# Patient Record
Sex: MALE | Race: BLACK OR AFRICAN AMERICAN | Employment: OTHER | URBAN - METROPOLITAN AREA
[De-identification: names, ages, dates, MRNs, and addresses within clinical notes are randomized per-mention and may not be internally consistent; named-entity substitution may affect disease eponyms.]

---

## 2017-07-20 ENCOUNTER — TRANSFERRED RECORDS (OUTPATIENT)
Dept: HEALTH INFORMATION MANAGEMENT | Facility: CLINIC | Age: 72
End: 2017-07-20

## 2017-07-24 ENCOUNTER — TRANSFERRED RECORDS (OUTPATIENT)
Dept: HEALTH INFORMATION MANAGEMENT | Facility: CLINIC | Age: 72
End: 2017-07-24

## 2017-08-01 ENCOUNTER — MEDICAL CORRESPONDENCE (OUTPATIENT)
Dept: HEALTH INFORMATION MANAGEMENT | Facility: CLINIC | Age: 72
End: 2017-08-01

## 2017-08-01 ENCOUNTER — TRANSFERRED RECORDS (OUTPATIENT)
Dept: HEALTH INFORMATION MANAGEMENT | Facility: CLINIC | Age: 72
End: 2017-08-01

## 2017-08-11 LAB — COPATH REPORT: NORMAL

## 2017-08-11 PROCEDURE — 00000346 ZZHCL STATISTIC REVIEW OUTSIDE SLIDES TC 88321: Performed by: UROLOGY

## 2017-08-18 ENCOUNTER — PRE VISIT (OUTPATIENT)
Dept: UROLOGY | Facility: CLINIC | Age: 72
End: 2017-08-18

## 2017-08-18 NOTE — TELEPHONE ENCOUNTER
Patient with urothelial carcinoma coming in for a consult. Chart reviewed and all records available. No need for a call.

## 2017-08-22 ENCOUNTER — OFFICE VISIT (OUTPATIENT)
Dept: UROLOGY | Facility: CLINIC | Age: 72
End: 2017-08-22

## 2017-08-22 VITALS
SYSTOLIC BLOOD PRESSURE: 153 MMHG | WEIGHT: 159.7 LBS | HEART RATE: 68 BPM | BODY MASS INDEX: 19.45 KG/M2 | DIASTOLIC BLOOD PRESSURE: 91 MMHG | HEIGHT: 76 IN

## 2017-08-22 DIAGNOSIS — C68.0 URETHRAL CANCER (H): Primary | ICD-10-CM

## 2017-08-22 RX ORDER — FLUTICASONE PROPIONATE 50 MCG
1 SPRAY, SUSPENSION (ML) NASAL EVERY MORNING
COMMUNITY
Start: 2013-07-28 | End: 2017-12-13

## 2017-08-22 RX ORDER — CEFAZOLIN SODIUM 1 G/3ML
1 INJECTION, POWDER, FOR SOLUTION INTRAMUSCULAR; INTRAVENOUS SEE ADMIN INSTRUCTIONS
Status: CANCELLED | OUTPATIENT
Start: 2017-08-22

## 2017-08-22 ASSESSMENT — ENCOUNTER SYMPTOMS
BOWEL INCONTINENCE: 0
MYALGIAS: 1
MUSCLE CRAMPS: 1
JAUNDICE: 0
DYSURIA: 1
ARTHRALGIAS: 1
DIFFICULTY URINATING: 1
HEARTBURN: 1
MUSCLE WEAKNESS: 0
TASTE DISTURBANCE: 0
BLOATING: 0
RECTAL BLEEDING: 0
TROUBLE SWALLOWING: 1
DIARRHEA: 0
DEPRESSION: 0
ABDOMINAL PAIN: 0
BLOOD IN STOOL: 0
STIFFNESS: 0
HOARSE VOICE: 0
NERVOUS/ANXIOUS: 1
BACK PAIN: 0
HEMATURIA: 0
RECTAL PAIN: 0
NECK PAIN: 1
PANIC: 0
DECREASED CONCENTRATION: 0
SINUS PAIN: 1
VOMITING: 0
FLANK PAIN: 0
NECK MASS: 0
SMELL DISTURBANCE: 0
NAUSEA: 0
SORE THROAT: 0
JOINT SWELLING: 0
INSOMNIA: 1
SINUS CONGESTION: 1
CONSTIPATION: 1

## 2017-08-22 ASSESSMENT — PAIN SCALES - GENERAL: PAINLEVEL: NO PAIN (0)

## 2017-08-22 NOTE — MR AVS SNAPSHOT
After Visit Summary   8/22/2017    King Gonsalo Bruno    MRN: [id number]           Patient Information     Date Of Birth          1945        Visit Information        Provider Department      8/22/2017 10:00 AM Muriel Haddad MD [facility] Urology and [facility] Prostate and Urologic Cancers        Today's Diagnoses     Urethral cancer (H)    -  1       Follow-ups after your visit        Additional Services     PAC Visit Referral (For [facility] Only)       Does this visit require an Anesthesia consult?  Yes - Evaluate for medical necessity related to one of the following conditions:      H&P done by:  N/A and Other (Specify): PAC      Please be aware that coverage of these services is subject to the terms and limitations of your health insurance plan.  Call member services at your health plan with any benefit or coverage questions.      Please bring the following to your appointment:  >>   Any x-rays, CTs or MRIs which have been performed.  Contact the facility where they were done to arrange for  prior to your scheduled appointment.  Any new CT, MRI or other procedures ordered by your specialist must be performed at a [facility] facility or coordinated by your clinic's referral office.    >>   List of current medications  >>   This referral request   >>   Any documents/labs given to you for this referral                  Your next 10 appointments already scheduled     Aug 23, 2017  9:45 AM CDT   PET ONCOLOGY WHOLE BODY with UUPET1   [facility] PET CT ([facility] [place])    [address] [facility] [address] [phone]           Tell your doctor:   If there is any chance you may be pregnant or if you are breastfeeding.   If you have problems lying in small spaces (claustrophobia). If you do, your doctor may give you medicine to help you relax. If you have diabetes:   Have your exam early in the morning. Your blood glucose will go up as  the day goes by.   Your glucose level must be 180 or less at the start of the exam. Please take any medicines you need to ensure this blood glucose level. 24 hours before your scan: Don t do any heavy exercise. (No jogging, aerobics or other workouts.) Exercise will make your pictures less accurate. 6 hours before your scan:   Stop all food and liquids (except water).   Do not chew gum or suck on mints.   If you need to take medicine with food, you may take it with a few crackers.  Please call your Imaging Department at your exam site with any questions.            Aug 23, 2017 11:45 AM CDT   (Arrive by 11:30 AM)   New Patient Visit with Steve Arceo MD   [facility] ([facility] [place])    [address] [place] [id number] [phone]              Future tests that were ordered for you today     Open Future Orders        Priority Expected Expires Ordered    PET Oncology Whole Body Routine  8/22/2018 8/22/2017            Who to contact     Please call your clinic at [phone] to:    Ask questions about your health    Make or cancel appointments    Discuss your medicines    Learn about your test results    Speak to your doctor   If you have compliments or concerns about an experience at your clinic, or if you wish to file a complaint, please contact [facility] Patient Relations at [phone] or email us at [email or web address]         Additional Information About Your Visit        Pryloshart Information     [employer] is an electronic gateway that provides easy, online access to your medical records. With [employer], you can request a clinic appointment, read your test results, renew a prescription or communicate with your care team.     To sign up for [employer] visit the website at [email or web address]   You will be asked to enter the access code listed below, as well as some personal information. Please  "follow the directions to create your username and password.     Your access code is: [email or web address]  Expires: 10/31/2017  1:07 PM     Your access code will  in 90 days. If you need help or a new code, please contact your [facility] or call [phone] for assistance.        Care EveryWhere ID     This is your Care EveryWhere ID. This could be used by other organizations to access your [facility] medical records  [id number]        Your Vitals Were     Pulse Height BMI (Body Mass Index)             68 1.93 m (6' 4\") 19.44 kg/m2          Blood Pressure from Last 3 Encounters:   17 (!) 153/91    Weight from Last 3 Encounters:   17 72.4 kg (159 lb 11.2 oz)              We Performed the Following     PAC Visit Referral (For [facility] Only)     Deysi-Operative Worksheet        Primary Care Provider Office Phone # Fax #    Joan Janet Ventura [phone]       [place] [address]        Equal Access to Services     SHILPI RED : Hadii aad ku hadasho Soomaali, waaxda luqadaha, qaybta kaalmada adeegyada, ronald srivastava . So [facility] [phone].    ATENCIÓN: Si habla español, tiene a washburn disposición servicios gratuitos de asistencia lingüística. Juwan al [phone].    We comply with applicable federal civil rights laws and [place] laws. We do not discriminate on the basis of race, color, national origin, age, disability sex, sexual orientation or gender identity.            Thank you!     Thank you for choosing [facility]OLOGY AND [facility] FOR PROSTATE AND UROLOGIC CANCERS  for your care. Our goal is always to provide you with excellent care. Hearing back from our patients is one way we can continue to improve our services. Please take a few minutes to complete the written survey that you may receive in the mail after your visit with us. Thank you!             Your Updated Medication List - Protect others around " you: Learn how to safely use, store and throw away your medicines at www.disposemymeds.org.          This list is accurate as of: 8/22/17 12:08 PM.  Always use your most recent med list.                   Brand Name Dispense Instructions for use Diagnosis    fluticasone 50 MCG/ACT spray    FLONASE     1 spray        ranitidine 150 MG tablet    ZANTAC     Take 150 mg by mouth

## 2017-08-22 NOTE — LETTER
8/22/2017       RE: King Gonsalo Bruno  [address]  [place] [facility] [address]     Dear Colleague,    Thank you for referring your patient, King Gonsalo Burno, to the [facility] UROLOGY AND [facility] FOR PROSTATE AND UROLOGIC CANCERS at [facility]. Please see a copy of my visit note below.    REASON FOR CONSULTATION:  Urethral cancer.      HISTORY OF PRESENT ILLNESS:  Mr. Bruno is a 71-year-old gentleman who originally presented for evaluation with his Primary Care physician with complaints of urinary difficulties.  He also had penile discomfort and mild hematuria for the last 4-6 months.  He at that time apparently had a strong stream and denied any history of urethral stricture or injury.  Also, he had no history of any STDs.  He subsequently was evaluated and found to have gross hematuria and then seen by Dr. Dunn.  Dr. Dunn was able to palpate the urethra, which was markedly abnormal, and he was very concerned about it.  He subsequently underwent a cystoscopy, at which time a very rough and irregular penile urethra was noted.  The urethra was dilated, as it could not be cystoscopically examined, and biopsy of this penile urethral area and mass was obtained.  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  He has then been counseled that he will need some sort of surgery to excise his urethra and needs some sort of urinary diversion, perhaps with a perineal urethrostomy, and he is here for further evaluation and possible discussion of treatment options.  He continues to have some urinary difficulties with a poor stream.  He denies any pain at this time.  He denies any significant bleeding.  He denies any back pain, bone pain or weight loss recently.  He does, however, have weight loss over the last couple of years of about 20 pounds, which he says may have been really linked to an esophageal diverticulum he has had, for which  he has had an endoscopy and treatments.  He also has a history of anemia.      PAST MEDICAL HISTORY:  Anemia, dysphagia and hypertension.  He also has GERD.      PAST SURGICAL HISTORY:  Laryngoscopy, takedown of hypopharyngeal diverticula.  He has a left upper quadrant abdominal incision as well.      SOCIAL HISTORY:  He is a retired  who was a former smoker, has a 10 pack year history of smoking, quit in 1992.  He quit alcohol use.  He currently has a significant other.         FAMILY HISTORY:  Significant for cancer in the brother, diabetes in the brother, hypertension in mother and prostate cancer in the brother.      MEDICATIONS:  Flonase, tolterodine, MiraLax, ibuprofen, oxycodone, hydrochlorothiazide, hydroxyzine, omeprazole.      ALLERGIES:  Lisinopril.      REVIEW OF SYSTEMS:  Review of 13 different systems is documented in the chart.  The only pertinent positives include  with difficulty urinating, burning, urgency and bladder spasms as well as GI with some dyspepsia difficulties and slight difficulty swallowing.      PHYSICAL EXAMINATION:   VITAL SIGNS:  He is afebrile.  Blood pressure is 152/91, pulse 68, weight 70 kg.   HEAD AND NECK:  Atraumatic, normocephalic.  PERRL, EOMI.  Oral mucosa moist and pink.  Neck is supple.  No jugular venous distention.  No palpable adenopathy or masses.   ABDOMEN:  Soft, nontender, nondistended.  No palpable organomegaly.  No CVA tenderness.   EXTREMITIES:  No clubbing, cyanosis or edema.   EXTERNAL GENITALIA:  Fully descended testicles bilaterally, palpably normal.  Phallus abnormal  He has a very firm, nontender mass lesion that extends along the urethra from about the mid penile shaft all the way to the perineum.     RECTAL:  Normal tone.  No masses palpable.  Normal-sized prostate, smooth and nontender.      IMAGING:  It should be noted that he had a CT of the abdomen and pelvis, which did not reveal any evidence of metastatic disease.  He also obtained  an MRI of the penis at the outside institution, which confirmed the presence of a large, almost occlusive mass along the bulbar urethra and the urethra of the penile shaft.  It did not reveal any betty metastases on the MRI or CT scan.      ASSESSMENT AND PLAN:  This is a gentleman with newly diagnosed urothelial carcinoma, primarily arising from the urethra.  It appears that his bladder itself has not been evaluated, and he may quite well be in some amount of retention at this time.  He denies any local pain at this time except for trying to urinate.  I could not palpate any inguinal lymph nodes in this gentleman.  At this point, I think we would like to obtain better staging information with a PET-CT scan.  I also spoke to his urologist, Dr. Dunn, and confirmed that he had not really thoroughly examined the bladder and also performed a bladder biopsy.  He was planning to do that, and the plan was also to perhaps resect the urethra and perform a perineal urethrostomy.  I presented to Mr. Bruno that we should probably pursue this line of evaluation after the PET-CT, perform a rigid cystoscopy of the bladder and possibly biopsy the bladder to ensure there is no urothelial component of cancer in the bladder.  I also suggested that we place an SP tube at that time to relieve the outlet obstruction.  I would also like him to see Medical Oncology to see if he would be a good candidate for neoadjuvant chemotherapy, since if he responds to that, we may be able to spare a larger extent of the urethra and make it more feasible for him to have a perineal urethrostomy.  He certainly would need surgery to remove the distal urethra.  Potentially, the corpora cavernosa could be preserved.  If there is not enough urethra left over after the distal portion is resected and a perineal urethrostomy could not be performed, he may need a complete radical cystoprostatectomy or have a bladder diversion with closure of the bladder  neck.  I discussed all of these options with him, and I will also discuss him with my colleague, Dr. Ferdinand Magaña, who will be able to helo with surgical reconstruction ultimately.  At this point, I would like him to also see Medical Oncology.  We will also schedule a cystoscopy with SP tube placement and bladder evaluation and possible biopsy in the near future.           Again, thank you for allowing me to participate in the care of your patient.      Sincerely,    Muriel Haddad MD

## 2017-08-23 ENCOUNTER — ALLIED HEALTH/NURSE VISIT (OUTPATIENT)
Dept: UROLOGY | Facility: CLINIC | Age: 72
End: 2017-08-23

## 2017-08-23 ENCOUNTER — ALLIED HEALTH/NURSE VISIT (OUTPATIENT)
Dept: SURGERY | Facility: CLINIC | Age: 72
End: 2017-08-23

## 2017-08-23 ENCOUNTER — ANESTHESIA EVENT (OUTPATIENT)
Dept: SURGERY | Facility: AMBULATORY SURGERY CENTER | Age: 72
End: 2017-08-23

## 2017-08-23 ENCOUNTER — OFFICE VISIT (OUTPATIENT)
Dept: SURGERY | Facility: CLINIC | Age: 72
End: 2017-08-23

## 2017-08-23 ENCOUNTER — CARE COORDINATION (OUTPATIENT)
Dept: ONCOLOGY | Facility: CLINIC | Age: 72
End: 2017-08-23

## 2017-08-23 ENCOUNTER — ONCOLOGY VISIT (OUTPATIENT)
Dept: ONCOLOGY | Facility: CLINIC | Age: 72
End: 2017-08-23
Attending: INTERNAL MEDICINE
Payer: COMMERCIAL

## 2017-08-23 ENCOUNTER — HOSPITAL ENCOUNTER (OUTPATIENT)
Dept: PET IMAGING | Facility: CLINIC | Age: 72
Discharge: HOME | End: 2017-08-23
Attending: UROLOGY | Admitting: UROLOGY
Payer: MEDICARE

## 2017-08-23 VITALS
HEART RATE: 76 BPM | DIASTOLIC BLOOD PRESSURE: 92 MMHG | TEMPERATURE: 98.2 F | OXYGEN SATURATION: 100 % | WEIGHT: 162.26 LBS | HEIGHT: 76 IN | BODY MASS INDEX: 19.76 KG/M2 | SYSTOLIC BLOOD PRESSURE: 165 MMHG | RESPIRATION RATE: 18 BRPM

## 2017-08-23 VITALS
SYSTOLIC BLOOD PRESSURE: 165 MMHG | BODY MASS INDEX: 19.75 KG/M2 | WEIGHT: 162.2 LBS | OXYGEN SATURATION: 100 % | TEMPERATURE: 98.2 F | HEIGHT: 76 IN | HEART RATE: 76 BPM | RESPIRATION RATE: 17 BRPM | DIASTOLIC BLOOD PRESSURE: 92 MMHG

## 2017-08-23 DIAGNOSIS — C68.0 URETHRAL CANCER (H): ICD-10-CM

## 2017-08-23 DIAGNOSIS — C68.0 URETHRAL CANCER (H): Primary | ICD-10-CM

## 2017-08-23 DIAGNOSIS — Z01.811 PRE-OP CHEST EXAM: Primary | ICD-10-CM

## 2017-08-23 DIAGNOSIS — Z01.811 PRE-OP CHEST EXAM: ICD-10-CM

## 2017-08-23 DIAGNOSIS — C67.9 BLADDER CANCER (H): Primary | ICD-10-CM

## 2017-08-23 LAB
ALBUMIN UR-MCNC: NEGATIVE MG/DL
APPEARANCE UR: CLEAR
BACTERIA #/AREA URNS HPF: ABNORMAL /HPF
BILIRUB UR QL STRIP: NEGATIVE
COLOR UR AUTO: ABNORMAL
CREAT BLD-MCNC: 1.3 MG/DL (ref 0.66–1.25)
GFR SERPL CREATININE-BSD FRML MDRD: 55 ML/MIN/1.7M2
GLUCOSE BLDC GLUCOMTR-MCNC: 109 MG/DL (ref 70–99)
GLUCOSE UR STRIP-MCNC: NEGATIVE MG/DL
HGB UR QL STRIP: ABNORMAL
KETONES UR STRIP-MCNC: NEGATIVE MG/DL
LEUKOCYTE ESTERASE UR QL STRIP: ABNORMAL
MUCOUS THREADS #/AREA URNS LPF: PRESENT /LPF
NITRATE UR QL: NEGATIVE
PH UR STRIP: 6 PH (ref 5–7)
RBC #/AREA URNS AUTO: 22 /HPF (ref 0–2)
SOURCE: ABNORMAL
SP GR UR STRIP: 1 (ref 1–1.03)
SQUAMOUS #/AREA URNS AUTO: 1 /HPF (ref 0–1)
UROBILINOGEN UR STRIP-MCNC: 0 MG/DL (ref 0–2)
WBC #/AREA URNS AUTO: 2 /HPF (ref 0–2)

## 2017-08-23 PROCEDURE — A9552 F18 FDG: HCPCS | Performed by: UROLOGY

## 2017-08-23 PROCEDURE — 82565 ASSAY OF CREATININE: CPT

## 2017-08-23 PROCEDURE — 78816 PET IMAGE W/CT FULL BODY: CPT | Mod: PI

## 2017-08-23 PROCEDURE — 82962 GLUCOSE BLOOD TEST: CPT

## 2017-08-23 PROCEDURE — 71260 CT THORAX DX C+: CPT

## 2017-08-23 PROCEDURE — 34300033 ZZH RX 343: Performed by: UROLOGY

## 2017-08-23 PROCEDURE — 99205 OFFICE O/P NEW HI 60 MIN: CPT | Mod: ZP | Performed by: INTERNAL MEDICINE

## 2017-08-23 PROCEDURE — 25000128 H RX IP 250 OP 636: Performed by: UROLOGY

## 2017-08-23 PROCEDURE — 99212 OFFICE O/P EST SF 10 MIN: CPT | Mod: ZF

## 2017-08-23 RX ORDER — ALBUTEROL SULFATE 90 UG/1
1-2 AEROSOL, METERED RESPIRATORY (INHALATION)
Status: CANCELLED
Start: 2017-08-23

## 2017-08-23 RX ORDER — EPINEPHRINE 1 MG/ML
0.3 INJECTION INTRAMUSCULAR; INTRAVENOUS; SUBCUTANEOUS EVERY 5 MIN PRN
Status: CANCELLED | OUTPATIENT
Start: 2017-08-23

## 2017-08-23 RX ORDER — IOPAMIDOL 755 MG/ML
97 INJECTION, SOLUTION INTRAVASCULAR ONCE
Status: COMPLETED | OUTPATIENT
Start: 2017-08-23 | End: 2017-08-23

## 2017-08-23 RX ORDER — ALBUTEROL SULFATE 90 UG/1
1-2 AEROSOL, METERED RESPIRATORY (INHALATION)
Status: CANCELLED
Start: 2017-09-22

## 2017-08-23 RX ORDER — PALONOSETRON 0.05 MG/ML
0.25 INJECTION, SOLUTION INTRAVENOUS ONCE
Status: CANCELLED
Start: 2017-09-22

## 2017-08-23 RX ORDER — EPINEPHRINE 0.3 MG/.3ML
0.3 INJECTION SUBCUTANEOUS EVERY 5 MIN PRN
Status: CANCELLED | OUTPATIENT
Start: 2017-09-22

## 2017-08-23 RX ORDER — EPINEPHRINE 1 MG/ML
0.3 INJECTION INTRAMUSCULAR; INTRAVENOUS; SUBCUTANEOUS EVERY 5 MIN PRN
Status: CANCELLED | OUTPATIENT
Start: 2017-09-22

## 2017-08-23 RX ORDER — DIPHENHYDRAMINE HYDROCHLORIDE 50 MG/ML
50 INJECTION INTRAMUSCULAR; INTRAVENOUS
Status: CANCELLED
Start: 2017-08-23

## 2017-08-23 RX ORDER — SODIUM CHLORIDE 9 MG/ML
1000 INJECTION, SOLUTION INTRAVENOUS CONTINUOUS PRN
Status: CANCELLED
Start: 2017-09-22

## 2017-08-23 RX ORDER — METHYLPREDNISOLONE SODIUM SUCCINATE 125 MG/2ML
125 INJECTION, POWDER, LYOPHILIZED, FOR SOLUTION INTRAMUSCULAR; INTRAVENOUS
Status: CANCELLED
Start: 2017-09-22

## 2017-08-23 RX ORDER — LORAZEPAM 2 MG/ML
0.5 INJECTION INTRAMUSCULAR EVERY 4 HOURS PRN
Status: CANCELLED
Start: 2017-08-23

## 2017-08-23 RX ORDER — SODIUM CHLORIDE 9 MG/ML
1000 INJECTION, SOLUTION INTRAVENOUS CONTINUOUS PRN
Status: CANCELLED
Start: 2017-08-23

## 2017-08-23 RX ORDER — MEPERIDINE HYDROCHLORIDE 25 MG/ML
25 INJECTION INTRAMUSCULAR; INTRAVENOUS; SUBCUTANEOUS EVERY 30 MIN PRN
Status: CANCELLED | OUTPATIENT
Start: 2017-09-22

## 2017-08-23 RX ORDER — MEPERIDINE HYDROCHLORIDE 25 MG/ML
25 INJECTION INTRAMUSCULAR; INTRAVENOUS; SUBCUTANEOUS EVERY 30 MIN PRN
Status: CANCELLED | OUTPATIENT
Start: 2017-08-23

## 2017-08-23 RX ORDER — LORAZEPAM 2 MG/ML
0.5 INJECTION INTRAMUSCULAR EVERY 4 HOURS PRN
Status: CANCELLED
Start: 2017-09-22

## 2017-08-23 RX ORDER — DIPHENHYDRAMINE HYDROCHLORIDE 50 MG/ML
50 INJECTION INTRAMUSCULAR; INTRAVENOUS
Status: CANCELLED
Start: 2017-09-22

## 2017-08-23 RX ORDER — ALBUTEROL SULFATE 0.83 MG/ML
2.5 SOLUTION RESPIRATORY (INHALATION)
Status: CANCELLED | OUTPATIENT
Start: 2017-08-23

## 2017-08-23 RX ORDER — PALONOSETRON 0.05 MG/ML
0.25 INJECTION, SOLUTION INTRAVENOUS ONCE
Status: CANCELLED
Start: 2017-08-23

## 2017-08-23 RX ORDER — EPINEPHRINE 0.3 MG/.3ML
0.3 INJECTION SUBCUTANEOUS EVERY 5 MIN PRN
Status: CANCELLED | OUTPATIENT
Start: 2017-08-23

## 2017-08-23 RX ORDER — METHYLPREDNISOLONE SODIUM SUCCINATE 125 MG/2ML
125 INJECTION, POWDER, LYOPHILIZED, FOR SOLUTION INTRAMUSCULAR; INTRAVENOUS
Status: CANCELLED
Start: 2017-08-23

## 2017-08-23 RX ORDER — OMEPRAZOLE 20 MG/1
20 TABLET, DELAYED RELEASE ORAL 2 TIMES DAILY
COMMUNITY
End: 2017-10-06

## 2017-08-23 RX ORDER — ALBUTEROL SULFATE 0.83 MG/ML
2.5 SOLUTION RESPIRATORY (INHALATION)
Status: CANCELLED | OUTPATIENT
Start: 2017-09-22

## 2017-08-23 RX ADMIN — FLUDEOXYGLUCOSE F-18 10.16 MCI.: 500 INJECTION, SOLUTION INTRAVENOUS at 10:00

## 2017-08-23 RX ADMIN — IOPAMIDOL 97 ML: 755 INJECTION, SOLUTION INTRAVENOUS at 10:46

## 2017-08-23 ASSESSMENT — PAIN SCALES - GENERAL: PAINLEVEL: NO PAIN (0)

## 2017-08-23 NOTE — NURSING NOTE
Pre Op Teaching Flowsheet       Pre and Post op Patient Education  Relevant Diagnosis:  Bladder cancer  Surgical procedure:  Cystoscopy bladder biopsy possible suprapubic tube placement  Teaching Topic:  Pre and post op teaching  Person Involved in teaching: King Gonsalo Bruno    Motivation Level:  Asks Questions: Yes  Eager to Learn:  Yes  Cooperative: Yes  Receptive (willing/able to accept information):  Yes    Patient demonstrates understanding of the following:  Date of surgery:  8/31/17  Location of surgery:  [facility]- 5th Floor  History and Physical and any other testing necessary prior to surgery: Yes  Required time line for completion of History and Physical and any pre-op testing: Yes    Patient demonstrates understanding of the following:  Pre-op bowel prep:  None needed  Pre-op showering/scrub information with PCMX Soap: Yes  Blood thinner medications discussed and when to stop (if applicable):  Yes      Infection Prevention:   Patient demonstrates understanding of the following:  Surgical procedure site care taught: at time of discharge  Signs and symptoms of infection taught:  Yes      Post-op follow-up:  Discussed how to contact the hospital, nurse, and clinic scheduling staff if necessary.    Instructional materials used/given/mailed:  [facility] Booklet, post op teaching sheet, Map, Soap, and arrival/location information.    Surgical instructions packet given to patient in office:  Yes.

## 2017-08-23 NOTE — PATIENT INSTRUCTIONS
Hospital Visiting Restrictions:   Due to the current measles outbreak, visitor restrictions are in place in the hospitals. No visitors under 5 are allowed to visit. Also, visitors who are unvaccinated for measles and those who are currently ill are also asked to refrain from visiting.  Preparing for Your Surgery      Name:  King Gonsalo Bruno   MRN:  [id number]   :  1945   Today's Date:  2017     Arriving for surgery:  Surgery date: 17  Surgery time:  1200 PM  Arrival time:  1030 AM  Please come to:     [facility]  [address] [place] [id number]     Parking is available in front of the Clinics and Surgery Center building from 5:30AM to 8:00PM.  -  Proceed to the 5th floor to check into the Ambulatory Surgery Center.              >> There will be patient concierges on the 1st and 5th floor, for assistance or an escort, if you would like.              >> Please call [phone] with any questions.    What can I eat or drink?  -  You may have solid food or milk products until 8 hours prior to your surgery. 12 MN Wednesday nayely.  -  You may have water, apple juice or 7up/Sprite until 2 hours prior to your surgery. 1000 AM Thursday.    Which medicines can I take?  -  Do NOT take these medications in the morning, the day of surgery:  HOLD HYDROCHLOROTHIAZIDE AM OF SURG.    -  Please take these medications the day of surgery:  SCHEDULED MEDS.    How do I prepare myself?  -  Take two showers: one the night before surgery; and one the morning of surgery.         Use Scrubcare or Hibiclens to wash from neck down.  You may use your own shampoo and conditioner. No other hair products.   -  Do NOT use lotion, powder, deodorant, or antiperspirant the day of your surgery.  -  Do NOT wear any makeup, fingernail polish or jewelry.  -  Begin using Incentive Spirometer 1 week prior to surgery.  Use 4 times per day, up to 5-10 breaths each time.  Bring Incentive Spirometer  to hospital.  -Do not bring your own medications to the hospital, except for inhalers and eye drops.  -  Bring your ID and insurance card.    Questions or Concerns:  If you have questions or concerns, please call the  Preoperative Assessment Center, Monday-Friday 7AM-7PM:  [phone]

## 2017-08-23 NOTE — LETTER
8/23/2017       RE: King Gonsalo Bruno  [address]  [facility] [address]     Dear Colleague,    Thank you for referring your patient, King Gonsalo Bruno, to the [facility]. Please see a copy of my visit note below.    [facility] CANCER 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     He has been following with his PCP and had been for routine annual physical on 6/14/17. He expressed burning sensation at the start of urination. He was noted to have microscopic hematuria per patient. He was seen in urology at [facility] by Dr. Ry Dunn on 7/20/17. He had cystoscopy, urethral dilation and biopsy done on 7/24/17 which revealed invasive high grade carcinoma with squamous differentiation. He does admit to having urinary difficulties for the last 6 months. He though it was because he was not circumcised.     He has seen Dr. Haddad yesterday and had a PET-CT earlier today.     He has good appetite. He has fair energy. He plays golf 18 holes twice a week. He feels good. He lost some weight which he attributes to a pouch in back of his throat. He has regained his weight.     He denies CP/SOB. He has good hearing. He does admit to occasional ringing in ears. He does get vertigo when he has too much fluid in sinuses. He denies  nausea/vomiting/diarrhea. He has some constipation.     He has little anxiety since his pouch in throat. He takes hydroxyzine to sleep and anxiety.     He is developing arthritis in his left hand and neck.      PAST MEDICAL HISTORY   - HTN  - New urothelial cancer involving bladder and penile urethra      CURRENT OUTPATIENT MEDICATIONS     Current Outpatient Prescriptions   Medication Sig     fluticasone (FLONASE) 50 MCG/ACT spray 1 spray     ranitidine (ZANTAC) 150 MG tablet Take 150 mg by mouth     No current facility-administered medications for this visit.    HCTZ 25 mg po qday     ALLERGIES      Allergies   Allergen Reactions     Lisinopril Swelling        SOCIAL HISTORY   He is in long term relationship but not officially . He lives with his significant other.     He is retired. He worked as mental health crisis .     He does not smoke. He quit smoking in May 1992. He smoked half a pack a day off and on since he was 22. 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 He does not drink. He quit alcohol in . He denies recreational drug use.        FAMILY HISTORY   Brother Lázaro  of prostate cancer - got prostate cancer at 63  Another brother had lung cancer  DM TII in several members of family; grand father had amputation from his DM. Brother James also had DM TII and CKD     REVIEW OF SYSTEMS   As above in the HPI, o/w complete 12-point ROS was negative.     PHYSICAL EXAM   B/P: Data Unavailable, T: Data Unavailable, P: Data Unavailable, R: Data Unavailable  SpO2 Readings from Last 4 Encounters:   No data found for SpO2     Wt Readings from Last 3 Encounters:   17 72.4 kg (159 lb 11.2 oz)     GEN: NAD  HEENT: PERRL, EOMI, no icterus, injection or pallor. Oropharynx is clear.  NECK: no cervical or supraclavicular lymphadenopathy  LUNGS: clear bilaterally  CV: regular, no murmurs, rubs, or gallops  ABDOMEN: soft, non-tender, non-distended, normal bowel sounds, no hepatosplenomegaly by  percussion or palpation  EXT: warm, well perfused, no edema  NEURO: alert  SKIN: no rashes     LABORATORY AND IMAGING STUDIES     Lab Results   Component Value Date    PATH  08/11/2017     Patient Name: KING AILYN CHAVEZ  MR#: [id number]  Specimen #: [id number]  Collected: 8/11/2017  Received: 8/11/2017  Reported: 8/11/2017 18:42  Ordering Phy(s): BRANDAN BRUCE  Additional Phy(s): [facility], Pathology Department    For improved result formatting, select 'View Enhanced Report Format'  under Linked Documents section.    TEST(S):  1 Slide, case #[id number]    FINAL DIAGNOSIS:  CASE FROM [place], [place] ([id number], OBTAINED  07/24/2017):  Urethra, biopsy:  - Invasive high grade carcinoma with squamous differentiation    I have personally reviewed all specimens and or slides, including the  listed special stains, and used them with my medical judgement to  determine the final diagnosis.    Electronically signed out by:    Jaime Stout M.D., [facility]    CLINICAL HISTORY:  The patient is a 71-year-old male.    GROSS:  Received from [facility] in [place], [place] are 1 stained slide  labeled [id number] (obtained 07/24/2017) and a copy of the referring  pathologist's report with patient identifying information.  All slides  are returned.    CPT Codes:  A: 86651-AZK6    TESTING LAB LOCATION:  [address], [address]   [address]  [phone]    COLLECTION SITE:  Client:  [facility]  Location:  [facility] (B)         No results found for this or any previous visit.      ASSESSMENT    1. Stage II urothelial cancer involving penile urethra  2. ECOG PS 1  3. HTN  4. No other medical comorbidity    DISCUSSION   I had a lengthy discussion with  regarding recent diagnosis of bladder cancer. We briefly touched on the fact that cigarette smoking is the most significant cause  for bladder cancer, and it is encouraging that  has at least quit smoking  About 40 yrs ago. We focused on the typical disease course, prognosis, and different treatment options.    has locally advanced urothelial cancer. Despite surgical resection a significant portion of bladder cancer does recur. To decrease this risk of recurrence chemotherapy either prior to cystectomy (shar-adjuvant) or post resection in the adjuvant setting has been studied. There has been evidence to suggest benefit for patients with high risk disease who received shar-adjuvant chemotherapy. This addresses micro-metastatic disease at the earliest. This gives an opportunity to test the effectiveness of therapy within the patients body. A complete pathologic response is associated with excellent long term outcomes.      Regimen with methotrexate, vinblastine, doxorubicin, and cisplatin (MVAC) was the standard of care through 1980's and 90's. Unfortunately, this was a fairly toxic regimen with about 3 to 4% treatment-related mortality. Since the last decade, cisplatin with gemcitabine has emerged as the preferred regimen in the neoadjuvant, adjuvant, or the metastatic setting. The only study was done in the metastatic setting, which compared these 2 regimens, showed similar efficacy but much better toxicity profile for cisplatin with gemcitabine. Across the [place] and [place], this is the preferred regimen in the perioperative setting. However more recently there has been a renewed interest in accelerated or dose dense MVAC. In this the 4 drugs are administered on the first day every 2 weeks and the subsequent weekly doses of methotrexate are skipped. This increases the dose intensity while decreasing the mucositis, one of primary concerns with 4 drug regimen.   There have been no large head to head comparisons with the two combinations. Several studies suggest that the 4 drug regimen with MVAC when administered in dose  dense/accelerated fashion - has higher pathologic complete response and downstaging rates than reported for cisplatin with gemcitabine regimen. (Rian robbins et al. J Clin Oncol. 2014 Jun 20;32(18):1889-94; Eduardo et al. J Clin Oncol. 2014 Jun 20;32(18):1889-94; Cancer. 2012 Aug 15;118(16):3920-7). It is well established for bladder cancer - pathologic downstaging correlates with survival. At many major centers, this has become the preferred regimen in most fit patients. He is little fearful of side effects from chemotherapy. After reviewing both treatment options and expected side effects, he feels more comfortable with cisplatin and gemcitabine regimen.   We discussed the individual side effects of cisplatin and gemcitabine regimens, and I have also provided printed information. Most notably, we discussed tinnitus, hearing deficits, nephrotoxicity, peripheral neuropathy, nausea, and vomiting with cisplatin. We also reviewed the myelosuppression with these agents, and in particular thrombocytopenia, which is fairly common with gemcitabine. Both agents together could contribute to fatigue, diminished appetite, altered taste, neutropenia and possibly neutropenic fever. He has to take every temperature greater than 100.4F seriously and immediately call us to care. In case he is neutropenic or would potentially get neutropenic soon, we would admit him and treat with IV antibiotics for 48 hrs.    I also reviewed the treatment schedule and curative intent. The entire treatment is given as an outpatient. We discussed the placement of PORT for ease of venous access. He does have difficulty with venous access but he would like to not add any additional procedures at this time. We would defer PORT placement.        PLAN   1. On discussing the risks, benefits, and alternatives,  is enthusiastic about starting chemotherapy. We discussed the regimen schedule at great length,   2. He has a cystoscopy and TURBT scheduled  for next week. We will wait for 10 d to 2 weeks and make definite plans after his pathology is available.   3. Start chemotherapy with cisplatin to be given on day 1 and gemcitabine on day 1, day 8 for 3 -week cycle   4. He will be seen by me or my nurse practitioner prior to every cycle. I will restage after the end of 2 and 4 cycles for the planned 4 cycles of chemotherapy   5. I will coordinate care with Urology team  Over 80 minutes of direct face to face time spent with patient with more than 50% time spent in counseling and coordinating care.    Steve Abarca ,  Division of Hematology, Oncology & Transplantation  [facility].

## 2017-08-23 NOTE — MR AVS SNAPSHOT
After Visit Summary   2017    King Gonsalo Bruno    MRN: [id number]           Patient Information     Date Of Birth          1945        Visit Information        Provider Department      2017 2:00 PM Rn, [facility]        Care Instructions    Hospital Visiting Restrictions:   Due to the current measles outbreak, visitor restrictions are in place in the hospitals. No visitors under 5 are allowed to visit. Also, visitors who are unvaccinated for measles and those who are currently ill are also asked to refrain from visiting.  Preparing for Your Surgery      Name:  King Gonsalo Bruno   MRN:  [id number]   :  1945   Today's Date:  2017     Arriving for surgery:  Surgery date: 17  Surgery time:  1200 PM  Arrival time:  1030 AM  Please come to:     [facility]  [address] [place] [id number]     Parking is available in front of the Clinics and Surgery Center building from 5:30AM to 8:00PM.  -  Proceed to the 5th floor to check into the Ambulatory Surgery Center.              >> There will be patient concierges on the 1st and 5th floor, for assistance or an escort, if you would like.              >> Please call [phone] with any questions.    What can I eat or drink?  -  You may have solid food or milk products until 8 hours prior to your surgery. 12 [place] Wednesday nayely.  -  You may have water, apple juice or 7up/Sprite until 2 hours prior to your surgery. 1000 AM Thursday.    Which medicines can I take?  -  Do NOT take these medications in the morning, the day of surgery:  HOLD HYDROCHLOROTHIAZIDE AM OF SURG.    -  Please take these medications the day of surgery:  SCHEDULED MEDS.    How do I prepare myself?  -  Take two showers: one the night before surgery; and one the morning of surgery.         Use Scrubcare or Hibiclens to wash from neck down.  You may use your own shampoo and conditioner. No  other hair products.   -  Do NOT use lotion, powder, deodorant, or antiperspirant the day of your surgery.  -  Do NOT wear any makeup, fingernail polish or jewelry.  -  Begin using Incentive Spirometer 1 week prior to surgery.  Use 4 times per day, up to 5-10 breaths each time.  Bring Incentive Spirometer to hospital.  -Do not bring your own medications to the hospital, except for inhalers and eye drops.  -  Bring your ID and insurance card.    Questions or Concerns:  If you have questions or concerns, please call the  Preoperative Assessment Center, Monday-Friday 7AM-7PM:  [phone]                    Follow-ups after your visit        Your next 10 appointments already scheduled     Aug 23, 2017  3:20 PM CDT   (Arrive by 3:05 PM)   PAC Anesthesia Consult with  Pac Anesthesiologist   [facility] ([facility])    [address] [place] [address]   [phone]            Aug 23, 2017  3:40 PM CDT   (Arrive by 3:25 PM)   Return Visit with  Prostate Cancer Ctr Nurse   [facility] for Prostate and Urologic Cancers ([facility])    [address] [place] [id number] [phone]            Aug 23, 2017  4:15 PM CDT   LAB with  LAB   [facility] Lab ([facility])    [address] [place] [phone]   [phone]           Patient must bring picture ID. Patient should be prepared to give a urine specimen  Please do not eat 10-12 hours before your appointment if you are coming in fasting for labs on lipids, cholesterol, or glucose (sugar). Pregnant women should follow their Care Team instructions. Water with medications is okay. Do not drink coffee or other fluids. If you have concerns about taking  your medications, please ask at office or if scheduling via [employer], send a message by clicking on Secure Messaging, Message Your  Care Team.            Aug 31, 2017   Procedure with Muriel Haddad MD   [facility] ([facility])    [address] [place] [address]  5th [facility] [address] [phone]           Located in the Clinics and Surgery Center at [address] [place] [id number].   parking is very convenient and highly recommended.  is a $6 flat rate fee.  Both  and self parkers should enter the main arrival plaza from [facility]; parking attendants will direct you based on your parking preference.            Sep 13, 2017 11:20 AM CDT   (Arrive by 11:05 AM)   Post-Op with Muriel Haddad MD   [facility] Prostate and Urologic Cancers ([facility])    [address] [place] [address]  4th [facility] [address] [phone]              Future tests that were ordered for you today     Open Future Orders        Priority Expected Expires Ordered    UA reflex to Microscopic and Culture Routine 8/23/2017 9/22/2017 8/23/2017    PET Oncology Whole Body Routine  8/22/2018 8/22/2017            Who to contact     Please call your clinic at [phone] to:    Ask questions about your health    Make or cancel appointments    Discuss your medicines    Learn about your test results    Speak to your doctor   If you have compliments or concerns about an experience at your clinic, or if you wish to file a complaint, please contact [facility] Patient Relations at [phone] or email us at [email or web address]         Additional Information About Your Visit        "[employer] Information     [employer] is an electronic gateway that provides easy, online access to your medical records. With [employer], you can request a clinic appointment, read your test results, renew a prescription or communicate with your care team.     To sign up for [employer] visit the website at [email or web address]    You will be asked to enter the access code listed below, as well as some personal information. Please follow the directions to create your username and password.     Your access code is: [email or web address]  Expires: 10/31/2017  1:07 PM     Your access code will  in 90 days. If you need help or a new code, please contact your [facility] or call [phone] for assistance.        Care EveryWhere ID     This is your Care EveryWhere ID. This could be used by other organizations to access your [facility] medical records  [id number]         Blood Pressure from Last 3 Encounters:   17 (!) 165/92   17 (!) 165/92   17 (!) 153/91    Weight from Last 3 Encounters:   17 73.6 kg (162 lb 4.1 oz)   17 73.6 kg (162 lb 3.2 oz)   17 72.4 kg (159 lb 11.2 oz)              Today, you had the following     No orders found for display       Primary Care Provider Office Phone # Fax #    Joan Janet Ventura [phone]       [facility] [address] [facility] [address]        Equal Access to Services     JENELLE RED : Hadii aad ku hadasho Soomaali, waaxda luqadaha, qaybta kaalmada adeegyada, waxay nayin haylidian gab rasmussen. So [facility] [phone].    ATENCIÓN: Si habla español, tiene a washburn disposición servicios gratuitos de asistencia lingüística. Llame al [phone].    We comply with applicable federal civil rights laws and [place] laws. We do not discriminate on the basis of race, color, national origin, age, disability sex, sexual orientation or gender identity.            Thank you!     Thank you for choosing [facility]  for your care. Our goal is always to provide you with excellent care. Hearing back from our patients is one way we can continue to improve our services. Please take a few minutes to complete the written survey that you may receive in the mail after your visit with us. Thank you!              Your Updated Medication List - Protect others around you: Learn how to safely use, store and throw away your medicines at www.disposemymeds.org.          This list is accurate as of: 8/23/17  2:27 PM.  Always use your most recent med list.                   Brand Name Dispense Instructions for use Diagnosis    fluticasone 50 MCG/ACT spray    FLONASE     Spray 1 spray into both nostrils every morning        HYDROCHLOROTHIAZIDE PO      Take 50 mg by mouth every morning        HYDROXYZINE HCL PO      Take 5 mg by mouth At Bedtime        priLOSEC OTC 20 MG tablet   Generic drug:  omeprazole      Take 20 mg by mouth 2 times daily

## 2017-08-23 NOTE — MR AVS SNAPSHOT
After Visit Summary   8/23/2017    King Gonsalo Bruno    MRN: [id number]           Patient Information     Date Of Birth          1945        Visit Information        Provider Department      8/23/2017 3:40 PM Nurse, [facility] for Prostate and Urologic Cancers        Today's Diagnoses     Bladder cancer (H)    -  1       Follow-ups after your visit        Your next 10 appointments already scheduled     Aug 23, 2017  3:20 PM CDT   (Arrive by 3:05 PM)   PAC Anesthesia Consult with  Pac Anesthesiologist   [facility] Preoperative Assessment Center ([facility])    [address] [place] [address]  [facility] [id number] [phone]            Aug 23, 2017  3:40 PM CDT   (Arrive by 3:25 PM)   Return Visit with  Prostate Cancer Ctr Nurse   [facility] for Prostate and Urologic Cancers ([facility])    [address] [place] [address]  [facility] [id number] [phone]            Aug 23, 2017  4:15 PM CDT   LAB with NEO LAB   [facility] Lab ([facility])    [address] [place] [address]  [facility] [id number] [phone]           Patient must bring picture ID. Patient should be prepared to give a urine specimen  Please do not eat 10-12 hours before your appointment if you are coming in fasting for labs on lipids, cholesterol, or glucose (sugar). Pregnant women should follow their Care Team instructions. Water with medications is okay. Do not drink coffee or other fluids. If you have concerns about taking  your medications, please ask at office or if scheduling via [employer], send a message by clicking on Secure Messaging, Message Your Care Team.            Aug 31, 2017   Procedure with Muriel Haddad MD   [facility] Surgery and Procedure Center ([facility])    [address] [place] [address]  [facility]th [facility] [id number] [phone]            Located in the [facility] at [address] [place] [address].   parking is very convenient and highly recommended.  is a $6 flat rate fee.  Both  and self parkers should enter the main arrival plaza from [facility]; parking attendants will direct you based on your parking preference.            Sep 13, 2017 11:20 AM CDT   (Arrive by 11:05 AM)   Post-Op with Muriel Haddad MD   [facility] for Prostate and Urologic Cancers ([facility])    [address] [place] [address]  4th [facility] [address] [phone]              Future tests that were ordered for you today     Open Future Orders        Priority Expected Expires Ordered    PET Oncology Whole Body Routine  2018            Who to contact     Please call your clinic at [phone] to:    Ask questions about your health    Make or cancel appointments    Discuss your medicines    Learn about your test results    Speak to your doctor   If you have compliments or concerns about an experience at your clinic, or if you wish to file a complaint, please contact [facility] Patient Relations at [phone] or email us at [email or web address]         Additional Information About Your Visit        [employer] Information     [employer] is an electronic gateway that provides easy, online access to your medical records. With [employer], you can request a clinic appointment, read your test results, renew a prescription or communicate with your care team.     To sign up for [employer] visit the website at [email or web address]   You will be asked to enter the access code listed below, as well as some personal information. Please follow the directions to create your username and password.     Your access code is: [email or web address]  Expires: 10/31/2017  1:07 PM     Your access code will  in 90 days. If you need help or a new code, please  contact your [facility] or call [phone] for assistance.        Care EveryWhere ID     This is your Care EveryWhere ID. This could be used by other organizations to access your [facility] medical records  [id number]         Blood Pressure from Last 3 Encounters:   08/23/17 (!) 165/92   08/23/17 (!) 165/92   08/22/17 (!) 153/91    Weight from Last 3 Encounters:   08/23/17 73.6 kg (162 lb 4.1 oz)   08/23/17 73.6 kg (162 lb 3.2 oz)   08/22/17 72.4 kg (159 lb 11.2 oz)              Today, you had the following     No orders found for display       Primary Care Provider Office Phone # Fax #    Joan Janet Ventura [phone]       [facility] [address]  [facility] [address]        Equal Access to Services     JENELLE RED : Hadii aad ku hadasho Soomaali, waaxda luqadaha, qaybta kaalmada adeegyada, waxay idiin haylidian gab srivastava . So [facility] [phone].    ATENCIÓN: Si habla español, tiene a washburn disposición servicios gratuitos de asistencia lingüística. Juwan al [phone].    We comply with applicable federal civil rights laws and [place] laws. We do not discriminate on the basis of race, color, national origin, age, disability sex, sexual orientation or gender identity.            Thank you!     Thank you for choosing [facility] UROLOGY AND [facility] FOR PROSTATE AND UROLOGIC CANCERS  for your care. Our goal is always to provide you with excellent care. Hearing back from our patients is one way we can continue to improve our services. Please take a few minutes to complete the written survey that you may receive in the mail after your visit with us. Thank you!             Your Updated Medication List - Protect others around you: Learn how to safely use, store and throw away your medicines at www.disposemymeds.org.          This list is accurate as of: 8/23/17  3:17 PM.  Always use your most recent med list.                   Brand Name Dispense Instructions  for use Diagnosis    fluticasone 50 MCG/ACT spray    FLONASE     Spray 1 spray into both nostrils every morning        HYDROCHLOROTHIAZIDE PO      Take 50 mg by mouth every morning        HYDROXYZINE HCL PO      Take 5 mg by mouth At Bedtime        priLOSEC OTC 20 MG tablet   Generic drug:  omeprazole      Take 20 mg by mouth 2 times daily

## 2017-08-23 NOTE — ANESTHESIA PREPROCEDURE EVALUATION
Anesthesia Evaluation     . Pt has had prior anesthetic. Type: General and MAC    History of anesthetic complications   - PONV        ROS/MED HX    ENT/Pulmonary:     (+)JEISON risk factors hypertension, , recent URI . .    Neurologic:  - neg neurologic ROS     Cardiovascular:     (+) hypertension-range: Takes HCTZ, ---. : . . . :. . Previous cardiac testing date:results:date: results:ECG reviewed date:7/24/17 results:Sinus bradycardia with 1st degree A-V block  Otherwise normal ECG  When compared with ECG of 09-OCT-2013 14:03,  Nonspecific T wave abnormality no longer evident in Anterior leads date: results:          METS/Exercise Tolerance:  >4 METS   Hematologic:  - neg hematologic  ROS       Musculoskeletal:   (+) arthritis, , , -       GI/Hepatic: Comment: Symptoms improved overall on medication but not completely especially when he eats big meals.    (+) GERD Symptomatic,       Renal/Genitourinary:  - ROS Renal section negative       Endo:  - neg endo ROS       Psychiatric:  - neg psychiatric ROS       Infectious Disease:  - neg infectious disease ROS       Malignancy:   (+) Malignancy History of Other  Other CA Active status post Bladder and penile urethra        Other:                     Physical Exam  Normal systems: cardiovascular and pulmonary    Airway   Mallampati: I  TM distance: >3 FB  Neck ROM: full    Dental   (+) partials  Comment: Lower front bridge- is not removable.    Cardiovascular   Rhythm and rate: regular and normal      Pulmonary    breath sounds clear to auscultation               PAC Discussion and Assessment    ASA Classification: 2  Case is suitable for: ASC  Anesthetic techniques and relevant risks discussed: GA  Invasive monitoring and risk discussed:   Types:   Possibility and Risk of blood transfusion discussed:   NPO instructions given:   Additional anesthetic preparation and risks discussed:   Needs early admission to pre-op area:   Other:     PAC Resident/NP Anesthesia  Assessment:  King Gonsalo Bruno is a 71 year old male scheduled for bladder cystoscopy, biopsy, possible suprapubic tube placement on 8/31/17  by  Dr. 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  PAC referral for risk assessment and optimization for anesthesia with comorbid conditions of  the following:     Pre-operative considerations:  1.  Cardiac:  Functional status- METS > 4. Golfs 18 holes 2x weekly.  low risk surgery with 0.4% (RCRI #) risk of major adverse cardiac event.        HTN- Managed on HCTZ. Will have patient hold the morning of his procedure.        EKG- SB with 1st degree AV Block. (7/24/17)  2.  Pulm:  Airway feasible.  JEISON risk: 3 ( intermediate) Former smoker quit over 20 years ago. No pulmonary symptoms reported  3.  GI:  Risk of PONV score = Patient has a history of PONV  4. 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       Direct laryngoscopy with the use of rigid endoscopy and takedown of left hypopharyngeal diverticula was done in 2013.         Patient is optimized and is acceptable candidate for the proposed procedure.  No further diagnostic evaluation is needed. Patients PET scan was completed today, results pending will addend when results are final.     Patient also evaluated by Dr. Santiago. See recommendations below.     For further details of assessment, testing, and physical exam please see H and P completed on same date.    Reviewed and Signed by PAC Mid-Level Provider/Resident  Mid-Level Provider/Resident: Tova Alejandre  Date: 8/23/17  Time:     Attending Anesthesiologist Anesthesia Assessment:  71 year old for bladder cystoscopy, biopsy, suprapubic tube placement in management of urothelial cancer of bladder and penile urethra. Chart reviewed, patient seen and evaluated; agree with above assessment. Active, no known cardiac disease; former smoker but no symptoms. As noted prior repair of hypopharyngeal diverticula, but  patient reports that it still fills with saliva, so use caution on intubation - may be able to suction prior to induction or RSI, although believe this fiverticula is above the cricoid, so pressure probably useless at best and may worsen risk of aspiration.    Patient is appropriate for the planned procedure without further workup or medical management change. The final anesthesia plan will be determined by the physician anesthesiologist caring for the patient on the day of surgery.      Reviewed and Signed by PAC Anesthesiologist  Anesthesiologist: ruma  Date: 8/23/2017  Time:   Pass/Fail: Pass  Disposition:     PAC Pharmacist Assessment:        Pharmacist:   Date:   Time:      Anesthesia Plan      History & Physical Review  History and physical reviewed and following examination; no interval change.    ASA Status:  2 .    NPO Status:  > 6 hours    Plan for General and LMA with Intravenous induction. Maintenance will be TIVA.    PONV prophylaxis:  Ondansetron (or other 5HT-3) and Dexamethasone or Solumedrol       Postoperative Care  Postoperative pain management:  Oral pain medications and Multi-modal analgesia.      Consents  Anesthetic plan, risks, benefits and alternatives discussed with:  Patient..                          .

## 2017-08-23 NOTE — H&P
Pre-Operative H & P     CC:  Preoperative exam to assess for increased cardiopulmonary risk while undergoing surgery and anesthesia.    Date of Encounter: 8/23/2017  Primary Care Physician:  Joan Ventura  King Gonsalo Bruno is a 71 year old male who presents for pre-operative H & P in preparation for Cystoscopy, bladder biopsy and possible suprapubic tube placement with Dr. Haddad on 8/31/17 at [facility] [place].   History is obtained from the patient and his wife.   King Damion a 71 year old male patient was evaluated for a 4 month history of hematuria and intermittently weak urinary stream and a noticeable  penile bump , the patient admits that the mass has been present for a much longer time. The patient had a large palpable urethral mass on exam, For which he underwent elected cystoscopy and urethral biopsy which the results revealed an Invasive high-grade urothelial carcinoma with prominent squamous differentiation. He has met with Dr. Haddad and is preparing for the above procedure.  His medical history also includes HTN, Dysphagia, GERD and anemia.      Past Medical History  Past Medical History:   Diagnosis Date     Dysphagia 2013    Secondary to diverticulum in the hypopharynx     Esophageal pouch 2013    Left sided diverticulum in the hypopharynx      GERD (gastroesophageal reflux disease)      Hypertension        Past Surgical History  Past Surgical History:   Procedure Laterality Date     LARYNGOSCOPY  2013    Direct laryngoscopy with the use of rigid endoscopy and takedown of left hypopharyngeal diverticula.        Hx of Blood transfusions/reactions: no     Hx of abnormal bleeding or anti-platelet use: no    Steroid use in the last year: no    Personal or FH with difficulty with Anesthesia:  PONV    Prior to Admission Medications  Current Outpatient Prescriptions   Medication Sig Dispense Refill     HYDROCHLOROTHIAZIDE PO Take 50 mg by mouth every morning        HYDROXYZINE  HCL PO Take 5 mg by mouth At Bedtime        omeprazole (PRILOSEC OTC) 20 MG tablet Take 20 mg by mouth 2 times daily       fluticasone (FLONASE) 50 MCG/ACT spray Spray 1 spray into both nostrils every morning          Allergies  Allergies   Allergen Reactions     Lisinopril Swelling       Social History  Social History     Social History     Marital status: Single     Spouse name: N/A     Number of children: 1     Years of education: N/A     Occupational History     retired crisis  for [employer]      Social History Main Topics     Smoking status: Former Smoker     Packs/day: 0.50     Years: 10.00     Smokeless tobacco: Never Used      Comment: quit in 1992     Alcohol use No      Comment: 1987 quit per personal choice.     Drug use: No     Sexual activity: Not on file     Other Topics Concern     Not on file     Social History Narrative       Family History  Family History   Problem Relation Age of Onset     CEREBROVASCULAR DISEASE Mother      Prostate Cancer Father 84     Prostate Cancer Brother 63     DIABETES Brother          ROS/MED HX  The complete review of systems is negative other than noted in the HPI or here.       ENT/Pulmonary:     (+)JEISON risk factors hypertension, , . .    Neurologic:  - neg neurologic ROS     Cardiovascular:     (+) hypertension-range: Takes HCTZ, -   Previous cardiac testing EKG- see results below      METS/Exercise Tolerance:  >4 METS   Hematologic:  - neg hematologic  ROS       Musculoskeletal:   (+) arthritis, , , -       GI/Hepatic: Comment: Symptoms improved overall on medication but not completely especially when he eats big meals.    (+) GERD Symptomatic,       Renal/Genitourinary:  - ROS Renal section negative       Endo:  - neg endo ROS       Psychiatric:  - neg psychiatric ROS       Infectious Disease:  - neg infectious disease ROS       Malignancy:   (+) Malignancy History of Other  Other CA Active status post Bladder and penile urethra        Other:      "      The complete review of systems is negative other than noted in the HPI or here.   Temp: 98.2  F (36.8  C) Temp src: Oral BP: (!) 165/92 Pulse: 76   Resp: 18 SpO2: 100 %         162 lbs 4.14 oz  6' 4\"   Body mass index is 19.75 kg/(m^2).       Physical Exam  Constitutional: Awake, alert, cooperative, no apparent distress, and appears stated age.  Eyes: Pupils equal, round and reactive to light, extra ocular muscles intact, sclera clear, conjunctiva normal.  HENT: Normocephalic, oral pharynx with moist mucus membranes, good dentition. No goiter appreciated.   Respiratory: Clear to auscultation bilaterally, no crackles or wheezing.  Cardiovascular: Regular rate and rhythm, normal S1 and S2, and no murmur noted.  Carotids +2, no bruits. No edema. Palpable pulses to radial  DP and PT arteries.   GI: Normal bowel sounds, soft, non-distended, non-tender, no masses palpated, no hepatosplenomegaly.  Surgical scars: none  Lymph/Hematologic: No cervical lymphadenopathy and no supraclavicular lymphadenopathy.  Genitourinary:  deferred  Skin: Warm and dry.  No rashes at anticipated surgical site.   Musculoskeletal: Full ROM of neck. There is no redness, warmth, or swelling of the joints. Gross motor strength is normal.    Neurologic: Awake, alert, oriented to name, place and time. Cranial nerves II-XII are grossly intact. Gait is normal.   Neuropsychiatric: Calm, cooperative. Normal affect.     Labs: (personally reviewed)  UA RESULTS:  Recent Labs   Lab Test  08/23/17   1453   COLOR  Straw   APPEARANCE  Clear   URINEGLC  Negative   URINEBILI  Negative   URINEKETONE  Negative   SG  1.005   UBLD  Moderate*   URINEPH  6.0   PROTEIN  Negative   NITRITE  Negative   LEUKEST  Trace*         EKG: ECG reviewed date:7/24/17 results:Sinus bradycardia with 1st degree A-V block  Otherwise normal ECG  When compared with ECG of 09-OCT-2013   Nonspecific T wave abnormality no longer evident in Anterior leads.    CT CAP " 7/20/17  Conclusion:  1.No urinary tract calculi, obstruction or obvious mucosal lesion. Cortical simple cysts in both kidneys, more apparent on the left superiorly. Mild prostatomegaly bulging into the bladder base. Urethral hypodensity with a tiny nodule, Partially visualized  2. Gas and stool material throughout normal caliber redundant colon. No mechanical obstruction.  MRI: Conclusion: (8/1/17)  Enhancing lobulated lesion within the posterior aspect of the penile urethra corresponding to know mitotic intraluminal lesion. Focal outpouching to the left aspect of the corpora cavernosa, possibly postoperative.  No pelvic sidewall or inguinal adenopathy. No pelvic free fluid.      Outside records reviewed from: care everywhere    ASSESSMENT and PLAN  King Gonsalo Bruno is a 71 year old male scheduled for bladder cystoscopy, biopsy, possible suprapubic tube placement on 8/31/17  by  Dr. 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  PAC referral for risk assessment and optimization for anesthesia with comorbid conditions of  the following:     Pre-operative considerations:  1.  Cardiac:  Functional status- METS > 4. Golfs 18 holes 2x weekly.  low risk surgery with 0.4% (RCRI #) risk of major adverse cardiac event.        HTN- Managed on HCTZ. Will have patient hold the morning of his procedure.        EKG- SB with 1st degree AV Block. (7/24/17)  2.  Pulm:  Airway feasible.  JEISON risk: 3 ( intermediate) Former smoker quit over 20 years ago. No pulmonary symptoms reported  3.  GI:  Risk of PONV score = Patient has a history of PONV  4. 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       Direct laryngoscopy with the use of rigid endoscopy and takedown of left hypopharyngeal diverticula which was done in 2013.               Patient is optimized and is acceptable candidate for the proposed procedure.  No further diagnostic evaluation is needed. Patients PET scan  was completed today, results pending will addend when results are final.     Patient also evaluated by Dr. Santiago.    ISAURA Shin CNP  Preoperative Assessment Center  [facility]  Clinic and Surgery Center  Phone: [phone]  Fax: [phone]

## 2017-08-23 NOTE — PROGRESS NOTES
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     He has been following with his PCP and had been for routine annual physical on 6/14/17. He expressed burning sensation at the start of urination. He was noted to have microscopic hematuria per patient. He was seen in urology at [facility] by Dr. Ry Dunn on 7/20/17. He had cystoscopy, urethral dilation and biopsy done on 7/24/17 which revealed invasive high grade carcinoma with squamous differentiation. He does admit to having urinary difficulties for the last 6 months. He though it was because he was not circumcised.     He has seen Dr. Haddad yesterday and had a PET-CT earlier today.     He has good appetite. He has fair energy. He plays golf 18 holes twice a week. He feels good. He lost some weight which he attributes to a pouch in back of his throat. He has regained his weight.     He denies CP/SOB. He has good hearing. He does admit to occasional ringing in ears. He does get vertigo when he has too much fluid in sinuses. He denies nausea/vomiting/diarrhea. He has some constipation.     He has little anxiety since his pouch in throat. He takes hydroxyzine to sleep and anxiety.     He is developing arthritis in his left hand and neck.      PAST MEDICAL HISTORY   - HTN  - New urothelial  cancer involving bladder and penile urethra      CURRENT OUTPATIENT MEDICATIONS     Current Outpatient Prescriptions   Medication Sig     fluticasone (FLONASE) 50 MCG/ACT spray 1 spray     ranitidine (ZANTAC) 150 MG tablet Take 150 mg by mouth     No current facility-administered medications for this visit.    HCTZ 25 mg po qday     ALLERGIES      Allergies   Allergen Reactions     Lisinopril Swelling        SOCIAL HISTORY   He is in long term relationship but not officially . He lives with his significant other.     He is retired. He worked as mental health crisis .     He does not smoke. He quit smoking in May 1992. He smoked half a pack a day off and on since he was 22. 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 He does not drink. He quit alcohol in . He denies recreational drug use.        FAMILY HISTORY   Brother Lázaro  of prostate cancer - got prostate cancer at 63  Another brother had lung cancer  DM TII in several members of family; grand father had amputation from his DM. Brother James also had DM TII and CKD     REVIEW OF SYSTEMS   As above in the HPI, o/w complete 12-point ROS was negative.     PHYSICAL EXAM   B/P: Data Unavailable, T: Data Unavailable, P: Data Unavailable, R: Data Unavailable  SpO2 Readings from Last 4 Encounters:   No data found for SpO2     Wt Readings from Last 3 Encounters:   17 72.4 kg (159 lb 11.2 oz)     GEN: NAD  HEENT: PERRL, EOMI, no icterus, injection or pallor. Oropharynx is clear.  NECK: no cervical or supraclavicular lymphadenopathy  LUNGS: clear bilaterally  CV: regular, no murmurs, rubs, or gallops  ABDOMEN: soft, non-tender, non-distended, normal bowel sounds, no hepatosplenomegaly by percussion or palpation  EXT: warm, well perfused, no edema  NEURO: alert  SKIN: no rashes     LABORATORY AND IMAGING STUDIES     Lab Results   Component Value Date    PATH  2017     Patient Name: KING AILYN CHAVEZ  MR#: [id number]  Specimen #:  [id number]  Collected: 8/11/2017  Received: 8/11/2017  Reported: 8/11/2017 18:42  Ordering Phy(s): BRANDAN BRUCE  Additional Phy(s): [facility], Pathology Department    For improved result formatting, select 'View Enhanced Report Format'  under Linked Documents section.    TEST(S):  1 Slide, case #[id number]    FINAL DIAGNOSIS:  CASE FROM [place], [place] ([id number], OBTAINED  07/24/2017):  Urethra, biopsy:  - Invasive high grade carcinoma with squamous differentiation    I have personally reviewed all specimens and or slides, including the  listed special stains, and used them with my medical judgement to  determine the final diagnosis.    Electronically signed out by:    Jaime Stout M.D., [facility]    CLINICAL HISTORY:  The patient is a 71-year-old male.    GROSS:  Received from [facility] in [place], [place] are 1 stained slide  labeled [id number] (obtained 07/24/2017) and a copy of the referring  pathologist's report with patient identifying information.  All slides  are returned.    CPT Codes:  A: 34780-ZTS4    TESTING LAB LOCATION:  [address], [address]   [id number] [phone]    COLLECTION SITE:  Client:  [facility]  Location:  [facility] (B)         No results found for this or any previous visit.      ASSESSMENT    1. Stage II urothelial cancer involving penile urethra  2. ECOG PS 1  3. HTN  4. No other medical comorbidity    DISCUSSION   I had a lengthy discussion with  regarding recent diagnosis of bladder cancer. We briefly touched on the fact that cigarette smoking is the most significant cause for bladder cancer, and it is encouraging that  has at least quit smoking  About 40 yrs ago. We focused on the typical disease course, prognosis, and different treatment options.    has locally advanced urothelial cancer. Despite surgical  resection a significant portion of bladder cancer does recur. To decrease this risk of recurrence chemotherapy either prior to cystectomy (shar-adjuvant) or post resection in the adjuvant setting has been studied. There has been evidence to suggest benefit for patients with high risk disease who received shar-adjuvant chemotherapy. This addresses micro-metastatic disease at the earliest. This gives an opportunity to test the effectiveness of therapy within the patients body. A complete pathologic response is associated with excellent long term outcomes.      Regimen with methotrexate, vinblastine, doxorubicin, and cisplatin (MVAC) was the standard of care through 1980's and 90's. Unfortunately, this was a fairly toxic regimen with about 3 to 4% treatment-related mortality. Since the last decade, cisplatin with gemcitabine has emerged as the preferred regimen in the neoadjuvant, adjuvant, or the metastatic setting. The only study was done in the metastatic setting, which compared these 2 regimens, showed similar efficacy but much better toxicity profile for cisplatin with gemcitabine. Across the [place] and [place], this is the preferred regimen in the perioperative setting. However more recently there has been a renewed interest in accelerated or dose dense MVAC. In this the 4 drugs are administered on the first day every 2 weeks and the subsequent weekly doses of methotrexate are skipped. This increases the dose intensity while decreasing the mucositis, one of primary concerns with 4 drug regimen.   There have been no large head to head comparisons with the two combinations. Several studies suggest that the 4 drug regimen with MVAC when administered in dose dense/accelerated fashion - has higher pathologic complete response and downstaging rates than reported for cisplatin with gemcitabine regimen. (Rian robbins et al. J Clin Oncol. 2014 Jun 20;32(18):1889-94; Eduardo et al. J Clin Oncol. 2014 Jun  20;32(18):4039-94; Cancer. 2012 Aug 15;118(16):9988-7). It is well established for bladder cancer - pathologic downstaging correlates with survival. At many major centers, this has become the preferred regimen in most fit patients. He is little fearful of side effects from chemotherapy. After reviewing both treatment options and expected side effects, he feels more comfortable with cisplatin and gemcitabine regimen.   We discussed the individual side effects of cisplatin and gemcitabine regimens, and I have also provided printed information. Most notably, we discussed tinnitus, hearing deficits, nephrotoxicity, peripheral neuropathy, nausea, and vomiting with cisplatin. We also reviewed the myelosuppression with these agents, and in particular thrombocytopenia, which is fairly common with gemcitabine. Both agents together could contribute to fatigue, diminished appetite, altered taste, neutropenia and possibly neutropenic fever. He has to take every temperature greater than 100.4F seriously and immediately call us to care. In case he is neutropenic or would potentially get neutropenic soon, we would admit him and treat with IV antibiotics for 48 hrs.    I also reviewed the treatment schedule and curative intent. The entire treatment is given as an outpatient. We discussed the placement of PORT for ease of venous access. He does have difficulty with venous access but he would like to not add any additional procedures at this time. We would defer PORT placement.        PLAN   1. On discussing the risks, benefits, and alternatives,  is enthusiastic about starting chemotherapy. We discussed the regimen schedule at great length,   2. He has a cystoscopy and TURBT scheduled for next week. We will wait for 10 d to 2 weeks and make definite plans after his pathology is available.   3. Start chemotherapy with cisplatin to be given on day 1 and gemcitabine on day 1, day 8 for 3 -week cycle   4. He will be seen by me or my  nurse practitioner prior to every cycle. I will restage after the end of 2 and 4 cycles for the planned 4 cycles of chemotherapy   5. I will coordinate care with Urology team  Over 80 minutes of direct face to face time spent with patient with more than 50% time spent in counseling and coordinating care.    Steve Abarca ,  Division of Hematology, Oncology & Transplantation  [facility].

## 2017-08-23 NOTE — PROGRESS NOTES
Met with pt and significant other in clinic. Introduced myself and role as RN care coordinator. Contact information given. Encouraged pt to contact clinic with any questions or concerns.

## 2017-08-23 NOTE — MR AVS SNAPSHOT
After Visit Summary   8/23/2017    King Gonsalo Bruno    MRN: [id number]           Patient Information     Date Of Birth          1945        Visit Information        Provider Department      8/23/2017 11:45 AM Steve Arceo MD [facility]        Today's Diagnoses     Urethral cancer (H)    -  1       Follow-ups after your visit        Your next 10 appointments already scheduled     Sep 15, 2017  6:30 AM CDT   [place] Draw with [facility] [place] DRAW   [facility] ([facility])    [address] [place] [id number]   [phone]            Sep 15, 2017  7:00 AM CDT   Infusion 360 with UC ONCOLOGY INFUSION, UC 11 ATC   [facility] ([facility])    [address] [place] [id number]   [phone]            Sep 15, 2017  7:00 AM CDT   (Arrive by 6:45 AM)   Return Visit with Josy Powell PA-C   [facility] ([facility])    [address] [place] [address]   [phone]            Sep 22, 2017 10:00 AM CDT   [facility] Draw with [facility] [place] [facility]   [facility])    [address] [place] [address]   [phone]            Sep 22, 2017 10:30 AM CDT   Infusion 60 with UC ONCOLOGY INFUSION, UC 31 ATC   [facility] ([facility])    [address] [place] [address]   [phone]            Sep 29, 2017 12:00 PM CDT   (Arrive by 11:45 AM)   NEW LUNG NODULE with Marisol Hernandez MD   [facility] ([facility])    [address] [place] [phone]   [phone]            Oct 06, 2017  7:15 AM CDT   [facility] Draw with [facility] [place] [facility] DRAW   [facility]  "Draw ([facility])    [address] [address] [phone]            Oct 06, 2017  7:50 AM CDT   (Arrive by 7:35 AM)   Return Visit with ISAURA Roach CNP   [facility] ([facility])    [address] [place] [id number] [phone]              Who to contact     If you have questions or need follow up information about today's clinic visit or your schedule please contact [facility]CER [facility] directly at [phone].  Normal or non-critical lab and imaging results will be communicated to you by MyChart, letter or phone within 4 business days after the clinic has received the results. If you do not hear from us within 7 days, please contact the clinic through MyChart or phone. If you have a critical or abnormal lab result, we will notify you by phone as soon as possible.  Submit refill requests through [employer] or call your pharmacy and they will forward the refill request to us. Please allow 3 business days for your refill to be completed.          Additional Information About Your Visit        [employer] Information     [employer] lets you send messages to your doctor, view your test results, renew your prescriptions, schedule appointments and more. To sign up, go to [email or web address] . Click on \"Log in\" on the left side of the screen, which will take you to the Welcome page. Then click on \"Sign up Now\" on the right side of the page.     You will be asked to enter the access code listed below, as well as some personal information. Please follow the directions to create your username and password.     Your access code is: [email or web address]  Expires: 10/31/2017  1:07 PM     Your access code will  in 90 days. If you need help or a new code, please call your [place] clinic or [phone].        Care EveryWhere ID     This is your Care EveryWhere ID. This could be used " "by other organizations to access your [place] medical records  [id number]        Your Vitals Were     Pulse Temperature Respirations Height Pulse Oximetry BMI (Body Mass Index)    76 98.2  F (36.8  C) (Oral) 17 1.93 m (6' 4\") 100% 19.74 kg/m2       Blood Pressure from Last 3 Encounters:   09/13/17 155/81   08/31/17 148/83   08/23/17 (!) 165/92    Weight from Last 3 Encounters:   09/13/17 75.3 kg (166 lb)   08/23/17 73.6 kg (162 lb 4.1 oz)   08/23/17 73.6 kg (162 lb 3.2 oz)              Today, you had the following     No orders found for display       Primary Care Provider Office Phone # Fax #    Joan Gonzales MARLENY [place] [phone]       [facility] [address] [facility] [address]        Equal Access to Services     JENELLE RED : Hadii светлана ku hadasho Soomaali, waaxda luqadaha, qaybta kaalmada adeegyada, ronald srivastava . So [facility] [phone].    ATENCIÓN: Si karenla esparnie, tiene a washburn disposición servicios gratuitos de asistencia lingüística. Llame al [phone].    We comply with applicable federal civil rights laws and [place] laws. We do not discriminate on the basis of race, color, national origin, age, disability sex, sexual orientation or gender identity.            Thank you!     Thank you for choosing [facility]CER [facility]  for your care. Our goal is always to provide you with excellent care. Hearing back from our patients is one way we can continue to improve our services. Please take a few minutes to complete the written survey that you may receive in the mail after your visit with us. Thank you!             Your Updated Medication List - Protect others around you: Learn how to safely use, store and throw away your medicines at www.disposemymeds.org.          This list is accurate as of: 8/23/17 11:59 PM.  Always use your most recent med list.                   Brand Name Dispense Instructions for use Diagnosis    acetaminophen 325 MG tablet    " TYLENOL    100 tablet    Take 2 tablets (650 mg) by mouth every 4 hours as needed for other (mild pain)    Urethral cancer (H)       ciprofloxacin 500 MG tablet    CIPRO    10 tablet    Take 1 tablet (500 mg) by mouth 2 times daily for 5 days    Urethral cancer (H)       fluticasone 50 MCG/ACT spray    FLONASE     Spray 1 spray into both nostrils every morning        HYDROCHLOROTHIAZIDE PO      Take 50 mg by mouth every morning        HYDROXYZINE HCL PO      Take 5 mg by mouth At Bedtime        ibuprofen 600 MG tablet    ADVIL/MOTRIN    30 tablet    Take 1 tablet (600 mg) by mouth every 6 hours as needed for other (For mild pain and temperature greater than 102F)    Urethral cancer (H)       oxyCODONE 5 MG IR tablet    ROXICODONE    10 tablet    Take 1 tablet (5 mg) by mouth every 4 hours as needed for other (Moderate to Severe Pain)    Urethral cancer (H)       polyethylene glycol powder    MIRALAX/GLYCOLAX    238 g    Take 17 g (1 capful) by mouth daily Mix 1 capful in 8 ounces of water, juice or soda.  To prevent constipation while taking narcotic  pain medication.  Follow with 8 ounces of water.  Discontinue  if you have loose stools or when you are no longer taking narcotics.    Urethral cancer (H)       priLOSEC OTC 20 MG tablet   Generic drug:  omeprazole      Take 20 mg by mouth 2 times daily        tolterodine 2 MG tablet    DETROL    13 tablet    Take 1 tablet (2 mg) by mouth every 12 hours as needed (bladder spasms)    Urethral cancer (H)

## 2017-08-23 NOTE — NURSING NOTE
"Oncology Rooming Note    August 23, 2017 11:41 AM   King Gonsalo Bruno is a 71 year old male who presents for:    Chief Complaint   Patient presents with     Oncology Clinic Visit     new patient visit for consultation related urothelial cancer     Initial Vitals: BP (!) 165/92  Pulse 76  Temp 98.2  F (36.8  C) (Oral)  Resp 17  Ht 1.93 m (6' 4\")  Wt 73.6 kg (162 lb 3.2 oz)  SpO2 100%  BMI 19.74 kg/m2 Estimated body mass index is 19.74 kg/(m^2) as calculated from the following:    Height as of this encounter: 1.93 m (6' 4\").    Weight as of this encounter: 73.6 kg (162 lb 3.2 oz). Body surface area is 1.99 meters squared.  No Pain (0) Comment: Data Unavailable   No LMP for male patient.  Allergies reviewed: Yes  Medications reviewed: Yes    Medications: Medication refills not needed today.  Pharmacy name entered into [employer]: [facility] [id number] [place] [address] AT [address] [place] [address]    Clinical concerns: discomfort when having bowel movements dr. thomas was notified.    5 minutes for nursing intake (face to face time)     Jojo Hull CMA              "

## 2017-08-31 ENCOUNTER — HOSPITAL ENCOUNTER (OUTPATIENT)
Facility: AMBULATORY SURGERY CENTER | Age: 72
End: 2017-08-31
Attending: UROLOGY

## 2017-08-31 ENCOUNTER — ANESTHESIA (OUTPATIENT)
Dept: SURGERY | Facility: AMBULATORY SURGERY CENTER | Age: 72
End: 2017-08-31

## 2017-08-31 VITALS
RESPIRATION RATE: 10 BRPM | TEMPERATURE: 97.7 F | DIASTOLIC BLOOD PRESSURE: 83 MMHG | SYSTOLIC BLOOD PRESSURE: 148 MMHG | OXYGEN SATURATION: 98 % | HEART RATE: 65 BPM

## 2017-08-31 DIAGNOSIS — C68.0 URETHRAL CANCER (H): ICD-10-CM

## 2017-08-31 RX ORDER — TOLTERODINE 4 MG/1
4 CAPSULE, EXTENDED RELEASE ORAL ONCE
Status: COMPLETED | OUTPATIENT
Start: 2017-08-31 | End: 2017-08-31

## 2017-08-31 RX ORDER — ONDANSETRON 4 MG/1
4 TABLET, ORALLY DISINTEGRATING ORAL EVERY 30 MIN PRN
Status: DISCONTINUED | OUTPATIENT
Start: 2017-08-31 | End: 2017-09-01 | Stop reason: HOSPADM

## 2017-08-31 RX ORDER — ONDANSETRON 2 MG/ML
4 INJECTION INTRAMUSCULAR; INTRAVENOUS EVERY 30 MIN PRN
Status: DISCONTINUED | OUTPATIENT
Start: 2017-08-31 | End: 2017-09-01 | Stop reason: HOSPADM

## 2017-08-31 RX ORDER — LIDOCAINE HYDROCHLORIDE 20 MG/ML
INJECTION, SOLUTION INFILTRATION; PERINEURAL PRN
Status: DISCONTINUED | OUTPATIENT
Start: 2017-08-31 | End: 2017-08-31

## 2017-08-31 RX ORDER — ONDANSETRON 2 MG/ML
INJECTION INTRAMUSCULAR; INTRAVENOUS PRN
Status: DISCONTINUED | OUTPATIENT
Start: 2017-08-31 | End: 2017-08-31

## 2017-08-31 RX ORDER — CIPROFLOXACIN 500 MG/1
500 TABLET, FILM COATED ORAL 2 TIMES DAILY
Qty: 10 TABLET | Refills: 0 | Status: SHIPPED | OUTPATIENT
Start: 2017-08-31 | End: 2017-09-05

## 2017-08-31 RX ORDER — IBUPROFEN 600 MG/1
600 TABLET, FILM COATED ORAL EVERY 6 HOURS PRN
Qty: 30 TABLET | Refills: 0 | COMMUNITY
Start: 2017-08-31 | End: 2017-09-15

## 2017-08-31 RX ORDER — SODIUM CHLORIDE, SODIUM LACTATE, POTASSIUM CHLORIDE, CALCIUM CHLORIDE 600; 310; 30; 20 MG/100ML; MG/100ML; MG/100ML; MG/100ML
INJECTION, SOLUTION INTRAVENOUS CONTINUOUS
Status: DISCONTINUED | OUTPATIENT
Start: 2017-08-31 | End: 2017-09-01 | Stop reason: HOSPADM

## 2017-08-31 RX ORDER — PROPOFOL 10 MG/ML
INJECTION, EMULSION INTRAVENOUS CONTINUOUS PRN
Status: DISCONTINUED | OUTPATIENT
Start: 2017-08-31 | End: 2017-08-31

## 2017-08-31 RX ORDER — SODIUM CHLORIDE, SODIUM LACTATE, POTASSIUM CHLORIDE, CALCIUM CHLORIDE 600; 310; 30; 20 MG/100ML; MG/100ML; MG/100ML; MG/100ML
INJECTION, SOLUTION INTRAVENOUS CONTINUOUS
Status: DISCONTINUED | OUTPATIENT
Start: 2017-08-31 | End: 2017-08-31 | Stop reason: HOSPADM

## 2017-08-31 RX ORDER — DEXAMETHASONE SODIUM PHOSPHATE 4 MG/ML
INJECTION, SOLUTION INTRA-ARTICULAR; INTRALESIONAL; INTRAMUSCULAR; INTRAVENOUS; SOFT TISSUE PRN
Status: DISCONTINUED | OUTPATIENT
Start: 2017-08-31 | End: 2017-08-31

## 2017-08-31 RX ORDER — ACETAMINOPHEN 325 MG/1
650 TABLET ORAL ONCE
Status: DISCONTINUED | OUTPATIENT
Start: 2017-08-31 | End: 2017-09-01 | Stop reason: HOSPADM

## 2017-08-31 RX ORDER — OXYCODONE HYDROCHLORIDE 5 MG/1
5 TABLET ORAL ONCE
Status: COMPLETED | OUTPATIENT
Start: 2017-08-31 | End: 2017-08-31

## 2017-08-31 RX ORDER — FENTANYL CITRATE 50 UG/ML
INJECTION, SOLUTION INTRAMUSCULAR; INTRAVENOUS PRN
Status: DISCONTINUED | OUTPATIENT
Start: 2017-08-31 | End: 2017-08-31

## 2017-08-31 RX ORDER — PROPOFOL 10 MG/ML
INJECTION, EMULSION INTRAVENOUS PRN
Status: DISCONTINUED | OUTPATIENT
Start: 2017-08-31 | End: 2017-08-31

## 2017-08-31 RX ORDER — IBUPROFEN 200 MG
600 TABLET ORAL ONCE
Status: DISCONTINUED | OUTPATIENT
Start: 2017-08-31 | End: 2017-09-01 | Stop reason: HOSPADM

## 2017-08-31 RX ORDER — ACETAMINOPHEN 325 MG/1
650 TABLET ORAL EVERY 4 HOURS PRN
Qty: 100 TABLET | Refills: 0 | COMMUNITY
Start: 2017-08-31 | End: 2017-09-15

## 2017-08-31 RX ORDER — FENTANYL CITRATE 50 UG/ML
25-50 INJECTION, SOLUTION INTRAMUSCULAR; INTRAVENOUS
Status: DISCONTINUED | OUTPATIENT
Start: 2017-08-31 | End: 2017-08-31 | Stop reason: HOSPADM

## 2017-08-31 RX ORDER — NALOXONE HYDROCHLORIDE 0.4 MG/ML
.1-.4 INJECTION, SOLUTION INTRAMUSCULAR; INTRAVENOUS; SUBCUTANEOUS
Status: DISCONTINUED | OUTPATIENT
Start: 2017-08-31 | End: 2017-09-01 | Stop reason: HOSPADM

## 2017-08-31 RX ORDER — TOLTERODINE TARTRATE 2 MG/1
2 TABLET, EXTENDED RELEASE ORAL EVERY 12 HOURS PRN
Qty: 13 TABLET | Refills: 0 | Status: SHIPPED | OUTPATIENT
Start: 2017-08-31 | End: 2017-09-13

## 2017-08-31 RX ORDER — MEPERIDINE HYDROCHLORIDE 25 MG/ML
12.5 INJECTION INTRAMUSCULAR; INTRAVENOUS; SUBCUTANEOUS
Status: DISCONTINUED | OUTPATIENT
Start: 2017-08-31 | End: 2017-09-01 | Stop reason: HOSPADM

## 2017-08-31 RX ORDER — OXYCODONE HYDROCHLORIDE 5 MG/1
5 TABLET ORAL EVERY 4 HOURS PRN
Qty: 10 TABLET | Refills: 0 | Status: SHIPPED | OUTPATIENT
Start: 2017-08-31 | End: 2017-09-15

## 2017-08-31 RX ORDER — LIDOCAINE 40 MG/G
CREAM TOPICAL
Status: DISCONTINUED | OUTPATIENT
Start: 2017-08-31 | End: 2017-08-31 | Stop reason: HOSPADM

## 2017-08-31 RX ORDER — KETOROLAC TROMETHAMINE 30 MG/ML
INJECTION, SOLUTION INTRAMUSCULAR; INTRAVENOUS PRN
Status: DISCONTINUED | OUTPATIENT
Start: 2017-08-31 | End: 2017-08-31

## 2017-08-31 RX ORDER — POLYETHYLENE GLYCOL 3350 17 G/17G
1 POWDER, FOR SOLUTION ORAL DAILY
Qty: 238 G | Refills: 0 | COMMUNITY
Start: 2017-08-31 | End: 2017-09-15

## 2017-08-31 RX ORDER — ACETAMINOPHEN 325 MG/1
975 TABLET ORAL ONCE
Status: COMPLETED | OUTPATIENT
Start: 2017-08-31 | End: 2017-08-31

## 2017-08-31 RX ORDER — CEFAZOLIN SODIUM 1 G/3ML
1 INJECTION, POWDER, FOR SOLUTION INTRAMUSCULAR; INTRAVENOUS SEE ADMIN INSTRUCTIONS
Status: DISCONTINUED | OUTPATIENT
Start: 2017-08-31 | End: 2017-08-31 | Stop reason: HOSPADM

## 2017-08-31 RX ADMIN — PROPOFOL 200 MG: 10 INJECTION, EMULSION INTRAVENOUS at 12:26

## 2017-08-31 RX ADMIN — Medication 100 MCG: at 12:39

## 2017-08-31 RX ADMIN — OXYCODONE HYDROCHLORIDE 5 MG: 5 TABLET ORAL at 14:12

## 2017-08-31 RX ADMIN — DEXAMETHASONE SODIUM PHOSPHATE 4 MG: 4 INJECTION, SOLUTION INTRA-ARTICULAR; INTRALESIONAL; INTRAMUSCULAR; INTRAVENOUS; SOFT TISSUE at 12:26

## 2017-08-31 RX ADMIN — PROPOFOL 150 MCG/KG/MIN: 10 INJECTION, EMULSION INTRAVENOUS at 12:27

## 2017-08-31 RX ADMIN — ONDANSETRON 4 MG: 2 INJECTION INTRAMUSCULAR; INTRAVENOUS at 12:26

## 2017-08-31 RX ADMIN — TOLTERODINE 4 MG: 4 CAPSULE, EXTENDED RELEASE ORAL at 14:12

## 2017-08-31 RX ADMIN — ACETAMINOPHEN 975 MG: 325 TABLET ORAL at 11:00

## 2017-08-31 RX ADMIN — FENTANYL CITRATE 50 MCG: 50 INJECTION, SOLUTION INTRAMUSCULAR; INTRAVENOUS at 12:31

## 2017-08-31 RX ADMIN — KETOROLAC TROMETHAMINE 15 MG: 30 INJECTION, SOLUTION INTRAMUSCULAR; INTRAVENOUS at 13:35

## 2017-08-31 RX ADMIN — Medication 200 MCG: at 12:52

## 2017-08-31 RX ADMIN — Medication 100 MCG: at 12:46

## 2017-08-31 RX ADMIN — Medication 100 MCG: at 12:45

## 2017-08-31 RX ADMIN — FENTANYL CITRATE 50 MCG: 50 INJECTION, SOLUTION INTRAMUSCULAR; INTRAVENOUS at 13:33

## 2017-08-31 RX ADMIN — PROPOFOL: 10 INJECTION, EMULSION INTRAVENOUS at 13:08

## 2017-08-31 RX ADMIN — SODIUM CHLORIDE, SODIUM LACTATE, POTASSIUM CHLORIDE, CALCIUM CHLORIDE: 600; 310; 30; 20 INJECTION, SOLUTION INTRAVENOUS at 11:02

## 2017-08-31 RX ADMIN — LIDOCAINE HYDROCHLORIDE 100 MG: 20 INJECTION, SOLUTION INFILTRATION; PERINEURAL at 12:26

## 2017-08-31 NOTE — DISCHARGE INSTRUCTIONS
[facility]  Home Care Following Anesthesia  For 24 hours after surgery:  1. Get plenty of rest.  A responsible adult must stay with you for at least 24 hours after you leave the surgery center.  2. Do not drive or use heavy equipment.  If you have weakness or tingling, don't drive or use heavy equipment until this feeling goes away.   3. Do not drink alcohol.   4. Avoid strenuous or risky activities.  Ask for help when climbing stairs.  5. You may feel lightheaded.  IF so, sit for a few minutes before standing.  Have someone help you get up.   6. If you have nausea (feel sick to your stomach): Drink only clear liquids such as apple juice, ginger ale, broth or 7-Up.  Rest may also help.  Be sure to drink enough fluids.  Move to a regular diet as you feel able.   7. You may have a slight fever.  Call the doctor if your fever is over 100 F (37.7 C) (taken under the tongue) or lasts longer than 24 hours.  8. You may have a dry mouth, a sore throat, muscle aches or trouble sleeping. These should go away after 24 hours.  9. Do not make important or legal decisions.     Tips for taking pain medications  To get the best pain relief possible, remember these points:    Take pain medications as directed, before pain becomes severe.    Pain medication can upset your stomach: taking it with food may help.    Constipation is a common side effect of pain medication. Drink plenty of  fluids.    Eat foods high in fiber. Take a stool softener if recommended by your doctor or pharmacist.    Do not drink alcohol, drive or operate machinery while taking pain medications.    Ask about other ways to control pain, such as with heat, ice or relaxation.    Tylenol/Acetaminophen Consumption  To help encourage the safe use of acetaminophen, the makers of TYLENOL  have lowered the maximum daily dose for single-ingredient Extra Strength TYLENOL  (acetaminophen) products sold in the [place] from 8 pills per day  (4,000 mg) to 6 pills per day (3,000 mg). The dosing interval has also changed from 2 pills every 4-6 hours to 2 pills every 6 hours.    If you feel your pain relief is insufficient, you may take Tylenol/Acetaminophen in addition to your narcotic pain medication.     Be careful not to exceed 3,000 mg of Tylenol/Acetaminophen in a 24 hour period from all sources.    If you are taking extra strength Tylenol/acetaminophen (500 mg), the maximum dose is 6 tablets in 24 hours.    If you are taking regular strength acetaminophen (325 mg), the maximum dose is 9 tablets in 24 hours.    Call a doctor for any of the followin. Signs of infection (fever, growing tenderness at the surgery site, a large amount of drainage or bleeding, severe pain, foul-smelling drainage, redness, swelling).  2. It has been over 8 to 10 hours since surgery and you are still not able to urinate (pass water).  3. Headache for over 24 hours.      Your doctor is:  Dr. Haddad, Prostate and Urology: [phone]               Or dial [phone] and ask for the resident on call for:  Prostate Urology  For emergency care, call the:  [facility]:  [phone] (TTY for hearing impaired: [phone])    Care of Indwelling Leal Catheter  Cleanliness is VERY important!  1. Wash well around catheter with soap and water and rinse well.  Do this every morning and before bedtime.  2. Empty leg bag or bed bag into toilet whenever it becomes half full.  3. When disconnecting and reconnecting, wipe both the catheter end and tubing tip with alcohol. (You may use commercially prepared alcohol wipes or regular cotton balls soaked in alcohol.)  4. Rinse leg bag and bed bag inside and out after each use. You can soak leg bag and/or bed bag in two to three ounces of white vinegar when not in use. Rinse thoroughly before reconnecting to catheter.  5. You may clamp the catheter for short periods of time (two to three hours) if you are not  uncomfortable. If planning intercourse: clamp catheter, disconnect from bag and   a) Tape catheter along shaft of penis, if male; or  b) Tape catheter to abdomen, if female  6. Drink lots of fluids (at least eight to ten cups/glasses per day) and take two to four grams of Vitamin C (optional) per day.      7. Watch for sign of catheter-associated urinary tract infection which include:      Cloudy urine, sediment in urine (may look like sand particles or white flakes), foul smelling urine    A burning feeling, pressure or pain in your lower abdomen    A burning feeling in the urethra or genitalia    Aching in the back (by the kidney)    At the time of discharge from the hospital, you have a 16fr Leal catheter with a  10 cc balloon. It was inserted on August 31, 2017 and should be changed one month from that date on 9/30/17.

## 2017-08-31 NOTE — ANESTHESIA POSTPROCEDURE EVALUATION
Patient: King Gonsalo Bruno    Procedure(s):  Cystoscopy, Suprapubic Tube Placement with Ultrasound Guidiance, Uretheral Dilation - Wound Class: II-Clean Contaminated   - Wound Class: II-Clean Contaminated    Diagnosis:Bladder Cancer  Diagnosis Additional Information: No value filed.    Anesthesia Type:  General, LMA    Note:  Anesthesia Post Evaluation    Patient location during evaluation: PACU  Patient participation: Able to fully participate in evaluation  Level of consciousness: awake  Pain management: adequate  Airway patency: patent  Cardiovascular status: acceptable  Respiratory status: acceptable  Hydration status: balanced  PONV: none     Anesthetic complications: None          Last vitals:  Vitals:    08/31/17 1415 08/31/17 1420 08/31/17 1438   BP: (!) 164/96  (!) 162/92   Pulse:      Resp: 15 17 15   Temp:   36.4  C (97.5  F)   SpO2: 96% 97% 94%         Electronically Signed By: Chandu Marquez MD  August 31, 2017  3:06 PM

## 2017-08-31 NOTE — OP NOTE
PREOPERATIVE DIAGNOSIS: Urethral cancer  POSTOPERATIVE DIAGNOSIS: Urethral cancer  PROCEDURE PERFORMED:   1. Suprapubic cystostomy with suprapubic tube placement under ultrasound guidance.   2. Cystoscopy with urethral dilation.   SURGEON: Muriel Haddad, present and scrubbed for the entire procedure.   RESIDENT: Maddy Harp MD  ESTIMATED BLOOD LOSS: Minimal, less than 5 mL.   TUBES AND DRAINS: A 16-Zimbabwean Leal catheter with 10 mL and balloon.   COMPLICATIONS: None.   SPECIMENS: NONE  FINDINGS: there was a total of about 13 cm of malingant appearing urethelium starting at about 2 cm distal to the membranous urethra and ending about 3 cm proximal to the meatus.   BRIEF HISTORY OF PRESENT ILLNESS: Mr King Gonsalo Bruno is an 71 year old male with invasive high grade urothelial carcinoma with squamous differentiation of the urethra diagnosed 8/11/2017. He has had 6 months of difficulty with urination. He was referred for ongoing management and has elected to undergo cystoscopy, bladder biopsy and suprapubic tube placement. We discussed options, and recommendations for suprapubic tube for bladder drainage. Patient understood risks, benefits and alternatives and agreed to proceed.  DESCRIPTION OF PROCEDURE: After obtaining informed consent, correctly identifying and marking the patient and confirming, preoperative antibiotics were given. He was brought back to the operating room table, placed supine where general anesthesia was induced. He was then prepped and draped in the standard sterile fashion.   Exam at the beginning of the case was pertinent for a firm palpable urethra from about 3 cm proximal to the meatus to the penoscrotal junction. We began the case by placing a 19 Occitan cystoscope into a well lubricated urethra.  At about 3 cm proximal to the meatus the lumen was obliterated by tumor. We then advanced a sensor wire into the bladder and confirmed is location in the bladder by ultrasound. We  attempted multiple times to pass the obliterative tumor but were unable with the 19 Azeri rigid cystoscope or the 16 Azeri flexible cystoscope.   We backfilled the bladder via the cystoscope. Next, using ultrasound guidance we localized the bladder. We then made a 1 centimeter incision with #11 blade scalpel approximately 2-3 fingerbreadths above the pubic bone.  We then used a Chiba needle to find a good trajectory under ultrasound guidance into the bladder. Once we isolated this trajectory, we removed the inner trocar of the chiba needle and there was return of urine. We advanced a second sensor wire into the bladder. the chiba was removed. A 20 Azeri sheath with a dilating trocar was passed over the wire. The inner trocar was removed and there was return of urine. We placed the 16-Uruguayan Leal catheter through the sheath and inflated the catheter with 10 mL in the balloon.  At this point we again attempted to survey the bladder. We dilated the urethra using amplatz dilators over the previously placed sensor wire, serially, until 22 Azeri diameter was reached. We then were able to advance the 19 Azeri rigid cystoscope into the bladder. We noted that there were changes in the urothelium suspicious for malignancy along the entire pendulous urethra and the distal bulbar urethra. There was about 1 cm of proximal bulbar urethra that appeared unaffected by tumor. There was no visible tumor in the membranous urethra or the prostatic urethra. The bladder was surveyed, we were able to see our suprapubic catheter balloon in the dome. There were no visible tumors in the bladder. There was a diverticulum in the left lateral wall. The ureteral orifices were orthotopic. We again surveyed the urethra as we removed the scope, verifying our previous findings that there was a total of about 13 cm of affected urethelium starting at about 2 cm distal to the membranous urethra and ending about 3 cm proximal to the meatus.     Dressing was placed around the suprapubic catheter and a drainage bag was attached. The catheter was secured to his abdomen. The patient was awakened from anesthesia in stable condition.

## 2017-08-31 NOTE — IP AVS SNAPSHOT
[facility]    [address] [place] [address]    Phone:  [phone]    Fax:  [phone]                                       After Visit Summary   8/31/2017    King Gonsalo Bruno    MRN: [id number]           After Visit Summary Signature Page     I have received my discharge instructions, and my questions have been answered. I have discussed any challenges I see with this plan with the nurse or doctor.    ..........................................................................................................................................  Patient/Patient Representative Signature      ..........................................................................................................................................  Patient Representative Print Name and Relationship to Patient    ..................................................               ................................................  Date                                            Time    ..........................................................................................................................................  Reviewed by Signature/Title    ...................................................              ..............................................  Date                                                            Time

## 2017-08-31 NOTE — IP AVS SNAPSHOT
MRN:[id number]                      After Visit Summary   8/31/2017    King Gonsalo Bruno    MRN: [id number]           Thank you!     Thank you for choosing [facility] for your care. Our goal is always to provide you with excellent care. Hearing back from our patients is one way we can continue to improve our services. Please take a few minutes to complete the written survey that you may receive in the mail after you visit with us. Thank you!        Patient Information     Date Of Birth          1945        About your hospital stay     You were admitted on:  August 31, 2017 [facility]    You were discharged on:  August 31, 2017       Who to Call     For medical emergencies, please call 911.  For non-urgent questions about your medical care, please call your primary care provider or clinic, [phone]  For questions related to your surgery, please call your surgery clinic        Attending Provider     Provider Muriel Alford MD Urology       Primary Care Provider Office Phone # Fax #    Joan Janet Ventura [phone]      After Care Instructions     Diet Instructions       Resume pre procedure diet            Discharge Instructions       Activity  - No strenuous exercise for 1 weeks.  - No lifting, pushing, pulling more than 10 pounds for 1 weeks.   - Do not strain with bowel movements.  - Do not drive until you can press the brake pedal quickly and fully without pain.   - Do not operate a motor vehicle while taking narcotic pain medications.     Incisions  - You may shower and get incisions wet starting 48 hrs after surgery.  - Do not scrub incisions or submerge wounds for 2 weeks or until seen in follow-up.   - Remove wound dressing 48 hours after surgery.   - Leave incision open to air. Cover with gauze only if needed for comfort or to protect clothing from drainage.     URINATION  - Blood tinged  urine is expected after urologic procedures.   - Drink plenty of fluids to keep urine clear.  -SUPRAPUBIC CATHETER: If you were discharged with a catheter, keep it secured to your abdomen at all times. If catheter becomes clogged, call the numbers below immediately. Your catheter should be changed in 6 weeks and then every 4 weeks.     Medications  1. PAIN:   - Use over the counter acetaminophen (tylenol) or ibuprofen (motrin) for pain, follow the instructions on the bottle.   - do not take more than 4,000mg of Tylenol (acetaminophen) from all sources in any 24 hour period.  - Do not take more than 3200 mg of ibuprofen (motrin) from all sources in any 24 hour period.  - Oxycodone 5mg tablets, 10 pills are prescribed for your breakthrough postoperative pain. Use this as second line if acetaminophen or ibuprofen are ineffective.   - Transition from narcotic pain medications to tylenol (acetaminophen) as you are able.  Wean yourself off all pain medications as you are able.  - Narcotics can make you constipated.  Take over the counter fiber (metamucil or benefiber) and stool softeners (miralax, docusate or senna) while taking narcotic pain medications, but stop if you develop diarrhea.  - No driving or operating machinery while taking narcotic pain medications   2. Antibiotic: take 5 day course of ciprofloxacin  3. Bladder spasms: Take detrol 2mg daily as needed for bladder spasm. Do not take this the morning of your catheter removal.       Follow-Up:  - Follow-up with Dr. Haddad on 9/13/2017. We will make a nurse visit for your suprapubic tube change appointment in 6 weeks from today.   - Call or return sooner than your regularly scheduled visit if you develop any of the following: fever (greater than 101.5), uncontrolled pain, uncontrolled nausea or vomiting, as well as increased redness, swelling, or drainage from your wound.     Phone numbers:   - Monday through Friday 8am to 4:30pm: Call [phone] with routine  "questions or to schedule or confirm appointment.    - For URGENT questions or concerns on Nights or weekends: call the after hours emergency pager - [phone] and tell the  \"I would like to page the Urology Resident on call.\"   - For emergencies, call 911            Dressing       Keep dressing clean and dry, change as instructed by Surgeon or RN            Encourage fluids       Encourage fluids at home to keep urine clear to light pink            Ice to affected area       Ice to operative site.  PRN as tolerated            No Alcohol       for 24 hours post procedure            No driving or operating machinery        until the day after procedure                  Your next 10 appointments already scheduled     Sep 13, 2017 11:20 AM CDT   (Arrive by 11:05 AM)   Post-Op with Muriel Haddad MD   [facility] ([facility])    [address] [place] [id number] [phone]              Further instructions from your care team       [facility]  Home Care Following Anesthesia  For 24 hours after surgery:  1. Get plenty of rest.  A responsible adult must stay with you for at least 24 hours after you leave the surgery center.  2. Do not drive or use heavy equipment.  If you have weakness or tingling, don't drive or use heavy equipment until this feeling goes away.   3. Do not drink alcohol.   4. Avoid strenuous or risky activities.  Ask for help when climbing stairs.  5. You may feel lightheaded.  IF so, sit for a few minutes before standing.  Have someone help you get up.   6. If you have nausea (feel sick to your stomach): Drink only clear liquids such as apple juice, ginger ale, broth or 7-Up.  Rest may also help.  Be sure to drink enough fluids.  Move to a regular diet as you feel able.   7. You may have a slight fever.  Call the doctor if your fever is over 100 F " (37.7 C) (taken under the tongue) or lasts longer than 24 hours.  8. You may have a dry mouth, a sore throat, muscle aches or trouble sleeping. These should go away after 24 hours.  9. Do not make important or legal decisions.     Tips for taking pain medications  To get the best pain relief possible, remember these points:    Take pain medications as directed, before pain becomes severe.    Pain medication can upset your stomach: taking it with food may help.    Constipation is a common side effect of pain medication. Drink plenty of  fluids.    Eat foods high in fiber. Take a stool softener if recommended by your doctor or pharmacist.    Do not drink alcohol, drive or operate machinery while taking pain medications.    Ask about other ways to control pain, such as with heat, ice or relaxation.    Tylenol/Acetaminophen Consumption  To help encourage the safe use of acetaminophen, the makers of TYLENOL  have lowered the maximum daily dose for single-ingredient Extra Strength TYLENOL  (acetaminophen) products sold in the [place] from 8 pills per day (4,000 mg) to 6 pills per day (3,000 mg). The dosing interval has also changed from 2 pills every 4-6 hours to 2 pills every 6 hours.    If you feel your pain relief is insufficient, you may take Tylenol/Acetaminophen in addition to your narcotic pain medication.     Be careful not to exceed 3,000 mg of Tylenol/Acetaminophen in a 24 hour period from all sources.    If you are taking extra strength Tylenol/acetaminophen (500 mg), the maximum dose is 6 tablets in 24 hours.    If you are taking regular strength acetaminophen (325 mg), the maximum dose is 9 tablets in 24 hours.    Call a doctor for any of the followin. Signs of infection (fever, growing tenderness at the surgery site, a large amount of drainage or bleeding, severe pain, foul-smelling drainage, redness, swelling).  2. It has been over 8 to 10 hours since surgery and you are still not able to urinate (pass  water).  3. Headache for over 24 hours.      Your doctor is:  Dr. Haddad, Prostate and Urology: [phone]               Or dial [phone] and ask for the resident on call for:  Prostate Urology  For emergency care, call the:  [facility]:  [phone] (TTY for hearing impaired: [phone])    Care of Indwelling Leal Catheter  Cleanliness is VERY important!  1. Wash well around catheter with soap and water and rinse well.  Do this every morning and before bedtime.  2. Empty leg bag or bed bag into toilet whenever it becomes half full.  3. When disconnecting and reconnecting, wipe both the catheter end and tubing tip with alcohol. (You may use commercially prepared alcohol wipes or regular cotton balls soaked in alcohol.)  4. Rinse leg bag and bed bag inside and out after each use. You can soak leg bag and/or bed bag in two to three ounces of white vinegar when not in use. Rinse thoroughly before reconnecting to catheter.  5. You may clamp the catheter for short periods of time (two to three hours) if you are not uncomfortable. If planning intercourse: clamp catheter, disconnect from bag and   a) Tape catheter along shaft of penis, if male; or  b) Tape catheter to abdomen, if female  6. Drink lots of fluids (at least eight to ten cups/glasses per day) and take two to four grams of Vitamin C (optional) per day.      7. Watch for sign of catheter-associated urinary tract infection which include:      Cloudy urine, sediment in urine (may look like sand particles or white flakes), foul smelling urine    A burning feeling, pressure or pain in your lower abdomen    A burning feeling in the urethra or genitalia    Aching in the back (by the kidney)    At the time of discharge from the hospital, you have a 16fr Leal catheter with a  10 cc balloon. It was inserted on August 31, 2017 and should be changed one month from that date on 9/30/17.                   Pending Results     No orders found  from 2017 to 2017.            Admission Information     Date & Time Provider Department Dept. Phone    2017 Muriel Haddad MD [facility] [phone]      Your Vitals Were     Blood Pressure Pulse Temperature Respirations Pulse Oximetry       162/92 65 97.5  F (36.4  C) (Temporal) 15 94%       MyChart Information     [employer] is an electronic gateway that provides easy, online access to your medical records. With [employer], you can request a clinic appointment, read your test results, renew a prescription or communicate with your care team.     To sign up for [employer] visit the website at [email or web address]   You will be asked to enter the access code listed below, as well as some personal information. Please follow the directions to create your username and password.     Your access code is: [email or web address]  Expires: 10/31/2017  1:07 PM     Your access code will  in 90 days. If you need help or a new code, please contact your [facility] or call [phone] for assistance.        Care EveryWhere ID     This is your Care EveryWhere ID. This could be used by other organizations to access your [facility] medical records  [id number]        Equal Access to Services     JENELLE RED AH: Hadii aad ku hadasho Sotimali, waaxda luqadaha, qaybta kaalmada adeegyada, ronald rasmussen. So [facility] [phone].    ATENCIÓN: Si habla español, tiene a washburn disposición servicios gratuitos de asistencia lingüística. Llame al [phone].    We comply with applicable federal civil rights laws and [place] laws. We do not discriminate on the basis of race, color, national origin, age, disability sex, sexual orientation or gender identity.               Review of your medicines      START taking        Dose / Directions    acetaminophen 325 MG tablet   Commonly known as:  TYLENOL   Used for:  Urethral cancer (H)        Dose:  650 mg    Take 2 tablets (650 mg) by mouth every 4 hours as needed for other (mild pain)   Quantity:  100 tablet   Refills:  0       ciprofloxacin 500 MG tablet   Commonly known as:  CIPRO   Used for:  Urethral cancer (H)        Dose:  500 mg   Take 1 tablet (500 mg) by mouth 2 times daily for 5 days   Quantity:  10 tablet   Refills:  0       ibuprofen 600 MG tablet   Commonly known as:  ADVIL/MOTRIN   Used for:  Urethral cancer (H)        Dose:  600 mg   Take 1 tablet (600 mg) by mouth every 6 hours as needed for other (For mild pain and temperature greater than 102F)   Quantity:  30 tablet   Refills:  0       oxyCODONE 5 MG IR tablet   Commonly known as:  ROXICODONE   Used for:  Urethral cancer (H)        Dose:  5 mg   Take 1 tablet (5 mg) by mouth every 4 hours as needed for other (Moderate to Severe Pain)   Quantity:  10 tablet   Refills:  0       polyethylene glycol powder   Commonly known as:  MIRALAX/GLYCOLAX   Used for:  Urethral cancer (H)        Dose:  1 capful   Take 17 g (1 capful) by mouth daily Mix 1 capful in 8 ounces of water, juice or soda.  To prevent constipation while taking narcotic  pain medication.  Follow with 8 ounces of water.  Discontinue  if you have loose stools or when you are no longer taking narcotics.   Quantity:  238 g   Refills:  0       tolterodine 2 MG tablet   Commonly known as:  DETROL   Used for:  Urethral cancer (H)        Dose:  2 mg   Take 1 tablet (2 mg) by mouth every 12 hours as needed (bladder spasms)   Quantity:  13 tablet   Refills:  0         CONTINUE these medicines which have NOT CHANGED        Dose / Directions    fluticasone 50 MCG/ACT spray   Commonly known as:  FLONASE        Dose:  1 spray   Spray 1 spray into both nostrils every morning   Refills:  0       HYDROCHLOROTHIAZIDE PO        Dose:  50 mg   Take 50 mg by mouth every morning   Refills:  0       HYDROXYZINE HCL PO        Dose:  5 mg   Take 5 mg by mouth At Bedtime   Refills:  0       priLOSEC OTC 20 MG tablet    Generic drug:  omeprazole        Dose:  20 mg   Take 20 mg by mouth 2 times daily   Refills:  0            Where to get your medicines      These medications were sent to [place], [place] - [address]  [address], [facility] [address]    Hours:  TRANSPLANT PHONE NUMBER [phone] Phone:  [phone]     ciprofloxacin 500 MG tablet    tolterodine 2 MG tablet         Some of these will need a paper prescription and others can be bought over the counter. Ask your nurse if you have questions.     Bring a paper prescription for each of these medications     oxyCODONE 5 MG IR tablet       You don't need a prescription for these medications     acetaminophen 325 MG tablet    ibuprofen 600 MG tablet    polyethylene glycol powder                Protect others around you: Learn how to safely use, store and throw away your medicines at www.disposemymeds.org.             Medication List: This is a list of all your medications and when to take them. Check marks below indicate your daily home schedule. Keep this list as a reference.      Medications           Morning Afternoon Evening Bedtime As Needed    acetaminophen 325 MG tablet   Commonly known as:  TYLENOL   Take 2 tablets (650 mg) by mouth every 4 hours as needed for other (mild pain)   Last time this was given:  975 mg on 8/31/2017 11:00 AM                                ciprofloxacin 500 MG tablet   Commonly known as:  CIPRO   Take 1 tablet (500 mg) by mouth 2 times daily for 5 days                                fluticasone 50 MCG/ACT spray   Commonly known as:  FLONASE   Spray 1 spray into both nostrils every morning                                HYDROCHLOROTHIAZIDE PO   Take 50 mg by mouth every morning                                HYDROXYZINE HCL PO   Take 5 mg by mouth At Bedtime                                ibuprofen 600 MG tablet   Commonly known as:  ADVIL/MOTRIN   Take 1 tablet (600  mg) by mouth every 6 hours as needed for other (For mild pain and temperature greater than 102F)                                oxyCODONE 5 MG IR tablet   Commonly known as:  ROXICODONE   Take 1 tablet (5 mg) by mouth every 4 hours as needed for other (Moderate to Severe Pain)   Last time this was given:  5 mg on 8/31/2017  2:12 PM             5 mg tablet given at 2:10 p.m.                   polyethylene glycol powder   Commonly known as:  MIRALAX/GLYCOLAX   Take 17 g (1 capful) by mouth daily Mix 1 capful in 8 ounces of water, juice or soda.  To prevent constipation while taking narcotic  pain medication.  Follow with 8 ounces of water.  Discontinue  if you have loose stools or when you are no longer taking narcotics.                                priLOSEC OTC 20 MG tablet   Take 20 mg by mouth 2 times daily   Generic drug:  omeprazole                                tolterodine 2 MG tablet   Commonly known as:  DETROL   Take 1 tablet (2 mg) by mouth every 12 hours as needed (bladder spasms)             1 tab given at 2:15 p.m.

## 2017-08-31 NOTE — PROGRESS NOTES
REASON FOR CONSULTATION:  Urethral cancer.      HISTORY OF PRESENT ILLNESS:  Mr. Bruno is a 71-year-old gentleman who originally presented for evaluation with his Primary Care physician with complaints of urinary difficulties.  He also had penile discomfort and mild hematuria for the last 4-6 months.  He at that time apparently had a strong stream and denied any history of urethral stricture or injury.  Also, he had no history of any STDs.  He subsequently was evaluated and found to have gross hematuria and then seen by Dr. Dunn.  Dr. Dunn was able to palpate the urethra, which was markedly abnormal, and he was very concerned about it.  He subsequently underwent a cystoscopy, at which time a very rough and irregular penile urethra was noted.  The urethra was dilated, as it could not be cystoscopically examined, and biopsy of this penile urethral area and mass was obtained.  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  He has then been counseled that he will need some sort of surgery to excise his urethra and needs some sort of urinary diversion, perhaps with a perineal urethrostomy, and he is here for further evaluation and possible discussion of treatment options.  He continues to have some urinary difficulties with a poor stream.  He denies any pain at this time.  He denies any significant bleeding.  He denies any back pain, bone pain or weight loss recently.  He does, however, have weight loss over the last couple of years of about 20 pounds, which he says may have been really linked to an esophageal diverticulum he has had, for which he has had an endoscopy and treatments.  He also has a history of anemia.      PAST MEDICAL HISTORY:  Anemia, dysphagia and hypertension.  He also has GERD.      PAST SURGICAL HISTORY:  Laryngoscopy, takedown of hypopharyngeal diverticula.  He has a left upper quadrant abdominal incision as well.      SOCIAL HISTORY:  He is a  retired  who was a former smoker, has a 10 pack year history of smoking, quit in 1992.  He quit alcohol use.  He currently has a significant other.         FAMILY HISTORY:  Significant for cancer in the brother, diabetes in the brother, hypertension in mother and prostate cancer in the brother.      MEDICATIONS:  Flonase, tolterodine, MiraLax, ibuprofen, oxycodone, hydrochlorothiazide, hydroxyzine, omeprazole.      ALLERGIES:  Lisinopril.      REVIEW OF SYSTEMS:  Review of 13 different systems is documented in the chart.  The only pertinent positives include  with difficulty urinating, burning, urgency and bladder spasms as well as GI with some dyspepsia difficulties and slight difficulty swallowing.      PHYSICAL EXAMINATION:   VITAL SIGNS:  He is afebrile.  Blood pressure is 152/91, pulse 68, weight 70 kg.   HEAD AND NECK:  Atraumatic, normocephalic.  PERRL, EOMI.  Oral mucosa moist and pink.  Neck is supple.  No jugular venous distention.  No palpable adenopathy or masses.   ABDOMEN:  Soft, nontender, nondistended.  No palpable organomegaly.  No CVA tenderness.   EXTREMITIES:  No clubbing, cyanosis or edema.   EXTERNAL GENITALIA:  Fully descended testicles bilaterally, palpably normal.  Phallus abnormal  He has a very firm, nontender mass lesion that extends along the urethra from about the mid penile shaft all the way to the perineum.     RECTAL:  Normal tone.  No masses palpable.  Normal-sized prostate, smooth and nontender.      IMAGING:  It should be noted that he had a CT of the abdomen and pelvis, which did not reveal any evidence of metastatic disease.  He also obtained an MRI of the penis at the outside institution, which confirmed the presence of a large, almost occlusive mass along the bulbar urethra and the urethra of the penile shaft.  It did not reveal any betty metastases on the MRI or CT scan.      ASSESSMENT AND PLAN:  This is a gentleman with newly diagnosed urothelial carcinoma,  primarily arising from the urethra.  It appears that his bladder itself has not been evaluated, and he may quite well be in some amount of retention at this time.  He denies any local pain at this time except for trying to urinate.  I could not palpate any inguinal lymph nodes in this gentleman.  At this point, I think we would like to obtain better staging information with a PET-CT scan.  I also spoke to his urologist, Dr. Dunn, and confirmed that he had not really thoroughly examined the bladder and also performed a bladder biopsy.  He was planning to do that, and the plan was also to perhaps resect the urethra and perform a perineal urethrostomy.  I presented to Mr. Bruno that we should probably pursue this line of evaluation after the PET-CT, perform a rigid cystoscopy of the bladder and possibly biopsy the bladder to ensure there is no urothelial component of cancer in the bladder.  I also suggested that we place an SP tube at that time to relieve the outlet obstruction.  I would also like him to see Medical Oncology to see if he would be a good candidate for neoadjuvant chemotherapy, since if he responds to that, we may be able to spare a larger extent of the urethra and make it more feasible for him to have a perineal urethrostomy.  He certainly would need surgery to remove the distal urethra.  Potentially, the corpora cavernosa could be preserved.  If there is not enough urethra left over after the distal portion is resected and a perineal urethrostomy could not be performed, he may need a complete radical cystoprostatectomy or have a bladder diversion with closure of the bladder neck.  I discussed all of these options with him, and I will also discuss him with my colleague, Dr. Ferdinand Magaña, who will be able to helo with surgical reconstruction ultimately.  At this point, I would like him to also see Medical Oncology.  We will also schedule a cystoscopy with SP tube placement and bladder evaluation  and possible biopsy in the near future.

## 2017-08-31 NOTE — ANESTHESIA CARE TRANSFER NOTE
Patient: King Gonsalo Bruno    Procedure(s):  Cystoscopy, Bladder Biopsy, Possible Suprapubic Tube Placement - Wound Class: II-Clean Contaminated   - Wound Class: II-Clean Contaminated    Diagnosis: Bladder Cancer  Diagnosis Additional Information: No value filed.    Anesthesia Type:   General, LMA     Note:  Airway :Room Air  Patient transferred to:PACU  Comments: Patient to PACU on RA/native airway and is awake and verbal. Report to RN and transfer of care. BP: 164/95 HR:75 SpO2: 98% in RA RR: 18 Temp: 97.0      Vitals: (Last set prior to Anesthesia Care Transfer)    CRNA VITALS  8/31/2017 1319 - 8/31/2017 1354      8/31/2017             Pulse: 90    SpO2: 100 %    Resp Rate (observed): (!)  1    Resp Rate (set): 10                Electronically Signed By: ISAURA Quick CRNA  August 31, 2017  1:54 PM

## 2017-09-05 ENCOUNTER — CARE COORDINATION (OUTPATIENT)
Dept: UROLOGY | Facility: CLINIC | Age: 72
End: 2017-09-05

## 2017-09-05 NOTE — PROGRESS NOTES
Urology Postop Phone Note:    Mr. King Gonsalo Bruno is a 71 year old male who underwent Suprapubic cystostomy with suprapubic tube placement under ultrasound guidance and Cystoscopy with urethral dilation on 8/31/17 with Dr. Haddad for a history of urethral cancer.  His postoperative course was unremarkable and the patient was d/c to home on 8/31/17.    Fevers/chills: Patient denies any fever or chills.  Eating/drinking: Patient is able to eat and drink without any complaints.  Bowel habits: Patient reports having a normal bowel movement.  Urine output S/P tube  Incisions: Patient denies any signs and symptoms of infection.  Pain: Patient reports pain as a 2 out of 10.  The pain is located in the penis and supra pubic area  Narcotics: The patient has been taking Oxycodone, Tylenol and Ibuprofen for pain medication and is able to control the pain.    Follow up appointment scheduled on 9/13/17 at 1120 with Dr. Haddad.    Informed the patient of the clinic triage nursing # ([phone] option 2) and urology resident on call # for after hours concerns.    Thanks,   Lizzy Chowdary   Department of Urologic Surgery

## 2017-09-06 ENCOUNTER — PRE VISIT (OUTPATIENT)
Dept: UROLOGY | Facility: CLINIC | Age: 72
End: 2017-09-06

## 2017-09-06 NOTE — TELEPHONE ENCOUNTER
Patient with urethral cancer coming in for a post op appointment. Chart reviewed and all records available. No need for a call.

## 2017-09-07 DIAGNOSIS — R91.8 PULMONARY NODULES: Primary | ICD-10-CM

## 2017-09-13 ENCOUNTER — OFFICE VISIT (OUTPATIENT)
Dept: UROLOGY | Facility: CLINIC | Age: 72
End: 2017-09-13

## 2017-09-13 VITALS
WEIGHT: 166 LBS | HEART RATE: 69 BPM | SYSTOLIC BLOOD PRESSURE: 155 MMHG | DIASTOLIC BLOOD PRESSURE: 81 MMHG | BODY MASS INDEX: 20.64 KG/M2 | HEIGHT: 75 IN

## 2017-09-13 DIAGNOSIS — C68.0 URETHRAL CANCER (H): Primary | ICD-10-CM

## 2017-09-13 ASSESSMENT — PAIN SCALES - GENERAL: PAINLEVEL: NO PAIN (0)

## 2017-09-13 NOTE — PATIENT INSTRUCTIONS
Nurse visit in 3 weeks for an SP tube change.  Follow up in six weeks following chemo 10/25/17 at 9:30.    It was a pleasure meeting with you today.  Thank you for allowing me and my team the privilege of caring for you today.  YOU are the reason we are here, and I truly hope we provided you with the excellent service you deserve.  Please let us know if there is anything else we can do for you so that we can be sure you are leaving completely satisfied with your care experience.

## 2017-09-13 NOTE — PROGRESS NOTES
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 The patient has had metastatic work up with a PET-CT which illustrated concern for pulmonary metastasis or second primary disease. The patient has been evaluated by medical oncology and per patient the plan is for him to begin chemotherapy this week. The patient is s/p SPT placement and urethral dilation to 22 Fr on 8/31/17. Of note, intra-operatively a 13 cm of malingant appearing urethelium starting at about 2 cm distal to the membranous urethra and ending about 3 cm proximal to the meatus was noted. There were no bladder tumors noted intra-operatively and biopsy was not performed. Today the patient reports being in his usual state of health; specifically he denies involuntary weight loss, fever, chills, change in appetite or fatigue. The patient states that he has some scant urine output from urethra but it is primarily from his SPT.    Objective:  /81  Pulse 69  Ht 1.905 m (6' 3\")  Wt 75.3 kg (166 lb)  BMI 20.75 kg/m2  Gen: In NAD, conversant.  Resp: Breathing non-labored  CV: Extremities warm.  Abd: Soft, non-distended, non-tender. SPT site CDI.     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 The patient has had metastatic work up with a PET-CT which illustrated concern for pulmonary metastasis or second primary disease. He is s/p SPT placement and urethral dilation on 8/31/17 and has been evaluated by medical oncology but has not yet started chemotherapy.      - Will have patient follow up in 3 weeks for SPT change    - Will follow up with patient in clinic in 6 weeks     - to be started on chemo asap will see med onc         This patient was seen & discussed with Dr." Boo.    Huber Steele MD  Urology Resident  Patient seen and examined with the resident.  Visit time 15 minutes and >50% spent in counseling.  I agree with the resident's note and plan of care.       Muriel Haddad MD  Urology Staff      CC  Patient Care Team:  Kit Ventura, NP as PCP - General (Nurse Practitioner)  Ry Dunn as Referring Physician (Surgery)  Muriel Haddad MD as MD (Urology)  Lizzy Chowdary, RN as Registered Nurse (Urology)  Steve Arceo MD as MD (Oncology)  Janet Murrieta, RN as Nurse Coordinator (Oncology)  KIT VENTURA

## 2017-09-13 NOTE — NURSING NOTE
Chief Complaint   Patient presents with     RECHECK     urethral cancer coming in for a post op appointment       Aleena Tim MA

## 2017-09-13 NOTE — LETTER
"9/13/2017       RE: King Gonsalo Bruno  [address]  [place] [address]     Dear Colleague,    Thank you for referring your patient, King Gonsalo Bruno, to the [facility] UROLOGY AND INST FOR PROSTATE AND UROLOGIC CANCERS at [facility]. Please see a copy of my visit note below.    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 The patient has had metastatic work up with a PET-CT which illustrated concern for pulmonary metastasis or second primary disease. The patient has been evaluated by medical oncology and per patient the plan is for him to begin chemotherapy this week. The patient is s/p SPT placement and urethral dilation to 22 Fr on 8/31/17. Of note, intra-operatively a 13 cm of malingant appearing urethelium starting at about 2 cm distal to the membranous urethra and ending about 3 cm proximal to the meatus was noted. There were no bladder tumors noted intra-operatively and biopsy was not performed. Today the patient reports being in his usual state of health; specifically he denies involuntary weight loss, fever, chills, change in appetite or fatigue. The patient states that he has some scant urine output from urethra but it is primarily from his SPT.    Objective:  /81  Pulse 69  Ht 1.905 m (6' 3\")  Wt 75.3 kg (166 lb)  BMI 20.75 kg/m2  Gen: In NAD, conversant.  Resp: Breathing non-labored  CV: Extremities warm.  Abd: Soft, non-distended, non-tender. SPT site CDI.     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 The patient has had metastatic work up with a PET-CT which illustrated concern for pulmonary metastasis or second primary disease. He is s/p SPT placement and " urethral dilation on 8/31/17 and has been evaluated by medical oncology but has not yet started chemotherapy.      - Will have patient follow up in 3 weeks for SPT change    - Will follow up with patient in clinic in 6 weeks     - to be started on chemo asap will see med onc         This patient was seen & discussed with Dr. Haddad.    Huber Steele MD  Urology Resident  Patient seen and examined with the resident.  Visit time 15 minutes and >50% spent in counseling.  I agree with the resident's note and plan of care.       Muriel Haddad MD  Urology Staff      CC  Patient Care Team:  Kit Ventura, NP as PCP - General (Nurse Practitioner)  Ry Dunn as Referring Physician (Surgery)  Muriel Haddad MD as MD (Urology)  Lizzy Chowdary, RN as Registered Nurse (Urology)  Steve Arceo MD as MD (Oncology)  Janet Murrieta, RN as Nurse Coordinator (Oncology)  KIT VENTURA

## 2017-09-13 NOTE — MR AVS SNAPSHOT
After Visit Summary   9/13/2017    King Gonsalo Bruno    MRN: [id number]           Patient Information     Date Of Birth          1945        Visit Information        Provider Department      9/13/2017 11:20 AM Muriel Haddad MD [facility] and [facility] for Prostate and Urologic Cancers        Today's Diagnoses     Urethral cancer (H)    -  1      Care Instructions    Nurse visit in 3 weeks for an SP tube change.  Follow up in six weeks following chemo 10/25/17 at 9:30.    It was a pleasure meeting with you today.  Thank you for allowing me and my team the privilege of caring for you today.  YOU are the reason we are here, and I truly hope we provided you with the excellent service you deserve.  Please let us know if there is anything else we can do for you so that we can be sure you are leaving completely satisfied with your care experience.                  Follow-ups after your visit        Follow-up notes from your care team     Return in about 3 weeks (around 10/4/2017).      Your next 10 appointments already scheduled     Sep 15, 2017 12:00 PM CDT   (Arrive by 11:45 AM)   NEW LUNG NODULE with Marisol Hernandez MD   [facility] ([facility])    [address]  [address] [facility] [id number] [phone]            Oct 06, 2017  1:00 PM CDT   (Arrive by 12:45 PM)   Return Visit with  Prostate Cancer Ctr Nurse   [facility] and [facility]rolog[facility] ([facility])    [address]  [address] [facility] [id number] [phone]            Oct 25, 2017  9:30 AM CDT   (Arrive by 9:15 AM)   Return Visit with Muriel Haddad MD   [facility] and [facility]rolog[facility] ([facility])    [address]  [address]th [address]  [facility] [id number] [phone]              Who to contact     Please call your clinic at [phone]  "to:    Ask questions about your health    Make or cancel appointments    Discuss your medicines    Learn about your test results    Speak to your doctor   If you have compliments or concerns about an experience at your clinic, or if you wish to file a complaint, please contact [facility] Patient Relations at [phone] or email us at [email or web address]         Additional Information About Your Visit        [employer] Information     [employer] is an electronic gateway that provides easy, online access to your medical records. With [employer], you can request a clinic appointment, read your test results, renew a prescription or communicate with your care team.     To sign up for [employer] visit the website at [email or web address]   You will be asked to enter the access code listed below, as well as some personal information. Please follow the directions to create your username and password.     Your access code is: [email or web address]  Expires: 10/31/2017  1:07 PM     Your access code will  in 90 days. If you need help or a new code, please contact your [facility] Clinic or call [phone] for assistance.        Care EveryWhere ID     This is your Care EveryWhere ID. This could be used by other organizations to access your [place] medical records  [id number]        Your Vitals Were     Pulse Height BMI (Body Mass Index)             69 1.905 m (6' 3\") 20.75 kg/m2          Blood Pressure from Last 3 Encounters:   17 155/81   17 148/83   17 (!) 165/92    Weight from Last 3 Encounters:   17 75.3 kg (166 lb)   17 73.6 kg (162 lb 4.1 oz)   17 73.6 kg (162 lb 3.2 oz)              Today, you had the following     No orders found for display       Primary Care Provider Office Phone # Fax #    Joan Ventura [phone]       [address] [address]        Equal Access to " Services     [facility]: Hadii aad ku hadnikkijanine Veroniqueveronica, wacarlosda luqadaha, qaybta kanazenoch chase, ronald srivastava . So [facility] [phone].    ATENCIÓN: Si everett hinson, tiene a washburn disposición servicios gratuitos de asistencia lingüística. Llame al [phone].    We comply with applicable federal civil rights laws and [place] laws. We do not discriminate on the basis of race, color, national origin, age, disability sex, sexual orientation or gender identity.            Thank you!     Thank you for choosing [facility] UROLOGY AND [facility] FOR PROSTATE AND UROLOGIC CANCERS  for your care. Our goal is always to provide you with excellent care. Hearing back from our patients is one way we can continue to improve our services. Please take a few minutes to complete the written survey that you may receive in the mail after your visit with us. Thank you!             Your Updated Medication List - Protect others around you: Learn how to safely use, store and throw away your medicines at www.disposemymeds.org.          This list is accurate as of: 9/13/17 12:41 PM.  Always use your most recent med list.                   Brand Name Dispense Instructions for use Diagnosis    acetaminophen 325 MG tablet    TYLENOL    100 tablet    Take 2 tablets (650 mg) by mouth every 4 hours as needed for other (mild pain)    Urethral cancer (H)       fluticasone 50 MCG/ACT spray    FLONASE     Spray 1 spray into both nostrils every morning        HYDROCHLOROTHIAZIDE PO      Take 50 mg by mouth every morning        HYDROXYZINE HCL PO      Take 5 mg by mouth At Bedtime        ibuprofen 600 MG tablet    ADVIL/MOTRIN    30 tablet    Take 1 tablet (600 mg) by mouth every 6 hours as needed for other (For mild pain and temperature greater than 102F)    Urethral cancer (H)       oxyCODONE 5 MG IR tablet    ROXICODONE    10 tablet    Take 1 tablet (5 mg) by mouth every 4 hours as needed for other (Moderate to Severe Pain)     Urethral cancer (H)       polyethylene glycol powder    MIRALAX/GLYCOLAX    238 g    Take 17 g (1 capful) by mouth daily Mix 1 capful in 8 ounces of water, juice or soda.  To prevent constipation while taking narcotic  pain medication.  Follow with 8 ounces of water.  Discontinue  if you have loose stools or when you are no longer taking narcotics.    Urethral cancer (H)       priLOSEC OTC 20 MG tablet   Generic drug:  omeprazole      Take 20 mg by mouth 2 times daily        tolterodine 2 MG tablet    DETROL    13 tablet    Take 1 tablet (2 mg) by mouth every 12 hours as needed (bladder spasms)    Urethral cancer (H)

## 2017-09-15 ENCOUNTER — INFUSION THERAPY VISIT (OUTPATIENT)
Dept: ONCOLOGY | Facility: CLINIC | Age: 72
End: 2017-09-15
Attending: INTERNAL MEDICINE
Payer: MEDICARE

## 2017-09-15 ENCOUNTER — ONCOLOGY VISIT (OUTPATIENT)
Dept: ONCOLOGY | Facility: CLINIC | Age: 72
End: 2017-09-15
Attending: PHYSICIAN ASSISTANT
Payer: MEDICARE

## 2017-09-15 ENCOUNTER — APPOINTMENT (OUTPATIENT)
Dept: LAB | Facility: CLINIC | Age: 72
End: 2017-09-15
Attending: INTERNAL MEDICINE
Payer: MEDICARE

## 2017-09-15 VITALS
SYSTOLIC BLOOD PRESSURE: 175 MMHG | TEMPERATURE: 97.9 F | DIASTOLIC BLOOD PRESSURE: 88 MMHG | OXYGEN SATURATION: 95 % | WEIGHT: 160.9 LBS | BODY MASS INDEX: 20.11 KG/M2 | HEART RATE: 70 BPM

## 2017-09-15 VITALS — DIASTOLIC BLOOD PRESSURE: 79 MMHG | SYSTOLIC BLOOD PRESSURE: 157 MMHG

## 2017-09-15 DIAGNOSIS — C68.0 URETHRAL CANCER (H): ICD-10-CM

## 2017-09-15 DIAGNOSIS — C68.9 UROTHELIAL CANCER (H): Primary | ICD-10-CM

## 2017-09-15 DIAGNOSIS — I10 BENIGN ESSENTIAL HYPERTENSION: Primary | ICD-10-CM

## 2017-09-15 DIAGNOSIS — I10 BENIGN ESSENTIAL HYPERTENSION: ICD-10-CM

## 2017-09-15 LAB
ALBUMIN SERPL-MCNC: 3.8 G/DL (ref 3.4–5)
ALP SERPL-CCNC: 65 U/L (ref 40–150)
ALT SERPL W P-5'-P-CCNC: 16 U/L (ref 0–70)
ANION GAP SERPL CALCULATED.3IONS-SCNC: 7 MMOL/L (ref 3–14)
AST SERPL W P-5'-P-CCNC: 23 U/L (ref 0–45)
BASOPHILS # BLD AUTO: 0 10E9/L (ref 0–0.2)
BASOPHILS NFR BLD AUTO: 0.4 %
BILIRUB SERPL-MCNC: 0.5 MG/DL (ref 0.2–1.3)
BUN SERPL-MCNC: 12 MG/DL (ref 7–30)
CALCIUM SERPL-MCNC: 9.6 MG/DL (ref 8.5–10.1)
CHLORIDE SERPL-SCNC: 96 MMOL/L (ref 94–109)
CO2 SERPL-SCNC: 29 MMOL/L (ref 20–32)
CREAT SERPL-MCNC: 1.33 MG/DL (ref 0.66–1.25)
DIFFERENTIAL METHOD BLD: ABNORMAL
EOSINOPHIL # BLD AUTO: 0.3 10E9/L (ref 0–0.7)
EOSINOPHIL NFR BLD AUTO: 3.3 %
ERYTHROCYTE [DISTWIDTH] IN BLOOD BY AUTOMATED COUNT: 14.5 % (ref 10–15)
GFR SERPL CREATININE-BSD FRML MDRD: 53 ML/MIN/1.7M2
GLUCOSE SERPL-MCNC: 103 MG/DL (ref 70–99)
HCT VFR BLD AUTO: 37.8 % (ref 40–53)
HGB BLD-MCNC: 12.6 G/DL (ref 13.3–17.7)
IMM GRANULOCYTES # BLD: 0 10E9/L (ref 0–0.4)
IMM GRANULOCYTES NFR BLD: 0.4 %
LYMPHOCYTES # BLD AUTO: 2.5 10E9/L (ref 0.8–5.3)
LYMPHOCYTES NFR BLD AUTO: 32.6 %
MAGNESIUM SERPL-MCNC: 2 MG/DL (ref 1.6–2.3)
MCH RBC QN AUTO: 29.2 PG (ref 26.5–33)
MCHC RBC AUTO-ENTMCNC: 33.3 G/DL (ref 31.5–36.5)
MCV RBC AUTO: 88 FL (ref 78–100)
MONOCYTES # BLD AUTO: 0.6 10E9/L (ref 0–1.3)
MONOCYTES NFR BLD AUTO: 8.1 %
NEUTROPHILS # BLD AUTO: 4.2 10E9/L (ref 1.6–8.3)
NEUTROPHILS NFR BLD AUTO: 55.2 %
NRBC # BLD AUTO: 0 10*3/UL
NRBC BLD AUTO-RTO: 0 /100
PLATELET # BLD AUTO: 310 10E9/L (ref 150–450)
POTASSIUM SERPL-SCNC: 3.7 MMOL/L (ref 3.4–5.3)
PROT SERPL-MCNC: 7.8 G/DL (ref 6.8–8.8)
RBC # BLD AUTO: 4.32 10E12/L (ref 4.4–5.9)
SODIUM SERPL-SCNC: 132 MMOL/L (ref 133–144)
WBC # BLD AUTO: 7.7 10E9/L (ref 4–11)

## 2017-09-15 PROCEDURE — 99214 OFFICE O/P EST MOD 30 MIN: CPT | Mod: ZP | Performed by: PHYSICIAN ASSISTANT

## 2017-09-15 PROCEDURE — 85025 COMPLETE CBC W/AUTO DIFF WBC: CPT | Performed by: INTERNAL MEDICINE

## 2017-09-15 PROCEDURE — 96417 CHEMO IV INFUS EACH ADDL SEQ: CPT

## 2017-09-15 PROCEDURE — 83735 ASSAY OF MAGNESIUM: CPT | Performed by: INTERNAL MEDICINE

## 2017-09-15 PROCEDURE — 96361 HYDRATE IV INFUSION ADD-ON: CPT

## 2017-09-15 PROCEDURE — 25000128 H RX IP 250 OP 636: Mod: ZF | Performed by: INTERNAL MEDICINE

## 2017-09-15 PROCEDURE — 96375 TX/PRO/DX INJ NEW DRUG ADDON: CPT

## 2017-09-15 PROCEDURE — 96413 CHEMO IV INFUSION 1 HR: CPT

## 2017-09-15 PROCEDURE — 80053 COMPREHEN METABOLIC PANEL: CPT | Performed by: INTERNAL MEDICINE

## 2017-09-15 PROCEDURE — 96367 TX/PROPH/DG ADDL SEQ IV INF: CPT

## 2017-09-15 RX ORDER — AMLODIPINE BESYLATE 5 MG/1
5 TABLET ORAL DAILY
Qty: 30 TABLET | Refills: 3 | Status: SHIPPED | OUTPATIENT
Start: 2017-09-15 | End: 2017-10-03

## 2017-09-15 RX ORDER — ONDANSETRON 8 MG/1
8 TABLET, FILM COATED ORAL EVERY 8 HOURS PRN
Qty: 20 TABLET | Refills: 3 | Status: SHIPPED | OUTPATIENT
Start: 2017-09-15 | End: 2017-10-06

## 2017-09-15 RX ORDER — DEXAMETHASONE 4 MG/1
8 TABLET ORAL DAILY
Qty: 12 TABLET | Refills: 3 | Status: SHIPPED | OUTPATIENT
Start: 2017-09-15 | End: 2018-02-26

## 2017-09-15 RX ORDER — PALONOSETRON 0.05 MG/ML
0.25 INJECTION, SOLUTION INTRAVENOUS ONCE
Status: COMPLETED | OUTPATIENT
Start: 2017-09-15 | End: 2017-09-15

## 2017-09-15 RX ORDER — PROCHLORPERAZINE MALEATE 10 MG
5 TABLET ORAL EVERY 6 HOURS PRN
Qty: 30 TABLET | Refills: 3 | Status: SHIPPED | OUTPATIENT
Start: 2017-09-15 | End: 2017-10-06

## 2017-09-15 RX ORDER — EPINEPHRINE 0.3 MG/.3ML
INJECTION SUBCUTANEOUS
Status: DISCONTINUED
Start: 2017-09-15 | End: 2017-09-15 | Stop reason: WASHOUT

## 2017-09-15 RX ORDER — LORAZEPAM 0.5 MG/1
0.5 TABLET ORAL EVERY 4 HOURS PRN
Qty: 30 TABLET | Refills: 3 | Status: SHIPPED | OUTPATIENT
Start: 2017-09-15 | End: 2017-10-06

## 2017-09-15 RX ORDER — HYDROCHLOROTHIAZIDE 25 MG/1
25 TABLET ORAL EVERY MORNING
Qty: 30 TABLET | COMMUNITY
Start: 2017-09-15 | End: 2017-10-03

## 2017-09-15 RX ADMIN — PALONOSETRON HYDROCHLORIDE 0.25 MG: 0.25 INJECTION INTRAVENOUS at 08:20

## 2017-09-15 RX ADMIN — SODIUM CHLORIDE 150 MG: 9 INJECTION, SOLUTION INTRAVENOUS at 08:20

## 2017-09-15 RX ADMIN — GEMCITABINE 2000 MG: 38 INJECTION, SOLUTION INTRAVENOUS at 08:54

## 2017-09-15 RX ADMIN — SODIUM CHLORIDE 1000 ML: 9 INJECTION, SOLUTION INTRAVENOUS at 07:04

## 2017-09-15 RX ADMIN — CISPLATIN 70 MG: 1 INJECTION, SOLUTION INTRAVENOUS at 09:41

## 2017-09-15 RX ADMIN — DEXAMETHASONE SODIUM PHOSPHATE 12 MG: 10 INJECTION, SOLUTION INTRAMUSCULAR; INTRAVENOUS at 08:05

## 2017-09-15 ASSESSMENT — PAIN SCALES - GENERAL: PAINLEVEL: MODERATE PAIN (4)

## 2017-09-15 NOTE — PATIENT INSTRUCTIONS
Today you received Gemzar and Cisplat.  You saw Lela MELARA  Your infusion nurse today was Ramona    Refer to the hand written instructions Lela provided you today regarding your medications, how much fluid to drink and how to manage side effects.        [facility] Main Line: [phone]    Call triage nurse with chills and/or temperature greater than or equal to 100.4, uncontrolled nausea/vomiting, diarrhea, constipation, dizziness, shortness of breath, chest pain, bleeding, unexplained bruising, or any new/concerning symptoms, questions/concerns.   If you are having any concerning symptoms or wish to speak to a provider before your next infusion visit, please call your care coordinator or triage to notify them so we can adequately serve you.   Triage Nurse Line: [phone]    If after hours, weekends, or holidays, call main hospital  and ask for Oncology doctor on call @ [phone]               September 2017 Sunday Monday Tuesday Wednesday Thursday Friday Saturday                            1     2       3     4     5     6     7     8     9       10     11     12     13     [facility] POST-OP   11:05 AM   (20 min.)   Muriel Haddad MD   [facility] for Prostate and Urologic Cancers 14     15     [facility] LAB DRAW    6:30 AM   (15 min.)    MASONIC LAB DRAW   [facility] Lab Draw     UMP RETURN    6:45 AM   (50 min.)   Josy Powell PA-C   [facility]     UMP ONC INFUSION 360    7:00 AM   (360 min.)    ONCOLOGY INFUSION   [facility] 16       17     18     19     20     21     22     P [facility] LAB DRAW   10:00 AM   (15 min.)    MASONIC LAB DRAW   [facility] Lab Draw     [facility] ONC INFUSION 60   10:30 AM   (60 min.)    ONCOLOGY INFUSION   [facility] 23       24     25     26     27     28     29     UMP NEW LUNG NODULE   11:45 AM   (30 min.)   Marisol Hernandez MD   [facility]  [facility] 30 October 2017 Sunday Monday Tuesday Wednesday Thursday Friday Saturday   1     2     3     4     5     6     [facility] LAB DRAW    7:15 AM   (15 min.)    MASONIC LAB DRAW   [facility] Mason[facility] Lab Draw     UMP RETURN    7:35 AM   (50 min.)   Yessica Ovalles APRN CNP   [facility]     [facility] ONC INFUSION 360    9:00 AM   (360 min.)    ONCOLOGY INFUSION   [facility]     [facility] RETURN   12:45 PM   (30 min.)   Nurse,  Prostate Cancer Ctr   [facility]state [facility] Urologic Cancers 7       8     9     10     11     12     13     [facility] LAB DRAW    9:00 AM   (15 min.)    MASONIC LAB DRAW   [facility] Lab Draw     P ONC INFUSION 60    9:30 AM   (60 min.)    ONCOLOGY INFUSION   [facility] 14       15     16     17     18     19     20     21       22     23     24     25     UMP CYSTOSCOPY    9:15 AM   (20 min.)   Muriel Haddad MD   [facility] and [facility] for Prostate and Urologic Cancers 26     27     [facility] LAB DRAW    9:00 AM   (15 min.)    MASONIC LAB DRAW   [facility] Lab Draw     [facility] RETURN    9:15 AM   (50 min.)   Jos[facility] Powell [facility]-C   [facility]   10:00 AM   (360 min.)    ONCOLOGY INFUSION   [facility] 28       29     30     31                                      Lab Results:  Recent Results (from the past 12 hour(s))   CBC with platelets differential    Collection Time: 09/15/17  6:32 AM   Result Value Ref Range    WBC 7.7 4.0 - 11.0 10e9/L    RBC Count 4.32 (L) 4.4 - 5.9 10e12/L    Hemoglobin 12.6 (L) 13.3 - 17.7 g/dL    Hematocrit 37.8 (L) 40.0 - 53.0 %    MCV 88 78 - 100 fl    MCH 29.2 26.5 - 33.0 pg    MCHC 33.3 31.5 - 36.5 g/dL    RDW 14.5 10.0 - 15.0 %    Platelet Count 310 150 - 450 10e9/L    Diff Method Automated Method     % Neutrophils 55.2 %    % Lymphocytes 32.6 %    %  Monocytes 8.1 %    % Eosinophils 3.3 %    % Basophils 0.4 %    % Immature Granulocytes 0.4 %    Nucleated RBCs 0 0 /100    Absolute Neutrophil 4.2 1.6 - 8.3 10e9/L    Absolute Lymphocytes 2.5 0.8 - 5.3 10e9/L    Absolute Monocytes 0.6 0.0 - 1.3 10e9/L    Absolute Eosinophils 0.3 0.0 - 0.7 10e9/L    Absolute Basophils 0.0 0.0 - 0.2 10e9/L    Abs Immature Granulocytes 0.0 0 - 0.4 10e9/L    Absolute Nucleated RBC 0.0    Comprehensive metabolic panel    Collection Time: 09/15/17  6:32 AM   Result Value Ref Range    Sodium 132 (L) 133 - 144 mmol/L    Potassium 3.7 3.4 - 5.3 mmol/L    Chloride 96 94 - 109 mmol/L    Carbon Dioxide 29 20 - 32 mmol/L    Anion Gap 7 3 - 14 mmol/L    Glucose 103 (H) 70 - 99 mg/dL    Urea Nitrogen 12 7 - 30 mg/dL    Creatinine 1.33 (H) 0.66 - 1.25 mg/dL    GFR Estimate 53 (L) >60 mL/min/1.7m2    GFR Estimate If Black 64 >60 mL/min/1.7m2    Calcium 9.6 8.5 - 10.1 mg/dL    Bilirubin Total 0.5 0.2 - 1.3 mg/dL    Albumin 3.8 3.4 - 5.0 g/dL    Protein Total 7.8 6.8 - 8.8 g/dL    Alkaline Phosphatase 65 40 - 150 U/L    ALT 16 0 - 70 U/L    AST 23 0 - 45 U/L   Magnesium    Collection Time: 09/15/17  6:32 AM   Result Value Ref Range    Magnesium 2.0 1.6 - 2.3 mg/dL

## 2017-09-15 NOTE — NURSING NOTE
Chief Complaint   Patient presents with     Blood Draw     labs drawn via venipuncture     /88 (BP Location: Right arm, Patient Position: Chair, Cuff Size: Adult Regular)  Pulse 70  Temp 97.9  F (36.6  C) (Oral)  Wt 73 kg (160 lb 14.4 oz)  SpO2 95%  BMI 20.11 kg/m2    Vitals taken.  Blood collected from right antecub venipuncture. Pt tolerated well. Pt checked in for next appointment.    Paula Mcfarlane RN

## 2017-09-15 NOTE — PROGRESS NOTES
[facility] CANCER CLINIC  FOLLOW-UP VISIT NOTE  Date of visit: 9-15-17        REASON FOR VISIT: bladder CA, here for assessment prior to chemotherapy    HPI: Mr. Bruno is a 71-year-old gentleman who originally presented for evaluation with his Primary Care physician with complaints of urinary difficulties.  He also had penile discomfort and mild hematuria for the last 4-6 months.  He at that time apparently had a strong stream and denied any history of urethral stricture or injury.  Also, he had no history of any STDs.  He subsequently was evaluated and found to have gross hematuria and then seen by Dr. Dunn.  Dr. Dunn was able to palpate the urethra, which was markedly abnormal, and he was very concerned about it.  He subsequently underwent a cystoscopy, at which time a very rough and irregular penile urethra was noted.  The urethra was dilated, as it could not be cystoscopically examined, and biopsy of this penile urethral area and mass was obtained.  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    Mr Bruno met with Dr Haddad here at [facility], he had a cysto and SPC placement on 8/31/17 as he was having obstructive urinary symptoms.  He met with Dr Arceo on 8/23/17 with plans for neoadjuvant chemotherapy.           INTERVAL HISTORY: Mr Bruno is here with friend Stephanie.  Overall he is anxious about chemo today and frustrated as he went weeks without scheduling chemo, but is over it and ready to start.  No recent URI or febrile issues.  Underwent SPC without complication.  Some adjustments in sleeping with bag, but doing well. Still gets bladder contractility with small amount of urine passing or dribbling on occasion.  Has penile pain at the distal penis.  Has h/o tinnitus, GERD, esophageal diverticulum and not well controlled HTN which were discussed.  No HA or vision issues.  Golfs 18 holes weekly.        EXAM:  /88 (BP Location: Right arm,  Patient Position: Chair, Cuff Size: Adult Regular)  Pulse 70  Temp 97.9  F (36.6  C) (Oral)  Wt 73 kg (160 lb 14.4 oz)  SpO2 95%  BMI 20.11 kg/m2  Wt Readings from Last 4 Encounters:   09/15/17 73 kg (160 lb 14.4 oz)   09/13/17 75.3 kg (166 lb)   08/23/17 73.6 kg (162 lb 4.1 oz)   08/23/17 73.6 kg (162 lb 3.2 oz)     Vital signs were reviewed.   Patient alert and oriented x3.   PERRLA. EOMI. No scleral icterus noted. OP without thrush/sores.  Neck exam: No palpable cervical, supraclavicular or axillary nodes bilaterally.   Heart: RRR no murmurs noted.   Lungs: clear to auscultation bilaterally.  No crackles or wheezing.   Abd: positive bowel sounds in all four quadrants.  No tenderness to palpation.  No hepatomegaly.  Has SPC in place, no signs of infection or drainage.  Clear urine in bag.  Extremities: No lower extremity edema.   Neuro: grossly intact.   Mood and affect is stable.       LABS:      9/15/2017 06:32   Sodium 132 (L)   Potassium 3.7   Chloride 96   Carbon Dioxide 29   Urea Nitrogen 12   Creatinine 1.33 (H)   GFR Estimate 53 (L)   GFR Estimate If Black 64   Calcium 9.6   Anion Gap 7   Magnesium 2.0   Albumin 3.8   Protein Total 7.8   Bilirubin Total 0.5   Alkaline Phosphatase 65   ALT 16   AST 23   Glucose 103 (H)   WBC 7.7   Hemoglobin 12.6 (L)   Hematocrit 37.8 (L)   Platelet Count 310   RBC Count 4.32 (L)   MCV 88     ASSESSMENT/PLAN: 71 year old with urothelial cancer with squamous differentiation arising from the penile urethra, undergoing shar adjuvant Cis + Crisp, split on Day 1 and 8 due to CKD.      ONC: Mr Bruno was counseled today regarding chemotherapy toxicity including neurotoxicity, nephrotoxicity, ototoxicity, nausea/vomiting, GERD, constipation, myelosuppression, fatigue and anorexia.  He was given a hand out regarding nausea, constipation and GERD management.  I think his nausea will be less due to the split cisplatin doses, but I counseled him on using dex,zofran, compazine and  ativan.  He has baseline CKD and tinnitus.     I expect that he should have less penile pain with chemotherapy, indicating a response.  We will set up a cysto in 6 weeks to evaluate, earlier if no improvement is noted.  Goal of neoadjuvant chemotherapy is to have successful distal urethra removed and perineal urethrostomy placement.  If not sufficient response, radical cysto may be needed.   - OK for C1 today split cisplat day 1 + day 8  - NANCY prior to C2  - cysto in 6 weeks    PULM; reviewed PET with Mr Bruno today- he spoke with urology about 2 possible lung nodules being cancer.  Normally these would be set up to be biopsied, due to extend of disease, chemo was started first.  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  Set up to see Dr Hernandez for fup next week.     CV: h/o HTN, uncontrolled today.  Primary recently tried lisinopril and he had angioedema.  The HCTZ is drying for him and not ideal with cisplatin when he needs the extra hydration.  Will cut back to 25 mg daily and add in 5 mg of Norvasc.  This can be escalating to 10 mg if tolerating and insufficient control    Renal: has CKD at baseline with creat ~1.3 range.  Hydrates already with 60-80 oz daily.     HEME: reports of anemia? Was unable to find work up.  Could be from CKD?  MCV normal today and starting hgb is 12.6 today.  Will monitor as will likely decline with cisplatin.     GERD: has been intermittently taking prilosec. Counseled on acute GERD mgmt with zantac and TUMS and long term mgmt with PPIs.  Has an esophageal diverticulum which just gives him a foreign body feeling in throat and cough, but otherwise not an issue.     Lela Powell PA-C

## 2017-09-15 NOTE — PROGRESS NOTES
Infusion Nursing Note:  King Gonsalo Bruno presents today for Cycle 1 Day 1 Gemzar, Cisplat.    Patient seen by provider today: Yes: Lela MELARA   present during visit today: Not Applicable.    Note: New to infusion room.  Is here today with his significant other Stephanie.  Given tour of of infusion area.  Oriented to call light.  Has a supra pupic catheter.  Chemo drug information provided to Lela Carrera has reviewed these with him and has provided her own written handout with detailed information.    Intravenous Access:  Peripheral IV placed.    Treatment Conditions:  Lab Results   Component Value Date    HGB 12.6 09/15/2017     Lab Results   Component Value Date    WBC 7.7 09/15/2017      Lab Results   Component Value Date    ANEU 4.2 09/15/2017     Lab Results   Component Value Date     09/15/2017      Lab Results   Component Value Date     09/15/2017                   Lab Results   Component Value Date    POTASSIUM 3.7 09/15/2017           Lab Results   Component Value Date    MAG 2.0 09/15/2017            Lab Results   Component Value Date    CR 1.33 09/15/2017                   Lab Results   Component Value Date    SAADIA 9.6 09/15/2017                Lab Results   Component Value Date    BILITOTAL 0.5 09/15/2017           Lab Results   Component Value Date    ALBUMIN 3.8 09/15/2017                    Lab Results   Component Value Date    ALT 16 09/15/2017           Lab Results   Component Value Date    AST 23 09/15/2017     Results reviewed, labs MET treatment parameters, ok to proceed with treatment.      Supra Pubic Cath bag emptied pre Cisplat, during, post.    Post Infusion Assessment:  Patient tolerated infusion without incident.  Did have some burning at PIV site with Gemzar, had + blood return. Gemzar slowed down, IVF's increased, warm pack applied, no further issues.  Blood return noted pre and post infusion.  Site patent and intact, free from redness, edema or  discomfort.  No evidence of extravasations.  Access discontinued per protocol.    Discharge Plan:   New Prescriptions given for Ativan, Compazine, Dexamethasone, Zofran, Norvasc, Hydrochlorothiazide.  Copy of AVS reviewed with patient and/or family.  Patient will return 9/22/17 for next appointment.  Patient discharged in stable condition accompanied by: significant other.  Departure Mode: Ambulatory.  Face to Face time: 0.    Ramona Comer RN

## 2017-09-15 NOTE — MR AVS SNAPSHOT
After Visit Summary   9/15/2017    King Gonsalo Bruno    MRN: [id number]           Patient Information     Date Of Birth          1945        Visit Information        Provider Department      9/15/2017 7:00 AM Josy Powell PA-C [facility]        Today's Diagnoses     Benign essential hypertension    -  1    Urethral cancer (H)           Follow-ups after your visit        Your next 10 appointments already scheduled     Sep 22, 2017 10:00 AM CDT   [facility] Lab Draw with [facility] LAB DRAW   [facility] Mason[facility] Lab Draw ([facility])    [address] [place] [address]   [phone]            Sep 22, 2017 10:30 AM CDT   Infusion 60 with UC ONCOLOGY INFUSION, UC 31 ATC   [facility] ([facility])    [address] [place] [address]   [phone]            Sep 29, 2017 12:00 PM CDT   (Arrive by 11:45 AM)   NEW LUNG NODULE with Marisol Hernandez MD   [facility] ([facility])    [address] [place] [address]   [phone]            Oct 06, 2017  7:15 AM CDT   [facility] Lab Draw with UC [facility]ON[facility] LAB DRAW   [facility] Masonic Lab Draw ([facility])    [address] [place] [address]   [phone]            Oct 06, 2017  7:50 AM CDT   (Arrive by 7:35 AM)   Return Visit with ISAURA Roach CNP   [facility] ([facility])    [address] [place] [address]   [phone]            Oct 06, 2017  9:00 AM CDT   Infusion 360 with UC ONCOLOGY INFUSION, UC 24 ATC   [facility])    [address] [place] [address]   [phone]            Oct 06, 2017  1:00 PM CDT   (Arrive by 12:45 PM)  "  Return Visit with  Prostate Cancer Ctr Nurse   [facility] Urology and Inst for Prostate and Urologic Cancers ([facility])    [address]  4th Floor  [facility] [id number] [phone]            Oct 13, 2017  9:00 AM CDT   [facility] Lab Draw with  MASONIC LAB DRAW   [facility] Lab Draw ([facility])    [address]  2nd Floor  [facility] [address] [phone]              Who to contact     If you have questions or need follow up information about today's clinic visit or your schedule please contact [facility] CANCER [facility] directly at [phone].  Normal or non-critical lab and imaging results will be communicated to you by MyChart, letter or phone within 4 business days after the clinic has received the results. If you do not hear from us within 7 days, please contact the clinic through [employer] or phone. If you have a critical or abnormal lab result, we will notify you by phone as soon as possible.  Submit refill requests through [employer] or call your pharmacy and they will forward the refill request to us. Please allow 3 business days for your refill to be completed.          Additional Information About Your Visit        Pol[employer]Springr Information     [employer] lets you send messages to your doctor, view your test results, renew your prescriptions, schedule appointments and more. To sign up, go to [email or web address] . Click on \"Log in\" on the left side of the screen, which will take you to the Welcome page. Then click on \"Sign up Now\" on the right side of the page.     You will be asked to enter the access code listed below, as well as some personal information. Please follow the directions to create your username and password.     Your access code is: [email or web address]  Expires: 10/31/2017  1:07 PM     Your access code will  in 90 days. If you need help or a new code, please call your [place] clinic or [phone].   "      Care EveryWhere ID     This is your Care EveryWhere ID. This could be used by other organizations to access your [place] medical records  [id number]        Your Vitals Were     Pulse Temperature Pulse Oximetry BMI (Body Mass Index)          70 97.9  F (36.6  C) (Oral) 95% 20.11 kg/m2         Blood Pressure from Last 3 Encounters:   09/15/17 175/88   09/13/17 155/81   08/31/17 148/83    Weight from Last 3 Encounters:   09/15/17 73 kg (160 lb 14.4 oz)   09/13/17 75.3 kg (166 lb)   08/23/17 73.6 kg (162 lb 4.1 oz)              Today, you had the following     No orders found for display         Today's Medication Changes          These changes are accurate as of: 9/15/17  8:32 AM.  If you have any questions, ask your nurse or doctor.               Start taking these medicines.        Dose/Directions    amLODIPine 5 MG tablet   Commonly known as:  NORVASC   Used for:  Benign essential hypertension        Dose:  5 mg   Take 1 tablet (5 mg) by mouth daily   Quantity:  30 tablet   Refills:  3       dexamethasone 4 MG tablet   Commonly known as:  DECADRON   Used for:  Urethral cancer (H)        Dose:  8 mg   Take 2 tablets (8 mg) by mouth daily Take for 3 days, starting the day after cisplatin (Day 2 and Day 9).   Quantity:  12 tablet   Refills:  3       LORazepam 0.5 MG tablet   Commonly known as:  ATIVAN   Used for:  Urethral cancer (H)        Dose:  0.5 mg   Take 1 tablet (0.5 mg) by mouth every 4 hours as needed (Anxiety, Nausea/Vomiting or Sleep)   Quantity:  30 tablet   Refills:  3       ondansetron 8 MG tablet   Commonly known as:  ZOFRAN   Used for:  Urothelial cancer (H)        Dose:  8 mg   Take 1 tablet (8 mg) by mouth every 8 hours as needed for nausea   Quantity:  20 tablet   Refills:  3       prochlorperazine 10 MG tablet   Commonly known as:  COMPAZINE   Used for:  Urethral cancer (H)        Dose:  5 mg   Take 0.5 tablets (5 mg) by mouth every 6 hours as needed (Nausea/Vomiting)   Quantity:  30 tablet    Refills:  3         These medicines have changed or have updated prescriptions.        Dose/Directions    hydrochlorothiazide 25 MG tablet   Commonly known as:  HYDRODIURIL   This may have changed:    - medication strength  - how much to take        Dose:  25 mg   Take 1 tablet (25 mg) by mouth every morning   Quantity:  30 tablet   Refills:  0         Stop taking these medicines if you haven't already. Please contact your care team if you have questions.     acetaminophen 325 MG tablet   Commonly known as:  TYLENOL           ibuprofen 600 MG tablet   Commonly known as:  ADVIL/MOTRIN           oxyCODONE 5 MG IR tablet   Commonly known as:  ROXICODONE           polyethylene glycol powder   Commonly known as:  MIRALAX/GLYCOLAX                Where to get your medicines      These medications were sent to [facility] [address] [facility] [address]  [address] [place] [address] [address] [address]    Hours:  TRANSPLANT PHONE NUMBER [phone] Phone:  [phone]     amLODIPine 5 MG tablet    dexamethasone 4 MG tablet    ondansetron 8 MG tablet    prochlorperazine 10 MG tablet         Some of these will need a paper prescription and others can be bought over the counter.  Ask your nurse if you have questions.     Bring a paper prescription for each of these medications     LORazepam 0.5 MG tablet                Primary Care Provider Office Phone # Fax #    Joan Janet Ventura [phone]       [facility] [address] [facility] [address]        Equal Access to Services     JENELLE RED AH: Hadja garcia hadnikkio Soveronica, waaxda luqadaha, qaybta kaalmada gabyada, ronald minor adeaamir rasmussen. So [facility] [phone].    ATENCIÓN: Si habla español, tiene a washburn disposición servicios gratuitos de asistencia lingüística. Juwan hoffmann [phone].    We comply with applicable federal civil rights laws and [place] laws. We do not discriminate on the  basis of race, color, national origin, age, disability sex, sexual orientation or gender identity.            Thank you!     Thank you for choosing [facility] CANCER CLINIC  for your care. Our goal is always to provide you with excellent care. Hearing back from our patients is one way we can continue to improve our services. Please take a few minutes to complete the written survey that you may receive in the mail after your visit with us. Thank you!             Your Updated Medication List - Protect others around you: Learn how to safely use, store and throw away your medicines at www.disposemymeds.org.          This list is accurate as of: 9/15/17  8:32 AM.  Always use your most recent med list.                   Brand Name Dispense Instructions for use Diagnosis    amLODIPine 5 MG tablet    NORVASC    30 tablet    Take 1 tablet (5 mg) by mouth daily    Benign essential hypertension       dexamethasone 4 MG tablet    DECADRON    12 tablet    Take 2 tablets (8 mg) by mouth daily Take for 3 days, starting the day after cisplatin (Day 2 and Day 9).    Urethral cancer (H)       fluticasone 50 MCG/ACT spray    FLONASE     Spray 1 spray into both nostrils every morning        hydrochlorothiazide 25 MG tablet    HYDRODIURIL    30 tablet    Take 1 tablet (25 mg) by mouth every morning        HYDROXYZINE HCL PO      Take 5 mg by mouth At Bedtime        LORazepam 0.5 MG tablet    ATIVAN    30 tablet    Take 1 tablet (0.5 mg) by mouth every 4 hours as needed (Anxiety, Nausea/Vomiting or Sleep)    Urethral cancer (H)       ondansetron 8 MG tablet    ZOFRAN    20 tablet    Take 1 tablet (8 mg) by mouth every 8 hours as needed for nausea    Urothelial cancer (H)       priLOSEC OTC 20 MG tablet   Generic drug:  omeprazole      Take 20 mg by mouth 2 times daily        prochlorperazine 10 MG tablet    COMPAZINE    30 tablet    Take 0.5 tablets (5 mg) by mouth every 6 hours as needed (Nausea/Vomiting)    Urethral cancer (H)

## 2017-09-15 NOTE — MR AVS SNAPSHOT
After Visit Summary   9/15/2017    King Gonsalo Bruno    MRN: [id number]           Patient Information     Date Of Birth          1945        Visit Information        Provider Department      9/15/2017 7:00 AM  11 ATC;  ONCOLOGY INFUSION M [facility]        Today's Diagnoses     Urothelial cancer (H)    -  1    Urethral cancer (H)        Benign essential hypertension          Care Instructions    Today you received Gemzar and Cisplat.  You saw Lela MELARA  Your infusion nurse today was Ramona    Refer to the hand written instructions Lela provided you today regarding your medications, how much fluid to drink and how to manage side effects.        [facility] Main Line: [phone]    Call triage nurse with chills and/or temperature greater than or equal to 100.4, uncontrolled nausea/vomiting, diarrhea, constipation, dizziness, shortness of breath, chest pain, bleeding, unexplained bruising, or any new/concerning symptoms, questions/concerns.   If you are having any concerning symptoms or wish to speak to a provider before your next infusion visit, please call your care coordinator or triage to notify them so we can adequately serve you.   Triage Nurse Line: [phone]    If after hours, weekends, or holidays, call main hospital  and ask for Oncology doctor on call @ [phone]               September 2017 Sunday Monday Tuesday Wednesday Thursday Friday Saturday                            1     2       3     4     5     6     7     8     9       10     11     12     13     P POST-OP   11:05 AM   (20 min.)   Muriel Haddad MD   [facility] 14     15     [facility]    6:30 AM   (15 min.)    [facility]   [facility] Lab Draw     [facility] RETURN    6:45 AM   (50 min.)   Josy Powell PA-C   [facility] ONC INFUSION 360    7:00 AM   (360 min.)     ONCOLOGY INFUSION   [facility] 16       17     18     19     20     21     22     [facility] DRAW   10:00 AM   (15 min.)    [facility] DRAW   [facility] Lab Draw     [facility] ONC INFUSION 60   10:30 AM   (60 min.)    ONCOLOGY INFUSION   [facility] 23       24     25     26     27     28     29     [facility] NEW LUNG NODULE   11:45 AM   (30 min.)   Marisol Hernandez MD   [facility] 30 October 2017 Sunday Monday Tuesday Wednesday Thursday Friday Saturday   1     2     3     4     5     6     [facility] DRAW    7:15 AM   (15 min.)    [facility] DRAW   [facility] Lab Draw     [facility] RETURN    7:35 AM   (50 min.)   Yessica Ovalles APRN CNP   [facility]     [facility] ONC INFUSION 360    9:00 AM   (360 min.)    ONCOLOGY INFUSION   [facility]     [facility] RETURN   12:45 PM   (30 min.)   Nurse,  Prostate Cancer Ctr   [facility]state [facility] Urologic Cancers 7       8     9     10     11     12     13     [facility] DRAW    9:00 AM   (15 min.)    [facility] LAB DRAW   [facility] Lab Draw     [facility] ONC INFUSION 60    9:30 AM   (60 min.)    ONCOLOGY INFUSION   [facility] 14       15     16     17     18     19     20     21       22     23     24     25     [facility] CYSTOSCOPY    9:15 AM   (20 min.)   Muriel Haddad MD   [facility]rologic C[facility] 26     27     [facility] DRAW    9:00 AM   (15 min.)    [facility] LAB DRAW   [facility] Lab Draw     [facility] RETURN    9:15 AM   (50 min.)   Josy Powell PA-C   [facility]     [facility] ONC INFUSION 360   10:00 AM   (360 min.)    ONCOLOGY INFUSION   [facility] 28       29     30     31                                      Lab Results:  Recent Results (from the past 12 hour(s))   CBC with platelets differential    Collection  Time: 09/15/17  6:32 AM   Result Value Ref Range    WBC 7.7 4.0 - 11.0 10e9/L    RBC Count 4.32 (L) 4.4 - 5.9 10e12/L    Hemoglobin 12.6 (L) 13.3 - 17.7 g/dL    Hematocrit 37.8 (L) 40.0 - 53.0 %    MCV 88 78 - 100 fl    MCH 29.2 26.5 - 33.0 pg    MCHC 33.3 31.5 - 36.5 g/dL    RDW 14.5 10.0 - 15.0 %    Platelet Count 310 150 - 450 10e9/L    Diff Method Automated Method     % Neutrophils 55.2 %    % Lymphocytes 32.6 %    % Monocytes 8.1 %    % Eosinophils 3.3 %    % Basophils 0.4 %    % Immature Granulocytes 0.4 %    Nucleated RBCs 0 0 /100    Absolute Neutrophil 4.2 1.6 - 8.3 10e9/L    Absolute Lymphocytes 2.5 0.8 - 5.3 10e9/L    Absolute Monocytes 0.6 0.0 - 1.3 10e9/L    Absolute Eosinophils 0.3 0.0 - 0.7 10e9/L    Absolute Basophils 0.0 0.0 - 0.2 10e9/L    Abs Immature Granulocytes 0.0 0 - 0.4 10e9/L    Absolute Nucleated RBC 0.0    Comprehensive metabolic panel    Collection Time: 09/15/17  6:32 AM   Result Value Ref Range    Sodium 132 (L) 133 - 144 mmol/L    Potassium 3.7 3.4 - 5.3 mmol/L    Chloride 96 94 - 109 mmol/L    Carbon Dioxide 29 20 - 32 mmol/L    Anion Gap 7 3 - 14 mmol/L    Glucose 103 (H) 70 - 99 mg/dL    Urea Nitrogen 12 7 - 30 mg/dL    Creatinine 1.33 (H) 0.66 - 1.25 mg/dL    GFR Estimate 53 (L) >60 mL/min/1.7m2    GFR Estimate If Black 64 >60 mL/min/1.7m2    Calcium 9.6 8.5 - 10.1 mg/dL    Bilirubin Total 0.5 0.2 - 1.3 mg/dL    Albumin 3.8 3.4 - 5.0 g/dL    Protein Total 7.8 6.8 - 8.8 g/dL    Alkaline Phosphatase 65 40 - 150 U/L    ALT 16 0 - 70 U/L    AST 23 0 - 45 U/L   Magnesium    Collection Time: 09/15/17  6:32 AM   Result Value Ref Range    Magnesium 2.0 1.6 - 2.3 mg/dL             Follow-ups after your visit        Your next 10 appointments already scheduled     Sep 22, 2017 10:00 AM CDT   [facility] Lab Draw with  [facility] LAB DRAW   [facility] Draw ([facility] [place])    [address]  [address] [facility] [address] [phone]            Sep 22,  2017 10:30 AM CDT   Infusion 60 with UC ONCOLOGY INFUSION, UC 31 ATC   [facility]    [address]  2nd Floor  [facility] [address] [phone]            Sep 29, 2017 12:00 PM CDT   (Arrive by 11:45 AM)   NEW LUNG NODULE with Marisol Hernandez MD   [facility]    [address]  2nd Floor  [place] [address] [phone]            Oct 06, 2017  7:15 AM CDT   [facility] Lab Draw with  [facility] LAB DRAW   [facility] Draw ([facility])    [address]  2nd Floor  [place] [facility] [address] [phone]            Oct 06, 2017  7:50 AM CDT   (Arrive by 7:35 AM)   Return Visit with ISAURA Roach CNP   [facility])    [address]  [address] [facility] [id number] [phone]            Oct 06, 2017  9:00 AM CDT   Infusion 360 with UC ONCOLOGY INFUSION, UC 24 ATC   [facility]    [address]  [address] [facility] [address] [phone]            Oct 06, 2017  1:00 PM CDT   (Arrive by 12:45 PM)   Return Visit with  Prostate Cancer Ctr Nurse   [facility]rologic [facility] ([facility])    [address]  4th Floor  [place] [facility] [address] [phone]            Oct 13, 2017  9:00 AM CDT   [facility] Lab Draw with  [facility] LAB DRAW   [facility] ([facility])    [address]  2nd Floor  [place] [facility] [id number] [phone]              Who to contact     If you have questions or need follow up information about today's clinic visit or your schedule please contact [facility] directly at [phone].  Normal or non-critical lab and imaging results will be  "communicated to you by SalonBookrhart, letter or phone within 4 business days after the clinic has received the results. If you do not hear from us within 7 days, please contact the clinic through Qoopl or phone. If you have a critical or abnormal lab result, we will notify you by phone as soon as possible.  Submit refill requests through Qoopl or call your pharmacy and they will forward the refill request to us. Please allow 3 business days for your refill to be completed.          Additional Information About Your Visit        Qoopl Information     Qoopl lets you send messages to your doctor, view your test results, renew your prescriptions, schedule appointments and more. To sign up, go to [email or web address] . Click on \"Log in\" on the left side of the screen, which will take you to the Welcome page. Then click on \"Sign up Now\" on the right side of the page.     You will be asked to enter the access code listed below, as well as some personal information. Please follow the directions to create your username and password.     Your access code is: [email or web address]  Expires: 10/31/2017  1:07 PM     Your access code will  in 90 days. If you need help or a new code, please call your [place] clinic or [phone].        Care EveryWhere ID     This is your Care EveryWhere ID. This could be used by other organizations to access your [place] medical records  [id number]         Blood Pressure from Last 3 Encounters:   09/15/17 175/88   17 155/81   17 148/83    Weight from Last 3 Encounters:   09/15/17 73 kg (160 lb 14.4 oz)   17 75.3 kg (166 lb)   17 73.6 kg (162 lb 4.1 oz)              We Performed the Following     CBC with platelets differential     Comprehensive metabolic panel     Magnesium          Today's Medication Changes          These changes are accurate as of: 9/15/17  9:33 AM.  If you have any questions, ask your nurse or doctor.               Start taking these medicines. "        Dose/Directions    amLODIPine 5 MG tablet   Commonly known as:  NORVASC   Used for:  Benign essential hypertension        Dose:  5 mg   Take 1 tablet (5 mg) by mouth daily   Quantity:  30 tablet   Refills:  3       dexamethasone 4 MG tablet   Commonly known as:  DECADRON   Used for:  Urethral cancer (H)        Dose:  8 mg   Take 2 tablets (8 mg) by mouth daily Take for 3 days, starting the day after cisplatin (Day 2 and Day 9).   Quantity:  12 tablet   Refills:  3       LORazepam 0.5 MG tablet   Commonly known as:  ATIVAN   Used for:  Urethral cancer (H)        Dose:  0.5 mg   Take 1 tablet (0.5 mg) by mouth every 4 hours as needed (Anxiety, Nausea/Vomiting or Sleep)   Quantity:  30 tablet   Refills:  3       ondansetron 8 MG tablet   Commonly known as:  ZOFRAN   Used for:  Urothelial cancer (H)        Dose:  8 mg   Take 1 tablet (8 mg) by mouth every 8 hours as needed for nausea   Quantity:  20 tablet   Refills:  3       prochlorperazine 10 MG tablet   Commonly known as:  COMPAZINE   Used for:  Urethral cancer (H)        Dose:  5 mg   Take 0.5 tablets (5 mg) by mouth every 6 hours as needed (Nausea/Vomiting)   Quantity:  30 tablet   Refills:  3         These medicines have changed or have updated prescriptions.        Dose/Directions    hydrochlorothiazide 25 MG tablet   Commonly known as:  HYDRODIURIL   This may have changed:    - medication strength  - how much to take        Dose:  25 mg   Take 1 tablet (25 mg) by mouth every morning   Quantity:  30 tablet   Refills:  0         Stop taking these medicines if you haven't already. Please contact your care team if you have questions.     acetaminophen 325 MG tablet   Commonly known as:  TYLENOL           ibuprofen 600 MG tablet   Commonly known as:  ADVIL/MOTRIN           oxyCODONE 5 MG IR tablet   Commonly known as:  ROXICODONE           polyethylene glycol powder   Commonly known as:  MIRALAX/GLYCOLAX                Where to get your medicines      These  medications were sent to [place], [place] - [address]  [address], [facility] [address]    Hours:  TRANSPLANT PHONE NUMBER [phone] Phone:  [phone]     amLODIPine 5 MG tablet    dexamethasone 4 MG tablet    ondansetron 8 MG tablet    prochlorperazine 10 MG tablet         Some of these will need a paper prescription and others can be bought over the counter.  Ask your nurse if you have questions.     Bring a paper prescription for each of these medications     LORazepam 0.5 MG tablet                Primary Care Provider Office Phone # Fax #    Joan Ventura, MARLENY [phone]       [facility] [address]  [facility] [address]        Equal Access to Services     JENELLE RED : Tiffanie leono Soveronica, waaxda luqadaha, qaybta kaalmada adeegyada, ronald rasmussen. So [facility] [phone].    ATENCIÓN: Si habla español, tiene a washburn disposición servicios gratuitos de asistencia lingüística. Leonidame al [phone].    We comply with applicable federal civil rights laws and [place] laws. We do not discriminate on the basis of race, color, national origin, age, disability sex, sexual orientation or gender identity.            Thank you!     Thank you for choosing [facility]  for your care. Our goal is always to provide you with excellent care. Hearing back from our patients is one way we can continue to improve our services. Please take a few minutes to complete the written survey that you may receive in the mail after your visit with us. Thank you!             Your Updated Medication List - Protect others around you: Learn how to safely use, store and throw away your medicines at www.disposemymeds.org.          This list is accurate as of: 9/15/17  9:33 AM.  Always use your most recent med list.                   Brand Name Dispense Instructions for use Diagnosis     amLODIPine 5 MG tablet    NORVASC    30 tablet    Take 1 tablet (5 mg) by mouth daily    Benign essential hypertension       dexamethasone 4 MG tablet    DECADRON    12 tablet    Take 2 tablets (8 mg) by mouth daily Take for 3 days, starting the day after cisplatin (Day 2 and Day 9).    Urethral cancer (H)       fluticasone 50 MCG/ACT spray    FLONASE     Spray 1 spray into both nostrils every morning        hydrochlorothiazide 25 MG tablet    HYDRODIURIL    30 tablet    Take 1 tablet (25 mg) by mouth every morning        HYDROXYZINE HCL PO      Take 5 mg by mouth At Bedtime        LORazepam 0.5 MG tablet    ATIVAN    30 tablet    Take 1 tablet (0.5 mg) by mouth every 4 hours as needed (Anxiety, Nausea/Vomiting or Sleep)    Urethral cancer (H)       ondansetron 8 MG tablet    ZOFRAN    20 tablet    Take 1 tablet (8 mg) by mouth every 8 hours as needed for nausea    Urothelial cancer (H)       priLOSEC OTC 20 MG tablet   Generic drug:  omeprazole      Take 20 mg by mouth 2 times daily        prochlorperazine 10 MG tablet    COMPAZINE    30 tablet    Take 0.5 tablets (5 mg) by mouth every 6 hours as needed (Nausea/Vomiting)    Urethral cancer (H)

## 2017-09-18 RX ORDER — SODIUM CHLORIDE 9 MG/ML
1000 INJECTION, SOLUTION INTRAVENOUS CONTINUOUS PRN
Status: CANCELLED
Start: 2017-10-13

## 2017-09-18 RX ORDER — DIPHENHYDRAMINE HYDROCHLORIDE 50 MG/ML
50 INJECTION INTRAMUSCULAR; INTRAVENOUS
Status: CANCELLED
Start: 2017-10-13

## 2017-09-18 RX ORDER — METHYLPREDNISOLONE SODIUM SUCCINATE 125 MG/2ML
125 INJECTION, POWDER, LYOPHILIZED, FOR SOLUTION INTRAMUSCULAR; INTRAVENOUS
Status: CANCELLED
Start: 2017-10-06

## 2017-09-18 RX ORDER — ALBUTEROL SULFATE 0.83 MG/ML
2.5 SOLUTION RESPIRATORY (INHALATION)
Status: CANCELLED | OUTPATIENT
Start: 2017-10-13

## 2017-09-18 RX ORDER — EPINEPHRINE 0.3 MG/.3ML
0.3 INJECTION SUBCUTANEOUS EVERY 5 MIN PRN
Status: CANCELLED | OUTPATIENT
Start: 2017-10-06

## 2017-09-18 RX ORDER — LORAZEPAM 2 MG/ML
0.5 INJECTION INTRAMUSCULAR EVERY 4 HOURS PRN
Status: CANCELLED
Start: 2017-10-06

## 2017-09-18 RX ORDER — EPINEPHRINE 1 MG/ML
0.3 INJECTION INTRAMUSCULAR; INTRAVENOUS; SUBCUTANEOUS EVERY 5 MIN PRN
Status: CANCELLED | OUTPATIENT
Start: 2017-10-06

## 2017-09-18 RX ORDER — DIPHENHYDRAMINE HYDROCHLORIDE 50 MG/ML
50 INJECTION INTRAMUSCULAR; INTRAVENOUS
Status: CANCELLED
Start: 2017-10-06

## 2017-09-18 RX ORDER — MEPERIDINE HYDROCHLORIDE 50 MG/ML
25 INJECTION INTRAMUSCULAR; INTRAVENOUS; SUBCUTANEOUS EVERY 30 MIN PRN
Status: CANCELLED | OUTPATIENT
Start: 2017-10-13

## 2017-09-18 RX ORDER — ALBUTEROL SULFATE 90 UG/1
1-2 AEROSOL, METERED RESPIRATORY (INHALATION)
Status: CANCELLED
Start: 2017-10-13

## 2017-09-18 RX ORDER — EPINEPHRINE 1 MG/ML
0.3 INJECTION INTRAMUSCULAR; INTRAVENOUS; SUBCUTANEOUS EVERY 5 MIN PRN
Status: CANCELLED | OUTPATIENT
Start: 2017-10-13

## 2017-09-18 RX ORDER — SODIUM CHLORIDE 9 MG/ML
1000 INJECTION, SOLUTION INTRAVENOUS CONTINUOUS PRN
Status: CANCELLED
Start: 2017-10-06

## 2017-09-18 RX ORDER — ALBUTEROL SULFATE 90 UG/1
1-2 AEROSOL, METERED RESPIRATORY (INHALATION)
Status: CANCELLED
Start: 2017-10-06

## 2017-09-18 RX ORDER — MEPERIDINE HYDROCHLORIDE 50 MG/ML
25 INJECTION INTRAMUSCULAR; INTRAVENOUS; SUBCUTANEOUS EVERY 30 MIN PRN
Status: CANCELLED | OUTPATIENT
Start: 2017-10-06

## 2017-09-18 RX ORDER — ALBUTEROL SULFATE 0.83 MG/ML
2.5 SOLUTION RESPIRATORY (INHALATION)
Status: CANCELLED | OUTPATIENT
Start: 2017-10-06

## 2017-09-18 RX ORDER — PALONOSETRON 0.05 MG/ML
0.25 INJECTION, SOLUTION INTRAVENOUS ONCE
Status: CANCELLED
Start: 2017-10-06

## 2017-09-18 RX ORDER — LORAZEPAM 2 MG/ML
0.5 INJECTION INTRAMUSCULAR EVERY 4 HOURS PRN
Status: CANCELLED
Start: 2017-10-13

## 2017-09-18 RX ORDER — PALONOSETRON 0.05 MG/ML
0.25 INJECTION, SOLUTION INTRAVENOUS ONCE
Status: CANCELLED
Start: 2017-10-13

## 2017-09-18 RX ORDER — METHYLPREDNISOLONE SODIUM SUCCINATE 125 MG/2ML
125 INJECTION, POWDER, LYOPHILIZED, FOR SOLUTION INTRAMUSCULAR; INTRAVENOUS
Status: CANCELLED
Start: 2017-10-13

## 2017-09-18 RX ORDER — EPINEPHRINE 0.3 MG/.3ML
0.3 INJECTION SUBCUTANEOUS EVERY 5 MIN PRN
Status: CANCELLED | OUTPATIENT
Start: 2017-10-13

## 2017-09-20 ENCOUNTER — TELEPHONE (OUTPATIENT)
Dept: ONCOLOGY | Facility: CLINIC | Age: 72
End: 2017-09-20

## 2017-09-20 NOTE — TELEPHONE ENCOUNTER
Prior Authorization Approval    Authorization Effective Date: 6/17/2017  Authorization Expiration Date: 9/15/2018  Medication: Ondansetron - Approved  Approved Dose/Quantity:8 20/6  Reference #:     Insurance Company: Medicare [employer] - Phone [phone] Fax [phone]  Expected CoPay:       CoPay Card Available:      Foundation Assistance Needed:    Which Pharmacy is filling the prescription (Not needed for infusion/clinic administered):    Pharmacy Notified: Yes  Patient Notified: Yes      Vincent Hirsch  [facility]  Oncology Pharmacy [email or web address]  [phone] (phone  [phone] (fax

## 2017-09-20 NOTE — TELEPHONE ENCOUNTER
Prior Authorization Approval    Authorization Effective Date:    Authorization Expiration Date:    Medication:   Approved Dose/Quantity: 8mg 20/6  Reference #:     Insurance Company:    Expected CoPay:       CoPay Card Available:      Foundation Assistance Needed:    Which Pharmacy is filling the prescription (Not needed for infusion/clinic administered):    Pharmacy Notified:    Patient Notified:          Vincent Hirsch  [facility]  Oncology Pharmacy [email or web address]  [phone] (phone  [phone] (fax

## 2017-09-22 ENCOUNTER — INFUSION THERAPY VISIT (OUTPATIENT)
Dept: ONCOLOGY | Facility: CLINIC | Age: 72
End: 2017-09-22
Attending: INTERNAL MEDICINE
Payer: MEDICARE

## 2017-09-22 VITALS
SYSTOLIC BLOOD PRESSURE: 153 MMHG | BODY MASS INDEX: 19.67 KG/M2 | RESPIRATION RATE: 16 BRPM | DIASTOLIC BLOOD PRESSURE: 82 MMHG | OXYGEN SATURATION: 98 % | WEIGHT: 157.4 LBS | HEART RATE: 71 BPM | TEMPERATURE: 98.2 F

## 2017-09-22 DIAGNOSIS — C68.0 URETHRAL CANCER (H): Primary | ICD-10-CM

## 2017-09-22 DIAGNOSIS — R79.89 ELEVATED SERUM CREATININE: ICD-10-CM

## 2017-09-22 DIAGNOSIS — E87.1 HYPONATREMIA: ICD-10-CM

## 2017-09-22 PROBLEM — E86.0 DEHYDRATION: Status: ACTIVE | Noted: 2017-09-22

## 2017-09-22 PROBLEM — R11.0 NAUSEA: Status: ACTIVE | Noted: 2017-09-22

## 2017-09-22 LAB
ALBUMIN SERPL-MCNC: 3.5 G/DL (ref 3.4–5)
ALP SERPL-CCNC: 67 U/L (ref 40–150)
ALT SERPL W P-5'-P-CCNC: 24 U/L (ref 0–70)
ANION GAP SERPL CALCULATED.3IONS-SCNC: 6 MMOL/L (ref 3–14)
ANION GAP SERPL CALCULATED.3IONS-SCNC: 8 MMOL/L (ref 3–14)
AST SERPL W P-5'-P-CCNC: 26 U/L (ref 0–45)
BASOPHILS # BLD AUTO: 0 10E9/L (ref 0–0.2)
BASOPHILS NFR BLD AUTO: 0.5 %
BILIRUB SERPL-MCNC: 0.6 MG/DL (ref 0.2–1.3)
BUN SERPL-MCNC: 12 MG/DL (ref 7–30)
BUN SERPL-MCNC: 14 MG/DL (ref 7–30)
CALCIUM SERPL-MCNC: 8.2 MG/DL (ref 8.5–10.1)
CALCIUM SERPL-MCNC: 9.4 MG/DL (ref 8.5–10.1)
CHLORIDE SERPL-SCNC: 93 MMOL/L (ref 94–109)
CHLORIDE SERPL-SCNC: 96 MMOL/L (ref 94–109)
CO2 SERPL-SCNC: 28 MMOL/L (ref 20–32)
CO2 SERPL-SCNC: 29 MMOL/L (ref 20–32)
CREAT SERPL-MCNC: 1.52 MG/DL (ref 0.66–1.25)
CREAT SERPL-MCNC: 1.66 MG/DL (ref 0.66–1.25)
DIFFERENTIAL METHOD BLD: ABNORMAL
EOSINOPHIL # BLD AUTO: 0.1 10E9/L (ref 0–0.7)
EOSINOPHIL NFR BLD AUTO: 1.5 %
ERYTHROCYTE [DISTWIDTH] IN BLOOD BY AUTOMATED COUNT: 13.5 % (ref 10–15)
GFR SERPL CREATININE-BSD FRML MDRD: 41 ML/MIN/1.7M2
GFR SERPL CREATININE-BSD FRML MDRD: 46 ML/MIN/1.7M2
GLUCOSE SERPL-MCNC: 105 MG/DL (ref 70–99)
GLUCOSE SERPL-MCNC: 111 MG/DL (ref 70–99)
HCT VFR BLD AUTO: 34.7 % (ref 40–53)
HGB BLD-MCNC: 11.6 G/DL (ref 13.3–17.7)
IMM GRANULOCYTES # BLD: 0 10E9/L (ref 0–0.4)
IMM GRANULOCYTES NFR BLD: 0.3 %
LYMPHOCYTES # BLD AUTO: 1.8 10E9/L (ref 0.8–5.3)
LYMPHOCYTES NFR BLD AUTO: 46.3 %
MAGNESIUM SERPL-MCNC: 2 MG/DL (ref 1.6–2.3)
MCH RBC QN AUTO: 29.1 PG (ref 26.5–33)
MCHC RBC AUTO-ENTMCNC: 33.4 G/DL (ref 31.5–36.5)
MCV RBC AUTO: 87 FL (ref 78–100)
MONOCYTES # BLD AUTO: 0.1 10E9/L (ref 0–1.3)
MONOCYTES NFR BLD AUTO: 3.3 %
NEUTROPHILS # BLD AUTO: 1.9 10E9/L (ref 1.6–8.3)
NEUTROPHILS NFR BLD AUTO: 48.1 %
NRBC # BLD AUTO: 0 10*3/UL
NRBC BLD AUTO-RTO: 0 /100
PLATELET # BLD AUTO: 172 10E9/L (ref 150–450)
POTASSIUM SERPL-SCNC: 3.1 MMOL/L (ref 3.4–5.3)
POTASSIUM SERPL-SCNC: 3.8 MMOL/L (ref 3.4–5.3)
PROT SERPL-MCNC: 7.6 G/DL (ref 6.8–8.8)
RBC # BLD AUTO: 3.99 10E12/L (ref 4.4–5.9)
SODIUM SERPL-SCNC: 129 MMOL/L (ref 133–144)
SODIUM SERPL-SCNC: 130 MMOL/L (ref 133–144)
WBC # BLD AUTO: 3.9 10E9/L (ref 4–11)

## 2017-09-22 PROCEDURE — 80053 COMPREHEN METABOLIC PANEL: CPT | Performed by: INTERNAL MEDICINE

## 2017-09-22 PROCEDURE — 96375 TX/PRO/DX INJ NEW DRUG ADDON: CPT

## 2017-09-22 PROCEDURE — 25000128 H RX IP 250 OP 636: Mod: ZF | Performed by: INTERNAL MEDICINE

## 2017-09-22 PROCEDURE — 96413 CHEMO IV INFUSION 1 HR: CPT

## 2017-09-22 PROCEDURE — 80048 BASIC METABOLIC PNL TOTAL CA: CPT | Performed by: PHYSICIAN ASSISTANT

## 2017-09-22 PROCEDURE — A9270 NON-COVERED ITEM OR SERVICE: HCPCS | Mod: GY | Performed by: PHYSICIAN ASSISTANT

## 2017-09-22 PROCEDURE — 25000128 H RX IP 250 OP 636: Mod: ZF | Performed by: PHYSICIAN ASSISTANT

## 2017-09-22 PROCEDURE — 96361 HYDRATE IV INFUSION ADD-ON: CPT

## 2017-09-22 PROCEDURE — 85025 COMPLETE CBC W/AUTO DIFF WBC: CPT | Performed by: INTERNAL MEDICINE

## 2017-09-22 PROCEDURE — 25000132 ZZH RX MED GY IP 250 OP 250 PS 637: Mod: GY | Performed by: PHYSICIAN ASSISTANT

## 2017-09-22 PROCEDURE — 96417 CHEMO IV INFUS EACH ADDL SEQ: CPT

## 2017-09-22 PROCEDURE — 96367 TX/PROPH/DG ADDL SEQ IV INF: CPT

## 2017-09-22 PROCEDURE — 83735 ASSAY OF MAGNESIUM: CPT | Performed by: INTERNAL MEDICINE

## 2017-09-22 RX ORDER — PALONOSETRON 0.05 MG/ML
0.25 INJECTION, SOLUTION INTRAVENOUS ONCE
Status: CANCELLED
Start: 2017-09-25 | End: 2017-09-25

## 2017-09-22 RX ORDER — PALONOSETRON 0.05 MG/ML
0.25 INJECTION, SOLUTION INTRAVENOUS ONCE
Status: COMPLETED | OUTPATIENT
Start: 2017-09-22 | End: 2017-09-22

## 2017-09-22 RX ORDER — POTASSIUM CHLORIDE 1500 MG/1
40 TABLET, EXTENDED RELEASE ORAL ONCE
Status: COMPLETED | OUTPATIENT
Start: 2017-09-22 | End: 2017-09-22

## 2017-09-22 RX ADMIN — SODIUM CHLORIDE 150 MG: 9 INJECTION, SOLUTION INTRAVENOUS at 14:20

## 2017-09-22 RX ADMIN — DEXAMETHASONE SODIUM PHOSPHATE 12 MG: 10 INJECTION, SOLUTION INTRAMUSCULAR; INTRAVENOUS at 14:07

## 2017-09-22 RX ADMIN — SODIUM CHLORIDE 1000 ML: 9 INJECTION, SOLUTION INTRAVENOUS at 10:51

## 2017-09-22 RX ADMIN — PALONOSETRON HYDROCHLORIDE 0.25 MG: 0.25 INJECTION INTRAVENOUS at 14:04

## 2017-09-22 RX ADMIN — SODIUM CHLORIDE 1000 ML: 9 INJECTION, SOLUTION INTRAVENOUS at 12:27

## 2017-09-22 RX ADMIN — POTASSIUM CHLORIDE 40 MEQ: 20 TABLET, EXTENDED RELEASE ORAL at 15:52

## 2017-09-22 RX ADMIN — GEMCITABINE 2000 MG: 38 INJECTION, SOLUTION INTRAVENOUS at 14:51

## 2017-09-22 RX ADMIN — CISPLATIN 52 MG: 1 INJECTION, SOLUTION INTRAVENOUS at 15:46

## 2017-09-22 ASSESSMENT — PAIN SCALES - GENERAL: PAINLEVEL: WORST PAIN (10)

## 2017-09-22 NOTE — PROGRESS NOTES
Infusion Nursing Note:  King Gonsalo Bruno presents today for Cycle 1 Day 8 Cisplat, Gemzar.    Patient seen by provider today: No   present during visit today: Not Applicable.    Note: Had emesis the evening of his last chemo.  Wednesday he had emesis in AM after taking his pills.  He had N/V much of Wednesday and Thursday.  Was feeling very weak yesterday, had some dizziness after taking a shower.  Did have issues with constipation which resolved with 3 days of MiraLax and 3 docusate tabs.  Checked his BP at cub, was 140/?.  He reports more urine coming from his urethra, which is very painful.  He was told as his tumor shrinks, this would happen.  Good output form Supra Pubic Cath.    KIERA Comer RN / Lela MELARA  Recheck BMP post 1 liter IV NS    KIERA Comer RN / Lela MELARA @ 1400  Will go ahead today with chemo.  Cisplat will be at 25% dose reduction.  To return Monday at 0930 for labs, IVF's, antiemetics.    Intravenous Access:  Peripheral IV placed in lab.    Treatment Conditions:  Lab Results   Component Value Date    HGB 11.6 09/22/2017     Lab Results   Component Value Date    WBC 3.9 09/22/2017      Lab Results   Component Value Date    ANEU 1.9 09/22/2017     Lab Results   Component Value Date     09/22/2017      Lab Results   Component Value Date     09/22/2017                   Lab Results   Component Value Date    POTASSIUM 3.8 09/22/2017           Lab Results   Component Value Date    MAG 2.0 09/22/2017            Lab Results   Component Value Date    CR 1.66 09/22/2017                   Lab Results   Component Value Date    SAADIA 9.4 09/22/2017                Lab Results   Component Value Date    BILITOTAL 0.6 09/22/2017           Lab Results   Component Value Date    ALBUMIN 3.5 09/22/2017                    Lab Results   Component Value Date    ALT 24 09/22/2017           Lab Results   Component Value Date    AST 26 09/22/2017     Results  reviewed, labs did NOT meet treatment parameters: see TORB.    Last Basic Metabolic Panel:  Lab Results   Component Value Date     09/22/2017      Lab Results   Component Value Date    POTASSIUM 3.1 09/22/2017     Lab Results   Component Value Date    CHLORIDE 96 09/22/2017     Lab Results   Component Value Date    SAADIA 8.2 09/22/2017     Lab Results   Component Value Date    CO2 28 09/22/2017     Lab Results   Component Value Date    BUN 12 09/22/2017     Lab Results   Component Value Date    CR 1.52 09/22/2017     Lab Results   Component Value Date     09/22/2017       Has emptied Supra Pubic cath leg bag x3    Post Infusion Assessment:  Reported burning at PIV site with Gemzar, rate was decreased, IV fluids were increased for dilution.  Patient tolerated remainder of infusion without incident.  Blood return noted pre and post infusion.  Site patent and intact, free from redness, edema or discomfort.  No evidence of extravasations.  Access discontinued per protocol.    Discharge Plan:   Patient declined prescription refills.  Copy of AVS reviewed with patient and/or family.  Patient will return Monday for next appointment.  Patient discharged in stable condition accompanied by: Significant Other Stephanie.  Departure Mode: Ambulatory.  Face to Face time: >25 minutes.    Ramona Comer RN

## 2017-09-22 NOTE — PATIENT INSTRUCTIONS
Hold off on Zofran (Ondansatron) until Monday, as you received Aloxi today.        [facility] Main Line: [phone]    Call triage nurse with chills and/or temperature greater than or equal to 100.4, uncontrolled nausea/vomiting, diarrhea, constipation, dizziness, shortness of breath, chest pain, bleeding, unexplained bruising, or any new/concerning symptoms, questions/concerns.   If you are having any concerning symptoms or wish to speak to a provider before your next infusion visit, please call your care coordinator or triage to notify them so we can adequately serve you.   Triage Nurse Line: [phone]    If after hours, weekends, or holidays, call main hospital  and ask for Oncology doctor on call @ [phone]               September 2017 Sunday Monday Tuesday Wednesday Thursday Friday Saturday                            1     2       3     4     5     6     7     8     9       10     11     12     13     UMP POST-OP   11:05 AM   (20 min.)   Muriel Haddad MD   [facility] for Prostate and Urologic Cancers 14     15     [facility] LAB DRAW    6:30 AM   (15 min.)    MASONIC LAB DRAW   [facility] Lab Draw     UMP RETURN    6:45 AM   (50 min.)   Josy Powell PA-C   [facility]     UMP ONC INFUSION 360    7:00 AM   (360 min.)    ONCOLOGY INFUSION   [facility] 16       17     18     19     20     21     22     [facility] LAB DRAW   10:00 AM   (15 min.)    [facility]   [facility]     UMP ONC INFUSION 360   10:30 AM   (60 min.)    ONCOLOGY INFUSION   [facility] 23       24     25     [facility] LAB DRAW    9:15 AM   (15 min.)    MASONIC LAB DRAW   [facility] Lab Draw     UMP ONC INFUSION 120    9:30 AM   (120 min.)    ONCOLOGY INFUSION   [facility] 26     27     28     29     UMP NEW LUNG NODULE   11:45 AM   (30 min.)   Marisol Hernandez  MD Rebecca   [facility] 30 October 2017 Sunday Monday Tuesday Wednesday Thursday Friday Saturday   1     2     3     4     5     6     [facility] LAB DRAW    7:15 AM   (15 min.)    MASONIC LAB DRAW   [facility] Masonic Lab Draw     UMP RETURN    7:35 AM   (50 min.)   Yessica Ovalles APRN CNP   [facility]     UMP ONC INFUSION 360    9:00 AM   (360 min.)    ONCOLOGY INFUSION   [facility]     UMP RETURN   12:45 PM   (30 min.)   Nurse,  Prostate Cancer Ctr   [facility] Inst for Prostate and Urologic Cancers 7       8     9     10     11     12     13     P MASONIC LAB DRAW    9:00 AM   (15 min.)    MASONIC LAB DRAW   [facility] Lab Draw     P ONC INFUSION 360   10:00 AM   (360 min.)    ONCOLOGY INFUSION   [facility] 14       15     16     17     18     19     20     21       22     23     24     25     UMP CYSTOSCOPY    9:05 AM   (20 min.)   Muriel Haddad MD   [facility] for Prostate and Urologic Cancers 26     27     [facility] LAB DRAW    9:00 AM   (15 min.)    [facility]     UMP RETURN    9:15 AM   (50 min.)   [facility] Powell [facility]-C   [facility]   10:00 AM   (360 min.)    ONCOLOGY INFUSION   [facility] 28       29     30     31                                      Lab Results:  Recent Results (from the past 12 hour(s))   CBC with platelets differential    Collection Time: 09/22/17 10:42 AM   Result Value Ref Range    WBC 3.9 (L) 4.0 - 11.0 10e9/L    RBC Count 3.99 (L) 4.4 - 5.9 10e12/L    Hemoglobin 11.6 (L) 13.3 - 17.7 g/dL    Hematocrit 34.7 (L) 40.0 - 53.0 %    MCV 87 78 - 100 fl    MCH 29.1 26.5 - 33.0 pg    MCHC 33.4 31.5 - 36.5 g/dL    RDW 13.5 10.0 - 15.0 %    Platelet Count 172 150 - 450 10e9/L    Diff Method Automated Method     % Neutrophils 48.1 %    %  Lymphocytes 46.3 %    % Monocytes 3.3 %    % Eosinophils 1.5 %    % Basophils 0.5 %    % Immature Granulocytes 0.3 %    Nucleated RBCs 0 0 /100    Absolute Neutrophil 1.9 1.6 - 8.3 10e9/L    Absolute Lymphocytes 1.8 0.8 - 5.3 10e9/L    Absolute Monocytes 0.1 0.0 - 1.3 10e9/L    Absolute Eosinophils 0.1 0.0 - 0.7 10e9/L    Absolute Basophils 0.0 0.0 - 0.2 10e9/L    Abs Immature Granulocytes 0.0 0 - 0.4 10e9/L    Absolute Nucleated RBC 0.0    Comprehensive metabolic panel    Collection Time: 09/22/17 10:42 AM   Result Value Ref Range    Sodium 129 (L) 133 - 144 mmol/L    Potassium 3.8 3.4 - 5.3 mmol/L    Chloride 93 (L) 94 - 109 mmol/L    Carbon Dioxide 29 20 - 32 mmol/L    Anion Gap 8 3 - 14 mmol/L    Glucose 111 (H) 70 - 99 mg/dL    Urea Nitrogen 14 7 - 30 mg/dL    Creatinine 1.66 (H) 0.66 - 1.25 mg/dL    GFR Estimate 41 (L) >60 mL/min/1.7m2    GFR Estimate If Black 50 (L) >60 mL/min/1.7m2    Calcium 9.4 8.5 - 10.1 mg/dL    Bilirubin Total 0.6 0.2 - 1.3 mg/dL    Albumin 3.5 3.4 - 5.0 g/dL    Protein Total 7.6 6.8 - 8.8 g/dL    Alkaline Phosphatase 67 40 - 150 U/L    ALT 24 0 - 70 U/L    AST 26 0 - 45 U/L   Magnesium    Collection Time: 09/22/17 10:42 AM   Result Value Ref Range    Magnesium 2.0 1.6 - 2.3 mg/dL   Basic metabolic panel    Collection Time: 09/22/17 12:20 PM   Result Value Ref Range    Sodium 130 (L) 133 - 144 mmol/L    Potassium 3.1 (L) 3.4 - 5.3 mmol/L    Chloride 96 94 - 109 mmol/L    Carbon Dioxide 28 20 - 32 mmol/L    Anion Gap 6 3 - 14 mmol/L    Glucose 105 (H) 70 - 99 mg/dL    Urea Nitrogen 12 7 - 30 mg/dL    Creatinine 1.52 (H) 0.66 - 1.25 mg/dL    GFR Estimate 46 (L) >60 mL/min/1.7m2    GFR Estimate If Black 55 (L) >60 mL/min/1.7m2    Calcium 8.2 (L) 8.5 - 10.1 mg/dL

## 2017-09-22 NOTE — NURSING NOTE
Chief Complaint   Patient presents with     Labs Only     venipuncture, vitals checked     PIV placed for labs and infusion

## 2017-09-22 NOTE — MR AVS SNAPSHOT
After Visit Summary   9/22/2017    King Gonsalo Bruno    MRN: [id number]           Patient Information     Date Of Birth          1945        Visit Information        Provider Department      9/22/2017 10:30 AM  31 ATC;  ONCOLOGY INFUSION [facility]        Today's Diagnoses     Urethral cancer (H)    -  1    Hyponatremia        Elevated serum creatinine          Care Instructions    Hold off on Zofran (Ondansatron) until Monday, as you received Aloxi today.        [facility] Main Line: [phone]    Call triage nurse with chills and/or temperature greater than or equal to 100.4, uncontrolled nausea/vomiting, diarrhea, constipation, dizziness, shortness of breath, chest pain, bleeding, unexplained bruising, or any new/concerning symptoms, questions/concerns.   If you are having any concerning symptoms or wish to speak to a provider before your next infusion visit, please call your care coordinator or triage to notify them so we can adequately serve you.   Triage Nurse Line: [phone]    If after hours, weekends, or holidays, call main hospital  and ask for Oncology doctor on call @ [phone]               September 2017 Sunday Monday Tuesday Wednesday Thursday Friday Saturday                            1     2       3     4     5     6     7     8     9       10     11     12     13     P POST-OP   11:05 AM   (20 min.)   Muriel Haddad MD   [facility]rology [facility] Inst for Prostate and Urologic Cancers 14     15     [facility] MASONIC LAB DRAW    6:30 AM   (15 min.)   [facility] LAB DRAW   [facility] Lab Draw     P RETURN    6:45 AM   (50 min.)   Josy Powell PA-C   [facility] ONC INFUSION 360    7:00 AM   (360 min.)    ONCOLOGY INFUSION   [facility] 16       17     18     19     20     21     22     [facility] MASONIC LAB DRAW   10:00 AM   (15 min.)   [facility]ON[facility] LAB DRAW   [facility]  Masonic Lab Draw     UMP ONC INFUSION 360   10:30 AM   (60 min.)    ONCOLOGY INFUSION   [facility] 23       24     25     UMP [facility]IC LAB DRAW    9:15 AM   (15 min.)    MASONIC LAB DRAW   [facility] Masonic Lab Draw     UMP ONC INFUSION 120    9:30 AM   (120 min.)    ONCOLOGY INFUSION   [facility] 26     27     28     29     UMP NEW LUNG NODULE   11:45 AM   (30 min.)   Marisol Hernandez MD   [facility] 30 October 2017 Sunday Monday Tuesday Wednesday Thursday Friday Saturday   1     2     3     4     5     6     [facility] LAB DRAW    7:15 AM   (15 min.)    MASONIC LAB DRAW   [facility] Masonic Lab Draw     UMP RETURN    7:35 AM   (50 min.)   Yessica Ovalles APRN CNP   [facility]     UMP ONC INFUSION 360    9:00 AM   (360 min.)    ONCOLOGY INFUSION   [facility]     UMP RETURN   12:45 PM   (30 min.)   Nurse,  Prostate Cancer Ctr   [facility] Inst for Prostate and Urologic Cancers 7       8     9     10     11     12     13     [facility] LAB DRAW    9:00 AM   (15 min.)    [facility]     UMP ONC INFUSION 360   10:00 AM   (360 min.)    ONCOLOGY INFUSION   [facility] 14       15     16     17     18     19     20     21       22     23     24     25     UMP CYSTOSCOPY    9:05 AM   (20 min.)   Muriel Haddad MD   [facility] for Prostate and Urologic Cancers 26     27     [facility] LAB DRAW    9:00 AM   (15 min.)    MASONIC LAB DRAW   [facility] Masonic Lab Draw     UMP RETURN    9:15 AM   (50 min.)   Josy Powell PA-C   [facility]     UMP ONC INFUSION 360   10:00 AM   (360 min.)    ONCOLOGY INFUSION   [facility] 28       29     30     31                                      Lab Results:  Recent Results (from the past 12 hour(s))   CBC with platelets  differential    Collection Time: 09/22/17 10:42 AM   Result Value Ref Range    WBC 3.9 (L) 4.0 - 11.0 10e9/L    RBC Count 3.99 (L) 4.4 - 5.9 10e12/L    Hemoglobin 11.6 (L) 13.3 - 17.7 g/dL    Hematocrit 34.7 (L) 40.0 - 53.0 %    MCV 87 78 - 100 fl    MCH 29.1 26.5 - 33.0 pg    MCHC 33.4 31.5 - 36.5 g/dL    RDW 13.5 10.0 - 15.0 %    Platelet Count 172 150 - 450 10e9/L    Diff Method Automated Method     % Neutrophils 48.1 %    % Lymphocytes 46.3 %    % Monocytes 3.3 %    % Eosinophils 1.5 %    % Basophils 0.5 %    % Immature Granulocytes 0.3 %    Nucleated RBCs 0 0 /100    Absolute Neutrophil 1.9 1.6 - 8.3 10e9/L    Absolute Lymphocytes 1.8 0.8 - 5.3 10e9/L    Absolute Monocytes 0.1 0.0 - 1.3 10e9/L    Absolute Eosinophils 0.1 0.0 - 0.7 10e9/L    Absolute Basophils 0.0 0.0 - 0.2 10e9/L    Abs Immature Granulocytes 0.0 0 - 0.4 10e9/L    Absolute Nucleated RBC 0.0    Comprehensive metabolic panel    Collection Time: 09/22/17 10:42 AM   Result Value Ref Range    Sodium 129 (L) 133 - 144 mmol/L    Potassium 3.8 3.4 - 5.3 mmol/L    Chloride 93 (L) 94 - 109 mmol/L    Carbon Dioxide 29 20 - 32 mmol/L    Anion Gap 8 3 - 14 mmol/L    Glucose 111 (H) 70 - 99 mg/dL    Urea Nitrogen 14 7 - 30 mg/dL    Creatinine 1.66 (H) 0.66 - 1.25 mg/dL    GFR Estimate 41 (L) >60 mL/min/1.7m2    GFR Estimate If Black 50 (L) >60 mL/min/1.7m2    Calcium 9.4 8.5 - 10.1 mg/dL    Bilirubin Total 0.6 0.2 - 1.3 mg/dL    Albumin 3.5 3.4 - 5.0 g/dL    Protein Total 7.6 6.8 - 8.8 g/dL    Alkaline Phosphatase 67 40 - 150 U/L    ALT 24 0 - 70 U/L    AST 26 0 - 45 U/L   Magnesium    Collection Time: 09/22/17 10:42 AM   Result Value Ref Range    Magnesium 2.0 1.6 - 2.3 mg/dL   Basic metabolic panel    Collection Time: 09/22/17 12:20 PM   Result Value Ref Range    Sodium 130 (L) 133 - 144 mmol/L    Potassium 3.1 (L) 3.4 - 5.3 mmol/L    Chloride 96 94 - 109 mmol/L    Carbon Dioxide 28 20 - 32 mmol/L    Anion Gap 6 3 - 14 mmol/L    Glucose 105 (H) 70 - 99  mg/dL    Urea Nitrogen 12 7 - 30 mg/dL    Creatinine 1.52 (H) 0.66 - 1.25 mg/dL    GFR Estimate 46 (L) >60 mL/min/1.7m2    GFR Estimate If Black 55 (L) >60 mL/min/1.7m2    Calcium 8.2 (L) 8.5 - 10.1 mg/dL             Follow-ups after your visit        Your next 10 appointments already scheduled     Sep 25, 2017  9:15 AM CDT   [facility] Lab Draw with  MASONIC LAB DRAW   [facility] Masonic Lab Draw ([facility])    [address] [place] [address]   [phone]            Sep 25, 2017  9:30 AM CDT   Infusion 120 with UC ONCOLOGY INFUSION, UC 26 ATC   [facility] ([facility])    [address] [place] [address]   [phone]            Sep 29, 2017 12:00 PM CDT   (Arrive by 11:45 AM)   NEW LUNG NODULE with Marisol Hernandez MD   [facility] ([facility])    [address] [place] [address]   [phone]            Oct 06, 2017  7:15 AM CDT   Masonic Lab Draw with  MASONIC LAB DRAW   [facility] Lab Draw ([facility])    [address] [place] [address]   [phone]            Oct 06, 2017  7:50 AM CDT   (Arrive by 7:35 AM)   Return Visit with ISAURA Roach CNP   [facility] ([facility])    [address] [place] [address]   [phone]            Oct 06, 2017  9:00 AM CDT   Infusion 360 with UC ONCOLOGY INFUSION, UC 24 ATC   [facility] ([facility])    [address] [place] [address]   [phone]            Oct 06, 2017  1:00 PM CDT   (Arrive by 12:45 PM)   Return Visit with  Prostate Cancer Ctr Nurse   [facility] and Inst for Prostate and Urologic Cancers ([facility])    [address]  "[address]  [address]th [address]  [place] [address] [phone]            Oct 13, 2017  9:00 AM T   [facility] Lab Draw with  MASONIC LAB DRAW   [facility] Lab Draw ([facility])    [address]  [address]  [place] [address] [phone]              Who to contact     If you have questions or need follow up information about today's clinic visit or your schedule please contact [facility] CANCER CLINIC directly at [phone].  Normal or non-critical lab and imaging results will be communicated to you by [employer], letter or phone within 4 business days after the clinic has received the results. If you do not hear from us within 7 days, please contact the clinic through [employer] or phone. If you have a critical or abnormal lab result, we will notify you by phone as soon as possible.  Submit refill requests through [email or web address] or call your pharmacy and they will forward the refill request to us. Please allow 3 business days for your refill to be completed.          Additional Information About Your Visit        [email or web address] Information     [email or web address] lets you send messages to your doctor, view your test results, renew your prescriptions, schedule appointments and more. To sign up, go to [email or web address] . Click on \"Log in\" on the left side of the screen, which will take you to the Welcome page. Then click on \"Sign up Now\" on the right side of the page.     You will be asked to enter the access code listed below, as well as some personal information. Please follow the directions to create your username and password.     Your access code is: [email or web address]  Expires: 10/31/2017  1:07 PM     Your access code will  in 90 days. If you need help or a new code, please call your [place] clinic or [phone].        Care EveryWhere ID     This is your Care EveryWhere ID. This could be used by other organizations to access your [place] medical records  [id number]        Your " Vitals Were     Pulse Temperature Respirations Pulse Oximetry BMI (Body Mass Index)       71 98.2  F (36.8  C) 16 98% 19.67 kg/m2        Blood Pressure from Last 3 Encounters:   09/22/17 153/82   09/15/17 175/88   09/15/17 157/79    Weight from Last 3 Encounters:   09/22/17 71.4 kg (157 lb 6.4 oz)   09/15/17 73 kg (160 lb 14.4 oz)   09/13/17 75.3 kg (166 lb)              We Performed the Following     Basic metabolic panel     CBC with platelets differential     Comprehensive metabolic panel     Magnesium        Primary Care Provider Office Phone # Fax #    Joan Ventura, MARLENY [phone]       [facility] [address] [address]        Equal Access to Services     JENELLE RED : Hadii aad ku hadasho Soomaali, waaxda luqadaha, qaybta kaalmada adeegyada, ronald srivastava . So [facility] [phone].    ATENCIÓN: Si habla español, tiene a washburn disposición servicios gratuitos de asistencia lingüística. Llame al [phone].    We comply with applicable federal civil rights laws and [place] laws. We do not discriminate on the basis of race, color, national origin, age, disability sex, sexual orientation or gender identity.            Thank you!     Thank you for choosing [facility] CANCER [facility]  for your care. Our goal is always to provide you with excellent care. Hearing back from our patients is one way we can continue to improve our services. Please take a few minutes to complete the written survey that you may receive in the mail after your visit with us. Thank you!             Your Updated Medication List - Protect others around you: Learn how to safely use, store and throw away your medicines at www.disposemymeds.org.          This list is accurate as of: 9/22/17  3:09 PM.  Always use your most recent med list.                   Brand Name Dispense Instructions for use Diagnosis    amLODIPine 5 MG tablet    NORVASC    30 tablet    Take 1 tablet (5 mg) by  mouth daily    Benign essential hypertension       dexamethasone 4 MG tablet    DECADRON    12 tablet    Take 2 tablets (8 mg) by mouth daily Take for 3 days, starting the day after cisplatin (Day 2 and Day 9).    Urethral cancer (H)       fluticasone 50 MCG/ACT spray    FLONASE     Spray 1 spray into both nostrils every morning        hydrochlorothiazide 25 MG tablet    HYDRODIURIL    30 tablet    Take 1 tablet (25 mg) by mouth every morning        HYDROXYZINE HCL PO      Take 5 mg by mouth At Bedtime        LORazepam 0.5 MG tablet    ATIVAN    30 tablet    Take 1 tablet (0.5 mg) by mouth every 4 hours as needed (Anxiety, Nausea/Vomiting or Sleep)    Urethral cancer (H)       ondansetron 8 MG tablet    ZOFRAN    20 tablet    Take 1 tablet (8 mg) by mouth every 8 hours as needed for nausea    Urothelial cancer (H)       priLOSEC OTC 20 MG tablet   Generic drug:  omeprazole      Take 20 mg by mouth 2 times daily        prochlorperazine 10 MG tablet    COMPAZINE    30 tablet    Take 0.5 tablets (5 mg) by mouth every 6 hours as needed (Nausea/Vomiting)    Urethral cancer (H)

## 2017-09-25 ENCOUNTER — INFUSION THERAPY VISIT (OUTPATIENT)
Dept: ONCOLOGY | Facility: CLINIC | Age: 72
End: 2017-09-25
Attending: INTERNAL MEDICINE
Payer: MEDICARE

## 2017-09-25 ENCOUNTER — APPOINTMENT (OUTPATIENT)
Dept: LAB | Facility: CLINIC | Age: 72
End: 2017-09-25
Attending: INTERNAL MEDICINE
Payer: MEDICARE

## 2017-09-25 VITALS
OXYGEN SATURATION: 100 % | TEMPERATURE: 97.7 F | DIASTOLIC BLOOD PRESSURE: 70 MMHG | RESPIRATION RATE: 16 BRPM | SYSTOLIC BLOOD PRESSURE: 152 MMHG | HEART RATE: 75 BPM | WEIGHT: 160.6 LBS | BODY MASS INDEX: 20.07 KG/M2

## 2017-09-25 DIAGNOSIS — R11.0 NAUSEA: ICD-10-CM

## 2017-09-25 DIAGNOSIS — C68.0 URETHRAL CANCER (H): Primary | ICD-10-CM

## 2017-09-25 DIAGNOSIS — E86.0 DEHYDRATION: ICD-10-CM

## 2017-09-25 DIAGNOSIS — R79.89 ELEVATED SERUM CREATININE: ICD-10-CM

## 2017-09-25 DIAGNOSIS — E87.1 HYPONATREMIA: ICD-10-CM

## 2017-09-25 LAB
ALBUMIN SERPL-MCNC: 3.4 G/DL (ref 3.4–5)
ALP SERPL-CCNC: 63 U/L (ref 40–150)
ALT SERPL W P-5'-P-CCNC: 29 U/L (ref 0–70)
ANION GAP SERPL CALCULATED.3IONS-SCNC: 9 MMOL/L (ref 3–14)
AST SERPL W P-5'-P-CCNC: 39 U/L (ref 0–45)
BILIRUB SERPL-MCNC: 0.9 MG/DL (ref 0.2–1.3)
BUN SERPL-MCNC: 15 MG/DL (ref 7–30)
CALCIUM SERPL-MCNC: 9.3 MG/DL (ref 8.5–10.1)
CHLORIDE SERPL-SCNC: 92 MMOL/L (ref 94–109)
CO2 SERPL-SCNC: 28 MMOL/L (ref 20–32)
CREAT SERPL-MCNC: 1.17 MG/DL (ref 0.66–1.25)
GFR SERPL CREATININE-BSD FRML MDRD: 62 ML/MIN/1.7M2
GLUCOSE SERPL-MCNC: 94 MG/DL (ref 70–99)
MAGNESIUM SERPL-MCNC: 1.8 MG/DL (ref 1.6–2.3)
POTASSIUM SERPL-SCNC: 3.4 MMOL/L (ref 3.4–5.3)
PROT SERPL-MCNC: 7.4 G/DL (ref 6.8–8.8)
SODIUM SERPL-SCNC: 128 MMOL/L (ref 133–144)

## 2017-09-25 PROCEDURE — 96375 TX/PRO/DX INJ NEW DRUG ADDON: CPT

## 2017-09-25 PROCEDURE — 83735 ASSAY OF MAGNESIUM: CPT | Performed by: INTERNAL MEDICINE

## 2017-09-25 PROCEDURE — 80053 COMPREHEN METABOLIC PANEL: CPT | Performed by: INTERNAL MEDICINE

## 2017-09-25 PROCEDURE — 96361 HYDRATE IV INFUSION ADD-ON: CPT

## 2017-09-25 PROCEDURE — 25000128 H RX IP 250 OP 636: Mod: ZF | Performed by: PHYSICIAN ASSISTANT

## 2017-09-25 PROCEDURE — 96365 THER/PROPH/DIAG IV INF INIT: CPT

## 2017-09-25 RX ORDER — PALONOSETRON 0.05 MG/ML
0.25 INJECTION, SOLUTION INTRAVENOUS ONCE
Status: CANCELLED
Start: 2017-09-25 | End: 2017-09-25

## 2017-09-25 RX ORDER — PALONOSETRON 0.05 MG/ML
0.25 INJECTION, SOLUTION INTRAVENOUS ONCE
Status: COMPLETED | OUTPATIENT
Start: 2017-09-25 | End: 2017-09-25

## 2017-09-25 RX ADMIN — SODIUM CHLORIDE 150 MG: 9 INJECTION, SOLUTION INTRAVENOUS at 10:32

## 2017-09-25 RX ADMIN — PALONOSETRON HYDROCHLORIDE 0.25 MG: 0.25 INJECTION INTRAVENOUS at 10:24

## 2017-09-25 RX ADMIN — SODIUM CHLORIDE 1000 ML: 9 INJECTION, SOLUTION INTRAVENOUS at 09:58

## 2017-09-25 ASSESSMENT — PAIN SCALES - GENERAL: PAINLEVEL: EXTREME PAIN (8)

## 2017-09-25 NOTE — PROGRESS NOTES
Infusion Nursing Note:  King Gonsalo Bruno presents today for IVF and antiemetics.    Patient seen by provider today: No    Note: Patient presents to clinic today feeling generally well with no questions.  Reports that he has been sleeping well using Ativan and that has also helped a lot with nausea.       TORB 9/25/2017 1033 SARITHA Gay/NETTIE Castellanos RN:  Instruct patient to increase Norvasc to 10mg PO daily if pressures remain greater than 140s/90s.  Patient verbalized understanding.    Intravenous Access:  Peripheral IV placed in lab.    Treatment Conditions:  Lab Results   Component Value Date     09/25/2017                   Lab Results   Component Value Date    POTASSIUM 3.4 09/25/2017           Lab Results   Component Value Date    MAG 1.8 09/25/2017            Lab Results   Component Value Date    CR 1.17 09/25/2017                   Lab Results   Component Value Date    SAADIA 9.3 09/25/2017                Lab Results   Component Value Date    BILITOTAL 0.9 09/25/2017           Lab Results   Component Value Date    ALBUMIN 3.4 09/25/2017                    Lab Results   Component Value Date    ALT 29 09/25/2017           Lab Results   Component Value Date    AST 39 09/25/2017     Post Infusion Assessment:  Patient tolerated infusion without incident.  Blood return noted pre and post infusion.  Site patent and intact, free from redness, edema or discomfort.  No evidence of extravasations.  Access discontinued per protocol.    Discharge Plan:   Prescription refills given for Ativan.  Discharge instructions reviewed with: Patient.  Patient and/or family verbalized understanding of discharge instructions and all questions answered.  Copy of AVS reviewed with patient and/or family.  Patient will return 10/6/2017 for next appointment.  Patient discharged in stable condition accompanied by: self.  Departure Mode: Ambulatory.    Rossi Castellanos, BRENDA

## 2017-09-25 NOTE — PATIENT INSTRUCTIONS
Contact Numbers    [facility] Main Line: [phone]  [facility] Triage:  [phone]    Call triage with chills and/or temperature greater than or equal to 100.5, uncontrolled nausea/vomiting, diarrhea, constipation, dizziness, shortness of breath, chest pain, bleeding, unexplained bruising, or any new/concerning symptoms, questions/concerns.     If you are having any concerning symptoms or wish to speak to a provider before your next infusion visit, please call your care coordinator or triage to notify them so we can adequately serve you.       After Hours: [phone]    If after hours, weekends, or holidays, call main hospital  and ask for Oncology doctor on call.     Increase Norvasc to 10mg (2 tabs) daily        September 2017 Sunday Monday Tuesday Wednesday Thursday Friday Saturday                            1     2       3     4     5     6     7     8     9       10     11     12     13     UMP POST-OP   11:05 AM   (20 min.)   Muriel Haddad MD   [facility]rology and [facility] for Prostate and Urologic Cancers 14     15     [facility] LAB DRAW    6:30 AM   (15 min.)    [facility] LAB DRAW   [facility] Lab Draw     UMP RETURN    6:45 AM   (50 min.)   Josy Powell PA-C   [facility]     UMP ONC INFUSION 360    7:00 AM   (360 min.)    ONCOLOGY INFUSION   [facility] 16       17     18     19     20     21     22     [facility] LAB DRAW   10:00 AM   (15 min.)    [facility] LAB DRAW   [facility] Lab Draw     UMP ONC INFUSION 360   10:30 AM   (60 min.)    ONCOLOGY INFUSION   [facility] 23       24     25     [facility] LAB DRAW    9:15 AM   (15 min.)    [facility] LAB DRAW   [facility] Lab Draw     UMP ONC INFUSION 120    9:30 AM   (120 min.)    ONCOLOGY INFUSION   [facility] 26     27     28     29     UMP NEW LUNG NODULE   11:45 AM   (30 min.)   Marisol Hernandez MD   [facility]  30 October 2017 Sunday Monday Tuesday Wednesday Thursday Friday Saturday   1     2     3     4     5     6     [facility] DRAW    7:15 AM   (15 min.)    [facility] LAB DRAW   [facility] Mason[facility] Lab Draw     UMP RETURN    7:35 AM   (50 min.)   Yessica Ovalles APRN CNP   [facility]    9:00 AM   (360 min.)    ONCOLOGY INFUSION   [facility]     UMP RETURN   12:45 PM   (30 min.)   Nurse,  Prostate Cancer Ctr   [facility]rology and Inst for Prostate and Urologic Cancers 7       8     9     10     11     12     13     [facility] LAB DRAW    9:00 AM   (15 min.)    M[facility]ONIC LAB DRAW   [facility] Lab Draw     P ONC INFUSION 360   10:00 AM   (360 min.)    ONCOLOGY INFUSION   [facility] 14       15     16     17     18     19     20     21       22     23     24     25     UMP CYSTOSCOPY    9:05 AM   (20 min.)   Muriel Haddad MD   [facility]rology and [facility] for Prostate and Urologic Cancers 26     27     [facility] LAB DRAW    9:00 AM   (15 min.)    [facility]   [facility]     [facility] RETURN    9:15 AM   (50 min.)   Jos[facility] Powell [facility]-C   [facility]   10:00 AM   (360 min.)    ONCOLOGY INFUSION   [facility] 28       29     30     31                                      Lab Results:  No results found for this or any previous visit (from the past 12 hour(s)).

## 2017-09-25 NOTE — NURSING NOTE
Chief Complaint   Patient presents with     Blood Draw     Peripheral IV placed and vitals taken      Elizabeth Coello RN

## 2017-09-25 NOTE — MR AVS SNAPSHOT
After Visit Summary   9/25/2017    King Gonsalo Bruno    MRN: [id number]           Patient Information     Date Of Birth          1945        Visit Information        Provider Department      9/25/2017 9:30 AM  26 ATC;  ONCOLOGY INFUSION [facility]        Today's Diagnoses     Urethral cancer (H)    -  1    Hyponatremia        Elevated serum creatinine        Nausea        Dehydration          Care Instructions    Contact Numbers    [facility] Main Line: [phone]  [facility] Triage:  [phone]    Call triage with chills and/or temperature greater than or equal to 100.5, uncontrolled nausea/vomiting, diarrhea, constipation, dizziness, shortness of breath, chest pain, bleeding, unexplained bruising, or any new/concerning symptoms, questions/concerns.     If you are having any concerning symptoms or wish to speak to a provider before your next infusion visit, please call your care coordinator or triage to notify them so we can adequately serve you.       After Hours: [phone]    If after hours, weekends, or holidays, call main hospital  and ask for Oncology doctor on call.     Increase Norvasc to 10mg (2 tabs) daily        September 2017 Sunday Monday Tuesday Wednesday Thursday Friday Saturday                            1     2       3     4     5     6     7     8     9       10     11     12     13     P POST-OP   11:05 AM   (20 min.)   Muriel Haddad MD   [facility] Urology and [facility] for Prostate and Urologic Cancers 14     15     [facility] LAB DRAW    6:30 AM   (15 min.)    MASONIC LAB DRAW   [facility] Lab Draw     UMP RETURN    6:45 AM   (50 min.)   Josy Powell PA-C   [facility] ONC INFUSION 360    7:00 AM   (360 min.)    ONCOLOGY INFUSION   [facility] 16       17     18     19     20     21     22     [facility] MASONIC LAB DRAW   10:00 AM   (15 min.)   [facility] LAB DRAW   [facility]  Draw     UMP ONC INFUSION 360   10:30 AM   (60 min.)    ONCOLOGY INFUSION   [facility] 23       24     25     [facility] LAB DRAW    9:15 AM   (15 min.)    MASONIC LAB DRAW   [facility] Masonic Lab Draw     UMP ONC INFUSION 120    9:30 AM   (120 min.)    ONCOLOGY INFUSION   [facility] 26     27     28     29     UMP NEW LUNG NODULE   11:45 AM   (30 min.)   Marisol Hernandez MD   [facility] 30 October 2017 Sunday Monday Tuesday Wednesday Thursday Friday Saturday   1     2     3     4     5     6     [facility] LAB DRAW    7:15 AM   (15 min.)    [facility]RAW   [facility]     UMP RETURN    7:35 AM   (50 min.)   Yessica Ovalles APRN CNP   [facility]     UMP ONC INFUSION 360    9:00 AM   (360 min.)    ONCOLOGY INFUSION   [facility]     UMP RETURN   12:45 PM   (30 min.)   Nurse,  Prostate Cancer Ctr   [facility]rology and Inst for Prostate and Urologic Cancers 7       8     9     10     11     12     13     [facility] LAB DRAW    9:00 AM   (15 min.)    MASONIC LAB DRAW   [facility] Lab Draw     UMP ONC INFUSION 360   10:00 AM   (360 min.)    ONCOLOGY INFUSION   [facility] 14       15     16     17     18     19     20     21       22     23     24     25     UMP CYSTOSCOPY    9:05 AM   (20 min.)   Muriel Haddad MD   [facility] and Inst for Prostate and Urologic Cancers 26     27     [facility] LAB DRAW    9:00 AM   (15 min.)    MASONIC LAB DRAW   [facility] Mason[facility] Lab Draw     UMP RETURN    9:15 AM   (50 min.)   Josy Powell PA-C   [facility]     UMP ONC INFUSION 360   10:00 AM   (360 min.)    ONCOLOGY INFUSION   [facility] 28       29     30     31                                      Lab Results:  No results found for this or any previous visit (from the past 12  hour(s)).            Follow-ups after your visit        Your next 10 appointments already scheduled     Sep 29, 2017 12:00 PM CDT   (Arrive by 11:45 AM)   NEW LUNG NODULE with Marisol Hernandez MD   [facility]    [address] [place] [address]  2nd [facility] [address]   [phone]            Oct 06, 2017  7:15 AM CDT   [facility] Lab Draw with  [facility] LAB DRAW   [facility] ([facility]    [address] [place] [address]  2nd [facility] [address]   [phone]            Oct 06, 2017  7:50 AM CDT   (Arrive by 7:35 AM)   Return Visit with ISAURA Roach CNP   [facility] ([facility]    [address] [place] [address]   [phone]            Oct 06, 2017  9:00 AM CDT   Infusion 360 with UC ONCOLOGY INFUSION, UC 24 ATC   [facility]    [address] [place] [address]  2nd [facility] [address]   [phone]            Oct 06, 2017  1:00 PM CDT   (Arrive by 12:45 PM)   Return Visit with  Prostate Cancer Ctr Nurse   [facility] Inst for Prostate and Urologic Cancers ([facility])    [address] [place] [address]  4th [facility] [address]   [phone]            Oct 13, 2017  9:00 AM CDT   [facility] Lab Draw with UC MASONIC LAB DRAW   [facility] Lab Draw ([facility])    [address] [place] [address]  2[address] [facility] [address]   [phone]            Oct 13, 2017 10:00 AM CDT   Infusion 360 with UC ONCOLOGY INFUSION, UC 12 ATC   [facility]    [address] [place] [address]  2nd [facility] [address]   [phone]            Oct 25, 2017  9:20 AM CDT   (Arrive by 9:05 AM)   Cystoscopy with Muriel Haddad MD   [facility] for Prostate  "and Urologic Cancers ([facility])    [address] [facility]  4th [facility] [address] [phone]              Who to contact     If you have questions or need follow up information about today's clinic visit or your schedule please contact [facility] CANCER CLINIC directly at [phone].  Normal or non-critical lab and imaging results will be communicated to you by MyChart, letter or phone within 4 business days after the clinic has received the results. If you do not hear from us within 7 days, please contact the clinic through MyChart or phone. If you have a critical or abnormal lab result, we will notify you by phone as soon as possible.  Submit refill requests through Minggl or call your pharmacy and they will forward the refill request to us. Please allow 3 business days for your refill to be completed.          Additional Information About Your Visit        [employer] Information     Minggl lets you send messages to your doctor, view your test results, renew your prescriptions, schedule appointments and more. To sign up, go to [email or web address] . Click on \"Log in\" on the left side of the screen, which will take you to the Welcome page. Then click on \"Sign up Now\" on the right side of the page.     You will be asked to enter the access code listed below, as well as some personal information. Please follow the directions to create your username and password.     Your access code is: [email or web address]  Expires: 10/31/2017  1:07 PM     Your access code will  in 90 days. If you need help or a new code, please call your [place] clinic or [phone].        Care EveryWhere ID     This is your Care EveryWhere ID. This could be used by other organizations to access your [place] medical records  [id number]        Your Vitals Were     Pulse Temperature Respirations Pulse Oximetry BMI (Body Mass Index)       75 97.7  F (36.5  C) (Oral) 16 100% 20.07 kg/m2        " Blood Pressure from Last 3 Encounters:   09/25/17 152/70   09/22/17 153/82   09/15/17 175/88    Weight from Last 3 Encounters:   09/25/17 72.8 kg (160 lb 9.6 oz)   09/22/17 71.4 kg (157 lb 6.4 oz)   09/15/17 73 kg (160 lb 14.4 oz)              We Performed the Following     Comprehensive metabolic panel     Magnesium        Primary Care Provider Office Phone # Fax #    Joan Gonzales Lorenzo, MARLENY [phone]       [facility] [address] [facility] [address]        Equal Access to Services     [facility] : Hadii светлана ku hadasho Soomaali, waaxda luqadaha, qaybta kaalmada adeegyada, ronald srivastava . So [facility] [phone].    ATENCIÓN: Si habla español, tiene a washburn disposición servicios gratuitos de asistencia lingüística. Llame al [phone].    We comply with applicable federal civil rights laws and [place] laws. We do not discriminate on the basis of race, color, national origin, age, disability sex, sexual orientation or gender identity.            Thank you!     Thank you for choosing [facility] CANCER [facility]  for your care. Our goal is always to provide you with excellent care. Hearing back from our patients is one way we can continue to improve our services. Please take a few minutes to complete the written survey that you may receive in the mail after your visit with us. Thank you!             Your Updated Medication List - Protect others around you: Learn how to safely use, store and throw away your medicines at www.disposemymeds.org.          This list is accurate as of: 9/25/17 10:50 AM.  Always use your most recent med list.                   Brand Name Dispense Instructions for use Diagnosis    amLODIPine 5 MG tablet    NORVASC    30 tablet    Take 1 tablet (5 mg) by mouth daily    Benign essential hypertension       dexamethasone 4 MG tablet    DECADRON    12 tablet    Take 2 tablets (8 mg) by mouth daily Take for 3 days, starting the day after cisplatin  (Day 2 and Day 9).    Urethral cancer (H)       fluticasone 50 MCG/ACT spray    FLONASE     Spray 1 spray into both nostrils every morning        hydrochlorothiazide 25 MG tablet    HYDRODIURIL    30 tablet    Take 1 tablet (25 mg) by mouth every morning        HYDROXYZINE HCL PO      Take 5 mg by mouth At Bedtime        LORazepam 0.5 MG tablet    ATIVAN    30 tablet    Take 1 tablet (0.5 mg) by mouth every 4 hours as needed (Anxiety, Nausea/Vomiting or Sleep)    Urethral cancer (H)       ondansetron 8 MG tablet    ZOFRAN    20 tablet    Take 1 tablet (8 mg) by mouth every 8 hours as needed for nausea    Urothelial cancer (H)       priLOSEC OTC 20 MG tablet   Generic drug:  omeprazole      Take 20 mg by mouth 2 times daily        prochlorperazine 10 MG tablet    COMPAZINE    30 tablet    Take 0.5 tablets (5 mg) by mouth every 6 hours as needed (Nausea/Vomiting)    Urethral cancer (H)

## 2017-09-29 ENCOUNTER — OFFICE VISIT (OUTPATIENT)
Dept: PULMONOLOGY | Facility: CLINIC | Age: 72
End: 2017-09-29
Attending: INTERNAL MEDICINE
Payer: MEDICARE

## 2017-09-29 VITALS
RESPIRATION RATE: 17 BRPM | TEMPERATURE: 98.2 F | BODY MASS INDEX: 19.36 KG/M2 | WEIGHT: 155.7 LBS | OXYGEN SATURATION: 99 % | SYSTOLIC BLOOD PRESSURE: 139 MMHG | HEART RATE: 88 BPM | DIASTOLIC BLOOD PRESSURE: 77 MMHG | HEIGHT: 75 IN

## 2017-09-29 DIAGNOSIS — C68.0 URETHRAL CANCER (H): ICD-10-CM

## 2017-09-29 DIAGNOSIS — Z87.891 PERSONAL HISTORY OF NICOTINE DEPENDENCE: ICD-10-CM

## 2017-09-29 DIAGNOSIS — R91.1 LUNG NODULE: Primary | ICD-10-CM

## 2017-09-29 PROCEDURE — 99212 OFFICE O/P EST SF 10 MIN: CPT | Mod: ZF

## 2017-09-29 ASSESSMENT — PAIN SCALES - GENERAL: PAINLEVEL: NO PAIN (0)

## 2017-09-29 NOTE — LETTER
9/29/2017       RE: King Gonsalo Bruno  [address]  [facility] [address]     Dear Colleague,    Thank you for referring your patient, King Gonsalo Bruno, to the [facility] CANCER CLINIC at [facility]. Please see a copy of my visit note below.    [facility] Care Nodule Clinic Initial Visit    Reason for Visit  King Gonsalo Bruno is a 71 year old male who is referred by Dr. Haddad for lung nodules  Pulmonary HPI  Mr. Bruno is a former .  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  He believes at that time he was told he would have spots on his lungs which would always be a problem.  He does not know for sure if he had any axial chest imaging, only chest x-rays.  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  Today he reports being very tired, seems very upset and frustrated about cancer and how poorly he's been feeling lately, related to chemotherapy.    TB risk factors: Yes  Prior Imaging:No  Constitutional Symptoms: No  Personal history of cancer:Yes    ROS Pulmonary  Dyspnea: No, Cough: No, Chest pain: No, Wheezing: No, Sputum Production: No, Hemoptysis: No  A complete ROS was otherwise negative except as noted in the HPI.  The patient was seen and examined by Marisol Hernandez MD   Current Outpatient Prescriptions   Medication     LORazepam (ATIVAN) 0.5 MG tablet     prochlorperazine (COMPAZINE) 10 MG tablet     dexamethasone (DECADRON) 4 MG tablet     amLODIPine (NORVASC) 5 MG tablet     hydrochlorothiazide (HYDRODIURIL) 25 MG tablet     HYDROXYZINE HCL PO     omeprazole (PRILOSEC OTC) 20 MG tablet     fluticasone (FLONASE) 50 MCG/ACT spray     ondansetron (ZOFRAN) 8 MG tablet     No current facility-administered medications for  "this visit.      Allergies   Allergen Reactions     Lisinopril Swelling     Social History     Social History     Marital status: Single     Spouse name: N/A     Number of children: 1     Years of education: N/A     Occupational History     retired crisis  for [employer]      Social History Main Topics     Smoking status: Former Smoker     Packs/day: 0.50     Years: 10.00     Smokeless tobacco: Never Used      Comment: quit in 1992     Alcohol use No      Comment: 1987 quit per personal choice.     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      GERD (gastroesophageal reflux disease)      Hypertension      PONV (postoperative nausea and vomiting)      Past Surgical History:   Procedure Laterality Date     CYSTOSCOPY, BIOPSY BLADDER, COMBINED N/A 8/31/2017    Procedure: COMBINED CYSTOSCOPY, BIOPSY BLADDER;  Cystoscopy, Suprapubic Tube Placement with Ultrasound Guidiance, Uretheral Dilation;  Surgeon: Muriel Haddad MD;  [facility]     CYSTOSTOMY, INSERT TUBE SUPRAPUBIC, COMBINED N/A 8/31/2017    Procedure: COMBINED CYSTOSTOMY, INSERT TUBE SUPRAPUBIC;;  Surgeon: Muriel Haddad MD;  [facility]     LARYNGOSCOPY  2013    Direct laryngoscopy with the use of rigid endoscopy and takedown of left hypopharyngeal diverticula.      Family History   Problem Relation Age of Onset     CEREBROVASCULAR DISEASE Mother      Prostate Cancer Father 84     Prostate Cancer Brother 63     DIABETES Brother        Exam:   /77  Pulse 88  Temp 98.2  F (36.8  C) (Oral)  Resp 17  Ht 1.905 m (6' 3\")  Wt 70.6 kg (155 lb 11.2 oz)  SpO2 99%  BMI 19.46 kg/m2  GENERAL APPEARANCE: Well developed, well nourished, alert, and in no apparent distress, " increasingly agitated and tearful throughout the interview.  ABDOMEN:  Suprapubic catheter in place, no gross purulence or erythema surrounding the insertion site  Results:  PET/CT 8/23 images reviewed, bilateral upper lobe nodules with irregular borders and max SUV around 6 (his ureteral involvement has an SUV around 30).  No prior chest imaging available for review.    Assessment and plan: Mr. Bruno is a 71-year-old male with a remote smoking history who presents for evaluation of bilateral lung nodules.  He also has ureteral cancer that is currently being treated with chemotherapy, I believe the plan is to perform resection and reconstructive surgery after neoadjuvant chemotherapy.  Nodule malignancy risk based on Osito/Cristina model: 75% http://reference.FunCaptcha.com/calculator/solitary-pulmonary-nodule-risk    Reviewed the findings with him and possible diagnostic options, he became very upset.  He had been told that his cancer was not in his chest, I attempted to clarify with him that I wasn't sure what it was but that I was concerned that it was either lung cancer or less likely urothelial cancer.  Given his history of smoking, primary lung cancers separately possible, the appearance on imaging would be most consistent with synchronous bilateral primaries.  Unfortunately, he was unwilling to discuss additional diagnostic testing for these lesions.  I believe he is overwhelmed with the possibility of additional testing and cancer diagnosis.  Therefore, today we agreed to work on obtaining his previous records from [facility] regarding his treatment of latent tuberculosis.  In the meantime I will plan to talk with his oncology team about the timing of lung evaluation and anticipated follow-up imaging and surgery for his urothelial cancer.    Total visit time 30, over 50% of which (20 minutes) was spent in counseling and/or coordination of care. We discussed diagnostic possibilities and approach to  indeterminate lung nodules, including indications for biopsy as well as biopsy options and risks.      Release of info signed for records from [facility]/dept of health (patient was an employee administering therapy to TB patients and then diagnosed with latent tuberculosis infection ~1995) TB treatment and surveillance    Again, thank you for allowing me to participate in the care of your patient.      Sincerely,    Marisol Hernandez MD

## 2017-09-29 NOTE — NURSING NOTE
"Oncology Rooming Note    September 29, 2017 12:00 PM   King Gonsalo Bruno is a 71 year old male who presents for:    Chief Complaint   Patient presents with     Oncology Clinic Visit     new patient visit for consultation related to lung nodule - pulmonary nodules     Initial Vitals: /77  Pulse 88  Temp 98.2  F (36.8  C) (Oral)  Resp 17  Ht 1.905 m (6' 3\")  Wt 70.6 kg (155 lb 11.2 oz)  SpO2 99%  BMI 19.46 kg/m2 Estimated body mass index is 19.46 kg/(m^2) as calculated from the following:    Height as of this encounter: 1.905 m (6' 3\").    Weight as of this encounter: 70.6 kg (155 lb 11.2 oz). Body surface area is 1.93 meters squared.  No Pain (0) Comment: Data Unavailable   No LMP for male patient.  Allergies reviewed: Yes  Medications reviewed: Yes    Medications: Medication refills not needed today.  Pharmacy name entered into Fanminder: [facility] [address] [place] [address] AT [address] [place] [address]    Clinical concerns: no concerns dr. frances was notified.    5 minutes for nursing intake (face to face time)     Jojo Hull CMA              "

## 2017-09-29 NOTE — MR AVS SNAPSHOT
After Visit Summary   9/29/2017    King Gonsalo Bruno    MRN: [id number]           Patient Information     Date Of Birth          1945        Visit Information        Provider Department      9/29/2017 12:00 PM Marisol Hernandez MD [facility]         Follow-ups after your visit        Your next 10 appointments already scheduled     Oct 06, 2017  7:15 AM CDT   Masonic Lab Draw with  MASONIC LAB DRAW   [facility] Masonic Lab Draw ([facility])    [address]  [address] [facility] [address]   [phone]            Oct 06, 2017  7:50 AM CDT   (Arrive by 7:35 AM)   Return Visit with ISAURA Roach CNP   [facility] ([facility])    [address] [place] [address]   [phone]            Oct 06, 2017  9:00 AM CDT   Infusion 360 with [facility] ONCOLOGY INFUSION, UC 24 ATC   [facility] ([facility])    [address] [place] [address]  [address] [facility] [address]   [phone]            Oct 06, 2017  1:00 PM CDT   (Arrive by 12:45 PM)   Return Visit with  Prostate Cancer Ctr Nurse   [facility] ([facility])    [address] [place] [address]  [address] [facility] [address]   [phone]            Oct 13, 2017  9:00 AM CDT   Masonic Lab Draw with UC MASONIC LAB DRAW   [facility] Masonic Lab Draw ([facility])    [address]  [address] [facility] [address]   [phone]            Oct 13, 2017 10:00 AM CDT   Infusion 360 with UC ONCOLOGY INFUSION, UC 12 ATC   [facility] ([facility])    [address]  [address] [facility] [address]   [phone]            Oct 25, 2017  9:20 AM CDT   (Arrive by 9:05 AM)   Cystoscopy with Muriel Haddad MD   [facility] and  "[facility] Prostate [facility] Urologic Cancers ([facility])    [address]  4th Floor  [facility] [address] [phone]            Oct 27, 2017  9:00 AM T   M[facility] Lab Draw with  MAS[facility]IC LAB DRAW   [facility]    [address]  [address] [facility] [id number] [phone]              Who to contact     If you have questions or need follow up information about today's clinic visit or your schedule please contact [facility] directly at [phone].  Normal or non-critical lab and imaging results will be communicated to you by [employer], letter or phone within 4 business days after the clinic has received the results. If you do not hear from us within 7 days, please contact the clinic through [employer] or phone. If you have a critical or abnormal lab result, we will notify you by phone as soon as possible.  Submit refill requests through [employer] or call your pharmacy and they will forward the refill request to us. Please allow 3 business days for your refill to be completed.          Additional Information About Your Visit        [employer] Information     [employer] lets you send messages to your doctor, view your test results, renew your prescriptions, schedule appointments and more. To sign up, go to [email or web address] . Click on \"Log in\" on the left side of the screen, which will take you to the Welcome page. Then click on \"Sign up Now\" on the right side of the page.     You will be asked to enter the access code listed below, as well as some personal information. Please follow the directions to create your username and password.     Your access code is: [email or web address]  Expires: 10/31/2017  1:07 PM     Your access code will  in 90 days. If you need help or a new code, please call your [facility] clinic or [phone].        Care EveryWhere ID     This is your Care EveryWhere ID. This could " "be used by other organizations to access your [facility] medical records  [id number]        Your Vitals Were     Pulse Temperature Respirations Height Pulse Oximetry BMI (Body Mass Index)    88 98.2  F (36.8  C) (Oral) 17 1.905 m (6' 3\") 99% 19.46 kg/m2       Blood Pressure from Last 3 Encounters:   09/29/17 139/77   09/25/17 152/70   09/22/17 153/82    Weight from Last 3 Encounters:   09/29/17 70.6 kg (155 lb 11.2 oz)   09/25/17 72.8 kg (160 lb 9.6 oz)   09/22/17 71.4 kg (157 lb 6.4 oz)              Today, you had the following     No orders found for display         Today's Medication Changes          These changes are accurate as of: 9/29/17 12:41 PM.  If you have any questions, ask your nurse or doctor.               These medicines have changed or have updated prescriptions.        Dose/Directions    amLODIPine 5 MG tablet   Commonly known as:  NORVASC   This may have changed:  how much to take   Used for:  Benign essential hypertension        Dose:  5 mg   Take 1 tablet (5 mg) by mouth daily   Quantity:  30 tablet   Refills:  3                Primary Care Provider Office Phone # Fax #    Joan Janet Ventura, MARLENY [phone]       [place] [address]        Equal Access to Services     JENELLE RED AH: Hadii светлана leono Soveronica, waaxda luqadaha, qaybta kaalmada adeaamiryada, ronald rasmussen. So [facility] [phone].    ATENCIÓN: Si habla español, tiene a washburn disposición servicios gratuitos de asistencia lingüística. Juwan al [phone].    We comply with applicable federal civil rights laws and [place] laws. We do not discriminate on the basis of race, color, national origin, age, disability, sex, sexual orientation, or gender identity.            Thank you!     Thank you for choosing [facility]  for your care. Our goal is always to provide you with excellent care. Hearing back from our patients is one way we can continue " to improve our services. Please take a few minutes to complete the written survey that you may receive in the mail after your visit with us. Thank you!             Your Updated Medication List - Protect others around you: Learn how to safely use, store and throw away your medicines at www.disposemymeds.org.          This list is accurate as of: 9/29/17 12:41 PM.  Always use your most recent med list.                   Brand Name Dispense Instructions for use Diagnosis    amLODIPine 5 MG tablet    NORVASC    30 tablet    Take 1 tablet (5 mg) by mouth daily    Benign essential hypertension       dexamethasone 4 MG tablet    DECADRON    12 tablet    Take 2 tablets (8 mg) by mouth daily Take for 3 days, starting the day after cisplatin (Day 2 and Day 9).    Urethral cancer (H)       fluticasone 50 MCG/ACT spray    FLONASE     Spray 1 spray into both nostrils every morning        hydrochlorothiazide 25 MG tablet    HYDRODIURIL    30 tablet    Take 1 tablet (25 mg) by mouth every morning        HYDROXYZINE HCL PO      Take 5 mg by mouth At Bedtime        LORazepam 0.5 MG tablet    ATIVAN    30 tablet    Take 1 tablet (0.5 mg) by mouth every 4 hours as needed (Anxiety, Nausea/Vomiting or Sleep)    Urethral cancer (H)       ondansetron 8 MG tablet    ZOFRAN    20 tablet    Take 1 tablet (8 mg) by mouth every 8 hours as needed for nausea    Urothelial cancer (H)       priLOSEC OTC 20 MG tablet   Generic drug:  omeprazole      Take 20 mg by mouth 2 times daily        prochlorperazine 10 MG tablet    COMPAZINE    30 tablet    Take 0.5 tablets (5 mg) by mouth every 6 hours as needed (Nausea/Vomiting)    Urethral cancer (H)

## 2017-09-29 NOTE — PROGRESS NOTES
[facility] Initial Visit    Reason for Visit  King Gonsalo Bruno is a 71 year old male who is referred by Dr. Haddad for lung nodules  Pulmonary HPI  Mr. Bruno is a former .  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  He believes at that time he was told he would have spots on his lungs which would always be a problem.  He does not know for sure if he had any axial chest imaging, only chest x-rays.  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  Today he reports being very tired, seems very upset and frustrated about cancer and how poorly he's been feeling lately, related to chemotherapy.    TB risk factors: Yes  Prior Imaging:No  Constitutional Symptoms: No  Personal history of cancer:Yes    ROS Pulmonary  Dyspnea: No, Cough: No, Chest pain: No, Wheezing: No, Sputum Production: No, Hemoptysis: No  A complete ROS was otherwise negative except as noted in the HPI.  The patient was seen and examined by Marisol Hernandez MD   Current Outpatient Prescriptions   Medication     LORazepam (ATIVAN) 0.5 MG tablet     prochlorperazine (COMPAZINE) 10 MG tablet     dexamethasone (DECADRON) 4 MG tablet     amLODIPine (NORVASC) 5 MG tablet     hydrochlorothiazide (HYDRODIURIL) 25 MG tablet     HYDROXYZINE HCL PO     omeprazole (PRILOSEC OTC) 20 MG tablet     fluticasone (FLONASE) 50 MCG/ACT spray     ondansetron (ZOFRAN) 8 MG tablet     No current facility-administered medications for this visit.      Allergies   Allergen Reactions     Lisinopril Swelling     Social History     Social History     Marital status: Single     Spouse name: N/A     Number of children: 1     Years of education: N/A     Occupational History     retired crisis  for [employer]  "History Main Topics     Smoking status: Former Smoker     Packs/day: 0.50     Years: 10.00     Smokeless tobacco: Never Used      Comment: quit in 1992     Alcohol use No      Comment: 1987 quit per personal choice.     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      GERD (gastroesophageal reflux disease)      Hypertension      PONV (postoperative nausea and vomiting)      Past Surgical History:   Procedure Laterality Date     CYSTOSCOPY, BIOPSY BLADDER, COMBINED N/A 8/31/2017    Procedure: COMBINED CYSTOSCOPY, BIOPSY BLADDER;  Cystoscopy, Suprapubic Tube Placement with Ultrasound Guidiance, Uretheral Dilation;  Surgeon: Muriel Haddad MD;  [facility]     CYSTOSTOMY, INSERT TUBE SUPRAPUBIC, COMBINED N/A 8/31/2017    Procedure: COMBINED CYSTOSTOMY, INSERT TUBE SUPRAPUBIC;;  Surgeon: Muriel Haddad MD;  [facility]     LARYNGOSCOPY  2013    Direct laryngoscopy with the use of rigid endoscopy and takedown of left hypopharyngeal diverticula.      Family History   Problem Relation Age of Onset     CEREBROVASCULAR DISEASE Mother      Prostate Cancer Father 84     Prostate Cancer Brother 63     DIABETES Brother        Exam:   /77  Pulse 88  Temp 98.2  F (36.8  C) (Oral)  Resp 17  Ht 1.905 m (6' 3\")  Wt 70.6 kg (155 lb 11.2 oz)  SpO2 99%  BMI 19.46 kg/m2  GENERAL APPEARANCE: Well developed, well nourished, alert, and in no apparent distress, increasingly agitated and tearful throughout the interview.  ABDOMEN:  Suprapubic catheter in place, no gross purulence or erythema surrounding the insertion site  Results:  PET/CT 8/23 images reviewed, bilateral upper lobe nodules with irregular borders and max SUV around 6 (his ureteral involvement " has an SUV around 30).  No prior chest imaging available for review.    Assessment and plan: Mr. Bruno is a 71-year-old male with a remote smoking history who presents for evaluation of bilateral lung nodules.  He also has ureteral cancer that is currently being treated with chemotherapy, I believe the plan is to perform resection and reconstructive surgery after neoadjuvant chemotherapy.  Nodule malignancy risk based on Osito/Cristina model: 75% http://reference.MyRealTrip.com/calculator/solitary-pulmonary-nodule-risk    Reviewed the findings with him and possible diagnostic options, he became very upset.  He had been told that his cancer was not in his chest, I attempted to clarify with him that I wasn't sure what it was but that I was concerned that it was either lung cancer or less likely urothelial cancer.  Given his history of smoking, primary lung cancers separately possible, the appearance on imaging would be most consistent with synchronous bilateral primaries.  Unfortunately, he was unwilling to discuss additional diagnostic testing for these lesions.  I believe he is overwhelmed with the possibility of additional testing and cancer diagnosis.  Therefore, today we agreed to work on obtaining his previous records from [facility] regarding his treatment of latent tuberculosis.  In the meantime I will plan to talk with his oncology team about the timing of lung evaluation and anticipated follow-up imaging and surgery for his urothelial cancer.    Total visit time 30, over 50% of which (20 minutes) was spent in counseling and/or coordination of care. We discussed diagnostic possibilities and approach to indeterminate lung nodules, including indications for biopsy as well as biopsy options and risks.      [place] [facility] [place] [place] [facility] (patient was an employee administering therapy to TB patients and then diagnosed with latent tuberculosis infection ~1995) TB treatment and  surveillance

## 2017-10-03 ENCOUNTER — ONCOLOGY VISIT (OUTPATIENT)
Dept: ONCOLOGY | Facility: CLINIC | Age: 72
End: 2017-10-03
Attending: INTERNAL MEDICINE
Payer: MEDICARE

## 2017-10-03 ENCOUNTER — APPOINTMENT (OUTPATIENT)
Dept: LAB | Facility: CLINIC | Age: 72
End: 2017-10-03
Attending: INTERNAL MEDICINE
Payer: MEDICARE

## 2017-10-03 ENCOUNTER — CARE COORDINATION (OUTPATIENT)
Dept: ONCOLOGY | Facility: CLINIC | Age: 72
End: 2017-10-03

## 2017-10-03 VITALS
DIASTOLIC BLOOD PRESSURE: 70 MMHG | RESPIRATION RATE: 16 BRPM | OXYGEN SATURATION: 98 % | WEIGHT: 162.3 LBS | TEMPERATURE: 97.8 F | BODY MASS INDEX: 20.29 KG/M2 | HEART RATE: 98 BPM | SYSTOLIC BLOOD PRESSURE: 125 MMHG

## 2017-10-03 VITALS — HEART RATE: 107 BPM | SYSTOLIC BLOOD PRESSURE: 115 MMHG | DIASTOLIC BLOOD PRESSURE: 79 MMHG | TEMPERATURE: 99.4 F

## 2017-10-03 DIAGNOSIS — E86.0 DEHYDRATION: ICD-10-CM

## 2017-10-03 DIAGNOSIS — R50.9 FEVER, UNSPECIFIED FEVER CAUSE: ICD-10-CM

## 2017-10-03 DIAGNOSIS — C68.0 URETHRAL CANCER (H): Primary | ICD-10-CM

## 2017-10-03 DIAGNOSIS — I10 BENIGN ESSENTIAL HYPERTENSION: ICD-10-CM

## 2017-10-03 DIAGNOSIS — R11.0 NAUSEA: ICD-10-CM

## 2017-10-03 DIAGNOSIS — K59.01 SLOW TRANSIT CONSTIPATION: Primary | ICD-10-CM

## 2017-10-03 DIAGNOSIS — K22.5 ZENKER'S DIVERTICULUM: ICD-10-CM

## 2017-10-03 DIAGNOSIS — C68.0 URETHRAL CANCER (H): ICD-10-CM

## 2017-10-03 LAB
ALBUMIN SERPL-MCNC: 3.2 G/DL (ref 3.4–5)
ALBUMIN UR-MCNC: 30 MG/DL
ALP SERPL-CCNC: 70 U/L (ref 40–150)
ALT SERPL W P-5'-P-CCNC: 21 U/L (ref 0–70)
ANION GAP SERPL CALCULATED.3IONS-SCNC: 8 MMOL/L (ref 3–14)
APPEARANCE UR: ABNORMAL
AST SERPL W P-5'-P-CCNC: 26 U/L (ref 0–45)
BACTERIA #/AREA URNS HPF: ABNORMAL /HPF
BASOPHILS # BLD AUTO: 0 10E9/L (ref 0–0.2)
BASOPHILS NFR BLD AUTO: 0.2 %
BILIRUB SERPL-MCNC: 0.4 MG/DL (ref 0.2–1.3)
BILIRUB UR QL STRIP: NEGATIVE
BUN SERPL-MCNC: 13 MG/DL (ref 7–30)
CALCIUM SERPL-MCNC: 8.4 MG/DL (ref 8.5–10.1)
CHLORIDE SERPL-SCNC: 96 MMOL/L (ref 94–109)
CO2 SERPL-SCNC: 28 MMOL/L (ref 20–32)
COLOR UR AUTO: YELLOW
CREAT SERPL-MCNC: 1.42 MG/DL (ref 0.66–1.25)
DIFFERENTIAL METHOD BLD: ABNORMAL
EOSINOPHIL # BLD AUTO: 0 10E9/L (ref 0–0.7)
EOSINOPHIL NFR BLD AUTO: 0.1 %
ERYTHROCYTE [DISTWIDTH] IN BLOOD BY AUTOMATED COUNT: 14.2 % (ref 10–15)
GFR SERPL CREATININE-BSD FRML MDRD: 49 ML/MIN/1.7M2
GLUCOSE SERPL-MCNC: 110 MG/DL (ref 70–99)
GLUCOSE UR STRIP-MCNC: NEGATIVE MG/DL
HCT VFR BLD AUTO: 29.5 % (ref 40–53)
HGB BLD-MCNC: 9.9 G/DL (ref 13.3–17.7)
HGB UR QL STRIP: ABNORMAL
HYALINE CASTS #/AREA URNS LPF: 1 /LPF (ref 0–2)
IMM GRANULOCYTES # BLD: 0.1 10E9/L (ref 0–0.4)
IMM GRANULOCYTES NFR BLD: 0.7 %
KETONES UR STRIP-MCNC: NEGATIVE MG/DL
LEUKOCYTE ESTERASE UR QL STRIP: ABNORMAL
LYMPHOCYTES # BLD AUTO: 0.9 10E9/L (ref 0.8–5.3)
LYMPHOCYTES NFR BLD AUTO: 8.6 %
MAGNESIUM SERPL-MCNC: 1.6 MG/DL (ref 1.6–2.3)
MCH RBC QN AUTO: 29.5 PG (ref 26.5–33)
MCHC RBC AUTO-ENTMCNC: 33.6 G/DL (ref 31.5–36.5)
MCV RBC AUTO: 88 FL (ref 78–100)
MONOCYTES # BLD AUTO: 0.7 10E9/L (ref 0–1.3)
MONOCYTES NFR BLD AUTO: 6.3 %
MUCOUS THREADS #/AREA URNS LPF: PRESENT /LPF
NEUTROPHILS # BLD AUTO: 8.7 10E9/L (ref 1.6–8.3)
NEUTROPHILS NFR BLD AUTO: 84.1 %
NITRATE UR QL: NEGATIVE
NRBC # BLD AUTO: 0 10*3/UL
NRBC BLD AUTO-RTO: 0 /100
PH UR STRIP: 6 PH (ref 5–7)
PLATELET # BLD AUTO: 167 10E9/L (ref 150–450)
POTASSIUM SERPL-SCNC: 3.8 MMOL/L (ref 3.4–5.3)
PROT SERPL-MCNC: 7.2 G/DL (ref 6.8–8.8)
RBC # BLD AUTO: 3.36 10E12/L (ref 4.4–5.9)
RBC #/AREA URNS AUTO: 41 /HPF (ref 0–2)
SODIUM SERPL-SCNC: 131 MMOL/L (ref 133–144)
SOURCE: ABNORMAL
SP GR UR STRIP: 1.01 (ref 1–1.03)
UROBILINOGEN UR STRIP-MCNC: 0 MG/DL (ref 0–2)
WBC # BLD AUTO: 10.4 10E9/L (ref 4–11)
WBC #/AREA URNS AUTO: 56 /HPF (ref 0–2)

## 2017-10-03 PROCEDURE — 99214 OFFICE O/P EST MOD 30 MIN: CPT | Mod: ZP | Performed by: PHYSICIAN ASSISTANT

## 2017-10-03 PROCEDURE — 96360 HYDRATION IV INFUSION INIT: CPT

## 2017-10-03 PROCEDURE — 87086 URINE CULTURE/COLONY COUNT: CPT | Performed by: PHYSICIAN ASSISTANT

## 2017-10-03 PROCEDURE — 85025 COMPLETE CBC W/AUTO DIFF WBC: CPT | Performed by: PHYSICIAN ASSISTANT

## 2017-10-03 PROCEDURE — 81001 URINALYSIS AUTO W/SCOPE: CPT | Performed by: PHYSICIAN ASSISTANT

## 2017-10-03 PROCEDURE — 25000128 H RX IP 250 OP 636: Mod: ZF | Performed by: PHYSICIAN ASSISTANT

## 2017-10-03 PROCEDURE — 80053 COMPREHEN METABOLIC PANEL: CPT | Performed by: PHYSICIAN ASSISTANT

## 2017-10-03 PROCEDURE — 83735 ASSAY OF MAGNESIUM: CPT | Performed by: PHYSICIAN ASSISTANT

## 2017-10-03 PROCEDURE — 99212 OFFICE O/P EST SF 10 MIN: CPT | Mod: ZF

## 2017-10-03 PROCEDURE — 96361 HYDRATE IV INFUSION ADD-ON: CPT

## 2017-10-03 PROCEDURE — 87040 BLOOD CULTURE FOR BACTERIA: CPT | Performed by: PHYSICIAN ASSISTANT

## 2017-10-03 RX ORDER — AMLODIPINE BESYLATE 5 MG/1
10 TABLET ORAL DAILY
Qty: 60 TABLET | Refills: 0 | Status: SHIPPED | OUTPATIENT
Start: 2017-10-03 | End: 2017-10-31

## 2017-10-03 RX ORDER — DOCUSATE SODIUM 100 MG/1
100 CAPSULE, LIQUID FILLED ORAL 2 TIMES DAILY PRN
Qty: 60 CAPSULE | Refills: 0 | Status: ON HOLD | OUTPATIENT
Start: 2017-10-03 | End: 2018-03-12

## 2017-10-03 RX ORDER — PALONOSETRON 0.05 MG/ML
0.25 INJECTION, SOLUTION INTRAVENOUS ONCE
Status: CANCELLED
Start: 2017-10-03 | End: 2017-10-03

## 2017-10-03 RX ADMIN — SODIUM CHLORIDE 1000 ML: 9 INJECTION, SOLUTION INTRAVENOUS at 15:57

## 2017-10-03 ASSESSMENT — PAIN SCALES - GENERAL: PAINLEVEL: NO PAIN (0)

## 2017-10-03 NOTE — PATIENT INSTRUCTIONS
's instructions:  1) Drink 3L of water a day (6 500 cc bottles). Alternate your carbonated drinks with non-carbonated drinks. If you are not getting this much fluid in, call us and we need to set you up with IV fluids  2) Stop Hydrochlorthiazide  3) For nausea no zofran for 3 days after each chemo cycle but ok to take beyond that as needed. Also ok to take ativan and compazine as needed.   4) Monitor for fever/chills. Call with temp >100.4F. On call oncologist [phone]  5) I put in a referral for ENT and asked our  to schedule  6) Miralax twice a day as needed and may add a stool softener (docusate/Colace) up to twice a day if needed

## 2017-10-03 NOTE — LETTER
10/3/2017      RE: King Gonsalo Bruno  [address]  [facility] [address]       Heme/onc visit note  October 3, 2017    Reason for visit: fever, nausea, dehydration    HPI: King Gonsalo Bruno is a 71 year old male with 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     Interval history:  hasn't been feeling well. He has been getting about 1 L fluids a day. He is feeling dehydrated with dry mouth and dizzy with position change. He has mild nausea and has been taking compazine prn with some relief. No emesis. He has also heaven constipated. Normally he is regular every day and now it is 5-6 days. He tried dulcolax and had diarrhea. Now he has been taking miralax once a day which has been working but small hard stools. No ab pain. Per the patient, the urethral mass has gotten smaller.     He had a temp of 100.4 today. In clinic it was 99. He denies purulent nasal drainage, cough, or change in urinary function (used SPC with some urethral leaking).     Despite all this he was also continuing to play golf and eat hot dogs after.     He would like to see an ENT here for his chronic nasal congestion and a diverticulum (Z[place]?). [facility] and would like to transfer his care here.     Past medical history:  Patient Active Problem List   Diagnosis     Urethral cancer (H)     Nausea     Dehydration       Medications:    Current Outpatient Prescriptions on File Prior to Visit:  LORazepam (ATIVAN) 0.5 MG tablet Take 1 tablet (0.5 mg) by mouth every 4 hours as needed (Anxiety, Nausea/Vomiting or Sleep)   prochlorperazine (COMPAZINE) 10 MG tablet Take 0.5 tablets (5 mg) by mouth every 6 hours as needed (Nausea/Vomiting)   dexamethasone (DECADRON) 4 MG tablet Take 2 tablets (8 mg) by mouth daily Take for 3 days, starting the day after cisplatin (Day 2 and Day 9).   ondansetron (ZOFRAN) 8 MG tablet Take  1 tablet (8 mg) by mouth every 8 hours as needed for nausea (Patient not taking: Reported on 9/29/2017)   amLODIPine (NORVASC) 5 MG tablet Take 1 tablet (5 mg) by mouth daily (Patient taking differently: Take 10 mg by mouth daily )   hydrochlorothiazide (HYDRODIURIL) 25 MG tablet Take 1 tablet (25 mg) by mouth every morning   HYDROXYZINE HCL PO Take 5 mg by mouth At Bedtime    omeprazole (PRILOSEC OTC) 20 MG tablet Take 20 mg by mouth 2 times daily   fluticasone (FLONASE) 50 MCG/ACT spray Spray 1 spray into both nostrils every morning      No current facility-administered medications on file prior to visit.     Allergy:     Allergies   Allergen Reactions     Lisinopril Swelling       Physical exam  /70 (BP Location: Right arm, Cuff Size: Adult Regular)  Pulse 98  Temp 97.8  F (36.6  C) (Oral)  Resp 16  Wt 73.6 kg (162 lb 4.8 oz)  SpO2 98%  BMI 20.29 kg/m2  Wt Readings from Last 4 Encounters:   09/29/17 70.6 kg (155 lb 11.2 oz)   09/25/17 72.8 kg (160 lb 9.6 oz)   09/22/17 71.4 kg (157 lb 6.4 oz)   09/15/17 73 kg (160 lb 14.4 oz)     General: No acute distress  HEENT: Sclera anicteric. Oral mucosa dry.  No mucositis or thrush  Lymph: No lymphadenopathy in neck  Heart: Regular, rate, and rhythm  Lungs: Clear to ascultation bilaterally  Abdomen: Positive bowel sounds. Soft, non-distended, non-tender.  : SPC site is clean, dry and non-tender.   Extremities: no lower extremity edema  Neuro: Cranial nerves grossly intact  Rash: none    Labs:  Results for KING AILYN CHAVEZ (MRN [id number]) as of 10/3/2017 16:50   Ref. Range 10/3/2017 15:44   Sodium Latest Ref Range: 133 - 144 mmol/L 131 (L)   Potassium Latest Ref Range: 3.4 - 5.3 mmol/L 3.8   Chloride Latest Ref Range: 94 - 109 mmol/L 96   Carbon Dioxide Latest Ref Range: 20 - 32 mmol/L 28   Urea Nitrogen Latest Ref Range: 7 - 30 mg/dL 13   Creatinine Latest Ref Range: 0.66 - 1.25 mg/dL 1.42 (H)   GFR Estimate Latest Ref Range: >60 mL/min/1.7m2 49 (L)   GFR  Estimate If Black Latest Ref Range: >60 mL/min/1.7m2 60 (L)   Calcium Latest Ref Range: 8.5 - 10.1 mg/dL 8.4 (L)   Anion Gap Latest Ref Range: 3 - 14 mmol/L 8   Magnesium Latest Ref Range: 1.6 - 2.3 mg/dL 1.6   Albumin Latest Ref Range: 3.4 - 5.0 g/dL 3.2 (L)   Protein Total Latest Ref Range: 6.8 - 8.8 g/dL 7.2   Bilirubin Total Latest Ref Range: 0.2 - 1.3 mg/dL 0.4   Alkaline Phosphatase Latest Ref Range: 40 - 150 U/L 70   ALT Latest Ref Range: 0 - 70 U/L 21   AST Latest Ref Range: 0 - 45 U/L 26   Glucose Latest Ref Range: 70 - 99 mg/dL 110 (H)   WBC Latest Ref Range: 4.0 - 11.0 10e9/L 10.4   Hemoglobin Latest Ref Range: 13.3 - 17.7 g/dL 9.9 (L)   Hematocrit Latest Ref Range: 40.0 - 53.0 % 29.5 (L)   Platelet Count Latest Ref Range: 150 - 450 10e9/L 167   RBC Count Latest Ref Range: 4.4 - 5.9 10e12/L 3.36 (L)   MCV Latest Ref Range: 78 - 100 fl 88   MCH Latest Ref Range: 26.5 - 33.0 pg 29.5   MCHC Latest Ref Range: 31.5 - 36.5 g/dL 33.6   RDW Latest Ref Range: 10.0 - 15.0 % 14.2   Diff Method Unknown Automated Method   % Neutrophils Latest Units: % 84.1   % Lymphocytes Latest Units: % 8.6   % Monocytes Latest Units: % 6.3   % Eosinophils Latest Units: % 0.1   % Basophils Latest Units: % 0.2   % Immature Granulocytes Latest Units: % 0.7   Nucleated RBCs Latest Ref Range: 0 /100 0   Absolute Neutrophil Latest Ref Range: 1.6 - 8.3 10e9/L 8.7 (H)   Absolute Lymphocytes Latest Ref Range: 0.8 - 5.3 10e9/L 0.9   Absolute Monocytes Latest Ref Range: 0.0 - 1.3 10e9/L 0.7   Absolute Eosinophils Latest Ref Range: 0.0 - 0.7 10e9/L 0.0   Absolute Basophils Latest Ref Range: 0.0 - 0.2 10e9/L 0.0   Abs Immature Granulocytes Latest Ref Range: 0 - 0.4 10e9/L 0.1   Absolute Nucleated RBC Unknown 0.0   BLOOD CULTURE Unknown Rpt     Assessment and plan:  King Gonsalo Bruno is a 71 year old male with 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 He is  here symptomatic after his chemo.     Dehydration/orthostatic hypotension/acute on chronic renal failure (baseline ~1.3) with decreased PO intake and nausea  -IL NS today and increase PO fluids to 3L a day  -stop HCTZ  -instructed he could use zofran prn (he thought he couldn't). He also has ativan and compazine.     Constipation. Advised againt dulcolax  -miralax BID prn  -colace BID prn    Low grade temp without localizing sx. Not neutropenic.   -Blood cx and UA/UC pending    Esophageal diverticulum  -He was seeing ENT at [facility] and would like to transfer his care here    Urothelial cancer, s/p cycle 1 cis/gem. He reports the mass is getting smaller  -FU Friday to see Yessica and possibly start a new cycle.       Over 50% of 35 min visit was spent counseling and coordinating care    Janene Alves PA-C

## 2017-10-03 NOTE — LETTER
10/3/2017       RE: King Gonsalo Bruno  [address]  [facility] [id number]     Dear Colleague,    Thank you for referring your patient, King Gonsalo Bruno, to the [facility]. Please see a copy of my visit note below.    Heme/onc visit note  October 3, 2017    Reason for visit: fever, nausea, dehydration    HPI: King Gonsalo Bruno is a 71 year old male with 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     Interval history:  hasn't been feeling well. He has been getting about 1 L fluids a day. He is feeling dehydrated with dry mouth and dizzy with position change. He has mild nausea and has been taking compazine prn with some relief. No emesis. He has also heaven constipated. Normally he is regular every day and now it is 5-6 days. He tried dulcolax and had diarrhea. Now he has been taking miralax once a day which has been working but small hard stools. No ab pain. Per the patient, the urethral mass has gotten smaller.     He had a temp of 100.4 today. In clinic it was 99. He denies purulent nasal drainage, cough, or change in urinary function (used SPC with some urethral leaking).     Despite all this he was also continuing to play golf and eat hot dogs after.     He would like to see an ENT here for his chronic nasal congestion and a diverticulum (Zenkers?). [facility] and would like to transfer his care here.     Past medical history:  Patient Active Problem List   Diagnosis     Urethral cancer (H)     Nausea     Dehydration       Medications:    Current Outpatient Prescriptions on File Prior to Visit:  LORazepam (ATIVAN) 0.5 MG tablet Take 1 tablet (0.5 mg) by mouth every 4 hours as needed (Anxiety, Nausea/Vomiting or Sleep)   prochlorperazine (COMPAZINE) 10 MG tablet Take 0.5 tablets (5 mg) by mouth every 6 hours as needed (Nausea/Vomiting)   dexamethasone (DECADRON)  4 MG tablet Take 2 tablets (8 mg) by mouth daily Take for 3 days, starting the day after cisplatin (Day 2 and Day 9).   ondansetron (ZOFRAN) 8 MG tablet Take 1 tablet (8 mg) by mouth every 8 hours as needed for nausea (Patient not taking: Reported on 9/29/2017)   amLODIPine (NORVASC) 5 MG tablet Take 1 tablet (5 mg) by mouth daily (Patient taking differently: Take 10 mg by mouth daily )   hydrochlorothiazide (HYDRODIURIL) 25 MG tablet Take 1 tablet (25 mg) by mouth every morning   HYDROXYZINE HCL PO Take 5 mg by mouth At Bedtime    omeprazole (PRILOSEC OTC) 20 MG tablet Take 20 mg by mouth 2 times daily   fluticasone (FLONASE) 50 MCG/ACT spray Spray 1 spray into both nostrils every morning      No current facility-administered medications on file prior to visit.     Allergy:     Allergies   Allergen Reactions     Lisinopril Swelling       Physical exam  /70 (BP Location: Right arm, Cuff Size: Adult Regular)  Pulse 98  Temp 97.8  F (36.6  C) (Oral)  Resp 16  Wt 73.6 kg (162 lb 4.8 oz)  SpO2 98%  BMI 20.29 kg/m2  Wt Readings from Last 4 Encounters:   09/29/17 70.6 kg (155 lb 11.2 oz)   09/25/17 72.8 kg (160 lb 9.6 oz)   09/22/17 71.4 kg (157 lb 6.4 oz)   09/15/17 73 kg (160 lb 14.4 oz)     General: No acute distress  HEENT: Sclera anicteric. Oral mucosa dry.  No mucositis or thrush  Lymph: No lymphadenopathy in neck  Heart: Regular, rate, and rhythm  Lungs: Clear to ascultation bilaterally  Abdomen: Positive bowel sounds. Soft, non-distended, non-tender.  : SPC site is clean, dry and non-tender.   Extremities: no lower extremity edema  Neuro: Cranial nerves grossly intact  Rash: none    Labs:  Results for KING AILYN CHAVEZ (MRN [id number]) as of 10/3/2017 16:50   Ref. Range 10/3/2017 15:44   Sodium Latest Ref Range: 133 - 144 mmol/L 131 (L)   Potassium Latest Ref Range: 3.4 - 5.3 mmol/L 3.8   Chloride Latest Ref Range: 94 - 109 mmol/L 96   Carbon Dioxide Latest Ref Range: 20 - 32 mmol/L 28   Urea  Nitrogen Latest Ref Range: 7 - 30 mg/dL 13   Creatinine Latest Ref Range: 0.66 - 1.25 mg/dL 1.42 (H)   GFR Estimate Latest Ref Range: >60 mL/min/1.7m2 49 (L)   GFR Estimate If Black Latest Ref Range: >60 mL/min/1.7m2 60 (L)   Calcium Latest Ref Range: 8.5 - 10.1 mg/dL 8.4 (L)   Anion Gap Latest Ref Range: 3 - 14 mmol/L 8   Magnesium Latest Ref Range: 1.6 - 2.3 mg/dL 1.6   Albumin Latest Ref Range: 3.4 - 5.0 g/dL 3.2 (L)   Protein Total Latest Ref Range: 6.8 - 8.8 g/dL 7.2   Bilirubin Total Latest Ref Range: 0.2 - 1.3 mg/dL 0.4   Alkaline Phosphatase Latest Ref Range: 40 - 150 U/L 70   ALT Latest Ref Range: 0 - 70 U/L 21   AST Latest Ref Range: 0 - 45 U/L 26   Glucose Latest Ref Range: 70 - 99 mg/dL 110 (H)   WBC Latest Ref Range: 4.0 - 11.0 10e9/L 10.4   Hemoglobin Latest Ref Range: 13.3 - 17.7 g/dL 9.9 (L)   Hematocrit Latest Ref Range: 40.0 - 53.0 % 29.5 (L)   Platelet Count Latest Ref Range: 150 - 450 10e9/L 167   RBC Count Latest Ref Range: 4.4 - 5.9 10e12/L 3.36 (L)   MCV Latest Ref Range: 78 - 100 fl 88   MCH Latest Ref Range: 26.5 - 33.0 pg 29.5   MCHC Latest Ref Range: 31.5 - 36.5 g/dL 33.6   RDW Latest Ref Range: 10.0 - 15.0 % 14.2   Diff Method Unknown Automated Method   % Neutrophils Latest Units: % 84.1   % Lymphocytes Latest Units: % 8.6   % Monocytes Latest Units: % 6.3   % Eosinophils Latest Units: % 0.1   % Basophils Latest Units: % 0.2   % Immature Granulocytes Latest Units: % 0.7   Nucleated RBCs Latest Ref Range: 0 /100 0   Absolute Neutrophil Latest Ref Range: 1.6 - 8.3 10e9/L 8.7 (H)   Absolute Lymphocytes Latest Ref Range: 0.8 - 5.3 10e9/L 0.9   Absolute Monocytes Latest Ref Range: 0.0 - 1.3 10e9/L 0.7   Absolute Eosinophils Latest Ref Range: 0.0 - 0.7 10e9/L 0.0   Absolute Basophils Latest Ref Range: 0.0 - 0.2 10e9/L 0.0   Abs Immature Granulocytes Latest Ref Range: 0 - 0.4 10e9/L 0.1   Absolute Nucleated RBC Unknown 0.0   BLOOD CULTURE Unknown Rpt     Assessment and plan:  King Gonsalo Bruno is  a 71 year old male with 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 He is here symptomatic after his chemo.     Dehydration/orthostatic hypotension/acute on chronic renal failure (baseline ~1.3) with decreased PO intake and nausea  -IL NS today and increase PO fluids to 3L a day  -stop HCTZ  -instructed he could use zofran prn (he thought he couldn't). He also has ativan and compazine.     Constipation. Advised againt dulcolax  -miralax BID prn  -colace BID prn    Low grade temp without localizing sx. Not neutropenic.   -Blood cx and UA/UC pending    Esophageal diverticulum  -He was seeing ENT at [facility] and would like to transfer his care here    Urothelial cancer, s/p cycle 1 cis/gem. He reports the mass is getting smaller  -FU Friday to see Yessica and possibly start a new cycle.       Over 50% of ** min visit was spent counseling and coordinating care    Janene Alves PA-C    Again, thank you for allowing me to participate in the care of your patient.      Sincerely,    Janene Alves PA-C

## 2017-10-03 NOTE — PROGRESS NOTES
Heme/onc visit note  October 3, 2017    Reason for visit: fever, nausea, dehydration    HPI: King Gonsalo Bruno is a 71 year old male with 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     Interval history:  hasn't been feeling well. He has been getting about 1 L fluids a day. He is feeling dehydrated with dry mouth and dizzy with position change. He has mild nausea and has been taking compazine prn with some relief. No emesis. He has also heaven constipated. Normally he is regular every day and now it is 5-6 days. He tried dulcolax and had diarrhea. Now he has been taking miralax once a day which has been working but small hard stools. No ab pain. Per the patient, the urethral mass has gotten smaller.     He had a temp of 100.4 today. In clinic it was 99. He denies purulent nasal drainage, cough, or change in urinary function (used SPC with some urethral leaking).     Despite all this he was also continuing to play golf and eat hot dogs after.     He would like to see an ENT here for his chronic nasal congestion and a diverticulum (Zenkers?). [facility] was [facility] and would like to transfer his care here.     Past medical history:  Patient Active Problem List   Diagnosis     Urethral cancer (H)     Nausea     Dehydration       Medications:    Current Outpatient Prescriptions on File Prior to Visit:  LORazepam (ATIVAN) 0.5 MG tablet Take 1 tablet (0.5 mg) by mouth every 4 hours as needed (Anxiety, Nausea/Vomiting or Sleep)   prochlorperazine (COMPAZINE) 10 MG tablet Take 0.5 tablets (5 mg) by mouth every 6 hours as needed (Nausea/Vomiting)   dexamethasone (DECADRON) 4 MG tablet Take 2 tablets (8 mg) by mouth daily Take for 3 days, starting the day after cisplatin (Day 2 and Day 9).   ondansetron (ZOFRAN) 8 MG tablet Take 1 tablet (8 mg) by mouth every 8 hours as needed for nausea (Patient not taking: Reported on  9/29/2017)   amLODIPine (NORVASC) 5 MG tablet Take 1 tablet (5 mg) by mouth daily (Patient taking differently: Take 10 mg by mouth daily )   hydrochlorothiazide (HYDRODIURIL) 25 MG tablet Take 1 tablet (25 mg) by mouth every morning   HYDROXYZINE HCL PO Take 5 mg by mouth At Bedtime    omeprazole (PRILOSEC OTC) 20 MG tablet Take 20 mg by mouth 2 times daily   fluticasone (FLONASE) 50 MCG/ACT spray Spray 1 spray into both nostrils every morning      No current facility-administered medications on file prior to visit.     Allergy:     Allergies   Allergen Reactions     Lisinopril Swelling       Physical exam  /70 (BP Location: Right arm, Cuff Size: Adult Regular)  Pulse 98  Temp 97.8  F (36.6  C) (Oral)  Resp 16  Wt 73.6 kg (162 lb 4.8 oz)  SpO2 98%  BMI 20.29 kg/m2  Wt Readings from Last 4 Encounters:   09/29/17 70.6 kg (155 lb 11.2 oz)   09/25/17 72.8 kg (160 lb 9.6 oz)   09/22/17 71.4 kg (157 lb 6.4 oz)   09/15/17 73 kg (160 lb 14.4 oz)     General: No acute distress  HEENT: Sclera anicteric. Oral mucosa dry.  No mucositis or thrush  Lymph: No lymphadenopathy in neck  Heart: Regular, rate, and rhythm  Lungs: Clear to ascultation bilaterally  Abdomen: Positive bowel sounds. Soft, non-distended, non-tender.  : SPC site is clean, dry and non-tender.   Extremities: no lower extremity edema  Neuro: Cranial nerves grossly intact  Rash: none    Labs:  Results for KING AILYN CHAVEZ (MRN [id number]) as of 10/3/2017 16:50   Ref. Range 10/3/2017 15:44   Sodium Latest Ref Range: 133 - 144 mmol/L 131 (L)   Potassium Latest Ref Range: 3.4 - 5.3 mmol/L 3.8   Chloride Latest Ref Range: 94 - 109 mmol/L 96   Carbon Dioxide Latest Ref Range: 20 - 32 mmol/L 28   Urea Nitrogen Latest Ref Range: 7 - 30 mg/dL 13   Creatinine Latest Ref Range: 0.66 - 1.25 mg/dL 1.42 (H)   GFR Estimate Latest Ref Range: >60 mL/min/1.7m2 49 (L)   GFR Estimate If Black Latest Ref Range: >60 mL/min/1.7m2 60 (L)   Calcium Latest Ref Range: 8.5 -  10.1 mg/dL 8.4 (L)   Anion Gap Latest Ref Range: 3 - 14 mmol/L 8   Magnesium Latest Ref Range: 1.6 - 2.3 mg/dL 1.6   Albumin Latest Ref Range: 3.4 - 5.0 g/dL 3.2 (L)   Protein Total Latest Ref Range: 6.8 - 8.8 g/dL 7.2   Bilirubin Total Latest Ref Range: 0.2 - 1.3 mg/dL 0.4   Alkaline Phosphatase Latest Ref Range: 40 - 150 U/L 70   ALT Latest Ref Range: 0 - 70 U/L 21   AST Latest Ref Range: 0 - 45 U/L 26   Glucose Latest Ref Range: 70 - 99 mg/dL 110 (H)   WBC Latest Ref Range: 4.0 - 11.0 10e9/L 10.4   Hemoglobin Latest Ref Range: 13.3 - 17.7 g/dL 9.9 (L)   Hematocrit Latest Ref Range: 40.0 - 53.0 % 29.5 (L)   Platelet Count Latest Ref Range: 150 - 450 10e9/L 167   RBC Count Latest Ref Range: 4.4 - 5.9 10e12/L 3.36 (L)   MCV Latest Ref Range: 78 - 100 fl 88   MCH Latest Ref Range: 26.5 - 33.0 pg 29.5   MCHC Latest Ref Range: 31.5 - 36.5 g/dL 33.6   RDW Latest Ref Range: 10.0 - 15.0 % 14.2   Diff Method Unknown Automated Method   % Neutrophils Latest Units: % 84.1   % Lymphocytes Latest Units: % 8.6   % Monocytes Latest Units: % 6.3   % Eosinophils Latest Units: % 0.1   % Basophils Latest Units: % 0.2   % Immature Granulocytes Latest Units: % 0.7   Nucleated RBCs Latest Ref Range: 0 /100 0   Absolute Neutrophil Latest Ref Range: 1.6 - 8.3 10e9/L 8.7 (H)   Absolute Lymphocytes Latest Ref Range: 0.8 - 5.3 10e9/L 0.9   Absolute Monocytes Latest Ref Range: 0.0 - 1.3 10e9/L 0.7   Absolute Eosinophils Latest Ref Range: 0.0 - 0.7 10e9/L 0.0   Absolute Basophils Latest Ref Range: 0.0 - 0.2 10e9/L 0.0   Abs Immature Granulocytes Latest Ref Range: 0 - 0.4 10e9/L 0.1   Absolute Nucleated RBC Unknown 0.0   BLOOD CULTURE Unknown Rpt     Assessment and plan:  King Gonsalo Bruno is a 71 year old male with 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 He is here symptomatic after his chemo.     Dehydration/orthostatic hypotension/acute on chronic  renal failure (baseline ~1.3) with decreased PO intake and nausea  -IL NS today and increase PO fluids to 3L a day  -stop HCTZ  -instructed he could use zofran prn (he thought he couldn't). He also has ativan and compazine.     Constipation. Advised againt dulcolax  -miralax BID prn  -colace BID prn    Low grade temp without localizing sx. Not neutropenic.   -Blood cx and UA/UC pending    Esophageal diverticulum  -He was seeing ENT at [facility] and would like to transfer his care here    Urothelial cancer, s/p cycle 1 cis/gem. He reports the mass is getting smaller  -FU Friday to see Yessica and possibly start a new cycle.       Over 50% of 35 min visit was spent counseling and coordinating care    Janene Alves PA-C

## 2017-10-03 NOTE — NURSING NOTE
"Oncology Rooming Note    October 3, 2017 3:52 PM   King Gonsalo Bruno is a 71 year old male who presents for:    Chief Complaint   Patient presents with     Blood Draw     Labs collected from PIV placed by RN. Saline flushed. Vitals taken and pt checked in for appt. Blood cultures drawn bilat arms; urine cup sent with pt for collection.     Oncology Clinic Visit     Bladder CA     Initial Vitals: /70 (BP Location: Right arm, Cuff Size: Adult Regular)  Pulse 98  Temp 97.8  F (36.6  C) (Oral)  Resp 16  Wt 73.6 kg (162 lb 4.8 oz)  SpO2 98%  BMI 20.29 kg/m2 Estimated body mass index is 20.29 kg/(m^2) as calculated from the following:    Height as of 9/29/17: 1.905 m (6' 3\").    Weight as of this encounter: 73.6 kg (162 lb 4.8 oz). Body surface area is 1.97 meters squared.  No Pain (0) Comment: Data Unavailable   No LMP for male patient.  Allergies reviewed: Yes  Medications reviewed: Yes    Medications: Medication refills not needed today.  Pharmacy name entered into Your.MD: [id number] [address] AT [address] [place] [address]    Clinical concerns:      6 minutes for nursing intake (face to face time)     Ansley Boston CMA              "

## 2017-10-03 NOTE — PROGRESS NOTES
"Called pt and [facility] to call back to follow up how he is doing.     Pt returned call to RN. Pt states he feels dehydrated. His throat feels dry and is having difficulty swallowing at times. Has occasional lightheadedness with standing.  Reports he is drinking four 20oz bottles of water daily. Having nausea \"every day.\" Denies any vomiting. Is taking compazine every 6 hours and ativan at bedtime.  States he had a good bowel movement this morning but has been struggling with constipation. Has been taking miralax for the past 2 days.  Requested pt takes his temp and it was 100.4. Denies any chills. Pt states the earliest he could come to the clinic today is 4pm.    Janene Alves PA-C notified. Per Janene, VO given for pt to have labs (CBC, CMP, Mg), 1 liter NS, and see her today for follow up.    Called pt and scheduled him at 4pm for labs and 4:30 IVFs. Janene will see him in infusion.  Pt verbalized understanding of plan.  "

## 2017-10-03 NOTE — MR AVS SNAPSHOT
After Visit Summary   10/3/2017    King Gonsalo Bruno    MRN: [id number]           Patient Information     Date Of Birth          1945        Visit Information        Provider Department      10/3/2017 4:30 PM  14 ATC;  ONCOLOGY INFUSION [facility]        Today's Diagnoses     Urethral cancer (H)    -  1    Nausea        Fever, unspecified fever cause        Dehydration          Care Instructions    Contact Numbers    [facility] Main Line: [phone]  [facility] Triage:  [phone]    Call triage with chills and/or temperature greater than or equal to 100.5, uncontrolled nausea/vomiting, diarrhea, constipation, dizziness, shortness of breath, chest pain, bleeding, unexplained bruising, or any new/concerning symptoms, questions/concerns.     If you are having any concerning symptoms or wish to speak to a provider before your next infusion visit, please call your care coordinator or triage to notify them so we can adequately serve you.       After Hours: [phone]    If after hours, weekends, or holidays, call main hospital  and ask for Oncology doctor on call.         October 2017 Sunday Monday Tuesday Wednesday Thursday Friday Saturday   1     2     3     UMP RETURN    3:45 PM   (60 min.)   Janene Alves [facility]-C   [facility]    4:00 PM   (15 min.)    [facility] DRAW   [facility] Lab Draw     [facility] ONC INFUSION 120    4:30 PM   (120 min.)    ONCOLOGY INFUSION   [facility] 4     5     6     [facility]    7:15 AM   (15 min.)    [facility] DRAW   [facility] Lab Draw     UMP RETURN    7:35 AM   (50 min.)   Yessica Ovalles, APRN CNP   [facility]     UMP ONC INFUSION 360    9:00 AM   (360 min.)    ONCOLOGY INFUSION   [facility]     UMP RETURN   12:45 PM   (30 min.)   Nurse,  Prostate Cancer Ctr   [facility] Pro[facility] and  Urologic Cancers 7       8     9     10     11     12     13     [facility]    9:00 AM   (15 min.)    [facility]   [facility]     UMP ONC INFUSION 360   10:00 AM   (360 min.)   UC ONCOLOGY INFUSION   [facility] 14       15     16     17     18     19     20     21       22     23     24     25     UMP CYSTOSCOPY    9:05 AM   (20 min.)   Muriel Haddad MD   [facility]state [facility] Urologic Cancers 26     27     [facility]RAW    9:00 AM   (15 min.)    [facility] DRAW   [facility] Lab Draw     UMP RETURN    9:15 AM   (50 min.)   Josy Powell PA-C   [facility]     UMP ONC INFUSION 360   10:00 AM   (360 min.)    ONCOLOGY INFUSION   [facility] 28       29     30     31 November 2017 Sunday Monday Tuesday Wednesday Thursday Friday Saturday                  1     2     3     [facility] DRAW   10:00 AM   (15 min.)    [facility] DRAW   [facility] Lab Draw     UMP ONC INFUSION 360   10:30 AM   (360 min.)    ONCOLOGY INFUSION   [facility] 4       5     6     7     8     UMP RETURN    1:30 PM   (30 min.)   Steve Arceo MD   [facility] 9     10     11       12     13     14     15     16     17     18       19     20     21     22     23     24     25  Happy Birthday!       26     27     28     29     30                            Lab Results:  Recent Results (from the past 12 hour(s))   *CBC with platelets differential    Collection Time: 10/03/17  3:44 PM   Result Value Ref Range    WBC 10.4 4.0 - 11.0 10e9/L    RBC Count 3.36 (L) 4.4 - 5.9 10e12/L    Hemoglobin 9.9 (L) 13.3 - 17.7 g/dL    Hematocrit 29.5 (L) 40.0 - 53.0 %    MCV 88 78 - 100 fl    MCH 29.5 26.5 - 33.0 pg    MCHC 33.6 31.5 - 36.5 g/dL    RDW 14.2 10.0 - 15.0 %    Platelet Count 167 150 - 450 10e9/L    Diff Method Automated  Method     % Neutrophils 84.1 %    % Lymphocytes 8.6 %    % Monocytes 6.3 %    % Eosinophils 0.1 %    % Basophils 0.2 %    % Immature Granulocytes 0.7 %    Nucleated RBCs 0 0 /100    Absolute Neutrophil 8.7 (H) 1.6 - 8.3 10e9/L    Absolute Lymphocytes 0.9 0.8 - 5.3 10e9/L    Absolute Monocytes 0.7 0.0 - 1.3 10e9/L    Absolute Eosinophils 0.0 0.0 - 0.7 10e9/L    Absolute Basophils 0.0 0.0 - 0.2 10e9/L    Abs Immature Granulocytes 0.1 0 - 0.4 10e9/L    Absolute Nucleated RBC 0.0    Comprehensive metabolic panel    Collection Time: 10/03/17  3:44 PM   Result Value Ref Range    Sodium 131 (L) 133 - 144 mmol/L    Potassium 3.8 3.4 - 5.3 mmol/L    Chloride 96 94 - 109 mmol/L    Carbon Dioxide 28 20 - 32 mmol/L    Anion Gap 8 3 - 14 mmol/L    Glucose 110 (H) 70 - 99 mg/dL    Urea Nitrogen 13 7 - 30 mg/dL    Creatinine 1.42 (H) 0.66 - 1.25 mg/dL    GFR Estimate 49 (L) >60 mL/min/1.7m2    GFR Estimate If Black 60 (L) >60 mL/min/1.7m2    Calcium 8.4 (L) 8.5 - 10.1 mg/dL    Bilirubin Total 0.4 0.2 - 1.3 mg/dL    Albumin 3.2 (L) 3.4 - 5.0 g/dL    Protein Total 7.2 6.8 - 8.8 g/dL    Alkaline Phosphatase 70 40 - 150 U/L    ALT 21 0 - 70 U/L    AST 26 0 - 45 U/L   Magnesium    Collection Time: 10/03/17  3:44 PM   Result Value Ref Range    Magnesium 1.6 1.6 - 2.3 mg/dL               Follow-ups after your visit        Your next 10 appointments already scheduled     Oct 06, 2017  7:15 AM CDT   [facility] with  [facility]RAW   [facility] ([facility])    [address] [address] [phone]            Oct 06, 2017  7:50 AM CDT   (Arrive by 7:35 AM)   Return Visit with ISAURA Roach CNP   [facility] ([facility])    [address] [facility]  2nd Floor  [facility] [address] [phone]            Oct 06, 2017  9:00 AM CDT   Infusion 360 with UC ONCOLOGY INFUSION, UC 24 ATC   [facility]  Cancer Clinic ([facility])    [address] [place] [address]   [phone]            Oct 06, 2017  1:00 PM CDT   (Arrive by 12:45 PM)   Return Visit with  Prostate Cancer Ctr Nurse   [facility] Urologic C[facility] ([facility])    [address] [place] [address]   [phone]            Oct 13, 2017  9:00 AM CDT   [facility] Draw with  [facility] DRAW   [facility] ([facility])    [address] [place] [address]   [phone]            Oct 13, 2017 10:00 AM CDT   Infusion 360 with  ONCOLOGY INFUSION,  12 ATC   [facility] ([facility])    [address] [place] [address]   [phone]            Oct 25, 2017  9:20 AM CDT   (Arrive by 9:05 AM)   Cystoscopy with Muriel Haddad MD   [facility]state and Urologic Cancers ([facility])    [address] [place] [address]   [phone]            Oct 27, 2017  9:00 AM CDT   [facility] Draw with  [facility] DRAW   [facility] ([facility])    [address] [place] [address]   [phone]              Who to contact     If you have questions or need follow up information about today's clinic visit or your schedule please contact [facility] directly at [phone].  Normal or non-critical lab and imaging results will be communicated to you by MyChart, letter or phone within 4 business days after the clinic has received the results. If you do not hear from us within 7 days, please contact the clinic through MyChart or phone. If you have a critical or abnormal lab result, we will notify you by phone as soon as possible.  Submit  "refill requests through [employer] or call your pharmacy and they will forward the refill request to us. Please allow 3 business days for your refill to be completed.          Additional Information About Your Visit        [employer] Information     [employer] lets you send messages to your doctor, view your test results, renew your prescriptions, schedule appointments and more. To sign up, go to [email or web address] . Click on \"Log in\" on the left side of the screen, which will take you to the Welcome page. Then click on \"Sign up Now\" on the right side of the page.     You will be asked to enter the access code listed below, as well as some personal information. Please follow the directions to create your username and password.     Your access code is: [email or web address]  Expires: 10/31/2017  1:07 PM     Your access code will  in 90 days. If you need help or a new code, please call your [place] clinic or [phone].        Care EveryWhere ID     This is your Care EveryWhere ID. This could be used by other organizations to access your [place] medical records  [id number]        Your Vitals Were     Pulse Temperature                107 99.4  F (37.4  C) (Oral)           Blood Pressure from Last 3 Encounters:   10/03/17 115/79   10/03/17 125/70   17 139/77    Weight from Last 3 Encounters:   10/03/17 73.6 kg (162 lb 4.8 oz)   17 70.6 kg (155 lb 11.2 oz)   17 72.8 kg (160 lb 9.6 oz)              We Performed the Following     *CBC with platelets differential     Blood culture     Blood culture     Comprehensive metabolic panel     Magnesium     Routine UA with micro reflex to culture          Today's Medication Changes          These changes are accurate as of: 10/3/17  6:09 PM.  If you have any questions, ask your nurse or doctor.               Start taking these medicines.        Dose/Directions    docusate sodium 100 MG capsule   Commonly known as:  COLACE   Used for:  Slow transit constipation "   Started by:  Janene Alves PA-C        Dose:  100 mg   Take 1 capsule (100 mg) by mouth 2 times daily as needed for constipation   Quantity:  60 capsule   Refills:  0         Stop taking these medicines if you haven't already. Please contact your care team if you have questions.     hydrochlorothiazide 25 MG tablet   Commonly known as:  HYDRODIURIL   Stopped by:  Janene Alves PA-C                Where to get your medicines      These medications were sent to [place], [place] - [address]  [address] [facility] [address], [facility] [address]    Hours:  TRANSPLANT PHONE NUMBER [phone] Phone:  [phone]     amLODIPine 5 MG tablet    docusate sodium 100 MG capsule                Primary Care Provider Office Phone # Fax #    Joan Gonzales MARLENY [place] [phone]       [facility] [address]  [facility] [address]        Equal Access to Services     JENELLE RED AH: Hadii светлана ku hadasho Soomaali, waaxda luqadaha, qaybta kaalmada adeegyada, waxay nayin haylidian gab rasmussen. So [facility] [phone].    ATENCIÓN: Si karenla joya, tiene a washburn disposición servicios gratuitos de asistencia lingüística. Llame al [phone].    We comply with applicable federal civil rights laws and [place] laws. We do not discriminate on the basis of race, color, national origin, age, disability, sex, sexual orientation, or gender identity.            Thank you!     Thank you for choosing [facility]  for your care. Our goal is always to provide you with excellent care. Hearing back from our patients is one way we can continue to improve our services. Please take a few minutes to complete the written survey that you may receive in the mail after your visit with us. Thank you!             Your Updated Medication List - Protect others around you: Learn how to safely use, store and throw away your medicines at www.disposemymeds.org.           This list is accurate as of: 10/3/17  6:09 PM.  Always use your most recent med list.                   Brand Name Dispense Instructions for use Diagnosis    amLODIPine 5 MG tablet    NORVASC    60 tablet    Take 2 tablets (10 mg) by mouth daily    Benign essential hypertension       dexamethasone 4 MG tablet    DECADRON    12 tablet    Take 2 tablets (8 mg) by mouth daily Take for 3 days, starting the day after cisplatin (Day 2 and Day 9).    Urethral cancer (H)       docusate sodium 100 MG capsule    COLACE    60 capsule    Take 1 capsule (100 mg) by mouth 2 times daily as needed for constipation    Slow transit constipation       fluticasone 50 MCG/ACT spray    FLONASE     Spray 1 spray into both nostrils every morning        HYDROXYZINE HCL PO      Take 5 mg by mouth At Bedtime        LORazepam 0.5 MG tablet    ATIVAN    30 tablet    Take 1 tablet (0.5 mg) by mouth every 4 hours as needed (Anxiety, Nausea/Vomiting or Sleep)    Urethral cancer (H)       ondansetron 8 MG tablet    ZOFRAN    20 tablet    Take 1 tablet (8 mg) by mouth every 8 hours as needed for nausea    Urothelial cancer (H)       priLOSEC OTC 20 MG tablet   Generic drug:  omeprazole      Take 20 mg by mouth 2 times daily        prochlorperazine 10 MG tablet    COMPAZINE    30 tablet    Take 0.5 tablets (5 mg) by mouth every 6 hours as needed (Nausea/Vomiting)    Urethral cancer (H)

## 2017-10-03 NOTE — PATIENT INSTRUCTIONS
Contact Numbers    [facility] Main Line: [phone]  [facility] Triage:  [phone]    Call triage with chills and/or temperature greater than or equal to 100.5, uncontrolled nausea/vomiting, diarrhea, constipation, dizziness, shortness of breath, chest pain, bleeding, unexplained bruising, or any new/concerning symptoms, questions/concerns.     If you are having any concerning symptoms or wish to speak to a provider before your next infusion visit, please call your care coordinator or triage to notify them so we can adequately serve you.       After Hours: [phone]    If after hours, weekends, or holidays, call main hospital  and ask for Oncology doctor on call.         October 2017 Sunday Monday Tuesday Wednesday Thursday Friday Saturday   1     2     3     UMP RETURN    3:45 PM   (60 min.)   Janene Alves PA-C   [facility]     [facility] DRAW    4:00 PM   (15 min.)    [facility] LAB DRAW   [facility] Lab Draw     UMP ONC INFUSION 120    4:30 PM   (120 min.)    ONCOLOGY INFUSION   [facility] 4     5     6     UMP [facility] DRAW    7:15 AM   (15 min.)    [facility] LAB DRAW   [facility] Lab Draw     UMP RETURN    7:35 AM   (50 min.)   Yessica Ovalles APRN CNP   [facility]     UMP ONC INFUSION 360    9:00 AM   (360 min.)    ONCOLOGY INFUSION   [facility]     UMP RETURN   12:45 PM   (30 min.)   Nurse,  Prostate Cancer [facility]state and Urologic Cancers 7       8     9     10     11     12     13     UMP [facility] DRAW    9:00 AM   (15 min.)    [facility] LAB DRAW   [facility] Lab Draw     UMP ONC INFUSION 360   10:00 AM   (360 min.)    ONCOLOGY INFUSION   [facility] 14       15     16     17     18     19     20     21       22     23     24     25     UMP CYSTOSCOPY    9:05 AM   (20 min.)   Muriel Haddad MD   [facility] for  Prostate and Urologic Cancers 26     27     P [facility] DRAW    9:00 AM   (15 min.)    [facility] LAB DRAW   [facility] Lab Draw     UMP RETURN    9:15 AM   (50 min.)   Josy Powell PA-C   [facility]     UMP ONC INFUSION 360   10:00 AM   (360 min.)   UC ONCOLOGY INFUSION   [facility] 28       29     30 31 November 2017 Sunday Monday Tuesday Wednesday Thursday Friday Saturday                  1     2     3     P [facility] DRAW   10:00 AM   (15 min.)    [facility] LAB DRAW   [facility] Lab Draw     UMP ONC INFUSION 360   10:30 AM   (360 min.)    ONCOLOGY INFUSION   [facility] 4       5     6     7     8     UMP RETURN    1:30 PM   (30 min.)   Steve Arceo MD   [facility] 9     10     11       12     13     14     15     16     17     18       19     20     21     22     23     24     25  Happy Birthday!       26     27     28     29     30                            Lab Results:  Recent Results (from the past 12 hour(s))   *CBC with platelets differential    Collection Time: 10/03/17  3:44 PM   Result Value Ref Range    WBC 10.4 4.0 - 11.0 10e9/L    RBC Count 3.36 (L) 4.4 - 5.9 10e12/L    Hemoglobin 9.9 (L) 13.3 - 17.7 g/dL    Hematocrit 29.5 (L) 40.0 - 53.0 %    MCV 88 78 - 100 fl    MCH 29.5 26.5 - 33.0 pg    MCHC 33.6 31.5 - 36.5 g/dL    RDW 14.2 10.0 - 15.0 %    Platelet Count 167 150 - 450 10e9/L    Diff Method Automated Method     % Neutrophils 84.1 %    % Lymphocytes 8.6 %    % Monocytes 6.3 %    % Eosinophils 0.1 %    % Basophils 0.2 %    % Immature Granulocytes 0.7 %    Nucleated RBCs 0 0 /100    Absolute Neutrophil 8.7 (H) 1.6 - 8.3 10e9/L    Absolute Lymphocytes 0.9 0.8 - 5.3 10e9/L    Absolute Monocytes 0.7 0.0 - 1.3 10e9/L    Absolute Eosinophils 0.0 0.0 - 0.7 10e9/L    Absolute Basophils 0.0 0.0 - 0.2 10e9/L    Abs Immature Granulocytes 0.1 0 - 0.4  10e9/L    Absolute Nucleated RBC 0.0    Comprehensive metabolic panel    Collection Time: 10/03/17  3:44 PM   Result Value Ref Range    Sodium 131 (L) 133 - 144 mmol/L    Potassium 3.8 3.4 - 5.3 mmol/L    Chloride 96 94 - 109 mmol/L    Carbon Dioxide 28 20 - 32 mmol/L    Anion Gap 8 3 - 14 mmol/L    Glucose 110 (H) 70 - 99 mg/dL    Urea Nitrogen 13 7 - 30 mg/dL    Creatinine 1.42 (H) 0.66 - 1.25 mg/dL    GFR Estimate 49 (L) >60 mL/min/1.7m2    GFR Estimate If Black 60 (L) >60 mL/min/1.7m2    Calcium 8.4 (L) 8.5 - 10.1 mg/dL    Bilirubin Total 0.4 0.2 - 1.3 mg/dL    Albumin 3.2 (L) 3.4 - 5.0 g/dL    Protein Total 7.2 6.8 - 8.8 g/dL    Alkaline Phosphatase 70 40 - 150 U/L    ALT 21 0 - 70 U/L    AST 26 0 - 45 U/L   Magnesium    Collection Time: 10/03/17  3:44 PM   Result Value Ref Range    Magnesium 1.6 1.6 - 2.3 mg/dL

## 2017-10-03 NOTE — MR AVS SNAPSHOT
After Visit Summary   10/3/2017    King Gonsalo Bruno    MRN: [id number]           Patient Information     Date Of Birth          1945        Visit Information        Provider Department      10/3/2017 4:00 PM Janene Alves PA-C [facility]        Today's Diagnoses     Slow transit constipation    -  1    Zenker's diverticulum        Benign essential hypertension          Care Instructions    's instructions:  1) Drink 3L of water a day (6 500 cc bottles). Alternate your carbonated drinks with non-carbonated drinks. If you are not getting this much fluid in, call us and we need to set you up with IV fluids  2) Stop Hydrochlorthiazide  3) For nausea no zofran for 3 days after each chemo cycle but ok to take beyond that as needed. Also ok to take ativan and compazine as needed.   4) Monitor for fever/chills. Call with temp >100.4F. On call oncologist [phone]  5) I put in a referral for ENT and asked our  to schedule  6) Miralax twice a day as needed and may add a stool softener (docusate/Colace) up to twice a day if needed               Follow-ups after your visit        Additional Services     OTOLARYNGOLOGY REFERRAL       Your provider has referred you to: [facility]: Adult [facility] (Otolaryngology) - [place] [phone]  [email or web address]    Please be aware that coverage of these services is subject to the terms and limitations of your health insurance plan.  Call member services at your health plan with any benefit or coverage questions.      Please bring the following with you to your appointment:    (1) Any X-Rays, CTs or MRIs which have been performed.  Contact the facility where they were done to arrange for  prior to your scheduled appointment.   (2) List of current medications  (3) This referral request   (4) Any documents/labs given to you for this referral                  Your  next 10 appointments already scheduled     Oct 06, 2017  7:15 AM CDT   [facility] Lab Draw with [facility] DRAW   [facility] Lab Draw ([facility])    [address] [place] [id number]   [phone]            Oct 06, 2017  7:50 AM CDT   (Arrive by 7:35 AM)   Return Visit with ISAURA Roach CNP   [facility] ([facility])    [address] [place] [address]  2nd [facility] [address]   [phone]            Oct 06, 2017  9:00 AM CDT   Infusion 360 with UC ONCOLOGY INFUSION, UC 24 ATC   [facility] ([facility])    [address] [place] [address]   [phone]            Oct 06, 2017  1:00 PM CDT   (Arrive by 12:45 PM)   Return Visit with  Prostate Cancer Ctr Nurse   [facility] ([facility])    [address] [place] [address]   [phone]            Oct 13, 2017  9:00 AM CDT   Masonic Lab Draw with [facility] LAB DRAW   [facility] Lab Draw ([facility])    [address] [place] [address]   [phone]            Oct 13, 2017 10:00 AM CDT   Infusion 360 with UC ONCOLOGY INFUSION, UC 12 ATC   [facility] ([facility])    [address] [place] [address]   [phone]            Oct 25, 2017  9:20 AM CDT   (Arrive by 9:05 AM)   Cystoscopy with Muriel Haddad MD   [facility] ([facility])    [address] [place] [address]   [phone]            Oct 27, 2017  9:00 AM CDT   Masonic Lab Draw with [facility]ON[facility] LAB DRAW   [facility]ic Lab Draw ([facility])    [address] [place] [address]  "[facility] [address] [phone]              Who to contact     If you have questions or need follow up information about today's clinic visit or your schedule please contact [facility] directly at [phone].  Normal or non-critical lab and imaging results will be communicated to you by MyChart, letter or phone within 4 business days after the clinic has received the results. If you do not hear from us within 7 days, please contact the clinic through MyChart or phone. If you have a critical or abnormal lab result, we will notify you by phone as soon as possible.  Submit refill requests through [employer] or call your pharmacy and they will forward the refill request to us. Please allow 3 business days for your refill to be completed.          Additional Information About Your Visit        [facility] Information     [employer] lets you send messages to your doctor, view your test results, renew your prescriptions, schedule appointments and more. To sign up, go to [email or web address] . Click on \"Log in\" on the left side of the screen, which will take you to the Welcome page. Then click on \"Sign up Now\" on the right side of the page.     You will be asked to enter the access code listed below, as well as some personal information. Please follow the directions to create your username and password.     Your access code is: [email or web address]  Expires: 10/31/2017  1:07 PM     Your access code will  in 90 days. If you need help or a new code, please call your [facility] clinic or [phone].        Care EveryWhere ID     This is your Care EveryWhere ID. This could be used by other organizations to access your [facility] medical records  [id number]        Your Vitals Were     Pulse Temperature Respirations Pulse Oximetry BMI (Body Mass Index)       98 97.8  F (36.6  C) (Oral) 16 98% 20.29 kg/m2        Blood Pressure from Last 3 Encounters:   10/03/17 115/79   10/03/17 125/70   17 " 139/77    Weight from Last 3 Encounters:   10/03/17 73.6 kg (162 lb 4.8 oz)   09/29/17 70.6 kg (155 lb 11.2 oz)   09/25/17 72.8 kg (160 lb 9.6 oz)              We Performed the Following     OTOLARYNGOLOGY REFERRAL          Today's Medication Changes          These changes are accurate as of: 10/3/17  5:12 PM.  If you have any questions, ask your nurse or doctor.               Start taking these medicines.        Dose/Directions    docusate sodium 100 MG capsule   Commonly known as:  COLACE   Used for:  Slow transit constipation   Started by:  Janene Alves PA-C        Dose:  100 mg   Take 1 capsule (100 mg) by mouth 2 times daily as needed for constipation   Quantity:  60 capsule   Refills:  0         Stop taking these medicines if you haven't already. Please contact your care team if you have questions.     hydrochlorothiazide 25 MG tablet   Commonly known as:  HYDRODIURIL   Stopped by:  Janene Alves PA-C                Where to get your medicines      These medications were sent to [place] [id number] [facility]  [address] [facility] [address] [address]    Hours:  TRANSPLANT PHONE NUMBER [phone] Phone:  [phone]     amLODIPine 5 MG tablet    docusate sodium 100 MG capsule                Primary Care Provider Office Phone # Fax #    Joan Gonzales MARLENY [place] [phone]       [address] [facility] [address]        Equal Access to Services     JENELLE RED AH: Hadii aad ku hadasho Soomaali, waaxda luqadaha, qaybta kaalmada adeegyada, waxay snow haylidian gab rasmussen. So [facility] [phone].    ATENCIÓN: Si habla español, tiene a washburn disposición servicios gratuitos de asistencia lingüística. Llame al [phone].    We comply with applicable federal civil rights laws and [place] laws. We do not discriminate on the basis of race, color, national origin, age, disability, sex, sexual orientation, or gender  identity.            Thank you!     Thank you for choosing [facility]  for your care. Our goal is always to provide you with excellent care. Hearing back from our patients is one way we can continue to improve our services. Please take a few minutes to complete the written survey that you may receive in the mail after your visit with us. Thank you!             Your Updated Medication List - Protect others around you: Learn how to safely use, store and throw away your medicines at www.disposemymeds.org.          This list is accurate as of: 10/3/17  5:12 PM.  Always use your most recent med list.                   Brand Name Dispense Instructions for use Diagnosis    amLODIPine 5 MG tablet    NORVASC    60 tablet    Take 2 tablets (10 mg) by mouth daily    Benign essential hypertension       dexamethasone 4 MG tablet    DECADRON    12 tablet    Take 2 tablets (8 mg) by mouth daily Take for 3 days, starting the day after cisplatin (Day 2 and Day 9).    Urethral cancer (H)       docusate sodium 100 MG capsule    COLACE    60 capsule    Take 1 capsule (100 mg) by mouth 2 times daily as needed for constipation    Slow transit constipation       fluticasone 50 MCG/ACT spray    FLONASE     Spray 1 spray into both nostrils every morning        HYDROXYZINE HCL PO      Take 5 mg by mouth At Bedtime        LORazepam 0.5 MG tablet    ATIVAN    30 tablet    Take 1 tablet (0.5 mg) by mouth every 4 hours as needed (Anxiety, Nausea/Vomiting or Sleep)    Urethral cancer (H)       ondansetron 8 MG tablet    ZOFRAN    20 tablet    Take 1 tablet (8 mg) by mouth every 8 hours as needed for nausea    Urothelial cancer (H)       priLOSEC OTC 20 MG tablet   Generic drug:  omeprazole      Take 20 mg by mouth 2 times daily        prochlorperazine 10 MG tablet    COMPAZINE    30 tablet    Take 0.5 tablets (5 mg) by mouth every 6 hours as needed (Nausea/Vomiting)    Urethral cancer (H)

## 2017-10-03 NOTE — NURSING NOTE
Chief Complaint   Patient presents with     Blood Draw     Labs collected from PIV placed by RN. Saline flushed. Vitals taken and pt checked in for appt. Blood cultures drawn bilat arms; urine cup sent with pt for collection.     Labs drawn from PIV placed by RN. Line flushed with saline. Vitals taken. Pt checked in for appointment(s).    Mariela Santamaria RN

## 2017-10-03 NOTE — PROGRESS NOTES
Infusion Nursing Note:  King Gonsalo Bruno presents today for IVF.    Patient seen by provider today: Yes: SARITHA Morales    Note: Patient presented to clinic with c/o nausea, dehydration and constipation.  Patient states he is eating and drinking well; PA to chair for evaluation.    Intravenous Access:  Peripheral IV placed placed in lab.    Treatment Conditions:  Lab Results   Component Value Date    HGB 9.9 10/03/2017     Lab Results   Component Value Date    WBC 10.4 10/03/2017      Lab Results   Component Value Date    ANEU 8.7 10/03/2017     Lab Results   Component Value Date     10/03/2017      Lab Results   Component Value Date     10/03/2017                   Lab Results   Component Value Date    POTASSIUM 3.8 10/03/2017           Lab Results   Component Value Date    MAG 1.6 10/03/2017            Lab Results   Component Value Date    CR 1.42 10/03/2017                   Lab Results   Component Value Date    SAADIA 8.4 10/03/2017                Lab Results   Component Value Date    BILITOTAL 0.4 10/03/2017           Lab Results   Component Value Date    ALBUMIN 3.2 10/03/2017                    Lab Results   Component Value Date    ALT 21 10/03/2017           Lab Results   Component Value Date    AST 26 10/03/2017     Results reviewed, labs MET treatment parameters, ok to proceed with treatment.    Post Infusion Assessment:  Patient tolerated infusion without incident.  Blood return noted pre and post infusion.  Site patent and intact, free from redness, edema or discomfort.  No evidence of extravasations.  Access discontinued per protocol.    Discharge Plan:   Patient declined prescription refills.  Discharge instructions reviewed with: Patient.  Patient and/or family verbalized understanding of discharge instructions and all questions answered.  Copy of AVS reviewed with patient and/or family.  Patient will return 10/6/2017 for next appointment.  Departure Mode: Ambulatory.    Rossi  BRENDA Castellanos

## 2017-10-04 ENCOUNTER — TELEPHONE (OUTPATIENT)
Dept: ONCOLOGY | Facility: CLINIC | Age: 72
End: 2017-10-04

## 2017-10-04 LAB
BACTERIA SPEC CULT: NORMAL
BACTERIA SPEC CULT: NORMAL
Lab: NORMAL
SPECIMEN SOURCE: NORMAL

## 2017-10-04 NOTE — TELEPHONE ENCOUNTER
I called patient to followup    He is feeling much better. No longer dizzy or nauseated. Pushing PO fluids and eating well. Increased miralax to BID and added colace. No fevers.     His UA was dirty (has SPC) but cx us NGTD. Will keep following this and treat if culture is positive or has fevers.     He will return Friday and knows to call in the meantime if issues    Janene Alves PA-C

## 2017-10-06 ENCOUNTER — TELEPHONE (OUTPATIENT)
Dept: ONCOLOGY | Facility: CLINIC | Age: 72
End: 2017-10-06

## 2017-10-06 ENCOUNTER — INFUSION THERAPY VISIT (OUTPATIENT)
Dept: ONCOLOGY | Facility: CLINIC | Age: 72
End: 2017-10-06
Attending: INTERNAL MEDICINE
Payer: MEDICARE

## 2017-10-06 ENCOUNTER — ONCOLOGY VISIT (OUTPATIENT)
Dept: ONCOLOGY | Facility: CLINIC | Age: 72
End: 2017-10-06
Attending: NURSE PRACTITIONER
Payer: MEDICARE

## 2017-10-06 ENCOUNTER — OFFICE VISIT (OUTPATIENT)
Dept: UROLOGY | Facility: CLINIC | Age: 72
End: 2017-10-06

## 2017-10-06 VITALS
WEIGHT: 166 LBS | HEIGHT: 76 IN | BODY MASS INDEX: 20.22 KG/M2 | DIASTOLIC BLOOD PRESSURE: 67 MMHG | SYSTOLIC BLOOD PRESSURE: 117 MMHG | HEART RATE: 85 BPM

## 2017-10-06 VITALS
WEIGHT: 168.2 LBS | HEIGHT: 75 IN | OXYGEN SATURATION: 94 % | TEMPERATURE: 97.6 F | HEART RATE: 86 BPM | SYSTOLIC BLOOD PRESSURE: 124 MMHG | RESPIRATION RATE: 15 BRPM | DIASTOLIC BLOOD PRESSURE: 63 MMHG | BODY MASS INDEX: 20.91 KG/M2

## 2017-10-06 DIAGNOSIS — K21.9 GASTROESOPHAGEAL REFLUX DISEASE WITHOUT ESOPHAGITIS: ICD-10-CM

## 2017-10-06 DIAGNOSIS — C68.0 URETHRAL CANCER (H): Primary | ICD-10-CM

## 2017-10-06 DIAGNOSIS — E87.1 HYPONATREMIA: ICD-10-CM

## 2017-10-06 DIAGNOSIS — R11.0 NAUSEA: ICD-10-CM

## 2017-10-06 DIAGNOSIS — C68.9 UROTHELIAL CANCER (H): ICD-10-CM

## 2017-10-06 LAB
ALBUMIN SERPL-MCNC: 2.7 G/DL (ref 3.4–5)
ALBUMIN UR-MCNC: 30 MG/DL
ALP SERPL-CCNC: 69 U/L (ref 40–150)
ALT SERPL W P-5'-P-CCNC: 18 U/L (ref 0–70)
ANION GAP SERPL CALCULATED.3IONS-SCNC: 6 MMOL/L (ref 3–14)
APPEARANCE UR: ABNORMAL
AST SERPL W P-5'-P-CCNC: 24 U/L (ref 0–45)
BACTERIA #/AREA URNS HPF: ABNORMAL /HPF
BASOPHILS # BLD AUTO: 0 10E9/L (ref 0–0.2)
BASOPHILS NFR BLD AUTO: 0.2 %
BILIRUB SERPL-MCNC: 0.5 MG/DL (ref 0.2–1.3)
BILIRUB UR QL STRIP: NEGATIVE
BUN SERPL-MCNC: 7 MG/DL (ref 7–30)
CALCIUM SERPL-MCNC: 8.6 MG/DL (ref 8.5–10.1)
CHLORIDE SERPL-SCNC: 97 MMOL/L (ref 94–109)
CHLORIDE UR-SCNC: 127 MMOL/L
CO2 SERPL-SCNC: 26 MMOL/L (ref 20–32)
COLOR UR AUTO: YELLOW
CREAT SERPL-MCNC: 1.16 MG/DL (ref 0.66–1.25)
CREAT UR-MCNC: 100 MG/DL
DIFFERENTIAL METHOD BLD: ABNORMAL
EOSINOPHIL # BLD AUTO: 0.1 10E9/L (ref 0–0.7)
EOSINOPHIL NFR BLD AUTO: 0.6 %
ERYTHROCYTE [DISTWIDTH] IN BLOOD BY AUTOMATED COUNT: 14.6 % (ref 10–15)
GFR SERPL CREATININE-BSD FRML MDRD: 62 ML/MIN/1.7M2
GLUCOSE SERPL-MCNC: 98 MG/DL (ref 70–99)
GLUCOSE UR STRIP-MCNC: NEGATIVE MG/DL
HCT VFR BLD AUTO: 30.3 % (ref 40–53)
HGB BLD-MCNC: 10 G/DL (ref 13.3–17.7)
HGB UR QL STRIP: ABNORMAL
IMM GRANULOCYTES # BLD: 0.1 10E9/L (ref 0–0.4)
IMM GRANULOCYTES NFR BLD: 0.8 %
KETONES UR STRIP-MCNC: NEGATIVE MG/DL
LEUKOCYTE ESTERASE UR QL STRIP: ABNORMAL
LYMPHOCYTES # BLD AUTO: 1.9 10E9/L (ref 0.8–5.3)
LYMPHOCYTES NFR BLD AUTO: 22.7 %
MAGNESIUM SERPL-MCNC: 1.5 MG/DL (ref 1.6–2.3)
MCH RBC QN AUTO: 28.9 PG (ref 26.5–33)
MCHC RBC AUTO-ENTMCNC: 33 G/DL (ref 31.5–36.5)
MCV RBC AUTO: 88 FL (ref 78–100)
MONOCYTES # BLD AUTO: 1.2 10E9/L (ref 0–1.3)
MONOCYTES NFR BLD AUTO: 14.3 %
MUCOUS THREADS #/AREA URNS LPF: PRESENT /LPF
NEUTROPHILS # BLD AUTO: 5.2 10E9/L (ref 1.6–8.3)
NEUTROPHILS NFR BLD AUTO: 61.4 %
NITRATE UR QL: NEGATIVE
NRBC # BLD AUTO: 0 10*3/UL
NRBC BLD AUTO-RTO: 0 /100
OSMOLALITY SERPL: 271 MMOL/KG (ref 280–301)
OSMOLALITY UR: 375 MMOL/KG (ref 100–1200)
PH UR STRIP: 6 PH (ref 5–7)
PLATELET # BLD AUTO: 320 10E9/L (ref 150–450)
POTASSIUM SERPL-SCNC: 3.9 MMOL/L (ref 3.4–5.3)
POTASSIUM UR-SCNC: 31 MMOL/L
PROT SERPL-MCNC: 6.9 G/DL (ref 6.8–8.8)
RBC # BLD AUTO: 3.46 10E12/L (ref 4.4–5.9)
RBC #/AREA URNS AUTO: 15 /HPF (ref 0–2)
SODIUM SERPL-SCNC: 129 MMOL/L (ref 133–144)
SODIUM UR-SCNC: 100 MMOL/L
SOURCE: ABNORMAL
SP GR UR STRIP: 1.01 (ref 1–1.03)
TSH SERPL DL<=0.005 MIU/L-ACNC: 1.61 MU/L (ref 0.4–4)
UROBILINOGEN UR STRIP-MCNC: 0 MG/DL (ref 0–2)
WBC # BLD AUTO: 8.5 10E9/L (ref 4–11)
WBC #/AREA URNS AUTO: 50 /HPF (ref 0–2)

## 2017-10-06 PROCEDURE — 25000128 H RX IP 250 OP 636: Mod: ZF | Performed by: NURSE PRACTITIONER

## 2017-10-06 PROCEDURE — 84133 ASSAY OF URINE POTASSIUM: CPT | Performed by: NURSE PRACTITIONER

## 2017-10-06 PROCEDURE — 84443 ASSAY THYROID STIM HORMONE: CPT | Performed by: INTERNAL MEDICINE

## 2017-10-06 PROCEDURE — 96417 CHEMO IV INFUS EACH ADDL SEQ: CPT

## 2017-10-06 PROCEDURE — 87086 URINE CULTURE/COLONY COUNT: CPT | Performed by: NURSE PRACTITIONER

## 2017-10-06 PROCEDURE — 81001 URINALYSIS AUTO W/SCOPE: CPT | Performed by: NURSE PRACTITIONER

## 2017-10-06 PROCEDURE — 84300 ASSAY OF URINE SODIUM: CPT | Performed by: NURSE PRACTITIONER

## 2017-10-06 PROCEDURE — 96368 THER/DIAG CONCURRENT INF: CPT

## 2017-10-06 PROCEDURE — 99212 OFFICE O/P EST SF 10 MIN: CPT | Mod: ZF

## 2017-10-06 PROCEDURE — 84443 ASSAY THYROID STIM HORMONE: CPT | Performed by: NURSE PRACTITIONER

## 2017-10-06 PROCEDURE — 96367 TX/PROPH/DG ADDL SEQ IV INF: CPT

## 2017-10-06 PROCEDURE — 96375 TX/PRO/DX INJ NEW DRUG ADDON: CPT

## 2017-10-06 PROCEDURE — 99214 OFFICE O/P EST MOD 30 MIN: CPT | Mod: ZP | Performed by: NURSE PRACTITIONER

## 2017-10-06 PROCEDURE — 85025 COMPLETE CBC W/AUTO DIFF WBC: CPT | Performed by: INTERNAL MEDICINE

## 2017-10-06 PROCEDURE — 82570 ASSAY OF URINE CREATININE: CPT | Performed by: NURSE PRACTITIONER

## 2017-10-06 PROCEDURE — 80053 COMPREHEN METABOLIC PANEL: CPT | Performed by: INTERNAL MEDICINE

## 2017-10-06 PROCEDURE — 96413 CHEMO IV INFUSION 1 HR: CPT

## 2017-10-06 PROCEDURE — 83935 ASSAY OF URINE OSMOLALITY: CPT | Performed by: NURSE PRACTITIONER

## 2017-10-06 PROCEDURE — 82436 ASSAY OF URINE CHLORIDE: CPT | Performed by: NURSE PRACTITIONER

## 2017-10-06 PROCEDURE — 25000128 H RX IP 250 OP 636: Mod: ZF | Performed by: INTERNAL MEDICINE

## 2017-10-06 PROCEDURE — 83930 ASSAY OF BLOOD OSMOLALITY: CPT | Performed by: INTERNAL MEDICINE

## 2017-10-06 PROCEDURE — 83735 ASSAY OF MAGNESIUM: CPT | Performed by: INTERNAL MEDICINE

## 2017-10-06 RX ORDER — PROCHLORPERAZINE MALEATE 10 MG
5 TABLET ORAL EVERY 6 HOURS PRN
Qty: 30 TABLET | Refills: 3 | Status: SHIPPED | OUTPATIENT
Start: 2017-10-06 | End: 2018-02-26

## 2017-10-06 RX ORDER — PANTOPRAZOLE SODIUM 20 MG/1
20 TABLET, DELAYED RELEASE ORAL 2 TIMES DAILY
Qty: 60 TABLET | Refills: 2 | Status: SHIPPED | OUTPATIENT
Start: 2017-10-06 | End: 2017-10-21

## 2017-10-06 RX ORDER — ONDANSETRON 8 MG/1
8 TABLET, FILM COATED ORAL EVERY 8 HOURS PRN
Qty: 20 TABLET | Refills: 3 | Status: SHIPPED | OUTPATIENT
Start: 2017-10-06 | End: 2018-02-26

## 2017-10-06 RX ORDER — LORAZEPAM 0.5 MG/1
0.5 TABLET ORAL EVERY 4 HOURS PRN
Qty: 30 TABLET | Refills: 3 | Status: SHIPPED | OUTPATIENT
Start: 2017-10-06 | End: 2018-01-10 | Stop reason: ALTCHOICE

## 2017-10-06 RX ORDER — PALONOSETRON 0.05 MG/ML
0.25 INJECTION, SOLUTION INTRAVENOUS ONCE
Status: COMPLETED | OUTPATIENT
Start: 2017-10-06 | End: 2017-10-06

## 2017-10-06 RX ADMIN — MAGNESIUM SULFATE HEPTAHYDRATE: 500 INJECTION, SOLUTION INTRAMUSCULAR; INTRAVENOUS at 11:18

## 2017-10-06 RX ADMIN — SODIUM CHLORIDE 150 MG: 900 INJECTION, SOLUTION INTRAVENOUS at 10:48

## 2017-10-06 RX ADMIN — DEXAMETHASONE SODIUM PHOSPHATE 12 MG: 10 INJECTION, SOLUTION INTRAMUSCULAR; INTRAVENOUS at 10:27

## 2017-10-06 RX ADMIN — CISPLATIN 70 MG: 1 INJECTION, SOLUTION INTRAVENOUS at 12:25

## 2017-10-06 RX ADMIN — PALONOSETRON HYDROCHLORIDE 0.25 MG: 0.25 INJECTION INTRAVENOUS at 10:24

## 2017-10-06 RX ADMIN — GEMCITABINE 2000 MG: 38 INJECTION, SOLUTION INTRAVENOUS at 11:21

## 2017-10-06 ASSESSMENT — PAIN SCALES - GENERAL
PAINLEVEL: NO PAIN (0)
PAINLEVEL: MODERATE PAIN (5)

## 2017-10-06 NOTE — MR AVS SNAPSHOT
After Visit Summary   10/6/2017    King Gonsalo Bruno    MRN: [id number]           Patient Information     Date Of Birth          1945        Visit Information        Provider Department      10/6/2017 9:00 AM  24 ATC;  ONCOLOGY INFUSION [facility]        Today's Diagnoses     Urethral cancer (H)    -  1    Hyponatremia          Care Instructions    Contact Numbers  [facility]: [phone]  Triage: [phone] (Monday-Friday 8-4:30)  After Hours:  [phone]    Please call the [facility] Triage line if you experience a temperature greater than or equal to 100.5, shaking chills, have uncontrolled nausea, vomiting and/or diarrhea, dizziness, shortness of breath, chest pain, bleeding, unexplained bruising, or if you have any other new/concerning symptoms, questions or concerns.     If it is after hours, weekends, or holidays, please call [phone] to speak to someone regarding your questions or concerns.    If you are having any concerning symptoms or wish to speak to a provider before your next infusion visit, please call your care coordinator or triage to notify them so we can adequately serve you.     If you need a refill on a narcotic prescription or other medication, please call triage before your infusion appointment.             October 2017 Sunday Monday Tuesday Wednesday Thursday Friday Saturday   1     2     3     [facility] RETURN    3:45 PM   (60 min.)   Janene Alves PA-C   [facility] DRAW    4:00 PM   (15 min.)    [facility] DRAW   [facility] Lab Draw     UM ONC INFUSION 120    4:30 PM   (120 min.)    ONCOLOGY INFUSION   [facility] 4     5     6     [facility] DRAW    7:15 AM   (15 min.)    [facility] DRAW   [facility] Lab Draw     [facility] RETURN    7:35 AM   (50 min.)   Yessica Ovalles, ISAURA CNP   [facility] ONC INFUSION 360    9:00 AM   (360  min.)    ONCOLOGY INFUSION   [facility]     [facility] RETURN   12:45 PM   (30 min.)   Nurse,  [facility]   [facility] 7       8     9     [facility] BMT INFUSION 120    2:00 PM   (120 min.)    BMT INFUSION   [facility] Blood and Marrow Transplant 10     11     12     13     [facility] DRAW    9:00 AM   (15 min.)    [facility]   [facility]     [facility] ONC INFUSION 360   10:00 AM   (360 min.)    ONCOLOGY INFUSION   [facility] 14       15     16     17     18     19     20     21       22     23     24     25     [facility] CYSTOSCOPY    9:05 AM   (20 min.)   Muriel Haddad MD   [facility] 26     27     [facility] DRAW    9:00 AM   (15 min.)    [facility] LAB DRAW   [facility] Lab Draw     [facility] RETURN    9:15 AM   (50 min.)   Josy Powell PA-C   [facility]     [facility] ONC INFUSION 360   10:00 AM   (360 min.)    ONCOLOGY INFUSION   [facility] 28       29     30     31 November 2017 Sunday Monday Tuesday Wednesday Thursday Friday Saturday                  1     2     3     [facility] DRAW   10:00 AM   (15 min.)    [facility] LAB DRAW   [facility] Lab Draw     [facility] ONC INFUSION 360   10:30 AM   (360 min.)    ONCOLOGY INFUSION   [facility] 4       5     6     7     8     [facility] RETURN    1:30 PM   (30 min.)   Steve Arceo MD   [facility] 9     10     11       12     13     14     15     16     17     18       19     20     21     22     23     24     25  Happy Birthday!       26     27     28     29     30                            Lab Results:  Recent Results (from the past 12 hour(s))   CBC with platelets differential    Collection Time: 10/06/17  7:49 AM   Result Value Ref Range    WBC 8.5 4.0 - 11.0 10e9/L    RBC  Count 3.46 (L) 4.4 - 5.9 10e12/L    Hemoglobin 10.0 (L) 13.3 - 17.7 g/dL    Hematocrit 30.3 (L) 40.0 - 53.0 %    MCV 88 78 - 100 fl    MCH 28.9 26.5 - 33.0 pg    MCHC 33.0 31.5 - 36.5 g/dL    RDW 14.6 10.0 - 15.0 %    Platelet Count 320 150 - 450 10e9/L    Diff Method Automated Method     % Neutrophils 61.4 %    % Lymphocytes 22.7 %    % Monocytes 14.3 %    % Eosinophils 0.6 %    % Basophils 0.2 %    % Immature Granulocytes 0.8 %    Nucleated RBCs 0 0 /100    Absolute Neutrophil 5.2 1.6 - 8.3 10e9/L    Absolute Lymphocytes 1.9 0.8 - 5.3 10e9/L    Absolute Monocytes 1.2 0.0 - 1.3 10e9/L    Absolute Eosinophils 0.1 0.0 - 0.7 10e9/L    Absolute Basophils 0.0 0.0 - 0.2 10e9/L    Abs Immature Granulocytes 0.1 0 - 0.4 10e9/L    Absolute Nucleated RBC 0.0    Comprehensive metabolic panel    Collection Time: 10/06/17  7:49 AM   Result Value Ref Range    Sodium 129 (L) 133 - 144 mmol/L    Potassium 3.9 3.4 - 5.3 mmol/L    Chloride 97 94 - 109 mmol/L    Carbon Dioxide 26 20 - 32 mmol/L    Anion Gap 6 3 - 14 mmol/L    Glucose 98 70 - 99 mg/dL    Urea Nitrogen 7 7 - 30 mg/dL    Creatinine 1.16 0.66 - 1.25 mg/dL    GFR Estimate 62 >60 mL/min/1.7m2    GFR Estimate If Black 75 >60 mL/min/1.7m2    Calcium 8.6 8.5 - 10.1 mg/dL    Bilirubin Total 0.5 0.2 - 1.3 mg/dL    Albumin 2.7 (L) 3.4 - 5.0 g/dL    Protein Total 6.9 6.8 - 8.8 g/dL    Alkaline Phosphatase 69 40 - 150 U/L    ALT 18 0 - 70 U/L    AST 24 0 - 45 U/L   Magnesium    Collection Time: 10/06/17  7:49 AM   Result Value Ref Range    Magnesium 1.5 (L) 1.6 - 2.3 mg/dL   Osmolality    Collection Time: 10/06/17  7:49 AM   Result Value Ref Range    Osmolality 271 (L) 280 - 301 mmol/kg   TSH with free T4 reflex    Collection Time: 10/06/17  7:49 AM   Result Value Ref Range    TSH 1.61 0.40 - 4.00 mU/L   Osmolality urine    Collection Time: 10/06/17 10:05 AM   Result Value Ref Range    Urine Osmolality 375 100 - 1200 mmol/kg   Routine UA with micro reflex to culture    Collection  Time: 10/06/17 10:05 AM   Result Value Ref Range    Color Urine Yellow     Appearance Urine Slightly Cloudy     Glucose Urine Negative NEG^Negative mg/dL    Bilirubin Urine Negative NEG^Negative    Ketones Urine Negative NEG^Negative mg/dL    Specific Gravity Urine 1.010 1.003 - 1.035    Blood Urine Moderate (A) NEG^Negative    pH Urine 6.0 5.0 - 7.0 pH    Protein Albumin Urine 30 (A) NEG^Negative mg/dL    Urobilinogen mg/dL 0.0 0.0 - 2.0 mg/dL    Nitrite Urine Negative NEG^Negative    Leukocyte Esterase Urine Large (A) NEG^Negative    Source Midstream Urine     WBC Urine 50 (H) 0 - 2 /HPF    RBC Urine 15 (H) 0 - 2 /HPF    Bacteria Urine Few (A) NEG^Negative /HPF    Mucous Urine Present (A) NEG^Negative /LPF   Sodium random urine    Collection Time: 10/06/17 10:05 AM   Result Value Ref Range    Sodium Urine mmol/L 100 mmol/L   Creatinine random urine    Collection Time: 10/06/17 10:05 AM   Result Value Ref Range    Creatinine Urine Random 100 mg/dL   Chloride random urine    Collection Time: 10/06/17 10:05 AM   Result Value Ref Range    Chloride Urine mmol/L 127 mmol/L   Potassium random urine    Collection Time: 10/06/17 10:05 AM   Result Value Ref Range    Potassium Urine mmol/L 31 mmol/L              Follow-ups after your visit        Your next 10 appointments already scheduled     Oct 09, 2017  2:00 PM CDT   Infusion 120 with UC BMT INFUSION, UC 3 ATC   [facility] Blood and Marrow Transplant ([facility])    [address] [place] [address]  2nd Floor  [facility] [address]   [phone]            Oct 13, 2017  9:00 AM CDT   [facility] Draw with UC [facility] DRAW   [facility] ([facility])    [address] [place] [address]   [phone]            Oct 13, 2017 10:00 AM CDT   Infusion 360 with UC ONCOLOGY INFUSION, UC 12 ATC   [facility] ([facility])    [address] [place] [address]  Floor  [facility] [address]   [phone]            Oct 25, 2017  9:20 AM CDT   (Arrive by 9:05 AM)   Cystoscopy with Muriel Haddad MD   [facility] Prostate [facility] Urologic Cancers ([facility])    [address] [place] [address]  4th Floor  [facility] [address]   [phone]            Oct 27, 2017  9:00 AM CDT   [facility] Lab Draw with UC [facility] DRAW   [facility] Lab Draw ([facility])    [address] [facility]  2nd Floor  [facility] [address]   [phone]            Oct 27, 2017  9:30 AM CDT   (Arrive by 9:15 AM)   Return Visit with Josy Powell PA-C   [facility] Clinic ([facility])    [address] [place] [address]  [address] [facility] [address]   [phone]            Oct 27, 2017 10:00 AM CDT   Infusion 360 with  ONCOLOGY INFUSION, UC 29 ATC   [facility] ([facility])    [address] [place] [address]  [address] [facility] [address]   [phone]              Who to contact     If you have questions or need follow up information about today's clinic visit or your schedule please contact [facility] directly at [phone].  Normal or non-critical lab and imaging results will be communicated to you by [employer], letter or phone within 4 business days after the clinic has received the results. If you do not hear from us within 7 days, please contact the clinic through [employer] or phone. If you have a critical or abnormal lab result, we will notify you by phone as soon as possible.  Submit refill requests through [employer] or call your pharmacy and they will forward the refill request to us. Please allow 3 business days for your refill to be completed.          Additional Information About Your Visit        [employer]el[employer] Information     [employer] lets you send messages to your doctor, view your test results, renew your prescriptions,  "schedule appointments and more. To sign up, go to [email or web address] . Click on \"Log in\" on the left side of the screen, which will take you to the Welcome page. Then click on \"Sign up Now\" on the right side of the page.     You will be asked to enter the access code listed below, as well as some personal information. Please follow the directions to create your username and password.     Your access code is: [email or web address]  Expires: 10/31/2017  1:07 PM     Your access code will  in 90 days. If you need help or a new code, please call your [facility] clinic or [phone].        Care EveryWhere ID     This is your Care EveryWhere ID. This could be used by other organizations to access your [facility] medical records  [id number]         Blood Pressure from Last 3 Encounters:   10/06/17 124/63   10/03/17 115/79   10/03/17 125/70    Weight from Last 3 Encounters:   10/06/17 76.3 kg (168 lb 3.2 oz)   10/03/17 73.6 kg (162 lb 4.8 oz)   17 70.6 kg (155 lb 11.2 oz)              We Performed the Following     CBC with platelets differential     Chloride random urine     Comprehensive metabolic panel     Creatinine random urine     Magnesium     Osmolality     Potassium random urine     Routine UA with micro reflex to culture     Sodium random urine     TSH with free T4 reflex          Today's Medication Changes          These changes are accurate as of: 10/6/17  1:09 PM.  If you have any questions, ask your nurse or doctor.               Start taking these medicines.        Dose/Directions    pantoprazole 20 MG EC tablet   Commonly known as:  PROTONIX   Used for:  Gastroesophageal reflux disease without esophagitis   Started by:  Yessica Ovalles APRN CNP        Dose:  20 mg   Take 1 tablet (20 mg) by mouth 2 times daily   Quantity:  60 tablet   Refills:  2         Stop taking these medicines if you haven't already. Please contact your care team if you have questions.     priLOSEC OTC 20 MG tablet   Generic " drug:  omeprazole   Stopped by:  Yessica Ovalles APRN CNP                Where to get your medicines      These medications were sent to [facility] [place], [place] - [address]  [address], [facility] [address]    Hours:  TRANSPLANT PHONE NUMBER [phone] Phone:  [phone]     ondansetron 8 MG tablet    pantoprazole 20 MG EC tablet    prochlorperazine 10 MG tablet         Some of these will need a paper prescription and others can be bought over the counter.  Ask your nurse if you have questions.     Bring a paper prescription for each of these medications     LORazepam 0.5 MG tablet                Primary Care Provider Office Phone # Fax #    Joan Janet Ventura [phone]       [facility] [address]  [facility] [address]        Equal Access to Services     JENELLE RED : Hadii светлана garcia hadasho Soveronica, waaxda luqadaha, qaybta kaalmada rena, ronald srivastava . So [facility] [phone].    ATENCIÓN: Si habla español, tiene a washburn disposición servicios gratuitos de asistencia lingüística. Juwan al [phone].    We comply with applicable federal civil rights laws and [place] laws. We do not discriminate on the basis of race, color, national origin, age, disability, sex, sexual orientation, or gender identity.            Thank you!     Thank you for choosing [facility]  for your care. Our goal is always to provide you with excellent care. Hearing back from our patients is one way we can continue to improve our services. Please take a few minutes to complete the written survey that you may receive in the mail after your visit with us. Thank you!             Your Updated Medication List - Protect others around you: Learn how to safely use, store and throw away your medicines at www.disposemymeds.org.          This list is accurate as of: 10/6/17  1:09 PM.  Always use your most recent  med list.                   Brand Name Dispense Instructions for use Diagnosis    amLODIPine 5 MG tablet    NORVASC    60 tablet    Take 2 tablets (10 mg) by mouth daily    Benign essential hypertension       dexamethasone 4 MG tablet    DECADRON    12 tablet    Take 2 tablets (8 mg) by mouth daily Take for 3 days, starting the day after cisplatin (Day 2 and Day 9).    Urethral cancer (H)       docusate sodium 100 MG capsule    COLACE    60 capsule    Take 1 capsule (100 mg) by mouth 2 times daily as needed for constipation    Slow transit constipation       fluticasone 50 MCG/ACT spray    FLONASE     Spray 1 spray into both nostrils every morning        HYDROXYZINE HCL PO      Take 5 mg by mouth At Bedtime        LORazepam 0.5 MG tablet    ATIVAN    30 tablet    Take 1 tablet (0.5 mg) by mouth every 4 hours as needed (Anxiety, Nausea/Vomiting or Sleep)    Nausea       ondansetron 8 MG tablet    ZOFRAN    20 tablet    Take 1 tablet (8 mg) by mouth every 8 hours as needed for nausea    Nausea       pantoprazole 20 MG EC tablet    PROTONIX    60 tablet    Take 1 tablet (20 mg) by mouth 2 times daily    Gastroesophageal reflux disease without esophagitis       prochlorperazine 10 MG tablet    COMPAZINE    30 tablet    Take 0.5 tablets (5 mg) by mouth every 6 hours as needed (Nausea/Vomiting)    Nausea

## 2017-10-06 NOTE — MR AVS SNAPSHOT
After Visit Summary   10/6/2017    King Gonsalo Bruno    MRN: [id number]           Patient Information     Date Of Birth          1945        Visit Information        Provider Department      10/6/2017 1:00 PM Audie Rooney MD [facility]        Today's Diagnoses     Urethral cancer (H)    -  1      Care Instructions    Schedule nurse visit in 4 weeks for next SPT change.    It was a pleasure meeting with you today.  Thank you for allowing me and my team the privilege of caring for you today.  YOU are the reason we are here, and I truly hope we provided you with the excellent service you deserve.  Please let us know if there is anything else we can do for you so that we can be sure you are leaving completely satisfied with your care experience.      ALL Amador          Follow-ups after your visit        Your next 10 appointments already scheduled     Nov 06, 2017  1:00 PM CST   (Arrive by 12:45 PM)   Return Visit with  Prostate Cancer Ctr Nurse   [facility]rologic C[facility] ([facility])    [address] [place] [address]  4th [facility] [address]   [phone]            Nov 06, 2017  2:00 PM CST   Infusion 120 with UC BMT INFUSION, UC 1 ATC   [facility] ([facility])    [address] [facility]  2nd [facility] [address]   [phone]            Nov 08, 2017 12:30 PM CST   Masonic Lab Draw with UC MASONIC LAB DRAW   [facility]    [address] [facility]  2nd [facility] [address]   [phone]            Nov 08, 2017  1:00 PM CST   Infusion 120 with UC ONCOLOGY INFUSION, UC 18 ATC   [facility]    [address] [facility]  2nd [facility] [address]   [phone]            Nov 10, 2017   8:15 AM CST   Masonic Lab Draw with  MASONIC LAB DRAW   [facility]    [address] [facility]  2nd [facility] [address] [phone]            Nov 10, 2017  8:40 AM CST   (Arrive by 8:25 AM)   Return Visit with SARITHA Briceno   [facility] ([facility])    [address] [facility]  2nd [facility] [id number] [phone]            Nov 10, 2017 10:00 AM CST   Infusion 360 with UC ONCOLOGY INFUSION, UC 12 ATC   [facility] ([facility])    [address] [facility]  2nd [facility] [id number] [phone]              Who to contact     Please call your clinic at [phone] to:    Ask questions about your health    Make or cancel appointments    Discuss your medicines    Learn about your test results    Speak to your doctor   If you have compliments or concerns about an experience at your clinic, or if you wish to file a complaint, please contact [facility] Patient Relations at [phone] or email us at [email or web address]         Additional Information About Your Visit        Portable Internethart Information     [employer] Touch[employer] is an electronic gateway that provides easy, online access to your medical records. With [employer], you can request a clinic appointment, read your test results, renew a prescription or communicate with your care team.     To sign up for [employer] visit the website at [email or web address]   You will be asked to enter the access code listed below, as well as some personal information. Please follow the directions to create your username and password.     Your access code is: [email or web address]  Expires: 2018  5:30 AM     Your access code will  in 90 days. If you need help or a new code, please contact your [facility] or call [phone] for assistance.       "  Care EveryWhere ID     This is your Care EveryWhere ID. This could be used by other organizations to access your [facility] medical records  [id number]        Your Vitals Were     Pulse Height BMI (Body Mass Index)             85 1.918 m (6' 3.5\") 20.47 kg/m2          Blood Pressure from Last 3 Encounters:   11/03/17 130/74   10/27/17 128/77   10/25/17 124/78    Weight from Last 3 Encounters:   11/03/17 71.7 kg (158 lb)   10/27/17 68.5 kg (151 lb)   10/25/17 68 kg (150 lb)              Today, you had the following     No orders found for display         Today's Medication Changes          These changes are accurate as of: 10/6/17 11:59 PM.  If you have any questions, ask your nurse or doctor.               Start taking these medicines.        Dose/Directions    pantoprazole 20 MG EC tablet   Commonly known as:  PROTONIX   Used for:  Gastroesophageal reflux disease without esophagitis   Started by:  Yessica Ovalles APRN CNP        Dose:  20 mg   Take 1 tablet (20 mg) by mouth 2 times daily   Quantity:  60 tablet   Refills:  2         Stop taking these medicines if you haven't already. Please contact your care team if you have questions.     priLOSEC OTC 20 MG tablet   Generic drug:  omeprazole   Stopped by:  Yessica Ovalles APRN CNP                Where to get your medicines      These medications were sent to [facility] [address] [facility] [address] [facility] [address]    Hours:  TRANSPLANT PHONE NUMBER [phone] Phone:  [phone]     ondansetron 8 MG tablet    pantoprazole 20 MG EC tablet    prochlorperazine 10 MG tablet         Some of these will need a paper prescription and others can be bought over the counter.  Ask your nurse if you have questions.     Bring a paper prescription for each of these medications     LORazepam 0.5 MG tablet                Primary Care Provider Office Phone # Fax #    Joan Ventura [phone] " [phone]       [facility] [address]  [facility] [address]        Equal Access to Services     ARMONDSHILPI NEDA : Hadii aad ku hadnikkijanine Raeali, wacarlosda lucorinnairisha, qagordonta kanazda georginamabel, ronald adamson lyledyana ericksonaamir vernreidraymundo rasmussen. So [facility] [phone].    ATENCIÓN: Si habla español, tiene a washburn disposición servicios gratuitos de asistencia lingüística. Llame al [phone].    We comply with applicable federal civil rights laws and [place] laws. We do not discriminate on the basis of race, color, national origin, age, disability, sex, sexual orientation, or gender identity.            Thank you!     Thank you for choosing [facility] AND [facility] FOR PROSTATE AND UROLOGIC CANCERS  for your care. Our goal is always to provide you with excellent care. Hearing back from our patients is one way we can continue to improve our services. Please take a few minutes to complete the written survey that you may receive in the mail after your visit with us. Thank you!             Your Updated Medication List - Protect others around you: Learn how to safely use, store and throw away your medicines at www.disposemymeds.org.          This list is accurate as of: 10/6/17 11:59 PM.  Always use your most recent med list.                   Brand Name Dispense Instructions for use Diagnosis    dexamethasone 4 MG tablet    DECADRON    12 tablet    Take 2 tablets (8 mg) by mouth daily Take for 3 days, starting the day after cisplatin (Day 2 and Day 9).    Urethral cancer (H)       docusate sodium 100 MG capsule    COLACE    60 capsule    Take 1 capsule (100 mg) by mouth 2 times daily as needed for constipation    Slow transit constipation       fluticasone 50 MCG/ACT spray    FLONASE     Spray 1 spray into both nostrils every morning        HYDROXYZINE HCL PO      Take 5 mg by mouth At Bedtime        LORazepam 0.5 MG tablet    ATIVAN    30 tablet    Take 1 tablet (0.5 mg) by mouth every 4 hours as needed (Anxiety,  Nausea/Vomiting or Sleep)    Nausea       ondansetron 8 MG tablet    ZOFRAN    20 tablet    Take 1 tablet (8 mg) by mouth every 8 hours as needed for nausea    Nausea       pantoprazole 20 MG EC tablet    PROTONIX    60 tablet    Take 1 tablet (20 mg) by mouth 2 times daily    Gastroesophageal reflux disease without esophagitis       prochlorperazine 10 MG tablet    COMPAZINE    30 tablet    Take 0.5 tablets (5 mg) by mouth every 6 hours as needed (Nausea/Vomiting)    Nausea

## 2017-10-06 NOTE — PATIENT INSTRUCTIONS
Schedule nurse visit in 4 weeks for next SPT change.    It was a pleasure meeting with you today.  Thank you for allowing me and my team the privilege of caring for you today.  YOU are the reason we are here, and I truly hope we provided you with the excellent service you deserve.  Please let us know if there is anything else we can do for you so that we can be sure you are leaving completely satisfied with your care experience.      ALL Amador

## 2017-10-06 NOTE — MR AVS SNAPSHOT
After Visit Summary   10/6/2017    King Gonsalo Bruno    MRN: [id number]           Patient Information     Date Of Birth          1945        Visit Information        Provider Department      10/6/2017 7:50 AM Yessica Ovalles APRN CNP [facility] [place] [facility]        Today's Diagnoses     Urethral cancer (H)    -  1    Nausea        Urothelial cancer (H)        Hyponatremia        Gastroesophageal reflux disease without esophagitis           Follow-ups after your visit        Your next 10 appointments already scheduled     Oct 09, 2017  2:00 PM CDT   Infusion 120 with UC BMT INFUSION, UC 3 ATC   [facility] ([facility])    [address] [facility]  [address]nd [facility] [address]   [phone]            Oct 13, 2017  9:00 AM CDT   Masonic Lab Draw with UC MASONIC LAB DRAW   [facility] Lab Draw ([facility])    [address] [place] [address]  [address] [facility] [address]   [phone]            Oct 13, 2017 10:00 AM CDT   Infusion 360 with UC ONCOLOGY INFUSION, UC 12 ATC   [facility] [place] [facility] ([facility])    [address] [place] [address]  [address] [facility] [address]   [phone]            Oct 25, 2017  9:20 AM CDT   (Arrive by 9:05 AM)   Cystoscopy with Muriel Haddad MD   [facility]rolog[facility] C[facility] ([facility])    [address] [place] [address]  4th Floor  [facility] [address]   [phone]            Oct 27, 2017  9:00 AM CDT   Masonic Lab Draw with UC MASONIC LAB DRAW   [facility] Lab Draw ([facility])    [address] [place] [address]  [address] [facility] [address]   [phone]            Oct 27, 2017  9:30 AM CDT   (Arrive by 9:15 AM)   Return Visit with Josy Powell PA-C   [facility] [place] [facility] ([facility])    [address] [place] [address]  "Floor  [facility] [id number] [phone]            Oct 27, 2017 10:00 AM CDT   Infusion 360 with UC ONCOLOGY INFUSION, UC 29 ATC   [facility] ([facility]    [address] [facility]  2nd [facility] [address] [phone]              Future tests that were ordered for you today     Open Future Orders        Priority Expected Expires Ordered    NPET Oncology (Eyes to Thighs) Routine 10/27/2017 12/29/2017 10/6/2017    CBC with platelets differential Routine 10/27/2017 12/29/2017 10/6/2017    Comprehensive metabolic panel Routine 10/27/2017 12/29/2017 10/6/2017            Who to contact     If you have questions or need follow up information about today's clinic visit or your schedule please contact [facility] directly at [phone].  Normal or non-critical lab and imaging results will be communicated to you by MyChart, letter or phone within 4 business days after the clinic has received the results. If you do not hear from us within 7 days, please contact the clinic through [employer] or phone. If you have a critical or abnormal lab result, we will notify you by phone as soon as possible.  Submit refill requests through [employer] or call your pharmacy and they will forward the refill request to us. Please allow 3 business days for your refill to be completed.          Additional Information About Your Visit        [employer] Information     [employer] lets you send messages to your doctor, view your test results, renew your prescriptions, schedule appointments and more. To sign up, go to [email or web address] . Click on \"Log in\" on the left side of the screen, which will take you to the Welcome page. Then click on \"Sign up Now\" on the right side of the page.     You will be asked to enter the access code listed below, as well as some personal information. Please follow the directions to create your username and password.     Your access code " "is: [email or web address]  Expires: 10/31/2017  1:07 PM     Your access code will  in 90 days. If you need help or a new code, please call your [facility] clinic or [phone].        Care EveryWhere ID     This is your Care EveryWhere ID. This could be used by other organizations to access your [facility] medical records  [id number]        Your Vitals Were     Pulse Temperature Respirations Height Pulse Oximetry BMI (Body Mass Index)    86 97.6  F (36.4  C) (Oral) 15 1.905 m (6' 3\") 94% 21.02 kg/m2       Blood Pressure from Last 3 Encounters:   10/06/17 117/67   10/06/17 124/63   10/03/17 115/79    Weight from Last 3 Encounters:   10/06/17 75.3 kg (166 lb)   10/06/17 76.3 kg (168 lb 3.2 oz)   10/03/17 73.6 kg (162 lb 4.8 oz)              We Performed the Following     Osmolality urine     Urine Culture Aerobic Bacterial          Today's Medication Changes          These changes are accurate as of: 10/6/17  6:24 PM.  If you have any questions, ask your nurse or doctor.               Start taking these medicines.        Dose/Directions    pantoprazole 20 MG EC tablet   Commonly known as:  PROTONIX   Used for:  Gastroesophageal reflux disease without esophagitis   Started by:  Yessica Ovalles APRN CNP        Dose:  20 mg   Take 1 tablet (20 mg) by mouth 2 times daily   Quantity:  60 tablet   Refills:  2         Stop taking these medicines if you haven't already. Please contact your care team if you have questions.     priLOSEC OTC 20 MG tablet   Generic drug:  omeprazole   Stopped by:  Yessica Ovalles APRN CNP                Where to get your medicines      These medications were sent to [facility] [address] [facility] [address] [place] [address] [address] [address]    Hours:  TRANSPLANT PHONE NUMBER [phone] Phone:  [phone]     ondansetron 8 MG tablet    pantoprazole 20 MG EC tablet    prochlorperazine 10 MG tablet         Some of these will need a paper " prescription and others can be bought over the counter.  Ask your nurse if you have questions.     Bring a paper prescription for each of these medications     LORazepam 0.5 MG tablet                Primary Care Provider Office Phone # Fax Jazmine VenturaMARLENY [phone]       [facility] [address] [facility] [address]        Equal Access to Services     JENELLE RED : Hadii aad ku hadasho Soomaali, waaxda luqadaha, qaybta kaalmada adeegyada, waxay idiin hayaan adeeg kharash lashakira . So [facility] [phone].    ATENCIÓN: Si habla español, tiene a washburn disposición servicios gratuitos de asistencia lingüística. Llame al [phone].    We comply with applicable federal civil rights laws and [place] laws. We do not discriminate on the basis of race, color, national origin, age, disability, sex, sexual orientation, or gender identity.            Thank you!     Thank you for choosing [facility]  for your care. Our goal is always to provide you with excellent care. Hearing back from our patients is one way we can continue to improve our services. Please take a few minutes to complete the written survey that you may receive in the mail after your visit with us. Thank you!             Your Updated Medication List - Protect others around you: Learn how to safely use, store and throw away your medicines at www.disposemymeds.org.          This list is accurate as of: 10/6/17  6:24 PM.  Always use your most recent med list.                   Brand Name Dispense Instructions for use Diagnosis    amLODIPine 5 MG tablet    NORVASC    60 tablet    Take 2 tablets (10 mg) by mouth daily    Benign essential hypertension       dexamethasone 4 MG tablet    DECADRON    12 tablet    Take 2 tablets (8 mg) by mouth daily Take for 3 days, starting the day after cisplatin (Day 2 and Day 9).    Urethral cancer (H)       docusate sodium 100 MG capsule    COLACE    60 capsule    Take 1 capsule  (100 mg) by mouth 2 times daily as needed for constipation    Slow transit constipation       fluticasone 50 MCG/ACT spray    FLONASE     Spray 1 spray into both nostrils every morning        HYDROXYZINE HCL PO      Take 5 mg by mouth At Bedtime        LORazepam 0.5 MG tablet    ATIVAN    30 tablet    Take 1 tablet (0.5 mg) by mouth every 4 hours as needed (Anxiety, Nausea/Vomiting or Sleep)    Nausea       ondansetron 8 MG tablet    ZOFRAN    20 tablet    Take 1 tablet (8 mg) by mouth every 8 hours as needed for nausea    Nausea       pantoprazole 20 MG EC tablet    PROTONIX    60 tablet    Take 1 tablet (20 mg) by mouth 2 times daily    Gastroesophageal reflux disease without esophagitis       prochlorperazine 10 MG tablet    COMPAZINE    30 tablet    Take 0.5 tablets (5 mg) by mouth every 6 hours as needed (Nausea/Vomiting)    Nausea

## 2017-10-06 NOTE — PATIENT INSTRUCTIONS
Contact Numbers  [facility]: [phone]  Triage: [phone] (Monday-Friday 8-4:30)  After Hours:  [phone]    Please call the [facility] Triage line if you experience a temperature greater than or equal to 100.5, shaking chills, have uncontrolled nausea, vomiting and/or diarrhea, dizziness, shortness of breath, chest pain, bleeding, unexplained bruising, or if you have any other new/concerning symptoms, questions or concerns.     If it is after hours, weekends, or holidays, please call [phone] to speak to someone regarding your questions or concerns.    If you are having any concerning symptoms or wish to speak to a provider before your next infusion visit, please call your care coordinator or triage to notify them so we can adequately serve you.     If you need a refill on a narcotic prescription or other medication, please call triage before your infusion appointment.             October 2017 Sunday Monday Tuesday Wednesday Thursday Friday Saturday   1     2     3     [facility] RETURN    3:45 PM   (60 min.)   [facility]ves [facility]-C   [facility]    4:00 PM   (15 min.)   [facility] LAB DRAW   [facility] Lab Draw     [facility] ONC INFUSION 120    4:30 PM   (120 min.)    ONCOLOGY INFUSION   [facility] 4     5     6     [facility] DRAW    7:15 AM   (15 min.)   [facility]   [facility] Lab Draw     [facility] RETURN    7:35 AM   (50 min.)   Yessica Ovalles APRN CNP   [facility]     [facility] ONC INFUSION 360    9:00 AM   (360 min.)    ONCOLOGY INFUSION   [facility]     [facility] RETURN   12:45 PM   (30 min.)   Nurse,  [facility]   [facility] for Prostate and Urologic Cancers 7       8     9     P BMT INFUSION 120    2:00 PM   (120 min.)    BMT INFUSION   [facility] Blood and Marrow Transplant 10     11     12     13     P [facility] LAB DRAW    9:00 AM   (15 min.)   [facility]  LAB DRAW   [facility] Lab Draw     [facility] ONC INFUSION 360   10:00 AM   (360 min.)    ONCOLOGY INFUSION   [facility] Cancer [facility] 14       15     16     17     18     19     20     21       22     23     24     25     [facility] CYSTOSCOPY    9:05 AM   (20 min.)   Muriel Haddad MD   [facility] for Prostate and Urologic Cancers 26     27     [facility] DRAW    9:00 AM   (15 min.)    [facility] DRAW   [facility] Lab Draw     [facility] RETURN    9:15 AM   (50 min.)   Josy Powell PA-C   [facility]     [facility] ONC INFUSION 360   10:00 AM   (360 min.)    ONCOLOGY INFUSION   [facility] 28       29     30     31 November 2017 Sunday Monday Tuesday Wednesday Thursday Friday Saturday                  1     2     3     [facility] DRAW   10:00 AM   (15 min.)    [facility] DRAW   [facility] Lab Draw     [facility] ONC INFUSION 360   10:30 AM   (360 min.)    ONCOLOGY INFUSION   [facility] 4       5     6     7     8     [facility] RETURN    1:30 PM   (30 min.)   Steve Arceo MD   [facility] 9     10     11       12     13     14     15     16     17     18       19     20     21     22     23     24     25  Happy Birthday!       26     27     28     29     30                            Lab Results:  Recent Results (from the past 12 hour(s))   CBC with platelets differential    Collection Time: 10/06/17  7:49 AM   Result Value Ref Range    WBC 8.5 4.0 - 11.0 10e9/L    RBC Count 3.46 (L) 4.4 - 5.9 10e12/L    Hemoglobin 10.0 (L) 13.3 - 17.7 g/dL    Hematocrit 30.3 (L) 40.0 - 53.0 %    MCV 88 78 - 100 fl    MCH 28.9 26.5 - 33.0 pg    MCHC 33.0 31.5 - 36.5 g/dL    RDW 14.6 10.0 - 15.0 %    Platelet Count 320 150 - 450 10e9/L    Diff Method Automated Method     % Neutrophils 61.4 %    % Lymphocytes 22.7 %    % Monocytes 14.3 %    % Eosinophils 0.6 %    % Basophils 0.2 %     % Immature Granulocytes 0.8 %    Nucleated RBCs 0 0 /100    Absolute Neutrophil 5.2 1.6 - 8.3 10e9/L    Absolute Lymphocytes 1.9 0.8 - 5.3 10e9/L    Absolute Monocytes 1.2 0.0 - 1.3 10e9/L    Absolute Eosinophils 0.1 0.0 - 0.7 10e9/L    Absolute Basophils 0.0 0.0 - 0.2 10e9/L    Abs Immature Granulocytes 0.1 0 - 0.4 10e9/L    Absolute Nucleated RBC 0.0    Comprehensive metabolic panel    Collection Time: 10/06/17  7:49 AM   Result Value Ref Range    Sodium 129 (L) 133 - 144 mmol/L    Potassium 3.9 3.4 - 5.3 mmol/L    Chloride 97 94 - 109 mmol/L    Carbon Dioxide 26 20 - 32 mmol/L    Anion Gap 6 3 - 14 mmol/L    Glucose 98 70 - 99 mg/dL    Urea Nitrogen 7 7 - 30 mg/dL    Creatinine 1.16 0.66 - 1.25 mg/dL    GFR Estimate 62 >60 mL/min/1.7m2    GFR Estimate If Black 75 >60 mL/min/1.7m2    Calcium 8.6 8.5 - 10.1 mg/dL    Bilirubin Total 0.5 0.2 - 1.3 mg/dL    Albumin 2.7 (L) 3.4 - 5.0 g/dL    Protein Total 6.9 6.8 - 8.8 g/dL    Alkaline Phosphatase 69 40 - 150 U/L    ALT 18 0 - 70 U/L    AST 24 0 - 45 U/L   Magnesium    Collection Time: 10/06/17  7:49 AM   Result Value Ref Range    Magnesium 1.5 (L) 1.6 - 2.3 mg/dL   Osmolality    Collection Time: 10/06/17  7:49 AM   Result Value Ref Range    Osmolality 271 (L) 280 - 301 mmol/kg   TSH with free T4 reflex    Collection Time: 10/06/17  7:49 AM   Result Value Ref Range    TSH 1.61 0.40 - 4.00 mU/L   Osmolality urine    Collection Time: 10/06/17 10:05 AM   Result Value Ref Range    Urine Osmolality 375 100 - 1200 mmol/kg   Routine UA with micro reflex to culture    Collection Time: 10/06/17 10:05 AM   Result Value Ref Range    Color Urine Yellow     Appearance Urine Slightly Cloudy     Glucose Urine Negative NEG^Negative mg/dL    Bilirubin Urine Negative NEG^Negative    Ketones Urine Negative NEG^Negative mg/dL    Specific Gravity Urine 1.010 1.003 - 1.035    Blood Urine Moderate (A) NEG^Negative    pH Urine 6.0 5.0 - 7.0 pH    Protein Albumin Urine 30 (A) NEG^Negative  mg/dL    Urobilinogen mg/dL 0.0 0.0 - 2.0 mg/dL    Nitrite Urine Negative NEG^Negative    Leukocyte Esterase Urine Large (A) NEG^Negative    Source Midstream Urine     WBC Urine 50 (H) 0 - 2 /HPF    RBC Urine 15 (H) 0 - 2 /HPF    Bacteria Urine Few (A) NEG^Negative /HPF    Mucous Urine Present (A) NEG^Negative /LPF   Sodium random urine    Collection Time: 10/06/17 10:05 AM   Result Value Ref Range    Sodium Urine mmol/L 100 mmol/L   Creatinine random urine    Collection Time: 10/06/17 10:05 AM   Result Value Ref Range    Creatinine Urine Random 100 mg/dL   Chloride random urine    Collection Time: 10/06/17 10:05 AM   Result Value Ref Range    Chloride Urine mmol/L 127 mmol/L   Potassium random urine    Collection Time: 10/06/17 10:05 AM   Result Value Ref Range    Potassium Urine mmol/L 31 mmol/L

## 2017-10-06 NOTE — PROGRESS NOTES
ASSESSMENT and Plan  Suprapubic tube changed without problem.  Plan to follow-up in 1 month for another change  Plan for follow-up with Dr Haddad.  _________________________________________________________________    CHIEF COMPLAINT   It was my pleasure to see King Gonsalo Bruno who is a 71 year old male for follow-up of suprapubic tube.      HPI  Mr. Bruno is doing well with the tube.  Tube was placed on 8/31/17.    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      GERD (gastroesophageal reflux disease)      Hypertension      PONV (postoperative nausea and vomiting)      Past Surgical History:   Procedure Laterality Date     CYSTOSCOPY, BIOPSY BLADDER, COMBINED N/A 8/31/2017    Procedure: COMBINED CYSTOSCOPY, BIOPSY BLADDER;  Cystoscopy, Suprapubic Tube Placement with Ultrasound Guidiance, Uretheral Dilation;  Surgeon: Muriel Haddad MD;  [facility]     CYSTOSTOMY, INSERT TUBE SUPRAPUBIC, COMBINED N/A 8/31/2017    Procedure: COMBINED CYSTOSTOMY, INSERT TUBE SUPRAPUBIC;;  Surgeon: Muriel Haddad MD;  [facility] OR     LARYNGOSCOPY  2013    Direct laryngoscopy with the use of rigid endoscopy and takedown of left hypopharyngeal diverticula.      Current Outpatient Prescriptions   Medication Sig Dispense Refill     LORazepam (ATIVAN) 0.5 MG tablet Take 1 tablet (0.5 mg) by mouth every 4 hours as needed (Anxiety, Nausea/Vomiting or Sleep) 30 tablet 3     prochlorperazine (COMPAZINE) 10 MG tablet Take 0.5 tablets (5 mg) by mouth every 6 hours as needed (Nausea/Vomiting) (Patient not taking: Reported on 11/3/2017) 30 tablet 3  "    ondansetron (ZOFRAN) 8 MG tablet Take 1 tablet (8 mg) by mouth every 8 hours as needed for nausea (Patient not taking: Reported on 11/3/2017) 20 tablet 3     docusate sodium (COLACE) 100 MG capsule Take 1 capsule (100 mg) by mouth 2 times daily as needed for constipation 60 capsule 0     dexamethasone (DECADRON) 4 MG tablet Take 2 tablets (8 mg) by mouth daily Take for 3 days, starting the day after cisplatin (Day 2 and Day 9). 12 tablet 3     HYDROXYZINE HCL PO Take 5 mg by mouth At Bedtime        fluticasone (FLONASE) 50 MCG/ACT spray Spray 1 spray into both nostrils every morning        omeprazole (PRILOSEC) 2 mg/mL SUSP Take 10 mLs (20 mg) by mouth 2 times daily 280 mL 0     nystatin (MYCOSTATIN) 824459 UNIT/ML suspension Take 5 mLs (500,000 Units) by mouth 4 times daily Swish and spit 200 mL 0     amLODIPine (NORVASC) 5 MG tablet Take 2 tablets (10 mg) by mouth daily 60 tablet 3     OLANZapine (ZYPREXA) 5 MG tablet Take 1 tablet (5 mg) by mouth At Bedtime 30 tablet 0     sucralfate (CARAFATE) 1 GM/10ML suspension Take 10 mLs (1 g) by mouth 4 times daily for 20 days 800 mL 0     FIRST-OMEPRAZOLE 2 MG/ML SUSP Take 10 mLs (20 mg) by mouth 2 times daily 300 mL 0     alum & mag hydroxide-simethicone (MYLANTA/MAALOX) 200-200-20 MG/5ML SUSP suspension Take 30 mLs by mouth every 4 hours as needed for indigestion 355 mL 1      SOCIAL HISTORY: He  reports that he quit smoking about 25 years ago. He started smoking about 45 years ago. He has a 20.00 pack-year smoking history. He has never used smokeless tobacco. He reports that he does not drink alcohol or use illicit drugs.    PHYSICAL EXAM  Vitals:    10/06/17 1414   BP: 117/67   Pulse: 85   Weight: 75.3 kg (166 lb)   Height: 1.918 m (6' 3.5\")     Constitutional: Alert, no acute distress  Psychiatric: Normal mood and affect  Abdomen: soft, non tender, non distended.    :  Suprapubic tube was changed with some difficulty, but clear urine output after " replacement.      CC  Patient Care Team:  Joan Ventura NP as PCP - General (Nurse Practitioner)  Ry Dunn as Referring Physician (Surgery)  Muriel Bruce MD as MD (Urology)  Lizzy Chowdary, RN as Registered Nurse (Urology)  Steve Arceo MD as MD (Oncology)  Janet Murrieta, RN as Nurse Coordinator (Oncology)  MURIEL BRUCE    Copy to patient  KING AILYN CHAVEZ  [address] [facility] [place] [facility] [id number]

## 2017-10-06 NOTE — PROGRESS NOTES
Oncology/Hematology Visit Note  Oct 6, 2017    Reason for Visit: follow up of urothelial carcinoma    History of Present Illness: King Gonsalo Bruno is a 71 year old male with penile urethral carcioma who is undergoing neoadjuvant Cisplatin/Gemzar, split on days 1 and 8 due to CKD. Please see previous notes for further details on the patient's history. He comes in today for routine follow up prior to cycle . He was seen by Janene Alves earlier this week with dehydration, N/V and concerns of fever.    Interval History:  He reports he had rough time a couple days after chemo , he was dehydrated, nauseated, had fever 100.4 F  and did not feel well. He received iv fluids .It is  hard to cope with everything. Overall he tells me he feels better.   He reports having hiccups that make  him anxious,  he does not like how omeprazole makes him feel. He is also waiting for ENT appointment re: esophageal diverticulum. His pain is stable and he takes advil prn . His suprapubic cath is draining well , denies hematuria or clots. He is scheduled for cath replacement today  Feels constipated , he took miralax today and will start colace today . He denies NV now , denies abdominal pain . His energy is at baseline.  Denies fever chills or sweats .       Review of Systems:  Patient denies any of the following except if noted above: fevers, chills, , vision or hearing changes, chest pain, dyspnea, abdominal pain, nausea, vomiting, diarrhea, c, urinary concerns, headaches, numbness, tingling, . He also denies lumps, bumps, rashes or skin lesions, bleeding or bruising issues.    Current Outpatient Prescriptions   Medication Sig Dispense Refill     LORazepam (ATIVAN) 0.5 MG tablet Take 1 tablet (0.5 mg) by mouth every 4 hours as needed (Anxiety, Nausea/Vomiting or Sleep) 30 tablet 3     prochlorperazine (COMPAZINE) 10 MG tablet Take 0.5 tablets (5 mg) by mouth every 6 hours as needed (Nausea/Vomiting) 30 tablet 3     ondansetron (ZOFRAN) 8 MG  "tablet Take 1 tablet (8 mg) by mouth every 8 hours as needed for nausea 20 tablet 3     pantoprazole (PROTONIX) 20 MG EC tablet Take 1 tablet (20 mg) by mouth 2 times daily 60 tablet 2     docusate sodium (COLACE) 100 MG capsule Take 1 capsule (100 mg) by mouth 2 times daily as needed for constipation 60 capsule 0     amLODIPine (NORVASC) 5 MG tablet Take 2 tablets (10 mg) by mouth daily 60 tablet 0     dexamethasone (DECADRON) 4 MG tablet Take 2 tablets (8 mg) by mouth daily Take for 3 days, starting the day after cisplatin (Day 2 and Day 9). 12 tablet 3     HYDROXYZINE HCL PO Take 5 mg by mouth At Bedtime        fluticasone (FLONASE) 50 MCG/ACT spray Spray 1 spray into both nostrils every morning          Physical Examination:  General: The patient is a pleasant male in no acute distress.  /63  Pulse 86  Temp 97.6  F (36.4  C) (Oral)  Resp 15  Ht 1.905 m (6' 3\")  Wt 76.3 kg (168 lb 3.2 oz)  SpO2 94%  BMI 21.02 kg/m2  Wt Readings from Last 10 Encounters:   10/06/17 76.3 kg (168 lb 3.2 oz)   10/03/17 73.6 kg (162 lb 4.8 oz)   09/29/17 70.6 kg (155 lb 11.2 oz)   09/25/17 72.8 kg (160 lb 9.6 oz)   09/22/17 71.4 kg (157 lb 6.4 oz)   09/15/17 73 kg (160 lb 14.4 oz)   09/13/17 75.3 kg (166 lb)   08/23/17 73.6 kg (162 lb 4.1 oz)   08/23/17 73.6 kg (162 lb 3.2 oz)   08/22/17 72.4 kg (159 lb 11.2 oz)     Anxious   HEENT: EOMI, PERRL. Sclerae are anicteric. Oral mucosa is pink and moist with no lesions or thrush.   Lymph: + L cervical tenderness   Neck is supple   Heart: Regular rate and rhythm.   Lungs: Clear to auscultation bilaterally.   Abdomen: Bowel sounds present, soft, nontender with no palpable hepatosplenomegaly or masses. Suprapubic catheter is intact. Minimal irritation around the insertion site.  Extremities: No lower extremity edema noted bilaterally.     Skin: No rashes, petechiae, or bruising noted on exposed skin.  Suprapubic cath site clean      Laboratory Data:  Results for KING AILYN CHAVEZ" (MRN [id number]) as of 10/6/2017 14:47   Ref. Range 10/6/2017 07:49   Sodium Latest Ref Range: 133 - 144 mmol/L 129 (L)   Potassium Latest Ref Range: 3.4 - 5.3 mmol/L 3.9   Chloride Latest Ref Range: 94 - 109 mmol/L 97   Carbon Dioxide Latest Ref Range: 20 - 32 mmol/L 26   Urea Nitrogen Latest Ref Range: 7 - 30 mg/dL 7   Creatinine Latest Ref Range: 0.66 - 1.25 mg/dL 1.16   GFR Estimate Latest Ref Range: >60 mL/min/1.7m2 62   GFR Estimate If Black Latest Ref Range: >60 mL/min/1.7m2 75   Calcium Latest Ref Range: 8.5 - 10.1 mg/dL 8.6   Anion Gap Latest Ref Range: 3 - 14 mmol/L 6   Magnesium Latest Ref Range: 1.6 - 2.3 mg/dL 1.5 (L)   Albumin Latest Ref Range: 3.4 - 5.0 g/dL 2.7 (L)   Protein Total Latest Ref Range: 6.8 - 8.8 g/dL 6.9   Bilirubin Total Latest Ref Range: 0.2 - 1.3 mg/dL 0.5   Alkaline Phosphatase Latest Ref Range: 40 - 150 U/L 69   ALT Latest Ref Range: 0 - 70 U/L 18   AST Latest Ref Range: 0 - 45 U/L 24   Osmolality Latest Ref Range: 280 - 301 mmol/kg 271 (L)   TSH Latest Ref Range: 0.40 - 4.00 mU/L 1.61   Glucose Latest Ref Range: 70 - 99 mg/dL 98   WBC Latest Ref Range: 4.0 - 11.0 10e9/L 8.5   Hemoglobin Latest Ref Range: 13.3 - 17.7 g/dL 10.0 (L)   Hematocrit Latest Ref Range: 40.0 - 53.0 % 30.3 (L)   Platelet Count Latest Ref Range: 150 - 450 10e9/L 320   RBC Count Latest Ref Range: 4.4 - 5.9 10e12/L 3.46 (L)   MCV Latest Ref Range: 78 - 100 fl 88   MCH Latest Ref Range: 26.5 - 33.0 pg 28.9   MCHC Latest Ref Range: 31.5 - 36.5 g/dL 33.0   RDW Latest Ref Range: 10.0 - 15.0 % 14.6   Diff Method Unknown Automated Method   % Neutrophils Latest Units: % 61.4   % Lymphocytes Latest Units: % 22.7   % Monocytes Latest Units: % 14.3   % Eosinophils Latest Units: % 0.6   % Basophils Latest Units: % 0.2   % Immature Granulocytes Latest Units: % 0.8   Nucleated RBCs Latest Ref Range: 0 /100 0   Absolute Neutrophil Latest Ref Range: 1.6 - 8.3 10e9/L 5.2   Absolute Lymphocytes Latest Ref Range: 0.8 - 5.3 10e9/L  1.9   Absolute Monocytes Latest Ref Range: 0.0 - 1.3 10e9/L 1.2   Absolute Eosinophils Latest Ref Range: 0.0 - 0.7 10e9/L 0.1   Absolute Basophils Latest Ref Range: 0.0 - 0.2 10e9/L 0.0   Abs Immature Granulocytes Latest Ref Range: 0 - 0.4 10e9/L 0.1   Absolute Nucleated RBC Unknown 0.0     Results for KING AILYN CHAVEZ (MRN [id number]) as of 10/6/2017 14:47   Ref. Range 10/6/2017 10:05   Chloride Urine mmol/L Latest Units: mmol/L 127   Creatinine Urine Random Latest Units: mg/dL 100   Potassium Urine mmol/L Latest Units: mmol/L 31   Sodium Urine mmol/L Latest Units: mmol/L 100   Urine Osmolality Latest Ref Range: 100 - 1200 mmol/kg 375   Color Urine Unknown Yellow   Appearance Urine Unknown Slightly Cloudy   Glucose Urine Latest Ref Range: NEG^Negative mg/dL Negative   Bilirubin Urine Latest Ref Range: NEG^Negative  Negative   Ketones Urine Latest Ref Range: NEG^Negative mg/dL Negative   Specific Gravity Urine Latest Ref Range: 1.003 - 1.035  1.010   pH Urine Latest Ref Range: 5.0 - 7.0 pH 6.0   Protein Albumin Urine Latest Ref Range: NEG^Negative mg/dL 30 (A)   Urobilinogen mg/dL Latest Ref Range: 0.0 - 2.0 mg/dL 0.0   Nitrite Urine Latest Ref Range: NEG^Negative  Negative   Blood Urine Latest Ref Range: NEG^Negative  Moderate (A)   Leukocyte Esterase Urine Latest Ref Range: NEG^Negative  Large (A)   Source Unknown Midstream Urine   WBC Urine Latest Ref Range: 0 - 2 /HPF 50 (H)   RBC Urine Latest Ref Range: 0 - 2 /HPF 15 (H)   Bacteria Urine Latest Ref Range: NEG^Negative /HPF Few (A)   Mucous Urine Latest Ref Range: NEG^Negative /LPF Present (A)   Specimen Description Unknown Midstream Urine   Culture Micro Unknown PENDING   URINE CULTURE AEROBIC BACTERIAL Unknown Rpt       Assessment and Plan:  This is a 72 y/o male with     Urothelial cancer:  s/p cycle 1 cisplatin / gemzar  S/p cycle 1. -complicated with dehydration, fever, nausea.that starts after 3 days of chemo.  - Today, he is schedulled for cycle 2. Since  there is major improvement in symptoms and resolution of AKII, we will proceed with cycle 2 today. Pt also agrees to go with chemo today. However will schedule, for NS hydration on Monday, he will take decadron  8 mg x 3 days post cisplatin  -will schedule for IVF and antiemetics on Monday and around day 11.   -reviewed the f/u plan, which will include a PET/CT after the second cycle to assess the pulmonary nodules and the primary tumor.  -will request a scheduled f/u with Dr. Arceo in 3 weeks with a PET/CT scan      Pulmonary nodules:   -unclear if this relates to prior TB vs metastatic disease vs new primary malignancy  -plan per Dr. Arceo is to re-evaluate after PET/CT and determine if Bx needed.    Hx of fever - resolved   Had temp  of 100.4 on 10/03 was not neutropenic   Blood cxs NAD  Urine cxs negative     CKD  Cr down to 1.16 from 1.42  scheduled with hydration post chemo on Monday . Avoid NSAID . Can take tylenol instead.   Monitor     Hyponatremia - look like chronic, but will evaluate for SIADH.   Likely due to CKD but will order urine lytes , UA, TSH and blood and urine osmolality      Mood   He was very anxious at the beginning of the exam , but after talking to him he felt better . He refuse therapy or counselor for now. Has declined palliative care consulation. Offered support. I think some of this stems from his feeling of lack of communication between the care teams. Discussed that we would try to work on clearer communication in the future.    Cervical tenderness   R/t Esophageal diverticulum  -He was seeing ENT at [facility] and would like to transfer his care here- was requested earlier this week and is pending.     Constipation   Cont miralax prn   He was asked to start colace    high fiber food discussed     Nausea- improved at present. Likely to worsen again with cisplatin. Will return in about 3 days to IVF/antiemetics Reviewed the oral antiemetics, need for dexamethasone x 3 days post  cisplatin. Use of compazine and ativan and avoidance of zofran on the first 3 days post infusion (given aloxi).    Hiccups-suspect this relates to gastritis, particularly while on dexamethasone. Had insurance coverage issues with omeprazole. Would like to try a new PPI.  Switch omeprazole to Protonix     Nutrition   Offered nutrition consult but pt deferred for now     RTC on Monday for iv fluids     The patient was seen in conjunction with Ross Terrazas NP who served as a scribe for today's visit. I have reviewed the note and agree with the above findings and plan. TW

## 2017-10-06 NOTE — NURSING NOTE
Chief Complaint   Patient presents with     Blood Draw     Labs Drawn      Peripheral IV started. Labs drawn from IV.     Lauren Schoen, RN

## 2017-10-06 NOTE — NURSING NOTE
"Oncology Rooming Note    October 6, 2017 7:57 AM   King Gonsalo Bruno is a 71 year old male who presents for:    Chief Complaint   Patient presents with     Blood Draw     Labs Drawn      Oncology Clinic Visit     Bladder Ca- F/U     Initial Vitals: /63  Pulse 86  Temp 97.6  F (36.4  C) (Oral)  Resp 15  Ht 1.905 m (6' 3\")  Wt 76.3 kg (168 lb 3.2 oz)  SpO2 94%  BMI 21.02 kg/m2 Estimated body mass index is 21.02 kg/(m^2) as calculated from the following:    Height as of this encounter: 1.905 m (6' 3\").    Weight as of this encounter: 76.3 kg (168 lb 3.2 oz). Body surface area is 2.01 meters squared.  Moderate Pain (5) Comment: Data Unavailable   No LMP for male patient.  Allergies reviewed: Yes  Medications reviewed: Yes    Medications: Medication refills not needed today.  Pharmacy name entered into [employer]: [facility] [address] [place] [address] AT [address] [place] [address]    Clinical concerns: no clinical concerns  provider was notified.    7 minutes for nursing intake (face to face time)     Martita Adrian CMA              "

## 2017-10-06 NOTE — TELEPHONE ENCOUNTER
Prior Authorization Approval    Authorization Effective Date: 7/18/2017  Authorization Expiration Date: 10/6/2018  Medication: pantoprazole - approval  Approved Dose/Quantity: 20mg 60/30  Reference #:     Insurance Company: [employer] - Phone [phone] Fax [phone]  Expected CoPay:       CoPay Card Available:      Foundation Assistance Needed:    Which Pharmacy is filling the prescription (Not needed for infusion/clinic administered): [facility] [address] 1-949  Pharmacy Notified: Yes  Patient Notified: Yes        Vincent Hirsch  [facility]  Oncology Pharmacy Liaison   [email or web address]  [phone] (phone  [phone] (fax

## 2017-10-06 NOTE — LETTER
10/6/2017       RE: King Gonsalo Bruno  [address]  [facility] [address]     Dear Colleague,    Thank you for referring your patient, King Gonsalo Bruno, to the [facility]. Please see a copy of my visit note below.    Oncology/Hematology Visit Note  Oct 6, 2017    Reason for Visit: follow up of urothelial carcinoma    History of Present Illness: King Gonsalo Bruno is a 71 year old male with penile urethral carcioma who is undergoing neoadjuvant Cisplatin/Gemzar, split on days 1 and 8 due to CKD. Please see previous notes for further details on the patient's history. He comes in today for routine follow up prior to cycle . He was seen by Janene Alves earlier this week with dehydration, N/V and concerns of fever.    Interval History:  He reports he had rough time a couple days after chemo , he was dehydrated, nauseated, had fever 100.4 F  and did not feel well. He received iv fluids .It is  hard to cope with everything. Overall he tells me he feels better.   He reports having hiccups that make  him anxious,  he does not like how omeprazole makes him feel. He is also waiting for ENT appointment re: esophageal diverticulum. His pain is stable and he takes advil prn . His suprapubic cath is draining well , denies hematuria or clots. He is scheduled for cath replacement today  Feels constipated , he took miralax today and will start colace today . He denies NV now , denies abdominal pain . His energy is at baseline.  Denies fever chills or sweats .       Review of Systems:  Patient denies any of the following except if noted above: fevers, chills, , vision or hearing changes, chest pain, dyspnea, abdominal pain, nausea, vomiting, diarrhea, c, urinary concerns, headaches, numbness, tingling, . He also denies lumps, bumps, rashes or skin lesions, bleeding or bruising issues.    Current Outpatient Prescriptions   Medication Sig Dispense Refill     LORazepam (ATIVAN) 0.5 MG tablet Take 1 tablet  "(0.5 mg) by mouth every 4 hours as needed (Anxiety, Nausea/Vomiting or Sleep) 30 tablet 3     prochlorperazine (COMPAZINE) 10 MG tablet Take 0.5 tablets (5 mg) by mouth every 6 hours as needed (Nausea/Vomiting) 30 tablet 3     ondansetron (ZOFRAN) 8 MG tablet Take 1 tablet (8 mg) by mouth every 8 hours as needed for nausea 20 tablet 3     pantoprazole (PROTONIX) 20 MG EC tablet Take 1 tablet (20 mg) by mouth 2 times daily 60 tablet 2     docusate sodium (COLACE) 100 MG capsule Take 1 capsule (100 mg) by mouth 2 times daily as needed for constipation 60 capsule 0     amLODIPine (NORVASC) 5 MG tablet Take 2 tablets (10 mg) by mouth daily 60 tablet 0     dexamethasone (DECADRON) 4 MG tablet Take 2 tablets (8 mg) by mouth daily Take for 3 days, starting the day after cisplatin (Day 2 and Day 9). 12 tablet 3     HYDROXYZINE HCL PO Take 5 mg by mouth At Bedtime        fluticasone (FLONASE) 50 MCG/ACT spray Spray 1 spray into both nostrils every morning          Physical Examination:  General: The patient is a pleasant male in no acute distress.  /63  Pulse 86  Temp 97.6  F (36.4  C) (Oral)  Resp 15  Ht 1.905 m (6' 3\")  Wt 76.3 kg (168 lb 3.2 oz)  SpO2 94%  BMI 21.02 kg/m2  Wt Readings from Last 10 Encounters:   10/06/17 76.3 kg (168 lb 3.2 oz)   10/03/17 73.6 kg (162 lb 4.8 oz)   09/29/17 70.6 kg (155 lb 11.2 oz)   09/25/17 72.8 kg (160 lb 9.6 oz)   09/22/17 71.4 kg (157 lb 6.4 oz)   09/15/17 73 kg (160 lb 14.4 oz)   09/13/17 75.3 kg (166 lb)   08/23/17 73.6 kg (162 lb 4.1 oz)   08/23/17 73.6 kg (162 lb 3.2 oz)   08/22/17 72.4 kg (159 lb 11.2 oz)     Anxious   HEENT: EOMI, PERRL. Sclerae are anicteric. Oral mucosa is pink and moist with no lesions or thrush.   Lymph: + L cervical tenderness   Neck is supple   Heart: Regular rate and rhythm.   Lungs: Clear to auscultation bilaterally.   Abdomen: Bowel sounds present, soft, nontender with no palpable hepatosplenomegaly or masses. Suprapubic catheter is intact. " Minimal irritation around the insertion site.  Extremities: No lower extremity edema noted bilaterally.     Skin: No rashes, petechiae, or bruising noted on exposed skin.  Suprapubic cath site clean      Laboratory Data:  Results for KING AILYN CHAVEZ (MRN [id number]) as of 10/6/2017 14:47   Ref. Range 10/6/2017 07:49   Sodium Latest Ref Range: 133 - 144 mmol/L 129 (L)   Potassium Latest Ref Range: 3.4 - 5.3 mmol/L 3.9   Chloride Latest Ref Range: 94 - 109 mmol/L 97   Carbon Dioxide Latest Ref Range: 20 - 32 mmol/L 26   Urea Nitrogen Latest Ref Range: 7 - 30 mg/dL 7   Creatinine Latest Ref Range: 0.66 - 1.25 mg/dL 1.16   GFR Estimate Latest Ref Range: >60 mL/min/1.7m2 62   GFR Estimate If Black Latest Ref Range: >60 mL/min/1.7m2 75   Calcium Latest Ref Range: 8.5 - 10.1 mg/dL 8.6   Anion Gap Latest Ref Range: 3 - 14 mmol/L 6   Magnesium Latest Ref Range: 1.6 - 2.3 mg/dL 1.5 (L)   Albumin Latest Ref Range: 3.4 - 5.0 g/dL 2.7 (L)   Protein Total Latest Ref Range: 6.8 - 8.8 g/dL 6.9   Bilirubin Total Latest Ref Range: 0.2 - 1.3 mg/dL 0.5   Alkaline Phosphatase Latest Ref Range: 40 - 150 U/L 69   ALT Latest Ref Range: 0 - 70 U/L 18   AST Latest Ref Range: 0 - 45 U/L 24   Osmolality Latest Ref Range: 280 - 301 mmol/kg 271 (L)   TSH Latest Ref Range: 0.40 - 4.00 mU/L 1.61   Glucose Latest Ref Range: 70 - 99 mg/dL 98   WBC Latest Ref Range: 4.0 - 11.0 10e9/L 8.5   Hemoglobin Latest Ref Range: 13.3 - 17.7 g/dL 10.0 (L)   Hematocrit Latest Ref Range: 40.0 - 53.0 % 30.3 (L)   Platelet Count Latest Ref Range: 150 - 450 10e9/L 320   RBC Count Latest Ref Range: 4.4 - 5.9 10e12/L 3.46 (L)   MCV Latest Ref Range: 78 - 100 fl 88   MCH Latest Ref Range: 26.5 - 33.0 pg 28.9   MCHC Latest Ref Range: 31.5 - 36.5 g/dL 33.0   RDW Latest Ref Range: 10.0 - 15.0 % 14.6   Diff Method Unknown Automated Method   % Neutrophils Latest Units: % 61.4   % Lymphocytes Latest Units: % 22.7   % Monocytes Latest Units: % 14.3   % Eosinophils Latest  Units: % 0.6   % Basophils Latest Units: % 0.2   % Immature Granulocytes Latest Units: % 0.8   Nucleated RBCs Latest Ref Range: 0 /100 0   Absolute Neutrophil Latest Ref Range: 1.6 - 8.3 10e9/L 5.2   Absolute Lymphocytes Latest Ref Range: 0.8 - 5.3 10e9/L 1.9   Absolute Monocytes Latest Ref Range: 0.0 - 1.3 10e9/L 1.2   Absolute Eosinophils Latest Ref Range: 0.0 - 0.7 10e9/L 0.1   Absolute Basophils Latest Ref Range: 0.0 - 0.2 10e9/L 0.0   Abs Immature Granulocytes Latest Ref Range: 0 - 0.4 10e9/L 0.1   Absolute Nucleated RBC Unknown 0.0     Results for KING AILYN CHAVEZ (MRN [id number]) as of 10/6/2017 14:47   Ref. Range 10/6/2017 10:05   Chloride Urine mmol/L Latest Units: mmol/L 127   Creatinine Urine Random Latest Units: mg/dL 100   Potassium Urine mmol/L Latest Units: mmol/L 31   Sodium Urine mmol/L Latest Units: mmol/L 100   Urine Osmolality Latest Ref Range: 100 - 1200 mmol/kg 375   Color Urine Unknown Yellow   Appearance Urine Unknown Slightly Cloudy   Glucose Urine Latest Ref Range: NEG^Negative mg/dL Negative   Bilirubin Urine Latest Ref Range: NEG^Negative  Negative   Ketones Urine Latest Ref Range: NEG^Negative mg/dL Negative   Specific Gravity Urine Latest Ref Range: 1.003 - 1.035  1.010   pH Urine Latest Ref Range: 5.0 - 7.0 pH 6.0   Protein Albumin Urine Latest Ref Range: NEG^Negative mg/dL 30 (A)   Urobilinogen mg/dL Latest Ref Range: 0.0 - 2.0 mg/dL 0.0   Nitrite Urine Latest Ref Range: NEG^Negative  Negative   Blood Urine Latest Ref Range: NEG^Negative  Moderate (A)   Leukocyte Esterase Urine Latest Ref Range: NEG^Negative  Large (A)   Source Unknown Midstream Urine   WBC Urine Latest Ref Range: 0 - 2 /HPF 50 (H)   RBC Urine Latest Ref Range: 0 - 2 /HPF 15 (H)   Bacteria Urine Latest Ref Range: NEG^Negative /HPF Few (A)   Mucous Urine Latest Ref Range: NEG^Negative /LPF Present (A)   Specimen Description Unknown Midstream Urine   Culture Micro Unknown PENDING   URINE CULTURE AEROBIC BACTERIAL  Unknown Rpt       Assessment and Plan:  This is a 70 y/o male with     Urothelial cancer:  s/p cycle 1 cisplatin / gemzar  S/p cycle 1. -complicated with dehydration, fever, nausea.that starts after 3 days of chemo.  - Today, he is schedulled for cycle 2. Since there is major improvement in symptoms and resolution of AKII, we will proceed with cycle 2 today. Pt also agrees to go with chemo today. However will schedule, for NS hydration on Monday, he will take decadron  8 mg x 3 days post cisplatin  -will schedule for IVF and antiemetics on Monday and around day 11.   -reviewed the f/u plan, which will include a PET/CT after the second cycle to assess the pulmonary nodules and the primary tumor.  -will request a scheduled f/u with Dr. Arceo in 3 weeks with a PET/CT scan      Pulmonary nodules:   -unclear if this relates to prior TB vs metastatic disease vs new primary malignancy  -plan per Dr. Arceo is to re-evaluate after PET/CT and determine if Bx needed.    Hx of fever - resolved   Had temp  of 100.4 on 10/03 was not neutropenic   Blood cxs NAD  Urine cxs negative     CKD  Cr down to 1.16 from 1.42  scheduled with hydration post chemo on Monday . Avoid NSAID . Can take tylenol instead.   Monitor     Hyponatremia - look like chronic, but will evaluate for SIADH.   Likely due to CKD but will order urine lytes , UA, TSH and blood and urine osmolality      Mood   He was very anxious at the beginning of the exam , but after talking to him he felt better . He refuse therapy or counselor for now. Has declined palliative care consulation. Offered support. I think some of this stems from his feeling of lack of communication between the care teams. Discussed that we would try to work on clearer communication in the future.    Cervical tenderness   R/t Esophageal diverticulum  -He was seeing ENT at [facility] and would like to transfer his care here- was requested earlier this week and is pending.     Constipation   Cont  miralax prn   He was asked to start colace    high fiber food discussed     Nausea- improved at present. Likely to worsen again with cisplatin. Will return in about 3 days to IVF/antiemetics Reviewed the oral antiemetics, need for dexamethasone x 3 days post cisplatin. Use of compazine and ativan and avoidance of zofran on the first 3 days post infusion (given aloxi).    Hiccups-suspect this relates to gastritis, particularly while on dexamethasone. Had insurance coverage issues with omeprazole. Would like to try a new PPI.  Switch omeprazole to Protonix     Nutrition   Offered nutrition consult but pt deferred for now     RTC on Monday for iv fluids     The patient was seen in conjunction with Ross Terrazas NP who served as a scribe for today's visit. I have reviewed the note and agree with the above findings and plan. TW        Again, thank you for allowing me to participate in the care of your patient.      Sincerely,    ISAURA Verma CNP

## 2017-10-06 NOTE — NURSING NOTE
"Chief Complaint   Patient presents with     RECHECK     SPT tube change       Initial /67  Pulse 85  Ht 1.918 m (6' 3.5\")  Wt 75.3 kg (166 lb)  BMI 20.47 kg/m2 Estimated body mass index is 20.47 kg/(m^2) as calculated from the following:    Height as of this encounter: 1.918 m (6' 3.5\").    Weight as of this encounter: 75.3 kg (166 lb).  Medication Reconciliation: complete     ALL Amador    "

## 2017-10-06 NOTE — PROGRESS NOTES
Infusion Nursing Note:  King Gonsalo Bruno presents today for Cycle 2 Day 1 Gemzar and Cisplatin.    Patient seen by provider today: Yes: Yessica Ovalles NP   present during visit today: Not Applicable.    Note: Patient tolerated Gemzar over 1 hour with hot pack on arm.  IV fluids running at 500 mL/hr.    At end of infusion a lump was noted around IV site.  IV site flushed with some resistance.  Positive blood return still obtained.  Patient denied any discomfort.  This author estimated that fluid in arm appeared to be 15-20 cc.  Patient stated that about 20 minutes before end of infusion felt a tug at IV site as he was sitting back down after returning to the bathroom.  Both NS with Magnesium sulfate and Cisplatin were infusing.  Reviewed medications with Maya Olsen, pharmacist.  Cisplatin at concentration of less then 0.4 mg/mL and less than 20 mL not a vesicant.  Ice only recommended intervention.  Patient given ice pack at discharge.  He was instructed to call if swelling does not improve, if he experiences discomfort or notices any redness at the site.  Patient and significant other verbalized understanding. Yessica Ovalles NP notified.    Intravenous Access:  Peripheral IV placed in lab.    Treatment Conditions:  Lab Results   Component Value Date    HGB 10.0 10/06/2017     Lab Results   Component Value Date    WBC 8.5 10/06/2017      Lab Results   Component Value Date    ANEU 5.2 10/06/2017     Lab Results   Component Value Date     10/06/2017      Lab Results   Component Value Date     10/06/2017                   Lab Results   Component Value Date    POTASSIUM 3.9 10/06/2017           Lab Results   Component Value Date    MAG 1.5 10/06/2017            Lab Results   Component Value Date    CR 1.16 10/06/2017                   Lab Results   Component Value Date    SAADIA 8.6 10/06/2017                Lab Results   Component Value Date    BILITOTAL 0.5 10/06/2017           Lab Results   Component  Value Date    ALBUMIN 2.7 10/06/2017                    Lab Results   Component Value Date    ALT 18 10/06/2017           Lab Results   Component Value Date    AST 24 10/06/2017     Results reviewed, labs MET treatment parameters, ok to proceed with treatment.          Post Infusion Assessment:  Blood return noted pre and post infusion.  Access discontinued per protocol.    Discharge Plan:   Prescription refills given for Ativan, Compazine, Dexamethasone, Zofran, and Protonix.  Discharge instructions reviewed with: Patient.  Patient and/or family verbalized understanding of discharge instructions and all questions answered.  Copy of AVS reviewed with patient and/or family.  Patient will return 10/9/17 for next appointment.  Patient discharged in stable condition accompanied by: significant ther.  Departure Mode: Ambulatory.    Kimberlee Lowe RN

## 2017-10-06 NOTE — LETTER
10/6/2017       RE: King Gonsalo Bruno  [address]  [facility] [address]     Dear Colleague,    Thank you for referring your patient, King Gonsalo Bruno, to the [facility] UROLOGY AND INST FOR PROSTATE AND UROLOGIC CANCERS at [facility]. Please see a copy of my visit note below.    ASSESSMENT and Plan  Suprapubic tube changed without problem.  Plan to follow-up in 1 month for another change  Plan for follow-up with Dr Haddad.  _________________________________________________________________    CHIEF COMPLAINT   It was my pleasure to see King Gonsalo Bruno who is a 71 year old male for follow-up of suprapubic tube.      HPI  Mr. Bruno is doing well with the tube.  Tube was placed on 8/31/17.    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      GERD (gastroesophageal reflux disease)      Hypertension      PONV (postoperative nausea and vomiting)      Past Surgical History:   Procedure Laterality Date     CYSTOSCOPY, BIOPSY BLADDER, COMBINED N/A 8/31/2017    Procedure: COMBINED CYSTOSCOPY, BIOPSY BLADDER;  Cystoscopy, Suprapubic Tube Placement with Ultrasound Guidiance, Uretheral Dilation;  Surgeon: Muriel Haddad MD;  Location: [facility] OR     CYSTOSTOMY, INSERT TUBE SUPRAPUBIC, COMBINED N/A 8/31/2017    Procedure: COMBINED CYSTOSTOMY, INSERT TUBE SUPRAPUBIC;;  Surgeon: Muriel Haddad MD;  Location: [facility] OR     LARYNGOSCOPY  2013    Direct laryngoscopy with the use of rigid endoscopy and takedown of left hypopharyngeal diverticula.      Current Outpatient Prescriptions   Medication Sig  Dispense Refill     LORazepam (ATIVAN) 0.5 MG tablet Take 1 tablet (0.5 mg) by mouth every 4 hours as needed (Anxiety, Nausea/Vomiting or Sleep) 30 tablet 3     prochlorperazine (COMPAZINE) 10 MG tablet Take 0.5 tablets (5 mg) by mouth every 6 hours as needed (Nausea/Vomiting) (Patient not taking: Reported on 11/3/2017) 30 tablet 3     ondansetron (ZOFRAN) 8 MG tablet Take 1 tablet (8 mg) by mouth every 8 hours as needed for nausea (Patient not taking: Reported on 11/3/2017) 20 tablet 3     docusate sodium (COLACE) 100 MG capsule Take 1 capsule (100 mg) by mouth 2 times daily as needed for constipation 60 capsule 0     dexamethasone (DECADRON) 4 MG tablet Take 2 tablets (8 mg) by mouth daily Take for 3 days, starting the day after cisplatin (Day 2 and Day 9). 12 tablet 3     HYDROXYZINE HCL PO Take 5 mg by mouth At Bedtime        fluticasone (FLONASE) 50 MCG/ACT spray Spray 1 spray into both nostrils every morning        omeprazole (PRILOSEC) 2 mg/mL SUSP Take 10 mLs (20 mg) by mouth 2 times daily 280 mL 0     nystatin (MYCOSTATIN) 814082 UNIT/ML suspension Take 5 mLs (500,000 Units) by mouth 4 times daily Swish and spit 200 mL 0     amLODIPine (NORVASC) 5 MG tablet Take 2 tablets (10 mg) by mouth daily 60 tablet 3     OLANZapine (ZYPREXA) 5 MG tablet Take 1 tablet (5 mg) by mouth At Bedtime 30 tablet 0     sucralfate (CARAFATE) 1 GM/10ML suspension Take 10 mLs (1 g) by mouth 4 times daily for 20 days 800 mL 0     FIRST-OMEPRAZOLE 2 MG/ML SUSP Take 10 mLs (20 mg) by mouth 2 times daily 300 mL 0     alum & mag hydroxide-simethicone (MYLANTA/MAALOX) 200-200-20 MG/5ML SUSP suspension Take 30 mLs by mouth every 4 hours as needed for indigestion 355 mL 1      SOCIAL HISTORY: He  reports that he quit smoking about 25 years ago. He started smoking about 45 years ago. He has a 20.00 pack-year smoking history. He has never used smokeless tobacco. He reports that he does not drink alcohol or use illicit drugs.    PHYSICAL  "EXAM  Vitals:    10/06/17 1414   BP: 117/67   Pulse: 85   Weight: 75.3 kg (166 lb)   Height: 1.918 m (6' 3.5\")     Constitutional: Alert, no acute distress  Psychiatric: Normal mood and affect  Abdomen: soft, non tender, non distended.    :  Suprapubic tube was changed with some difficulty, but clear urine output after replacement.    Again, thank you for allowing me to participate in the care of your patient.      Sincerely,    Audie Rooney MD    CC  Patient Care Team:  Joan Ventura NP as PCP - General (Nurse Practitioner)  Ry Dunn as Referring Physician (Surgery)  Muriel Bruce MD as MD (Urology)  Lizzy Chowdary, RN as Registered Nurse (Urology)  Steve Arceo MD as MD (Oncology)  Janet Murrieta, RN as Nurse Coordinator (Oncology)  MURIEL BRUCE    Copy to patient  KING AILYN CHAVEZ  [address]  [facility] [address]            "

## 2017-10-07 LAB
BACTERIA SPEC CULT: NORMAL
Lab: NORMAL
SPECIMEN SOURCE: NORMAL

## 2017-10-09 ENCOUNTER — INFUSION THERAPY VISIT (OUTPATIENT)
Dept: TRANSPLANT | Facility: CLINIC | Age: 72
End: 2017-10-09
Attending: INTERNAL MEDICINE
Payer: MEDICARE

## 2017-10-09 VITALS
SYSTOLIC BLOOD PRESSURE: 166 MMHG | OXYGEN SATURATION: 100 % | RESPIRATION RATE: 16 BRPM | TEMPERATURE: 98 F | HEART RATE: 90 BPM | DIASTOLIC BLOOD PRESSURE: 77 MMHG

## 2017-10-09 DIAGNOSIS — C68.0 URETHRAL CANCER (H): ICD-10-CM

## 2017-10-09 DIAGNOSIS — R11.0 NAUSEA: Primary | ICD-10-CM

## 2017-10-09 DIAGNOSIS — E86.0 DEHYDRATION: ICD-10-CM

## 2017-10-09 LAB
BACTERIA SPEC CULT: NO GROWTH
BACTERIA SPEC CULT: NO GROWTH
SPECIMEN SOURCE: NORMAL
SPECIMEN SOURCE: NORMAL

## 2017-10-09 PROCEDURE — 40000141 ZZH STATISTIC PERIPHERAL IV START W/O US GUIDANCE: Mod: ZF

## 2017-10-09 PROCEDURE — 25000128 H RX IP 250 OP 636: Mod: ZF | Performed by: PHYSICIAN ASSISTANT

## 2017-10-09 PROCEDURE — 96360 HYDRATION IV INFUSION INIT: CPT

## 2017-10-09 RX ORDER — PALONOSETRON 0.05 MG/ML
0.25 INJECTION, SOLUTION INTRAVENOUS ONCE
Status: CANCELLED
Start: 2017-10-09 | End: 2017-10-09

## 2017-10-09 RX ADMIN — SODIUM CHLORIDE 1000 ML: 9 INJECTION, SOLUTION INTRAVENOUS at 14:47

## 2017-10-09 ASSESSMENT — PAIN SCALES - GENERAL: PAINLEVEL: NO PAIN (0)

## 2017-10-09 NOTE — PROGRESS NOTES
Infusion Nursing Note:  King Gonsalo Bruno presents today for fluids.    Patient seen by provider today: No   present during visit today: Not Applicable.    Note: Patient received 1L NS over an hour.  Patient did not report any nausea, in fact he ate a sandwhich and some cookies and a ginger ale.  No antiemetics were given.    Intravenous Access:  Peripheral IV placed.    Treatment Conditions:  Patient was added on to our schedule last week at some point and is an off service Heme Onc patient.  This appointment was ordered by AUSTIN Ovalles.      Post Infusion Assessment:  Patient tolerated infusion without incident.    Discharge Plan:   Discharge instructions reviewed with: Patient.  Patient discharged in stable condition accompanied by: wife.  Departure Mode: Ambulatory.    Selam Mendez RN

## 2017-10-09 NOTE — MR AVS SNAPSHOT
After Visit Summary   10/9/2017    King Gonsalo Bruno    MRN: [id number]           Patient Information     Date Of Birth          1945        Visit Information        Provider Department      10/9/2017 2:00 PM UC 3 ATC; UC BMT INFUSION [facility] Blood and Marrow Transplant        Today's Diagnoses     Nausea    -  1    Dehydration        Urethral cancer (H)              [facility] ([facility]  [address] [place] [id number]  Phone: [phone]  Clinic Hours:   Monday-Thursday:7am to 7pm   Friday: 7am to 5pm   Weekends and holidays:    8am to noon (in general)  If your fever is 100.5  or greater,   call the clinic.  After hours call the   hospital at [phone] or   [phone]. Ask for the BMT   fellow on-call            Follow-ups after your visit        Your next 10 appointments already scheduled     Oct 13, 2017  9:00 AM CDT   [facility] Lab Draw with UC [facility] LAB DRAW   [facility] Lab Draw ([facility])    [address] [place] [id number] [phone]            Oct 13, 2017 10:00 AM CDT   Infusion 360 with UC ONCOLOGY INFUSION, UC 12 ATC   [facility] ([facility] [place] [facility])    [address] [place] [id number] [phone]            Oct 16, 2017  3:30 PM CDT   Infusion 120 with UC ONCOLOGY INFUSION, UC 16 ATC   [facility] ([facility])    [address] [place] [id number] [phone]            Oct 23, 2017  9:45 AM CDT   PE NPET ONCOLOGY (EYES TO THIGHS) with UUPET1   [facility] [place] PET CT ([facility] [place])    [address] [facility] [id number] [phone]           Tell your doctor:   If there is any chance you may be pregnant or if you are breastfeeding.   If you have problems lying in small spaces (claustrophobia). If  you do, your doctor may give you medicine to help you relax. If you have diabetes:   Have your exam early in the morning. Your blood glucose will go up as the day goes by.   Your glucose level must be 180 or less at the start of the exam. Please take any medicines you need to ensure this blood glucose level. 24 hours before your scan: Don t do any heavy exercise. (No jogging, aerobics or other workouts.) Exercise will make your pictures less accurate. 6 hours before your scan:   Stop all food and liquids (except water).   Do not chew gum or suck on mints.   If you need to take medicine with food, you may take it with a few crackers.  Please call your Imaging Department at your exam site with any questions.            Oct 25, 2017  9:20 AM CDT   (Arrive by 9:05 AM)   Cystoscopy with Muriel Haddad MD   [facility] for Prostate and Urologic Cancers ([facility])    [address] [place] [address]  4th [facility] [phone]            Oct 25, 2017  9:45 AM CDT   [facility] Lab Draw with  [facility] LAB DRAW   [facility] Lab Draw ([facility])    [address] [place] [address]  2nd [facility] [id number] [phone]            Oct 25, 2017 10:15 AM CDT   (Arrive by 10:00 AM)   Return Visit with Steve Arceo MD   [facility] ([facility])    [address] [place] [address]  2nd [facility] [id number] [phone]            Oct 27, 2017  9:30 AM CDT   [facility] Lab Draw with  MASONIC LAB DRAW   [facility] Lab Draw ([facility])    [address] [place] [address]  2nd [facility] [id number] [phone]              Who to contact     If you have questions or need follow up information about today's clinic visit or your schedule please contact [facility]ARROW [facility] directly at [phone].  Normal or non-critical lab and imaging results  "will be communicated to you by Elancehart, letter or phone within 4 business days after the clinic has received the results. If you do not hear from us within 7 days, please contact the clinic through [employer] or phone. If you have a critical or abnormal lab result, we will notify you by phone as soon as possible.  Submit refill requests through [employer] or call your pharmacy and they will forward the refill request to us. Please allow 3 business days for your refill to be completed.          Additional Information About Your Visit        [employer] Information     [employer] lets you send messages to your doctor, view your test results, renew your prescriptions, schedule appointments and more. To sign up, go to [email or web address] . Click on \"Log in\" on the left side of the screen, which will take you to the Welcome page. Then click on \"Sign up Now\" on the right side of the page.     You will be asked to enter the access code listed below, as well as some personal information. Please follow the directions to create your username and password.     Your access code is: [email or web address]  Expires: 10/31/2017  1:07 PM     Your access code will  in 90 days. If you need help or a new code, please call your [facility] clinic or [phone].        Care EveryWhere ID     This is your Care EveryWhere ID. This could be used by other organizations to access your [facility] medical records  [id number]        Your Vitals Were     Pulse Temperature Respirations Pulse Oximetry          90 98  F (36.7  C) (Oral) 16 100%         Blood Pressure from Last 3 Encounters:   10/09/17 166/77   10/06/17 117/67   10/06/17 124/63    Weight from Last 3 Encounters:   10/06/17 75.3 kg (166 lb)   10/06/17 76.3 kg (168 lb 3.2 oz)   10/03/17 73.6 kg (162 lb 4.8 oz)              Today, you had the following     No orders found for display       Recent Review Flowsheet Data     BMT Recent Results Latest Ref Rng & Units 2017 9/15/2017 2017 " 9/22/2017 9/25/2017 10/3/2017 10/6/2017    WBC 4.0 - 11.0 10e9/L - 7.7 3.9(L) - - 10.4 8.5    Hemoglobin 13.3 - 17.7 g/dL - 12.6(L) 11.6(L) - - 9.9(L) 10.0(L)    Platelet Count 150 - 450 10e9/L - 310 172 - - 167 320    Neutrophils (Absolute) 1.6 - 8.3 10e9/L - 4.2 1.9 - - 8.7(H) 5.2    Sodium 133 - 144 mmol/L - 132(L) 129(L) 130(L) 128(L) 131(L) 129(L)    Potassium 3.4 - 5.3 mmol/L - 3.7 3.8 3.1(L) 3.4 3.8 3.9    Chloride 94 - 109 mmol/L - 96 93(L) 96 92(L) 96 97    Glucose 70 - 99 mg/dL 109(H) 103(H) 111(H) 105(H) 94 110(H) 98    Urea Nitrogen 7 - 30 mg/dL - 12 14 12 15 13 7    Creatinine 0.66 - 1.25 mg/dL 1.3(H) 1.33(H) 1.66(H) 1.52(H) 1.17 1.42(H) 1.16    Calcium (Total) 8.5 - 10.1 mg/dL - 9.6 9.4 8.2(L) 9.3 8.4(L) 8.6    Protein (Total) 6.8 - 8.8 g/dL - 7.8 7.6 - 7.4 7.2 6.9    Albumin 3.4 - 5.0 g/dL - 3.8 3.5 - 3.4 3.2(L) 2.7(L)    Alkaline Phosphatase 40 - 150 U/L - 65 67 - 63 70 69    AST 0 - 45 U/L - 23 26 - 39 26 24    ALT 0 - 70 U/L - 16 24 - 29 21 18    MCV 78 - 100 fl - 88 87 - - 88 88               Primary Care Provider Office Phone # Fax #    Joan Ventura [phone]       [facility] [address]  [facility] [address]        Equal Access to Services     JENELLE RED : Hadii светлана cui Soveronica, wacarlosda luqadaha, qaybta kaalmada rena, ronald porrasreidraymundo rasmussen. So [facility] [phone].    ATENCIÓN: Si habla español, tiene a washburn disposición servicios gratuitos de asistencia lingüística. Llame al [phone].    We comply with applicable federal civil rights laws and [place] laws. We do not discriminate on the basis of race, color, national origin, age, disability, sex, sexual orientation, or gender identity.            Thank you!     Thank you for choosing [facility] BLOOD AND MARROW TRANSPLANT  for your care. Our goal is always to provide you with excellent care. Hearing back from our patients is one way we can continue to improve our services. Please  take a few minutes to complete the written survey that you may receive in the mail after your visit with us. Thank you!             Your Updated Medication List - Protect others around you: Learn how to safely use, store and throw away your medicines at www.disposemymeds.org.          This list is accurate as of: 10/9/17  5:09 PM.  Always use your most recent med list.                   Brand Name Dispense Instructions for use Diagnosis    amLODIPine 5 MG tablet    NORVASC    60 tablet    Take 2 tablets (10 mg) by mouth daily    Benign essential hypertension       dexamethasone 4 MG tablet    DECADRON    12 tablet    Take 2 tablets (8 mg) by mouth daily Take for 3 days, starting the day after cisplatin (Day 2 and Day 9).    Urethral cancer (H)       docusate sodium 100 MG capsule    COLACE    60 capsule    Take 1 capsule (100 mg) by mouth 2 times daily as needed for constipation    Slow transit constipation       fluticasone 50 MCG/ACT spray    FLONASE     Spray 1 spray into both nostrils every morning        HYDROXYZINE HCL PO      Take 5 mg by mouth At Bedtime        LORazepam 0.5 MG tablet    ATIVAN    30 tablet    Take 1 tablet (0.5 mg) by mouth every 4 hours as needed (Anxiety, Nausea/Vomiting or Sleep)    Nausea       ondansetron 8 MG tablet    ZOFRAN    20 tablet    Take 1 tablet (8 mg) by mouth every 8 hours as needed for nausea    Nausea       pantoprazole 20 MG EC tablet    PROTONIX    60 tablet    Take 1 tablet (20 mg) by mouth 2 times daily    Gastroesophageal reflux disease without esophagitis       prochlorperazine 10 MG tablet    COMPAZINE    30 tablet    Take 0.5 tablets (5 mg) by mouth every 6 hours as needed (Nausea/Vomiting)    Nausea

## 2017-10-13 ENCOUNTER — APPOINTMENT (OUTPATIENT)
Dept: LAB | Facility: CLINIC | Age: 72
End: 2017-10-13
Attending: INTERNAL MEDICINE
Payer: MEDICARE

## 2017-10-13 ENCOUNTER — INFUSION THERAPY VISIT (OUTPATIENT)
Dept: ONCOLOGY | Facility: CLINIC | Age: 72
End: 2017-10-13
Attending: INTERNAL MEDICINE
Payer: MEDICARE

## 2017-10-13 VITALS
BODY MASS INDEX: 19.83 KG/M2 | DIASTOLIC BLOOD PRESSURE: 80 MMHG | TEMPERATURE: 98.7 F | SYSTOLIC BLOOD PRESSURE: 134 MMHG | RESPIRATION RATE: 18 BRPM | HEART RATE: 83 BPM | OXYGEN SATURATION: 97 % | WEIGHT: 160.8 LBS

## 2017-10-13 DIAGNOSIS — C68.0 URETHRAL CANCER (H): Primary | ICD-10-CM

## 2017-10-13 LAB
ALBUMIN SERPL-MCNC: 3 G/DL (ref 3.4–5)
ALP SERPL-CCNC: 81 U/L (ref 40–150)
ALT SERPL W P-5'-P-CCNC: 30 U/L (ref 0–70)
ANION GAP SERPL CALCULATED.3IONS-SCNC: 6 MMOL/L (ref 3–14)
AST SERPL W P-5'-P-CCNC: 24 U/L (ref 0–45)
BASOPHILS # BLD AUTO: 0 10E9/L (ref 0–0.2)
BASOPHILS NFR BLD AUTO: 0.3 %
BILIRUB SERPL-MCNC: 0.5 MG/DL (ref 0.2–1.3)
BUN SERPL-MCNC: 13 MG/DL (ref 7–30)
CALCIUM SERPL-MCNC: 9.3 MG/DL (ref 8.5–10.1)
CHLORIDE SERPL-SCNC: 97 MMOL/L (ref 94–109)
CO2 SERPL-SCNC: 28 MMOL/L (ref 20–32)
CREAT SERPL-MCNC: 1.21 MG/DL (ref 0.66–1.25)
DIFFERENTIAL METHOD BLD: ABNORMAL
EOSINOPHIL # BLD AUTO: 0 10E9/L (ref 0–0.7)
EOSINOPHIL NFR BLD AUTO: 0.2 %
ERYTHROCYTE [DISTWIDTH] IN BLOOD BY AUTOMATED COUNT: 14.3 % (ref 10–15)
GFR SERPL CREATININE-BSD FRML MDRD: 59 ML/MIN/1.7M2
GLUCOSE SERPL-MCNC: 95 MG/DL (ref 70–99)
HCT VFR BLD AUTO: 32 % (ref 40–53)
HGB BLD-MCNC: 10.6 G/DL (ref 13.3–17.7)
IMM GRANULOCYTES # BLD: 0.3 10E9/L (ref 0–0.4)
IMM GRANULOCYTES NFR BLD: 4.5 %
LYMPHOCYTES # BLD AUTO: 2.1 10E9/L (ref 0.8–5.3)
LYMPHOCYTES NFR BLD AUTO: 32.8 %
MAGNESIUM SERPL-MCNC: 1.8 MG/DL (ref 1.6–2.3)
MCH RBC QN AUTO: 28.8 PG (ref 26.5–33)
MCHC RBC AUTO-ENTMCNC: 33.1 G/DL (ref 31.5–36.5)
MCV RBC AUTO: 87 FL (ref 78–100)
MONOCYTES # BLD AUTO: 0.4 10E9/L (ref 0–1.3)
MONOCYTES NFR BLD AUTO: 6.6 %
NEUTROPHILS # BLD AUTO: 3.6 10E9/L (ref 1.6–8.3)
NEUTROPHILS NFR BLD AUTO: 55.6 %
NRBC # BLD AUTO: 0 10*3/UL
NRBC BLD AUTO-RTO: 0 /100
PLATELET # BLD AUTO: 278 10E9/L (ref 150–450)
POTASSIUM SERPL-SCNC: 3.7 MMOL/L (ref 3.4–5.3)
PROT SERPL-MCNC: 7 G/DL (ref 6.8–8.8)
RBC # BLD AUTO: 3.68 10E12/L (ref 4.4–5.9)
SODIUM SERPL-SCNC: 131 MMOL/L (ref 133–144)
WBC # BLD AUTO: 6.4 10E9/L (ref 4–11)

## 2017-10-13 PROCEDURE — 96417 CHEMO IV INFUS EACH ADDL SEQ: CPT

## 2017-10-13 PROCEDURE — 96367 TX/PROPH/DG ADDL SEQ IV INF: CPT

## 2017-10-13 PROCEDURE — 40000141 ZZH STATISTIC PERIPHERAL IV START W/O US GUIDANCE: Mod: ZF

## 2017-10-13 PROCEDURE — 80053 COMPREHEN METABOLIC PANEL: CPT | Performed by: INTERNAL MEDICINE

## 2017-10-13 PROCEDURE — 25000128 H RX IP 250 OP 636: Mod: ZF | Performed by: INTERNAL MEDICINE

## 2017-10-13 PROCEDURE — 85025 COMPLETE CBC W/AUTO DIFF WBC: CPT | Performed by: INTERNAL MEDICINE

## 2017-10-13 PROCEDURE — 96375 TX/PRO/DX INJ NEW DRUG ADDON: CPT

## 2017-10-13 PROCEDURE — 96361 HYDRATE IV INFUSION ADD-ON: CPT

## 2017-10-13 PROCEDURE — 96413 CHEMO IV INFUSION 1 HR: CPT

## 2017-10-13 PROCEDURE — 83735 ASSAY OF MAGNESIUM: CPT | Performed by: INTERNAL MEDICINE

## 2017-10-13 RX ORDER — PALONOSETRON 0.05 MG/ML
0.25 INJECTION, SOLUTION INTRAVENOUS ONCE
Status: COMPLETED | OUTPATIENT
Start: 2017-10-13 | End: 2017-10-13

## 2017-10-13 RX ADMIN — SODIUM CHLORIDE 1000 ML: 9 INJECTION, SOLUTION INTRAVENOUS at 10:08

## 2017-10-13 RX ADMIN — CISPLATIN 70 MG: 1 INJECTION, SOLUTION INTRAVENOUS at 12:12

## 2017-10-13 RX ADMIN — SODIUM CHLORIDE 150 MG: 9 INJECTION, SOLUTION INTRAVENOUS at 11:06

## 2017-10-13 RX ADMIN — DEXAMETHASONE SODIUM PHOSPHATE 12 MG: 10 INJECTION, SOLUTION INTRAMUSCULAR; INTRAVENOUS at 10:56

## 2017-10-13 RX ADMIN — PALONOSETRON HYDROCHLORIDE 0.25 MG: 0.25 INJECTION INTRAVENOUS at 11:06

## 2017-10-13 RX ADMIN — GEMCITABINE 2000 MG: 38 INJECTION, SOLUTION INTRAVENOUS at 11:32

## 2017-10-13 ASSESSMENT — PAIN SCALES - GENERAL: PAINLEVEL: MILD PAIN (3)

## 2017-10-13 NOTE — PATIENT INSTRUCTIONS
October 2017 Sunday Monday Tuesday Wednesday Thursday Friday Saturday   1     2     3     [facility] RETURN    3:45 PM   (60 min.)   Janene Alves PA-C   [facility]     [facility] LAB DRAW    4:00 PM   (15 min.)    [facility] LAB DRAW   [facility] Lab Draw     [facility] ONC INFUSION 120    4:30 PM   (120 min.)   UC ONCOLOGY INFUSION   [facility] 4     5     6     [facility] LAB DRAW    7:15 AM   (15 min.)    [facility]     [facility] RETURN    7:35 AM   (50 min.)   Yessica Ovalles APRN CNP   [facility]     [facility] ONC INFUSION 360    9:00 AM   (360 min.)    ONCOLOGY INFUSION   [facility]     [facility] RETURN   12:45 PM   (30 min.)   Audie Rooney MD   [facility] Prostate and Urologic Cancers 7       8     9     [facility] BMT INFUSION 120    2:00 PM   (120 min.)    BMT INFUSION   [facility] Blood and Marrow Transplant 10     11     12     13     [facility] LAB DRAW    9:00 AM   (15 min.)    [facility]     [facility] ONC INFUSION 360   10:00 AM   (360 min.)    ONCOLOGY INFUSION   [facility] 14       15     16     [facility] ONC INFUSION 120    3:30 PM   (120 min.)    ONCOLOGY INFUSION   [facility] 17     18     19     20     21       22     23     [facility]/TH ONCOLOGY    9:45 AM   (45 min.)   PPET1   [facility] 24     25     [facility] CYSTOSCOPY    9:05 AM   (20 min.)   Muriel Haddad MD   [facility] Prostate and Urologic Cancers     [facility] LAB DRAW    9:45 AM   (15 min.)    [facility] LAB DRAW   [facility] Mason[facility] Lab Draw     [facility] RETURN   10:00 AM   (30 min.)   Steve Arceo MD   [facility] [place] [facility] 26     27     [facility] LAB DRAW    9:30 AM   (15 min.)    MAS[facility] LAB DRAW   [facility] Lab Draw     [facility] ONC INFUSION 360   10:00 AM   (360 min.)     ONCOLOGY INFUSION   [facility] 28 29     30     31 November 2017 Sunday Monday Tuesday Wednesday Thursday Friday Saturday                  1     2     3     [facility] LAB DRAW   10:00 AM   (15 min.)    [facility]AS[facility] LAB DRAW   [facility] Lab Draw     [facility] ONC INFUSION 360   10:30 AM   (360 min.)    ONCOLOGY INFUSION   [facility] 4       5     6     [facility] RETURN   12:45 PM   (30 min.)   Nurse,  [facility]   [facility] for Prostate and Urologic Cancers 7     8     [facility] RETURN    1:30 PM   (30 min.)   Steve Arceo MD   [facility] 9     10     11       12     13     14     15     16     17     18       19     20     21     22     23     24     25  Happy Birthday!       26     27     28     29     30                            Lab Results:  Recent Results (from the past 12 hour(s))   CBC with platelets differential    Collection Time: 10/13/17  9:00 AM   Result Value Ref Range    WBC 6.4 4.0 - 11.0 10e9/L    RBC Count 3.68 (L) 4.4 - 5.9 10e12/L    Hemoglobin 10.6 (L) 13.3 - 17.7 g/dL    Hematocrit 32.0 (L) 40.0 - 53.0 %    MCV 87 78 - 100 fl    MCH 28.8 26.5 - 33.0 pg    MCHC 33.1 31.5 - 36.5 g/dL    RDW 14.3 10.0 - 15.0 %    Platelet Count 278 150 - 450 10e9/L    Diff Method Automated Method     % Neutrophils 55.6 %    % Lymphocytes 32.8 %    % Monocytes 6.6 %    % Eosinophils 0.2 %    % Basophils 0.3 %    % Immature Granulocytes 4.5 %    Nucleated RBCs 0 0 /100    Absolute Neutrophil 3.6 1.6 - 8.3 10e9/L    Absolute Lymphocytes 2.1 0.8 - 5.3 10e9/L    Absolute Monocytes 0.4 0.0 - 1.3 10e9/L    Absolute Eosinophils 0.0 0.0 - 0.7 10e9/L    Absolute Basophils 0.0 0.0 - 0.2 10e9/L    Abs Immature Granulocytes 0.3 0 - 0.4 10e9/L    Absolute Nucleated RBC 0.0    Comprehensive metabolic panel    Collection Time: 10/13/17  9:00 AM   Result Value Ref Range    Sodium 131 (L) 133 - 144 mmol/L     Potassium 3.7 3.4 - 5.3 mmol/L    Chloride 97 94 - 109 mmol/L    Carbon Dioxide 28 20 - 32 mmol/L    Anion Gap 6 3 - 14 mmol/L    Glucose 95 70 - 99 mg/dL    Urea Nitrogen 13 7 - 30 mg/dL    Creatinine 1.21 0.66 - 1.25 mg/dL    GFR Estimate 59 (L) >60 mL/min/1.7m2    GFR Estimate If Black 72 >60 mL/min/1.7m2    Calcium 9.3 8.5 - 10.1 mg/dL    Bilirubin Total 0.5 0.2 - 1.3 mg/dL    Albumin 3.0 (L) 3.4 - 5.0 g/dL    Protein Total 7.0 6.8 - 8.8 g/dL    Alkaline Phosphatase 81 40 - 150 U/L    ALT 30 0 - 70 U/L    AST 24 0 - 45 U/L   Magnesium    Collection Time: 10/13/17  9:00 AM   Result Value Ref Range    Magnesium 1.8 1.6 - 2.3 mg/dL     Contact Numbers    [facility] Main Line: [phone]  [facility] Triage:  [phone]    Call triage with chills and/or temperature greater than or equal to 100.5, uncontrolled nausea/vomiting, diarrhea, constipation, dizziness, shortness of breath, chest pain, bleeding, unexplained bruising, or any new/concerning symptoms, questions/concerns.     If you are having any concerning symptoms or wish to speak to a provider before your next infusion visit, please call your care coordinator or triage to notify them so we can adequately serve you.       After Hours: [phone]    If after hours, weekends, or holidays, call main hospital  and ask for Oncology doctor on call.

## 2017-10-13 NOTE — MR AVS SNAPSHOT
After Visit Summary   10/13/2017    King Gonsalo Bruno    MRN: [id number]           Patient Information     Date Of Birth          1945        Visit Information        Provider Department      10/13/2017 10:00 AM UC 12 ATC; UC ONCOLOGY INFUSION [facility]        Today's Diagnoses     Urethral cancer (H)    -  1      Care Instructions          October 2017 Sunday Monday Tuesday Wednesday Thursday Friday Saturday   1     2     3     UMP RETURN    3:45 PM   (60 min.)   Janene Alves PA-C   [facility]     [facility] LAB DRAW    4:00 PM   (15 min.)    [facility] LAB DRAW   [facility] Lab Draw     UMP ONC INFUSION 120    4:30 PM   (120 min.)    ONCOLOGY INFUSION   [facility] 4     5     6     [facility] LAB DRAW    7:15 AM   (15 min.)    [facility] LAB DRAW   [facility] Lab Draw     UMP RETURN    7:35 AM   (50 min.)   Yessica Ovalles APRN CNP   [facility]     UMP ONC INFUSION 360    9:00 AM   (360 min.)   UC ONCOLOGY INFUSION   [facility]     UMP RETURN   12:45 PM   (30 min.)   Audie Rooney MD   [facility] 7       8     9     UMP BMT INFUSION 120    2:00 PM   (120 min.)    BMT INFUSION   [facility] Blood and Marrow Transplant 10     11     12     13     [facility] LAB DRAW    9:00 AM   (15 min.)    [facility] LAB DRAW   [facility] Lab Draw     UMP ONC INFUSION 360   10:00 AM   (360 min.)   UC ONCOLOGY INFUSION   [facility] 14       15     16     UMP ONC INFUSION 120    3:30 PM   (120 min.)   UC ONCOLOGY INFUSION   [facility] 17     18     19     20     21       22     23     PE EYE/TH ONCOLOGY    9:45 AM   (45 min.)   UMPPET1   [facility] 24     25     UMP CYSTOSCOPY    9:05 AM   (20 min.)   Muriel Haddad MD   [facility]  Urologic Cancers     [facility] LAB DRAW    9:45 AM   (15 min.)    [facility] LAB DRAW   [facility] Lab Draw     UMP RETURN   10:00 AM   (30 min.)   Steve Arceo MD   [facility] 26     27     [facility] LAB DRAW    9:30 AM   (15 min.)    [facility] LAB DRAW   [facility] Lab Draw     UMP ONC INFUSION 360   10:00 AM   (360 min.)    ONCOLOGY INFUSION   [facility] 28       29     30     31 November 2017 Sunday Monday Tuesday Wednesday Thursday Friday Saturday                  1     2     3     [facility] LAB DRAW   10:00 AM   (15 min.)    [facility] LAB DRAW   [facility] Draw     UMP ONC INFUSION 360   10:30 AM   (360 min.)    ONCOLOGY INFUSION   [facility] 4       5     6     UMP RETURN   12:45 PM   (30 min.)   Nurse,  [facility]   [facility] 7     8     UMP RETURN    1:30 PM   (30 min.)   Steve Arceo MD   [facility] 9     10     11       12     13     14     15     16     17     18       19     20     21     22     23     24     25  Happy Birthday!       26     27     28     29     30                            Lab Results:  Recent Results (from the past 12 hour(s))   CBC with platelets differential    Collection Time: 10/13/17  9:00 AM   Result Value Ref Range    WBC 6.4 4.0 - 11.0 10e9/L    RBC Count 3.68 (L) 4.4 - 5.9 10e12/L    Hemoglobin 10.6 (L) 13.3 - 17.7 g/dL    Hematocrit 32.0 (L) 40.0 - 53.0 %    MCV 87 78 - 100 fl    MCH 28.8 26.5 - 33.0 pg    MCHC 33.1 31.5 - 36.5 g/dL    RDW 14.3 10.0 - 15.0 %    Platelet Count 278 150 - 450 10e9/L    Diff Method Automated Method     % Neutrophils 55.6 %    % Lymphocytes 32.8 %    % Monocytes 6.6 %    % Eosinophils 0.2 %    % Basophils 0.3 %    % Immature Granulocytes 4.5 %    Nucleated RBCs 0 0 /100    Absolute Neutrophil 3.6 1.6 - 8.3 10e9/L    Absolute  Lymphocytes 2.1 0.8 - 5.3 10e9/L    Absolute Monocytes 0.4 0.0 - 1.3 10e9/L    Absolute Eosinophils 0.0 0.0 - 0.7 10e9/L    Absolute Basophils 0.0 0.0 - 0.2 10e9/L    Abs Immature Granulocytes 0.3 0 - 0.4 10e9/L    Absolute Nucleated RBC 0.0    Comprehensive metabolic panel    Collection Time: 10/13/17  9:00 AM   Result Value Ref Range    Sodium 131 (L) 133 - 144 mmol/L    Potassium 3.7 3.4 - 5.3 mmol/L    Chloride 97 94 - 109 mmol/L    Carbon Dioxide 28 20 - 32 mmol/L    Anion Gap 6 3 - 14 mmol/L    Glucose 95 70 - 99 mg/dL    Urea Nitrogen 13 7 - 30 mg/dL    Creatinine 1.21 0.66 - 1.25 mg/dL    GFR Estimate 59 (L) >60 mL/min/1.7m2    GFR Estimate If Black 72 >60 mL/min/1.7m2    Calcium 9.3 8.5 - 10.1 mg/dL    Bilirubin Total 0.5 0.2 - 1.3 mg/dL    Albumin 3.0 (L) 3.4 - 5.0 g/dL    Protein Total 7.0 6.8 - 8.8 g/dL    Alkaline Phosphatase 81 40 - 150 U/L    ALT 30 0 - 70 U/L    AST 24 0 - 45 U/L   Magnesium    Collection Time: 10/13/17  9:00 AM   Result Value Ref Range    Magnesium 1.8 1.6 - 2.3 mg/dL     Contact Numbers    [facility] [place]: [phone]  [facility] [place]:  [phone]    Call triage with chills and/or temperature greater than or equal to 100.5, uncontrolled nausea/vomiting, diarrhea, constipation, dizziness, shortness of breath, chest pain, bleeding, unexplained bruising, or any new/concerning symptoms, questions/concerns.     If you are having any concerning symptoms or wish to speak to a provider before your next infusion visit, please call your care coordinator or triage to notify them so we can adequately serve you.       After Hours: [phone]    If after hours, weekends, or holidays, call main hospital  and ask for Oncology doctor on call.             Follow-ups after your visit        Your next 10 appointments already scheduled     Oct 13, 2017 10:00 AM CDT   Infusion 360 with UC ONCOLOGY INFUSION,  12 [facility] ([facility])    [address] [facility] [address]   [phone]            Oct 16, 2017  3:30 PM CDT   Infusion 120 with UC ONCOLOGY INFUSION, UC 16 ATC   [facility] ([facility])    [address]  [address] [facility] [address]   [phone]            Oct 23, 2017  9:45 AM CDT   PE NPET ONCOLOGY (EYES TO THIGHS) with [id number]   [facility] ([facility])    [address] [address]   [phone]           Tell your doctor:   If there is any chance you may be pregnant or if you are breastfeeding.   If you have problems lying in small spaces (claustrophobia). If you do, your doctor may give you medicine to help you relax. If you have diabetes:   Have your exam early in the morning. Your blood glucose will go up as the day goes by.   Your glucose level must be 180 or less at the start of the exam. Please take any medicines you need to ensure this blood glucose level. 24 hours before your scan: Don t do any heavy exercise. (No jogging, aerobics or other workouts.) Exercise will make your pictures less accurate. 6 hours before your scan:   Stop all food and liquids (except water).   Do not chew gum or suck on mints.   If you need to take medicine with food, you may take it with a few crackers.  Please call your Imaging Department at your exam site with any questions.            Oct 25, 2017  9:20 AM CDT   (Arrive by 9:05 AM)   Cystoscopy with Muriel Haddad MD   [facility] ([facility])    [address] [place] [address]  [address] [facility] [address]   [phone]            Oct 25, 2017  9:45 AM CDT   [facility] Lab Draw with  [facility] LAB DRAW   [facility] ([facility])    [address] [place] [address]  [address] [facility] [address]   [phone]            Oct 25, 2017 10:15 AM CDT   (Arrive  "by 10:00 AM)   Return Visit with Steve Arceo MD   [facility] ([facility])    [address] [id number] [phone]            Oct 27, 2017  9:30 AM CDT   Masonic Lab Draw with  MASONIC LAB DRAW   [facility] Lab Draw ([facility])    [address] [id number] [phone]            Oct 27, 2017 10:00 AM CDT   Infusion 360 with UC ONCOLOGY INFUSION   [facility] ([facility])    [address] [place] [id number] [phone]              Who to contact     If you have questions or need follow up information about today's clinic visit or your schedule please contact [facility] directly at [phone].  Normal or non-critical lab and imaging results will be communicated to you by Nano Terrahart, letter or phone within 4 business days after the clinic has received the results. If you do not hear from us within 7 days, please contact the clinic through Stratopyt or phone. If you have a critical or abnormal lab result, we will notify you by phone as soon as possible.  Submit refill requests through e-Rewards or call your pharmacy and they will forward the refill request to us. Please allow 3 business days for your refill to be completed.          Additional Information About Your Visit        Nano TerraharUGAME Information     e-Rewards lets you send messages to your doctor, view your test results, renew your prescriptions, schedule appointments and more. To sign up, go to [email or web address] . Click on \"Log in\" on the left side of the screen, which will take you to the Welcome page. Then click on \"Sign up Now\" on the right side of the page.     You will be asked to enter the access code listed below, as well as some personal information. Please follow the directions to create your username and " password.     Your access code is: [email or web address]  Expires: 10/31/2017  1:07 PM     Your access code will  in 90 days. If you need help or a new code, please call your [facility] or [phone].        Care EveryWhere ID     This is your Care EveryWhere ID. This could be used by other organizations to access your [place] medical records  [id number]        Your Vitals Were     Pulse Temperature Respirations Pulse Oximetry BMI (Body Mass Index)       83 98.7  F (37.1  C) (Oral) 18 97% 19.83 kg/m2        Blood Pressure from Last 3 Encounters:   10/13/17 134/80   10/09/17 166/77   10/06/17 117/67    Weight from Last 3 Encounters:   10/13/17 72.9 kg (160 lb 12.8 oz)   10/06/17 75.3 kg (166 lb)   10/06/17 76.3 kg (168 lb 3.2 oz)              We Performed the Following     CBC with platelets differential     Comprehensive metabolic panel     Magnesium        Primary Care Provider Office Phone # Fax #    Joan Janet Ventura [phone]       [facility] [address] [place] [address]        Equal Access to Services     JENELLE RED : Hadii светлана ku hadasho Soomaali, waaxda luqadaha, qaybta kaalmada adeegyada, ronald rasmussen. So [facility] [phone].    ATENCIÓN: Si habla español, tiene a washburn disposición servicios gratuitos de asistencia lingüística. Juwan al [phone].    We comply with applicable federal civil rights laws and [place] laws. We do not discriminate on the basis of race, color, national origin, age, disability, sex, sexual orientation, or gender identity.            Thank you!     Thank you for choosing [facility]  for your care. Our goal is always to provide you with excellent care. Hearing back from our patients is one way we can continue to improve our services. Please take a few minutes to complete the written survey that you may receive in the mail after your visit with us. Thank you!             Your Updated Medication  List - Protect others around you: Learn how to safely use, store and throw away your medicines at www.disposemymeds.org.          This list is accurate as of: 10/13/17  9:50 AM.  Always use your most recent med list.                   Brand Name Dispense Instructions for use Diagnosis    amLODIPine 5 MG tablet    NORVASC    60 tablet    Take 2 tablets (10 mg) by mouth daily    Benign essential hypertension       dexamethasone 4 MG tablet    DECADRON    12 tablet    Take 2 tablets (8 mg) by mouth daily Take for 3 days, starting the day after cisplatin (Day 2 and Day 9).    Urethral cancer (H)       docusate sodium 100 MG capsule    COLACE    60 capsule    Take 1 capsule (100 mg) by mouth 2 times daily as needed for constipation    Slow transit constipation       fluticasone 50 MCG/ACT spray    FLONASE     Spray 1 spray into both nostrils every morning        HYDROXYZINE HCL PO      Take 5 mg by mouth At Bedtime        LORazepam 0.5 MG tablet    ATIVAN    30 tablet    Take 1 tablet (0.5 mg) by mouth every 4 hours as needed (Anxiety, Nausea/Vomiting or Sleep)    Nausea       ondansetron 8 MG tablet    ZOFRAN    20 tablet    Take 1 tablet (8 mg) by mouth every 8 hours as needed for nausea    Nausea       pantoprazole 20 MG EC tablet    PROTONIX    60 tablet    Take 1 tablet (20 mg) by mouth 2 times daily    Gastroesophageal reflux disease without esophagitis       prochlorperazine 10 MG tablet    COMPAZINE    30 tablet    Take 0.5 tablets (5 mg) by mouth every 6 hours as needed (Nausea/Vomiting)    Nausea

## 2017-10-13 NOTE — PROGRESS NOTES
Infusion Nursing Note:  King Gonsalo Bruno presents today for Cycle 2 Day 8 Cisplatin, Gemzar.    Patient seen by provider today: No    Treatment Conditions:  Lab Results   Component Value Date    HGB 10.6 10/13/2017     Lab Results   Component Value Date    WBC 6.4 10/13/2017      Lab Results   Component Value Date    ANEU 3.6 10/13/2017     Lab Results   Component Value Date     10/13/2017      Lab Results   Component Value Date     10/13/2017                   Lab Results   Component Value Date    POTASSIUM 3.7 10/13/2017           Lab Results   Component Value Date    MAG 1.8 10/13/2017            Lab Results   Component Value Date    CR 1.21 10/13/2017                   Lab Results   Component Value Date    SAADIA 9.3 10/13/2017                Lab Results   Component Value Date    BILITOTAL 0.5 10/13/2017           Lab Results   Component Value Date    ALBUMIN 3.0 10/13/2017                    Lab Results   Component Value Date    ALT 30 10/13/2017           Lab Results   Component Value Date    AST 24 10/13/2017     Results reviewed, labs MET treatment parameters, ok to proceed with treatment.        Intravenous Access:  Peripheral IV placed by Vascular Access.    Note: Patient arrived to infusion with his wife today complaining of fatigue and nausea x3 days post cycle 2 day 1. Patient states this is what he expects with each cycle and that is within his normal limits. He denied concerns or the need to see a provider today. He used a urinal to empty his suprapubic catheter, prior to, during and post Cisplatin today.      Post Infusion Assessment:  Patient tolerated infusion without incident.  Blood return noted pre and post infusion.  Site patent and intact, free from redness, edema or discomfort.  No evidence of extravasations.  Access discontinued per protocol.    Discharge Plan:   Patient declined prescription refills.  Discharge instructions reviewed with: Patient and Family.  Patient and/or family  verbalized understanding of discharge instructions and all questions answered.  Copy of AVS reviewed with patient and/or family.  Patient will return 10/16/17 for next appointment.  Patient discharged in stable condition accompanied by: wife.  Departure Mode: Ambulatory.    Eri Og RN

## 2017-10-13 NOTE — NURSING NOTE
Chief Complaint   Patient presents with     Blood Draw     Left AC butterfly  X 1, labs drawn and sent, vitals completed, checked into next appointment.   Sharlene Yang RN

## 2017-10-16 ENCOUNTER — INFUSION THERAPY VISIT (OUTPATIENT)
Dept: ONCOLOGY | Facility: CLINIC | Age: 72
End: 2017-10-16
Attending: INTERNAL MEDICINE
Payer: MEDICARE

## 2017-10-16 VITALS
WEIGHT: 161.4 LBS | OXYGEN SATURATION: 100 % | HEART RATE: 73 BPM | SYSTOLIC BLOOD PRESSURE: 156 MMHG | TEMPERATURE: 98.2 F | BODY MASS INDEX: 19.91 KG/M2 | DIASTOLIC BLOOD PRESSURE: 81 MMHG | RESPIRATION RATE: 18 BRPM

## 2017-10-16 DIAGNOSIS — C68.0 URETHRAL CANCER (H): ICD-10-CM

## 2017-10-16 DIAGNOSIS — E86.0 DEHYDRATION: ICD-10-CM

## 2017-10-16 DIAGNOSIS — R11.0 NAUSEA: Primary | ICD-10-CM

## 2017-10-16 PROCEDURE — 96365 THER/PROPH/DIAG IV INF INIT: CPT

## 2017-10-16 PROCEDURE — 25000128 H RX IP 250 OP 636: Mod: ZF | Performed by: PHYSICIAN ASSISTANT

## 2017-10-16 PROCEDURE — 96375 TX/PRO/DX INJ NEW DRUG ADDON: CPT

## 2017-10-16 RX ORDER — PALONOSETRON 0.05 MG/ML
0.25 INJECTION, SOLUTION INTRAVENOUS ONCE
Status: COMPLETED | OUTPATIENT
Start: 2017-10-16 | End: 2017-10-16

## 2017-10-16 RX ORDER — PALONOSETRON 0.05 MG/ML
0.25 INJECTION, SOLUTION INTRAVENOUS ONCE
Status: CANCELLED
Start: 2017-10-16 | End: 2017-10-16

## 2017-10-16 RX ADMIN — SODIUM CHLORIDE 1000 ML: 9 INJECTION, SOLUTION INTRAVENOUS at 16:24

## 2017-10-16 RX ADMIN — SODIUM CHLORIDE 150 MG: 9 INJECTION, SOLUTION INTRAVENOUS at 16:42

## 2017-10-16 RX ADMIN — PALONOSETRON HYDROCHLORIDE 0.25 MG: 0.25 INJECTION INTRAVENOUS at 16:28

## 2017-10-16 RX ADMIN — DEXAMETHASONE SODIUM PHOSPHATE 10 MG: 10 INJECTION, SOLUTION INTRAMUSCULAR; INTRAVENOUS at 16:28

## 2017-10-16 NOTE — MR AVS SNAPSHOT
After Visit Summary   10/16/2017    King Gonsalo Bruno    MRN: [id number]           Patient Information     Date Of Birth          1945        Visit Information        Provider Department      10/16/2017 3:30 PM  16 ATC;  ONCOLOGY INFUSION [facility]        Today's Diagnoses     Nausea    -  1    Dehydration        Urethral cancer (H)          Care Instructions    Contact Numbers  [facility]: [phone]  (Choose Option 3 for triage RN)  After Hours: [phone]    Call triage with chills and/or temperature greater than or equal to 100.5, uncontrolled nausea/vomiting, diarrhea, constipation, dizziness, shortness of breath, chest pain, bleeding, unexplained bruising, or any new/concerning symptoms, questions/concerns.     If after hours, weekends, or holidays, call the main clinic number. Calls will be forwarded to the hospital , please ask for the adult oncology doctor on call.     If you are having any concerning symptoms or wish to speak to a provider before your next infusion visit, please call your care coordinator or triage to notify them so we can adequately serve you.     If you need a refill on a narcotic prescription, please call triage or your care coordinator before your infusion appointment.             October 2017 Sunday Monday Tuesday Wednesday Thursday Friday Saturday   1     2     3     UMP RETURN    3:45 PM   (60 min.)   [facility]ves [facility]-C   [facility]    4:00 PM   (15 min.)    [facility]   [facility] Lab Draw     UMP ONC INFUSION 120    4:30 PM   (120 min.)    ONCOLOGY INFUSION   [facility] 4     5     6     UMP [facility]    7:15 AM   (15 min.)   [facility]   [facility] Lab Draw     UMP RETURN    7:35 AM   (50 min.)   Yessica Ovalles APRN CNP   [facility] ONC INFUSION 360    9:00 AM   (360 min.)     ONCOLOGY INFUSION   [facility]     UMP RETURN   12:45 PM   (30 min.)   Audie Rooney MD   [facility]rolog[facility] 7       8     9     UMP BMT INFUSION 120    2:00 PM   (120 min.)    BMT INFUSION   [facility] Blood and Marrow Transplant 10     11     12     13     UMP [facility]    9:00 AM   (15 min.)    [facility] DRAW   [facility] Lab Draw     UMP ONC INFUSION 360   10:00 AM   (360 min.)    ONCOLOGY INFUSION   [facility] 14       15     16     UMP ONC INFUSION 120    3:30 PM   (120 min.)    ONCOLOGY INFUSION   [facility] 17     18     19     20     21       22     23     [facility] ONCOLOGY    9:45 AM   (45 min.)   UMPPET1   [facility] 24     25     UMP CYSTOSCOPY    9:05 AM   (20 min.)   Muriel Haddad MD   [facility] for Prostate and Urologic Cancers     [facility]RAW    9:45 AM   (15 min.)    [facility] DRAW   [facility] Lab Draw     UMP RETURN   10:00 AM   (30 min.)   Steve Arceo MD   [facility] 26     27     UMP [facility] DRAW    9:30 AM   (15 min.)    [facility] DRAW   [facility] Lab Draw     UMP ONC INFUSION 360   10:00 AM   (360 min.)    ONCOLOGY INFUSION   [facility] 28       29     30     31 November 2017 Sunday Monday Tuesday Wednesday Thursday Friday Saturday                  1     2     3     UMP [facility]   10:00 AM   (15 min.)    [facility] DRAW   [facility] Lab Draw     UMP ONC INFUSION 360   10:30 AM   (360 min.)    ONCOLOGY INFUSION   [facility] 4       5     6     UMP RETURN   12:45 PM   (30 min.)   Nurse,  [facility]   [facility] 7     8     UMP RETURN    1:30 PM   (30 min.)   Steve Arceo MD   [facility]  Cancer Clinic 9     10     11       12     13     14     15     16     17     18       19     20     21     22     23     24     25  Happy Birthday!       26     27     28     29     30                            Lab Results:  No results found for this or any previous visit (from the past 12 hour(s)).            Follow-ups after your visit        Your next 10 appointments already scheduled     Oct 23, 2017  9:45 AM CDT   PE NPET ONCOLOGY (EYES TO THIGHS) with UMPPET1   [facility] ([facility])    [address] [facility] [address]   [phone]           Tell your doctor:   If there is any chance you may be pregnant or if you are breastfeeding.   If you have problems lying in small spaces (claustrophobia). If you do, your doctor may give you medicine to help you relax. If you have diabetes:   Have your exam early in the morning. Your blood glucose will go up as the day goes by.   Your glucose level must be 180 or less at the start of the exam. Please take any medicines you need to ensure this blood glucose level. 24 hours before your scan: Don t do any heavy exercise. (No jogging, aerobics or other workouts.) Exercise will make your pictures less accurate. 6 hours before your scan:   Stop all food and liquids (except water).   Do not chew gum or suck on mints.   If you need to take medicine with food, you may take it with a few crackers.  Please call your Imaging Department at your exam site with any questions.            Oct 25, 2017  9:20 AM CDT   (Arrive by 9:05 AM)   Cystoscopy with Muriel Haddad MD   [facility] Prostate and Urologic Cancers ([facility])    [address] [place] [address]  [employer] [id number] [phone]            Oct 25, 2017  9:45 AM CDT   [facility] with  [facility]   [facility] ([facility])    [address] [place] [address]  [facility] [address]   [phone]            Oct 25, 2017 10:15 AM CDT   (Arrive by 10:00 AM)   Return Visit with Steve Arceo MD   [facility] ([facility])    [address] [place] [address]   [phone]            Oct 27, 2017  9:30 AM CDT   [facility] with UC [facility]   [facility])    [address] [place] [address]   [phone]            Oct 27, 2017 10:00 AM CDT   Infusion 360 with UC ONCOLOGY INFUSION, UC 29 ATC   [facility] ([facility])    [address] [place] [address]   [phone]            Nov 03, 2017 10:00 AM CDT   [facility] with [facility]   [facility] ([facility])    [address] [place] [address]   [phone]            Nov 03, 2017 10:30 AM CDT   Infusion 360 with UC ONCOLOGY INFUSION, UC 21 ATC   [facility] ([facility])    [address] [place] [address]   [phone]              Who to contact     If you have questions or need follow up information about today's clinic visit or your schedule please contact [facility] directly at [phone].  Normal or non-critical lab and imaging results will be communicated to you by MyChart, letter or phone within 4 business days after the clinic has received the results. If you do not hear from us within 7 days, please contact the clinic through MyChart or phone. If you have a critical or abnormal lab result, we will notify you by phone as soon as possible.  Submit refill requests through [employer] or call your pharmacy and they will forward the refill request to us. Please allow 3 business days for your refill to be  "completed.          Additional Information About Your Visit        [employer] Information     [employer] lets you send messages to your doctor, view your test results, renew your prescriptions, schedule appointments and more. To sign up, go to [email or web address] . Click on \"Log in\" on the left side of the screen, which will take you to the Welcome page. Then click on \"Sign up Now\" on the right side of the page.     You will be asked to enter the access code listed below, as well as some personal information. Please follow the directions to create your username and password.     Your access code is: [email or web address]  Expires: 10/31/2017  1:07 PM     Your access code will  in 90 days. If you need help or a new code, please call your [place] clinic or [phone].        Care EveryWhere ID     This is your Care EveryWhere ID. This could be used by other organizations to access your [place] medical records  [id number]        Your Vitals Were     Pulse Temperature Respirations Pulse Oximetry BMI (Body Mass Index)       73 98.2  F (36.8  C) (Oral) 18 100% 19.91 kg/m2        Blood Pressure from Last 3 Encounters:   10/16/17 156/81   10/13/17 134/80   10/09/17 166/77    Weight from Last 3 Encounters:   10/16/17 73.2 kg (161 lb 6.4 oz)   10/13/17 72.9 kg (160 lb 12.8 oz)   10/06/17 75.3 kg (166 lb)              Today, you had the following     No orders found for display       Primary Care Provider Office Phone # Fax #    Joan Janet Ventura [phone]       [facility] [address]  [facility] [address]        Equal Access to Services     SHILPI RED : Tiffanie Robert, dorie weaver, paolo chase, ronald rasmussen. So [facility] [phone].    ATENCIÓN: Si habla español, tiene a washburn disposición servicios gratuitos de asistencia lingüística. Llame al [phone].    We comply with applicable federal civil rights laws and [place] laws. We do " not discriminate on the basis of race, color, national origin, age, disability, sex, sexual orientation, or gender identity.            Thank you!     Thank you for choosing [facility]  for your care. Our goal is always to provide you with excellent care. Hearing back from our patients is one way we can continue to improve our services. Please take a few minutes to complete the written survey that you may receive in the mail after your visit with us. Thank you!             Your Updated Medication List - Protect others around you: Learn how to safely use, store and throw away your medicines at www.disposemymeds.org.          This list is accurate as of: 10/16/17  7:04 PM.  Always use your most recent med list.                   Brand Name Dispense Instructions for use Diagnosis    amLODIPine 5 MG tablet    NORVASC    60 tablet    Take 2 tablets (10 mg) by mouth daily    Benign essential hypertension       dexamethasone 4 MG tablet    DECADRON    12 tablet    Take 2 tablets (8 mg) by mouth daily Take for 3 days, starting the day after cisplatin (Day 2 and Day 9).    Urethral cancer (H)       docusate sodium 100 MG capsule    COLACE    60 capsule    Take 1 capsule (100 mg) by mouth 2 times daily as needed for constipation    Slow transit constipation       fluticasone 50 MCG/ACT spray    FLONASE     Spray 1 spray into both nostrils every morning        HYDROXYZINE HCL PO      Take 5 mg by mouth At Bedtime        LORazepam 0.5 MG tablet    ATIVAN    30 tablet    Take 1 tablet (0.5 mg) by mouth every 4 hours as needed (Anxiety, Nausea/Vomiting or Sleep)    Nausea       ondansetron 8 MG tablet    ZOFRAN    20 tablet    Take 1 tablet (8 mg) by mouth every 8 hours as needed for nausea    Nausea       pantoprazole 20 MG EC tablet    PROTONIX    60 tablet    Take 1 tablet (20 mg) by mouth 2 times daily    Gastroesophageal reflux disease without esophagitis       prochlorperazine 10 MG tablet    COMPAZINE     30 tablet    Take 0.5 tablets (5 mg) by mouth every 6 hours as needed (Nausea/Vomiting)    Nausea

## 2017-10-16 NOTE — PATIENT INSTRUCTIONS
Contact Numbers  [facility]: [phone]  (Choose Option 3 for triage RN)  After Hours: [phone]    Call triage with chills and/or temperature greater than or equal to 100.5, uncontrolled nausea/vomiting, diarrhea, constipation, dizziness, shortness of breath, chest pain, bleeding, unexplained bruising, or any new/concerning symptoms, questions/concerns.     If after hours, weekends, or holidays, call the main clinic number. Calls will be forwarded to the hospital , please ask for the adult oncology doctor on call.     If you are having any concerning symptoms or wish to speak to a provider before your next infusion visit, please call your care coordinator or triage to notify them so we can adequately serve you.     If you need a refill on a narcotic prescription, please call triage or your care coordinator before your infusion appointment.             October 2017 Sunday Monday Tuesday Wednesday Thursday Friday Saturday   1     2     3     UMP RETURN    3:45 PM   (60 min.)   Janene Alves PA-C   [facility] ED [facility]    4:00 PM   (15 min.)    [facility] DRAW   [facility] Lab Draw     [facility] ONC INFUSION 120    4:30 PM   (120 min.)    ONCOLOGY INFUSION   [facility] 4     5     6     P [facility] DRAW    7:15 AM   (15 min.)   [facility]   [facility] Lab Draw     UMP RETURN    7:35 AM   (50 min.)   Yessica [facility]valles [facility]N CNP   [facility]    9:00 AM   (360 min.)    ONCOLOGY INFUSION   [facility]     UMP RETURN   12:45 PM   (30 min.)   Audie Rooney MD   [facility] Prostate and Urologic Cancers 7       8     9     P BMT INFUSION 120    2:00 PM   (120 min.)    BMT INFUSION   [facility] Blood and Marrow Transplant 10     11     12     13     P [facility]RAW    9:00 AM   (15 min.)   [facility] DRAW   [facility]  [facility]     UMP ONC INFUSION 360   10:00 AM   (360 min.)    ONCOLOGY INFUSION   [facility] 14       15     16     UMP ONC INFUSION 120    3:30 PM   (120 min.)    ONCOLOGY INFUSION   [facility] 17     18     19     20     21       22     23     [facility]/ ONCOLOGY    9:45 AM   (45 min.)   UMPPET1   [facility] 24     25     UMP CYSTOSCOPY    9:05 AM   (20 min.)   Muriel Haddad MD   [facility] for Prostate and Urologic Cancers     [facility] DRAW    9:45 AM   (15 min.)    [facility]     UMP RETURN   10:00 AM   (30 min.)   Steve Arceo MD   [facility] 26     27     UMP [facility] DRAW    9:30 AM   (15 min.)    [facility]     UMP ONC INFUSION 360   10:00 AM   (360 min.)    ONCOLOGY INFUSION   [facility] 28       29     30     31 November 2017 Sunday Monday Tuesday Wednesday Thursday Friday Saturday                  1     2     3     UMP [facility] DRAW   10:00 AM   (15 min.)    [facility] DRAW   [facility] Draw     UMP ONC INFUSION 360   10:30 AM   (360 min.)    ONCOLOGY INFUSION   [facility] 4       5     6     UMP RETURN   12:45 PM   (30 min.)   Nurse,  [facility]   [facility] for Prostate and Urologic Cancers 7     8     UMP RETURN    1:30 PM   (30 min.)   Steve Arceo MD   [facility] 9     10     11       12     13     14     15     16     17     18       19     20     21     22     23     24     25  Happy Birthday!       26     27     28     29     30                            Lab Results:  No results found for this or any previous visit (from the past 12 hour(s)).

## 2017-10-18 ENCOUNTER — PRE VISIT (OUTPATIENT)
Dept: UROLOGY | Facility: CLINIC | Age: 72
End: 2017-10-18

## 2017-10-18 NOTE — TELEPHONE ENCOUNTER
Pt with urethral cancer coming in for a cystoscopy because he was still having urethral pain when he saw Josy Powell PA-C. All records available. No need for a call. Possible cytology.

## 2017-10-19 ENCOUNTER — TELEPHONE (OUTPATIENT)
Dept: ONCOLOGY | Facility: CLINIC | Age: 72
End: 2017-10-19

## 2017-10-19 ENCOUNTER — HOSPITAL ENCOUNTER (EMERGENCY)
Facility: CLINIC | Age: 72
Discharge: HOME | End: 2017-10-19
Attending: EMERGENCY MEDICINE | Admitting: EMERGENCY MEDICINE
Payer: MEDICARE

## 2017-10-19 VITALS
BODY MASS INDEX: 18.3 KG/M2 | HEART RATE: 87 BPM | RESPIRATION RATE: 16 BRPM | OXYGEN SATURATION: 99 % | DIASTOLIC BLOOD PRESSURE: 81 MMHG | SYSTOLIC BLOOD PRESSURE: 141 MMHG | TEMPERATURE: 98.1 F | HEIGHT: 77 IN | WEIGHT: 155 LBS

## 2017-10-19 DIAGNOSIS — N17.9 ACUTE RENAL FAILURE, UNSPECIFIED ACUTE RENAL FAILURE TYPE (H): ICD-10-CM

## 2017-10-19 DIAGNOSIS — C68.0 URETHRAL CANCER (H): ICD-10-CM

## 2017-10-19 DIAGNOSIS — E86.0 DEHYDRATION: ICD-10-CM

## 2017-10-19 DIAGNOSIS — R53.1 ASTHENIA: ICD-10-CM

## 2017-10-19 LAB
ALBUMIN SERPL-MCNC: 3.2 G/DL (ref 3.4–5)
ALBUMIN UR-MCNC: NEGATIVE MG/DL
ALP SERPL-CCNC: 74 U/L (ref 40–150)
ALT SERPL W P-5'-P-CCNC: 25 U/L (ref 0–70)
ANION GAP SERPL CALCULATED.3IONS-SCNC: 9 MMOL/L (ref 3–14)
APPEARANCE UR: CLEAR
AST SERPL W P-5'-P-CCNC: 26 U/L (ref 0–45)
BACTERIA #/AREA URNS HPF: ABNORMAL /HPF
BASOPHILS # BLD AUTO: 0 10E9/L (ref 0–0.2)
BASOPHILS NFR BLD AUTO: 0 %
BILIRUB SERPL-MCNC: 0.4 MG/DL (ref 0.2–1.3)
BILIRUB UR QL STRIP: NEGATIVE
BUN SERPL-MCNC: 16 MG/DL (ref 7–30)
CALCIUM SERPL-MCNC: 8.9 MG/DL (ref 8.5–10.1)
CHLORIDE SERPL-SCNC: 92 MMOL/L (ref 94–109)
CO2 SERPL-SCNC: 28 MMOL/L (ref 20–32)
COLOR UR AUTO: ABNORMAL
CREAT SERPL-MCNC: 1.44 MG/DL (ref 0.66–1.25)
DIFFERENTIAL METHOD BLD: ABNORMAL
EOSINOPHIL # BLD AUTO: 0 10E9/L (ref 0–0.7)
EOSINOPHIL NFR BLD AUTO: 0 %
ERYTHROCYTE [DISTWIDTH] IN BLOOD BY AUTOMATED COUNT: 14.2 % (ref 10–15)
GFR SERPL CREATININE-BSD FRML MDRD: 49 ML/MIN/1.7M2
GLUCOSE SERPL-MCNC: 110 MG/DL (ref 70–99)
GLUCOSE UR STRIP-MCNC: NEGATIVE MG/DL
HCT VFR BLD AUTO: 31.5 % (ref 40–53)
HGB BLD-MCNC: 10.7 G/DL (ref 13.3–17.7)
HGB UR QL STRIP: NEGATIVE
IMM GRANULOCYTES # BLD: 0 10E9/L (ref 0–0.4)
IMM GRANULOCYTES NFR BLD: 0.3 %
KETONES UR STRIP-MCNC: NEGATIVE MG/DL
LACTATE BLD-SCNC: 1.2 MMOL/L (ref 0.7–2)
LEUKOCYTE ESTERASE UR QL STRIP: ABNORMAL
LIPASE SERPL-CCNC: 254 U/L (ref 73–393)
LYMPHOCYTES # BLD AUTO: 1.8 10E9/L (ref 0.8–5.3)
LYMPHOCYTES NFR BLD AUTO: 55.5 %
MCH RBC QN AUTO: 29.3 PG (ref 26.5–33)
MCHC RBC AUTO-ENTMCNC: 34 G/DL (ref 31.5–36.5)
MCV RBC AUTO: 86 FL (ref 78–100)
MONOCYTES # BLD AUTO: 0 10E9/L (ref 0–1.3)
MONOCYTES NFR BLD AUTO: 1.2 %
MUCOUS THREADS #/AREA URNS LPF: PRESENT /LPF
NEUTROPHILS # BLD AUTO: 1.4 10E9/L (ref 1.6–8.3)
NEUTROPHILS NFR BLD AUTO: 43 %
NITRATE UR QL: NEGATIVE
NRBC # BLD AUTO: 0 10*3/UL
NRBC BLD AUTO-RTO: 0 /100
PH UR STRIP: 6 PH (ref 5–7)
PLATELET # BLD AUTO: 177 10E9/L (ref 150–450)
POTASSIUM SERPL-SCNC: 3.4 MMOL/L (ref 3.4–5.3)
PROT SERPL-MCNC: 6.7 G/DL (ref 6.8–8.8)
RBC # BLD AUTO: 3.65 10E12/L (ref 4.4–5.9)
RBC #/AREA URNS AUTO: <1 /HPF (ref 0–2)
SODIUM SERPL-SCNC: 129 MMOL/L (ref 133–144)
SOURCE: ABNORMAL
SP GR UR STRIP: 1 (ref 1–1.03)
UROBILINOGEN UR STRIP-MCNC: NORMAL MG/DL (ref 0–2)
WBC # BLD AUTO: 3.3 10E9/L (ref 4–11)
WBC #/AREA URNS AUTO: 5 /HPF (ref 0–2)

## 2017-10-19 PROCEDURE — 99284 EMERGENCY DEPT VISIT MOD MDM: CPT | Mod: 25

## 2017-10-19 PROCEDURE — 25000128 H RX IP 250 OP 636: Performed by: EMERGENCY MEDICINE

## 2017-10-19 PROCEDURE — 96374 THER/PROPH/DIAG INJ IV PUSH: CPT

## 2017-10-19 PROCEDURE — 96375 TX/PRO/DX INJ NEW DRUG ADDON: CPT

## 2017-10-19 PROCEDURE — A9270 NON-COVERED ITEM OR SERVICE: HCPCS | Mod: GY | Performed by: EMERGENCY MEDICINE

## 2017-10-19 PROCEDURE — 87086 URINE CULTURE/COLONY COUNT: CPT | Performed by: EMERGENCY MEDICINE

## 2017-10-19 PROCEDURE — 80053 COMPREHEN METABOLIC PANEL: CPT | Performed by: EMERGENCY MEDICINE

## 2017-10-19 PROCEDURE — 25000132 ZZH RX MED GY IP 250 OP 250 PS 637: Mod: GY | Performed by: EMERGENCY MEDICINE

## 2017-10-19 PROCEDURE — 85025 COMPLETE CBC W/AUTO DIFF WBC: CPT | Performed by: EMERGENCY MEDICINE

## 2017-10-19 PROCEDURE — 96361 HYDRATE IV INFUSION ADD-ON: CPT | Mod: 59

## 2017-10-19 PROCEDURE — 83605 ASSAY OF LACTIC ACID: CPT | Performed by: EMERGENCY MEDICINE

## 2017-10-19 PROCEDURE — 25000125 ZZHC RX 250: Performed by: EMERGENCY MEDICINE

## 2017-10-19 PROCEDURE — 87088 URINE BACTERIA CULTURE: CPT | Performed by: EMERGENCY MEDICINE

## 2017-10-19 PROCEDURE — 83690 ASSAY OF LIPASE: CPT | Performed by: EMERGENCY MEDICINE

## 2017-10-19 PROCEDURE — 87186 SC STD MICRODIL/AGAR DIL: CPT | Performed by: EMERGENCY MEDICINE

## 2017-10-19 PROCEDURE — 81001 URINALYSIS AUTO W/SCOPE: CPT | Performed by: EMERGENCY MEDICINE

## 2017-10-19 PROCEDURE — 99284 EMERGENCY DEPT VISIT MOD MDM: CPT | Mod: Z6 | Performed by: EMERGENCY MEDICINE

## 2017-10-19 RX ORDER — SODIUM CHLORIDE 9 MG/ML
INJECTION, SOLUTION INTRAVENOUS CONTINUOUS
Status: DISCONTINUED | OUTPATIENT
Start: 2017-10-19 | End: 2017-10-19 | Stop reason: HOSPADM

## 2017-10-19 RX ORDER — MORPHINE SULFATE 4 MG/ML
4 INJECTION, SOLUTION INTRAMUSCULAR; INTRAVENOUS
Status: DISCONTINUED | OUTPATIENT
Start: 2017-10-19 | End: 2017-10-19 | Stop reason: HOSPADM

## 2017-10-19 RX ORDER — DIPHENHYDRAMINE HYDROCHLORIDE 50 MG/ML
25 INJECTION INTRAMUSCULAR; INTRAVENOUS ONCE
Status: COMPLETED | OUTPATIENT
Start: 2017-10-19 | End: 2017-10-19

## 2017-10-19 RX ORDER — MAGNESIUM HYDROXIDE/ALUMINUM HYDROXICE/SIMETHICONE 120; 1200; 1200 MG/30ML; MG/30ML; MG/30ML
30 SUSPENSION ORAL EVERY 4 HOURS PRN
Qty: 355 ML | Refills: 1 | Status: SHIPPED | OUTPATIENT
Start: 2017-10-19 | End: 2018-02-26

## 2017-10-19 RX ADMIN — SODIUM CHLORIDE 1000 ML: 9 INJECTION, SOLUTION INTRAVENOUS at 19:09

## 2017-10-19 RX ADMIN — PROCHLORPERAZINE EDISYLATE 5 MG: 5 INJECTION INTRAMUSCULAR; INTRAVENOUS at 18:34

## 2017-10-19 RX ADMIN — DIPHENHYDRAMINE HYDROCHLORIDE 25 MG: 50 INJECTION, SOLUTION INTRAMUSCULAR; INTRAVENOUS at 18:37

## 2017-10-19 RX ADMIN — RANITIDINE HYDROCHLORIDE 50 MG: 25 INJECTION INTRAMUSCULAR; INTRAVENOUS at 18:29

## 2017-10-19 RX ADMIN — LIDOCAINE HYDROCHLORIDE 30 ML: 20 SOLUTION ORAL; TOPICAL at 18:27

## 2017-10-19 RX ADMIN — SODIUM CHLORIDE 1000 ML: 9 INJECTION, SOLUTION INTRAVENOUS at 18:15

## 2017-10-19 NOTE — TELEPHONE ENCOUNTER
[facility] Telephone Triage Note    Assessment: Patient called in to triage reporting the following symptoms: nausea,  increased GERD. All attempts at drinking and eating today have resulted in gagging and spitting out swallowed contents with bile. Pt report being lightheaded all day. Received IV fluids 3 days ago and did feel better after fluids. He does not think any of his medications stayed long enough in his stomach to absorb. Decreased urinary output from [place]. Last BM was hard and small last night.     Recommendations:  ER Visit recommended. Advised pt to go directly to [facility] ER or whichever ER was closest.    Follow-Up: Pt and another female caller was upset that pt was unable to come to the clinic for infusion hydration. Demanded to speak with RNCC Janet. Writer informed them Janet had left for the day (per versus ) and pt stated he still wanted to be transferred to her number. Female caller stated they know the clinic is open until 7pm and why writer cannot coordinate pt coming in today. Writer informed them the last apt of the day is usually before 4pm to accommodate total infusion time by the time the clinic closes and is dependent on staffing availability.    Writer advised again to go to the ER, that pt should not wait until tomorrow for an apt. Writer is unsure whether pt will go as he only wanted to speak with RNCC to coordinate another infusion in clinic.

## 2017-10-19 NOTE — ED AVS SNAPSHOT
[facility] [place], Emergency Department    [address] [place] [address]    Phone:  [phone]                                       King Gonsalo Bruno   MRN: [id number]    Department:  [facility], Emergency Department   Date of Visit:  10/19/2017           After Visit Summary Signature Page     I have received my discharge instructions, and my questions have been answered. I have discussed any challenges I see with this plan with the nurse or doctor.    ..........................................................................................................................................  Patient/Patient Representative Signature      ..........................................................................................................................................  Patient Representative Print Name and Relationship to Patient    ..................................................               ................................................  Date                                            Time    ..........................................................................................................................................  Reviewed by Signature/Title    ...................................................              ..............................................  Date                                                            Time

## 2017-10-19 NOTE — ED AVS SNAPSHOT
[facility], Emergency Department    [address] [facility] [address]    Phone:  [phone]                                       King Gonsalo Bruno   MRN: [id number]    Department:  [facility], Emergency Department   Date of Visit:  10/19/2017           Patient Information     Date Of Birth          1945        Your diagnoses for this visit were:     Dehydration     Acute renal failure, unspecified acute renal failure type (H)        You were seen by Damien Olivo MD.        Discharge Instructions           Discharge Instructions for Acute Kidney Injury  You have been diagnosed with acute kidney injury. This means that your kidneys are not working properly. When both kidneys are healthy, they help filter out fluid and waste from the blood and body. Acute kidney injury has many causes. These include urinary blockages, infection, lack of enough blood supply, and medicines that can injure kidneys. In some cases, acute kidney injury is short-term (temporary), lasting several days to a few months. This is because the kidney can repair itself. But acute kidney injury can also result in chronic kidney disease or end stage renal failure. Here are some instructions for you to follow as you recover.  Home care    Follow any instructions for eating and drinking given to you by your healthcare provider.      Keep a record of everything you eat and drink.    Measure the amount of urine and stool you have each day.    Weigh yourself every day, at the same time of day, and in the same kind of clothes. Keep a daily record of your daily weights.    Take your temperature every day. Keep a record of the results.    Learn to take your own blood pressure. Keep a record of your results. Ask your healthcare provider when you should seek emergency medical attention. Your provider will tell you which blood pressure reading is dangerous.    Avoid contact with people who have infections (colds, bronchitis, or skin  conditions).    Practice good personal hygiene. This is especially important if you have a catheter in place when you leave the hospital. Doing so helps keep you safe from infection.    Take your medicines exactly as directed.    You may require frequent blood and urine tests to monitor your kidney function.  Follow-up care  Follow up with your healthcare provider, or as advised.  When to seek medical care  Call your healthcare provider right away if you have any of the following:    Signs of bladder infection (urinating more often than usual, or burning, pain, bleeding, or hesitancy when you urinate)    Signs of infection around your catheter (redness, swelling, warmth, or drainage)    Rapid weight loss or weight gain, such as 3 pounds or more in 24 hours or 6 pounds or more in 7 days    Fever above 100.4 F (38.0 C) or chills    Muscle aches    Night sweats    Very little or no urine output    Swelling of your hands, legs, or feet    Back pain    Abdominal pain    Extreme tiredness   Date Last Reviewed: 2/1/2017 2000-2017 [employer]. [address] [place] [address]. All rights reserved. This information is not intended as a substitute for professional medical care. Always follow your healthcare professional's instructions.        Dehydration (Adult)  Dehydration occurs when your body loses too much fluid. This may be the result of prolonged vomiting or diarrhea, excessive sweating, or a high fever. It may also happen if you don t drink enough fluid when you re sick or out in the heat. Misuse of diuretics (water pills) can also be a cause.  Symptoms include thirst and decreased urine output. You may also feel dizzy, weak, fatigued, or very drowsy. The diet described below is usually enough to treat dehydration. In some cases, you may need medicine.  Home care    Drink at least 12 8-ounce glasses of fluid every day to resolve the dehydration. Fluid may include water; orange juice;  lemonade; apple, grape, or cranberry juice; clear fruit drinks; electrolyte replacement and sports drinks; and teas and coffee without caffeine. Don't drink alcohol. If you have been diagnosed with a kidney disease, ask your doctor how much and what types of fluids you should drink to prevent dehydration. If you have kidney disease, fluid can build up in the body. This can be dangerous to your health.    If you have a fever, muscle aches, or a headache as a result of a cold or flu, you may take acetaminophen or ibuprofen, unless another medicine was prescribed. If you have chronic liver or kidney disease, or have ever had a stomach ulcer or gastrointestinal bleeding, talk with your healthcare provider before using these medicines. Don't take aspirin if you are younger than 18 and have a fever. Aspirin raises the chance for severe liver injury.  Follow-up care  Follow up with your healthcare provider, or as advised.  When to seek medical advice  Call your healthcare provider right away if any of these occur:    Continued vomiting    Frequent diarrhea (more than 5 times a day); blood (red or black color) or mucus in diarrhea    Blood in vomit or stool    Swollen abdomen or increasing abdominal pain    Weakness, dizziness, or fainting    Unusual drowsiness or confusion    Reduced urine output or extreme thirst    Fever of 100.4 F (38 C) or higher  Date Last Reviewed: 5/1/2017 2000-2017 [employer]. [address] [place] [address]. All rights reserved. This information is not intended as a substitute for professional medical care. Always follow your healthcare professional's instructions.          Future Appointments        Provider Department Dept Phone Center    10/23/2017 9:45 AM [facility] [phone] [facility] Owned    10/25/2017 9:20 AM Muriel Haddad MD [facility]  [phone] [facility]    10/25/2017 9:45 AM [facility] Lab Draw [phone] [facility]    10/25/2017 10:15 AM Steve Arceo MD [facility] [phone] [facility]    10/27/2017 9:30 AM [facility] Lab Draw [phone] [facility]    10/27/2017 10:00 AM [facility] Oncology Infusion [facility] [phone] [facility]    11/3/2017 10:00 AM [facility] Lab Draw [phone] [facility]    11/3/2017 10:30 AM [facility] Oncology Infusion [facility] [phone] [facility]    11/6/2017 1:00 PM Prostate Cancer Center Nurse [facility] Prostate [facility] Urologic Cancers [phone] [facility]    11/8/2017 1:45 PM Steve Arceo MD [facility] [phone] [facility]    11/14/2017 1:30 PM Arsenio Ortiz MD [facility] Ear Nose [facility] Throat [phone] [facility]      24 Hour Appointment Hotline       To make an appointment at any [facility], call [phone] ([phone]). If you don't have a family doctor or clinic, we will help you find one. [facility] are conveniently located to serve the needs of you and your family.             Review of your medicines      START taking        Dose / Directions Last dose taken    alum & mag hydroxide-simethicone 200-200-20 MG/5ML Susp suspension   Commonly known as:  MYLANTA/MAALOX   Dose:  30 mL   Quantity:  355 mL        Take 30 mLs by mouth every 4 hours as needed for indigestion   Refills:  1          Our records show that you are taking the medicines listed below. If these are incorrect, please call your family doctor or clinic.        Dose / Directions Last dose taken    amLODIPine 5 MG tablet   Commonly known as:  NORVASC   Dose:  10 mg   Quantity:  60 tablet        Take 2 tablets (10 mg) by mouth daily   Refills:  0        dexamethasone 4 MG tablet   Commonly known as:  DECADRON   Dose:  8 mg    Quantity:  12 tablet        Take 2 tablets (8 mg) by mouth daily Take for 3 days, starting the day after cisplatin (Day 2 and Day 9).   Refills:  3        docusate sodium 100 MG capsule   Commonly known as:  COLACE   Dose:  100 mg   Quantity:  60 capsule        Take 1 capsule (100 mg) by mouth 2 times daily as needed for constipation   Refills:  0        fluticasone 50 MCG/ACT spray   Commonly known as:  FLONASE   Dose:  1 spray        Spray 1 spray into both nostrils every morning   Refills:  0        HYDROXYZINE HCL PO   Dose:  5 mg        Take 5 mg by mouth At Bedtime   Refills:  0        LORazepam 0.5 MG tablet   Commonly known as:  ATIVAN   Dose:  0.5 mg   Quantity:  30 tablet        Take 1 tablet (0.5 mg) by mouth every 4 hours as needed (Anxiety, Nausea/Vomiting or Sleep)   Refills:  3        ondansetron 8 MG tablet   Commonly known as:  ZOFRAN   Dose:  8 mg   Quantity:  20 tablet        Take 1 tablet (8 mg) by mouth every 8 hours as needed for nausea   Refills:  3        pantoprazole 20 MG EC tablet   Commonly known as:  PROTONIX   Dose:  20 mg   Quantity:  60 tablet        Take 1 tablet (20 mg) by mouth 2 times daily   Refills:  2        prochlorperazine 10 MG tablet   Commonly known as:  COMPAZINE   Dose:  5 mg   Quantity:  30 tablet        Take 0.5 tablets (5 mg) by mouth every 6 hours as needed (Nausea/Vomiting)   Refills:  3                Prescriptions were sent or printed at these locations (1 Prescription)                   Other Prescriptions                Printed at Department/Unit printer (1 of 1)         alum & mag hydroxide-simethicone (MYLANTA/MAALOX) 200-200-20 MG/5ML SUSP suspension                Procedures and tests performed during your visit     CBC with platelets differential    Comprehensive metabolic panel    Lactic acid whole blood    Lipase    UA reflex to Microscopic and Culture      Orders Needing Specimen Collection     None      Pending Results     Date and Time Order Name  "Status Description    10/19/2017 1749 UA reflex to Microscopic and Culture In process             Pending Culture Results     Date and Time Order Name Status Description    10/19/2017 1749 UA reflex to Microscopic and Culture In process             Pending Results Instructions     If you had any lab results that were not finalized at the time of your Discharge, you can call the ED Lab Result RN at [phone]. You will be contacted by this team for any positive Lab results or changes in treatment. The nurses are available 7 days a week from 10A to 6:30P.  You can leave a message 24 hours per day and they will return your call.        Thank you for choosing [facility]       Thank you for choosing [facility] for your care. Our goal is always to provide you with excellent care. Hearing back from our patients is one way we can continue to improve our services. Please take a few minutes to complete the written survey that you may receive in the mail after you visit with us. Thank you!        [facility] Information     [employer] lets you send messages to your doctor, view your test results, renew your prescriptions, schedule appointments and more. To sign up, go to [email or web address] . Click on \"Log in\" on the left side of the screen, which will take you to the Welcome page. Then click on \"Sign up Now\" on the right side of the page.     You will be asked to enter the access code listed below, as well as some personal information. Please follow the directions to create your username and password.     Your access code is: [email or web address]  Expires: 10/31/2017  1:07 PM     Your access code will  in 90 days. If you need help or a new code, please call your [facility] clinic or [phone].        Care EveryWhere ID     This is your Care EveryWhere ID. This could be used by other organizations to access your [facility] medical records  [id number]        Equal Access to Services     JENELLE RED AH: Tiffanie Robert, " dorie weaver, ronald paz. So [facility] [phone].    ATENCIÓN: Si habla español, tiene a washburn disposición servicios gratuitos de asistencia lingüística. Llame al [phone].    We comply with applicable federal civil rights laws and [place] laws. We do not discriminate on the basis of race, color, national origin, age, disability, sex, sexual orientation, or gender identity.            After Visit Summary       This is your record. Keep this with you and show to your community pharmacist(s) and doctor(s) at your next visit.

## 2017-10-19 NOTE — ED NOTES
"Triage Assessment & Note:    BP (!) 177/92  Pulse 87  Temp 98.1  F (36.7  C) (Oral)  Resp 16  Ht 1.956 m (6' 5\")  Wt 70.3 kg (155 lb)  SpO2 99%  BMI 18.38 kg/m2    Patient presents with: Pt advised to come to ER by clinic RN. PT reports sore throat and difficulty swallowing. PT also reports poor PO intake and feels like he could be dehydrated.     Home Treatments/Remedies: None    Febrile / Afebrile? Afebrile    Duration of C/o: 3 days    Manjinder Velez  October 19, 2017      "

## 2017-10-19 NOTE — DISCHARGE INSTRUCTIONS
Discharge Instructions for Acute Kidney Injury  You have been diagnosed with acute kidney injury. This means that your kidneys are not working properly. When both kidneys are healthy, they help filter out fluid and waste from the blood and body. Acute kidney injury has many causes. These include urinary blockages, infection, lack of enough blood supply, and medicines that can injure kidneys. In some cases, acute kidney injury is short-term (temporary), lasting several days to a few months. This is because the kidney can repair itself. But acute kidney injury can also result in chronic kidney disease or end stage renal failure. Here are some instructions for you to follow as you recover.  Home care    Follow any instructions for eating and drinking given to you by your healthcare provider.      Keep a record of everything you eat and drink.    Measure the amount of urine and stool you have each day.    Weigh yourself every day, at the same time of day, and in the same kind of clothes. Keep a daily record of your daily weights.    Take your temperature every day. Keep a record of the results.    Learn to take your own blood pressure. Keep a record of your results. Ask your healthcare provider when you should seek emergency medical attention. Your provider will tell you which blood pressure reading is dangerous.    Avoid contact with people who have infections (colds, bronchitis, or skin conditions).    Practice good personal hygiene. This is especially important if you have a catheter in place when you leave the hospital. Doing so helps keep you safe from infection.    Take your medicines exactly as directed.    You may require frequent blood and urine tests to monitor your kidney function.  Follow-up care  Follow up with your healthcare provider, or as advised.  When to seek medical care  Call your healthcare provider right away if you have any of the following:    Signs of bladder infection (urinating more often  than usual, or burning, pain, bleeding, or hesitancy when you urinate)    Signs of infection around your catheter (redness, swelling, warmth, or drainage)    Rapid weight loss or weight gain, such as 3 pounds or more in 24 hours or 6 pounds or more in 7 days    Fever above 100.4 F (38.0 C) or chills    Muscle aches    Night sweats    Very little or no urine output    Swelling of your hands, legs, or feet    Back pain    Abdominal pain    Extreme tiredness   Date Last Reviewed: 2/1/2017 2000-2017 [employer]. [address] [place] [address]. All rights reserved. This information is not intended as a substitute for professional medical care. Always follow your healthcare professional's instructions.        Dehydration (Adult)  Dehydration occurs when your body loses too much fluid. This may be the result of prolonged vomiting or diarrhea, excessive sweating, or a high fever. It may also happen if you don t drink enough fluid when you re sick or out in the heat. Misuse of diuretics (water pills) can also be a cause.  Symptoms include thirst and decreased urine output. You may also feel dizzy, weak, fatigued, or very drowsy. The diet described below is usually enough to treat dehydration. In some cases, you may need medicine.  Home care    Drink at least 12 8-ounce glasses of fluid every day to resolve the dehydration. Fluid may include water; orange juice; lemonade; apple, grape, or cranberry juice; clear fruit drinks; electrolyte replacement and sports drinks; and teas and coffee without caffeine. Don't drink alcohol. If you have been diagnosed with a kidney disease, ask your doctor how much and what types of fluids you should drink to prevent dehydration. If you have kidney disease, fluid can build up in the body. This can be dangerous to your health.    If you have a fever, muscle aches, or a headache as a result of a cold or flu, you may take acetaminophen or ibuprofen, unless another  medicine was prescribed. If you have chronic liver or kidney disease, or have ever had a stomach ulcer or gastrointestinal bleeding, talk with your healthcare provider before using these medicines. Don't take aspirin if you are younger than 18 and have a fever. Aspirin raises the chance for severe liver injury.  Follow-up care  Follow up with your healthcare provider, or as advised.  When to seek medical advice  Call your healthcare provider right away if any of these occur:    Continued vomiting    Frequent diarrhea (more than 5 times a day); blood (red or black color) or mucus in diarrhea    Blood in vomit or stool    Swollen abdomen or increasing abdominal pain    Weakness, dizziness, or fainting    Unusual drowsiness or confusion    Reduced urine output or extreme thirst    Fever of 100.4 F (38 C) or higher  Date Last Reviewed: 5/1/2017 2000-2017 [employer]. [address] [place] [address], [place] [address]. All rights reserved. This information is not intended as a substitute for professional medical care. Always follow your healthcare professional's instructions.

## 2017-10-20 ENCOUNTER — CARE COORDINATION (OUTPATIENT)
Dept: ONCOLOGY | Facility: CLINIC | Age: 72
End: 2017-10-20

## 2017-10-20 ENCOUNTER — INFUSION THERAPY VISIT (OUTPATIENT)
Dept: ONCOLOGY | Facility: CLINIC | Age: 72
End: 2017-10-20
Attending: INTERNAL MEDICINE
Payer: MEDICARE

## 2017-10-20 VITALS
WEIGHT: 155 LBS | OXYGEN SATURATION: 100 % | RESPIRATION RATE: 16 BRPM | HEART RATE: 91 BPM | DIASTOLIC BLOOD PRESSURE: 84 MMHG | TEMPERATURE: 98.8 F | BODY MASS INDEX: 18.38 KG/M2 | SYSTOLIC BLOOD PRESSURE: 125 MMHG

## 2017-10-20 DIAGNOSIS — R11.0 NAUSEA: ICD-10-CM

## 2017-10-20 DIAGNOSIS — C68.0 URETHRAL CANCER (H): ICD-10-CM

## 2017-10-20 DIAGNOSIS — C68.0 URETHRAL CANCER (H): Primary | ICD-10-CM

## 2017-10-20 DIAGNOSIS — R11.0 NAUSEA: Primary | ICD-10-CM

## 2017-10-20 DIAGNOSIS — E86.0 DEHYDRATION: ICD-10-CM

## 2017-10-20 DIAGNOSIS — K21.00 GASTROESOPHAGEAL REFLUX DISEASE WITH ESOPHAGITIS: Primary | ICD-10-CM

## 2017-10-20 LAB
ALBUMIN SERPL-MCNC: 3.2 G/DL (ref 3.4–5)
ALP SERPL-CCNC: 77 U/L (ref 40–150)
ALT SERPL W P-5'-P-CCNC: 25 U/L (ref 0–70)
ANION GAP SERPL CALCULATED.3IONS-SCNC: 8 MMOL/L (ref 3–14)
AST SERPL W P-5'-P-CCNC: 31 U/L (ref 0–45)
BASOPHILS # BLD AUTO: 0 10E9/L (ref 0–0.2)
BASOPHILS NFR BLD AUTO: 0.3 %
BILIRUB SERPL-MCNC: 0.5 MG/DL (ref 0.2–1.3)
BUN SERPL-MCNC: 10 MG/DL (ref 7–30)
CALCIUM SERPL-MCNC: 8.9 MG/DL (ref 8.5–10.1)
CHLORIDE SERPL-SCNC: 92 MMOL/L (ref 94–109)
CO2 SERPL-SCNC: 26 MMOL/L (ref 20–32)
CREAT SERPL-MCNC: 1.16 MG/DL (ref 0.66–1.25)
DIFFERENTIAL METHOD BLD: ABNORMAL
EOSINOPHIL # BLD AUTO: 0 10E9/L (ref 0–0.7)
EOSINOPHIL NFR BLD AUTO: 0.3 %
ERYTHROCYTE [DISTWIDTH] IN BLOOD BY AUTOMATED COUNT: 14 % (ref 10–15)
GFR SERPL CREATININE-BSD FRML MDRD: 62 ML/MIN/1.7M2
GLUCOSE SERPL-MCNC: 109 MG/DL (ref 70–99)
HCT VFR BLD AUTO: 31.1 % (ref 40–53)
HGB BLD-MCNC: 10.6 G/DL (ref 13.3–17.7)
IMM GRANULOCYTES # BLD: 0 10E9/L (ref 0–0.4)
IMM GRANULOCYTES NFR BLD: 0.5 %
LYMPHOCYTES # BLD AUTO: 1.2 10E9/L (ref 0.8–5.3)
LYMPHOCYTES NFR BLD AUTO: 30.5 %
MAGNESIUM SERPL-MCNC: 1.3 MG/DL (ref 1.6–2.3)
MCH RBC QN AUTO: 29.2 PG (ref 26.5–33)
MCHC RBC AUTO-ENTMCNC: 34.1 G/DL (ref 31.5–36.5)
MCV RBC AUTO: 86 FL (ref 78–100)
MONOCYTES # BLD AUTO: 0.1 10E9/L (ref 0–1.3)
MONOCYTES NFR BLD AUTO: 3 %
NEUTROPHILS # BLD AUTO: 2.6 10E9/L (ref 1.6–8.3)
NEUTROPHILS NFR BLD AUTO: 65.4 %
NRBC # BLD AUTO: 0 10*3/UL
NRBC BLD AUTO-RTO: 0 /100
PLATELET # BLD AUTO: 158 10E9/L (ref 150–450)
POTASSIUM SERPL-SCNC: 4 MMOL/L (ref 3.4–5.3)
PROT SERPL-MCNC: 7 G/DL (ref 6.8–8.8)
RBC # BLD AUTO: 3.63 10E12/L (ref 4.4–5.9)
SODIUM SERPL-SCNC: 127 MMOL/L (ref 133–144)
WBC # BLD AUTO: 4 10E9/L (ref 4–11)

## 2017-10-20 PROCEDURE — 25000128 H RX IP 250 OP 636: Mod: ZF | Performed by: PHYSICIAN ASSISTANT

## 2017-10-20 PROCEDURE — 99214 OFFICE O/P EST MOD 30 MIN: CPT | Mod: ZP | Performed by: PHYSICIAN ASSISTANT

## 2017-10-20 PROCEDURE — 83735 ASSAY OF MAGNESIUM: CPT | Performed by: PHYSICIAN ASSISTANT

## 2017-10-20 PROCEDURE — 25000128 H RX IP 250 OP 636: Mod: ZF | Performed by: INTERNAL MEDICINE

## 2017-10-20 PROCEDURE — 80053 COMPREHEN METABOLIC PANEL: CPT | Performed by: PHYSICIAN ASSISTANT

## 2017-10-20 PROCEDURE — 36415 COLL VENOUS BLD VENIPUNCTURE: CPT

## 2017-10-20 PROCEDURE — 25000125 ZZHC RX 250: Mod: ZF | Performed by: PHYSICIAN ASSISTANT

## 2017-10-20 PROCEDURE — 96365 THER/PROPH/DIAG IV INF INIT: CPT

## 2017-10-20 PROCEDURE — 96367 TX/PROPH/DG ADDL SEQ IV INF: CPT

## 2017-10-20 PROCEDURE — 96375 TX/PRO/DX INJ NEW DRUG ADDON: CPT

## 2017-10-20 PROCEDURE — 85025 COMPLETE CBC W/AUTO DIFF WBC: CPT | Performed by: PHYSICIAN ASSISTANT

## 2017-10-20 PROCEDURE — 96361 HYDRATE IV INFUSION ADD-ON: CPT

## 2017-10-20 RX ORDER — PALONOSETRON 0.05 MG/ML
0.25 INJECTION, SOLUTION INTRAVENOUS ONCE
Status: COMPLETED | OUTPATIENT
Start: 2017-10-20 | End: 2017-10-20

## 2017-10-20 RX ORDER — SUCRALFATE ORAL 1 G/10ML
1 SUSPENSION ORAL 4 TIMES DAILY
Qty: 800 ML | Refills: 0 | Status: SHIPPED | OUTPATIENT
Start: 2017-10-20 | End: 2017-11-09

## 2017-10-20 RX ORDER — OMEPRAZOLE
20 KIT 2 TIMES DAILY
Qty: 300 ML | Refills: 0 | Status: SHIPPED | OUTPATIENT
Start: 2017-10-20 | End: 2017-11-19

## 2017-10-20 RX ORDER — PALONOSETRON 0.05 MG/ML
0.25 INJECTION, SOLUTION INTRAVENOUS ONCE
Status: CANCELLED
Start: 2017-10-20 | End: 2017-10-20

## 2017-10-20 RX ADMIN — SODIUM CHLORIDE 150 MG: 9 INJECTION, SOLUTION INTRAVENOUS at 16:48

## 2017-10-20 RX ADMIN — SODIUM CHLORIDE 1000 ML: 9 INJECTION, SOLUTION INTRAVENOUS at 14:28

## 2017-10-20 RX ADMIN — DEXAMETHASONE SODIUM PHOSPHATE 10 MG: 10 INJECTION, SOLUTION INTRAMUSCULAR; INTRAVENOUS at 16:35

## 2017-10-20 RX ADMIN — PALONOSETRON HYDROCHLORIDE 0.25 MG: 0.25 INJECTION INTRAVENOUS at 16:13

## 2017-10-20 RX ADMIN — MAGNESIUM SULFATE HEPTAHYDRATE: 500 INJECTION, SOLUTION INTRAMUSCULAR; INTRAVENOUS at 15:42

## 2017-10-20 RX ADMIN — PANTOPRAZOLE SODIUM 40 MG: 40 INJECTION, POWDER, FOR SOLUTION INTRAVENOUS at 16:48

## 2017-10-20 NOTE — PROGRESS NOTES
Infusion Nursing Note:  King Gonsalo Bruno presents today for IVF and antiemetics.    Patient seen by provider today: Yes: To be seen by Josy MELARA at bedside.   present during visit today: Not Applicable.    Note: Mr Bruno arrives today after having labs drawn and states he was in the ER yesterday for dehydration and nausea. He appears weak and unsteady on his feet. He is oriented.  He stated that he doesn't know if he is urinating enough when asked about his elimination and currently uses a suprapubic catheter for urine collection.  He denies bloody urine and denies dark iveth urine. Mr Bruno endorses vomiting and denies diarrhea.  Magnesium replacement given for Mg level of 1.3. Anti-emetics also given at this visit: Emend, Dex, Aloxi and Protonix IV push.    Intravenous Access:  Peripheral IV placed.    Treatment Conditions:  Lab Results   Component Value Date    HGB 10.6 10/20/2017     Lab Results   Component Value Date    WBC 4.0 10/20/2017      Lab Results   Component Value Date    ANEU 2.6 10/20/2017     Lab Results   Component Value Date     10/20/2017      Lab Results   Component Value Date     10/20/2017                   Lab Results   Component Value Date    POTASSIUM 4.0 10/20/2017           Lab Results   Component Value Date    MAG 1.3 10/20/2017            Lab Results   Component Value Date    CR 1.16 10/20/2017                   Lab Results   Component Value Date    SAADIA 8.9 10/20/2017                Lab Results   Component Value Date    BILITOTAL 0.5 10/20/2017           Lab Results   Component Value Date    ALBUMIN 3.2 10/20/2017                    Lab Results   Component Value Date    ALT 25 10/20/2017           Lab Results   Component Value Date    AST 31 10/20/2017       Post Infusion Assessment:Patient tolerated infusion without incident.  Site patent and intact, free from redness, edema or discomfort.  No evidence of extravasations.  IV left intact for infusion  tomorrow.     Discharge Plan:   Prescriptions written for Protonix suspension and Carafate.  Discharge instructions reviewed with: Patient and Family.  Patient and/or family verbalized understanding of discharge instructions and all questions answered.  Copy of AVS reviewed with patient and/or family.  Patient will return 10/21/17 for IVF and Protonix IV push at next appointment.  Patient discharged in stable condition accompanied by: wife.  Departure Mode: Ambulatory.    Eri Og RN

## 2017-10-20 NOTE — PATIENT INSTRUCTIONS
October 2017 Sunday Monday Tuesday Wednesday Thursday Friday Saturday   1     2     3     [facility] RETURN    3:45 PM   (60 min.)   Janene Alves PA-C   [facility]     [facility] LAB DRAW    4:00 PM   (15 min.)    [facility]   [facility]     [facility] ONC INFUSION 120    4:30 PM   (120 min.)   [facility] ONCOLOGY INFUSION   [facility] 4     5     6     [facility] LAB DRAW    7:15 AM   (15 min.)   [facility]     [facility] RETURN    7:35 AM   (50 min.)   Yessica Ovalles APRN CNP   [facility]     [facility] ONC INFUSION 360    9:00 AM   (360 min.)    ONCOLOGY INFUSION   [facility]     [facility] RETURN   12:45 PM   (30 min.)   Audie Rooney MD   [facility]rologic C[facility] 7       8     9     UMP BMT INFUSION 120    2:00 PM   (120 min.)    BMT INFUSION   [facility] Blood and Marrow Transplant 10     11     12     13     [facility] LAB DRAW    9:00 AM   (15 min.)    MASONIC LAB DRAW   [facility] Lab Draw     UMP ONC INFUSION 360   10:00 AM   (360 min.)   UC ONCOLOGY INFUSION   [facility] 14       15     16     UMP ONC INFUSION 120    3:30 PM   (120 min.)   [facility] ONCOLOGY INFUSION   [facility] 17     18     19     Admission    5:22 PM   [facility] Emergency Department   (Discharge: 10/19/2017) 20     [facility] LAB DRAW    1:30 PM   (15 min.)    MASONIC LAB DRAW   [facility] Lab Draw     UMP RETURN    1:45 PM   (30 min.)   Josy Powell PA-C   [facility]     [facility] ONC INFUSION 120    2:00 PM   (120 min.)   [facility] ONCOLOGY INFUSION   [facility] 21       22     23     [facility]/ ONCOLOGY    9:45 AM   (45 min.)   [facility] 24     25     [facility] CYSTOSCOPY    9:05 AM   (20 min.)   Muriel Haddad MD   [facility]  Prostate and Urologic Cancers     [facility] LAB DRAW    9:45 AM   (15 min.)    MASONIC LAB DRAW   [facility] Lab Draw     UMP RETURN   10:00 AM   (30 min.)   Steve Arceo MD   [facility] 26     27     [facility] LAB DRAW    9:30 AM   (15 min.)    MASONIC LAB DRAW   [facility] Lab Draw     UMP ONC INFUSION 360   10:00 AM   (360 min.)    ONCOLOGY INFUSION   [facility] 28       29     30     31 November 2017 Sunday Monday Tuesday Wednesday Thursday Friday Saturday                  1     2     3     [facility] LAB DRAW   10:00 AM   (15 min.)    [facility]   [facility]     [facility] ONC INFUSION 360   10:30 AM   (360 min.)    ONCOLOGY INFUSION   [facility] 4       5     6     UMP RETURN   12:45 PM   (30 min.)   Nurse,  [facility]   [facility]rologic [facility] 7     8     UMP RETURN    1:30 PM   (30 min.)   Steve Arceo MD   [facility] 9     10     11       12     13     14     UMP NEW    1:15 PM   (30 min.)   Arsenio Ortiz MD   [facility] 15     16     17     18       19     20     21     22     23     24     25  Happy Birthday!       26     27     28     29     30                            Lab Results:  Recent Results (from the past 12 hour(s))   *CBC with platelets differential    Collection Time: 10/20/17  1:54 PM   Result Value Ref Range    WBC 4.0 4.0 - 11.0 10e9/L    RBC Count 3.63 (L) 4.4 - 5.9 10e12/L    Hemoglobin 10.6 (L) 13.3 - 17.7 g/dL    Hematocrit 31.1 (L) 40.0 - 53.0 %    MCV 86 78 - 100 fl    MCH 29.2 26.5 - 33.0 pg    MCHC 34.1 31.5 - 36.5 g/dL    RDW 14.0 10.0 - 15.0 %    Platelet Count 158 150 - 450 10e9/L    Diff Method Automated Method     % Neutrophils 65.4 %    % Lymphocytes 30.5 %    % Monocytes 3.0 %    % Eosinophils 0.3 %    % Basophils 0.3 %    %  Immature Granulocytes 0.5 %    Nucleated RBCs 0 0 /100    Absolute Neutrophil 2.6 1.6 - 8.3 10e9/L    Absolute Lymphocytes 1.2 0.8 - 5.3 10e9/L    Absolute Monocytes 0.1 0.0 - 1.3 10e9/L    Absolute Eosinophils 0.0 0.0 - 0.7 10e9/L    Absolute Basophils 0.0 0.0 - 0.2 10e9/L    Abs Immature Granulocytes 0.0 0 - 0.4 10e9/L    Absolute Nucleated RBC 0.0    Comprehensive metabolic panel    Collection Time: 10/20/17  1:54 PM   Result Value Ref Range    Sodium 127 (L) 133 - 144 mmol/L    Potassium 4.0 3.4 - 5.3 mmol/L    Chloride 92 (L) 94 - 109 mmol/L    Carbon Dioxide 26 20 - 32 mmol/L    Anion Gap 8 3 - 14 mmol/L    Glucose 109 (H) 70 - 99 mg/dL    Urea Nitrogen 10 7 - 30 mg/dL    Creatinine 1.16 0.66 - 1.25 mg/dL    GFR Estimate 62 >60 mL/min/1.7m2    GFR Estimate If Black 75 >60 mL/min/1.7m2    Calcium 8.9 8.5 - 10.1 mg/dL    Bilirubin Total 0.5 0.2 - 1.3 mg/dL    Albumin 3.2 (L) 3.4 - 5.0 g/dL    Protein Total 7.0 6.8 - 8.8 g/dL    Alkaline Phosphatase 77 40 - 150 U/L    ALT 25 0 - 70 U/L    AST 31 0 - 45 U/L   Magnesium    Collection Time: 10/20/17  1:54 PM   Result Value Ref Range    Magnesium 1.3 (L) 1.6 - 2.3 mg/dL     Contact Numbers    [facility] [place] [facility] Main Line: [phone]  [facility] [place] [facility] Triage:  [phone]    Call triage with chills and/or temperature greater than or equal to 100.5, uncontrolled nausea/vomiting, diarrhea, constipation, dizziness, shortness of breath, chest pain, bleeding, unexplained bruising, or any new/concerning symptoms, questions/concerns.     If you are having any concerning symptoms or wish to speak to a provider before your next infusion visit, please call your care coordinator or triage to notify them so we can adequately serve you.       After Hours: [phone]    If after hours, weekends, or holidays, call main hospital  and ask for Oncology doctor on call.

## 2017-10-20 NOTE — PROGRESS NOTES
"Called pt to follow up on ER visit from yesterday (see notes).  Pt states he \"can't do it anymore.\" Received IVFs and zantac in the ER yesterday but continues to feel poor.  States his \"throat condition was exacerbated by the chemo and everything tastes like cardboard or saw dust.\" Is scheduled to see Dr. Ortiz with ENT on 11/14 for esophageal diverticulum. He is not eating and has not had a bowel movement in \"5 days.\" Has difficulty drinking at times because his throat burns. Has lost weight. States \"I just can't take it. I'm tired.\" Is unsure if has any lightheadedness cause had not gotten out of bed yet this morning.  Continues to have nausea. Took compazine and ativan this morning but \"these meds do nothing for me. Nobody understands what I am going through.\" Has chronic neck pain he was taking aleve twice daily for. Was told by the ER to stop taking Aleve. Is wondering what to do about the pain now that he can't take aleve? Reports difficulty sleeping and is frustrated with feeling poor.    Lela Powell PA-C notified. VO given for pt to have labs (CBC, CMP,Mg) and receive 1-2 liter NS today with IV zofran, dex, and emend. She will see pt in infusion.    Called pt and he will come in at 1:30pm for labs with infusion at 2pm. Lela Powell PA-C will see him in infusion. Emphasized with pt the importance of coming to clinic today for further evaluation and encouraged him to come earlier than 1:30pm if he could. He will check with his ride and come sooner if he can to the clinic.  "

## 2017-10-20 NOTE — MR AVS SNAPSHOT
After Visit Summary   10/20/2017    King Gonsalo Bruno    MRN: [id number]           Patient Information     Date Of Birth          1945        Visit Information        Provider Department      10/20/2017 2:00 PM Josy Powell PA-C [facility]        Today's Diagnoses     Gastroesophageal reflux disease with esophagitis    -  1    Urethral cancer (H)        Nausea        Dehydration           Follow-ups after your visit        Your next 10 appointments already scheduled     Nov 08, 2017 12:30 PM CST   [facility] Lab Draw with [facility] LAB DRAW   [facility] Masonic Lab Draw ([facility])    [address] [place] [id number]   [phone]            Nov 08, 2017  1:00 PM CST   Infusion 120 with UC ONCOLOGY INFUSION, UC 18 ATC   [facility] ([facility])    [address] [place] [address]   [phone]            Nov 08, 2017  3:00 PM CST   (Arrive by 2:45 PM)   New Patient Visit with Ignacia Brower RD   [facility] ([facility])    [address] [place] [address]   [phone]            Nov 10, 2017  8:15 AM CST   Masonic Lab Draw with [facility] MASONIC LAB DRAW   [facility] Masonic Lab Draw ([facility])    [address] [place] [phone]   [phone]            Nov 10, 2017  8:40 AM CST   (Arrive by 8:25 AM)   Return Visit with SARITHA Briceno   [facility])    [address] [place] [address]   [phone]            Nov 10, 2017 10:00 AM CST   Infusion 360 with UC ONCOLOGY INFUSION, UC 12 ATC   [facility] ([facility])    [address] [place] [address]   [phone]     "        2017  1:30 PM CST   (Arrive by 1:15 PM)   New Patient Visit with Arsenio Ortiz MD   [facility] Ear Nose and Throat ([facility])    [address]  4th Floor  [facility] [address] [phone]            Nov 15, 2017 12:00 PM CST   Masonic Lab Draw with  MASONIC LAB DRAW   [facility] Masonic Lab Draw ([facility])    [address]  [address] [facility] [address] [phone]              Who to contact     If you have questions or need follow up information about today's clinic visit or your schedule please contact [facility] directly at [phone].  Normal or non-critical lab and imaging results will be communicated to you by MyChart, letter or phone within 4 business days after the clinic has received the results. If you do not hear from us within 7 days, please contact the clinic through MyChart or phone. If you have a critical or abnormal lab result, we will notify you by phone as soon as possible.  Submit refill requests through [employer] or call your pharmacy and they will forward the refill request to us. Please allow 3 business days for your refill to be completed.          Additional Information About Your Visit        [employer] Information     [employer] lets you send messages to your doctor, view your test results, renew your prescriptions, schedule appointments and more. To sign up, go to [email or web address] . Click on \"Log in\" on the left side of the screen, which will take you to the Welcome page. Then click on \"Sign up Now\" on the right side of the page.     You will be asked to enter the access code listed below, as well as some personal information. Please follow the directions to create your username and password.     Your access code is: [email or web address]  Expires: 2018  5:30 AM     Your access code will  in 90 days. If you need help or a new code, please call your [facility] " clinic or [phone].        Care EveryWhere ID     This is your Care EveryWhere ID. This could be used by other organizations to access your [facility] medical records  [id number]        Your Vitals Were     Pulse Temperature Respirations Pulse Oximetry BMI (Body Mass Index)       91 98.8  F (37.1  C) 16 100% 18.38 kg/m2        Blood Pressure from Last 3 Encounters:   11/06/17 137/81   11/03/17 130/74   10/27/17 128/77    Weight from Last 3 Encounters:   11/03/17 71.7 kg (158 lb)   10/27/17 68.5 kg (151 lb)   10/25/17 68 kg (150 lb)              We Performed the Following     *CBC with platelets differential     Comprehensive metabolic panel     Magnesium          Today's Medication Changes          These changes are accurate as of: 10/20/17 11:59 PM.  If you have any questions, ask your nurse or doctor.               Start taking these medicines.        Dose/Directions    FIRST-OMEPRAZOLE 2 MG/ML Susp   Used for:  Urethral cancer (H), Gastroesophageal reflux disease with esophagitis   Started by:  Josy Powell PA-C        Dose:  20 mg   Take 10 mLs (20 mg) by mouth 2 times daily   Quantity:  300 mL   Refills:  0       pantoprazole Susp suspension   Commonly known as:  PROTONIX   Used for:  Gastroesophageal reflux disease with esophagitis   Started by:  Josy Powell PA-C        Dose:  40 mg   Take 20 mLs (40 mg) by mouth 2 times daily for 20 days   Quantity:  800 mL   Refills:  0       sucralfate 1 GM/10ML suspension   Commonly known as:  CARAFATE   Used for:  Gastroesophageal reflux disease with esophagitis, Urethral cancer (H)   Started by:  Josy Powell PA-C        Dose:  1 g   Take 10 mLs (1 g) by mouth 4 times daily for 20 days   Quantity:  800 mL   Refills:  0            Where to get your medicines      These medications were sent to [facility] [place], [place] - [address]  [address] [facility] [address]    Hours:  TRANSPLANT  PHONE NUMBER [phone] Phone:  [phone]     pantoprazole Susp suspension    sucralfate 1 GM/10ML suspension         These medications were sent to [facility] [address] [place] [address] [address] [address]     Phone:  [phone]     FIRST-OMEPRAZOLE 2 MG/ML Susp                Primary Care Provider Office Phone # Fax #    Joan Ventura, MARLENY [phone]       [facility] [address] [address]        Equal Access to Services     [facility] : Hadii aad ku hadasho Soomaali, waaxda luqadaha, qaybta kaalmada adeegyada, waxay idiin hayaan adeeg alma srivastava . So [facility] [phone].    ATENCIÓN: Si habla español, tiene a washburn disposición servicios gratuitos de asistencia lingüística. Llame al [phone].    We comply with applicable federal civil rights laws and [place] laws. We do not discriminate on the basis of race, color, national origin, age, disability, sex, sexual orientation, or gender identity.            Thank you!     Thank you for choosing [facility]  for your care. Our goal is always to provide you with excellent care. Hearing back from our patients is one way we can continue to improve our services. Please take a few minutes to complete the written survey that you may receive in the mail after your visit with us. Thank you!             Your Updated Medication List - Protect others around you: Learn how to safely use, store and throw away your medicines at www.disposemymeds.org.          This list is accurate as of: 10/20/17 11:59 PM.  Always use your most recent med list.                   Brand Name Dispense Instructions for use Diagnosis    alum & mag hydroxide-simethicone 200-200-20 MG/5ML Susp suspension    MYLANTA/MAALOX    355 mL    Take 30 mLs by mouth every 4 hours as needed for indigestion        dexamethasone 4 MG tablet    DECADRON    12 tablet     Take 2 tablets (8 mg) by mouth daily Take for 3 days, starting the day after cisplatin (Day 2 and Day 9).    Urethral cancer (H)       docusate sodium 100 MG capsule    COLACE    60 capsule    Take 1 capsule (100 mg) by mouth 2 times daily as needed for constipation    Slow transit constipation       FIRST-OMEPRAZOLE 2 MG/ML Susp     300 mL    Take 10 mLs (20 mg) by mouth 2 times daily    Urethral cancer (H), Gastroesophageal reflux disease with esophagitis       fluticasone 50 MCG/ACT spray    FLONASE     Spray 1 spray into both nostrils every morning        HYDROXYZINE HCL PO      Take 5 mg by mouth At Bedtime        LORazepam 0.5 MG tablet    ATIVAN    30 tablet    Take 1 tablet (0.5 mg) by mouth every 4 hours as needed (Anxiety, Nausea/Vomiting or Sleep)    Nausea       ondansetron 8 MG tablet    ZOFRAN    20 tablet    Take 1 tablet (8 mg) by mouth every 8 hours as needed for nausea    Nausea       pantoprazole Susp suspension    PROTONIX    800 mL    Take 20 mLs (40 mg) by mouth 2 times daily for 20 days    Gastroesophageal reflux disease with esophagitis       prochlorperazine 10 MG tablet    COMPAZINE    30 tablet    Take 0.5 tablets (5 mg) by mouth every 6 hours as needed (Nausea/Vomiting)    Nausea       sucralfate 1 GM/10ML suspension    CARAFATE    800 mL    Take 10 mLs (1 g) by mouth 4 times daily for 20 days    Gastroesophageal reflux disease with esophagitis, Urethral cancer (H)

## 2017-10-20 NOTE — NURSING NOTE
Chief Complaint   Patient presents with     Labs Only     venipuncture, vitals checked     PIV placed for infusion, pt is very tearful, states he cant keep any thing down, was in ED last night for N/V

## 2017-10-20 NOTE — MR AVS SNAPSHOT
After Visit Summary   10/20/2017    King Gonsalo Bruno    MRN: [id number]           Patient Information     Date Of Birth          1945        Visit Information        Provider Department      10/20/2017 2:00 PM UC 24 ATC; UC ONCOLOGY INFUSION [facility]        Today's Diagnoses     Nausea    -  1    Dehydration        Urethral cancer (H)          Care Instructions          October 2017 Sunday Monday Tuesday Wednesday Thursday Friday Saturday   1     2     3     UMP RETURN    3:45 PM   (60 min.)   Janene Alves PA-C   [facility]     [facility] LAB DRAW    4:00 PM   (15 min.)    [facility] LAB DRAW   [facility] Lab Draw     UMP ONC INFUSION 120    4:30 PM   (120 min.)    ONCOLOGY INFUSION   [facility] 4     5     6     [facility] LAB DRAW    7:15 AM   (15 min.)    [facility] LAB DRAW   [facility] Lab Draw     UMP RETURN    7:35 AM   (50 min.)   Yessica Ovalles, ISAURA CNP   [facility]     UMP ONC INFUSION 360    9:00 AM   (360 min.)   UC ONCOLOGY INFUSION   [facility]     UMP RETURN   12:45 PM   (30 min.)   Audie Rooney MD   [facility]state [facility] Urologic Cancers 7       8     9     UMP BMT INFUSION 120    2:00 PM   (120 min.)    BMT INFUSION   [facility] Blood [facility] Marrow Transplant 10     11     12     13     [facility] LAB DRAW    9:00 AM   (15 min.)    [facility]     UMP ONC INFUSION 360   10:00 AM   (360 min.)   UC ONCOLOGY INFUSION   [facility] 14       15     16     UMP ONC INFUSION 120    3:30 PM   (120 min.)   UC ONCOLOGY INFUSION   [facility] 17     18     19     Admission    5:22 PM   [facility], Emergency Department   (Discharge: 10/19/2017) 20     [facility] LAB DRAW    1:30 PM   (15 min.)    MASONIC LAB DRAW   [facility] Lab Draw     UMP RETURN    1:45  PM   (30 min.)   Josy Powell PA-C   [facility]     UMP ONC INFUSION 120    2:00 PM   (120 min.)    ONCOLOGY INFUSION   [facility] 21       22     23     [facility]/ ONCOLOGY    9:45 AM   (45 min.)   UMPPET1   [facility] 24     25     UMP CYSTOSCOPY    9:05 AM   (20 min.)   Muriel Haddad MD   [facility] Prostate [facility] Urologic Cancers     UMP [facility] LAB DRAW    9:45 AM   (15 min.)    MASONIC LAB DRAW   [facility] Lab Draw     UMP RETURN   10:00 AM   (30 min.)   Steve Arceo MD   [facility] 26     27     [facility] LAB DRAW    9:30 AM   (15 min.)    MAS[facility]IC LAB DRAW   [facility] Lab Draw     UMP ONC INFUSION 360   10:00 AM   (360 min.)    ONCOLOGY INFUSION   [facility] 28       29 30 31 November 2017 Sunday Monday Tuesday Wednesday Thursday Friday Saturday                  1     2     3     [facility] LAB DRAW   10:00 AM   (15 min.)    MAS[facility]IC LAB DRAW   [facility] Lab Draw     UMP ONC INFUSION 360   10:30 AM   (360 min.)    ONCOLOGY INFUSION   [facility] 4       5     6     UMP RETURN   12:45 PM   (30 min.)   Nurse,  [facility]   [facility]rologic Cancers 7     8     UMP RETURN    1:30 PM   (30 min.)   Steve Arceo MD   [facility] 9     10     11       12     13     14     UMP NEW    1:15 PM   (30 min.)   Arsenio Ortiz MD   [facility] Nose [facility] Throat 15     16     17     18       19     20     21     22     23     24     25  Happy Birthday!       26     27     28     29     30                            Lab Results:  Recent Results (from the past 12 hour(s))   *CBC with platelets differential    Collection Time: 10/20/17  1:54 PM   Result Value Ref Range    WBC 4.0 4.0 - 11.0 10e9/L    RBC Count  3.63 (L) 4.4 - 5.9 10e12/L    Hemoglobin 10.6 (L) 13.3 - 17.7 g/dL    Hematocrit 31.1 (L) 40.0 - 53.0 %    MCV 86 78 - 100 fl    MCH 29.2 26.5 - 33.0 pg    MCHC 34.1 31.5 - 36.5 g/dL    RDW 14.0 10.0 - 15.0 %    Platelet Count 158 150 - 450 10e9/L    Diff Method Automated Method     % Neutrophils 65.4 %    % Lymphocytes 30.5 %    % Monocytes 3.0 %    % Eosinophils 0.3 %    % Basophils 0.3 %    % Immature Granulocytes 0.5 %    Nucleated RBCs 0 0 /100    Absolute Neutrophil 2.6 1.6 - 8.3 10e9/L    Absolute Lymphocytes 1.2 0.8 - 5.3 10e9/L    Absolute Monocytes 0.1 0.0 - 1.3 10e9/L    Absolute Eosinophils 0.0 0.0 - 0.7 10e9/L    Absolute Basophils 0.0 0.0 - 0.2 10e9/L    Abs Immature Granulocytes 0.0 0 - 0.4 10e9/L    Absolute Nucleated RBC 0.0    Comprehensive metabolic panel    Collection Time: 10/20/17  1:54 PM   Result Value Ref Range    Sodium 127 (L) 133 - 144 mmol/L    Potassium 4.0 3.4 - 5.3 mmol/L    Chloride 92 (L) 94 - 109 mmol/L    Carbon Dioxide 26 20 - 32 mmol/L    Anion Gap 8 3 - 14 mmol/L    Glucose 109 (H) 70 - 99 mg/dL    Urea Nitrogen 10 7 - 30 mg/dL    Creatinine 1.16 0.66 - 1.25 mg/dL    GFR Estimate 62 >60 mL/min/1.7m2    GFR Estimate If Black 75 >60 mL/min/1.7m2    Calcium 8.9 8.5 - 10.1 mg/dL    Bilirubin Total 0.5 0.2 - 1.3 mg/dL    Albumin 3.2 (L) 3.4 - 5.0 g/dL    Protein Total 7.0 6.8 - 8.8 g/dL    Alkaline Phosphatase 77 40 - 150 U/L    ALT 25 0 - 70 U/L    AST 31 0 - 45 U/L   Magnesium    Collection Time: 10/20/17  1:54 PM   Result Value Ref Range    Magnesium 1.3 (L) 1.6 - 2.3 mg/dL     Contact Numbers    [facility] Main Line: [phone]  [facility] Triage:  [phone]    Call triage with chills and/or temperature greater than or equal to 100.5, uncontrolled nausea/vomiting, diarrhea, constipation, dizziness, shortness of breath, chest pain, bleeding, unexplained bruising, or any new/concerning symptoms, questions/concerns.     If you are having any concerning symptoms or wish to speak to a  provider before your next infusion visit, please call your care coordinator or triage to notify them so we can adequately serve you.       After Hours: [phone]    If after hours, weekends, or holidays, call main hospital  and ask for Oncology doctor on call.             Follow-ups after your visit        Your next 10 appointments already scheduled     Oct 23, 2017  9:45 AM CDT   PE NPET ONCOLOGY (EYES TO THIGHS) with UMPPET1   [facility] ([facility])    [address] [address]   [phone]           Tell your doctor:   If there is any chance you may be pregnant or if you are breastfeeding.   If you have problems lying in small spaces (claustrophobia). If you do, your doctor may give you medicine to help you relax. If you have diabetes:   Have your exam early in the morning. Your blood glucose will go up as the day goes by.   Your glucose level must be 180 or less at the start of the exam. Please take any medicines you need to ensure this blood glucose level. 24 hours before your scan: Don t do any heavy exercise. (No jogging, aerobics or other workouts.) Exercise will make your pictures less accurate. 6 hours before your scan:   Stop all food and liquids (except water).   Do not chew gum or suck on mints.   If you need to take medicine with food, you may take it with a few crackers.  Please call your Imaging Department at your exam site with any questions.            Oct 25, 2017  9:20 AM CDT   (Arrive by 9:05 AM)   Cystoscopy with Muriel Haddad MD   [facility]state [facility] Urologic Cancers ([facility])    [address] [place] [address]  4th [facility] [address] [phone]            Oct 25, 2017  9:45 AM CDT   [facility] Lab Draw with  MASONIC LAB DRAW   [facility] Lab Draw ([facility])    [address] [place] [address]  2nd [facility] [address]    [phone]            Oct 25, 2017 10:15 AM CDT   (Arrive by 10:00 AM)   Return Visit with Steve Arceo MD   [facility] ([facility])    [address] [place] [address]   [phone]            Oct 27, 2017  9:30 AM CDT   [facility] Lab Draw with UC [facility] LAB DRAW   [facility])    [address] [place] [address]   [phone]            Oct 27, 2017 10:00 AM CDT   Infusion 360 with UC ONCOLOGY INFUSION, UC 29 ATC   [facility] ([facility])    [address] [place] [address]   [phone]            Nov 03, 2017 10:00 AM CDT   [facility] Lab Draw with UC [facility] LAB DRAW   [facility])    [address] [place] [address] [phone]            Nov 03, 2017 10:30 AM CDT   Infusion 360 with UC ONCOLOGY INFUSION, UC 21 ATC   [facility] ([facility])    [address] [place] [id number] [phone]              Who to contact     If you have questions or need follow up information about today's clinic visit or your schedule please contact [facility] directly at [phone].  Normal or non-critical lab and imaging results will be communicated to you by MyChart, letter or phone within 4 business days after the clinic has received the results. If you do not hear from us within 7 days, please contact the clinic through MyChart or phone. If you have a critical or abnormal lab result, we will notify you by phone as soon as possible.  Submit refill requests through [facility] or call your pharmacy and they will forward the refill request to us. Please allow 3 business days for your refill to be completed.          Additional  "Information About Your Visit        MyChart Information     [employer] lets you send messages to your doctor, view your test results, renew your prescriptions, schedule appointments and more. To sign up, go to [email or web address] . Click on \"Log in\" on the left side of the screen, which will take you to the Welcome page. Then click on \"Sign up Now\" on the right side of the page.     You will be asked to enter the access code listed below, as well as some personal information. Please follow the directions to create your username and password.     Your access code is: [email or web address]  Expires: 10/31/2017  1:07 PM     Your access code will  in 90 days. If you need help or a new code, please call your [facility] clinic or [phone].        Care EveryWhere ID     This is your Care EveryWhere ID. This could be used by other organizations to access your [facility] medical records  [id number]         Blood Pressure from Last 3 Encounters:   10/20/17 125/84   10/19/17 141/81   10/16/17 156/81    Weight from Last 3 Encounters:   10/20/17 70.3 kg (155 lb)   10/19/17 70.3 kg (155 lb)   10/16/17 73.2 kg (161 lb 6.4 oz)              Today, you had the following     No orders found for display         Today's Medication Changes          These changes are accurate as of: 10/20/17  5:32 PM.  If you have any questions, ask your nurse or doctor.               Start taking these medicines.        Dose/Directions    FIRST-OMEPRAZOLE 2 MG/ML Susp   Used for:  Urethral cancer (H), Gastroesophageal reflux disease with esophagitis   Started by:  Josy Powell PA-C        Dose:  20 mg   Take 10 mLs (20 mg) by mouth 2 times daily   Quantity:  300 mL   Refills:  0       sucralfate 1 GM/10ML suspension   Commonly known as:  CARAFATE   Used for:  Gastroesophageal reflux disease with esophagitis, Urethral cancer (H)   Started by:  Josy Powell PA-C        Dose:  1 g   Take 10 mLs (1 g) by mouth 4 times daily for 20 days "   Quantity:  800 mL   Refills:  0         These medicines have changed or have updated prescriptions.        Dose/Directions    * pantoprazole 20 MG EC tablet   Commonly known as:  PROTONIX   This may have changed:  Another medication with the same name was added. Make sure you understand how and when to take each.   Used for:  Gastroesophageal reflux disease without esophagitis   Changed by:  Yessica Ovalles APRN CNP        Dose:  20 mg   Take 1 tablet (20 mg) by mouth 2 times daily   Quantity:  60 tablet   Refills:  2       * pantoprazole Susp suspension   Commonly known as:  PROTONIX   This may have changed:  You were already taking a medication with the same name, and this prescription was added. Make sure you understand how and when to take each.   Used for:  Gastroesophageal reflux disease with esophagitis   Changed by:  Josy Powell PA-C        Dose:  40 mg   Take 20 mLs (40 mg) by mouth 2 times daily for 20 days   Quantity:  800 mL   Refills:  0       * Notice:  This list has 2 medication(s) that are the same as other medications prescribed for you. Read the directions carefully, and ask your doctor or other care provider to review them with you.         Where to get your medicines      These medications were sent to [facility] [address] [facility]  [address] [facility] [address] [address]    Hours:  TRANSPLANT PHONE NUMBER [phone] Phone:  [phone]     pantoprazole Susp suspension    sucralfate 1 GM/10ML suspension         These medications were sent to [facility] [address] [place] [address] AT [address] [place] [address] & [address] [address]     Phone:  [phone]     FIRST-OMEPRAZOLE 2 MG/ML Susp                Primary Care Provider Office Phone # Fax #    Joan Ventura [phone]       [facility] [address] [facility] [address]        Equal  Access to Services     [facility] : Hadii aad ku hadnikkijanine Raeali, wacarlosda luqadaha, qarik kanazronald montoya. So [facility] [place] [phone].    ATENCIÓN: Si habla español, tiene a washburn disposición servicios gratuitos de asistencia lingüística. Llame al [phone].    We comply with applicable federal civil rights laws and [place] laws. We do not discriminate on the basis of race, color, national origin, age, disability, sex, sexual orientation, or gender identity.            Thank you!     Thank you for choosing [facility]  for your care. Our goal is always to provide you with excellent care. Hearing back from our patients is one way we can continue to improve our services. Please take a few minutes to complete the written survey that you may receive in the mail after your visit with us. Thank you!             Your Updated Medication List - Protect others around you: Learn how to safely use, store and throw away your medicines at www.disposemymeds.org.          This list is accurate as of: 10/20/17  5:32 PM.  Always use your most recent med list.                   Brand Name Dispense Instructions for use Diagnosis    alum & mag hydroxide-simethicone 200-200-20 MG/5ML Susp suspension    MYLANTA/MAALOX    355 mL    Take 30 mLs by mouth every 4 hours as needed for indigestion        amLODIPine 5 MG tablet    NORVASC    60 tablet    Take 2 tablets (10 mg) by mouth daily    Benign essential hypertension       dexamethasone 4 MG tablet    DECADRON    12 tablet    Take 2 tablets (8 mg) by mouth daily Take for 3 days, starting the day after cisplatin (Day 2 and Day 9).    Urethral cancer (H)       docusate sodium 100 MG capsule    COLACE    60 capsule    Take 1 capsule (100 mg) by mouth 2 times daily as needed for constipation    Slow transit constipation       FIRST-OMEPRAZOLE 2 MG/ML Susp     300 mL    Take 10 mLs (20 mg) by mouth 2 times daily    Urethral cancer  (H), Gastroesophageal reflux disease with esophagitis       fluticasone 50 MCG/ACT spray    FLONASE     Spray 1 spray into both nostrils every morning        HYDROXYZINE HCL PO      Take 5 mg by mouth At Bedtime        LORazepam 0.5 MG tablet    ATIVAN    30 tablet    Take 1 tablet (0.5 mg) by mouth every 4 hours as needed (Anxiety, Nausea/Vomiting or Sleep)    Nausea       ondansetron 8 MG tablet    ZOFRAN    20 tablet    Take 1 tablet (8 mg) by mouth every 8 hours as needed for nausea    Nausea       * pantoprazole 20 MG EC tablet    PROTONIX    60 tablet    Take 1 tablet (20 mg) by mouth 2 times daily    Gastroesophageal reflux disease without esophagitis       * pantoprazole Susp suspension    PROTONIX    800 mL    Take 20 mLs (40 mg) by mouth 2 times daily for 20 days    Gastroesophageal reflux disease with esophagitis       prochlorperazine 10 MG tablet    COMPAZINE    30 tablet    Take 0.5 tablets (5 mg) by mouth every 6 hours as needed (Nausea/Vomiting)    Nausea       sucralfate 1 GM/10ML suspension    CARAFATE    800 mL    Take 10 mLs (1 g) by mouth 4 times daily for 20 days    Gastroesophageal reflux disease with esophagitis, Urethral cancer (H)       * Notice:  This list has 2 medication(s) that are the same as other medications prescribed for you. Read the directions carefully, and ask your doctor or other care provider to review them with you.

## 2017-10-21 ENCOUNTER — INFUSION THERAPY VISIT (OUTPATIENT)
Dept: ONCOLOGY | Facility: CLINIC | Age: 72
End: 2017-10-21
Attending: PHYSICIAN ASSISTANT
Payer: MEDICARE

## 2017-10-21 VITALS
TEMPERATURE: 97.6 F | HEART RATE: 83 BPM | OXYGEN SATURATION: 100 % | DIASTOLIC BLOOD PRESSURE: 83 MMHG | SYSTOLIC BLOOD PRESSURE: 153 MMHG | RESPIRATION RATE: 18 BRPM

## 2017-10-21 DIAGNOSIS — R11.0 NAUSEA: Primary | ICD-10-CM

## 2017-10-21 DIAGNOSIS — C68.0 URETHRAL CANCER (H): ICD-10-CM

## 2017-10-21 DIAGNOSIS — E86.0 DEHYDRATION: ICD-10-CM

## 2017-10-21 PROCEDURE — 25000125 ZZHC RX 250: Mod: ZF | Performed by: PHYSICIAN ASSISTANT

## 2017-10-21 PROCEDURE — 96374 THER/PROPH/DIAG INJ IV PUSH: CPT

## 2017-10-21 PROCEDURE — 25000128 H RX IP 250 OP 636: Mod: ZF | Performed by: PHYSICIAN ASSISTANT

## 2017-10-21 PROCEDURE — 96361 HYDRATE IV INFUSION ADD-ON: CPT

## 2017-10-21 RX ORDER — PALONOSETRON 0.05 MG/ML
0.25 INJECTION, SOLUTION INTRAVENOUS ONCE
Status: CANCELLED
Start: 2017-10-21 | End: 2017-10-21

## 2017-10-21 RX ADMIN — SODIUM CHLORIDE 1000 ML: 9 INJECTION, SOLUTION INTRAVENOUS at 08:43

## 2017-10-21 RX ADMIN — PANTOPRAZOLE SODIUM 40 MG: 40 INJECTION, POWDER, FOR SOLUTION INTRAVENOUS at 08:43

## 2017-10-21 ASSESSMENT — PAIN SCALES - GENERAL: PAINLEVEL: NO PAIN (0)

## 2017-10-21 NOTE — MR AVS SNAPSHOT
After Visit Summary   10/21/2017    King Gonsalo Bruno    MRN: [id number]           Patient Information     Date Of Birth          1945        Visit Information        Provider Department      10/21/2017 8:30 AM  12 ATC;  ONCOLOGY INFUSION [facility]        Today's Diagnoses     Nausea    -  1    Dehydration        Urethral cancer (H)          Care Instructions    Contact Numbers  [facility]: [phone]  Triage: [phone] (Monday-Friday 8-4:30)  After Hours:  [phone]    Please call the [facility] Triage line if you experience a temperature greater than or equal to 100.5, shaking chills, have uncontrolled nausea, vomiting and/or diarrhea, dizziness, shortness of breath, chest pain, bleeding, unexplained bruising, or if you have any other new/concerning symptoms, questions or concerns.     If it is after hours, weekends, or holidays, please call [phone] to speak to someone regarding your questions or concerns.    If you are having any concerning symptoms or wish to speak to a provider before your next infusion visit, please call your care coordinator or triage to notify them so we can adequately serve you.     If you need a refill on a narcotic prescription or other medication, please call triage before your infusion appointment.             October 2017 Sunday Monday Tuesday Wednesday Thursday Friday Saturday   1     2     3     UMP RETURN    3:45 PM   (60 min.)   Janene Alves PA-C   [facility] LAB DRAW    4:00 PM   (15 min.)    [facility] LAB DRAW   [facility] Lab Draw     [facility] ONC INFUSION 120    4:30 PM   (120 min.)    ONCOLOGY INFUSION   [facility] 4     5     6     P [facility] LAB DRAW    7:15 AM   (15 min.)    [facility] LAB DRAW   [facility] Lab Draw     UMP RETURN    7:35 AM   (50 min.)   Yessica Ovalles, ISAURA CNP   [facility] ONC INFUSION 360    9:00  AM   (360 min.)   UC ONCOLOGY INFUSION   [facility]     UMP RETURN   12:45 PM   (30 min.)   Audie Rooney MD   [facility] Prostate and Urologic Cancers 7       8     9     UMP BMT INFUSION 120    2:00 PM   (120 min.)   UC BMT INFUSION   [facility] Blood and Marrow Transplant 10     11     12     13     UMP [facility] LAB DRAW    9:00 AM   (15 min.)    [facility] LAB DRAW   [facility] Lab Draw     UMP ONC INFUSION 360   10:00 AM   (360 min.)   UC ONCOLOGY INFUSION   [facility] 14       15     16     UMP ONC INFUSION 120    3:30 PM   (120 min.)    ONCOLOGY INFUSION   [facility] 17     18     19     Admission    5:22 PM   [facility], [place], Emergency Department   (Discharge: 10/19/2017) 20     UMP [facility] LAB DRAW    1:30 PM   (15 min.)   UC [facility] LAB DRAW   [facility] Lab Draw     UMP RETURN    1:45 PM   (30 min.)   Josy Powell PA-C   [facility]     UMP ONC INFUSION 120    2:00 PM   (120 min.)   UC ONCOLOGY INFUSION   [facility] 21     UMP ONC INFUSION 120    8:30 AM   (120 min.)    ONCOLOGY INFUSION   [facility]   22     23     PE EYE/TH ONCOLOGY    9:45 AM   (45 min.)   [facility] 24     25     UMP CYSTOSCOPY    9:05 AM   (20 min.)   Muriel Haddad MD   [facility] Prostate and Urologic Cancers     UMP [facility] LAB DRAW    9:45 AM   (15 min.)   UC [facility] LAB DRAW   [facility] Lab Draw     UMP RETURN   10:00 AM   (30 min.)   Steve Arceo MD   [facility] 26     27     UMP [facility] LAB DRAW    9:30 AM   (15 min.)   UC [facility] LAB DRAW   [facility] Lab Draw     UMP ONC INFUSION 360   10:00 AM   (360 min.)   UC ONCOLOGY INFUSION   [facility] 28       29     30     31 November 2017 Sunday Monday Tuesday  Wednesday Thursday Friday Saturday                  1     2     3     [facility] LAB DRAW   10:00 AM   (15 min.)    [facility]IC LAB DRAW   [facility] Lab Draw     [facility] ONC INFUSION 360   10:30 AM   (360 min.)    ONCOLOGY INFUSION   [facility] 4       5     6     UMP RETURN   12:45 PM   (30 min.)   Nurse, [facility]   [facility] Inst for Prostate and Urologic Cancers 7     8     UMP RETURN    1:30 PM   (30 min.)   Steve Arceo MD   [facility] 9     10     11       12     13     14     UMP NEW    1:15 PM   (30 min.)   Arsenio Ortiz MD   [facility] Ear Nose and Throat 15     16     17     18       19     20     21     22     23     24     25  Happy Birthday!       26     27     28     29     30                            Lab Results:  No results found for this or any previous visit (from the past 12 hour(s)).           Follow-ups after your visit        Your next 10 appointments already scheduled     Oct 23, 2017  9:45 AM CDT   PE NPET [facility]   [facility] ([facility])    [address] [facility] [address]   [phone]           Tell your doctor:   If there is any chance you may be pregnant or if you are breastfeeding.   If you have problems lying in small spaces (claustrophobia). If you do, your doctor may give you medicine to help you relax. If you have diabetes:   Have your exam early in the morning. Your blood glucose will go up as the day goes by.   Your glucose level must be 180 or less at the start of the exam. Please take any medicines you need to ensure this blood glucose level. 24 hours before your scan: Don t do any heavy exercise. (No jogging, aerobics or other workouts.) Exercise will make your pictures less accurate. 6 hours before your scan:   Stop all food and liquids (except water).   Do not chew gum or suck on mints.   If you need to take  medicine with food, you may take it with a few crackers.  Please call your Imaging Department at your exam site with any questions.            Oct 25, 2017  9:20 AM CDT   (Arrive by 9:05 AM)   Cystoscopy with Muriel Haddad MD   [facility]rology [facility] Inst for Prostate and Urologic Cancers ([facility])    [address] [place] [address]  4th [facility] [address]   [phone]            Oct 25, 2017  9:45 AM CDT   [facility] Draw with UC [facility] LAB DRAW   [facility] Lab Draw ([facility])    [address] [place] [address]  2nd [facility] [address]   [phone]            Oct 25, 2017 10:15 AM CDT   (Arrive by 10:00 AM)   Return Visit with Steve Arceo MD   [facility] ([facility])    [address] [place] [address]   [phone]            Oct 27, 2017  9:30 AM CDT   [facility] Lab Draw with UC [facility] LAB DRAW   [facility] Lab Draw ([facility])    [address]  2nd [facility] [address]   [phone]            Oct 27, 2017 10:00 AM CDT   Infusion 360 with UC ONCOLOGY INFUSION, UC 29 ATC   [facility] ([facility])    [address] [place] [address]  2nd [facility] [address]   [phone]            Nov 03, 2017 10:00 AM CDT   [facility] Lab Draw with UC [facility] LAB DRAW   [facility] Lab Draw ([facility])    [address] [place] [address]   [phone]            Nov 03, 2017 10:30 AM CDT   Infusion 360 with UC ONCOLOGY INFUSION, UC 21 ATC   [facility] ([facility])    [address] [place] [address]   [phone]              Who to contact     If you have questions or need follow up information about today's clinic visit or your schedule please contact  "[facility]CER CLINIC directly at [phone].  Normal or non-critical lab and imaging results will be communicated to you by MyChart, letter or phone within 4 business days after the clinic has received the results. If you do not hear from us within 7 days, please contact the clinic through [employer] or phone. If you have a critical or abnormal lab result, we will notify you by phone as soon as possible.  Submit refill requests through [employer] e[employer] or call your pharmacy and they will forward the refill request to us. Please allow 3 business days for your refill to be completed.          Additional Information About Your Visit        [employer] Information     [employer] lets you send messages to your doctor, view your test results, renew your prescriptions, schedule appointments and more. To sign up, go to [email or web address] . Click on \"Log in\" on the left side of the screen, which will take you to the Welcome page. Then click on \"Sign up Now\" on the right side of the page.     You will be asked to enter the access code listed below, as well as some personal information. Please follow the directions to create your username and password.     Your access code is: [email or web address]  Expires: 10/31/2017  1:07 PM     Your access code will  in 90 days. If you need help or a new code, please call your [facility] clinic or [phone].        Care EveryWhere ID     This is your Care EveryWhere ID. This could be used by other organizations to access your [facility] medical records  [id number]        Your Vitals Were     Pulse Temperature Respirations Pulse Oximetry          83 97.6  F (36.4  C) (Tympanic) 18 100%         Blood Pressure from Last 3 Encounters:   10/21/17 153/83   10/20/17 125/84   10/19/17 141/81    Weight from Last 3 Encounters:   10/20/17 70.3 kg (155 lb)   10/19/17 70.3 kg (155 lb)   10/16/17 73.2 kg (161 lb 6.4 oz)              Today, you had the following     No orders found for display       Primary " Care Provider Office Phone # Fax #    Joan Ventura, MARLENY [phone]       [facility] [address]  [facility] [address]        Equal Access to Services     JENELLE RED : Hadii светлана ku hadnikkio Soomaali, waaxda luqadaha, qaybta kaalmada adeegyada, ronald nayin hayaan georginaaamir marquez carola rasmussen. So [facility] [phone].    ATENCIÓN: Si habla español, tiene a washburn disposición servicios gratuitos de asistencia lingüística. Llame al [phone].    We comply with applicable federal civil rights laws and [place] laws. We do not discriminate on the basis of race, color, national origin, age, disability, sex, sexual orientation, or gender identity.            Thank you!     Thank you for choosing [facility] CANCER [facility]  for your care. Our goal is always to provide you with excellent care. Hearing back from our patients is one way we can continue to improve our services. Please take a few minutes to complete the written survey that you may receive in the mail after your visit with us. Thank you!             Your Updated Medication List - Protect others around you: Learn how to safely use, store and throw away your medicines at www.disposemymeds.org.          This list is accurate as of: 10/21/17 11:25 AM.  Always use your most recent med list.                   Brand Name Dispense Instructions for use Diagnosis    alum & mag hydroxide-simethicone 200-200-20 MG/5ML Susp suspension    MYLANTA/MAALOX    355 mL    Take 30 mLs by mouth every 4 hours as needed for indigestion        amLODIPine 5 MG tablet    NORVASC    60 tablet    Take 2 tablets (10 mg) by mouth daily    Benign essential hypertension       dexamethasone 4 MG tablet    DECADRON    12 tablet    Take 2 tablets (8 mg) by mouth daily Take for 3 days, starting the day after cisplatin (Day 2 and Day 9).    Urethral cancer (H)       docusate sodium 100 MG capsule    COLACE    60 capsule    Take 1 capsule (100 mg) by mouth 2 times daily as needed  for constipation    Slow transit constipation       FIRST-OMEPRAZOLE 2 MG/ML Susp     300 mL    Take 10 mLs (20 mg) by mouth 2 times daily    Urethral cancer (H), Gastroesophageal reflux disease with esophagitis       fluticasone 50 MCG/ACT spray    FLONASE     Spray 1 spray into both nostrils every morning        HYDROXYZINE HCL PO      Take 5 mg by mouth At Bedtime        LORazepam 0.5 MG tablet    ATIVAN    30 tablet    Take 1 tablet (0.5 mg) by mouth every 4 hours as needed (Anxiety, Nausea/Vomiting or Sleep)    Nausea       ondansetron 8 MG tablet    ZOFRAN    20 tablet    Take 1 tablet (8 mg) by mouth every 8 hours as needed for nausea    Nausea       prochlorperazine 10 MG tablet    COMPAZINE    30 tablet    Take 0.5 tablets (5 mg) by mouth every 6 hours as needed (Nausea/Vomiting)    Nausea       sucralfate 1 GM/10ML suspension    CARAFATE    800 mL    Take 10 mLs (1 g) by mouth 4 times daily for 20 days    Gastroesophageal reflux disease with esophagitis, Urethral cancer (H)

## 2017-10-21 NOTE — PATIENT INSTRUCTIONS
Contact Numbers  [facility]: [phone]  Triage: [phone] (Monday-Friday 8-4:30)  After Hours:  [phone]    Please call the [facility] Triage line if you experience a temperature greater than or equal to 100.5, shaking chills, have uncontrolled nausea, vomiting and/or diarrhea, dizziness, shortness of breath, chest pain, bleeding, unexplained bruising, or if you have any other new/concerning symptoms, questions or concerns.     If it is after hours, weekends, or holidays, please call [phone] to speak to someone regarding your questions or concerns.    If you are having any concerning symptoms or wish to speak to a provider before your next infusion visit, please call your care coordinator or triage to notify them so we can adequately serve you.     If you need a refill on a narcotic prescription or other medication, please call triage before your infusion appointment.             October 2017 Sunday Monday Tuesday Wednesday Thursday Friday Saturday   1     2     3     UMP RETURN    3:45 PM   (60 min.)   Janene Alves PA-C   [facility]    4:00 PM   (15 min.)   UC [facility] LAB DRAW   [facility] Lab Draw     [facility] ONC INFUSION 120    4:30 PM   (120 min.)    ONCOLOGY INFUSION   [facility] 4     5     6     [facility] LAB DRAW    7:15 AM   (15 min.)   UC [facility] LAB DRAW   [facility] Lab Draw     UMP RETURN    7:35 AM   (50 min.)   Yessica Ovalles APRN CNP   [facility]    9:00 AM   (360 min.)    ONCOLOGY INFUSION   [facility]     UMP RETURN   12:45 PM   (30 min.)   Audie Rooney MD   [facility] for Prostate and Urologic Cancers 7       8     9     P BMT INFUSION 120    2:00 PM   (120 min.)    BMT INFUSION   [facility] Blood and Marrow Transplant 10     11     12     13     P [facility] LAB DRAW    9:00 AM   (15 min.)   [facility] LAB  DRAW   [facility] Lab Draw     UMP ONC INFUSION 360   10:00 AM   (360 min.)   UC ONCOLOGY INFUSION   [facility] 14       15     16     UMP ONC INFUSION 120    3:30 PM   (120 min.)    ONCOLOGY INFUSION   [facility] 17     18     19     Admission    5:22 PM   [facility] [place], Emergency Department   (Discharge: 10/19/2017) 20     UMP [facility] LAB DRAW    1:30 PM   (15 min.)   UC [facility] LAB DRAW   [facility] Lab Draw     UMP RETURN    1:45 PM   (30 min.)   Josy Powell PA-C   [facility]     UMP ONC INFUSION 120    2:00 PM   (120 min.)    ONCOLOGY INFUSION   [facility] 21     UMP ONC INFUSION 120    8:30 AM   (120 min.)    ONCOLOGY INFUSION   [facility]   22     23     PE EYE/TH ONCOLOGY    9:45 AM   (45 min.)   [facility] 24     25     UMP CYSTOSCOPY    9:05 AM   (20 min.)   Muriel Haddad MD   [facility] Prostate and Urologic Cancers     UMP [facility] LAB DRAW    9:45 AM   (15 min.)    [facility] LAB DRAW   [facility] Lab Draw     UMP RETURN   10:00 AM   (30 min.)   Steve Arceo MD   [facility] 26     27     UMP [facility] LAB DRAW    9:30 AM   (15 min.)   [facility]     UMP ONC INFUSION 360   10:00 AM   (360 min.)    ONCOLOGY INFUSION   [facility] 28       29     30     31 November 2017 Sunday Monday Tuesday Wednesday Thursday Friday Saturday                  1     2     3     UMP [facility] LAB DRAW   10:00 AM   (15 min.)    [facility]     UMP ONC INFUSION 360   10:30 AM   (360 min.)    ONCOLOGY INFUSION   [facility] 4       5     6     UMP RETURN   12:45 PM   (30 min.)   Nurse,  [facility]   [facility] for Prostate and Urologic  Cancers 7     8     UMP RETURN    1:30 PM   (30 min.)   Steve Arceo MD   [facility] 9     10     11       12     13     14     UMP NEW    1:15 PM   (30 min.)   Arsenio Ortiz MD   [facility] Ear Nose and Throat 15     16     17     18       19     20     21     22     23     24     25  Happy Birthday!       26     27     28     29     30                            Lab Results:  No results found for this or any previous visit (from the past 12 hour(s)).

## 2017-10-21 NOTE — PROGRESS NOTES
Infusion Nursing Note:  King Gonsalo Bruno presents today for IV fluids and Protonix.    Patient seen by provider today: No   present during visit today: Not Applicable.    Note: Patient continues to not have an appetite.  He states that his nausea is improved from yesterday.  Patient received 2 L NS today.     Intravenous Access:  Peripheral IV placed.    Treatment Conditions:  Not Applicable.      Post Infusion Assessment:  Patient tolerated infusion without incident.  Blood return noted pre and post infusion.  Site patent and intact, free from redness, edema or discomfort.  No evidence of extravasations.  Access discontinued per protocol.    Discharge Plan:   Prescription refills given for patient picked up at clinic pharmacy.  Discharge instructions reviewed with: Patient and Family.  Patient and/or family verbalized understanding of discharge instructions and all questions answered.  Copy of AVS reviewed with patient and/or family.  Patient will return 10/25/17 for next appointment.  Patient discharged in stable condition accompanied by: wife.  Departure Mode: Ambulatory.    Kimberlee Lowe RN

## 2017-10-22 ENCOUNTER — TELEPHONE (OUTPATIENT)
Dept: EMERGENCY MEDICINE | Facility: CLINIC | Age: 72
End: 2017-10-22

## 2017-10-22 NOTE — TELEPHONE ENCOUNTER
[facility] UR Emergency Department Lab result notification [Adult-Male]    [facility] ED lab result protocol used  Urine Culture Protcol    Reason for call  Notify of lab results, assess symptoms,  review ED providers recommendations/discharge instructions (if necessary) and advise per ED lab result f/u protocol    Lab Result (including Rx patient on, if applicable)  Preliminary urine culture report on 10/22/2017 shows the presence of bacteria(s):  50,000 to 100,000 colonies/mL Coagulase negative Staphylococcus and 50,000 to 100,000 colonies/mL Strain 2 Enterococcus faecalis  [place] ED discharge antibiotic: none  Recommendations per [place] ED Lab result protocol - Urine culture protocol.    Information table from ED Provider visit on 10/19/2017  ED diagnosis: Acute renal failure, unspecified acute renal failure type (H, Dehydration   ED provider Damien Olivo MD,   Symptoms reported at ED visit (Chief complaint, HPI)  71 year old male with a medical history of urethral cancer, GERD, hypertension and dehydration who presents to the Emergency Department for evaluation of dehydration. The patient has been on chemotherapy treatment since the first week of September of this year. The patient's most recent infusion visit for his chemotherapy treatment was C2D8 Cisplatin, Gemzar. The patient reports getting chemotherapy treatment every 2 weeks, with 1 week off in between. The patient reports since started chemotherapy treatment, he has been having problems with nausea, acid reflux and difficulty keeping fluids or food down. The patient was seen on 10/16/2017 at his infusion center where he received IV fluids and antiemetics. The patient reports whenever he attempts to eat or drink, he becomes nauseas and has acid reflux. The patient currently has prescriptions for Zofran, Compazine and Ativan at home. He denies any recent fevers or diarrhea   ED providers Impression and Plan (applicable information) Note not completed at this  time   Significant Medical hx, if applicable Reviewed   Coumadin/Warfarin [Yes or No] no   Creatinine Level (mg/dl) 1.16   Creatinine clearance (ml/min), if applicable 58.997   Allergies Lisinopril   Weight, if applicable 70.3 kg      RN Assessment (Patient s current Symptoms), include time called.  [Insert Left message here if message left]  At 1329 Left voicemail message requesting a call back to [phone] between 10 a.m. and 6:30 p.m., 7 days a week for patient's ED/UC lab results.  May leave a message 24/7, if no one available.     Isidra Lin, RN  [place] [employer] RN  Lung Nodule and ED Lab Result F/u RN  Epic pool (ED late result f/u RN): [id number]  FV INCIDENTAL RADIOLOGY F/U NURSES: P [id number]  # [phone]       Copy of Lab result   Preliminary Result   Exam Information   Exam Date Exam Time Accession # Results    10/19/17  7:46 PM [id number]    Component Results   Component Collected Lab   Specimen Description 10/19/2017  7:46    Catheterized Urine   Special Requests 10/19/2017  7:46 PM 75   Specimen received in preservative   Culture Micro (Abnormal) 10/19/2017  7:46    50,000 to 100,000 colonies/mL   Coagulase negative Staphylococcus   Susceptibility testing in progress      Culture Micro (Abnormal) 10/19/2017  7:46    50,000 to 100,000 colonies/mL   Strain 2   Enterococcus faecalis   Susceptibility testing in progress

## 2017-10-23 ENCOUNTER — TELEPHONE (OUTPATIENT)
Dept: ONCOLOGY | Facility: CLINIC | Age: 72
End: 2017-10-23

## 2017-10-23 LAB
BACTERIA SPEC CULT: ABNORMAL
BACTERIA SPEC CULT: ABNORMAL
Lab: ABNORMAL
SPECIMEN SOURCE: ABNORMAL

## 2017-10-23 NOTE — TELEPHONE ENCOUNTER
Notification of [place] ED lab result:  Visit date: 10/19/2017  Lab Result:  Final urine culture on 10/23/2017 shows the presence of bacteria(s): 50,000 to 100,000 colonies/mL Coagulase negative Staphylococcus and 50,000 to 100,000 colonies/mL Enterococcus faecalis  [place] ED discharge antibiotic: None  As per  ED lab result protocol, treat per Urine culture protocol.    Recommendations/Instructions:  At 1411 attempted to contact the NANC[facility] for the [facility] to discuss treatment recommendations the phone call is being declined and RN is receiving a busy signal, will attempt again at a later time.     Isidra Lin, RN  [facility] RN  Lung Nodule and ED Lab Result F/u RN  Epic pool (ED late result f/u RN): [id number]  [facility] INCIDENTAL RADIOLOGY F/U NURSES: P [id number]  # [phone]    Exam Information   Exam Date Exam Time Accession # Results    10/19/17  7:46 PM [id number]    Component Results   Component Collected Lab   Specimen Description 10/19/2017  7:46    Catheterized Urine   Special Requests 10/19/2017  7:46 PM 75   Specimen received in preservative   Culture Micro (Abnormal) 10/19/2017  7:46    50,000 to 100,000 colonies/mL   Coagulase negative Staphylococcus      Culture Micro (Abnormal) 10/19/2017  7:46    50,000 to 100,000 colonies/mL   Enterococcus faecalis      Culture & Susceptibility   ENTEROCOCCUS FAECALIS   Antibiotic Interpretation Sensitivity Unit Method Status   AMPICILLIN Sensitive <=2 ug/mL CONCEPCIÓN Final   NITROFURANTOIN Sensitive <=16 ug/mL CONCEPCIÓN Final   PENICILLIN Sensitive 4 ug/mL CONCEPCIÓN Final   VANCOMYCIN Sensitive 1 ug/mL CONCEPCIÓN Final         COAGULASE NEGATIVE STAPHYLOCOCCUS   Antibiotic Interpretation Sensitivity Unit Method Status   CIPROFLOXACIN Intermediate 2 ug/mL CONCEPCIÓN Final   GENTAMICIN Sensitive <=0.5 ug/mL CONCEPCIÓN Final   LEVOFLOXACIN Sensitive 1 ug/mL CONCEPCIÓN Final   NITROFURANTOIN Sensitive <=16 ug/mL CONCEPCIÓN Final   OXACILLIN Resistant >=4 ug/mL CONCEPCIÓN Final   PENICILLIN  Resistant >=0.5 ug/mL CONCEPCIÓN Final   TETRACYCLINE Sensitive 2 ug/mL CONCEPCIÓN Final   VANCOMYCIN Sensitive <=0.5 ug/mL CONCEPCIÓN Final

## 2017-10-24 ENCOUNTER — ONCOLOGY VISIT (OUTPATIENT)
Dept: ONCOLOGY | Facility: CLINIC | Age: 72
End: 2017-10-24
Attending: PHYSICIAN ASSISTANT
Payer: MEDICARE

## 2017-10-24 ENCOUNTER — INFUSION THERAPY VISIT (OUTPATIENT)
Dept: ONCOLOGY | Facility: CLINIC | Age: 72
End: 2017-10-24
Attending: INTERNAL MEDICINE
Payer: MEDICARE

## 2017-10-24 ENCOUNTER — TELEPHONE (OUTPATIENT)
Dept: ONCOLOGY | Facility: CLINIC | Age: 72
End: 2017-10-24

## 2017-10-24 VITALS
HEART RATE: 87 BPM | DIASTOLIC BLOOD PRESSURE: 61 MMHG | SYSTOLIC BLOOD PRESSURE: 168 MMHG | OXYGEN SATURATION: 98 % | TEMPERATURE: 98.2 F | RESPIRATION RATE: 16 BRPM

## 2017-10-24 DIAGNOSIS — E87.6 HYPOKALEMIA: ICD-10-CM

## 2017-10-24 DIAGNOSIS — E87.1 HYPONATREMIA: ICD-10-CM

## 2017-10-24 DIAGNOSIS — C68.0 URETHRAL CANCER (H): Primary | ICD-10-CM

## 2017-10-24 DIAGNOSIS — C68.0 URETHRAL CANCER (H): ICD-10-CM

## 2017-10-24 DIAGNOSIS — Q38.7 PHARYNGEAL DIVERTICULA: ICD-10-CM

## 2017-10-24 DIAGNOSIS — R11.0 NAUSEA: ICD-10-CM

## 2017-10-24 DIAGNOSIS — K21.00 GASTROESOPHAGEAL REFLUX DISEASE WITH ESOPHAGITIS: ICD-10-CM

## 2017-10-24 DIAGNOSIS — E86.0 DEHYDRATION: ICD-10-CM

## 2017-10-24 DIAGNOSIS — B37.0 THRUSH: Primary | ICD-10-CM

## 2017-10-24 LAB
ALBUMIN SERPL-MCNC: 3.3 G/DL (ref 3.4–5)
ALP SERPL-CCNC: 79 U/L (ref 40–150)
ALT SERPL W P-5'-P-CCNC: 19 U/L (ref 0–70)
ANION GAP SERPL CALCULATED.3IONS-SCNC: 9 MMOL/L (ref 3–14)
AST SERPL W P-5'-P-CCNC: 25 U/L (ref 0–45)
BASOPHILS # BLD AUTO: 0 10E9/L (ref 0–0.2)
BASOPHILS NFR BLD AUTO: 0.2 %
BILIRUB SERPL-MCNC: 0.4 MG/DL (ref 0.2–1.3)
BUN SERPL-MCNC: 11 MG/DL (ref 7–30)
CALCIUM SERPL-MCNC: 8.5 MG/DL (ref 8.5–10.1)
CHLORIDE SERPL-SCNC: 91 MMOL/L (ref 94–109)
CO2 SERPL-SCNC: 27 MMOL/L (ref 20–32)
CREAT SERPL-MCNC: 1.18 MG/DL (ref 0.66–1.25)
DIFFERENTIAL METHOD BLD: ABNORMAL
EOSINOPHIL # BLD AUTO: 0 10E9/L (ref 0–0.7)
EOSINOPHIL NFR BLD AUTO: 0.4 %
ERYTHROCYTE [DISTWIDTH] IN BLOOD BY AUTOMATED COUNT: 14.8 % (ref 10–15)
GFR SERPL CREATININE-BSD FRML MDRD: 61 ML/MIN/1.7M2
GLUCOSE SERPL-MCNC: 82 MG/DL (ref 70–99)
HCT VFR BLD AUTO: 30.8 % (ref 40–53)
HGB BLD-MCNC: 10.6 G/DL (ref 13.3–17.7)
IMM GRANULOCYTES # BLD: 0 10E9/L (ref 0–0.4)
IMM GRANULOCYTES NFR BLD: 0.6 %
LYMPHOCYTES # BLD AUTO: 1.9 10E9/L (ref 0.8–5.3)
LYMPHOCYTES NFR BLD AUTO: 36.4 %
MAGNESIUM SERPL-MCNC: 1.6 MG/DL (ref 1.6–2.3)
MCH RBC QN AUTO: 29.4 PG (ref 26.5–33)
MCHC RBC AUTO-ENTMCNC: 34.4 G/DL (ref 31.5–36.5)
MCV RBC AUTO: 85 FL (ref 78–100)
MONOCYTES # BLD AUTO: 0.6 10E9/L (ref 0–1.3)
MONOCYTES NFR BLD AUTO: 12 %
NEUTROPHILS # BLD AUTO: 2.7 10E9/L (ref 1.6–8.3)
NEUTROPHILS NFR BLD AUTO: 50.4 %
NRBC # BLD AUTO: 0 10*3/UL
NRBC BLD AUTO-RTO: 0 /100
PLATELET # BLD AUTO: 178 10E9/L (ref 150–450)
POTASSIUM SERPL-SCNC: 3.1 MMOL/L (ref 3.4–5.3)
PROT SERPL-MCNC: 7 G/DL (ref 6.8–8.8)
RBC # BLD AUTO: 3.61 10E12/L (ref 4.4–5.9)
SODIUM SERPL-SCNC: 127 MMOL/L (ref 133–144)
WBC # BLD AUTO: 5.3 10E9/L (ref 4–11)

## 2017-10-24 PROCEDURE — 83735 ASSAY OF MAGNESIUM: CPT | Performed by: PHYSICIAN ASSISTANT

## 2017-10-24 PROCEDURE — 85025 COMPLETE CBC W/AUTO DIFF WBC: CPT | Performed by: PHYSICIAN ASSISTANT

## 2017-10-24 PROCEDURE — 25000128 H RX IP 250 OP 636: Mod: ZF | Performed by: PHYSICIAN ASSISTANT

## 2017-10-24 PROCEDURE — 80053 COMPREHEN METABOLIC PANEL: CPT | Performed by: PHYSICIAN ASSISTANT

## 2017-10-24 PROCEDURE — 96375 TX/PRO/DX INJ NEW DRUG ADDON: CPT

## 2017-10-24 PROCEDURE — 96366 THER/PROPH/DIAG IV INF ADDON: CPT

## 2017-10-24 PROCEDURE — 96361 HYDRATE IV INFUSION ADD-ON: CPT

## 2017-10-24 PROCEDURE — 96365 THER/PROPH/DIAG IV INF INIT: CPT

## 2017-10-24 PROCEDURE — 99214 OFFICE O/P EST MOD 30 MIN: CPT | Mod: ZP | Performed by: PHYSICIAN ASSISTANT

## 2017-10-24 RX ORDER — LORAZEPAM 2 MG/ML
0.5 INJECTION INTRAMUSCULAR ONCE
Status: COMPLETED | OUTPATIENT
Start: 2017-10-24 | End: 2017-10-24

## 2017-10-24 RX ORDER — PALONOSETRON 0.05 MG/ML
0.25 INJECTION, SOLUTION INTRAVENOUS ONCE
Status: CANCELLED
Start: 2017-10-24 | End: 2017-10-24

## 2017-10-24 RX ORDER — NYSTATIN 100000/ML
SUSPENSION, ORAL (FINAL DOSE FORM) ORAL
Qty: 200 ML | Refills: 0 | Status: SHIPPED | OUTPATIENT
Start: 2017-10-24 | End: 2017-10-24

## 2017-10-24 RX ORDER — NYSTATIN 100000/ML
500000 SUSPENSION, ORAL (FINAL DOSE FORM) ORAL 4 TIMES DAILY
Qty: 200 ML | Refills: 0 | Status: SHIPPED | OUTPATIENT
Start: 2017-10-24 | End: 2017-11-03

## 2017-10-24 RX ORDER — NYSTATIN 100000/ML
500000 SUSPENSION, ORAL (FINAL DOSE FORM) ORAL 4 TIMES DAILY
Qty: 200 ML | Refills: 0 | Status: SHIPPED | OUTPATIENT
Start: 2017-10-24 | End: 2017-10-24

## 2017-10-24 RX ADMIN — SODIUM CHLORIDE 1000 ML: 9 INJECTION, SOLUTION INTRAVENOUS at 15:39

## 2017-10-24 RX ADMIN — LORAZEPAM 0.5 MG: 2 INJECTION INTRAMUSCULAR; INTRAVENOUS at 15:40

## 2017-10-24 RX ADMIN — POTASSIUM CHLORIDE: 149 INJECTION, SOLUTION, CONCENTRATE INTRAVENOUS at 16:08

## 2017-10-24 NOTE — PROGRESS NOTES
Infusion Nursing Note:  King Gonsalo Bruno presents today for IVF, antiemetics, potassium replacement.    Patient seen by provider today: Yes: SARITHA Morales   present during visit today: Not Applicable.    Note: Patient complains of nausea/vomiting, no appetite, taste aversion, slight dizziness today.  Due to time restraints, pt received 20 mEq of potassium today, with the other half scheduled to be given tomorrow.    Intravenous Access:  Labs drawn without difficulty through IV.  Peripheral IV placed in infusion today.    Treatment Conditions:     Lab Results   Component Value Date    WBC 5.3 10/24/2017     Lab Results   Component Value Date    RBC 3.61 10/24/2017     Lab Results   Component Value Date    HGB 10.6 10/24/2017     Lab Results   Component Value Date    HCT 30.8 10/24/2017     No components found for: MCT  Lab Results   Component Value Date    MCV 85 10/24/2017     Lab Results   Component Value Date    MCH 29.4 10/24/2017     Lab Results   Component Value Date    MCHC 34.4 10/24/2017     Lab Results   Component Value Date    RDW 14.8 10/24/2017     Lab Results   Component Value Date     10/24/2017        Lab Results   Component Value Date     10/24/2017                   Lab Results   Component Value Date    POTASSIUM 3.1 10/24/2017           Lab Results   Component Value Date    MAG 1.6 10/24/2017            Lab Results   Component Value Date    CR 1.18 10/24/2017                   Lab Results   Component Value Date    SAADIA 8.5 10/24/2017                Lab Results   Component Value Date    BILITOTAL 0.4 10/24/2017           Lab Results   Component Value Date    ALBUMIN 3.3 10/24/2017                    Lab Results   Component Value Date    ALT 19 10/24/2017           Lab Results   Component Value Date    AST 25 10/24/2017                  Results reviewed, labs MET treatment parameters, ok to proceed with treatment.    Post Infusion Assessment:  Patient tolerated infusion without  incident.  Blood return noted pre and post infusion.  Site patent and intact, free from redness, edema or discomfort.  No evidence of extravasations.  Access discontinued per protocol.    Discharge Plan:   Prescription refills given for nystatin.  Discharge instructions reviewed with: Patient and Family.  Patient and/or family verbalized understanding of discharge instructions and all questions answered.  Copy of AVS reviewed with patient and/or family.  Patient will return 10/25/2017 for labs, MD visit, and cystoscopy appointment.  Patient discharged in stable condition accompanied by: self.  Departure Mode: Ambulatory.    Usha Gavin RN

## 2017-10-24 NOTE — PATIENT INSTRUCTIONS
Nausea- lorazepam, ondansetron, and prochlorperazine all available as needed at home.     Acid reflux- I called Sher, omeprazole will cost $112. I am running it again at our pharmacy to see if he can do a prior authorization at get it covered.     Weight loss- It is very important to get at least 2 boost or ensure in every day. If too sweet, mix milk in.     Difficulty swallowing- Per Lela, I will set you up for a swallow study    Low sodium and potassium will replace half today and half tomorrow    You will return to see Beto Haddad and Marielos tomorrow for the future plan

## 2017-10-24 NOTE — TELEPHONE ENCOUNTER
[facility] Telephone Triage Note    Assessment: Patient called in to triage reporting the following symptoms: nausea,  vomiting, starting yesterday with 4-5 episodes in the last 24 hours,.    Recommendations: Discussed with SARITHA Gay.  Clinic visit  today with the following procedures/labs:  no procedures,  CBC, CMP, Magnesium,  Infusion of IV fluids.    Follow-Up: Message sent to schedulers to add pt on to schedule, Informed patient of appointment times, Instructed patient to seek care immediately for worsening sypmtoms, including: fever, chest pain, shortness of breath, dizziness. Patient voiced understanding of advice and/or instructions given.

## 2017-10-24 NOTE — PROGRESS NOTES
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 He hasn't been tolerating treatment well with n/v/GERD and dehydration.    Interval history:  had a PET/CT yesterday and after the contrast had acute n/v X 4-5 times. Last emesis was 4 am this am. Currently not nauseated.    He had issues with GERD which have resolved. He hasn't taken the carafate (scared to try something new) and hasn't picked up the omeprazole liquid yet. He reports he has had a bad reaction to the protonix IV although isn't clear about what it was (although his SO agreed he doesn't do well with it).     PO intake is poor. Not hungry. No taste. Tongue with film. Losing weight. Not doing boost/ensure. Not interested in seeing a nutritionist. Unclear whether he is constipated. Denies ab pain.     He endorses being very overwhelmed. He is upset today and BP is up.     ROS otherwise negative    Past medical history:  Patient Active Problem List   Diagnosis     Urethral cancer (H)     Nausea     Dehydration       Medications:    Current Outpatient Prescriptions on File Prior to Visit:  sucralfate (CARAFATE) 1 GM/10ML suspension Take 10 mLs (1 g) by mouth 4 times daily for 20 days   FIRST-OMEPRAZOLE 2 MG/ML SUSP Take 10 mLs (20 mg) by mouth 2 times daily   alum & mag hydroxide-simethicone (MYLANTA/MAALOX) 200-200-20 MG/5ML SUSP suspension Take 30 mLs by mouth every 4 hours as needed for indigestion   LORazepam (ATIVAN) 0.5 MG tablet Take 1 tablet (0.5 mg) by mouth every 4 hours as needed (Anxiety, Nausea/Vomiting or Sleep)   prochlorperazine (COMPAZINE) 10 MG tablet Take 0.5 tablets (5 mg) by mouth every 6 hours as needed (Nausea/Vomiting)   ondansetron (ZOFRAN) 8 MG tablet Take 1 tablet (8 mg) by mouth every 8 hours as needed for nausea   docusate sodium  (COLACE) 100 MG capsule Take 1 capsule (100 mg) by mouth 2 times daily as needed for constipation   amLODIPine (NORVASC) 5 MG tablet Take 2 tablets (10 mg) by mouth daily   dexamethasone (DECADRON) 4 MG tablet Take 2 tablets (8 mg) by mouth daily Take for 3 days, starting the day after cisplatin (Day 2 and Day 9).   HYDROXYZINE HCL PO Take 5 mg by mouth At Bedtime    fluticasone (FLONASE) 50 MCG/ACT spray Spray 1 spray into both nostrils every morning      No current facility-administered medications on file prior to visit.     Allergy:     Allergies   Allergen Reactions     Lisinopril Swelling       Physical exam  Vital Signs 10/24/2017   Systolic 168   Diastolic 61   Pulse 87   Temperature 98.2   Respirations 16   Weight (LB)    Height    BMI (Calculated)    Pain    O2 98     Wt Readings from Last 4 Encounters:   10/20/17 70.3 kg (155 lb)   10/19/17 70.3 kg (155 lb)   10/16/17 73.2 kg (161 lb 6.4 oz)   10/13/17 72.9 kg (160 lb 12.8 oz)     General: No acute distress but very anxious/overwhelmed/upset. He is in tears. Losing weight.   HEENT: Sclera anicteric. Oral mucosa dry.  +thrush  Lymph: No lymphadenopathy in neck  Heart: Regular, rate, and rhythm  Lungs: Clear to ascultation bilaterally  Abdomen: Positive bowel sounds. Soft, non-distended, non-tender.  : SPC site is clean, dry and non-tender.   Extremities: no lower extremity edema  Neuro: Cranial nerves grossly intact  Rash: none    Labs:  Results for KING AILYN CHAVEZ (MRN [id number]) as of 10/24/2017 15:56   Ref. Range 10/24/2017 15:00   Sodium Latest Ref Range: 133 - 144 mmol/L 127 (L)   Potassium Latest Ref Range: 3.4 - 5.3 mmol/L 3.1 (L)   Chloride Latest Ref Range: 94 - 109 mmol/L 91 (L)   Carbon Dioxide Latest Ref Range: 20 - 32 mmol/L 27   Urea Nitrogen Latest Ref Range: 7 - 30 mg/dL 11   Creatinine Latest Ref Range: 0.66 - 1.25 mg/dL 1.18   GFR Estimate Latest Ref Range: >60 mL/min/1.7m2 61   GFR Estimate If Black Latest Ref Range: >60  mL/min/1.7m2 74   Calcium Latest Ref Range: 8.5 - 10.1 mg/dL 8.5   Anion Gap Latest Ref Range: 3 - 14 mmol/L 9   Magnesium Latest Ref Range: 1.6 - 2.3 mg/dL 1.6   Albumin Latest Ref Range: 3.4 - 5.0 g/dL 3.3 (L)   Protein Total Latest Ref Range: 6.8 - 8.8 g/dL 7.0   Bilirubin Total Latest Ref Range: 0.2 - 1.3 mg/dL 0.4   Alkaline Phosphatase Latest Ref Range: 40 - 150 U/L 79   ALT Latest Ref Range: 0 - 70 U/L 19   AST Latest Ref Range: 0 - 45 U/L 25   Glucose Latest Ref Range: 70 - 99 mg/dL 82   WBC Latest Ref Range: 4.0 - 11.0 10e9/L 5.3   Hemoglobin Latest Ref Range: 13.3 - 17.7 g/dL 10.6 (L)   Hematocrit Latest Ref Range: 40.0 - 53.0 % 30.8 (L)   Platelet Count Latest Ref Range: 150 - 450 10e9/L 178   RBC Count Latest Ref Range: 4.4 - 5.9 10e12/L 3.61 (L)   MCV Latest Ref Range: 78 - 100 fl 85   MCH Latest Ref Range: 26.5 - 33.0 pg 29.4   MCHC Latest Ref Range: 31.5 - 36.5 g/dL 34.4   RDW Latest Ref Range: 10.0 - 15.0 % 14.8   Diff Method Unknown Automated Method   % Neutrophils Latest Units: % 50.4   % Lymphocytes Latest Units: % 36.4   % Monocytes Latest Units: % 12.0   % Eosinophils Latest Units: % 0.4   % Basophils Latest Units: % 0.2   % Immature Granulocytes Latest Units: % 0.6   Nucleated RBCs Latest Ref Range: 0 /100 0   Absolute Neutrophil Latest Ref Range: 1.6 - 8.3 10e9/L 2.7   Absolute Lymphocytes Latest Ref Range: 0.8 - 5.3 10e9/L 1.9   Absolute Monocytes Latest Ref Range: 0.0 - 1.3 10e9/L 0.6   Absolute Eosinophils Latest Ref Range: 0.0 - 0.7 10e9/L 0.0   Absolute Basophils Latest Ref Range: 0.0 - 0.2 10e9/L 0.0   Abs Immature Granulocytes Latest Ref Range: 0 - 0.4 10e9/L 0.0   Absolute Nucleated RBC Unknown 0.0     Assessment and plan:  King Gonsalo Bruno is a 71 year old male with 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 He is here symptomatic after his chemo.     Nausea/emesis- sounds like related to  contrast. He hasn't vomited since 4am but he is very upset and afraid to get sick again.   -Ativan 0.5 mg IV  -Home antiemetics: ativan, compazine, zofran    GERD/gastritis- better with a few doses of IV PPI. He hasn't tried the carafate or picked up the omeprazole  -benefit check on omeprazole liquid.     FEN:  Hyponatremia- consistent with prior values. IL NS today and tomorrow  Hypokalemia- will replace half today and half tomorrow.   Malnutrition- declined nutrition referral. Discussed importance of at least 2 boost or ensure a day.   Chronic renal failure. Cr at baseline today    Pharyngeal diverticulum with dysphagia  -He was seeing ENT at [facility] and would like to transfer his care here  -Per Lela will arrange video swallow and apt with SLP    Urothelial cancer, s/p 2 cycles cis/gem. He reports the mass is getting smaller. PET/CT shows response  -FU tomorrow with Beto Haddad and Marielos     Over 50% of 40 min visit was spent counseling and coordinating care    Janene Alves PA-C

## 2017-10-24 NOTE — LETTER
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 He hasn't been tolerating treatment well with n/v/GERD and dehydration.    Interval history:  had a PET/CT yesterday and after the contrast had acute n/v X 4-5 times. Last emesis was 4 am this am. Currently not nauseated.    He had issues with GERD which have resolved. He hasn't taken the carafate (scared to try something new) and hasn't picked up the omeprazole liquid yet. He reports he has had a bad reaction to the protonix IV although isn't clear about what it was (although his SO agreed he doesn't do well with it).     PO intake is poor. Not hungry. No taste. Tongue with film. Losing weight. Not doing boost/ensure. Not interested in seeing a nutritionist. Unclear whether he is constipated. Denies ab pain.     He endorses being very overwhelmed. He is upset today and BP is up.     ROS otherwise negative    Past medical history:  Patient Active Problem List   Diagnosis     Urethral cancer (H)     Nausea     Dehydration       Medications:    Current Outpatient Prescriptions on File Prior to Visit:  sucralfate (CARAFATE) 1 GM/10ML suspension Take 10 mLs (1 g) by mouth 4 times daily for 20 days   FIRST-OMEPRAZOLE 2 MG/ML SUSP Take 10 mLs (20 mg) by mouth 2 times daily   alum & mag hydroxide-simethicone (MYLANTA/MAALOX) 200-200-20 MG/5ML SUSP suspension Take 30 mLs by mouth every 4 hours as needed for indigestion   LORazepam (ATIVAN) 0.5 MG tablet Take 1 tablet (0.5 mg) by mouth every 4 hours as needed (Anxiety, Nausea/Vomiting or Sleep)   prochlorperazine (COMPAZINE) 10 MG tablet Take 0.5 tablets (5 mg) by mouth every 6 hours as needed (Nausea/Vomiting)   ondansetron (ZOFRAN) 8  MG tablet Take 1 tablet (8 mg) by mouth every 8 hours as needed for nausea   docusate sodium (COLACE) 100 MG capsule Take 1 capsule (100 mg) by mouth 2 times daily as needed for constipation   amLODIPine (NORVASC) 5 MG tablet Take 2 tablets (10 mg) by mouth daily   dexamethasone (DECADRON) 4 MG tablet Take 2 tablets (8 mg) by mouth daily Take for 3 days, starting the day after cisplatin (Day 2 and Day 9).   HYDROXYZINE HCL PO Take 5 mg by mouth At Bedtime    fluticasone (FLONASE) 50 MCG/ACT spray Spray 1 spray into both nostrils every morning      No current facility-administered medications on file prior to visit.     Allergy:     Allergies   Allergen Reactions     Lisinopril Swelling       Physical exam  Vital Signs 10/24/2017   Systolic 168   Diastolic 61   Pulse 87   Temperature 98.2   Respirations 16   Weight (LB)    Height    BMI (Calculated)    Pain    O2 98     Wt Readings from Last 4 Encounters:   10/20/17 70.3 kg (155 lb)   10/19/17 70.3 kg (155 lb)   10/16/17 73.2 kg (161 lb 6.4 oz)   10/13/17 72.9 kg (160 lb 12.8 oz)     General: No acute distress but very anxious/overwhelmed/upset. He is in tears. Losing weight.   HEENT: Sclera anicteric. Oral mucosa dry.  +thrush  Lymph: No lymphadenopathy in neck  Heart: Regular, rate, and rhythm  Lungs: Clear to ascultation bilaterally  Abdomen: Positive bowel sounds. Soft, non-distended, non-tender.  : SPC site is clean, dry and non-tender.   Extremities: no lower extremity edema  Neuro: Cranial nerves grossly intact  Rash: none    Labs:  Results for KING AILYN CHAVEZ (MRN [id number]) as of 10/24/2017 15:56   Ref. Range 10/24/2017 15:00   Sodium Latest Ref Range: 133 - 144 mmol/L 127 (L)   Potassium Latest Ref Range: 3.4 - 5.3 mmol/L 3.1 (L)   Chloride Latest Ref Range: 94 - 109 mmol/L 91 (L)   Carbon Dioxide Latest Ref Range: 20 - 32 mmol/L 27   Urea Nitrogen Latest Ref Range: 7 - 30 mg/dL 11   Creatinine Latest Ref Range: 0.66 - 1.25 mg/dL 1.18   GFR Estimate  Latest Ref Range: >60 mL/min/1.7m2 61   GFR Estimate If Black Latest Ref Range: >60 mL/min/1.7m2 74   Calcium Latest Ref Range: 8.5 - 10.1 mg/dL 8.5   Anion Gap Latest Ref Range: 3 - 14 mmol/L 9   Magnesium Latest Ref Range: 1.6 - 2.3 mg/dL 1.6   Albumin Latest Ref Range: 3.4 - 5.0 g/dL 3.3 (L)   Protein Total Latest Ref Range: 6.8 - 8.8 g/dL 7.0   Bilirubin Total Latest Ref Range: 0.2 - 1.3 mg/dL 0.4   Alkaline Phosphatase Latest Ref Range: 40 - 150 U/L 79   ALT Latest Ref Range: 0 - 70 U/L 19   AST Latest Ref Range: 0 - 45 U/L 25   Glucose Latest Ref Range: 70 - 99 mg/dL 82   WBC Latest Ref Range: 4.0 - 11.0 10e9/L 5.3   Hemoglobin Latest Ref Range: 13.3 - 17.7 g/dL 10.6 (L)   Hematocrit Latest Ref Range: 40.0 - 53.0 % 30.8 (L)   Platelet Count Latest Ref Range: 150 - 450 10e9/L 178   RBC Count Latest Ref Range: 4.4 - 5.9 10e12/L 3.61 (L)   MCV Latest Ref Range: 78 - 100 fl 85   MCH Latest Ref Range: 26.5 - 33.0 pg 29.4   MCHC Latest Ref Range: 31.5 - 36.5 g/dL 34.4   RDW Latest Ref Range: 10.0 - 15.0 % 14.8   Diff Method Unknown Automated Method   % Neutrophils Latest Units: % 50.4   % Lymphocytes Latest Units: % 36.4   % Monocytes Latest Units: % 12.0   % Eosinophils Latest Units: % 0.4   % Basophils Latest Units: % 0.2   % Immature Granulocytes Latest Units: % 0.6   Nucleated RBCs Latest Ref Range: 0 /100 0   Absolute Neutrophil Latest Ref Range: 1.6 - 8.3 10e9/L 2.7   Absolute Lymphocytes Latest Ref Range: 0.8 - 5.3 10e9/L 1.9   Absolute Monocytes Latest Ref Range: 0.0 - 1.3 10e9/L 0.6   Absolute Eosinophils Latest Ref Range: 0.0 - 0.7 10e9/L 0.0   Absolute Basophils Latest Ref Range: 0.0 - 0.2 10e9/L 0.0   Abs Immature Granulocytes Latest Ref Range: 0 - 0.4 10e9/L 0.0   Absolute Nucleated RBC Unknown 0.0     Assessment and plan:  King Gonsalo Bruno is a 71 year old male with 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 He is  here symptomatic after his chemo.     Nausea/emesis- sounds like related to contrast. He hasn't vomited since 4am but he is very upset and afraid to get sick again.   -Ativan 0.5 mg IV  -Home antiemetics: ativan, compazine, zofran    GERD/gastritis- better with a few doses of IV PPI. He hasn't tried the carafate or picked up the omeprazole  -benefit check on omeprazole liquid.     FEN:  Hyponatremia- consistent with prior values. IL NS today and tomorrow  Hypokalemia- will replace half today and half tomorrow.   Malnutrition- declined nutrition referral. Discussed importance of at least 2 boost or ensure a day.   Chronic renal failure. Cr at baseline today    Pharyngeal diverticulum with dysphagia  -He was seeing ENT at [facility] and would like to transfer his care here  -Per Lela will arrange video swallow and apt with SLP    Urothelial cancer, s/p 2 cycles cis/gem. He reports the mass is getting smaller. PET/CT shows response  -FU tomorrow with Beto Haddad and Marielos     Over 50% of 40 min visit was spent counseling and coordinating care    Janene Alves PA-C

## 2017-10-24 NOTE — PATIENT INSTRUCTIONS
Contact Numbers  [facility] Nurse Triage: [phone]  After Hours Nurse Line:  [phone]    Please call the [facility] Nurse Triage line or after hours number if you experience a temperature greater than or equal to 100.5, shaking chills, have uncontrolled nausea, vomiting and/or diarrhea, dizziness, shortness of breath, chest pain, bleeding, unexplained bruising, or if you have any other new/concerning symptoms, questions or concerns.     If you are having any concerning symptoms or wish to speak to a provider before your next infusion visit, please call your care coordinator or triage to notify them so we can adequately serve you.     If you need a refill on a narcotic prescription or other medication, please call triage before your infusion appointment.             October 2017 Sunday Monday Tuesday Wednesday Thursday Friday Saturday   1     2     3     UMP RETURN    3:45 PM   (60 min.)   Janene Alves PA-C   [facility] MASONIC LAB DRAW    4:00 PM   (15 min.)    MASONIC LAB DRAW   [facility] Lab Draw     [facility] ONC INFUSION 120    4:30 PM   (120 min.)    ONCOLOGY INFUSION   [facility] 4     5     6     UMP [facility]ON[facility] LAB DRAW    7:15 AM   (15 min.)    MASONIC LAB DRAW   [facility] Lab Draw     UMP RETURN    7:35 AM   (50 min.)   Yessica Ovalles APRN CNP   [facility]    9:00 AM   (360 min.)    ONCOLOGY INFUSION   [facility]     UMP RETURN   12:45 PM   (30 min.)   Audie Rooney MD   [facility]rology [facility] Inst for Prostate and Urologic Cancers 7       8     9     UMP BMT INFUSION 120    2:00 PM   (120 min.)    BMT INFUSION   [facility] Blood and Marrow Transplant 10     11     12     13     P MASONIC LAB DRAW    9:00 AM   (15 min.)    MASONIC LAB DRAW   [facility] Lab Draw     [facility] ONC INFUSION 360   10:00 AM   (360 min.)    ONCOLOGY INFUSION   [facility]  [facility] 14       15     16     UMP ONC INFUSION 120    3:30 PM   (120 min.)   UC ONCOLOGY INFUSION   [facility] 17     18     19     Admission    5:22 PM   [facility] [place], Emergency Department   (Discharge: 10/19/2017) 20     UMP [facility] LAB DRAW    1:30 PM   (15 min.)   UC [facility] LAB DRAW   [facility] Masonic Lab Draw     UMP RETURN    1:45 PM   (30 min.)   Josy Powell PA-C   [facility]     UMP ONC INFUSION 120    2:00 PM   (120 min.)   UC ONCOLOGY INFUSION   [facility] 21     UMP ONC INFUSION 120    8:30 AM   (120 min.)    ONCOLOGY INFUSION   [facility]   22     23     PE EYE/ ONCOLOGY    9:45 AM   (45 min.)   [facility] [place] [address] for Clinical Imaging Research 24     UMP RETURN    1:45 PM   (60 min.)   Janene Alves PA-C   [facility]     UMP ONC INFUSION 120    2:00 PM   (120 min.)    ONCOLOGY INFUSION   [facility] 25     UMP CYSTOSCOPY    9:05 AM   (20 min.)   Muriel Haddad MD   [facility]rology [facility] Inst for Prostate and Urologic Cancers     [facility] LAB DRAW    9:45 AM   (15 min.)    [facility]   [facility]     UMP RETURN   10:00 AM   (30 min.)   Steve Arceo MD   [facility]     UMP BMT INFUSION 120    2:00 PM   (120 min.)    BMT INFUSION   [facility] Blood and Marrow Transplant 26     27     UMP MASONIC LAB DRAW    9:30 AM   (15 min.)    MASONIC LAB DRAW   [facility] Masonic Lab Draw     UMP ONC INFUSION 360   10:00 AM   (360 min.)   UC ONCOLOGY INFUSION   [facility] 28       29     30     31 November 2017 Sunday Monday Tuesday Wednesday Thursday Friday Saturday                  1     2     3     [facility] LAB DRAW   10:00 AM   (15 min.)   UC MASONIC LAB DRAW   [facility] Masonic Lab Draw     UMP ONC INFUSION 360   10:30 AM   (360 min.)    ONCOLOGY  INFUSION   [facility] 4       5     6     UMP RETURN   12:45 PM   (30 min.)   Nurse, [facility]   [facility] Urology and Inst for Prostate and Urologic Cancers 7     8     UMP RETURN    1:30 PM   (30 min.)   Steve Arceo MD   [facility] 9     10     11       12     13     14     UMP NEW    1:15 PM   (30 min.)   Arsenio Ortiz MD   [facility] Ear Nose and Throat 15     16     17     18       19     20     21     22     23     24     25  Happy Birthday!       26     27     28     29     30                            Lab Results:  Recent Results (from the past 12 hour(s))   CBC with platelets differential    Collection Time: 10/24/17  3:00 PM   Result Value Ref Range    WBC 5.3 4.0 - 11.0 10e9/L    RBC Count 3.61 (L) 4.4 - 5.9 10e12/L    Hemoglobin 10.6 (L) 13.3 - 17.7 g/dL    Hematocrit 30.8 (L) 40.0 - 53.0 %    MCV 85 78 - 100 fl    MCH 29.4 26.5 - 33.0 pg    MCHC 34.4 31.5 - 36.5 g/dL    RDW 14.8 10.0 - 15.0 %    Platelet Count 178 150 - 450 10e9/L    Diff Method Automated Method     % Neutrophils 50.4 %    % Lymphocytes 36.4 %    % Monocytes 12.0 %    % Eosinophils 0.4 %    % Basophils 0.2 %    % Immature Granulocytes 0.6 %    Nucleated RBCs 0 0 /100    Absolute Neutrophil 2.7 1.6 - 8.3 10e9/L    Absolute Lymphocytes 1.9 0.8 - 5.3 10e9/L    Absolute Monocytes 0.6 0.0 - 1.3 10e9/L    Absolute Eosinophils 0.0 0.0 - 0.7 10e9/L    Absolute Basophils 0.0 0.0 - 0.2 10e9/L    Abs Immature Granulocytes 0.0 0 - 0.4 10e9/L    Absolute Nucleated RBC 0.0    Comprehensive metabolic panel    Collection Time: 10/24/17  3:00 PM   Result Value Ref Range    Sodium 127 (L) 133 - 144 mmol/L    Potassium 3.1 (L) 3.4 - 5.3 mmol/L    Chloride 91 (L) 94 - 109 mmol/L    Carbon Dioxide 27 20 - 32 mmol/L    Anion Gap 9 3 - 14 mmol/L    Glucose 82 70 - 99 mg/dL    Urea Nitrogen 11 7 - 30 mg/dL    Creatinine 1.18 0.66 - 1.25 mg/dL    GFR Estimate 61 >60 mL/min/1.7m2    GFR Estimate  If Black 74 >60 mL/min/1.7m2    Calcium 8.5 8.5 - 10.1 mg/dL    Bilirubin Total 0.4 0.2 - 1.3 mg/dL    Albumin 3.3 (L) 3.4 - 5.0 g/dL    Protein Total 7.0 6.8 - 8.8 g/dL    Alkaline Phosphatase 79 40 - 150 U/L    ALT 19 0 - 70 U/L    AST 25 0 - 45 U/L   Magnesium    Collection Time: 10/24/17  3:00 PM   Result Value Ref Range    Magnesium 1.6 1.6 - 2.3 mg/dL

## 2017-10-24 NOTE — MR AVS SNAPSHOT
After Visit Summary   10/24/2017    King Gonsalo Bruno    MRN: [id number]           Patient Information     Date Of Birth          1945        Visit Information        Provider Department      10/24/2017 2:00 PM Janene Alves PA-C [facility]        Today's Diagnoses     Thrush    -  1    Pharyngeal diverticula        Urethral cancer (H)        Gastroesophageal reflux disease with esophagitis        Hyponatremia        Hypokalemia          Care Instructions    Nausea- lorazepam, ondansetron, and prochlorperazine all available as needed at home.     Acid reflux- I called Sher, omeprazole will cost $112. I am running it again at our pharmacy to see if he can do a prior authorization at get it covered.     Weight loss- It is very important to get at least 2 boost or ensure in every day. If too sweet, mix milk in.     Difficulty swallowing- Per Lela, I will set you up for a swallow study    Low sodium and potassium will replace half today and half tomorrow    You will return to see Beto Haddad and Marielos tomorrow for the future plan          Follow-ups after your visit        Additional Services     Speech Therapy Referral       *This order will print in the [facility] Central Scheduling Office*    [facility] provides Speech Therapy evaluation and treatment and many specialty services across the [place] system.  If requesting a specialty program, please choose from the list below.    Call [phone] to schedule [facility] at all locations, with the exception of [facility], please call [phone].     Treatment: Evaluation & Treatment  Speech Treatment Diagnosis: Feels like food getting stuck  Special Instructions: h/o esophageal diverticulum  Special Programs: Clinical Swallow Study    Please be aware that coverage of these services is subject to the terms and limitations of your health insurance plan.   Call member services at your health plan with any benefit or coverage questions.      **Note to Provider** To refer patients to therapy outside of the location list, change the order class to External Referral in the order composer.            Speech Therapy Referral       *This order will print in the [facility] Central Scheduling Office*    [facility] provides Speech Therapy evaluation and treatment and many specialty services across the [place] system.  If requesting a specialty program, please choose from the list below.    Call [phone] to schedule [facility] [place] [facility] at all locations, with the exception of [facility], please call [phone].     Treatment: Evaluation & Treatment  Speech Treatment Diagnosis: Dysphagia  Special Instructions: eval and treat  Special Programs: video swallow    Please be aware that coverage of these services is subject to the terms and limitations of your health insurance plan.  Call member services at your health plan with any benefit or coverage questions.      **Note to Provider** To refer patients to therapy outside of the location list, change the order class to External Referral in the order composer.                  Your next 10 appointments already scheduled     Oct 25, 2017  9:20 AM CDT   (Arrive by 9:05 AM)   Cystoscopy with Muriel Haddad MD   [facility]rology and Inst for Prostate and Urologic Cancers ([facility] [place])    [address] [place] [address]  4th Floor  [facility] [id number] [phone]            Oct 25, 2017  9:45 AM CDT   M[facility]ic Lab Draw with  MASONIC LAB DRAW   [facility] Masonic Lab Draw ([facility])    [address] [place] [address]  2nd [facility] [id number] [phone]            Oct 25, 2017 10:15 AM CDT   (Arrive by 10:00 AM)   Return Visit with Steve [facility]ceo MD   [facility]    [address] [place] [address]   [phone]            Oct 25, 2017  2:00 PM CDT   Infusion 120 with UC BMT INFUSION, UC 4 ATC   [facility] Blood and Marrow Transplant ([facility])    [address] [place] [id number]   [phone]            Oct 27, 2017  9:30 AM CDT   Masonic Lab Draw with UC MASONIC LAB DRAW   [facility] ([facility])    [address] [place] [id number]   [phone]            Oct 27, 2017 10:00 AM CDT   Infusion 360 with UC ONCOLOGY INFUSION, UC 29 ATC   [facility] ([facility])    [address] [place] [phone]   [phone]            Nov 03, 2017 10:00 AM CDT   Masonic Lab Draw with UC MASONIC LAB DRAW   [facility] Masonic Lab Draw ([facility])    [address] [place] [address]   [phone]            Nov 03, 2017 10:30 AM CDT   Infusion 360 with UC ONCOLOGY INFUSION   [facility] ([facility])    [address] [place] [address]   [phone]              Future tests that were ordered for you today     Open Future Orders        Priority Expected Expires Ordered    XR Video Swallow w Esophagram - Order with Speech Therapy Referral Routine 10/24/2017 10/24/2018 10/24/2017            Who to contact     If you have questions or need follow up information about today's clinic visit or your schedule please contact [facility] directly at [phone].  Normal or non-critical lab and imaging results will be communicated to you by MyChart, letter or phone within 4 business days after the clinic has received the results. If you do not hear from us within 7 days, please contact the clinic through MyChart or phone. If you have a  "critical or abnormal lab result, we will notify you by phone as soon as possible.  Submit refill requests through [employer] or call your pharmacy and they will forward the refill request to us. Please allow 3 business days for your refill to be completed.          Additional Information About Your Visit        MyChart Information     [employer] lets you send messages to your doctor, view your test results, renew your prescriptions, schedule appointments and more. To sign up, go to [email or web address] . Click on \"Log in\" on the left side of the screen, which will take you to the Welcome page. Then click on \"Sign up Now\" on the right side of the page.     You will be asked to enter the access code listed below, as well as some personal information. Please follow the directions to create your username and password.     Your access code is: [email or web address]  Expires: 10/31/2017  1:07 PM     Your access code will  in 90 days. If you need help or a new code, please call your [facility] clinic or [phone].        Care EveryWhere ID     This is your Care EveryWhere ID. This could be used by other organizations to access your [facility] medical records  [id number]         Blood Pressure from Last 3 Encounters:   10/24/17 168/61   10/21/17 153/83   10/20/17 125/84    Weight from Last 3 Encounters:   10/20/17 70.3 kg (155 lb)   10/19/17 70.3 kg (155 lb)   10/16/17 73.2 kg (161 lb 6.4 oz)              We Performed the Following     Speech Therapy Referral     Speech Therapy Referral          Today's Medication Changes          These changes are accurate as of: 10/24/17  4:07 PM.  If you have any questions, ask your nurse or doctor.               Start taking these medicines.        Dose/Directions    nystatin 102654 UNIT/ML suspension   Commonly known as:  MYCOSTATIN   Used for:  Thrush   Started by:  Janene Alves PA-C        Dose:  973727 Units   Take 5 mLs (500,000 Units) by mouth 4 times daily for 7 days   Quantity:  " 200 mL   Refills:  0         These medicines have changed or have updated prescriptions.        Dose/Directions    * FIRST-OMEPRAZOLE 2 MG/ML Susp   This may have changed:  Another medication with the same name was added. Make sure you understand how and when to take each.   Used for:  Urethral cancer (H), Gastroesophageal reflux disease with esophagitis   Changed by:  Josy Powell PA-C        Dose:  20 mg   Take 10 mLs (20 mg) by mouth 2 times daily   Quantity:  300 mL   Refills:  0       * omeprazole 2 mg/mL Susp   Commonly known as:  priLOSEC   This may have changed:  You were already taking a medication with the same name, and this prescription was added. Make sure you understand how and when to take each.   Used for:  Gastroesophageal reflux disease with esophagitis, Urethral cancer (H)   Changed by:  Janene Alves PA-C        Dose:  20 mg   Take 10 mLs (20 mg) by mouth 2 times daily for 14 days   Quantity:  280 mL   Refills:  0       * Notice:  This list has 2 medication(s) that are the same as other medications prescribed for you. Read the directions carefully, and ask your doctor or other care provider to review them with you.         Where to get your medicines      These medications were sent to [facility]  [address] [facility] [address] [address]    Hours:  TRANSPLANT PHONE NUMBER [phone] Phone:  [phone]     omeprazole 2 mg/mL Susp         These medications were sent to [facility] [address] [place] [address] AT [address] [place] [address]     Phone:  [phone]     nystatin 679843 UNIT/ML suspension                Primary Care Provider Office Phone # Fax #    Joan Ventura [phone]       [facility] [address] [address]        Equal Access to Services     JENELLE RED AH: Tiffanie cui  Veroniqueveronica, dukeda luqadaha, qagordonta kadiane chase, ronald simondyana grady. So [facility] [phone].    ATENCIÓN: Si everett hinson, tiene a washburn disposición servicios gratuitos de asistencia lingüística. Juwan al [phone].    We comply with applicable federal civil rights laws and [place] laws. We do not discriminate on the basis of race, color, national origin, age, disability, sex, sexual orientation, or gender identity.            Thank you!     Thank you for choosing [facility]  for your care. Our goal is always to provide you with excellent care. Hearing back from our patients is one way we can continue to improve our services. Please take a few minutes to complete the written survey that you may receive in the mail after your visit with us. Thank you!             Your Updated Medication List - Protect others around you: Learn how to safely use, store and throw away your medicines at www.disposemymeds.org.          This list is accurate as of: 10/24/17  4:07 PM.  Always use your most recent med list.                   Brand Name Dispense Instructions for use Diagnosis    alum & mag hydroxide-simethicone 200-200-20 MG/5ML Susp suspension    MYLANTA/MAALOX    355 mL    Take 30 mLs by mouth every 4 hours as needed for indigestion        amLODIPine 5 MG tablet    NORVASC    60 tablet    Take 2 tablets (10 mg) by mouth daily    Benign essential hypertension       dexamethasone 4 MG tablet    DECADRON    12 tablet    Take 2 tablets (8 mg) by mouth daily Take for 3 days, starting the day after cisplatin (Day 2 and Day 9).    Urethral cancer (H)       docusate sodium 100 MG capsule    COLACE    60 capsule    Take 1 capsule (100 mg) by mouth 2 times daily as needed for constipation    Slow transit constipation       * FIRST-OMEPRAZOLE 2 MG/ML Susp     300 mL    Take 10 mLs (20 mg) by mouth 2 times daily    Urethral cancer (H), Gastroesophageal reflux disease with esophagitis        * omeprazole 2 mg/mL Susp    priLOSEC    280 mL    Take 10 mLs (20 mg) by mouth 2 times daily for 14 days    Gastroesophageal reflux disease with esophagitis, Urethral cancer (H)       fluticasone 50 MCG/ACT spray    FLONASE     Spray 1 spray into both nostrils every morning        HYDROXYZINE HCL PO      Take 5 mg by mouth At Bedtime        LORazepam 0.5 MG tablet    ATIVAN    30 tablet    Take 1 tablet (0.5 mg) by mouth every 4 hours as needed (Anxiety, Nausea/Vomiting or Sleep)    Nausea       nystatin 668635 UNIT/ML suspension    MYCOSTATIN    200 mL    Take 5 mLs (500,000 Units) by mouth 4 times daily for 7 days    Thrush       ondansetron 8 MG tablet    ZOFRAN    20 tablet    Take 1 tablet (8 mg) by mouth every 8 hours as needed for nausea    Nausea       prochlorperazine 10 MG tablet    COMPAZINE    30 tablet    Take 0.5 tablets (5 mg) by mouth every 6 hours as needed (Nausea/Vomiting)    Nausea       sucralfate 1 GM/10ML suspension    CARAFATE    800 mL    Take 10 mLs (1 g) by mouth 4 times daily for 20 days    Gastroesophageal reflux disease with esophagitis, Urethral cancer (H)       * Notice:  This list has 2 medication(s) that are the same as other medications prescribed for you. Read the directions carefully, and ask your doctor or other care provider to review them with you.

## 2017-10-24 NOTE — MR AVS SNAPSHOT
After Visit Summary   10/24/2017    King Gonsalo Bruno    MRN: [id number]           Patient Information     Date Of Birth          1945        Visit Information        Provider Department      10/24/2017 2:00 PM  29 ATC;  ONCOLOGY INFUSION [facility]        Today's Diagnoses     Urethral cancer (H)    -  1    Nausea        Dehydration          Care Instructions    Contact Numbers  [facility] Nurse Triage: [phone]  After Hours Nurse Line:  [phone]    Please call the [facility] Nurse Triage line or after hours number if you experience a temperature greater than or equal to 100.5, shaking chills, have uncontrolled nausea, vomiting and/or diarrhea, dizziness, shortness of breath, chest pain, bleeding, unexplained bruising, or if you have any other new/concerning symptoms, questions or concerns.     If you are having any concerning symptoms or wish to speak to a provider before your next infusion visit, please call your care coordinator or triage to notify them so we can adequately serve you.     If you need a refill on a narcotic prescription or other medication, please call triage before your infusion appointment.             October 2017 Sunday Monday Tuesday Wednesday Thursday Friday Saturday   1     2     3     UMP RETURN    3:45 PM   (60 min.)   Janene Alves PA-C   [facility] LAB DRAW    4:00 PM   (15 min.)    M[facility] LAB DRAW   [facility] Lab Draw     [facility] ONC INFUSION 120    4:30 PM   (120 min.)    ONCOLOGY INFUSION   [facility] 4     5     6     [facility] LAB DRAW    7:15 AM   (15 min.)    [facility] LAB DRAW   [facility] Lab Draw     UMP RETURN    7:35 AM   (50 min.)   Yessica Ovalles APRN CNP   [facility]     UMP ONC INFUSION 360    9:00 AM   (360 min.)    ONCOLOGY INFUSION   [facility]     UMP RETURN   12:45 PM   (30 min.)    Audie Rooney MD   [facility]rology [facility] Inst for Prostate and Urologic Cancers 7       8     9     UMP BMT INFUSION 120    2:00 PM   (120 min.)    BMT INFUSION   [facility] Blood and Marrow Transplant 10     11     12     13     [facility] LAB DRAW    9:00 AM   (15 min.)   [facility] DRAW   [facility] Draw     UMP ONC INFUSION 360   10:00 AM   (360 min.)   UC ONCOLOGY INFUSION   [facility] 14       15     16     UMP ONC INFUSION 120    3:30 PM   (120 min.)    ONCOLOGY INFUSION   [facility] 17     18     19     Admission    5:22 PM   [facility], Emergency Department   (Discharge: 10/19/2017) 20     UMP [facility] LAB DRAW    1:30 PM   (15 min.)    [facility]   [facility]     UMP RETURN    1:45 PM   (30 min.)   Josy Powell [facility]-C   [facility]    2:00 PM   (120 min.)    ONCOLOGY INFUSION   [facility] 21     UM ONC INFUSION 120    8:30 AM   (120 min.)    ONCOLOGY INFUSION   [facility]   22     23     PE EYE/TH ONCOLOGY    9:45 AM   (45 min.)   [facility] 24     UMP RETURN    1:45 PM   (60 min.)   Janene Alves PA-C   [facility] ONC INFUSION 120    2:00 PM   (120 min.)    ONCOLOGY INFUSION   [facility] 25     UMP CYSTOSCOPY    9:05 AM   (20 min.)   Muriel Haddad MD   [facility] Inst for Prostate and Urologic Cancers     [facility] DRAW    9:45 AM   (15 min.)    [facility]   [facility]     UMP RETURN   10:00 AM   (30 min.)   Steve Arceo MD   [facility]     UMP BMT INFUSION 120    2:00 PM   (120 min.)    BMT INFUSION   [facility] Blood and Marrow Transplant 26     27     [facility] LAB DRAW    9:30 AM   (15 min.)   [facility]   [facility]     UMP ONC  INFUSION 360   10:00 AM   (360 min.)    ONCOLOGY INFUSION   [facility] 28       29     30     31 November 2017 Sunday Monday Tuesday Wednesday Thursday Friday Saturday                  1     2     3     [facility] LAB DRAW   10:00 AM   (15 min.)    [facility]IC LAB DRAW   [facility] Lab Draw     UMP ONC INFUSION 360   10:30 AM   (360 min.)    ONCOLOGY INFUSION   [facility] 4       5     6     UMP RETURN   12:45 PM   (30 min.)   Nurse,  [facility]   [facility] Urology and Inst for Prostate and Urologic Cancers 7     8     UMP RETURN    1:30 PM   (30 min.)   Steve Arceo MD   [facility] 9     10     11       12     13     14     UMP NEW    1:15 PM   (30 min.)   Arsenio Ortiz MD   [facility] Ear Nose and Throat 15     16     17     18       19     20     21     22     23     24     25  Happy Birthday!       26     27     28     29     30                            Lab Results:  Recent Results (from the past 12 hour(s))   CBC with platelets differential    Collection Time: 10/24/17  3:00 PM   Result Value Ref Range    WBC 5.3 4.0 - 11.0 10e9/L    RBC Count 3.61 (L) 4.4 - 5.9 10e12/L    Hemoglobin 10.6 (L) 13.3 - 17.7 g/dL    Hematocrit 30.8 (L) 40.0 - 53.0 %    MCV 85 78 - 100 fl    MCH 29.4 26.5 - 33.0 pg    MCHC 34.4 31.5 - 36.5 g/dL    RDW 14.8 10.0 - 15.0 %    Platelet Count 178 150 - 450 10e9/L    Diff Method Automated Method     % Neutrophils 50.4 %    % Lymphocytes 36.4 %    % Monocytes 12.0 %    % Eosinophils 0.4 %    % Basophils 0.2 %    % Immature Granulocytes 0.6 %    Nucleated RBCs 0 0 /100    Absolute Neutrophil 2.7 1.6 - 8.3 10e9/L    Absolute Lymphocytes 1.9 0.8 - 5.3 10e9/L    Absolute Monocytes 0.6 0.0 - 1.3 10e9/L    Absolute Eosinophils 0.0 0.0 - 0.7 10e9/L    Absolute Basophils 0.0 0.0 - 0.2 10e9/L    Abs Immature Granulocytes 0.0 0 - 0.4 10e9/L    Absolute Nucleated RBC 0.0     Comprehensive metabolic panel    Collection Time: 10/24/17  3:00 PM   Result Value Ref Range    Sodium 127 (L) 133 - 144 mmol/L    Potassium 3.1 (L) 3.4 - 5.3 mmol/L    Chloride 91 (L) 94 - 109 mmol/L    Carbon Dioxide 27 20 - 32 mmol/L    Anion Gap 9 3 - 14 mmol/L    Glucose 82 70 - 99 mg/dL    Urea Nitrogen 11 7 - 30 mg/dL    Creatinine 1.18 0.66 - 1.25 mg/dL    GFR Estimate 61 >60 mL/min/1.7m2    GFR Estimate If Black 74 >60 mL/min/1.7m2    Calcium 8.5 8.5 - 10.1 mg/dL    Bilirubin Total 0.4 0.2 - 1.3 mg/dL    Albumin 3.3 (L) 3.4 - 5.0 g/dL    Protein Total 7.0 6.8 - 8.8 g/dL    Alkaline Phosphatase 79 40 - 150 U/L    ALT 19 0 - 70 U/L    AST 25 0 - 45 U/L   Magnesium    Collection Time: 10/24/17  3:00 PM   Result Value Ref Range    Magnesium 1.6 1.6 - 2.3 mg/dL             Follow-ups after your visit        Your next 10 appointments already scheduled     Oct 25, 2017  9:20 AM CDT   (Arrive by 9:05 AM)   Cystoscopy with Muriel Haddad MD   [facility] Urology and Inst for Prostate and Urologic Cancers ([facility])    [address] [place] [address]  4th [facility] [id number] [phone]            Oct 25, 2017  9:45 AM CDT   [facility] Lab Draw with  [facility] LAB DRAW   [facility] Lab Draw ([facility])    [address]  2nd [facility] [id number] [phone]            Oct 25, 2017 10:15 AM CDT   (Arrive by 10:00 AM)   Return Visit with Steve Arceo MD   [facility] Cancer Clinic ([facility])    [address] [place] [address]  2nd [facility] [id number] [phone]            Oct 25, 2017  2:00 PM CDT   Infusion 120 with UC BMT INFUSION, UC 4 ATC   [facility] Blood and Marrow Transplant ([facility])    [address] [address]   [phone]            Oct 27, 2017  9:30 AM [facility] Lab Draw with [facility] DRAW   [facility] Draw ([facility])    [address] [facility]  2nd [facility] [address]   [phone]            Oct 27, 2017 10:00 AM CDT   Infusion 360 with UC ONCOLOGY INFUSION, UC 29 ATC   [facility] Cancer Clinic ([facility])    [address] [address]   [phone]            Nov 03, 2017 10:00 AM CDT   [facility] Lab Draw with UC MASONIC LAB DRAW   [facility] Lab Draw ([facility])    [address] [place] [address]   [phone]            Nov 03, 2017 10:30 AM CDT   Infusion 360 with UC ONCOLOGY INFUSION   [facility] ([facility])    [address] [place] [address]   [phone]              Future tests that were ordered for you today     Open Future Orders        Priority Expected Expires Ordered    XR Video Swallow w Esophagram - Order with Speech Therapy Referral Routine 10/24/2017 10/24/2018 10/24/2017            Who to contact     If you have questions or need follow up information about today's clinic visit or your schedule please contact [facility]CER [facility] directly at [phone].  Normal or non-critical lab and imaging results will be communicated to you by [employer], letter or phone within 4 business days after the clinic has received the results. If you do not hear from us within 7 days, please contact the clinic through [employer] or phone. If you have a critical or abnormal lab result, we will notify you by phone as soon as possible.  Submit refill requests through [employer] or call your pharmacy and they will forward the refill request to us. Please allow 3 business days for your refill to be completed.          Additional Information About Your Visit        2U Information     2U lets you send messages to your doctor, view your test results, renew your  "prescriptions, schedule appointments and more. To sign up, go to [email or web address] . Click on \"Log in\" on the left side of the screen, which will take you to the Welcome page. Then click on \"Sign up Now\" on the right side of the page.     You will be asked to enter the access code listed below, as well as some personal information. Please follow the directions to create your username and password.     Your access code is: [email or web address]  Expires: 10/31/2017  1:07 PM     Your access code will  in 90 days. If you need help or a new code, please call your [place] clinic or [phone].        Care EveryWhere ID     This is your Care EveryWhere ID. This could be used by other organizations to access your [place] medical records  [id number]        Your Vitals Were     Pulse Temperature Respirations Pulse Oximetry          87 98.2  F (36.8  C) (Oral) 16 98%         Blood Pressure from Last 3 Encounters:   10/24/17 168/61   10/21/17 153/83   10/20/17 125/84    Weight from Last 3 Encounters:   10/20/17 70.3 kg (155 lb)   10/19/17 70.3 kg (155 lb)   10/16/17 73.2 kg (161 lb 6.4 oz)              We Performed the Following     CBC with platelets differential     Comprehensive metabolic panel     Magnesium          Today's Medication Changes          These changes are accurate as of: 10/24/17  4:11 PM.  If you have any questions, ask your nurse or doctor.               Start taking these medicines.        Dose/Directions    nystatin 392830 UNIT/ML suspension   Commonly known as:  MYCOSTATIN   Used for:  Thrush   Started by:  Janene Alves PA-C        Dose:  021337 Units   Take 5 mLs (500,000 Units) by mouth 4 times daily for 7 days   Quantity:  200 mL   Refills:  0         These medicines have changed or have updated prescriptions.        Dose/Directions    * FIRST-OMEPRAZOLE 2 MG/ML Susp   This may have changed:  Another medication with the same name was added. Make sure you understand how and when to take " each.   Used for:  Urethral cancer (H), Gastroesophageal reflux disease with esophagitis   Changed by:  Josy Powell PA-C        Dose:  20 mg   Take 10 mLs (20 mg) by mouth 2 times daily   Quantity:  300 mL   Refills:  0       * omeprazole 2 mg/mL Susp   Commonly known as:  priLOSEC   This may have changed:  You were already taking a medication with the same name, and this prescription was added. Make sure you understand how and when to take each.   Used for:  Gastroesophageal reflux disease with esophagitis, Urethral cancer (H)   Changed by:  Janene Alves PA-C        Dose:  20 mg   Take 10 mLs (20 mg) by mouth 2 times daily for 14 days   Quantity:  280 mL   Refills:  0       * Notice:  This list has 2 medication(s) that are the same as other medications prescribed for you. Read the directions carefully, and ask your doctor or other care provider to review them with you.         Where to get your medicines      These medications were sent to [address] [place] [address] [address] [address]    Hours:  TRANSPLANT PHONE NUMBER [phone] Phone:  [phone]     omeprazole 2 mg/mL Susp         These medications were sent to [facility] [address] [place] [address] AT [address] [place] [address]     Phone:  [phone]     nystatin 211927 UNIT/ML suspension                Primary Care Provider Office Phone # Fax #    Joan Janet Ventura, MARLENY [phone]       [facility] [address]  [facility] [address]        Equal Access to Services     SHILPI RED AH: Hadii светлана cui Soveronica, waaxda luqadaha, qaybta kaalmada adeaamiryaenoch, ronald rasmussen. So [facility] [phone].    ATENCIÓN: Si habla español, tiene a washburn disposición servicios gratuitos de asistencia lingüística. Llame al [phone].    We comply with  applicable federal civil rights laws and [place] laws. We do not discriminate on the basis of race, color, national origin, age, disability, sex, sexual orientation, or gender identity.            Thank you!     Thank you for choosing [facility]  for your care. Our goal is always to provide you with excellent care. Hearing back from our patients is one way we can continue to improve our services. Please take a few minutes to complete the written survey that you may receive in the mail after your visit with us. Thank you!             Your Updated Medication List - Protect others around you: Learn how to safely use, store and throw away your medicines at www.disposemymeds.org.          This list is accurate as of: 10/24/17  4:11 PM.  Always use your most recent med list.                   Brand Name Dispense Instructions for use Diagnosis    alum & mag hydroxide-simethicone 200-200-20 MG/5ML Susp suspension    MYLANTA/MAALOX    355 mL    Take 30 mLs by mouth every 4 hours as needed for indigestion        amLODIPine 5 MG tablet    NORVASC    60 tablet    Take 2 tablets (10 mg) by mouth daily    Benign essential hypertension       dexamethasone 4 MG tablet    DECADRON    12 tablet    Take 2 tablets (8 mg) by mouth daily Take for 3 days, starting the day after cisplatin (Day 2 and Day 9).    Urethral cancer (H)       docusate sodium 100 MG capsule    COLACE    60 capsule    Take 1 capsule (100 mg) by mouth 2 times daily as needed for constipation    Slow transit constipation       * FIRST-OMEPRAZOLE 2 MG/ML Susp     300 mL    Take 10 mLs (20 mg) by mouth 2 times daily    Urethral cancer (H), Gastroesophageal reflux disease with esophagitis       * omeprazole 2 mg/mL Susp    priLOSEC    280 mL    Take 10 mLs (20 mg) by mouth 2 times daily for 14 days    Gastroesophageal reflux disease with esophagitis, Urethral cancer (H)       fluticasone 50 MCG/ACT spray    FLONASE     Spray 1 spray into both  nostrils every morning        HYDROXYZINE HCL PO      Take 5 mg by mouth At Bedtime        LORazepam 0.5 MG tablet    ATIVAN    30 tablet    Take 1 tablet (0.5 mg) by mouth every 4 hours as needed (Anxiety, Nausea/Vomiting or Sleep)    Nausea       nystatin 256482 UNIT/ML suspension    MYCOSTATIN    200 mL    Take 5 mLs (500,000 Units) by mouth 4 times daily for 7 days    Thrush       ondansetron 8 MG tablet    ZOFRAN    20 tablet    Take 1 tablet (8 mg) by mouth every 8 hours as needed for nausea    Nausea       prochlorperazine 10 MG tablet    COMPAZINE    30 tablet    Take 0.5 tablets (5 mg) by mouth every 6 hours as needed (Nausea/Vomiting)    Nausea       sucralfate 1 GM/10ML suspension    CARAFATE    800 mL    Take 10 mLs (1 g) by mouth 4 times daily for 20 days    Gastroesophageal reflux disease with esophagitis, Urethral cancer (H)       * Notice:  This list has 2 medication(s) that are the same as other medications prescribed for you. Read the directions carefully, and ask your doctor or other care provider to review them with you.

## 2017-10-25 ENCOUNTER — INFUSION THERAPY VISIT (OUTPATIENT)
Dept: TRANSPLANT | Facility: CLINIC | Age: 72
End: 2017-10-25
Attending: INTERNAL MEDICINE
Payer: MEDICARE

## 2017-10-25 ENCOUNTER — TELEPHONE (OUTPATIENT)
Dept: ONCOLOGY | Facility: CLINIC | Age: 72
End: 2017-10-25

## 2017-10-25 ENCOUNTER — APPOINTMENT (OUTPATIENT)
Dept: LAB | Facility: CLINIC | Age: 72
End: 2017-10-25
Attending: INTERNAL MEDICINE
Payer: MEDICARE

## 2017-10-25 ENCOUNTER — OFFICE VISIT (OUTPATIENT)
Dept: UROLOGY | Facility: CLINIC | Age: 72
End: 2017-10-25

## 2017-10-25 ENCOUNTER — ONCOLOGY VISIT (OUTPATIENT)
Dept: ONCOLOGY | Facility: CLINIC | Age: 72
End: 2017-10-25
Attending: INTERNAL MEDICINE
Payer: MEDICARE

## 2017-10-25 VITALS
SYSTOLIC BLOOD PRESSURE: 124 MMHG | DIASTOLIC BLOOD PRESSURE: 78 MMHG | RESPIRATION RATE: 14 BRPM | TEMPERATURE: 98.6 F | HEART RATE: 82 BPM | OXYGEN SATURATION: 100 %

## 2017-10-25 VITALS
HEART RATE: 93 BPM | BODY MASS INDEX: 18.27 KG/M2 | SYSTOLIC BLOOD PRESSURE: 131 MMHG | DIASTOLIC BLOOD PRESSURE: 77 MMHG | HEIGHT: 76 IN | WEIGHT: 150 LBS

## 2017-10-25 VITALS
BODY MASS INDEX: 18.26 KG/M2 | HEART RATE: 93 BPM | WEIGHT: 150 LBS | SYSTOLIC BLOOD PRESSURE: 131 MMHG | DIASTOLIC BLOOD PRESSURE: 77 MMHG | TEMPERATURE: 98.5 F

## 2017-10-25 DIAGNOSIS — C68.0 URETHRAL CANCER (H): Primary | ICD-10-CM

## 2017-10-25 DIAGNOSIS — E87.6 HYPOKALEMIA: ICD-10-CM

## 2017-10-25 DIAGNOSIS — E86.0 DEHYDRATION: ICD-10-CM

## 2017-10-25 DIAGNOSIS — R11.0 NAUSEA: ICD-10-CM

## 2017-10-25 DIAGNOSIS — C68.0 MALIGNANT NEOPLASM OF URETHRA (H): Primary | ICD-10-CM

## 2017-10-25 LAB
ALBUMIN SERPL-MCNC: 3.3 G/DL (ref 3.4–5)
ALP SERPL-CCNC: 77 U/L (ref 40–150)
ALT SERPL W P-5'-P-CCNC: 19 U/L (ref 0–70)
ANION GAP SERPL CALCULATED.3IONS-SCNC: 11 MMOL/L (ref 3–14)
AST SERPL W P-5'-P-CCNC: 26 U/L (ref 0–45)
BASOPHILS # BLD AUTO: 0 10E9/L (ref 0–0.2)
BASOPHILS NFR BLD AUTO: 0.2 %
BILIRUB SERPL-MCNC: 0.4 MG/DL (ref 0.2–1.3)
BUN SERPL-MCNC: 11 MG/DL (ref 7–30)
CALCIUM SERPL-MCNC: 8.8 MG/DL (ref 8.5–10.1)
CHLORIDE SERPL-SCNC: 93 MMOL/L (ref 94–109)
CO2 SERPL-SCNC: 24 MMOL/L (ref 20–32)
CREAT SERPL-MCNC: 1.14 MG/DL (ref 0.66–1.25)
DIFFERENTIAL METHOD BLD: ABNORMAL
EOSINOPHIL # BLD AUTO: 0 10E9/L (ref 0–0.7)
EOSINOPHIL NFR BLD AUTO: 0.4 %
ERYTHROCYTE [DISTWIDTH] IN BLOOD BY AUTOMATED COUNT: 15 % (ref 10–15)
GFR SERPL CREATININE-BSD FRML MDRD: 64 ML/MIN/1.7M2
GLUCOSE SERPL-MCNC: 80 MG/DL (ref 70–99)
HCT VFR BLD AUTO: 31.3 % (ref 40–53)
HGB BLD-MCNC: 10.7 G/DL (ref 13.3–17.7)
IMM GRANULOCYTES # BLD: 0 10E9/L (ref 0–0.4)
IMM GRANULOCYTES NFR BLD: 0.6 %
LYMPHOCYTES # BLD AUTO: 1.1 10E9/L (ref 0.8–5.3)
LYMPHOCYTES NFR BLD AUTO: 21.8 %
MCH RBC QN AUTO: 29.2 PG (ref 26.5–33)
MCHC RBC AUTO-ENTMCNC: 34.2 G/DL (ref 31.5–36.5)
MCV RBC AUTO: 85 FL (ref 78–100)
MONOCYTES # BLD AUTO: 0.5 10E9/L (ref 0–1.3)
MONOCYTES NFR BLD AUTO: 10.6 %
NEUTROPHILS # BLD AUTO: 3.2 10E9/L (ref 1.6–8.3)
NEUTROPHILS NFR BLD AUTO: 66.4 %
NRBC # BLD AUTO: 0 10*3/UL
NRBC BLD AUTO-RTO: 1 /100
PLATELET # BLD AUTO: 215 10E9/L (ref 150–450)
POTASSIUM SERPL-SCNC: 3.2 MMOL/L (ref 3.4–5.3)
PROT SERPL-MCNC: 7.1 G/DL (ref 6.8–8.8)
RBC # BLD AUTO: 3.67 10E12/L (ref 4.4–5.9)
SODIUM SERPL-SCNC: 128 MMOL/L (ref 133–144)
WBC # BLD AUTO: 4.8 10E9/L (ref 4–11)

## 2017-10-25 PROCEDURE — 96365 THER/PROPH/DIAG IV INF INIT: CPT

## 2017-10-25 PROCEDURE — 80053 COMPREHEN METABOLIC PANEL: CPT | Performed by: NURSE PRACTITIONER

## 2017-10-25 PROCEDURE — 40000268 ZZH STATISTIC NO CHARGES: Mod: ZF

## 2017-10-25 PROCEDURE — 36592 COLLECT BLOOD FROM PICC: CPT

## 2017-10-25 PROCEDURE — 99212 OFFICE O/P EST SF 10 MIN: CPT | Mod: 25

## 2017-10-25 PROCEDURE — 96366 THER/PROPH/DIAG IV INF ADDON: CPT

## 2017-10-25 PROCEDURE — 40000141 ZZH STATISTIC PERIPHERAL IV START W/O US GUIDANCE

## 2017-10-25 PROCEDURE — 99215 OFFICE O/P EST HI 40 MIN: CPT | Mod: ZP | Performed by: INTERNAL MEDICINE

## 2017-10-25 PROCEDURE — 85025 COMPLETE CBC W/AUTO DIFF WBC: CPT | Performed by: NURSE PRACTITIONER

## 2017-10-25 PROCEDURE — 25000128 H RX IP 250 OP 636: Mod: ZF | Performed by: INTERNAL MEDICINE

## 2017-10-25 RX ORDER — PALONOSETRON 0.05 MG/ML
0.25 INJECTION, SOLUTION INTRAVENOUS ONCE
Status: CANCELLED
Start: 2017-10-25 | End: 2017-10-25

## 2017-10-25 RX ORDER — OLANZAPINE 5 MG/1
5 TABLET ORAL AT BEDTIME
Qty: 30 TABLET | Refills: 0 | Status: SHIPPED | OUTPATIENT
Start: 2017-10-25 | End: 2018-02-26

## 2017-10-25 RX ADMIN — SODIUM CHLORIDE: 900 INJECTION, SOLUTION INTRAVENOUS at 12:38

## 2017-10-25 ASSESSMENT — PAIN SCALES - GENERAL
PAINLEVEL: NO PAIN (0)
PAINLEVEL: NO PAIN (0)

## 2017-10-25 NOTE — NURSING NOTE
Invasive Procedure Safety Checklist:    Procedure: cysto     Action: Complete sections and checkboxes as appropriate.    Pre-procedure:  1. Patient ID Verified with 2 identifiers ([place] and  or MRN) : YES    2. Procedure and site verified with patient/designee (when able) : YES    3. Accurate consent documentation in medical record : YES    4. H&P (or appropriate assessment) documented in medical record : NO  H&P must be up to 30 days prior to procedure an updated within 24 hours of                 Procedure as applicable.     5. Relevant diagnostic and radiology test results appropriately labeled and displayed as applicable : YES    6. Blood products, implants, devices, and/or special equipment available for the procedure as applicable : YES    7. Procedure site(s) marked with provider initials [Exclusions: none] : NO    8. Marking not required. Reason : Yes  Procedure does not require site marking    Time Out:     Time-Out performed immediately prior to starting procedure, including verbal and active participation of all team members addressing: YES    1. Correct patient identity.  2. Confirmed that the correct side and site are marked.  3. An accurate procedure to be done.  4. Agreement on the procedure to be done.  5. Correct patient position.  6. Relevant images and results are properly labeled and appropriately displayed.  7. The need to administer antibiotics or fluids for irrigation purposes during the procedure as applicable.  8. Safety precautions based on patient history or medication use.    During Procedure: Verification of correct person, site, and procedure occurs any time the responsibility for care of the patient is transferred to another member of the care team.

## 2017-10-25 NOTE — LETTER
10/25/2017       RE: King Gonsalo Bruno  [address]  [facility] [address]     Dear Colleague,    Thank you for referring your patient, King Gonsalo Bruno, to the [facility] UROLOGY AND INST FOR PROSTATE AND UROLOGIC CANCERS at [facility]. Please see a copy of my visit note below.      Pre-procedure diagnosis: Urethral cancer  Post procedure diagnosis: abnormal cystoscopy  Procedure performed: cystoscopy  Surgeon: ARY Haddad MD  Anesthesia: local    Indications for procedure: Patient is a 62 year old male with a history of urethral cancer currently on NAC    Description of procedure: After fully informed voluntary consent was obtained patient was brought into the procedure room, identified and placed in a supine position on the cysto table.  The groin/scrotum were prepped and draped in a sterile fashion with betadine.  A 15F flexible cystoscope was inserted into the urethra There was significant narrowing of the distal penile urethra such that the scope could not be passed beyond about 4cm of distal urethra.  There were some yellowish tumor deposits visible on the anterior urethral mucosa.  On palpation the urethral mass felt previously seems to be smaller in width but appears to now extend to the distal urethra.  The patient tolerated the procedure well and there were no complications.      Assessment/Plan: Patient with a history of uretral cancer with worsening stricture of the urethra.  Recent pain could be due to bladder spasm or prostatitis.  Would treat symptomatically with pyridium or detrol.  Has been unable to eat and has had continuing weight loss.  Will check PET done recently to determine response to chemo      Sincerely,    Muriel Haddad MD

## 2017-10-25 NOTE — TELEPHONE ENCOUNTER
[facility] Prior Authorization Team   Phone: [phone]  Fax: [phone]      PA Initiation    Medication: omeprazole (PRILOSEC) 2 mg/mL SUSP  Insurance Company: [employer] - Phone [phone] Fax [phone]  Pharmacy Filling the Rx: [facility] [place], [place] - [address] [place] [address] [phone]  Filling Pharmacy Phone: [phone]  Filling Pharmacy Fax:    Start Date: 10/25/2017

## 2017-10-25 NOTE — MR AVS SNAPSHOT
After Visit Summary   10/25/2017    King Gonsalo Bruno    MRN: [id number]           Patient Information     Date Of Birth          1945        Visit Information        Provider Department      10/25/2017 2:00 PM UC 4 ATC; UC BMT INFUSION [facility] Blood and Marrow Transplant        Today's Diagnoses     Urethral cancer (H)    -  1    Hypokalemia        Nausea        Dehydration              [facility] ([facility])  [address] [place] [address]  Phone: [phone]  Clinic Hours:   Monday-Thursday:7am to 7pm   Friday: 7am to 5pm   Weekends and holidays:    8am to noon (in general)  If your fever is 100.5  or greater,   call the clinic.  After hours call the   hospital at [phone] or   [phone]. Ask for the BMT   fellow on-call            Follow-ups after your visit        Your next 10 appointments already scheduled     Oct 27, 2017  9:30 AM CDT   Masonic Lab Draw with UC MASONIC LAB DRAW   [facility] Masonic Lab Draw ([facility])    [address] [place] [address]   [phone]            Oct 27, 2017 10:00 AM CDT   Infusion 120 with UC ONCOLOGY INFUSION, UC 29 ATC   [facility] ([facility]    [address] [place] [address]   [phone]            Nov 01, 2017 11:45 AM CDT   Masonic Lab Draw with UC MASONIC LAB DRAW   [facility] Masonic Lab Draw ([facility])    [address] [place] [address]   [phone]            Nov 01, 2017 12:30 PM CDT   Infusion 120 with UC ONCOLOGY INFUSION, UC 29 ATC   [facility] ([facility])    [address] [place] [address]   [phone]            Nov 03, 2017  7:15 AM CDT   Masonic Lab Draw with UC MASONIC LAB DRAW   [facility] Masonic Lab Draw ([facility])    [facility]  "[facility]  [address]nd [facility] [address] [phone]            Nov 03, 2017  7:50 AM CDT   (Arrive by 7:35 AM)   Return Visit with SARITHA Briceno   [facility] ([facility])    [address] [place] [id number] [phone]            Nov 03, 2017  8:30 AM CDT   Infusion 360 with UC ONCOLOGY INFUSION, UC 21 ATC   [facility] ([facility])    [address] [place] [id number] [phone]              Future tests that were ordered for you today     Open Future Orders        Priority Expected Expires Ordered    XR Video Swallow w Esophagram - Order with Speech Therapy Referral Routine 10/24/2017 10/24/2018 10/24/2017            Who to contact     If you have questions or need follow up information about today's clinic visit or your schedule please contact [facility] BLOOD AND MARROW TRANSPLANT directly at [phone].  Normal or non-critical lab and imaging results will be communicated to you by [employer], letter or phone within 4 business days after the clinic has received the results. If you do not hear from us within 7 days, please contact the clinic through [employer] or phone. If you have a critical or abnormal lab result, we will notify you by phone as soon as possible.  Submit refill requests through [employer] or call your pharmacy and they will forward the refill request to us. Please allow 3 business days for your refill to be completed.          Additional Information About Your Visit        [employer] Information     [employer] lets you send messages to your doctor, view your test results, renew your prescriptions, schedule appointments and more. To sign up, go to [email or web address] . Click on \"Log in\" on the left side of the screen, which will take you to the Welcome page. Then click on \"Sign up Now\" on the right side of the page.     You will be " asked to enter the access code listed below, as well as some personal information. Please follow the directions to create your username and password.     Your access code is: [email or web address]  Expires: 10/31/2017  1:07 PM     Your access code will  in 90 days. If you need help or a new code, please call your [facility] clinic or [phone].        Care EveryWhere ID     This is your Care EveryWhere ID. This could be used by other organizations to access your [facility] medical records  [id number]         Blood Pressure from Last 3 Encounters:   10/25/17 131/77   10/25/17 131/77   10/24/17 168/61    Weight from Last 3 Encounters:   10/25/17 68 kg (150 lb)   10/25/17 68 kg (150 lb)   10/20/17 70.3 kg (155 lb)              We Performed the Following     CBC with platelets differential     Comprehensive metabolic panel        Recent Review Flowsheet Data     BMT Recent Results Latest Ref Rng & Units 10/6/2017 10/13/2017 10/19/2017 10/20/2017 10/23/2017 10/24/2017 10/25/2017    WBC 4.0 - 11.0 10e9/L 8.5 6.4 3.3(L) 4.0 - 5.3 4.8    Hemoglobin 13.3 - 17.7 g/dL 10.0(L) 10.6(L) 10.7(L) 10.6(L) - 10.6(L) 10.7(L)    Platelet Count 150 - 450 10e9/L 320 278 177 158 - 178 215    Neutrophils (Absolute) 1.6 - 8.3 10e9/L 5.2 3.6 1.4(L) 2.6 - 2.7 3.2    Sodium 133 - 144 mmol/L 129(L) 131(L) 129(L) 127(L) - 127(L) 128(L)    Potassium 3.4 - 5.3 mmol/L 3.9 3.7 3.4 4.0 - 3.1(L) 3.2(L)    Chloride 94 - 109 mmol/L 97 97 92(L) 92(L) - 91(L) 93(L)    Glucose 70 - 99 mg/dL 98 95 110(H) 109(H) 109(A) 82 80    Urea Nitrogen 7 - 30 mg/dL 7 13 16 10 - 11 11    Creatinine 0.66 - 1.25 mg/dL 1.16 1.21 1.44(H) 1.16 - 1.18 1.14    Calcium (Total) 8.5 - 10.1 mg/dL 8.6 9.3 8.9 8.9 - 8.5 8.8    Protein (Total) 6.8 - 8.8 g/dL 6.9 7.0 6.7(L) 7.0 - 7.0 7.1    Albumin 3.4 - 5.0 g/dL 2.7(L) 3.0(L) 3.2(L) 3.2(L) - 3.3(L) 3.3(L)    Alkaline Phosphatase 40 - 150 U/L 69 81 74 77 - 79 77    AST 0 - 45 U/L 24 24 26 31 - 25 26    ALT 0 - 70 U/L 18 30 25 25 - 19 19     MCV 78 - 100 fl 88 87 86 86 - 85 85               Primary Care Provider Office Phone # Fax Jazmine Ventura, MARLENY [phone]       [facility] [address] [facility] [address]        Equal Access to Services     ARMONDSHILPI NEDA : Hadii aad ku hadnikkio Soomaali, waaxda luqadaha, qaybta kaalmada adeegyada, waxay idiin hayaan adeeg khbradley lashakira rasmussen. So [facility] [phone].    ATENCIÓN: Si habla español, tiene a washburn disposición servicios gratuitos de asistencia lingüística. Llame al [phone].    We comply with applicable federal civil rights laws and [place] laws. We do not discriminate on the basis of race, color, national origin, age, disability, sex, sexual orientation, or gender identity.            Thank you!     Thank you for choosing [facility] BLOOD AND MARROW TRANSPLANT  for your care. Our goal is always to provide you with excellent care. Hearing back from our patients is one way we can continue to improve our services. Please take a few minutes to complete the written survey that you may receive in the mail after your visit with us. Thank you!             Your Updated Medication List - Protect others around you: Learn how to safely use, store and throw away your medicines at www.disposemymeds.org.          This list is accurate as of: 10/25/17  2:39 PM.  Always use your most recent med list.                   Brand Name Dispense Instructions for use Diagnosis    alum & mag hydroxide-simethicone 200-200-20 MG/5ML Susp suspension    MYLANTA/MAALOX    355 mL    Take 30 mLs by mouth every 4 hours as needed for indigestion        amLODIPine 5 MG tablet    NORVASC    60 tablet    Take 2 tablets (10 mg) by mouth daily    Benign essential hypertension       dexamethasone 4 MG tablet    DECADRON    12 tablet    Take 2 tablets (8 mg) by mouth daily Take for 3 days, starting the day after cisplatin (Day 2 and Day 9).    Urethral cancer (H)       docusate sodium 100 MG capsule    COLACE    60  capsule    Take 1 capsule (100 mg) by mouth 2 times daily as needed for constipation    Slow transit constipation       * FIRST-OMEPRAZOLE 2 MG/ML Susp     300 mL    Take 10 mLs (20 mg) by mouth 2 times daily    Urethral cancer (H), Gastroesophageal reflux disease with esophagitis       * omeprazole 2 mg/mL Susp    priLOSEC    280 mL    Take 10 mLs (20 mg) by mouth 2 times daily for 14 days    Gastroesophageal reflux disease with esophagitis, Urethral cancer (H)       fluticasone 50 MCG/ACT spray    FLONASE     Spray 1 spray into both nostrils every morning        HYDROXYZINE HCL PO      Take 5 mg by mouth At Bedtime        LORazepam 0.5 MG tablet    ATIVAN    30 tablet    Take 1 tablet (0.5 mg) by mouth every 4 hours as needed (Anxiety, Nausea/Vomiting or Sleep)    Nausea       nystatin 286923 UNIT/ML suspension    MYCOSTATIN    200 mL    Take 5 mLs (500,000 Units) by mouth 4 times daily Swish and spit    Thrush       ondansetron 8 MG tablet    ZOFRAN    20 tablet    Take 1 tablet (8 mg) by mouth every 8 hours as needed for nausea    Nausea       prochlorperazine 10 MG tablet    COMPAZINE    30 tablet    Take 0.5 tablets (5 mg) by mouth every 6 hours as needed (Nausea/Vomiting)    Nausea       sucralfate 1 GM/10ML suspension    CARAFATE    800 mL    Take 10 mLs (1 g) by mouth 4 times daily for 20 days    Gastroesophageal reflux disease with esophagitis, Urethral cancer (H)       * Notice:  This list has 2 medication(s) that are the same as other medications prescribed for you. Read the directions carefully, and ask your doctor or other care provider to review them with you.

## 2017-10-25 NOTE — NURSING NOTE
Chief Complaint   Patient presents with     Infusion     here for add-on IVF of NS + KCL HX: Urethral CA.

## 2017-10-25 NOTE — PROGRESS NOTES
Pre-procedure diagnosis: Urethral cancer  Post procedure diagnosis: abnormal cystoscopy  Procedure performed: cystoscopy  Surgeon: RAY Haddad MD  Anesthesia: local    Indications for procedure: Patient is a 62 year old male with a history of urethral cancer currently on NAC    Description of procedure: After fully informed voluntary consent was obtained patient was brought into the procedure room, identified and placed in a supine position on the cysto table.  The groin/scrotum were prepped and draped in a sterile fashion with betadine.  A 15F flexible cystoscope was inserted into the urethra There was significant narrowing of the distal penile urethra such that the scope could not be passed beyond about 4cm of distal urethra.  There were some yellowish tumor deposits visible on the anterior urethral mucosa.  On palpation the urethral mass felt previously seems to be smaller in width but appears to now extend to the distal urethra.  The patient tolerated the procedure well and there were no complications.      Assessment/Plan: Patient with a history of uretral cancer with worsening stricture of the urethra.  Recent pain could be due to bladder spasm or prostatitis.  Would treat symptomatically with pyridium or detrol.  Has been unable to eat and has had continuing weight loss.  Will check PET done recently to determine response to chemo

## 2017-10-25 NOTE — NURSING NOTE
Chief Complaint   Patient presents with     Blood Draw     IV placed by vascular access     Vitals done at previous appointment, labs drawn off IV, see flow sheets.  VENITA BARRON, CMA

## 2017-10-25 NOTE — PROGRESS NOTES
"Infusion Nursing Note:  King Gonsalo Bruno presents today for infusion of IVF HX: Urethral CA.    Patient seen by provider today: Ruben   present during visit today: Not Applicable.    Note: PT s/p provider visit.  Pt with K: 3.2  Provider order received to infuse IV replacement of potassium, over 4 hours.  Pt reports \"I haven't eaten anything recently because my taste buds are gone\".  Pt provided with 2 sandwiches, cookies and water.      Intravenous Access:  Peripheral IV placed.  Removed upon successful infusion, pressure wrap applied to site.    Treatment Conditions:  Results reviewed, labs MET treatment parameters, ok to proceed with treatment.      Post Infusion Assessment:  Patient tolerated infusion without incident.  Pt reports nausea at 1610.  Pt's caregiver went to discharge pharmacy to \" the new medication ordered today\".  Will continue to monitor.  Pt with emesis upon leaving BMT INFUSION.  Pt encouraged to use anti-emetics as discharge medications.    Discharge Plan:   Patient discharged in stable condition accompanied by: self and caregiver.  Pt encouraged to call oncology clinic if nausea/emesis persists for >24hrs despite use of anti-emetics.  Pt reports understanding.    Juju Kiran RN                        "

## 2017-10-25 NOTE — MR AVS SNAPSHOT
After Visit Summary   10/25/2017    King Gonsalo Bruno    MRN: [id number]           Patient Information     Date Of Birth          1945        Visit Information        Provider Department      10/25/2017 10:15 AM Steve Arceo MD [facility]        Today's Diagnoses     Urethral cancer (H)    -  1    Dehydration        Hypokalemia        Nausea           Follow-ups after your visit        Your next 10 appointments already scheduled     Oct 27, 2017  9:30 AM CDT   Masonic Lab Draw with UC MASONIC LAB DRAW   [facility]    [address] [place] [address]   [phone]            Oct 27, 2017 10:00 AM CDT   Infusion 120 with UC ONCOLOGY INFUSION, UC 29 ATC   [facility] ([facility])    [address] [place] [address]   [phone]            Nov 01, 2017 11:45 AM CDT   Masonic Lab Draw with UC MASONIC LAB DRAW   [facility] Lab Draw ([facility])    [address] [place] [address]   [phone]            Nov 01, 2017 12:30 PM CDT   Infusion 120 with UC ONCOLOGY INFUSION, UC 29 ATC   [facility] ([facility])    [address] [place] [address]   [phone]            Nov 03, 2017  7:15 AM CDT   Masonic Lab Draw with UC MASONIC LAB DRAW   [facility]    [address] [place] [address]   [phone]            Nov 03, 2017  7:50 AM CDT   (Arrive by 7:35 AM)   Return Visit with SARITHA Briceno   [facility] ([facility])    [address] [place] [address]   [phone]            Nov 03, 2017  8:30 AM CDT   Infusion 360 with UC ONCOLOGY INFUSION,  "UC 21 ATC   [facility] ([facility])    [address] [facility]  2nd Floor  [facility] [address] [phone]              Who to contact     If you have questions or need follow up information about today's clinic visit or your schedule please contact [facility] directly at [phone].  Normal or non-critical lab and imaging results will be communicated to you by MyChart, letter or phone within 4 business days after the clinic has received the results. If you do not hear from us within 7 days, please contact the clinic through MyChart or phone. If you have a critical or abnormal lab result, we will notify you by phone as soon as possible.  Submit refill requests through [employer] or call your pharmacy and they will forward the refill request to us. Please allow 3 business days for your refill to be completed.          Additional Information About Your Visit        [employer] Information     [employer] lets you send messages to your doctor, view your test results, renew your prescriptions, schedule appointments and more. To sign up, go to [email or web address] . Click on \"Log in\" on the left side of the screen, which will take you to the Welcome page. Then click on \"Sign up Now\" on the right side of the page.     You will be asked to enter the access code listed below, as well as some personal information. Please follow the directions to create your username and password.     Your access code is: [email or web address]  Expires: 10/31/2017  1:07 PM     Your access code will  in 90 days. If you need help or a new code, please call your [facility] clinic or [phone].        Care EveryWhere ID     This is your Care EveryWhere ID. This could be used by other organizations to access your [facility] medical records  [id number]        Your Vitals Were     Pulse Temperature BMI (Body Mass Index)             93 98.5  F (36.9  C) (Oral) 18.26 kg/m2          Blood " Pressure from Last 3 Encounters:   10/25/17 124/78   10/25/17 131/77   10/25/17 131/77    Weight from Last 3 Encounters:   10/25/17 68 kg (150 lb)   10/25/17 68 kg (150 lb)   10/20/17 70.3 kg (155 lb)              Today, you had the following     No orders found for display         Today's Medication Changes          These changes are accurate as of: 10/25/17 11:59 PM.  If you have any questions, ask your nurse or doctor.               Start taking these medicines.        Dose/Directions    OLANZapine 5 MG tablet   Commonly known as:  zyPREXA   Used for:  Urethral cancer (H), Nausea   Started by:  Steve Arceo MD        Dose:  5 mg   Take 1 tablet (5 mg) by mouth At Bedtime   Quantity:  30 tablet   Refills:  0            Where to get your medicines      These medications were sent to [facility] [address] [place] [address]     Phone:  [phone]     OLANZapine 5 MG tablet                Primary Care Provider Office Phone # Fax #    Joan Janet Ventura, MARLENY [phone]       [facility] [address] [facility] [address]        Equal Access to Services     JENELLE RED AH: Hadii свелтана ku hadasho Soomaali, waaxda luqadaha, qaybta kaalmada adeegyada, waxay nayin hayreymundo rasmussen. So [facility] [phone].    ATENCIÓN: Si habla español, tiene a washburn disposición servicios gratuitos de asistencia lingüística. Llame al [phone].    We comply with applicable federal civil rights laws and [place] laws. We do not discriminate on the basis of race, color, national origin, age, disability, sex, sexual orientation, or gender identity.            Thank you!     Thank you for choosing [facility]  for your care. Our goal is always to provide you with excellent care. Hearing back from our patients is one way we can continue to improve our services. Please take a few minutes to  complete the written survey that you may receive in the mail after your visit with us. Thank you!             Your Updated Medication List - Protect others around you: Learn how to safely use, store and throw away your medicines at www.disposemymeds.org.          This list is accurate as of: 10/25/17 11:59 PM.  Always use your most recent med list.                   Brand Name Dispense Instructions for use Diagnosis    alum & mag hydroxide-simethicone 200-200-20 MG/5ML Susp suspension    MYLANTA/MAALOX    355 mL    Take 30 mLs by mouth every 4 hours as needed for indigestion        amLODIPine 5 MG tablet    NORVASC    60 tablet    Take 2 tablets (10 mg) by mouth daily    Benign essential hypertension       dexamethasone 4 MG tablet    DECADRON    12 tablet    Take 2 tablets (8 mg) by mouth daily Take for 3 days, starting the day after cisplatin (Day 2 and Day 9).    Urethral cancer (H)       docusate sodium 100 MG capsule    COLACE    60 capsule    Take 1 capsule (100 mg) by mouth 2 times daily as needed for constipation    Slow transit constipation       * FIRST-OMEPRAZOLE 2 MG/ML Susp     300 mL    Take 10 mLs (20 mg) by mouth 2 times daily    Urethral cancer (H), Gastroesophageal reflux disease with esophagitis       * omeprazole 2 mg/mL Susp    priLOSEC    280 mL    Take 10 mLs (20 mg) by mouth 2 times daily for 14 days    Gastroesophageal reflux disease with esophagitis, Urethral cancer (H)       fluticasone 50 MCG/ACT spray    FLONASE     Spray 1 spray into both nostrils every morning        HYDROXYZINE HCL PO      Take 5 mg by mouth At Bedtime        LORazepam 0.5 MG tablet    ATIVAN    30 tablet    Take 1 tablet (0.5 mg) by mouth every 4 hours as needed (Anxiety, Nausea/Vomiting or Sleep)    Nausea       nystatin 440032 UNIT/ML suspension    MYCOSTATIN    200 mL    Take 5 mLs (500,000 Units) by mouth 4 times daily Swish and spit    Thrush       OLANZapine 5 MG tablet    zyPREXA    30 tablet    Take 1 tablet  (5 mg) by mouth At Bedtime    Urethral cancer (H), Nausea       ondansetron 8 MG tablet    ZOFRAN    20 tablet    Take 1 tablet (8 mg) by mouth every 8 hours as needed for nausea    Nausea       prochlorperazine 10 MG tablet    COMPAZINE    30 tablet    Take 0.5 tablets (5 mg) by mouth every 6 hours as needed (Nausea/Vomiting)    Nausea       sucralfate 1 GM/10ML suspension    CARAFATE    800 mL    Take 10 mLs (1 g) by mouth 4 times daily for 20 days    Gastroesophageal reflux disease with esophagitis, Urethral cancer (H)       * Notice:  This list has 2 medication(s) that are the same as other medications prescribed for you. Read the directions carefully, and ask your doctor or other care provider to review them with you.

## 2017-10-25 NOTE — MR AVS SNAPSHOT
"              After Visit Summary   10/25/2017    King Gonsalo Bruno    MRN: [id number]           Patient Information     Date Of Birth          1945        Visit Information        Provider Department      10/25/2017 9:20 AM Muriel Haddad MD [facility]rology and [facility]rolog[facility]        Today's Diagnoses     Malignant neoplasm of urethra (H)    -  1      Care Instructions      AFTER YOUR CYSTOSCOPY        You have just completed a cystoscopy, or \"cysto\", which allowed your physician to learn more about your bladder (or to remove a stent placed after surgery). We suggest that you continue to avoid caffeine, fruit juice, and alcohol for the next 24 hours, however, you are encouraged to return to your normal activities.         A few things that are considered normal after your cystoscopy:     * Small amount of bleeding (or spotting) that clears within the next 24 hours     * Slight burning sensation with urination     * Sensation to of needing to avoid more frequently     * The feeling of \"air\" in your urine     * Mild discomfort that is relieved with Tylenol        Please contact our office promptly if you:     * Develop a fever above 101 degrees     * Are unable to urinate     * Develop bright red blood that does not stop     * Severe pain or swelling         Please contact our office with any concerns or questions [email or web address].          Follow-ups after your visit        Follow-up notes from your care team     Return in 12 months (on 10/25/2018).      Your next 10 appointments already scheduled     Nov 03, 2017  7:15 AM CDT   [facility] Lab Draw with  digitalbox LAB DRAW   [facility]    [address] [place] [id number] [phone]            Nov 03, 2017  7:50 AM CDT   (Arrive by 7:35 AM)   Return Visit with SARITHA Briceno   [facility] ([facility])    [address] [address]   [phone]            Nov 03, 2017  8:30 AM CDT   Infusion 360 with UC ONCOLOGY INFUSION, UC 21 ATC   [facility]    [address] [place] [id number]   [phone]            Nov 06, 2017  1:00 PM CST   (Arrive by 12:45 PM)   Return Visit with  Prostate Cancer Ctr Nurse   [facility]rologic C[facility] ([facility])    [address] [place] [id number]   [phone]            Nov 08, 2017 12:30 PM CST   [facility] Lab Draw with  MASONIC LAB DRAW   [facility] ([facility])    [address] [place] [id number]   [phone]            Nov 08, 2017  1:00 PM CST   Infusion 120 with UC ONCOLOGY INFUSION, UC 18 ATC   [facility]    [address] [place] [address]   [phone]            Nov 10, 2017  8:15 AM CST   [facility] Lab Draw with  MASONIC LAB DRAW   [facility] ([facility])    [address] [place] [address]   [phone]            Nov 10, 2017  8:40 AM CST   (Arrive by 8:25 AM)   Return Visit with SARITHA Briceno   [facility]    [address] [place] [address]   [phone]              Who to contact     Please call your clinic at [phone] to:    Ask questions about your health    Make or cancel appointments    Discuss your medicines    Learn about your test results    Speak to your doctor   If you have compliments or concerns about an experience at your clinic, or if you wish to file a complaint, please contact [facility] Patient Relations at [phone]  "or email us at [email or web address]         Additional Information About Your Visit        Hailo Information     Hailo is an electronic gateway that provides easy, online access to your medical records. With Hailo, you can request a clinic appointment, read your test results, renew a prescription or communicate with your care team.     To sign up for Hailo visit the website at [email or web address]   You will be asked to enter the access code listed below, as well as some personal information. Please follow the directions to create your username and password.     Your access code is: [email or web address]  Expires: 2018  6:30 AM     Your access code will  in 90 days. If you need help or a new code, please contact your [facility] or call [phone] for assistance.        Care EveryWhere ID     This is your Care EveryWhere ID. This could be used by other organizations to access your [place] medical records  [id number]        Your Vitals Were     Pulse Height BMI (Body Mass Index)             93 1.93 m (6' 4\") 18.26 kg/m2          Blood Pressure from Last 3 Encounters:   No data found for BP    Weight from Last 3 Encounters:   No data found for Wt              Today, you had the following     No orders found for display         Today's Medication Changes          These changes are accurate as of: 10/25/17 11:59 PM.  If you have any questions, ask your nurse or doctor.               Start taking these medicines.        Dose/Directions    OLANZapine 5 MG tablet   Commonly known as:  zyPREXA   Used for:  Urethral cancer (H), Nausea   Started by:  Steve Arceo MD        Dose:  5 mg   Take 1 tablet (5 mg) by mouth At Bedtime   Quantity:  30 tablet   Refills:  0            Where to get your medicines      These medications were sent to [facility] [address] [place] [address] AT [address] [place] [address], " [facility] [place] [address]     Phone:  [phone]     OLANZapine 5 MG tablet                Primary Care Provider Office Phone # Fax #    Joan Ventura, MARLENY [phone]       [facility] [address]  [facility] [address]        Equal Access to Services     JENELLE RED : Hadii aad ku hadasho Soomaali, waaxda luqadaha, qaybta kaalmada adeegyada, waxay idiin hayaan adeeg khreidraymundo lashakira rasmussen. So [facility] [phone].    ATENCIÓN: Si habla español, tiene a washburn disposición servicios gratuitos de asistencia lingüística. Juwan al [phone].    We comply with applicable federal civil rights laws and [place] laws. We do not discriminate on the basis of race, color, national origin, age, disability, sex, sexual orientation, or gender identity.            Thank you!     Thank you for choosing [facility] UROLOGY AND [facility] FOR PROSTATE AND UROLOGIC CANCERS  for your care. Our goal is always to provide you with excellent care. Hearing back from our patients is one way we can continue to improve our services. Please take a few minutes to complete the written survey that you may receive in the mail after your visit with us. Thank you!             Your Updated Medication List - Protect others around you: Learn how to safely use, store and throw away your medicines at www.disposemymeds.org.          This list is accurate as of: 10/25/17 11:59 PM.  Always use your most recent med list.                   Brand Name Dispense Instructions for use Diagnosis    alum & mag hydroxide-simethicone 200-200-20 MG/5ML Susp suspension    MYLANTA/MAALOX    355 mL    Take 30 mLs by mouth every 4 hours as needed for indigestion        dexamethasone 4 MG tablet    DECADRON    12 tablet    Take 2 tablets (8 mg) by mouth daily Take for 3 days, starting the day after cisplatin (Day 2 and Day 9).    Urethral cancer (H)       docusate sodium 100 MG capsule    COLACE    60 capsule    Take 1 capsule (100 mg) by mouth 2 times daily as  needed for constipation    Slow transit constipation       * FIRST-OMEPRAZOLE 2 MG/ML Susp     300 mL    Take 10 mLs (20 mg) by mouth 2 times daily    Urethral cancer (H), Gastroesophageal reflux disease with esophagitis       * omeprazole 2 mg/mL Susp    priLOSEC    280 mL    Take 10 mLs (20 mg) by mouth 2 times daily for 14 days    Gastroesophageal reflux disease with esophagitis, Urethral cancer (H)       fluticasone 50 MCG/ACT spray    FLONASE     Spray 1 spray into both nostrils every morning        HYDROXYZINE HCL PO      Take 5 mg by mouth At Bedtime        LORazepam 0.5 MG tablet    ATIVAN    30 tablet    Take 1 tablet (0.5 mg) by mouth every 4 hours as needed (Anxiety, Nausea/Vomiting or Sleep)    Nausea       nystatin 398445 UNIT/ML suspension    MYCOSTATIN    200 mL    Take 5 mLs (500,000 Units) by mouth 4 times daily Swish and spit    Thrush       OLANZapine 5 MG tablet    zyPREXA    30 tablet    Take 1 tablet (5 mg) by mouth At Bedtime    Urethral cancer (H), Nausea       ondansetron 8 MG tablet    ZOFRAN    20 tablet    Take 1 tablet (8 mg) by mouth every 8 hours as needed for nausea    Nausea       prochlorperazine 10 MG tablet    COMPAZINE    30 tablet    Take 0.5 tablets (5 mg) by mouth every 6 hours as needed (Nausea/Vomiting)    Nausea       sucralfate 1 GM/10ML suspension    CARAFATE    800 mL    Take 10 mLs (1 g) by mouth 4 times daily for 20 days    Gastroesophageal reflux disease with esophagitis, Urethral cancer (H)       * Notice:  This list has 2 medication(s) that are the same as other medications prescribed for you. Read the directions carefully, and ask your doctor or other care provider to review them with you.

## 2017-10-25 NOTE — PROGRESS NOTES
[facility]  HEMATOLOGY AND ONCOLOGY    FOLLOW-UP VISIT NOTE    PATIENT NAME: King Gonsalo Bruno MRN # [id number]  DATE OF VISIT: Oct 25, 2017 YOB: 1945    REFERRING PROVIDER: Muriel Haddad MD  [address]  [place] [address]    CANCER TYPE: Urothelial with squamous differentiation from bladder and penile urethra  STAGE: III     TREATMENT SUMMARY:  7/24/17 TURBT - Path: Invasive high grade carcinoma (urothelial) with squamous differentiation   Chemotherapy 9/15/2017 9/22/2017 10/6/2017    Day, Cycle Day 1, Cycle 1 Day 8, Cycle 1 Day 1, Cycle 2 Day 8, Cycle 2   CISplatin (PLATINOL) IV 35 mg/m2 26 mg/m2 35 mg/m2 35 mg/m2   gemcitabine (GEMZAR) IV 2,000 mg 2,000 mg 2,000 mg 2,000 mg     CURRENT INTERVENTIONS:  Chemotherapy with cisplatin 70 mg/m2 split over day 1 and 8, gemcitabine 1000 mg/m2 day 1 and day 8    SUBJECTIVE   King Gonsalo Bruno is being followed for locally advanced urothelial cancer from penile urethra    Gonsalo is accompanied by his wife at this clinic visit. He has been struggling with chemotherapy. He has poor taste and is unable to enjoy anything. Even chicken tastes dry and tasteless. He has persistent nausea. He has tried several medications without any benefit. He is struggling with his Zenker's diverticulum which gives him dysphagia.       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      GERD (gastroesophageal reflux disease)      Hypertension      PONV (postoperative nausea and vomiting)          CURRENT OUTPATIENT MEDICATIONS     Current Outpatient Prescriptions   Medication Sig     omeprazole (PRILOSEC) 2 mg/mL SUSP Take 10 mLs (20 mg) by mouth 2 times daily for 14 days     nystatin (MYCOSTATIN) 366985 UNIT/ML suspension Take 5 mLs (500,000 Units) by mouth 4 times daily Swish and spit      sucralfate (CARAFATE) 1 GM/10ML suspension Take 10 mLs (1 g) by mouth 4 times daily for 20 days     FIRST-OMEPRAZOLE 2 MG/ML SUSP Take 10 mLs (20 mg) by mouth 2 times daily     alum & mag hydroxide-simethicone (MYLANTA/MAALOX) 200-200-20 MG/5ML SUSP suspension Take 30 mLs by mouth every 4 hours as needed for indigestion     LORazepam (ATIVAN) 0.5 MG tablet Take 1 tablet (0.5 mg) by mouth every 4 hours as needed (Anxiety, Nausea/Vomiting or Sleep)     prochlorperazine (COMPAZINE) 10 MG tablet Take 0.5 tablets (5 mg) by mouth every 6 hours as needed (Nausea/Vomiting)     ondansetron (ZOFRAN) 8 MG tablet Take 1 tablet (8 mg) by mouth every 8 hours as needed for nausea     docusate sodium (COLACE) 100 MG capsule Take 1 capsule (100 mg) by mouth 2 times daily as needed for constipation     amLODIPine (NORVASC) 5 MG tablet Take 2 tablets (10 mg) by mouth daily     dexamethasone (DECADRON) 4 MG tablet Take 2 tablets (8 mg) by mouth daily Take for 3 days, starting the day after cisplatin (Day 2 and Day 9).     HYDROXYZINE HCL PO Take 5 mg by mouth At Bedtime      fluticasone (FLONASE) 50 MCG/ACT spray Spray 1 spray into both nostrils every morning      No current facility-administered medications for this visit.      Facility-Administered Medications Ordered in Other Visits   Medication     [START ON 10/26/2017] NaCl 0.9 % 1,000 mL with potassium chloride 40 mEq         ALLERGIES      Allergies   Allergen Reactions     Lisinopril Swelling        REVIEW OF SYSTEMS   As above in the HPI, o/w complete 12-point ROS was negative.     PHYSICAL EXAM   /77  Pulse 93  Temp 98.5  F (36.9  C) (Oral)  Wt 68 kg (150 lb)  BMI 18.26 kg/m2  GEN: NAD  HEENT: PERRL, EOMI, no icterus, injection or pallor. Oropharynx is clear.  LYMPHATICs: no cervical or supraclavicular lymphadenopathy; no other abn lymphadenopathy  PULMONARY: clear with good air entry bilaterally  CARDIOVASCULAR: regular, no murmurs, rubs, or  gallops  GASTROINTESTINAL: soft, non-tender, non-distended, normal bowel sounds, no hepatosplenomegaly by percussion or palpation  MUSCULOSKELTAL: warm, well perfused, no edema  NEURO: awake, alert and oriented to time place and person, cranial nerves intact - II - XII, no focal neurologic deficits  SKIN: no rashes     LABORATORY AND IMAGING STUDIES     Recent Labs   Lab Test  10/25/17   1046  10/24/17   1500  10/23/17   0940  10/20/17   1354  10/19/17   1810  10/13/17   0900   NA  128*  127*   --   127*  129*  131*   POTASSIUM  3.2*  3.1*   --   4.0  3.4  3.7   CHLORIDE  93*  91*   --   92*  92*  97   CO2  24  27   --   26  28  28   ANIONGAP  11  9   --   8  9  6   BUN  11  11   --   10  16  13   CR  1.14  1.18   --   1.16  1.44*  1.21   GLC  80  82  109*  109*  110*  95   SAADIA  8.8  8.5   --   8.9  8.9  9.3     Recent Labs   Lab Test  10/24/17   1500  10/20/17   1354  10/13/17   0900  10/06/17   0749  10/03/17   1544   MAG  1.6  1.3*  1.8  1.5*  1.6     Recent Labs   Lab Test  10/25/17   1046  10/24/17   1500  10/20/17   1354  10/19/17   1810  10/13/17   0900   WBC  4.8  5.3  4.0  3.3*  6.4   HGB  10.7*  10.6*  10.6*  10.7*  10.6*   PLT  215  178  158  177  278   MCV  85  85  86  86  87   NEUTROPHIL  66.4  50.4  65.4  43.0  55.6     Recent Labs   Lab Test  10/25/17   1046  10/24/17   1500  10/20/17   1354   BILITOTAL  0.4  0.4  0.5   ALKPHOS  77  79  77   ALT  19  19  25   AST  26  25  31   ALBUMIN  3.3*  3.3*  3.2*     TSH   Date Value Ref Range Status   10/06/2017 1.61 0.40 - 4.00 mU/L Final     No results for input(s): CEA in the last 82744 hours.  Results for orders placed or performed in visit on 10/23/17   PET Oncology Whole Body    Narrative    Combined Report of:    PET and CT on  10/23/2017 11:40 AM :    1. PET of the neck, chest, abdomen, and pelvis.  2. PET CT Fusion for Attenuation Correction and Anatomical  Localization:    3. Diagnostic CT scan of the chest, abdomen, and pelvis with  intravenous  contrast for interpretation.  3. CT of the chest, abdomen and pelvis obtained for diagnostic  interpretation.  4. 3D MIP and PET-CT fused images were processed on an independent  workstation and archived to PACS and reviewed by a radiologist.    Technique:    1. PET: The patient received 16.26 mCi of F-18-FDG; the serum glucose  was 109 prior to administration, body weight was 70.3 kg. Images were  evaluated in the axial, sagittal, and coronal planes as well as the  rotational whole body MIP. Images were acquired from the Vertex to the  Feet.    UPTAKE WAS MEASURED AT 60 MINUTES.     2. CT: Volumetric acquisition for clinical interpretation of the  chest, abdomen, and pelvis acquired at 3 mm sections . The chest,  abdomen, and pelvis were evaluated at 5 mm sections in bone, soft  tissue, and lung windows.      The patient received 100 cc. Of Optiray 320 intravenously for the  examination.      3. 3D MIP and PET-CT fused images were processed on an independent  workstation and archived to PACS and reviewed by a radiologist.    INDICATION: urothelial cancer, assess response to treatment.,  Malignant neoplasm of urethra    ADDITIONAL INFORMATION OBTAINED FROM EMR: Status post second cycle of  chemotherapy. PET/CT to assess pulmonary nodules and primary tumor.    COMPARISON: 8/23/2017    FINDINGS:     HEAD/NECK:  There is no  suspicious FDG uptake in the neck.     The paranasal sinuses are clear. The mastoid air cells patent.     The mucosal pharyngeal space, the , prevertebral and carotid  spaces are within normal limits.     No masses, mass effect or pathologically enlarged lymph nodes are  evident. The thyroid gland is unremarkable.    CHEST:  Diffuse mild esophageal uptake, perhaps related to gastroesophageal  reflux disease. No axillary, mediastinal, or hilar lymphadenopathy.  Calcified right hilar node, perhaps related to granulomatous disease.  The heart is not enlarged. No pericardial effusion. Normal  branching  pattern of the great vessels. Central tracheobronchial tree is patent.  No pleural effusion.     Moderate centrilobular and paraseptal emphysematous changes with an  upper lobe predominance. Biapical scarring. 9 x 4 mm nodule in the  left upper lobe along the anterior junction line (series 10 image 74)  appears decreased in size since the prior examination, when it  measured 12 x 6 mm. Maximum SUV of 1.21 has also decreased since the  prior exam when it measured 5.43. Spiculated nodule along the anterior  margin of the right upper lobe (series 10 image 116) has significantly  decreased in size and now measures approximately 5 mm, without  significant hypermetabolic activity, although this now may be below  the resolution of PET.    ABDOMEN AND PELVIS:  There continues to be avid FDG uptake about the anterior portion of  the urethral mass, with maximum SUV of 12.47 measured on post void  post Lasix images. On CT the lesion (FDG avid portion) measures  approximately 19 x 13 mm in axial dimension. The posterior portion of  the mass which extends posteriorly to the membranous urethra is no  longer FDG avid. It is slightly smaller in transverse thickness and  now contains air. No additional worrisome FDG uptake in the abdomen or  pelvis.    Suprapubic catheter in place. The liver, gallbladder, pancreas,  spleen, and adrenal glands are unremarkable. Multiple cystic  nonmetabolic lesions within the kidneys. The larger lesions are  simple, while the smaller subcentimeter lesions are too small to  adequately characterize. Urinary bladder is decompressed. No  significant free fluid. No free air. Scattered atherosclerotic  calcifications in the abdominal and pelvic vasculature.    LOWER EXTREMITIES:   No abnormal masses or hypermetabolic lesions.    BONES:   There are no suspicious lytic or blastic osseous lesions.  There is no  abnormal FDG uptake in the skeleton. Degenerative changes in the hips  and spine.         Impression    IMPRESSION:   In this patient with urothelial carcinoma status post second cycle of  chemotherapy:  1. Comparison with prior PET is slightly suboptimal given lack of post  Lasix PET images. Interval decrease in extent and FDG avidity of the  urethral tumor.    2. Spiculated hypermetabolic nodules in the right upper and left upper  lobes have decreased in size and FDG uptake since the prior  examination. These findings continue to be worrisome for metastatic  disease, with chemotherapy response. However primary malignancy given  underlying emphysematous changes continues to be in the differential,  albeit with chemotherapy response.    I have personally reviewed the examination and initial interpretation  and I agree with the findings.    ELIZABETH WHITEHEAD MD         ASSESSMENT AND PLAN   1. Stage II urothelial cancer involving penile urethra  2. ECOG PS 2  3. HTN  4. No other medical comorbidity     I had a lengthy discussion with Gonsalo in the presence of his wife.  I have reviewed the actual images from his recent PET/CT scan.  There is suggestion of a good response both at the primary urethral site and also in the lungs.  I have reviewed his case both with Dr. Marisol Hernandez in Pulmonary Medicine and Dr. Muriel Haddad in Urology.  Given his continued response, we would like to continue with his chemotherapy.  He has been struggling with nausea, vomiting and dysgeusia, and has very poor oral intake and feels dehydrated.  He notes that he is clearly not ready for chemotherapy at this time.  He would like an additional week at least to recover.  This is not unreasonable, especially given the good radiographic response for now.  We will plan to continue with chemotherapy as current.       He has a radiographic response in the lung lesion which is concerning in a way that it suggests presence of malignancy.  This could be either the same urothelial cancer responding to chemotherapy or it could be a separate  lung primary.  We would never know this for sure without a biopsy.  The lesions currently are small and would be difficult for a biopsy at this time.  I will plan to restage him in about 6 weeks' time and we would make a decision on his definitive curative treatment at that point in time.  The presence of a distant metastatic site would eliminate the idea of curative therapy.  However, this can still be attempted in the setting of limited metastatic disease as in his case.  We could also attempt to surgically resect the sites of lung metastasis.  However, our first goal is to make him feel better and permit completion of therapy as currently planned.  I will have him receive IV fluids today and then again on Friday.  I will schedule this twice a week for the duration of his treatment.  He will be seen by Josy Powell prior to every dose of chemotherapy.     Recommend zyprexa for nausea.     Over 45 min of direct face to face time spent with patient with more than 50% time spent in counseling and coordinating care.

## 2017-10-25 NOTE — LETTER
10/25/2017       RE: King Gonsalo Bruno  [address]  [facility] [id number]     Dear Colleague,    Thank you for referring your patient, King Gonsalo Bruno, to the [facility]. Please see a copy of my visit note below.    [facility]  HEMATOLOGY AND ONCOLOGY    FOLLOW-UP VISIT NOTE    PATIENT NAME: King Gonsalo Bruno MRN # [id number]  DATE OF VISIT: Oct 25, 2017 YOB: 1945    REFERRING PROVIDER: Muriel Haddad MD  [address]  [place] [address]    CANCER TYPE: Urothelial with squamous differentiation from bladder and penile urethra  STAGE: III     TREATMENT SUMMARY:  7/24/17 TURBT - Path: Invasive high grade carcinoma (urothelial) with squamous differentiation   Chemotherapy 9/15/2017 9/22/2017 10/6/2017    Day, Cycle Day 1, Cycle 1 Day 8, Cycle 1 Day 1, Cycle 2 Day 8, Cycle 2   CISplatin (PLATINOL) IV 35 mg/m2 26 mg/m2 35 mg/m2 35 mg/m2   gemcitabine (GEMZAR) IV 2,000 mg 2,000 mg 2,000 mg 2,000 mg     CURRENT INTERVENTIONS:  Chemotherapy with cisplatin 70 mg/m2 split over day 1 and 8, gemcitabine 1000 mg/m2 day 1 and day 8    SUBJECTIVE   King Gonsalo Bruno is being followed for locally advanced urothelial cancer from penile urethra    Gonsalo is accompanied by his wife at this clinic visit. He has been struggling with chemotherapy. He has poor taste and is unable to enjoy anything. Even chicken tastes dry and tasteless. He has persistent nausea. He has tried several medications without any benefit. He is struggling with his Zenker's diverticulum which gives him dysphagia.       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      GERD (gastroesophageal reflux disease)      Hypertension      PONV (postoperative nausea and vomiting)          CURRENT OUTPATIENT MEDICATIONS     Current  Outpatient Prescriptions   Medication Sig     omeprazole (PRILOSEC) 2 mg/mL SUSP Take 10 mLs (20 mg) by mouth 2 times daily for 14 days     nystatin (MYCOSTATIN) 604447 UNIT/ML suspension Take 5 mLs (500,000 Units) by mouth 4 times daily Swish and spit     sucralfate (CARAFATE) 1 GM/10ML suspension Take 10 mLs (1 g) by mouth 4 times daily for 20 days     FIRST-OMEPRAZOLE 2 MG/ML SUSP Take 10 mLs (20 mg) by mouth 2 times daily     alum & mag hydroxide-simethicone (MYLANTA/MAALOX) 200-200-20 MG/5ML SUSP suspension Take 30 mLs by mouth every 4 hours as needed for indigestion     LORazepam (ATIVAN) 0.5 MG tablet Take 1 tablet (0.5 mg) by mouth every 4 hours as needed (Anxiety, Nausea/Vomiting or Sleep)     prochlorperazine (COMPAZINE) 10 MG tablet Take 0.5 tablets (5 mg) by mouth every 6 hours as needed (Nausea/Vomiting)     ondansetron (ZOFRAN) 8 MG tablet Take 1 tablet (8 mg) by mouth every 8 hours as needed for nausea     docusate sodium (COLACE) 100 MG capsule Take 1 capsule (100 mg) by mouth 2 times daily as needed for constipation     amLODIPine (NORVASC) 5 MG tablet Take 2 tablets (10 mg) by mouth daily     dexamethasone (DECADRON) 4 MG tablet Take 2 tablets (8 mg) by mouth daily Take for 3 days, starting the day after cisplatin (Day 2 and Day 9).     HYDROXYZINE HCL PO Take 5 mg by mouth At Bedtime      fluticasone (FLONASE) 50 MCG/ACT spray Spray 1 spray into both nostrils every morning      No current facility-administered medications for this visit.      Facility-Administered Medications Ordered in Other Visits   Medication     [START ON 10/26/2017] NaCl 0.9 % 1,000 mL with potassium chloride 40 mEq         ALLERGIES      Allergies   Allergen Reactions     Lisinopril Swelling        REVIEW OF SYSTEMS   As above in the HPI, o/w complete 12-point ROS was negative.     PHYSICAL EXAM   /77  Pulse 93  Temp 98.5  F (36.9  C) (Oral)  Wt 68 kg (150 lb)  BMI 18.26 kg/m2  GEN: NAD  HEENT: PERRL, EOMI, no  icterus, injection or pallor. Oropharynx is clear.  LYMPHATICs: no cervical or supraclavicular lymphadenopathy; no other abn lymphadenopathy  PULMONARY: clear with good air entry bilaterally  CARDIOVASCULAR: regular, no murmurs, rubs, or gallops  GASTROINTESTINAL: soft, non-tender, non-distended, normal bowel sounds, no hepatosplenomegaly by percussion or palpation  MUSCULOSKELTAL: warm, well perfused, no edema  NEURO: awake, alert and oriented to time place and person, cranial nerves intact - II - XII, no focal neurologic deficits  SKIN: no rashes     LABORATORY AND IMAGING STUDIES     Recent Labs   Lab Test  10/25/17   1046  10/24/17   1500  10/23/17   0940  10/20/17   1354  10/19/17   1810  10/13/17   0900   NA  128*  127*   --   127*  129*  131*   POTASSIUM  3.2*  3.1*   --   4.0  3.4  3.7   CHLORIDE  93*  91*   --   92*  92*  97   CO2  24  27   --   26  28  28   ANIONGAP  11  9   --   8  9  6   BUN  11  11   --   10  16  13   CR  1.14  1.18   --   1.16  1.44*  1.21   GLC  80  82  109*  109*  110*  95   SAADIA  8.8  8.5   --   8.9  8.9  9.3     Recent Labs   Lab Test  10/24/17   1500  10/20/17   1354  10/13/17   0900  10/06/17   0749  10/03/17   1544   MAG  1.6  1.3*  1.8  1.5*  1.6     Recent Labs   Lab Test  10/25/17   1046  10/24/17   1500  10/20/17   1354  10/19/17   1810  10/13/17   0900   WBC  4.8  5.3  4.0  3.3*  6.4   HGB  10.7*  10.6*  10.6*  10.7*  10.6*   PLT  215  178  158  177  278   MCV  85  85  86  86  87   NEUTROPHIL  66.4  50.4  65.4  43.0  55.6     Recent Labs   Lab Test  10/25/17   1046  10/24/17   1500  10/20/17   1354   BILITOTAL  0.4  0.4  0.5   ALKPHOS  77  79  77   ALT  19  19  25   AST  26  25  31   ALBUMIN  3.3*  3.3*  3.2*     TSH   Date Value Ref Range Status   10/06/2017 1.61 0.40 - 4.00 mU/L Final     No results for input(s): CEA in the last 07284 hours.  Results for orders placed or performed in visit on 10/23/17   PET Oncology Whole Body    Narrative    Combined Report of:    PET and  CT on  10/23/2017 11:40 AM :    1. PET of the neck, chest, abdomen, and pelvis.  2. PET CT Fusion for Attenuation Correction and Anatomical  Localization:    3. Diagnostic CT scan of the chest, abdomen, and pelvis with  intravenous contrast for interpretation.  3. CT of the chest, abdomen and pelvis obtained for diagnostic  interpretation.  4. 3D MIP and PET-CT fused images were processed on an independent  workstation and archived to PACS and reviewed by a radiologist.    Technique:    1. PET: The patient received 16.26 mCi of F-18-FDG; the serum glucose  was 109 prior to administration, body weight was 70.3 kg. Images were  evaluated in the axial, sagittal, and coronal planes as well as the  rotational whole body MIP. Images were acquired from the Vertex to the  Feet.    UPTAKE WAS MEASURED AT 60 MINUTES.     2. CT: Volumetric acquisition for clinical interpretation of the  chest, abdomen, and pelvis acquired at 3 mm sections . The chest,  abdomen, and pelvis were evaluated at 5 mm sections in bone, soft  tissue, and lung windows.      The patient received 100 cc. Of Optiray 320 intravenously for the  examination.      3. 3D MIP and PET-CT fused images were processed on an independent  workstation and archived to PACS and reviewed by a radiologist.    INDICATION: urothelial cancer, assess response to treatment.,  Malignant neoplasm of urethra    ADDITIONAL INFORMATION OBTAINED FROM EMR: Status post second cycle of  chemotherapy. PET/CT to assess pulmonary nodules and primary tumor.    COMPARISON: 8/23/2017    FINDINGS:     HEAD/NECK:  There is no  suspicious FDG uptake in the neck.     The paranasal sinuses are clear. The mastoid air cells patent.     The mucosal pharyngeal space, the , prevertebral and carotid  spaces are within normal limits.     No masses, mass effect or pathologically enlarged lymph nodes are  evident. The thyroid gland is unremarkable.    CHEST:  Diffuse mild esophageal uptake,  perhaps related to gastroesophageal  reflux disease. No axillary, mediastinal, or hilar lymphadenopathy.  Calcified right hilar node, perhaps related to granulomatous disease.  The heart is not enlarged. No pericardial effusion. Normal branching  pattern of the great vessels. Central tracheobronchial tree is patent.  No pleural effusion.     Moderate centrilobular and paraseptal emphysematous changes with an  upper lobe predominance. Biapical scarring. 9 x 4 mm nodule in the  left upper lobe along the anterior junction line (series 10 image 74)  appears decreased in size since the prior examination, when it  measured 12 x 6 mm. Maximum SUV of 1.21 has also decreased since the  prior exam when it measured 5.43. Spiculated nodule along the anterior  margin of the right upper lobe (series 10 image 116) has significantly  decreased in size and now measures approximately 5 mm, without  significant hypermetabolic activity, although this now may be below  the resolution of PET.    ABDOMEN AND PELVIS:  There continues to be avid FDG uptake about the anterior portion of  the urethral mass, with maximum SUV of 12.47 measured on post void  post Lasix images. On CT the lesion (FDG avid portion) measures  approximately 19 x 13 mm in axial dimension. The posterior portion of  the mass which extends posteriorly to the membranous urethra is no  longer FDG avid. It is slightly smaller in transverse thickness and  now contains air. No additional worrisome FDG uptake in the abdomen or  pelvis.    Suprapubic catheter in place. The liver, gallbladder, pancreas,  spleen, and adrenal glands are unremarkable. Multiple cystic  nonmetabolic lesions within the kidneys. The larger lesions are  simple, while the smaller subcentimeter lesions are too small to  adequately characterize. Urinary bladder is decompressed. No  significant free fluid. No free air. Scattered atherosclerotic  calcifications in the abdominal and pelvic  vasculature.    LOWER EXTREMITIES:   No abnormal masses or hypermetabolic lesions.    BONES:   There are no suspicious lytic or blastic osseous lesions.  There is no  abnormal FDG uptake in the skeleton. Degenerative changes in the hips  and spine.        Impression    IMPRESSION:   In this patient with urothelial carcinoma status post second cycle of  chemotherapy:  1. Comparison with prior PET is slightly suboptimal given lack of post  Lasix PET images. Interval decrease in extent and FDG avidity of the  urethral tumor.    2. Spiculated hypermetabolic nodules in the right upper and left upper  lobes have decreased in size and FDG uptake since the prior  examination. These findings continue to be worrisome for metastatic  disease, with chemotherapy response. However primary malignancy given  underlying emphysematous changes continues to be in the differential,  albeit with chemotherapy response.    I have personally reviewed the examination and initial interpretation  and I agree with the findings.    ELIZABETH WHITEHEAD MD         ASSESSMENT AND PLAN   1. Stage II urothelial cancer involving penile urethra  2. ECOG PS 2  3. HTN  4. No other medical comorbidity     I had a lengthy discussion with Gonsalo in the presence of his wife.  I have reviewed the actual images from his recent PET/CT scan.  There is suggestion of a good response both at the primary urethral site and also in the lungs.  I have reviewed his case both with Dr. Marisol Hernandez in Pulmonary Medicine and Dr. Muriel Haddad in Urology.  Given his continued response, we would like to continue with his chemotherapy.  He has been struggling with nausea, vomiting and dysgeusia, and has very poor oral intake and feels dehydrated.  He notes that he is clearly not ready for chemotherapy at this time.  He would like an additional week at least to recover.  This is not unreasonable, especially given the good radiographic response for now.  We will plan to continue  with chemotherapy as current.       He has a radiographic response in the lung lesion which is concerning in a way that it suggests presence of malignancy.  This could be either the same urothelial cancer responding to chemotherapy or it could be a separate lung primary.  We would never know this for sure without a biopsy.  The lesions currently are small and would be difficult for a biopsy at this time.  I will plan to restage him in about 6 weeks' time and we would make a decision on his definitive curative treatment at that point in time.  The presence of a distant metastatic site would eliminate the idea of curative therapy.  However, this can still be attempted in the setting of limited metastatic disease as in his case.  We could also attempt to surgically resect the sites of lung metastasis.  However, our first goal is to make him feel better and permit completion of therapy as currently planned.  I will have him receive IV fluids today and then again on Friday.  I will schedule this twice a week for the duration of his treatment.  He will be seen by Josy Powell prior to every dose of chemotherapy.     Recommend zyprexa for nausea.     Over 45 min of direct face to face time spent with patient with more than 50% time spent in counseling and coordinating care.      Again, thank you for allowing me to participate in the care of your patient.      Sincerely,    Steve Arceo MD

## 2017-10-25 NOTE — NURSING NOTE
"Oncology Rooming Note    October 25, 2017 11:15 AM   King Gonsalo Bruno is a 71 year old male who presents for:    Chief Complaint   Patient presents with     Blood Draw     IV placed by vascular access     [facility]      Initial Vitals: /77  Pulse 93  Temp 98.5  F (36.9  C) (Oral)  Wt 68 kg (150 lb)  BMI 18.26 kg/m2 Estimated body mass index is 18.26 kg/(m^2) as calculated from the following:    Height as of an earlier encounter on 10/25/17: 1.93 m (6' 4\").    Weight as of this encounter: 68 kg (150 lb). Body surface area is 1.91 meters squared.  Data Unavailable Comment: Data Unavailable   No LMP for male patient.  Allergies reviewed: Yes  Medications reviewed: Yes    Medications: Medication refills not needed today.  Pharmacy name entered into [facility]: [facility] [id number] [address] AT [address] [place] [address]    Clinical concerns: infusion and venipunctures      7 minutes for nursing intake (face to face time)     Rober Peres              "

## 2017-10-26 RX ORDER — ALBUTEROL SULFATE 90 UG/1
1-2 AEROSOL, METERED RESPIRATORY (INHALATION)
Status: CANCELLED
Start: 2017-12-01

## 2017-10-26 RX ORDER — ALBUTEROL SULFATE 0.83 MG/ML
2.5 SOLUTION RESPIRATORY (INHALATION)
Status: CANCELLED | OUTPATIENT
Start: 2017-12-01

## 2017-10-26 RX ORDER — LORAZEPAM 2 MG/ML
0.5 INJECTION INTRAMUSCULAR EVERY 4 HOURS PRN
Status: CANCELLED
Start: 2017-11-03

## 2017-10-26 RX ORDER — EPINEPHRINE 1 MG/ML
0.3 INJECTION, SOLUTION, CONCENTRATE INTRAVENOUS EVERY 5 MIN PRN
Status: CANCELLED | OUTPATIENT
Start: 2017-11-24

## 2017-10-26 RX ORDER — EPINEPHRINE 0.3 MG/.3ML
0.3 INJECTION SUBCUTANEOUS EVERY 5 MIN PRN
Status: CANCELLED | OUTPATIENT
Start: 2017-11-24

## 2017-10-26 RX ORDER — DIPHENHYDRAMINE HYDROCHLORIDE 50 MG/ML
50 INJECTION INTRAMUSCULAR; INTRAVENOUS
Status: CANCELLED
Start: 2017-11-03

## 2017-10-26 RX ORDER — METHYLPREDNISOLONE SODIUM SUCCINATE 125 MG/2ML
125 INJECTION, POWDER, LYOPHILIZED, FOR SOLUTION INTRAMUSCULAR; INTRAVENOUS
Status: CANCELLED
Start: 2017-11-10

## 2017-10-26 RX ORDER — SODIUM CHLORIDE 9 MG/ML
1000 INJECTION, SOLUTION INTRAVENOUS CONTINUOUS PRN
Status: CANCELLED
Start: 2017-11-10

## 2017-10-26 RX ORDER — EPINEPHRINE 0.3 MG/.3ML
0.3 INJECTION SUBCUTANEOUS EVERY 5 MIN PRN
Status: CANCELLED | OUTPATIENT
Start: 2017-11-03

## 2017-10-26 RX ORDER — EPINEPHRINE 0.3 MG/.3ML
0.3 INJECTION SUBCUTANEOUS EVERY 5 MIN PRN
Status: CANCELLED | OUTPATIENT
Start: 2017-12-01

## 2017-10-26 RX ORDER — SODIUM CHLORIDE 9 MG/ML
1000 INJECTION, SOLUTION INTRAVENOUS CONTINUOUS PRN
Status: CANCELLED
Start: 2017-11-24

## 2017-10-26 RX ORDER — METHYLPREDNISOLONE SODIUM SUCCINATE 125 MG/2ML
125 INJECTION, POWDER, LYOPHILIZED, FOR SOLUTION INTRAMUSCULAR; INTRAVENOUS
Status: CANCELLED
Start: 2017-12-01

## 2017-10-26 RX ORDER — EPINEPHRINE 0.3 MG/.3ML
0.3 INJECTION SUBCUTANEOUS EVERY 5 MIN PRN
Status: CANCELLED | OUTPATIENT
Start: 2017-11-10

## 2017-10-26 RX ORDER — SODIUM CHLORIDE 9 MG/ML
1000 INJECTION, SOLUTION INTRAVENOUS CONTINUOUS PRN
Status: CANCELLED
Start: 2017-12-01

## 2017-10-26 RX ORDER — MEPERIDINE HYDROCHLORIDE 25 MG/ML
25 INJECTION INTRAMUSCULAR; INTRAVENOUS; SUBCUTANEOUS EVERY 30 MIN PRN
Status: CANCELLED | OUTPATIENT
Start: 2017-11-10

## 2017-10-26 RX ORDER — ALBUTEROL SULFATE 90 UG/1
1-2 AEROSOL, METERED RESPIRATORY (INHALATION)
Status: CANCELLED
Start: 2017-11-10

## 2017-10-26 RX ORDER — PALONOSETRON 0.05 MG/ML
0.25 INJECTION, SOLUTION INTRAVENOUS ONCE
Status: CANCELLED
Start: 2017-11-03

## 2017-10-26 RX ORDER — ALBUTEROL SULFATE 0.83 MG/ML
2.5 SOLUTION RESPIRATORY (INHALATION)
Status: CANCELLED | OUTPATIENT
Start: 2017-11-24

## 2017-10-26 RX ORDER — METHYLPREDNISOLONE SODIUM SUCCINATE 125 MG/2ML
125 INJECTION, POWDER, LYOPHILIZED, FOR SOLUTION INTRAMUSCULAR; INTRAVENOUS
Status: CANCELLED
Start: 2017-11-24

## 2017-10-26 RX ORDER — METHYLPREDNISOLONE SODIUM SUCCINATE 125 MG/2ML
125 INJECTION, POWDER, LYOPHILIZED, FOR SOLUTION INTRAMUSCULAR; INTRAVENOUS
Status: CANCELLED
Start: 2017-11-03

## 2017-10-26 RX ORDER — DIPHENHYDRAMINE HYDROCHLORIDE 50 MG/ML
50 INJECTION INTRAMUSCULAR; INTRAVENOUS
Status: CANCELLED
Start: 2017-11-24

## 2017-10-26 RX ORDER — ALBUTEROL SULFATE 90 UG/1
1-2 AEROSOL, METERED RESPIRATORY (INHALATION)
Status: CANCELLED
Start: 2017-11-03

## 2017-10-26 RX ORDER — EPINEPHRINE 1 MG/ML
0.3 INJECTION, SOLUTION, CONCENTRATE INTRAVENOUS EVERY 5 MIN PRN
Status: CANCELLED | OUTPATIENT
Start: 2017-11-03

## 2017-10-26 RX ORDER — LORAZEPAM 2 MG/ML
0.5 INJECTION INTRAMUSCULAR EVERY 4 HOURS PRN
Status: CANCELLED
Start: 2017-11-24

## 2017-10-26 RX ORDER — PALONOSETRON 0.05 MG/ML
0.25 INJECTION, SOLUTION INTRAVENOUS ONCE
Status: CANCELLED
Start: 2017-12-01

## 2017-10-26 RX ORDER — MEPERIDINE HYDROCHLORIDE 25 MG/ML
25 INJECTION INTRAMUSCULAR; INTRAVENOUS; SUBCUTANEOUS EVERY 30 MIN PRN
Status: CANCELLED | OUTPATIENT
Start: 2017-11-03

## 2017-10-26 RX ORDER — SODIUM CHLORIDE 9 MG/ML
1000 INJECTION, SOLUTION INTRAVENOUS CONTINUOUS PRN
Status: CANCELLED
Start: 2017-11-03

## 2017-10-26 RX ORDER — LORAZEPAM 2 MG/ML
0.5 INJECTION INTRAMUSCULAR EVERY 4 HOURS PRN
Status: CANCELLED
Start: 2017-12-01

## 2017-10-26 RX ORDER — ALBUTEROL SULFATE 90 UG/1
1-2 AEROSOL, METERED RESPIRATORY (INHALATION)
Status: CANCELLED
Start: 2017-11-24

## 2017-10-26 RX ORDER — LORAZEPAM 2 MG/ML
0.5 INJECTION INTRAMUSCULAR EVERY 4 HOURS PRN
Status: CANCELLED
Start: 2017-11-10

## 2017-10-26 RX ORDER — MEPERIDINE HYDROCHLORIDE 25 MG/ML
25 INJECTION INTRAMUSCULAR; INTRAVENOUS; SUBCUTANEOUS EVERY 30 MIN PRN
Status: CANCELLED | OUTPATIENT
Start: 2017-12-01

## 2017-10-26 RX ORDER — PALONOSETRON 0.05 MG/ML
0.25 INJECTION, SOLUTION INTRAVENOUS ONCE
Status: CANCELLED
Start: 2017-11-10

## 2017-10-26 RX ORDER — ALBUTEROL SULFATE 0.83 MG/ML
2.5 SOLUTION RESPIRATORY (INHALATION)
Status: CANCELLED | OUTPATIENT
Start: 2017-11-03

## 2017-10-26 RX ORDER — EPINEPHRINE 1 MG/ML
0.3 INJECTION, SOLUTION, CONCENTRATE INTRAVENOUS EVERY 5 MIN PRN
Status: CANCELLED | OUTPATIENT
Start: 2017-12-01

## 2017-10-26 RX ORDER — DIPHENHYDRAMINE HYDROCHLORIDE 50 MG/ML
50 INJECTION INTRAMUSCULAR; INTRAVENOUS
Status: CANCELLED
Start: 2017-11-10

## 2017-10-26 RX ORDER — PALONOSETRON 0.05 MG/ML
0.25 INJECTION, SOLUTION INTRAVENOUS ONCE
Status: CANCELLED
Start: 2017-11-24

## 2017-10-26 RX ORDER — DIPHENHYDRAMINE HYDROCHLORIDE 50 MG/ML
50 INJECTION INTRAMUSCULAR; INTRAVENOUS
Status: CANCELLED
Start: 2017-12-01

## 2017-10-26 RX ORDER — MEPERIDINE HYDROCHLORIDE 25 MG/ML
25 INJECTION INTRAMUSCULAR; INTRAVENOUS; SUBCUTANEOUS EVERY 30 MIN PRN
Status: CANCELLED | OUTPATIENT
Start: 2017-11-24

## 2017-10-26 RX ORDER — ALBUTEROL SULFATE 0.83 MG/ML
2.5 SOLUTION RESPIRATORY (INHALATION)
Status: CANCELLED | OUTPATIENT
Start: 2017-11-10

## 2017-10-26 RX ORDER — EPINEPHRINE 1 MG/ML
0.3 INJECTION, SOLUTION, CONCENTRATE INTRAVENOUS EVERY 5 MIN PRN
Status: CANCELLED | OUTPATIENT
Start: 2017-11-10

## 2017-10-27 ENCOUNTER — INFUSION THERAPY VISIT (OUTPATIENT)
Dept: ONCOLOGY | Facility: CLINIC | Age: 72
End: 2017-10-27
Attending: INTERNAL MEDICINE
Payer: MEDICARE

## 2017-10-27 ENCOUNTER — TELEPHONE (OUTPATIENT)
Dept: ONCOLOGY | Facility: CLINIC | Age: 72
End: 2017-10-27

## 2017-10-27 ENCOUNTER — APPOINTMENT (OUTPATIENT)
Dept: LAB | Facility: CLINIC | Age: 72
End: 2017-10-27
Attending: INTERNAL MEDICINE
Payer: MEDICARE

## 2017-10-27 VITALS
RESPIRATION RATE: 16 BRPM | BODY MASS INDEX: 18.38 KG/M2 | HEART RATE: 90 BPM | OXYGEN SATURATION: 99 % | SYSTOLIC BLOOD PRESSURE: 128 MMHG | WEIGHT: 151 LBS | DIASTOLIC BLOOD PRESSURE: 77 MMHG | TEMPERATURE: 98 F

## 2017-10-27 DIAGNOSIS — E86.0 DEHYDRATION: ICD-10-CM

## 2017-10-27 DIAGNOSIS — R11.0 NAUSEA: ICD-10-CM

## 2017-10-27 DIAGNOSIS — C68.0 URETHRAL CANCER (H): Primary | ICD-10-CM

## 2017-10-27 DIAGNOSIS — E87.6 HYPOKALEMIA: ICD-10-CM

## 2017-10-27 LAB
ALBUMIN SERPL-MCNC: 3.2 G/DL (ref 3.4–5)
ALP SERPL-CCNC: 71 U/L (ref 40–150)
ALT SERPL W P-5'-P-CCNC: 17 U/L (ref 0–70)
ANION GAP SERPL CALCULATED.3IONS-SCNC: 10 MMOL/L (ref 3–14)
AST SERPL W P-5'-P-CCNC: 25 U/L (ref 0–45)
BILIRUB SERPL-MCNC: 0.4 MG/DL (ref 0.2–1.3)
BUN SERPL-MCNC: 11 MG/DL (ref 7–30)
CALCIUM SERPL-MCNC: 8.6 MG/DL (ref 8.5–10.1)
CHLORIDE SERPL-SCNC: 95 MMOL/L (ref 94–109)
CO2 SERPL-SCNC: 24 MMOL/L (ref 20–32)
CREAT SERPL-MCNC: 1.21 MG/DL (ref 0.66–1.25)
GFR SERPL CREATININE-BSD FRML MDRD: 59 ML/MIN/1.7M2
GLUCOSE SERPL-MCNC: 90 MG/DL (ref 70–99)
MAGNESIUM SERPL-MCNC: 1.6 MG/DL (ref 1.6–2.3)
POTASSIUM SERPL-SCNC: 3.3 MMOL/L (ref 3.4–5.3)
PROT SERPL-MCNC: 6.7 G/DL (ref 6.8–8.8)
SODIUM SERPL-SCNC: 129 MMOL/L (ref 133–144)

## 2017-10-27 PROCEDURE — 25000128 H RX IP 250 OP 636: Mod: ZF | Performed by: PHYSICIAN ASSISTANT

## 2017-10-27 PROCEDURE — 96375 TX/PRO/DX INJ NEW DRUG ADDON: CPT

## 2017-10-27 PROCEDURE — A9270 NON-COVERED ITEM OR SERVICE: HCPCS | Mod: ZF | Performed by: INTERNAL MEDICINE

## 2017-10-27 PROCEDURE — 25000132 ZZH RX MED GY IP 250 OP 250 PS 637: Mod: ZF | Performed by: INTERNAL MEDICINE

## 2017-10-27 PROCEDURE — 96361 HYDRATE IV INFUSION ADD-ON: CPT

## 2017-10-27 PROCEDURE — 83735 ASSAY OF MAGNESIUM: CPT | Performed by: INTERNAL MEDICINE

## 2017-10-27 PROCEDURE — 96365 THER/PROPH/DIAG IV INF INIT: CPT

## 2017-10-27 PROCEDURE — 80053 COMPREHEN METABOLIC PANEL: CPT | Performed by: INTERNAL MEDICINE

## 2017-10-27 PROCEDURE — 25000125 ZZHC RX 250: Mod: ZF | Performed by: PHYSICIAN ASSISTANT

## 2017-10-27 RX ORDER — PALONOSETRON 0.05 MG/ML
0.25 INJECTION, SOLUTION INTRAVENOUS ONCE
Status: COMPLETED | OUTPATIENT
Start: 2017-10-27 | End: 2017-10-27

## 2017-10-27 RX ORDER — POTASSIUM CHLORIDE 1500 MG/1
40 TABLET, EXTENDED RELEASE ORAL DAILY
Status: DISCONTINUED | OUTPATIENT
Start: 2017-10-27 | End: 2017-10-27 | Stop reason: HOSPADM

## 2017-10-27 RX ORDER — PALONOSETRON 0.05 MG/ML
0.25 INJECTION, SOLUTION INTRAVENOUS ONCE
Status: CANCELLED
Start: 2017-10-27 | End: 2017-10-27

## 2017-10-27 RX ADMIN — SODIUM CHLORIDE 1000 ML: 9 INJECTION, SOLUTION INTRAVENOUS at 10:09

## 2017-10-27 RX ADMIN — POTASSIUM CHLORIDE 40 MEQ: 1500 TABLET, EXTENDED RELEASE ORAL at 12:14

## 2017-10-27 RX ADMIN — PANTOPRAZOLE SODIUM 40 MG: 40 INJECTION, POWDER, FOR SOLUTION INTRAVENOUS at 11:03

## 2017-10-27 RX ADMIN — SODIUM CHLORIDE 150 MG: 9 INJECTION, SOLUTION INTRAVENOUS at 10:30

## 2017-10-27 RX ADMIN — PALONOSETRON HYDROCHLORIDE 0.25 MG: 0.25 INJECTION INTRAVENOUS at 10:25

## 2017-10-27 ASSESSMENT — PAIN SCALES - GENERAL: PAINLEVEL: SEVERE PAIN (6)

## 2017-10-27 NOTE — PROGRESS NOTES
Infusion Nursing Note:  King Gonsalo Bruno presents today for IVF and antiemetics.    Patient seen by provider today: No   present during visit today: Not Applicable.    Note: Pt feeling okay today. Denies any new complaints.  He did report some vomiting on Wednesday night but none since.    Potassium 3.2 today.  Potassium replaced orally with 40 meq per protocol.    Intravenous Access:  Peripheral IV placed.    Treatment Conditions:  Lab Results   Component Value Date    HGB 10.7 10/25/2017     Lab Results   Component Value Date    WBC 4.8 10/25/2017      Lab Results   Component Value Date    ANEU 3.2 10/25/2017     Lab Results   Component Value Date     10/25/2017      Lab Results   Component Value Date     10/27/2017                   Lab Results   Component Value Date    POTASSIUM 3.3 10/27/2017           Lab Results   Component Value Date    MAG 1.6 10/27/2017            Lab Results   Component Value Date    CR 1.21 10/27/2017                   Lab Results   Component Value Date    SAADIA 8.6 10/27/2017                Lab Results   Component Value Date    BILITOTAL 0.4 10/27/2017           Lab Results   Component Value Date    ALBUMIN 3.2 10/27/2017                    Lab Results   Component Value Date    ALT 17 10/27/2017           Lab Results   Component Value Date    AST 25 10/27/2017         Post Infusion Assessment:  Patient tolerated infusion without incident.  Blood return noted pre and post infusion.  Site patent and intact, free from redness, edema or discomfort.  No evidence of extravasations.  Access discontinued per protocol.    Discharge Plan:   Patient declined prescription refills.  Discharge instructions reviewed with: Patient.  Patient and/or family verbalized understanding of discharge instructions and all questions answered.  Copy of AVS reviewed with patient and/or family.  Patient will return 11/1/17 for next appointment.  Patient discharged in stable condition  accompanied by: significant other  Departure Mode: Ambulatory.    Mikayla Bronson RN

## 2017-10-27 NOTE — MR AVS SNAPSHOT
After Visit Summary   10/27/2017    King Gonsalo Bruno    MRN: [id number]           Patient Information     Date Of Birth          1945        Visit Information        Provider Department      10/27/2017 10:00 AM  29 ATC;  ONCOLOGY INFUSION [facility]        Today's Diagnoses     Urethral cancer (H)    -  1    Hypokalemia        Nausea        Dehydration          Care Instructions    Contact Numbers    [facility] Main Line: [phone]  [facility] Triage:  [phone]    Call triage with chills and/or temperature greater than or equal to 100.5, uncontrolled nausea/vomiting, diarrhea, constipation, dizziness, shortness of breath, chest pain, bleeding, unexplained bruising, or any new/concerning symptoms, questions/concerns.     If you are having any concerning symptoms or wish to speak to a provider before your next infusion visit, please call your care coordinator or triage to notify them so we can adequately serve you.       After Hours: [phone]    If after hours, weekends, or holidays, call main hospital  and ask for Oncology doctor on call.         October 2017 Sunday Monday Tuesday Wednesday Thursday Friday Saturday   1     2     3     UMP RETURN    3:45 PM   (60 min.)   Janene Alves PA-C   [facility]    4:00 PM   (15 min.)    [facility] LAB DRAW   [facility] Lab Draw     [facility] ONC INFUSION 120    4:30 PM   (120 min.)    ONCOLOGY INFUSION   [facility] 4     5     6     UMP [facility] LAB DRAW    7:15 AM   (15 min.)    [facility] LAB DRAW   [facility] Lab Draw     UMP RETURN    7:35 AM   (50 min.)   Yessica Ovalles, APRN CNP   [facility]     UMP ONC INFUSION 360    9:00 AM   (360 min.)    ONCOLOGY INFUSION   [facility]     UMP RETURN   12:45 PM   (30 min.)   Audie Rooney MD   [facility] Urology and [facility] for Prostate and Urologic Cancers 7        8     9     UMP BMT INFUSION 120    2:00 PM   (120 min.)   UC BMT INFUSION   [facility] Blood and Marrow Transplant 10     11     12     13     UMP [facility] DRAW    9:00 AM   (15 min.)    [facility]   [facility]     UMP ONC INFUSION 360   10:00 AM   (360 min.)   UC ONCOLOGY INFUSION   [facility] 14       15     16     UMP ONC INFUSION 120    3:30 PM   (120 min.)    ONCOLOGY INFUSION   [facility] 17     18     19     Admission    5:22 PM   [facility], Emergency Department   (Discharge: 10/19/2017) 20     UMP [facility] DRAW    1:30 PM   (15 min.)    [facility] DRAW   [facility]     UMP RETURN    1:45 PM   (30 min.)   Josy Powell PA-C   [facility]     UMP ONC INFUSION 120    2:00 PM   (120 min.)    ONCOLOGY INFUSION   [facility] 21     UMP ONC INFUSION 120    8:30 AM   (120 min.)    ONCOLOGY INFUSION   [facility]   22     23     PE EYE/ ONCOLOGY    9:45 AM   (45 min.)   [facility] [place] [address] for Clinical Imaging Research 24     UMP RETURN    1:45 PM   (60 min.)   Janene Alves PA-C   [facility]     UMP ONC INFUSION 120    2:00 PM   (120 min.)    ONCOLOGY INFUSION   [facility] 25     UMP CYSTOSCOPY    9:05 AM   (20 min.)   Muriel Haddad MD   [facility] Urology and Inst for Prostate and Urologic Cancers     UMP [facility] LAB DRAW    9:45 AM   (15 min.)    [facility] DRAW   [facility]     UMP RETURN   10:00 AM   (30 min.)   Steve Arceo MD   [facility]     UMP BMT INFUSION 120    2:00 PM   (120 min.)    BMT INFUSION   [facility] Blood and Marrow Transplant 26     27     UMP [facility] DRAW    9:30 AM   (15 min.)    [facility] DRAW   [facility]     UMP ONC INFUSION 120   10:00 AM   (120 min.)    ONCOLOGY INFUSION   [facility] 28        29 30 31 November 2017 Sunday Monday Tuesday Wednesday Thursday Friday Saturday                  1     UMP [facility] DRAW   11:45 AM   (15 min.)    [facility] LAB DRAW   [facility] Mason[facility] Lab Draw     UMP ONC INFUSION 120   12:30 PM   (120 min.)    ONCOLOGY INFUSION   [facility] 2     3     UMP [facility] LAB DRAW    7:15 AM   (15 min.)    [facility] LAB DRAW   [facility] Mason[facility] Lab Draw     UMP RETURN    7:35 AM   (50 min.)   Ramona Greer PA   [facility]     UMP ONC INFUSION 360    8:30 AM   (360 min.)    ONCOLOGY INFUSION   [facility] 4       5     6     UMP RETURN   12:45 PM   (30 min.)   Nurse,  Prostate Cancer Ctr   [facility] Urology and Inst for Prostate and Urologic Cancers 7     8     UMP [facility] LAB DRAW   12:30 PM   (15 min.)    [facility]     UMP ONC INFUSION 120    1:00 PM   (120 min.)    ONCOLOGY INFUSION   [facility] 9     10     UMP [facility] LAB DRAW    8:15 AM   (15 min.)    [facility]     UMP RETURN    8:25 AM   (50 min.)   Ramona Greer PA   [facility]     UMP ONC INFUSION 360   10:00 AM   (360 min.)    ONCOLOGY INFUSION   [facility] 11       12     13     14     UMP NEW    1:15 PM   (30 min.)   Arsenio Ortiz MD   [facility] Ear Nose and Throat 15     UMP [facility] LAB DRAW   12:00 PM   (15 min.)    [facility] LAB DRAW   [facility] Lab Draw     UMP ONC INFUSION 120   12:30 PM   (120 min.)    ONCOLOGY INFUSION   [facility] 16     17     UMP [facility] LAB DRAW   12:30 PM   (15 min.)    [facility] LAB DRAW   [facility] Lab Draw     UMP ONC INFUSION 120    1:00 PM   (120 min.)    ONCOLOGY INFUSION   [facility] 18       19     20     21     22     UMP [facility] LAB DRAW   12:30 PM   (15 min.)     MASONIC LAB DRAW   [facility] Masonic Lab Draw     UMP ONC INFUSION 120    1:00 PM   (120 min.)    ONCOLOGY INFUSION   [facility] 23     24     UMP MASONIC LAB DRAW    8:15 AM   (15 min.)    MASONIC LAB DRAW   [facility] Masonic Lab Draw     UMP RETURN    8:25 AM   (50 min.)   Ramona Greer PA   [facility]     UMP ONC INFUSION 360   10:00 AM   (360 min.)    ONCOLOGY INFUSION   [facility] 25  Happy Birthday!       26     27     28     29     UMP [facility] LAB DRAW   10:30 AM   (15 min.)    MASONIC LAB DRAW   [facility] Masonic Lab Draw     UMP ONC INFUSION 120   11:00 AM   (120 min.)    ONCOLOGY INFUSION   [facility] 30                            Lab Results:  Recent Results (from the past 12 hour(s))   Comprehensive metabolic panel    Collection Time: 10/27/17  9:40 AM   Result Value Ref Range    Sodium 129 (L) 133 - 144 mmol/L    Potassium 3.3 (L) 3.4 - 5.3 mmol/L    Chloride 95 94 - 109 mmol/L    Carbon Dioxide 24 20 - 32 mmol/L    Anion Gap 10 3 - 14 mmol/L    Glucose 90 70 - 99 mg/dL    Urea Nitrogen 11 7 - 30 mg/dL    Creatinine 1.21 0.66 - 1.25 mg/dL    GFR Estimate 59 (L) >60 mL/min/1.7m2    GFR Estimate If Black 72 >60 mL/min/1.7m2    Calcium 8.6 8.5 - 10.1 mg/dL    Bilirubin Total 0.4 0.2 - 1.3 mg/dL    Albumin 3.2 (L) 3.4 - 5.0 g/dL    Protein Total 6.7 (L) 6.8 - 8.8 g/dL    Alkaline Phosphatase 71 40 - 150 U/L    ALT 17 0 - 70 U/L    AST 25 0 - 45 U/L   Magnesium    Collection Time: 10/27/17  9:40 AM   Result Value Ref Range    Magnesium 1.6 1.6 - 2.3 mg/dL               Follow-ups after your visit        Your next 10 appointments already scheduled     Nov 01, 2017 11:45 AM [facility]   [facility] Lab Draw with  MAS[facility]IC LAB DRAW   [facility] Lab Draw ([facility])    [address] [address] [phone]            Nov 01, 2017 12:30 PM CDT   Infusion 120  with UC ONCOLOGY INFUSION, UC 29 ATC   [facility] ([facility])    [address] [facility]  [facility] [address]   [phone]            Nov 03, 2017  7:15 AM CDT   [facility] Lab Draw with  [facility] LAB DRAW   [facility]    [address] [facility]  [address] [facility] [address]   [phone]            Nov 03, 2017  7:50 AM CDT   (Arrive by 7:35 AM)   Return Visit with SARITHA Briceno   [facility] ([facility])    [address] [place] [address]  [address] [facility] [address]   [phone]            Nov 03, 2017  8:30 AM CDT   Infusion 360 with UC ONCOLOGY INFUSION, UC 21 ATC   [facility] ([facility])    [address] [place] [address]  [address] [facility] [address] [phone]            Nov 06, 2017  1:00 PM CST   (Arrive by 12:45 PM)   Return Visit with  Prostate Cancer Ctr Nurse   [facility] Urology and Inst for Prostate and Urologic Cancers ([facility])    [address] [place] [address]  4th Floor  [facility] [address]   [phone]            Nov 08, 2017 12:30 PM CST   [facility] Lab Draw with UC [facility] LAB DRAW   [facility])    [address] [place] [address]  [address] [facility] [address] [phone]            Nov 08, 2017  1:00 PM CST   Infusion 120 with UC ONCOLOGY INFUSION   [facility] ([facility])    [address] [place] [address]  [address] [facility] [address] [phone]              Who to contact     If you have questions or need follow up information about today's clinic visit or your schedule please contact [facility] directly at [phone].  Normal or non-critical lab and imaging results will be communicated to you by MyChart, letter or phone within 4  "business days after the clinic has received the results. If you do not hear from us within 7 days, please contact the clinic through [employer] or phone. If you have a critical or abnormal lab result, we will notify you by phone as soon as possible.  Submit refill requests through [employer] or call your pharmacy and they will forward the refill request to us. Please allow 3 business days for your refill to be completed.          Additional Information About Your Visit        [employer] Information     [employer] lets you send messages to your doctor, view your test results, renew your prescriptions, schedule appointments and more. To sign up, go to [email or web address] . Click on \"Log in\" on the left side of the screen, which will take you to the Welcome page. Then click on \"Sign up Now\" on the right side of the page.     You will be asked to enter the access code listed below, as well as some personal information. Please follow the directions to create your username and password.     Your access code is: [email or web address]  Expires: 10/31/2017  1:07 PM     Your access code will  in 90 days. If you need help or a new code, please call your [place] clinic or [phone].        Care EveryWhere ID     This is your Care EveryWhere ID. This could be used by other organizations to access your [place] medical records  [id number]        Your Vitals Were     Pulse Temperature Respirations Pulse Oximetry BMI (Body Mass Index)       90 98  F (36.7  C) (Oral) 16 99% 18.38 kg/m2        Blood Pressure from Last 3 Encounters:   10/27/17 128/77   10/25/17 124/78   10/25/17 131/77    Weight from Last 3 Encounters:   10/27/17 68.5 kg (151 lb)   10/25/17 68 kg (150 lb)   10/25/17 68 kg (150 lb)              We Performed the Following     Comprehensive metabolic panel     Magnesium        Primary Care Provider Office Phone # Fax #    Joan Ventura [phone]       [address]" [address]        Equal Access to Services     SHILPI NEDA : Hadii aad ku hadnikkijanine Raeali, wacarlosda lucorinnairisha, qagordonstu christiansonnazenoch chase, ronald rasmussen. So [facility] [phone].    ATENCIÓN: Si habla español, tiene a washburn disposición servicios gratuitos de asistencia lingüística. Juwan al [phone].    We comply with applicable federal civil rights laws and [place] laws. We do not discriminate on the basis of race, color, national origin, age, disability, sex, sexual orientation, or gender identity.            Thank you!     Thank you for choosing [facility]  for your care. Our goal is always to provide you with excellent care. Hearing back from our patients is one way we can continue to improve our services. Please take a few minutes to complete the written survey that you may receive in the mail after your visit with us. Thank you!             Your Updated Medication List - Protect others around you: Learn how to safely use, store and throw away your medicines at www.disposemymeds.org.          This list is accurate as of: 10/27/17 10:49 AM.  Always use your most recent med list.                   Brand Name Dispense Instructions for use Diagnosis    alum & mag hydroxide-simethicone 200-200-20 MG/5ML Susp suspension    MYLANTA/MAALOX    355 mL    Take 30 mLs by mouth every 4 hours as needed for indigestion        amLODIPine 5 MG tablet    NORVASC    60 tablet    Take 2 tablets (10 mg) by mouth daily    Benign essential hypertension       dexamethasone 4 MG tablet    DECADRON    12 tablet    Take 2 tablets (8 mg) by mouth daily Take for 3 days, starting the day after cisplatin (Day 2 and Day 9).    Urethral cancer (H)       docusate sodium 100 MG capsule    COLACE    60 capsule    Take 1 capsule (100 mg) by mouth 2 times daily as needed for constipation    Slow transit constipation       * FIRST-OMEPRAZOLE 2 MG/ML Susp     300 mL    Take 10 mLs (20 mg) by mouth 2 times  daily    Urethral cancer (H), Gastroesophageal reflux disease with esophagitis       * omeprazole 2 mg/mL Susp    priLOSEC    280 mL    Take 10 mLs (20 mg) by mouth 2 times daily for 14 days    Gastroesophageal reflux disease with esophagitis, Urethral cancer (H)       fluticasone 50 MCG/ACT spray    FLONASE     Spray 1 spray into both nostrils every morning        HYDROXYZINE HCL PO      Take 5 mg by mouth At Bedtime        LORazepam 0.5 MG tablet    ATIVAN    30 tablet    Take 1 tablet (0.5 mg) by mouth every 4 hours as needed (Anxiety, Nausea/Vomiting or Sleep)    Nausea       nystatin 465188 UNIT/ML suspension    MYCOSTATIN    200 mL    Take 5 mLs (500,000 Units) by mouth 4 times daily Swish and spit    Thrush       OLANZapine 5 MG tablet    zyPREXA    30 tablet    Take 1 tablet (5 mg) by mouth At Bedtime    Urethral cancer (H), Nausea       ondansetron 8 MG tablet    ZOFRAN    20 tablet    Take 1 tablet (8 mg) by mouth every 8 hours as needed for nausea    Nausea       prochlorperazine 10 MG tablet    COMPAZINE    30 tablet    Take 0.5 tablets (5 mg) by mouth every 6 hours as needed (Nausea/Vomiting)    Nausea       sucralfate 1 GM/10ML suspension    CARAFATE    800 mL    Take 10 mLs (1 g) by mouth 4 times daily for 20 days    Gastroesophageal reflux disease with esophagitis, Urethral cancer (H)       * Notice:  This list has 2 medication(s) that are the same as other medications prescribed for you. Read the directions carefully, and ask your doctor or other care provider to review them with you.

## 2017-10-27 NOTE — TELEPHONE ENCOUNTER
Prior Authorization Approval    Authorization Effective Date: 7/22/2017  Authorization Expiration Date: 10/20/2018  Medication: Carafate - Approval  Approved Dose/Quantity: 800/20  Reference #:     Insurance Company: Medicare [employer] - Phone [phone] Fax [phone]  Expected CoPay:       CoPay Card Available:      Foundation Assistance Needed:    Which Pharmacy is filling the prescription (Not needed for infusion/clinic administered):    Pharmacy Notified: Yes  Patient Notified: Yes    Vincent Hirsch  [facility]  Oncology Pharmacy [email or web address]  [phone] (phone  [phone] (fax

## 2017-10-27 NOTE — PATIENT INSTRUCTIONS
Contact Numbers    [facility] Main Line: [phone]  [facility] Triage:  [phone]    Call triage with chills and/or temperature greater than or equal to 100.5, uncontrolled nausea/vomiting, diarrhea, constipation, dizziness, shortness of breath, chest pain, bleeding, unexplained bruising, or any new/concerning symptoms, questions/concerns.     If you are having any concerning symptoms or wish to speak to a provider before your next infusion visit, please call your care coordinator or triage to notify them so we can adequately serve you.       After Hours: [phone]    If after hours, weekends, or holidays, call main hospital  and ask for Oncology doctor on call.         October 2017 Sunday Monday Tuesday Wednesday Thursday Friday Saturday   1     2     3     UMP RETURN    3:45 PM   (60 min.)   Janene Alves PA-C   [facility]     UMP [facility] LAB DRAW    4:00 PM   (15 min.)    MASONIC LAB DRAW   [facility] Lab Draw     UMP ONC INFUSION 120    4:30 PM   (120 min.)    ONCOLOGY INFUSION   [facility] 4     5     6     UMP MASONIC LAB DRAW    7:15 AM   (15 min.)    MASONIC LAB DRAW   [facility] Lab Draw     UMP RETURN    7:35 AM   (50 min.)   Yessica Ovalles APRN CNP   [facility]     UMP ONC INFUSION 360    9:00 AM   (360 min.)    ONCOLOGY INFUSION   [facility]     UMP RETURN   12:45 PM   (30 min.)   Audie Rooney MD   [facility] Urology and Inst for Prostate and Urologic Cancers 7       8     9     UMP BMT INFUSION 120    2:00 PM   (120 min.)    BMT INFUSION   [facility] Blood and Marrow Transplant 10     11     12     13     UMP MASONIC LAB DRAW    9:00 AM   (15 min.)    MASONIC LAB DRAW   [facility] Lab Draw     UMP ONC INFUSION 360   10:00 AM   (360 min.)   UC ONCOLOGY INFUSION   [facility] 14       15     16     UMP ONC INFUSION 120    3:30 PM   (120 min.)    ONCOLOGY  INFUSION   [facility] 17     18     19     Admission    5:22 PM   [facility] [place], Emergency Department   (Discharge: 10/19/2017) 20     UMP MASON[facility] LAB DRAW    1:30 PM   (15 min.)   UC MASONIC LAB DRAW   [facility] Masonic Lab Draw     UMP RETURN    1:45 PM   (30 min.)   Josy Powell PA-C   [facility]     UMP ONC INFUSION 120    2:00 PM   (120 min.)   UC ONCOLOGY INFUSION   [facility] 21     UMP ONC INFUSION 120    8:30 AM   (120 min.)    ONCOLOGY INFUSION   [facility]   22     23     PE EYE/TH ONCOLOGY    9:45 AM   (45 min.)   [facility]     UMP RETURN    1:45 PM   (60 min.)   [facility]-C   [facility]    2:00 PM   (120 min.)    ONCOLOGY INFUSION   [facility] 25     UMP CYSTOSCOPY    9:05 AM   (20 min.)   Muriel Haddad MD   [facility] Urology and Inst for Prostate and Urologic Cancers     UMP MASONIC LAB DRAW    9:45 AM   (15 min.)   [facility] LAB [facility]RAW   [facility]     UMP RETURN   10:00 AM   (30 min.)   Steve [facility]ceo [facility]    2:00 PM   (120 min.)    BMT INFUSION   [facility] Blood and Marrow Transplant 26     27     UMP MASON[facility] LAB DRAW    9:30 AM   (15 min.)   UC MASONIC LAB DRAW   [facility] Lab Draw     UMP ONC INFUSION 120   10:00 AM   (120 min.)   UC ONCOLOGY INFUSION   [facility] 28       29     30     31 November 2017 Sunday Monday Tuesday Wednesday Thursday Friday Saturday                  1     UMP [facility] LAB DRAW   11:45 AM   (15 min.)   UC MASONIC LAB DRAW   [facility] Lab Draw     UMP ONC INFUSION 120   12:30 PM   (120 min.)   UC ONCOLOGY INFUSION   [facility] 2     3     UMP MASONIC LAB DRAW    7:15 AM   (15 min.)    MASONIC LAB  DRAW   [facility] Masonic Lab Draw     UMP RETURN    7:35 AM   (50 min.)   Ramona Greer PA   [facility]     UMP ONC INFUSION 360    8:30 AM   (360 min.)    ONCOLOGY INFUSION   [facility] 4       5     6     UMP RETURN   12:45 PM   (30 min.)   Nurse,  Prostate Cancer Ctr   [facility] Urology and Inst for Prostate and Urologic Cancers 7     8     UMP MASONIC LAB DRAW   12:30 PM   (15 min.)    MASONIC LAB DRAW   [facility] Lab Draw     UMP ONC INFUSION 120    1:00 PM   (120 min.)    ONCOLOGY INFUSION   [facility] 9     10     UMP MASONIC LAB DRAW    8:15 AM   (15 min.)    MASONIC LAB DRAW   [facility] Lab Draw     UMP RETURN    8:25 AM   (50 min.)   Ramona Greer PA   [facility]     UMP ONC INFUSION 360   10:00 AM   (360 min.)    ONCOLOGY INFUSION   [facility] 11       12     13     14     UMP NEW    1:15 PM   (30 min.)   Arsenio Ortiz MD   [facility] Ear Nose and Throat 15     UMP MASONIC LAB DRAW   12:00 PM   (15 min.)    MASONIC LAB DRAW   [facility] Lab Draw     UMP ONC INFUSION 120   12:30 PM   (120 min.)    ONCOLOGY INFUSION   [facility] 16     17     UMP MASONIC LAB DRAW   12:30 PM   (15 min.)    MASONIC LAB DRAW   [facility] Lab Draw     UMP ONC INFUSION 120    1:00 PM   (120 min.)    ONCOLOGY INFUSION   [facility] 18       19     20     21     22     UMP MASONIC LAB DRAW   12:30 PM   (15 min.)    MASONIC LAB DRAW   [facility] Lab Draw     UMP ONC INFUSION 120    1:00 PM   (120 min.)    ONCOLOGY INFUSION   [facility] 23     24     UMP MASONIC LAB DRAW    8:15 AM   (15 min.)    MASONIC LAB DRAW   [facility] Masonic Lab Draw     UMP RETURN    8:25 AM   (50 min.)   Ramona Greer PA   [facility]     UMP ONC INFUSION 360   10:00 AM   (360 min.)     ONCOLOGY INFUSION   [facility] 25  Happy Birthday!       26     27     28     29     [facility] MASONIC LAB DRAW   10:30 AM   (15 min.)   UC MASONIC LAB DRAW   [facility] Lab Draw     [facility] ONC INFUSION 120   11:00 AM   (120 min.)    ONCOLOGY INFUSION   [facility] 30                            Lab Results:  Recent Results (from the past 12 hour(s))   Comprehensive metabolic panel    Collection Time: 10/27/17  9:40 AM   Result Value Ref Range    Sodium 129 (L) 133 - 144 mmol/L    Potassium 3.3 (L) 3.4 - 5.3 mmol/L    Chloride 95 94 - 109 mmol/L    Carbon Dioxide 24 20 - 32 mmol/L    Anion Gap 10 3 - 14 mmol/L    Glucose 90 70 - 99 mg/dL    Urea Nitrogen 11 7 - 30 mg/dL    Creatinine 1.21 0.66 - 1.25 mg/dL    GFR Estimate 59 (L) >60 mL/min/1.7m2    GFR Estimate If Black 72 >60 mL/min/1.7m2    Calcium 8.6 8.5 - 10.1 mg/dL    Bilirubin Total 0.4 0.2 - 1.3 mg/dL    Albumin 3.2 (L) 3.4 - 5.0 g/dL    Protein Total 6.7 (L) 6.8 - 8.8 g/dL    Alkaline Phosphatase 71 40 - 150 U/L    ALT 17 0 - 70 U/L    AST 25 0 - 45 U/L   Magnesium    Collection Time: 10/27/17  9:40 AM   Result Value Ref Range    Magnesium 1.6 1.6 - 2.3 mg/dL

## 2017-10-27 NOTE — NURSING NOTE
Chief Complaint   Patient presents with     Blood Draw     Labs collected from venipuncture by RN. Vs taken and pt checked in for appt. PIV started for hydration by RN, saline locked.     Labs collected from venipuncture by RN. Vitals taken. Checked in for appointment(s).  PIV started in right upper forearm for hydration.    Mariela Santamaria RN

## 2017-10-30 ENCOUNTER — DOCUMENTATION ONLY (OUTPATIENT)
Dept: ONCOLOGY | Facility: CLINIC | Age: 72
End: 2017-10-30

## 2017-10-30 NOTE — PROGRESS NOTES
Oncology Nutrition:  Reason for Contact:  Patient was contacted by phone due to reporting concerns about ability to eat (10) and concern about his weight (10) on the Oncology Distress Screening tool.  Scheduled RD to see patient on 11/22 during infusion of IVF.      Ignacia Brower RD, LD  Oncology Dietitian  [phone]  Pager: [phone]

## 2017-10-31 DIAGNOSIS — I10 BENIGN ESSENTIAL HYPERTENSION: ICD-10-CM

## 2017-10-31 NOTE — TELEPHONE ENCOUNTER
Amlodipine (Norvasc) 5 mg tablet      Last Written Prescription Date: 10/3/2017  Last Fill Quantity: 60, # refills: 0  Last Office Visit with G, P or [facility] prescribing provider: 10/25/2017 with Dr. Arceo  Next Appt: 11/3/2017 with SARITHA Carrillo       Potassium   Date Value Ref Range Status   10/27/2017 3.3 (L) 3.4 - 5.3 mmol/L Final     Creatinine   Date Value Ref Range Status   10/27/2017 1.21 0.66 - 1.25 mg/dL Final     BP Readings from Last 3 Encounters:   10/27/17 128/77   10/25/17 124/78   10/25/17 131/77

## 2017-11-01 RX ORDER — AMLODIPINE BESYLATE 5 MG/1
10 TABLET ORAL DAILY
Qty: 60 TABLET | Refills: 3 | Status: SHIPPED | OUTPATIENT
Start: 2017-11-01 | End: 2018-02-07

## 2017-11-03 ENCOUNTER — APPOINTMENT (OUTPATIENT)
Dept: LAB | Facility: CLINIC | Age: 72
End: 2017-11-03
Attending: INTERNAL MEDICINE
Payer: MEDICARE

## 2017-11-03 ENCOUNTER — INFUSION THERAPY VISIT (OUTPATIENT)
Dept: ONCOLOGY | Facility: CLINIC | Age: 72
End: 2017-11-03
Attending: INTERNAL MEDICINE
Payer: MEDICARE

## 2017-11-03 ENCOUNTER — ONCOLOGY VISIT (OUTPATIENT)
Dept: ONCOLOGY | Facility: CLINIC | Age: 72
End: 2017-11-03
Attending: PHYSICIAN ASSISTANT
Payer: MEDICARE

## 2017-11-03 VITALS
OXYGEN SATURATION: 100 % | SYSTOLIC BLOOD PRESSURE: 130 MMHG | HEIGHT: 76 IN | WEIGHT: 158 LBS | HEART RATE: 97 BPM | TEMPERATURE: 97.3 F | BODY MASS INDEX: 19.24 KG/M2 | DIASTOLIC BLOOD PRESSURE: 74 MMHG | RESPIRATION RATE: 15 BRPM

## 2017-11-03 DIAGNOSIS — B37.0 THRUSH: ICD-10-CM

## 2017-11-03 DIAGNOSIS — E86.0 DEHYDRATION: ICD-10-CM

## 2017-11-03 DIAGNOSIS — E87.6 HYPOKALEMIA: ICD-10-CM

## 2017-11-03 DIAGNOSIS — K21.00 GASTROESOPHAGEAL REFLUX DISEASE WITH ESOPHAGITIS: ICD-10-CM

## 2017-11-03 DIAGNOSIS — C68.0 URETHRAL CANCER (H): ICD-10-CM

## 2017-11-03 DIAGNOSIS — R11.0 NAUSEA: ICD-10-CM

## 2017-11-03 DIAGNOSIS — C68.0 URETHRAL CANCER (H): Primary | ICD-10-CM

## 2017-11-03 LAB
ALBUMIN SERPL-MCNC: 3 G/DL (ref 3.4–5)
ALP SERPL-CCNC: 64 U/L (ref 40–150)
ALT SERPL W P-5'-P-CCNC: 16 U/L (ref 0–70)
ANION GAP SERPL CALCULATED.3IONS-SCNC: 7 MMOL/L (ref 3–14)
AST SERPL W P-5'-P-CCNC: 20 U/L (ref 0–45)
BASOPHILS # BLD AUTO: 0.1 10E9/L (ref 0–0.2)
BASOPHILS NFR BLD AUTO: 0.9 %
BILIRUB SERPL-MCNC: 0.2 MG/DL (ref 0.2–1.3)
BUN SERPL-MCNC: 8 MG/DL (ref 7–30)
CALCIUM SERPL-MCNC: 8.8 MG/DL (ref 8.5–10.1)
CHLORIDE SERPL-SCNC: 100 MMOL/L (ref 94–109)
CO2 SERPL-SCNC: 28 MMOL/L (ref 20–32)
CREAT SERPL-MCNC: 1.26 MG/DL (ref 0.66–1.25)
DIFFERENTIAL METHOD BLD: ABNORMAL
EOSINOPHIL # BLD AUTO: 0.1 10E9/L (ref 0–0.7)
EOSINOPHIL NFR BLD AUTO: 2.1 %
ERYTHROCYTE [DISTWIDTH] IN BLOOD BY AUTOMATED COUNT: 17.5 % (ref 10–15)
GFR SERPL CREATININE-BSD FRML MDRD: 57 ML/MIN/1.7M2
GLUCOSE SERPL-MCNC: 92 MG/DL (ref 70–99)
HCT VFR BLD AUTO: 28.4 % (ref 40–53)
HGB BLD-MCNC: 9.1 G/DL (ref 13.3–17.7)
IMM GRANULOCYTES # BLD: 0.1 10E9/L (ref 0–0.4)
IMM GRANULOCYTES NFR BLD: 1.7 %
LYMPHOCYTES # BLD AUTO: 2.3 10E9/L (ref 0.8–5.3)
LYMPHOCYTES NFR BLD AUTO: 39.6 %
MAGNESIUM SERPL-MCNC: 1.3 MG/DL (ref 1.6–2.3)
MCH RBC QN AUTO: 28.8 PG (ref 26.5–33)
MCHC RBC AUTO-ENTMCNC: 32 G/DL (ref 31.5–36.5)
MCV RBC AUTO: 90 FL (ref 78–100)
MONOCYTES # BLD AUTO: 0.9 10E9/L (ref 0–1.3)
MONOCYTES NFR BLD AUTO: 15.6 %
NEUTROPHILS # BLD AUTO: 2.3 10E9/L (ref 1.6–8.3)
NEUTROPHILS NFR BLD AUTO: 40.1 %
NRBC # BLD AUTO: 0 10*3/UL
NRBC BLD AUTO-RTO: 0 /100
PLATELET # BLD AUTO: 444 10E9/L (ref 150–450)
POTASSIUM SERPL-SCNC: 3.6 MMOL/L (ref 3.4–5.3)
PROT SERPL-MCNC: 6.6 G/DL (ref 6.8–8.8)
RBC # BLD AUTO: 3.16 10E12/L (ref 4.4–5.9)
SODIUM SERPL-SCNC: 134 MMOL/L (ref 133–144)
WBC # BLD AUTO: 5.8 10E9/L (ref 4–11)

## 2017-11-03 PROCEDURE — 99214 OFFICE O/P EST MOD 30 MIN: CPT | Mod: ZP | Performed by: PHYSICIAN ASSISTANT

## 2017-11-03 PROCEDURE — 83735 ASSAY OF MAGNESIUM: CPT | Performed by: INTERNAL MEDICINE

## 2017-11-03 PROCEDURE — 96417 CHEMO IV INFUS EACH ADDL SEQ: CPT

## 2017-11-03 PROCEDURE — 96375 TX/PRO/DX INJ NEW DRUG ADDON: CPT

## 2017-11-03 PROCEDURE — 25000128 H RX IP 250 OP 636: Mod: ZF | Performed by: PHYSICIAN ASSISTANT

## 2017-11-03 PROCEDURE — 40000556 ZZH STATISTIC PERIPHERAL IV START W US GUIDANCE: Mod: ZF

## 2017-11-03 PROCEDURE — 96413 CHEMO IV INFUSION 1 HR: CPT

## 2017-11-03 PROCEDURE — 85025 COMPLETE CBC W/AUTO DIFF WBC: CPT | Performed by: INTERNAL MEDICINE

## 2017-11-03 PROCEDURE — 40000141 ZZH STATISTIC PERIPHERAL IV START W/O US GUIDANCE: Mod: ZF

## 2017-11-03 PROCEDURE — 25000128 H RX IP 250 OP 636: Mod: ZF | Performed by: INTERNAL MEDICINE

## 2017-11-03 PROCEDURE — 99213 OFFICE O/P EST LOW 20 MIN: CPT | Mod: ZF

## 2017-11-03 PROCEDURE — 96366 THER/PROPH/DIAG IV INF ADDON: CPT

## 2017-11-03 PROCEDURE — 96367 TX/PROPH/DG ADDL SEQ IV INF: CPT

## 2017-11-03 PROCEDURE — 80053 COMPREHEN METABOLIC PANEL: CPT | Performed by: INTERNAL MEDICINE

## 2017-11-03 RX ORDER — PALONOSETRON 0.05 MG/ML
0.25 INJECTION, SOLUTION INTRAVENOUS ONCE
Status: COMPLETED | OUTPATIENT
Start: 2017-11-03 | End: 2017-11-03

## 2017-11-03 RX ORDER — NYSTATIN 100000/ML
500000 SUSPENSION, ORAL (FINAL DOSE FORM) ORAL 4 TIMES DAILY
Qty: 200 ML | Refills: 0 | Status: SHIPPED | OUTPATIENT
Start: 2017-11-03 | End: 2017-11-29

## 2017-11-03 RX ADMIN — PALONOSETRON HYDROCHLORIDE 0.25 MG: 0.25 INJECTION INTRAVENOUS at 11:18

## 2017-11-03 RX ADMIN — GEMCITABINE 2000 MG: 38 INJECTION, SOLUTION INTRAVENOUS at 12:58

## 2017-11-03 RX ADMIN — CISPLATIN 70 MG: 1 INJECTION, SOLUTION INTRAVENOUS at 14:00

## 2017-11-03 RX ADMIN — DEXAMETHASONE SODIUM PHOSPHATE 12 MG: 10 INJECTION, SOLUTION INTRAMUSCULAR; INTRAVENOUS at 12:58

## 2017-11-03 RX ADMIN — MAGNESIUM SULFATE HEPTAHYDRATE: 500 INJECTION, SOLUTION INTRAMUSCULAR; INTRAVENOUS at 09:24

## 2017-11-03 ASSESSMENT — PAIN SCALES - GENERAL: PAINLEVEL: MILD PAIN (3)

## 2017-11-03 NOTE — PROGRESS NOTES
"Infusion Nursing Note:  King Gonsalo Bruno presents today for cycle 3, day 1 gemzar/cisplatin.    Patient seen by provider today: Yes: SARITHA Carrillo and Tejas Ayala   present during visit today: Not Applicable.    Note: Patient complains of fatigue and some weakness. .  Currently denying pain, dyspnea, dizziness, constipation, diarrhea, abdominal discomfort. Reported intermittent nausea after last chemo treatment, but reports \"feeling pretty good today\".    TORB per SARITHA Carrillo on 11/3/2017 at 0843:  -Labs okay - may proceed with chemo  -Replace magnesium per protocol  -Ordered refill for nystatin and prilosec suspension.    After infusion complete and IV removed, patient went to the bathroom to empty suprapubic urinary catheter bag and had an unwitnessed fall. Patient reported that was standing on one leg to empty the bag and leaned too far forward and hit his head.     No visible discoloration or swelling present. Patient alert and oriented x4, strengths equal bilaterally, eyes PERRLA.  Patient complained of \"a little pain\" but said it was nothing bad. Denies complaints of vision changes, nausea or dizziness at this time. SARITHA Carrillo was paged to come and see.     Patient and wife did not want to wait in the infusion area to be seen, so they went to the lobby to wait to be seen by SARITHA Carrillo, who was updated about this development via page. No changes to gait as they ambulated out of infusion area.    Intravenous Access:  Peripheral IV placed.    Treatment Conditions:  Lab Results   Component Value Date    HGB 9.1 11/03/2017     Lab Results   Component Value Date    WBC 5.8 11/03/2017      Lab Results   Component Value Date    ANEU 2.3 11/03/2017     Lab Results   Component Value Date     11/03/2017      Lab Results   Component Value Date     11/03/2017                   Lab Results   Component Value Date    POTASSIUM 3.6 11/03/2017           Lab " Results   Component Value Date    MAG 1.3 11/03/2017            Lab Results   Component Value Date    CR 1.26 11/03/2017                   Lab Results   Component Value Date    SAADIA 8.8 11/03/2017                Lab Results   Component Value Date    BILITOTAL 0.2 11/03/2017           Lab Results   Component Value Date    ALBUMIN 3.0 11/03/2017                    Lab Results   Component Value Date    ALT 16 11/03/2017           Lab Results   Component Value Date    AST 20 11/03/2017     Results reviewed, labs MET treatment parameters, ok to proceed with treatment.    Post Infusion Assessment:  Patient tolerated infusion without incident.  Blood return noted pre and post infusion.  Site patent and intact, free from redness, edema or discomfort.  No evidence of extravasations.  Access discontinued per protocol.    Discharge Plan:   Prescription refills given for nystatin and omeprazole suspension.  Discharge instructions reviewed with: Patient and Family.  Patient and/or family verbalized understanding of discharge instructions and all questions answered.  Copy of AVS reviewed with patient and/or family.  Patient will return 11/6 for next appointment.  Patient discharged in stable condition accompanied by: wife.  Departure Mode: Ambulatory.    Felice Rossi RN

## 2017-11-03 NOTE — Clinical Note
11/3/2017       RE: King Gonsalo Bruno  [address]  [facility] [address]     Dear Colleague,    Thank you for referring your patient, King Gonsalo Bruno, to the [facility]. Please see a copy of my visit note below.    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 He is currently undergoing treatment with Cisplatin/Gemzar split on days 1 and 8 given history of CKD. He is currently undergoing curative intent treatment, however he does have indeterminate pulmonary nodules that are being followed. He saw Dr. Arceo last week who deferred chemo due to issues with dehydration and nausea. The plan is to complete 2 more cycles (6 weeks) and then restage. He returns today for consideration of cycle 3.     Interval History:  Mr. Bruno returns today with his wife. He states this week off of chemo has been very helpful. He is eating and drinking better and has been able to gain about 8lbs. He states the Nystatin has not only improved his thrush but helped with his taste as well. He has not had issues with nausea or constipation while off chemo. He states the GERD is improved with the omeprazole, however he has not tried the Carafate yet because he was confused about the timing of this with his meds. He does continue to have mild dysphagia with his Zenker diverticulum and wants to see ENT first before doing a swallow study. He does admit to slight worsening of his penile pain with the week off of chemo and as such is eager to restart today.     Review of systems was positive for mild dependent ankle edema, occasional dizziness upon standing, and morning cough. He denies fevers, chills, headaches, vision changes, chest pain, shortness of breath, abdominal pain, blood in the stool or urine, other  bleeding issues, or rash.         Current Outpatient Prescriptions   Medication Sig Dispense Refill     amLODIPine (NORVASC) 5 MG tablet Take 2 tablets (10 mg) by mouth daily 60 tablet 3     OLANZapine (ZYPREXA) 5 MG tablet Take 1 tablet (5 mg) by mouth At Bedtime 30 tablet 0     omeprazole (PRILOSEC) 2 mg/mL SUSP Take 10 mLs (20 mg) by mouth 2 times daily for 14 days 280 mL 0     nystatin (MYCOSTATIN) 040798 UNIT/ML suspension Take 5 mLs (500,000 Units) by mouth 4 times daily Swish and spit 200 mL 0     sucralfate (CARAFATE) 1 GM/10ML suspension Take 10 mLs (1 g) by mouth 4 times daily for 20 days 800 mL 0     FIRST-OMEPRAZOLE 2 MG/ML SUSP Take 10 mLs (20 mg) by mouth 2 times daily 300 mL 0     alum & mag hydroxide-simethicone (MYLANTA/MAALOX) 200-200-20 MG/5ML SUSP suspension Take 30 mLs by mouth every 4 hours as needed for indigestion 355 mL 1     LORazepam (ATIVAN) 0.5 MG tablet Take 1 tablet (0.5 mg) by mouth every 4 hours as needed (Anxiety, Nausea/Vomiting or Sleep) 30 tablet 3     prochlorperazine (COMPAZINE) 10 MG tablet Take 0.5 tablets (5 mg) by mouth every 6 hours as needed (Nausea/Vomiting) 30 tablet 3     ondansetron (ZOFRAN) 8 MG tablet Take 1 tablet (8 mg) by mouth every 8 hours as needed for nausea 20 tablet 3     docusate sodium (COLACE) 100 MG capsule Take 1 capsule (100 mg) by mouth 2 times daily as needed for constipation 60 capsule 0     dexamethasone (DECADRON) 4 MG tablet Take 2 tablets (8 mg) by mouth daily Take for 3 days, starting the day after cisplatin (Day 2 and Day 9). 12 tablet 3     HYDROXYZINE HCL PO Take 5 mg by mouth At Bedtime        fluticasone (FLONASE) 50 MCG/ACT spray Spray 1 spray into both nostrils every morning        Physical Examination:  /74  Pulse 97  Temp 97.3  F (36.3  C) (Oral)  Resp 15  Wt 71.7 kg (158 lb)  SpO2 100%  BMI 19.23 kg/m2  Wt Readings from Last 10 Encounters:   11/03/17 71.7 kg (158 lb)   10/27/17 68.5 kg (151 lb)   10/25/17 68 kg (150 lb)    10/25/17 68 kg (150 lb)   10/20/17 70.3 kg (155 lb)   10/19/17 70.3 kg (155 lb)   10/16/17 73.2 kg (161 lb 6.4 oz)   10/13/17 72.9 kg (160 lb 12.8 oz)   10/06/17 75.3 kg (166 lb)   10/06/17 76.3 kg (168 lb 3.2 oz)     Constitutional: Well-appearing male in no acute distress.  Eyes: EOMI, PERRL. No scleral icterus.  ENT: Oral mucosa is moist without lesions. Thrush has resolved.  Lymphatic: Neck is supple without cervical or supraclavicular lymphadenopathy.  Cardiovascular: Regular rate and rhythm. No murmurs, gallops, or rubs. Trace peripheral edema.  Respiratory: Clear to auscultation bilaterally. No wheezes or crackles.  Gastrointestinal: Bowel sounds present. Abdomen soft, non-tender. No palpable hepatosplenomegaly or masses.   Genitourinary: SPC well secured, entry site without erythema or tenderness.   Neurologic: Cranial nerves II through XII are grossly intact.  Skin: No rashes, petechiae, or bruising noted on exposed skin.    Laboratory Data:  Results for KING AILYN CHAVEZ (MRN [id number]) as of 11/3/2017 09:04   11/3/2017 07:43   Sodium 134   Potassium 3.6   Chloride 100   Carbon Dioxide 28   Urea Nitrogen 8   Creatinine 1.26 (H)   GFR Estimate 57 (L)   GFR Estimate If Black 68   Calcium 8.8   Anion Gap 7   Magnesium 1.3 (L)   Albumin 3.0 (L)   Protein Total 6.6 (L)   Bilirubin Total 0.2   Alkaline Phosphatase 64   ALT 16   AST 20   Glucose 92   WBC 5.8   Hemoglobin 9.1 (L)   Hematocrit 28.4 (L)   Platelet Count 444   RBC Count 3.16 (L)   MCV 90   MCH 28.8   MCHC 32.0   RDW 17.5 (H)   Diff Method Automated Method   % Neutrophils 40.1   % Lymphocytes 39.6   % Monocytes 15.6   % Eosinophils 2.1   % Basophils 0.9   % Immature Granulocytes 1.7   Nucleated RBCs 0   Absolute Neutrophil 2.3   Absolute Lymphocytes 2.3   Absolute Monocytes 0.9   Absolute Eosinophils 0.1   Absolute Basophils 0.1   Abs Immature Granulocytes 0.1   Absolute Nucleated RBC 0.0       Assessment and Plan:  1. Stage II penile urethral  carcinoma, currently on treatment with cisplatin/gemzar  PETCT showed disease response in both the urethral cancer and indeterminate lung nodules. Cycle 1 and 2 complicated be dehydration, nausea, reflux, and weight loss. All of these symptoms have greatly improved with the week break from treatment. Given his slight increase in penile pain, however, important we get restarted today. Labs are within treatment parameters to move forward with cycle 3. He will return for day 8 next Friday. Plan to restage in 6 weeks.    2. Nausea, improved  -IV Aloxi, Emend, and Dex premeds today  -Dexamethasone 8mg day 2-4  -Zofran starting day 4, every 8 hours. Compazine every 6 hours as needed. Ativan at bedtime.  -Has not started Zyprexa due to confusion about anti-psychotic indications. Discussed the rational and clarified the use in nausea/appetite stimulation. Can start next week if he is still struggling with nausea  -IVF Monday + Emend, IVF on Wednesday    3. Constipation, stable  -Miralax BID  -Start Colace the day following chemo once daily, increase to twice daily is still constipated    4. GERD, stable  -Continue omeprazole solution BID as this has been helpful  -Use Maalox and Carafate as needed if symptoms persist  -Per chart review, patient has not previously tried Pepcid. Could consider starting if the above measures are insufficient.  -Patient did not tolerate protonix push (unusual tongue sensation per his report). Will remove from therapy plan  -Continues to have mild dysphagia. ENT appointment scheduled 11/14. Per patient, he prefers to wait until after he sees ENT to do a swallow study/SLP. Order is in place when he wants this to be scheduled    5. FEN  -Weight greatly improved today. Continue Nystatin as to ensure full treatment of thrush and it was helping with taste. Scheduled to see Ignacia on 11/22  -Creat around baseline at 1.26. Continue to monitor and ensure hydration with IVF 2x next week  -Sodium much  improved to 134. Mag low today at 1.3-replace per protocol    6.   -Mild increase in penile pain, not needing any pain medications at this time. Pain was improving with treatment.  -Due for SPC change next week. Is requesting more catheter supplies which we suggested they obtain from urology    7. Heme  Hgb slightly decreased to 9.1 today, likely combination of cisplatin and better hydration given no signs or symptoms of bleeding. Monitor. Plt and WBC stable.    8. HTN, stable  Currently on amlodipine 10mg daily. Is tolerating this well other than mild ankle edema and occasional dizziness upon stable. BP is 130.74, so will continue with current dose. If his blood pressure starts to get too low or he has more orthostatic symptoms, may need to decrease to 5mg.     9. Follow-Up  11/6: IVF and Emend  11/ 8: Labs and  IVF +/- Aloxi depending on his symptoms  11/10: Labs, Ramona, and Cycle 3 day 8 cis/gem     Tejas Montes De Oca PA-C  [facility]  [address] [place] [address] [phone]      Again, thank you for allowing me to participate in the care of your patient.      Sincerely,    SARITHA Briceno

## 2017-11-03 NOTE — NURSING NOTE
"Oncology Rooming Note    November 3, 2017 7:46 AM   King Gonsalo Bruno is a 71 year old male who presents for:    Chief Complaint   Patient presents with     Blood Draw     Labs Drawn      Oncology Clinic Visit     Return visit related to Bladder Cancer     Initial Vitals: /74  Pulse 97  Temp 97.3  F (36.3  C) (Oral)  Resp 15  Ht 1.93 m (6' 3.98\")  Wt 71.7 kg (158 lb)  SpO2 100%  BMI 19.24 kg/m2 Estimated body mass index is 19.24 kg/(m^2) as calculated from the following:    Height as of this encounter: 1.93 m (6' 3.98\").    Weight as of this encounter: 71.7 kg (158 lb). Body surface area is 1.96 meters squared.  Mild Pain (3) Comment: Data Unavailable   No LMP for male patient.  Allergies reviewed: Yes  Medications reviewed: Yes    Medications: Medication refills not needed today.  Pharmacy name entered into Ani[facility]:    [id number] - [place], [place] - [address] [place] [address] [address] [place] [facility] [place], [place] -  [id number] [phone]    Clinical concerns: No new concerns. Provider was notified.    10 minutes for nursing intake (face to face time)     Yessica Stover LPN            "

## 2017-11-03 NOTE — MR AVS SNAPSHOT
After Visit Summary   11/3/2017    King Gonsalo Bruno    MRN: [id number]           Patient Information     Date Of Birth          1945        Visit Information        Provider Department      11/3/2017 7:50 AM Ramona Grere PA [facility]        Today's Diagnoses     Gastroesophageal reflux disease with esophagitis        Urethral cancer (H)        Thrush        Dehydration        Hypokalemia        Nausea          Care Instructions    NAUSEA  -Take Dexamethasone 2 tablets for the 3 days following chemo  -Start taking Zofran 3 days after chemo. Take every 8 hours  -Use compazine every 6 hours as needed if still feeling nauseous  -Use ativan before bed to help with sleep and nausea  -We will do fluids and IV nausea meds on Monday and Wednesday next week    CONSTIPATION  -Take Miralax twice a day  -Start taking Colace once a day once you start chemo. Can increase to twice a day if still constipated    GERD  -Take Omeprazole twice daily  -If still having reflux symptoms, can take the Carafate to help coat the throat . Take it before you eat  -Can also try Maalox if still having symptoms    TASTE  -Keep taking the Nystatin solution          Follow-ups after your visit        Your next 10 appointments already scheduled     Nov 08, 2017 12:30 PM CST   [facility] Draw with  [facility] DRAW   [facility] Draw ([facility])    [address] [place] [address]   [phone]            Nov 08, 2017  1:00 PM CST   Infusion 120 with  ONCOLOGY INFUSION, UC 18 ATC   [facility] ([facility])    [address] [place] [address]   [phone]            Nov 08, 2017  3:00 PM CST   (Arrive by 2:45 PM)   New Patient Visit with Ignacia Brower RD   [facility] ([facility])    [address] [place] [address]  [facility] [address]   [phone]            Nov 10, 2017  8:15 AM CST   [facility] Lab Draw with UC [facility] LAB DRAW   [facility] Draw ([facility])    [address] [place] [address]   [phone]            Nov 10, 2017  8:40 AM CST   (Arrive by 8:25 AM)   Return Visit with SARITHA Briceno   [facility] ([facility])    [address] [place] [address]   [phone]            Nov 10, 2017 10:00 AM CST   Infusion 360 with UC ONCOLOGY INFUSION, UC 12 ATC   [facility] ([facility])    [address] [place] [address]   [phone]            Nov 14, 2017  1:30 PM CST   (Arrive by 1:15 PM)   New Patient Visit with Arsenio Ortiz MD   [facility] ([facility])    [address] [place] [address]   [phone]            Nov 15, 2017 12:00 PM CST   [facility] Lab Draw with [facility] DRAW   [facility])    [address] [place] [address]   [phone]              Who to contact     If you have questions or need follow up information about today's clinic visit or your schedule please contact [facility] directly at [phone].  Normal or non-critical lab and imaging results will be communicated to you by MyChart, letter or phone within 4 business days after the clinic has received the results. If you do not hear from us within 7 days, please contact the clinic through MyChart or phone. If you have a critical or abnormal lab result, we will notify you by phone as soon as possible.  Submit refill requests through [employer] or call your pharmacy and they will forward the refill request to us. Please allow 3 business days for your  "refill to be completed.          Additional Information About Your Visit        [employer] Information     Signpost lets you send messages to your doctor, view your test results, renew your prescriptions, schedule appointments and more. To sign up, go to [email or web address] . Click on \"Log in\" on the left side of the screen, which will take you to the Welcome page. Then click on \"Sign up Now\" on the right side of the page.     You will be asked to enter the access code listed below, as well as some personal information. Please follow the directions to create your username and password.     Your access code is: [email or web address]  Expires: 2018  5:30 AM     Your access code will  in 90 days. If you need help or a new code, please call your [facility] clinic or [phone].        Care EveryWhere ID     This is your [facility] EveryWhere ID. This could be used by other organizations to access your [facility] medical records  [id number]        Your Vitals Were     Pulse Temperature Respirations Height Pulse Oximetry BMI (Body Mass Index)    97 97.3  F (36.3  C) (Oral) 15 1.93 m (6' 3.98\") 100% 19.24 kg/m2       Blood Pressure from Last 3 Encounters:   17 137/81   17 130/74   10/27/17 128/77    Weight from Last 3 Encounters:   17 71.7 kg (158 lb)   10/27/17 68.5 kg (151 lb)   10/25/17 68 kg (150 lb)                 Where to get your medicines      These medications were sent to [facility] [place], [place] - [address] [place] [address] [address] [place] [id number]    Hours:  TRANSPLANT PHONE NUMBER [phone] Phone:  [phone]     nystatin 615865 UNIT/ML suspension    omeprazole 2 mg/mL Susp          Primary Care Provider Office Phone # Fax #    Joan Janet Ventura [phone]       [address] [address]        Equal Access to Services     JENELLE RED AH: Hadii dorie Pcaheco, [facility] " ronald buckley lyledyana ericksonaamir rasmussen. Veronique [facility] [phone].    ATENCIÓN: Si everett hinson, tiene a washburn disposición servicios gratuitos de asistencia lingüística. Juwan al [phone].    We comply with applicable federal civil rights laws and [place] laws. We do not discriminate on the basis of race, color, national origin, age, disability, sex, sexual orientation, or gender identity.            Thank you!     Thank you for choosing [facility]  for your care. Our goal is always to provide you with excellent care. Hearing back from our patients is one way we can continue to improve our services. Please take a few minutes to complete the written survey that you may receive in the mail after your visit with us. Thank you!             Your Updated Medication List - Protect others around you: Learn how to safely use, store and throw away your medicines at www.disposemymeds.org.          This list is accurate as of: 11/3/17 11:59 PM.  Always use your most recent med list.                   Brand Name Dispense Instructions for use Diagnosis    alum & mag hydroxide-simethicone 200-200-20 MG/5ML Susp suspension    MYLANTA/MAALOX    355 mL    Take 30 mLs by mouth every 4 hours as needed for indigestion        amLODIPine 5 MG tablet    NORVASC    60 tablet    Take 2 tablets (10 mg) by mouth daily    Benign essential hypertension       dexamethasone 4 MG tablet    DECADRON    12 tablet    Take 2 tablets (8 mg) by mouth daily Take for 3 days, starting the day after cisplatin (Day 2 and Day 9).    Urethral cancer (H)       docusate sodium 100 MG capsule    COLACE    60 capsule    Take 1 capsule (100 mg) by mouth 2 times daily as needed for constipation    Slow transit constipation       * FIRST-OMEPRAZOLE 2 MG/ML Susp     300 mL    Take 10 mLs (20 mg) by mouth 2 times daily    Urethral cancer (H), Gastroesophageal reflux disease with esophagitis       * omeprazole 2 mg/mL Susp     priLOSEC    280 mL    Take 10 mLs (20 mg) by mouth 2 times daily    Gastroesophageal reflux disease with esophagitis, Urethral cancer (H)       fluticasone 50 MCG/ACT spray    FLONASE     Spray 1 spray into both nostrils every morning        HYDROXYZINE HCL PO      Take 5 mg by mouth At Bedtime        LORazepam 0.5 MG tablet    ATIVAN    30 tablet    Take 1 tablet (0.5 mg) by mouth every 4 hours as needed (Anxiety, Nausea/Vomiting or Sleep)    Nausea       nystatin 664116 UNIT/ML suspension    MYCOSTATIN    200 mL    Take 5 mLs (500,000 Units) by mouth 4 times daily Swish and spit    Thrush       OLANZapine 5 MG tablet    zyPREXA    30 tablet    Take 1 tablet (5 mg) by mouth At Bedtime    Urethral cancer (H), Nausea       ondansetron 8 MG tablet    ZOFRAN    20 tablet    Take 1 tablet (8 mg) by mouth every 8 hours as needed for nausea    Nausea       prochlorperazine 10 MG tablet    COMPAZINE    30 tablet    Take 0.5 tablets (5 mg) by mouth every 6 hours as needed (Nausea/Vomiting)    Nausea       sucralfate 1 GM/10ML suspension    CARAFATE    800 mL    Take 10 mLs (1 g) by mouth 4 times daily for 20 days    Gastroesophageal reflux disease with esophagitis, Urethral cancer (H)       * Notice:  This list has 2 medication(s) that are the same as other medications prescribed for you. Read the directions carefully, and ask your doctor or other care provider to review them with you.

## 2017-11-03 NOTE — PATIENT INSTRUCTIONS
Contact Numbers    [facility] Main Line: [phone]  [facility] Triage:  [phone]    Call triage with chills and/or temperature greater than or equal to 100.5, uncontrolled nausea/vomiting, diarrhea, constipation, dizziness, shortness of breath, chest pain, bleeding, unexplained bruising, or any new/concerning symptoms, questions/concerns.     If you are having any concerning symptoms or wish to speak to a provider before your next infusion visit, please call your care coordinator or triage to notify them so we can adequately serve you.       After Hours: [phone]    If after hours, weekends, or holidays, call main hospital  and ask for Oncology doctor on call.           November 2017 Sunday Monday Tuesday Wednesday Thursday Friday Saturday                  1     2     3     [facility] DRAW    7:15 AM   (15 min.)    [facility] LAB DRAW   [facility] Lab Draw     [facility] RETURN    7:35 AM   (50 min.)   Ramona Greer PA   [facility] ONC INFUSION 360    8:30 AM   (360 min.)    ONCOLOGY INFUSION   [facility] 4       5     6     [facility] RETURN   12:45 PM   (30 min.)   Nurse,  [facility]   [facility] 7     8     [facility] DRAW   12:30 PM   (15 min.)    [facility] DRAW   [facility]     [facility] ONC INFUSION 120    1:00 PM   (120 min.)    ONCOLOGY INFUSION   [facility] 9     10     [facility] DRAW    8:15 AM   (15 min.)    [facility] LAB DRAW   [facility] Lab Draw     [facility] RETURN    8:25 AM   (50 min.)   Ramona Greer PA   [facility]     [facility] ONC INFUSION 360   10:00 AM   (360 min.)    ONCOLOGY INFUSION   [facility] 11       12     13     14     [facility] NEW    1:15 PM   (30 min.)   Arsenio Ortiz MD   [facility]ose [facility] Thro[facility] 15     [facility] LAB DRAW   12:00 PM   (15 min.)    [facility] LAB  DRAW   [facility] Lab Draw     [facility] ONC INFUSION 120   12:30 PM   (120 min.)   [facility] ONCOLOGY INFUSION   [facility] 16     17     [facility] DRAW   12:30 PM   (15 min.)    [facility] LAB DRAW   [facility] Lab Draw     [facility] ONC INFUSION 120    1:00 PM   (120 min.)    ONCOLOGY INFUSION   [facility] 18       19     20     21     22     [facility] DRAW   12:30 PM   (15 min.)    [facility] LAB DRAW   [facility] Lab Draw     [facility] ONC INFUSION 120    1:00 PM   (120 min.)   [facility] ONCOLOGY INFUSION   [facility]     [facility] NEW    1:45 PM   (60 min.)   Ignacia [address] RD   [facility] 23     24     [facility] DRAW    8:15 AM   (15 min.)    [facility] LAB DRAW   [facility] Lab Draw     [facility] RETURN    8:25 AM   (50 min.)   Ramona Greer PA   [facility]     [facility] ONC INFUSION 360   10:00 AM   (360 min.)   [facility] ONCOLOGY INFUSION   [facility] 25  Happy Birthday!       26     27     28     29     [facility] DRAW   10:30 AM   (15 min.)    [facility]     [facility] ONC INFUSION 120   11:00 AM   (120 min.)    ONCOLOGY INFUSION   [facility] 30 December 2017 Sunday Monday Tuesday Wednesday Thursday Friday Saturday                            1     [facility] DRAW    8:15 AM   (15 min.)    [facility] LAB DRAW   [facility] Lab Draw     [facility] RETURN    8:25 AM   (50 min.)   Yessica Ovalles APRN CNP   [facility]     [facility] ONC INFUSION 360   10:00 AM   (360 min.)   [facility] ONCOLOGY INFUSION   [facility] 2       3     4     5     6     7     8     9       10     11     12     13     14     15     16       17     18     19     20     21     22     23       24     25     26     27     28     29     30       31                                                Lab  Results:  Recent Results (from the past 12 hour(s))   CBC with platelets differential    Collection Time: 11/03/17  7:43 AM   Result Value Ref Range    WBC 5.8 4.0 - 11.0 10e9/L    RBC Count 3.16 (L) 4.4 - 5.9 10e12/L    Hemoglobin 9.1 (L) 13.3 - 17.7 g/dL    Hematocrit 28.4 (L) 40.0 - 53.0 %    MCV 90 78 - 100 fl    MCH 28.8 26.5 - 33.0 pg    MCHC 32.0 31.5 - 36.5 g/dL    RDW 17.5 (H) 10.0 - 15.0 %    Platelet Count 444 150 - 450 10e9/L    Diff Method Automated Method     % Neutrophils 40.1 %    % Lymphocytes 39.6 %    % Monocytes 15.6 %    % Eosinophils 2.1 %    % Basophils 0.9 %    % Immature Granulocytes 1.7 %    Nucleated RBCs 0 0 /100    Absolute Neutrophil 2.3 1.6 - 8.3 10e9/L    Absolute Lymphocytes 2.3 0.8 - 5.3 10e9/L    Absolute Monocytes 0.9 0.0 - 1.3 10e9/L    Absolute Eosinophils 0.1 0.0 - 0.7 10e9/L    Absolute Basophils 0.1 0.0 - 0.2 10e9/L    Abs Immature Granulocytes 0.1 0 - 0.4 10e9/L    Absolute Nucleated RBC 0.0    Comprehensive metabolic panel    Collection Time: 11/03/17  7:43 AM   Result Value Ref Range    Sodium 134 133 - 144 mmol/L    Potassium 3.6 3.4 - 5.3 mmol/L    Chloride 100 94 - 109 mmol/L    Carbon Dioxide 28 20 - 32 mmol/L    Anion Gap 7 3 - 14 mmol/L    Glucose 92 70 - 99 mg/dL    Urea Nitrogen 8 7 - 30 mg/dL    Creatinine 1.26 (H) 0.66 - 1.25 mg/dL    GFR Estimate 57 (L) >60 mL/min/1.7m2    GFR Estimate If Black 68 >60 mL/min/1.7m2    Calcium 8.8 8.5 - 10.1 mg/dL    Bilirubin Total 0.2 0.2 - 1.3 mg/dL    Albumin 3.0 (L) 3.4 - 5.0 g/dL    Protein Total 6.6 (L) 6.8 - 8.8 g/dL    Alkaline Phosphatase 64 40 - 150 U/L    ALT 16 0 - 70 U/L    AST 20 0 - 45 U/L   Magnesium    Collection Time: 11/03/17  7:43 AM   Result Value Ref Range    Magnesium 1.3 (L) 1.6 - 2.3 mg/dL

## 2017-11-03 NOTE — PROGRESS NOTES
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 He is currently undergoing treatment with Cisplatin/Gemzar split on days 1 and 8 given history of CKD. He is currently undergoing curative intent treatment, however he does have indeterminate pulmonary nodules that are being followed. He saw Dr. Arceo last week who deferred chemo due to issues with dehydration and nausea. The plan is to complete 2 more cycles (6 weeks) and then restage. He returns today for consideration of cycle 3.     Interval History:  Mr. Bruno returns today with his wife. He states this week off of chemo has been very helpful. He is eating and drinking better and has been able to gain about 8lbs. He states the Nystatin has not only improved his thrush but helped with his taste as well. He has not had issues with nausea or constipation while off chemo. He states the GERD is improved with the omeprazole, however he has not tried the Carafate yet because he was confused about the timing of this with his meds. He does continue to have mild dysphagia with his Zenker diverticulum and wants to see ENT first before doing a swallow study. He does admit to slight worsening of his penile pain with the week off of chemo and as such is eager to restart today.     Review of systems was positive for mild dependent ankle edema, occasional dizziness upon standing, and morning cough. He denies fevers, chills, headaches, vision changes, chest pain, shortness of breath, abdominal pain, blood in the stool or urine, other bleeding issues, or rash.         Current Outpatient Prescriptions   Medication Sig Dispense Refill     amLODIPine (NORVASC) 5 MG tablet Take 2 tablets (10 mg) by mouth daily 60 tablet 3     OLANZapine (ZYPREXA) 5 MG tablet Take 1 tablet (5 mg) by  mouth At Bedtime 30 tablet 0     omeprazole (PRILOSEC) 2 mg/mL SUSP Take 10 mLs (20 mg) by mouth 2 times daily for 14 days 280 mL 0     nystatin (MYCOSTATIN) 375447 UNIT/ML suspension Take 5 mLs (500,000 Units) by mouth 4 times daily Swish and spit 200 mL 0     sucralfate (CARAFATE) 1 GM/10ML suspension Take 10 mLs (1 g) by mouth 4 times daily for 20 days 800 mL 0     FIRST-OMEPRAZOLE 2 MG/ML SUSP Take 10 mLs (20 mg) by mouth 2 times daily 300 mL 0     alum & mag hydroxide-simethicone (MYLANTA/MAALOX) 200-200-20 MG/5ML SUSP suspension Take 30 mLs by mouth every 4 hours as needed for indigestion 355 mL 1     LORazepam (ATIVAN) 0.5 MG tablet Take 1 tablet (0.5 mg) by mouth every 4 hours as needed (Anxiety, Nausea/Vomiting or Sleep) 30 tablet 3     prochlorperazine (COMPAZINE) 10 MG tablet Take 0.5 tablets (5 mg) by mouth every 6 hours as needed (Nausea/Vomiting) 30 tablet 3     ondansetron (ZOFRAN) 8 MG tablet Take 1 tablet (8 mg) by mouth every 8 hours as needed for nausea 20 tablet 3     docusate sodium (COLACE) 100 MG capsule Take 1 capsule (100 mg) by mouth 2 times daily as needed for constipation 60 capsule 0     dexamethasone (DECADRON) 4 MG tablet Take 2 tablets (8 mg) by mouth daily Take for 3 days, starting the day after cisplatin (Day 2 and Day 9). 12 tablet 3     HYDROXYZINE HCL PO Take 5 mg by mouth At Bedtime        fluticasone (FLONASE) 50 MCG/ACT spray Spray 1 spray into both nostrils every morning        Physical Examination:  /74  Pulse 97  Temp 97.3  F (36.3  C) (Oral)  Resp 15  Wt 71.7 kg (158 lb)  SpO2 100%  BMI 19.23 kg/m2  Wt Readings from Last 10 Encounters:   11/03/17 71.7 kg (158 lb)   10/27/17 68.5 kg (151 lb)   10/25/17 68 kg (150 lb)   10/25/17 68 kg (150 lb)   10/20/17 70.3 kg (155 lb)   10/19/17 70.3 kg (155 lb)   10/16/17 73.2 kg (161 lb 6.4 oz)   10/13/17 72.9 kg (160 lb 12.8 oz)   10/06/17 75.3 kg (166 lb)   10/06/17 76.3 kg (168 lb 3.2 oz)     Constitutional:  Well-appearing male in no acute distress.  Eyes: EOMI, PERRL. No scleral icterus.  ENT: Oral mucosa is moist without lesions. Thrush has resolved.  Lymphatic: Neck is supple without cervical or supraclavicular lymphadenopathy.  Cardiovascular: Regular rate and rhythm. No murmurs, gallops, or rubs. Trace peripheral edema.  Respiratory: Clear to auscultation bilaterally. No wheezes or crackles.  Gastrointestinal: Bowel sounds present. Abdomen soft, non-tender. No palpable hepatosplenomegaly or masses.   Genitourinary: SPC well secured, entry site without erythema or tenderness.   Neurologic: Cranial nerves II through XII are grossly intact.  Skin: No rashes, petechiae, or bruising noted on exposed skin.    Laboratory Data:  Results for KING AILYN CHAVEZ (MRN [id number]) as of 11/3/2017 09:04   11/3/2017 07:43   Sodium 134   Potassium 3.6   Chloride 100   Carbon Dioxide 28   Urea Nitrogen 8   Creatinine 1.26 (H)   GFR Estimate 57 (L)   GFR Estimate If Black 68   Calcium 8.8   Anion Gap 7   Magnesium 1.3 (L)   Albumin 3.0 (L)   Protein Total 6.6 (L)   Bilirubin Total 0.2   Alkaline Phosphatase 64   ALT 16   AST 20   Glucose 92   WBC 5.8   Hemoglobin 9.1 (L)   Hematocrit 28.4 (L)   Platelet Count 444   RBC Count 3.16 (L)   MCV 90   MCH 28.8   MCHC 32.0   RDW 17.5 (H)   Diff Method Automated Method   % Neutrophils 40.1   % Lymphocytes 39.6   % Monocytes 15.6   % Eosinophils 2.1   % Basophils 0.9   % Immature Granulocytes 1.7   Nucleated RBCs 0   Absolute Neutrophil 2.3   Absolute Lymphocytes 2.3   Absolute Monocytes 0.9   Absolute Eosinophils 0.1   Absolute Basophils 0.1   Abs Immature Granulocytes 0.1   Absolute Nucleated RBC 0.0       Assessment and Plan:  1. Stage II penile urethral carcinoma, currently on treatment with cisplatin/gemzar  PETCT showed disease response in both the urethral cancer and indeterminate lung nodules. Cycle 1 and 2 complicated be dehydration, nausea, reflux, and weight loss. All of these  symptoms have greatly improved with the week break from treatment. Given his slight increase in penile pain, however, important we get restarted today. Labs are within treatment parameters to move forward with cycle 3. He will return for day 8 next Friday. Plan to restage in 6 weeks.    2. Nausea, improved  -IV Aloxi, Emend, and Dex premeds today  -Dexamethasone 8mg day 2-4  -Zofran starting day 4, every 8 hours. Compazine every 6 hours as needed. Ativan at bedtime.  -Has not started Zyprexa due to confusion about anti-psychotic indications. Discussed the rational and clarified the use in nausea/appetite stimulation. Can start next week if he is still struggling with nausea  -IVF Monday + Emend, IVF (+/- Aloxi depending on how he is feeling) on Wednesday    3. Constipation, stable  -Miralax BID  -Start Colace the day following chemo once daily, increase to twice daily is still constipated    4. GERD, stable  -Continue omeprazole solution BID as this has been helpful  -Use Maalox and Carafate as needed if symptoms persist  -Per chart review, patient has not previously tried Pepcid. Could consider starting if the above measures are insufficient.  -Patient did not tolerate protonix push (unusual tongue sensation per his report). Will remove from therapy plan  -Continues to have mild dysphagia. ENT appointment scheduled 11/14. Per patient, he prefers to wait until after he sees ENT to do a swallow study/SLP. Order is in place when he wants this to be scheduled    5. FEN  -Weight greatly improved today. Continue Nystatin as to ensure full treatment of thrush and it was helping with taste. Scheduled to see Ignacia on 11/22  -Creat around baseline at 1.26. Continue to monitor and ensure hydration with IVF 2x next week  -Sodium much improved to 134. Mag low today at 1.3-replace per protocol    6.   -Mild increase in penile pain, not needing any pain medications at this time. Pain was improving with treatment.  -Due for SPC  change next week. Is requesting more catheter supplies which we suggested they obtain from urology    7. Heme  Hgb slightly decreased to 9.1 today, likely combination of cisplatin and better hydration given no signs or symptoms of bleeding. Monitor. Plt and WBC stable.    8. HTN, stable  Currently on amlodipine 10mg daily. Is tolerating this well other than mild ankle edema and occasional dizziness upon stable. BP is 130.74, so will continue with current dose. If his blood pressure starts to get too low or he has more orthostatic symptoms, may need to decrease to 5mg.     9. Follow-Up  11/6: IVF and Emend  11/ 8: Labs and  IVF +/- Aloxi depending on his symptoms  11/10: Labs, Ramona, and Cycle 3 day 8 cis/gem     Addendum: Was asked to see patient in lobby after his infusion as he had fallen in the bathroom. He states he was bending over and standing on one foot to fix his catheter bag when he fell forward and hit his head on the ground. He did not lose consciousness and was able to stand right away. He did not have any bleeding and the area was mildly tender initially but does not hurt now. He is not having an headaches, dizziness, confusion, nausea, trouble walking, or disequilibrium. He is walking and speaking normally. He feels fine to leave and I felt comfortable with this as well. Of note, his platelets were normal today so he is not at increased risk of bleeding. Discussed needing to call or go to the ER should he develop a new severe headache, confusion, or trouble walking. Patient and his wife expressed understanding of this. He will be back in clinic on Monday for IVF and antiemetics.    Tejas Montes De Oca PA-C    The patient was seen in conjunction with Tejas Montes De Oca PA-C who served as a scribe for today's visit. I have reviewed and edited the above note, and agree with the above findings and plan.    Ramona Greer PA-C  [facility]  [address] [place] [address]  [id number]  [phone]

## 2017-11-03 NOTE — LETTER
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 He is currently undergoing treatment with Cisplatin/Gemzar split on days 1 and 8 given history of CKD. He is currently undergoing curative intent treatment, however he does have indeterminate pulmonary nodules that are being followed. He saw Dr. Arceo last week who deferred chemo due to issues with dehydration and nausea. The plan is to complete 2 more cycles (6 weeks) and then restage. He returns today for consideration of cycle 3.     Interval History:  Mr. Bruno returns today with his wife. He states this week off of chemo has been very helpful. He is eating and drinking better and has been able to gain about 8lbs. He states the Nystatin has not only improved his thrush but helped with his taste as well. He has not had issues with nausea or constipation while off chemo. He states the GERD is improved with the omeprazole, however he has not tried the Carafate yet because he was confused about the timing of this with his meds. He does continue to have mild dysphagia with his Zenker diverticulum and wants to see ENT first before doing a swallow study. He does admit to slight worsening of his penile pain with the week off of chemo and as such is eager to restart today.     Review of systems was positive for mild dependent ankle edema, occasional dizziness upon standing, and morning cough. He denies fevers, chills, headaches, vision changes, chest pain, shortness of breath, abdominal pain, blood in the stool or urine, other bleeding issues, or rash.         Current Outpatient Prescriptions   Medication Sig Dispense Refill     amLODIPine (NORVASC) 5 MG tablet Take 2 tablets (10  mg) by mouth daily 60 tablet 3     OLANZapine (ZYPREXA) 5 MG tablet Take 1 tablet (5 mg) by mouth At Bedtime 30 tablet 0     omeprazole (PRILOSEC) 2 mg/mL SUSP Take 10 mLs (20 mg) by mouth 2 times daily for 14 days 280 mL 0     nystatin (MYCOSTATIN) 553651 UNIT/ML suspension Take 5 mLs (500,000 Units) by mouth 4 times daily Swish and spit 200 mL 0     sucralfate (CARAFATE) 1 GM/10ML suspension Take 10 mLs (1 g) by mouth 4 times daily for 20 days 800 mL 0     FIRST-OMEPRAZOLE 2 MG/ML SUSP Take 10 mLs (20 mg) by mouth 2 times daily 300 mL 0     alum & mag hydroxide-simethicone (MYLANTA/MAALOX) 200-200-20 MG/5ML SUSP suspension Take 30 mLs by mouth every 4 hours as needed for indigestion 355 mL 1     LORazepam (ATIVAN) 0.5 MG tablet Take 1 tablet (0.5 mg) by mouth every 4 hours as needed (Anxiety, Nausea/Vomiting or Sleep) 30 tablet 3     prochlorperazine (COMPAZINE) 10 MG tablet Take 0.5 tablets (5 mg) by mouth every 6 hours as needed (Nausea/Vomiting) 30 tablet 3     ondansetron (ZOFRAN) 8 MG tablet Take 1 tablet (8 mg) by mouth every 8 hours as needed for nausea 20 tablet 3     docusate sodium (COLACE) 100 MG capsule Take 1 capsule (100 mg) by mouth 2 times daily as needed for constipation 60 capsule 0     dexamethasone (DECADRON) 4 MG tablet Take 2 tablets (8 mg) by mouth daily Take for 3 days, starting the day after cisplatin (Day 2 and Day 9). 12 tablet 3     HYDROXYZINE HCL PO Take 5 mg by mouth At Bedtime        fluticasone (FLONASE) 50 MCG/ACT spray Spray 1 spray into both nostrils every morning        Physical Examination:  /74  Pulse 97  Temp 97.3  F (36.3  C) (Oral)  Resp 15  Wt 71.7 kg (158 lb)  SpO2 100%  BMI 19.23 kg/m2  Wt Readings from Last 10 Encounters:   11/03/17 71.7 kg (158 lb)   10/27/17 68.5 kg (151 lb)   10/25/17 68 kg (150 lb)   10/25/17 68 kg (150 lb)   10/20/17 70.3 kg (155 lb)   10/19/17 70.3 kg (155 lb)   10/16/17 73.2 kg (161 lb 6.4 oz)   10/13/17 72.9 kg (160 lb 12.8 oz)    10/06/17 75.3 kg (166 lb)   10/06/17 76.3 kg (168 lb 3.2 oz)     Constitutional: Well-appearing male in no acute distress.  Eyes: EOMI, PERRL. No scleral icterus.  ENT: Oral mucosa is moist without lesions. Thrush has resolved.  Lymphatic: Neck is supple without cervical or supraclavicular lymphadenopathy.  Cardiovascular: Regular rate and rhythm. No murmurs, gallops, or rubs. Trace peripheral edema.  Respiratory: Clear to auscultation bilaterally. No wheezes or crackles.  Gastrointestinal: Bowel sounds present. Abdomen soft, non-tender. No palpable hepatosplenomegaly or masses.   Genitourinary: SPC well secured, entry site without erythema or tenderness.   Neurologic: Cranial nerves II through XII are grossly intact.  Skin: No rashes, petechiae, or bruising noted on exposed skin.    Laboratory Data:  Results for KING AILYN CHAVEZ (MRN [id number]) as of 11/3/2017 09:04   11/3/2017 07:43   Sodium 134   Potassium 3.6   Chloride 100   Carbon Dioxide 28   Urea Nitrogen 8   Creatinine 1.26 (H)   GFR Estimate 57 (L)   GFR Estimate If Black 68   Calcium 8.8   Anion Gap 7   Magnesium 1.3 (L)   Albumin 3.0 (L)   Protein Total 6.6 (L)   Bilirubin Total 0.2   Alkaline Phosphatase 64   ALT 16   AST 20   Glucose 92   WBC 5.8   Hemoglobin 9.1 (L)   Hematocrit 28.4 (L)   Platelet Count 444   RBC Count 3.16 (L)   MCV 90   MCH 28.8   MCHC 32.0   RDW 17.5 (H)   Diff Method Automated Method   % Neutrophils 40.1   % Lymphocytes 39.6   % Monocytes 15.6   % Eosinophils 2.1   % Basophils 0.9   % Immature Granulocytes 1.7   Nucleated RBCs 0   Absolute Neutrophil 2.3   Absolute Lymphocytes 2.3   Absolute Monocytes 0.9   Absolute Eosinophils 0.1   Absolute Basophils 0.1   Abs Immature Granulocytes 0.1   Absolute Nucleated RBC 0.0       Assessment and Plan:  1. Stage II penile urethral carcinoma, currently on treatment with cisplatin/gemzar  PETCT showed disease response in both the urethral cancer and indeterminate lung nodules. Cycle 1  and 2 complicated be dehydration, nausea, reflux, and weight loss. All of these symptoms have greatly improved with the week break from treatment. Given his slight increase in penile pain, however, important we get restarted today. Labs are within treatment parameters to move forward with cycle 3. He will return for day 8 next Friday. Plan to restage in 6 weeks.    2. Nausea, improved  -IV Aloxi, Emend, and Dex premeds today  -Dexamethasone 8mg day 2-4  -Zofran starting day 4, every 8 hours. Compazine every 6 hours as needed. Ativan at bedtime.  -Has not started Zyprexa due to confusion about anti-psychotic indications. Discussed the rational and clarified the use in nausea/appetite stimulation. Can start next week if he is still struggling with nausea  -IVF Monday + Emend, IVF (+/- Aloxi depending on how he is feeling) on Wednesday    3. Constipation, stable  -Miralax BID  -Start Colace the day following chemo once daily, increase to twice daily is still constipated    4. GERD, stable  -Continue omeprazole solution BID as this has been helpful  -Use Maalox and Carafate as needed if symptoms persist  -Per chart review, patient has not previously tried Pepcid. Could consider starting if the above measures are insufficient.  -Patient did not tolerate protonix push (unusual tongue sensation per his report). Will remove from therapy plan  -Continues to have mild dysphagia. ENT appointment scheduled 11/14. Per patient, he prefers to wait until after he sees ENT to do a swallow study/SLP. Order is in place when he wants this to be scheduled    5. FEN  -Weight greatly improved today. Continue Nystatin as to ensure full treatment of thrush and it was helping with taste. Scheduled to see Ignacia on 11/22  -Creat around baseline at 1.26. Continue to monitor and ensure hydration with IVF 2x next week  -Sodium much improved to 134. Mag low today at 1.3-replace per protocol    6.   -Mild increase in penile pain, not needing any  pain medications at this time. Pain was improving with treatment.  -Due for SPC change next week. Is requesting more catheter supplies which we suggested they obtain from urology    7. Heme  Hgb slightly decreased to 9.1 today, likely combination of cisplatin and better hydration given no signs or symptoms of bleeding. Monitor. Plt and WBC stable.    8. HTN, stable  Currently on amlodipine 10mg daily. Is tolerating this well other than mild ankle edema and occasional dizziness upon stable. BP is 130.74, so will continue with current dose. If his blood pressure starts to get too low or he has more orthostatic symptoms, may need to decrease to 5mg.     9. Follow-Up  11/6: IVF and Emend  11/ 8: Labs and  IVF +/- Aloxi depending on his symptoms  11/10: Labs, Ramona, and Cycle 3 day 8 cis/gem     Addendum: Was asked to see patient in lobby after his infusion as he had fallen in the bathroom. He states he was bending over and standing on one foot to fix his catheter bag when he fell forward and hit his head on the ground. He did not lose consciousness and was able to stand right away. He did not have any bleeding and the area was mildly tender initially but does not hurt now. He is not having an headaches, dizziness, confusion, nausea, trouble walking, or disequilibrium. He is walking and speaking normally. He feels fine to leave and I felt comfortable with this as well. Of note, his platelets were normal today so he is not at increased risk of bleeding. Discussed needing to call or go to the ER should he develop a new severe headache, confusion, or trouble walking. Patient and his wife expressed understanding of this. He will be back in clinic on Monday for IVF and antiemetics.    Tejas Montes De Oca PA-C    The patient was seen in conjunction with Tejas Montes De Oca PA-C who served as a scribe for today's visit. I have reviewed and edited the above note, and agree with the above findings and plan.    Ramona Greer,  IMTIAZ  [facility]  [address], [place] [id number] [phone]

## 2017-11-03 NOTE — PATIENT INSTRUCTIONS
NAUSEA  -Take Dexamethasone 2 tablets for the 3 days following chemo  -Start taking Zofran 3 days after chemo. Take every 8 hours  -Use compazine every 6 hours as needed if still feeling nauseous  -Use ativan before bed to help with sleep and nausea  -We will do fluids and IV nausea meds on Monday and Wednesday next week    CONSTIPATION  -Take Miralax twice a day  -Start taking Colace once a day once you start chemo. Can increase to twice a day if still constipated    GERD  -Take Omeprazole twice daily  -If still having reflux symptoms, can take the Carafate to help coat the throat . Take it before you eat  -Can also try Maalox if still having symptoms    TASTE  -Keep taking the Nystatin solution

## 2017-11-03 NOTE — NURSING NOTE
Chief Complaint   Patient presents with     Blood Draw     Labs Drawn      Peripheral IV started, blood return noted but unable to collect enough blood for lab collection. IV left in place for use of infusion. Labs collected peripherally.     Lauren Schoen, RN

## 2017-11-03 NOTE — MR AVS SNAPSHOT
After Visit Summary   11/3/2017    King Gonsalo Bruno    MRN: [id number]           Patient Information     Date Of Birth          1945        Visit Information        Provider Department      11/3/2017 8:30 AM UC 21 ATC;  ONCOLOGY INFUSION [facility]        Today's Diagnoses     Urethral cancer (H)    -  1      Care Instructions    Contact Numbers    [facility] Main Line: [phone]  [facility] Triage:  [phone]    Call triage with chills and/or temperature greater than or equal to 100.5, uncontrolled nausea/vomiting, diarrhea, constipation, dizziness, shortness of breath, chest pain, bleeding, unexplained bruising, or any new/concerning symptoms, questions/concerns.     If you are having any concerning symptoms or wish to speak to a provider before your next infusion visit, please call your care coordinator or triage to notify them so we can adequately serve you.       After Hours: [phone]    If after hours, weekends, or holidays, call main hospital  and ask for Oncology doctor on call.           November 2017 Sunday Monday Tuesday Wednesday Thursday Friday Saturday                  1     2     3     [facility] D[facility]    7:15 AM   (15 min.)    [facility] DRAW   [facility] Lab Draw     [facility] RETURN    7:35 AM   (50 min.)   Ramona Greer PA   [facility]     [facility] ONC INFUSION 360    8:30 AM   (360 min.)    ONCOLOGY INFUSION   [facility] 4       5     6     [facility] RETURN   12:45 PM   (30 min.)   Nurse,  Prostate Cancer Ctr   [facility] for Prostate and Urologic Cancers 7     8     [facility] DRAW   12:30 PM   (15 min.)    [facility] DRAW   [facility] Lab Draw     [facility] ONC INFUSION 120    1:00 PM   (120 min.)    ONCOLOGY INFUSION   [facility] 9     10     [facility] DRAW    8:15 AM   (15 min.)    [facility] DRAW   [facility] Lab Draw     [facility] RETURN     8:25 AM   (50 min.)   Ramona Greer PA [facility]     [facility] ONC INFUSION 360   10:00 AM   (360 min.)   [facility] ONCOLOGY INFUSION   [facility] 11       12     13     14     [facility] NEW    1:15 PM   (30 min.)   Arsenio Ortiz MD   [facility] 15     [facility] DRAW   12:00 PM   (15 min.)   [facility] DRAW   [facility] Draw     [facility] ONC INFUSION 120   12:30 PM   (120 min.)   UC ONCOLOGY INFUSION   [facility] 16     17     [facility] DRAW   12:30 PM   (15 min.)   [facility] DRAW   [facility] Lab Draw     [facility] ONC INFUSION 120    1:00 PM   (120 min.)    ONCOLOGY INFUSION   [facility] 18       19     20     21     22     [facility] DRAW   12:30 PM   (15 min.)   [facility] DRAW   [facility] Lab Draw     [facility] ONC INFUSION 120    1:00 PM   (120 min.)   [facility] ONCOLOGY INFUSION   [facility]     [facility] NEW    1:45 PM   (60 min.)   Ignacia Wallace RD   [facility] 23     24     [facility] DRAW    8:15 AM   (15 min.)   [facility] DRAW   [facility] Lab Draw     [facility] RETURN    8:25 AM   (50 min.)   Ramona Greer PA   [facility]     [facility] ONC INFUSION 360   10:00 AM   (360 min.)    ONCOLOGY INFUSION   [facility] 25  Happy Birthday!       26     27     28     29     [facility] DRAW   10:30 AM   (15 min.)   [facility] DRAW   [facility] Draw     [facility] ONC INFUSION 120   11:00 AM   (120 min.)   [facility] ONCOLOGY INFUSION   [facility] 30 December 2017 Sunday Monday Tuesday Wednesday Thursday Friday Saturday                            1     [facility] DRAW    8:15 AM   (15 min.)   [facility] DRAW   [facility] Draw     [facility] RETURN    8:25 AM   (50 min.)   Yessica Ovalles, ISAURA CNP   [facility]  Clinic     [facility] ONC INFUSION 360   10:00 AM   (360 min.)    ONCOLOGY INFUSION   [facility] 2       3     4     5     6     7     8     9       10     11     12     13     14     15     16       17     18     19     20     21     22     23       24     25     26     27     28     29     30       31                                                Lab Results:  Recent Results (from the past 12 hour(s))   CBC with platelets differential    Collection Time: 11/03/17  7:43 AM   Result Value Ref Range    WBC 5.8 4.0 - 11.0 10e9/L    RBC Count 3.16 (L) 4.4 - 5.9 10e12/L    Hemoglobin 9.1 (L) 13.3 - 17.7 g/dL    Hematocrit 28.4 (L) 40.0 - 53.0 %    MCV 90 78 - 100 fl    MCH 28.8 26.5 - 33.0 pg    MCHC 32.0 31.5 - 36.5 g/dL    RDW 17.5 (H) 10.0 - 15.0 %    Platelet Count 444 150 - 450 10e9/L    Diff Method Automated Method     % Neutrophils 40.1 %    % Lymphocytes 39.6 %    % Monocytes 15.6 %    % Eosinophils 2.1 %    % Basophils 0.9 %    % Immature Granulocytes 1.7 %    Nucleated RBCs 0 0 /100    Absolute Neutrophil 2.3 1.6 - 8.3 10e9/L    Absolute Lymphocytes 2.3 0.8 - 5.3 10e9/L    Absolute Monocytes 0.9 0.0 - 1.3 10e9/L    Absolute Eosinophils 0.1 0.0 - 0.7 10e9/L    Absolute Basophils 0.1 0.0 - 0.2 10e9/L    Abs Immature Granulocytes 0.1 0 - 0.4 10e9/L    Absolute Nucleated RBC 0.0    Comprehensive metabolic panel    Collection Time: 11/03/17  7:43 AM   Result Value Ref Range    Sodium 134 133 - 144 mmol/L    Potassium 3.6 3.4 - 5.3 mmol/L    Chloride 100 94 - 109 mmol/L    Carbon Dioxide 28 20 - 32 mmol/L    Anion Gap 7 3 - 14 mmol/L    Glucose 92 70 - 99 mg/dL    Urea Nitrogen 8 7 - 30 mg/dL    Creatinine 1.26 (H) 0.66 - 1.25 mg/dL    GFR Estimate 57 (L) >60 mL/min/1.7m2    GFR Estimate If Black 68 >60 mL/min/1.7m2    Calcium 8.8 8.5 - 10.1 mg/dL    Bilirubin Total 0.2 0.2 - 1.3 mg/dL    Albumin 3.0 (L) 3.4 - 5.0 g/dL    Protein Total 6.6 (L) 6.8 - 8.8 g/dL    Alkaline Phosphatase 64 40 - 150 U/L     ALT 16 0 - 70 U/L    AST 20 0 - 45 U/L   Magnesium    Collection Time: 11/03/17  7:43 AM   Result Value Ref Range    Magnesium 1.3 (L) 1.6 - 2.3 mg/dL               Follow-ups after your visit        Your next 10 appointments already scheduled     Nov 06, 2017  1:00 PM CST   (Arrive by 12:45 PM)   Return Visit with  Prostate Cancer Ctr Nurse   [facility]rology [facility] Inst for Prostate and Urologic Cancers ([facility])    [address] [place] [address]  4th [facility] [address]   [phone]            Nov 06, 2017  1:30 PM CST   [facility] Draw with [facility] DRAW   [facility] Lab Draw ([facility])    [address] [place] [address]  2nd [facility] [address]   [phone]            Nov 06, 2017  2:00 PM CST   Infusion 120 with UC BMT INFUSION, UC 1 ATC   [facility] Blood and Marrow Transplant ([facility])    [address] [place] [address]  2nd [facility] [address]   [phone]            Nov 08, 2017 12:30 PM CST   [facility] Draw with [facility] DRAW   [facility] Lab Draw ([facility])    [address] [place] [address]  2nd [facility] [address]   [phone]            Nov 08, 2017  1:00 PM CST   Infusion 120 with UC ONCOLOGY INFUSION, UC 18 ATC   [facility] ([facility])    [address] [place] [address]  2nd [facility] [address]   [phone]            Nov 10, 2017  8:15 AM CST   [facility] Draw with [facility] DRAW   [facility] Lab Draw ([facility])    [address] [place] [address]  2nd [facility] [address]   [phone]            Nov 10, 2017  8:40 AM CST   (Arrive by 8:25 AM)   Return Visit with SARITHA Briceno   [facility] ([facility])    [address] [place] [address]  2nd [facility] [address]   [phone]            Nov 10, 2017  "10:00 AM CST   Infusion 360 with UC ONCOLOGY INFUSION   [facility] ([facility])    [address] [facility]  2nd Floor  [facility] [address] [phone]              Future tests that were ordered for you today     Open Standing Orders        Priority Remaining Interval Expires Ordered    Basic metabolic panel Routine 10/10 weekly 11/3/2018 11/3/2017            Who to contact     If you have questions or need follow up information about today's clinic visit or your schedule please contact [facility] directly at [phone].  Normal or non-critical lab and imaging results will be communicated to you by Limonetikhart, letter or phone within 4 business days after the clinic has received the results. If you do not hear from us within 7 days, please contact the clinic through Toptalt or phone. If you have a critical or abnormal lab result, we will notify you by phone as soon as possible.  Submit refill requests through Spredfast or call your pharmacy and they will forward the refill request to us. Please allow 3 business days for your refill to be completed.          Additional Information About Your Visit        Limonetikhart Information     Spredfast lets you send messages to your doctor, view your test results, renew your prescriptions, schedule appointments and more. To sign up, go to [email or web address] . Click on \"Log in\" on the left side of the screen, which will take you to the Welcome page. Then click on \"Sign up Now\" on the right side of the page.     You will be asked to enter the access code listed below, as well as some personal information. Please follow the directions to create your username and password.     Your access code is: [email or web address]  Expires: 2018  6:30 AM     Your access code will  in 90 days. If you need help or a new code, please call your [place] clinic or [phone].        Care EveryWhere ID     This is your Care " EveryWhere ID. This could be used by other organizations to access your [place] medical records  [id number]         Blood Pressure from Last 3 Encounters:   11/03/17 130/74   10/27/17 128/77   10/25/17 124/78    Weight from Last 3 Encounters:   11/03/17 71.7 kg (158 lb)   10/27/17 68.5 kg (151 lb)   10/25/17 68 kg (150 lb)              We Performed the Following     CBC with platelets differential     Comprehensive metabolic panel     Magnesium          Where to get your medicines      These medications were sent to [place], [place] - [address]  [address], [facility] [address]    Hours:  TRANSPLANT PHONE NUMBER [phone] Phone:  [phone]     nystatin 206290 UNIT/ML suspension    omeprazole 2 mg/mL Susp          Primary Care Provider Office Phone # Fax #    Joan Ventura, MARLENY [phone]       [facility] [address] [facility] [address]        Equal Access to Services     JENELLE RED : Hadii aad ku hadasho Soomaali, waaxda luqadaha, qaybta kaalmada adeegyada, waxsulma teein hayreymundo srivastava . So [facility] [phone].    ATENCIÓN: Si habla español, tiene a washburn disposición servicios gratuitos de asistencia lingüística. Llame al [phone].    We comply with applicable federal civil rights laws and [place] laws. We do not discriminate on the basis of race, color, national origin, age, disability, sex, sexual orientation, or gender identity.            Thank you!     Thank you for choosing [facility]  for your care. Our goal is always to provide you with excellent care. Hearing back from our patients is one way we can continue to improve our services. Please take a few minutes to complete the written survey that you may receive in the mail after your visit with us. Thank you!             Your Updated Medication List - Protect others around you: Learn how to safely use, store and throw  away your medicines at www.disposemymeds.org.          This list is accurate as of: 11/3/17 12:02 PM.  Always use your most recent med list.                   Brand Name Dispense Instructions for use Diagnosis    alum & mag hydroxide-simethicone 200-200-20 MG/5ML Susp suspension    MYLANTA/MAALOX    355 mL    Take 30 mLs by mouth every 4 hours as needed for indigestion        amLODIPine 5 MG tablet    NORVASC    60 tablet    Take 2 tablets (10 mg) by mouth daily    Benign essential hypertension       dexamethasone 4 MG tablet    DECADRON    12 tablet    Take 2 tablets (8 mg) by mouth daily Take for 3 days, starting the day after cisplatin (Day 2 and Day 9).    Urethral cancer (H)       docusate sodium 100 MG capsule    COLACE    60 capsule    Take 1 capsule (100 mg) by mouth 2 times daily as needed for constipation    Slow transit constipation       * FIRST-OMEPRAZOLE 2 MG/ML Susp     300 mL    Take 10 mLs (20 mg) by mouth 2 times daily    Urethral cancer (H), Gastroesophageal reflux disease with esophagitis       * omeprazole 2 mg/mL Susp    priLOSEC    280 mL    Take 10 mLs (20 mg) by mouth 2 times daily    Gastroesophageal reflux disease with esophagitis, Urethral cancer (H)       fluticasone 50 MCG/ACT spray    FLONASE     Spray 1 spray into both nostrils every morning        HYDROXYZINE HCL PO      Take 5 mg by mouth At Bedtime        LORazepam 0.5 MG tablet    ATIVAN    30 tablet    Take 1 tablet (0.5 mg) by mouth every 4 hours as needed (Anxiety, Nausea/Vomiting or Sleep)    Nausea       nystatin 616664 UNIT/ML suspension    MYCOSTATIN    200 mL    Take 5 mLs (500,000 Units) by mouth 4 times daily Swish and spit    Thrush       OLANZapine 5 MG tablet    zyPREXA    30 tablet    Take 1 tablet (5 mg) by mouth At Bedtime    Urethral cancer (H), Nausea       ondansetron 8 MG tablet    ZOFRAN    20 tablet    Take 1 tablet (8 mg) by mouth every 8 hours as needed for nausea    Nausea       prochlorperazine 10 MG  tablet    COMPAZINE    30 tablet    Take 0.5 tablets (5 mg) by mouth every 6 hours as needed (Nausea/Vomiting)    Nausea       sucralfate 1 GM/10ML suspension    CARAFATE    800 mL    Take 10 mLs (1 g) by mouth 4 times daily for 20 days    Gastroesophageal reflux disease with esophagitis, Urethral cancer (H)       * Notice:  This list has 2 medication(s) that are the same as other medications prescribed for you. Read the directions carefully, and ask your doctor or other care provider to review them with you.

## 2017-11-06 ENCOUNTER — ALLIED HEALTH/NURSE VISIT (OUTPATIENT)
Dept: UROLOGY | Facility: CLINIC | Age: 72
End: 2017-11-06

## 2017-11-06 ENCOUNTER — TELEPHONE (OUTPATIENT)
Dept: TRANSPLANT | Facility: CLINIC | Age: 72
End: 2017-11-06

## 2017-11-06 ENCOUNTER — INFUSION THERAPY VISIT (OUTPATIENT)
Dept: TRANSPLANT | Facility: CLINIC | Age: 72
End: 2017-11-06
Attending: PHYSICIAN ASSISTANT
Payer: MEDICARE

## 2017-11-06 VITALS
TEMPERATURE: 98.5 F | OXYGEN SATURATION: 98 % | HEART RATE: 88 BPM | RESPIRATION RATE: 16 BRPM | SYSTOLIC BLOOD PRESSURE: 137 MMHG | DIASTOLIC BLOOD PRESSURE: 81 MMHG

## 2017-11-06 DIAGNOSIS — C68.0 URETHRAL CANCER (H): Primary | ICD-10-CM

## 2017-11-06 DIAGNOSIS — R11.0 NAUSEA: ICD-10-CM

## 2017-11-06 DIAGNOSIS — E87.6 HYPOKALEMIA: Primary | ICD-10-CM

## 2017-11-06 DIAGNOSIS — C68.0 URETHRAL CANCER (H): ICD-10-CM

## 2017-11-06 DIAGNOSIS — E86.0 DEHYDRATION: ICD-10-CM

## 2017-11-06 PROCEDURE — 25000128 H RX IP 250 OP 636: Mod: ZF | Performed by: PHYSICIAN ASSISTANT

## 2017-11-06 PROCEDURE — 96361 HYDRATE IV INFUSION ADD-ON: CPT

## 2017-11-06 PROCEDURE — 96365 THER/PROPH/DIAG IV INF INIT: CPT

## 2017-11-06 PROCEDURE — 40000141 ZZH STATISTIC PERIPHERAL IV START W/O US GUIDANCE: Mod: ZF

## 2017-11-06 RX ORDER — PALONOSETRON 0.05 MG/ML
0.25 INJECTION, SOLUTION INTRAVENOUS ONCE
Status: CANCELLED
Start: 2017-11-06 | End: 2017-11-06

## 2017-11-06 RX ADMIN — SODIUM CHLORIDE 150 MG: 9 INJECTION, SOLUTION INTRAVENOUS at 14:10

## 2017-11-06 RX ADMIN — SODIUM CHLORIDE 1000 ML: 9 INJECTION, SOLUTION INTRAVENOUS at 13:59

## 2017-11-06 ASSESSMENT — PAIN SCALES - GENERAL: PAINLEVEL: MODERATE PAIN (4)

## 2017-11-06 NOTE — MR AVS SNAPSHOT
After Visit Summary   11/6/2017    King Gonsalo Bruno    MRN: [id number]           Patient Information     Date Of Birth          1945        Visit Information        Provider Department      11/6/2017 2:00 PM UC 1 ATC; UC BMT INFUSION [facility] Blood and Marrow Transplant        Today's Diagnoses     Hypokalemia    -  1    Nausea        Dehydration        Urethral cancer (H)              [facility] ([facility]  [address] [place] [address]  Phone: [phone]  Clinic Hours:   Monday-Thursday:7am to 7pm   Friday: 7am to 5pm   Weekends and holidays:    8am to noon (in general)  If your fever is 100.5  or greater,   call the clinic.  After hours call the   hospital at [phone] or   [phone]. Ask for the BMT   fellow on-call            Follow-ups after your visit        Your next 10 appointments already scheduled     Nov 08, 2017 12:30 PM CST   Masonic Lab Draw with  MASONIC LAB DRAW   [facility]    [address] [place] [address] [phone]            Nov 08, 2017  1:00 PM CST   Infusion 120 with UC ONCOLOGY INFUSION, UC 18 ATC   [facility] Cancer [facility] ([facility])    [address] [place] [address] [phone]            Nov 08, 2017  3:00 PM CST   (Arrive by 2:45 PM)   New Patient Visit with Ignacia Brower RD   [facility] ([facility])    [address] [place] [address]   [phone]            Nov 10, 2017  8:15 AM CST   Masonic Lab Draw with  MASONIC LAB DRAW   [facility] Masonic Lab Draw ([facility])    [address] [place] [address]   [phone]            Nov 10, 2017  8:40 AM CST   (Arrive by 8:25 AM)   Return Visit with SARITHA Briceno   [facility]  "Clinic ([facility])    [address] [facility]  2nd Floor  [facility] [address]   [phone]            Nov 10, 2017 10:00 AM CST   Infusion 360 with UC ONCOLOGY INFUSION, UC 12 ATC   [facility]    [address] [place] [address]  2nd [facility] [address]   [phone]            Nov 14, 2017  1:30 PM CST   (Arrive by 1:15 PM)   New Patient Visit with Arsenio Ortiz MD   [facility] Ear Nose and Throat ([facility])    [address] [place] [address]  4th Floor  [facility] [address]   [phone]            Nov 15, 2017 12:00 PM CST   Masonic Lab Draw with  MASONIC LAB DRAW   [facility] Lab Draw ([facility])    [address] [place] [address]  [address] [facility] [address] [phone]              Who to contact     If you have questions or need follow up information about today's clinic visit or your schedule please contact [facility] BLOOD AND MARROW TRANSPLANT directly at [phone].  Normal or non-critical lab and imaging results will be communicated to you by MyChart, letter or phone within 4 business days after the clinic has received the results. If you do not hear from us within 7 days, please contact the clinic through [employer] Butcher[employer] or phone. If you have a critical or abnormal lab result, we will notify you by phone as soon as possible.  Submit refill requests through PingMe or call your pharmacy and they will forward the refill request to us. Please allow 3 business days for your refill to be completed.          Additional Information About Your Visit        Bill the [employer]cher[employer] Information     PingMe lets you send messages to your doctor, view your test results, renew your prescriptions, schedule appointments and more. To sign up, go to [email or web address] . Click on \"Log in\" on the left side of the screen, which will take you to the Welcome page. Then click on \"Sign up Now\" " on the right side of the page.     You will be asked to enter the access code listed below, as well as some personal information. Please follow the directions to create your username and password.     Your access code is: [email or web address]  Expires: 2018  5:30 AM     Your access code will  in 90 days. If you need help or a new code, please call your [place] clinic or [phone].        Care EveryWhere ID     This is your Care EveryWhere ID. This could be used by other organizations to access your [place] medical records  [id number]        Your Vitals Were     Pulse Temperature Respirations Pulse Oximetry          88 98.5  F (36.9  C) 16 98%         Blood Pressure from Last 3 Encounters:   17 137/81   17 130/74   10/27/17 128/77    Weight from Last 3 Encounters:   17 71.7 kg (158 lb)   10/27/17 68.5 kg (151 lb)   10/25/17 68 kg (150 lb)              Today, you had the following     No orders found for display       Recent Review Flowsheet Data     BMT Recent Results Latest Ref Rng & Units 10/19/2017 10/20/2017 10/23/2017 10/24/2017 10/25/2017 10/27/2017 11/3/2017    WBC 4.0 - 11.0 10e9/L 3.3(L) 4.0 - 5.3 4.8 - 5.8    Hemoglobin 13.3 - 17.7 g/dL 10.7(L) 10.6(L) - 10.6(L) 10.7(L) - 9.1(L)    Platelet Count 150 - 450 10e9/L 177 158 - 178 215 - 444    Neutrophils (Absolute) 1.6 - 8.3 10e9/L 1.4(L) 2.6 - 2.7 3.2 - 2.3    Sodium 133 - 144 mmol/L 129(L) 127(L) - 127(L) 128(L) 129(L) 134    Potassium 3.4 - 5.3 mmol/L 3.4 4.0 - 3.1(L) 3.2(L) 3.3(L) 3.6    Chloride 94 - 109 mmol/L 92(L) 92(L) - 91(L) 93(L) 95 100    Glucose 70 - 99 mg/dL 110(H) 109(H) 109(A) 82 80 90 92    Urea Nitrogen 7 - 30 mg/dL 16 10 - 11 11 11 8    Creatinine 0.66 - 1.25 mg/dL 1.44(H) 1.16 - 1.18 1.14 1.21 1.26(H)    Calcium (Total) 8.5 - 10.1 mg/dL 8.9 8.9 - 8.5 8.8 8.6 8.8    Protein (Total) 6.8 - 8.8 g/dL 6.7(L) 7.0 - 7.0 7.1 6.7(L) 6.6(L)    Albumin 3.4 - 5.0 g/dL 3.2(L) 3.2(L) - 3.3(L) 3.3(L) 3.2(L) 3.0(L)    Alkaline  Phosphatase 40 - 150 U/L 74 77 - 79 77 71 64    AST 0 - 45 U/L 26 31 - 25 26 25 20    ALT 0 - 70 U/L 25 25 - 19 19 17 16    MCV 78 - 100 fl 86 86 - 85 85 - 90               Primary Care Provider Office Phone # Fax Jazmine Ventura [phone]       [facility] [address]  [facility] [address]        Equal Access to Services     JENELLE RED : Hadii aad ku hadasho Soomaali, waaxda luqadaha, qaybta kaalmada adeegyada, waxay idiin hayaan adeeg kharash la'aan ah. So [facility] [phone].    ATENCIÓN: Si everett hinson, tiene a washburn disposición servicios gratuitos de asistencia lingüística. [facility] [phone].    We comply with applicable federal civil rights laws and [place] laws. We do not discriminate on the basis of race, color, national origin, age, disability, sex, sexual orientation, or gender identity.            Thank you!     Thank you for choosing [facility] BLOOD AND MARROW TRANSPLANT  for your care. Our goal is always to provide you with excellent care. Hearing back from our patients is one way we can continue to improve our services. Please take a few minutes to complete the written survey that you may receive in the mail after your visit with us. Thank you!             Your Updated Medication List - Protect others around you: Learn how to safely use, store and throw away your medicines at www.disposemymeds.org.          This list is accurate as of: 11/6/17  3:41 PM.  Always use your most recent med list.                   Brand Name Dispense Instructions for use Diagnosis    alum & mag hydroxide-simethicone 200-200-20 MG/5ML Susp suspension    MYLANTA/MAALOX    355 mL    Take 30 mLs by mouth every 4 hours as needed for indigestion        amLODIPine 5 MG tablet    NORVASC    60 tablet    Take 2 tablets (10 mg) by mouth daily    Benign essential hypertension       dexamethasone 4 MG tablet    DECADRON    12 tablet    Take 2 tablets (8 mg) by mouth daily Take for 3 days, starting  the day after cisplatin (Day 2 and Day 9).    Urethral cancer (H)       docusate sodium 100 MG capsule    COLACE    60 capsule    Take 1 capsule (100 mg) by mouth 2 times daily as needed for constipation    Slow transit constipation       * FIRST-OMEPRAZOLE 2 MG/ML Susp     300 mL    Take 10 mLs (20 mg) by mouth 2 times daily    Urethral cancer (H), Gastroesophageal reflux disease with esophagitis       * omeprazole 2 mg/mL Susp    priLOSEC    280 mL    Take 10 mLs (20 mg) by mouth 2 times daily    Gastroesophageal reflux disease with esophagitis, Urethral cancer (H)       fluticasone 50 MCG/ACT spray    FLONASE     Spray 1 spray into both nostrils every morning        HYDROXYZINE HCL PO      Take 5 mg by mouth At Bedtime        LORazepam 0.5 MG tablet    ATIVAN    30 tablet    Take 1 tablet (0.5 mg) by mouth every 4 hours as needed (Anxiety, Nausea/Vomiting or Sleep)    Nausea       nystatin 618270 UNIT/ML suspension    MYCOSTATIN    200 mL    Take 5 mLs (500,000 Units) by mouth 4 times daily Swish and spit    Thrush       OLANZapine 5 MG tablet    zyPREXA    30 tablet    Take 1 tablet (5 mg) by mouth At Bedtime    Urethral cancer (H), Nausea       ondansetron 8 MG tablet    ZOFRAN    20 tablet    Take 1 tablet (8 mg) by mouth every 8 hours as needed for nausea    Nausea       prochlorperazine 10 MG tablet    COMPAZINE    30 tablet    Take 0.5 tablets (5 mg) by mouth every 6 hours as needed (Nausea/Vomiting)    Nausea       sucralfate 1 GM/10ML suspension    CARAFATE    800 mL    Take 10 mLs (1 g) by mouth 4 times daily for 20 days    Gastroesophageal reflux disease with esophagitis, Urethral cancer (H)       * Notice:  This list has 2 medication(s) that are the same as other medications prescribed for you. Read the directions carefully, and ask your doctor or other care provider to review them with you.

## 2017-11-06 NOTE — PROGRESS NOTES
Infusion Nursing Note:  King Gonsalo Bruno presents today for IVF.    Patient seen by provider today: No   present during visit today: Not Applicable.    Note: Pt received 1L NS, IV Aprepitant over 20 minutes.  Tolerated well.    Intravenous Access:  Peripheral IV placed.    Treatment Conditions:  Not Applicable.      Post Infusion Assessment:  Patient tolerated infusion without incident.  Blood return noted pre and post infusion.  Site patent and intact, free from redness, edema or discomfort.  No evidence of extravasations.    Discharge Plan:   Discharge instructions reviewed with: Patient and Family.  Patient and/or family verbalized understanding of discharge instructions and all questions answered.    Joyce Rwoley RN

## 2017-11-06 NOTE — TELEPHONE ENCOUNTER
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     Intervention: CSW left VM for pt offering support and resources.     Education Provided: N/a    Follow-up Required: CSW left contact info for Onc LICSW (Sofie Kelly: [phone]).     CHER Crawford, [facility]  BMT   Pager: [phone]      "

## 2017-11-06 NOTE — PATIENT INSTRUCTIONS
Follow up on 12-11-17 at 1:00 pm for your next catheter change.    It was a pleasure meeting with you today.  Thank you for allowing me and my team the privilege of caring for you today.  YOU are the reason we are here, and I truly hope we provided you with the excellent service you deserve.  Please let us know if there is anything else we can do for you so that we can be sure you are leaving completely satisfied with your care experience.      ALL Amador

## 2017-11-06 NOTE — MR AVS SNAPSHOT
After Visit Summary   11/6/2017    King Gonsalo Bruno    MRN: [id number]           Patient Information     Date Of Birth          1945        Visit Information        Provider Department      11/6/2017 1:00 PM Nurse, [facility] and [email or web address] for Prostate and Urologic Cancers        Care Instructions    Follow up on 12-11-17 at 1:00 pm for your next catheter change.    It was a pleasure meeting with you today.  Thank you for allowing me and my team the privilege of caring for you today.  YOU are the reason we are here, and I truly hope we provided you with the excellent service you deserve.  Please let us know if there is anything else we can do for you so that we can be sure you are leaving completely satisfied with your care experience.      ALL Amador          Follow-ups after your visit        Your next 10 appointments already scheduled     Nov 06, 2017  2:00 PM CST   Infusion 120 with UC BMT INFUSION, UC 1 ATC   [facility] Blood and Marrow Transplant ([facility])    [address] [place] [address]   [phone]            Nov 08, 2017 12:30 PM CST   Masonic Lab Draw with UC MASONIC LAB DRAW   [facility]    [address] [place] [address]   [phone]            Nov 08, 2017  1:00 PM CST   Infusion 120 with UC ONCOLOGY INFUSION, UC 18 ATC   [facility] ([facility])    [address] [place] [address]   [phone]            Nov 08, 2017  3:00 PM CST   (Arrive by 2:45 PM)   New Patient Visit with Ignacia Brower RD   [facility] ([facility])    [address] [place] [address]   [phone]            Nov 10, 2017  8:15 AM CST   Masonic Lab Draw with UC MASONIC LAB DRAW   [facility] Lab Draw ([facility])    [address]  [address] [facility] [address]   [phone]            Nov 10, 2017  8:40 AM CST   (Arrive by 8:25 AM)   Return Visit with SARITHA Briceno   [facility] ([facility])    [address]  [address] [facility] [address] [phone]            Nov 10, 2017 10:00 AM CST   Infusion 360 with UC ONCOLOGY INFUSION, UC 12 ATC   [facility] ([facility])    [address]  [address] [facility] [address] [phone]              Who to contact     Please call your clinic at [phone] to:    Ask questions about your health    Make or cancel appointments    Discuss your medicines    Learn about your test results    Speak to your doctor   If you have compliments or concerns about an experience at your clinic, or if you wish to file a complaint, please contact [facility] Patient Relations at [phone] or email us at [email or web address]         Additional Information About Your Visit        FieldEZhart Information     FieldEZhart is an electronic gateway that provides easy, online access to your medical records. With [employer], you can request a clinic appointment, read your test results, renew a prescription or communicate with your care team.     To sign up for Kaboodlet visit the website at [email or web address]   You will be asked to enter the access code listed below, as well as some personal information. Please follow the directions to create your username and password.     Your access code is: [email or web address]  Expires: 2018  5:30 AM     Your access code will  in 90 days. If you need help or a new code, please contact your [facility] Clinic or call [phone] for assistance.        Care EveryWhere ID     This is your Care EveryWhere ID. This  could be used by other organizations to access your [place] medical records  [id number]         Blood Pressure from Last 3 Encounters:   11/03/17 130/74   10/27/17 128/77   10/25/17 124/78    Weight from Last 3 Encounters:   11/03/17 71.7 kg (158 lb)   10/27/17 68.5 kg (151 lb)   10/25/17 68 kg (150 lb)              Today, you had the following     No orders found for display       Primary Care Provider Office Phone # Fax #    Joan Gonzales Lorenzo, MARLENY [phone]       [facility] [address] [facility] [address]        Equal Access to Services     SHILPI RED : Hadii светлана garcia hadnikkio Soveronica, waaxda luqadaha, qaybta kaalmada adeegyada, ronald rasmussen. So [facility] [phone].    ATENCIÓN: Si habla español, tiene a washburn disposición servicios gratuitos de asistencia lingüística. Llame al [phone].    We comply with applicable federal civil rights laws and [place] laws. We do not discriminate on the basis of race, color, national origin, age, disability, sex, sexual orientation, or gender identity.            Thank you!     Thank you for choosing [facility] UROLOGY AND [facility] FOR PROSTATE AND UROLOGIC CANCERS  for your care. Our goal is always to provide you with excellent care. Hearing back from our patients is one way we can continue to improve our services. Please take a few minutes to complete the written survey that you may receive in the mail after your visit with us. Thank you!             Your Updated Medication List - Protect others around you: Learn how to safely use, store and throw away your medicines at www.disposemymeds.org.          This list is accurate as of: 11/6/17  1:11 PM.  Always use your most recent med list.                   Brand Name Dispense Instructions for use Diagnosis    alum & mag hydroxide-simethicone 200-200-20 MG/5ML Susp suspension    MYLANTA/MAALOX    355 mL    Take 30 mLs by mouth every 4 hours as needed for indigestion         amLODIPine 5 MG tablet    NORVASC    60 tablet    Take 2 tablets (10 mg) by mouth daily    Benign essential hypertension       dexamethasone 4 MG tablet    DECADRON    12 tablet    Take 2 tablets (8 mg) by mouth daily Take for 3 days, starting the day after cisplatin (Day 2 and Day 9).    Urethral cancer (H)       docusate sodium 100 MG capsule    COLACE    60 capsule    Take 1 capsule (100 mg) by mouth 2 times daily as needed for constipation    Slow transit constipation       * FIRST-OMEPRAZOLE 2 MG/ML Susp     300 mL    Take 10 mLs (20 mg) by mouth 2 times daily    Urethral cancer (H), Gastroesophageal reflux disease with esophagitis       * omeprazole 2 mg/mL Susp    priLOSEC    280 mL    Take 10 mLs (20 mg) by mouth 2 times daily    Gastroesophageal reflux disease with esophagitis, Urethral cancer (H)       fluticasone 50 MCG/ACT spray    FLONASE     Spray 1 spray into both nostrils every morning        HYDROXYZINE HCL PO      Take 5 mg by mouth At Bedtime        LORazepam 0.5 MG tablet    ATIVAN    30 tablet    Take 1 tablet (0.5 mg) by mouth every 4 hours as needed (Anxiety, Nausea/Vomiting or Sleep)    Nausea       nystatin 666922 UNIT/ML suspension    MYCOSTATIN    200 mL    Take 5 mLs (500,000 Units) by mouth 4 times daily Swish and spit    Thrush       OLANZapine 5 MG tablet    zyPREXA    30 tablet    Take 1 tablet (5 mg) by mouth At Bedtime    Urethral cancer (H), Nausea       ondansetron 8 MG tablet    ZOFRAN    20 tablet    Take 1 tablet (8 mg) by mouth every 8 hours as needed for nausea    Nausea       prochlorperazine 10 MG tablet    COMPAZINE    30 tablet    Take 0.5 tablets (5 mg) by mouth every 6 hours as needed (Nausea/Vomiting)    Nausea       sucralfate 1 GM/10ML suspension    CARAFATE    800 mL    Take 10 mLs (1 g) by mouth 4 times daily for 20 days    Gastroesophageal reflux disease with esophagitis, Urethral cancer (H)       * Notice:  This list has 2 medication(s) that are the same as other  medications prescribed for you. Read the directions carefully, and ask your doctor or other care provider to review them with you.

## 2017-11-06 NOTE — NURSING NOTE
King Gonsalo Bruno comes into clinic today at the request of Dr. Haddad Ordering Provider for monthly SP tube change.    This service provided today was under the supervising provider of the day Dr. Magaña, who was available if needed.    Reason for visit: Catheter Change    Zoë Snyder    Catheter insertion documentation on 11/6/2017:    King Gonsalo Bruno presents to the clinic for catheter insertion.  Reason for insertion: scheduled insertion  Order has been verified. YES  Catheter successfully inserted into the suprapubic meatus in the usual sterile fashion without immediate complication.  Type of catheter placed: 16 Serbian silicone catheter  Urine is yellow in color.  10 cc's of urine output returned.  Balloon was filled with 7 cc's of normal saline.  Securement device placed for the catheter.  The patient tolerated the procedure and was instructed to return or call for pain, fever, leakage or decreased urine flow.    One Cipro 500 mg tablet given PO prior to catheter change.    Zoë Snyder, ALL

## 2017-11-07 NOTE — TELEPHONE ENCOUNTER
APPT INFO    Date /Time: 11/14/17 at 1:30PM   Reason for Appt: Zenker's diverticulum   Ref Provider[facility]Cl[facility]   Are there internal records? If yes, list: [facility] - records in [facility]   Patient Contact (Y/N) & Call Details: No, per appt notes - pt has records at [facility].   Action: Faxed records request to .     OUTSIDE RECORDS CHECKLIST     CLINIC NAME COMMENTS REC (x) IMG (x)   [facility]  X none

## 2017-11-08 ENCOUNTER — INFUSION THERAPY VISIT (OUTPATIENT)
Dept: ONCOLOGY | Facility: CLINIC | Age: 72
End: 2017-11-08
Attending: INTERNAL MEDICINE
Payer: MEDICARE

## 2017-11-08 ENCOUNTER — APPOINTMENT (OUTPATIENT)
Dept: LAB | Facility: CLINIC | Age: 72
End: 2017-11-08
Attending: INTERNAL MEDICINE
Payer: MEDICARE

## 2017-11-08 ENCOUNTER — ONCOLOGY VISIT (OUTPATIENT)
Dept: ONCOLOGY | Facility: CLINIC | Age: 72
End: 2017-11-08
Attending: DIETITIAN, REGISTERED
Payer: MEDICARE

## 2017-11-08 VITALS
SYSTOLIC BLOOD PRESSURE: 130 MMHG | WEIGHT: 155.6 LBS | OXYGEN SATURATION: 98 % | DIASTOLIC BLOOD PRESSURE: 74 MMHG | RESPIRATION RATE: 16 BRPM | HEART RATE: 74 BPM | TEMPERATURE: 97.9 F | BODY MASS INDEX: 18.95 KG/M2

## 2017-11-08 DIAGNOSIS — E87.6 HYPOKALEMIA: ICD-10-CM

## 2017-11-08 DIAGNOSIS — R11.0 NAUSEA: ICD-10-CM

## 2017-11-08 DIAGNOSIS — C68.0 URETHRAL CANCER (H): ICD-10-CM

## 2017-11-08 DIAGNOSIS — C68.0 URETHRAL CANCER (H): Primary | ICD-10-CM

## 2017-11-08 DIAGNOSIS — K21.00 GASTROESOPHAGEAL REFLUX DISEASE WITH ESOPHAGITIS: Primary | ICD-10-CM

## 2017-11-08 DIAGNOSIS — E86.0 DEHYDRATION: ICD-10-CM

## 2017-11-08 LAB
ANION GAP SERPL CALCULATED.3IONS-SCNC: 10 MMOL/L (ref 3–14)
BUN SERPL-MCNC: 11 MG/DL (ref 7–30)
CALCIUM SERPL-MCNC: 9.1 MG/DL (ref 8.5–10.1)
CHLORIDE SERPL-SCNC: 96 MMOL/L (ref 94–109)
CO2 SERPL-SCNC: 24 MMOL/L (ref 20–32)
CREAT SERPL-MCNC: 1.39 MG/DL (ref 0.66–1.25)
GFR SERPL CREATININE-BSD FRML MDRD: 51 ML/MIN/1.7M2
GLUCOSE SERPL-MCNC: 83 MG/DL (ref 70–99)
MAGNESIUM SERPL-MCNC: 1.4 MG/DL (ref 1.6–2.3)
POTASSIUM SERPL-SCNC: 3.5 MMOL/L (ref 3.4–5.3)
SODIUM SERPL-SCNC: 130 MMOL/L (ref 133–144)

## 2017-11-08 PROCEDURE — 83735 ASSAY OF MAGNESIUM: CPT | Performed by: PHYSICIAN ASSISTANT

## 2017-11-08 PROCEDURE — 96360 HYDRATION IV INFUSION INIT: CPT

## 2017-11-08 PROCEDURE — 80048 BASIC METABOLIC PNL TOTAL CA: CPT | Performed by: PHYSICIAN ASSISTANT

## 2017-11-08 PROCEDURE — 25000128 H RX IP 250 OP 636: Mod: ZF | Performed by: PHYSICIAN ASSISTANT

## 2017-11-08 PROCEDURE — 96361 HYDRATE IV INFUSION ADD-ON: CPT

## 2017-11-08 PROCEDURE — 40000141 ZZH STATISTIC PERIPHERAL IV START W/O US GUIDANCE: Mod: ZF

## 2017-11-08 RX ORDER — PALONOSETRON 0.05 MG/ML
0.25 INJECTION, SOLUTION INTRAVENOUS ONCE
Status: CANCELLED
Start: 2017-11-08 | End: 2017-11-08

## 2017-11-08 RX ADMIN — SODIUM CHLORIDE 1000 ML: 9 INJECTION, SOLUTION INTRAVENOUS at 13:20

## 2017-11-08 ASSESSMENT — PAIN SCALES - GENERAL: PAINLEVEL: MODERATE PAIN (4)

## 2017-11-08 NOTE — LETTER
2017       RE: King Gonsalo Bruno  [address]  [facility] [address]     Dear Colleague,    Thank you for referring your patient, King Gonsalo Bruno, to the [facility]. Please see a copy of my visit note below.    Oncology Distress Screening   Clinical Nutrition  [facility]     Identified Concern and Score From Distress Screening:  Concerned with his ability to eat (10) and concern with his weight (10).      Date of Distress Screenin17     Data:  Per MD - King Gonsalo Bruno is a 71 year old male with stage II penile urethral carcinoma with a PMH significant for HTN and CKD. He is currently undergoing treatment with Cisplatin/Gemzar split on days 1 and 8 given history of CKD. He is currently undergoing curative intent treatment, however he does have indeterminate pulmonary nodules that are being followed. He saw Dr. Arceo last week who deferred chemo due to issues with dehydration and nausea. The plan is to complete 2 more cycles (6 weeks) and then restage. He returns today for consideration of cycle 3.     Per RD - met patient today during infusion.  Pt not very interactive during conversation.  He reports that he has some dysphagia d/t zenker diverticulum.    He is able to eat all texture foods.  He reports weight loss, however, states that he is able to regain weigh between chemo treatments.  Per records, he has lost ~8% of his weight in the past 1 month.   He has been drinking ONS, amount unknown.        Wt Readings from Last 12 Encounters:   17 70.6 kg (155 lb 9.6 oz)   17 71.7 kg (158 lb)   10/27/17 68.5 kg (151 lb)   10/25/17 68 kg (150 lb)   10/25/17 68 kg (150 lb)   10/20/17 70.3 kg (155 lb)   10/19/17 70.3 kg (155 lb)   10/16/17 73.2 kg (161 lb 6.4 oz)   10/13/17 72.9 kg (160 lb 12.8 oz)   10/06/17 75.3 kg (166 lb)   10/06/17 76.3 kg (168 lb 3.2 oz)   10/03/17 73.6 kg (162 lb 4.8 oz)       Intervention:  Encouraged high calorie/high protein foods.  Advised  pt to focus on ~2200-2600kcal/day (which would equate to 6 Ensure Enlive/Plus daily).       Education Provided: as above     Follow-up Required:  NA - per patient request.         Ignacia Brower RD, LD  Oncology Dietitian  [phone]  Pager: [phone]

## 2017-11-08 NOTE — MR AVS SNAPSHOT
After Visit Summary   11/8/2017    King Gonsalo Bruno    MRN: [id number]           Patient Information     Date Of Birth          1945        Visit Information        Provider Department      11/8/2017 1:00 PM UC 18 ATC;  ONCOLOGY INFUSION [facility]        Today's Diagnoses     Gastroesophageal reflux disease with esophagitis    -  1    Hypokalemia        Nausea        Dehydration        Urethral cancer (H)          Care Instructions    Contact numbers:  Triage Main Line: [phone]  After hours: [phone]    Call with chills and/or temperature greater than or equal to 100.5 and questions or concerns.    If after hours, weekends, or holidays, call main hospital  at  [phone] and ask for Oncology doctor on call.           November 2017 Sunday Monday Tuesday Wednesday Thursday Friday Saturday                  1     2     3     [facility] LAB DRAW    7:15 AM   (15 min.)    MASON[facility] LAB DRAW   [facility] Lab Draw     [facility] RETURN    7:35 AM   (50 min.)   [facility]    8:30 AM   (360 min.)    ONCOLOGY INFUSION   [facility] 4       5     6     [facility] RETURN   12:45 PM   (30 min.)   Nurse,  Prostate Cancer Ctr   [facility] Urology and Inst for Prostate and Urologic Cancers     [facility] BMT INFUSION 120    2:00 PM   (120 min.)    BMT INFUSION   [facility] Blood and Marrow Transplant 7     8     [facility] DRAW   12:30 PM   (15 min.)    M[facility]ONIC LAB DRAW   [facility] Lab Draw     [facility] ONC INFUSION 120    1:00 PM   (120 min.)    ONCOLOGY INFUSION   [facility]     UMP NEW    2:45 PM   (60 min.)   Ignacia Wallace RD   [facility] 9     10     [facility] LAB DRAW    8:15 AM   (15 min.)    MASONIC LAB DRAW   [facility] Lab Draw     [facility] RETURN    8:25 AM   (50 min.)   Ramnoa Greer PA M [facility]  [facility]     [facility] ONC INFUSION 360   10:00 AM   (360 min.)   UC ONCOLOGY INFUSION   [facility] 11       12     13     14     UMP NEW    1:15 PM   (30 min.)   Arsenio Ortiz MD   [facility] Ear Nose and Throat 15     [facility] LAB DRAW   12:00 PM   (15 min.)    MASONIC LAB DRAW   [facility] Masonic Lab Draw     [facility] ONC INFUSION 120   12:30 PM   (120 min.)   UC ONCOLOGY INFUSION   [facility] 16     17     [facility] LAB DRAW   12:30 PM   (15 min.)    MASONIC LAB DRAW   [facility] Masonic Lab Draw     [facility] ONC INFUSION 120    1:00 PM   (120 min.)    ONCOLOGY INFUSION   [facility] 18       19     20     21     22     [facility] LAB DRAW   12:30 PM   (15 min.)    MASONIC LAB DRAW   [facility] Masonic Lab Draw     [facility] ONC INFUSION 120    1:00 PM   (120 min.)    ONCOLOGY INFUSION   [facility] 23     24     [facility] DRAW    8:15 AM   (15 min.)    [facility]   [facility]     [facility] RETURN    8:25 AM   (50 min.)   Ramona Greer PA   [facility]     [facility] ONC INFUSION 360   10:00 AM   (360 min.)   UC ONCOLOGY INFUSION   [facility] 25  Happy Birthday!       26     27     28     29     [facility] LAB DRAW   10:30 AM   (15 min.)    MASONIC LAB DRAW   [facility] Masonic Lab Draw     UMP ONC INFUSION 120   11:00 AM   (120 min.)   UC ONCOLOGY INFUSION   [facility] 30 December 2017 Sunday Monday Tuesday Wednesday Thursday Friday Saturday                            1     [facility] LAB DRAW    8:15 AM   (15 min.)    MASONIC LAB DRAW   [facility] Masonic Lab Draw     UMP RETURN    8:25 AM   (50 min.)   Yessica Ovalles APRN CNP   [facility]     UMP ONC INFUSION 360   10:00 AM   (360 min.)   UC ONCOLOGY INFUSION   [facility] 2       3     4     5     6     7     8     9        10     11     UMP RETURN   12:45 PM   (30 min.)   Nurse, [facility] Prostate Cancer [facility]   [facility] Urology and Inst for Prostate and Urologic Cancers 12     13     14     15     16       17     18     19     20     21     22     23       24     25     26     27     28     29     30       31                                               Recent Results (from the past 24 hour(s))   Basic metabolic panel    Collection Time: 11/08/17  1:31 PM   Result Value Ref Range    Sodium 130 (L) 133 - 144 mmol/L    Potassium 3.5 3.4 - 5.3 mmol/L    Chloride 96 94 - 109 mmol/L    Carbon Dioxide 24 20 - 32 mmol/L    Anion Gap 10 3 - 14 mmol/L    Glucose 83 70 - 99 mg/dL    Urea Nitrogen 11 7 - 30 mg/dL    Creatinine 1.39 (H) 0.66 - 1.25 mg/dL    GFR Estimate 51 (L) >60 mL/min/1.7m2    GFR Estimate If Black 61 >60 mL/min/1.7m2    Calcium 9.1 8.5 - 10.1 mg/dL   Magnesium    Collection Time: 11/08/17  1:31 PM   Result Value Ref Range    Magnesium 1.4 (L) 1.6 - 2.3 mg/dL                 Follow-ups after your visit        Your next 10 appointments already scheduled     Nov 08, 2017  3:00 PM CST   (Arrive by 2:45 PM)   New Patient Visit with Ignacia Brower RD   [facility])    [address] [place] [address]   [phone]            Nov 10, 2017  8:15 AM CST   Masonic Lab Draw with  MASONIC LAB DRAW   [facility] Lab Draw ([facility])    [address] [place] [address]   [phone]            Nov 10, 2017  8:40 AM CST   (Arrive by 8:25 AM)   Return Visit with SARITHA Briceno   [facility] ([facility])    [address] [place] [address]   [phone]            Nov 10, 2017 10:00 AM CST   Infusion 360 with NEO ONCOLOGY INFUSION, UC 12 ATC   [facility] ([facility])     [address] [address]   [phone]            Nov 14, 2017  1:30 PM CST   (Arrive by 1:15 PM)   New Patient Visit with Arsenio Ortiz MD   [facility] [place] [facility] Ear Nose and Throat ([facility])    [address] [place] [address]  4th [facility] [address]   [phone]            Nov 15, 2017 12:00 PM CST   Masonic Lab Draw with  [facility] LAB DRAW   [facility])    [address] [place] [address]   [phone]            Nov 15, 2017 12:30 PM CST   Infusion 120 with  ONCOLOGY INFUSION, UC 29 ATC   [facility] ([facility])    [address] [place] [address]   [phone]            Nov 17, 2017 12:30 PM CST   [facility]ic Lab Draw with  [facility] LAB DRAW   [facility])    [address] [place] [address]   [phone]              Who to contact     If you have questions or need follow up information about today's clinic visit or your schedule please contact [facility] CANCER [facility] directly at [phone].  Normal or non-critical lab and imaging results will be communicated to you by GEOCOMtmshart, letter or phone within 4 business days after the clinic has received the results. If you do not hear from us within 7 days, please contact the clinic through GEOCOMtmshart or phone. If you have a critical or abnormal lab result, we will notify you by phone as soon as possible.  Submit refill requests through WolfGIS or call your pharmacy and they will forward the refill request to us. Please allow 3 business days for your refill to be completed.          Additional Information About Your Visit        WolfGIS Information     WolfGIS lets you send messages to your doctor, view your test results, renew your prescriptions, schedule appointments and  "more. To sign up, go to [email or web address] . Click on \"Log in\" on the left side of the screen, which will take you to the Welcome page. Then click on \"Sign up Now\" on the right side of the page.     You will be asked to enter the access code listed below, as well as some personal information. Please follow the directions to create your username and password.     Your access code is: [email or web address]  Expires: 2018  5:30 AM     Your access code will  in 90 days. If you need help or a new code, please call your [place] clinic or [phone].        Care EveryWhere ID     This is your Care EveryWhere ID. This could be used by other organizations to access your [place] medical records  [id number]        Your Vitals Were     Pulse Temperature Respirations Pulse Oximetry BMI (Body Mass Index)       74 97.9  F (36.6  C) (Oral) 16 98% 18.95 kg/m2        Blood Pressure from Last 3 Encounters:   17 130/74   17 137/81   17 130/74    Weight from Last 3 Encounters:   17 70.6 kg (155 lb 9.6 oz)   17 71.7 kg (158 lb)   10/27/17 68.5 kg (151 lb)              We Performed the Following     Basic metabolic panel     Magnesium        Primary Care Provider Office Phone # Fax #    Joan Janet Ventura [phone]       [place] [address]        Equal Access to Services     JENELLE RED : Hadii светлана ku hadasho Soomaali, waaxda luqadaha, qaybta kaalmada adeegsanjana, ronald srivastava . So [facility] [phone].    ATENCIÓN: Si habla español, tiene a washburn disposición servicios gratuitos de asistencia lingüística. Llame al [phone].    We comply with applicable federal civil rights laws and [place] laws. We do not discriminate on the basis of race, color, national origin, age, disability, sex, sexual orientation, or gender identity.            Thank you!     Thank you for choosing [facility] CANCER [facility]  for your " care. Our goal is always to provide you with excellent care. Hearing back from our patients is one way we can continue to improve our services. Please take a few minutes to complete the written survey that you may receive in the mail after your visit with us. Thank you!             Your Updated Medication List - Protect others around you: Learn how to safely use, store and throw away your medicines at www.disposemymeds.org.          This list is accurate as of: 11/8/17  2:55 PM.  Always use your most recent med list.                   Brand Name Dispense Instructions for use Diagnosis    alum & mag hydroxide-simethicone 200-200-20 MG/5ML Susp suspension    MYLANTA/MAALOX    355 mL    Take 30 mLs by mouth every 4 hours as needed for indigestion        amLODIPine 5 MG tablet    NORVASC    60 tablet    Take 2 tablets (10 mg) by mouth daily    Benign essential hypertension       dexamethasone 4 MG tablet    DECADRON    12 tablet    Take 2 tablets (8 mg) by mouth daily Take for 3 days, starting the day after cisplatin (Day 2 and Day 9).    Urethral cancer (H)       docusate sodium 100 MG capsule    COLACE    60 capsule    Take 1 capsule (100 mg) by mouth 2 times daily as needed for constipation    Slow transit constipation       * FIRST-OMEPRAZOLE 2 MG/ML Susp     300 mL    Take 10 mLs (20 mg) by mouth 2 times daily    Urethral cancer (H), Gastroesophageal reflux disease with esophagitis       * omeprazole 2 mg/mL Susp    priLOSEC    280 mL    Take 10 mLs (20 mg) by mouth 2 times daily    Gastroesophageal reflux disease with esophagitis, Urethral cancer (H)       fluticasone 50 MCG/ACT spray    FLONASE     Spray 1 spray into both nostrils every morning        HYDROXYZINE HCL PO      Take 5 mg by mouth At Bedtime        LORazepam 0.5 MG tablet    ATIVAN    30 tablet    Take 1 tablet (0.5 mg) by mouth every 4 hours as needed (Anxiety, Nausea/Vomiting or Sleep)    Nausea       nystatin [id number] UNIT/ML suspension     MYCOSTATIN    200 mL    Take 5 mLs (500,000 Units) by mouth 4 times daily Swish and spit    Thrush       OLANZapine 5 MG tablet    zyPREXA    30 tablet    Take 1 tablet (5 mg) by mouth At Bedtime    Urethral cancer (H), Nausea       ondansetron 8 MG tablet    ZOFRAN    20 tablet    Take 1 tablet (8 mg) by mouth every 8 hours as needed for nausea    Nausea       prochlorperazine 10 MG tablet    COMPAZINE    30 tablet    Take 0.5 tablets (5 mg) by mouth every 6 hours as needed (Nausea/Vomiting)    Nausea       sucralfate 1 GM/10ML suspension    CARAFATE    800 mL    Take 10 mLs (1 g) by mouth 4 times daily for 20 days    Gastroesophageal reflux disease with esophagitis, Urethral cancer (H)       * Notice:  This list has 2 medication(s) that are the same as other medications prescribed for you. Read the directions carefully, and ask your doctor or other care provider to review them with you.

## 2017-11-08 NOTE — MR AVS SNAPSHOT
After Visit Summary   11/8/2017    King Gonsalo Bruno    MRN: [id number]           Patient Information     Date Of Birth          1945        Visit Information        Provider Department      11/8/2017 3:00 PM Ignacia Wallace RD [facility]        Today's Diagnoses     Urethral cancer (H)    -  1       Follow-ups after your visit        Your next 10 appointments already scheduled     Nov 10, 2017  8:15 AM CST   Masonic Lab Draw with UC MASONIC LAB DRAW   [facility] Lab Draw ([facility])    [address] [place] [address]   [phone]            Nov 10, 2017  8:40 AM CST   (Arrive by 8:25 AM)   Return Visit with SARITHA Briceno   [facility] ([facility])    [address] [place] [address]   [phone]            Nov 10, 2017 10:00 AM CST   Infusion 360 with UC ONCOLOGY INFUSION, UC 12 ATC   [facility] ([facility])    [address] [place] [address]   [phone]            Nov 14, 2017  1:30 PM CST   (Arrive by 1:15 PM)   New Patient Visit with Arsenio Ortiz MD   [facility] Ear Nose and Throat ([facility])    [address] [place] [address]   [phone]            Nov 15, 2017 12:00 PM CST   Masonic Lab Draw with [facility] LAB DRAW   [facility])    [address] [place] [address]   [phone]            Nov 15, 2017 12:30 PM CST   Infusion 120 with UC ONCOLOGY INFUSION, UC 29 ATC   [facility] Cancer [facility] ([facility])    [address] [place] [address]   [phone]            Nov 17, 2017 12:30 PM CST   Masonic Lab Draw with UC MASONIC LAB DRAW   [facility] Lab Draw ([facility])    [address] [facility]  [address] [facility] [place] [address] [phone]            2017  1:00 PM CST   Infusion 120 with UC ONCOLOGY INFUSION   [facility] ([facility])    [address] [address] [phone]              Who to contact     If you have questions or need follow up information about today's clinic visit or your schedule please contact [facility] CANCER [facility] directly at [phone].  Normal or non-critical lab and imaging results will be communicated to you by HCIhart, letter or phone within 4 business days after the clinic has received the results. If you do not hear from us within 7 days, please contact the clinic through HCIhart or phone. If you have a critical or abnormal lab result, we will notify you by phone as soon as possible.  Submit refill requests through Hua Kang or call your pharmacy and they will forward the refill request to us. Please allow 3 business days for your refill to be completed.          Additional Information About Your Visit        HCIhart Information     Hua Kang lets you send messages to your doctor, view your test results, renew your prescriptions, schedule appointments and more. To sign up, go to [email or web address] . Click on \"Log in\" on the left side of the screen, which will take you to the Welcome page. Then click on \"Sign up Now\" on the right side of the page.     You will be asked to enter the access code listed below, as well as some personal information. Please follow the directions to create your username and password.     Your access code is: [email or web address]  Expires: 2018  5:30 AM     Your access code will  in 90 days. If you need help or a new code, please call your [place] clinic or [phone].        Care EveryWhere ID     This is your Care EveryWhere ID. This could be used by other " organizations to access your [place] medical records  [id number]         Blood Pressure from Last 3 Encounters:   11/08/17 130/74   11/06/17 137/81   11/03/17 130/74    Weight from Last 3 Encounters:   11/08/17 70.6 kg (155 lb 9.6 oz)   11/03/17 71.7 kg (158 lb)   10/27/17 68.5 kg (151 lb)              Today, you had the following     No orders found for display       Primary Care Provider Office Phone # Fax #    Joan Gonzales Lorenzo, MARLENY [phone]       [facility] [address] [facility] [address]        Equal Access to Services     JENELLE RED : Hadii светлана Robert, waaxda luqadaha, qaybta kaalmada adeaamiryada, ronald rasmussen. So [facility] [phone].    ATENCIÓN: Si habla español, tiene a washburn disposición servicios gratuitos de asistencia lingüística. Llame al [phone].    We comply with applicable federal civil rights laws and [place] laws. We do not discriminate on the basis of race, color, national origin, age, disability, sex, sexual orientation, or gender identity.            Thank you!     Thank you for choosing [facility] CANCER [facility]  for your care. Our goal is always to provide you with excellent care. Hearing back from our patients is one way we can continue to improve our services. Please take a few minutes to complete the written survey that you may receive in the mail after your visit with us. Thank you!             Your Updated Medication List - Protect others around you: Learn how to safely use, store and throw away your medicines at www.disposemymeds.org.          This list is accurate as of: 11/8/17 11:59 PM.  Always use your most recent med list.                   Brand Name Dispense Instructions for use Diagnosis    alum & mag hydroxide-simethicone 200-200-20 MG/5ML Susp suspension    MYLANTA/MAALOX    355 mL    Take 30 mLs by mouth every 4 hours as needed for indigestion        amLODIPine 5 MG tablet    NORVASC    60 tablet     Take 2 tablets (10 mg) by mouth daily    Benign essential hypertension       dexamethasone 4 MG tablet    DECADRON    12 tablet    Take 2 tablets (8 mg) by mouth daily Take for 3 days, starting the day after cisplatin (Day 2 and Day 9).    Urethral cancer (H)       docusate sodium 100 MG capsule    COLACE    60 capsule    Take 1 capsule (100 mg) by mouth 2 times daily as needed for constipation    Slow transit constipation       * FIRST-OMEPRAZOLE 2 MG/ML Susp     300 mL    Take 10 mLs (20 mg) by mouth 2 times daily    Urethral cancer (H), Gastroesophageal reflux disease with esophagitis       * omeprazole 2 mg/mL Susp    priLOSEC    280 mL    Take 10 mLs (20 mg) by mouth 2 times daily    Gastroesophageal reflux disease with esophagitis, Urethral cancer (H)       fluticasone 50 MCG/ACT spray    FLONASE     Spray 1 spray into both nostrils every morning        HYDROXYZINE HCL PO      Take 5 mg by mouth At Bedtime        LORazepam 0.5 MG tablet    ATIVAN    30 tablet    Take 1 tablet (0.5 mg) by mouth every 4 hours as needed (Anxiety, Nausea/Vomiting or Sleep)    Nausea       nystatin [id number] UNIT/ML suspension    MYCOSTATIN    200 mL    Take 5 mLs (500,000 Units) by mouth 4 times daily Swish and spit    Thrush       OLANZapine 5 MG tablet    zyPREXA    30 tablet    Take 1 tablet (5 mg) by mouth At Bedtime    Urethral cancer (H), Nausea       ondansetron 8 MG tablet    ZOFRAN    20 tablet    Take 1 tablet (8 mg) by mouth every 8 hours as needed for nausea    Nausea       prochlorperazine 10 MG tablet    COMPAZINE    30 tablet    Take 0.5 tablets (5 mg) by mouth every 6 hours as needed (Nausea/Vomiting)    Nausea       sucralfate 1 GM/10ML suspension    CARAFATE    800 mL    Take 10 mLs (1 g) by mouth 4 times daily for 20 days    Gastroesophageal reflux disease with esophagitis, Urethral cancer (H)       * Notice:  This list has 2 medication(s) that are the same as other medications prescribed for you. Read the  directions carefully, and ask your doctor or other care provider to review them with you.

## 2017-11-08 NOTE — PATIENT INSTRUCTIONS
Contact numbers:  Triage Main Line: [phone]  After hours: [phone]    Call with chills and/or temperature greater than or equal to 100.5 and questions or concerns.    If after hours, weekends, or holidays, call main hospital  at  [phone] and ask for Oncology doctor on call.           November 2017 Sunday Monday Tuesday Wednesday Thursday Friday Saturday                  1     2     3     [facility]    7:15 AM   (15 min.)    [facility]   [facility]     [facility] RETURN    7:35 AM   (50 min.)   Ramona Greer PA   [facility]    8:30 AM   (360 min.)    ONCOLOGY INFUSION   [facility] 4       5     6     [facility] RETURN   12:45 PM   (30 min.)   Nurse,  [facility]   [facility]     P BMT INFUSION 120    2:00 PM   (120 min.)    BMT INFUSION   [facility] 7     8     [facility]   12:30 PM   (15 min.)    [facility]   [facility]     [facility] ONC INFUSION 120    1:00 PM   (120 min.)    ONCOLOGY INFUSION   [facility]     [facility] NEW    2:45 PM   (60 min.)   Ignacia Wallace RD   [facility] 9     10     [facility]    8:15 AM   (15 min.)    [facility] DRAW   [facility]     [facility] RETURN    8:25 AM   (50 min.)   Ramona Greer PA   [facility]     [facility] ONC INFUSION 360   10:00 AM   (360 min.)    ONCOLOGY INFUSION   [facility] 11       12     13     14     [facility] NEW    1:15 PM   (30 min.)   Arsenio Ortiz MD   [facility] 15     [facility]   12:00 PM   (15 min.)   [facility]     [facility] ONC INFUSION 120   12:30 PM   (120 min.)    ONCOLOGY INFUSION   [facility] 16     17     [facility]  DRAW   12:30 PM   (15 min.)    [facility] DRAW   [facility] Draw     [facility] ONC INFUSION 120    1:00 PM   (120 min.)    ONCOLOGY INFUSION   [facility] 18       19     20     21     22     [facility]   12:30 PM   (15 min.)    [facility] DRAW   [facility]     [facility] ONC INFUSION 120    1:00 PM   (120 min.)    ONCOLOGY INFUSION   [facility] 23     24     [facility]    8:15 AM   (15 min.)    [facility]     [facility] RETURN    8:25 AM   (50 min.)   Ramona Greer PA   [facility]     [facility] ONC INFUSION 360   10:00 AM   (360 min.)    ONCOLOGY INFUSION   [facility] 25  Happy Birth[birth year]!       26     27     28     29     [facility]   10:30 AM   (15 min.)    [facility]     [facility] ONC INFUSION 120   11:00 AM   (120 min.)    ONCOLOGY INFUSION   [facility] 30 December 2017 Sunday Monday Tuesday Wednesday Thursday Friday Saturday                            1     [facility]    8:15 AM   (15 min.)    [facility] DRAW   [facility]     [facility] RETURN    8:25 AM   (50 min.)   Yessica Ovalles APRN CNP   [facility]     [facility] ONC INFUSION 360   10:00 AM   (360 min.)    ONCOLOGY INFUSION   [facility] 2       3     4     5     6     7     8     9       10     11     [facility] RETURN   12:45 PM   (30 min.)   Nurse,  Prostate Cancer Ctr   [facility] 12     13     14     15     16       17     18     19     20     21     22     23       24     25     26     27     28     29     30       31                                               Recent Results (from the past 24 hour(s))   Basic metabolic panel    Collection Time: 11/08/17  1:31 PM   Result Value Ref Range    Sodium 130 (L) 133  - 144 mmol/L    Potassium 3.5 3.4 - 5.3 mmol/L    Chloride 96 94 - 109 mmol/L    Carbon Dioxide 24 20 - 32 mmol/L    Anion Gap 10 3 - 14 mmol/L    Glucose 83 70 - 99 mg/dL    Urea Nitrogen 11 7 - 30 mg/dL    Creatinine 1.39 (H) 0.66 - 1.25 mg/dL    GFR Estimate 51 (L) >60 mL/min/1.7m2    GFR Estimate If Black 61 >60 mL/min/1.7m2    Calcium 9.1 8.5 - 10.1 mg/dL   Magnesium    Collection Time: 11/08/17  1:31 PM   Result Value Ref Range    Magnesium 1.4 (L) 1.6 - 2.3 mg/dL

## 2017-11-08 NOTE — PROGRESS NOTES
Infusion Nursing Note:  King Gonsalo Bruno presents for IVF    Note: pt feeling okay today, states he's been able to eat and drink enough and his nausea is being adequately controlled with his oral antiemetics. Pt got emend on Monday and will be getting Aloxi on Friday with chemo, pt did not feel like he needed any IV zofran or dex toady. Labs came back with mag of 1.4, creatinine trending up at 1.39 and sodium down to 130. Pt does not want to stay for IV magnesium replacement today    TORB- SARITHA Carrillo/Giovanna Peres RN  --send pt home with script for magnesium oxide, 400mb BID  --encourage pt to continue to drink plenty of water at home    Pt did not want a prescription and stated he would pick it up over the counter    Treatment Conditions:  Lab Results   Component Value Date     11/08/2017                   Lab Results   Component Value Date    POTASSIUM 3.5 11/08/2017           Lab Results   Component Value Date    MAG 1.4 11/08/2017            Lab Results   Component Value Date    CR 1.39 11/08/2017                   Lab Results   Component Value Date    SAADIA 9.1 11/08/2017                Lab Results   Component Value Date    BILITOTAL 0.2 11/03/2017           Lab Results   Component Value Date    ALBUMIN 3.0 11/03/2017                    Lab Results   Component Value Date    ALT 16 11/03/2017           Lab Results   Component Value Date    AST 20 11/03/2017         Intravenous Access:  Peripheral IV placed by vascular access.    Post Infusion Assessment:  Patient tolerated infusion without incident.  Blood return noted pre and post infusion.  No evidence of extravasations.  Access discontinued per protocol.    Discharge Plan:   Patient declined prescription refills.  Discharge instructions reviewed with: Patient.  Patient and/or family verbalized understanding of discharge instructions and all questions answered.  Copy of AVS reviewed with patient and/or family.  Patient will return 11/22 for next  appointment.  Patient discharged in stable condition accompanied by: self.    Giovanna Peres RN

## 2017-11-08 NOTE — TELEPHONE ENCOUNTER
Records Received From:   [facility] [employer]     DATE/EXAM/LOCATION  (specify location if different)   Office Notes: 8/16/17   Labs:  2017

## 2017-11-08 NOTE — PROGRESS NOTES
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     Interval history:  hasn't been feeling well. He has been getting about 1 L fluids a day. He is feeling dehydrated with dry mouth and dizzy with position change. He has mild nausea and has been taking compazine prn with some relief. No emesis. He has also heaven constipated. Normally he is regular every day and now it is 5-6 days. He tried dulcolax and had diarrhea. Now he has been taking miralax once a day which has been working but small hard stools. No ab pain. Per the patient, the urethral mass has gotten smaller.     He would like to see an ENT here for his chronic nasal congestion and a diverticulum (Zenkers?). He was seeing [facility] and would like to transfer his care here. He feels this strongly has to do with his gagging and nausea/vomiting, which seems to be far out from actual chemo and more related to GERD or gagging issues.     Past medical history:  Patient Active Problem List   Diagnosis     Urethral cancer (H)     Nausea     Dehydration     Hypokalemia       Medications:    Current Outpatient Prescriptions on File Prior to Visit:  alum & mag hydroxide-simethicone (MYLANTA/MAALOX) 200-200-20 MG/5ML SUSP suspension Take 30 mLs by mouth every 4 hours as needed for indigestion   LORazepam (ATIVAN) 0.5 MG tablet Take 1 tablet (0.5 mg) by mouth every 4 hours as needed (Anxiety, Nausea/Vomiting or Sleep)   prochlorperazine (COMPAZINE) 10 MG tablet Take 0.5 tablets (5 mg) by mouth every 6 hours as needed (Nausea/Vomiting) (Patient not taking: Reported on 11/3/2017)   ondansetron (ZOFRAN) 8 MG tablet Take 1 tablet (8 mg) by mouth every 8 hours as needed for nausea (Patient not taking: Reported on 11/3/2017)   docusate  sodium (COLACE) 100 MG capsule Take 1 capsule (100 mg) by mouth 2 times daily as needed for constipation   dexamethasone (DECADRON) 4 MG tablet Take 2 tablets (8 mg) by mouth daily Take for 3 days, starting the day after cisplatin (Day 2 and Day 9).   HYDROXYZINE HCL PO Take 5 mg by mouth At Bedtime    fluticasone (FLONASE) 50 MCG/ACT spray Spray 1 spray into both nostrils every morning      No current facility-administered medications on file prior to visit.     Allergy:     Allergies   Allergen Reactions     Lisinopril Swelling       Physical exam  /84  Pulse 91  Temp 98.8  F (37.1  C)  Resp 16  Wt 70.3 kg (155 lb)  SpO2 100%  BMI 18.38 kg/m2  Wt Readings from Last 4 Encounters:   11/03/17 71.7 kg (158 lb)   10/27/17 68.5 kg (151 lb)   10/25/17 68 kg (150 lb)   10/25/17 68 kg (150 lb)     General: No acute distress  HEENT: Sclera anicteric. Oral mucosa dry.  No mucositis or thrush  Lymph: No lymphadenopathy in neck  Heart: Regular, rate, and rhythm  Lungs: Clear to ascultation bilaterally  Abdomen: Positive bowel sounds. Soft, non-distended, non-tender.  : SPC site is clean, dry and non-tender.   Extremities: no lower extremity edema  Neuro: Cranial nerves grossly intact  Rash: none    Labs:     10/20/2017 13:54   Sodium 127 (L)   Potassium 4.0   Chloride 92 (L)   Carbon Dioxide 26   Urea Nitrogen 10   Creatinine 1.16   GFR Estimate 62   GFR Estimate If Black 75   Calcium 8.9   Anion Gap 8   Magnesium 1.3 (L)   Albumin 3.2 (L)   Protein Total 7.0   Bilirubin Total 0.5   Alkaline Phosphatase 77   ALT 25   AST 31   Glucose 109 (H)   WBC 4.0   Hemoglobin 10.6 (L)   Hematocrit 31.1 (L)   Platelet Count 158   RBC Count 3.63 (L)   MCV 86     Assessment and plan:  King Gonsalo Bruno is a 71 year old male with 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 He is here symptomatic after his chemo.     Dehydration/orthostatic  hypotension with decreased PO intake and nausea  -IL NS today and increase PO fluids to 3L a day.  Will set up IVF this weekend as well  -IV K and will start oral K  -IV mag today  -stop HCTZ  -scheduled zofran, ativan and compazine.     Constipation. Advised againt dulcolax  -miralax BID prn  -colace BID prn    Esophageal diverticulum  -He was seeing ENT at [facility] and would like to transfer his care here.  Will set up swallow study and ENT next week.    GERD: start IV PPI today and will try and get liquid PPI BID for home, H2 BID and carafate      Over 50% of 35 min visit was spent counseling and coordinating care  Lela Powell PA-C

## 2017-11-09 NOTE — PROGRESS NOTES
Oncology Distress Screening   Clinical Nutrition  [facility]     Identified Concern and Score From Distress Screening: Concerned with his ability to eat (10) and concern with his weight (10).      Date of Distress Screenin17     Data: Per MD - King Gonsalo Bruno is a 71 year old male with stage II penile urethral carcinoma with a PMH significant for HTN and CKD. He is currently undergoing treatment with Cisplatin/Gemzar split on days 1 and 8 given history of CKD. He is currently undergoing curative intent treatment, however he does have indeterminate pulmonary nodules that are being followed. He saw Dr. Arceo last week who deferred chemo due to issues with dehydration and nausea. The plan is to complete 2 more cycles (6 weeks) and then restage. He returns today for consideration of cycle 3.     Per RD - met patient today during infusion.  Pt not very interactive during conversation.  He reports that he has some dysphagia d/t zenker diverticulum.    He is able to eat all texture foods.  He reports weight loss, however, states that he is able to regain weigh between chemo treatments.  Per records, he has lost ~8% of his weight in the past 1 month.   He has been drinking ONS, amount unknown.        Wt Readings from Last 12 Encounters:   17 70.6 kg (155 lb 9.6 oz)   17 71.7 kg (158 lb)   10/27/17 68.5 kg (151 lb)   10/25/17 68 kg (150 lb)   10/25/17 68 kg (150 lb)   10/20/17 70.3 kg (155 lb)   10/19/17 70.3 kg (155 lb)   10/16/17 73.2 kg (161 lb 6.4 oz)   10/13/17 72.9 kg (160 lb 12.8 oz)   10/06/17 75.3 kg (166 lb)   10/06/17 76.3 kg (168 lb 3.2 oz)   10/03/17 73.6 kg (162 lb 4.8 oz)       Intervention: Encouraged high calorie/high protein foods.  Advised pt to focus on ~2200-2600kcal/day (which would equate to 6 Ensure Enlive/Plus daily).       Education Provided: as above     Follow-up Required: NA - per patient request.         Ignacia Brower RD, LD  Oncology Dietitian  [phone]  Pager:  [phone]

## 2017-11-10 ENCOUNTER — ONCOLOGY VISIT (OUTPATIENT)
Dept: ONCOLOGY | Facility: CLINIC | Age: 72
End: 2017-11-10
Attending: PHYSICIAN ASSISTANT
Payer: MEDICARE

## 2017-11-10 ENCOUNTER — APPOINTMENT (OUTPATIENT)
Dept: LAB | Facility: CLINIC | Age: 72
End: 2017-11-10
Attending: INTERNAL MEDICINE
Payer: MEDICARE

## 2017-11-10 ENCOUNTER — INFUSION THERAPY VISIT (OUTPATIENT)
Dept: ONCOLOGY | Facility: CLINIC | Age: 72
End: 2017-11-10
Attending: INTERNAL MEDICINE
Payer: MEDICARE

## 2017-11-10 VITALS
TEMPERATURE: 98.6 F | BODY MASS INDEX: 18.97 KG/M2 | DIASTOLIC BLOOD PRESSURE: 76 MMHG | SYSTOLIC BLOOD PRESSURE: 135 MMHG | OXYGEN SATURATION: 98 % | HEART RATE: 81 BPM | HEIGHT: 76 IN | RESPIRATION RATE: 18 BRPM | WEIGHT: 155.8 LBS

## 2017-11-10 DIAGNOSIS — C68.0 URETHRAL CANCER (H): Primary | ICD-10-CM

## 2017-11-10 LAB
ALBUMIN SERPL-MCNC: 3.3 G/DL (ref 3.4–5)
ALP SERPL-CCNC: 70 U/L (ref 40–150)
ALT SERPL W P-5'-P-CCNC: 18 U/L (ref 0–70)
ANION GAP SERPL CALCULATED.3IONS-SCNC: 9 MMOL/L (ref 3–14)
AST SERPL W P-5'-P-CCNC: 26 U/L (ref 0–45)
BASOPHILS # BLD AUTO: 0 10E9/L (ref 0–0.2)
BASOPHILS NFR BLD AUTO: 0.7 %
BILIRUB SERPL-MCNC: 0.4 MG/DL (ref 0.2–1.3)
BUN SERPL-MCNC: 15 MG/DL (ref 7–30)
CALCIUM SERPL-MCNC: 9.3 MG/DL (ref 8.5–10.1)
CHLORIDE SERPL-SCNC: 94 MMOL/L (ref 94–109)
CO2 SERPL-SCNC: 25 MMOL/L (ref 20–32)
CREAT SERPL-MCNC: 1.52 MG/DL (ref 0.66–1.25)
CREAT SERPL-MCNC: 1.6 MG/DL (ref 0.66–1.25)
DIFFERENTIAL METHOD BLD: ABNORMAL
EOSINOPHIL # BLD AUTO: 0 10E9/L (ref 0–0.7)
EOSINOPHIL NFR BLD AUTO: 0.7 %
ERYTHROCYTE [DISTWIDTH] IN BLOOD BY AUTOMATED COUNT: 15.9 % (ref 10–15)
GFR SERPL CREATININE-BSD FRML MDRD: 43 ML/MIN/1.7M2
GFR SERPL CREATININE-BSD FRML MDRD: 46 ML/MIN/1.7M2
GLUCOSE SERPL-MCNC: 92 MG/DL (ref 70–99)
HCT VFR BLD AUTO: 26.4 % (ref 40–53)
HGB BLD-MCNC: 9 G/DL (ref 13.3–17.7)
IMM GRANULOCYTES # BLD: 0.1 10E9/L (ref 0–0.4)
IMM GRANULOCYTES NFR BLD: 3.3 %
LYMPHOCYTES # BLD AUTO: 1.4 10E9/L (ref 0.8–5.3)
LYMPHOCYTES NFR BLD AUTO: 44.9 %
MAGNESIUM SERPL-MCNC: 1.3 MG/DL (ref 1.6–2.3)
MCH RBC QN AUTO: 29.4 PG (ref 26.5–33)
MCHC RBC AUTO-ENTMCNC: 34.1 G/DL (ref 31.5–36.5)
MCV RBC AUTO: 86 FL (ref 78–100)
MONOCYTES # BLD AUTO: 0.2 10E9/L (ref 0–1.3)
MONOCYTES NFR BLD AUTO: 6.3 %
NEUTROPHILS # BLD AUTO: 1.3 10E9/L (ref 1.6–8.3)
NEUTROPHILS NFR BLD AUTO: 44.1 %
NRBC # BLD AUTO: 0 10*3/UL
NRBC BLD AUTO-RTO: 1 /100
PLATELET # BLD AUTO: 157 10E9/L (ref 150–450)
POTASSIUM SERPL-SCNC: 3.2 MMOL/L (ref 3.4–5.3)
PROT SERPL-MCNC: 7.1 G/DL (ref 6.8–8.8)
RBC # BLD AUTO: 3.06 10E12/L (ref 4.4–5.9)
SODIUM SERPL-SCNC: 128 MMOL/L (ref 133–144)
WBC # BLD AUTO: 3 10E9/L (ref 4–11)

## 2017-11-10 PROCEDURE — 99212 OFFICE O/P EST SF 10 MIN: CPT | Mod: 25

## 2017-11-10 PROCEDURE — 25000128 H RX IP 250 OP 636: Mod: ZF | Performed by: INTERNAL MEDICINE

## 2017-11-10 PROCEDURE — 40000556 ZZH STATISTIC PERIPHERAL IV START W US GUIDANCE: Mod: ZF

## 2017-11-10 PROCEDURE — 25000128 H RX IP 250 OP 636: Mod: ZF | Performed by: PHYSICIAN ASSISTANT

## 2017-11-10 PROCEDURE — 96413 CHEMO IV INFUSION 1 HR: CPT

## 2017-11-10 PROCEDURE — 82565 ASSAY OF CREATININE: CPT | Performed by: PHYSICIAN ASSISTANT

## 2017-11-10 PROCEDURE — 25000132 ZZH RX MED GY IP 250 OP 250 PS 637: Mod: ZF | Performed by: PHYSICIAN ASSISTANT

## 2017-11-10 PROCEDURE — 83735 ASSAY OF MAGNESIUM: CPT | Performed by: INTERNAL MEDICINE

## 2017-11-10 PROCEDURE — 99214 OFFICE O/P EST MOD 30 MIN: CPT | Mod: ZP | Performed by: PHYSICIAN ASSISTANT

## 2017-11-10 PROCEDURE — 99213 OFFICE O/P EST LOW 20 MIN: CPT | Mod: ZF

## 2017-11-10 PROCEDURE — 85025 COMPLETE CBC W/AUTO DIFF WBC: CPT | Performed by: INTERNAL MEDICINE

## 2017-11-10 PROCEDURE — 96367 TX/PROPH/DG ADDL SEQ IV INF: CPT

## 2017-11-10 PROCEDURE — A9270 NON-COVERED ITEM OR SERVICE: HCPCS | Mod: ZF | Performed by: PHYSICIAN ASSISTANT

## 2017-11-10 PROCEDURE — 96417 CHEMO IV INFUS EACH ADDL SEQ: CPT

## 2017-11-10 PROCEDURE — 96375 TX/PRO/DX INJ NEW DRUG ADDON: CPT

## 2017-11-10 PROCEDURE — 80053 COMPREHEN METABOLIC PANEL: CPT | Performed by: INTERNAL MEDICINE

## 2017-11-10 RX ORDER — POTASSIUM CHLORIDE 1.5 G/1.58G
40 POWDER, FOR SOLUTION ORAL ONCE
Status: COMPLETED | OUTPATIENT
Start: 2017-11-10 | End: 2017-11-10

## 2017-11-10 RX ORDER — PALONOSETRON 0.05 MG/ML
0.25 INJECTION, SOLUTION INTRAVENOUS ONCE
Status: COMPLETED | OUTPATIENT
Start: 2017-11-10 | End: 2017-11-10

## 2017-11-10 RX ADMIN — GEMCITABINE 2000 MG: 38 INJECTION, SOLUTION INTRAVENOUS at 13:38

## 2017-11-10 RX ADMIN — CISPLATIN 70 MG: 1 INJECTION, SOLUTION INTRAVENOUS at 14:15

## 2017-11-10 RX ADMIN — POTASSIUM CHLORIDE 40 MEQ: 1.5 POWDER, FOR SOLUTION ORAL at 10:10

## 2017-11-10 RX ADMIN — MAGNESIUM SULFATE HEPTAHYDRATE: 500 INJECTION, SOLUTION INTRAMUSCULAR; INTRAVENOUS at 10:04

## 2017-11-10 RX ADMIN — PALONOSETRON HYDROCHLORIDE 0.25 MG: 0.25 INJECTION INTRAVENOUS at 12:51

## 2017-11-10 RX ADMIN — SODIUM CHLORIDE 150 MG: 9 INJECTION, SOLUTION INTRAVENOUS at 13:02

## 2017-11-10 RX ADMIN — DEXAMETHASONE SODIUM PHOSPHATE 12 MG: 10 INJECTION, SOLUTION INTRAMUSCULAR; INTRAVENOUS at 12:53

## 2017-11-10 ASSESSMENT — PAIN SCALES - GENERAL: PAINLEVEL: NO PAIN (0)

## 2017-11-10 NOTE — PROGRESS NOTES
Infusion Nursing Note:  King Gonsalo Bruno presents today for Cycle 3 Day 8 Gemzar/Cisplatin, IVF, Electrolyte Supplementation.    Patient seen by provider today: Yes: Ramona MELARA    Note: Pt given IVF and electrolytes supplemented per protocol. Pt voided prior to and after Cisplatin. Encouraged pt to increase po fluid intake. Educated patient on signs/symptoms of hypokalemia, hypomagnesemia. Verbalized understanding.     TORB 11/10/17 0930: Ramona MELARA/Khalida Lynch RN: Give 1 L NS with 2gm Magnesium Sulfate per Electrolyte replacement protocol. Check Creatinine level post IVF. Replace Potassium per Electrolyte replacement protocol.     TORB per Sofi Rooney RN/Ramona MELARA 11/10/2017 @ 1215:  - Okay to proceed with chemotherapy today as creatinine is 1.52. Please ensure that patient has a follow up appointment on Monday 11/13/17 for IVF.    TORB 11/10/17 1230 Ramona MELARA/Khalida Lynch RN: Ok for patient to come to infusion Saturday/Wednesday/Friday for IVF/electrolyte replacement.     Intravenous Access:  Peripheral IV placed in lab.    Treatment Conditions:  Lab Results   Component Value Date    HGB 9.0 11/10/2017     Lab Results   Component Value Date    WBC 3.0 11/10/2017      Lab Results   Component Value Date    ANEU 1.3 11/10/2017     Lab Results   Component Value Date     11/10/2017      Lab Results   Component Value Date     11/10/2017                   Lab Results   Component Value Date    POTASSIUM 3.2 11/10/2017           Lab Results   Component Value Date    MAG 1.3 11/10/2017            Lab Results   Component Value Date    CR 1.60 11/10/2017                   Lab Results   Component Value Date    SAADIA 9.3 11/10/2017                Lab Results   Component Value Date    BILITOTAL 0.4 11/10/2017           Lab Results   Component Value Date    ALBUMIN 3.3 11/10/2017                    Lab Results   Component Value Date    ALT 18 11/10/2017           Lab  Results   Component Value Date    AST 26 11/10/2017     Results reviewed, labs did not meet treatment parameters, see TORB above      Post Infusion Assessment:  Patient tolerated infusion without incident.   Blood return noted pre and post infusion.  No evidence of extravasations.  Access discontinued per protocol.    Discharge Plan:   Prescription refills given for Ativan. Pt to  from first floor pharmacy.  Discharge instructions reviewed with: Patient.  Patient verbalized understanding of discharge instructions and all questions answered.  Copy of AVS reviewed with patient and/or family.  Patient will return 11/12/17 for next infusion appointment.  Patient discharged in stable condition accompanied by: significant other, Stephanie.  Face to Face time: 5.    HALEY QUEVEDO RN

## 2017-11-10 NOTE — PROGRESS NOTES
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 He is currently undergoing treatment with Cisplatin/Gemzar split on days 1 and 8 given history of CKD. He is currently undergoing curative intent treatment, however he does have indeterminate pulmonary nodules that are being followed. He saw Dr. Arceo 2 weeks ago who deferred chemo due to issues with dehydration and nausea. The plan is to complete 2 more cycles (6 weeks) and then restage. He started cycle 3 on 11/3. He returns today for consideration of cycle 3 day 8.     Interval History:  Mr. Bruno returns today with his wife. This week after cycle 1 day 1 of chemotherapy is usually tough for him. He did much better with more aggressive antiemetics and IVF. His nausea has been much better. He has had no vomiting. Has had 32 oz of water so far today and over 80 oz yesterday. He denies any mouth sores but does continue to have \"throat\" issues with his reflux. He is using omeprazole BID and carafate prn. Was initially constipated and taking Miralax and colace. He then had a few episodes of diarrhea after taking magnesium tablets. No fevers/chills, cough, SOB, CP. No further falls. He does have intermittent dizziness upon standing. Mild ankle edema that resolves with elevating legs. ROS otherwise negative.     Current Outpatient Prescriptions   Medication Sig Dispense Refill     omeprazole (PRILOSEC) 2 mg/mL SUSP Take 10 mLs (20 mg) by mouth 2 times daily 280 mL 0     nystatin (MYCOSTATIN) 548359 UNIT/ML suspension Take 5 mLs (500,000 Units) by mouth 4 times daily Swish and spit 200 mL 0     amLODIPine (NORVASC) 5 MG tablet Take 2 tablets (10 mg) by mouth daily 60 tablet 3     OLANZapine (ZYPREXA) 5 MG tablet Take 1 tablet (5 mg) by mouth At Bedtime 30 tablet 0     " "FIRST-OMEPRAZOLE 2 MG/ML SUSP Take 10 mLs (20 mg) by mouth 2 times daily 300 mL 0     alum & mag hydroxide-simethicone (MYLANTA/MAALOX) 200-200-20 MG/5ML SUSP suspension Take 30 mLs by mouth every 4 hours as needed for indigestion 355 mL 1     LORazepam (ATIVAN) 0.5 MG tablet Take 1 tablet (0.5 mg) by mouth every 4 hours as needed (Anxiety, Nausea/Vomiting or Sleep) 30 tablet 3     prochlorperazine (COMPAZINE) 10 MG tablet Take 0.5 tablets (5 mg) by mouth every 6 hours as needed (Nausea/Vomiting) 30 tablet 3     ondansetron (ZOFRAN) 8 MG tablet Take 1 tablet (8 mg) by mouth every 8 hours as needed for nausea 20 tablet 3     docusate sodium (COLACE) 100 MG capsule Take 1 capsule (100 mg) by mouth 2 times daily as needed for constipation 60 capsule 0     dexamethasone (DECADRON) 4 MG tablet Take 2 tablets (8 mg) by mouth daily Take for 3 days, starting the day after cisplatin (Day 2 and Day 9). 12 tablet 3     HYDROXYZINE HCL PO Take 5 mg by mouth At Bedtime        fluticasone (FLONASE) 50 MCG/ACT spray Spray 1 spray into both nostrils every morning        Physical Examination:  /76  Pulse 81  Temp 98.6  F (37  C) (Oral)  Resp 18  Ht 1.93 m (6' 3.98\")  Wt 70.7 kg (155 lb 12.8 oz)  SpO2 98%  BMI 18.97 kg/m2  Wt Readings from Last 10 Encounters:   11/10/17 70.7 kg (155 lb 12.8 oz)   11/08/17 70.6 kg (155 lb 9.6 oz)   11/03/17 71.7 kg (158 lb)   10/27/17 68.5 kg (151 lb)   10/25/17 68 kg (150 lb)   10/25/17 68 kg (150 lb)   10/20/17 70.3 kg (155 lb)   10/19/17 70.3 kg (155 lb)   10/16/17 73.2 kg (161 lb 6.4 oz)   10/13/17 72.9 kg (160 lb 12.8 oz)     Constitutional: Well-appearing male in no acute distress.  Eyes: EOMI, PERRL. No scleral icterus.  ENT: Oral mucosa is moist without lesions. Thrush has resolved.  Lymphatic: Neck is supple without cervical or supraclavicular lymphadenopathy.  Cardiovascular: Regular rate and rhythm. No murmurs, gallops, or rubs. Trace peripheral edema.  Respiratory: Clear to " auscultation bilaterally. No wheezes or crackles.  Gastrointestinal: Bowel sounds present. Abdomen soft, non-tender. No palpable hepatosplenomegaly or masses.   Genitourinary: SPC well secured, entry site without erythema or tenderness.   Neurologic: Cranial nerves II through XII are grossly intact.  Skin: No rashes, petechiae, or bruising noted on exposed skin.    Laboratory Data:   11/10/2017 08:39 11/10/2017 11:20   Sodium 128 (L)    Potassium 3.2 (L)    Chloride 94    Carbon Dioxide 25    Urea Nitrogen 15    Creatinine 1.60 (H) 1.52 (H)   GFR Estimate 43 (L) 46 (L)   GFR Estimate If Black 52 (L) 55 (L)   Calcium 9.3    Anion Gap 9    Magnesium 1.3 (L)    Albumin 3.3 (L)    Protein Total 7.1    Bilirubin Total 0.4    Alkaline Phosphatase 70    ALT 18    AST 26    Glucose 92    WBC 3.0 (L)    Hemoglobin 9.0 (L)    Hematocrit 26.4 (L)    Platelet Count 157    RBC Count 3.06 (L)    MCV 86    MCH 29.4    MCHC 34.1    RDW 15.9 (H)    Diff Method Automated Method    % Neutrophils 44.1    % Lymphocytes 44.9    % Monocytes 6.3    % Eosinophils 0.7    % Basophils 0.7    % Immature Granulocytes 3.3    Nucleated RBCs 1 (H)    Absolute Neutrophil 1.3 (L)    Absolute Lymphocytes 1.4    Absolute Monocytes 0.2    Absolute Eosinophils 0.0    Absolute Basophils 0.0    Abs Immature Granulocytes 0.1    Absolute Nucleated RBC 0.0          Assessment and Plan:  1. Stage II penile urethral carcinoma, currently on treatment with cisplatin/gemzar  PETCT showed disease response in both the urethral cancer and indeterminate lung nodules. Cycle 1 and 2 complicated be dehydration, nausea, reflux, and weight loss. He recovered nicely with a treatment break and started cycle 3 next week. His Cr is elevated today, improved prior to receiving day 8 after 1 L of IVF. Counts meet parameters. Plan to restage after cycle 4. He will see me prior to cycle 4.     2. Nausea, improved  -IV Aloxi, Emend, and Dex premeds today  -Zofran starting day 4,  every 8 hours. Compazine every 6 hours as needed. Ativan at bedtime.  -Zyprexa q hs prn for further nausea.   -IVF 3x per week with prn antiemetics.     3. Constipation, stable   -Miralax BID and colace BID prn    4. GERD, stable  -Continue omeprazole solution BID as this has been helpful  -Use Maalox and Carafate as needed if symptoms persist  -Continues to have mild dysphagia. ENT appointment scheduled 11/14. Per patient, he prefers to wait until after he sees ENT to do a swallow study/SLP. Order is in place when he wants this to be scheduled    5. FEN  -Weight stable today.  -Creat up to 1.6. Improved to 1.52 today. Continue to monitor and IVF 3x next week  -Mildly low Na, K, and Mg. Mg and K replaced in infusion. Na treated with NS IVF. He did not tolerate mag tablets well, replaced IV.     6.   -Mild increase in penile pain, not needing any pain medications at this time. Pain was improving with treatment.    7. Heme  Hgb stable at 9.0, WBC dropping from treatment last week, platelets WNL.    8. HTN, stable  Currently on amlodipine 10mg daily. Is tolerating this well other than mild ankle edema and occasional dizziness upon stable. BP is 135/76. If his blood pressure starts to get too low or he has more orthostatic symptoms, may need to decrease to 5mg.     Ramona Greer PA-C  [facility]  [address], [place] [address] [phone]

## 2017-11-10 NOTE — PATIENT INSTRUCTIONS
Contact Numbers    [facility] Main Line: [phone]  [facility] Triage:  [phone]    Call triage with chills and/or temperature greater than or equal to 100.5, uncontrolled nausea/vomiting, diarrhea, constipation, dizziness, shortness of breath, chest pain, bleeding, unexplained bruising, or any new/concerning symptoms, questions/concerns.     If you are having any concerning symptoms or wish to speak to a provider before your next infusion visit, please call your care coordinator or triage to notify them so we can adequately serve you.       After Hours: [phone]    If after hours, weekends, or holidays, call main hospital  and ask for Oncology doctor on call.         November 2017 Sunday Monday Tuesday Wednesday Thursday Friday Saturday                  1     2     3     [facility] DRAW    7:15 AM   (15 min.)    [facility] DRAW   [facility] Lab Draw     [facility] RETURN    7:35 AM   (50 min.)   Ramona Greer PA M [facility] ONC INFUSION 360    8:30 AM   (360 min.)    ONCOLOGY INFUSION   [facility] 4       5     6     [facility] RETURN   12:45 PM   (30 min.)   Nurse,  Prostate Cancer Ctr   [facility] Urology and Inst for Prostate and Urologic Cancers     [facility] BMT INFUSION 120    2:00 PM   (120 min.)    BMT INFUSION   [facility] Blood and Marrow Transplant 7     8     [facility] DRAW   12:30 PM   (15 min.)    [facility] DRAW   [facility] Draw     P ONC INFUSION 120    1:00 PM   (120 min.)    ONCOLOGY INFUSION   [facility]     [facility] NEW    2:45 PM   (60 min.)   Ignacia Wallace RD   [facility] 9     10     [facility] DRAW    8:15 AM   (15 min.)    [facility] LAB DRAW   [facility] Lab Draw     [facility] RETURN    8:25 AM   (50 min.)   [facility]   10:00 AM   (360 min.)    ONCOLOGY INFUSION   [facility]  [facility] 11       12     [facility] ONC INFUSION 120    9:30 AM   (120 min.)    ONCOLOGY INFUSION   [facility] 13     14     [facility] NEW    1:15 PM   (30 min.)   Arsenio Ortiz MD   [facility] Ear Nose and Throat 15     [facility] DRAW   12:00 PM   (15 min.)    [facility] LAB DRAW   [facility] Lab Draw     [facility] ONC INFUSION 120   12:30 PM   (120 min.)    ONCOLOGY INFUSION   [facility] 16     17     [facility] DRAW   12:30 PM   (15 min.)    [facility]   [facility]     [facility] ONC INFUSION 120    1:00 PM   (120 min.)    ONCOLOGY INFUSION   [facility] 18       19     20     [facility] ONC INFUSION 120    1:00 PM   (120 min.)    ONCOLOGY INFUSION   [facility] 21     22     [facility] DRAW   12:30 PM   (15 min.)    [facility] LAB DRAW   [facility] Lab Draw     [facility] ONC INFUSION 120    1:00 PM   (120 min.)    ONCOLOGY INFUSION   [facility] 23     24     [facility] DRAW    8:15 AM   (15 min.)    [facility] LAB DRAW   [facility] Lab Draw     [facility] RETURN    8:25 AM   (50 min.)   Ramona Greer PA   [facility]     [facility] ONC INFUSION 360   10:00 AM   (360 min.)    ONCOLOGY INFUSION   [facility] 25  Happy Birthday!       26     27     28     29     [facility] DRAW   10:30 AM   (15 min.)    [facility] LAB DRAW   [facility] Lab Draw     [facility] ONC INFUSION 120   11:00 AM   (120 min.)    ONCOLOGY INFUSION   [facility] 30 December 2017 Sunday Monday Tuesday Wednesday Thursday Friday Saturday                            1     [facility] DRAW    8:15 AM   (15 min.)    [facility] LAB DRAW   [facility] Lab Draw     [facility] RETURN    8:25 AM   (50 min.)   Yessica Ovalles APRN CNP   [facility]     [facility] ONC INFUSION 360   10:00 AM   (360 min.)     ONCOLOGY INFUSION   [facility] 2       3     4     5     6     7     8     9       10     11     [facility] RETURN   12:45 PM   (30 min.)   Nurse, [facility]   [facility]rology and [facility] for Prostate and Urologic Cancers 12     13     14     15     16       17     18     19     20     21     22     23       24     25     26     27     28     29     30       31                                               Recent Results (from the past 24 hour(s))   CBC with platelets differential    Collection Time: 11/10/17  8:39 AM   Result Value Ref Range    WBC 3.0 (L) 4.0 - 11.0 10e9/L    RBC Count 3.06 (L) 4.4 - 5.9 10e12/L    Hemoglobin 9.0 (L) 13.3 - 17.7 g/dL    Hematocrit 26.4 (L) 40.0 - 53.0 %    MCV 86 78 - 100 fl    MCH 29.4 26.5 - 33.0 pg    MCHC 34.1 31.5 - 36.5 g/dL    RDW 15.9 (H) 10.0 - 15.0 %    Platelet Count 157 150 - 450 10e9/L    Diff Method Automated Method     % Neutrophils 44.1 %    % Lymphocytes 44.9 %    % Monocytes 6.3 %    % Eosinophils 0.7 %    % Basophils 0.7 %    % Immature Granulocytes 3.3 %    Nucleated RBCs 1 (H) 0 /100    Absolute Neutrophil 1.3 (L) 1.6 - 8.3 10e9/L    Absolute Lymphocytes 1.4 0.8 - 5.3 10e9/L    Absolute Monocytes 0.2 0.0 - 1.3 10e9/L    Absolute Eosinophils 0.0 0.0 - 0.7 10e9/L    Absolute Basophils 0.0 0.0 - 0.2 10e9/L    Abs Immature Granulocytes 0.1 0 - 0.4 10e9/L    Absolute Nucleated RBC 0.0    Comprehensive metabolic panel    Collection Time: 11/10/17  8:39 AM   Result Value Ref Range    Sodium 128 (L) 133 - 144 mmol/L    Potassium 3.2 (L) 3.4 - 5.3 mmol/L    Chloride 94 94 - 109 mmol/L    Carbon Dioxide 25 20 - 32 mmol/L    Anion Gap 9 3 - 14 mmol/L    Glucose 92 70 - 99 mg/dL    Urea Nitrogen 15 7 - 30 mg/dL    Creatinine 1.60 (H) 0.66 - 1.25 mg/dL    GFR Estimate 43 (L) >60 mL/min/1.7m2    GFR Estimate If Black 52 (L) >60 mL/min/1.7m2    Calcium 9.3 8.5 - 10.1 mg/dL    Bilirubin Total 0.4 0.2 - 1.3 mg/dL    Albumin 3.3 (L) 3.4 - 5.0 g/dL     Protein Total 7.1 6.8 - 8.8 g/dL    Alkaline Phosphatase 70 40 - 150 U/L    ALT 18 0 - 70 U/L    AST 26 0 - 45 U/L   Magnesium    Collection Time: 11/10/17  8:39 AM   Result Value Ref Range    Magnesium 1.3 (L) 1.6 - 2.3 mg/dL   Creatinine    Collection Time: 11/10/17 11:20 AM   Result Value Ref Range    Creatinine 1.52 (H) 0.66 - 1.25 mg/dL    GFR Estimate 46 (L) >60 mL/min/1.7m2    GFR Estimate If Black 55 (L) >60 mL/min/1.7m2

## 2017-11-10 NOTE — MR AVS SNAPSHOT
After Visit Summary   11/10/2017    King Gonsalo Bruno    MRN: [id number]           Patient Information     Date Of Birth          1945        Visit Information        Provider Department      11/10/2017 8:40 AM Ramona Greer PA [facility]        Today's Diagnoses     Urethral cancer (H)    -  1       Follow-ups after your visit        Your next 10 appointments already scheduled     Nov 12, 2017  9:30 AM CST   Infusion 120 with UC ONCOLOGY INFUSION, UC 11 ATC   [facility] ([facility])    [address] [place] [address]  2nd [facility] [address]   [phone]            Nov 14, 2017  1:30 PM CST   (Arrive by 1:15 PM)   New Patient Visit with Arsenio Ortiz MD   [facility] Ear Nose and Throat ([facility])    [address] [place] [address]  4th [facility] [address]   [phone]            Nov 15, 2017 12:00 PM CST   [facility] Draw with UC [facility] LAB DRAW   [facility] Lab Draw ([facility])    [address] [place] [address]  2nd [facility] [address]   [phone]            Nov 15, 2017 12:30 PM CST   Infusion 120 with UC ONCOLOGY INFUSION, UC 29 ATC   [facility] ([facility])    [address] [place] [address]  2nd [facility] [address]   [phone]            Nov 17, 2017 12:30 PM CST   [facility] Lab Draw with UC [facility] DRAW   [facility])    [address] [place] [address]   [phone]            Nov 17, 2017  1:00 PM CST   Infusion 120 with UC ONCOLOGY INFUSION, UC 18 ATC   [facility] ([facility])    [address] [facility]  2nd [facility] [address]   [phone]            Nov 20, 2017  1:00 PM CST   Infusion 120 with UC ONCOLOGY INFUSION   [facility] (  "[facility])    [address] [facility]  2nd Floor  [facility] [address] [phone]              Who to contact     If you have questions or need follow up information about today's clinic visit or your schedule please contact [facility] CANCER [facility] directly at [phone].  Normal or non-critical lab and imaging results will be communicated to you by MyChart, letter or phone within 4 business days after the clinic has received the results. If you do not hear from us within 7 days, please contact the clinic through MyChart or phone. If you have a critical or abnormal lab result, we will notify you by phone as soon as possible.  Submit refill requests through [employer] or call your pharmacy and they will forward the refill request to us. Please allow 3 business days for your refill to be completed.          Additional Information About Your Visit        MyChart Information     [employer] lets you send messages to your doctor, view your test results, renew your prescriptions, schedule appointments and more. To sign up, go to [email or web address] . Click on \"Log in\" on the left side of the screen, which will take you to the Welcome page. Then click on \"Sign up Now\" on the right side of the page.     You will be asked to enter the access code listed below, as well as some personal information. Please follow the directions to create your username and password.     Your access code is: [email or web address]  Expires: 2018  5:30 AM     Your access code will  in 90 days. If you need help or a new code, please call your [place] clinic or [phone].        Care EveryWhere ID     This is your Care EveryWhere ID. This could be used by other organizations to access your [place] medical records  [id number]        Your Vitals Were     Pulse Temperature Respirations Height Pulse Oximetry BMI (Body Mass Index)    81 98.6  F (37  C) (Oral) 18 1.93 m (6' 3.98\") 98% 18.97 kg/m2       Blood " Pressure from Last 3 Encounters:   11/10/17 135/76   11/08/17 130/74   11/06/17 137/81    Weight from Last 3 Encounters:   11/10/17 70.7 kg (155 lb 12.8 oz)   11/08/17 70.6 kg (155 lb 9.6 oz)   11/03/17 71.7 kg (158 lb)              Today, you had the following     No orders found for display       Primary Care Provider Office Phone # Fax #    Joan Banksursula, MARLENY [phone]       [facility] [address] [facility] [address]        Equal Access to Services     [facility] : Hadii aad ku hadasho Soomaali, waaxda luqadaha, qaybta kaalmada adeegyada, ronald srivastava . So [facility] [phone].    ATENCIÓN: Si habla español, tiene a washburn disposición servicios gratuitos de asistencia lingüística. Llame al [phone].    We comply with applicable federal civil rights laws and [place] laws. We do not discriminate on the basis of race, color, national origin, age, disability, sex, sexual orientation, or gender identity.            Thank you!     Thank you for choosing [facility]  for your care. Our goal is always to provide you with excellent care. Hearing back from our patients is one way we can continue to improve our services. Please take a few minutes to complete the written survey that you may receive in the mail after your visit with us. Thank you!             Your Updated Medication List - Protect others around you: Learn how to safely use, store and throw away your medicines at www.disposemymeds.org.          This list is accurate as of: 11/10/17 12:32 PM.  Always use your most recent med list.                   Brand Name Dispense Instructions for use Diagnosis    alum & mag hydroxide-simethicone 200-200-20 MG/5ML Susp suspension    MYLANTA/MAALOX    355 mL    Take 30 mLs by mouth every 4 hours as needed for indigestion        amLODIPine 5 MG tablet    NORVASC    60 tablet    Take 2 tablets (10 mg) by mouth daily    Benign essential hypertension        dexamethasone 4 MG tablet    DECADRON    12 tablet    Take 2 tablets (8 mg) by mouth daily Take for 3 days, starting the day after cisplatin (Day 2 and Day 9).    Urethral cancer (H)       docusate sodium 100 MG capsule    COLACE    60 capsule    Take 1 capsule (100 mg) by mouth 2 times daily as needed for constipation    Slow transit constipation       * FIRST-OMEPRAZOLE 2 MG/ML Susp     300 mL    Take 10 mLs (20 mg) by mouth 2 times daily    Urethral cancer (H), Gastroesophageal reflux disease with esophagitis       * omeprazole 2 mg/mL Susp    priLOSEC    280 mL    Take 10 mLs (20 mg) by mouth 2 times daily    Gastroesophageal reflux disease with esophagitis, Urethral cancer (H)       fluticasone 50 MCG/ACT spray    FLONASE     Spray 1 spray into both nostrils every morning        HYDROXYZINE HCL PO      Take 5 mg by mouth At Bedtime        LORazepam 0.5 MG tablet    ATIVAN    30 tablet    Take 1 tablet (0.5 mg) by mouth every 4 hours as needed (Anxiety, Nausea/Vomiting or Sleep)    Nausea       nystatin 395303 UNIT/ML suspension    MYCOSTATIN    200 mL    Take 5 mLs (500,000 Units) by mouth 4 times daily Swish and spit    Thrush       OLANZapine 5 MG tablet    zyPREXA    30 tablet    Take 1 tablet (5 mg) by mouth At Bedtime    Urethral cancer (H), Nausea       ondansetron 8 MG tablet    ZOFRAN    20 tablet    Take 1 tablet (8 mg) by mouth every 8 hours as needed for nausea    Nausea       prochlorperazine 10 MG tablet    COMPAZINE    30 tablet    Take 0.5 tablets (5 mg) by mouth every 6 hours as needed (Nausea/Vomiting)    Nausea       * Notice:  This list has 2 medication(s) that are the same as other medications prescribed for you. Read the directions carefully, and ask your doctor or other care provider to review them with you.

## 2017-11-10 NOTE — LETTER
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 He is currently undergoing treatment with Cisplatin/Gemzar split on days 1 and 8 given history of CKD. He is currently undergoing curative intent treatment, however he does have indeterminate pulmonary nodules that are being followed. He saw Dr. Arceo 2 weeks ago who deferred chemo due to issues with dehydration and nausea. The plan is to complete 2 more cycles (6 weeks) and then restage. He started cycle 3 on 11/3. He returns today for consideration of cycle 3 day 8.     Interval History:  Mr. Bruno returns today with his wife. This week after cycle 1 day 1 of chemotherapy is usually tough for him. He did much better with more aggressive antiemetics and IVF. His nausea has been much better. He has had no vomiting. Has had 32 oz of water so far today and over 80 oz yesterday. He denies any mouth sores but does continue to have \"throat\" issues with his reflux. He is using omeprazole BID and carafate prn. Was initially constipated and taking Miralax and colace. He then had a few episodes of diarrhea after taking magnesium tablets. No fevers/chills, cough, SOB, CP. No further falls. He does have intermittent dizziness upon standing. Mild ankle edema that resolves with elevating legs. ROS otherwise negative.     Current Outpatient Prescriptions   Medication Sig Dispense Refill     omeprazole (PRILOSEC) 2 mg/mL SUSP Take 10 mLs (20 mg) by mouth 2 times daily 280 mL 0     nystatin (MYCOSTATIN) 423546 UNIT/ML suspension Take 5 mLs (500,000 Units) by mouth 4 times daily Swish and spit 200 mL 0     amLODIPine (NORVASC) 5 MG tablet Take 2 tablets (10 mg) by mouth daily 60 tablet 3     " "OLANZapine (ZYPREXA) 5 MG tablet Take 1 tablet (5 mg) by mouth At Bedtime 30 tablet 0     FIRST-OMEPRAZOLE 2 MG/ML SUSP Take 10 mLs (20 mg) by mouth 2 times daily 300 mL 0     alum & mag hydroxide-simethicone (MYLANTA/MAALOX) 200-200-20 MG/5ML SUSP suspension Take 30 mLs by mouth every 4 hours as needed for indigestion 355 mL 1     LORazepam (ATIVAN) 0.5 MG tablet Take 1 tablet (0.5 mg) by mouth every 4 hours as needed (Anxiety, Nausea/Vomiting or Sleep) 30 tablet 3     prochlorperazine (COMPAZINE) 10 MG tablet Take 0.5 tablets (5 mg) by mouth every 6 hours as needed (Nausea/Vomiting) 30 tablet 3     ondansetron (ZOFRAN) 8 MG tablet Take 1 tablet (8 mg) by mouth every 8 hours as needed for nausea 20 tablet 3     docusate sodium (COLACE) 100 MG capsule Take 1 capsule (100 mg) by mouth 2 times daily as needed for constipation 60 capsule 0     dexamethasone (DECADRON) 4 MG tablet Take 2 tablets (8 mg) by mouth daily Take for 3 days, starting the day after cisplatin (Day 2 and Day 9). 12 tablet 3     HYDROXYZINE HCL PO Take 5 mg by mouth At Bedtime        fluticasone (FLONASE) 50 MCG/ACT spray Spray 1 spray into both nostrils every morning        Physical Examination:  /76  Pulse 81  Temp 98.6  F (37  C) (Oral)  Resp 18  Ht 1.93 m (6' 3.98\")  Wt 70.7 kg (155 lb 12.8 oz)  SpO2 98%  BMI 18.97 kg/m2  Wt Readings from Last 10 Encounters:   11/10/17 70.7 kg (155 lb 12.8 oz)   11/08/17 70.6 kg (155 lb 9.6 oz)   11/03/17 71.7 kg (158 lb)   10/27/17 68.5 kg (151 lb)   10/25/17 68 kg (150 lb)   10/25/17 68 kg (150 lb)   10/20/17 70.3 kg (155 lb)   10/19/17 70.3 kg (155 lb)   10/16/17 73.2 kg (161 lb 6.4 oz)   10/13/17 72.9 kg (160 lb 12.8 oz)     Constitutional: Well-appearing male in no acute distress.  Eyes: EOMI, PERRL. No scleral icterus.  ENT: Oral mucosa is moist without lesions. Thrush has resolved.  Lymphatic: Neck is supple without cervical or supraclavicular lymphadenopathy.  Cardiovascular: Regular rate " and rhythm. No murmurs, gallops, or rubs. Trace peripheral edema.  Respiratory: Clear to auscultation bilaterally. No wheezes or crackles.  Gastrointestinal: Bowel sounds present. Abdomen soft, non-tender. No palpable hepatosplenomegaly or masses.   Genitourinary: SPC well secured, entry site without erythema or tenderness.   Neurologic: Cranial nerves II through XII are grossly intact.  Skin: No rashes, petechiae, or bruising noted on exposed skin.    Laboratory Data:   11/10/2017 08:39 11/10/2017 11:20   Sodium 128 (L)    Potassium 3.2 (L)    Chloride 94    Carbon Dioxide 25    Urea Nitrogen 15    Creatinine 1.60 (H) 1.52 (H)   GFR Estimate 43 (L) 46 (L)   GFR Estimate If Black 52 (L) 55 (L)   Calcium 9.3    Anion Gap 9    Magnesium 1.3 (L)    Albumin 3.3 (L)    Protein Total 7.1    Bilirubin Total 0.4    Alkaline Phosphatase 70    ALT 18    AST 26    Glucose 92    WBC 3.0 (L)    Hemoglobin 9.0 (L)    Hematocrit 26.4 (L)    Platelet Count 157    RBC Count 3.06 (L)    MCV 86    MCH 29.4    MCHC 34.1    RDW 15.9 (H)    Diff Method Automated Method    % Neutrophils 44.1    % Lymphocytes 44.9    % Monocytes 6.3    % Eosinophils 0.7    % Basophils 0.7    % Immature Granulocytes 3.3    Nucleated RBCs 1 (H)    Absolute Neutrophil 1.3 (L)    Absolute Lymphocytes 1.4    Absolute Monocytes 0.2    Absolute Eosinophils 0.0    Absolute Basophils 0.0    Abs Immature Granulocytes 0.1    Absolute Nucleated RBC 0.0          Assessment and Plan:  1. Stage II penile urethral carcinoma, currently on treatment with cisplatin/gemzar  PETCT showed disease response in both the urethral cancer and indeterminate lung nodules. Cycle 1 and 2 complicated be dehydration, nausea, reflux, and weight loss. He recovered nicely with a treatment break and started cycle 3 next week. His Cr is elevated today, improved prior to receiving day 8 after 1 L of IVF. Counts meet parameters. Plan to restage after cycle 4. He will see me prior to cycle 4.      2. Nausea, improved  -IV Aloxi, Emend, and Dex premeds today  -Zofran starting day 4, every 8 hours. Compazine every 6 hours as needed. Ativan at bedtime.  -Zyprexa q hs prn for further nausea.   -IVF 3x per week with prn antiemetics.     3. Constipation, stable   -Miralax BID and colace BID prn    4. GERD, stable  -Continue omeprazole solution BID as this has been helpful  -Use Maalox and Carafate as needed if symptoms persist  -Continues to have mild dysphagia. ENT appointment scheduled 11/14. Per patient, he prefers to wait until after he sees ENT to do a swallow study/SLP. Order is in place when he wants this to be scheduled    5. FEN  -Weight stable today.  -Creat up to 1.6. Improved to 1.52 today. Continue to monitor and IVF 3x next week  -Mildly low Na, K, and Mg. Mg and K replaced in infusion. Na treated with NS IVF. He did not tolerate mag tablets well, replaced IV.     6.   -Mild increase in penile pain, not needing any pain medications at this time. Pain was improving with treatment.    7. Heme  Hgb stable at 9.0, WBC dropping from treatment last week, platelets WNL.    8. HTN, stable  Currently on amlodipine 10mg daily. Is tolerating this well other than mild ankle edema and occasional dizziness upon stable. BP is 135/76. If his blood pressure starts to get too low or he has more orthostatic symptoms, may need to decrease to 5mg.     Ramona Greer PA-C  [facility]  [address] [place] [address] [phone]

## 2017-11-10 NOTE — PROGRESS NOTES
KIERA per Sofi Rooney RN/Ramona MELARA 11/10/2017 @ 1215:  - Okay to proceed with chemotherapy today as creatinine is 1.52. Please ensure that patient has a follow up appointment on Monday 11/13/17 for IVF.

## 2017-11-10 NOTE — NURSING NOTE
Chief Complaint   Patient presents with     Blood Draw     IV placement by vascular access and vitals done     Labs drawn via IV placed by vascular access in right arm

## 2017-11-12 ENCOUNTER — INFUSION THERAPY VISIT (OUTPATIENT)
Dept: ONCOLOGY | Facility: CLINIC | Age: 72
End: 2017-11-12
Attending: INTERNAL MEDICINE
Payer: MEDICARE

## 2017-11-12 VITALS — RESPIRATION RATE: 16 BRPM

## 2017-11-12 DIAGNOSIS — R11.0 NAUSEA: ICD-10-CM

## 2017-11-12 DIAGNOSIS — K21.00 GASTROESOPHAGEAL REFLUX DISEASE WITH ESOPHAGITIS: Primary | ICD-10-CM

## 2017-11-12 DIAGNOSIS — E87.6 HYPOKALEMIA: ICD-10-CM

## 2017-11-12 DIAGNOSIS — C68.0 URETHRAL CANCER (H): ICD-10-CM

## 2017-11-12 DIAGNOSIS — E86.0 DEHYDRATION: ICD-10-CM

## 2017-11-12 LAB
ANION GAP SERPL CALCULATED.3IONS-SCNC: 10 MMOL/L (ref 3–14)
BUN SERPL-MCNC: 24 MG/DL (ref 7–30)
CALCIUM SERPL-MCNC: 9.3 MG/DL (ref 8.5–10.1)
CHLORIDE SERPL-SCNC: 95 MMOL/L (ref 94–109)
CO2 SERPL-SCNC: 25 MMOL/L (ref 20–32)
CREAT SERPL-MCNC: 1.62 MG/DL (ref 0.66–1.25)
GFR SERPL CREATININE-BSD FRML MDRD: 42 ML/MIN/1.7M2
GLUCOSE SERPL-MCNC: 97 MG/DL (ref 70–99)
POTASSIUM SERPL-SCNC: 3.3 MMOL/L (ref 3.4–5.3)
SODIUM SERPL-SCNC: 130 MMOL/L (ref 133–144)

## 2017-11-12 PROCEDURE — 25000128 H RX IP 250 OP 636: Mod: ZF | Performed by: INTERNAL MEDICINE

## 2017-11-12 PROCEDURE — 96365 THER/PROPH/DIAG IV INF INIT: CPT

## 2017-11-12 PROCEDURE — 96366 THER/PROPH/DIAG IV INF ADDON: CPT

## 2017-11-12 PROCEDURE — 80048 BASIC METABOLIC PNL TOTAL CA: CPT | Performed by: PHYSICIAN ASSISTANT

## 2017-11-12 RX ORDER — PALONOSETRON 0.05 MG/ML
0.25 INJECTION, SOLUTION INTRAVENOUS ONCE
Status: CANCELLED
Start: 2017-11-12 | End: 2017-11-12

## 2017-11-12 RX ORDER — POTASSIUM CHLORIDE 1500 MG/1
40 TABLET, EXTENDED RELEASE ORAL ONCE
Status: DISCONTINUED | OUTPATIENT
Start: 2017-11-12 | End: 2017-11-12

## 2017-11-12 RX ORDER — PALONOSETRON 0.05 MG/ML
0.25 INJECTION, SOLUTION INTRAVENOUS ONCE
Status: DISCONTINUED | OUTPATIENT
Start: 2017-11-12 | End: 2017-11-12

## 2017-11-12 RX ADMIN — POTASSIUM CHLORIDE: 149 INJECTION, SOLUTION, CONCENTRATE INTRAVENOUS at 10:45

## 2017-11-12 NOTE — NURSING NOTE
Chief Complaint   Patient presents with     Blood Draw     labs collected from L arm venipuncture, vitals taken      Elizabeth Coello RN

## 2017-11-12 NOTE — PATIENT INSTRUCTIONS
Contact Numbers    [facility] Main Line: [phone]  [facility] Triage:  [phone]    Call triage with chills and/or temperature greater than or equal to 100.5, uncontrolled nausea/vomiting, diarrhea, constipation, dizziness, shortness of breath, chest pain, bleeding, unexplained bruising, or any new/concerning symptoms, questions/concerns.     If you are having any concerning symptoms or wish to speak to a provider before your next infusion visit, please call your care coordinator or triage to notify them so we can adequately serve you.       After Hours: [phone]    If after hours, weekends, or holidays, call main hospital  and ask for Oncology doctor on call.         November 2017 Sunday Monday Tuesday Wednesday Thursday Friday Saturday                  1     2     3     [facility] D[facility]    7:15 AM   (15 min.)    [facility] LAB DRAW   [facility] Lab Draw     UMP RETURN    7:35 AM   (50 min.)   Ramona Greer PA M [facility] ONC INFUSION 360    8:30 AM   (360 min.)    ONCOLOGY INFUSION   [facility] 4       5     6     [facility] RETURN   12:45 PM   (30 min.)   Nurse,  Prostate Cancer Ctr   [facility] Urology and Inst for Prostate and Urologic Cancers     [facility] BMT INFUSION 120    2:00 PM   (120 min.)    BMT INFUSION   [facility] Blood and Marrow Transplant 7     8     [facility]   12:30 PM   (15 min.)    [facility]   [facility]     P ONC INFUSION 120    1:00 PM   (120 min.)    ONCOLOGY INFUSION   [facility]     UMP NEW    2:45 PM   (60 min.)   Ignacia Wallace RD   [facility] 9     10     [facility] DRAW    8:15 AM   (15 min.)    [facility] LAB DRAW   [facility] Draw     UMP RETURN    8:25 AM   (50 min.)   [facility]   10:00 AM   (360 min.)    ONCOLOGY INFUSION   [facility]  [facility] 11       12     [facility] DRAW    9:00 AM   (15 min.)    [facility] LAB DRAW   [facility] Lab Draw     [facility] ONC INFUSION 120    9:30 AM   (120 min.)    ONCOLOGY INFUSION   [facility] 13     14     UMP NEW    1:15 PM   (30 min.)   Arsenio Ortiz MD   [facility] Ear Nose and Throat 15     [facility] DRAW   12:00 PM   (15 min.)    [facility] LAB DRAW   [facility] Draw     [facility] ONC INFUSION 120   12:30 PM   (120 min.)    ONCOLOGY INFUSION   [facility] 16     17     [facility] DRAW   12:30 PM   (15 min.)    [facility] DRAW   [facility] Draw     [facility] ONC INFUSION 120    1:00 PM   (120 min.)    ONCOLOGY INFUSION   [facility] 18       19     20     UMP ONC INFUSION 120    1:00 PM   (120 min.)    ONCOLOGY INFUSION   [facility] 21     22     [facility] DRAW   12:30 PM   (15 min.)    [facility] LAB DRAW   [facility] Draw     UMP ONC INFUSION 120    1:00 PM   (120 min.)    ONCOLOGY INFUSION   [facility] 23     24     [facility] DRAW    8:15 AM   (15 min.)    [facility] DRAW   [facility] Draw     UMP RETURN    8:25 AM   (50 min.)   Ramona Greer PA   [facility]     UMP ONC INFUSION 360   10:00 AM   (360 min.)    ONCOLOGY INFUSION   [facility] 25  Happy Birthday!       26     27     28     29     [facility] DRAW   10:30 AM   (15 min.)    [facility] LAB DRAW   [facility] Draw     [facility] ONC INFUSION 120   11:00 AM   (120 min.)    ONCOLOGY INFUSION   [facility] 30 December 2017 Sunday Monday Tuesday Wednesday Thursday Friday Saturday                            1     [facility] DRAW    8:15 AM   (15 min.)   [facility]   [facility] Draw     UMP RETURN    8:25 AM   (50 min.)   Yessica Ovalles,  APRN CNP   [facility] ONC INFUSION 360   10:00 AM   (360 min.)   UC ONCOLOGY INFUSION   [facility] 2       3     4     5     6     7     8     9       10     11     UMP RETURN   12:45 PM   (30 min.)   Nurse, [facility]   [facility] for Prostate and Urologic Cancers 12     13     14     15     16       17     18     19     20     21     22     23       24     25     26     27     28     29     30       31                                                Lab Results:  Recent Results (from the past 12 hour(s))   Basic metabolic panel    Collection Time: 11/12/17  9:51 AM   Result Value Ref Range    Sodium 130 (L) 133 - 144 mmol/L    Potassium 3.3 (L) 3.4 - 5.3 mmol/L    Chloride 95 94 - 109 mmol/L    Carbon Dioxide 25 20 - 32 mmol/L    Anion Gap 10 3 - 14 mmol/L    Glucose 97 70 - 99 mg/dL    Urea Nitrogen 24 7 - 30 mg/dL    Creatinine 1.62 (H) 0.66 - 1.25 mg/dL    GFR Estimate 42 (L) >60 mL/min/1.7m2    GFR Estimate If Black 51 (L) >60 mL/min/1.7m2    Calcium 9.3 8.5 - 10.1 mg/dL

## 2017-11-12 NOTE — MR AVS SNAPSHOT
After Visit Summary   11/12/2017    King Gonsalo Bruno    MRN: [id number]           Patient Information     Date Of Birth          1945        Visit Information        Provider Department      11/12/2017 9:30 AM  11 ATC;  ONCOLOGY INFUSION [facility]        Today's Diagnoses     Gastroesophageal reflux disease with esophagitis    -  1    Hypokalemia        Nausea        Dehydration        Urethral cancer (H)          Care Instructions    Contact Numbers    [facility] Main Line: [phone]  [facility] Triage:  [phone]    Call triage with chills and/or temperature greater than or equal to 100.5, uncontrolled nausea/vomiting, diarrhea, constipation, dizziness, shortness of breath, chest pain, bleeding, unexplained bruising, or any new/concerning symptoms, questions/concerns.     If you are having any concerning symptoms or wish to speak to a provider before your next infusion visit, please call your care coordinator or triage to notify them so we can adequately serve you.       After Hours: [phone]    If after hours, weekends, or holidays, call main hospital  and ask for Oncology doctor on call.         November 2017 Sunday Monday Tuesday Wednesday Thursday Friday Saturday                  1     2     3     P [facility] DRAW    7:15 AM   (15 min.)    [facility]   [facility]     [facility] RETURN    7:35 AM   (50 min.)   Ramona Greer PA   [facility] ONC INFUSION 360    8:30 AM   (360 min.)    ONCOLOGY INFUSION   [facility] 4       5     6     UMP RETURN   12:45 PM   (30 min.)   Nurse,  Prostate Cancer Ctr   [facility]rology and Inst for Prostate and Urologic Cancers     [facility] BMT INFUSION 120    2:00 PM   (120 min.)    BMT INFUSION   [facility] Blood and Marrow Transplant 7     8     [facility] DRAW   12:30 PM   (15 min.)    [facility] LAB DRAW   [facility] Lab Draw     P ONC  INFUSION 120    1:00 PM   (120 min.)   UC ONCOLOGY INFUSION   [facility]     UMP NEW    2:45 PM   (60 min.)   Ignacia Wallace RD   [facility] 9     10     [facility] DRAW    8:15 AM   (15 min.)    [facility] LAB DRAW   [facility] Lab Draw     UMP RETURN    8:25 AM   (50 min.)   Ramona Greer PA   [facility]     UMP ONC INFUSION 360   10:00 AM   (360 min.)   UC ONCOLOGY INFUSION   [facility] 11       12     [facility] DRAW    9:00 AM   (15 min.)    [facility] LAB DRAW   [facility] Lab Draw     [facility] ONC INFUSION 120    9:30 AM   (120 min.)    ONCOLOGY INFUSION   [facility] 13     14     UMP NEW    1:15 PM   (30 min.)   Arsenio Ortiz MD   [facility] Ear Nose and Throat 15     [facility] DRAW   12:00 PM   (15 min.)    [facility] LAB DRAW   [facility] Draw     [facility] ONC INFUSION 120   12:30 PM   (120 min.)    ONCOLOGY INFUSION   [facility] 16     17     [facility] DRAW   12:30 PM   (15 min.)    [facility] LAB DRAW   [facility] Lab Draw     [facility] ONC INFUSION 120    1:00 PM   (120 min.)    ONCOLOGY INFUSION   [facility] 18       19     20     UMP ONC INFUSION 120    1:00 PM   (120 min.)    ONCOLOGY INFUSION   [facility] 21     22     [facility] DRAW   12:30 PM   (15 min.)    [facility] LAB DRAW   [facility] Draw     [facility] ONC INFUSION 120    1:00 PM   (120 min.)    ONCOLOGY INFUSION   [facility] 23     24     [facility] DRAW    8:15 AM   (15 min.)    [facility] LAB DRAW   [facility] Draw     UMP RETURN    8:25 AM   (50 min.)   Ramona Greer PA   M [facility]     [facility] ONC INFUSION 360   10:00 AM   (360 min.)    ONCOLOGY INFUSION   [facility] 25  Happy Birthday!       26     27     28     29     UMP  [facility] DRAW   10:30 AM   (15 min.)    [facility] LAB DRAW   [facility] Lab Draw     UMP ONC INFUSION 120   11:00 AM   (120 min.)    ONCOLOGY INFUSION   [facility] 30 December 2017 Sunday Monday Tuesday Wednesday Thursday Friday Saturday                            1     [facility] DRAW    8:15 AM   (15 min.)    [facility] LAB DRAW   [facility] Lab Draw     UMP RETURN    8:25 AM   (50 min.)   Yessica Ovalles APRN CNP   [facility]     [facility] ONC INFUSION 360   10:00 AM   (360 min.)    ONCOLOGY INFUSION   [facility] 2       3     4     5     6     7     8     9       10     11     UMP RETURN   12:45 PM   (30 min.)   Nurse,  Prostate Cancer Ctr   [facility]rology and Inst for Prostate and Urologic Cancers 12     13     14     15     16       17     18     19     20     21     22     23       24     25     26     27     28     29     30       31                                                Lab Results:  Recent Results (from the past 12 hour(s))   Basic metabolic panel    Collection Time: 11/12/17  9:51 AM   Result Value Ref Range    Sodium 130 (L) 133 - 144 mmol/L    Potassium 3.3 (L) 3.4 - 5.3 mmol/L    Chloride 95 94 - 109 mmol/L    Carbon Dioxide 25 20 - 32 mmol/L    Anion Gap 10 3 - 14 mmol/L    Glucose 97 70 - 99 mg/dL    Urea Nitrogen 24 7 - 30 mg/dL    Creatinine 1.62 (H) 0.66 - 1.25 mg/dL    GFR Estimate 42 (L) >60 mL/min/1.7m2    GFR Estimate If Black 51 (L) >60 mL/min/1.7m2    Calcium 9.3 8.5 - 10.1 mg/dL               Follow-ups after your visit        Your next 10 appointments already scheduled     Nov 14, 2017  1:30 PM CST   (Arrive by 1:15 PM)   New Patient Visit with Arsenio Ortiz MD   [facility] Ear Nose and Throat ([facility])    [address] [place] [id number] [phone]            Nov 15, 2017 12:00 PM CST   [facility] Draw with   [facility] DRAW   [facility] [facility]    [address] [address]   [phone]            Nov 15, 2017 12:30 PM CST   Infusion 120 with UC ONCOLOGY INFUSION, UC 29 ATC   [facility] [facility]    [address] [place] [address]   [phone]            Nov 17, 2017 12:30 PM CST   [facility] Lab Draw with [facility] LAB DRAW   [facility] Mason[facility] Lab Draw ([place] [facility])    [address] [place] [address]   [phone]            Nov 17, 2017  1:00 PM CST   Infusion 120 with UC ONCOLOGY INFUSION, UC 18 ATC   [facility] [facility]    [address] [place] [address]   [phone]            Nov 20, 2017  1:00 PM CST   Infusion 120 with UC ONCOLOGY INFUSION, UC 25 ATC   [facility] ([place] [facility]    [address] [place] [address]   [phone]            Nov 22, 2017 12:30 PM CST   [facility] Lab Draw with [facility] DRAW   [facility] Lab Draw ([place] [facility])    [address] [place] [address]   [phone]              Who to contact     If you have questions or need follow up information about today's clinic visit or your schedule please contact [facility] directly at [phone].  Normal or non-critical lab and imaging results will be communicated to you by MyChart, letter or phone within 4 business days after the clinic has received the results. If you do not hear from us within 7 days, please contact the clinic through MyChart or phone. If you have a critical or abnormal lab result, we will notify you by phone as soon as possible.  Submit refill requests through [employer] or call your pharmacy and they  "will forward the refill request to us. Please allow 3 business days for your refill to be completed.          Additional Information About Your Visit        [employer] Information     [employer] lets you send messages to your doctor, view your test results, renew your prescriptions, schedule appointments and more. To sign up, go to [email or web address] . Click on \"Log in\" on the left side of the screen, which will take you to the Welcome page. Then click on \"Sign up Now\" on the right side of the page.     You will be asked to enter the access code listed below, as well as some personal information. Please follow the directions to create your username and password.     Your access code is: [email or web address]  Expires: 2018  5:30 AM     Your access code will  in 90 days. If you need help or a new code, please call your [place] clinic or [phone].        Care EveryWhere ID     This is your [facility] EveryWhere ID. This could be used by other organizations to access your [place] medical records  [id number]         Blood Pressure from Last 3 Encounters:   11/10/17 135/76   17 130/74   17 137/81    Weight from Last 3 Encounters:   11/10/17 70.7 kg (155 lb 12.8 oz)   17 70.6 kg (155 lb 9.6 oz)   17 71.7 kg (158 lb)              We Performed the Following     Basic metabolic panel        Primary Care Provider Office Phone # Fax #    Joan Janet Ventura [phone]       [address] [facility] [address]        Equal Access to Services     SHILPI RED AH: Hadii светлана Robert, wacarlosda luqadaha, qaybta kaalronald zhong. So [facility] [phone].    ATENCIÓN: Si habla español, tiene a washburn disposición servicios gratuitos de asistencia lingüística. Juwan hoffmann [phone].    We comply with applicable federal civil rights laws and [place] laws. We do not discriminate on the basis of race, color, national origin, age, " disability, sex, sexual orientation, or gender identity.            Thank you!     Thank you for choosing [facility]  for your care. Our goal is always to provide you with excellent care. Hearing back from our patients is one way we can continue to improve our services. Please take a few minutes to complete the written survey that you may receive in the mail after your visit with us. Thank you!             Your Updated Medication List - Protect others around you: Learn how to safely use, store and throw away your medicines at www.disposemymeds.org.          This list is accurate as of: 11/12/17  1:31 PM.  Always use your most recent med list.                   Brand Name Dispense Instructions for use Diagnosis    alum & mag hydroxide-simethicone 200-200-20 MG/5ML Susp suspension    MYLANTA/MAALOX    355 mL    Take 30 mLs by mouth every 4 hours as needed for indigestion        amLODIPine 5 MG tablet    NORVASC    60 tablet    Take 2 tablets (10 mg) by mouth daily    Benign essential hypertension       dexamethasone 4 MG tablet    DECADRON    12 tablet    Take 2 tablets (8 mg) by mouth daily Take for 3 days, starting the day after cisplatin (Day 2 and Day 9).    Urethral cancer (H)       docusate sodium 100 MG capsule    COLACE    60 capsule    Take 1 capsule (100 mg) by mouth 2 times daily as needed for constipation    Slow transit constipation       * FIRST-OMEPRAZOLE 2 MG/ML Susp     300 mL    Take 10 mLs (20 mg) by mouth 2 times daily    Urethral cancer (H), Gastroesophageal reflux disease with esophagitis       * omeprazole 2 mg/mL Susp    priLOSEC    280 mL    Take 10 mLs (20 mg) by mouth 2 times daily    Gastroesophageal reflux disease with esophagitis, Urethral cancer (H)       fluticasone 50 MCG/ACT spray    FLONASE     Spray 1 spray into both nostrils every morning        HYDROXYZINE HCL PO      Take 5 mg by mouth At Bedtime        LORazepam 0.5 MG tablet    ATIVAN    30 tablet    Take 1  tablet (0.5 mg) by mouth every 4 hours as needed (Anxiety, Nausea/Vomiting or Sleep)    Nausea       nystatin 543026 UNIT/ML suspension    MYCOSTATIN    200 mL    Take 5 mLs (500,000 Units) by mouth 4 times daily Swish and spit    Thrush       OLANZapine 5 MG tablet    zyPREXA    30 tablet    Take 1 tablet (5 mg) by mouth At Bedtime    Urethral cancer (H), Nausea       ondansetron 8 MG tablet    ZOFRAN    20 tablet    Take 1 tablet (8 mg) by mouth every 8 hours as needed for nausea    Nausea       prochlorperazine 10 MG tablet    COMPAZINE    30 tablet    Take 0.5 tablets (5 mg) by mouth every 6 hours as needed (Nausea/Vomiting)    Nausea       * Notice:  This list has 2 medication(s) that are the same as other medications prescribed for you. Read the directions carefully, and ask your doctor or other care provider to review them with you.

## 2017-11-12 NOTE — PROGRESS NOTES
Infusion Nursing Note:  King Gonsalo Bruno presents today for IVF and electrolyte replacement.    Patient seen by provider today: No    Note: Patient presented to clinic w/ c/o fatigue.  Decline replacing K (3.3) PO; would like to have all via PIV.  Administered 2L IVF and replaced K per protocol per order and 11/10/2017 instruction.  Patient denies any urinary changes.       Intravenous Access:  Peripheral IV placed.    Treatment Conditions:  Lab Results   Component Value Date     11/12/2017                   Lab Results   Component Value Date    POTASSIUM 3.3 11/12/2017           Lab Results   Component Value Date    MAG 1.3 11/10/2017            Lab Results   Component Value Date    CR 1.62 11/12/2017                   Lab Results   Component Value Date    SAADIA 9.3 11/12/2017                Lab Results   Component Value Date    BILITOTAL 0.4 11/10/2017           Lab Results   Component Value Date    ALBUMIN 3.3 11/10/2017                    Lab Results   Component Value Date    ALT 18 11/10/2017           Lab Results   Component Value Date    AST 26 11/10/2017     Results reviewed, labs MET treatment parameters, ok to proceed with treatment.    Post Infusion Assessment:  Patient tolerated infusion without incident.  Blood return noted pre and post infusion.  Site patent and intact, free from redness, edema or discomfort.  No evidence of extravasations.  Access discontinued per protocol.    Discharge Plan:   Patient declined prescription refills.  Discharge instructions reviewed with: Patient.  Patient and/or family verbalized understanding of discharge instructions and all questions answered.  Copy of AVS reviewed with patient and/or family.  Patient will return 11/15/2017 for next appointment.  Departure Mode: Ambulatory.    Rossi Castellanos RN

## 2017-11-14 ENCOUNTER — PRE VISIT (OUTPATIENT)
Dept: OTOLARYNGOLOGY | Facility: CLINIC | Age: 72
End: 2017-11-14

## 2017-11-14 ENCOUNTER — OFFICE VISIT (OUTPATIENT)
Dept: OTOLARYNGOLOGY | Facility: CLINIC | Age: 72
End: 2017-11-14

## 2017-11-14 ENCOUNTER — THERAPY VISIT (OUTPATIENT)
Dept: SPEECH THERAPY | Facility: CLINIC | Age: 72
End: 2017-11-14

## 2017-11-14 VITALS — BODY MASS INDEX: 18.39 KG/M2 | HEIGHT: 76 IN | WEIGHT: 151 LBS

## 2017-11-14 DIAGNOSIS — K22.5 ZENKER DIVERTICULA: Primary | ICD-10-CM

## 2017-11-14 DIAGNOSIS — R13.13 PHARYNGEAL DYSPHAGIA: ICD-10-CM

## 2017-11-14 DIAGNOSIS — Q38.7 PHARYNGEAL DIVERTICULA: Primary | ICD-10-CM

## 2017-11-14 DIAGNOSIS — K21.00 GASTROESOPHAGEAL REFLUX DISEASE WITH ESOPHAGITIS: ICD-10-CM

## 2017-11-14 ASSESSMENT — PAIN SCALES - GENERAL: PAINLEVEL: NO PAIN (0)

## 2017-11-14 NOTE — LETTER
11/14/2017       RE: King Gonsalo Bruno  [address]  [facility] [address]     Dear Colleague,    Thank you for referring your patient, King Gonsalo Bruno, to the [facility] EAR NOSE AND THROAT at [facility] Please see a copy of my visit note below.    HISTORY OF PRESENT ILLNESS: King Gonsalo Bruno is a 71 year old male with a history of  HISTORY OF PRESENT ILLNESS:   He has a history of reflux at times, esophageal spasm at times. He has a Zenker's diverticulum that is quite small and not operable.  He had a modified barium swallow study today.  He has urethral cancer and for his urethral cancer he is being treated with cisplatin and the cisplatin is probably giving him some nerve problems as well.              Last 2 Scores for Patient-Answered VHI Questionnaire  No flowsheet data found.    Last 2 Scores for Patient-Answered CSI Questionnaire  No flowsheet data found.      Last 2 Scores for Patient-Answered EAT Questionnaire  No flowsheet data found.        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      GERD (gastroesophageal reflux disease)      Hypertension      PONV (postoperative nausea and vomiting)        PAST SURGICAL HISTORY:   Past Surgical History:   Procedure Laterality Date     CYSTOSCOPY, BIOPSY BLADDER, COMBINED N/A 8/31/2017    Procedure: COMBINED CYSTOSCOPY, BIOPSY BLADDER;  Cystoscopy, Suprapubic Tube Placement with Ultrasound Guidiance, Uretheral Dilation;  Surgeon: Muriel Haddad MD;  [facility]     CYSTOSTOMY, INSERT TUBE SUPRAPUBIC, COMBINED N/A 8/31/2017    Procedure: COMBINED CYSTOSTOMY, INSERT TUBE SUPRAPUBIC;;  Surgeon: Muriel Haddad MD;  [facility]     LARYNGOSCOPY  2013    Direct laryngoscopy with the use of rigid endoscopy and takedown of left hypopharyngeal  diverticula.        FAMILY HISTORY:   Family History   Problem Relation Age of Onset     CEREBROVASCULAR DISEASE Mother      Hypertension Mother      Prostate Cancer Father 84     Prostate Cancer Brother 63     DIABETES Brother      CANCER Brother      DIABETES Brother        SOCIAL HISTORY:   Social History   Substance Use Topics     Smoking status: Former Smoker     Packs/day: 1.00     Years: 20.00     Types: Cigarettes     Start date: 9/29/1972     Quit date: 1/1/1992     Smokeless tobacco: Never Used      Comment: quit in 1992     Alcohol use No      Comment: 1987 quit per personal choice.       REVIEW OF SYSTEMS: Ten point review of systems was performed and is negative except for:   [facility] 11/14[facility]2017   Constitutional Weight loss, Appetite change, Problems with sleep   Neurology Dizzy spells   Ears, Nose, Throat Ringing/noise in ears, Nasal congestion or drainage, Sore throat, Trouble swallowing   Gastrointestinal/Genitourinary Heartburn/indigestion, Pain with urination, Constipation   Musculoskeletal Neck pain        ALLERGIES: Lisinopril    MEDICATIONS:   Current Outpatient Prescriptions   Medication Sig Dispense Refill     omeprazole (PRILOSEC) 2 mg/mL SUSP Take 10 mLs (20 mg) by mouth 2 times daily 280 mL 0     nystatin (MYCOSTATIN) 600438 UNIT/ML suspension Take 5 mLs (500,000 Units) by mouth 4 times daily Swish and spit 200 mL 0     amLODIPine (NORVASC) 5 MG tablet Take 2 tablets (10 mg) by mouth daily 60 tablet 3     OLANZapine (ZYPREXA) 5 MG tablet Take 1 tablet (5 mg) by mouth At Bedtime 30 tablet 0     FIRST-OMEPRAZOLE 2 MG/ML SUSP Take 10 mLs (20 mg) by mouth 2 times daily 300 mL 0     alum & mag hydroxide-simethicone (MYLANTA/MAALOX) 200-200-20 MG/5ML SUSP suspension Take 30 mLs by mouth every 4 hours as needed for indigestion 355 mL 1     LORazepam (ATIVAN) 0.5 MG tablet Take 1 tablet (0.5 mg) by mouth every 4 hours as needed (Anxiety, Nausea/Vomiting or Sleep) 30 tablet 3     prochlorperazine  (COMPAZINE) 10 MG tablet Take 0.5 tablets (5 mg) by mouth every 6 hours as needed (Nausea/Vomiting) 30 tablet 3     ondansetron (ZOFRAN) 8 MG tablet Take 1 tablet (8 mg) by mouth every 8 hours as needed for nausea 20 tablet 3     docusate sodium (COLACE) 100 MG capsule Take 1 capsule (100 mg) by mouth 2 times daily as needed for constipation 60 capsule 0     dexamethasone (DECADRON) 4 MG tablet Take 2 tablets (8 mg) by mouth daily Take for 3 days, starting the day after cisplatin (Day 2 and Day 9). 12 tablet 3     HYDROXYZINE HCL PO Take 5 mg by mouth At Bedtime        fluticasone (FLONASE) 50 MCG/ACT spray Spray 1 spray into both nostrils every morning            PHYSICAL EXAMINATION:  He  is awake, alert and in no apparent distress.    His tympanic membranes are clear and intact bilaterally. External auditory canals are clear.  Nasal exam shows a mild septal deviation without obstruction.  Examination of the oral cavity shows no suspicious lesions.  There is symmetric movement of the tongue and soft palate.    The oropharynx is clear.  His neck is supple without significant adenopathy.  Pulse is regular.  Upper airway is clear.  Cranial nerves II-XII are grossly intact.        IMPRESSION: GERD and small zenkers   ASSESSMENT: Patient with a history of urethral cancer, some swallowing problems.      PLAN:   I gave him the advice on coming back to see us if there is  discoordination.  I told him to continue taking the omeprazole and we talked about types of things that would bring him back to clinic sooner rather than later.  He understands and agrees.      FO/ms       PLAN:      Again, thank you for allowing me to participate in the care of your patient.      Sincerely,    Arsenio Ortiz MD

## 2017-11-14 NOTE — NURSING NOTE
"Chief Complaint   Patient presents with     Consult     Zenkers consultation      Height 1.93 m (6' 4\"), weight 68.5 kg (151 lb).    Jim Romero    "

## 2017-11-14 NOTE — MR AVS SNAPSHOT
After Visit Summary   11/14/2017    King Gonsalo Bruno    MRN: [id number]           Patient Information     Date Of Birth          1945        Visit Information        Provider Department      11/14/2017 1:30 PM Arsenio Ortiz MD [facility] Ear Nose and Throat        Today's Diagnoses     Zenker diverticula    -  1      Care Instructions    Follow up will be based on the improvement of your swallow function after the chemo therapy.   If the Zenker's diverticula symptoms gets worse please call Dr Ledesma' office.   Graeme DillardRN  [phone]              Follow-ups after your visit        Your next 10 appointments already scheduled     Dec 06, 2017 10:00 AM CST   [facility] Draw with  [facility] DRAW   [facility] Lab Draw ([facility])    [address] [place] [id number] [phone]            Dec 06, 2017 10:30 AM CST   Infusion 120 with [facility] ONCOLOGY INFUSION, UC 30 ATC   [facility] ([facility])    [address] [place] [address]   [phone]            Dec 08, 2017  9:30 AM CST   Infusion 120 with [facility] ONCOLOGY INFUSION, UC 22 ATC   [facility] ([facility])    [address] [place] [address]   [phone]            Dec 11, 2017  1:00 PM CST   (Arrive by 12:45 PM)   Return Visit with  Prostate Cancer Ctr Nurse   [facility] ([facility])    [address] [place] [id number] [phone]            Dec 12, 2017  8:30 AM CST   (Arrive by 8:15 AM)   PE NPET ONCOLOGY (EYES TO THIGHS) with [id number]   [facility] ([facility])    [address] [facility] [address]   [phone]           Tell your doctor:   If there is any chance you may be  pregnant or if you are breastfeeding.   If you have problems lying in small spaces (claustrophobia). If you do, your doctor may give you medicine to help you relax. If you have diabetes:   Have your exam early in the morning. Your blood glucose will go up as the day goes by.   Your glucose level must be 180 or less at the start of the exam. Please take any medicines you need to ensure this blood glucose level. 24 hours before your scan: Don t do any heavy exercise. (No jogging, aerobics or other workouts.) Exercise will make your pictures less accurate. 6 hours before your scan:   Stop all food and liquids (except water).   Do not chew gum or suck on mints.   If you need to take medicine with food, you may take it with a few crackers.  Please call your Imaging Department at your exam site with any questions.            Dec 13, 2017  8:45 AM CST   (Arrive by 8:30 AM)   Return Visit with Steve Arceo MD   [facility] ([facility])    [address] [place] [address] [phone]            Dec 14, 2017 11:30 AM CST   Infusion 360 with  ONCOLOGY INFUSION, UC 13 ATC   [facility] ([facility])    [address] [place] [address] [phone]              Who to contact     Please call your clinic at [phone] to:    Ask questions about your health    Make or cancel appointments    Discuss your medicines    Learn about your test results    Speak to your doctor   If you have compliments or concerns about an experience at your clinic, or if you wish to file a complaint, please contact [facility] Patient Relations at [phone] or email us at [email or web address]         Additional Information About Your Visit        MyChart Information     [facility]. is an electronic gateway that provides easy, online access to your medical records.  "With [employer], you can request a clinic appointment, read your test results, renew a prescription or communicate with your care team.     To sign up for [employer] visit the website at [email or web address]   You will be asked to enter the access code listed below, as well as some personal information. Please follow the directions to create your username and password.     Your access code is: [email or web address]  Expires: 2018  5:30 AM     Your access code will  in 90 days. If you need help or a new code, please contact your [facility] or call [phone] for assistance.        Care EveryWhere ID     This is your Care EveryWhere ID. This could be used by other organizations to access your [facility] medical records  [id number]        Your Vitals Were     Height BMI (Body Mass Index)                1.93 m (6' 4\") 18.38 kg/m2           Blood Pressure from Last 3 Encounters:   17 118/72   17 151/75   17 149/71    Weight from Last 3 Encounters:   17 69.1 kg (152 lb 4.8 oz)   17 71.6 kg (157 lb 12.8 oz)   17 72.2 kg (159 lb 3.2 oz)              Today, you had the following     No orders found for display       Primary Care Provider Office Phone # Fax #    Joan Gonzales Lorenzo, MARLENY [phone]       [address] [place] [address]        Equal Access to Services     JENELLE RED AH: Hadii aad ku hadasho Soomaali, waaxda luqadaha, qaybta kaalmada adeegyada, ronald rasmussen. So [facility] [phone].    ATENCIÓN: Si habla español, tiene a washburn disposición servicios gratuitos de asistencia lingüística. Llame al [phone].    We comply with applicable federal civil rights laws and [place] laws. We do not discriminate on the basis of race, color, national origin, age, disability, sex, sexual orientation, or gender identity.            Thank you!     Thank you for choosing [facility] EAR NOSE AND THROAT "  for your care. Our goal is always to provide you with excellent care. Hearing back from our patients is one way we can continue to improve our services. Please take a few minutes to complete the written survey that you may receive in the mail after your visit with us. Thank you!             Your Updated Medication List - Protect others around you: Learn how to safely use, store and throw away your medicines at www.disposemymeds.org.          This list is accurate as of: 11/14/17 11:59 PM.  Always use your most recent med list.                   Brand Name Dispense Instructions for use Diagnosis    alum & mag hydroxide-simethicone 200-200-20 MG/5ML Susp suspension    MYLANTA/MAALOX    355 mL    Take 30 mLs by mouth every 4 hours as needed for indigestion        amLODIPine 5 MG tablet    NORVASC    60 tablet    Take 2 tablets (10 mg) by mouth daily    Benign essential hypertension       dexamethasone 4 MG tablet    DECADRON    12 tablet    Take 2 tablets (8 mg) by mouth daily Take for 3 days, starting the day after cisplatin (Day 2 and Day 9).    Urethral cancer (H)       docusate sodium 100 MG capsule    COLACE    60 capsule    Take 1 capsule (100 mg) by mouth 2 times daily as needed for constipation    Slow transit constipation       FIRST-OMEPRAZOLE 2 MG/ML Susp     300 mL    Take 10 mLs (20 mg) by mouth 2 times daily    Urethral cancer (H), Gastroesophageal reflux disease with esophagitis       fluticasone 50 MCG/ACT spray    FLONASE     Spray 1 spray into both nostrils every morning        HYDROXYZINE HCL PO      Take 5 mg by mouth At Bedtime        LORazepam 0.5 MG tablet    ATIVAN    30 tablet    Take 1 tablet (0.5 mg) by mouth every 4 hours as needed (Anxiety, Nausea/Vomiting or Sleep)    Nausea       OLANZapine 5 MG tablet    zyPREXA    30 tablet    Take 1 tablet (5 mg) by mouth At Bedtime    Urethral cancer (H), Nausea       ondansetron 8 MG tablet    ZOFRAN    20 tablet    Take 1 tablet (8 mg) by mouth  every 8 hours as needed for nausea    Nausea       prochlorperazine 10 MG tablet    COMPAZINE    30 tablet    Take 0.5 tablets (5 mg) by mouth every 6 hours as needed (Nausea/Vomiting)    Nausea

## 2017-11-14 NOTE — PATIENT INSTRUCTIONS
Follow up will be based on the improvement of your swallow function after the chemo therapy.   If the Zenker's diverticula symptoms gets worse please call Dr Ledesma' office.   Graeme Dillard RN  [phone]

## 2017-11-14 NOTE — PROGRESS NOTES
HISTORY OF PRESENT ILLNESS: King Gonsalo Bruno is a 71 year old male with a history of  HISTORY OF PRESENT ILLNESS:   He has a history of reflux at times, esophageal spasm at times. He has a Zenker's diverticulum that is quite small and not operable.  He had a modified barium swallow study today.  He has urethral cancer and for his urethral cancer he is being treated with cisplatin and the cisplatin is probably giving him some nerve problems as well.              Last 2 Scores for Patient-Answered VHI Questionnaire  No flowsheet data found.    Last 2 Scores for Patient-Answered CSI Questionnaire  No flowsheet data found.      Last 2 Scores for Patient-Answered EAT Questionnaire  No flowsheet data found.        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      GERD (gastroesophageal reflux disease)      Hypertension      PONV (postoperative nausea and vomiting)        PAST SURGICAL HISTORY:   Past Surgical History:   Procedure Laterality Date     CYSTOSCOPY, BIOPSY BLADDER, COMBINED N/A 8/31/2017    Procedure: COMBINED CYSTOSCOPY, BIOPSY BLADDER;  Cystoscopy, Suprapubic Tube Placement with Ultrasound Guidiance, Uretheral Dilation;  Surgeon: Muriel Haddad MD;  [facility]     CYSTOSTOMY, INSERT TUBE SUPRAPUBIC, COMBINED N/A 8/31/2017    Procedure: COMBINED CYSTOSTOMY, INSERT TUBE SUPRAPUBIC;;  Surgeon: Muriel Haddad MD;  [facility]     LARYNGOSCOPY  2013    Direct laryngoscopy with the use of rigid endoscopy and takedown of left hypopharyngeal diverticula.        FAMILY HISTORY:   Family History   Problem Relation Age of Onset     CEREBROVASCULAR DISEASE Mother      Hypertension Mother      Prostate Cancer Father 84     Prostate Cancer Brother 63     DIABETES Brother      CANCER Brother      DIABETES Brother        SOCIAL HISTORY:   Social  History   Substance Use Topics     Smoking status: Former Smoker     Packs/day: 1.00     Years: 20.00     Types: Cigarettes     Start date: 9/29/1972     Quit date: 1/1/1992     Smokeless tobacco: Never Used      Comment: quit in 1992     Alcohol use No      Comment: 1987 quit per personal choice.       REVIEW OF SYSTEMS: Ten point review of systems was performed and is negative except for:   [facility] ENT ROS 11/14/2017   Constitutional Weight loss, Appetite change, Problems with sleep   Neurology Dizzy spells   Ears, Nose, Throat Ringing/noise in ears, Nasal congestion or drainage, Sore throat, Trouble swallowing   Gastrointestinal/Genitourinary Heartburn/indigestion, Pain with urination, Constipation   Musculoskeletal Neck pain        ALLERGIES: Lisinopril    MEDICATIONS:   Current Outpatient Prescriptions   Medication Sig Dispense Refill     omeprazole (PRILOSEC) 2 mg/mL SUSP Take 10 mLs (20 mg) by mouth 2 times daily 280 mL 0     nystatin (MYCOSTATIN) 080765 UNIT/ML suspension Take 5 mLs (500,000 Units) by mouth 4 times daily Swish and spit 200 mL 0     amLODIPine (NORVASC) 5 MG tablet Take 2 tablets (10 mg) by mouth daily 60 tablet 3     OLANZapine (ZYPREXA) 5 MG tablet Take 1 tablet (5 mg) by mouth At Bedtime 30 tablet 0     FIRST-OMEPRAZOLE 2 MG/ML SUSP Take 10 mLs (20 mg) by mouth 2 times daily 300 mL 0     alum & mag hydroxide-simethicone (MYLANTA/MAALOX) 200-200-20 MG/5ML SUSP suspension Take 30 mLs by mouth every 4 hours as needed for indigestion 355 mL 1     LORazepam (ATIVAN) 0.5 MG tablet Take 1 tablet (0.5 mg) by mouth every 4 hours as needed (Anxiety, Nausea/Vomiting or Sleep) 30 tablet 3     prochlorperazine (COMPAZINE) 10 MG tablet Take 0.5 tablets (5 mg) by mouth every 6 hours as needed (Nausea/Vomiting) 30 tablet 3     ondansetron (ZOFRAN) 8 MG tablet Take 1 tablet (8 mg) by mouth every 8 hours as needed for nausea 20 tablet 3     docusate sodium (COLACE) 100 MG capsule Take 1 capsule (100 mg) by mouth  2 times daily as needed for constipation 60 capsule 0     dexamethasone (DECADRON) 4 MG tablet Take 2 tablets (8 mg) by mouth daily Take for 3 days, starting the day after cisplatin (Day 2 and Day 9). 12 tablet 3     HYDROXYZINE HCL PO Take 5 mg by mouth At Bedtime        fluticasone (FLONASE) 50 MCG/ACT spray Spray 1 spray into both nostrils every morning            PHYSICAL EXAMINATION:  He  is awake, alert and in no apparent distress.    His tympanic membranes are clear and intact bilaterally. External auditory canals are clear.  Nasal exam shows a mild septal deviation without obstruction.  Examination of the oral cavity shows no suspicious lesions.  There is symmetric movement of the tongue and soft palate.    The oropharynx is clear.  His neck is supple without significant adenopathy.  Pulse is regular.  Upper airway is clear.  Cranial nerves II-XII are grossly intact.        IMPRESSION: GERD and small zenkers   ASSESSMENT: Patient with a history of urethral cancer, some swallowing problems.      PLAN:   I gave him the advice on coming back to see us if there is  discoordination.  I told him to continue taking the omeprazole and we talked about types of things that would bring him back to clinic sooner rather than later.  He understands and agrees.      FO/ms       PLAN:    As dictated

## 2017-11-14 NOTE — MR AVS SNAPSHOT
After Visit Summary   11/14/2017    King Gonsalo Bruno    MRN: [id number]           Patient Information     Date Of Birth          1945        Visit Information        Provider Department      11/14/2017 1:00 PM Khalida March SLP [facility]        Today's Diagnoses     Pharyngeal diverticula    -  1    Pharyngeal dysphagia           Follow-ups after your visit        Your next 10 appointments already scheduled     Nov 15, 2017 12:00 PM CST   Masonic Lab Draw with UC MASONIC LAB DRAW   [facility]    [address] [place] [address]   [phone]            Nov 15, 2017 12:30 PM CST   Infusion 120 with UC ONCOLOGY INFUSION, UC 29 ATC   [facility] ([facility])    [address] [place] [address]   [phone]            Nov 17, 2017 12:30 PM CST   Masonic Lab Draw with UC MASONIC LAB DRAW   [facility]    [address] [place] [address]   [phone]            Nov 17, 2017  1:00 PM CST   Infusion 120 with UC ONCOLOGY INFUSION, UC 18 ATC   [facility] ([facility])    [address] [address]   [phone]            Nov 20, 2017  1:00 PM CST   Infusion 120 with UC ONCOLOGY INFUSION, UC 25 ATC   [facility] ([facility])    [address] [place] [address]   [phone]            Nov 22, 2017 12:30 PM CST   Masonic Lab Draw with UC MASONIC LAB DRAW   [facility] Masonic Lab Draw ([facility])    [address] [address]   [phone]            Nov 22, 2017  1:00 PM CST   Infusion 120 with UC ONCOLOGY INFUSION   [facility] ([facility])    [address] [address] [phone]              Who to contact     Please call your clinic at [phone] to:    Ask questions about your health    Make or cancel appointments    Discuss your medicines    Learn about your test results    Speak to your doctor   If you have compliments or concerns about an experience at your clinic, or if you wish to file a complaint, please contact [facility] Patient Relations at [phone] or email us at [email or web address]         Additional Information About Your Visit        ParadialharRatingBug Information     NanoAntibioticst is an electronic gateway that provides easy, online access to your medical records. With Jounce, you can request a clinic appointment, read your test results, renew a prescription or communicate with your care team.     To sign up for Jounce visit the website at [email or web address]   You will be asked to enter the access code listed below, as well as some personal information. Please follow the directions to create your username and password.     Your access code is: [email or web address]  Expires: 2018  5:30 AM     Your access code will  in 90 days. If you need help or a new code, please contact your [facility] Clinic or call [phone] for assistance.        Care EveryWhere ID     This is your Care EveryWhere ID. This could be used by other organizations to access your [place] medical records  [id number]         Blood Pressure from Last 3 Encounters:   11/10/17 135/76   17 130/74   17 137/81    Weight from Last 3 Encounters:   17 68.5 kg (151 lb)   11/10/17 70.7 kg (155 lb 12.8 oz)   17 70.6 kg (155 lb 9.6 oz)              Today, you had the following     No orders found for display       Primary Care Provider Office Phone # Fax #    Joan Ventura [phone]       [place] [address]  [facility] [address]        Equal Access to Services     ARMONDSHILPI NEDA : Hadii светлана garcia hadnikkijanine Sotimali, waaxda luqadaha, qaybta kanazenoch chase, ronald adamson hayreymundo porrasreidraymundo rasmussen. So [facility] [phone].    ATENCIÓN: Si habla español, tiene a washburn disposición servicios gratuitos de asistencia lingüística. Llame al [phone].    We comply with applicable federal civil rights laws and [place] laws. We do not discriminate on the basis of race, color, national origin, age, disability, sex, sexual orientation, or gender identity.            Thank you!     Thank you for choosing [facility]  for your care. Our goal is always to provide you with excellent care. Hearing back from our patients is one way we can continue to improve our services. Please take a few minutes to complete the written survey that you may receive in the mail after your visit with us. Thank you!             Your Updated Medication List - Protect others around you: Learn how to safely use, store and throw away your medicines at www.disposemymeds.org.          This list is accurate as of: 11/14/17 11:59 PM.  Always use your most recent med list.                   Brand Name Dispense Instructions for use Diagnosis    alum & mag hydroxide-simethicone 200-200-20 MG/5ML Susp suspension    MYLANTA/MAALOX    355 mL    Take 30 mLs by mouth every 4 hours as needed for indigestion        amLODIPine 5 MG tablet    NORVASC    60 tablet    Take 2 tablets (10 mg) by mouth daily    Benign essential hypertension       dexamethasone 4 MG tablet    DECADRON    12 tablet    Take 2 tablets (8 mg) by mouth daily Take for 3 days, starting the day after cisplatin (Day 2 and Day 9).    Urethral cancer (H)       docusate sodium 100 MG capsule    COLACE    60 capsule    Take 1 capsule (100 mg) by mouth 2 times daily as needed for constipation    Slow transit constipation       * FIRST-OMEPRAZOLE 2 MG/ML Susp     300 mL    Take 10 mLs (20 mg) by mouth 2 times  daily    Urethral cancer (H), Gastroesophageal reflux disease with esophagitis       * omeprazole 2 mg/mL Susp    priLOSEC    280 mL    Take 10 mLs (20 mg) by mouth 2 times daily    Gastroesophageal reflux disease with esophagitis, Urethral cancer (H)       fluticasone 50 MCG/ACT spray    FLONASE     Spray 1 spray into both nostrils every morning        HYDROXYZINE HCL PO      Take 5 mg by mouth At Bedtime        LORazepam 0.5 MG tablet    ATIVAN    30 tablet    Take 1 tablet (0.5 mg) by mouth every 4 hours as needed (Anxiety, Nausea/Vomiting or Sleep)    Nausea       nystatin 379977 UNIT/ML suspension    MYCOSTATIN    200 mL    Take 5 mLs (500,000 Units) by mouth 4 times daily Swish and spit    Thrush       OLANZapine 5 MG tablet    zyPREXA    30 tablet    Take 1 tablet (5 mg) by mouth At Bedtime    Urethral cancer (H), Nausea       ondansetron 8 MG tablet    ZOFRAN    20 tablet    Take 1 tablet (8 mg) by mouth every 8 hours as needed for nausea    Nausea       prochlorperazine 10 MG tablet    COMPAZINE    30 tablet    Take 0.5 tablets (5 mg) by mouth every 6 hours as needed (Nausea/Vomiting)    Nausea       * Notice:  This list has 2 medication(s) that are the same as other medications prescribed for you. Read the directions carefully, and ask your doctor or other care provider to review them with you.

## 2017-11-15 ENCOUNTER — INFUSION THERAPY VISIT (OUTPATIENT)
Dept: ONCOLOGY | Facility: CLINIC | Age: 72
End: 2017-11-15
Attending: INTERNAL MEDICINE
Payer: MEDICARE

## 2017-11-15 ENCOUNTER — APPOINTMENT (OUTPATIENT)
Dept: LAB | Facility: CLINIC | Age: 72
End: 2017-11-15
Attending: INTERNAL MEDICINE
Payer: MEDICARE

## 2017-11-15 VITALS
HEART RATE: 80 BPM | TEMPERATURE: 98.5 F | BODY MASS INDEX: 18.66 KG/M2 | SYSTOLIC BLOOD PRESSURE: 150 MMHG | OXYGEN SATURATION: 99 % | WEIGHT: 153.3 LBS | DIASTOLIC BLOOD PRESSURE: 82 MMHG

## 2017-11-15 DIAGNOSIS — E86.0 DEHYDRATION: ICD-10-CM

## 2017-11-15 DIAGNOSIS — K21.00 GASTROESOPHAGEAL REFLUX DISEASE WITH ESOPHAGITIS: Primary | ICD-10-CM

## 2017-11-15 DIAGNOSIS — C68.0 URETHRAL CANCER (H): ICD-10-CM

## 2017-11-15 DIAGNOSIS — E87.6 HYPOKALEMIA: ICD-10-CM

## 2017-11-15 DIAGNOSIS — R11.0 NAUSEA: ICD-10-CM

## 2017-11-15 LAB
ANION GAP SERPL CALCULATED.3IONS-SCNC: 9 MMOL/L (ref 3–14)
BUN SERPL-MCNC: 17 MG/DL (ref 7–30)
CALCIUM SERPL-MCNC: 8.9 MG/DL (ref 8.5–10.1)
CHLORIDE SERPL-SCNC: 92 MMOL/L (ref 94–109)
CO2 SERPL-SCNC: 26 MMOL/L (ref 20–32)
CREAT SERPL-MCNC: 1.8 MG/DL (ref 0.66–1.25)
GFR SERPL CREATININE-BSD FRML MDRD: 37 ML/MIN/1.7M2
GLUCOSE SERPL-MCNC: 97 MG/DL (ref 70–99)
MAGNESIUM SERPL-MCNC: 1.2 MG/DL (ref 1.6–2.3)
POTASSIUM SERPL-SCNC: 3.2 MMOL/L (ref 3.4–5.3)
SODIUM SERPL-SCNC: 128 MMOL/L (ref 133–144)

## 2017-11-15 PROCEDURE — A9270 NON-COVERED ITEM OR SERVICE: HCPCS | Mod: ZF | Performed by: PHYSICIAN ASSISTANT

## 2017-11-15 PROCEDURE — 96361 HYDRATE IV INFUSION ADD-ON: CPT

## 2017-11-15 PROCEDURE — 25000128 H RX IP 250 OP 636: Mod: ZF | Performed by: PHYSICIAN ASSISTANT

## 2017-11-15 PROCEDURE — 96365 THER/PROPH/DIAG IV INF INIT: CPT

## 2017-11-15 PROCEDURE — 80048 BASIC METABOLIC PNL TOTAL CA: CPT | Performed by: PHYSICIAN ASSISTANT

## 2017-11-15 PROCEDURE — 99211 OFF/OP EST MAY X REQ PHY/QHP: CPT | Mod: 25

## 2017-11-15 PROCEDURE — 25000132 ZZH RX MED GY IP 250 OP 250 PS 637: Mod: ZF | Performed by: PHYSICIAN ASSISTANT

## 2017-11-15 PROCEDURE — 96375 TX/PRO/DX INJ NEW DRUG ADDON: CPT

## 2017-11-15 PROCEDURE — 96367 TX/PROPH/DG ADDL SEQ IV INF: CPT

## 2017-11-15 PROCEDURE — 83735 ASSAY OF MAGNESIUM: CPT | Performed by: PHYSICIAN ASSISTANT

## 2017-11-15 PROCEDURE — 96366 THER/PROPH/DIAG IV INF ADDON: CPT

## 2017-11-15 PROCEDURE — 40000141 ZZH STATISTIC PERIPHERAL IV START W/O US GUIDANCE

## 2017-11-15 RX ORDER — POTASSIUM CHLORIDE 1.5 G/1.58G
20 POWDER, FOR SOLUTION ORAL ONCE
Status: COMPLETED | OUTPATIENT
Start: 2017-11-15 | End: 2017-11-15

## 2017-11-15 RX ORDER — PALONOSETRON 0.05 MG/ML
0.25 INJECTION, SOLUTION INTRAVENOUS ONCE
Status: CANCELLED
Start: 2017-11-15 | End: 2017-11-15

## 2017-11-15 RX ORDER — PALONOSETRON 0.05 MG/ML
0.25 INJECTION, SOLUTION INTRAVENOUS ONCE
Status: COMPLETED | OUTPATIENT
Start: 2017-11-15 | End: 2017-11-15

## 2017-11-15 RX ADMIN — MAGNESIUM SULFATE HEPTAHYDRATE: 500 INJECTION, SOLUTION INTRAMUSCULAR; INTRAVENOUS at 14:38

## 2017-11-15 RX ADMIN — POTASSIUM CHLORIDE 20 MEQ: 1.5 POWDER, FOR SOLUTION ORAL at 14:11

## 2017-11-15 RX ADMIN — SODIUM CHLORIDE 150 MG: 9 INJECTION, SOLUTION INTRAVENOUS at 13:16

## 2017-11-15 RX ADMIN — SODIUM CHLORIDE 1000 ML: 9 INJECTION, SOLUTION INTRAVENOUS at 13:04

## 2017-11-15 RX ADMIN — PALONOSETRON HYDROCHLORIDE 0.25 MG: 0.25 INJECTION INTRAVENOUS at 13:15

## 2017-11-15 ASSESSMENT — PAIN SCALES - GENERAL: PAINLEVEL: SEVERE PAIN (6)

## 2017-11-15 NOTE — MR AVS SNAPSHOT
After Visit Summary   11/15/2017    King Gonsalo Bruno    MRN: [id number]           Patient Information     Date Of Birth          1945        Visit Information        Provider Department      11/15/2017 12:30 PM  29 ATC;  ONCOLOGY INFUSION [facility]        Today's Diagnoses     Gastroesophageal reflux disease with esophagitis    -  1    Hypokalemia        Nausea        Dehydration        Urethral cancer (H)          Care Instructions    Contact Numbers    [facility] Main Line: [phone]  [facility] Triage:  [phone]    Call triage with chills and/or temperature greater than or equal to 100.5, uncontrolled nausea/vomiting, diarrhea, constipation, dizziness, shortness of breath, chest pain, bleeding, unexplained bruising, or any new/concerning symptoms, questions/concerns.     If you are having any concerning symptoms or wish to speak to a provider before your next infusion visit, please call your care coordinator or triage to notify them so we can adequately serve you.       After Hours: [phone]    If after hours, weekends, or holidays, call main hospital  and ask for Oncology doctor on call.           November 2017 Sunday Monday Tuesday Wednesday Thursday Friday Saturday                  1     2     3     P MASONIC LAB DRAW    7:15 AM   (15 min.)    MASONIC LAB DRAW   [facility] Draw     UMP RETURN    7:35 AM   (50 min.)   Ramona Greer PA   [facility] ONC INFUSION 360    8:30 AM   (360 min.)    ONCOLOGY INFUSION   [facility] 4       5     6     UMP RETURN   12:45 PM   (30 min.)   Nurse,  Prostate Cancer Ctr   [facility] for Prostate and Urologic Cancers     [facility] BMT INFUSION 120    2:00 PM   (120 min.)    BMT INFUSION   [facility] Blood and Marrow Transplant 7     8     UMP MASONIC LAB DRAW   12:30 PM   (15 min.)    MASONIC LAB DRAW   [facility] Lab Draw     P ONC  INFUSION 120    1:00 PM   (120 min.)   UC ONCOLOGY INFUSION   [facility]     UMP NEW    2:45 PM   (60 min.)   Ignacia Wallace RD   [facility] 9     10     UMP MASONIC LAB DRAW    8:15 AM   (15 min.)    MASONIC LAB DRAW   [facility] Lab Draw     UMP RETURN    8:25 AM   (50 min.)   Ramona Greer PA   [facility]     UMP ONC INFUSION 360   10:00 AM   (360 min.)    ONCOLOGY INFUSION   [facility] 11       12     UMP MASONIC LAB DRAW    9:00 AM   (15 min.)    MASONIC LAB DRAW   [facility] Lab Draw     UMP ONC INFUSION 120    9:30 AM   (120 min.)    ONCOLOGY INFUSION   [facility] 13     14     VIDEO SWALLOW STUDY   11:00 AM   (60 min.)   Khalida March SLP   [facility] Re[facility]     XR VIDEO SPEECH W ESOPHAGRAM    1:00 PM   (60 min.)   UCXR2   [facility] Xray     UMP NEW    1:15 PM   (30 min.)   Arsenio Ortiz MD   [facility] Ear Nose and Throat 15     UMP MASONIC LAB DRAW   12:00 PM   (15 min.)    MASONIC LAB DRAW   [facility] Masonic Lab Draw     UMP ONC INFUSION 120   12:30 PM   (120 min.)    ONCOLOGY INFUSION   [facility] 16     17     UMP MASONIC LAB DRAW   12:30 PM   (15 min.)    [facility]   [facility]     [facility] ONC INFUSION 120    1:00 PM   (120 min.)    ONCOLOGY INFUSION   [facility] 18       19     20     UMP ONC INFUSION 120    1:00 PM   (120 min.)    ONCOLOGY INFUSION   [facility] 21     22     UMP MASONIC LAB DRAW   12:30 PM   (15 min.)    MASONIC LAB DRAW   [facility] Masonic Lab Draw     UMP ONC INFUSION 120    1:00 PM   (120 min.)    ONCOLOGY INFUSION   [facility] 23     24     UMP MASONIC LAB DRAW    8:15 AM   (15 min.)    MASONIC LAB DRAW   [facility] Lab Draw     UMP RETURN    8:25 AM   (50 min.)   Ramona Greer PA   [facility]   10:00 AM   (360 min.)    ONCOLOGY INFUSION   [facility] 25  Happy Birthday!       26     27     28     29     UMP MASONIC LAB DRAW   10:30 AM   (15 min.)    [facility] LAB [facility]   [facility]     [facility] ONC INFUSION 120   11:00 AM   (120 min.)    ONCOLOGY INFUSION   [facility] 30 December 2017 Sunday Monday Tuesday Wednesday Thursday Friday Saturday                            1     UMP MASONIC LAB DRAW    8:15 AM   (15 min.)    MASONIC LAB DRAW   [facility] Lab Draw     UMP RETURN    8:25 AM   (50 min.)   Yessica Ovalles, APRN CNP   [facility] 360   10:00 AM   (360 min.)    ONCOLOGY INFUSION   [facility] 2       3     4     5     6     7     8     9       10     11     UMP RETURN   12:45 PM   (30 min.)   Nurse,  Prostate Cancer Ctr   [facility]rology and Inst for Prostate and Urologic Cancers 12     13     14     15     16       17     18     19     20     21     22     23       24     25     26     27     28     29     30       31                                               Recent Results (from the past 24 hour(s))   Basic metabolic panel    Collection Time: 11/15/17 12:21 PM   Result Value Ref Range    Sodium 128 (L) 133 - 144 mmol/L    Potassium 3.2 (L) 3.4 - 5.3 mmol/L    Chloride 92 (L) 94 - 109 mmol/L    Carbon Dioxide 26 20 - 32 mmol/L    Anion Gap 9 3 - 14 mmol/L    Glucose 97 70 - 99 mg/dL    Urea Nitrogen 17 7 - 30 mg/dL    Creatinine 1.80 (H) 0.66 - 1.25 mg/dL    GFR Estimate 37 (L) >60 mL/min/1.7m2    GFR Estimate If Black 45 (L) >60 mL/min/1.7m2    Calcium 8.9 8.5 - 10.1 mg/dL   Magnesium    Collection Time: 11/15/17 12:21 PM   Result Value Ref Range    Magnesium 1.2 (L) 1.6 - 2.3 mg/dL                 Follow-ups after your visit        Your next 10 appointments already scheduled     Nov 17, 2017 12:30  PM CST   Masonic Lab Draw with UC MASONIC LAB DRAW   [facility] Masonic Lab Draw ([facility])    [address] [facility]  [address] [facility] [address]   [phone]            Nov 17, 2017  1:00 PM CST   Infusion 120 with UC ONCOLOGY INFUSION, UC 18 ATC   [facility] ([facility])    [address] [address]   [phone]            Nov 20, 2017  1:00 PM CST   Infusion 120 with UC ONCOLOGY INFUSION, UC 25 ATC   [facility] ([facility])    [address] [place] [address]   [phone]            Nov 22, 2017 12:30 PM CST   Masonic Lab Draw with UC MASONIC LAB DRAW   [facility] Masonic Lab Draw ([facility])    [address] [place] [address]   [phone]            Nov 22, 2017  1:00 PM CST   Infusion 120 with UC ONCOLOGY INFUSION, UC 25 ATC   [facility] ([facility])    [address] [place] [address]   [phone]            Nov 24, 2017  8:15 AM CST   Masonic Lab Draw with UC MASONIC LAB DRAW   [facility] Masonic Lab Draw ([facility])    [address] [place] [address]   [phone]            Nov 24, 2017  8:40 AM CST   (Arrive by 8:25 AM)   Return Visit with SARITHA Briceno   [facility] ([facility])    [address] [place] [address]   [phone]              Future tests that were ordered for you today     Open Standing Orders        Priority Remaining Interval Expires Ordered    Magnesium Routine 30/30 every visit 11/15/2018 11/15/2017            Who to contact     If you have questions or need follow up information about today's clinic visit or your schedule please contact [facility] directly at [phone].  Normal or non-critical lab and imaging results will be communicated to you by MyChart, letter or phone within 4 business days after the clinic has received the results. If you do not hear from us within 7 days, please contact the clinic through [employer] or phone. If you have a critical or abnormal lab result, we will notify you by phone as soon as possible.  Submit refill requests through [employer] or call your pharmacy and they will forward the refill request to us. Please allow 3 business days for your refill to be completed.          Additional Information About Your Visit        [employer] Information     [employer] lets you send messages to your doctor, view your test results, renew your prescriptions, schedule appointments and more. To sign up, go to [email or web address] . Click on \"Log in\" on the left side of the screen, which will take you to the Welcome page. Then click on \"Sign up Now\" on the right side of the page.     You will be asked to enter the access code listed below, as well as some personal information. Please follow the directions to create your username and password.     Your access code is: [email or web address]  Expires: 2018  5:30 AM     Your access code will  in 90 days. If you need help or a new code, please call your [place] clinic or [phone].        Care EveryWhere ID     This is your Care EveryWhere ID. This could be used by other organizations to access your [place] medical records  [id number]        Your Vitals Were     Pulse Temperature Pulse Oximetry BMI (Body Mass Index)          80 98.5  F (36.9  C) (Oral) 99% 18.66 kg/m2         Blood Pressure from Last 3 Encounters:   11/15/17 150/82   11/10/17 135/76   17 130/74    Weight from Last 3 Encounters:   11/15/17 69.5 kg (153 lb 4.8 oz)   17 68.5 kg (151 lb)   11/10/17 70.7 kg (155 lb 12.8 oz)              We Performed the Following     Basic metabolic panel     " [facility]        Primary Care Provider Office Phone # Fax #    Joan Ventura, MARLENY [phone]       [facility] [address]  [facility] [address]        Equal Access to Services     JENELLE RED : Hadii aad ku hadnikkio Soomaali, waaxda luqadaha, qaybta kaalmada adeegyada, ronald nayin hayaadyana porrasreidraymundo rasmussen. So [facility] [phone].    ATENCIÓN: Si habla español, tiene a washburn disposición servicios gratuitos de asistencia lingüística. Llame al [phone].    We comply with applicable federal civil rights laws and [place] laws. We do not discriminate on the basis of race, color, national origin, age, disability, sex, sexual orientation, or gender identity.            Thank you!     Thank you for choosing [facility] CANCER [facility]  for your care. Our goal is always to provide you with excellent care. Hearing back from our patients is one way we can continue to improve our services. Please take a few minutes to complete the written survey that you may receive in the mail after your visit with us. Thank you!             Your Updated Medication List - Protect others around you: Learn how to safely use, store and throw away your medicines at www.disposemymeds.org.          This list is accurate as of: 11/15/17  2:45 PM.  Always use your most recent med list.                   Brand Name Dispense Instructions for use Diagnosis    alum & mag hydroxide-simethicone 200-200-20 MG/5ML Susp suspension    MYLANTA/MAALOX    355 mL    Take 30 mLs by mouth every 4 hours as needed for indigestion        amLODIPine 5 MG tablet    NORVASC    60 tablet    Take 2 tablets (10 mg) by mouth daily    Benign essential hypertension       dexamethasone 4 MG tablet    DECADRON    12 tablet    Take 2 tablets (8 mg) by mouth daily Take for 3 days, starting the day after cisplatin (Day 2 and Day 9).    Urethral cancer (H)       docusate sodium 100 MG capsule    COLACE    60 capsule    Take 1 capsule (100 mg) by  mouth 2 times daily as needed for constipation    Slow transit constipation       * FIRST-OMEPRAZOLE 2 MG/ML Susp     300 mL    Take 10 mLs (20 mg) by mouth 2 times daily    Urethral cancer (H), Gastroesophageal reflux disease with esophagitis       * omeprazole 2 mg/mL Susp    priLOSEC    280 mL    Take 10 mLs (20 mg) by mouth 2 times daily    Gastroesophageal reflux disease with esophagitis, Urethral cancer (H)       fluticasone 50 MCG/ACT spray    FLONASE     Spray 1 spray into both nostrils every morning        HYDROXYZINE HCL PO      Take 5 mg by mouth At Bedtime        LORazepam 0.5 MG tablet    ATIVAN    30 tablet    Take 1 tablet (0.5 mg) by mouth every 4 hours as needed (Anxiety, Nausea/Vomiting or Sleep)    Nausea       MAGNESIUM OXIDE PO      Take 400 mg by mouth 2 times daily        nystatin 933310 UNIT/ML suspension    MYCOSTATIN    200 mL    Take 5 mLs (500,000 Units) by mouth 4 times daily Swish and spit    Thrush       OLANZapine 5 MG tablet    zyPREXA    30 tablet    Take 1 tablet (5 mg) by mouth At Bedtime    Urethral cancer (H), Nausea       ondansetron 8 MG tablet    ZOFRAN    20 tablet    Take 1 tablet (8 mg) by mouth every 8 hours as needed for nausea    Nausea       prochlorperazine 10 MG tablet    COMPAZINE    30 tablet    Take 0.5 tablets (5 mg) by mouth every 6 hours as needed (Nausea/Vomiting)    Nausea       * Notice:  This list has 2 medication(s) that are the same as other medications prescribed for you. Read the directions carefully, and ask your doctor or other care provider to review them with you.

## 2017-11-15 NOTE — PROGRESS NOTES
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 Upon clinical interview, pt reported a history of swallowing problems. He stated he previously has participated in x2 VFSS at other facilities with the most recent VFSS being completed ~1.5 years ago. He stated he was diagnosed with a diverticulum in the hypopharynx in 2013. Per chart, pt underwent laryngoscopy with a takedown of left hypopharyngeal diverticula in 2013. Pt endorsed the majority of his swallowing difficulties were well-managed before starting chemotherapy. He reported he continues to eat a regular diet when able and not feeling nauseous. Stated swallowing has become painful and frequently \"burns\" since starting chemo. Endorsed he belches frequently and occasionally regurgitates liquids. Reported pills get stuck at times. Endorsed weight loss which began after starting chemotherapy. Denied recent pneumonias. Reported having had thrush since beginning chemo as well. Pt endorsed a history of reflux for which he takes liquid Omeprazole.    Respiratory Status Room air   Prior Level Of Function Swallowing   Prior Level Of Function Comment Regular textures, thin liquids   General Observations Pt was pleasant and cooperative. He appeared concerned about becoming nauseous during his appointment 2/2 his current chemotherapy regime.    Patient/family Goals To assess swallowing function. "   Pain Assessment   Pain Reported No   Fall Risk Screen   Fall screen completed by SLP   Have you fallen 2 or more times in the past year? No   Have you fallen and had an injury in the past year? No   Is patient a fall risk? No   Clinical Swallow Evaluation   Oral Musculature generally intact   Structural Abnormalities none present   Dentition present and adequate   Mucosal Quality adequate   Mandibular Strength and Mobility intact   Oral Labial Strength and Mobility WFL   Lingual Strength and Mobility WFL   Velar Elevation intact   Laryngeal Function Swallow;Voicing initiated   Oral Musculature Comments WFL   VFSS Evaluation   VFSS Additional Documentation Yes   VFSS Eval: Radiology   Radiologist Resident   Views Taken left lateral;A/P   Physical Location of Procedure [facility]   VFSS Eval: Thin Liquid Texture Trial   Mode of Presentation, Thin Liquid cup   Order of Presentation 1, 2, 3, 5, 8, 9, 10, 12, 13   Preparatory Phase WFL   Oral Phase, Thin Liquid WFL   Pharyngeal Phase, Thin Liquid Delayed swallow reflex;Residue in valleculae;Residue in pyriform sinus;other (see comments)  (small left outpouching)   Rosenbek's Penetration Aspiration Scale: Thin Liquid Trial Results 1 - no aspiration, contrast does not enter airway   Diagnostic Statement Consistent delayed swallow response to the level inferior to the valleculae and generally superior to the pyriform sinuses. Effective airway protection with no penetration or aspiration. Pt exhibits consistent mild residue in vallecuale and pyriform sinuses which generally clears with a subsequent dry swallow. A small outpouching is noted in the left hypopharynx. Small amounts of thin liquid temporarily become lodged in pouch but appear to generally clear with dry swallows and when given extra time.    VFSS Eval: Puree Solid Texture Trial   Mode of Presentation, Puree spoon   Order of Presentation 4   Preparatory Phase WFL   Oral Phase, Puree WFL   Pharyngeal Phase,  Puree WFL   Rosenbek's Penetration Aspiration Scale: Puree Food Trial Results 1 - no aspiration, contrast does not enter airway   Diagnostic Statement Prompt swallow response with effective aiway protection. No pharyngeal stasis. Purees did not appear to become lodged in small left outpouching in hypopharynx.    VFSS Eval: Solid Food Texture Trial   Mode of Presentation, Solid self-fed   Order of Presentation 6, 7  (additional trial not saved to imaging)   Preparatory Phase WFL   Oral Phase, Solid WFL   Pharyngeal Phase, Solid WFL   Rosenbek's Penetration Aspiration Scale: Solid Food Trial Results 1 - no aspiration, contrast does not enter airway   Diagnostic Statement Prompt swallow response with effective airway protection. Bolus of solid textures did not appear to become lodged in small left outpouching in hypopharynx.    Swallow Compensations   Swallow Compensations (Dry swallow)   Results No difficulties noted   Educational Assessment   Barriers to Learning No barriers   Esophageal Phase of Swallow   Patient reports or presents with symptoms of esophageal dysphagia Yes   Esophageal sweep performed during today s vidofluoroscopic exam  Yes;Please refer to radiologist's report for details   Esophageal comments Pt reported a history of reflux.    Swallow Eval: Clinical Impressions   Skilled Criteria for Therapy Intervention No problems identified which require skilled intervention   Dysphagia Outcome Severity Scale (SHILPI) Level 5 - SHILPI   Treatment Diagnosis Mild pharyngeal dysphagia   Diet texture recommendations Regular diet;Thin liquids   Recommended Feeding/Eating Techniques alternate between small bites and sips of food/liquid;maintain upright posture during/after eating for 30 mins;small sips/bites;other (see comments)  (dry swallow after eat bite/sip, oral cares, general reflux)   Rehab Potential good, to achieve stated therapy goals   Anticipated Discharge Disposition home   Risks and Benefits of Treatment  have been explained. Yes   Patient, family and/or staff in agreement with Plan of Care Yes   Clinical Impression Comments Pt presents with mild pharyngeal dysphagia; however, swallow is functional with use of swallowing strategies. Oral phase is marked by adequate mastication, bolus control, and oral clearance. Pharyngeal phase is characterized by delayed swallow response with thin liquids. Improved timing of swallow response noted with solid textures. No penetration or aspiration is seen. Pt exhibits consistent mild stasis in valleculae and pyriform sinuses after swallows of thin liquids. Stasis is generally eliminated with subsequent dry swallow. No pharyngeal stasis is noted with solid textures. A small outpouching is seen in the left hypopharynx which appears to have mild impacts on pt's swallow. Small amounts of thin liquid temporarily appear to become lodged in outpouching; however, residuals are genearlly eliminated with subsequent dry swallows and additional time. No significant amounts of residuals from puree or solid textures appear to become lodged in outpouching. Barium tablet easily passed through pharynx without beecoming lodged in pouch.     Given results of VFSS, pt is at mildly increased risk of aspiration d/t delayed swallow response. Pharyngeal difficultlies can be managed with use of swallowing strategies. Recommend continue regular textures and thin liquids with use of strategies including sit upright at 90 degrees, small bites/sips, alternate solids/liquids, dry swallow after eat bite/sip, remain upright 60 min after PO, slow pace, and general reflux precautions. Pt participated in education re: results and recommendations of swallow eval after completion of study.    Total Session Time   Total Session Time 50   Total Evaluation Time 50   SLP Medicare Only G-code   G-code Swallowing   Swallowing   Swallowing:  Current Status , Goal , Discharge -Otnf Only-Modifier the same for all  G-codes CI: 1-19% impairment   Swallowing: Current  & Discharge Modifier Rationale-Eval Only Per results of VFSS, assessment of PO trials, and in conjunction with pt subjective report.      Thank you for the referral of King Gonsalo Bruno. If you have any questions about this report, please contact me using the information below.     Khalida March MA, CCC-SLP  Speech-Language Pathologist   [facility]  Department of Otolaryngology/D&T - 4th floor  Pager: [phone]  Phone: [phone]  Email: [email or web address]

## 2017-11-15 NOTE — NURSING NOTE
Chief Complaint   Patient presents with     Blood Draw     PIV placed by vasc access, labs collected from piv by RN.

## 2017-11-15 NOTE — PATIENT INSTRUCTIONS
Contact Numbers    [facility] Main Line: [phone]  [facility] Triage:  [phone]    Call triage with chills and/or temperature greater than or equal to 100.5, uncontrolled nausea/vomiting, diarrhea, constipation, dizziness, shortness of breath, chest pain, bleeding, unexplained bruising, or any new/concerning symptoms, questions/concerns.     If you are having any concerning symptoms or wish to speak to a provider before your next infusion visit, please call your care coordinator or triage to notify them so we can adequately serve you.       After Hours: [phone]    If after hours, weekends, or holidays, call main hospital  and ask for Oncology doctor on call.           November 2017 Sunday Monday Tuesday Wednesday Thursday Friday Saturday                  1     2     3     [facility] DRAW    7:15 AM   (15 min.)    [facility] DRAW   [facility] Lab Draw     [facility] RETURN    7:35 AM   (50 min.)   Ramona Greer PA [facility] ONC INFUSION 360    8:30 AM   (360 min.)    ONCOLOGY INFUSION   [facility] 4       5     6     [facility] RETURN   12:45 PM   (30 min.)   Nurse,  [facility]   [facility] for Prostate and Urologic Cancers     [facility] BMT INFUSION 120    2:00 PM   (120 min.)    BMT INFUSION   [facility] Blood and Marrow Transplant 7     8     [facility] DRAW   12:30 PM   (15 min.)    [facility]   [facility]     P ONC INFUSION 120    1:00 PM   (120 min.)    ONCOLOGY INFUSION   [facility]     [facility] NEW    2:45 PM   (60 min.)   Ignacia Wallace RD   [facility] 9     10     [facility] DRAW    8:15 AM   (15 min.)    [facility] DRAW   [facility] Draw     [facility] RETURN    8:25 AM   (50 min.)   [facility]   10:00 AM   (360 min.)    ONCOLOGY INFUSION   [facility]  [facility] 11       12     [facility] DRAW    9:00 AM   (15 min.)    [facility] DRAW   [facility] Draw     [facility] ONC INFUSION 120    9:30 AM   (120 min.)    ONCOLOGY INFUSION   [facility] 13     14     VIDEO SWALLOW STUDY   11:00 AM   (60 min.)   Khalida March SLP   [facility] Rehab     XR VIDEO SPEECH W ESOPHAGRAM    1:00 PM   (60 min.)   [id number]   [facility] Xray     [facility] NEW    1:15 PM   (30 min.)   Arsenio Ortiz MD   [facility] Ear Nose and Throat 15     [facility] DRAW   12:00 PM   (15 min.)    [facility]     [facility] ONC INFUSION 120   12:30 PM   (120 min.)    ONCOLOGY INFUSION   [facility] 16     17     [facility] DRAW   12:30 PM   (15 min.)    [facility] DRAW   [facility] Lab Draw     [facility] ONC INFUSION 120    1:00 PM   (120 min.)    ONCOLOGY INFUSION   [facility] 18       19     20     [facility] ONC INFUSION 120    1:00 PM   (120 min.)    ONCOLOGY INFUSION   [facility] 21     22     [facility] DRAW   12:30 PM   (15 min.)    [facility] DRAW   [facility] Draw     [facility] ONC INFUSION 120    1:00 PM   (120 min.)    ONCOLOGY INFUSION   [facility] 23     24     [facility] DRAW    8:15 AM   (15 min.)    [facility] DRAW   [facility] Lab Draw     [facility] RETURN    8:25 AM   (50 min.)   Ramona Greer PA   [facility]     [facility] ONC INFUSION 360   10:00 AM   (360 min.)    ONCOLOGY INFUSION   [facility] 25  Happy Birthday!       26     27     28     29     [facility] DRAW   10:30 AM   (15 min.)    [facility] DRAW   [facility]     [facility] ONC INFUSION 120   11:00 AM   (120 min.)    ONCOLOGY INFUSION   [facility] 30 December 2017 Sunday Monday Tuesday Wednesday Thursday Friday Saturday                             1     [facility] DRAW    8:15 AM   (15 min.)    [facility] LAB DRAW   [facility] Lab Draw     [facility] RETURN    8:25 AM   (50 min.)   Yessica Ovalles, ISAURA CNP   [facility] ONC INFUSION 360   10:00 AM   (360 min.)    ONCOLOGY INFUSION   [facility] 2       3     4     5     6     7     8     9       10     11     [facility] RETURN   12:45 PM   (30 min.)   Nurse,  [facility]   [facility] Inst for Prostate and Urologic Cancers 12     13     14     15     16       17     18     19     20     21     22     23       24     25     26     27     28     29     30       31                                               Recent Results (from the past 24 hour(s))   Basic metabolic panel    Collection Time: 11/15/17 12:21 PM   Result Value Ref Range    Sodium 128 (L) 133 - 144 mmol/L    Potassium 3.2 (L) 3.4 - 5.3 mmol/L    Chloride 92 (L) 94 - 109 mmol/L    Carbon Dioxide 26 20 - 32 mmol/L    Anion Gap 9 3 - 14 mmol/L    Glucose 97 70 - 99 mg/dL    Urea Nitrogen 17 7 - 30 mg/dL    Creatinine 1.80 (H) 0.66 - 1.25 mg/dL    GFR Estimate 37 (L) >60 mL/min/1.7m2    GFR Estimate If Black 45 (L) >60 mL/min/1.7m2    Calcium 8.9 8.5 - 10.1 mg/dL   Magnesium    Collection Time: 11/15/17 12:21 PM   Result Value Ref Range    Magnesium 1.2 (L) 1.6 - 2.3 mg/dL

## 2017-11-15 NOTE — PROGRESS NOTES
Infusion Nursing Note:  King Gonsalo Bruno presents today for IV fluids, antiemetics, electrolyte replacement  Patient seen by provider today: No   present during visit today: Not Applicable.    Note: Patient states that he vomited x2 last evening. He ate today and has not vomited, but has been throwing up daily. He states he has a pouch in his neck where stuff gets stuck and then he vomits. He met with the ENT doctor yesterday.  He complains that his food just doesn't taste right.  He has constipation and is taking miralax and docusate. He has not started taking zyprexa and is not interested in taking it at this time.  He has not been taking his oral magnesium because it causes diarrhea    Intravenous Access:  Peripheral IV placed by vascular access in lab    Treatment Conditions:  Lab Results   Component Value Date     11/15/2017                   Lab Results   Component Value Date    POTASSIUM 3.2 11/15/2017           Lab Results   Component Value Date    MAG 1.2 11/15/2017            Lab Results   Component Value Date    CR 1.80 11/15/2017                   Lab Results   Component Value Date    SAADIA 8.9 11/15/2017                Lab Results   Component Value Date    BILITOTAL 0.4 11/10/2017           Lab Results   Component Value Date    ALBUMIN 3.3 11/10/2017                    Lab Results   Component Value Date    ALT 18 11/10/2017           Lab Results   Component Value Date    AST 26 11/10/2017     Results reviewed with SARITHA Carrillo    11/15/2017 1310 SARITHA Carrillo/Sol Sher RN  -replace magnesium IV per protocol and encourage patient to take oral magnesium oxide 400mg PO BID at home as ordered (patient has been refusing due to diarrhea)  -replace potassium today, ok to do oral or IV per patient preference  -patient needs to continue to get IV fluids 3x weekly  -add a standing order for magnesium to be checked with each visit  -ok to give aloxi and emend as ordered on therapy  plan today for nausea      Post Infusion Assessment:  Patient tolerated infusion without incident.  Blood return noted pre and post infusion.  Site patent and intact, free from redness, edema or discomfort.  No evidence of extravasations.  Access discontinued per protocol.    Discharge Plan:   Patient declined prescription refills.  Discharge instructions reviewed with: Patient.  Patient and/or family verbalized understanding of discharge instructions and all questions answered.  Copy of AVS reviewed with patient and/or family.  Patient will return 11/17 for next appointment.  Patient discharged in stable condition accompanied by: self.  Departure Mode: Ambulatory.  Face to Face time: 2 minutes.    Sol Sher RN

## 2017-11-17 ENCOUNTER — INFUSION THERAPY VISIT (OUTPATIENT)
Dept: ONCOLOGY | Facility: CLINIC | Age: 72
End: 2017-11-17
Attending: INTERNAL MEDICINE
Payer: MEDICARE

## 2017-11-17 VITALS
DIASTOLIC BLOOD PRESSURE: 73 MMHG | HEART RATE: 82 BPM | BODY MASS INDEX: 18.47 KG/M2 | OXYGEN SATURATION: 99 % | TEMPERATURE: 97.7 F | SYSTOLIC BLOOD PRESSURE: 151 MMHG | WEIGHT: 151.7 LBS | RESPIRATION RATE: 16 BRPM

## 2017-11-17 DIAGNOSIS — R11.0 NAUSEA: ICD-10-CM

## 2017-11-17 DIAGNOSIS — C68.0 URETHRAL CANCER (H): ICD-10-CM

## 2017-11-17 DIAGNOSIS — E86.0 DEHYDRATION: ICD-10-CM

## 2017-11-17 DIAGNOSIS — K21.00 GASTROESOPHAGEAL REFLUX DISEASE WITH ESOPHAGITIS: Primary | ICD-10-CM

## 2017-11-17 DIAGNOSIS — E87.6 HYPOKALEMIA: ICD-10-CM

## 2017-11-17 LAB
ANION GAP SERPL CALCULATED.3IONS-SCNC: 10 MMOL/L (ref 3–14)
BUN SERPL-MCNC: 11 MG/DL (ref 7–30)
CALCIUM SERPL-MCNC: 9.5 MG/DL (ref 8.5–10.1)
CHLORIDE SERPL-SCNC: 94 MMOL/L (ref 94–109)
CO2 SERPL-SCNC: 25 MMOL/L (ref 20–32)
CREAT SERPL-MCNC: 1.59 MG/DL (ref 0.66–1.25)
GFR SERPL CREATININE-BSD FRML MDRD: 43 ML/MIN/1.7M2
GLUCOSE SERPL-MCNC: 75 MG/DL (ref 70–99)
MAGNESIUM SERPL-MCNC: 1.3 MG/DL (ref 1.6–2.3)
POTASSIUM SERPL-SCNC: 3.4 MMOL/L (ref 3.4–5.3)
SODIUM SERPL-SCNC: 128 MMOL/L (ref 133–144)

## 2017-11-17 PROCEDURE — 25000128 H RX IP 250 OP 636: Mod: ZF | Performed by: INTERNAL MEDICINE

## 2017-11-17 PROCEDURE — 25000125 ZZHC RX 250: Mod: ZF | Performed by: PHYSICIAN ASSISTANT

## 2017-11-17 PROCEDURE — 40000141 ZZH STATISTIC PERIPHERAL IV START W/O US GUIDANCE

## 2017-11-17 PROCEDURE — 96374 THER/PROPH/DIAG INJ IV PUSH: CPT

## 2017-11-17 PROCEDURE — 96375 TX/PRO/DX INJ NEW DRUG ADDON: CPT

## 2017-11-17 PROCEDURE — 25000128 H RX IP 250 OP 636: Mod: ZF | Performed by: PHYSICIAN ASSISTANT

## 2017-11-17 PROCEDURE — 83735 ASSAY OF MAGNESIUM: CPT | Performed by: PHYSICIAN ASSISTANT

## 2017-11-17 PROCEDURE — S0028 INJECTION, FAMOTIDINE, 20 MG: HCPCS | Mod: ZF | Performed by: PHYSICIAN ASSISTANT

## 2017-11-17 PROCEDURE — 80048 BASIC METABOLIC PNL TOTAL CA: CPT | Performed by: PHYSICIAN ASSISTANT

## 2017-11-17 PROCEDURE — 96361 HYDRATE IV INFUSION ADD-ON: CPT

## 2017-11-17 RX ORDER — PALONOSETRON 0.05 MG/ML
0.25 INJECTION, SOLUTION INTRAVENOUS ONCE
Status: CANCELLED
Start: 2017-11-17 | End: 2017-11-17

## 2017-11-17 RX ORDER — MAGNESIUM SULFATE HEPTAHYDRATE 40 MG/ML
2 INJECTION, SOLUTION INTRAVENOUS ONCE
Status: COMPLETED | OUTPATIENT
Start: 2017-11-17 | End: 2017-11-17

## 2017-11-17 RX ADMIN — FAMOTIDINE 20 MG: 20 INJECTION, SOLUTION INTRAVENOUS at 14:33

## 2017-11-17 RX ADMIN — DEXAMETHASONE SODIUM PHOSPHATE 8 MG: 10 INJECTION, SOLUTION INTRAMUSCULAR; INTRAVENOUS at 15:10

## 2017-11-17 RX ADMIN — MAGNESIUM SULFATE IN WATER 2 G: 40 INJECTION, SOLUTION INTRAVENOUS at 14:08

## 2017-11-17 RX ADMIN — SODIUM CHLORIDE 1000 ML: 9 INJECTION, SOLUTION INTRAVENOUS at 13:20

## 2017-11-17 NOTE — PROGRESS NOTES
Infusion Nursing Note:  King Gonsalo Bruno presents for IVF, antiemetics    Note: pt feeling better today but still a little nauseous, he got emend and aloxi on Wednesday. Creatinine better at 1.59 and magnesium 1.3, pt does not like taking the oral magnesium and wondering if he still needs to do that.     TORB- SARITHA Gay/Giovanna Peres RN  --okay to give 8mg IV dexamethasone and 20mg IV pepcid  --replace magnesium IV per protocol, encourage pt to try and take oral magnesium if he can    Treatment Conditions:  Lab Results   Component Value Date     11/17/2017                   Lab Results   Component Value Date    POTASSIUM 3.4 11/17/2017           Lab Results   Component Value Date    MAG 1.3 11/17/2017            Lab Results   Component Value Date    CR 1.59 11/17/2017                   Lab Results   Component Value Date    SAADIA 9.5 11/17/2017                Lab Results   Component Value Date    BILITOTAL 0.4 11/10/2017           Lab Results   Component Value Date    ALBUMIN 3.3 11/10/2017                    Lab Results   Component Value Date    ALT 18 11/10/2017           Lab Results   Component Value Date    AST 26 11/10/2017       Intravenous Access:  Peripheral IV in place with positive blood return noted.    Post Infusion Assessment:  Patient tolerated infusion without incident.  Blood return noted pre and post infusion.  No evidence of extravasations.  Access discontinued per protocol.    Discharge Plan:   Patient declined prescription refills.  Discharge instructions reviewed with: Patient and Family.  Patient and/or family verbalized understanding of discharge instructions and all questions answered.  Copy of AVS reviewed with patient and/or family.  Patient will return 11/20 for next appointment.  Patient discharged in stable condition accompanied by: self and wife.    Giovanna Peres RN

## 2017-11-17 NOTE — MR AVS SNAPSHOT
After Visit Summary   11/17/2017    King Gonsalo Bruno    MRN: [id number]           Patient Information     Date Of Birth          1945        Visit Information        Provider Department      11/17/2017 1:00 PM UC 18 ATC;  ONCOLOGY INFUSION [facility]        Today's Diagnoses     Gastroesophageal reflux disease with esophagitis    -  1    Hypokalemia        Nausea        Dehydration        Urethral cancer (H)          Care Instructions    Contact numbers:  Triage Main Line: [phone]  After hours: [phone]    Call with chills and/or temperature greater than or equal to 100.5 and questions or concerns.    If after hours, weekends, or holidays, call main hospital  at  [phone] and ask for Oncology doctor on call.           November 2017 Sunday Monday Tuesday Wednesday Thursday Friday Saturday                  1     2     3     [facility] D[facility]    7:15 AM   (15 min.)    [facility] LAB DRAW   [facility] Lab Draw     UMP RETURN    7:35 AM   (50 min.)   [facility]    8:30 AM   (360 min.)    ONCOLOGY INFUSION   [facility] 4       5     6     UMP RETURN   12:45 PM   (30 min.)   Nurse,  Prostate Cancer Ctr   [facility] Urology and Inst for Prostate and Urologic Cancers     [facility] BMT INFUSION 120    2:00 PM   (120 min.)    BMT INFUSION   [facility] Blood and Marrow Transplant 7     8     [facility] DRAW   12:30 PM   (15 min.)    [facility] LAB DRAW   [facility] Lab Draw     UMP ONC INFUSION 120    1:00 PM   (120 min.)    ONCOLOGY INFUSION   [facility]     UMP NEW    2:45 PM   (60 min.)   Ignacia Wallace RD   M [facility] 9     10     [facility] DRAW    8:15 AM   (15 min.)    [facility] LAB DRAW   [facility] Lab Draw     UMP RETURN    8:25 AM   (50 min.)   Ramona Greer PA M [facility]  [facility]     UMP ONC INFUSION 360   10:00 AM   (360 min.)   UC ONCOLOGY INFUSION   [facility] 11       12     [facility] DRAW    9:00 AM   (15 min.)    [facility] LAB DRAW   [facility] Lab Draw     UMP ONC INFUSION 120    9:30 AM   (120 min.)    ONCOLOGY INFUSION   [facility] 13     14     VIDEO SWALLOW STUDY   11:00 AM   (60 min.)   Khalida March SLP   [facility] Rehab     XR VIDEO SPEECH W ESOPHAGRAM    1:00 PM   (60 min.)   UCXR2   [facility] Imaging Center Xray     UMP NEW    1:15 PM   (30 min.)   Arsenio Ortiz MD   [facility] Ear Nose and Throat 15     [facility] DRAW   12:00 PM   (15 min.)    [facility]   [facility]     [facility] ONC INFUSION 120   12:30 PM   (120 min.)    ONCOLOGY INFUSION   [facility] 16     17     [facility] DRAW   12:30 PM   (15 min.)    [facility]   [facility]     UMP ONC INFUSION 120    1:00 PM   (120 min.)    ONCOLOGY INFUSION   [facility] 18       19     20     UMP ONC INFUSION 120    8:00 AM   (120 min.)    ONCOLOGY INFUSION   [facility] 21     22     [facility] DRAW   12:30 PM   (15 min.)    [facility] LAB DRAW   [facility] Lab Draw     UMP ONC INFUSION 120    1:00 PM   (120 min.)    ONCOLOGY INFUSION   [facility] 23     24     [facility] DRAW    8:15 AM   (15 min.)    [facility]   [facility]     [facility] RETURN    8:25 AM   (50 min.)   Ramona Greer PA   [facility]     UMP ONC INFUSION 360   10:00 AM   (360 min.)    ONCOLOGY INFUSION   [facility] 25  Happy Birthday!       26     27     28     29     [facility] DRAW   10:30 AM   (15 min.)    [facility]   [facility]     UMP ONC INFUSION 120   11:00 AM   (120 min.)    ONCOLOGY INFUSION   [facility]  30 December 2017 Sunday Monday Tuesday Wednesday Thursday Friday Saturday                            1     [facility] DRAW    8:15 AM   (15 min.)    [facility] LAB DRAW   [facility] Lab Draw     UMP RETURN    8:25 AM   (50 min.)   Yessica Ovalles, ISAURA CNP   [facility]     UMP ONC INFUSION 360   10:00 AM   (360 min.)   UC ONCOLOGY INFUSION   [facility] 2       3     4     5     6     7     8     9       10     11     UMP RETURN   12:45 PM   (30 min.)   Nurse,  Prostate Cancer Ctr   [facility] Urology and Inst for Prostate and Urologic Cancers 12     13     14     15     16       17     18     19     20     21     22     23       24     25     26     27     28     29     30       31                                                Lab Results:  Recent Results (from the past 12 hour(s))   Magnesium    Collection Time: 11/17/17 12:41 PM   Result Value Ref Range    Magnesium 1.3 (L) 1.6 - 2.3 mg/dL   Basic metabolic panel    Collection Time: 11/17/17 12:41 PM   Result Value Ref Range    Sodium 128 (L) 133 - 144 mmol/L    Potassium 3.4 3.4 - 5.3 mmol/L    Chloride 94 94 - 109 mmol/L    Carbon Dioxide 25 20 - 32 mmol/L    Anion Gap 10 3 - 14 mmol/L    Glucose 75 70 - 99 mg/dL    Urea Nitrogen 11 7 - 30 mg/dL    Creatinine 1.59 (H) 0.66 - 1.25 mg/dL    GFR Estimate 43 (L) >60 mL/min/1.7m2    GFR Estimate If Black 52 (L) >60 mL/min/1.7m2    Calcium 9.5 8.5 - 10.1 mg/dL               Follow-ups after your visit        Your next 10 appointments already scheduled     Nov 20, 2017  8:00 AM CST   Infusion 120 with UC ONCOLOGY INFUSION, UC 25 ATC   [facility] Cancer [facility] ([facility])    [address] [place] [address]  2nd [facility] [address] [phone]            Nov 22, 2017 12:30 PM CST   [facility] Lab Draw with UC [facility] LAB DRAW   [facility]    [address] [place] [address]  [address] [address]   [phone]            Nov 22, 2017  1:00 PM CST   Infusion 120 with UC ONCOLOGY INFUSION, UC 25 ATC   [facility] ([facility])    [address] [place] [address]   [phone]            Nov 24, 2017  8:15 AM CST   [facility] Lab Draw with UC [facility]ON[facility] LAB DRAW   [facility] Lab Draw ([facility])    [address] [place] [address]   [phone]            Nov 24, 2017  8:40 AM CST   (Arrive by 8:25 AM)   Return Visit with SARITHA Briceno   [facility] ([facility])    [address] [place] [address]   [phone]            Nov 24, 2017 10:00 AM CST   Infusion 360 with UC ONCOLOGY INFUSION, UC 12 ATC   [facility] ([facility])    [address] [place] [address]   [phone]            Nov 29, 2017 10:30 AM CST   [facility] Lab Draw with UC [facility] LAB DRAW   [facility] Lab Draw ([facility])    [address] [place] [address]   [phone]              Who to contact     If you have questions or need follow up information about today's clinic visit or your schedule please contact [facility] directly at [phone].  Normal or non-critical lab and imaging results will be communicated to you by MyChart, letter or phone within 4 business days after the clinic has received the results. If you do not hear from us within 7 days, please contact the clinic through MyChart or phone. If you have a critical or abnormal lab result, we will notify you by phone as soon as possible.  Submit refill requests through [employer] or call your pharmacy and they will forward the refill request to us. Please allow 3 business days for your refill to  "be completed.          Additional Information About Your Visit        [employer] Information     [employer] lets you send messages to your doctor, view your test results, renew your prescriptions, schedule appointments and more. To sign up, go to [email or web address] . Click on \"Log in\" on the left side of the screen, which will take you to the Welcome page. Then click on \"Sign up Now\" on the right side of the page.     You will be asked to enter the access code listed below, as well as some personal information. Please follow the directions to create your username and password.     Your access code is: [email or web address]  Expires: 2018  5:30 AM     Your access code will  in 90 days. If you need help or a new code, please call your [place] clinic or [phone].        Care EveryWhere ID     This is your Care EveryWhere ID. This could be used by other organizations to access your [place] medical records  [id number]        Your Vitals Were     Pulse Temperature Respirations Pulse Oximetry BMI (Body Mass Index)       82 97.7  F (36.5  C) (Oral) 16 99% 18.47 kg/m2        Blood Pressure from Last 3 Encounters:   17 151/73   11/15/17 150/82   11/10/17 135/76    Weight from Last 3 Encounters:   17 68.8 kg (151 lb 11.2 oz)   11/15/17 69.5 kg (153 lb 4.8 oz)   17 68.5 kg (151 lb)              We Performed the Following     Basic metabolic panel     Magnesium        Primary Care Provider Office Phone # Fax #    Joan Ventura [phone]       [facility] [address]  [facility] [address]        Equal Access to Services     SHILPI RED : Tiffanie Robert, dorie weaver, paolo kaalmada rena, ronald rasmussen. So [facility] [phone].    ATENCIÓN: Si habla español, tiene a washburn disposición servicios gratuitos de asistencia lingüística. Llame al [phone].    We comply with applicable federal civil rights laws and [place] laws. " We do not discriminate on the basis of race, color, national origin, age, disability, sex, sexual orientation, or gender identity.            Thank you!     Thank you for choosing [facility]  for your care. Our goal is always to provide you with excellent care. Hearing back from our patients is one way we can continue to improve our services. Please take a few minutes to complete the written survey that you may receive in the mail after your visit with us. Thank you!             Your Updated Medication List - Protect others around you: Learn how to safely use, store and throw away your medicines at www.disposemymeds.org.          This list is accurate as of: 11/17/17  2:37 PM.  Always use your most recent med list.                   Brand Name Dispense Instructions for use Diagnosis    alum & mag hydroxide-simethicone 200-200-20 MG/5ML Susp suspension    MYLANTA/MAALOX    355 mL    Take 30 mLs by mouth every 4 hours as needed for indigestion        amLODIPine 5 MG tablet    NORVASC    60 tablet    Take 2 tablets (10 mg) by mouth daily    Benign essential hypertension       dexamethasone 4 MG tablet    DECADRON    12 tablet    Take 2 tablets (8 mg) by mouth daily Take for 3 days, starting the day after cisplatin (Day 2 and Day 9).    Urethral cancer (H)       docusate sodium 100 MG capsule    COLACE    60 capsule    Take 1 capsule (100 mg) by mouth 2 times daily as needed for constipation    Slow transit constipation       * FIRST-OMEPRAZOLE 2 MG/ML Susp     300 mL    Take 10 mLs (20 mg) by mouth 2 times daily    Urethral cancer (H), Gastroesophageal reflux disease with esophagitis       * omeprazole 2 mg/mL Susp    priLOSEC    280 mL    Take 10 mLs (20 mg) by mouth 2 times daily    Gastroesophageal reflux disease with esophagitis, Urethral cancer (H)       fluticasone 50 MCG/ACT spray    FLONASE     Spray 1 spray into both nostrils every morning        HYDROXYZINE HCL PO      Take 5 mg by mouth  At Bedtime        LORazepam 0.5 MG tablet    ATIVAN    30 tablet    Take 1 tablet (0.5 mg) by mouth every 4 hours as needed (Anxiety, Nausea/Vomiting or Sleep)    Nausea       MAGNESIUM OXIDE PO      Take 400 mg by mouth 2 times daily        nystatin 923366 UNIT/ML suspension    MYCOSTATIN    200 mL    Take 5 mLs (500,000 Units) by mouth 4 times daily Swish and spit    Thrush       OLANZapine 5 MG tablet    zyPREXA    30 tablet    Take 1 tablet (5 mg) by mouth At Bedtime    Urethral cancer (H), Nausea       ondansetron 8 MG tablet    ZOFRAN    20 tablet    Take 1 tablet (8 mg) by mouth every 8 hours as needed for nausea    Nausea       prochlorperazine 10 MG tablet    COMPAZINE    30 tablet    Take 0.5 tablets (5 mg) by mouth every 6 hours as needed (Nausea/Vomiting)    Nausea       * Notice:  This list has 2 medication(s) that are the same as other medications prescribed for you. Read the directions carefully, and ask your doctor or other care provider to review them with you.

## 2017-11-17 NOTE — NURSING NOTE
Chief Complaint   Patient presents with     Blood Draw     IV placement and drawn by RN, Vitals taken by LISBET Osborn, LISBET

## 2017-11-17 NOTE — PATIENT INSTRUCTIONS
Contact numbers:  Triage Main Line: [phone]  After hours: [phone]    Call with chills and/or temperature greater than or equal to 100.5 and questions or concerns.    If after hours, weekends, or holidays, call main hospital  at  [phone] and ask for Oncology doctor on call.           November 2017 Sunday Monday Tuesday Wednesday Thursday Friday Saturday                  1     2     3     [facility] DRAW    7:15 AM   (15 min.)    [facility] LAB DRAW   [facility] Lab Draw     UMP RETURN    7:35 AM   (50 min.)   Ramona Greer PA   [facility] 360    8:30 AM   (360 min.)    ONCOLOGY INFUSION   [facility] 4       5     6     UMP RETURN   12:45 PM   (30 min.)   Nurse,  Prostate Cancer Ctr   [facility] Urology and Inst for Prostate and Urologic Cancers     P BMT INFUSION 120    2:00 PM   (120 min.)    BMT INFUSION   [facility] Blood and Marrow Transplant 7     8     [facility] DRAW   12:30 PM   (15 min.)    [facility] LAB DRAW   [facility] Lab Draw     UMP ONC INFUSION 120    1:00 PM   (120 min.)    ONCOLOGY INFUSION   [facility]     UMP NEW    2:45 PM   (60 min.)   Ignacia Wallace RD   [facility] 9     10     [facility] DRAW    8:15 AM   (15 min.)    [facility] LAB DRAW   [facility]     UMP RETURN    8:25 AM   (50 min.)   Ramona Greer PA   [facility]     UMP ONC INFUSION 360   10:00 AM   (360 min.)    ONCOLOGY INFUSION   [facility] 11       12     [facility] DRAW    9:00 AM   (15 min.)    [facility] LAB DRAW   [facility]     UMP ONC INFUSION 120    9:30 AM   (120 min.)    ONCOLOGY INFUSION   [facility] 13     14     VIDEO SWALLOW STUDY   11:00 AM   (60 min.)   Anca, PEDRO Gaxiola   [facility] Rehab     XR VIDEO SPEECH W ESOPHAGRAM    1:00 PM    (60 min.)   UCXR2   [facility] Imaging Center Xray     UMP NEW    1:15 PM   (30 min.)   Arsenio Ortiz MD   [facility] Ear Nose and Throat 15     [facility] LAB DRAW   12:00 PM   (15 min.)    [facility] LAB DRAW   [facility] Lab Draw     UMP ONC INFUSION 120   12:30 PM   (120 min.)    ONCOLOGY INFUSION   [facility] 16     17     [facility] LAB DRAW   12:30 PM   (15 min.)    M[facility] LAB DRAW   [facility] Lab Draw     UMP ONC INFUSION 120    1:00 PM   (120 min.)    ONCOLOGY INFUSION   [facility] 18       19     20     UMP ONC INFUSION 120    8:00 AM   (120 min.)    ONCOLOGY INFUSION   [facility] 21     22     [facility] LAB DRAW   12:30 PM   (15 min.)    [facility] LAB DRAW   [facility] Lab Draw     UMP ONC INFUSION 120    1:00 PM   (120 min.)    ONCOLOGY INFUSION   [facility] 23     24     [facility] LAB DRAW    8:15 AM   (15 min.)    [facility]   [facility]     UMP RETURN    8:25 AM   (50 min.)   Ramona Greer PA   [facility]     UMP ONC INFUSION 360   10:00 AM   (360 min.)    ONCOLOGY INFUSION   [facility] 25  Happy Birthday!       26     27     28     29     [facility] LAB DRAW   10:30 AM   (15 min.)    [facility] LAB DRAW   [facility] Lab Draw     UMP ONC INFUSION 120   11:00 AM   (120 min.)   UC ONCOLOGY INFUSION   [facility] 30 December 2017 Sunday Monday Tuesday Wednesday Thursday Friday Saturday                            1     [facility] DRAW    8:15 AM   (15 min.)    M[facility]ON[facility] LAB DRAW   [facility] Lab Draw     UMP RETURN    8:25 AM   (50 min.)   Yessica Ovalles APRN CNP   [facility]     UMP ONC INFUSION 360   10:00 AM   (360 min.)   UC ONCOLOGY INFUSION   [facility] 2       3     4     5     6     7     8     9        10     11     UMP RETURN   12:45 PM   (30 min.)   Nurse, [facility] for Prostate and Urologic Cancers 12     13     14     15     16       17     18     19     20     21     22     23       24     25     26     27     28     29     30       31                                                Lab Results:  Recent Results (from the past 12 hour(s))   Magnesium    Collection Time: 11/17/17 12:41 PM   Result Value Ref Range    Magnesium 1.3 (L) 1.6 - 2.3 mg/dL   Basic metabolic panel    Collection Time: 11/17/17 12:41 PM   Result Value Ref Range    Sodium 128 (L) 133 - 144 mmol/L    Potassium 3.4 3.4 - 5.3 mmol/L    Chloride 94 94 - 109 mmol/L    Carbon Dioxide 25 20 - 32 mmol/L    Anion Gap 10 3 - 14 mmol/L    Glucose 75 70 - 99 mg/dL    Urea Nitrogen 11 7 - 30 mg/dL    Creatinine 1.59 (H) 0.66 - 1.25 mg/dL    GFR Estimate 43 (L) >60 mL/min/1.7m2    GFR Estimate If Black 52 (L) >60 mL/min/1.7m2    Calcium 9.5 8.5 - 10.1 mg/dL

## 2017-11-22 ENCOUNTER — INFUSION THERAPY VISIT (OUTPATIENT)
Dept: ONCOLOGY | Facility: CLINIC | Age: 72
End: 2017-11-22
Attending: INTERNAL MEDICINE
Payer: MEDICARE

## 2017-11-22 ENCOUNTER — APPOINTMENT (OUTPATIENT)
Dept: LAB | Facility: CLINIC | Age: 72
End: 2017-11-22
Attending: INTERNAL MEDICINE
Payer: MEDICARE

## 2017-11-22 VITALS
RESPIRATION RATE: 18 BRPM | SYSTOLIC BLOOD PRESSURE: 146 MMHG | BODY MASS INDEX: 19.07 KG/M2 | WEIGHT: 156.7 LBS | TEMPERATURE: 98.2 F | DIASTOLIC BLOOD PRESSURE: 76 MMHG | OXYGEN SATURATION: 99 % | HEART RATE: 79 BPM

## 2017-11-22 DIAGNOSIS — C68.0 URETHRAL CANCER (H): ICD-10-CM

## 2017-11-22 DIAGNOSIS — R11.0 NAUSEA: ICD-10-CM

## 2017-11-22 DIAGNOSIS — E87.6 HYPOKALEMIA: ICD-10-CM

## 2017-11-22 DIAGNOSIS — E86.0 DEHYDRATION: ICD-10-CM

## 2017-11-22 DIAGNOSIS — K21.00 GASTROESOPHAGEAL REFLUX DISEASE WITH ESOPHAGITIS: Primary | ICD-10-CM

## 2017-11-22 LAB
ANION GAP SERPL CALCULATED.3IONS-SCNC: 9 MMOL/L (ref 3–14)
BUN SERPL-MCNC: 11 MG/DL (ref 7–30)
CALCIUM SERPL-MCNC: 9.2 MG/DL (ref 8.5–10.1)
CHLORIDE SERPL-SCNC: 96 MMOL/L (ref 94–109)
CO2 SERPL-SCNC: 23 MMOL/L (ref 20–32)
CREAT SERPL-MCNC: 1.57 MG/DL (ref 0.66–1.25)
GFR SERPL CREATININE-BSD FRML MDRD: 44 ML/MIN/1.7M2
GLUCOSE SERPL-MCNC: 83 MG/DL (ref 70–99)
MAGNESIUM SERPL-MCNC: 1.3 MG/DL (ref 1.6–2.3)
POTASSIUM SERPL-SCNC: 3.9 MMOL/L (ref 3.4–5.3)
SODIUM SERPL-SCNC: 129 MMOL/L (ref 133–144)

## 2017-11-22 PROCEDURE — 96365 THER/PROPH/DIAG IV INF INIT: CPT

## 2017-11-22 PROCEDURE — 96375 TX/PRO/DX INJ NEW DRUG ADDON: CPT

## 2017-11-22 PROCEDURE — 25000128 H RX IP 250 OP 636: Mod: ZF | Performed by: PHYSICIAN ASSISTANT

## 2017-11-22 PROCEDURE — 25000125 ZZHC RX 250: Mod: ZF | Performed by: PHYSICIAN ASSISTANT

## 2017-11-22 PROCEDURE — 83735 ASSAY OF MAGNESIUM: CPT | Performed by: PHYSICIAN ASSISTANT

## 2017-11-22 PROCEDURE — 40000141 ZZH STATISTIC PERIPHERAL IV START W/O US GUIDANCE

## 2017-11-22 PROCEDURE — 80048 BASIC METABOLIC PNL TOTAL CA: CPT | Performed by: PHYSICIAN ASSISTANT

## 2017-11-22 PROCEDURE — 25000128 H RX IP 250 OP 636: Mod: ZF | Performed by: INTERNAL MEDICINE

## 2017-11-22 PROCEDURE — 96361 HYDRATE IV INFUSION ADD-ON: CPT

## 2017-11-22 PROCEDURE — 96368 THER/DIAG CONCURRENT INF: CPT

## 2017-11-22 PROCEDURE — S0028 INJECTION, FAMOTIDINE, 20 MG: HCPCS | Mod: ZF | Performed by: PHYSICIAN ASSISTANT

## 2017-11-22 RX ORDER — MAGNESIUM SULFATE HEPTAHYDRATE 40 MG/ML
2 INJECTION, SOLUTION INTRAVENOUS ONCE
Status: COMPLETED | OUTPATIENT
Start: 2017-11-22 | End: 2017-11-22

## 2017-11-22 RX ORDER — PALONOSETRON 0.05 MG/ML
0.25 INJECTION, SOLUTION INTRAVENOUS ONCE
Status: CANCELLED
Start: 2017-11-22 | End: 2017-11-22

## 2017-11-22 RX ADMIN — FAMOTIDINE 20 MG: 20 INJECTION, SOLUTION INTRAVENOUS at 14:27

## 2017-11-22 RX ADMIN — DEXAMETHASONE SODIUM PHOSPHATE 8 MG: 10 INJECTION, SOLUTION INTRAMUSCULAR; INTRAVENOUS at 14:52

## 2017-11-22 RX ADMIN — MAGNESIUM SULFATE IN WATER 2 G: 40 INJECTION, SOLUTION INTRAVENOUS at 14:06

## 2017-11-22 RX ADMIN — SODIUM CHLORIDE 1000 ML: 9 INJECTION, SOLUTION INTRAVENOUS at 13:23

## 2017-11-22 NOTE — PATIENT INSTRUCTIONS
Contact Numbers    [facility] [place] [phone]  [facility] [place]:  [phone]    Call triage with chills and/or temperature greater than or equal to 100.5, uncontrolled nausea/vomiting, diarrhea, constipation, dizziness, shortness of breath, chest pain, bleeding, unexplained bruising, or any new/concerning symptoms, questions/concerns.     If you are having any concerning symptoms or wish to speak to a provider before your next infusion visit, please call your care coordinator or triage to notify them so we can adequately serve you.       After Hours: [phone]    If after hours, weekends, or holidays, call main hospital  and ask for Oncology doctor on call.           November 2017 Sunday Monday Tuesday Wednesday Thursday Friday Saturday                  1     2     3     [facility] DRAW    7:15 AM   (15 min.)    M[facility] LAB DRAW   [facility] Lab Draw     UMP RETURN    7:35 AM   (50 min.)   Ramona Greer PA M [facility] ONC INFUSION 360    8:30 AM   (360 min.)    ONCOLOGY INFUSION   [facility] 4       5     6     UMP RETURN   12:45 PM   (30 min.)   Nurse,  Prostate Cancer Ctr   [facility] Urology and Inst for Prostate and Urologic Cancers     [facility] BMT INFUSION 120    2:00 PM   (120 min.)    BMT INFUSION   [facility] Blood and Marrow Transplant 7     8     [facility] LAB DRAW   12:30 PM   (15 min.)    [facility]   [facility]     P ONC INFUSION 120    1:00 PM   (120 min.)    ONCOLOGY INFUSION   [facility]     UMP NEW    2:45 PM   (60 min.)   Ignacia Wallace RD   [facility] 9     10     [facility] LAB DRAW    8:15 AM   (15 min.)    MASONIC LAB DRAW   [facility] Lab Draw     UMP RETURN    8:25 AM   (50 min.)   [facility]   10:00 AM   (360 min.)    ONCOLOGY INFUSION   [facility]  [facility] 11       12     UMP [facility] LAB DRAW    9:00 AM   (15 min.)    M[facility] LAB DRAW   [facility] Lab Draw     UMP ONC INFUSION 120    9:30 AM   (120 min.)    ONCOLOGY INFUSION   [facility] 13     14     VIDEO SWALLOW STUDY   11:00 AM   (60 min.)   Khalida March SLP   [facility] Rehab     XR VIDEO SPEECH W ESOPHAGRAM    1:00 PM   (60 min.)   UCXR2   [facility] Xray     UMP NEW    1:15 PM   (30 min.)   Arsenio Ortiz MD   [facility] Ear Nose and Throat 15     UMP [facility] LAB DRAW   12:00 PM   (15 min.)    [facility] LAB DRAW   [facility] Lab Draw     UMP ONC INFUSION 120   12:30 PM   (120 min.)    ONCOLOGY INFUSION   [facility] 16     17     UMP [facility] LAB DRAW   12:30 PM   (15 min.)    [facility] LAB DRAW   [facility] Lab Draw     UMP ONC INFUSION 120    1:00 PM   (120 min.)    ONCOLOGY INFUSION   [facility] 18       19     20     21     22     [facility] LAB DRAW   12:30 PM   (15 min.)    [facility] LAB DRAW   [facility] Draw     UMP ONC INFUSION 120    1:00 PM   (120 min.)    ONCOLOGY INFUSION   [facility] 23     24     UMP [facility] LAB DRAW    8:15 AM   (15 min.)    [facility] LAB [facility]   [facility]     UMP RETURN    8:25 AM   (50 min.)   Ramona Greer PA   [facility]     UMP ONC INFUSION 360   10:00 AM   (360 min.)    ONCOLOGY INFUSION   [facility] 25  Happy Birthday!       26     UMP [facility] LAB DRAW    9:30 AM   (15 min.)    [facility] LAB DRAW   [facility] Lab Draw     UMP ONC INFUSION 120   10:30 AM   (120 min.)    ONCOLOGY INFUSION   [facility] 27     28     29     [facility] LAB DRAW   10:30 AM   (15 min.)    [facility] LAB DRAW   [facility] Lab Draw     UMP ONC INFUSION 120   11:00 AM   (120 min.)    ONCOLOGY INFUSION   [facility] 30                           December 2017 Sunday Monday Tuesday Wednesday Thursday Friday Saturday                            1     [facility] MASON[facility] LAB DRAW    8:15 AM   (15 min.)    MASONIC LAB DRAW   [facility] Lab Draw     UMP RETURN    8:25 AM   (50 min.)   Yessica Ovalles APRN CNP   [facility]     UMP ONC INFUSION 360   10:00 AM   (360 min.)    ONCOLOGY INFUSION   [facility] 2       3     4     5     6     7     8     9       10     11     UMP RETURN   12:45 PM   (30 min.)   Nurse,  Prostate Cancer Ctr   [facility] Urology and Inst for Prostate and Urologic Cancers 12     13     14     15     16       17     18     19     20     21     22     23       24     25     26     27     28     29     30       31                                               Recent Results (from the past 24 hour(s))   Basic metabolic panel    Collection Time: 11/22/17 12:44 PM   Result Value Ref Range    Sodium 129 (L) 133 - 144 mmol/L    Potassium 3.9 3.4 - 5.3 mmol/L    Chloride 96 94 - 109 mmol/L    Carbon Dioxide 23 20 - 32 mmol/L    Anion Gap 9 3 - 14 mmol/L    Glucose 83 70 - 99 mg/dL    Urea Nitrogen 11 7 - 30 mg/dL    Creatinine 1.57 (H) 0.66 - 1.25 mg/dL    GFR Estimate 44 (L) >60 mL/min/1.7m2    GFR Estimate If Black 53 (L) >60 mL/min/1.7m2    Calcium 9.2 8.5 - 10.1 mg/dL   Magnesium    Collection Time: 11/22/17 12:44 PM   Result Value Ref Range    Magnesium 1.3 (L) 1.6 - 2.3 mg/dL

## 2017-11-22 NOTE — PROGRESS NOTES
Infusion Nursing Note:  King Gonsalo Bruno presents today for IV fluid hydration, anti-emetics, electrolyte replacement.    Patient seen by provider today: No   present during visit today: Not Applicable.    Note:   SARITHA Contreras/Kimberlee Lowe RN at 1410 on 11/22/17  No change to blood pressure medication.  Can be reassessed at provider visit on Friday.  Give dexamethasone 8mg IV.  Pepcid 20 mg IV.    Mag 1.3 today, replaced per protocol.   2L NS given today per pt's request    Intravenous Access:  Peripheral IV placed.    Treatment Conditions:  Lab Results   Component Value Date     11/22/2017                   Lab Results   Component Value Date    POTASSIUM 3.9 11/22/2017           Lab Results   Component Value Date    MAG 1.3 11/22/2017            Lab Results   Component Value Date    CR 1.57 11/22/2017                   Lab Results   Component Value Date    SAADIA 9.2 11/22/2017                    Post Infusion Assessment:  Patient tolerated infusion without incident.  Blood return noted pre and post infusion.  Site patent and intact, free from redness, edema or discomfort.  No evidence of extravasations.  Access discontinued per protocol.    Discharge Plan:   Patient declined prescription refills.  Discharge instructions reviewed with: Patient and Family.  Patient and/or family verbalized understanding of discharge instructions and all questions answered.  Copy of AVS reviewed with patient and/or family.  Patient will return 11/24/17  for next appointment.  Patient discharged in stable condition accompanied by: significant other.  Departure Mode: Ambulatory.    OLE ABDUL RN

## 2017-11-22 NOTE — NURSING NOTE
Lab collected and sent with IV placement by Heike, vs taken and pt checked in for next appt. Joanna Aguilar

## 2017-11-22 NOTE — MR AVS SNAPSHOT
After Visit Summary   11/22/2017    King Gonsalo Bruno    MRN: [id number]           Patient Information     Date Of Birth          1945        Visit Information        Provider Department      11/22/2017 1:00 PM  25 ATC;  ONCOLOGY INFUSION [facility]        Today's Diagnoses     Gastroesophageal reflux disease with esophagitis    -  1    Hypokalemia        Nausea        Dehydration        Urethral cancer (H)          Care Instructions    Contact Numbers    [facility] Main Line: [phone]  [facility] Triage:  [phone]    Call triage with chills and/or temperature greater than or equal to 100.5, uncontrolled nausea/vomiting, diarrhea, constipation, dizziness, shortness of breath, chest pain, bleeding, unexplained bruising, or any new/concerning symptoms, questions/concerns.     If you are having any concerning symptoms or wish to speak to a provider before your next infusion visit, please call your care coordinator or triage to notify them so we can adequately serve you.       After Hours: [phone]    If after hours, weekends, or holidays, call main hospital  and ask for Oncology doctor on call.           November 2017 Sunday Monday Tuesday Wednesday Thursday Friday Saturday                  1     2     3     P [facility] DRAW    7:15 AM   (15 min.)    [facility]     [facility] RETURN    7:35 AM   (50 min.)   Ramona Greer PA   [facility] ONC INFUSION 360    8:30 AM   (360 min.)    ONCOLOGY INFUSION   [facility] 4       5     6     UMP RETURN   12:45 PM   (30 min.)   Nurse,  [facility] Cancer Ctr   [facility] Prostate [facility] Urologic Cancers     [facility] BMT INFUSION 120    2:00 PM   (120 min.)    BMT INFUSION   [facility] Blood [facility] Marrow Transplant 7     8     [facility] DRAW   12:30 PM   (15 min.)    [facility]   [facility]     P ONC  INFUSION 120    1:00 PM   (120 min.)   UC ONCOLOGY INFUSION   [facility]     UMP NEW    2:45 PM   (60 min.)   Ignacia Wallace RD   [facility] 9     10     [facility] DRAW    8:15 AM   (15 min.)   [facility] DRAW   [facility] Mason[facility] Lab Draw     UMP RETURN    8:25 AM   (50 min.)   Ramona Greer PA   M [facility]     UMP ONC INFUSION 360   10:00 AM   (360 min.)    ONCOLOGY INFUSION   [facility] 11       12     [facility] DRAW    9:00 AM   (15 min.)    [facility] LAB DRAW   [facility] Lab Draw     [facility] ONC INFUSION 120    9:30 AM   (120 min.)    ONCOLOGY INFUSION   [facility] 13     14     VIDEO SWALLOW STUDY   11:00 AM   (60 min.)   Khalida March SLP   [facility]     XR VIDEO SPEECH W ESOPHAGRAM    1:00 PM   (60 min.)   UCXR2   [facility] Xray     UMP NEW    1:15 PM   (30 min.)   Arsenio Ortiz MD   [facility] 15     [facility] DRAW   12:00 PM   (15 min.)    [facility] LAB DRAW   [facility] Draw     [facility] ONC INFUSION 120   12:30 PM   (120 min.)    ONCOLOGY INFUSION   [facility] 16     17     [facility] DRAW   12:30 PM   (15 min.)    [facility] LAB DRAW   [facility] Draw     UMP ONC INFUSION 120    1:00 PM   (120 min.)    ONCOLOGY INFUSION   [facility] 18       19     20     21     22     [facility] DRAW   12:30 PM   (15 min.)    [facility] LAB DRAW   [facility] Draw     UMP ONC INFUSION 120    1:00 PM   (120 min.)    ONCOLOGY INFUSION   [facility] 23     24     [facility] DRAW    8:15 AM   (15 min.)    [facility] DRAW   [facility] Draw     UMP RETURN    8:25 AM   (50 min.)   Ramona Greer PA   M [facility]     UMP ONC INFUSION 360   10:00 AM   (360 min.)    ONCOLOGY INFUSION   M  [facility] 25  Happy Birthday!       26     [facility] DRAW    9:30 AM   (15 min.)    [facility] LAB DRAW   [facility] Mason[facility] Lab Draw     UMP ONC INFUSION 120   10:30 AM   (120 min.)    ONCOLOGY INFUSION   [facility] 27     28     29     [facility] DRAW   10:30 AM   (15 min.)    [facility]     [facility] ONC INFUSION 120   11:00 AM   (120 min.)    ONCOLOGY INFUSION   [facility] 30 December 2017 Sunday Monday Tuesday Wednesday Thursday Friday Saturday                            1     [facility] DRAW    8:15 AM   (15 min.)    [facility] LAB DRAW   [facility] Lab Draw     UMP RETURN    8:25 AM   (50 min.)   Yessica Ovalles APRN CNP   [facility]     UMP ONC INFUSION 360   10:00 AM   (360 min.)    ONCOLOGY INFUSION   [facility] 2       3     4     5     6     7     8     9       10     11     UMP RETURN   12:45 PM   (30 min.)   Nurse,  Prostate Cancer Ctr   [facility] Prostate and Urologic Cancers 12     13     14     15     16       17     18     19     20     21     22     23       24     25     26     27     28     29     30       31                                               Recent Results (from the past 24 hour(s))   Basic metabolic panel    Collection Time: 11/22/17 12:44 PM   Result Value Ref Range    Sodium 129 (L) 133 - 144 mmol/L    Potassium 3.9 3.4 - 5.3 mmol/L    Chloride 96 94 - 109 mmol/L    Carbon Dioxide 23 20 - 32 mmol/L    Anion Gap 9 3 - 14 mmol/L    Glucose 83 70 - 99 mg/dL    Urea Nitrogen 11 7 - 30 mg/dL    Creatinine 1.57 (H) 0.66 - 1.25 mg/dL    GFR Estimate 44 (L) >60 mL/min/1.7m2    GFR Estimate If Black 53 (L) >60 mL/min/1.7m2    Calcium 9.2 8.5 - 10.1 mg/dL   Magnesium    Collection Time: 11/22/17 12:44 PM   Result Value Ref Range    Magnesium 1.3 (L) 1.6 - 2.3 mg/dL                  Follow-ups after your visit        Your next 10 appointments already scheduled     Nov 24, 2017  8:15 AM CST   [facility] Lab Draw with UC [facility] DRAW   [facility] Mason[facility] Lab Draw ([facility])    [address] [place] [id number]   [phone]            Nov 24, 2017  8:40 AM CST   (Arrive by 8:25 AM)   Return Visit with SARITHA Briceno   [facility] ([facility])    [address] [place] [id number]   [phone]            Nov 24, 2017 10:00 AM CST   Infusion 360 with UC ONCOLOGY INFUSION, UC 12 ATC   [facility] ([facility])    [address] [place] [id number] [phone]            Nov 26, 2017  9:30 AM CST   [facility] Lab Draw with UC [facility] LAB DRAW   [facility] Lab Draw ([facility])    [address] [place] [id number] [phone]            Nov 26, 2017 10:30 AM CST   Infusion 120 with UC ONCOLOGY INFUSION, UC 12 ATC   [facility] ([facility])    [address] [place] [id number] [phone]            Nov 29, 2017 10:30 AM CST   [facility] Draw with [facility] DRAW   [facility]    [address] [place] [id number] [phone]            Nov 29, 2017 11:00 AM CST   Infusion 120 with UC ONCOLOGY INFUSION, UC 18 ATC   [facility] ([facility])    [address] [place] [id number] [phone]              Who to contact     If you have questions or need follow up information about today's clinic visit or your schedule please contact [facility] directly at [phone].  Normal or non-critical lab and imaging  "results will be communicated to you by MyChart, letter or phone within 4 business days after the clinic has received the results. If you do not hear from us within 7 days, please contact the clinic through "[employer]t or phone. If you have a critical or abnormal lab result, we will notify you by phone as soon as possible.  Submit refill requests through [employer] or call your pharmacy and they will forward the refill request to us. Please allow 3 business days for your refill to be completed.          Additional Information About Your Visit        19[employer] Information     [employer] lets you send messages to your doctor, view your test results, renew your prescriptions, schedule appointments and more. To sign up, go to [email or web address] . Click on \"Log in\" on the left side of the screen, which will take you to the Welcome page. Then click on \"Sign up Now\" on the right side of the page.     You will be asked to enter the access code listed below, as well as some personal information. Please follow the directions to create your username and password.     Your access code is: [email or web address]  Expires: 2018  5:30 AM     Your access code will  in 90 days. If you need help or a new code, please call your [facility] clinic or [phone].        Care EveryWhere ID     This is your Care EveryWhere ID. This could be used by other organizations to access your [facility] medical records  [id number]        Your Vitals Were     Pulse Temperature Respirations Pulse Oximetry BMI (Body Mass Index)       79 98.2  F (36.8  C) (Oral) 18 99% 19.07 kg/m2        Blood Pressure from Last 3 Encounters:   17 156/86   17 151/73   11/15/17 150/82    Weight from Last 3 Encounters:   17 71.1 kg (156 lb 11.2 oz)   17 68.8 kg (151 lb 11.2 oz)   11/15/17 69.5 kg (153 lb 4.8 oz)              We Performed the Following     Basic metabolic panel     Magnesium        Primary Care Provider Office Phone # Fax #    Joan Janet " MARLENY Ventura [phone]       [facility] [address]  [facility] [address]        Equal Access to Services     JENELLE RED : Hadja aad ku hadnikkijanine Jakob, wacarlosda bamirisha, rajendrata kanazda rena, ronald minor georginaaamir marquez laKylereymundo rasmussen. So [facility] [phone].    ATENCIÓN: Si habla español, tiene a washburn disposición servicios gratuitos de asistencia lingüística. Llame al [phone].    We comply with applicable federal civil rights laws and [place] laws. We do not discriminate on the basis of race, color, national origin, age, disability, sex, sexual orientation, or gender identity.            Thank you!     Thank you for choosing [facility]  for your care. Our goal is always to provide you with excellent care. Hearing back from our patients is one way we can continue to improve our services. Please take a few minutes to complete the written survey that you may receive in the mail after your visit with us. Thank you!             Your Updated Medication List - Protect others around you: Learn how to safely use, store and throw away your medicines at www.disposemymeds.org.          This list is accurate as of: 11/22/17  2:57 PM.  Always use your most recent med list.                   Brand Name Dispense Instructions for use Diagnosis    alum & mag hydroxide-simethicone 200-200-20 MG/5ML Susp suspension    MYLANTA/MAALOX    355 mL    Take 30 mLs by mouth every 4 hours as needed for indigestion        amLODIPine 5 MG tablet    NORVASC    60 tablet    Take 2 tablets (10 mg) by mouth daily    Benign essential hypertension       dexamethasone 4 MG tablet    DECADRON    12 tablet    Take 2 tablets (8 mg) by mouth daily Take for 3 days, starting the day after cisplatin (Day 2 and Day 9).    Urethral cancer (H)       docusate sodium 100 MG capsule    COLACE    60 capsule    Take 1 capsule (100 mg) by mouth 2 times daily as needed for constipation    Slow transit constipation        fluticasone 50 MCG/ACT spray    FLONASE     Spray 1 spray into both nostrils every morning        HYDROXYZINE HCL PO      Take 5 mg by mouth At Bedtime        LORazepam 0.5 MG tablet    ATIVAN    30 tablet    Take 1 tablet (0.5 mg) by mouth every 4 hours as needed (Anxiety, Nausea/Vomiting or Sleep)    Nausea       MAGNESIUM OXIDE PO      Take 400 mg by mouth 2 times daily        nystatin 388519 UNIT/ML suspension    MYCOSTATIN    200 mL    Take 5 mLs (500,000 Units) by mouth 4 times daily Swish and spit    Thrush       OLANZapine 5 MG tablet    zyPREXA    30 tablet    Take 1 tablet (5 mg) by mouth At Bedtime    Urethral cancer (H), Nausea       omeprazole 2 mg/mL Susp    priLOSEC    280 mL    Take 10 mLs (20 mg) by mouth 2 times daily    Gastroesophageal reflux disease with esophagitis, Urethral cancer (H)       ondansetron 8 MG tablet    ZOFRAN    20 tablet    Take 1 tablet (8 mg) by mouth every 8 hours as needed for nausea    Nausea       prochlorperazine 10 MG tablet    COMPAZINE    30 tablet    Take 0.5 tablets (5 mg) by mouth every 6 hours as needed (Nausea/Vomiting)    Nausea

## 2017-11-24 ENCOUNTER — INFUSION THERAPY VISIT (OUTPATIENT)
Dept: ONCOLOGY | Facility: CLINIC | Age: 72
End: 2017-11-24
Attending: INTERNAL MEDICINE
Payer: MEDICARE

## 2017-11-24 ENCOUNTER — ONCOLOGY VISIT (OUTPATIENT)
Dept: ONCOLOGY | Facility: CLINIC | Age: 72
End: 2017-11-24
Attending: PHYSICIAN ASSISTANT
Payer: MEDICARE

## 2017-11-24 VITALS
DIASTOLIC BLOOD PRESSURE: 71 MMHG | BODY MASS INDEX: 19.07 KG/M2 | OXYGEN SATURATION: 99 % | TEMPERATURE: 98.3 F | SYSTOLIC BLOOD PRESSURE: 140 MMHG | RESPIRATION RATE: 16 BRPM | HEART RATE: 84 BPM | WEIGHT: 156.7 LBS

## 2017-11-24 VITALS
RESPIRATION RATE: 18 BRPM | TEMPERATURE: 99.6 F | OXYGEN SATURATION: 99 % | SYSTOLIC BLOOD PRESSURE: 142 MMHG | HEART RATE: 77 BPM | DIASTOLIC BLOOD PRESSURE: 70 MMHG

## 2017-11-24 DIAGNOSIS — D63.0 ANEMIA IN NEOPLASTIC DISEASE: ICD-10-CM

## 2017-11-24 DIAGNOSIS — C68.0 URETHRAL CANCER (H): Primary | ICD-10-CM

## 2017-11-24 DIAGNOSIS — K21.00 GASTROESOPHAGEAL REFLUX DISEASE WITH ESOPHAGITIS: ICD-10-CM

## 2017-11-24 DIAGNOSIS — R53.82 CHRONIC FATIGUE: ICD-10-CM

## 2017-11-24 LAB
ABO + RH BLD: NORMAL
ABO + RH BLD: NORMAL
ALBUMIN SERPL-MCNC: 3.3 G/DL (ref 3.4–5)
ALP SERPL-CCNC: 59 U/L (ref 40–150)
ALT SERPL W P-5'-P-CCNC: 16 U/L (ref 0–70)
ANION GAP SERPL CALCULATED.3IONS-SCNC: 8 MMOL/L (ref 3–14)
AST SERPL W P-5'-P-CCNC: 20 U/L (ref 0–45)
BASOPHILS # BLD AUTO: 0 10E9/L (ref 0–0.2)
BASOPHILS NFR BLD AUTO: 0.3 %
BILIRUB SERPL-MCNC: 0.2 MG/DL (ref 0.2–1.3)
BLD GP AB SCN SERPL QL: NORMAL
BLD PROD TYP BPU: NORMAL
BLD UNIT ID BPU: 0
BLD UNIT ID BPU: 0
BLOOD BANK CMNT PATIENT-IMP: NORMAL
BLOOD PRODUCT CODE: NORMAL
BLOOD PRODUCT CODE: NORMAL
BPU ID: NORMAL
BPU ID: NORMAL
BUN SERPL-MCNC: 11 MG/DL (ref 7–30)
CALCIUM SERPL-MCNC: 9.3 MG/DL (ref 8.5–10.1)
CHLORIDE SERPL-SCNC: 98 MMOL/L (ref 94–109)
CO2 SERPL-SCNC: 26 MMOL/L (ref 20–32)
CREAT SERPL-MCNC: 1.78 MG/DL (ref 0.66–1.25)
DIFFERENTIAL METHOD BLD: ABNORMAL
EOSINOPHIL # BLD AUTO: 0 10E9/L (ref 0–0.7)
EOSINOPHIL NFR BLD AUTO: 0.8 %
ERYTHROCYTE [DISTWIDTH] IN BLOOD BY AUTOMATED COUNT: 18.2 % (ref 10–15)
FERRITIN SERPL-MCNC: 289 NG/ML (ref 26–388)
GFR SERPL CREATININE-BSD FRML MDRD: 38 ML/MIN/1.7M2
GLUCOSE SERPL-MCNC: 75 MG/DL (ref 70–99)
HCT VFR BLD AUTO: 24.4 % (ref 40–53)
HGB BLD-MCNC: 7.9 G/DL (ref 13.3–17.7)
IMM GRANULOCYTES # BLD: 0 10E9/L (ref 0–0.4)
IMM GRANULOCYTES NFR BLD: 0.5 %
IRON SATN MFR SERPL: 10 % (ref 15–46)
IRON SERPL-MCNC: 26 UG/DL (ref 35–180)
LYMPHOCYTES # BLD AUTO: 1.1 10E9/L (ref 0.8–5.3)
LYMPHOCYTES NFR BLD AUTO: 29.9 %
MAGNESIUM SERPL-MCNC: 1.3 MG/DL (ref 1.6–2.3)
MCH RBC QN AUTO: 29.3 PG (ref 26.5–33)
MCHC RBC AUTO-ENTMCNC: 32.4 G/DL (ref 31.5–36.5)
MCV RBC AUTO: 90 FL (ref 78–100)
MONOCYTES # BLD AUTO: 0.4 10E9/L (ref 0–1.3)
MONOCYTES NFR BLD AUTO: 11.2 %
NEUTROPHILS # BLD AUTO: 2.1 10E9/L (ref 1.6–8.3)
NEUTROPHILS NFR BLD AUTO: 57.3 %
NRBC # BLD AUTO: 0 10*3/UL
NRBC BLD AUTO-RTO: 0 /100
NUM BPU REQUESTED: 2
PLATELET # BLD AUTO: 146 10E9/L (ref 150–450)
POTASSIUM SERPL-SCNC: 3.7 MMOL/L (ref 3.4–5.3)
PROT SERPL-MCNC: 6.7 G/DL (ref 6.8–8.8)
RBC # BLD AUTO: 2.7 10E12/L (ref 4.4–5.9)
SODIUM SERPL-SCNC: 132 MMOL/L (ref 133–144)
SPECIMEN EXP DATE BLD: NORMAL
TIBC SERPL-MCNC: 263 UG/DL (ref 240–430)
TRANSFUSION STATUS PATIENT QL: NORMAL
TSH SERPL DL<=0.005 MIU/L-ACNC: 0.82 MU/L (ref 0.4–4)
VIT B12 SERPL-MCNC: 420 PG/ML (ref 193–986)
WBC # BLD AUTO: 3.7 10E9/L (ref 4–11)

## 2017-11-24 PROCEDURE — 83550 IRON BINDING TEST: CPT | Performed by: PHYSICIAN ASSISTANT

## 2017-11-24 PROCEDURE — 85025 COMPLETE CBC W/AUTO DIFF WBC: CPT | Performed by: INTERNAL MEDICINE

## 2017-11-24 PROCEDURE — 99215 OFFICE O/P EST HI 40 MIN: CPT | Mod: ZP | Performed by: PHYSICIAN ASSISTANT

## 2017-11-24 PROCEDURE — 86901 BLOOD TYPING SEROLOGIC RH(D): CPT | Performed by: PHYSICIAN ASSISTANT

## 2017-11-24 PROCEDURE — 36430 TRANSFUSION BLD/BLD COMPNT: CPT

## 2017-11-24 PROCEDURE — 85045 AUTOMATED RETICULOCYTE COUNT: CPT | Performed by: INTERNAL MEDICINE

## 2017-11-24 PROCEDURE — 82607 VITAMIN B-12: CPT | Performed by: PHYSICIAN ASSISTANT

## 2017-11-24 PROCEDURE — 86900 BLOOD TYPING SEROLOGIC ABO: CPT | Performed by: PHYSICIAN ASSISTANT

## 2017-11-24 PROCEDURE — 80053 COMPREHEN METABOLIC PANEL: CPT | Performed by: INTERNAL MEDICINE

## 2017-11-24 PROCEDURE — 96375 TX/PRO/DX INJ NEW DRUG ADDON: CPT

## 2017-11-24 PROCEDURE — 86850 RBC ANTIBODY SCREEN: CPT | Performed by: INTERNAL MEDICINE

## 2017-11-24 PROCEDURE — P9016 RBC LEUKOCYTES REDUCED: HCPCS | Performed by: INTERNAL MEDICINE

## 2017-11-24 PROCEDURE — 25000128 H RX IP 250 OP 636: Mod: ZF | Performed by: INTERNAL MEDICINE

## 2017-11-24 PROCEDURE — 86900 BLOOD TYPING SEROLOGIC ABO: CPT | Performed by: INTERNAL MEDICINE

## 2017-11-24 PROCEDURE — 96367 TX/PROPH/DG ADDL SEQ IV INF: CPT

## 2017-11-24 PROCEDURE — 96413 CHEMO IV INFUSION 1 HR: CPT

## 2017-11-24 PROCEDURE — 82728 ASSAY OF FERRITIN: CPT | Performed by: PHYSICIAN ASSISTANT

## 2017-11-24 PROCEDURE — 96361 HYDRATE IV INFUSION ADD-ON: CPT

## 2017-11-24 PROCEDURE — 86901 BLOOD TYPING SEROLOGIC RH(D): CPT | Performed by: INTERNAL MEDICINE

## 2017-11-24 PROCEDURE — 83540 ASSAY OF IRON: CPT | Performed by: PHYSICIAN ASSISTANT

## 2017-11-24 PROCEDURE — 82668 ASSAY OF ERYTHROPOIETIN: CPT | Performed by: PHYSICIAN ASSISTANT

## 2017-11-24 PROCEDURE — 25000128 H RX IP 250 OP 636: Mod: ZF | Performed by: PHYSICIAN ASSISTANT

## 2017-11-24 PROCEDURE — 84443 ASSAY THYROID STIM HORMONE: CPT | Performed by: PHYSICIAN ASSISTANT

## 2017-11-24 PROCEDURE — 86923 COMPATIBILITY TEST ELECTRIC: CPT | Performed by: INTERNAL MEDICINE

## 2017-11-24 PROCEDURE — 86850 RBC ANTIBODY SCREEN: CPT | Performed by: PHYSICIAN ASSISTANT

## 2017-11-24 PROCEDURE — 40000556 ZZH STATISTIC PERIPHERAL IV START W US GUIDANCE: Mod: ZF

## 2017-11-24 PROCEDURE — 99212 OFFICE O/P EST SF 10 MIN: CPT | Mod: ZF

## 2017-11-24 PROCEDURE — 96417 CHEMO IV INFUS EACH ADDL SEQ: CPT

## 2017-11-24 PROCEDURE — 83735 ASSAY OF MAGNESIUM: CPT | Performed by: INTERNAL MEDICINE

## 2017-11-24 RX ORDER — PALONOSETRON 0.05 MG/ML
0.25 INJECTION, SOLUTION INTRAVENOUS ONCE
Status: COMPLETED | OUTPATIENT
Start: 2017-11-24 | End: 2017-11-24

## 2017-11-24 RX ORDER — EPINEPHRINE 0.3 MG/.3ML
INJECTION SUBCUTANEOUS
Status: DISCONTINUED
Start: 2017-11-24 | End: 2017-11-24 | Stop reason: WASHOUT

## 2017-11-24 RX ORDER — MAGNESIUM SULFATE HEPTAHYDRATE 40 MG/ML
2 INJECTION, SOLUTION INTRAVENOUS EVERY 4 HOURS PRN
Status: DISCONTINUED | OUTPATIENT
Start: 2017-11-24 | End: 2017-11-24 | Stop reason: HOSPADM

## 2017-11-24 RX ADMIN — SODIUM CHLORIDE 150 MG: 9 INJECTION, SOLUTION INTRAVENOUS at 11:02

## 2017-11-24 RX ADMIN — PALONOSETRON HYDROCHLORIDE 0.25 MG: 0.25 INJECTION INTRAVENOUS at 10:54

## 2017-11-24 RX ADMIN — DEXAMETHASONE SODIUM PHOSPHATE 12 MG: 10 INJECTION, SOLUTION INTRAMUSCULAR; INTRAVENOUS at 10:54

## 2017-11-24 RX ADMIN — SODIUM CHLORIDE 1000 ML: 9 INJECTION, SOLUTION INTRAVENOUS at 10:20

## 2017-11-24 RX ADMIN — MAGNESIUM SULFATE IN WATER 2 G: 40 INJECTION, SOLUTION INTRAVENOUS at 11:34

## 2017-11-24 RX ADMIN — GEMCITABINE 2000 MG: 38 INJECTION, SOLUTION INTRAVENOUS at 12:06

## 2017-11-24 RX ADMIN — CARBOPLATIN 320 MG: 10 INJECTION, SOLUTION INTRAVENOUS at 11:30

## 2017-11-24 ASSESSMENT — PAIN SCALES - GENERAL: PAINLEVEL: NO PAIN (0)

## 2017-11-24 NOTE — PROGRESS NOTES
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 He is currently undergoing treatment with Cisplatin/Gemzar split on days 1 and 8 given history of CKD. He is currently undergoing curative intent treatment, however he does have indeterminate pulmonary nodules that are being followed. He saw Dr. Arceo the end of October with a plan to continue Cis/Gemzar for 2 more cycles (6 weeks) and then restage. He returns today for consideration of cycle 4.     Interval History:  Mr. Bruno returns today with his wife. He states overall things have gone well the last few weeks. He states the 2-3x weekly IVF has been very beneficial and he is continuing to take his Zofran and Compazine scheduled, which has greatly improved his nausea. He takes the omeprazole solution scheduled for GERD and this has also been going well. He is taking his Miralax and Colace to manage constipation. No diarrhea. He did try taking magnesium pills at home but this caused a lot of stomach upset.    He continues to complain about dysphagia, and notes that he will likely follow-up with ENT in the future to further discuss this. At the time the plan is to monitor his Zenker diverticulum. Swallow study showed delayed swallow response and recommended regular textures, thin liquids, and small bites, along with staying upright after eating.     His penile pain is about the same, noting some discomfort in the morning and evening when he feels the need to urinate. He is not taking any pain medications. He has no complaints with the suprapubic catheter.     Review of Systems:  Patient denies fevers, chills, night sweats, unexplained weight changes, headaches, dizziness, vision or hearing changes, new lumps or bumps, chest pain, shortness of breath, cough,  abdominal pain, nausea, vomiting, changes to bowel or bladder, swelling of extremities, bleeding issues, or rash.    Current Outpatient Prescriptions   Medication Sig Dispense Refill     MAGNESIUM OXIDE PO Take 400 mg by mouth 2 times daily       omeprazole (PRILOSEC) 2 mg/mL SUSP Take 10 mLs (20 mg) by mouth 2 times daily 280 mL 0     nystatin (MYCOSTATIN) 478882 UNIT/ML suspension Take 5 mLs (500,000 Units) by mouth 4 times daily Swish and spit 200 mL 0     amLODIPine (NORVASC) 5 MG tablet Take 2 tablets (10 mg) by mouth daily 60 tablet 3     alum & mag hydroxide-simethicone (MYLANTA/MAALOX) 200-200-20 MG/5ML SUSP suspension Take 30 mLs by mouth every 4 hours as needed for indigestion 355 mL 1     LORazepam (ATIVAN) 0.5 MG tablet Take 1 tablet (0.5 mg) by mouth every 4 hours as needed (Anxiety, Nausea/Vomiting or Sleep) 30 tablet 3     prochlorperazine (COMPAZINE) 10 MG tablet Take 0.5 tablets (5 mg) by mouth every 6 hours as needed (Nausea/Vomiting) 30 tablet 3     ondansetron (ZOFRAN) 8 MG tablet Take 1 tablet (8 mg) by mouth every 8 hours as needed for nausea 20 tablet 3     docusate sodium (COLACE) 100 MG capsule Take 1 capsule (100 mg) by mouth 2 times daily as needed for constipation 60 capsule 0     dexamethasone (DECADRON) 4 MG tablet Take 2 tablets (8 mg) by mouth daily Take for 3 days, starting the day after cisplatin (Day 2 and Day 9). 12 tablet 3     HYDROXYZINE HCL PO Take 5 mg by mouth At Bedtime        fluticasone (FLONASE) 50 MCG/ACT spray Spray 1 spray into both nostrils every morning        OLANZapine (ZYPREXA) 5 MG tablet Take 1 tablet (5 mg) by mouth At Bedtime (Patient not taking: Reported on 11/15/2017) 30 tablet 0       Physical Examination:  /71 (BP Location: Right arm, Cuff Size: Adult Regular)  Pulse 84  Temp 98.3  F (36.8  C) (Oral)  Resp 16  Wt 71.1 kg (156 lb 11.2 oz)  SpO2 99%  BMI 19.07 kg/m2  Wt Readings from Last 10 Encounters:   11/24/17 71.1 kg (156 lb 11.2 oz)    11/22/17 71.1 kg (156 lb 11.2 oz)   11/17/17 68.8 kg (151 lb 11.2 oz)   11/15/17 69.5 kg (153 lb 4.8 oz)   11/14/17 68.5 kg (151 lb)   11/10/17 70.7 kg (155 lb 12.8 oz)   11/08/17 70.6 kg (155 lb 9.6 oz)   11/03/17 71.7 kg (158 lb)   10/27/17 68.5 kg (151 lb)   10/25/17 68 kg (150 lb)     Constitutional: Well-appearing male in no acute distress.  Eyes: EOMI, PERRL. No scleral icterus.  ENT: Oral mucosa is moist without lesions or thrush.   Lymphatic: Neck is supple without cervical or supraclavicular lymphadenopathy. No axillary lymphadenopathy.  Cardiovascular: Regular rate and rhythm. No murmurs, gallops, or rubs. No peripheral edema.  Respiratory: Clear to auscultation bilaterally. No wheezes or crackles.  Gastrointestinal: Bowel sounds present. Abdomen soft, non-tender. No palpable hepatosplenomegaly or masses. Suprapubic catheter in place in lower abdomen, clean and without erythema, drainage, or tenderness.  Neurologic: Cranial nerves II through XII are grossly intact.  Skin: No rashes, petechiae, or bruising noted on exposed skin.    Laboratory Data:  Results for KING AILYN CHAVEZ (MRN [id number]) as of 11/24/2017 13:02   11/24/2017 08:27   Sodium 132 (L)   Potassium 3.7   Chloride 98   Carbon Dioxide 26   Urea Nitrogen 11   Creatinine 1.78 (H)   GFR Estimate 38 (L)   GFR Estimate If Black 46 (L)   Calcium 9.3   Anion Gap 8   Magnesium 1.3 (L)   Albumin 3.3 (L)   Protein Total 6.7 (L)   Bilirubin Total 0.2   Alkaline Phosphatase 59   ALT 16   AST 20   Glucose 75   WBC 3.7 (L)   Hemoglobin 7.9 (L)   Hematocrit 24.4 (L)   Platelet Count 146 (L)   RBC Count 2.70 (L)   MCV 90   MCH 29.3   MCHC 32.4   RDW 18.2 (H)   Diff Method Automated Method   % Neutrophils 57.3   % Lymphocytes 29.9   % Monocytes 11.2   % Eosinophils 0.8   % Basophils 0.3   % Immature Granulocytes 0.5   Nucleated RBCs 0   Absolute Neutrophil 2.1   Absolute Lymphocytes 1.1   Absolute Monocytes 0.4   Absolute Eosinophils 0.0   Absolute  Basophils 0.0   Abs Immature Granulocytes 0.0   Absolute Nucleated RBC 0.0     Results for KING AILYN CHAVEZ (MRN [id number]) as of 11/24/2017 13:02   11/3/2017 07:43 11/8/2017 13:31 11/10/2017 08:39 11/10/2017 11:20 11/12/2017 09:51 11/14/2017 13:58 11/15/2017 12:21 11/17/2017 12:41 11/22/2017 12:44 11/24/2017 08:27   Urea Nitrogen 8 11 15  24  17 11 11 11   Creatinine 1.26 (H) 1.39 (H) 1.60 (H) 1.52 (H) 1.62 (H)  1.80 (H) 1.59 (H) 1.57 (H) 1.78 (H)   GFR Estimate 57 (L) 51 (L) 43 (L) 46 (L) 42 (L)  37 (L) 43 (L) 44 (L) 38 (L)   GFR Estimate If Black 68 61 52 (L) 55 (L) 51 (L)  45 (L) 52 (L) 53 (L) 46 (L)       Assessment and Plan:  1. Stage II penile urethral carcinoma, currently on treatment with cisplatin/gemzar  PETCT showed disease response in both the urethral cancer and indeterminate lung nodules. Cycle 1 and 2 complicated be dehydration, nausea, reflux, and weight loss. Cycle 3 now complicated by renal toxicity. Day 1 creat 1.26, day 8 1.60 with improvement to 1.52 on recheck after IVF. Was improving this week but today is back to 1.78 despite no treatment and hydrating well at home (72oz water daily) and in clinic.    Discussed with Dr. Arceo. Given he has baseline CKD and is already on split dosing, will switch to Carboplatin and continue Gemzar for the remaining two weeks.      2. Nausea, improved  -IV Aloxi, Emend, and Dex premeds today  -Dexamethasone 8mg day 2-4  -Zofran starting day 4, every 8 hours. Compazine every 6 hours as needed. Ativan at bedtime.  -IVF and prn antiemetics 3x week. Can consider cutting back on this if nausea is improved with carboplatin.       3. Constipation, stable  -Miralax BID  -Colace BID PRN     4. GERD, stable  -Continue omeprazole solution BID as this has been helpful. Refill given today  -Use Maalox and Carafate as needed if symptoms persist  -Met with ENT regarding dysphagia. No intervention at this time, continue PPI. Swallow study recommended aspiration precautions  (sitting up right after meals, small bites) but no changes to food textures or further management. Encouraged him to f/u with ENT if he is still having issues.    5. FEN  -Weight improved and stabilized. Met with Ignacia ELY and discussed high protein/calorie options. Can continue Nystatin if needed for future thrush as patient notes this greatly inhibits his eating.  -Baseline creat around ~1.2. Creat continues to elevate, today 1.78. See above discussion with Dr. Arceo. Continue 3x weekly IVF  -Sodium improved today to 132. Replace mag with 2g today per protocol. Patient does not tolerate oral mag so will need to continue checking in clinic. Hopefully this will improve with carbo vs cisplatin. K stable.     6.   -Stable penile pain, not needing any pain medications at this time. Pain was improving with treatment.  -Due for next SPC the beginning of December. No concerns with catheter today.     7. Heme  Hgb decreased today to 7.9 (last check 9.0 on 11/10) . Patient denies any bleeding issues. Will check anemia labs (retic, TSH, iron, ferritin, B12, folate) and also give 1 unit pRBC today. Consent obtained.      8. HTN, stable  Mildly elevated today but given prior issues with dehydration, should continue amlodipine 10mg daily.      9. Follow-Up  11/26: IVF   11/28: IVF  12/1: Labs, NANCY, Cycle 4 Day 8 Carbo/[place]  12/4, 12/6, 12/8: IVF  12/11 or 12/11: PETCT  12/13: Follow-up Dr. Arceo  12/14 or 12/15: Cycle 5 Day 1 Carbo/[place]    Greater than 40 minutes was spent with this patient with greater than 20 minutes spent in counseling and coordination of care.    Tejas Montes De Oca PA-C    The patient was seen in conjunction with Tejas Montes De Oca PA-C who served as a scribe for today's visit. I have reviewed and edited the above note, and agree with the above findings and plan.    Ramona Greer PA-C  [facility] [address] [id number] [phone]

## 2017-11-24 NOTE — MR AVS SNAPSHOT
After Visit Summary   11/24/2017    King Gonsalo Bruno    MRN: [id number]           Patient Information     Date Of Birth          1945        Visit Information        Provider Department      11/24/2017 8:40 AM Ramona Greer PA [facility]        Today's Diagnoses     Urethral cancer (H)    -  1    Anemia in neoplastic disease        Chronic fatigue            Follow-ups after your visit        Your next 10 appointments already scheduled     Nov 29, 2017 10:30 AM CST   [facility] Lab Draw with [facility] LAB DRAW   [facility] Draw ([facility])    [address] [place] [address]   [phone]            Nov 29, 2017 11:00 AM CST   Infusion 120 with UC ONCOLOGY INFUSION, UC 18 ATC   [facility] ([facility])    [address] [place] [address]   [phone]            Dec 01, 2017  8:15 AM CST   [facility] Lab Draw with [facility] LAB DRAW   [facility] Draw ([facility])    [address] [place] [address]   [phone]            Dec 01, 2017  8:40 AM CST   (Arrive by 8:25 AM)   Return Visit with ISAURA Roach CNP   [facility] ([facility])    [address] [place] [address]   [phone]            Dec 01, 2017 10:00 AM CST   Infusion 360 with UC ONCOLOGY INFUSION, UC 12 ATC   [facility] ([facility])    [address] [place] [address]   [phone]            Dec 04, 2017  1:00 PM CST   Infusion 120 with UC ONCOLOGY INFUSION, UC 18 ATC   [facility] ([facility])    [address] [place] [address]   [phone]            Dec 06, 2017 10:30 AM CST   Infusion 120 with  "UC ONCOLOGY INFUSION   [facility] ([facility] [place])    [address]  [address] Floor  [facility] [address] [phone]              Who to contact     If you have questions or need follow up information about today's clinic visit or your schedule please contact [facility] directly at [phone].  Normal or non-critical lab and imaging results will be communicated to you by MyChart, letter or phone within 4 business days after the clinic has received the results. If you do not hear from us within 7 days, please contact the clinic through MyChart or phone. If you have a critical or abnormal lab result, we will notify you by phone as soon as possible.  Submit refill requests through [employer] or call your pharmacy and they will forward the refill request to us. Please allow 3 business days for your refill to be completed.          Additional Information About Your Visit        [employer] Information     [employer] lets you send messages to your doctor, view your test results, renew your prescriptions, schedule appointments and more. To sign up, go to [email or web address] . Click on \"Log in\" on the left side of the screen, which will take you to the Welcome page. Then click on \"Sign up Now\" on the right side of the page.     You will be asked to enter the access code listed below, as well as some personal information. Please follow the directions to create your username and password.     Your access code is: [email or web address]  Expires: 2018  5:30 AM     Your access code will  in 90 days. If you need help or a new code, please call your [place] clinic or [phone].        Care EveryWhere ID     This is your Care EveryWhere ID. This could be used by other organizations to access your [place] medical records  [id number]        Your Vitals Were     Pulse Temperature Respirations Pulse Oximetry BMI (Body Mass Index)       84 98.3  F (36.8  C) " (Oral) 16 99% 19.07 kg/m2        Blood Pressure from Last 3 Encounters:   11/26/17 149/71   11/24/17 142/70   11/24/17 140/71    Weight from Last 3 Encounters:   11/26/17 72.2 kg (159 lb 3.2 oz)   11/24/17 71.1 kg (156 lb 11.2 oz)   11/22/17 71.1 kg (156 lb 11.2 oz)              We Performed the Following     Erythropoietin     Ferritin     Iron and iron binding capacity     TSH with free T4 reflex     Vitamin B12          Where to get your medicines      These medications were sent to [place], [place] - [address]  [address], [facility] [address]    Hours:  TRANSPLANT PHONE NUMBER [phone] Phone:  [phone]     omeprazole 2 mg/mL Susp          Primary Care Provider Office Phone # Fax #    Joan Ventura, MARLENY [phone]       [facility] [address] [facility] [address]        Equal Access to Services     JENELLE RED AH: Hadii светлана ku hadasho Soomaali, waaxda luqadaha, qaybta kaalmada adeegyada, ronald srivastava . So [facility] [phone].    ATENCIÓN: Si habla español, tiene a washburn disposición servicios gratuitos de asistencia lingüística. Llame al [phone].    We comply with applicable federal civil rights laws and [place] laws. We do not discriminate on the basis of race, color, national origin, age, disability, sex, sexual orientation, or gender identity.            Thank you!     Thank you for choosing [facility]  for your care. Our goal is always to provide you with excellent care. Hearing back from our patients is one way we can continue to improve our services. Please take a few minutes to complete the written survey that you may receive in the mail after your visit with us. Thank you!             Your Updated Medication List - Protect others around you: Learn how to safely use, store and throw away your medicines at www.disposemymeds.org.          This list is  accurate as of: 11/24/17 11:59 PM.  Always use your most recent med list.                   Brand Name Dispense Instructions for use Diagnosis    alum & mag hydroxide-simethicone 200-200-20 MG/5ML Susp suspension    MYLANTA/MAALOX    355 mL    Take 30 mLs by mouth every 4 hours as needed for indigestion        amLODIPine 5 MG tablet    NORVASC    60 tablet    Take 2 tablets (10 mg) by mouth daily    Benign essential hypertension       dexamethasone 4 MG tablet    DECADRON    12 tablet    Take 2 tablets (8 mg) by mouth daily Take for 3 days, starting the day after cisplatin (Day 2 and Day 9).    Urethral cancer (H)       docusate sodium 100 MG capsule    COLACE    60 capsule    Take 1 capsule (100 mg) by mouth 2 times daily as needed for constipation    Slow transit constipation       fluticasone 50 MCG/ACT spray    FLONASE     Spray 1 spray into both nostrils every morning        HYDROXYZINE HCL PO      Take 5 mg by mouth At Bedtime        LORazepam 0.5 MG tablet    ATIVAN    30 tablet    Take 1 tablet (0.5 mg) by mouth every 4 hours as needed (Anxiety, Nausea/Vomiting or Sleep)    Nausea       MAGNESIUM OXIDE PO      Take 400 mg by mouth 2 times daily        nystatin 750465 UNIT/ML suspension    MYCOSTATIN    200 mL    Take 5 mLs (500,000 Units) by mouth 4 times daily Swish and spit    Thrush       OLANZapine 5 MG tablet    zyPREXA    30 tablet    Take 1 tablet (5 mg) by mouth At Bedtime    Urethral cancer (H), Nausea       omeprazole 2 mg/mL Susp    priLOSEC    280 mL    Take 10 mLs (20 mg) by mouth 2 times daily    Gastroesophageal reflux disease with esophagitis, Urethral cancer (H)       ondansetron 8 MG tablet    ZOFRAN    20 tablet    Take 1 tablet (8 mg) by mouth every 8 hours as needed for nausea    Nausea       prochlorperazine 10 MG tablet    COMPAZINE    30 tablet    Take 0.5 tablets (5 mg) by mouth every 6 hours as needed (Nausea/Vomiting)    Nausea

## 2017-11-24 NOTE — PROGRESS NOTES
Infusion Nursing Note:  King Gonsalo Bruno presents today for Cycle 4 Day 1 Carboplatin, Gemzar, 1 unit PRBC, Magnesium replacement.    Patient seen by provider today: Yes: SARITHA Silvestre and SARITHA Carrillo    Note: Treatment changed to Carboplatin/Gemzar from Cisplatin/Gemzar today due to elevated creatinine.     Intravenous Access:  Peripheral IV placed by Vascular Access.    Treatment Conditions:  Lab Results   Component Value Date    HGB 7.9 11/24/2017     Lab Results   Component Value Date    WBC 3.7 11/24/2017      Lab Results   Component Value Date    ANEU 2.1 11/24/2017     Lab Results   Component Value Date     11/24/2017      Lab Results   Component Value Date     11/24/2017                   Lab Results   Component Value Date    POTASSIUM 3.7 11/24/2017           Lab Results   Component Value Date    MAG 1.3 11/24/2017            Lab Results   Component Value Date    CR 1.78 11/24/2017                   Lab Results   Component Value Date    SAADIA 9.3 11/24/2017                Lab Results   Component Value Date    BILITOTAL 0.2 11/24/2017           Lab Results   Component Value Date    ALBUMIN 3.3 11/24/2017                    Lab Results   Component Value Date    ALT 16 11/24/2017           Lab Results   Component Value Date    AST 20 11/24/2017     Blood Consent Signed: Today  Results reviewed, labs MET treatment parameters, ok to proceed with treatment.      Post Infusion Assessment:  Patient tolerated infusion without incident.  Blood return noted pre and post infusion.  Site patent and intact, free from redness, edema or discomfort.  No evidence of extravasations. Access discontinued per protocol.    Discharge Plan:   Prescription refills given for liquid Prilosec.  Copy of AVS reviewed with patient and/or family.  Patient will return 11/26 for fluids appointment.  Patient discharged in stable condition accompanied by: significant other.  Departure Mode: Ambulatory.    Sol Duran RN

## 2017-11-24 NOTE — Clinical Note
11/24/2017       RE: King Gonsalo Bruno  [address]  [facility] [facility] [address]     Dear Colleague,    Thank you for referring your patient, King Gonsalo Bruno, to the [facility]. Please see a copy of my visit note below.    Oncology/Hematology Visit Note  Nov 24, 2017    Reason for Visit: Follow up of     History of Present Illness: King Gonsalo Bruno is a 71 year old male with ***. He/She is currently undergoing treatment with ***.     Interval History:      Review of Systems:  Patient denies fevers, chills, night sweats, unexplained weight changes, headaches, dizziness, vision or hearing changes, new lumps or bumps, chest pain, shortness of breath, cough, abdominal pain, nausea, vomiting, changes to bowel or bladder, swelling of extremities, bleeding issues, or rash.    Current Outpatient Prescriptions   Medication Sig Dispense Refill     MAGNESIUM OXIDE PO Take 400 mg by mouth 2 times daily       omeprazole (PRILOSEC) 2 mg/mL SUSP Take 10 mLs (20 mg) by mouth 2 times daily 280 mL 0     nystatin (MYCOSTATIN) 253122 UNIT/ML suspension Take 5 mLs (500,000 Units) by mouth 4 times daily Swish and spit 200 mL 0     amLODIPine (NORVASC) 5 MG tablet Take 2 tablets (10 mg) by mouth daily 60 tablet 3     alum & mag hydroxide-simethicone (MYLANTA/MAALOX) 200-200-20 MG/5ML SUSP suspension Take 30 mLs by mouth every 4 hours as needed for indigestion 355 mL 1     LORazepam (ATIVAN) 0.5 MG tablet Take 1 tablet (0.5 mg) by mouth every 4 hours as needed (Anxiety, Nausea/Vomiting or Sleep) 30 tablet 3     prochlorperazine (COMPAZINE) 10 MG tablet Take 0.5 tablets (5 mg) by mouth every 6 hours as needed (Nausea/Vomiting) 30 tablet 3     ondansetron (ZOFRAN) 8 MG tablet Take 1 tablet (8 mg) by mouth every 8 hours as needed for nausea 20 tablet 3     docusate sodium (COLACE) 100 MG capsule Take 1 capsule (100 mg) by mouth 2 times daily as needed for constipation 60 capsule 0     dexamethasone (DECADRON) 4 MG  tablet Take 2 tablets (8 mg) by mouth daily Take for 3 days, starting the day after cisplatin (Day 2 and Day 9). 12 tablet 3     HYDROXYZINE HCL PO Take 5 mg by mouth At Bedtime        fluticasone (FLONASE) 50 MCG/ACT spray Spray 1 spray into both nostrils every morning        OLANZapine (ZYPREXA) 5 MG tablet Take 1 tablet (5 mg) by mouth At Bedtime (Patient not taking: Reported on 11/15/2017) 30 tablet 0       Physical Examination:  /71 (BP Location: Right arm, Cuff Size: Adult Regular)  Pulse 84  Temp 98.3  F (36.8  C) (Oral)  Resp 16  Wt 71.1 kg (156 lb 11.2 oz)  SpO2 99%  BMI 19.07 kg/m2  Wt Readings from Last 10 Encounters:   11/24/17 71.1 kg (156 lb 11.2 oz)   11/22/17 71.1 kg (156 lb 11.2 oz)   11/17/17 68.8 kg (151 lb 11.2 oz)   11/15/17 69.5 kg (153 lb 4.8 oz)   11/14/17 68.5 kg (151 lb)   11/10/17 70.7 kg (155 lb 12.8 oz)   11/08/17 70.6 kg (155 lb 9.6 oz)   11/03/17 71.7 kg (158 lb)   10/27/17 68.5 kg (151 lb)   10/25/17 68 kg (150 lb)     Constitutional: Well-appearing male in no acute distress.  Eyes: EOMI, PERRL. No scleral icterus.  ENT: Oral mucosa is moist without lesions or thrush.   Lymphatic: Neck is supple without cervical or supraclavicular lymphadenopathy. No axillary lymphadenopathy.  Cardiovascular: Regular rate and rhythm. No murmurs, gallops, or rubs. No peripheral edema.  Respiratory: Clear to auscultation bilaterally. No wheezes or crackles.  Gastrointestinal: Bowel sounds present. Abdomen soft, non-tender. No palpable hepatosplenomegaly or masses.   Neurologic: Cranial nerves II through XII are grossly intact.  Skin: No rashes, petechiae, or bruising noted on exposed skin.    Laboratory Data:        Assessment and Plan:          Tejas Montes De Oca PA-C  [facility] [address] [address]  [phone]      Again, thank you for allowing me to participate in the care of your patient.      Sincerely,    SARITHA Briceno

## 2017-11-24 NOTE — NURSING NOTE
"Oncology Rooming Note    November 24, 2017 8:37 AM   King Gonsalo Bruno is a 71 year old male who presents for:    Chief Complaint   Patient presents with     Blood Draw     Labs drawn from PIV placed by Shant vascular access RN. Line flushed with saline. Vs taken and pt checked in for appt     Oncology Clinic Visit     Urethral CA f/u      Initial Vitals: /71 (BP Location: Right arm, Cuff Size: Adult Regular)  Pulse 84  Temp 98.3  F (36.8  C) (Oral)  Resp 16  Wt 71.1 kg (156 lb 11.2 oz)  SpO2 99%  BMI 19.07 kg/m2 Estimated body mass index is 19.07 kg/(m^2) as calculated from the following:    Height as of 11/14/17: 1.93 m (6' 4\").    Weight as of this encounter: 71.1 kg (156 lb 11.2 oz). Body surface area is 1.95 meters squared.  No Pain (0) Comment: Data Unavailable   No LMP for male patient.  Allergies reviewed: Yes  Medications reviewed: No    Medications: Medication refills not needed today.  Pharmacy name entered into [employer]:    [id number] - [place], [place] - [address] [place] [address] AT [address] [place] [address] & [facility] [place], [place] - [phone]    Clinical concerns: Omeprazole  Provider was notified.    7 minutes for nursing intake (face to face time)     Rober Peres              "

## 2017-11-24 NOTE — PATIENT INSTRUCTIONS
Contact Numbers  [facility] Nurse Triage: [phone]  After Hours Nurse Line:  [phone]    Please call the [facility] Nurse Triage line or after hours number if you experience a temperature greater than or equal to 100.5, shaking chills, have uncontrolled nausea, vomiting and/or diarrhea, dizziness, shortness of breath, chest pain, bleeding, unexplained bruising, or if you have any other new/concerning symptoms, questions or concerns.     If you are having any concerning symptoms or wish to speak to a provider before your next infusion visit, please call your care coordinator or triage to notify them so we can adequately serve you.     If you need a refill on a narcotic prescription or other medication, please call triage before your infusion appointment.         November 2017 Sunday Monday Tuesday Wednesday Thursday Friday Saturday                  1     2     3     [facility]    7:15 AM   (15 min.)    [facility]   [facility] Lab Draw     [facility] RETURN    7:35 AM   (50 min.)   Ramona Greer PA M [facility] ONC INFUSION 360    8:30 AM   (360 min.)    ONCOLOGY INFUSION   [facility] 4       5     6     [facility] RETURN   12:45 PM   (30 min.)   Nurse,  [facility] Cancer [facility]tr   [facility]rology and Inst for Prostate and Urologic Cancers     [facility] BMT INFUSION 120    2:00 PM   (120 min.)    BMT INFUSION   [facility] Blood and Marrow Transplant 7     8     [facility]   12:30 PM   (15 min.)    [facility] DRAW   [facility] Lab Draw     [facility] ONC INFUSION 120    1:00 PM   (120 min.)    ONCOLOGY INFUSION   [facility]     [facility] NEW    2:45 PM   (60 min.)   Ignacia Wallace RD M [facility] 9     10     [facility] DRAW    8:15 AM   (15 min.)    [facility] DRAW   [facility] Lab Draw     [facility] RETURN    8:25 AM   (50 min.)   Ramona Greer PA M [facility]  [facility]     [facility] ONC INFUSION 360   10:00 AM   (360 min.)    ONCOLOGY INFUSION   [facility] 11       12     [facility] DRAW    9:00 AM   (15 min.)    [facility] DRAW   [facility] Draw     [facility] ONC INFUSION 120    9:30 AM   (120 min.)    ONCOLOGY INFUSION   [facility] 13     14     VIDEO SWALLOW STUDY   11:00 AM   (60 min.)   Khalida March SLP   [facility] Rehab     XR VIDEO SPEECH W ESOPHAGRAM    1:00 PM   (60 min.)   [id number]   [facility] Xray     [facility] NEW    1:15 PM   (30 min.)   Arsenio Ortiz MD   [facility] Ear Nose and Throat 15     [facility] DRAW   12:00 PM   (15 min.)    [facility] DRAW   [facility] Draw     [facility] ONC INFUSION 120   12:30 PM   (120 min.)    ONCOLOGY INFUSION   [facility] 16     17     [facility] DRAW   12:30 PM   (15 min.)    [facility] DRAW   [facility] Draw     [facility] ONC INFUSION 120    1:00 PM   (120 min.)    ONCOLOGY INFUSION   [facility] 18       19     20     21     22     [facility] DRAW   12:30 PM   (15 min.)    [facility] DRAW   [facility] Draw     [facility] ONC INFUSION 120    1:00 PM   (120 min.)    ONCOLOGY INFUSION   [facility] 23     24     [facility] DRAW    8:15 AM   (15 min.)    [facility]   [facility]     [facility] RETURN    8:25 AM   (50 min.)   Ramona Greer PA   [facility]     [facility] ONC INFUSION 360   10:00 AM   (360 min.)    ONCOLOGY INFUSION   [facility] 25  Happy Birthday!       26     [facility] DRAW    9:30 AM   (15 min.)    [facility] DRAW   [facility] Draw     [facility] ONC INFUSION 120   10:30 AM   (120 min.)    ONCOLOGY INFUSION   [facility] 27     28     29     [facility] DRAW   10:30 AM   (15 min.)    [facility] DRAW   [facility] Draw     [facility]  ONC INFUSION 120   11:00 AM   (120 min.)    ONCOLOGY INFUSION   [facility] 30 December 2017 Sunday Monday Tuesday Wednesday Thursday Friday Saturday                            1     [facility] DRAW    8:15 AM   (15 min.)    [facility] DRAW   [facility] Lab Draw     [facility] RETURN    8:25 AM   (50 min.)   Yessica Ovalles, ISAURA CNP   [facility]     [facility] ONC INFUSION 360   10:00 AM   (360 min.)    ONCOLOGY INFUSION   [facility] 2       3     4     5     6     7     8     9       10     11     [facility] RETURN   12:45 PM   (30 min.)   Nurse,  Pro[facility] Cancer Ctr   [facility]rology and Inst for Prostate and Urologic Cancers 12     13     14     15     16       17     18     19     20     21     22     23       24     25     26     27     28     29     30       31                                                Lab Results:  Recent Results (from the past 12 hour(s))   CBC with platelets differential    Collection Time: 11/24/17  8:27 AM   Result Value Ref Range    WBC 3.7 (L) 4.0 - 11.0 10e9/L    RBC Count 2.70 (L) 4.4 - 5.9 10e12/L    Hemoglobin 7.9 (L) 13.3 - 17.7 g/dL    Hematocrit 24.4 (L) 40.0 - 53.0 %    MCV 90 78 - 100 fl    MCH 29.3 26.5 - 33.0 pg    MCHC 32.4 31.5 - 36.5 g/dL    RDW 18.2 (H) 10.0 - 15.0 %    Platelet Count 146 (L) 150 - 450 10e9/L    Diff Method Automated Method     % Neutrophils 57.3 %    % Lymphocytes 29.9 %    % Monocytes 11.2 %    % Eosinophils 0.8 %    % Basophils 0.3 %    % Immature Granulocytes 0.5 %    Nucleated RBCs 0 0 /100    Absolute Neutrophil 2.1 1.6 - 8.3 10e9/L    Absolute Lymphocytes 1.1 0.8 - 5.3 10e9/L    Absolute Monocytes 0.4 0.0 - 1.3 10e9/L    Absolute Eosinophils 0.0 0.0 - 0.7 10e9/L    Absolute Basophils 0.0 0.0 - 0.2 10e9/L    Abs Immature Granulocytes 0.0 0 - 0.4 10e9/L    Absolute Nucleated RBC 0.0    Comprehensive metabolic panel    Collection Time: 11/24/17  8:27 AM    Result Value Ref Range    Sodium 132 (L) 133 - 144 mmol/L    Potassium 3.7 3.4 - 5.3 mmol/L    Chloride 98 94 - 109 mmol/L    Carbon Dioxide 26 20 - 32 mmol/L    Anion Gap 8 3 - 14 mmol/L    Glucose 75 70 - 99 mg/dL    Urea Nitrogen 11 7 - 30 mg/dL    Creatinine 1.78 (H) 0.66 - 1.25 mg/dL    GFR Estimate 38 (L) >60 mL/min/1.7m2    GFR Estimate If Black 46 (L) >60 mL/min/1.7m2    Calcium 9.3 8.5 - 10.1 mg/dL    Bilirubin Total 0.2 0.2 - 1.3 mg/dL    Albumin 3.3 (L) 3.4 - 5.0 g/dL    Protein Total 6.7 (L) 6.8 - 8.8 g/dL    Alkaline Phosphatase 59 40 - 150 U/L    ALT 16 0 - 70 U/L    AST 20 0 - 45 U/L   Magnesium    Collection Time: 11/24/17  8:27 AM   Result Value Ref Range    Magnesium 1.3 (L) 1.6 - 2.3 mg/dL   ABO/Rh type and screen    Collection Time: 11/24/17  8:30 AM   Result Value Ref Range    Units Ordered 2     ABO A     RH(D) Pos     Antibody Screen Neg     Test Valid Only At          [facility]    Specimen Expires 11/27/2017     Crossmatch Red Blood Cells    Blood component    Collection Time: 11/24/17  8:30 AM   Result Value Ref Range    Unit Number [id number]     Blood Component Type Red Blood Cells Leukocyte Reduced     Division Number 00     Status of Unit IN-TRANSIT     Blood Product Code [id number]     Unit Status     Blood component    Collection Time: 11/24/17  8:30 AM   Result Value Ref Range    Unit Number [id number]     Blood Component Type Red Blood Cells Leukocyte Reduced     Division Number 00     Status of Unit IN-TRANSIT     Blood Product Code [id number]     Unit Status

## 2017-11-24 NOTE — MR AVS SNAPSHOT
After Visit Summary   11/24/2017    King Gonsalo Bruno    MRN: [id number]           Patient Information     Date Of Birth          1945        Visit Information        Provider Department      11/24/2017 10:00 AM  12 ATC;  ONCOLOGY INFUSION [facility]        Today's Diagnoses     Urethral cancer (H)    -  1    Anemia in neoplastic disease        Gastroesophageal reflux disease with esophagitis          Care Instructions    Contact Numbers  [facility] Nurse Triage: [phone]  After Hours Nurse Line:  [phone]    Please call the [facility] Nurse Triage line or after hours number if you experience a temperature greater than or equal to 100.5, shaking chills, have uncontrolled nausea, vomiting and/or diarrhea, dizziness, shortness of breath, chest pain, bleeding, unexplained bruising, or if you have any other new/concerning symptoms, questions or concerns.     If you are having any concerning symptoms or wish to speak to a provider before your next infusion visit, please call your care coordinator or triage to notify them so we can adequately serve you.     If you need a refill on a narcotic prescription or other medication, please call triage before your infusion appointment.         November 2017 Sunday Monday Tuesday Wednesday Thursday Friday Saturday                  1     2     3     [facility] LAB DRAW    7:15 AM   (15 min.)   [facility] LAB DRAW   [facility] Lab Draw     [facility] RETURN    7:35 AM   (50 min.)   Ramona Greer PA   [facility] ONC INFUSION 360    8:30 AM   (360 min.)    ONCOLOGY INFUSION   [facility] 4       5     6     [facility] RETURN   12:45 PM   (30 min.)   Nurse,  [facility]   [facility] Urology and Inst for Prostate and Urologic Cancers     [facility] BMT INFUSION 120    2:00 PM   (120 min.)    BMT INFUSION   [facility] Blood and Marrow Transplant 7     8     [facility]   12[facility]30 PM   (15 min.)   [facility]   [facility] Mason[facility] Lab Draw     [facility] ONC INFUSION 120    1:00 PM   (120 min.)    ONCOLOGY INFUSION   [facility]     [facility] NEW    2:45 PM   (60 min.)   Ignacia Wallace RD   [facility] 9     10     [facility] LAB DRAW    8:15 AM   (15 min.)    MAS[facility] LAB DRAW   [facility] Masonic Lab Draw     [facility] RETURN    8:25 AM   (50 min.)   Ramona Greer PA   [facility]     [facility] ONC INFUSION 360   10:00 AM   (360 min.)    ONCOLOGY INFUSION   [facility] 11       12     [facility] LAB DRAW    9:00 AM   (15 min.)    [facility]AS[facility] LAB DRAW   [facility] Lab Draw     [facility] ONC INFUSION 120    9:30 AM   (120 min.)    ONCOLOGY INFUSION   [facility] 13     14     VIDEO SWALLOW STUDY   11:00 AM   (60 min.)   Khalida March SLP   [facility] Rehab     XR VIDEO SPEECH W ESOPHAGRAM    1:00 PM   (60 min.)   UCXR2   [facility] Xray     [facility] NEW    1:15 PM   (30 min.)   Arsenio Ortiz MD   [facility] Ear Nose and Throat 15     [facility] LAB DRAW   12:00 PM   (15 min.)    MAS[facility] LAB DRAW   [facility] Lab Draw     [facility] ONC INFUSION 120   12:30 PM   (120 min.)    ONCOLOGY INFUSION   [facility] 16     17     [facility] LAB DRAW   12:30 PM   (15 min.)   [facility] DRAW   [facility] Lab Draw     [facility] ONC INFUSION 120    1:00 PM   (120 min.)    ONCOLOGY INFUSION   [facility] 18       19     20     21     22     [facility] LAB DRAW   12:30 PM   (15 min.)    MAS[facility] LAB DRAW   [facility] Lab Draw     [facility] ONC INFUSION 120    1:00 PM   (120 min.)    ONCOLOGY INFUSION   [facility] 23     24     [facility] LAB DRAW    8:15 AM   (15 min.)   [facility] LAB DRAW   [facility] Lab Draw     [facility] RETURN    8:25 AM   (50 min.)   Ramona Greer PA   [facility]  Cancer Clinic     [facility] ONC INFUSION 360   10:00 AM   (360 min.)    ONCOLOGY INFUSION   [facility] 25  Happy Birthday!       26     [facility] LAB DRAW    9:30 AM   (15 min.)    MASONIC LAB DRAW   [facility] Masonic Lab Draw     [facility] ONC INFUSION 120   10:30 AM   (120 min.)    ONCOLOGY INFUSION   [facility] 27     28     29     [facility] LAB DRAW   10:30 AM   (15 min.)    MAS[facility] LAB DRAW   [facility] Mason[facility] Lab Draw     [facility] ONC INFUSION 120   11:00 AM   (120 min.)    ONCOLOGY INFUSION   [facility] 30 December 2017 Sunday Monday Tuesday Wednesday Thursday Friday Saturday                            1     [facility] LAB DRAW    8:15 AM   (15 min.)    MASONIC LAB DRAW   [facility] Lab Draw     [facility] RETURN    8:25 AM   (50 min.)   Yessica Ovalles APRN CNP   [facility]     [facility] ONC INFUSION 360   10:00 AM   (360 min.)    ONCOLOGY INFUSION   [facility] 2       3     4     5     6     7     8     9       10     11     [facility] RETURN   12:45 PM   (30 min.)   Nurse,  [facility]   [facility]rology [facility] Inst for Prostate and Urologic Cancers 12     13     14     15     16       17     18     19     20     21     22     23       24     25     26     27     28     29     30       31                                                Lab Results:  Recent Results (from the past 12 hour(s))   CBC with platelets differential    Collection Time: 11/24/17  8:27 AM   Result Value Ref Range    WBC 3.7 (L) 4.0 - 11.0 10e9/L    RBC Count 2.70 (L) 4.4 - 5.9 10e12/L    Hemoglobin 7.9 (L) 13.3 - 17.7 g/dL    Hematocrit 24.4 (L) 40.0 - 53.0 %    MCV 90 78 - 100 fl    MCH 29.3 26.5 - 33.0 pg    MCHC 32.4 31.5 - 36.5 g/dL    RDW 18.2 (H) 10.0 - 15.0 %    Platelet Count 146 (L) 150 - 450 10e9/L    Diff Method Automated Method     % Neutrophils 57.3 %    % Lymphocytes 29.9 %    % Monocytes 11.2 %    %  Eosinophils 0.8 %    % Basophils 0.3 %    % Immature Granulocytes 0.5 %    Nucleated RBCs 0 0 /100    Absolute Neutrophil 2.1 1.6 - 8.3 10e9/L    Absolute Lymphocytes 1.1 0.8 - 5.3 10e9/L    Absolute Monocytes 0.4 0.0 - 1.3 10e9/L    Absolute Eosinophils 0.0 0.0 - 0.7 10e9/L    Absolute Basophils 0.0 0.0 - 0.2 10e9/L    Abs Immature Granulocytes 0.0 0 - 0.4 10e9/L    Absolute Nucleated RBC 0.0    Comprehensive metabolic panel    Collection Time: 11/24/17  8:27 AM   Result Value Ref Range    Sodium 132 (L) 133 - 144 mmol/L    Potassium 3.7 3.4 - 5.3 mmol/L    Chloride 98 94 - 109 mmol/L    Carbon Dioxide 26 20 - 32 mmol/L    Anion Gap 8 3 - 14 mmol/L    Glucose 75 70 - 99 mg/dL    Urea Nitrogen 11 7 - 30 mg/dL    Creatinine 1.78 (H) 0.66 - 1.25 mg/dL    GFR Estimate 38 (L) >60 mL/min/1.7m2    GFR Estimate If Black 46 (L) >60 mL/min/1.7m2    Calcium 9.3 8.5 - 10.1 mg/dL    Bilirubin Total 0.2 0.2 - 1.3 mg/dL    Albumin 3.3 (L) 3.4 - 5.0 g/dL    Protein Total 6.7 (L) 6.8 - 8.8 g/dL    Alkaline Phosphatase 59 40 - 150 U/L    ALT 16 0 - 70 U/L    AST 20 0 - 45 U/L   Magnesium    Collection Time: 11/24/17  8:27 AM   Result Value Ref Range    Magnesium 1.3 (L) 1.6 - 2.3 mg/dL   ABO/Rh type and screen    Collection Time: 11/24/17  8:30 AM   Result Value Ref Range    Units Ordered 2     ABO A     RH(D) Pos     Antibody Screen Neg     Test Valid Only At          [facility]    Specimen Expires 11/27/2017     Crossmatch Red Blood Cells    Blood component    Collection Time: 11/24/17  8:30 AM   Result Value Ref Range    Unit Number [id number]     Blood Component Type Red Blood Cells Leukocyte Reduced     Division Number 00     Status of Unit IN-TRANSIT     Blood Product Code [id number]     Unit Status     Blood component    Collection Time: 11/24/17  8:30 AM   Result Value Ref Range    Unit Number [id number]     Blood Component Type Red Blood Cells Leukocyte Reduced     Division  Number 00     Status of Unit IN-TRANSIT     Blood Product Code [id number]     Unit Status                 Follow-ups after your visit        Your next 10 appointments already scheduled     Nov 26, 2017  9:30 AM CST   [facility] Lab Draw with UC [facility] LAB DRAW   [facility] Masonic Lab Draw ([facility])    [address] [place] [id number]   [phone]            Nov 26, 2017 10:30 AM CST   Infusion 120 with UC ONCOLOGY INFUSION, UC 12 ATC   [facility] ([facility])    [address] [place] [address]   [phone]            Nov 29, 2017 10:30 AM CST   [facility] Lab Draw with [facility]ON[facility] LAB DRAW   [facility] Masonic Lab Draw ([facility])    [address] [place] [id number]   [phone]            Nov 29, 2017 11:00 AM CST   Infusion 120 with UC ONCOLOGY INFUSION, UC 18 ATC   [facility] ([facility])    [address] [place] [address]   [phone]            Dec 01, 2017  8:15 AM CST   [facility] Lab Draw with [facility] MASON[facility] LAB DRAW   [facility] Mason[facility] Lab Draw ([facility])    [address] [place] [id number]   [phone]            Dec 01, 2017  8:40 AM CST   (Arrive by 8:25 AM)   Return Visit with ISAURA Roach CNP   [facility] ([facility])    [address] [place] [address]   [phone]            Dec 01, 2017 10:00 AM CST   Infusion 360 with UC ONCOLOGY INFUSION, UC 12 ATC   [facility] ([facility])    [address] [place] [address]   [phone]              Future tests that were ordered for you today     Open Standing Orders        Priority Remaining Interval Expires Ordered     "Red blood cell prepare order unit Routine 99/100 CONDITIONAL (SPECIFY) BLOOD  2017    Transfuse red blood cell unit Routine  TRANSFUSE 1 UNIT  2017          Open Future Orders        Priority Expected Expires Ordered    NPET Oncology (Eyes to Thighs) Routine 2017            Who to contact     If you have questions or need follow up information about today's clinic visit or your schedule please contact [facility] directly at [phone].  Normal or non-critical lab and imaging results will be communicated to you by [facility] [employer], letter or phone within 4 business days after the clinic has received the results. If you do not hear from us within 7 days, please contact the clinic through [employer] ([place])t or phone. If you have a critical or abnormal lab result, we will notify you by phone as soon as possible.  Submit refill requests through [employer] or call your pharmacy and they will forward the refill request to us. Please allow 3 business days for your refill to be completed.          Additional Information About Your Visit        [employer] Information     [employer] lets you send messages to your doctor, view your test results, renew your prescriptions, schedule appointments and more. To sign up, go to [email or web address] . Click on \"Log in\" on the left side of the screen, which will take you to the Welcome page. Then click on \"Sign up Now\" on the right side of the page.     You will be asked to enter the access code listed below, as well as some personal information. Please follow the directions to create your username and password.     Your access code is: [email or web address]  Expires: 2018  5:30 AM     Your access code will  in 90 days. If you need help or a new code, please call your [facility] clinic or [phone].        Care EveryWhere ID     This is your Care EveryWhere ID. This could be used by other organizations to access your [facility] medical " records  [id number]         Blood Pressure from Last 3 Encounters:   11/24/17 140/71   11/22/17 146/76   11/17/17 151/73    Weight from Last 3 Encounters:   11/24/17 71.1 kg (156 lb 11.2 oz)   11/22/17 71.1 kg (156 lb 11.2 oz)   11/17/17 68.8 kg (151 lb 11.2 oz)              We Performed the Following     ABO/Rh type and screen     Blood component     Blood component     CBC with platelets differential     Comprehensive metabolic panel     Magnesium          Where to get your medicines      These medications were sent to [place], [place] - [address]  [address], [facility] [address]    Hours:  TRANSPLANT PHONE NUMBER [phone] Phone:  [phone]     omeprazole 2 mg/mL Susp          Primary Care Provider Office Phone # Fax #    Joan Ventura, MARLENY [phone]       [facility] [address] [facility] [address]        Equal Access to Services     JENELLE RED : Hadii aad ku hadasho Soomaali, waaxda luqadaha, qaybta kaalmada adeegyada, ronald adamson hayreymundo srivastava . So [facility] [phone].    ATENCIÓN: Si habla español, tiene a washburn disposición servicios gratuitos de asistencia lingüística. Llame al [phone].    We comply with applicable federal civil rights laws and [place] laws. We do not discriminate on the basis of race, color, national origin, age, disability, sex, sexual orientation, or gender identity.            Thank you!     Thank you for choosing [facility]  for your care. Our goal is always to provide you with excellent care. Hearing back from our patients is one way we can continue to improve our services. Please take a few minutes to complete the written survey that you may receive in the mail after your visit with us. Thank you!             Your Updated Medication List - Protect others around you: Learn how to safely use, store and throw away your medicines at  www.disposemymeds.org.          This list is accurate as of: 11/24/17 12:12 PM.  Always use your most recent med list.                   Brand Name Dispense Instructions for use Diagnosis    alum & mag hydroxide-simethicone 200-200-20 MG/5ML Susp suspension    MYLANTA/MAALOX    355 mL    Take 30 mLs by mouth every 4 hours as needed for indigestion        amLODIPine 5 MG tablet    NORVASC    60 tablet    Take 2 tablets (10 mg) by mouth daily    Benign essential hypertension       dexamethasone 4 MG tablet    DECADRON    12 tablet    Take 2 tablets (8 mg) by mouth daily Take for 3 days, starting the day after cisplatin (Day 2 and Day 9).    Urethral cancer (H)       docusate sodium 100 MG capsule    COLACE    60 capsule    Take 1 capsule (100 mg) by mouth 2 times daily as needed for constipation    Slow transit constipation       fluticasone 50 MCG/ACT spray    FLONASE     Spray 1 spray into both nostrils every morning        HYDROXYZINE HCL PO      Take 5 mg by mouth At Bedtime        LORazepam 0.5 MG tablet    ATIVAN    30 tablet    Take 1 tablet (0.5 mg) by mouth every 4 hours as needed (Anxiety, Nausea/Vomiting or Sleep)    Nausea       MAGNESIUM OXIDE PO      Take 400 mg by mouth 2 times daily        nystatin 528219 UNIT/ML suspension    MYCOSTATIN    200 mL    Take 5 mLs (500,000 Units) by mouth 4 times daily Swish and spit    Thrush       OLANZapine 5 MG tablet    zyPREXA    30 tablet    Take 1 tablet (5 mg) by mouth At Bedtime    Urethral cancer (H), Nausea       omeprazole 2 mg/mL Susp    priLOSEC    280 mL    Take 10 mLs (20 mg) by mouth 2 times daily    Gastroesophageal reflux disease with esophagitis, Urethral cancer (H)       ondansetron 8 MG tablet    ZOFRAN    20 tablet    Take 1 tablet (8 mg) by mouth every 8 hours as needed for nausea    Nausea       prochlorperazine 10 MG tablet    COMPAZINE    30 tablet    Take 0.5 tablets (5 mg) by mouth every 6 hours as needed (Nausea/Vomiting)    Nausea

## 2017-11-24 NOTE — LETTER
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 He is currently undergoing treatment with Cisplatin/Gemzar split on days 1 and 8 given history of CKD. He is currently undergoing curative intent treatment, however he does have indeterminate pulmonary nodules that are being followed. He saw Dr. Arceo the end of October with a plan to continue Cis/Gemzar for 2 more cycles (6 weeks) and then restage. He returns today for consideration of cycle 4.     Interval History:  Mr. Bruno returns today with his wife. He states overall things have gone well the last few weeks. He states the 2-3x weekly IVF has been very beneficial and he is continuing to take his Zofran and Compazine scheduled, which has greatly improved his nausea. He takes the omeprazole solution scheduled for GERD and this has also been going well. He is taking his Miralax and Colace to manage constipation. No diarrhea. He did try taking magnesium pills at home but this caused a lot of stomach upset.    He continues to complain about dysphagia, and notes that he will likely follow-up with ENT in the future to further discuss this. At the time the plan is to monitor his Zenker diverticulum. Swallow study showed delayed swallow response and recommended regular textures, thin liquids, and small bites, along with staying upright after eating.     His penile pain is about the same, noting some discomfort in the morning and evening when he feels the need to urinate. He is not taking any pain medications. He has no complaints with the suprapubic catheter.     Review of Systems:  Patient denies fevers, chills, night sweats, unexplained weight changes, headaches, dizziness,  vision or hearing changes, new lumps or bumps, chest pain, shortness of breath, cough, abdominal pain, nausea, vomiting, changes to bowel or bladder, swelling of extremities, bleeding issues, or rash.    Current Outpatient Prescriptions   Medication Sig Dispense Refill     MAGNESIUM OXIDE PO Take 400 mg by mouth 2 times daily       omeprazole (PRILOSEC) 2 mg/mL SUSP Take 10 mLs (20 mg) by mouth 2 times daily 280 mL 0     nystatin (MYCOSTATIN) 535385 UNIT/ML suspension Take 5 mLs (500,000 Units) by mouth 4 times daily Swish and spit 200 mL 0     amLODIPine (NORVASC) 5 MG tablet Take 2 tablets (10 mg) by mouth daily 60 tablet 3     alum & mag hydroxide-simethicone (MYLANTA/MAALOX) 200-200-20 MG/5ML SUSP suspension Take 30 mLs by mouth every 4 hours as needed for indigestion 355 mL 1     LORazepam (ATIVAN) 0.5 MG tablet Take 1 tablet (0.5 mg) by mouth every 4 hours as needed (Anxiety, Nausea/Vomiting or Sleep) 30 tablet 3     prochlorperazine (COMPAZINE) 10 MG tablet Take 0.5 tablets (5 mg) by mouth every 6 hours as needed (Nausea/Vomiting) 30 tablet 3     ondansetron (ZOFRAN) 8 MG tablet Take 1 tablet (8 mg) by mouth every 8 hours as needed for nausea 20 tablet 3     docusate sodium (COLACE) 100 MG capsule Take 1 capsule (100 mg) by mouth 2 times daily as needed for constipation 60 capsule 0     dexamethasone (DECADRON) 4 MG tablet Take 2 tablets (8 mg) by mouth daily Take for 3 days, starting the day after cisplatin (Day 2 and Day 9). 12 tablet 3     HYDROXYZINE HCL PO Take 5 mg by mouth At Bedtime        fluticasone (FLONASE) 50 MCG/ACT spray Spray 1 spray into both nostrils every morning        OLANZapine (ZYPREXA) 5 MG tablet Take 1 tablet (5 mg) by mouth At Bedtime (Patient not taking: Reported on 11/15/2017) 30 tablet 0       Physical Examination:  /71 (BP Location: Right arm, Cuff Size: Adult Regular)  Pulse 84  Temp 98.3  F (36.8  C) (Oral)  Resp 16  Wt 71.1 kg (156 lb 11.2 oz)  SpO2 99%  BMI 19.07  kg/m2  Wt Readings from Last 10 Encounters:   11/24/17 71.1 kg (156 lb 11.2 oz)   11/22/17 71.1 kg (156 lb 11.2 oz)   11/17/17 68.8 kg (151 lb 11.2 oz)   11/15/17 69.5 kg (153 lb 4.8 oz)   11/14/17 68.5 kg (151 lb)   11/10/17 70.7 kg (155 lb 12.8 oz)   11/08/17 70.6 kg (155 lb 9.6 oz)   11/03/17 71.7 kg (158 lb)   10/27/17 68.5 kg (151 lb)   10/25/17 68 kg (150 lb)     Constitutional: Well-appearing male in no acute distress.  Eyes: EOMI, PERRL. No scleral icterus.  ENT: Oral mucosa is moist without lesions or thrush.   Lymphatic: Neck is supple without cervical or supraclavicular lymphadenopathy. No axillary lymphadenopathy.  Cardiovascular: Regular rate and rhythm. No murmurs, gallops, or rubs. No peripheral edema.  Respiratory: Clear to auscultation bilaterally. No wheezes or crackles.  Gastrointestinal: Bowel sounds present. Abdomen soft, non-tender. No palpable hepatosplenomegaly or masses. Suprapubic catheter in place in lower abdomen, clean and without erythema, drainage, or tenderness.  Neurologic: Cranial nerves II through XII are grossly intact.  Skin: No rashes, petechiae, or bruising noted on exposed skin.    Laboratory Data:  Results for KING AILYN CHAVEZ (MRN [id number]) as of 11/24/2017 13:02   11/24/2017 08:27   Sodium 132 (L)   Potassium 3.7   Chloride 98   Carbon Dioxide 26   Urea Nitrogen 11   Creatinine 1.78 (H)   GFR Estimate 38 (L)   GFR Estimate If Black 46 (L)   Calcium 9.3   Anion Gap 8   Magnesium 1.3 (L)   Albumin 3.3 (L)   Protein Total 6.7 (L)   Bilirubin Total 0.2   Alkaline Phosphatase 59   ALT 16   AST 20   Glucose 75   WBC 3.7 (L)   Hemoglobin 7.9 (L)   Hematocrit 24.4 (L)   Platelet Count 146 (L)   RBC Count 2.70 (L)   MCV 90   MCH 29.3   MCHC 32.4   RDW 18.2 (H)   Diff Method Automated Method   % Neutrophils 57.3   % Lymphocytes 29.9   % Monocytes 11.2   % Eosinophils 0.8   % Basophils 0.3   % Immature Granulocytes 0.5   Nucleated RBCs 0   Absolute Neutrophil 2.1   Absolute  Lymphocytes 1.1   Absolute Monocytes 0.4   Absolute Eosinophils 0.0   Absolute Basophils 0.0   Abs Immature Granulocytes 0.0   Absolute Nucleated RBC 0.0     Results for KING AILYN CHAVEZ (MRN [id number]) as of 11/24/2017 13:02   11/3/2017 07:43 11/8/2017 13:31 11/10/2017 08:39 11/10/2017 11:20 11/12/2017 09:51 11/14/2017 13:58 11/15/2017 12:21 11/17/2017 12:41 11/22/2017 12:44 11/24/2017 08:27   Urea Nitrogen 8 11 15  24  17 11 11 11   Creatinine 1.26 (H) 1.39 (H) 1.60 (H) 1.52 (H) 1.62 (H)  1.80 (H) 1.59 (H) 1.57 (H) 1.78 (H)   GFR Estimate 57 (L) 51 (L) 43 (L) 46 (L) 42 (L)  37 (L) 43 (L) 44 (L) 38 (L)   GFR Estimate If Black 68 61 52 (L) 55 (L) 51 (L)  45 (L) 52 (L) 53 (L) 46 (L)       Assessment and Plan:  1. Stage II penile urethral carcinoma, currently on treatment with cisplatin/gemzar  PETCT showed disease response in both the urethral cancer and indeterminate lung nodules. Cycle 1 and 2 complicated be dehydration, nausea, reflux, and weight loss. Cycle 3 now complicated by renal toxicity. Day 1 creat 1.26, day 8 1.60 with improvement to 1.52 on recheck after IVF. Was improving this week but today is back to 1.78 despite no treatment and hydrating well at home (72oz water daily) and in clinic.    Discussed with Dr. Arceo. Given he has baseline CKD and is already on split dosing, will switch to Carboplatin and continue Gemzar for the remaining two weeks.      2. Nausea, improved  -IV Aloxi, Emend, and Dex premeds today  -Dexamethasone 8mg day 2-4  -Zofran starting day 4, every 8 hours. Compazine every 6 hours as needed. Ativan at bedtime.  -IVF and prn antiemetics 3x week. Can consider cutting back on this if nausea is improved with carboplatin.       3. Constipation, stable  -Miralax BID  -Colace BID PRN     4. GERD, stable  -Continue omeprazole solution BID as this has been helpful. Refill given today  -Use Maalox and Carafate as needed if symptoms persist  -Met with ENT regarding dysphagia. No intervention  at this time, continue PPI. Swallow study recommended aspiration precautions (sitting up right after meals, small bites) but no changes to food textures or further management. Encouraged him to f/u with ENT if he is still having issues.    5. FEN  -Weight improved and stabilized. Met with Ignacia ELY and discussed high protein/calorie options. Can continue Nystatin if needed for future thrush as patient notes this greatly inhibits his eating.  -Baseline creat around ~1.2. Creat continues to elevate, today 1.78. See above discussion with Dr. Arceo. Continue 3x weekly IVF  -Sodium improved today to 132. Replace mag with 2g today per protocol. Patient does not tolerate oral mag so will need to continue checking in clinic. Hopefully this will improve with carbo vs cisplatin. K stable.     6.   -Stable penile pain, not needing any pain medications at this time. Pain was improving with treatment.  -Due for next SPC the beginning of December. No concerns with catheter today.     7. Heme  Hgb decreased today to 7.9 (last check 9.0 on 11/10) . Patient denies any bleeding issues. Will check anemia labs (retic, TSH, iron, ferritin, B12, folate) and also give 1 unit pRBC today. Consent obtained.      8. HTN, stable  Mildly elevated today but given prior issues with dehydration, should continue amlodipine 10mg daily.      9. Follow-Up  11/26: IVF   11/28: IVF  12/1: Labs, NANCY, Cycle 4 Day 8 Carbo/[place]  12/4, 12/6, 12/8: IVF  12/11 or 12/11: PETCT  12/13: Follow-up Dr. Arceo  12/14 or 12/15: Cycle 5 Day 1 Carbo/[place]    Tejas Montes De Oca PA-C    The patient was seen in conjunction with Tejas Montes De Oca PA-C who served as a scribe for today's visit. I have reviewed and edited the above note, and agree with the above findings and plan.    Ramona Greer PA-C  [facility] [address] [address] [phone]

## 2017-11-25 LAB — EPO SERPL-ACNC: 16 MU/ML (ref 4–27)

## 2017-11-26 ENCOUNTER — INFUSION THERAPY VISIT (OUTPATIENT)
Dept: ONCOLOGY | Facility: CLINIC | Age: 72
End: 2017-11-26
Attending: INTERNAL MEDICINE
Payer: MEDICARE

## 2017-11-26 VITALS
SYSTOLIC BLOOD PRESSURE: 149 MMHG | RESPIRATION RATE: 18 BRPM | WEIGHT: 159.2 LBS | BODY MASS INDEX: 19.38 KG/M2 | OXYGEN SATURATION: 99 % | HEART RATE: 69 BPM | DIASTOLIC BLOOD PRESSURE: 71 MMHG | TEMPERATURE: 97.8 F

## 2017-11-26 DIAGNOSIS — R11.0 NAUSEA: ICD-10-CM

## 2017-11-26 DIAGNOSIS — C68.0 URETHRAL CANCER (H): ICD-10-CM

## 2017-11-26 DIAGNOSIS — E87.6 HYPOKALEMIA: Primary | ICD-10-CM

## 2017-11-26 DIAGNOSIS — K21.00 GASTROESOPHAGEAL REFLUX DISEASE WITH ESOPHAGITIS: ICD-10-CM

## 2017-11-26 DIAGNOSIS — E86.0 DEHYDRATION: ICD-10-CM

## 2017-11-26 DIAGNOSIS — E87.6 HYPOKALEMIA: ICD-10-CM

## 2017-11-26 LAB
ANION GAP SERPL CALCULATED.3IONS-SCNC: 8 MMOL/L (ref 3–14)
BUN SERPL-MCNC: 18 MG/DL (ref 7–30)
CALCIUM SERPL-MCNC: 8.8 MG/DL (ref 8.5–10.1)
CHLORIDE SERPL-SCNC: 97 MMOL/L (ref 94–109)
CO2 SERPL-SCNC: 26 MMOL/L (ref 20–32)
CREAT SERPL-MCNC: 1.36 MG/DL (ref 0.66–1.25)
GFR SERPL CREATININE-BSD FRML MDRD: 52 ML/MIN/1.7M2
GLUCOSE SERPL-MCNC: 88 MG/DL (ref 70–99)
MAGNESIUM SERPL-MCNC: 1.5 MG/DL (ref 1.6–2.3)
POTASSIUM SERPL-SCNC: 3.6 MMOL/L (ref 3.4–5.3)
SODIUM SERPL-SCNC: 131 MMOL/L (ref 133–144)

## 2017-11-26 PROCEDURE — 25000128 H RX IP 250 OP 636: Mod: ZF | Performed by: PHYSICIAN ASSISTANT

## 2017-11-26 PROCEDURE — 96361 HYDRATE IV INFUSION ADD-ON: CPT

## 2017-11-26 PROCEDURE — 96360 HYDRATION IV INFUSION INIT: CPT

## 2017-11-26 PROCEDURE — 83735 ASSAY OF MAGNESIUM: CPT | Performed by: PHYSICIAN ASSISTANT

## 2017-11-26 PROCEDURE — 80048 BASIC METABOLIC PNL TOTAL CA: CPT | Performed by: PHYSICIAN ASSISTANT

## 2017-11-26 PROCEDURE — 25000128 H RX IP 250 OP 636: Mod: ZF | Performed by: INTERNAL MEDICINE

## 2017-11-26 RX ORDER — PALONOSETRON 0.05 MG/ML
0.25 INJECTION, SOLUTION INTRAVENOUS ONCE
Status: CANCELLED
Start: 2017-11-26 | End: 2017-11-26

## 2017-11-26 RX ORDER — MAGNESIUM SULFATE HEPTAHYDRATE 40 MG/ML
2 INJECTION, SOLUTION INTRAVENOUS ONCE
Status: COMPLETED | OUTPATIENT
Start: 2017-11-26 | End: 2017-11-26

## 2017-11-26 RX ORDER — PALONOSETRON 0.05 MG/ML
0.25 INJECTION, SOLUTION INTRAVENOUS ONCE
Status: DISCONTINUED | OUTPATIENT
Start: 2017-11-26 | End: 2017-11-26 | Stop reason: CLARIF

## 2017-11-26 RX ADMIN — MAGNESIUM SULFATE IN WATER 2 G: 40 INJECTION, SOLUTION INTRAVENOUS at 11:09

## 2017-11-26 RX ADMIN — SODIUM CHLORIDE 1000 ML: 9 INJECTION, SOLUTION INTRAVENOUS at 11:09

## 2017-11-26 RX ADMIN — SODIUM CHLORIDE 1000 ML: 9 INJECTION, SOLUTION INTRAVENOUS at 10:20

## 2017-11-26 ASSESSMENT — PAIN SCALES - GENERAL: PAINLEVEL: NO PAIN (0)

## 2017-11-26 NOTE — NURSING NOTE
VS taken and pt brought to oncology nurse for her to place IV as writer had no success with placing working IV. Pt tolerated and understood, vs taken and brought to infusion. Joanna Aguilar

## 2017-11-26 NOTE — MR AVS SNAPSHOT
After Visit Summary   11/26/2017    King Gonsalo Bruno    MRN: [id number]           Patient Information     Date Of Birth          1945        Visit Information        Provider Department      11/26/2017 10:30 AM  12 ATC;  ONCOLOGY INFUSION [facility]        Today's Diagnoses     Hypokalemia    -  1    Nausea        Dehydration        Urethral cancer (H)        Gastroesophageal reflux disease with esophagitis          Care Instructions    Contact Numbers  [facility]: [phone]    After Hours:  [phone]  Triage: [phone]    Please call the [facility] Triage line if you experience a temperature greater than or equal to 100.5, shaking chills, have uncontrolled nausea, vomiting and/or diarrhea, dizziness, shortness of breath, chest pain, bleeding, unexplained bruising, or if you have any other new/concerning symptoms, questions or concerns.     If it is after hours, weekends, or holidays, please call the main hospital  at  [phone] and ask to speak to the Oncology doctor on call.     If you are having any concerning symptoms or wish to speak to a provider before your next infusion visit, please call your care coordinator or triage to notify them so we can adequately serve you.     If you need a refill on a narcotic prescription or other medication, please call triage before your infusion appointment.         November 2017 Sunday Monday Tuesday Wednesday Thursday Friday Saturday                  1     2     3     [facility]    7:15 AM   (15 min.)   [facility] LAB DRAW   [facility] Lab Draw     [facility] RETURN    7:35 AM   (50 min.)   Ramona Greer PA   [facility] ONC INFUSION 360    8:30 AM   (360 min.)    ONCOLOGY INFUSION   [facility] 4       5     6     [facility] RETURN   12:45 PM   (30 min.)   Nurse,  Prostate Cancer Ctr   [facility] and [facility] for Prostate and Urologic  Cancers     [facility] BMT INFUSION 120    2:00 PM   (120 min.)   UC BMT INFUSION   [facility] Blood [facility] Marrow Transplant 7     8     [facility] LAB DRAW   12:30 PM   (15 min.)   [facility] LAB DRAW   [facility] Lab Draw     [facility] ONC INFUSION 120    1:00 PM   (120 min.)   UC ONCOLOGY INFUSION   [facility]     UMP NEW    2:45 PM   (60 min.)   Ignacia Wallace RD   [facility] 9     10     [facility] LAB DRAW    8:15 AM   (15 min.)    [facility]     [facility] RETURN    8:25 AM   (50 min.)   Ramona Greer PA   [facility]     [facility] ONC INFUSION 360   10:00 AM   (360 min.)    ONCOLOGY INFUSION   [facility] 11       12     [facility] LAB DRAW    9:00 AM   (15 min.)    [facility] LAB DRAW   [facility] Lab Draw     [facility] ONC INFUSION 120    9:30 AM   (120 min.)    ONCOLOGY INFUSION   [facility] 13     14     VIDEO SWALLOW STUDY   11:00 AM   (60 min.)   Khalida March SLP   [facility] Rehab     XR VIDEO SPEECH W ESOPHAGRAM    1:00 PM   (60 min.)   UCXR2   [facility] Xray     [facility] NEW    1:15 PM   (30 min.)   Arsenio Ortiz MD   [facility] Ear Nose and Throat 15     [facility] LAB DRAW   12:00 PM   (15 min.)    [facility] LAB DRAW   [facility] Lab Draw     [facility] ONC INFUSION 120   12:30 PM   (120 min.)    ONCOLOGY INFUSION   [facility] 16     17     [facility] LAB DRAW   12:30 PM   (15 min.)    M[facility]ON[facility] LAB DRAW   [facility] Lab Draw     [facility] ONC INFUSION 120    1:00 PM   (120 min.)    ONCOLOGY INFUSION   [facility] 18       19     20     21     22     [facility] LAB DRAW   12:30 PM   (15 min.)    M[facility]ON[facility] LAB DRAW   [facility] Lab Draw     [facility] ONC INFUSION 120    1:00 PM   (120 min.)    ONCOLOGY INFUSION   [facility] 23     24     [facility] LAB DRAW    8:15 AM   (15  min.)    MASONIC LAB DRAW   [facility] Masonic Lab Draw     UMP RETURN    8:25 AM   (50 min.)   Ramona Greer PA   [facility]     [facility] ONC INFUSION 360   10:00 AM   (360 min.)    ONCOLOGY INFUSION   [facility] 25  Happy Birthday!       26     [facility] LAB DRAW    9:30 AM   (15 min.)    M[facility]ONIC LAB DRAW   [facility] Lab Draw     [facility] ONC INFUSION 120   10:30 AM   (120 min.)    ONCOLOGY INFUSION   [facility] 27     28     29     [facility] LAB DRAW   10:30 AM   (15 min.)    [facility]     [facility] ONC INFUSION 120   11:00 AM   (120 min.)    ONCOLOGY INFUSION   [facility] 30 December 2017 Sunday Monday Tuesday Wednesday Thursday Friday Saturday 1     [facility] LAB DRAW    8:15 AM   (15 min.)    [facility]     [facility] RETURN    8:25 AM   (50 min.)   Yessica Ovalles APRN CNP   [facility]     [facility] ONC INFUSION 360   10:00 AM   (360 min.)    ONCOLOGY INFUSION   [facility] 2       3     4     5     6     7     8     9       10     11     [facility] RETURN   12:45 PM   (30 min.)   Nurse,  Prostate Cancer Ctr   [facility] Inst for Prostate and Urologic Cancers 12     13     14     15     16       17     18     19     20     21     22     23       24     25     26     27     28     29     30       31                                               Recent Results (from the past 24 hour(s))   Magnesium    Collection Time: 11/26/17 10:15 AM   Result Value Ref Range    Magnesium 1.5 (L) 1.6 - 2.3 mg/dL   Basic metabolic panel    Collection Time: 11/26/17 10:15 AM   Result Value Ref Range    Sodium 131 (L) 133 - 144 mmol/L    Potassium 3.6 3.4 - 5.3 mmol/L    Chloride 97 94 - 109 mmol/L    Carbon Dioxide 26 20 - 32 mmol/L    Anion Gap 8 3 - 14 mmol/L    Glucose 88 70  - 99 mg/dL    Urea Nitrogen 18 7 - 30 mg/dL    Creatinine 1.36 (H) 0.66 - 1.25 mg/dL    GFR Estimate 52 (L) >60 mL/min/1.7m2    GFR Estimate If Black 63 >60 mL/min/1.7m2    Calcium 8.8 8.5 - 10.1 mg/dL                 Follow-ups after your visit        Your next 10 appointments already scheduled     Nov 29, 2017 10:30 AM CST   [facility] Lab Draw with UC [facility] LAB DRAW   [facility] Lab Draw ([facility])    [address] [place] [id number]   [phone]            Nov 29, 2017 11:00 AM CST   Infusion 120 with UC ONCOLOGY INFUSION, UC 18 ATC   [facility], located within [facility] ([facility])    [address] [place] [id number]   [phone]            Dec 01, 2017  8:15 AM CST   Masonic Lab Draw with  MASONIC LAB DRAW   [facility] Lab Draw ([facility])    [address] [place] [id number]   [phone]            Dec 01, 2017  8:40 AM CST   (Arrive by 8:25 AM)   Return Visit with ISAURA Roach CNP   [facility] ([facility])    [address] [place] [address]   [phone]            Dec 01, 2017 10:00 AM CST   Infusion 360 with UC ONCOLOGY INFUSION, UC 12 ATC   [facility] ([facility])    [address] [place] [address]   [phone]            Dec 11, 2017  1:00 PM CST   (Arrive by 12:45 PM)   Return Visit with  Prostate Cancer Ctr Nurse   [facility]rology and Inst for Prostate and Urologic Cancers ([facility])    [address] [place] [id number] [phone]              Who to contact     If you have questions or need follow up information about today's clinic visit or your schedule please contact [facility] directly at  "[phone].  Normal or non-critical lab and imaging results will be communicated to you by MyChart, letter or phone within 4 business days after the clinic has received the results. If you do not hear from us within 7 days, please contact the clinic through [employer] or phone. If you have a critical or abnormal lab result, we will notify you by phone as soon as possible.  Submit refill requests through [employer] or call your pharmacy and they will forward the refill request to us. Please allow 3 business days for your refill to be completed.          Additional Information About Your Visit        [employer] Information     [employer] lets you send messages to your doctor, view your test results, renew your prescriptions, schedule appointments and more. To sign up, go to [email or web address] . Click on \"Log in\" on the left side of the screen, which will take you to the Welcome page. Then click on \"Sign up Now\" on the right side of the page.     You will be asked to enter the access code listed below, as well as some personal information. Please follow the directions to create your username and password.     Your access code is: [email or web address]  Expires: 2018  5:30 AM     Your access code will  in 90 days. If you need help or a new code, please call your [place] clinic or [phone].        Care EveryWhere ID     This is your Care EveryWhere ID. This could be used by other organizations to access your [place] medical records  [id number]         Blood Pressure from Last 3 Encounters:   17 149/71   17 142/70   17 140/71    Weight from Last 3 Encounters:   17 72.2 kg (159 lb 3.2 oz)   17 71.1 kg (156 lb 11.2 oz)   17 71.1 kg (156 lb 11.2 oz)              Today, you had the following     No orders found for display       Primary Care Provider Office Phone # Fax #    Joan Janet Ventura [phone]       [address] [address]      "   Equal Access to Services     [facility] : Hadii aad ku hadnikkijanine Robert, wacarlosda lucorinnairisha, qagordonstu christiansonnazronald montoya. So [facility] [phone].    ATENCIÓN: Si habla español, tiene a washburn disposición servicios gratuitos de asistencia lingüística. Leonidame al [phone].    We comply with applicable federal civil rights laws and [place] laws. We do not discriminate on the basis of race, color, national origin, age, disability, sex, sexual orientation, or gender identity.            Thank you!     Thank you for choosing [facility]  for your care. Our goal is always to provide you with excellent care. Hearing back from our patients is one way we can continue to improve our services. Please take a few minutes to complete the written survey that you may receive in the mail after your visit with us. Thank you!             Your Updated Medication List - Protect others around you: Learn how to safely use, store and throw away your medicines at www.disposemymeds.org.          This list is accurate as of: 11/26/17 11:43 AM.  Always use your most recent med list.                   Brand Name Dispense Instructions for use Diagnosis    alum & mag hydroxide-simethicone 200-200-20 MG/5ML Susp suspension    MYLANTA/MAALOX    355 mL    Take 30 mLs by mouth every 4 hours as needed for indigestion        amLODIPine 5 MG tablet    NORVASC    60 tablet    Take 2 tablets (10 mg) by mouth daily    Benign essential hypertension       dexamethasone 4 MG tablet    DECADRON    12 tablet    Take 2 tablets (8 mg) by mouth daily Take for 3 days, starting the day after cisplatin (Day 2 and Day 9).    Urethral cancer (H)       docusate sodium 100 MG capsule    COLACE    60 capsule    Take 1 capsule (100 mg) by mouth 2 times daily as needed for constipation    Slow transit constipation       fluticasone 50 MCG/ACT spray    FLONASE     Spray 1 spray into both nostrils every morning         HYDROXYZINE HCL PO      Take 5 mg by mouth At Bedtime        LORazepam 0.5 MG tablet    ATIVAN    30 tablet    Take 1 tablet (0.5 mg) by mouth every 4 hours as needed (Anxiety, Nausea/Vomiting or Sleep)    Nausea       MAGNESIUM OXIDE PO      Take 400 mg by mouth 2 times daily        nystatin 571827 UNIT/ML suspension    MYCOSTATIN    200 mL    Take 5 mLs (500,000 Units) by mouth 4 times daily Swish and spit    Thrush       OLANZapine 5 MG tablet    zyPREXA    30 tablet    Take 1 tablet (5 mg) by mouth At Bedtime    Urethral cancer (H), Nausea       omeprazole 2 mg/mL Susp    priLOSEC    280 mL    Take 10 mLs (20 mg) by mouth 2 times daily    Gastroesophageal reflux disease with esophagitis, Urethral cancer (H)       ondansetron 8 MG tablet    ZOFRAN    20 tablet    Take 1 tablet (8 mg) by mouth every 8 hours as needed for nausea    Nausea       prochlorperazine 10 MG tablet    COMPAZINE    30 tablet    Take 0.5 tablets (5 mg) by mouth every 6 hours as needed (Nausea/Vomiting)    Nausea

## 2017-11-26 NOTE — PATIENT INSTRUCTIONS
Contact Numbers  [facility]: [phone]    After Hours:  [phone]  Triage: [phone]    Please call the [facility] Triage line if you experience a temperature greater than or equal to 100.5, shaking chills, have uncontrolled nausea, vomiting and/or diarrhea, dizziness, shortness of breath, chest pain, bleeding, unexplained bruising, or if you have any other new/concerning symptoms, questions or concerns.     If it is after hours, weekends, or holidays, please call the main hospital  at  [phone] and ask to speak to the Oncology doctor on call.     If you are having any concerning symptoms or wish to speak to a provider before your next infusion visit, please call your care coordinator or triage to notify them so we can adequately serve you.     If you need a refill on a narcotic prescription or other medication, please call triage before your infusion appointment.         November 2017 Sunday Monday Tuesday Wednesday Thursday Friday Saturday                  1     2     3     [facility] LAB DRAW    7:15 AM   (15 min.)   [facility]   [facility] Lab Draw     [facility] RETURN    7:35 AM   (50 min.)   Ramona Greer PA   [facility] ONC INFUSION 360    8:30 AM   (360 min.)    ONCOLOGY INFUSION   [facility] 4       5     6     [facility] RETURN   12:45 PM   (30 min.)   Nurse,  Prostate Cancer Ctr   [facility] Prostate and Urologic Cancers     [facility] BMT INFUSION 120    2:00 PM   (120 min.)    BMT INFUSION   [facility] Blood and Marrow Transplant 7     8     [facility] LAB DRAW   12:30 PM   (15 min.)   [facility]   [facility] Lab Draw     [facility] ONC INFUSION 120    1:00 PM   (120 min.)    ONCOLOGY INFUSION   [facility]     [facility] NEW    2:45 PM   (60 min.)   Ignacia Wallace RD   [facility] 9     10     [facility] LAB DRAW    8:15 AM   (15 min.)     [facility] LAB DRAW   [facility] Lab Draw     [facility] RETURN    8:25 AM   (50 min.)   Ramona Greer PA   [facility]     [facility] ONC INFUSION 360   10:00 AM   (360 min.)    ONCOLOGY INFUSION   [facility] 11       12     [facility] DRAW    9:00 AM   (15 min.)    [facility]   [facility]     [facility] ONC INFUSION 120    9:30 AM   (120 min.)    ONCOLOGY INFUSION   [facility] 13     14     VIDEO SWALLOW STUDY   11:00 AM   (60 min.)   Khalida March SLP   [facility]     XR VIDEO SPEECH W ESOPHAGRAM    1:00 PM   (60 min.)   UCXR2   [facility] Xray     [facility] NEW    1:15 PM   (30 min.)   Arsenio Ortiz MD   [facility] Ear Nose and Throat 15     [facility] DRAW   12:00 PM   (15 min.)    [facility] LAB DRAW   [facility] Lab Draw     [facility] ONC INFUSION 120   12:30 PM   (120 min.)    ONCOLOGY INFUSION   [facility] 16     17     [facility] DRAW   12:30 PM   (15 min.)    [facility] LAB DRAW   [facility] Lab Draw     [facility] ONC INFUSION 120    1:00 PM   (120 min.)    ONCOLOGY INFUSION   [facility] 18       19     20     21     22     [facility] DRAW   12:30 PM   (15 min.)    [facility] LAB DRAW   [facility] Lab Draw     [facility] ONC INFUSION 120    1:00 PM   (120 min.)    ONCOLOGY INFUSION   [facility] 23     24     [facility] DRAW    8:15 AM   (15 min.)    [facility] LAB DRAW   [facility] Lab Draw     [facility] RETURN    8:25 AM   (50 min.)   Ramona Greer PA   [facility]     [facility] ONC INFUSION 360   10:00 AM   (360 min.)    ONCOLOGY INFUSION   [facility] 25  Happy Birthday!       26     [facility] DRAW    9:30 AM   (15 min.)    [facility] LAB DRAW   [facility] Lab Draw     [facility] ONC INFUSION 120   10:30 AM   (120 min.)    ONCOLOGY INFUSION   [facility]  27     28     29     [facility] DRAW   10:30 AM   (15 min.)    [facility]IC LAB DRAW   [facility] Lab Draw     [facility] ONC INFUSION 120   11:00 AM   (120 min.)    ONCOLOGY INFUSION   [facility] 30 December 2017 Sunday Monday Tuesday Wednesday Thursday Friday Saturday 1     [facility] DRAW    8:15 AM   (15 min.)    [facility] LAB DRAW   [facility] Lab Draw     [facility] RETURN    8:25 AM   (50 min.)   Yessica Ovalles APRN CNP   [facility]     [facility] ONC INFUSION 360   10:00 AM   (360 min.)    ONCOLOGY INFUSION   [facility] 2       3     4     5     6     7     8     9       10     11     [facility] RETURN   12:45 PM   (30 min.)   Nurse,  Prostate Cancer Ctr   [facility] for Prostate and Urologic Cancers 12     13     14     15     16       17     18     19     20     21     22     23       24     25     26     27     28     29     30       31                                               Recent Results (from the past 24 hour(s))   Magnesium    Collection Time: 11/26/17 10:15 AM   Result Value Ref Range    Magnesium 1.5 (L) 1.6 - 2.3 mg/dL   Basic metabolic panel    Collection Time: 11/26/17 10:15 AM   Result Value Ref Range    Sodium 131 (L) 133 - 144 mmol/L    Potassium 3.6 3.4 - 5.3 mmol/L    Chloride 97 94 - 109 mmol/L    Carbon Dioxide 26 20 - 32 mmol/L    Anion Gap 8 3 - 14 mmol/L    Glucose 88 70 - 99 mg/dL    Urea Nitrogen 18 7 - 30 mg/dL    Creatinine 1.36 (H) 0.66 - 1.25 mg/dL    GFR Estimate 52 (L) >60 mL/min/1.7m2    GFR Estimate If Black 63 >60 mL/min/1.7m2    Calcium 8.8 8.5 - 10.1 mg/dL

## 2017-11-26 NOTE — PROGRESS NOTES
Infusion Nursing Note:  King Gonsalo Bruno presents today for 2 Liters NS-Magnesium IV.    Patient seen by provider today: No    Treatment Conditions:  Lab Results   Component Value Date     11/26/2017                   Lab Results   Component Value Date    POTASSIUM 3.6 11/26/2017           Lab Results   Component Value Date    MAG 1.5 11/26/2017            Lab Results   Component Value Date    CR 1.36 11/26/2017                   Lab Results   Component Value Date    SAADIA 8.8 11/26/2017                      Intravenous Access:  Peripheral IV placed.  Access dc'd at time of discharge.      Note:   Results reviewed, copy given to patient.  Proceed with treatment.    Copy of AVS given to patient. + Blood return from PIV pre and post infusion.  Tolerated infusion without incident. No Prescriptions filled today.   D/C in care of self.  Pt will return 11/29 for next appointment.       Jeannette Strickland RN

## 2017-11-29 ENCOUNTER — INFUSION THERAPY VISIT (OUTPATIENT)
Dept: ONCOLOGY | Facility: CLINIC | Age: 72
End: 2017-11-29
Attending: INTERNAL MEDICINE
Payer: MEDICARE

## 2017-11-29 VITALS
RESPIRATION RATE: 18 BRPM | BODY MASS INDEX: 19.21 KG/M2 | OXYGEN SATURATION: 99 % | DIASTOLIC BLOOD PRESSURE: 75 MMHG | HEART RATE: 82 BPM | TEMPERATURE: 98.2 F | SYSTOLIC BLOOD PRESSURE: 151 MMHG | WEIGHT: 157.8 LBS

## 2017-11-29 DIAGNOSIS — E86.0 DEHYDRATION: ICD-10-CM

## 2017-11-29 DIAGNOSIS — B37.0 THRUSH: ICD-10-CM

## 2017-11-29 DIAGNOSIS — R11.0 NAUSEA: ICD-10-CM

## 2017-11-29 DIAGNOSIS — E87.6 HYPOKALEMIA: ICD-10-CM

## 2017-11-29 DIAGNOSIS — C68.0 URETHRAL CANCER (H): ICD-10-CM

## 2017-11-29 DIAGNOSIS — K21.00 GASTROESOPHAGEAL REFLUX DISEASE WITH ESOPHAGITIS: Primary | ICD-10-CM

## 2017-11-29 LAB
ANION GAP SERPL CALCULATED.3IONS-SCNC: 8 MMOL/L (ref 3–14)
BUN SERPL-MCNC: 15 MG/DL (ref 7–30)
CALCIUM SERPL-MCNC: 8.8 MG/DL (ref 8.5–10.1)
CHLORIDE SERPL-SCNC: 97 MMOL/L (ref 94–109)
CO2 SERPL-SCNC: 24 MMOL/L (ref 20–32)
CREAT SERPL-MCNC: 1.43 MG/DL (ref 0.66–1.25)
GFR SERPL CREATININE-BSD FRML MDRD: 49 ML/MIN/1.7M2
GLUCOSE SERPL-MCNC: 78 MG/DL (ref 70–99)
MAGNESIUM SERPL-MCNC: 1.4 MG/DL (ref 1.6–2.3)
POTASSIUM SERPL-SCNC: 3.8 MMOL/L (ref 3.4–5.3)
SODIUM SERPL-SCNC: 130 MMOL/L (ref 133–144)

## 2017-11-29 PROCEDURE — 25000128 H RX IP 250 OP 636: Mod: ZF | Performed by: PHYSICIAN ASSISTANT

## 2017-11-29 PROCEDURE — 96375 TX/PRO/DX INJ NEW DRUG ADDON: CPT

## 2017-11-29 PROCEDURE — 80048 BASIC METABOLIC PNL TOTAL CA: CPT | Performed by: PHYSICIAN ASSISTANT

## 2017-11-29 PROCEDURE — 83735 ASSAY OF MAGNESIUM: CPT | Performed by: PHYSICIAN ASSISTANT

## 2017-11-29 PROCEDURE — 96361 HYDRATE IV INFUSION ADD-ON: CPT

## 2017-11-29 PROCEDURE — 40000559 ZZH STATISTIC FAILED PERIPHERAL IV START: Mod: ZF

## 2017-11-29 PROCEDURE — 96365 THER/PROPH/DIAG IV INF INIT: CPT

## 2017-11-29 PROCEDURE — 25000128 H RX IP 250 OP 636: Mod: ZF | Performed by: INTERNAL MEDICINE

## 2017-11-29 RX ORDER — EPINEPHRINE 0.3 MG/.3ML
INJECTION SUBCUTANEOUS
Status: DISCONTINUED
Start: 2017-11-29 | End: 2017-11-29 | Stop reason: WASHOUT

## 2017-11-29 RX ORDER — PALONOSETRON 0.05 MG/ML
0.25 INJECTION, SOLUTION INTRAVENOUS ONCE
Status: CANCELLED
Start: 2017-11-29 | End: 2017-11-29

## 2017-11-29 RX ORDER — NYSTATIN 100000/ML
500000 SUSPENSION, ORAL (FINAL DOSE FORM) ORAL 4 TIMES DAILY
Qty: 200 ML | Refills: 0 | Status: SHIPPED | OUTPATIENT
Start: 2017-11-29 | End: 2018-08-03

## 2017-11-29 RX ORDER — PALONOSETRON 0.05 MG/ML
0.25 INJECTION, SOLUTION INTRAVENOUS ONCE
Status: COMPLETED | OUTPATIENT
Start: 2017-11-29 | End: 2017-11-29

## 2017-11-29 RX ORDER — MAGNESIUM SULFATE HEPTAHYDRATE 40 MG/ML
2 INJECTION, SOLUTION INTRAVENOUS ONCE
Status: COMPLETED | OUTPATIENT
Start: 2017-11-29 | End: 2017-11-29

## 2017-11-29 RX ADMIN — PALONOSETRON HYDROCHLORIDE 0.25 MG: 0.25 INJECTION INTRAVENOUS at 12:01

## 2017-11-29 RX ADMIN — SODIUM CHLORIDE 150 MG: 9 INJECTION, SOLUTION INTRAVENOUS at 12:02

## 2017-11-29 RX ADMIN — SODIUM CHLORIDE 2000 ML: 9 INJECTION, SOLUTION INTRAVENOUS at 11:51

## 2017-11-29 RX ADMIN — MAGNESIUM SULFATE IN WATER 2 G: 40 INJECTION, SOLUTION INTRAVENOUS at 12:47

## 2017-11-29 ASSESSMENT — PAIN SCALES - GENERAL: PAINLEVEL: MODERATE PAIN (4)

## 2017-11-29 NOTE — NURSING NOTE
Chief Complaint   Patient presents with     Blood Draw     Labs only     Vitals done, labs drawn by IV placed by vascular access RN Heike Reid.  See doc flow sheets for details.  Lupe Delgado CMA

## 2017-11-29 NOTE — PATIENT INSTRUCTIONS
Contact Numbers  [facility]: [phone]    After Hours:  [phone]  Triage: [phone]    Please call the [facility] Triage line if you experience a temperature greater than or equal to 100.5, shaking chills, have uncontrolled nausea, vomiting and/or diarrhea, dizziness, shortness of breath, chest pain, bleeding, unexplained bruising, or if you have any other new/concerning symptoms, questions or concerns.     If it is after hours, weekends, or holidays, please call the main hospital  at  [phone] and ask to speak to the Oncology doctor on call.     If you are having any concerning symptoms or wish to speak to a provider before your next infusion visit, please call your care coordinator or triage to notify them so we can adequately serve you.     If you need a refill on a narcotic prescription or other medication, please call triage before your infusion appointment.         November 2017 Sunday Monday Tuesday Wednesday Thursday Friday Saturday                  1     2     3     [facility] LAB DRAW    7:15 AM   (15 min.)   [facility] DRAW   [facility] Lab Draw     [facility] RETURN    7:35 AM   (50 min.)   Ramona Greer PA   [facility] ONC INFUSION 360    8:30 AM   (360 min.)    ONCOLOGY INFUSION   [facility] 4       5     6     [facility] RETURN   12:45 PM   (30 min.)   Nurse,  Prostate Cancer Ctr   [facility] for Prostate and Urologic Cancers     [facility] BMT INFUSION 120    2:00 PM   (120 min.)    BMT INFUSION   [facility] Blood and Marrow Transplant 7     8     [facility] LAB DRAW   12:30 PM   (15 min.)   [facility] DRAW   [facility] Lab Draw     [facility] ONC INFUSION 120    1:00 PM   (120 min.)    ONCOLOGY INFUSION   [facility]     UMP NEW    2:45 PM   (60 min.)   Ignacia Wallace RD   [facility] 9     10     [facility] LAB DRAW    8:15 AM   (15 min.)     MASONIC LAB DRAW   [facility] Masonic Lab Draw     UMP RETURN    8:25 AM   (50 min.)   Ramona Greer PA   [facility]     [facility] ONC INFUSION 360   10:00 AM   (360 min.)    ONCOLOGY INFUSION   [facility] 11       12     [facility] DRAW    9:00 AM   (15 min.)    [facility]     [facility] ONC INFUSION 120    9:30 AM   (120 min.)    ONCOLOGY INFUSION   [facility] 13     14     VIDEO SWALLOW STUDY   11:00 AM   (60 min.)   Khalida March SLP   [facility]     XR VIDEO SPEECH W ESOPHAGRAM    1:00 PM   (60 min.)   UCXR2   [facility] Xray     [facility] NEW    1:15 PM   (30 min.)   Arsenio Ortiz MD   [facility] Ear Nose and Throat 15     [facility] DRAW   12:00 PM   (15 min.)    [facility] LAB DRAW   [facility] Lab Draw     [facility] ONC INFUSION 120   12:30 PM   (120 min.)    ONCOLOGY INFUSION   [facility] 16     17     [facility] DRAW   12:30 PM   (15 min.)    [facility] LAB DRAW   [facility] Lab Draw     [facility] ONC INFUSION 120    1:00 PM   (120 min.)    ONCOLOGY INFUSION   [facility] 18       19     20     21     22     [facility] DRAW   12:30 PM   (15 min.)    [facility] LAB DRAW   [facility] Lab Draw     [facility] ONC INFUSION 120    1:00 PM   (120 min.)    ONCOLOGY INFUSION   [facility] 23     24     [facility] DRAW    8:15 AM   (15 min.)    [facility] LAB DRAW   [facility] Lab Draw     [facility] RETURN    8:25 AM   (50 min.)   Ramona Greer PA   [facility]     [facility] ONC INFUSION 360   10:00 AM   (360 min.)    ONCOLOGY INFUSION   [facility] 25  Happy Birthday!       26     [facility] DRAW    9:30 AM   (15 min.)    MASONIC LAB DRAW   [facility] Lab Draw     [facility] ONC INFUSION 120   10:30 AM   (120 min.)    ONCOLOGY INFUSION   [facility]  27     28     29     [facility] DRAW   10:30 AM   (15 min.)    [facility] LAB DRAW   [facility] Lab Draw     [facility] ONC INFUSION 120   11:00 AM   (120 min.)   UC ONCOLOGY INFUSION   [facility] 30 December 2017 Sunday Monday Tuesday Wednesday Thursday Friday Saturday                            1     [facility] LAB DRAW    8:15 AM   (15 min.)    M[facility]ONIC LAB DRAW   [facility] Lab Draw     [facility] RETURN    8:25 AM   (50 min.)   Yessica Ovalles APRN CNP   [facility]     [facility] ONC INFUSION 360   10:00 AM   (360 min.)   UC ONCOLOGY INFUSION   [facility] 2       3     4     [facility] ONC INFUSION 120    1:00 PM   (120 min.)    ONCOLOGY INFUSION   [facility] 5     6     [facility] ONC INFUSION 120   10:30 AM   (120 min.)    ONCOLOGY INFUSION   [facility] 7     8     [facility] ONC INFUSION 120    9:30 AM   (120 min.)    ONCOLOGY INFUSION   [facility] 9       10     11     [facility] RETURN   12:45 PM   (30 min.)   Nurse,  Prostate Cancer Ctr   [facility] for Prostate and Urologic Cancers 12     PE EYE/ ONCOLOGY    8:15 AM   (45 min.)   UUPET1   [facility] [place] [facility] PET CT 13     UMP RETURN    8:30 AM   (30 min.)   Steve Arceo MD   [facility] 14     [facility] ONC INFUSION 360   11:30 AM   (360 min.)    ONCOLOGY INFUSION   [facility] 15     16       17     18     19     20     21     22     23       24     25     26     27     28     29     30       31                                               Recent Results (from the past 24 hour(s))   Basic metabolic panel    Collection Time: 11/29/17 10:58 AM   Result Value Ref Range    Sodium 130 (L) 133 - 144 mmol/L    Potassium 3.8 3.4 - 5.3 mmol/L    Chloride 97 94 - 109 mmol/L    Carbon Dioxide 24 20 - 32 mmol/L    Anion Gap 8 3 - 14 mmol/L    Glucose 78 70 - 99 mg/dL    Urea Nitrogen 15 7  - 30 mg/dL    Creatinine 1.43 (H) 0.66 - 1.25 mg/dL    GFR Estimate 49 (L) >60 mL/min/1.7m2    GFR Estimate If Black 59 (L) >60 mL/min/1.7m2    Calcium 8.8 8.5 - 10.1 mg/dL   Magnesium    Collection Time: 11/29/17 10:58 AM   Result Value Ref Range    Magnesium 1.4 (L) 1.6 - 2.3 mg/dL

## 2017-11-29 NOTE — PROGRESS NOTES
Infusion Nursing Note:  King Gonsalo Bruno presents today for 2 L NS-Antiemetics-Magnesium Replacement.    Patient seen by provider today: No    Treatment Conditions:  Lab Results   Component Value Date     11/29/2017                   Lab Results   Component Value Date    POTASSIUM 3.8 11/29/2017           Lab Results   Component Value Date    MAG 1.4 11/29/2017            Lab Results   Component Value Date    CR 1.43 11/29/2017                   Lab Results   Component Value Date    SAADIA 8.8 11/29/2017                  Intravenous Access:  Peripheral IV placed.  Access dc'd at time of discharge.      Note:   Results reviewed, copy given to patient.  Proceed with treatment.    Copy of AVS given to patient. + Blood return from PIV pre and post infusion.  Tolerated infusion without incident. Nystatin Prescription filled today.   D/C in care of spouse.  Pt will return 12/1 for next appointment.   SARITHA Carrillo aware of sodium result today and no further intervention at this time.    Jeannette Strickland RN

## 2017-11-29 NOTE — MR AVS SNAPSHOT
After Visit Summary   11/29/2017    King Gonsalo Bruno    MRN: [id number]           Patient Information     Date Of Birth          1945        Visit Information        Provider Department      11/29/2017 11:00 AM  18 ATC;  ONCOLOGY INFUSION [facility]        Today's Diagnoses     Gastroesophageal reflux disease with esophagitis    -  1    Hypokalemia        Nausea        Dehydration        Urethral cancer (H)        Thrush          Care Instructions    Contact Numbers  [facility]: [phone]    After Hours:  [phone]  Triage: [phone]    Please call the [facility] Triage line if you experience a temperature greater than or equal to 100.5, shaking chills, have uncontrolled nausea, vomiting and/or diarrhea, dizziness, shortness of breath, chest pain, bleeding, unexplained bruising, or if you have any other new/concerning symptoms, questions or concerns.     If it is after hours, weekends, or holidays, please call the main hospital  at  [phone] and ask to speak to the Oncology doctor on call.     If you are having any concerning symptoms or wish to speak to a provider before your next infusion visit, please call your care coordinator or triage to notify them so we can adequately serve you.     If you need a refill on a narcotic prescription or other medication, please call triage before your infusion appointment.         November 2017 Sunday Monday Tuesday Wednesday Thursday Friday Saturday                  1     2     3     [facility]    7[facility]15 AM   (15 min.)   [facility] LAB DRAW   [facility] Lab Draw     [facility] RETURN    7:35 AM   (50 min.)   Ramona Greer PA   [facility] ONC INFUSION 360    8:30 AM   (360 min.)    ONCOLOGY INFUSION   [facility] 4       5     6     [facility] RETURN   12:45 PM   (30 min.)   Nurse,  [facility]   [facility] Inst for Prostate and  Urologic Cancers     [facility] BMT INFUSION 120    2:00 PM   (120 min.)    BMT INFUSION   [facility] Blood and Marrow Transplant 7     8     [facility] DRAW   12:30 PM   (15 min.)   [facility]   [facility] Lab Draw     [facility] ONC INFUSION 120    1:00 PM   (120 min.)   [facility] ONCOLOGY INFUSION   [facility]     [facility] NEW    2:45 PM   (60 min.)   Ignacia Wallace RD   [facility] 9     10     [facility] LAB DRAW    8:15 AM   (15 min.)   [facility] DRAW   [facility] Lab Draw     [facility] RETURN    8:25 AM   (50 min.)   Ramona Greer PA   [facility]     [facility] ONC INFUSION 360   10:00 AM   (360 min.)    ONCOLOGY INFUSION   [facility] 11       12     [facility] LAB DRAW    9:00 AM   (15 min.)    [facility] LAB DRAW   [facility] Lab Draw     [facility] ONC INFUSION 120    9:30 AM   (120 min.)    ONCOLOGY INFUSION   [facility] 13     14     VIDEO SWALLOW STUDY   11:00 AM   (60 min.)   Khalida March SLP   [facility] Rehab     XR VIDEO SPEECH W ESOPHAGRAM    1:00 PM   (60 min.)   UCXR2   [facility] Xray     [facility] NEW    1:15 PM   (30 min.)   Arsenio Ortiz MD   [facility] Ear Nose and Throat 15     [facility] LAB DRAW   12:00 PM   (15 min.)    [facility] LAB DRAW   [facility] Lab Draw     [facility] ONC INFUSION 120   12:30 PM   (120 min.)    ONCOLOGY INFUSION   [facility] 16     17     [facility] LAB DRAW   12:30 PM   (15 min.)    [facility] LAB DRAW   [facility] Lab Draw     [facility] ONC INFUSION 120    1:00 PM   (120 min.)    ONCOLOGY INFUSION   [facility] 18       19     20     21     22     [facility] LAB DRAW   12:30 PM   (15 min.)    [facility] LAB DRAW   [facility] Lab Draw     [facility] ONC INFUSION 120    1:00 PM   (120 min.)    ONCOLOGY INFUSION   [facility] 23     24     [facility] LAB DRAW    8:15 AM    (15 min.)    [facility] LAB DRAW   [facility] Lab Draw     [facility] RETURN    8:25 AM   (50 min.)   Ramona Greer PA   [facility]     [facility] ONC INFUSION 360   10:00 AM   (360 min.)    ONCOLOGY INFUSION   [facility] 25  Happy Birthday!       26     [facility] LAB DRAW    9:30 AM   (15 min.)    [facility] LAB DRAW   [facility] Lab Draw     [facility] ONC INFUSION 120   10:30 AM   (120 min.)    ONCOLOGY INFUSION   [facility] 27     28     29     [facility] LAB DRAW   10:30 AM   (15 min.)    [facility] LAB DRAW   [facility] Lab Draw     [facility] ONC INFUSION 120   11:00 AM   (120 min.)    ONCOLOGY INFUSION   [facility] 30 December 2017 Sunday Monday Tuesday Wednesday Thursday Friday Saturday                            1     [facility] LAB DRAW    8:15 AM   (15 min.)    [facility] LAB DRAW   [facility] Lab Draw     [facility] RETURN    8:25 AM   (50 min.)   Yessica Ovalles APRN CNP   [facility]     [facility] ONC INFUSION 360   10:00 AM   (360 min.)    ONCOLOGY INFUSION   [facility] 2       3     4     [facility] ONC INFUSION 120    1:00 PM   (120 min.)    ONCOLOGY INFUSION   [facility] 5     6     [facility] ONC INFUSION 120   10:30 AM   (120 min.)    ONCOLOGY INFUSION   [facility] 7     8     [facility] ONC INFUSION 120    9:30 AM   (120 min.)    ONCOLOGY INFUSION   [facility] 9       10     11     [facility] RETURN   12:45 PM   (30 min.)   Nurse,  Pro[facility] Cancer Ctr   [facility]rology [facility] Inst for Prostate and Urologic Cancers 12     PE EYE/TH ONCOLOGY    8:15 AM   (45 min.)   UUPET1   [facility] [place] PET CT 13     UMP RETURN    8:30 AM   (30 min.)   Steve Arceo MD   [facility] 14     [facility] ONC INFUSION 360   11:30 AM   (360 min.)    ONCOLOGY INFUSION   [facility] 15     16        17     18     19     20     21     22     23       24     25     26     27     28     29     30       31                                               Recent Results (from the past 24 hour(s))   Basic metabolic panel    Collection Time: 11/29/17 10:58 AM   Result Value Ref Range    Sodium 130 (L) 133 - 144 mmol/L    Potassium 3.8 3.4 - 5.3 mmol/L    Chloride 97 94 - 109 mmol/L    Carbon Dioxide 24 20 - 32 mmol/L    Anion Gap 8 3 - 14 mmol/L    Glucose 78 70 - 99 mg/dL    Urea Nitrogen 15 7 - 30 mg/dL    Creatinine 1.43 (H) 0.66 - 1.25 mg/dL    GFR Estimate 49 (L) >60 mL/min/1.7m2    GFR Estimate If Black 59 (L) >60 mL/min/1.7m2    Calcium 8.8 8.5 - 10.1 mg/dL   Magnesium    Collection Time: 11/29/17 10:58 AM   Result Value Ref Range    Magnesium 1.4 (L) 1.6 - 2.3 mg/dL                 Follow-ups after your visit        Your next 10 appointments already scheduled     Dec 01, 2017  8:15 AM CST   [facility] Lab Draw with  [facility] LAB DRAW   [facility] Draw ([facility])    [address] [place] [id number]   [phone]            Dec 01, 2017  8:40 AM CST   (Arrive by 8:25 AM)   Return Visit with ISAURA Roach CNP   [facility])    [address] [place] [id number]   [phone]            Dec 01, 2017 10:00 AM CST   Infusion 360 with UC ONCOLOGY INFUSION, UC 12 ATC   [facility] ([facility])    [address] [facility]  [address] [facility] [address]   [phone]            Dec 04, 2017  1:00 PM CST   Infusion 120 with UC ONCOLOGY INFUSION, UC 18 ATC   [facility])    [address] [place] [id number]   [phone]            Dec 06, 2017 10:30 AM CST   Infusion 120 with UC ONCOLOGY INFUSION, UC 30 ATC   [facility] (  "[facility])    [address] [place] [address]  2nd [facility] [id number] [phone]            Dec 08, 2017  9:30 AM CST   Infusion 120 with UC ONCOLOGY INFUSION, UC 22 ATC   [facility] ([facility])    [address] [place] [id number] [phone]              Who to contact     If you have questions or need follow up information about today's clinic visit or your schedule please contact [facility] CANCER [facility] directly at [phone].  Normal or non-critical lab and imaging results will be communicated to you by [employer], letter or phone within 4 business days after the clinic has received the results. If you do not hear from us within 7 days, please contact the clinic through [employer] or phone. If you have a critical or abnormal lab result, we will notify you by phone as soon as possible.  Submit refill requests through [employer] or call your pharmacy and they will forward the refill request to us. Please allow 3 business days for your refill to be completed.          Additional Information About Your Visit        MyChart Information     [employer] lets you send messages to your doctor, view your test results, renew your prescriptions, schedule appointments and more. To sign up, go to [email or web address] . Click on \"Log in\" on the left side of the screen, which will take you to the Welcome page. Then click on \"Sign up Now\" on the right side of the page.     You will be asked to enter the access code listed below, as well as some personal information. Please follow the directions to create your username and password.     Your access code is: [email or web address]  Expires: 2018  5:30 AM     Your access code will  in 90 days. If you need help or a new code, please call your Mobile clinic or [phone].        Care EveryWhere ID     This is your Care EveryWhere ID. This could be used by other organizations " to access your [place] medical records  [id number]        Your Vitals Were     Pulse Temperature Respirations Pulse Oximetry BMI (Body Mass Index)       82 98.2  F (36.8  C) (Oral) 18 99% 19.21 kg/m2        Blood Pressure from Last 3 Encounters:   11/29/17 151/75   11/26/17 149/71   11/24/17 142/70    Weight from Last 3 Encounters:   11/29/17 71.6 kg (157 lb 12.8 oz)   11/26/17 72.2 kg (159 lb 3.2 oz)   11/24/17 71.1 kg (156 lb 11.2 oz)              We Performed the Following     Basic metabolic panel     Magnesium          Where to get your medicines      These medications were sent to [place], [place] - [address] [facility] [address] [facility] [address] [place] [address]    Hours:  TRANSPLANT PHONE NUMBER [phone] Phone:  [phone]     nystatin 389299 UNIT/ML suspension          Primary Care Provider Office Phone # Fax #    Joan Janet Ventura, MARLENY [phone]       [facility] [address]  [facility] [address]        Equal Access to Services     JENELLE RED AH: Hadii светлана garcia hadnikkio Soveronica, waaxda luqadaha, qaybta kaalmada adeegyada, ronald rasmussen. So [facility] [phone].    ATENCIÓN: Si habla español, tiene a washburn disposición servicios gratuitos de asistencia lingüística. Juwan al [phone].    We comply with applicable federal civil rights laws and [place] laws. We do not discriminate on the basis of race, color, national origin, age, disability, sex, sexual orientation, or gender identity.            Thank you!     Thank you for choosing [facility]  for your care. Our goal is always to provide you with excellent care. Hearing back from our patients is one way we can continue to improve our services. Please take a few minutes to complete the written survey that you may receive in the mail after your visit with us. Thank you!             Your Updated Medication List - Protect others around  you: Learn how to safely use, store and throw away your medicines at www.disposemymeds.org.          This list is accurate as of: 11/29/17  1:53 PM.  Always use your most recent med list.                   Brand Name Dispense Instructions for use Diagnosis    alum & mag hydroxide-simethicone 200-200-20 MG/5ML Susp suspension    MYLANTA/MAALOX    355 mL    Take 30 mLs by mouth every 4 hours as needed for indigestion        amLODIPine 5 MG tablet    NORVASC    60 tablet    Take 2 tablets (10 mg) by mouth daily    Benign essential hypertension       dexamethasone 4 MG tablet    DECADRON    12 tablet    Take 2 tablets (8 mg) by mouth daily Take for 3 days, starting the day after cisplatin (Day 2 and Day 9).    Urethral cancer (H)       docusate sodium 100 MG capsule    COLACE    60 capsule    Take 1 capsule (100 mg) by mouth 2 times daily as needed for constipation    Slow transit constipation       fluticasone 50 MCG/ACT spray    FLONASE     Spray 1 spray into both nostrils every morning        HYDROXYZINE HCL PO      Take 5 mg by mouth At Bedtime        LORazepam 0.5 MG tablet    ATIVAN    30 tablet    Take 1 tablet (0.5 mg) by mouth every 4 hours as needed (Anxiety, Nausea/Vomiting or Sleep)    Nausea       MAGNESIUM OXIDE PO      Take 400 mg by mouth 2 times daily        nystatin 081030 UNIT/ML suspension    MYCOSTATIN    200 mL    Take 5 mLs (500,000 Units) by mouth 4 times daily Swish and spit    Thrush       OLANZapine 5 MG tablet    zyPREXA    30 tablet    Take 1 tablet (5 mg) by mouth At Bedtime    Urethral cancer (H), Nausea       omeprazole 2 mg/mL Susp    priLOSEC    280 mL    Take 10 mLs (20 mg) by mouth 2 times daily    Gastroesophageal reflux disease with esophagitis, Urethral cancer (H)       ondansetron 8 MG tablet    ZOFRAN    20 tablet    Take 1 tablet (8 mg) by mouth every 8 hours as needed for nausea    Nausea       prochlorperazine 10 MG tablet    COMPAZINE    30 tablet    Take 0.5 tablets (5 mg)  by mouth every 6 hours as needed (Nausea/Vomiting)    Nausea

## 2017-11-30 ENCOUNTER — TELEPHONE (OUTPATIENT)
Dept: ONCOLOGY | Facility: CLINIC | Age: 72
End: 2017-11-30

## 2017-11-30 NOTE — TELEPHONE ENCOUNTER
"[facility] Telephone Triage Note    Assessment: Patient called in to triage reporting the following symptoms: fever of 100.9 orally this morning. Yesterday had developed nasal congestion, sore throat and feels like phlegm in his throat but unable to cough up. Stated he did not sleep well last night due to congestion. He took zyrtec and tylenol around 4 am this morning. Eating and drinking okay, received 2L NS and Mg replacement yesterday in the clinic. Advised ok to take sudafed and mucinex for symptoms. Scheduled to see NANCY and Gem[facility] infusion tomorrow. Paged Yessica DELAROSA CNP.    Recommendations: Discussed with Yessica DELAROSA CNP. Pt ok to treat at home with tylenol, sudafed, mucinex. Keep apt tomorrow, will determine if should proceed with infusion.     Follow-Up: Spoke with pt with above recommendations. He stated he has tried everything and is having difficulty breathing. Writer advised pt go to the ER if he is having shortness of breath. Pt stated it is not sob, he just can't breathe through his nose. Asked if he'd tried a steam bath, saline drops or Sudafed, he stated he only took the mucinex and \"it didn't do anything\". Pt stated he wants an antibiotic or he will not be able to come to apt tomorrow. Again advised ER evaluation if pt does not think he can wait until tomorrow's apt, there are no available apts in the clinic today. Pt stated he will lodge a complaint against writer and hung up. Later called back and when advised by another triage nurse to follow writer's recommendations, pt stated he will go and to call report into [facility].     Writer called and spoke with Jamir ba RN at [facility].      "

## 2017-12-01 ENCOUNTER — INFUSION THERAPY VISIT (OUTPATIENT)
Dept: ONCOLOGY | Facility: CLINIC | Age: 72
End: 2017-12-01
Attending: INTERNAL MEDICINE
Payer: MEDICARE

## 2017-12-01 ENCOUNTER — ONCOLOGY VISIT (OUTPATIENT)
Dept: ONCOLOGY | Facility: CLINIC | Age: 72
End: 2017-12-01
Attending: NURSE PRACTITIONER
Payer: MEDICARE

## 2017-12-01 ENCOUNTER — APPOINTMENT (OUTPATIENT)
Dept: LAB | Facility: CLINIC | Age: 72
End: 2017-12-01
Attending: INTERNAL MEDICINE
Payer: MEDICARE

## 2017-12-01 VITALS
TEMPERATURE: 99.3 F | RESPIRATION RATE: 18 BRPM | SYSTOLIC BLOOD PRESSURE: 118 MMHG | OXYGEN SATURATION: 98 % | DIASTOLIC BLOOD PRESSURE: 72 MMHG | HEART RATE: 89 BPM | WEIGHT: 152.3 LBS | BODY MASS INDEX: 18.54 KG/M2

## 2017-12-01 DIAGNOSIS — J20.9 ACUTE BRONCHITIS, UNSPECIFIED ORGANISM: ICD-10-CM

## 2017-12-01 DIAGNOSIS — C68.0 URETHRAL CANCER (H): Primary | ICD-10-CM

## 2017-12-01 DIAGNOSIS — R05.9 COUGH: ICD-10-CM

## 2017-12-01 LAB
ALBUMIN SERPL-MCNC: 3.2 G/DL (ref 3.4–5)
ALP SERPL-CCNC: 60 U/L (ref 40–150)
ALT SERPL W P-5'-P-CCNC: 21 U/L (ref 0–70)
ANION GAP SERPL CALCULATED.3IONS-SCNC: 10 MMOL/L (ref 3–14)
ANISOCYTOSIS BLD QL SMEAR: ABNORMAL
AST SERPL W P-5'-P-CCNC: 42 U/L (ref 0–45)
BASOPHILS # BLD AUTO: 0 10E9/L (ref 0–0.2)
BASOPHILS NFR BLD AUTO: 0 %
BILIRUB SERPL-MCNC: 0.3 MG/DL (ref 0.2–1.3)
BUN SERPL-MCNC: 19 MG/DL (ref 7–30)
CALCIUM SERPL-MCNC: 8.6 MG/DL (ref 8.5–10.1)
CHLORIDE SERPL-SCNC: 92 MMOL/L (ref 94–109)
CO2 SERPL-SCNC: 25 MMOL/L (ref 20–32)
CREAT SERPL-MCNC: 1.56 MG/DL (ref 0.66–1.25)
DIFFERENTIAL METHOD BLD: ABNORMAL
EOSINOPHIL # BLD AUTO: 0 10E9/L (ref 0–0.7)
EOSINOPHIL NFR BLD AUTO: 0 %
ERYTHROCYTE [DISTWIDTH] IN BLOOD BY AUTOMATED COUNT: 17.2 % (ref 10–15)
FLUAV+FLUBV RNA SPEC QL NAA+PROBE: NEGATIVE
FLUAV+FLUBV RNA SPEC QL NAA+PROBE: NEGATIVE
GFR SERPL CREATININE-BSD FRML MDRD: 44 ML/MIN/1.7M2
GLUCOSE SERPL-MCNC: 88 MG/DL (ref 70–99)
HCT VFR BLD AUTO: 27.2 % (ref 40–53)
HGB BLD-MCNC: 9.1 G/DL (ref 13.3–17.7)
LYMPHOCYTES # BLD AUTO: 0.7 10E9/L (ref 0.8–5.3)
LYMPHOCYTES NFR BLD AUTO: 30.7 %
MACROCYTES BLD QL SMEAR: PRESENT
MAGNESIUM SERPL-MCNC: 1.2 MG/DL (ref 1.6–2.3)
MCH RBC QN AUTO: 29.7 PG (ref 26.5–33)
MCHC RBC AUTO-ENTMCNC: 33.5 G/DL (ref 31.5–36.5)
MCV RBC AUTO: 89 FL (ref 78–100)
MONOCYTES # BLD AUTO: 0 10E9/L (ref 0–1.3)
MONOCYTES NFR BLD AUTO: 1.7 %
MYELOCYTES # BLD: 0 10E9/L
MYELOCYTES NFR BLD MANUAL: 0.9 %
NEUTROPHILS # BLD AUTO: 1.5 10E9/L (ref 1.6–8.3)
NEUTROPHILS NFR BLD AUTO: 66.7 %
PLATELET # BLD AUTO: 113 10E9/L (ref 150–450)
POIKILOCYTOSIS BLD QL SMEAR: ABNORMAL
POTASSIUM SERPL-SCNC: 3.6 MMOL/L (ref 3.4–5.3)
PROT SERPL-MCNC: 6.8 G/DL (ref 6.8–8.8)
RBC # BLD AUTO: 3.06 10E12/L (ref 4.4–5.9)
RSV RNA SPEC NAA+PROBE: NEGATIVE
SODIUM SERPL-SCNC: 127 MMOL/L (ref 133–144)
SPECIMEN SOURCE: NORMAL
WBC # BLD AUTO: 2.2 10E9/L (ref 4–11)

## 2017-12-01 PROCEDURE — 87631 RESP VIRUS 3-5 TARGETS: CPT | Performed by: NURSE PRACTITIONER

## 2017-12-01 PROCEDURE — 96361 HYDRATE IV INFUSION ADD-ON: CPT

## 2017-12-01 PROCEDURE — 85025 COMPLETE CBC W/AUTO DIFF WBC: CPT | Performed by: INTERNAL MEDICINE

## 2017-12-01 PROCEDURE — 40000556 ZZH STATISTIC PERIPHERAL IV START W US GUIDANCE

## 2017-12-01 PROCEDURE — 96365 THER/PROPH/DIAG IV INF INIT: CPT

## 2017-12-01 PROCEDURE — 83735 ASSAY OF MAGNESIUM: CPT | Performed by: INTERNAL MEDICINE

## 2017-12-01 PROCEDURE — 25000128 H RX IP 250 OP 636: Mod: ZF | Performed by: NURSE PRACTITIONER

## 2017-12-01 PROCEDURE — 99214 OFFICE O/P EST MOD 30 MIN: CPT | Mod: ZP | Performed by: NURSE PRACTITIONER

## 2017-12-01 PROCEDURE — 80053 COMPREHEN METABOLIC PANEL: CPT | Performed by: INTERNAL MEDICINE

## 2017-12-01 RX ORDER — CODEINE PHOSPHATE AND GUAIFENESIN 10; 100 MG/5ML; MG/5ML
1 SOLUTION ORAL EVERY 4 HOURS PRN
Qty: 120 ML | Refills: 0 | Status: SHIPPED | OUTPATIENT
Start: 2017-12-01 | End: 2018-01-10

## 2017-12-01 RX ADMIN — MAGNESIUM SULFATE HEPTAHYDRATE: 500 INJECTION, SOLUTION INTRAMUSCULAR; INTRAVENOUS at 11:52

## 2017-12-01 RX ADMIN — SODIUM CHLORIDE 1000 ML: 9 INJECTION, SOLUTION INTRAVENOUS at 09:00

## 2017-12-01 ASSESSMENT — PAIN SCALES - GENERAL: PAINLEVEL: WORST PAIN (10)

## 2017-12-01 NOTE — PROGRESS NOTES
Infusion Nursing Note:  King Gonsalo Bruno presents today for IVF and magnesium replacement.    Patient seen by provider today: Yes: Yessica Ovalles DNP   present during visit today: Not Applicable.    Note: TORB: Yessica Ovalles DNP/Mikayla Bronson RN  -Cancel chemotherapy today  -collect nasal swab for influenza/RSV   -replace magnesium in 500 ml NS  -give additional 1 liter NS  -Pt to go down for Chest xray    Nasal swab collected, chest xray completed, saline and magnesium started.    Yessica Ovalles DNP returned to review results with patient. Chest xray negative for pneumonia. Ok for patient to discharge to home post fluids today. Pt to return Monday for IVF, CBC, CMP, and mag recheck.    Intravenous Access:  Peripheral IV placed by vascular access.    Treatment Conditions:  Lab Results   Component Value Date    HGB 9.1 12/01/2017     Lab Results   Component Value Date    WBC 2.2 12/01/2017      Lab Results   Component Value Date    ANEU 1.5 12/01/2017     Lab Results   Component Value Date     12/01/2017      Lab Results   Component Value Date     12/01/2017                   Lab Results   Component Value Date    POTASSIUM 3.6 12/01/2017           Lab Results   Component Value Date    MAG 1.2 12/01/2017            Lab Results   Component Value Date    CR 1.56 12/01/2017                   Lab Results   Component Value Date    SAADIA 8.6 12/01/2017                Lab Results   Component Value Date    BILITOTAL 0.3 12/01/2017           Lab Results   Component Value Date    ALBUMIN 3.2 12/01/2017                    Lab Results   Component Value Date    ALT 21 12/01/2017           Lab Results   Component Value Date    AST 42 12/01/2017    Chemo cancelled due to patient not feeling well      Post Infusion Assessment:  Patient tolerated infusion without incident.  Blood return noted pre and post infusion.  Site patent and intact, free from redness, edema or discomfort.  No evidence of  extravasations.  Access discontinued per protocol.    Discharge Plan:   Prescription refills given for ativan, augmentin, robitussin, norvasc  Discharge instructions reviewed with: Patient.  Patient and/or family verbalized understanding of discharge instructions and all questions answered.  Copy of AVS reviewed with patient and/or family.  Patient will return 12/4/17 for next appointment.  Patient discharged in stable condition accompanied by: wife.  Departure Mode: Wheelchair.    Mikayla Bronson RN

## 2017-12-01 NOTE — PROGRESS NOTES
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 He initiated Cisplatin/[place] split on days 1 and 8 given history of CKD. He is currently undergoing curative intent treatment, however he does have indeterminate pulmonary nodules that are being followed. He had a positive response to the 2 cycles which were complicated with issues of dehydration and nausea. With cycle 4, cisplatin was changed to carboplatin due to CKD.  He returns for evaluation prior to cycle 4, day 8.      Interval history:  notes that he has been feeling very poorly for the past few days. 5 days ago, he started to note some nasal congestion and sore throat. Symptoms have escalated over the past couple of days with a hacking cough, body aches (shoulders and neck), malaise, ear fullness/tenderness. He had a temp to 100.9 yesterday. He took tylenol yesterday and temp went down to 98.8. Feels short of breath at times,but thinks this relates to sinus congestion. Energy is low. No chest pain, palpitation or dizziness. Eating and drinking very little in the past few days. Is having increased issue with constipation again.  Urine appears stable in the catheter. Denies cloudiness or darkness in color. No bleeding/bruising. Has a mild headache at times. No vision changes. Hearing has decreased.    Current Outpatient Prescriptions   Medication Sig Dispense Refill     nystatin (MYCOSTATIN) 514149 UNIT/ML suspension Take 5 mLs (500,000 Units) by mouth 4 times daily Swish and spit 200 mL 0     omeprazole (PRILOSEC) 2 mg/mL SUSP Take 10 mLs (20 mg) by mouth 2 times daily 280 mL 0     MAGNESIUM OXIDE PO Take 400 mg by mouth 2 times daily       amLODIPine (NORVASC) 5 MG tablet Take 2 tablets (10 mg) by mouth daily 60 tablet 3     OLANZapine (ZYPREXA) 5 MG tablet Take 1 tablet (5 mg) by mouth At Bedtime (Patient not taking:  Reported on 11/15/2017) 30 tablet 0     alum & mag hydroxide-simethicone (MYLANTA/MAALOX) 200-200-20 MG/5ML SUSP suspension Take 30 mLs by mouth every 4 hours as needed for indigestion 355 mL 1     LORazepam (ATIVAN) 0.5 MG tablet Take 1 tablet (0.5 mg) by mouth every 4 hours as needed (Anxiety, Nausea/Vomiting or Sleep) 30 tablet 3     prochlorperazine (COMPAZINE) 10 MG tablet Take 0.5 tablets (5 mg) by mouth every 6 hours as needed (Nausea/Vomiting) 30 tablet 3     ondansetron (ZOFRAN) 8 MG tablet Take 1 tablet (8 mg) by mouth every 8 hours as needed for nausea 20 tablet 3     docusate sodium (COLACE) 100 MG capsule Take 1 capsule (100 mg) by mouth 2 times daily as needed for constipation 60 capsule 0     dexamethasone (DECADRON) 4 MG tablet Take 2 tablets (8 mg) by mouth daily Take for 3 days, starting the day after cisplatin (Day 2 and Day 9). 12 tablet 3     HYDROXYZINE HCL PO Take 5 mg by mouth At Bedtime        fluticasone (FLONASE) 50 MCG/ACT spray Spray 1 spray into both nostrils every morning             Allergies   Allergen Reactions     Lisinopril Swelling         Exam: alert, appears weak, fatigued Blood pressure 118/72, pulse 89, temperature 99.3  F (37.4  C), temperature source Oral, resp. rate 18, weight 69.1 kg (152 lb 4.8 oz), SpO2 98 %.  Wt Readings from Last 4 Encounters:   12/01/17 69.1 kg (152 lb 4.8 oz)   11/29/17 71.6 kg (157 lb 12.8 oz)   11/26/17 72.2 kg (159 lb 3.2 oz)   11/24/17 71.1 kg (156 lb 11.2 oz)     Oropharynx is moist, no focal lesion. No icterus. Neck supple and without adenopathy. Lungs: crackles right base, no wheezes or rub. Heart: RRR. Abdomen: soft, nontender. Extremities: warm, no edema.    Labs: Results for KING AILYN CHAVEZ (MRN [id number]) as of 12/1/2017 09:00   Ref. Range 12/1/2017 08:24   Sodium Latest Ref Range: 133 - 144 mmol/L 127 (L)   Potassium Latest Ref Range: 3.4 - 5.3 mmol/L 3.6   Chloride Latest Ref Range: 94 - 109 mmol/L 92 (L)   Carbon Dioxide Latest Ref  Range: 20 - 32 mmol/L 25   Urea Nitrogen Latest Ref Range: 7 - 30 mg/dL 19   Creatinine Latest Ref Range: 0.66 - 1.25 mg/dL 1.56 (H)   GFR Estimate Latest Ref Range: >60 mL/min/1.7m2 44 (L)   GFR Estimate If Black Latest Ref Range: >60 mL/min/1.7m2 54 (L)   Calcium Latest Ref Range: 8.5 - 10.1 mg/dL 8.6   Anion Gap Latest Ref Range: 3 - 14 mmol/L 10   Magnesium Latest Ref Range: 1.6 - 2.3 mg/dL 1.2 (L)   Albumin Latest Ref Range: 3.4 - 5.0 g/dL 3.2 (L)   Protein Total Latest Ref Range: 6.8 - 8.8 g/dL 6.8   Bilirubin Total Latest Ref Range: 0.2 - 1.3 mg/dL 0.3   Alkaline Phosphatase Latest Ref Range: 40 - 150 U/L 60   ALT Latest Ref Range: 0 - 70 U/L 21   AST Latest Ref Range: 0 - 45 U/L 42   Glucose Latest Ref Range: 70 - 99 mg/dL 88   WBC Latest Ref Range: 4.0 - 11.0 10e9/L 2.2 (L)   Hemoglobin Latest Ref Range: 13.3 - 17.7 g/dL 9.1 (L)   Hematocrit Latest Ref Range: 40.0 - 53.0 % 27.2 (L)   Platelet Count Latest Ref Range: 150 - 450 10e9/L 113 (L)   RBC Count Latest Ref Range: 4.4 - 5.9 10e12/L 3.06 (L)   MCV Latest Ref Range: 78 - 100 fl 89   MCH Latest Ref Range: 26.5 - 33.0 pg 29.7   MCHC Latest Ref Range: 31.5 - 36.5 g/dL 33.5   RDW Latest Ref Range: 10.0 - 15.0 % 17.2 (H)   Diff Method Unknown PENDING   % Neutrophils Latest Units: % 66.7   % Lymphocytes Latest Units: % 30.7   % Monocytes Latest Units: % 1.7   % Eosinophils Latest Units: % 0.0   % Basophils Latest Units: % 0.0   Absolute Neutrophil Latest Ref Range: 1.6 - 8.3 10e9/L 1.5 (L)   Absolute Lymphocytes Latest Ref Range: 0.8 - 5.3 10e9/L 0.7 (L)   Absolute Monocytes Latest Ref Range: 0.0 - 1.3 10e9/L 0.0   Absolute Eosinophils Latest Ref Range: 0.0 - 0.7 10e9/L 0.0   Absolute Basophils Latest Ref Range: 0.0 - 0.2 10e9/L 0.0       Imaging: EXAM: XR CHEST 2 VW  12/1/2017 9:55 AM      HISTORY:  SOB, COUGH ,FEVER, EVALUATE FOR PNEUMONIA IN THE SETTING OF  PENILE URETHRAL CANCER; Cough        COMPARISON:  PET 10/23/2017     FINDINGS: PA and lateral  radiographs of the chest. The mediastinal  border, cardiac silhouette, and pulmonary vasculature are within  normal limits. No focal airspace opacities. No pneumothorax. No  pleural effusions. Emphysematous changes. Previously visualized  anterior pulmonary nodules are not appreciated.         IMPRESSION: Emphysematous changes without consolidation.     I have personally reviewed the examination and initial interpretation  and I agree with the findings.     GIOVANNY JOSEPH MD    Impression/plan:     1. Stage II penile urethral carcinoma, previously on Cisplatin/[place], now on cycle 4 Carbo/[place] due to complications of CKD with cisplatin  -was feeling this was better tolerated with IVF support, but now symptomatic from bronchitis  -will cancel day 8 Gemzar today. Will have f/u with Dr. Arceo in 2 weeks with a PET/CT to assess response     2. URI: likely viral, but with some immunocompromise while on chemotherapy  - will initiated a course of augmentin 875 bid for bronchitis/sinusitis  -discussed symptom control with mucinex, sudafed, tylenol. Given Robitussin AC to use at HS for coughing  -quite weak/fatigued today, felt a little better with IVF rehydration.     3. FEN: eating/drinking poorly  - hyponatremia-chronic, but a little worse today. Mildly dehydrated. Given 1.5 L NS today   hypomagnesemia-chronic related to cisplatin, on MagOx 400 mg bid, replace IV today per protocol  -recheck lytes and give possible IVF next week Monday    4. Constipation   -continue miralax bid, colace bid, can add senna as needed    5.: has suprapubic catheter, due for change next month    6. Heme: s/p 1 U PRBC transfusion last week- hgb improved to 9.1. Will monitor    7. HTN: bp lower than baseline today. Remains on norvasc.

## 2017-12-01 NOTE — NURSING NOTE
Chief Complaint   Patient presents with     Blood Draw     provider paged regarding pt condition and was instructed to bring pt directly to infusion-IV placed in right arm by Heike with ultrasound.     Joanna Aguilar

## 2017-12-01 NOTE — LETTER
12/1/2017       RE: King Gonsalo Bruno  [address]  [facility] [address]     Dear Colleague,    Thank you for referring your patient, King Gonsalo Bruno, to the [facility]. Please see a copy of my visit note below.    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 He initiated Cisplatin/Gemzar split on days 1 and 8 given history of CKD. He is currently undergoing curative intent treatment, however he does have indeterminate pulmonary nodules that are being followed. He had a positive response to the 2 cycles which were complicated with issues of dehydration and nausea. With cycle 4, cisplatin was changed to carboplatin due to CKD.  He returns for evaluation prior to cycle 4, day 8.      Interval history:  notes that he has been feeling very poorly for the past few days. 5 days ago, he started to note some nasal congestion and sore throat. Symptoms have escalated over the past couple of days with a hacking cough, body aches (shoulders and neck), malaise, ear fullness/tenderness. He had a temp to 100.9 yesterday. He took tylenol yesterday and temp went down to 98.8. Feels short of breath at times,but thinks this relates to sinus congestion. Energy is low. No chest pain, palpitation or dizziness. Eating and drinking very little in the past few days. Is having increased issue with constipation again.  Urine appears stable in the catheter. Denies cloudiness or darkness in color. No bleeding/bruising. Has a mild headache at times. No vision changes. Hearing has decreased.    Current Outpatient Prescriptions   Medication Sig Dispense Refill     nystatin (MYCOSTATIN) 496779 UNIT/ML suspension Take 5 mLs (500,000 Units) by mouth 4 times daily Swish and spit 200 mL 0     omeprazole (PRILOSEC) 2 mg/mL SUSP Take 10 mLs (20 mg) by mouth 2 times daily 280 mL  0     MAGNESIUM OXIDE PO Take 400 mg by mouth 2 times daily       amLODIPine (NORVASC) 5 MG tablet Take 2 tablets (10 mg) by mouth daily 60 tablet 3     OLANZapine (ZYPREXA) 5 MG tablet Take 1 tablet (5 mg) by mouth At Bedtime (Patient not taking: Reported on 11/15/2017) 30 tablet 0     alum & mag hydroxide-simethicone (MYLANTA/MAALOX) 200-200-20 MG/5ML SUSP suspension Take 30 mLs by mouth every 4 hours as needed for indigestion 355 mL 1     LORazepam (ATIVAN) 0.5 MG tablet Take 1 tablet (0.5 mg) by mouth every 4 hours as needed (Anxiety, Nausea/Vomiting or Sleep) 30 tablet 3     prochlorperazine (COMPAZINE) 10 MG tablet Take 0.5 tablets (5 mg) by mouth every 6 hours as needed (Nausea/Vomiting) 30 tablet 3     ondansetron (ZOFRAN) 8 MG tablet Take 1 tablet (8 mg) by mouth every 8 hours as needed for nausea 20 tablet 3     docusate sodium (COLACE) 100 MG capsule Take 1 capsule (100 mg) by mouth 2 times daily as needed for constipation 60 capsule 0     dexamethasone (DECADRON) 4 MG tablet Take 2 tablets (8 mg) by mouth daily Take for 3 days, starting the day after cisplatin (Day 2 and Day 9). 12 tablet 3     HYDROXYZINE HCL PO Take 5 mg by mouth At Bedtime        fluticasone (FLONASE) 50 MCG/ACT spray Spray 1 spray into both nostrils every morning             Allergies   Allergen Reactions     Lisinopril Swelling         Exam: alert, appears weak, fatigued Blood pressure 118/72, pulse 89, temperature 99.3  F (37.4  C), temperature source Oral, resp. rate 18, weight 69.1 kg (152 lb 4.8 oz), SpO2 98 %.  Wt Readings from Last 4 Encounters:   12/01/17 69.1 kg (152 lb 4.8 oz)   11/29/17 71.6 kg (157 lb 12.8 oz)   11/26/17 72.2 kg (159 lb 3.2 oz)   11/24/17 71.1 kg (156 lb 11.2 oz)     Oropharynx is moist, no focal lesion. No icterus. Neck supple and without adenopathy. Lungs: crackles right base, no wheezes or rub. Heart: RRR. Abdomen: soft, nontender. Extremities: warm, no edema.    Labs: Results for KING AILYN CHAVEZ  (MRN [id number]) as of 12/1/2017 09:00   Ref. Range 12/1/2017 08:24   Sodium Latest Ref Range: 133 - 144 mmol/L 127 (L)   Potassium Latest Ref Range: 3.4 - 5.3 mmol/L 3.6   Chloride Latest Ref Range: 94 - 109 mmol/L 92 (L)   Carbon Dioxide Latest Ref Range: 20 - 32 mmol/L 25   Urea Nitrogen Latest Ref Range: 7 - 30 mg/dL 19   Creatinine Latest Ref Range: 0.66 - 1.25 mg/dL 1.56 (H)   GFR Estimate Latest Ref Range: >60 mL/min/1.7m2 44 (L)   GFR Estimate If Black Latest Ref Range: >60 mL/min/1.7m2 54 (L)   Calcium Latest Ref Range: 8.5 - 10.1 mg/dL 8.6   Anion Gap Latest Ref Range: 3 - 14 mmol/L 10   Magnesium Latest Ref Range: 1.6 - 2.3 mg/dL 1.2 (L)   Albumin Latest Ref Range: 3.4 - 5.0 g/dL 3.2 (L)   Protein Total Latest Ref Range: 6.8 - 8.8 g/dL 6.8   Bilirubin Total Latest Ref Range: 0.2 - 1.3 mg/dL 0.3   Alkaline Phosphatase Latest Ref Range: 40 - 150 U/L 60   ALT Latest Ref Range: 0 - 70 U/L 21   AST Latest Ref Range: 0 - 45 U/L 42   Glucose Latest Ref Range: 70 - 99 mg/dL 88   WBC Latest Ref Range: 4.0 - 11.0 10e9/L 2.2 (L)   Hemoglobin Latest Ref Range: 13.3 - 17.7 g/dL 9.1 (L)   Hematocrit Latest Ref Range: 40.0 - 53.0 % 27.2 (L)   Platelet Count Latest Ref Range: 150 - 450 10e9/L 113 (L)   RBC Count Latest Ref Range: 4.4 - 5.9 10e12/L 3.06 (L)   MCV Latest Ref Range: 78 - 100 fl 89   MCH Latest Ref Range: 26.5 - 33.0 pg 29.7   MCHC Latest Ref Range: 31.5 - 36.5 g/dL 33.5   RDW Latest Ref Range: 10.0 - 15.0 % 17.2 (H)   Diff Method Unknown PENDING   % Neutrophils Latest Units: % 66.7   % Lymphocytes Latest Units: % 30.7   % Monocytes Latest Units: % 1.7   % Eosinophils Latest Units: % 0.0   % Basophils Latest Units: % 0.0   Absolute Neutrophil Latest Ref Range: 1.6 - 8.3 10e9/L 1.5 (L)   Absolute Lymphocytes Latest Ref Range: 0.8 - 5.3 10e9/L 0.7 (L)   Absolute Monocytes Latest Ref Range: 0.0 - 1.3 10e9/L 0.0   Absolute Eosinophils Latest Ref Range: 0.0 - 0.7 10e9/L 0.0   Absolute Basophils Latest Ref Range:  0.0 - 0.2 10e9/L 0.0       Imaging: EXAM: XR CHEST 2 VW  12/1/2017 9:55 AM      HISTORY:  SOB, COUGH ,FEVER, EVALUATE FOR PNEUMONIA IN THE SETTING OF  PENILE URETHRAL CANCER; Cough        COMPARISON:  PET 10/23/2017     FINDINGS: PA and lateral radiographs of the chest. The mediastinal  border, cardiac silhouette, and pulmonary vasculature are within  normal limits. No focal airspace opacities. No pneumothorax. No  pleural effusions. Emphysematous changes. Previously visualized  anterior pulmonary nodules are not appreciated.         IMPRESSION: Emphysematous changes without consolidation.     I have personally reviewed the examination and initial interpretation  and I agree with the findings.     GIOVANNY JOSEPH MD    Impression/plan:     1. Stage II penile urethral carcinoma, previously on Cisplatin/[place], now on cycle 4 Carbo/[place] due to complications of CKD with cisplatin  -was feeling this was better tolerated with IVF support, but now symptomatic from bronchitis  -will cancel day 8 Gemzar today. Will have f/u with Dr. Arceo in 2 weeks with a PET/CT to assess response     2. URI: likely viral, but with some immunocompromise while on chemotherapy  - will initiated a course of augmentin 875 bid for bronchitis/sinusitis  -discussed symptom control with mucinex, sudafed, tylenol. Given Robitussin AC to use at HS for coughing  -quite weak/fatigued today, felt a little better with IVF rehydration.     3. FEN: eating/drinking poorly  - hyponatremia-chronic, but a little worse today. Mildly dehydrated. Given 1.5 L NS today   hypomagnesemia-chronic related to cisplatin, on MagOx 400 mg bid, replace IV today per protocol  -recheck lytes and give possible IVF next week Monday    4. Constipation   -continue miralax bid, colace bid, can add senna as needed    5.: has suprapubic catheter, due for change next month    6. Heme: s/p 1 U PRBC transfusion last week- hgb improved to 9.1. Will monitor    7. HTN: bp lower than baseline  today. Remains on norvasc.      Again, thank you for allowing me to participate in the care of your patient.      Sincerely,    ISAURA Verma CNP

## 2017-12-01 NOTE — LETTER
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 He initiated Cisplatin/Gemzar split on days 1 and 8 given history of CKD. He is currently undergoing curative intent treatment, however he does have indeterminate pulmonary nodules that are being followed. He had a positive response to the 2 cycles which were complicated with issues of dehydration and nausea. With cycle 4, cisplatin was changed to carboplatin due to CKD.  He returns for evaluation prior to cycle 4, day 8.      Interval history:  notes that he has been feeling very poorly for the past few days. 5 days ago, he started to note some nasal congestion and sore throat. Symptoms have escalated over the past couple of days with a hacking cough, body aches (shoulders and neck), malaise, ear fullness/tenderness. He had a temp to 100.9 yesterday. He took tylenol yesterday and temp went down to 98.8. Feels short of breath at times,but thinks this relates to sinus congestion. Energy is low. No chest pain, palpitation or dizziness. Eating and drinking very little in the past few days. Is having increased issue with constipation again.  Urine appears stable in the catheter. Denies cloudiness or darkness in color. No bleeding/bruising. Has a mild headache at times. No vision changes. Hearing has decreased.    Current Outpatient Prescriptions   Medication Sig Dispense Refill     nystatin (MYCOSTATIN) 163046 UNIT/ML suspension Take 5 mLs (500,000 Units) by mouth 4 times daily Swish and spit 200 mL 0     omeprazole (PRILOSEC) 2 mg/mL SUSP Take 10 mLs (20 mg) by mouth 2 times daily 280 mL 0     MAGNESIUM OXIDE PO Take 400 mg by mouth 2 times daily       amLODIPine (NORVASC) 5 MG tablet Take 2 tablets (10 mg) by mouth daily 60 tablet 3      OLANZapine (ZYPREXA) 5 MG tablet Take 1 tablet (5 mg) by mouth At Bedtime (Patient not taking: Reported on 11/15/2017) 30 tablet 0     alum & mag hydroxide-simethicone (MYLANTA/MAALOX) 200-200-20 MG/5ML SUSP suspension Take 30 mLs by mouth every 4 hours as needed for indigestion 355 mL 1     LORazepam (ATIVAN) 0.5 MG tablet Take 1 tablet (0.5 mg) by mouth every 4 hours as needed (Anxiety, Nausea/Vomiting or Sleep) 30 tablet 3     prochlorperazine (COMPAZINE) 10 MG tablet Take 0.5 tablets (5 mg) by mouth every 6 hours as needed (Nausea/Vomiting) 30 tablet 3     ondansetron (ZOFRAN) 8 MG tablet Take 1 tablet (8 mg) by mouth every 8 hours as needed for nausea 20 tablet 3     docusate sodium (COLACE) 100 MG capsule Take 1 capsule (100 mg) by mouth 2 times daily as needed for constipation 60 capsule 0     dexamethasone (DECADRON) 4 MG tablet Take 2 tablets (8 mg) by mouth daily Take for 3 days, starting the day after cisplatin (Day 2 and Day 9). 12 tablet 3     HYDROXYZINE HCL PO Take 5 mg by mouth At Bedtime        fluticasone (FLONASE) 50 MCG/ACT spray Spray 1 spray into both nostrils every morning             Allergies   Allergen Reactions     Lisinopril Swelling         Exam: alert, appears weak, fatigued Blood pressure 118/72, pulse 89, temperature 99.3  F (37.4  C), temperature source Oral, resp. rate 18, weight 69.1 kg (152 lb 4.8 oz), SpO2 98 %.  Wt Readings from Last 4 Encounters:   12/01/17 69.1 kg (152 lb 4.8 oz)   11/29/17 71.6 kg (157 lb 12.8 oz)   11/26/17 72.2 kg (159 lb 3.2 oz)   11/24/17 71.1 kg (156 lb 11.2 oz)     Oropharynx is moist, no focal lesion. No icterus. Neck supple and without adenopathy. Lungs: crackles right base, no wheezes or rub. Heart: RRR. Abdomen: soft, nontender. Extremities: warm, no edema.    Labs: Results for KING AILYN CHAVEZ (MRN [id number]) as of 12/1/2017 09:00   Ref. Range 12/1/2017 08:24   Sodium Latest Ref Range: 133 - 144 mmol/L 127 (L)   Potassium Latest Ref Range: 3.4 -  5.3 mmol/L 3.6   Chloride Latest Ref Range: 94 - 109 mmol/L 92 (L)   Carbon Dioxide Latest Ref Range: 20 - 32 mmol/L 25   Urea Nitrogen Latest Ref Range: 7 - 30 mg/dL 19   Creatinine Latest Ref Range: 0.66 - 1.25 mg/dL 1.56 (H)   GFR Estimate Latest Ref Range: >60 mL/min/1.7m2 44 (L)   GFR Estimate If Black Latest Ref Range: >60 mL/min/1.7m2 54 (L)   Calcium Latest Ref Range: 8.5 - 10.1 mg/dL 8.6   Anion Gap Latest Ref Range: 3 - 14 mmol/L 10   Magnesium Latest Ref Range: 1.6 - 2.3 mg/dL 1.2 (L)   Albumin Latest Ref Range: 3.4 - 5.0 g/dL 3.2 (L)   Protein Total Latest Ref Range: 6.8 - 8.8 g/dL 6.8   Bilirubin Total Latest Ref Range: 0.2 - 1.3 mg/dL 0.3   Alkaline Phosphatase Latest Ref Range: 40 - 150 U/L 60   ALT Latest Ref Range: 0 - 70 U/L 21   AST Latest Ref Range: 0 - 45 U/L 42   Glucose Latest Ref Range: 70 - 99 mg/dL 88   WBC Latest Ref Range: 4.0 - 11.0 10e9/L 2.2 (L)   Hemoglobin Latest Ref Range: 13.3 - 17.7 g/dL 9.1 (L)   Hematocrit Latest Ref Range: 40.0 - 53.0 % 27.2 (L)   Platelet Count Latest Ref Range: 150 - 450 10e9/L 113 (L)   RBC Count Latest Ref Range: 4.4 - 5.9 10e12/L 3.06 (L)   MCV Latest Ref Range: 78 - 100 fl 89   MCH Latest Ref Range: 26.5 - 33.0 pg 29.7   MCHC Latest Ref Range: 31.5 - 36.5 g/dL 33.5   RDW Latest Ref Range: 10.0 - 15.0 % 17.2 (H)   Diff Method Unknown PENDING   % Neutrophils Latest Units: % 66.7   % Lymphocytes Latest Units: % 30.7   % Monocytes Latest Units: % 1.7   % Eosinophils Latest Units: % 0.0   % Basophils Latest Units: % 0.0   Absolute Neutrophil Latest Ref Range: 1.6 - 8.3 10e9/L 1.5 (L)   Absolute Lymphocytes Latest Ref Range: 0.8 - 5.3 10e9/L 0.7 (L)   Absolute Monocytes Latest Ref Range: 0.0 - 1.3 10e9/L 0.0   Absolute Eosinophils Latest Ref Range: 0.0 - 0.7 10e9/L 0.0   Absolute Basophils Latest Ref Range: 0.0 - 0.2 10e9/L 0.0       Imaging: EXAM: XR CHEST 2 VW  12/1/2017 9:55 AM      HISTORY:  SOB, COUGH ,FEVER, EVALUATE FOR PNEUMONIA IN THE SETTING  OF  PENILE URETHRAL CANCER; Cough        COMPARISON:  PET 10/23/2017     FINDINGS: PA and lateral radiographs of the chest. The mediastinal  border, cardiac silhouette, and pulmonary vasculature are within  normal limits. No focal airspace opacities. No pneumothorax. No  pleural effusions. Emphysematous changes. Previously visualized  anterior pulmonary nodules are not appreciated.         IMPRESSION: Emphysematous changes without consolidation.     I have personally reviewed the examination and initial interpretation  and I agree with the findings.     GIOVANNY JOSEPH MD    Impression/plan:     1. Stage II penile urethral carcinoma, previously on Cisplatin/[place], now on cycle 4 Carbo/[place] due to complications of CKD with cisplatin  -was feeling this was better tolerated with IVF support, but now symptomatic from bronchitis  -will cancel day 8 Gemzar today. Will have f/u with Dr. Arceo in 2 weeks with a PET/CT to assess response     2. URI: likely viral, but with some immunocompromise while on chemotherapy  - will initiated a course of augmentin 875 bid for bronchitis/sinusitis  -discussed symptom control with mucinex, sudafed, tylenol. Given Robitussin AC to use at HS for coughing  -quite weak/fatigued today, felt a little better with IVF rehydration.     3. FEN: eating/drinking poorly  - hyponatremia-chronic, but a little worse today. Mildly dehydrated. Given 1.5 L NS today   hypomagnesemia-chronic related to cisplatin, on MagOx 400 mg bid, replace IV today per protocol  -recheck lytes and give possible IVF next week Monday    4. Constipation   -continue miralax bid, colace bid, can add senna as needed    5.: has suprapubic catheter, due for change next month    6. Heme: s/p 1 U PRBC transfusion last week- hgb improved to 9.1. Will monitor    7. HTN: bp lower than baseline today. Remains on norvasc.      Yessica Ovalles, ISAURA CNP

## 2017-12-01 NOTE — MR AVS SNAPSHOT
After Visit Summary   12/1/2017    King Gonsalo Bruno    MRN: [id number]           Patient Information     Date Of Birth          1945        Visit Information        Provider Department      12/1/2017 10:00 AM  12 ATC;  ONCOLOGY INFUSION [facility]        Today's Diagnoses     Urethral cancer (H)    -  1    Cough          Care Instructions    Contact Numbers    [facility] Main Line: [phone]  [facility] Triage:  [phone]    Call triage with chills and/or temperature greater than or equal to 100.5, uncontrolled nausea/vomiting, diarrhea, constipation, dizziness, shortness of breath, chest pain, bleeding, unexplained bruising, or any new/concerning symptoms, questions/concerns.     If you are having any concerning symptoms or wish to speak to a provider before your next infusion visit, please call your care coordinator or triage to notify them so we can adequately serve you.       After Hours: [phone]    If after hours, weekends, or holidays, call main hospital  and ask for Oncology doctor on call.         December 2017 Sunday Monday Tuesday Wednesday Thursday Friday Saturday                            1     [facility] DRAW    8:15 AM   (15 min.)    [facility] LAB DRAW   [facility] Lab Draw     [facility] RETURN    8:25 AM   (50 min.)   Yessica Ovalles, APRN CNP   [facility]     XR CHEST 2 VIEWS    9:15 AM   (15 min.)   UCXR1   [facility] Xray     [facility] ONC INFUSION 360   10:00 AM   (360 min.)    ONCOLOGY INFUSION   [facility] 2       3     4     [facility] DRAW   12:30 PM   (15 min.)    [facility] LAB DRAW   [facility] Lab Draw     [facility] ONC INFUSION 120    1:00 PM   (120 min.)    ONCOLOGY INFUSION   [facility] 5     6     [facility] ONC INFUSION 120   10:30 AM   (120 min.)    ONCOLOGY INFUSION   [facility] 7     8     UMP ONC INFUSION 120    9:30 AM   (120 min.)    UC ONCOLOGY INFUSION   [facility] 9       10     11     UMP RETURN   12:45 PM   (30 min.)   Nurse,  Prostate Cancer Ctr   [facility] Urologic C[facility] 12     PE EYE/ ONCOLOGY    8:15 AM   (120 min.)   UMPPET1   [facility] 13     [facility] RETURN    8:30 AM   (30 min.)   Steve Arceo MD   [facility] 14     [facility] ONC INFUSION 360   11:30 AM   (360 min.)    ONCOLOGY INFUSION   [facility] 15     16       17     18     19     20     21     22     23       24     25     26     27     28     29     30       31                                              January 2018 Sunday Monday Tuesday Wednesday Thursday Friday Saturday        1     2     3     4     5     6       7     8     9     10     11     12     13       14     15     16     17     18     19     20       21     22     23     24     25     26     27       28     29     30     31                                 Lab Results:  Recent Results (from the past 12 hour(s))   CBC with platelets differential    Collection Time: 12/01/17  8:24 AM   Result Value Ref Range    WBC 2.2 (L) 4.0 - 11.0 10e9/L    RBC Count 3.06 (L) 4.4 - 5.9 10e12/L    Hemoglobin 9.1 (L) 13.3 - 17.7 g/dL    Hematocrit 27.2 (L) 40.0 - 53.0 %    MCV 89 78 - 100 fl    MCH 29.7 26.5 - 33.0 pg    MCHC 33.5 31.5 - 36.5 g/dL    RDW 17.2 (H) 10.0 - 15.0 %    Platelet Count 113 (L) 150 - 450 10e9/L    Diff Method Manual Differential     % Neutrophils 66.7 %    % Lymphocytes 30.7 %    % Monocytes 1.7 %    % Eosinophils 0.0 %    % Basophils 0.0 %    % Myelocytes 0.9 %    Absolute Neutrophil 1.5 (L) 1.6 - 8.3 10e9/L    Absolute Lymphocytes 0.7 (L) 0.8 - 5.3 10e9/L    Absolute Monocytes 0.0 0.0 - 1.3 10e9/L    Absolute Eosinophils 0.0 0.0 - 0.7 10e9/L    Absolute Basophils 0.0 0.0 - 0.2 10e9/L    Absolute Myelocytes 0.0 0 10e9/L    Anisocytosis Moderate     Poikilocytosis Moderate      Macrocytes Present    Comprehensive metabolic panel    Collection Time: 12/01/17  8:24 AM   Result Value Ref Range    Sodium 127 (L) 133 - 144 mmol/L    Potassium 3.6 3.4 - 5.3 mmol/L    Chloride 92 (L) 94 - 109 mmol/L    Carbon Dioxide 25 20 - 32 mmol/L    Anion Gap 10 3 - 14 mmol/L    Glucose 88 70 - 99 mg/dL    Urea Nitrogen 19 7 - 30 mg/dL    Creatinine 1.56 (H) 0.66 - 1.25 mg/dL    GFR Estimate 44 (L) >60 mL/min/1.7m2    GFR Estimate If Black 54 (L) >60 mL/min/1.7m2    Calcium 8.6 8.5 - 10.1 mg/dL    Bilirubin Total 0.3 0.2 - 1.3 mg/dL    Albumin 3.2 (L) 3.4 - 5.0 g/dL    Protein Total 6.8 6.8 - 8.8 g/dL    Alkaline Phosphatase 60 40 - 150 U/L    ALT 21 0 - 70 U/L    AST 42 0 - 45 U/L   Magnesium    Collection Time: 12/01/17  8:24 AM   Result Value Ref Range    Magnesium 1.2 (L) 1.6 - 2.3 mg/dL   Influenza A and B and RSV PCR    Collection Time: 12/01/17  9:38 AM   Result Value Ref Range    Specimen Description Nasal     Influenza A PCR Negative NEG^Negative    Influenza B PCR Negative NEG^Negative    Resp Syncytial Virus Negative NEG^Negative               Follow-ups after your visit        Your next 10 appointments already scheduled     Dec 04, 2017 12:30 PM CST   [facility] Draw with [facility] DRAW   [facility] Lab Draw ([facility])    [address] [place] [address]  [address] [facility] [address]   [phone]            Dec 04, 2017  1:00 PM CST   Infusion 120 with UC ONCOLOGY INFUSION, UC 18 ATC   [facility] ([facility])    [address] [facility]  [address] [facility] [address] [phone]            Dec 06, 2017 10:30 AM CST   Infusion 120 with UC ONCOLOGY INFUSION, UC 30 ATC   [facility] ([facility])    [address] [place] [address]  [address] [facility] [address]   [phone]            Dec 08, 2017  9:30 AM CST   Infusion 120 with UC ONCOLOGY INFUSION, [address]  [facility] ([facility])    [address] [facility]  2nd Floor  [facility] [address] [phone]            Dec 11, 2017  1:00 PM CST   (Arrive by 12:45 PM)   Return Visit with  Prostate Cancer Ctr Nurse   [facility] for Prostate and Urologic Cancers ([facility])    [address]  4th Floor  [facility] [address] [phone]            Dec 12, 2017  8:30 AM CST   (Arrive by 8:15 AM)   PE NPET ONCOLOGY (EYES TO THIGHS) with UMPPET1   [facility] ([facility])    [address] [facility] [address]   [phone]           Tell your doctor:   If there is any chance you may be pregnant or if you are breastfeeding.   If you have problems lying in small spaces (claustrophobia). If you do, your doctor may give you medicine to help you relax. If you have diabetes:   Have your exam early in the morning. Your blood glucose will go up as the day goes by.   Your glucose level must be 180 or less at the start of the exam. Please take any medicines you need to ensure this blood glucose level. 24 hours before your scan: Don t do any heavy exercise. (No jogging, aerobics or other workouts.) Exercise will make your pictures less accurate. 6 hours before your scan:   Stop all food and liquids (except water).   Do not chew gum or suck on mints.   If you need to take medicine with food, you may take it with a few crackers.  Please call your Imaging Department at your exam site with any questions.            Dec 13, 2017  8:45 AM CST   (Arrive by 8:30 AM)   Return Visit with Steve Arceo MD   [facility] ([facility])    [address]  [address] [facility] [address] [phone]              Who to contact     If you have questions or need follow up information about today's clinic visit or your schedule please contact [facility]  "CANCER CLINIC directly at [phone].  Normal or non-critical lab and imaging results will be communicated to you by MyChart, letter or phone within 4 business days after the clinic has received the results. If you do not hear from us within 7 days, please contact the clinic through [employer] or phone. If you have a critical or abnormal lab result, we will notify you by phone as soon as possible.  Submit refill requests through [employer] or call your pharmacy and they will forward the refill request to us. Please allow 3 business days for your refill to be completed.          Additional Information About Your Visit        [employer] Information     [employer] lets you send messages to your doctor, view your test results, renew your prescriptions, schedule appointments and more. To sign up, go to [email or web address] . Click on \"Log in\" on the left side of the screen, which will take you to the Welcome page. Then click on \"Sign up Now\" on the right side of the page.     You will be asked to enter the access code listed below, as well as some personal information. Please follow the directions to create your username and password.     Your access code is: [email or web address]  Expires: 2018  5:30 AM     Your access code will  in 90 days. If you need help or a new code, please call your [place] clinic or [phone].        Care EveryWhere ID     This is your Care EveryWhere ID. This could be used by other organizations to access your [facility] medical records  [id number]         Blood Pressure from Last 3 Encounters:   17 118/72   17 151/75   17 149/71    Weight from Last 3 Encounters:   17 69.1 kg (152 lb 4.8 oz)   17 71.6 kg (157 lb 12.8 oz)   17 72.2 kg (159 lb 3.2 oz)              We Performed the Following     CBC with platelets differential     Comprehensive metabolic panel     Influenza A and B and RSV PCR     Magnesium          Today's Medication Changes          These " changes are accurate as of: 12/1/17  1:04 PM.  If you have any questions, ask your nurse or doctor.               Start taking these medicines.        Dose/Directions    amoxicillin-clavulanate 875-125 MG per tablet   Commonly known as:  AUGMENTIN   Used for:  Acute bronchitis, unspecified organism   Started by:  Yessica Ovalles APRN CNP        Dose:  1 tablet   Take 1 tablet by mouth 2 times daily   Quantity:  20 tablet   Refills:  0       guaiFENesin-codeine 100-10 MG/5ML Soln solution   Commonly known as:  ROBITUSSIN AC   Used for:  Acute bronchitis, unspecified organism, Cough   Started by:  Yessica Ovalles APRN CNP        Dose:  1 tsp.   Take 5 mLs by mouth every 4 hours as needed for cough   Quantity:  120 mL   Refills:  0            Where to get your medicines      These medications were sent to [facility] [address] [facility] [address]  [address] [place] [address] [address] [place] [address]    Hours:  TRANSPLANT PHONE NUMBER [phone] Phone:  [phone]     amoxicillin-clavulanate 875-125 MG per tablet         Some of these will need a paper prescription and others can be bought over the counter.  Ask your nurse if you have questions.     Bring a paper prescription for each of these medications     guaiFENesin-codeine 100-10 MG/5ML Soln solution                Primary Care Provider Office Phone # Fax #    Joan Janet Ventura [phone]       [address] [address]        Equal Access to Services     JENELLE RED AH: Hadii aad ku hadasho Soomaali, waaxda luqadaha, qaybta kaalmada adeegyada, ronald rasmussen. So [facility] [phone].    ATENCIÓN: Si habla español, tiene a washburn disposición servicios gratuitos de asistencia lingüística. Llame al [phone].    We comply with applicable federal civil rights laws and [place] laws. We do not discriminate on the basis of race, color, national origin, age,  disability, sex, sexual orientation, or gender identity.            Thank you!     Thank you for choosing [facility]  for your care. Our goal is always to provide you with excellent care. Hearing back from our patients is one way we can continue to improve our services. Please take a few minutes to complete the written survey that you may receive in the mail after your visit with us. Thank you!             Your Updated Medication List - Protect others around you: Learn how to safely use, store and throw away your medicines at www.disposemymeds.org.          This list is accurate as of: 12/1/17  1:04 PM.  Always use your most recent med list.                   Brand Name Dispense Instructions for use Diagnosis    alum & mag hydroxide-simethicone 200-200-20 MG/5ML Susp suspension    MYLANTA/MAALOX    355 mL    Take 30 mLs by mouth every 4 hours as needed for indigestion        amLODIPine 5 MG tablet    NORVASC    60 tablet    Take 2 tablets (10 mg) by mouth daily    Benign essential hypertension       amoxicillin-clavulanate 875-125 MG per tablet    AUGMENTIN    20 tablet    Take 1 tablet by mouth 2 times daily    Acute bronchitis, unspecified organism       dexamethasone 4 MG tablet    DECADRON    12 tablet    Take 2 tablets (8 mg) by mouth daily Take for 3 days, starting the day after cisplatin (Day 2 and Day 9).    Urethral cancer (H)       docusate sodium 100 MG capsule    COLACE    60 capsule    Take 1 capsule (100 mg) by mouth 2 times daily as needed for constipation    Slow transit constipation       fluticasone 50 MCG/ACT spray    FLONASE     Spray 1 spray into both nostrils every morning        guaiFENesin-codeine 100-10 MG/5ML Soln solution    ROBITUSSIN AC    120 mL    Take 5 mLs by mouth every 4 hours as needed for cough    Acute bronchitis, unspecified organism, Cough       HYDROXYZINE HCL PO      Take 5 mg by mouth At Bedtime        LORazepam 0.5 MG tablet    ATIVAN    30 tablet     Take 1 tablet (0.5 mg) by mouth every 4 hours as needed (Anxiety, Nausea/Vomiting or Sleep)    Nausea       MAGNESIUM OXIDE PO      Take 400 mg by mouth 2 times daily        nystatin 909539 UNIT/ML suspension    MYCOSTATIN    200 mL    Take 5 mLs (500,000 Units) by mouth 4 times daily Swish and spit    Thrush       OLANZapine 5 MG tablet    zyPREXA    30 tablet    Take 1 tablet (5 mg) by mouth At Bedtime    Urethral cancer (H), Nausea       omeprazole 2 mg/mL Susp    priLOSEC    280 mL    Take 10 mLs (20 mg) by mouth 2 times daily    Gastroesophageal reflux disease with esophagitis, Urethral cancer (H)       ondansetron 8 MG tablet    ZOFRAN    20 tablet    Take 1 tablet (8 mg) by mouth every 8 hours as needed for nausea    Nausea       prochlorperazine 10 MG tablet    COMPAZINE    30 tablet    Take 0.5 tablets (5 mg) by mouth every 6 hours as needed (Nausea/Vomiting)    Nausea

## 2017-12-01 NOTE — PATIENT INSTRUCTIONS
Contact Numbers    [facility] Main Line: [phone]  [facility] Triage:  [phone]    Call triage with chills and/or temperature greater than or equal to 100.5, uncontrolled nausea/vomiting, diarrhea, constipation, dizziness, shortness of breath, chest pain, bleeding, unexplained bruising, or any new/concerning symptoms, questions/concerns.     If you are having any concerning symptoms or wish to speak to a provider before your next infusion visit, please call your care coordinator or triage to notify them so we can adequately serve you.       After Hours: [phone]    If after hours, weekends, or holidays, call main hospital  and ask for Oncology doctor on call.         December 2017 Sunday Monday Tuesday Wednesday Thursday Friday Saturday                            1     [facility]    8:15 AM   (15 min.)   [facility] DRAW   [facility] Lab Draw     [facility] RETURN    8:25 AM   (50 min.)   Yessica Ovalles, APRN CNP   [facility]     XR CHEST 2 VIEWS    9:15 AM   (15 min.)   UCXR1   [facility] Xray     UMP ONC INFUSION 360   10:00 AM   (360 min.)    ONCOLOGY INFUSION   [facility] 2       3     4     P [facility] LAB DRAW   12:30 PM   (15 min.)   [facility] LAB DRAW   [facility] Lab Draw     UMP ONC INFUSION 120    1:00 PM   (120 min.)    ONCOLOGY INFUSION   [facility] 5     6     [facility] ONC INFUSION 120   10:30 AM   (120 min.)    ONCOLOGY INFUSION   [facility] 7     8     UMP ONC INFUSION 120    9:30 AM   (120 min.)    ONCOLOGY INFUSION   [facility] 9       10     11     UMP RETURN   12:45 PM   (30 min.)   Nurse,  Prostate Cancer Ctr   [facility] for Prostate and Urologic Cancers 12     PE EYE/ ONCOLOGY    8:15 AM   (120 min.)   PPET   [facility] 13     UMP RETURN    8:30 AM   (30 min.)   Steve Arceo MD   [facility]  Cancer Clinic 14     [facility] ONC INFUSION 360   11:30 AM   (360 min.)    ONCOLOGY INFUSION   [facility] 15     16       17     18     19     20     21     22     23       24     25     26     27     28     29     30       31                                              January 2018 Sunday Monday Tuesday Wednesday Thursday Friday Saturday        1     2     3     4     5     6       7     8     9     10     11     12     13       14     15     16     17     18     19     20       21     22     23     24     25     26     27       28     29     30     31                                 Lab Results:  Recent Results (from the past 12 hour(s))   CBC with platelets differential    Collection Time: 12/01/17  8:24 AM   Result Value Ref Range    WBC 2.2 (L) 4.0 - 11.0 10e9/L    RBC Count 3.06 (L) 4.4 - 5.9 10e12/L    Hemoglobin 9.1 (L) 13.3 - 17.7 g/dL    Hematocrit 27.2 (L) 40.0 - 53.0 %    MCV 89 78 - 100 fl    MCH 29.7 26.5 - 33.0 pg    MCHC 33.5 31.5 - 36.5 g/dL    RDW 17.2 (H) 10.0 - 15.0 %    Platelet Count 113 (L) 150 - 450 10e9/L    Diff Method Manual Differential     % Neutrophils 66.7 %    % Lymphocytes 30.7 %    % Monocytes 1.7 %    % Eosinophils 0.0 %    % Basophils 0.0 %    % Myelocytes 0.9 %    Absolute Neutrophil 1.5 (L) 1.6 - 8.3 10e9/L    Absolute Lymphocytes 0.7 (L) 0.8 - 5.3 10e9/L    Absolute Monocytes 0.0 0.0 - 1.3 10e9/L    Absolute Eosinophils 0.0 0.0 - 0.7 10e9/L    Absolute Basophils 0.0 0.0 - 0.2 10e9/L    Absolute Myelocytes 0.0 0 10e9/L    Anisocytosis Moderate     Poikilocytosis Moderate     Macrocytes Present    Comprehensive metabolic panel    Collection Time: 12/01/17  8:24 AM   Result Value Ref Range    Sodium 127 (L) 133 - 144 mmol/L    Potassium 3.6 3.4 - 5.3 mmol/L    Chloride 92 (L) 94 - 109 mmol/L    Carbon Dioxide 25 20 - 32 mmol/L    Anion Gap 10 3 - 14 mmol/L    Glucose 88 70 - 99 mg/dL    Urea Nitrogen 19 7 - 30 mg/dL    Creatinine 1.56 (H) 0.66 - 1.25 mg/dL     GFR Estimate 44 (L) >60 mL/min/1.7m2    GFR Estimate If Black 54 (L) >60 mL/min/1.7m2    Calcium 8.6 8.5 - 10.1 mg/dL    Bilirubin Total 0.3 0.2 - 1.3 mg/dL    Albumin 3.2 (L) 3.4 - 5.0 g/dL    Protein Total 6.8 6.8 - 8.8 g/dL    Alkaline Phosphatase 60 40 - 150 U/L    ALT 21 0 - 70 U/L    AST 42 0 - 45 U/L   Magnesium    Collection Time: 12/01/17  8:24 AM   Result Value Ref Range    Magnesium 1.2 (L) 1.6 - 2.3 mg/dL   Influenza A and B and RSV PCR    Collection Time: 12/01/17  9:38 AM   Result Value Ref Range    Specimen Description Nasal     Influenza A PCR Negative NEG^Negative    Influenza B PCR Negative NEG^Negative    Resp Syncytial Virus Negative NEG^Negative

## 2017-12-01 NOTE — MR AVS SNAPSHOT
After Visit Summary   12/1/2017    King Gonsalo Bruno    MRN: [id number]           Patient Information     Date Of Birth          1945        Visit Information        Provider Department      12/1/2017 8:40 AM Yessica Ovalles APRN CNP [facility]        Today's Diagnoses     Urethral cancer (H)    -  1    Cough        Acute bronchitis, unspecified organism           Follow-ups after your visit        Your next 10 appointments already scheduled     Dec 04, 2017 12:30 PM CST   Masonic Lab Draw with UC MASONIC LAB DRAW   [facility] Lab Draw ([facility])    [address] [place] [address]  2nd [facility] [address]   [phone]            Dec 04, 2017  1:00 PM CST   Infusion 120 with UC ONCOLOGY INFUSION, UC 18 ATC   [facility] ([facility])    [address] [place] [address]  2nd [facility] [address]   [phone]            Dec 06, 2017 10:30 AM CST   Infusion 120 with UC ONCOLOGY INFUSION, UC 30 ATC   [facility] ([facility])    [address] [place] [address]  [address] [facility] [address]   [phone]            Dec 08, 2017  9:30 AM CST   Infusion 120 with UC ONCOLOGY INFUSION, UC 22 ATC   [facility] ([facility])    [address] [place] [address]  [address]nd [facility] [address]   [phone]            Dec 11, 2017  1:00 PM CST   (Arrive by 12:45 PM)   Return Visit with  Prostate Cancer Ctr Nurse   [facility] Prostate [facility] Urologic Cancers ([facility])    [address] [place] [address]  4th Floor  [facility] [address]   [phone]            Dec 12, 2017  8:30 AM CST   (Arrive by 8:15 AM)   [facility]   [facility] ([facility])    [address] [facility] [address]   [phone]           Tell  your doctor:   If there is any chance you may be pregnant or if you are breastfeeding.   If you have problems lying in small spaces (claustrophobia). If you do, your doctor may give you medicine to help you relax. If you have diabetes:   Have your exam early in the morning. Your blood glucose will go up as the day goes by.   Your glucose level must be 180 or less at the start of the exam. Please take any medicines you need to ensure this blood glucose level. 24 hours before your scan: Don t do any heavy exercise. (No jogging, aerobics or other workouts.) Exercise will make your pictures less accurate. 6 hours before your scan:   Stop all food and liquids (except water).   Do not chew gum or suck on mints.   If you need to take medicine with food, you may take it with a few crackers.  Please call your Imaging Department at your exam site with any questions.            Dec 13, 2017  8:45 AM CST   (Arrive by 8:30 AM)   Return Visit with Steve Arceo MD   [facility] ([facility])    [address] [place] [address] [phone]              Who to contact     If you have questions or need follow up information about today's clinic visit or your schedule please contact [facility] directly at [phone].  Normal or non-critical lab and imaging results will be communicated to you by MyChart, letter or phone within 4 business days after the clinic has received the results. If you do not hear from us within 7 days, please contact the clinic through MyChart or phone. If you have a critical or abnormal lab result, we will notify you by phone as soon as possible.  Submit refill requests through [employer] or call your pharmacy and they will forward the refill request to us. Please allow 3 business days for your refill to be completed.          Additional Information About Your Visit        [employer] Information     [employer] lets you  "send messages to your doctor, view your test results, renew your prescriptions, schedule appointments and more. To sign up, go to [email or web address] . Click on \"Log in\" on the left side of the screen, which will take you to the Welcome page. Then click on \"Sign up Now\" on the right side of the page.     You will be asked to enter the access code listed below, as well as some personal information. Please follow the directions to create your username and password.     Your access code is: [email or web address]  Expires: 2018  5:30 AM     Your access code will  in 90 days. If you need help or a new code, please call your [place] clinic or [phone].        Care EveryWhere ID     This is your Care EveryWhere ID. This could be used by other organizations to access your [place] medical records  [id number]        Your Vitals Were     Pulse Temperature Respirations Pulse Oximetry BMI (Body Mass Index)       89 99.3  F (37.4  C) (Oral) 18 98% 18.54 kg/m2        Blood Pressure from Last 3 Encounters:   17 118/72   17 151/75   17 149/71    Weight from Last 3 Encounters:   17 69.1 kg (152 lb 4.8 oz)   17 71.6 kg (157 lb 12.8 oz)   17 72.2 kg (159 lb 3.2 oz)                 Today's Medication Changes          These changes are accurate as of: 17  1:30 PM.  If you have any questions, ask your nurse or doctor.               Start taking these medicines.        Dose/Directions    amoxicillin-clavulanate 875-125 MG per tablet   Commonly known as:  AUGMENTIN   Used for:  Acute bronchitis, unspecified organism   Started by:  Yessica Ovalles APRN CNP        Dose:  1 tablet   Take 1 tablet by mouth 2 times daily   Quantity:  20 tablet   Refills:  0       guaiFENesin-codeine 100-10 MG/5ML Soln solution   Commonly known as:  ROBITUSSIN AC   Used for:  Acute bronchitis, unspecified organism, Cough   Started by:  Yessica Ovalles APRN CNP        Dose:  1 tsp.   Take 5 mLs by mouth " every 4 hours as needed for cough   Quantity:  120 mL   Refills:  0            Where to get your medicines      These medications were sent to [place], [place] - [address]  [address], [facility] [address]    Hours:  TRANSPLANT PHONE NUMBER [phone] Phone:  [phone]     amoxicillin-clavulanate 875-125 MG per tablet         Some of these will need a paper prescription and others can be bought over the counter.  Ask your nurse if you have questions.     Bring a paper prescription for each of these medications     guaiFENesin-codeine 100-10 MG/5ML Soln solution                Primary Care Provider Office Phone # Fax #    Joan Gonzales Lorenzo, MARLENY [phone]       [facility] [address]  [facility] [address]        Equal Access to Services     JENELLE RED : Hadii aad ku hadasho Soomaali, waaxda luqadaha, qaybta kaalmada adeegyada, ronald adamson hayreymundo srivastava . So [facility] [phone].    ATENCIÓN: Si habla español, tiene a washburn disposición servicios gratuitos de asistencia lingüística. Juwan al [phone].    We comply with applicable federal civil rights laws and [place] laws. We do not discriminate on the basis of race, color, national origin, age, disability, sex, sexual orientation, or gender identity.            Thank you!     Thank you for choosing [facility]  for your care. Our goal is always to provide you with excellent care. Hearing back from our patients is one way we can continue to improve our services. Please take a few minutes to complete the written survey that you may receive in the mail after your visit with us. Thank you!             Your Updated Medication List - Protect others around you: Learn how to safely use, store and throw away your medicines at www.disposemymeds.org.          This list is accurate as of: 12/1/17  1:30 PM.  Always use your most recent med list.                    Brand Name Dispense Instructions for use Diagnosis    alum & mag hydroxide-simethicone 200-200-20 MG/5ML Susp suspension    MYLANTA/MAALOX    355 mL    Take 30 mLs by mouth every 4 hours as needed for indigestion        amLODIPine 5 MG tablet    NORVASC    60 tablet    Take 2 tablets (10 mg) by mouth daily    Benign essential hypertension       amoxicillin-clavulanate 875-125 MG per tablet    AUGMENTIN    20 tablet    Take 1 tablet by mouth 2 times daily    Acute bronchitis, unspecified organism       dexamethasone 4 MG tablet    DECADRON    12 tablet    Take 2 tablets (8 mg) by mouth daily Take for 3 days, starting the day after cisplatin (Day 2 and Day 9).    Urethral cancer (H)       docusate sodium 100 MG capsule    COLACE    60 capsule    Take 1 capsule (100 mg) by mouth 2 times daily as needed for constipation    Slow transit constipation       fluticasone 50 MCG/ACT spray    FLONASE     Spray 1 spray into both nostrils every morning        guaiFENesin-codeine 100-10 MG/5ML Soln solution    ROBITUSSIN AC    120 mL    Take 5 mLs by mouth every 4 hours as needed for cough    Acute bronchitis, unspecified organism, Cough       HYDROXYZINE HCL PO      Take 5 mg by mouth At Bedtime        LORazepam 0.5 MG tablet    ATIVAN    30 tablet    Take 1 tablet (0.5 mg) by mouth every 4 hours as needed (Anxiety, Nausea/Vomiting or Sleep)    Nausea       MAGNESIUM OXIDE PO      Take 400 mg by mouth 2 times daily        nystatin 738630 UNIT/ML suspension    MYCOSTATIN    200 mL    Take 5 mLs (500,000 Units) by mouth 4 times daily Swish and spit    Thrush       OLANZapine 5 MG tablet    zyPREXA    30 tablet    Take 1 tablet (5 mg) by mouth At Bedtime    Urethral cancer (H), Nausea       omeprazole 2 mg/mL Susp    priLOSEC    280 mL    Take 10 mLs (20 mg) by mouth 2 times daily    Gastroesophageal reflux disease with esophagitis, Urethral cancer (H)       ondansetron 8 MG tablet    ZOFRAN    20 tablet    Take 1  tablet (8 mg) by mouth every 8 hours as needed for nausea    Nausea       prochlorperazine 10 MG tablet    COMPAZINE    30 tablet    Take 0.5 tablets (5 mg) by mouth every 6 hours as needed (Nausea/Vomiting)    Nausea

## 2017-12-06 ENCOUNTER — APPOINTMENT (OUTPATIENT)
Dept: LAB | Facility: CLINIC | Age: 72
End: 2017-12-06
Attending: INTERNAL MEDICINE
Payer: MEDICARE

## 2017-12-06 ENCOUNTER — INFUSION THERAPY VISIT (OUTPATIENT)
Dept: ONCOLOGY | Facility: CLINIC | Age: 72
End: 2017-12-06
Attending: INTERNAL MEDICINE
Payer: MEDICARE

## 2017-12-06 VITALS
TEMPERATURE: 97.9 F | SYSTOLIC BLOOD PRESSURE: 131 MMHG | HEART RATE: 82 BPM | DIASTOLIC BLOOD PRESSURE: 80 MMHG | WEIGHT: 155.3 LBS | BODY MASS INDEX: 18.9 KG/M2 | OXYGEN SATURATION: 99 % | RESPIRATION RATE: 16 BRPM

## 2017-12-06 DIAGNOSIS — E86.0 DEHYDRATION: ICD-10-CM

## 2017-12-06 DIAGNOSIS — E87.6 HYPOKALEMIA: ICD-10-CM

## 2017-12-06 DIAGNOSIS — R11.0 NAUSEA: ICD-10-CM

## 2017-12-06 DIAGNOSIS — K21.00 GASTROESOPHAGEAL REFLUX DISEASE WITH ESOPHAGITIS: Primary | ICD-10-CM

## 2017-12-06 DIAGNOSIS — C68.0 URETHRAL CANCER (H): ICD-10-CM

## 2017-12-06 LAB
ANION GAP SERPL CALCULATED.3IONS-SCNC: 9 MMOL/L (ref 3–14)
BUN SERPL-MCNC: 8 MG/DL (ref 7–30)
CALCIUM SERPL-MCNC: 8.6 MG/DL (ref 8.5–10.1)
CHLORIDE SERPL-SCNC: 100 MMOL/L (ref 94–109)
CO2 SERPL-SCNC: 23 MMOL/L (ref 20–32)
CREAT SERPL-MCNC: 1.21 MG/DL (ref 0.66–1.25)
GFR SERPL CREATININE-BSD FRML MDRD: 59 ML/MIN/1.7M2
GLUCOSE SERPL-MCNC: 101 MG/DL (ref 70–99)
MAGNESIUM SERPL-MCNC: 1.3 MG/DL (ref 1.6–2.3)
POTASSIUM SERPL-SCNC: 3.7 MMOL/L (ref 3.4–5.3)
SODIUM SERPL-SCNC: 133 MMOL/L (ref 133–144)

## 2017-12-06 PROCEDURE — 96361 HYDRATE IV INFUSION ADD-ON: CPT

## 2017-12-06 PROCEDURE — 25000128 H RX IP 250 OP 636: Mod: ZF | Performed by: INTERNAL MEDICINE

## 2017-12-06 PROCEDURE — 25000128 H RX IP 250 OP 636: Mod: ZF | Performed by: PHYSICIAN ASSISTANT

## 2017-12-06 PROCEDURE — 40000556 ZZH STATISTIC PERIPHERAL IV START W US GUIDANCE: Mod: ZF

## 2017-12-06 PROCEDURE — 80048 BASIC METABOLIC PNL TOTAL CA: CPT | Performed by: PHYSICIAN ASSISTANT

## 2017-12-06 PROCEDURE — 96365 THER/PROPH/DIAG IV INF INIT: CPT

## 2017-12-06 PROCEDURE — 83735 ASSAY OF MAGNESIUM: CPT | Performed by: PHYSICIAN ASSISTANT

## 2017-12-06 PROCEDURE — 96367 TX/PROPH/DG ADDL SEQ IV INF: CPT

## 2017-12-06 PROCEDURE — 96366 THER/PROPH/DIAG IV INF ADDON: CPT

## 2017-12-06 RX ORDER — PALONOSETRON 0.05 MG/ML
0.25 INJECTION, SOLUTION INTRAVENOUS ONCE
Status: CANCELLED
Start: 2017-12-06 | End: 2017-12-06

## 2017-12-06 RX ADMIN — SODIUM CHLORIDE 150 MG: 9 INJECTION, SOLUTION INTRAVENOUS at 12:03

## 2017-12-06 RX ADMIN — SODIUM CHLORIDE 1000 ML: 9 INJECTION, SOLUTION INTRAVENOUS at 11:32

## 2017-12-06 RX ADMIN — MAGNESIUM SULFATE HEPTAHYDRATE: 500 INJECTION, SOLUTION INTRAMUSCULAR; INTRAVENOUS at 13:31

## 2017-12-06 ASSESSMENT — PAIN SCALES - GENERAL: PAINLEVEL: NO PAIN (0)

## 2017-12-06 NOTE — MR AVS SNAPSHOT
After Visit Summary   12/6/2017    King Gonsalo Bruno    MRN: [id number]           Patient Information     Date Of Birth          1945        Visit Information        Provider Department      12/6/2017 10:30 AM  30 ATC;  ONCOLOGY INFUSION [facility]        Today's Diagnoses     Gastroesophageal reflux disease with esophagitis    -  1    Hypokalemia        Nausea        Dehydration        Urethral cancer (H)          Care Instructions    Contact Numbers  [facility] Nurse Triage: [phone]  After Hours Nurse Line:  [phone]    Please call the [facility] Triage line if you experience a temperature greater than or equal to 100.5, shaking chills, have uncontrolled nausea, vomiting and/or diarrhea, dizziness, shortness of breath, chest pain, bleeding, unexplained bruising, or if you have any other new/concerning symptoms, questions or concerns.     If it is after hours, weekends, or holidays, please call either the after hours nurse line listed above.    If you are having any concerning symptoms or wish to speak to a provider before your next infusion visit, please call your care coordinator or triage to notify them so we can adequately serve you.     If you need a refill on a narcotic prescription or other medication, please call triage before your infusion appointment.         December 2017 Sunday Monday Tuesday Wednesday Thursday Friday Saturday                            1     [facility] MASONIC LAB DRAW    8:15 AM   (15 min.)    MASONIC LAB DRAW   [facility] Lab Draw     UMP RETURN    8:25 AM   (50 min.)   Yessica Ovalles, ISAURA CNP   [facility]     XR CHEST 2 VIEWS    9:15 AM   (15 min.)   UCXR1   [facility] Xray     UMP ONC INFUSION 360   10:00 AM   (360 min.)    ONCOLOGY INFUSION   [facility] 2       3     4     5     6     P MASONIC LAB DRAW   10:00 AM   (15 min.)   UC MASONIC LAB DRAW   [facility]  Masonic Lab Draw     UMP ONC INFUSION 120   10:30 AM   (120 min.)    ONCOLOGY INFUSION   [facility] 7     8     [facility] MASONIC LAB DRAW    9:00 AM   (15 min.)    MASONIC LAB DRAW   [facility] Masonic Lab Draw     UMP ONC INFUSION 120    9:30 AM   (120 min.)    ONCOLOGY INFUSION   [facility] 9       10     11     UMP RETURN   12:45 PM   (30 min.)   Nurse,  Prostate Cancer Ctr   [facility]state [facility] Urologic Cancers 12     PE EYE/ ONCOLOGY    8:15 AM   (120 min.)   UMPPET1   [facility] 13     P MASONIC LAB DRAW    8:15 AM   (15 min.)    MASONIC LAB DRAW   [facility] Masonic Lab Draw     UMP RETURN    8:30 AM   (30 min.)   Steve Arceo MD   [facility] 14     UMP ONC INFUSION 360   11:30 AM   (360 min.)    ONCOLOGY INFUSION   [facility] 15     16       17     18     19     20     21     22     23       24     25     26     27     28     29     30       31                                              January 2018 Sunday Monday Tuesday Wednesday Thursday Friday Saturday        1     2     3     4     5     6       7     8     9     10     11     12     13       14     15     16     17     18     19     20       21     22     23     24     25     26     27       28     29     30     31                                 Lab Results:  Recent Results (from the past 12 hour(s))   Magnesium    Collection Time: 12/06/17 10:39 AM   Result Value Ref Range    Magnesium 1.3 (L) 1.6 - 2.3 mg/dL   Basic metabolic panel    Collection Time: 12/06/17 10:39 AM   Result Value Ref Range    Sodium 133 133 - 144 mmol/L    Potassium 3.7 3.4 - 5.3 mmol/L    Chloride 100 94 - 109 mmol/L    Carbon Dioxide 23 20 - 32 mmol/L    Anion Gap 9 3 - 14 mmol/L    Glucose 101 (H) 70 - 99 mg/dL    Urea Nitrogen 8 7 - 30 mg/dL    Creatinine 1.21 0.66 - 1.25 mg/dL    GFR Estimate 59 (L) >60 mL/min/1.7m2    GFR  Estimate If Black 72 >60 mL/min/1.7m2    Calcium 8.6 8.5 - 10.1 mg/dL               Follow-ups after your visit        Your next 10 appointments already scheduled     Dec 08, 2017  9:00 AM CST   M[facility]ic Lab Draw with  MASONIC LAB DRAW   [facility]    [address] [facility]  [address] [facility] [address]   [phone]            Dec 08, 2017  9:30 AM CST   Infusion 120 with  ONCOLOGY INFUSION,  22 ATC   [facility] ([facility])    [address] [facility] [facility]  [address] [facility] [address]   [phone]            Dec 11, 2017  1:00 PM CST   (Arrive by 12:45 PM)   Return Visit with  Prostate Cancer Ctr Nurse   [facility] for Prostate and Urologic Cancers ([facility])    [address] [facility]  4th Floor  [facility] [address]   [phone]            Dec 12, 2017  8:30 AM CST   (Arrive by 8:15 AM)   PE NPET ONCOLOGY (EYES TO THIGHS) with UMPPET1   [facility] ([facility])    [address] [facility] [address]   [phone]           Tell your doctor:   If there is any chance you may be pregnant or if you are breastfeeding.   If you have problems lying in small spaces (claustrophobia). If you do, your doctor may give you medicine to help you relax. If you have diabetes:   Have your exam early in the morning. Your blood glucose will go up as the day goes by.   Your glucose level must be 180 or less at the start of the exam. Please take any medicines you need to ensure this blood glucose level. 24 hours before your scan: Don t do any heavy exercise. (No jogging, aerobics or other workouts.) Exercise will make your pictures less accurate. 6 hours before your scan:   Stop all food and liquids (except water).   Do not chew gum or suck on mints.   If you need to take medicine with food, you may take it with a few crackers.   "Please call your Imaging Department at your exam site with any questions.            Dec 13, 2017  8:15 AM CST   Masonic Lab Draw with  MASONIC LAB DRAW   [facility] Lab Draw ([facility])    [address] [place] [id number] [phone]            Dec 13, 2017  8:45 AM CST   (Arrive by 8:30 AM)   Return Visit with Steve Arceo MD   [facility] Cancer [facility] ([facility])    [address] [place] [id number] [phone]            Dec 14, 2017 11:30 AM CST   Infusion 360 with  ONCOLOGY INFUSION, UC 13 ATC   [facility] ([facility])    [address] [place] [address] [phone]              Who to contact     If you have questions or need follow up information about today's clinic visit or your schedule please contact [facility] directly at [phone].  Normal or non-critical lab and imaging results will be communicated to you by [employer], letter or phone within 4 business days after the clinic has received the results. If you do not hear from us within 7 days, please contact the clinic through [employer] or phone. If you have a critical or abnormal lab result, we will notify you by phone as soon as possible.  Submit refill requests through [employer] or call your pharmacy and they will forward the refill request to us. Please allow 3 business days for your refill to be completed.          Additional Information About Your Visit        [employer] Information     [employer] lets you send messages to your doctor, view your test results, renew your prescriptions, schedule appointments and more. To sign up, go to [email or web address] . Click on \"Log in\" on the left side of the screen, which will take you to the Welcome page. Then click on \"Sign up Now\" on the right side of the page.     You will be asked to " enter the access code listed below, as well as some personal information. Please follow the directions to create your username and password.     Your access code is: [email or web address]  Expires: 2018  5:30 AM     Your access code will  in 90 days. If you need help or a new code, please call your [facility] or [phone].        Care EveryWhere ID     This is your Care EveryWhere ID. This could be used by other organizations to access your [place] medical records  [id number]        Your Vitals Were     Pulse Temperature Respirations Pulse Oximetry BMI (Body Mass Index)       82 97.9  F (36.6  C) (Oral) 16 99% 18.9 kg/m2        Blood Pressure from Last 3 Encounters:   17 131/80   17 118/72   17 151/75    Weight from Last 3 Encounters:   17 70.4 kg (155 lb 4.8 oz)   17 69.1 kg (152 lb 4.8 oz)   17 71.6 kg (157 lb 12.8 oz)              We Performed the Following     Basic metabolic panel     Magnesium        Primary Care Provider Office Phone # Fax #    Joan Janet Ventura, MARLENY [phone]       [place] [address]        Equal Access to Services     JENELLE RED : Hadii светлана ku hadasho Soomaali, waaxda luqadaha, qaybta kaalmada adeegyada, ronald srivastava . So [facility] [phone].    ATENCIÓN: Si habla español, tiene a washburn disposición servicios gratuitos de asistencia lingüística. Llame al [phone].    We comply with applicable federal civil rights laws and [place] laws. We do not discriminate on the basis of race, color, national origin, age, disability, sex, sexual orientation, or gender identity.            Thank you!     Thank you for choosing [facility]  for your care. Our goal is always to provide you with excellent care. Hearing back from our patients is one way we can continue to improve our services. Please take a few minutes to complete the written survey that you may  receive in the mail after your visit with us. Thank you!             Your Updated Medication List - Protect others around you: Learn how to safely use, store and throw away your medicines at www.disposemymeds.org.          This list is accurate as of: 12/6/17  3:17 PM.  Always use your most recent med list.                   Brand Name Dispense Instructions for use Diagnosis    alum & mag hydroxide-simethicone 200-200-20 MG/5ML Susp suspension    MYLANTA/MAALOX    355 mL    Take 30 mLs by mouth every 4 hours as needed for indigestion        amLODIPine 5 MG tablet    NORVASC    60 tablet    Take 2 tablets (10 mg) by mouth daily    Benign essential hypertension       amoxicillin-clavulanate 875-125 MG per tablet    AUGMENTIN    20 tablet    Take 1 tablet by mouth 2 times daily    Acute bronchitis, unspecified organism       dexamethasone 4 MG tablet    DECADRON    12 tablet    Take 2 tablets (8 mg) by mouth daily Take for 3 days, starting the day after cisplatin (Day 2 and Day 9).    Urethral cancer (H)       docusate sodium 100 MG capsule    COLACE    60 capsule    Take 1 capsule (100 mg) by mouth 2 times daily as needed for constipation    Slow transit constipation       fluticasone 50 MCG/ACT spray    FLONASE     Spray 1 spray into both nostrils every morning        guaiFENesin-codeine 100-10 MG/5ML Soln solution    ROBITUSSIN AC    120 mL    Take 5 mLs by mouth every 4 hours as needed for cough    Acute bronchitis, unspecified organism, Cough       HYDROXYZINE HCL PO      Take 5 mg by mouth At Bedtime        LORazepam 0.5 MG tablet    ATIVAN    30 tablet    Take 1 tablet (0.5 mg) by mouth every 4 hours as needed (Anxiety, Nausea/Vomiting or Sleep)    Nausea       MAGNESIUM OXIDE PO      Take 400 mg by mouth 2 times daily        nystatin 621617 UNIT/ML suspension    MYCOSTATIN    200 mL    Take 5 mLs (500,000 Units) by mouth 4 times daily Swish and spit    Thrush       OLANZapine 5 MG tablet    zyPREXA    30 tablet     Take 1 tablet (5 mg) by mouth At Bedtime    Urethral cancer (H), Nausea       omeprazole 2 mg/mL Susp    priLOSEC    280 mL    Take 10 mLs (20 mg) by mouth 2 times daily    Gastroesophageal reflux disease with esophagitis, Urethral cancer (H)       ondansetron 8 MG tablet    ZOFRAN    20 tablet    Take 1 tablet (8 mg) by mouth every 8 hours as needed for nausea    Nausea       prochlorperazine 10 MG tablet    COMPAZINE    30 tablet    Take 0.5 tablets (5 mg) by mouth every 6 hours as needed (Nausea/Vomiting)    Nausea

## 2017-12-06 NOTE — PROGRESS NOTES
Infusion Nursing Note:  King Gonsalo Bruno presents today for IVF, electrolyte replacement.    Patient seen by provider today: No    Intravenous Access:  Peripheral IV placed by vascular access.    Treatment Conditions:  Lab Results   Component Value Date    HGB 9.1 12/01/2017     Lab Results   Component Value Date    WBC 2.2 12/01/2017      Lab Results   Component Value Date    ANEU 1.5 12/01/2017     Lab Results   Component Value Date     12/01/2017      Lab Results   Component Value Date     12/06/2017                   Lab Results   Component Value Date    POTASSIUM 3.7 12/06/2017           Lab Results   Component Value Date    MAG 1.3 12/06/2017            Lab Results   Component Value Date    CR 1.21 12/06/2017                   Lab Results   Component Value Date    SAADIA 8.6 12/06/2017                Lab Results   Component Value Date    BILITOTAL 0.3 12/01/2017           Lab Results   Component Value Date    ALBUMIN 3.2 12/01/2017                    Lab Results   Component Value Date    ALT 21 12/01/2017           Lab Results   Component Value Date    AST 42 12/01/2017       Note: Patient presents to infusion for IVF and electrolyte replacement. States he feels better overall. Still has nasal congestion, post-nasal drip with sore throat, and cough. Denied fever, lightheadedness or dizziness. Reports that he has had multiple episodes of diarrhea since starting Augmentin on 12/1. The last dose he took was 12/5 in the am. Yessica Ovalles NP notified of this.  Mg+ 1.3 today, will replace with 2 gram per protocol. Patient requests med for nausea; emend released from therapy plan.    TORB: Yessica Ashby NP/Dalia Clemente RN, 12/6/17 @ 1240: 1. Okay to have patient stop Augmentin due to diarrhea per patient report of feeling better. 2. Keep patient's infusion schedule for 12/8/17.    Post Infusion Assessment:  Patient tolerated infusion without incident.  Blood return noted pre and post infusion.  Site  patent and intact, free from redness, edema or discomfort.  No evidence of extravasations.  Access discontinued per protocol.    Discharge Plan:   Patient declined prescription refills.  Discharge instructions reviewed with: Patient.  Patient and/or family verbalized understanding of discharge instructions and all questions answered.  Copy of AVS reviewed with patient and/or family.  Patient will return 12/8/17 for next appointment.  Patient discharged in stable condition accompanied by: self.  Departure Mode: Ambulatory.    Dalia Clemente RN

## 2017-12-06 NOTE — NURSING NOTE
Chief Complaint   Patient presents with     Blood Draw     labs drawn by vpt by rn.  vs taken.     Labs drawn by vpt by rn.  Vital signs taken.  Pt checked in to next appointment.  Marycruz Ortiz RN

## 2017-12-06 NOTE — PATIENT INSTRUCTIONS
Contact Numbers  [facility] Nurse Triage: [phone]  After Hours Nurse Line:  [phone]    Please call the [facility] Triage line if you experience a temperature greater than or equal to 100.5, shaking chills, have uncontrolled nausea, vomiting and/or diarrhea, dizziness, shortness of breath, chest pain, bleeding, unexplained bruising, or if you have any other new/concerning symptoms, questions or concerns.     If it is after hours, weekends, or holidays, please call either the after hours nurse line listed above.    If you are having any concerning symptoms or wish to speak to a provider before your next infusion visit, please call your care coordinator or triage to notify them so we can adequately serve you.     If you need a refill on a narcotic prescription or other medication, please call triage before your infusion appointment.         December 2017 Sunday Monday Tuesday Wednesday Thursday Friday Saturday 1     [facility] DRAW    8:15 AM   (15 min.)   [facility] LAB DRAW   [facility] Lab Draw     [facility] RETURN    8:25 AM   (50 min.)   Yessica Ovalles, ISAURA CNP   [facility]     XR CHEST 2 VIEWS    9:15 AM   (15 min.)   [id number]   [facility] Xray     [facility] ONC INFUSION 360   10:00 AM   (360 min.)    ONCOLOGY INFUSION   [facility] 2       3     4     5     6     [facility] DRAW   10:00 AM   (15 min.)    [facility]   [facility] Draw     [facility] ONC INFUSION 120   10:30 AM   (120 min.)    ONCOLOGY INFUSION   [facility] 7     8     [facility] DRAW    9:00 AM   (15 min.)    [facility] D[facility]   [facility] Draw     [facility] ONC INFUSION 120    9:30 AM   (120 min.)    ONCOLOGY INFUSION   [facility] 9       10     11     [facility] RETURN   12:45 PM   (30 min.)   Nurse, [facility]   [facility]  12     PE EYE/ ONCOLOGY    8:15 AM   (120 min.)   [id number]   [facility] 13     [facility] DRAW    8:15 AM   (15 min.)    [facility] D[facility]   [facility] Lab Draw     [facility] RETURN    8:30 AM   (30 min.)   Steve Arceo MD   [facility] 14     [facility] ONC INFUSION 360   11:30 AM   (360 min.)    ONCOLOGY INFUSION   [facility] 15     16       17     18     19     20     21     22     23       24     25     26     27     28     29     30       31                                              January 2018 Sunday Monday Tuesday Wednesday Thursday Friday Saturday        1     2     3     4     5     6       7     8     9     10     11     12     13       14     15     16     17     18     19     20       21     22     23     24     25     26     27       28     29     30     31                                 Lab Results:  Recent Results (from the past 12 hour(s))   Magnesium    Collection Time: 12/06/17 10:39 AM   Result Value Ref Range    Magnesium 1.3 (L) 1.6 - 2.3 mg/dL   Basic metabolic panel    Collection Time: 12/06/17 10:39 AM   Result Value Ref Range    Sodium 133 133 - 144 mmol/L    Potassium 3.7 3.4 - 5.3 mmol/L    Chloride 100 94 - 109 mmol/L    Carbon Dioxide 23 20 - 32 mmol/L    Anion Gap 9 3 - 14 mmol/L    Glucose 101 (H) 70 - 99 mg/dL    Urea Nitrogen 8 7 - 30 mg/dL    Creatinine 1.21 0.66 - 1.25 mg/dL    GFR Estimate 59 (L) >60 mL/min/1.7m2    GFR Estimate If Black 72 >60 mL/min/1.7m2    Calcium 8.6 8.5 - 10.1 mg/dL

## 2017-12-08 ENCOUNTER — APPOINTMENT (OUTPATIENT)
Dept: LAB | Facility: CLINIC | Age: 72
End: 2017-12-08
Attending: INTERNAL MEDICINE
Payer: MEDICARE

## 2017-12-08 ENCOUNTER — INFUSION THERAPY VISIT (OUTPATIENT)
Dept: ONCOLOGY | Facility: CLINIC | Age: 72
End: 2017-12-08
Attending: INTERNAL MEDICINE
Payer: MEDICARE

## 2017-12-08 VITALS
RESPIRATION RATE: 18 BRPM | TEMPERATURE: 98 F | DIASTOLIC BLOOD PRESSURE: 82 MMHG | OXYGEN SATURATION: 100 % | BODY MASS INDEX: 19.03 KG/M2 | WEIGHT: 156.3 LBS | HEART RATE: 73 BPM | SYSTOLIC BLOOD PRESSURE: 139 MMHG

## 2017-12-08 DIAGNOSIS — E87.6 HYPOKALEMIA: ICD-10-CM

## 2017-12-08 DIAGNOSIS — R11.0 NAUSEA: ICD-10-CM

## 2017-12-08 DIAGNOSIS — E86.0 DEHYDRATION: ICD-10-CM

## 2017-12-08 DIAGNOSIS — K21.00 GASTROESOPHAGEAL REFLUX DISEASE WITH ESOPHAGITIS: Primary | ICD-10-CM

## 2017-12-08 DIAGNOSIS — C68.0 URETHRAL CANCER (H): ICD-10-CM

## 2017-12-08 LAB
ANION GAP SERPL CALCULATED.3IONS-SCNC: 8 MMOL/L (ref 3–14)
BUN SERPL-MCNC: 9 MG/DL (ref 7–30)
CALCIUM SERPL-MCNC: 8.6 MG/DL (ref 8.5–10.1)
CHLORIDE SERPL-SCNC: 103 MMOL/L (ref 94–109)
CO2 SERPL-SCNC: 23 MMOL/L (ref 20–32)
CREAT SERPL-MCNC: 1.29 MG/DL (ref 0.66–1.25)
GFR SERPL CREATININE-BSD FRML MDRD: 55 ML/MIN/1.7M2
GLUCOSE SERPL-MCNC: 79 MG/DL (ref 70–99)
MAGNESIUM SERPL-MCNC: 1.5 MG/DL (ref 1.6–2.3)
POTASSIUM SERPL-SCNC: 4 MMOL/L (ref 3.4–5.3)
SODIUM SERPL-SCNC: 135 MMOL/L (ref 133–144)

## 2017-12-08 PROCEDURE — 96360 HYDRATION IV INFUSION INIT: CPT

## 2017-12-08 PROCEDURE — 25000128 H RX IP 250 OP 636: Mod: ZF | Performed by: INTERNAL MEDICINE

## 2017-12-08 PROCEDURE — 80048 BASIC METABOLIC PNL TOTAL CA: CPT | Performed by: PHYSICIAN ASSISTANT

## 2017-12-08 PROCEDURE — 25000128 H RX IP 250 OP 636: Mod: ZF | Performed by: PHYSICIAN ASSISTANT

## 2017-12-08 PROCEDURE — 96361 HYDRATE IV INFUSION ADD-ON: CPT

## 2017-12-08 PROCEDURE — 83735 ASSAY OF MAGNESIUM: CPT | Performed by: PHYSICIAN ASSISTANT

## 2017-12-08 PROCEDURE — 40000141 ZZH STATISTIC PERIPHERAL IV START W/O US GUIDANCE

## 2017-12-08 RX ORDER — MAGNESIUM SULFATE HEPTAHYDRATE 40 MG/ML
2 INJECTION, SOLUTION INTRAVENOUS ONCE
Status: COMPLETED | OUTPATIENT
Start: 2017-12-08 | End: 2017-12-08

## 2017-12-08 RX ORDER — PALONOSETRON 0.05 MG/ML
0.25 INJECTION, SOLUTION INTRAVENOUS ONCE
Status: CANCELLED
Start: 2017-12-08 | End: 2017-12-08

## 2017-12-08 RX ADMIN — SODIUM CHLORIDE 1000 ML: 9 INJECTION, SOLUTION INTRAVENOUS at 09:48

## 2017-12-08 RX ADMIN — MAGNESIUM SULFATE IN WATER 2 G: 40 INJECTION, SOLUTION INTRAVENOUS at 10:51

## 2017-12-08 NOTE — PATIENT INSTRUCTIONS
Contact Numbers  [facility] Nurse Triage: [phone]  After Hours Nurse Line:  [phone]    Please call the [facility] Nurse Triage line or after hours number if you experience a temperature greater than or equal to 100.5, shaking chills, have uncontrolled nausea, vomiting and/or diarrhea, dizziness, shortness of breath, chest pain, bleeding, unexplained bruising, or if you have any other new/concerning symptoms, questions or concerns.     If you are having any concerning symptoms or wish to speak to a provider before your next infusion visit, please call your care coordinator or triage to notify them so we can adequately serve you.     If you need a refill on a narcotic prescription or other medication, please call triage before your infusion appointment.           December 2017 Sunday Monday Tuesday Wednesday Thursday Friday Saturday                            1     [facility] D[facility]    8:15 AM   (15 min.)   [facility]   [facility] Lab Draw     [facility] RETURN    8:25 AM   (50 min.)   Yessica Ovalles, ISAURA CNP   [facility]     XR CHEST 2 VIEWS    9:15 AM   (15 min.)   [facility]XR1   [facility] Xray     [facility] ONC INFUSION 360   10:00 AM   (360 min.)    ONCOLOGY INFUSION   [facility] 2       3     4     5     6     [facility] DRAW   10:00 AM   (15 min.)   [facility] D[facility]   [facility] Lab Draw     [facility] ONC INFUSION 120   10:30 AM   (120 min.)    ONCOLOGY INFUSION   [facility] 7     8     [facility] DRAW    9:00 AM   (15 min.)    [facility] D[facility]   [facility] Lab Draw     [facility] ONC INFUSION 120    9:30 AM   (120 min.)    ONCOLOGY INFUSION   [facility] 9       10     11     [facility] RETURN   12:45 PM   (30 min.)   Nurse,  [facility]   [facility] 12     PE EYE/TH ONCOLOGY    8:15 AM   (120 min.)   UMPPET1   [facility] 13     [facility] DRAW    8:15 AM   (15 min.)    [facility] DRAW   [facility] Lab Draw     [facility] RETURN    8:30 AM   (30 min.)   Steve Arceo MD   [facility] 14     [facility] ONC INFUSION 360   11:30 AM   (360 min.)   [facility] ONCOLOGY INFUSION   [facility] 15     16       17     18     19     20     21     22     23       24     25     26     27     28     29     30       31 January 2018 Sunday Monday Tuesday Wednesday Thursday Friday Saturday        1     2     3     4     5     6       7     8     9     10     11     12     13       14     15     16     17     18     19     20       21     22     23     24     25     26     27       28     29     30     31                                 Lab Results:  Recent Results (from the past 12 hour(s))   Magnesium    Collection Time: 12/08/17  9:25 AM   Result Value Ref Range    Magnesium 1.5 (L) 1.6 - 2.3 mg/dL   Basic metabolic panel    Collection Time: 12/08/17  9:25 AM   Result Value Ref Range    Sodium 135 133 - 144 mmol/L    Potassium 4.0 3.4 - 5.3 mmol/L    Chloride 103 94 - 109 mmol/L    Carbon Dioxide 23 20 - 32 mmol/L    Anion Gap 8 3 - 14 mmol/L    Glucose 79 70 - 99 mg/dL    Urea Nitrogen 9 7 - 30 mg/dL    Creatinine 1.29 (H) 0.66 - 1.25 mg/dL    GFR Estimate 55 (L) >60 mL/min/1.7m2    GFR Estimate If Black 67 >60 mL/min/1.7m2    Calcium 8.6 8.5 - 10.1 mg/dL

## 2017-12-08 NOTE — PROGRESS NOTES
Infusion Nursing Note:  King Gonsalo Bruno presents today for IVF, magnesium replacement.    Patient seen by provider today: No   present during visit today: Not Applicable.    Note: patient denies nausea, vomiting, dizziness, lightheadedness.  VSS.  See assessment flowsheet for additional details.    Intravenous Access:  Peripheral IV placed.    Treatment Conditions:  Lab Results   Component Value Date    HGB 9.1 12/01/2017     Lab Results   Component Value Date    WBC 2.2 12/01/2017      Lab Results   Component Value Date    ANEU 1.5 12/01/2017     Lab Results   Component Value Date     12/01/2017      Lab Results   Component Value Date     12/08/2017                   Lab Results   Component Value Date    POTASSIUM 4.0 12/08/2017           Lab Results   Component Value Date    MAG 1.5 12/08/2017            Lab Results   Component Value Date    CR 1.29 12/08/2017                   Lab Results   Component Value Date    SAADIA 8.6 12/08/2017                Lab Results   Component Value Date    BILITOTAL 0.3 12/01/2017           Lab Results   Component Value Date    ALBUMIN 3.2 12/01/2017                    Lab Results   Component Value Date    ALT 21 12/01/2017           Lab Results   Component Value Date    AST 42 12/01/2017     Results reviewed, labs MET treatment parameters, ok to proceed with treatment.    Post Infusion Assessment:  Patient tolerated infusion without incident.  Blood return noted pre and post infusion.  Site patent and intact, free from redness, edema or discomfort.  No evidence of extravasations.  Access discontinued per protocol.    Discharge Plan:   Patient declined prescription refills.  Discharge instructions reviewed with: Patient.  Patient and/or family verbalized understanding of discharge instructions and all questions answered.  Copy of AVS reviewed with patient and/or family.  Patient will return 12/12/2017 for PET scan and 12/13/2017 for next lab, provider, and  chemotherapy appointment.  Patient discharged in stable condition accompanied by: self.  Departure Mode: Ambulatory.    Usha Gavin RN

## 2017-12-08 NOTE — NURSING NOTE
Chief Complaint   Patient presents with     Blood Draw     IV placement for blood draw and infusion and vitals     Labs drawn via IV placed by Heike Reid in left hand

## 2017-12-08 NOTE — MR AVS SNAPSHOT
After Visit Summary   12/8/2017    King Gonsalo Bruno    MRN: [id number]           Patient Information     Date Of Birth          1945        Visit Information        Provider Department      12/8/2017 9:30 AM  22 ATC;  ONCOLOGY INFUSION [facility]        Today's Diagnoses     Gastroesophageal reflux disease with esophagitis    -  1    Hypokalemia        Nausea        Dehydration        Urethral cancer (H)          Care Instructions    Contact Numbers  [facility] Nurse Triage: [phone]  After Hours Nurse Line:  [phone]    Please call the [facility] Nurse Triage line or after hours number if you experience a temperature greater than or equal to 100.5, shaking chills, have uncontrolled nausea, vomiting and/or diarrhea, dizziness, shortness of breath, chest pain, bleeding, unexplained bruising, or if you have any other new/concerning symptoms, questions or concerns.     If you are having any concerning symptoms or wish to speak to a provider before your next infusion visit, please call your care coordinator or triage to notify them so we can adequately serve you.     If you need a refill on a narcotic prescription or other medication, please call triage before your infusion appointment.           December 2017 Sunday Monday Tuesday Wednesday Thursday Friday Saturday                            1     P [facility] LAB DRAW    8:15 AM   (15 min.)   [facility] LAB DRAW   [facility] Lab Draw     UMP RETURN    8:25 AM   (50 min.)   Yessica Ovalles, ISAURA CNP   [facility]     XR CHEST 2 VIEWS    9:15 AM   (15 min.)   UCXR1   [facility] Xray     UMP ONC INFUSION 360   10:00 AM   (360 min.)    ONCOLOGY INFUSION   [facility] 2       3     4     5     6     [facility] LAB DRAW   10:00 AM   (15 min.)   [facility] LAB DRAW   [facility] Lab Draw     UMP ONC INFUSION 120   10:30 AM   (120 min.)    ONCOLOGY  INFUSION   [facility] 7     8     [facility] LAB DRAW    9:00 AM   (15 min.)    [facility]RAW   [facility]     UMP ONC INFUSION 120    9:30 AM   (120 min.)    ONCOLOGY INFUSION   [facility] 9       10     11     UMP RETURN   12:45 PM   (30 min.)   Nurse, [facility]   [facility] Prostate [facility] Urologic Cancers 12     PE EYE/ ONCOLOGY    8:15 AM   (120 min.)   UMPPET1   [facility] 13     [facility] LAB DRAW    8:15 AM   (15 min.)    [facility] LAB DRAW   [facility] Lab Draw     UMP RETURN    8:30 AM   (30 min.)   Steve Arceo MD   [facility] 14     UMP ONC INFUSION 360   11:30 AM   (360 min.)    ONCOLOGY INFUSION   [facility] 15     16       17     18     19     20     21     22     23       24     25     26     27     28     29     30       31                                              January 2018 Sunday Monday Tuesday Wednesday Thursday Friday Saturday        1     2     3     4     5     6       7     8     9     10     11     12     13       14     15     16     17     18     19     20       21     22     23     24     25     26     27       28     29     30     31                                 Lab Results:  Recent Results (from the past 12 hour(s))   Magnesium    Collection Time: 12/08/17  9:25 AM   Result Value Ref Range    Magnesium 1.5 (L) 1.6 - 2.3 mg/dL   Basic metabolic panel    Collection Time: 12/08/17  9:25 AM   Result Value Ref Range    Sodium 135 133 - 144 mmol/L    Potassium 4.0 3.4 - 5.3 mmol/L    Chloride 103 94 - 109 mmol/L    Carbon Dioxide 23 20 - 32 mmol/L    Anion Gap 8 3 - 14 mmol/L    Glucose 79 70 - 99 mg/dL    Urea Nitrogen 9 7 - 30 mg/dL    Creatinine 1.29 (H) 0.66 - 1.25 mg/dL    GFR Estimate 55 (L) >60 mL/min/1.7m2    GFR Estimate If Black 67 >60 mL/min/1.7m2    Calcium 8.6 8.5 - 10.1 mg/dL                  Follow-ups after your visit        Your next 10 appointments already scheduled     Dec 11, 2017  1:00 PM CST   (Arrive by 12:45 PM)   Return Visit with  Prostate Cancer Ctr Nurse   [facility] Prostate and Urologic Cancers ([facility])    [address]  [address] [facility] [address] [phone]            Dec 12, 2017  8:30 AM CST   (Arrive by 8:15 AM)   PE NPET ONCOLOGY (EYES TO THIGHS) with UMPPET1   [facility] ([facility])    [address] [address]   [phone]           Tell your doctor:   If there is any chance you may be pregnant or if you are breastfeeding.   If you have problems lying in small spaces (claustrophobia). If you do, your doctor may give you medicine to help you relax. If you have diabetes:   Have your exam early in the morning. Your blood glucose will go up as the day goes by.   Your glucose level must be 180 or less at the start of the exam. Please take any medicines you need to ensure this blood glucose level. 24 hours before your scan: Don t do any heavy exercise. (No jogging, aerobics or other workouts.) Exercise will make your pictures less accurate. 6 hours before your scan:   Stop all food and liquids (except water).   Do not chew gum or suck on mints.   If you need to take medicine with food, you may take it with a few crackers.  Please call your Imaging Department at your exam site with any questions.            Dec 13, 2017  8:15 AM CST   [facility] Lab Draw with  MASONIC LAB DRAW   [facility] Lab Draw ([facility])    [address]  [address] [facility] [id number] [phone]            Dec 13, 2017  8:45 AM CST   (Arrive by 8:30 AM)   Return Visit with Steve Arceo MD   [facility] ([facility])    [address]  [address] [facility] [id number] [phone]  "           Dec 14, 2017 11:30 AM CST   Infusion 360 with UC ONCOLOGY INFUSION, UC 13 ATC   [facility] ([facility])    [address]  [address] [facility] [address] [phone]              Who to contact     If you have questions or need follow up information about today's clinic visit or your schedule please contact [facility] CANCER [facility] directly at [phone].  Normal or non-critical lab and imaging results will be communicated to you by Spotjournalhart, letter or phone within 4 business days after the clinic has received the results. If you do not hear from us within 7 days, please contact the clinic through Spotjournalhart or phone. If you have a critical or abnormal lab result, we will notify you by phone as soon as possible.  Submit refill requests through [employer] or call your pharmacy and they will forward the refill request to us. Please allow 3 business days for your refill to be completed.          Additional Information About Your Visit        Work[employer]ight Information     [employer] lets you send messages to your doctor, view your test results, renew your prescriptions, schedule appointments and more. To sign up, go to [email or web address] . Click on \"Log in\" on the left side of the screen, which will take you to the Welcome page. Then click on \"Sign up Now\" on the right side of the page.     You will be asked to enter the access code listed below, as well as some personal information. Please follow the directions to create your username and password.     Your access code is: [email or web address]  Expires: 2018  5:30 AM     Your access code will  in 90 days. If you need help or a new code, please call your [place] clinic or [phone].        Care EveryWhere ID     This is your Care EveryWhere ID. This could be used by other organizations to access your [place] medical records  [id number]        Your Vitals Were     Pulse Temperature Respirations " Pulse Oximetry BMI (Body Mass Index)       73 98  F (36.7  C) (Oral) 18 100% 19.03 kg/m2        Blood Pressure from Last 3 Encounters:   12/08/17 139/82   12/06/17 131/80   12/01/17 118/72    Weight from Last 3 Encounters:   12/08/17 70.9 kg (156 lb 4.8 oz)   12/06/17 70.4 kg (155 lb 4.8 oz)   12/01/17 69.1 kg (152 lb 4.8 oz)              We Performed the Following     Basic metabolic panel     Magnesium        Primary Care Provider Office Phone # Fax #    Joan Ventura, MARLENY [phone]       [facility] [address] [address]        Equal Access to Services     JENELLE RED : Hadii aad ku hadasho Soomaali, waaxda luqadaha, qaybta kaalmada adeegyada, ronald rasmussen. So [facility] [phone].    ATENCIÓN: Si habla español, tiene a washburn disposición servicios gratuitos de asistencia lingüística. Llame al [phone].    We comply with applicable federal civil rights laws and [place] laws. We do not discriminate on the basis of race, color, national origin, age, disability, sex, sexual orientation, or gender identity.            Thank you!     Thank you for choosing [facility]  for your care. Our goal is always to provide you with excellent care. Hearing back from our patients is one way we can continue to improve our services. Please take a few minutes to complete the written survey that you may receive in the mail after your visit with us. Thank you!             Your Updated Medication List - Protect others around you: Learn how to safely use, store and throw away your medicines at www.disposemymeds.org.          This list is accurate as of: 12/8/17 11:50 AM.  Always use your most recent med list.                   Brand Name Dispense Instructions for use Diagnosis    alum & mag hydroxide-simethicone 200-200-20 MG/5ML Susp suspension    MYLANTA/MAALOX    355 mL    Take 30 mLs by mouth every 4 hours as needed for indigestion         amLODIPine 5 MG tablet    NORVASC    60 tablet    Take 2 tablets (10 mg) by mouth daily    Benign essential hypertension       amoxicillin-clavulanate 875-125 MG per tablet    AUGMENTIN    20 tablet    Take 1 tablet by mouth 2 times daily    Acute bronchitis, unspecified organism       dexamethasone 4 MG tablet    DECADRON    12 tablet    Take 2 tablets (8 mg) by mouth daily Take for 3 days, starting the day after cisplatin (Day 2 and Day 9).    Urethral cancer (H)       docusate sodium 100 MG capsule    COLACE    60 capsule    Take 1 capsule (100 mg) by mouth 2 times daily as needed for constipation    Slow transit constipation       fluticasone 50 MCG/ACT spray    FLONASE     Spray 1 spray into both nostrils every morning        guaiFENesin-codeine 100-10 MG/5ML Soln solution    ROBITUSSIN AC    120 mL    Take 5 mLs by mouth every 4 hours as needed for cough    Acute bronchitis, unspecified organism, Cough       HYDROXYZINE HCL PO      Take 5 mg by mouth At Bedtime        LORazepam 0.5 MG tablet    ATIVAN    30 tablet    Take 1 tablet (0.5 mg) by mouth every 4 hours as needed (Anxiety, Nausea/Vomiting or Sleep)    Nausea       MAGNESIUM OXIDE PO      Take 400 mg by mouth 2 times daily        nystatin 783742 UNIT/ML suspension    MYCOSTATIN    200 mL    Take 5 mLs (500,000 Units) by mouth 4 times daily Swish and spit    Thrush       OLANZapine 5 MG tablet    zyPREXA    30 tablet    Take 1 tablet (5 mg) by mouth At Bedtime    Urethral cancer (H), Nausea       omeprazole 2 mg/mL Susp    priLOSEC    280 mL    Take 10 mLs (20 mg) by mouth 2 times daily    Gastroesophageal reflux disease with esophagitis, Urethral cancer (H)       ondansetron 8 MG tablet    ZOFRAN    20 tablet    Take 1 tablet (8 mg) by mouth every 8 hours as needed for nausea    Nausea       prochlorperazine 10 MG tablet    COMPAZINE    30 tablet    Take 0.5 tablets (5 mg) by mouth every 6 hours as needed (Nausea/Vomiting)    Nausea

## 2017-12-11 ENCOUNTER — ALLIED HEALTH/NURSE VISIT (OUTPATIENT)
Dept: UROLOGY | Facility: CLINIC | Age: 72
End: 2017-12-11

## 2017-12-11 DIAGNOSIS — C68.0 URETHRAL CANCER (H): Primary | ICD-10-CM

## 2017-12-11 NOTE — MR AVS SNAPSHOT
After Visit Summary   12/11/2017    King Gonsalo Bruno    MRN: [id number]           Patient Information     Date Of Birth          1945        Visit Information        Provider Department      12/11/2017 1:00 PM Nurse,  Prostate Cancer [facility] for Prostate and Urologic Cancers        Care Instructions    Follow up in one month for next SPT change.    It was a pleasure meeting with you today.  Thank you for allowing me and my team the privilege of caring for you today.  YOU are the reason we are here, and I truly hope we provided you with the excellent service you deserve.  Please let us know if there is anything else we can do for you so that we can be sure you are leaving completely satisfied with your care experience.      ALL Amador          Follow-ups after your visit        Your next 10 appointments already scheduled     Dec 12, 2017  8:30 AM CST   (Arrive by 8:15 AM)   PE NPET ONCOLOGY (EYES TO THIGHS) with [address] for Clinical Imaging Research ([facility])    [address] [facility] [address]   [phone]           Tell your doctor:   If there is any chance you may be pregnant or if you are breastfeeding.   If you have problems lying in small spaces (claustrophobia). If you do, your doctor may give you medicine to help you relax. If you have diabetes:   Have your exam early in the morning. Your blood glucose will go up as the day goes by.   Your glucose level must be 180 or less at the start of the exam. Please take any medicines you need to ensure this blood glucose level. 24 hours before your scan: Don t do any heavy exercise. (No jogging, aerobics or other workouts.) Exercise will make your pictures less accurate. 6 hours before your scan:   Stop all food and liquids (except water).   Do not chew gum or suck on mints.   If you need to take medicine with food, you may take it with a few crackers.  Please call your Imaging  Department at your exam site with any questions.            Dec 13, 2017  8:15 AM CST   [facility]ic Lab Draw with  MASONIC LAB DRAW   [facility] Lab Draw ([facility])    [address]  2nd Floor  [facility] [address]   [phone]            Dec 13, 2017  8:45 AM CST   (Arrive by 8:30 AM)   Return Visit with Steve Arceo MD   [facility]    [address]  [address] [facility] [address]   [phone]            Dec 14, 2017 11:30 AM CST   Infusion 360 with  ONCOLOGY INFUSION,  13 ATC   [facility]    [address]  [address] [facility] [address]   [phone]            Jan 08, 2018  1:40 PM CST   (Arrive by 1:25 PM)   Return Visit with  Prostate Cancer Ctr Nurse   [facility] for Prostate and Urologic Cancers ([facility])    [address] [place] [address]  4th Floor  [facility] [address] [phone]              Future tests that were ordered for you today     Open Future Orders        Priority Expected Expires Ordered    *CBC with platelets differential Routine 12/13/2017 12/11/2018 12/11/2017    Comprehensive metabolic panel Routine 12/13/2017 12/11/2018 12/11/2017    Magnesium Routine 12/13/2017 12/11/2018 12/11/2017            Who to contact     Please call your clinic at [phone] to:    Ask questions about your health    Make or cancel appointments    Discuss your medicines    Learn about your test results    Speak to your doctor   If you have compliments or concerns about an experience at your clinic, or if you wish to file a complaint, please contact [facility] Patient Relations at [phone] or email us at [email or web address]         Additional Information About Your Visit        MyChart Information     VIAPhart is an electronic  gateway that provides easy, online access to your medical records. With Glovico, you can request a clinic appointment, read your test results, renew a prescription or communicate with your care team.     To sign up for Glovico visit the website at [email or web address]   You will be asked to enter the access code listed below, as well as some personal information. Please follow the directions to create your username and password.     Your access code is: [email or web address]  Expires: 2018  5:30 AM     Your access code will  in 90 days. If you need help or a new code, please contact your [facility] or call [phone] for assistance.        Care EveryWhere ID     This is your Care EveryWhere ID. This could be used by other organizations to access your [place] medical records  [id number]         Blood Pressure from Last 3 Encounters:   17 139/82   17 131/80   17 118/72    Weight from Last 3 Encounters:   17 70.9 kg (156 lb 4.8 oz)   17 70.4 kg (155 lb 4.8 oz)   17 69.1 kg (152 lb 4.8 oz)              Today, you had the following     No orders found for display       Primary Care Provider Office Phone # Fax #    Joan BanksMARLENY vergara [phone]       [address] [facility] [address]        Equal Access to Services     JENELLE RED : Hadii светлана ku hadasho Soomaali, waaxda luqadaha, qaybta kaalmada adeegyada, ronald srivastava . So [facility] [phone].    ATENCIÓN: Si karenla joya, tiene a washburn disposición servicios gratuitos de asistencia lingüística. Llame al [phone].    We comply with applicable federal civil rights laws and [place] laws. We do not discriminate on the basis of race, color, national origin, age, disability, sex, sexual orientation, or gender identity.            Thank you!     Thank you for choosing [facility] UROLOGY AND [facility] FOR PROSTATE AND UROLOGIC CANCERS   for your care. Our goal is always to provide you with excellent care. Hearing back from our patients is one way we can continue to improve our services. Please take a few minutes to complete the written survey that you may receive in the mail after your visit with us. Thank you!             Your Updated Medication List - Protect others around you: Learn how to safely use, store and throw away your medicines at www.disposemymeds.org.          This list is accurate as of: 12/11/17  1:15 PM.  Always use your most recent med list.                   Brand Name Dispense Instructions for use Diagnosis    alum & mag hydroxide-simethicone 200-200-20 MG/5ML Susp suspension    MYLANTA/MAALOX    355 mL    Take 30 mLs by mouth every 4 hours as needed for indigestion        amLODIPine 5 MG tablet    NORVASC    60 tablet    Take 2 tablets (10 mg) by mouth daily    Benign essential hypertension       amoxicillin-clavulanate 875-125 MG per tablet    AUGMENTIN    20 tablet    Take 1 tablet by mouth 2 times daily    Acute bronchitis, unspecified organism       dexamethasone 4 MG tablet    DECADRON    12 tablet    Take 2 tablets (8 mg) by mouth daily Take for 3 days, starting the day after cisplatin (Day 2 and Day 9).    Urethral cancer (H)       docusate sodium 100 MG capsule    COLACE    60 capsule    Take 1 capsule (100 mg) by mouth 2 times daily as needed for constipation    Slow transit constipation       fluticasone 50 MCG/ACT spray    FLONASE     Spray 1 spray into both nostrils every morning        guaiFENesin-codeine 100-10 MG/5ML Soln solution    ROBITUSSIN AC    120 mL    Take 5 mLs by mouth every 4 hours as needed for cough    Acute bronchitis, unspecified organism, Cough       HYDROXYZINE HCL PO      Take 5 mg by mouth At Bedtime        LORazepam 0.5 MG tablet    ATIVAN    30 tablet    Take 1 tablet (0.5 mg) by mouth every 4 hours as needed (Anxiety, Nausea/Vomiting or Sleep)    Nausea       MAGNESIUM OXIDE PO      Take  400 mg by mouth 2 times daily        nystatin [id number] UNIT/ML suspension    MYCOSTATIN    200 mL    Take 5 mLs (500,000 Units) by mouth 4 times daily Swish and spit    Thrush       OLANZapine 5 MG tablet    zyPREXA    30 tablet    Take 1 tablet (5 mg) by mouth At Bedtime    Urethral cancer (H), Nausea       omeprazole 2 mg/mL Susp    priLOSEC    280 mL    Take 10 mLs (20 mg) by mouth 2 times daily    Gastroesophageal reflux disease with esophagitis, Urethral cancer (H)       ondansetron 8 MG tablet    ZOFRAN    20 tablet    Take 1 tablet (8 mg) by mouth every 8 hours as needed for nausea    Nausea       prochlorperazine 10 MG tablet    COMPAZINE    30 tablet    Take 0.5 tablets (5 mg) by mouth every 6 hours as needed (Nausea/Vomiting)    Nausea

## 2017-12-11 NOTE — PATIENT INSTRUCTIONS
Follow up in one month for next SPT change.    It was a pleasure meeting with you today.  Thank you for allowing me and my team the privilege of caring for you today.  YOU are the reason we are here, and I truly hope we provided you with the excellent service you deserve.  Please let us know if there is anything else we can do for you so that we can be sure you are leaving completely satisfied with your care experience.      ALL Amador

## 2017-12-11 NOTE — NURSING NOTE
King Gonsalo Bruno comes into clinic today at the request of Dr. Haddad Ordering Provider for Catheter Change.    This service provided today was under the supervising provider of the day Khalida Grullon PA-C, who was available if needed.      Catheter insertion documentation on 12/11/2017:    King Gonsalo Bruno presents to the clinic for catheter insertion.  Reason for insertion: replacement  Order has been verified. YES  Catheter successfully inserted into the suprapubic meatus in the usual sterile fashion without immediate complication.  Type of catheter placed: 16 Chinese SILICONE catheter  Urine is yellow in color.  10 cc's of urine output returned.  Balloon was filled with 6 cc's of normal saline.  Securement device placed for the catheter.  The patient tolerated the procedure and was instructed to return or call for pain, fever, leakage or decreased urine flow.    One Cipro 500 mg tablet given PO prior to catheter change.    ALL Amador

## 2017-12-13 ENCOUNTER — ONCOLOGY VISIT (OUTPATIENT)
Dept: ONCOLOGY | Facility: CLINIC | Age: 72
End: 2017-12-13
Attending: INTERNAL MEDICINE
Payer: MEDICARE

## 2017-12-13 ENCOUNTER — APPOINTMENT (OUTPATIENT)
Dept: LAB | Facility: CLINIC | Age: 72
End: 2017-12-13
Attending: INTERNAL MEDICINE
Payer: MEDICARE

## 2017-12-13 VITALS
RESPIRATION RATE: 18 BRPM | WEIGHT: 154.5 LBS | HEART RATE: 87 BPM | SYSTOLIC BLOOD PRESSURE: 145 MMHG | TEMPERATURE: 98.3 F | BODY MASS INDEX: 18.81 KG/M2 | DIASTOLIC BLOOD PRESSURE: 79 MMHG | OXYGEN SATURATION: 99 %

## 2017-12-13 DIAGNOSIS — C68.0 URETHRAL CANCER (H): Primary | ICD-10-CM

## 2017-12-13 DIAGNOSIS — J30.9 ALLERGIC SINUSITIS: ICD-10-CM

## 2017-12-13 LAB
ALBUMIN SERPL-MCNC: 3.3 G/DL (ref 3.4–5)
ALP SERPL-CCNC: 73 U/L (ref 40–150)
ALT SERPL W P-5'-P-CCNC: 12 U/L (ref 0–70)
ANION GAP SERPL CALCULATED.3IONS-SCNC: 11 MMOL/L (ref 3–14)
AST SERPL W P-5'-P-CCNC: 28 U/L (ref 0–45)
BASOPHILS # BLD AUTO: 0 10E9/L (ref 0–0.2)
BASOPHILS NFR BLD AUTO: 0.2 %
BILIRUB SERPL-MCNC: 0.3 MG/DL (ref 0.2–1.3)
BUN SERPL-MCNC: 12 MG/DL (ref 7–30)
CALCIUM SERPL-MCNC: 9.1 MG/DL (ref 8.5–10.1)
CHLORIDE SERPL-SCNC: 100 MMOL/L (ref 94–109)
CO2 SERPL-SCNC: 22 MMOL/L (ref 20–32)
CREAT SERPL-MCNC: 1.65 MG/DL (ref 0.66–1.25)
DIFFERENTIAL METHOD BLD: ABNORMAL
EOSINOPHIL # BLD AUTO: 0 10E9/L (ref 0–0.7)
EOSINOPHIL NFR BLD AUTO: 0.5 %
ERYTHROCYTE [DISTWIDTH] IN BLOOD BY AUTOMATED COUNT: 18.5 % (ref 10–15)
GFR SERPL CREATININE-BSD FRML MDRD: 41 ML/MIN/1.7M2
GLUCOSE SERPL-MCNC: 94 MG/DL (ref 70–99)
HCT VFR BLD AUTO: 28.4 % (ref 40–53)
HGB BLD-MCNC: 9.4 G/DL (ref 13.3–17.7)
IMM GRANULOCYTES # BLD: 0 10E9/L (ref 0–0.4)
IMM GRANULOCYTES NFR BLD: 0.9 %
LYMPHOCYTES # BLD AUTO: 1.9 10E9/L (ref 0.8–5.3)
LYMPHOCYTES NFR BLD AUTO: 44.9 %
MAGNESIUM SERPL-MCNC: 1.3 MG/DL (ref 1.6–2.3)
MCH RBC QN AUTO: 30.1 PG (ref 26.5–33)
MCHC RBC AUTO-ENTMCNC: 33.1 G/DL (ref 31.5–36.5)
MCV RBC AUTO: 91 FL (ref 78–100)
MONOCYTES # BLD AUTO: 1 10E9/L (ref 0–1.3)
MONOCYTES NFR BLD AUTO: 23.1 %
NEUTROPHILS # BLD AUTO: 1.3 10E9/L (ref 1.6–8.3)
NEUTROPHILS NFR BLD AUTO: 30.4 %
NRBC # BLD AUTO: 0 10*3/UL
NRBC BLD AUTO-RTO: 0 /100
PLATELET # BLD AUTO: 262 10E9/L (ref 150–450)
PLATELET # BLD EST: ABNORMAL 10*3/UL
POTASSIUM SERPL-SCNC: 4 MMOL/L (ref 3.4–5.3)
PROT SERPL-MCNC: 7.7 G/DL (ref 6.8–8.8)
RBC # BLD AUTO: 3.12 10E12/L (ref 4.4–5.9)
SODIUM SERPL-SCNC: 132 MMOL/L (ref 133–144)
WBC # BLD AUTO: 4.3 10E9/L (ref 4–11)

## 2017-12-13 PROCEDURE — 36415 COLL VENOUS BLD VENIPUNCTURE: CPT

## 2017-12-13 PROCEDURE — 83735 ASSAY OF MAGNESIUM: CPT | Performed by: INTERNAL MEDICINE

## 2017-12-13 PROCEDURE — 99212 OFFICE O/P EST SF 10 MIN: CPT | Mod: ZF

## 2017-12-13 PROCEDURE — 99215 OFFICE O/P EST HI 40 MIN: CPT | Mod: ZP | Performed by: INTERNAL MEDICINE

## 2017-12-13 PROCEDURE — 85025 COMPLETE CBC W/AUTO DIFF WBC: CPT | Performed by: INTERNAL MEDICINE

## 2017-12-13 PROCEDURE — 80053 COMPREHEN METABOLIC PANEL: CPT | Performed by: INTERNAL MEDICINE

## 2017-12-13 RX ORDER — FLUTICASONE PROPIONATE 50 MCG
1 SPRAY, SUSPENSION (ML) NASAL EVERY MORNING
Qty: 1 BOTTLE | Refills: 3 | Status: SHIPPED | OUTPATIENT
Start: 2017-12-13 | End: 2018-02-07

## 2017-12-13 ASSESSMENT — PAIN SCALES - GENERAL: PAINLEVEL: NO PAIN (0)

## 2017-12-13 NOTE — PROGRESS NOTES
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 He initiated Cisplatin/Gemzar split on days 1 and 8 given history of CKD. He is currently undergoing curative intent treatment, however he does have indeterminate pulmonary nodules that are being followed. He had a positive response to the 2 cycles which were complicated with issues of dehydration and nausea. He has completed 4 cycles of chemotherapy.     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     He is accompanied by his significant other. He did well initially with chemotherapy but has been struggling for the last couple of cycles. His energy is low. He has no appetite. He has been losing weight.     No chest pain, palpitation or dizziness. Eating and drinking very little in the past few days. Is having increased issue with constipation again.  Urine appears stable in the catheter. Denies cloudiness or darkness in color. No bleeding/bruising. Has a mild headache at times. No vision changes. Hearing has decreased.    Current Outpatient Prescriptions   Medication Sig     guaiFENesin-codeine (ROBITUSSIN AC) 100-10 MG/5ML SOLN solution Take 5 mLs by mouth every 4 hours as needed for cough     nystatin (MYCOSTATIN) 726642 UNIT/ML suspension Take 5 mLs (500,000 Units) by mouth 4 times daily Swish and spit     omeprazole (PRILOSEC) 2 mg/mL SUSP Take 10 mLs (20 mg) by mouth 2 times daily     MAGNESIUM OXIDE PO Take 400 mg by mouth 2 times daily     amLODIPine (NORVASC) 5 MG tablet Take 2 tablets (10 mg) by mouth daily     alum & mag hydroxide-simethicone (MYLANTA/MAALOX) 200-200-20 MG/5ML SUSP suspension Take 30 mLs by mouth every 4 hours as needed for indigestion     LORazepam (ATIVAN) 0.5 MG tablet Take 1 tablet (0.5 mg) by mouth every 4 hours as needed (Anxiety, Nausea/Vomiting or Sleep)      prochlorperazine (COMPAZINE) 10 MG tablet Take 0.5 tablets (5 mg) by mouth every 6 hours as needed (Nausea/Vomiting)     ondansetron (ZOFRAN) 8 MG tablet Take 1 tablet (8 mg) by mouth every 8 hours as needed for nausea     docusate sodium (COLACE) 100 MG capsule Take 1 capsule (100 mg) by mouth 2 times daily as needed for constipation     dexamethasone (DECADRON) 4 MG tablet Take 2 tablets (8 mg) by mouth daily Take for 3 days, starting the day after cisplatin (Day 2 and Day 9).     HYDROXYZINE HCL PO Take 5 mg by mouth At Bedtime      fluticasone (FLONASE) 50 MCG/ACT spray Spray 1 spray into both nostrils every morning      amoxicillin-clavulanate (AUGMENTIN) 875-125 MG per tablet Take 1 tablet by mouth 2 times daily (Patient not taking: Reported on 12/6/2017)     OLANZapine (ZYPREXA) 5 MG tablet Take 1 tablet (5 mg) by mouth At Bedtime (Patient not taking: Reported on 11/15/2017)     No current facility-administered medications for this visit.      Facility-Administered Medications Ordered in Other Visits   Medication     barium sulfate (EZ PAQUE) oral suspension 96%     barium sulfate (EZ-HD) oral suspension 98%     sod bicarbonate-citric acid-simethicone (EZ GAS) 2.21-1.53-0.04 G packet 4 g           Allergies   Allergen Reactions     Lisinopril Swelling         Exam: alert, appears weak, fatigued Blood pressure 145/79, pulse 87, temperature 98.3  F (36.8  C), temperature source Oral, resp. rate 18, weight 70.1 kg (154 lb 8 oz), SpO2 99 %.  Wt Readings from Last 4 Encounters:   12/13/17 70.1 kg (154 lb 8 oz)   12/08/17 70.9 kg (156 lb 4.8 oz)   12/06/17 70.4 kg (155 lb 4.8 oz)   12/01/17 69.1 kg (152 lb 4.8 oz)     Oropharynx is moist, no focal lesion. No icterus. Neck supple and without adenopathy. Lungs: crackles right base, no wheezes or rub. Heart: RRR. Abdomen: soft, nontender. Extremities: warm, no edema.    Labs:   Recent Labs   Lab Test  12/13/17   0813  12/12/17   1135  12/08/17   0925  12/06/17   1039   12/01/17   0824  11/29/17   1058   NA  132*   --   135  133  127*  130*   POTASSIUM  4.0   --   4.0  3.7  3.6  3.8   CHLORIDE  100   --   103  100  92*  97   CO2  22   --   23  23  25  24   ANIONGAP  11   --   8  9  10  8   BUN  12   --   9  8  19  15   CR  1.65*   --   1.29*  1.21  1.56*  1.43*   GLC  94  108*  79  101*  88  78   SAADIA  9.1   --   8.6  8.6  8.6  8.8     Recent Labs   Lab Test  12/13/17   0813  12/08/17   0925  12/06/17   1039  12/01/17   0824  11/29/17   1058   MAG  1.3*  1.5*  1.3*  1.2*  1.4*     Recent Labs   Lab Test  12/13/17   0813  12/01/17   0824  11/24/17   0827  11/10/17   0839  11/03/17   0743   WBC  4.3  2.2*  3.7*  3.0*  5.8   HGB  9.4*  9.1*  7.9*  9.0*  9.1*   PLT  262  113*  146*  157  444   MCV  91  89  90  86  90   NEUTROPHIL  30.4  66.7  57.3  44.1  40.1     Recent Labs   Lab Test  12/13/17   0813 12/01/17   0824  11/24/17   0827   BILITOTAL  0.3  0.3  0.2   ALKPHOS  73  60  59   ALT  12  21  16   AST  28  42  20   ALBUMIN  3.3*  3.2*  3.3*     TSH   Date Value Ref Range Status   11/24/2017 0.82 0.40 - 4.00 mU/L Final   10/06/2017 1.61 0.40 - 4.00 mU/L Final     No results for input(s): CEA in the last 65610 hours.  Results for orders placed or performed in visit on 12/01/17   X-ray Chest 2 vws*    Narrative    EXAM: XR CHEST 2 VW  12/1/2017 9:55 AM     HISTORY:  SOB, COUGH ,FEVER, EVALUATE FOR PNEUMONIA IN THE SETTING OF  PENILE URETHRAL CANCER; Cough       COMPARISON:  PET 10/23/2017    FINDINGS: PA and lateral radiographs of the chest. The mediastinal  border, cardiac silhouette, and pulmonary vasculature are within  normal limits. No focal airspace opacities. No pneumothorax. No  pleural effusions. Emphysematous changes. Previously visualized  anterior pulmonary nodules are not appreciated.      Impression    IMPRESSION: Emphysematous changes without consolidation.    I have personally reviewed the examination and initial interpretation  and I agree with the  findings.    GIOVANNY JOSEPH MD         Impression/plan:     1. Stage II penile urethral carcinoma, previously on Cisplatin/Custer, now on cycle 4 Carbo/Custer due to complications of CKD with cisplatin  He has completed 4 rounds of chemotherapy with platinum and gemcitabine.  He received split-dose cisplatin for the first 3 cycles and this had to be changed to carboplatin for the last cycle.  He did not receive his day 8 gemcitabine because of his thrombocytopenia.  I reviewed the actual images from his restaging scans which appear to suggest progressive disease.  The SUV value is slightly higher.  The official read on this PET scan remains pending at this time.  The lesions in the chest also appear a little more prominent.  I have reviewed the scans with the radiologist and they concur with my assessment.  I explained to Mr. Bruno that he has already received 4 cycles of chemotherapy.  Additional chemotherapy would not be helpful, as he does not seem to be responding any further to chemotherapy.  I will review his case with Dr. Muriel Haddad.  I would refer him to Dr. Haddad for surgery.       ADDENDUM:  I have reviewed his case with Dr. Haddad after the clinic visit.  Dr. Haddda agreed with plans for taking him up for surgery.  We reviewed about the FDG-avid nodes within the chest.  In the past we had referred the patient to Dr. Marisol Hernandez in Interventional Pulmonology.  The patient was quite defensive about this and had refused any procedures, stating that he has been previously exposed to tuberculosis and these lymph nodes are merely tell-tale signs of previous exposure to Mycobacterium tuberculosis.  Dr. Haddad would like evaluation of one of these nodes before proceeding with surgery because if there was any evidence of persistent metastatic disease, there would be no rationale for aggressive surgery.  The patient even at this visit was pretty reluctant about considering anything for the lymph nodes in the  chest and is convinced that it has nothing to do with the current malignancy or even the possibility of a separate lung primary.       I would schedule a tentative followup in about 8 weeks' time with labs prior to the clinic visit.  Hopefully he would have undergone surgery during this time.       His labs reveal hyponatremia and elevated creatinine from dehydration and inadequate oral intake.     He has hypomagnesemia due to ongoing platinum based chemotherapy.     He has moderate anemia related to his chemotherapy and also his CKD stage III.     Over 45 min of direct face to face time spent with patient with more than 50% time spent in counseling and coordinating care.

## 2017-12-13 NOTE — LETTER
12/13/2017       RE: King Gonsalo Bruno  [id number]  [facility] [id number]     Dear Colleague,    Thank you for referring your patient, King Gonsalo Bruno, to the [facility]. Please see a copy of my visit note below.    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 He initiated Cisplatin/Gemzar split on days 1 and 8 given history of CKD. He is currently undergoing curative intent treatment, however he does have indeterminate pulmonary nodules that are being followed. He had a positive response to the 2 cycles which were complicated with issues of dehydration and nausea. With cycle 4, cisplatin was changed to carboplatin due to CKD.  He returns for evaluation prior to cycle 4, day 8.      Interval history:  notes that he has been feeling very poorly for the past few days. 5 days ago, he started to note some nasal congestion and sore throat. Symptoms have escalated over the past couple of days with a hacking cough, body aches (shoulders and neck), malaise, ear fullness/tenderness. He had a temp to 100.9 yesterday. He took tylenol yesterday and temp went down to 98.8. Feels short of breath at times,but thinks this relates to sinus congestion. Energy is low. No chest pain, palpitation or dizziness. Eating and drinking very little in the past few days. Is having increased issue with constipation again.  Urine appears stable in the catheter. Denies cloudiness or darkness in color. No bleeding/bruising. Has a mild headache at times. No vision changes. Hearing has decreased.    Current Outpatient Prescriptions   Medication Sig     guaiFENesin-codeine (ROBITUSSIN AC) 100-10 MG/5ML SOLN solution Take 5 mLs by mouth every 4 hours as needed for cough     nystatin (MYCOSTATIN) 325360 UNIT/ML suspension Take 5 mLs (500,000 Units) by mouth 4 times daily Swish  and spit     omeprazole (PRILOSEC) 2 mg/mL SUSP Take 10 mLs (20 mg) by mouth 2 times daily     MAGNESIUM OXIDE PO Take 400 mg by mouth 2 times daily     amLODIPine (NORVASC) 5 MG tablet Take 2 tablets (10 mg) by mouth daily     alum & mag hydroxide-simethicone (MYLANTA/MAALOX) 200-200-20 MG/5ML SUSP suspension Take 30 mLs by mouth every 4 hours as needed for indigestion     LORazepam (ATIVAN) 0.5 MG tablet Take 1 tablet (0.5 mg) by mouth every 4 hours as needed (Anxiety, Nausea/Vomiting or Sleep)     prochlorperazine (COMPAZINE) 10 MG tablet Take 0.5 tablets (5 mg) by mouth every 6 hours as needed (Nausea/Vomiting)     ondansetron (ZOFRAN) 8 MG tablet Take 1 tablet (8 mg) by mouth every 8 hours as needed for nausea     docusate sodium (COLACE) 100 MG capsule Take 1 capsule (100 mg) by mouth 2 times daily as needed for constipation     dexamethasone (DECADRON) 4 MG tablet Take 2 tablets (8 mg) by mouth daily Take for 3 days, starting the day after cisplatin (Day 2 and Day 9).     HYDROXYZINE HCL PO Take 5 mg by mouth At Bedtime      fluticasone (FLONASE) 50 MCG/ACT spray Spray 1 spray into both nostrils every morning      amoxicillin-clavulanate (AUGMENTIN) 875-125 MG per tablet Take 1 tablet by mouth 2 times daily (Patient not taking: Reported on 12/6/2017)     OLANZapine (ZYPREXA) 5 MG tablet Take 1 tablet (5 mg) by mouth At Bedtime (Patient not taking: Reported on 11/15/2017)     No current facility-administered medications for this visit.      Facility-Administered Medications Ordered in Other Visits   Medication     barium sulfate (EZ PAQUE) oral suspension 96%     barium sulfate (EZ-HD) oral suspension 98%     sod bicarbonate-citric acid-simethicone (EZ GAS) 2.21-1.53-0.04 G packet 4 g           Allergies   Allergen Reactions     Lisinopril Swelling         Exam: alert, appears weak, fatigued Blood pressure 145/79, pulse 87, temperature 98.3  F (36.8  C), temperature source Oral, resp. rate 18, weight 70.1 kg  (154 lb 8 oz), SpO2 99 %.  Wt Readings from Last 4 Encounters:   12/13/17 70.1 kg (154 lb 8 oz)   12/08/17 70.9 kg (156 lb 4.8 oz)   12/06/17 70.4 kg (155 lb 4.8 oz)   12/01/17 69.1 kg (152 lb 4.8 oz)     Oropharynx is moist, no focal lesion. No icterus. Neck supple and without adenopathy. Lungs: crackles right base, no wheezes or rub. Heart: RRR. Abdomen: soft, nontender. Extremities: warm, no edema.    Labs:   Recent Labs   Lab Test  12/12/17   1135  12/08/17   0925  12/06/17   1039  12/01/17   0824  11/29/17   1058  11/26/17   1015   NA   --   135  133  127*  130*  131*   POTASSIUM   --   4.0  3.7  3.6  3.8  3.6   CHLORIDE   --   103  100  92*  97  97   CO2   --   23  23  25  24  26   ANIONGAP   --   8  9  10  8  8   BUN   --   9  8  19  15  18   CR   --   1.29*  1.21  1.56*  1.43*  1.36*   GLC  108*  79  101*  88  78  88   SAADIA   --   8.6  8.6  8.6  8.8  8.8     Recent Labs   Lab Test  12/08/17   0925  12/06/17   1039  12/01/17   0824  11/29/17   1058  11/26/17   1015   MAG  1.5*  1.3*  1.2*  1.4*  1.5*     Recent Labs   Lab Test  12/13/17   0813  12/01/17   0824  11/24/17   0827  11/10/17   0839  11/03/17   0743  10/25/17   1046   WBC  4.3  2.2*  3.7*  3.0*  5.8  4.8   HGB  9.4*  9.1*  7.9*  9.0*  9.1*  10.7*   PLT  262  113*  146*  157  444  215   MCV  91  89  90  86  90  85   NEUTROPHIL   --   66.7  57.3  44.1  40.1  66.4     Recent Labs   Lab Test  12/01/17   0824  11/24/17   0827  11/10/17   0839   BILITOTAL  0.3  0.2  0.4   ALKPHOS  60  59  70   ALT  21  16  18   AST  42  20  26   ALBUMIN  3.2*  3.3*  3.3*     TSH   Date Value Ref Range Status   11/24/2017 0.82 0.40 - 4.00 mU/L Final   10/06/2017 1.61 0.40 - 4.00 mU/L Final     Imaging: EXAM: XR CHEST 2 VW  12/1/2017 9:55 AM      HISTORY:  SOB, COUGH ,FEVER, EVALUATE FOR PNEUMONIA IN THE SETTING OF  PENILE URETHRAL CANCER; Cough        COMPARISON:  PET 10/23/2017     FINDINGS: PA and lateral radiographs of the chest. The mediastinal  border, cardiac  silhouette, and pulmonary vasculature are within  normal limits. No focal airspace opacities. No pneumothorax. No  pleural effusions. Emphysematous changes. Previously visualized  anterior pulmonary nodules are not appreciated.         IMPRESSION: Emphysematous changes without consolidation.     I have personally reviewed the examination and initial interpretation  and I agree with the findings.     GIOVANNY JOSEPH MD    Impression/plan:     1. Stage II penile urethral carcinoma, previously on Cisplatin/Brule, now on cycle 4 Carbo/Brule due to complications of CKD with cisplatin  -was feeling this was better tolerated with IVF support, but now symptomatic from bronchitis  -will cancel day 8 Gemzar today. Will have f/u with Dr. Arceo in 2 weeks with a PET/CT to assess response     2. URI: likely viral, but with some immunocompromise while on chemotherapy  - will initiated a course of augmentin 875 bid for bronchitis/sinusitis  -discussed symptom control with mucinex, sudafed, tylenol. Given Robitussin AC to use at HS for coughing  -quite weak/fatigued today, felt a little better with IVF rehydration.     3. FEN: eating/drinking poorly  - hyponatremia-chronic, but a little worse today. Mildly dehydrated. Given 1.5 L NS today   hypomagnesemia-chronic related to cisplatin, on MagOx 400 mg bid, replace IV today per protocol  -recheck lytes and give possible IVF next week Monday    4. Constipation   -continue miralax bid, colace bid, can add senna as needed    5.: has suprapubic catheter, due for change next month    6. Heme: s/p 1 U PRBC transfusion last week- hgb improved to 9.1. Will monitor    7. HTN: bp lower than baseline today. Remains on norvasc.      Again, thank you for allowing me to participate in the care of your patient.      Sincerely,    Steve Arceo MD

## 2017-12-13 NOTE — NURSING NOTE
"Oncology Rooming Note    December 13, 2017 8:35 AM   King Gonsalo Bruno is a 72 year old male who presents for:    Chief Complaint   Patient presents with     Blood Draw     labs drawn left hand via venipuncture by RN and vitals taken by [facility]     Oncology Clinic Visit     Urethral cancer f/u      Initial Vitals: /79 (BP Location: Left arm, Patient Position: Chair, Cuff Size: Adult Regular)  Pulse 87  Temp 98.3  F (36.8  C) (Oral)  Resp 18  Wt 70.1 kg (154 lb 8 oz)  SpO2 99%  BMI 18.81 kg/m2 Estimated body mass index is 18.81 kg/(m^2) as calculated from the following:    Height as of 11/14/17: 1.93 m (6' 4\").    Weight as of this encounter: 70.1 kg (154 lb 8 oz). Body surface area is 1.94 meters squared.  No Pain (0) Comment: Data Unavailable   No LMP for male patient.  Allergies reviewed: Yes  Medications reviewed: Yes    Medications: Medication refills not needed today.  Pharmacy name entered into [employer]:    [id number] - [place], [place] - [address] [place] [address] [address] [place] [address] [facility] [place], [place] - [address] [facility] [phone]    Clinical concerns: No clinical concerns  Provider was NOT notified.    7 minutes for nursing intake (face to face time)     Rober Peres              "

## 2017-12-13 NOTE — MR AVS SNAPSHOT
After Visit Summary   12/13/2017    King Gonsalo Bruno    MRN: [id number]           Patient Information     Date Of Birth          1945        Visit Information        Provider Department      12/13/2017 8:45 AM Steve Arceo MD [facility]        Today's Diagnoses     Urethral cancer (H)    -  1    Allergic sinusitis           Follow-ups after your visit        Your next 10 appointments already scheduled     Dec 14, 2017 11:30 AM CST   Infusion 360 with  ONCOLOGY INFUSION, UC 13 ATC   [facility] ([facility])    [address] [place] [address]  2nd [facility] [address]   [phone]            Jan 08, 2018  1:40 PM CST   (Arrive by 1:25 PM)   Return Visit with  Prostate Cancer Ctr Nurse   [facility] Inst for Prostate and Urologic Cancers ([facility])    [address] [place] [address]  4th [facility] [address] [phone]            Feb 07, 2018 11:45 AM CST   [facility] Lab Draw with UC MASONIC LAB DRAW   [facility] Lab Draw ([facility])    [address] [place] [address] [phone]            Feb 07, 2018 12:15 PM CST   (Arrive by 12:00 PM)   Return Visit with Steve Arceo MD   [facility] ([facility])    [address] [place] [address] [phone]              Who to contact     If you have questions or need follow up information about today's clinic visit or your schedule please contact [facility] directly at [phone].  Normal or non-critical lab and imaging results will be communicated to you by MyChart, letter or phone within 4 business days after the clinic has received the results. If you do not hear from us within 7 days, please contact the clinic through MyChart or phone. If you have a critical or abnormal lab  "result, we will notify you by phone as soon as possible.  Submit refill requests through Verious or call your pharmacy and they will forward the refill request to us. Please allow 3 business days for your refill to be completed.          Additional Information About Your Visit        Femasyshart Information     Verious lets you send messages to your doctor, view your test results, renew your prescriptions, schedule appointments and more. To sign up, go to [email or web address] . Click on \"Log in\" on the left side of the screen, which will take you to the Welcome page. Then click on \"Sign up Now\" on the right side of the page.     You will be asked to enter the access code listed below, as well as some personal information. Please follow the directions to create your username and password.     Your access code is: [email or web address]  Expires: 2018  5:30 AM     Your access code will  in 90 days. If you need help or a new code, please call your [place] clinic or [phone].        Care EveryWhere ID     This is your Care EveryWhere ID. This could be used by other organizations to access your [place] medical records  [id number]        Your Vitals Were     Pulse Temperature Respirations Pulse Oximetry BMI (Body Mass Index)       87 98.3  F (36.8  C) (Oral) 18 99% 18.81 kg/m2        Blood Pressure from Last 3 Encounters:   17 145/79   17 139/82   17 131/80    Weight from Last 3 Encounters:   17 70.1 kg (154 lb 8 oz)   17 70.9 kg (156 lb 4.8 oz)   17 70.4 kg (155 lb 4.8 oz)              We Performed the Following     *CBC with platelets differential     Comprehensive metabolic panel     Magnesium          Where to get your medicines      These medications were sent to [facility] [place], [place] - [address] [facility] [address] [address]    Hours:  TRANSPLANT PHONE NUMBER [phone] Phone:  [phone]     " fluticasone 50 MCG/ACT spray          Primary Care Provider Office Phone # Fax #    Joan Ventura, [phone]       [facility] [address]  [facility] [address]        Equal Access to Services     JENELLE RDE : Hadii светлана ku hadnikkio Soomaali, waaxda luqadaha, qaybta kaalmada adeegyada, waxsulma nayin haylidian georginaaamir marquez carola rasmussen. So [facility] [phone].    ATENCIÓN: Si habla español, tiene a washburn disposición servicios gratuitos de asistencia lingüística. Llame al [phone].    We comply with applicable federal civil rights laws and [place] laws. We do not discriminate on the basis of race, color, national origin, age, disability, sex, sexual orientation, or gender identity.            Thank you!     Thank you for choosing [facility] CANCER [facility]  for your care. Our goal is always to provide you with excellent care. Hearing back from our patients is one way we can continue to improve our services. Please take a few minutes to complete the written survey that you may receive in the mail after your visit with us. Thank you!             Your Updated Medication List - Protect others around you: Learn how to safely use, store and throw away your medicines at www.disposemymeds.org.          This list is accurate as of: 12/13/17  9:10 AM.  Always use your most recent med list.                   Brand Name Dispense Instructions for use Diagnosis    alum & mag hydroxide-simethicone 200-200-20 MG/5ML Susp suspension    MYLANTA/MAALOX    355 mL    Take 30 mLs by mouth every 4 hours as needed for indigestion        amLODIPine 5 MG tablet    NORVASC    60 tablet    Take 2 tablets (10 mg) by mouth daily    Benign essential hypertension       amoxicillin-clavulanate 875-125 MG per tablet    AUGMENTIN    20 tablet    Take 1 tablet by mouth 2 times daily    Acute bronchitis, unspecified organism       dexamethasone 4 MG tablet    DECADRON    12 tablet    Take 2 tablets (8 mg) by mouth daily Take  for 3 days, starting the day after cisplatin (Day 2 and Day 9).    Urethral cancer (H)       docusate sodium 100 MG capsule    COLACE    60 capsule    Take 1 capsule (100 mg) by mouth 2 times daily as needed for constipation    Slow transit constipation       fluticasone 50 MCG/ACT spray    FLONASE    1 Bottle    Spray 1 spray into both nostrils every morning    Urethral cancer (H), Allergic sinusitis       guaiFENesin-codeine 100-10 MG/5ML Soln solution    ROBITUSSIN AC    120 mL    Take 5 mLs by mouth every 4 hours as needed for cough    Acute bronchitis, unspecified organism, Cough       HYDROXYZINE HCL PO      Take 5 mg by mouth At Bedtime        LORazepam 0.5 MG tablet    ATIVAN    30 tablet    Take 1 tablet (0.5 mg) by mouth every 4 hours as needed (Anxiety, Nausea/Vomiting or Sleep)    Nausea       MAGNESIUM OXIDE PO      Take 400 mg by mouth 2 times daily        nystatin 014247 UNIT/ML suspension    MYCOSTATIN    200 mL    Take 5 mLs (500,000 Units) by mouth 4 times daily Swish and spit    Thrush       OLANZapine 5 MG tablet    zyPREXA    30 tablet    Take 1 tablet (5 mg) by mouth At Bedtime    Urethral cancer (H), Nausea       omeprazole 2 mg/mL Susp    priLOSEC    280 mL    Take 10 mLs (20 mg) by mouth 2 times daily    Gastroesophageal reflux disease with esophagitis, Urethral cancer (H)       ondansetron 8 MG tablet    ZOFRAN    20 tablet    Take 1 tablet (8 mg) by mouth every 8 hours as needed for nausea    Nausea       prochlorperazine 10 MG tablet    COMPAZINE    30 tablet    Take 0.5 tablets (5 mg) by mouth every 6 hours as needed (Nausea/Vomiting)    Nausea

## 2017-12-13 NOTE — NURSING NOTE
Chief Complaint   Patient presents with     Blood Draw     labs drawn left hand via venipuncture by RN and vitals taken by LISBET Valerio CMA December 13, 2017

## 2018-01-01 ENCOUNTER — INFUSION THERAPY VISIT (OUTPATIENT)
Dept: ONCOLOGY | Facility: CLINIC | Age: 73
End: 2018-01-01
Attending: INTERNAL MEDICINE
Payer: MEDICARE

## 2018-01-01 ENCOUNTER — RADIANT APPOINTMENT (OUTPATIENT)
Dept: CT IMAGING | Facility: CLINIC | Age: 73
End: 2018-01-01
Attending: INTERNAL MEDICINE
Payer: COMMERCIAL

## 2018-01-01 ENCOUNTER — APPOINTMENT (OUTPATIENT)
Dept: LAB | Facility: CLINIC | Age: 73
End: 2018-01-01
Attending: INTERNAL MEDICINE
Payer: MEDICARE

## 2018-01-01 ENCOUNTER — INFUSION THERAPY VISIT (OUTPATIENT)
Dept: ONCOLOGY | Facility: CLINIC | Age: 73
End: 2018-01-01
Attending: NURSE PRACTITIONER
Payer: MEDICARE

## 2018-01-01 ENCOUNTER — APPOINTMENT (OUTPATIENT)
Dept: LAB | Facility: CLINIC | Age: 73
End: 2018-01-01
Attending: NURSE PRACTITIONER
Payer: MEDICARE

## 2018-01-01 ENCOUNTER — ONCOLOGY VISIT (OUTPATIENT)
Dept: ONCOLOGY | Facility: CLINIC | Age: 73
End: 2018-01-01
Attending: INTERNAL MEDICINE
Payer: COMMERCIAL

## 2018-01-01 VITALS
BODY MASS INDEX: 17.65 KG/M2 | DIASTOLIC BLOOD PRESSURE: 70 MMHG | SYSTOLIC BLOOD PRESSURE: 125 MMHG | TEMPERATURE: 98.7 F | HEART RATE: 71 BPM | OXYGEN SATURATION: 100 % | RESPIRATION RATE: 16 BRPM | WEIGHT: 144.9 LBS

## 2018-01-01 VITALS
DIASTOLIC BLOOD PRESSURE: 69 MMHG | SYSTOLIC BLOOD PRESSURE: 132 MMHG | BODY MASS INDEX: 17.78 KG/M2 | OXYGEN SATURATION: 100 % | HEART RATE: 75 BPM | WEIGHT: 146 LBS | TEMPERATURE: 97.8 F

## 2018-01-01 VITALS
HEART RATE: 71 BPM | SYSTOLIC BLOOD PRESSURE: 125 MMHG | TEMPERATURE: 98.7 F | DIASTOLIC BLOOD PRESSURE: 70 MMHG | OXYGEN SATURATION: 100 % | WEIGHT: 144.84 LBS | RESPIRATION RATE: 16 BRPM | BODY MASS INDEX: 17.64 KG/M2

## 2018-01-01 VITALS
HEART RATE: 67 BPM | OXYGEN SATURATION: 100 % | SYSTOLIC BLOOD PRESSURE: 151 MMHG | TEMPERATURE: 98 F | RESPIRATION RATE: 18 BRPM | WEIGHT: 143 LBS | DIASTOLIC BLOOD PRESSURE: 80 MMHG | BODY MASS INDEX: 17.41 KG/M2

## 2018-01-01 VITALS
RESPIRATION RATE: 16 BRPM | TEMPERATURE: 98.4 F | SYSTOLIC BLOOD PRESSURE: 135 MMHG | WEIGHT: 143.7 LBS | DIASTOLIC BLOOD PRESSURE: 74 MMHG | BODY MASS INDEX: 17.5 KG/M2 | HEIGHT: 76 IN | OXYGEN SATURATION: 100 % | HEART RATE: 64 BPM

## 2018-01-01 VITALS
TEMPERATURE: 97.3 F | WEIGHT: 143.4 LBS | RESPIRATION RATE: 16 BRPM | DIASTOLIC BLOOD PRESSURE: 77 MMHG | BODY MASS INDEX: 17.46 KG/M2 | HEART RATE: 69 BPM | SYSTOLIC BLOOD PRESSURE: 137 MMHG | OXYGEN SATURATION: 98 %

## 2018-01-01 DIAGNOSIS — E87.5 HYPERKALEMIA: ICD-10-CM

## 2018-01-01 DIAGNOSIS — C78.02 MALIGNANT NEOPLASM METASTATIC TO BOTH LUNGS (H): Primary | ICD-10-CM

## 2018-01-01 DIAGNOSIS — C78.02 MALIGNANT NEOPLASM METASTATIC TO BOTH LUNGS (H): ICD-10-CM

## 2018-01-01 DIAGNOSIS — C68.0 URETHRAL CANCER (H): ICD-10-CM

## 2018-01-01 DIAGNOSIS — R11.0 NAUSEA: ICD-10-CM

## 2018-01-01 DIAGNOSIS — E87.6 HYPOKALEMIA: ICD-10-CM

## 2018-01-01 DIAGNOSIS — C78.01 MALIGNANT NEOPLASM METASTATIC TO BOTH LUNGS (H): ICD-10-CM

## 2018-01-01 DIAGNOSIS — K21.00 GASTROESOPHAGEAL REFLUX DISEASE WITH ESOPHAGITIS: ICD-10-CM

## 2018-01-01 DIAGNOSIS — E86.0 DEHYDRATION: ICD-10-CM

## 2018-01-01 DIAGNOSIS — C68.0 URETHRAL CANCER (H): Primary | ICD-10-CM

## 2018-01-01 DIAGNOSIS — C78.00 MALIGNANT NEOPLASM METASTATIC TO LUNG, UNSPECIFIED LATERALITY (H): ICD-10-CM

## 2018-01-01 DIAGNOSIS — C78.01 MALIGNANT NEOPLASM METASTATIC TO BOTH LUNGS (H): Primary | ICD-10-CM

## 2018-01-01 DIAGNOSIS — M19.90 ARTHRITIS: ICD-10-CM

## 2018-01-01 DIAGNOSIS — N18.9 CHRONIC KIDNEY DISEASE, UNSPECIFIED CKD STAGE: ICD-10-CM

## 2018-01-01 DIAGNOSIS — F41.9 ANXIETY: ICD-10-CM

## 2018-01-01 LAB
ALBUMIN SERPL-MCNC: 3.7 G/DL (ref 3.4–5)
ALBUMIN SERPL-MCNC: 3.8 G/DL (ref 3.4–5)
ALBUMIN SERPL-MCNC: 3.8 G/DL (ref 3.4–5)
ALBUMIN SERPL-MCNC: 3.9 G/DL (ref 3.4–5)
ALBUMIN SERPL-MCNC: 4.1 G/DL (ref 3.4–5)
ALP SERPL-CCNC: 72 U/L (ref 40–150)
ALP SERPL-CCNC: 78 U/L (ref 40–150)
ALP SERPL-CCNC: 83 U/L (ref 40–150)
ALT SERPL W P-5'-P-CCNC: 17 U/L (ref 0–70)
ALT SERPL W P-5'-P-CCNC: 18 U/L (ref 0–70)
ALT SERPL W P-5'-P-CCNC: 20 U/L (ref 0–70)
ANION GAP SERPL CALCULATED.3IONS-SCNC: 7 MMOL/L (ref 3–14)
ANION GAP SERPL CALCULATED.3IONS-SCNC: 7 MMOL/L (ref 3–14)
ANION GAP SERPL CALCULATED.3IONS-SCNC: 8 MMOL/L (ref 3–14)
ANION GAP SERPL CALCULATED.3IONS-SCNC: 9 MMOL/L (ref 3–14)
ANION GAP SERPL CALCULATED.3IONS-SCNC: 9 MMOL/L (ref 3–14)
AST SERPL W P-5'-P-CCNC: 28 U/L (ref 0–45)
AST SERPL W P-5'-P-CCNC: 30 U/L (ref 0–45)
AST SERPL W P-5'-P-CCNC: 34 U/L (ref 0–45)
AST SERPL W P-5'-P-CCNC: 34 U/L (ref 0–45)
AST SERPL W P-5'-P-CCNC: 35 U/L (ref 0–45)
BILIRUB SERPL-MCNC: 0.4 MG/DL (ref 0.2–1.3)
BILIRUB SERPL-MCNC: 0.4 MG/DL (ref 0.2–1.3)
BILIRUB SERPL-MCNC: 0.5 MG/DL (ref 0.2–1.3)
BUN SERPL-MCNC: 13 MG/DL (ref 7–30)
BUN SERPL-MCNC: 13 MG/DL (ref 7–30)
BUN SERPL-MCNC: 17 MG/DL (ref 7–30)
BUN SERPL-MCNC: 18 MG/DL (ref 7–30)
BUN SERPL-MCNC: 18 MG/DL (ref 7–30)
CALCIUM SERPL-MCNC: 9.3 MG/DL (ref 8.5–10.1)
CALCIUM SERPL-MCNC: 9.5 MG/DL (ref 8.5–10.1)
CALCIUM SERPL-MCNC: 9.5 MG/DL (ref 8.5–10.1)
CALCIUM SERPL-MCNC: 9.6 MG/DL (ref 8.5–10.1)
CALCIUM SERPL-MCNC: 9.6 MG/DL (ref 8.5–10.1)
CHLORIDE SERPL-SCNC: 102 MMOL/L (ref 94–109)
CHLORIDE SERPL-SCNC: 103 MMOL/L (ref 94–109)
CHLORIDE SERPL-SCNC: 103 MMOL/L (ref 94–109)
CHLORIDE SERPL-SCNC: 104 MMOL/L (ref 94–109)
CHLORIDE SERPL-SCNC: 104 MMOL/L (ref 94–109)
CO2 SERPL-SCNC: 24 MMOL/L (ref 20–32)
CO2 SERPL-SCNC: 24 MMOL/L (ref 20–32)
CO2 SERPL-SCNC: 25 MMOL/L (ref 20–32)
CO2 SERPL-SCNC: 25 MMOL/L (ref 20–32)
CO2 SERPL-SCNC: 26 MMOL/L (ref 20–32)
CREAT SERPL-MCNC: 1.54 MG/DL (ref 0.66–1.25)
CREAT SERPL-MCNC: 1.56 MG/DL (ref 0.66–1.25)
CREAT SERPL-MCNC: 1.59 MG/DL (ref 0.66–1.25)
CREAT SERPL-MCNC: 1.67 MG/DL (ref 0.66–1.25)
CREAT SERPL-MCNC: 1.73 MG/DL (ref 0.66–1.25)
GFR SERPL CREATININE-BSD FRML MDRD: 39 ML/MIN/{1.73_M2}
GFR SERPL CREATININE-BSD FRML MDRD: 41 ML/MIN/1.7M2
GFR SERPL CREATININE-BSD FRML MDRD: 43 ML/MIN/1.7M2
GFR SERPL CREATININE-BSD FRML MDRD: 44 ML/MIN/1.7M2
GFR SERPL CREATININE-BSD FRML MDRD: 45 ML/MIN/1.7M2
GLUCOSE SERPL-MCNC: 78 MG/DL (ref 70–99)
GLUCOSE SERPL-MCNC: 78 MG/DL (ref 70–99)
GLUCOSE SERPL-MCNC: 79 MG/DL (ref 70–99)
GLUCOSE SERPL-MCNC: 88 MG/DL (ref 70–99)
GLUCOSE SERPL-MCNC: 89 MG/DL (ref 70–99)
MAGNESIUM SERPL-MCNC: 1.8 MG/DL (ref 1.6–2.3)
MAGNESIUM SERPL-MCNC: 1.8 MG/DL (ref 1.6–2.3)
MAGNESIUM SERPL-MCNC: 1.9 MG/DL (ref 1.6–2.3)
MAGNESIUM SERPL-MCNC: 2 MG/DL (ref 1.6–2.3)
POTASSIUM SERPL-SCNC: 3.8 MMOL/L (ref 3.4–5.3)
POTASSIUM SERPL-SCNC: 4 MMOL/L (ref 3.4–5.3)
POTASSIUM SERPL-SCNC: 4.3 MMOL/L (ref 3.4–5.3)
POTASSIUM SERPL-SCNC: 4.5 MMOL/L (ref 3.4–5.3)
POTASSIUM SERPL-SCNC: 4.7 MMOL/L (ref 3.4–5.3)
POTASSIUM SERPL-SCNC: 5.5 MMOL/L (ref 3.4–5.3)
PROT SERPL-MCNC: 7.7 G/DL (ref 6.8–8.8)
PROT SERPL-MCNC: 8.1 G/DL (ref 6.8–8.8)
PROT SERPL-MCNC: 8.1 G/DL (ref 6.8–8.8)
PROT SERPL-MCNC: 8.3 G/DL (ref 6.8–8.8)
PROT SERPL-MCNC: 8.3 G/DL (ref 6.8–8.8)
SODIUM SERPL-SCNC: 134 MMOL/L (ref 133–144)
SODIUM SERPL-SCNC: 135 MMOL/L (ref 133–144)
SODIUM SERPL-SCNC: 136 MMOL/L (ref 133–144)
SODIUM SERPL-SCNC: 137 MMOL/L (ref 133–144)
SODIUM SERPL-SCNC: 137 MMOL/L (ref 133–144)
TSH SERPL DL<=0.005 MIU/L-ACNC: 0.56 MU/L (ref 0.4–4)
TSH SERPL DL<=0.005 MIU/L-ACNC: 0.68 MU/L (ref 0.4–4)
TSH SERPL DL<=0.005 MIU/L-ACNC: 0.71 MU/L (ref 0.4–4)
TSH SERPL DL<=0.005 MIU/L-ACNC: 0.83 MU/L (ref 0.4–4)

## 2018-01-01 PROCEDURE — 99214 OFFICE O/P EST MOD 30 MIN: CPT | Mod: ZP | Performed by: NURSE PRACTITIONER

## 2018-01-01 PROCEDURE — 25000128 H RX IP 250 OP 636: Mod: ZF | Performed by: INTERNAL MEDICINE

## 2018-01-01 PROCEDURE — G0463 HOSPITAL OUTPT CLINIC VISIT: HCPCS | Mod: ZF

## 2018-01-01 PROCEDURE — 40000141 ZZH STATISTIC PERIPHERAL IV START W/O US GUIDANCE: Mod: ZF

## 2018-01-01 PROCEDURE — 83735 ASSAY OF MAGNESIUM: CPT | Performed by: NURSE PRACTITIONER

## 2018-01-01 PROCEDURE — 80053 COMPREHEN METABOLIC PANEL: CPT | Performed by: INTERNAL MEDICINE

## 2018-01-01 PROCEDURE — 99214 OFFICE O/P EST MOD 30 MIN: CPT | Mod: ZP | Performed by: INTERNAL MEDICINE

## 2018-01-01 PROCEDURE — 83735 ASSAY OF MAGNESIUM: CPT | Performed by: INTERNAL MEDICINE

## 2018-01-01 PROCEDURE — 84132 ASSAY OF SERUM POTASSIUM: CPT | Mod: 91 | Performed by: NURSE PRACTITIONER

## 2018-01-01 PROCEDURE — 25000128 H RX IP 250 OP 636: Mod: ZF | Performed by: NURSE PRACTITIONER

## 2018-01-01 PROCEDURE — 80053 COMPREHEN METABOLIC PANEL: CPT | Performed by: NURSE PRACTITIONER

## 2018-01-01 PROCEDURE — 84443 ASSAY THYROID STIM HORMONE: CPT | Performed by: NURSE PRACTITIONER

## 2018-01-01 PROCEDURE — 84443 ASSAY THYROID STIM HORMONE: CPT | Performed by: INTERNAL MEDICINE

## 2018-01-01 PROCEDURE — 96413 CHEMO IV INFUSION 1 HR: CPT

## 2018-01-01 PROCEDURE — 36415 COLL VENOUS BLD VENIPUNCTURE: CPT | Mod: ZF

## 2018-01-01 RX ORDER — ALBUTEROL SULFATE 0.83 MG/ML
2.5 SOLUTION RESPIRATORY (INHALATION)
Status: CANCELLED | OUTPATIENT
Start: 2018-01-01

## 2018-01-01 RX ORDER — POLYETHYLENE GLYCOL 3350 17 G/17G
1 POWDER, FOR SOLUTION ORAL DAILY
Status: ON HOLD | COMMUNITY
End: 2019-01-01

## 2018-01-01 RX ORDER — SODIUM CHLORIDE 9 MG/ML
1000 INJECTION, SOLUTION INTRAVENOUS CONTINUOUS PRN
Status: CANCELLED
Start: 2018-01-01

## 2018-01-01 RX ORDER — LORAZEPAM 2 MG/ML
0.5 INJECTION INTRAMUSCULAR EVERY 4 HOURS PRN
Status: CANCELLED
Start: 2018-01-01

## 2018-01-01 RX ORDER — DIPHENHYDRAMINE HYDROCHLORIDE 50 MG/ML
50 INJECTION INTRAMUSCULAR; INTRAVENOUS
Status: CANCELLED
Start: 2018-01-01

## 2018-01-01 RX ORDER — EPINEPHRINE 1 MG/ML
0.3 INJECTION, SOLUTION INTRAMUSCULAR; SUBCUTANEOUS EVERY 5 MIN PRN
Status: CANCELLED | OUTPATIENT
Start: 2018-01-01

## 2018-01-01 RX ORDER — ALBUTEROL SULFATE 90 UG/1
1-2 AEROSOL, METERED RESPIRATORY (INHALATION)
Status: CANCELLED
Start: 2018-01-01

## 2018-01-01 RX ORDER — LORAZEPAM 0.5 MG/1
0.5 TABLET ORAL
Qty: 30 TABLET | Refills: 3 | Status: SHIPPED | OUTPATIENT
Start: 2018-01-01 | End: 2019-01-01

## 2018-01-01 RX ORDER — EPINEPHRINE 0.3 MG/.3ML
0.3 INJECTION SUBCUTANEOUS EVERY 5 MIN PRN
Status: CANCELLED | OUTPATIENT
Start: 2018-01-01

## 2018-01-01 RX ORDER — METHYLPREDNISOLONE SODIUM SUCCINATE 125 MG/2ML
125 INJECTION, POWDER, LYOPHILIZED, FOR SOLUTION INTRAMUSCULAR; INTRAVENOUS
Status: CANCELLED
Start: 2018-01-01

## 2018-01-01 RX ORDER — MEPERIDINE HYDROCHLORIDE 25 MG/ML
25 INJECTION INTRAMUSCULAR; INTRAVENOUS; SUBCUTANEOUS EVERY 30 MIN PRN
Status: CANCELLED | OUTPATIENT
Start: 2018-01-01

## 2018-01-01 RX ADMIN — SODIUM CHLORIDE 200 MG: 900 INJECTION, SOLUTION INTRAVENOUS at 13:36

## 2018-01-01 RX ADMIN — SODIUM CHLORIDE 250 ML: 9 INJECTION, SOLUTION INTRAVENOUS at 13:35

## 2018-01-01 RX ADMIN — SODIUM CHLORIDE 200 MG: 9 INJECTION, SOLUTION INTRAVENOUS at 13:44

## 2018-01-01 RX ADMIN — SODIUM CHLORIDE 200 MG: 9 INJECTION, SOLUTION INTRAVENOUS at 11:01

## 2018-01-01 RX ADMIN — SODIUM CHLORIDE 200 MG: 9 INJECTION, SOLUTION INTRAVENOUS at 13:45

## 2018-01-01 RX ADMIN — SODIUM CHLORIDE 250 ML: 9 INJECTION, SOLUTION INTRAVENOUS at 10:25

## 2018-01-01 RX ADMIN — SODIUM CHLORIDE 1000 ML: 9 INJECTION, SOLUTION INTRAVENOUS at 13:45

## 2018-01-01 RX ADMIN — SODIUM CHLORIDE 250 ML: 9 INJECTION, SOLUTION INTRAVENOUS at 13:32

## 2018-01-01 ASSESSMENT — MIFFLIN-ST. JEOR: SCORE: 1508.07

## 2018-01-01 ASSESSMENT — PAIN SCALES - GENERAL
PAINLEVEL: NO PAIN (0)
PAINLEVEL: MODERATE PAIN (4)

## 2018-01-04 DIAGNOSIS — F41.9 ANXIETY: Primary | ICD-10-CM

## 2018-01-04 RX ORDER — LORAZEPAM 0.5 MG/1
0.5 TABLET ORAL EVERY 8 HOURS PRN
Qty: 30 TABLET | Refills: 0 | Status: SHIPPED | OUTPATIENT
Start: 2018-01-04 | End: 2018-02-07

## 2018-01-05 ENCOUNTER — PRE VISIT (OUTPATIENT)
Dept: UROLOGY | Facility: CLINIC | Age: 73
End: 2018-01-05

## 2018-01-08 ENCOUNTER — ALLIED HEALTH/NURSE VISIT (OUTPATIENT)
Dept: UROLOGY | Facility: CLINIC | Age: 73
End: 2018-01-08
Payer: COMMERCIAL

## 2018-01-08 DIAGNOSIS — C68.0 URETHRAL CANCER (H): Primary | ICD-10-CM

## 2018-01-08 NOTE — PATIENT INSTRUCTIONS
Follow up in 3 weeks for next SP tube change.    It was a pleasure meeting with you today.  Thank you for allowing me and my team the privilege of caring for you today.  YOU are the reason we are here, and I truly hope we provided you with the excellent service you deserve.  Please let us know if there is anything else we can do for you so that we can be sure you are leaving completely satisfied with your care experience.      ALL Amador

## 2018-01-08 NOTE — NURSING NOTE
King Gonsalo Bruno comes into clinic today at the request of Dr. Haddad Ordering Provider for Catheter Change.    This service provided today was under the supervising provider of the day Khalida Grullon PA-C, who was available if needed.    Zoë Snyder    Catheter insertion documentation on 1/8/2018:    King Gonsalo Bruno presents to the clinic for catheter insertion.  Reason for insertion: replacement  Order has been verified. YES  Catheter successfully inserted into the suprapubic meatus in the usual sterile fashion without immediate complication.  Type of catheter placed: 16 Tunisian SILICONE catheter  Urine is yellow in color.  10 cc's of urine output returned.  Balloon was filled with 7cc's of normal saline.  Securement device placed for the catheter.  The patient tolerated the procedure and was instructed to return or call for pain, fever, leakage or decreased urine flow.    ALL Amador

## 2018-01-08 NOTE — MR AVS SNAPSHOT
After Visit Summary   1/8/2018    King Gonsalo Bruno    MRN: [id number]           Patient Information     Date Of Birth          1945        Visit Information        Provider Department      1/8/2018 1:40 PM Nurse,  Prostate Cancer Ctr [facility]rology and Inst for Prostate and Urologic Cancers        Care Instructions    Follow up in 3 weeks for next SP tube change.    It was a pleasure meeting with you today.  Thank you for allowing me and my team the privilege of caring for you today.  YOU are the reason we are here, and I truly hope we provided you with the excellent service you deserve.  Please let us know if there is anything else we can do for you so that we can be sure you are leaving completely satisfied with your care experience.      Zoë Snyder PO          Follow-ups after your visit        Your next 10 appointments already scheduled     David 10, 2018  5:00 PM CST   (Arrive by 4:45 PM)   Return Visit with Muriel Haddad MD   [facility]rology [facility] for Prostate and Urologic Cancers ([facility])    [address]  [address] [facility] [address] [phone]            Jan 29, 2018  1:40 PM CST   (Arrive by 1:25 PM)   Return Visit with  Prostate Cancer Ctr Nurse   [facility] for Prostate and Urologic Cancers ([facility])    [address] [place] [address]  4th [facility] [address] [phone]            Feb 07, 2018 11:45 AM CST   [facility] Lab Draw with  [facility] LAB DRAW   [facility]    [address]  [address]  [facility] [address] [phone]            Feb 07, 2018 12:15 PM CST   (Arrive by 12:00 PM)   Return Visit with Steve Arceo MD   [facility] ([facility])    [address]  [address]  [facility] [address] [phone]              Who to contact      Please call your clinic at [phone] to:    Ask questions about your health    Make or cancel appointments    Discuss your medicines    Learn about your test results    Speak to your doctor   If you have compliments or concerns about an experience at your clinic, or if you wish to file a complaint, please contact [facility] Patient Relations at [phone] or email us at [email or web address]         Additional Information About Your Visit        TrackMavenhart Information     LETSGROOP is an electronic gateway that provides easy, online access to your medical records. With LETSGROOP, you can request a clinic appointment, read your test results, renew a prescription or communicate with your care team.     To sign up for LETSGROOP visit the website at [email or web address]   You will be asked to enter the access code listed below, as well as some personal information. Please follow the directions to create your username and password.     Your access code is: [email or web address]  Expires: 2018  5:30 AM     Your access code will  in 90 days. If you need help or a new code, please contact your [facility] or call [phone] for assistance.        Care EveryWhere ID     This is your Care EveryWhere ID. This could be used by other organizations to access your [place] medical records  [id number]         Blood Pressure from Last 3 Encounters:   17 145/79   17 139/82   17 131/80    Weight from Last 3 Encounters:   17 70.1 kg (154 lb 8 oz)   17 70.9 kg (156 lb 4.8 oz)   17 70.4 kg (155 lb 4.8 oz)              Today, you had the following     No orders found for display       Primary Care Provider Office Phone # Fax #    Joan Ventura [phone]       [address] [address]        Equal Access to Services     JENELLE RED AH: Hadii dorie Pacheco  owen codyrik christiansonronald luke. So [facility] [phone].    ATENCIÓN: Si everett hinson, tiene a washburn disposición servicios gratuitos de asistencia lingüística. Juwan al [phone].    We comply with applicable federal civil rights laws and [place] laws. We do not discriminate on the basis of race, color, national origin, age, disability, sex, sexual orientation, or gender identity.            Thank you!     Thank you for choosing [facility] UROLOGY AND [facility] FOR PROSTATE AND UROLOGIC CANCERS  for your care. Our goal is always to provide you with excellent care. Hearing back from our patients is one way we can continue to improve our services. Please take a few minutes to complete the written survey that you may receive in the mail after your visit with us. Thank you!             Your Updated Medication List - Protect others around you: Learn how to safely use, store and throw away your medicines at www.disposemymeds.org.          This list is accurate as of: 1/8/18  2:03 PM.  Always use your most recent med list.                   Brand Name Dispense Instructions for use Diagnosis    alum & mag hydroxide-simethicone 200-200-20 MG/5ML Susp suspension    MYLANTA/MAALOX    355 mL    Take 30 mLs by mouth every 4 hours as needed for indigestion        amLODIPine 5 MG tablet    NORVASC    60 tablet    Take 2 tablets (10 mg) by mouth daily    Benign essential hypertension       amoxicillin-clavulanate 875-125 MG per tablet    AUGMENTIN    20 tablet    Take 1 tablet by mouth 2 times daily    Acute bronchitis, unspecified organism       dexamethasone 4 MG tablet    DECADRON    12 tablet    Take 2 tablets (8 mg) by mouth daily Take for 3 days, starting the day after cisplatin (Day 2 and Day 9).    Urethral cancer (H)       docusate sodium 100 MG capsule    COLACE    60 capsule    Take 1 capsule (100 mg) by mouth 2 times daily as needed for constipation    Slow transit constipation        fluticasone 50 MCG/ACT spray    FLONASE    1 Bottle    Spray 1 spray into both nostrils every morning    Urethral cancer (H), Allergic sinusitis       guaiFENesin-codeine 100-10 MG/5ML Soln solution    ROBITUSSIN AC    120 mL    Take 5 mLs by mouth every 4 hours as needed for cough    Acute bronchitis, unspecified organism, Cough       HYDROXYZINE HCL PO      Take 5 mg by mouth At Bedtime        * LORazepam 0.5 MG tablet    ATIVAN    30 tablet    Take 1 tablet (0.5 mg) by mouth every 4 hours as needed (Anxiety, Nausea/Vomiting or Sleep)    Nausea       * LORazepam 0.5 MG tablet    ATIVAN    30 tablet    Take 1 tablet (0.5 mg) by mouth every 8 hours as needed for anxiety    Anxiety       MAGNESIUM OXIDE PO      Take 400 mg by mouth 2 times daily        nystatin 608736 UNIT/ML suspension    MYCOSTATIN    200 mL    Take 5 mLs (500,000 Units) by mouth 4 times daily Swish and spit    Thrush       OLANZapine 5 MG tablet    zyPREXA    30 tablet    Take 1 tablet (5 mg) by mouth At Bedtime    Urethral cancer (H), Nausea       omeprazole 2 mg/mL Susp    priLOSEC    280 mL    Take 10 mLs (20 mg) by mouth 2 times daily    Gastroesophageal reflux disease with esophagitis, Urethral cancer (H)       ondansetron 8 MG tablet    ZOFRAN    20 tablet    Take 1 tablet (8 mg) by mouth every 8 hours as needed for nausea    Nausea       prochlorperazine 10 MG tablet    COMPAZINE    30 tablet    Take 0.5 tablets (5 mg) by mouth every 6 hours as needed (Nausea/Vomiting)    Nausea       * Notice:  This list has 2 medication(s) that are the same as other medications prescribed for you. Read the directions carefully, and ask your doctor or other care provider to review them with you.

## 2018-01-09 ENCOUNTER — TELEPHONE (OUTPATIENT)
Dept: UROLOGY | Facility: CLINIC | Age: 73
End: 2018-01-09

## 2018-01-09 NOTE — TELEPHONE ENCOUNTER
Spoke to patient about medication and also about needing further surgery. Tatyana Elder LPN Staff Nurse

## 2018-01-09 NOTE — TELEPHONE ENCOUNTER
----- Message from Muriel Haddad MD sent at 1/2/2018  5:07 PM CST -----  Yes that is ok and I spoke to [facility] - he needs surgery  ----- Message -----     From: Venkatesh Elder LPN     Sent: 1/2/2018  12:51 PM       To: Muriel Haddad MD    Patient called wants ativan is that ok?? And also wants to know if you spoke to [facility] about future care  He is done with chemo. venkatesh

## 2018-01-10 ENCOUNTER — OFFICE VISIT (OUTPATIENT)
Dept: UROLOGY | Facility: CLINIC | Age: 73
End: 2018-01-10
Payer: COMMERCIAL

## 2018-01-10 VITALS
HEART RATE: 86 BPM | WEIGHT: 153.3 LBS | SYSTOLIC BLOOD PRESSURE: 124 MMHG | DIASTOLIC BLOOD PRESSURE: 73 MMHG | HEIGHT: 76 IN | BODY MASS INDEX: 18.67 KG/M2

## 2018-01-10 DIAGNOSIS — C68.0 URETHRAL CANCER (H): Primary | ICD-10-CM

## 2018-01-10 RX ORDER — ERTAPENEM 1 G/1
1 INJECTION, POWDER, LYOPHILIZED, FOR SOLUTION INTRAMUSCULAR; INTRAVENOUS EVERY 24 HOURS
Status: CANCELLED | OUTPATIENT
Start: 2018-01-10

## 2018-01-10 RX ORDER — CEFAZOLIN SODIUM 1 G/3ML
1 INJECTION, POWDER, FOR SOLUTION INTRAMUSCULAR; INTRAVENOUS SEE ADMIN INSTRUCTIONS
Status: CANCELLED | OUTPATIENT
Start: 2018-01-10

## 2018-01-10 RX ORDER — HEPARIN SODIUM 5000 [USP'U]/.5ML
5000 INJECTION, SOLUTION INTRAVENOUS; SUBCUTANEOUS ONCE
Status: CANCELLED | OUTPATIENT
Start: 2018-01-10 | End: 2018-01-10

## 2018-01-10 ASSESSMENT — PAIN SCALES - GENERAL: PAINLEVEL: MILD PAIN (3)

## 2018-01-10 NOTE — MR AVS SNAPSHOT
After Visit Summary   1/10/2018    King Gonsalo Bruno    MRN: [id number]           Patient Information     Date Of Birth          1945        Visit Information        Provider Department      1/10/2018 5:00 PM Muriel Haddad MD [facility] Inst for Prostate and Urologic Cancers        Today's Diagnoses     Urethral cancer (H)    -  1       Follow-ups after your visit        Additional Services     PAC Visit Referral (For [facility] Only)       Does this visit require an Anesthesia consult?      H&P done by:  N/A and Other (Specify): PAC      Please be aware that coverage of these services is subject to the terms and limitations of your health insurance plan.  Call member services at your health plan with any benefit or coverage questions.      Please bring the following to your appointment:  >>   Any x-rays, CTs or MRIs which have been performed.  Contact the facility where they were done to arrange for  prior to your scheduled appointment.  Any new CT, MRI or other procedures ordered by your specialist must be performed at a [facility] facility or coordinated by your clinic's referral office.    >>   List of current medications  >>   This referral request   >>   Any documents/labs given to you for this referral                  Follow-up notes from your care team     Return in about 4 weeks (around 2/7/2018).      Your next 10 appointments already scheduled     Jan 29, 2018  1:40 PM CST   (Arrive by 1:25 PM)   Return Visit with  Prostate Cancer Ctr Nurse   [facility] for Prostate and Urologic Cancers ([facility])    [address]  4th Floor  [place] [address]   [phone]            Feb 07, 2018 11:45 AM CST   [facility] Draw with  [facility] DRAW   [facility] Draw ([facility])    [address]  [address]  [place] [address]   [phone]            Feb 07, 2018 12:15 PM  "CST   (Arrive by 12:00 PM)   Return Visit with Steve Arceo MD   [facility] ([facility])    [address]  [address]  [facility] [address] [phone]              Who to contact     Please call your clinic at [phone] to:    Ask questions about your health    Make or cancel appointments    Discuss your medicines    Learn about your test results    Speak to your doctor   If you have compliments or concerns about an experience at your clinic, or if you wish to file a complaint, please contact [facility] Patient Relations at [phone] or email us at [email or web address]         Additional Information About Your Visit        [employer] Information     [employer] is an electronic gateway that provides easy, online access to your medical records. With [employer], you can request a clinic appointment, read your test results, renew a prescription or communicate with your care team.     To sign up for [employer] visit the website at [email or web address]   You will be asked to enter the access code listed below, as well as some personal information. Please follow the directions to create your username and password.     Your access code is: [email or web address]  Expires: 2018  5:30 AM     Your access code will  in 90 days. If you need help or a new code, please contact your [facility] Clinic or call [phone] for assistance.        Care EveryWhere ID     This is your Care EveryWhere ID. This could be used by other organizations to access your [place] medical records  [id number]        Your Vitals Were     Pulse Height BMI (Body Mass Index)             86 1.93 m (6' 4\") 18.66 kg/m2          Blood Pressure from Last 3 Encounters:   01/10/18 124/73   17 145/79   17 139/82    Weight from Last 3 Encounters:   01/10/18 69.5 kg (153 lb 4.8 oz)   17 70.1 kg (154 lb 8 oz) "   12/08/17 70.9 kg (156 lb 4.8 oz)              We Performed the Following     PAC Visit Referral (For [facility] Only)     Deysi-Operative Worksheet          Today's Medication Changes          These changes are accurate as of: 1/10/18  7:04 PM.  If you have any questions, ask your nurse or doctor.               These medicines have changed or have updated prescriptions.        Dose/Directions    LORazepam 0.5 MG tablet   Commonly known as:  ATIVAN   This may have changed:  Another medication with the same name was removed. Continue taking this medication, and follow the directions you see here.   Used for:  Anxiety   Changed by:  Muriel Haddad MD        Dose:  0.5 mg   Take 1 tablet (0.5 mg) by mouth every 8 hours as needed for anxiety   Quantity:  30 tablet   Refills:  0                Primary Care Provider Office Phone # Fax #    Joan VenturaMARLENY [phone]       [place] [address]        Equal Access to Services     JENELLE RED AH: Hadii светлана ku hadasho Soomaali, waaxda luqadaha, qaybta kaalmada adeegyada, ronald srivastava . So [facility] [phone].    ATENCIÓN: Si habla español, tiene a washburn disposición servicios gratuitos de asistencia lingüística. Llame al [phone].    We comply with applicable federal civil rights laws and [place] laws. We do not discriminate on the basis of race, color, national origin, age, disability, sex, sexual orientation, or gender identity.            Thank you!     Thank you for choosing [facility] UROLOGY AND [facility] FOR PROSTATE AND UROLOGIC CANCERS  for your care. Our goal is always to provide you with excellent care. Hearing back from our patients is one way we can continue to improve our services. Please take a few minutes to complete the written survey that you may receive in the mail after your visit with us. Thank you!             Your Updated Medication List - Protect others around you: Learn how to  safely use, store and throw away your medicines at www.disposemymeds.org.          This list is accurate as of: 1/10/18  7:04 PM.  Always use your most recent med list.                   Brand Name Dispense Instructions for use Diagnosis    alum & mag hydroxide-simethicone 200-200-20 MG/5ML Susp suspension    MYLANTA/MAALOX    355 mL    Take 30 mLs by mouth every 4 hours as needed for indigestion        amLODIPine 5 MG tablet    NORVASC    60 tablet    Take 2 tablets (10 mg) by mouth daily    Benign essential hypertension       dexamethasone 4 MG tablet    DECADRON    12 tablet    Take 2 tablets (8 mg) by mouth daily Take for 3 days, starting the day after cisplatin (Day 2 and Day 9).    Urethral cancer (H)       docusate sodium 100 MG capsule    COLACE    60 capsule    Take 1 capsule (100 mg) by mouth 2 times daily as needed for constipation    Slow transit constipation       fluticasone 50 MCG/ACT spray    FLONASE    1 Bottle    Spray 1 spray into both nostrils every morning    Urethral cancer (H), Allergic sinusitis       HYDROXYZINE HCL PO      Take 5 mg by mouth At Bedtime        LORazepam 0.5 MG tablet    ATIVAN    30 tablet    Take 1 tablet (0.5 mg) by mouth every 8 hours as needed for anxiety    Anxiety       MAGNESIUM OXIDE PO      Take 400 mg by mouth 2 times daily        nystatin 209217 UNIT/ML suspension    MYCOSTATIN    200 mL    Take 5 mLs (500,000 Units) by mouth 4 times daily Swish and spit    Thrush       OLANZapine 5 MG tablet    zyPREXA    30 tablet    Take 1 tablet (5 mg) by mouth At Bedtime    Urethral cancer (H), Nausea       omeprazole 2 mg/mL Susp    priLOSEC    280 mL    Take 10 mLs (20 mg) by mouth 2 times daily    Gastroesophageal reflux disease with esophagitis, Urethral cancer (H)       ondansetron 8 MG tablet    ZOFRAN    20 tablet    Take 1 tablet (8 mg) by mouth every 8 hours as needed for nausea    Nausea       prochlorperazine 10 MG tablet    COMPAZINE    30 tablet    Take 0.5  tablets (5 mg) by mouth every 6 hours as needed (Nausea/Vomiting)    Nausea

## 2018-01-10 NOTE — PROGRESS NOTES
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 The patient has had metastatic work up with a PET-CT which illustrated concern for pulmonary metastasis or second primary disease. The patient has been evaluated by medical oncology and per patient the plan is for him to begin chemotherapy this week. The patient is s/p SPT placement and urethral dilation to 22 Fr with Dr. Haddad on 8/31/17. Of note, intra-operatively a 13 cm of malingant appearing urethelium starting at about 2 cm distal to the membranous urethra and ending about 3 cm proximal to the meatus was noted. There were no bladder tumors noted intra-operatively and biopsy was not performed. He was referred to medical oncology and started on NAC (G/C, 4 cycles) with positive response to chemotherapy per Dr. Arceo's note. However, the patient declined any interventions for his FDG-avid pulmonary LNs as he was convinced that they are not related to his tumor and are just results of a prior exposure to TB.      PET CT 12/12/18  CHEST: Scattered pulmonary nodules which are unchanged from 10/23/2017,  but improved from 8/23/2017.    ABDOMEN AND PELVIS:   Suprapubic catheter with expected radiotracer uptake along the  catheter tract. As seen previously, there are two foci of  hypermetabolic penile urethral mass with an SUV max of 11.4  posteriorly and 7.2 anteriorly, previously 9.6 and 6.7, respectively.  Diffuse dilatation of the penile/bulbar urethra with a focus of gas in  the penile urethra.      Current meds  Not pertinent. Remainder reviewed in EMR.    OBJECTIVE:  There were no vitals taken for this visit.    EXAM:  GENERAL: No acute distress. Well nourished.   HEENT:  Sclerae anicteric.  Conjunctivae pink.  Moist mucous membranes.  LUNGS:  Non-labored breathing.  ABDOMEN: Soft, non-tender, 20  surgical scars, no organomegaly, non-tender.  SKIN: No rashes.  Dry.     EXTREMITIES:  Warm, well perfused.  No Lower extremity edema bilaterally.  NEURO: normal gait, no focal deficits.   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 The patient has had metastatic work up with a PET-CT which illustrated concern for pulmonary metastasis or second primary disease. He has completed neoadjuvant chemotherapy (G/C, 4 cycles) with some disease response but his pulmonary nodules persist, and he has declined any interventions for them. He presents to discuss surgery today.     PLAN:   Discussed surgery.  This could be just urethrectomy and radical penectomy as well as inguinal lympahdenectomy.  Intraop we could cystoscope the rest of the urethra and if there is no further involvement he could get a perineal urethrostomy.  If the proximal urethra is involved we could still perform an excision upto the prostatic apex or resect the prostate and close the bladder neck if the bladder is uninvolved.  If the bladder is involved he will need a cystectomy and conduit.  If not and perineal urethrostomy cannot be performed he is happy to live with an SP tube and closed bladder neck.    I also discussed possibility of a appendicovesicostomy but he is not too keen on it.  Described risks and benefits in detail and he understands and agrees to proceed.  Will seek the assistance of Dr. Magaña at surgery if needed.    Patient seen and plan discussed with Dr. Haddad  Patient seen and examined with the resident.  Visit time 20 minutes and >50% spent in counseling.  I agree with the resident's note and plan of care.       Muriel Haddad MD  Urology Staff    CC  Patient  Care Team:  Kit Ventura NP as PCP - General (Nurse Practitioner)  Ry Dunn as Referring Physician (Surgery)  Muriel Haddad MD as MD (Urology)  Lizzy Chowdary, RN as Registered Nurse (Urology)  Steve Arceo MD as MD (Oncology)  Janet Murrieta, RN as Nurse Coordinator (Oncology)  KIT VENTURA    Copy to patient  KING AILYN CHAVEZ  [address]  [place] [address]

## 2018-01-10 NOTE — LETTER
1/10/2018       RE: King Gonsalo Bruno  [address]  [place] [address]     Dear Colleague,    Thank you for referring your patient, King Gonsalo Bruno, to the [facility] UROLOGY AND [facility] FOR PROSTATE AND UROLOGIC CANCERS at [facility]. Please see a copy of my visit note below.    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 The patient has had metastatic work up with a PET-CT which illustrated concern for pulmonary metastasis or second primary disease. The patient has been evaluated by medical oncology and per patient the plan is for him to begin chemotherapy this week. The patient is s/p SPT placement and urethral dilation to 22 Fr with Dr. Haddad on 8/31/17. Of note, intra-operatively a 13 cm of malingant appearing urethelium starting at about 2 cm distal to the membranous urethra and ending about 3 cm proximal to the meatus was noted. There were no bladder tumors noted intra-operatively and biopsy was not performed. He was referred to medical oncology and started on NAC (G/C, 4 cycles) with positive response to chemotherapy per Dr. Arceo's note. However, the patient declined any interventions for his FDG-avid pulmonary LNs as he was convinced that they are not related to his tumor and are just results of a prior exposure to TB.      PET CT 12/12/18  CHEST: Scattered pulmonary nodules which are unchanged from 10/23/2017,  but improved from 8/23/2017.    ABDOMEN AND PELVIS:   Suprapubic catheter with expected radiotracer uptake along the  catheter tract. As seen previously, there are two foci of  hypermetabolic penile urethral mass with an SUV max of 11.4  posteriorly and 7.2 anteriorly, previously 9.6 and 6.7, respectively.  Diffuse dilatation of the penile/bulbar urethra with a focus of gas  in  the penile urethra.      Current meds  Not pertinent. Remainder reviewed in EMR.    OBJECTIVE:  There were no vitals taken for this visit.    EXAM:  GENERAL: No acute distress. Well nourished.   HEENT:  Sclerae anicteric.  Conjunctivae pink.  Moist mucous membranes.  LUNGS:  Non-labored breathing.  ABDOMEN: Soft, non-tender, 20 surgical scars, no organomegaly, non-tender.  SKIN: No rashes.  Dry.     EXTREMITIES:  Warm, well perfused.  No Lower extremity edema bilaterally.  NEURO: normal gait, no focal deficits.   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 The patient has had metastatic work up with a PET-CT which illustrated concern for pulmonary metastasis or second primary disease. He has completed neoadjuvant chemotherapy (G/C, 4 cycles) with some disease response but his pulmonary nodules persist, and he has declined any interventions for them. He presents to discuss surgery today.     PLAN:   Discussed surgery.  This could be just urethrectomy and radical penectomy as well as inguinal lympahdenectomy.  Intraop we could cystoscope the rest of the urethra and if there is no further involvement he could get a perineal urethrostomy.  If the proximal urethra is involved we could still perform an excision upto the prostatic apex or resect the prostate and close the bladder neck if the bladder is uninvolved.  If the bladder is involved he will need a cystectomy and conduit.  If not and perineal urethrostomy cannot be performed he is happy to live with an SP tube and closed bladder neck.    I also discussed possibility of a appendicovesicostomy but he is not too keen on it.  Described risks and benefits in detail and he understands and agrees to  proceed.  Will seek the assistance of Dr. Magaña at surgery if needed.    Patient seen and plan discussed with Dr. Haddad  Patient seen and examined with the resident.  Visit time 20 minutes and >50% spent in counseling.  I agree with the resident's note and plan of care.       Muriel Haddad MD  Urology Staff    CC  Patient Care Team:  Kit Ventura, NP as PCP - General (Nurse Practitioner)  Ry Dunn as Referring Physician (Surgery)  Muriel Haddad MD as MD (Urology)  Lizzy Chowdary, RN as Registered Nurse (Urology)  Steve Arceo MD as MD (Oncology)  Janet Murrieta, RN as Nurse Coordinator (Oncology)  KIT VENTURA    Copy to patient  KING AILYN CHAVEZ  [address]  [place] [address]

## 2018-01-10 NOTE — NURSING NOTE
"Chief Complaint   Patient presents with     RECHECK     Discuss surgery for urethral cancer       Blood pressure 124/73, pulse 86, height 1.93 m (6' 4\"), weight 69.5 kg (153 lb 4.8 oz). Body mass index is 18.66 kg/(m^2).    Patient Active Problem List   Diagnosis     Urethral cancer (H)     Nausea     Dehydration     Hypokalemia     Anemia in neoplastic disease       Allergies   Allergen Reactions     Lisinopril Swelling       Current Outpatient Prescriptions   Medication Sig Dispense Refill     LORazepam (ATIVAN) 0.5 MG tablet Take 1 tablet (0.5 mg) by mouth every 8 hours as needed for anxiety 30 tablet 0     fluticasone (FLONASE) 50 MCG/ACT spray Spray 1 spray into both nostrils every morning 1 Bottle 3     omeprazole (PRILOSEC) 2 mg/mL SUSP Take 10 mLs (20 mg) by mouth 2 times daily 280 mL 0     amLODIPine (NORVASC) 5 MG tablet Take 2 tablets (10 mg) by mouth daily 60 tablet 3     nystatin (MYCOSTATIN) 410421 UNIT/ML suspension Take 5 mLs (500,000 Units) by mouth 4 times daily Swish and spit (Patient not taking: Reported on 1/10/2018) 200 mL 0     MAGNESIUM OXIDE PO Take 400 mg by mouth 2 times daily       OLANZapine (ZYPREXA) 5 MG tablet Take 1 tablet (5 mg) by mouth At Bedtime (Patient not taking: Reported on 11/15/2017) 30 tablet 0     alum & mag hydroxide-simethicone (MYLANTA/MAALOX) 200-200-20 MG/5ML SUSP suspension Take 30 mLs by mouth every 4 hours as needed for indigestion (Patient not taking: Reported on 1/10/2018) 355 mL 1     prochlorperazine (COMPAZINE) 10 MG tablet Take 0.5 tablets (5 mg) by mouth every 6 hours as needed (Nausea/Vomiting) (Patient not taking: Reported on 1/10/2018) 30 tablet 3     ondansetron (ZOFRAN) 8 MG tablet Take 1 tablet (8 mg) by mouth every 8 hours as needed for nausea (Patient not taking: Reported on 1/10/2018) 20 tablet 3     docusate sodium (COLACE) 100 MG capsule Take 1 capsule (100 mg) by mouth 2 times daily as needed for constipation (Patient not taking: Reported on " 1/10/2018) 60 capsule 0     dexamethasone (DECADRON) 4 MG tablet Take 2 tablets (8 mg) by mouth daily Take for 3 days, starting the day after cisplatin (Day 2 and Day 9). (Patient not taking: Reported on 1/10/2018) 12 tablet 3     HYDROXYZINE HCL PO Take 5 mg by mouth At Bedtime          Social History   Substance Use Topics     Smoking status: Former Smoker     Packs/day: 1.00     Years: 20.00     Types: Cigarettes     Start date: 9/29/1972     Quit date: 1/1/1992     Smokeless tobacco: Never Used      Comment: quit in 1992     Alcohol use No      Comment: 1987 quit per personal choice.       ALL Paulino  1/10/2018  5:45 PM

## 2018-01-29 ENCOUNTER — ALLIED HEALTH/NURSE VISIT (OUTPATIENT)
Dept: UROLOGY | Facility: CLINIC | Age: 73
End: 2018-01-29
Payer: COMMERCIAL

## 2018-01-29 DIAGNOSIS — C68.0 URETHRAL CANCER (H): Primary | ICD-10-CM

## 2018-01-29 NOTE — NURSING NOTE
King Gonsalo Bruno comes into clinic today at the request of Dr. ARY Haddad Ordering Provider for Catheter Change.    This service provided today was under the supervising provider of the marlen Grullon PA-C, who was available if needed.      Catheter insertion documentation on 1/29/2018:    King Gonsalo Bruno presents to the clinic for catheter insertion.  Reason for insertion: replacement  Order has been verified. YES  Catheter successfully inserted into the suprapubic meatus in the usual sterile fashion without immediate complication.  Type of catheter placed: 16 [place]orean straight SILICONE catheter  Urine is yellow in color.  10 cc's of urine output returned.  Balloon was filled with 7 cc's of normal saline.  Securement device placed for the catheter.  The patient tolerated the procedure and was instructed to return or call for pain, fever, leakage or decreased urine flow.    One Cipro 500 mg tablet given PO prior to catheter change.    ALL Amador

## 2018-01-29 NOTE — MR AVS SNAPSHOT
After Visit Summary   1/29/2018    King Gonsalo Bruno    MRN: [id number]           Patient Information     Date Of Birth          1945        Visit Information        Provider Department      1/29/2018 1:40 PM Nurse,  [facility] Prostate [facility] Urologic Cancers        Care Instructions    Follow up in 4 weeks for next SPT change.      It was a pleasure meeting with you today.  Thank you for allowing me and my team the privilege of caring for you today.  YOU are the reason we are here, and I truly hope we provided you with the excellent service you deserve.  Please let us know if there is anything else we can do for you so that we can be sure you are leaving completely satisfied with your care experience.      ALL Amador          Follow-ups after your visit        Your next 10 appointments already scheduled     Feb 07, 2018 11:45 AM CST   [facility] with  [facility]   [facility] ([facility])    [address] [place] [address]  [address]  [facility] [id number] [phone]            Feb 07, 2018 12:15 PM CST   (Arrive by 12:00 PM)   Return Visit with Steve Arceo MD   [facility] ([facility])    [address] [place] [address]  [address]  [facility] [id number] [phone]            Feb 26, 2018  2:20 PM CST   (Arrive by 2:05 PM)   Return Visit with  Prostate Cancer Ctr Nurse   [facility]rolog[facility] ([facility])    [address] [place] [address]  4th Floor  [facility] [id number] [phone]              Who to contact     Please call your clinic at [phone] to:    Ask questions about your health    Make or cancel appointments    Discuss your medicines    Learn about your test results    Speak to your doctor   If you have compliments or concerns about an experience at your clinic, or if you  wish to file a complaint, please contact [facility] Patient Relations at [phone] or email us at [email or web address]         Additional Information About Your Visit        [employer] Information     [employer] is an electronic gateway that provides easy, online access to your medical records. With [employer], you can request a clinic appointment, read your test results, renew a prescription or communicate with your care team.     To sign up for [employer] visit the website at [email or web address]   You will be asked to enter the access code listed below, as well as some personal information. Please follow the directions to create your username and password.     Your access code is: [email or web address]  Expires: 2018  5:30 AM     Your access code will  in 90 days. If you need help or a new code, please contact your [facility] Clinic or call [phone] for assistance.        Care EveryWhere ID     This is your Care EveryWhere ID. This could be used by other organizations to access your [place] medical records  [id number]         Blood Pressure from Last 3 Encounters:   01/10/18 124/73   17 145/79   17 139/82    Weight from Last 3 Encounters:   01/10/18 69.5 kg (153 lb 4.8 oz)   17 70.1 kg (154 lb 8 oz)   17 70.9 kg (156 lb 4.8 oz)              Today, you had the following     No orders found for display       Primary Care Provider Office Phone # Fax #    Joan Janet Ventura [phone]       [facility] [address]  [facility] [address]        Equal Access to Services     JENELLE RED : Hadii светлана garcia hadasho Sotimali, waaxda luqadaha, qaybta kaalmada adeegyada, ronald rasmussen. So [facility] [phone].    ATENCIÓN: Si habla español, tiene a washburn disposición servicios gratuitos de asistencia lingüística. Llame al [phone].    We comply with applicable federal civil rights laws  and [place] laws. We do not discriminate on the basis of race, color, national origin, age, disability, sex, sexual orientation, or gender identity.            Thank you!     Thank you for choosing [facility] AND [facility] PROSTATE AND UROLOGIC CANCERS  for your care. Our goal is always to provide you with excellent care. Hearing back from our patients is one way we can continue to improve our services. Please take a few minutes to complete the written survey that you may receive in the mail after your visit with us. Thank you!             Your Updated Medication List - Protect others around you: Learn how to safely use, store and throw away your medicines at www.disposemymeds.org.          This list is accurate as of 1/29/18  2:06 PM.  Always use your most recent med list.                   Brand Name Dispense Instructions for use Diagnosis    alum & mag hydroxide-simethicone 200-200-20 MG/5ML Susp suspension    MYLANTA/MAALOX    355 mL    Take 30 mLs by mouth every 4 hours as needed for indigestion        amLODIPine 5 MG tablet    NORVASC    60 tablet    Take 2 tablets (10 mg) by mouth daily    Benign essential hypertension       dexamethasone 4 MG tablet    DECADRON    12 tablet    Take 2 tablets (8 mg) by mouth daily Take for 3 days, starting the day after cisplatin (Day 2 and Day 9).    Urethral cancer (H)       docusate sodium 100 MG capsule    COLACE    60 capsule    Take 1 capsule (100 mg) by mouth 2 times daily as needed for constipation    Slow transit constipation       fluticasone 50 MCG/ACT spray    FLONASE    1 Bottle    Spray 1 spray into both nostrils every morning    Urethral cancer (H), Allergic sinusitis       HYDROXYZINE HCL PO      Take 5 mg by mouth At Bedtime        LORazepam 0.5 MG tablet    ATIVAN    30 tablet    Take 1 tablet (0.5 mg) by mouth every 8 hours as needed for anxiety    Anxiety       MAGNESIUM OXIDE PO      Take 400 mg by mouth 2 times daily        nystatin 122543 UNIT/ML  suspension    MYCOSTATIN    200 mL    Take 5 mLs (500,000 Units) by mouth 4 times daily Swish and spit    Thrush       OLANZapine 5 MG tablet    zyPREXA    30 tablet    Take 1 tablet (5 mg) by mouth At Bedtime    Urethral cancer (H), Nausea       omeprazole 2 mg/mL Susp    priLOSEC    280 mL    Take 10 mLs (20 mg) by mouth 2 times daily    Gastroesophageal reflux disease with esophagitis, Urethral cancer (H)       ondansetron 8 MG tablet    ZOFRAN    20 tablet    Take 1 tablet (8 mg) by mouth every 8 hours as needed for nausea    Nausea       prochlorperazine 10 MG tablet    COMPAZINE    30 tablet    Take 0.5 tablets (5 mg) by mouth every 6 hours as needed (Nausea/Vomiting)    Nausea

## 2018-01-29 NOTE — PATIENT INSTRUCTIONS
Follow up in 4 weeks for next SPT change.      It was a pleasure meeting with you today.  Thank you for allowing me and my team the privilege of caring for you today.  YOU are the reason we are here, and I truly hope we provided you with the excellent service you deserve.  Please let us know if there is anything else we can do for you so that we can be sure you are leaving completely satisfied with your care experience.      ALL Amador

## 2018-02-07 ENCOUNTER — ONCOLOGY VISIT (OUTPATIENT)
Dept: ONCOLOGY | Facility: CLINIC | Age: 73
End: 2018-02-07
Attending: INTERNAL MEDICINE
Payer: COMMERCIAL

## 2018-02-07 ENCOUNTER — APPOINTMENT (OUTPATIENT)
Dept: LAB | Facility: CLINIC | Age: 73
End: 2018-02-07
Attending: INTERNAL MEDICINE
Payer: MEDICARE

## 2018-02-07 VITALS
RESPIRATION RATE: 18 BRPM | HEART RATE: 81 BPM | BODY MASS INDEX: 18.35 KG/M2 | OXYGEN SATURATION: 98 % | TEMPERATURE: 97.9 F | SYSTOLIC BLOOD PRESSURE: 139 MMHG | HEIGHT: 76 IN | DIASTOLIC BLOOD PRESSURE: 77 MMHG | WEIGHT: 150.7 LBS

## 2018-02-07 DIAGNOSIS — C68.0 URETHRAL CANCER (H): Primary | ICD-10-CM

## 2018-02-07 DIAGNOSIS — E87.6 HYPOKALEMIA: ICD-10-CM

## 2018-02-07 DIAGNOSIS — K21.00 GASTROESOPHAGEAL REFLUX DISEASE WITH ESOPHAGITIS: ICD-10-CM

## 2018-02-07 DIAGNOSIS — E86.0 DEHYDRATION: ICD-10-CM

## 2018-02-07 DIAGNOSIS — J30.9 ALLERGIC SINUSITIS: ICD-10-CM

## 2018-02-07 DIAGNOSIS — C68.0 URETHRAL CANCER (H): ICD-10-CM

## 2018-02-07 DIAGNOSIS — I10 BENIGN ESSENTIAL HYPERTENSION: ICD-10-CM

## 2018-02-07 DIAGNOSIS — R60.0 LOCALIZED EDEMA: ICD-10-CM

## 2018-02-07 DIAGNOSIS — F41.9 ANXIETY: ICD-10-CM

## 2018-02-07 DIAGNOSIS — R11.0 NAUSEA: ICD-10-CM

## 2018-02-07 LAB — MAGNESIUM SERPL-MCNC: 1.8 MG/DL (ref 1.6–2.3)

## 2018-02-07 PROCEDURE — 99215 OFFICE O/P EST HI 40 MIN: CPT | Mod: ZP | Performed by: INTERNAL MEDICINE

## 2018-02-07 PROCEDURE — 83735 ASSAY OF MAGNESIUM: CPT | Performed by: PHYSICIAN ASSISTANT

## 2018-02-07 PROCEDURE — G0463 HOSPITAL OUTPT CLINIC VISIT: HCPCS | Mod: ZF

## 2018-02-07 PROCEDURE — 36415 COLL VENOUS BLD VENIPUNCTURE: CPT

## 2018-02-07 RX ORDER — FLUTICASONE PROPIONATE 50 MCG
1 SPRAY, SUSPENSION (ML) NASAL EVERY MORNING
Qty: 1 BOTTLE | Refills: 11 | Status: SHIPPED | OUTPATIENT
Start: 2018-02-07 | End: 2018-02-13

## 2018-02-07 RX ORDER — AMLODIPINE BESYLATE 10 MG/1
10 TABLET ORAL DAILY
Qty: 90 TABLET | Refills: 3 | Status: SHIPPED | OUTPATIENT
Start: 2018-02-07 | End: 2019-01-01

## 2018-02-07 RX ORDER — LORAZEPAM 0.5 MG/1
0.5 TABLET ORAL EVERY 8 HOURS PRN
Qty: 30 TABLET | Refills: 3 | Status: SHIPPED | OUTPATIENT
Start: 2018-02-07 | End: 2018-07-19

## 2018-02-07 RX ORDER — POLYETHYLENE GLYCOL 3350 17 G/17G
1 POWDER, FOR SOLUTION ORAL DAILY PRN
Status: ON HOLD | COMMUNITY
End: 2018-03-12

## 2018-02-07 ASSESSMENT — PAIN SCALES - GENERAL: PAINLEVEL: MODERATE PAIN (4)

## 2018-02-07 NOTE — NURSING NOTE
"Oncology Rooming Note    February 7, 2018 12:04 PM   King Gonsalo Bruno is a 72 year old male who presents for:    Chief Complaint   Patient presents with     Blood Draw     red gel, purple JIC tubes sent-call [phone] if orders are placed for these tubes     Oncology Clinic Visit     Bladder Ca, 8 Wk F/U.     Initial Vitals: /77  Pulse 81  Temp 97.9  F (36.6  C) (Oral)  Resp 18  Ht 1.93 m (6' 3.98\")  Wt 68.4 kg (150 lb 11.2 oz)  SpO2 98%  BMI 18.35 kg/m2 Estimated body mass index is 18.35 kg/(m^2) as calculated from the following:    Height as of this encounter: 1.93 m (6' 3.98\").    Weight as of this encounter: 68.4 kg (150 lb 11.2 oz). Body surface area is 1.91 meters squared.  Moderate Pain (4) Comment: Penis   No LMP for male patient.  Allergies reviewed: Yes  Medications reviewed: Yes    Medications: MEDICATION REFILLS NEEDED TODAY. Provider was notified.  Pharmacy name entered into [employer]:    [id number] - [place], [place] - [address] [place] [address] AT [address] [place] [address] PHARMACY [place], [place] - [address] [phone]    Clinical concerns: Amlodipine and Ativan. Pt reports having issues with Prilosec liquid prescription. Dr Arceo was notified.    7 minutes for nursing intake (face to face time)     Josiane Bonilla LPN              "

## 2018-02-07 NOTE — LETTER
2/7/2018       RE: King Gonsalo Bruno  [address]  [facility] [address]     Dear Colleague,    Thank you for referring your patient, King Gonsalo Bruno, to the [facility]. Please see a copy of my visit note below.    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 He initiated Cisplatin/Gemzar split on days 1 and 8 given history of CKD. He is currently undergoing curative intent treatment, however he does have indeterminate pulmonary nodules that are being followed. He had a positive response to the 2 cycles which were complicated with issues of dehydration and nausea. He has completed 4 cycles of chemotherapy.     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     He is accompanied by his significant other.  He is now about 8 weeks out from completion of chemotherapy.  He is doing much better at this time and has recovered.  He has since followed up with Dr. Haddad and urologic surgery.    No chest pain, palpitation or dizziness. Eating and drinking very little in the past few days. Is having increased issue with constipation again.  Urine appears stable in the catheter. Denies cloudiness or darkness in color. No bleeding/bruising. Has a mild headache at times. No vision changes. Hearing has decreased.    Current Outpatient Prescriptions   Medication Sig     polyethylene glycol (MIRALAX/GLYCOLAX) powder Take 1 capful by mouth daily as needed for constipation     LORazepam (ATIVAN) 0.5 MG tablet Take 1 tablet (0.5 mg) by mouth every 8 hours as needed for anxiety (Patient taking differently: Take 0.5 mg by mouth daily )     fluticasone (FLONASE) 50 MCG/ACT spray Spray 1 spray into both nostrils every morning     amLODIPine (NORVASC) 5 MG tablet Take 2 tablets (10 mg) by mouth daily     docusate sodium (COLACE) 100 MG  "capsule Take 1 capsule (100 mg) by mouth 2 times daily as needed for constipation     nystatin (MYCOSTATIN) 832979 UNIT/ML suspension Take 5 mLs (500,000 Units) by mouth 4 times daily Swish and spit (Patient not taking: Reported on 1/10/2018)     omeprazole (PRILOSEC) 2 mg/mL SUSP Take 10 mLs (20 mg) by mouth 2 times daily (Patient not taking: Reported on 2/7/2018)     MAGNESIUM OXIDE PO Take 400 mg by mouth 2 times daily     OLANZapine (ZYPREXA) 5 MG tablet Take 1 tablet (5 mg) by mouth At Bedtime (Patient not taking: Reported on 11/15/2017)     alum & mag hydroxide-simethicone (MYLANTA/MAALOX) 200-200-20 MG/5ML SUSP suspension Take 30 mLs by mouth every 4 hours as needed for indigestion (Patient not taking: Reported on 1/10/2018)     prochlorperazine (COMPAZINE) 10 MG tablet Take 0.5 tablets (5 mg) by mouth every 6 hours as needed (Nausea/Vomiting) (Patient not taking: Reported on 1/10/2018)     ondansetron (ZOFRAN) 8 MG tablet Take 1 tablet (8 mg) by mouth every 8 hours as needed for nausea (Patient not taking: Reported on 1/10/2018)     dexamethasone (DECADRON) 4 MG tablet Take 2 tablets (8 mg) by mouth daily Take for 3 days, starting the day after cisplatin (Day 2 and Day 9). (Patient not taking: Reported on 1/10/2018)     HYDROXYZINE HCL PO Take 5 mg by mouth At Bedtime      No current facility-administered medications for this visit.      Facility-Administered Medications Ordered in Other Visits   Medication     barium sulfate (EZ PAQUE) oral suspension 96%     barium sulfate (EZ-HD) oral suspension 98%     sod bicarbonate-citric acid-simethicone (EZ GAS) 2.21-1.53-0.04 G packet 4 g           Allergies   Allergen Reactions     Lisinopril Swelling         Exam: alert, appears weak, fatigued Blood pressure 139/77, pulse 81, temperature 97.9  F (36.6  C), temperature source Oral, resp. rate 18, height 1.93 m (6' 3.98\"), weight 68.4 kg (150 lb 11.2 oz), SpO2 98 %.  Wt Readings from Last 4 Encounters:   02/07/18 " 68.4 kg (150 lb 11.2 oz)   01/10/18 69.5 kg (153 lb 4.8 oz)   12/13/17 70.1 kg (154 lb 8 oz)   12/08/17 70.9 kg (156 lb 4.8 oz)     Oropharynx is moist, no focal lesion. No icterus. Neck supple and without adenopathy. Lungs: crackles right base, no wheezes or rub. Heart: RRR. Abdomen: soft, nontender. Extremities: warm, no edema.    Labs:   Recent Labs   Lab Test  02/26/18   1452  12/13/17   0813  12/12/17   1135  12/08/17   0925  12/06/17   1039  12/01/17   0824   NA  133  132*   --   135  133  127*   POTASSIUM  3.7  4.0   --   4.0  3.7  3.6   CHLORIDE  101  100   --   103  100  92*   CO2  24  22   --   23  23  25   ANIONGAP  9  11   --   8  9  10   BUN  12  12   --   9  8  19   CR  1.71*  1.65*   --   1.29*  1.21  1.56*   GLC  91  94  108*  79  101*  88   SAADIA  9.9  9.1   --   8.6  8.6  8.6     Recent Labs   Lab Test  02/07/18   1150  12/13/17   0813  12/08/17   0925  12/06/17   1039  12/01/17   0824   MAG  1.8  1.3*  1.5*  1.3*  1.2*     Recent Labs   Lab Test  02/26/18   1452  12/13/17   0813  12/01/17   0824  11/24/17   0827  11/10/17   0839  11/03/17   0743   WBC  5.6  4.3  2.2*  3.7*  3.0*  5.8   HGB  11.0*  9.4*  9.1*  7.9*  9.0*  9.1*   PLT  269  262  113*  146*  157  444   MCV  90  91  89  90  86  90   NEUTROPHIL   --   30.4  66.7  57.3  44.1  40.1     Recent Labs   Lab Test  12/13/17   0813  12/01/17   0824  11/24/17   0827   BILITOTAL  0.3  0.3  0.2   ALKPHOS  73  60  59   ALT  12  21  16   AST  28  42  20   ALBUMIN  3.3*  3.2*  3.3*     TSH   Date Value Ref Range Status   11/24/2017 0.82 0.40 - 4.00 mU/L Final   10/06/2017 1.61 0.40 - 4.00 mU/L Final     No results for input(s): CEA in the last [id number] hours.  Results for orders placed or performed in visit on 12/12/17   PET Oncology Whole Body    Narrative    Combined Report of:    PET and CT on  12/12/2017 1:25 PM :    1. PET of the neck, chest, abdomen, and pelvis.  2. PET CT Fusion for Attenuation Correction and Anatomical  Localization:    3.  Diagnostic CT scan of the chest, abdomen, and pelvis with  intravenous contrast for interpretation.  3. CT of the chest, abdomen and pelvis obtained for diagnostic  interpretation.  4. 3D MIP and PET-CT fused images were processed on an independent  workstation and archived to PACS and reviewed by a radiologist.    Technique:    1. PET: The patient received 15.53 mCi of F-18-FDG; the serum glucose  was 108 prior to administration, body weight was 70.9 kg. Images were  evaluated in the axial, sagittal, and coronal planes as well as the  rotational whole body MIP. Images were acquired from the Vertex to the  Feet.    UPTAKE WAS MEASURED AT 60 MINUTES.     2. CT: Volumetric acquisition for clinical interpretation of the  chest, abdomen, and pelvis acquired at 3 mm sections . The chest,  abdomen, and pelvis were evaluated at 5 mm sections in bone, soft  tissue, and lung windows.      The patient received 100 cc. Of Optiray 320 intravenously for the  examination.      3. 3D MIP and PET-CT fused images were processed on an independent  workstation and archived to PACS and reviewed by a radiologist.    INDICATION: urothelial cancer, assess response to treatment; Urethral  cancer (H)    ADDITIONAL INFORMATION OBTAINED FROM EMR: Stage II penile urethral  carcinoma, previously on Cisplatin/Roby, now on cycle 4 Carbo/Roby due  to complications of CKD with cisplatin    COMPARISON: PET/CT 10/23/2017    FINDINGS:     HEAD/NECK:  There is a 7 mm left level 2A lymph node with an SUV max of 3.6 by  (axial series 2, image 111), previously 8 mm without FDG uptake.  Otherwise no suspicious FDG uptake in the head/neck.    Confluent periventricular hypodensities are nonspecific but favored to  represent sequelae of chronic small vessel ischemic disease.    Mild mucosal thickening of the maxillary sinuses, left greater than  right. Paranasal sinuses and mastoid air cells are otherwise clear.    The mucosal pharyngeal space, the ,  prevertebral and carotid  spaces are within normal limits. The thyroid gland is normal.    CHEST:  There is a 7 mm precarinal lymph node with an SUV max of 4.1 (axial  series 2, image 205) and a 12 mm AP window lymph node with an SUV max  of 4.0 (axial series 2, image 200). These lymph nodes previously  measured 6 and 8 mm, respectively, without significant FDG uptake.    Pulmonary artery trunk measures 3.5 cm. Heart and thoracic aorta are  normal in size. No pericardial or pleural effusion. Multifocal  coronary artery atherosclerotic calcifications.    Central airways are patent. Centrilobular and paraseptal emphysematous  changes of the lungs with an upper lobe predominance.    Few scattered pulmonary nodules, for example (axial series 7):  Image 22:7 mm semisolid right upper lobe nodule, unchanged  Image 74:6 mm subpleural left upper lobe nodule adjacent to the  anterior junction line, unchanged  Image 129:3 mm left lower lobe nodule, unchanged  Image 117:5 mm subpleural right upper lobe nodule, unchanged    ABDOMEN AND PELVIS:    Suprapubic catheter with expected radiotracer uptake along the  catheter tract. As seen previously, there are two foci of  hypermetabolic penile urethral mass with an SUV max of 11.4  posteriorly and 7.2 anteriorly, previously 9.6 and 6.7, respectively.  Diffuse dilatation of the penile/bulbar urethra with a focus of gas in  the penile urethra.    The liver, gallbladder, spleen, adrenal glands, small bowel, large  bowel, and appendix are normal. Bilateral simple fluid density renal  cysts. Kidneys are otherwise normal. Pancreas divisum. No free fluid  or suspicious lymphadenopathy. Atherosclerotic changes of the  abdominal aorta and iliac arteries without aneurysmal dilatation.    LOWER EXTREMITIES:   No abnormal masses or hypermetabolic lesions.    BONES:   Degenerative changes of the spine and pelvis. Large subchondral cysts  in the left superior acetabulum. There are no suspicious  lytic or  blastic osseous lesions. There is no abnormal FDG uptake in the  skeleton.      Impression    IMPRESSION:   1. In this patient with a history of penile carcinoma status post  chemotherapy:  a. The penile urethral tumor is slightly more hypermetabolic compared  to 10/23/2017.  b. Slightly enlarged FDG avid precarinal and AP window lymph nodes,  probably reactive.    2. Scattered pulmonary nodules which are unchanged from 10/23/2017,  but improved from 8/23/2017.    3. Emphysematous changes of the lungs.    4. Pancreas divisum.    5. Enlarged pulmonary artery trunk which can be seen with pulmonary  artery hypertension.    I have personally reviewed the examination and initial interpretation  and I agree with the findings.    ELIZABETH WHITEHEAD MD       Impression/plan:     1. Stage II penile urethral carcinoma, previously on Cisplatin/[place], now on cycle 4 Carbo/C[place]ba due to complications of CKD with cisplatin  He has completed 4 rounds of chemotherapy with platinum and gemcitabine.  He received split-dose cisplatin for the first 3 cycles and this had to be changed to carboplatin for the last cycle.  He did not receive his day 8 gemcitabine because of his thrombocytopenia.  I reviewed the actual images from his restaging scans which suggested progressive disease.      He is being seen  8 weeks since completion of his chemotherapy.  Unfortunately he has not had surgery as yet.  I will recheck her to continue to get an early date for him.  I will see him again post completion of his surgery.    He has moderate anemia related to his chemotherapy and also his CKD stage III.   I refilled his amlodipine, MiraLAX, omeprazole and fluticasone.    He did not have a good response to chemotherapy radiographically.  We will have to wait for the pathology results to see if the chemotherapy  helped him.  I do not feel that there is any role for adjuvant chemotherapy for him.  We do have a placebo-controlled clinical trial with  nivolumab for patients with high-risk urothelial carcinoma.  It is quite likely that he would be a candidate for this clinical trial.  I will see him post surgery to review eligibility.  I did review immunotherapy with nivolumab.     Over 25 min of direct face to face time spent with patient with more than 50% time spent in counseling and coordinating care.      Again, thank you for allowing me to participate in the care of your patient.      Sincerely,    Steve Arceo MD

## 2018-02-07 NOTE — MR AVS SNAPSHOT
After Visit Summary   2/7/2018    King Gonsalo Bruno    MRN: [id number]           Patient Information     Date Of Birth          1945        Visit Information        Provider Department      2/7/2018 12:00 PM Steve Arceo MD [facility]        Today's Diagnoses     Gastroesophageal reflux disease with esophagitis        Hypokalemia        Nausea        Dehydration        Urethral cancer (H)        Anxiety        Benign essential hypertension        Allergic sinusitis           Follow-ups after your visit        Your next 10 appointments already scheduled     Feb 26, 2018  2:20 PM CST   (Arrive by 2:05 PM)   Return Visit with  Prostate Cancer Ctr Nurse   [facility] ([facility])    [address] [place] [address]  4th Floor  [facility] [id number] [phone]            Apr 25, 2018 11:30 AM CDT   [facility] with  [facility]   [facility] ([facility])    [address] [place] [address]  [address]  [facility] [id number] [phone]            Apr 25, 2018 12:00 PM CDT   (Arrive by 11:45 AM)   Return Visit with Steve Arceo MD   [facility] ([facility])    [address] [place] [address]  [address]  [facility] [id number] [phone]              Who to contact     If you have questions or need follow up information about today's clinic visit or your schedule please contact [facility] directly at [phone].  Normal or non-critical lab and imaging results will be communicated to you by MyChart, letter or phone within 4 business days after the clinic has received the results. If you do not hear from us within 7 days, please contact the clinic through MyChart or phone. If you have a critical or abnormal lab result, we will notify you by phone as soon as possible.  Submit refill requests  "through [employer] or call your pharmacy and they will forward the refill request to us. Please allow 3 business days for your refill to be completed.          Additional Information About Your Visit        [employer] Information     [employer] lets you send messages to your doctor, view your test results, renew your prescriptions, schedule appointments and more. To sign up, go to [email or web address] . Click on \"Log in\" on the left side of the screen, which will take you to the Welcome page. Then click on \"Sign up Now\" on the right side of the page.     You will be asked to enter the access code listed below, as well as some personal information. Please follow the directions to create your username and password.     Your access code is: [email or web address]  Expires: 2018  6:31 AM     Your access code will  in 90 days. If you need help or a new code, please call your [place] clinic or [phone].        Care EveryWhere ID     This is your Care EveryWhere ID. This could be used by other organizations to access your [place] medical records  [id number]        Your Vitals Were     Pulse Temperature Respirations Height Pulse Oximetry BMI (Body Mass Index)    81 97.9  F (36.6  C) (Oral) 18 1.93 m (6' 3.98\") 98% 18.35 kg/m2       Blood Pressure from Last 3 Encounters:   18 139/77   01/10/18 124/73   17 145/79    Weight from Last 3 Encounters:   18 68.4 kg (150 lb 11.2 oz)   01/10/18 69.5 kg (153 lb 4.8 oz)   17 70.1 kg (154 lb 8 oz)              We Performed the Following     Magnesium          Today's Medication Changes          These changes are accurate as of 18  1:08 PM.  If you have any questions, ask your nurse or doctor.               These medicines have changed or have updated prescriptions.        Dose/Directions    amLODIPine 10 MG tablet   Commonly known as:  NORVASC   This may have changed:  medication strength   Used for:  Benign essential hypertension   Changed by:  Steve Arceo " MD Sherrill        Dose:  10 mg   Take 1 tablet (10 mg) by mouth daily   Quantity:  90 tablet   Refills:  3       LORazepam 0.5 MG tablet   Commonly known as:  ATIVAN   This may have changed:    - when to take this  - reasons to take this   Used for:  Anxiety        Dose:  0.5 mg   Take 1 tablet (0.5 mg) by mouth every 8 hours as needed for anxiety   Quantity:  30 tablet   Refills:  3            Where to get your medicines      These medications were sent to [place], [place] - [address]  [address], [facility] [address]    Hours:  TRANSPLANT PHONE NUMBER [phone] Phone:  [phone]     amLODIPine 10 MG tablet    fluticasone 50 MCG/ACT spray    omeprazole 2 mg/mL Susp         Some of these will need a paper prescription and others can be bought over the counter.  Ask your nurse if you have questions.     Bring a paper prescription for each of these medications     LORazepam 0.5 MG tablet                Primary Care Provider Office Phone # Fax #    Joan Janet Ventura [phone]       [facility] [address] [facility] [address]        Equal Access to Services     JENELLE RED AH: Hadii светлана garcia hadasho Soveronica, waaxda luqadaha, qaybta kaalmada adeegyada, ronald rasmussen. So [facility] [phone].    ATENCIÓN: Si habla español, tiene a washburn disposición servicios gratLovelace Rehabilitation Hospitalos de asistencia lingüística. Juwan al [phone].    We comply with applicable federal civil rights laws and [place] laws. We do not discriminate on the basis of race, color, national origin, age, disability, sex, sexual orientation, or gender identity.            Thank you!     Thank you for choosing [facility]  for your care. Our goal is always to provide you with excellent care. Hearing back from our patients is one way we can continue to improve our services. Please take a few minutes to complete the written  survey that you may receive in the mail after your visit with us. Thank you!             Your Updated Medication List - Protect others around you: Learn how to safely use, store and throw away your medicines at www.disposemymeds.org.          This list is accurate as of 2/7/18  1:08 PM.  Always use your most recent med list.                   Brand Name Dispense Instructions for use Diagnosis    alum & mag hydroxide-simethicone 200-200-20 MG/5ML Susp suspension    MYLANTA/MAALOX    355 mL    Take 30 mLs by mouth every 4 hours as needed for indigestion        amLODIPine 10 MG tablet    NORVASC    90 tablet    Take 1 tablet (10 mg) by mouth daily    Benign essential hypertension       dexamethasone 4 MG tablet    DECADRON    12 tablet    Take 2 tablets (8 mg) by mouth daily Take for 3 days, starting the day after cisplatin (Day 2 and Day 9).    Urethral cancer (H)       docusate sodium 100 MG capsule    COLACE    60 capsule    Take 1 capsule (100 mg) by mouth 2 times daily as needed for constipation    Slow transit constipation       fluticasone 50 MCG/ACT spray    FLONASE    1 Bottle    Spray 1 spray into both nostrils every morning    Urethral cancer (H), Allergic sinusitis       HYDROXYZINE HCL PO      Take 5 mg by mouth At Bedtime        LORazepam 0.5 MG tablet    ATIVAN    30 tablet    Take 1 tablet (0.5 mg) by mouth every 8 hours as needed for anxiety    Anxiety       MAGNESIUM OXIDE PO      Take 400 mg by mouth 2 times daily        nystatin 269041 UNIT/ML suspension    MYCOSTATIN    200 mL    Take 5 mLs (500,000 Units) by mouth 4 times daily Swish and spit    Thrush       OLANZapine 5 MG tablet    zyPREXA    30 tablet    Take 1 tablet (5 mg) by mouth At Bedtime    Urethral cancer (H), Nausea       omeprazole 2 mg/mL Susp    priLOSEC    280 mL    Take 10 mLs (20 mg) by mouth 2 times daily    Gastroesophageal reflux disease with esophagitis, Urethral cancer (H)       ondansetron 8 MG tablet    ZOFRAN    20 tablet     Take 1 tablet (8 mg) by mouth every 8 hours as needed for nausea    Nausea       polyethylene glycol powder    MIRALAX/GLYCOLAX     Take 1 capful by mouth daily as needed for constipation        prochlorperazine 10 MG tablet    COMPAZINE    30 tablet    Take 0.5 tablets (5 mg) by mouth every 6 hours as needed (Nausea/Vomiting)    Nausea

## 2018-02-07 NOTE — NURSING NOTE
done by RN, pt tolerated well, labs collected and sent, VS taken and pt checked in for next appt.

## 2018-02-07 NOTE — PROGRESS NOTES
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 He initiated Cisplatin/Gemzar split on days 1 and 8 given history of CKD. He is currently undergoing curative intent treatment, however he does have indeterminate pulmonary nodules that are being followed. He had a positive response to the 2 cycles which were complicated with issues of dehydration and nausea. He has completed 4 cycles of chemotherapy.     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     He is accompanied by his significant other.  He is now about 8 weeks out from completion of chemotherapy.  He is doing much better at this time and has recovered.  He has since followed up with Dr. Haddad and urologic surgery.    No chest pain, palpitation or dizziness. Eating and drinking very little in the past few days. Is having increased issue with constipation again.  Urine appears stable in the catheter. Denies cloudiness or darkness in color. No bleeding/bruising. Has a mild headache at times. No vision changes. Hearing has decreased.    Current Outpatient Prescriptions   Medication Sig     polyethylene glycol (MIRALAX/GLYCOLAX) powder Take 1 capful by mouth daily as needed for constipation     LORazepam (ATIVAN) 0.5 MG tablet Take 1 tablet (0.5 mg) by mouth every 8 hours as needed for anxiety (Patient taking differently: Take 0.5 mg by mouth daily )     fluticasone (FLONASE) 50 MCG/ACT spray Spray 1 spray into both nostrils every morning     amLODIPine (NORVASC) 5 MG tablet Take 2 tablets (10 mg) by mouth daily     docusate sodium (COLACE) 100 MG capsule Take 1 capsule (100 mg) by mouth 2 times daily as needed for constipation     nystatin (MYCOSTATIN) 207967 UNIT/ML suspension Take 5 mLs (500,000 Units) by mouth 4 times daily Swish and spit (Patient not taking: Reported on 1/10/2018)      "omeprazole (PRILOSEC) 2 mg/mL SUSP Take 10 mLs (20 mg) by mouth 2 times daily (Patient not taking: Reported on 2/7/2018)     MAGNESIUM OXIDE PO Take 400 mg by mouth 2 times daily     OLANZapine (ZYPREXA) 5 MG tablet Take 1 tablet (5 mg) by mouth At Bedtime (Patient not taking: Reported on 11/15/2017)     alum & mag hydroxide-simethicone (MYLANTA/MAALOX) 200-200-20 MG/5ML SUSP suspension Take 30 mLs by mouth every 4 hours as needed for indigestion (Patient not taking: Reported on 1/10/2018)     prochlorperazine (COMPAZINE) 10 MG tablet Take 0.5 tablets (5 mg) by mouth every 6 hours as needed (Nausea/Vomiting) (Patient not taking: Reported on 1/10/2018)     ondansetron (ZOFRAN) 8 MG tablet Take 1 tablet (8 mg) by mouth every 8 hours as needed for nausea (Patient not taking: Reported on 1/10/2018)     dexamethasone (DECADRON) 4 MG tablet Take 2 tablets (8 mg) by mouth daily Take for 3 days, starting the day after cisplatin (Day 2 and Day 9). (Patient not taking: Reported on 1/10/2018)     HYDROXYZINE HCL PO Take 5 mg by mouth At Bedtime      No current facility-administered medications for this visit.      Facility-Administered Medications Ordered in Other Visits   Medication     barium sulfate (EZ PAQUE) oral suspension 96%     barium sulfate (EZ-HD) oral suspension 98%     sod bicarbonate-citric acid-simethicone (EZ GAS) 2.21-1.53-0.04 G packet 4 g           Allergies   Allergen Reactions     Lisinopril Swelling         Exam: alert, appears weak, fatigued Blood pressure 139/77, pulse 81, temperature 97.9  F (36.6  C), temperature source Oral, resp. rate 18, height 1.93 m (6' 3.98\"), weight 68.4 kg (150 lb 11.2 oz), SpO2 98 %.  Wt Readings from Last 4 Encounters:   02/07/18 68.4 kg (150 lb 11.2 oz)   01/10/18 69.5 kg (153 lb 4.8 oz)   12/13/17 70.1 kg (154 lb 8 oz)   12/08/17 70.9 kg (156 lb 4.8 oz)     Oropharynx is moist, no focal lesion. No icterus. Neck supple and without adenopathy. Lungs: crackles right base, no " wheezes or rub. Heart: RRR. Abdomen: soft, nontender. Extremities: warm, no edema.    Labs:   Recent Labs   Lab Test  02/26/18   1452  12/13/17   0813  12/12/17   1135  12/08/17   0925  12/06/17   1039  12/01/17   0824   NA  133  132*   --   135  133  127*   POTASSIUM  3.7  4.0   --   4.0  3.7  3.6   CHLORIDE  101  100   --   103  100  92*   CO2  24  22   --   23  23  25   ANIONGAP  9  11   --   8  9  10   BUN  12  12   --   9  8  19   CR  1.71*  1.65*   --   1.29*  1.21  1.56*   GLC  91  94  108*  79  101*  88   SAADIA  9.9  9.1   --   8.6  8.6  8.6     Recent Labs   Lab Test  02/07/18   1150  12/13/17   0813  12/08/17   0925  12/06/17   1039  12/01/17   0824   MAG  1.8  1.3*  1.5*  1.3*  1.2*     Recent Labs   Lab Test  02/26/18   1452  12/13/17   0813  12/01/17   0824  11/24/17   0827  11/10/17   0839  11/03/17   0743   WBC  5.6  4.3  2.2*  3.7*  3.0*  5.8   HGB  11.0*  9.4*  9.1*  7.9*  9.0*  9.1*   PLT  269  262  113*  146*  157  444   MCV  90  91  89  90  86  90   NEUTROPHIL   --   30.4  66.7  57.3  44.1  40.1     Recent Labs   Lab Test  12/13/17   0813  12/01/17   0824  11/24/17   0827   BILITOTAL  0.3  0.3  0.2   ALKPHOS  73  60  59   ALT  12  21  16   AST  28  42  20   ALBUMIN  3.3*  3.2*  3.3*     TSH   Date Value Ref Range Status   11/24/2017 0.82 0.40 - 4.00 mU/L Final   10/06/2017 1.61 0.40 - 4.00 mU/L Final     No results for input(s): CEA in the last 73444 hours.  Results for orders placed or performed in visit on 12/12/17   PET Oncology Whole Body    Narrative    Combined Report of:    PET and CT on  12/12/2017 1:25 PM :    1. PET of the neck, chest, abdomen, and pelvis.  2. PET CT Fusion for Attenuation Correction and Anatomical  Localization:    3. Diagnostic CT scan of the chest, abdomen, and pelvis with  intravenous contrast for interpretation.  3. CT of the chest, abdomen and pelvis obtained for diagnostic  interpretation.  4. 3D MIP and PET-CT fused images were processed on an  independent  workstation and archived to PACS and reviewed by a radiologist.    Technique:    1. PET: The patient received 15.53 mCi of F-18-FDG; the serum glucose  was 108 prior to administration, body weight was 70.9 kg. Images were  evaluated in the axial, sagittal, and coronal planes as well as the  rotational whole body MIP. Images were acquired from the Vertex to the  Feet.    UPTAKE WAS MEASURED AT 60 MINUTES.     2. CT: Volumetric acquisition for clinical interpretation of the  chest, abdomen, and pelvis acquired at 3 mm sections . The chest,  abdomen, and pelvis were evaluated at 5 mm sections in bone, soft  tissue, and lung windows.      The patient received 100 cc. Of Optiray 320 intravenously for the  examination.      3. 3D MIP and PET-CT fused images were processed on an independent  workstation and archived to PACS and reviewed by a radiologist.    INDICATION: urothelial cancer, assess response to treatment; Urethral  cancer (H)    ADDITIONAL INFORMATION OBTAINED FROM EMR: Stage II penile urethral  carcinoma, previously on Cisplatin/[place], now on cycle 4 Carbo/[place] due  to complications of CKD with cisplatin    COMPARISON: PET/CT 10/23/2017    FINDINGS:     HEAD/NECK:  There is a 7 mm left level 2A lymph node with an SUV max of 3.6 by  (axial series 2, image 111), previously 8 mm without FDG uptake.  Otherwise no suspicious FDG uptake in the head/neck.    Confluent periventricular hypodensities are nonspecific but favored to  represent sequelae of chronic small vessel ischemic disease.    Mild mucosal thickening of the maxillary sinuses, left greater than  right. Paranasal sinuses and mastoid air cells are otherwise clear.    The mucosal pharyngeal space, the , prevertebral and carotid  spaces are within normal limits. The thyroid gland is normal.    CHEST:  There is a 7 mm precarinal lymph node with an SUV max of 4.1 (axial  series 2, image 205) and a 12 mm AP window lymph node with an SUV  max  of 4.0 (axial series 2, image 200). These lymph nodes previously  measured 6 and 8 mm, respectively, without significant FDG uptake.    Pulmonary artery trunk measures 3.5 cm. Heart and thoracic aorta are  normal in size. No pericardial or pleural effusion. Multifocal  coronary artery atherosclerotic calcifications.    Central airways are patent. Centrilobular and paraseptal emphysematous  changes of the lungs with an upper lobe predominance.    Few scattered pulmonary nodules, for example (axial series 7):  Image 22:7 mm semisolid right upper lobe nodule, unchanged  Image 74:6 mm subpleural left upper lobe nodule adjacent to the  anterior junction line, unchanged  Image 129:3 mm left lower lobe nodule, unchanged  Image 117:5 mm subpleural right upper lobe nodule, unchanged    ABDOMEN AND PELVIS:    Suprapubic catheter with expected radiotracer uptake along the  catheter tract. As seen previously, there are two foci of  hypermetabolic penile urethral mass with an SUV max of 11.4  posteriorly and 7.2 anteriorly, previously 9.6 and 6.7, respectively.  Diffuse dilatation of the penile/bulbar urethra with a focus of gas in  the penile urethra.    The liver, gallbladder, spleen, adrenal glands, small bowel, large  bowel, and appendix are normal. Bilateral simple fluid density renal  cysts. Kidneys are otherwise normal. Pancreas divisum. No free fluid  or suspicious lymphadenopathy. Atherosclerotic changes of the  abdominal aorta and iliac arteries without aneurysmal dilatation.    LOWER EXTREMITIES:   No abnormal masses or hypermetabolic lesions.    BONES:   Degenerative changes of the spine and pelvis. Large subchondral cysts  in the left superior acetabulum. There are no suspicious lytic or  blastic osseous lesions. There is no abnormal FDG uptake in the  skeleton.      Impression    IMPRESSION:   1. In this patient with a history of penile carcinoma status post  chemotherapy:  a. The penile urethral tumor is  slightly more hypermetabolic compared  to 10/23/2017.  b. Slightly enlarged FDG avid precarinal and AP window lymph nodes,  probably reactive.    2. Scattered pulmonary nodules which are unchanged from 10/23/2017,  but improved from 8/23/2017.    3. Emphysematous changes of the lungs.    4. Pancreas divisum.    5. Enlarged pulmonary artery trunk which can be seen with pulmonary  artery hypertension.    I have personally reviewed the examination and initial interpretation  and I agree with the findings.    ELIZABETH WHITEHEAD MD       Impression/plan:     1. Stage II penile urethral carcinoma, previously on Cisplatin/[place], now on cycle 4 Carbo/[place] due to complications of CKD with cisplatin  He has completed 4 rounds of chemotherapy with platinum and gemcitabine.  He received split-dose cisplatin for the first 3 cycles and this had to be changed to carboplatin for the last cycle.  He did not receive his day 8 gemcitabine because of his thrombocytopenia.  I reviewed the actual images from his restaging scans which suggested progressive disease.      He is being seen  8 weeks since completion of his chemotherapy.  Unfortunately he has not had surgery as yet.  I will recheck her to continue to get an early date for him.  I will see him again post completion of his surgery.    He has moderate anemia related to his chemotherapy and also his CKD stage III.   I refilled his amlodipine, MiraLAX, omeprazole and fluticasone.    He did not have a good response to chemotherapy radiographically.  We will have to wait for the pathology results to see if the chemotherapy  helped him.  I do not feel that there is any role for adjuvant chemotherapy for him.  We do have a placebo-controlled clinical trial with nivolumab for patients with high-risk urothelial carcinoma.  It is quite likely that he would be a candidate for this clinical trial.  I will see him post surgery to review eligibility.  I did review immunotherapy with nivolumab.      Over 25 min of direct face to face time spent with patient with more than 50% time spent in counseling and coordinating care.

## 2018-02-13 RX ORDER — FLUTICASONE PROPIONATE 50 MCG
2 SPRAY, SUSPENSION (ML) NASAL 2 TIMES DAILY
Qty: 2 BOTTLE | Refills: 11 | Status: SHIPPED | OUTPATIENT
Start: 2018-02-13 | End: 2019-01-01

## 2018-02-26 ENCOUNTER — OFFICE VISIT (OUTPATIENT)
Dept: SURGERY | Facility: CLINIC | Age: 73
End: 2018-02-26
Payer: COMMERCIAL

## 2018-02-26 ENCOUNTER — RADIANT APPOINTMENT (OUTPATIENT)
Dept: ULTRASOUND IMAGING | Facility: CLINIC | Age: 73
End: 2018-02-26
Attending: UROLOGY
Payer: COMMERCIAL

## 2018-02-26 ENCOUNTER — ALLIED HEALTH/NURSE VISIT (OUTPATIENT)
Dept: UROLOGY | Facility: CLINIC | Age: 73
End: 2018-02-26
Payer: COMMERCIAL

## 2018-02-26 ENCOUNTER — OFFICE VISIT (OUTPATIENT)
Dept: WOUND CARE | Facility: CLINIC | Age: 73
End: 2018-02-26
Payer: COMMERCIAL

## 2018-02-26 ENCOUNTER — APPOINTMENT (OUTPATIENT)
Dept: SURGERY | Facility: CLINIC | Age: 73
End: 2018-02-26
Payer: COMMERCIAL

## 2018-02-26 ENCOUNTER — ANESTHESIA EVENT (OUTPATIENT)
Dept: SURGERY | Facility: CLINIC | Age: 73
DRG: 657 | End: 2018-02-26
Payer: MEDICARE

## 2018-02-26 ENCOUNTER — ALLIED HEALTH/NURSE VISIT (OUTPATIENT)
Dept: SURGERY | Facility: CLINIC | Age: 73
End: 2018-02-26
Payer: COMMERCIAL

## 2018-02-26 VITALS
HEIGHT: 76 IN | SYSTOLIC BLOOD PRESSURE: 156 MMHG | DIASTOLIC BLOOD PRESSURE: 76 MMHG | OXYGEN SATURATION: 99 % | TEMPERATURE: 97.9 F | HEART RATE: 87 BPM | BODY MASS INDEX: 17.69 KG/M2 | RESPIRATION RATE: 18 BRPM | WEIGHT: 145.3 LBS

## 2018-02-26 DIAGNOSIS — Z71.89 ENCOUNTER FOR COUNSELING FOR UROSTOMY MANAGEMENT: ICD-10-CM

## 2018-02-26 DIAGNOSIS — C68.0 URETHRAL CANCER (H): Primary | ICD-10-CM

## 2018-02-26 DIAGNOSIS — R60.0 LOCALIZED EDEMA: ICD-10-CM

## 2018-02-26 DIAGNOSIS — C68.0 URETHRAL CANCER (H): ICD-10-CM

## 2018-02-26 DIAGNOSIS — Z71.89 OSTOMY NURSE CONSULTATION: ICD-10-CM

## 2018-02-26 DIAGNOSIS — Z01.818 PRE-OP EXAMINATION: Primary | ICD-10-CM

## 2018-02-26 LAB
ALBUMIN UR-MCNC: 100 MG/DL
ANION GAP SERPL CALCULATED.3IONS-SCNC: 9 MMOL/L (ref 3–14)
APPEARANCE UR: ABNORMAL
BACTERIA #/AREA URNS HPF: ABNORMAL /HPF
BILIRUB UR QL STRIP: NEGATIVE
BUN SERPL-MCNC: 12 MG/DL (ref 7–30)
CALCIUM SERPL-MCNC: 9.9 MG/DL (ref 8.5–10.1)
CHLORIDE SERPL-SCNC: 101 MMOL/L (ref 94–109)
CO2 SERPL-SCNC: 24 MMOL/L (ref 20–32)
COLOR UR AUTO: YELLOW
CREAT SERPL-MCNC: 1.71 MG/DL (ref 0.66–1.25)
ERYTHROCYTE [DISTWIDTH] IN BLOOD BY AUTOMATED COUNT: 14.1 % (ref 10–15)
GFR SERPL CREATININE-BSD FRML MDRD: 40 ML/MIN/1.7M2
GLUCOSE SERPL-MCNC: 91 MG/DL (ref 70–99)
GLUCOSE UR STRIP-MCNC: NEGATIVE MG/DL
HCT VFR BLD AUTO: 33 % (ref 40–53)
HGB BLD-MCNC: 11 G/DL (ref 13.3–17.7)
HGB UR QL STRIP: ABNORMAL
INR PPP: 0.97 (ref 0.86–1.14)
KETONES UR STRIP-MCNC: 20 MG/DL
LEUKOCYTE ESTERASE UR QL STRIP: ABNORMAL
MCH RBC QN AUTO: 30.1 PG (ref 26.5–33)
MCHC RBC AUTO-ENTMCNC: 33.3 G/DL (ref 31.5–36.5)
MCV RBC AUTO: 90 FL (ref 78–100)
MUCOUS THREADS #/AREA URNS LPF: PRESENT /LPF
NITRATE UR QL: NEGATIVE
PH UR STRIP: 6 PH (ref 5–7)
PLATELET # BLD AUTO: 269 10E9/L (ref 150–450)
POTASSIUM SERPL-SCNC: 3.7 MMOL/L (ref 3.4–5.3)
RBC # BLD AUTO: 3.65 10E12/L (ref 4.4–5.9)
RBC #/AREA URNS AUTO: >182 /HPF (ref 0–2)
SODIUM SERPL-SCNC: 133 MMOL/L (ref 133–144)
SOURCE: ABNORMAL
SP GR UR STRIP: 1.02 (ref 1–1.03)
UROBILINOGEN UR STRIP-MCNC: 0 MG/DL (ref 0–2)
WBC # BLD AUTO: 5.6 10E9/L (ref 4–11)
WBC #/AREA URNS AUTO: >182 /HPF (ref 0–2)
WBC CLUMPS #/AREA URNS HPF: PRESENT /HPF
YEAST #/AREA URNS HPF: ABNORMAL /HPF

## 2018-02-26 PROCEDURE — 81001 URINALYSIS AUTO W/SCOPE: CPT | Performed by: NURSE PRACTITIONER

## 2018-02-26 PROCEDURE — 85610 PROTHROMBIN TIME: CPT | Performed by: NURSE PRACTITIONER

## 2018-02-26 PROCEDURE — 86900 BLOOD TYPING SEROLOGIC ABO: CPT | Performed by: NURSE PRACTITIONER

## 2018-02-26 PROCEDURE — 86850 RBC ANTIBODY SCREEN: CPT | Performed by: NURSE PRACTITIONER

## 2018-02-26 PROCEDURE — 80048 BASIC METABOLIC PNL TOTAL CA: CPT | Performed by: NURSE PRACTITIONER

## 2018-02-26 PROCEDURE — 85027 COMPLETE CBC AUTOMATED: CPT | Performed by: NURSE PRACTITIONER

## 2018-02-26 PROCEDURE — 86901 BLOOD TYPING SEROLOGIC RH(D): CPT | Performed by: NURSE PRACTITIONER

## 2018-02-26 RX ORDER — ACETAMINOPHEN 325 MG/1
975 TABLET ORAL ONCE
Status: CANCELLED | OUTPATIENT
Start: 2018-02-26 | End: 2018-02-26

## 2018-02-26 RX ORDER — GABAPENTIN 300 MG/1
300 CAPSULE ORAL ONCE
Status: CANCELLED | OUTPATIENT
Start: 2018-02-26 | End: 2018-02-26

## 2018-02-26 ASSESSMENT — LIFESTYLE VARIABLES: TOBACCO_USE: 1

## 2018-02-26 NOTE — MR AVS SNAPSHOT
After Visit Summary   2/26/2018    King Gonsalo Bruno    MRN: [id number]           Patient Information     Date Of Birth          1945        Visit Information        Provider Department      2/26/2018 2:20 PM Nurse, [facility] Urology and [facility]rolog[facility]        Today's Diagnoses     Urethral cancer (H)    -  1       Follow-ups after your visit        Your next 10 appointments already scheduled     Feb 26, 2018  3:15 PM CST   Masonic Lab Draw with  MASONIC LAB DRAW   [facility] Masonic Lab Draw ([facility])    [address]  [address]  [place] [facility] [address] [phone]            Mar 08, 2018   Procedure with Muriel Haddad MD   [facility], [place], Same Day Surgery (--)    [address] [address] [phone]            Mar 30, 2018  2:00 PM CDT   Telephone Call with Rebeka Brennan RD   [facility] Urology and [facility] Prostate and Urologic Cancers ([facility])    [address]  4th Floor  [place] [place] [address] [phone]           Note: this is not an onsite visit; there is no need to come to the facility.  Thank you for choosing [facility] for your health care needs. Below is some information for patients who are interested in having their follow-up visit with a physician by telephone. In some cases, a telephone visit can be an effective and convenient way to manage your follow-up care. Choosing a telephone visit rather than a face to face visit for your follow-up care is a decision that you and your physician can make together to ensure it meets all of your needs.  A face to face visit is always an available option, if you choose to do so.  We want to make sure you have all of the information you need about the telephone visit option and answer all of your questions before you decide to schedule a telephone follow-up visit. If you have any  questions, you may talk to a staff member or our financial counselor at [phone].  1. General overview Our clinic sees patients for a variety of conditions and concerns. A face to face visit with your doctor is required for any new concerns or for your initial visit. If you and your doctor decide that a follow up visit by telephone is appropriate, you may decide to opt for a telephone visit.   2. Billing and insurance coverage There is a charge for telephone visits, similar to the charge for an in-person visit. Your bill is based on the amount of time you and your physician are on the phone. We will bill each visit to your insurance company (just like your other medical visits), and you will be responsible for any costs not paid by your insurance company. The charge may be denied by your insurance company, in which case you will be responsible for the entire amount billed. The decision to cover the cost is determined by your insurance company. If you want to know what your insurance company will cover, we encourage you to contact them to determine your coverage. The codes below are the codes we use when billing for telephone visits and the associated charges. This may help you work with your insurance company to determine your benefits.   Billing CPT codes for Telephone visits  70774 ($30), 72741 ($35), 34135 ($40)            Apr 04, 2018 11:30 AM CDT   POST OP OSTOMY NURSE with Gonsalo Aguilar RN   [facility] Wound Ostomy ([facility])    [address] [place] [address]   [phone]            Apr 04, 2018  1:20 PM CDT   (Arrive by 1:05 PM)   Post-Op with Muriel Haddad MD   [facility] Urology and [facility] for Prostate and Urologic Cancers ([facility])    [address] [place] [address]   [phone]            Apr 25, 2018 11:30 AM CDT   Masonic Lab Draw with  MASONIC LAB DRAW   [facility] Masonic Lab Draw  ([facility])    [address]  [address]  [facility] [address]   [phone]            2018 12:00 PM CDT   (Arrive by 11:45 AM)   Return Visit with Steve Arceo MD   [facility] ([facility])    [address]  [address]  [facility] [address] [phone]              Who to contact     Please call your clinic at [phone] to:    Ask questions about your health    Make or cancel appointments    Discuss your medicines    Learn about your test results    Speak to your doctor            Additional Information About Your Visit        MyChart Information     DecideQuickhart is an electronic gateway that provides easy, online access to your medical records. With MyTwinPlace, you can request a clinic appointment, read your test results, renew a prescription or communicate with your care team.     To sign up for Interglosst visit the website at [email or web address]   You will be asked to enter the access code listed below, as well as some personal information. Please follow the directions to create your username and password.     Your access code is: [email or web address]  Expires: 2018  6:31 AM     Your access code will  in 90 days. If you need help or a new code, please contact your [facility] or call [phone] for assistance.        Care EveryWhere ID     This is your Care EveryWhere ID. This could be used by other organizations to access your [place] medical records  [id number]         Blood Pressure from Last 3 Encounters:   18 156/76   18 139/77   01/10/18 124/73    Weight from Last 3 Encounters:   18 65.9 kg (145 lb 4.8 oz)   18 68.4 kg (150 lb 11.2 oz)   01/10/18 69.5 kg (153 lb 4.8 oz)              We Performed the Following     Cath Insertion, Simple (45105)     Urine Culture Aerobic Bacterial          Today's Medication Changes          These changes  are accurate as of 2/26/18  3:07 PM.  If you have any questions, ask your nurse or doctor.               These medicines have changed or have updated prescriptions.        Dose/Directions    amLODIPine 10 MG tablet   Commonly known as:  NORVASC   This may have changed:    - how much to take  - when to take this   Used for:  Benign essential hypertension        Dose:  10 mg   Take 1 tablet (10 mg) by mouth daily   Quantity:  90 tablet   Refills:  3       fluticasone 50 MCG/ACT spray   Commonly known as:  FLONASE   This may have changed:  when to take this   Used for:  Urethral cancer (H), Allergic sinusitis        Dose:  2 spray   Spray 2 sprays into both nostrils 2 times daily   Quantity:  2 Bottle   Refills:  11       LORazepam 0.5 MG tablet   Commonly known as:  ATIVAN   This may have changed:  when to take this   Used for:  Anxiety        Dose:  0.5 mg   Take 1 tablet (0.5 mg) by mouth every 8 hours as needed for anxiety   Quantity:  30 tablet   Refills:  3       nystatin 687859 UNIT/ML suspension   Commonly known as:  MYCOSTATIN   This may have changed:    - when to take this  - reasons to take this  - additional instructions   Used for:  Thrush        Dose:  437540 Units   Take 5 mLs (500,000 Units) by mouth 4 times daily Swish and spit   Quantity:  200 mL   Refills:  0                Primary Care Provider Office Phone # Fax #    Joan Janet Ventura [phone]       [facility] [address] [address]        Equal Access to Services     JENELLE RED AH: Hadii светлана garcia hadnikkio Soveronica, waaxda luqadaha, qaybta kaalmada adeegyada, ronald rasmussen. So [facility] [phone].    ATENCIÓN: Si habla español, tiene a washburn disposición servicios gratuitos de asistencia lingüística. Juwan al [phone].    We comply with applicable federal civil rights laws and [place] laws. We do not discriminate on the basis of race, color, national origin, age, disability, sex,  sexual orientation, or gender identity.            Thank you!     Thank you for choosing [facility] UROLOGY AND [facility] FOR PROSTATE AND UROLOGIC CANCERS  for your care. Our goal is always to provide you with excellent care. Hearing back from our patients is one way we can continue to improve our services. Please take a few minutes to complete the written survey that you may receive in the mail after your visit with us. Thank you!             Your Updated Medication List - Protect others around you: Learn how to safely use, store and throw away your medicines at www.disposemymeds.org.          This list is accurate as of 2/26/18  3:07 PM.  Always use your most recent med list.                   Brand Name Dispense Instructions for use Diagnosis    amLODIPine 10 MG tablet    NORVASC    90 tablet    Take 1 tablet (10 mg) by mouth daily    Benign essential hypertension       docusate sodium 100 MG capsule    COLACE    60 capsule    Take 1 capsule (100 mg) by mouth 2 times daily as needed for constipation    Slow transit constipation       fluticasone 50 MCG/ACT spray    FLONASE    2 Bottle    Spray 2 sprays into both nostrils 2 times daily    Urethral cancer (H), Allergic sinusitis       LORazepam 0.5 MG tablet    ATIVAN    30 tablet    Take 1 tablet (0.5 mg) by mouth every 8 hours as needed for anxiety    Anxiety       nystatin 138646 UNIT/ML suspension    MYCOSTATIN    200 mL    Take 5 mLs (500,000 Units) by mouth 4 times daily Swish and spit    Thrush       omeprazole 2 mg/mL Susp    priLOSEC    280 mL    Take 10 mLs (20 mg) by mouth 2 times daily    Gastroesophageal reflux disease with esophagitis, Urethral cancer (H)       polyethylene glycol powder    MIRALAX/GLYCOLAX     Take 1 capful by mouth daily as needed for constipation        TYLENOL PO      Take 325 mg by mouth as needed for mild pain or fever        VITAMIN B COMPLEX PO      Take by mouth daily (with lunch)

## 2018-02-26 NOTE — NURSING NOTE
Pre Op Teaching Flowsheet       Pre and Post op Patient Education  Relevant Diagnosis:  Urethral cancer  Surgical procedure:  radical penectomy and urethrectomy possible cystectomy with ileal conduit, possible appendicovesicostomy, possible perineal urethrostomy, inguinal lymphadenectomy  Teaching Topic:  Pre and post op teaching  Person Involved in teaching: King Gonsalo Bruno    Motivation Level:  Asks Questions: Yes  Eager to Learn:  Yes  Cooperative: Yes  Receptive (willing/able to accept information):  Yes    Patient demonstrates understanding of the following:  Date of surgery:  3/8/18  Location of surgery:  [address] [place] [address]  History and Physical and any other testing necessary prior to surgery: Yes  Required time line for completion of History and Physical and any pre-op testing: Yes    Patient demonstrates understanding of the following:  Pre-op bowel prep:  Clear liquids day prior with Fleets enema that night  Pre-op showering/scrub information with PCMX Soap: Yes  Blood thinner medications discussed and when to stop (if applicable):  Yes      Infection Prevention:   Patient demonstrates understanding of the following:  Surgical procedure site care taught: at time of discharge  Signs and symptoms of infection taught:  Yes      Post-op follow-up:  Discussed how to contact the hospital, nurse, and clinic scheduling staff if necessary.    Instructional materials used/given/mailed:  [facility] Booklet, post op teaching sheet, Map, Soap, and arrival/location information.    Surgical instructions packet given to patient in office:  Yes.    Patient has  to take him home and stay with him the first 24 hours.  Patient given nutritional drink as well.

## 2018-02-26 NOTE — MR AVS SNAPSHOT
After Visit Summary   2/26/2018    King Gonsalo Bruno    MRN: [id number]           Patient Information     Date Of Birth          1945        Visit Information        Provider Department      2/26/2018 9:30 AM Olivia Glaser RN [facility] Wound Ostomy        Today's Diagnoses     Urethral cancer (H)    -  1    Encounter for counseling for urostomy management        Ostomy nurse consultation           Follow-ups after your visit        Your next 10 appointments already scheduled     Feb 26, 2018  3:00 PM CST   LAB with  LAB   [facility] Lab ([facility])    [address]  1st [address]  [facility] [id number] [phone]           Please do not eat 10-12 hours before your appointment if you are coming in fasting for labs on lipids, cholesterol, or glucose (sugar). This does not apply to pregnant women. Water, hot tea and black coffee (with nothing added) are okay. Do not drink other fluids, diet soda or chew gum.            Feb 26, 2018  3:15 PM CST   Masonic Lab Draw with  MASONIC LAB DRAW   [facility] Masonic Lab Draw ([facility])    [address]  [address]  [place] [id number] [phone]            Mar 08, 2018   Procedure with Muriel Haddad MD   [facility] [place], Same Day Surgery (--)    [address] [address] [phone]            Mar 30, 2018  2:00 PM CDT   Telephone Call with Rebeka Brennan RD   [facility] Urology and Inst for Prostate and Urologic Cancers ([facility])    [address]  4th Floor  [facility] [address] [phone]           Note: this is not an onsite visit; there is no need to come to the facility.  Thank you for choosing [facility] for your health care needs. Below is some information for patients who are interested in having their follow-up visit with a physician by telephone. In some cases, a telephone visit can be an  effective and convenient way to manage your follow-up care. Choosing a telephone visit rather than a face to face visit for your follow-up care is a decision that you and your physician can make together to ensure it meets all of your needs.  A face to face visit is always an available option, if you choose to do so.  We want to make sure you have all of the information you need about the telephone visit option and answer all of your questions before you decide to schedule a telephone follow-up visit. If you have any questions, you may talk to a staff member or our financial counselor at [phone].  1. General overview Our clinic sees patients for a variety of conditions and concerns. A face to face visit with your doctor is required for any new concerns or for your initial visit. If you and your doctor decide that a follow up visit by telephone is appropriate, you may decide to opt for a telephone visit.   2. Billing and insurance coverage There is a charge for telephone visits, similar to the charge for an in-person visit. Your bill is based on the amount of time you and your physician are on the phone. We will bill each visit to your insurance company (just like your other medical visits), and you will be responsible for any costs not paid by your insurance company. The charge may be denied by your insurance company, in which case you will be responsible for the entire amount billed. The decision to cover the cost is determined by your insurance company. If you want to know what your insurance company will cover, we encourage you to contact them to determine your coverage. The codes below are the codes we use when billing for telephone visits and the associated charges. This may help you work with your insurance company to determine your benefits.   Billing CPT codes for Telephone visits  12040 ($30), 73979 ($35), 82851 ($40)            Apr 04, 2018 11:30 AM CDT   POST OP OSTOMY NURSE with Gonsalo Aguilar RN    [facility] Wound Ostomy ([facility])    [address]  4th Floor  [facility] [address]   [phone]            Apr 04, 2018  1:20 PM CDT   (Arrive by 1:05 PM)   Post-Op with Muriel Haddad MD   [facility]rology and Inst for Prostate and Urologic Cancers ([facility])    [address]  4th Floor  [facility] [address] [phone]            Apr 25, 2018 11:30 AM CDT   Masonic Lab Draw with  MASONIC LAB DRAW   [facility] Masonic Lab Draw ([facility])    [address]  [address]  [facility] [address] [phone]            Apr 25, 2018 12:00 PM CDT   (Arrive by 11:45 AM)   Return Visit with Steve Arceo MD   [facility] ([facility])    [address]  [address]  [facility] [address] [phone]              Future tests that were ordered for you today     Open Future Orders        Priority Expected Expires Ordered    UA reflex to Microscopic and Culture Routine 2/26/2018 3/28/2018 2/26/2018    ABO/Rh type and screen Routine 2/26/2018 3/28/2018 2/26/2018    Basic metabolic panel Routine 2/26/2018 3/28/2018 2/26/2018    CBC with platelets Routine 2/26/2018 3/28/2018 2/26/2018    INR Routine 2/26/2018 3/28/2018 2/26/2018            Who to contact     Please call your clinic at [phone] to:    Ask questions about your health    Make or cancel appointments    Discuss your medicines    Learn about your test results    Speak to your doctor            Additional Information About Your Visit        MyChart Information     Silicon Biosystemshart is an electronic gateway that provides easy, online access to your medical records. With [email or web address], you can request a clinic appointment, read your test results, renew a prescription or communicate with your care team.     To sign up for Qiandaot visit the website at [email or web address]   You will be asked to  enter the access code listed below, as well as some personal information. Please follow the directions to create your username and password.     Your access code is: [email or web address]  Expires: 2018  6:31 AM     Your access code will  in 90 days. If you need help or a new code, please contact your [facility] or call [phone] for assistance.        Care EveryWhere ID     This is your Care EveryWhere ID. This could be used by other organizations to access your [place] medical records  [id number]         Blood Pressure from Last 3 Encounters:   18 156/76   18 139/77   01/10/18 124/73    Weight from Last 3 Encounters:   18 65.9 kg (145 lb 4.8 oz)   18 68.4 kg (150 lb 11.2 oz)   01/10/18 69.5 kg (153 lb 4.8 oz)              Today, you had the following     No orders found for display         Today's Medication Changes          These changes are accurate as of 18  2:50 PM.  If you have any questions, ask your nurse or doctor.               These medicines have changed or have updated prescriptions.        Dose/Directions    amLODIPine 10 MG tablet   Commonly known as:  NORVASC   This may have changed:    - how much to take  - when to take this   Used for:  Benign essential hypertension        Dose:  10 mg   Take 1 tablet (10 mg) by mouth daily   Quantity:  90 tablet   Refills:  3       fluticasone 50 MCG/ACT spray   Commonly known as:  FLONASE   This may have changed:  when to take this   Used for:  Urethral cancer (H), Allergic sinusitis        Dose:  2 spray   Spray 2 sprays into both nostrils 2 times daily   Quantity:  2 Bottle   Refills:  11       LORazepam 0.5 MG tablet   Commonly known as:  ATIVAN   This may have changed:  when to take this   Used for:  Anxiety        Dose:  0.5 mg   Take 1 tablet (0.5 mg) by mouth every 8 hours as needed for anxiety   Quantity:  30 tablet   Refills:  3       nystatin 387684 UNIT/ML suspension   Commonly known  as:  MYCOSTATIN   This may have changed:    - when to take this  - reasons to take this  - additional instructions   Used for:  Thrush        Dose:  617362 Units   Take 5 mLs (500,000 Units) by mouth 4 times daily Swish and spit   Quantity:  200 mL   Refills:  0                Primary Care Provider Office Phone # Fax Jazmine Ventura -[phone]       [facility] [address] [facility] [address]        Equal Access to Services     JENELLE RED : Hadii aad ku hadasho Soomaali, waaxda luqadaha, qaybta kaalmada adeegyada, waxay idiin hayaan adeeg kharash la'reymundo . So [facility] [phone].    ATENCIÓN: Si habla español, tiene a washburn disposición servicios gratuitos de asistencia lingüística. [facility] [phone].    We comply with applicable federal civil rights laws and [place] laws. We do not discriminate on the basis of race, color, national origin, age, disability, sex, sexual orientation, or gender identity.            Thank you!     Thank you for choosing [facility] OSTOMY  for your care. Our goal is always to provide you with excellent care. Hearing back from our patients is one way we can continue to improve our services. Please take a few minutes to complete the written survey that you may receive in the mail after your visit with us. Thank you!             Your Updated Medication List - Protect others around you: Learn how to safely use, store and throw away your medicines at www.disposemymeds.org.          This list is accurate as of 2/26/18  2:50 PM.  Always use your most recent med list.                   Brand Name Dispense Instructions for use Diagnosis    amLODIPine 10 MG tablet    NORVASC    90 tablet    Take 1 tablet (10 mg) by mouth daily    Benign essential hypertension       docusate sodium 100 MG capsule    COLACE    60 capsule    Take 1 capsule (100 mg) by mouth 2 times daily as needed for constipation    Slow transit constipation       fluticasone 50 MCG/ACT  spray    FLONASE    2 Bottle    Spray 2 sprays into both nostrils 2 times daily    Urethral cancer (H), Allergic sinusitis       LORazepam 0.5 MG tablet    ATIVAN    30 tablet    Take 1 tablet (0.5 mg) by mouth every 8 hours as needed for anxiety    Anxiety       nystatin 906301 UNIT/ML suspension    MYCOSTATIN    200 mL    Take 5 mLs (500,000 Units) by mouth 4 times daily Swish and spit    Thrush       omeprazole 2 mg/mL Susp    priLOSEC    280 mL    Take 10 mLs (20 mg) by mouth 2 times daily    Gastroesophageal reflux disease with esophagitis, Urethral cancer (H)       polyethylene glycol powder    MIRALAX/GLYCOLAX     Take 1 capful by mouth daily as needed for constipation        TYLENOL PO      Take 325 mg by mouth as needed for mild pain or fever        VITAMIN B COMPLEX PO      Take by mouth daily (with lunch)

## 2018-02-26 NOTE — PROGRESS NOTES
radical penectomy and urethrectomy possible cystectomy with ileal conduit, possible appendicovesicostomy, possible perineal urethrostomy, inguinal lymphadenectomy    WOC Preoperative Ostomy    Referral from Dr. Haddad  Consult attended by patient and spouse  Diagnosis: uretheral cancer Date of Surgery: 03/08/18   Type of Surgery: radical penectomy and urethrectomy possible cystectomy with ileal conduit, possible appendicovesicostomy, possible perineal urethrostomy, inguinal lymphadenectomy  Emotional readiness for surgery: no acute distress  Physical Limitations: Without limitations  Abdomen assessed for site by: Patient's ability to visiualize area, avoidance of skin creases and scars, palpating for rectus abdominus muscle and clothing fit  Teaching: Anatomy/picture of stoma, stoma/bowel function postop, intro to pouches, written/media offered and role of WOC/postop followup explained.  Stoma site marking:  Marking pen and tegaderm   Location chosen: [facility]ragon NP was available for supervision of care if needed or if questions should arise and regarding plan of care.  Olivia Glaser RN CWON

## 2018-02-26 NOTE — PATIENT INSTRUCTIONS
Preparing for Your Surgery      Name:  King Gonsalo Bruno   MRN:  [id number]   :  1945   Today's Date:  2018     Arriving for surgery:  Surgery date: 3/8/18  Arrival time:  5:15 am    Please come to:     [facility], 3rd floor, [address]    [address] [place]  [address]    -   parking is available in front of the hospital from 5:15 am to 8:00 pm    -  Stop at the Information Desk in the lobby    -   Inform the information person that you are here for surgery. An escort to 3c will be provided. If you would not like an escort, please proceed to 3C on the 3rd floor. [phone]     What can I eat or drink?  -  You may have solid food or milk products; drink 8-12 ounces of clear gatorade the evening before surgery until 11:45 pm;   -  You may have water, gatorade, apple juice or 7up/Sprite;  Drink 8-12 ounces of clear gatorade as you are on the way to the hospital until 5:15 am.    Which medicines can I take?        -  Avoid vitamin B complex for 7 days before surgery (take last dose on 18).    -  Do NOT take these medications in the morning, the day of surgery: Miralax powder or colace      -  Take these medications the day of surgery:  Amlodipine and Omeprazole.         -  Do not bring your own medications to the hospital, except for inhalers and eye drops.    How do I prepare myself?  -  Take two showers: one the night before surgery; and one the morning of surgery.         Use Scrubcare or Hibiclens to wash from neck down.  You may use your own shampoo and conditioner. No other hair products.   -  Do NOT use lotion, powder, deodorant, or antiperspirant the day of your surgery.  -  Do NOT wear any makeup, fingernail polish or jewelry.  -  Bring your ID and insurance card.    Enhanced Recovery After Surgery     This is a team effort, including you, to get you back on your feet, eating and drinking normally and out of the hospital as quickly as  possible.       The goals are: 1) NO INFECTIONS and      2) RETURN TO NORMAL DIET    How can we achieve these goals?    1) STAY ACTIVE: Walk every day before your surgery; try to increase the amount every day.  Walk after surgery as much as you can-the nurses will help you.  Walking speeds healing and gets you home quicker, you heal better at home and have less risk of infection.     2) DEEP BREATHING:  Using an Incentive Spirometer    An incentive spirometer is a device that helps you do deep breathing exercises. These exercises expand your lungs, aid in circulation, and help prevent pneumonia. Deep breathing exercises also help you breathe better and improve the function of your lungs by:    Keeping your lungs clear    Strengthening your breathing muscles    Helping prevent respiratory complications or problems  The incentive spirometer gives you a way to take an active part in your care. A nurse or therapist will teach you breathing exercises. To do these exercises, you will breathe in through your mouth and not your nose. The incentive spirometer only works correctly if you breathe in through your mouth.    Steps to clear lungs  Step 1. Exhale normally. Then, inhale normally.    Relax and breathe out.  Step 2. Place your lips tightly around the mouthpiece.    Make sure the device is upright and not tilted.  Step 3. Inhale as much air as you can through the mouthpiece (don't breath through your nose).    Inhale slowly and deeply.    Hold your breath long enough to keep the balls or disk raised for at least 3 to 5 seconds, or as instructed by your healthcare provider.  Step 4. Repeat the exercise regularly.    Begin using the Incentive Spirometer one week prior to your surgery, 4 times per day, 5-10 breaths each.    3) STAY HYDRATED: Drink clear liquids up until 2 hours before your surgery. We would like you to purchase a drink such as Gatorade or Ensure Clear (not the milkshake type).  Drink this before bedtime  and on the way into the hospital, drink between 8-12 ounces or until you feel hydrated.  Keeping well hydrated leads to your veins being plump, you wake up faster, and you are less likely to be nauseated. Start drinking water as soon as you can after surgery and advance to clear liquids and food as tolerated.  IV fluids contain salt, drinking fluids will minimize the amount of IV fluids you need and decrease the amount of salt you get.    The most common reason for the patient to be readmitted is dehydration. Staying hydrated after you go home from the hospital is very important.  Ensure or Ensure Clear are good options to keep you hydrated.     4) PAIN MANAGEMENT: If we minimize the amount of opioids and narcotics, and use regional blocks (which numb the area where your surgery is) along with oral pain medications; you will have less side effects of nausea and constipation. Narcotics can slow down your bowels and cause you to stay in the hospital longer.     Questions or Concerns:  If you have questions or concerns, please call the  Preoperative Assessment Center, Monday-Friday 7AM-7PM:  [phone]

## 2018-02-26 NOTE — ANESTHESIA PREPROCEDURE EVALUATION
Anesthesia Evaluation     . 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     (+)JEISON risk factors hypertension, allergic rhinitis, other ENT- Zencker diverticulum, tobacco use (Quit 1992.  Smoked <1PPD for 16 years. ), Past use , . .    Neurologic:  - neg neurologic ROS     Cardiovascular:     (+) hypertension----. : . . . :. . Previous cardiac testing      (-) taking anticoagulants/antiplatelets   METS/Exercise Tolerance:  >4 METS   Hematologic:     (+) History of Transfusion -      Musculoskeletal: Comment: Chronic neck pain.    (+) arthritis (cervical spine and knuckles. ), , , -       GI/Hepatic: Comment: Constipation    (+) GERD Asymptomatic on medication,       Renal/Genitourinary:     (+) chronic renal disease, type: CRI,       Endo:  - neg endo ROS       Psychiatric:     (+) psychiatric history anxiety      Infectious Disease:  - neg infectious disease ROS       Malignancy:   (+) Malignancy History of Other  Other CA status post Chemo         Other:    (+) No chance of pregnancy H/O Chronic Pain,                   Physical Exam      Airway   Mallampati: II  TM distance: >3 FB  Neck ROM: limited    Dental   (+) missing    Cardiovascular   Rhythm and rate: regular and normal      Pulmonary    breath sounds clear to auscultation    Other findings: Combined Report of:    PET and CT on  12/12/2017 1:25 PM :       IMPRESSION:   1. In this patient with a history of penile carcinoma status post  chemotherapy:  a. The penile urethral tumor is slightly more hypermetabolic compared  to 10/23/2017.  b. Slightly enlarged FDG avid precarinal and AP window lymph nodes,  probably reactive.     2. Scattered pulmonary nodules which are unchanged from 10/23/2017,  but improved from  8/23/2017.     3. Emphysematous changes of the lungs.     4. Pancreas divisum.     5. Enlarged pulmonary artery trunk which can be seen with pulmonary  artery hypertension.    For further details of assessment, testing, and physical exam please see H and P completed on same date.           PAC Discussion and Assessment    ASA Classification: 3  Case is suitable for: [place]  Anesthetic techniques and relevant risks discussed: GA  Invasive monitoring and risk discussed:   Types:   Possibility and Risk of blood transfusion discussed:   NPO instructions given:   Additional anesthetic preparation and risks discussed:   Needs early admission to pre-op area:   Other:     PAC Resident/NP Anesthesia Assessment:  Gonsalo Bruno is a 72 year old male scheduled for radical penectomy and urethrectomy possible cystectomy with ileal conduit, possible appendicovesicostomy, possible perineal urethrostomy, inguinal lymphadenectomy on 3/8/2018 with Dr. Haddad at [facility] under general anesthesia.  Mr. Bruno was seen by Dr. 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  Mr. Bruno is s/p urethral dilation and SPT placed in August 2017.  He has completed neoadjuvant chemotherapy.  Records indicate pulmonary nodules on imaging concerning for metastatic disease or second primary disease, however, patient convinced it is r/t his prior TB exposure.  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      He has the following specific operative considerations:     ERAS protocol    1.  Cardiology - Denies cardiac history or symptoms.  METS>4 prior to chemotherapy.  RCRI : No serious cardiac risks.  0.4 % risk of major adverse cardiac event. No further cardiac  evaluation needed per 2014 ACC/AHA guidelines for non-cardiac surgery.         HTN, take CCB DOS  2.  Pulmonary - remote smoking hx       - JEISON # of risks 3/8 = intermediate       - pulmonary nodules on imaging concerning for metastatic disease or second primary disease>>>>patient convinced it is r/t his prior TB exposure.    3.  Hematology - VTE risk:  4.5%.  Increased VTE risk: Recommend use of mechanical/chemical VTE prophylaxis in the day- and postoperative periods.         - H/O blood transfusion>>>T&S today  4.  GI - Known PONV = anti-emetic intervention recommended.        - GERD, take PPI DOS   5.  Renal - CKD, GFR 41, Cr 1.65  6.   - Stage II penile urethral carcinoma, s/p chemotherapy>>>now with above procedure planned.        - Abnormal UA today>>>>Dr. Haddad's office will manage     - Anesthesia considerations:  Refer to PAC assessment in anesthesia records  - Airway:  H/o dysphagia>>>>s/p repair hypopharyngeal diverticula 9/2013.  - Post-op delirium risk: elevated given age    Arrival time, NPO, shower and medication instructions provided by nursing staff today.  Preparing For Your Surgery handout given.  Patient was discussed with Dr Urrutia. I spent 20 minutes face to face with patient assessing, educating, counseling and/or coordinating care and examining the patient.  Of that 20 minutes, I spent greater than 50% of my time counseling and/or coordinating care.  All of the above labs and procedures I personally reviewed.    Reviewed and Signed by PAC Mid-Level Provider/Resident  Mid-Level Provider/Resident: Doris Ceballos APRN CN  Date: 2/26/18  Time: 14:13    Attending Anesthesiologist Anesthesia Assessment:  Mr. Bruno is a 72 year old male scheduled for penectomy, ureterectomy, inguinal lymph node dissection, and ileal diversion on 3/8/18 by Dr. Haddad in treatment of penile and urethral carcinoma.  PAC referral for risk assessment and optimization for anesthesia with comorbid conditions of:  PONV, HTN, GERD, CRI, and hypopharyngeal diverticula s/p repair.      Pre-operative considerations:  1.  Cardiac:  Functional status- METS >4.  Low risk surgery with 0.4-0.9 % risk of major adverse cardiac event. preoperative EKG ordered given advanced age  2.  Pulm:  Airway feasible.  Prior anesthetic records indicated easy placement of LMA   3.  GI:  Risk of PONV score = 2.  If > 2, anti-emetic intervention recommended.  GERD, pt with known history of PONV seems to be associated with postoperative oxycodone use. H/o dysphagia now s/p repair of hypopharyngeal diverticula in 2013.   4. Musculoskeletal: mild arthritis   5. Endo:  Negative  6. Neuro: Negative  7. HEME: H/o anemia with transfusion required. Preoperative CBC ordered         Patient is optimized and is acceptable candidate for the proposed procedure.  No further diagnostic evaluation is needed.     Patient was seen and discussed with staff anesthesiologist, Dr. Urrutia. The final anesthetic plan will be determined by the anesthesiologist on the day of surgery.     Cholo Rutherford DO  Department of Anesthesiology  CA-1  P: [phone]        Anesthesiologist:   Date:   Time:   Pass/Fail:   Disposition:     PAC Pharmacist Assessment:        Pharmacist:   Date:   Time:      ANESTHESIA PREOP EVALUATION    Procedure: Procedure(s):  radical penectomy and urethrectomy possible cystectomy with ileal conduit, possible appendicovesicostomy, possible perineal urethrostomy, inguinal lymphadenectomy (ERAS Patient) - Wound Class:    - Wound Class:    - Wound Class:    - Wound Class:    - Wound Class:     HPI: King Gonsalo Bruno is a 72 year old male who is presenting for above stated procedure.    PMHx/PSHx/ROS:  Past Medical History:   Diagnosis Date     Dysphagia 2013    Secondary to diverticulum in the hypopharynx     Esophageal pouch 2013    Left sided diverticulum in the hypopharynx      GERD (gastroesophageal reflux disease)      Hypertension      PONV (postoperative  nausea and vomiting)        Past Surgical History:   Procedure Laterality Date     CYSTOSCOPY, BIOPSY BLADDER, COMBINED N/A 8/31/2017    Procedure: COMBINED CYSTOSCOPY, BIOPSY BLADDER;  Cystoscopy, Suprapubic Tube Placement with Ultrasound Guidiance, Uretheral Dilation;  Surgeon: Muriel Haddad MD;  Location: [facility] OR     CYSTOSTOMY, INSERT TUBE SUPRAPUBIC, COMBINED N/A 8/31/2017    Procedure: COMBINED CYSTOSTOMY, INSERT TUBE SUPRAPUBIC;;  Surgeon: Muriel Haddad MD;  Location: [facility] OR     LARYNGOSCOPY  2013    Direct laryngoscopy with the use of rigid endoscopy and takedown of left hypopharyngeal diverticula.        ROS as stated above    Soc Hx:   Social History   Substance Use Topics     Smoking status: Former Smoker     Packs/day: 1.00     Years: 20.00     Types: Cigarettes     Start date: 9/29/1972     Quit date: 1/1/1992     Smokeless tobacco: Never Used      Comment: quit in 1992     Alcohol use No      Comment: 1987 quit per personal choice.       Allergies:   Allergies   Allergen Reactions     Lisinopril Swelling     Oxycodone Nausea and Vomiting     Vicodin [Hydrocodone-Acetaminophen] Nausea and Vomiting       Meds:   No prescriptions prior to admission.       Current Outpatient Prescriptions   Medication Sig Dispense Refill     amoxicillin-clavulanate (AUGMENTIN) 500-125 MG per tablet Take 1 tablet by mouth 3 times daily for 7 days 21 tablet 0     fluconazole (DIFLUCAN) 100 MG tablet Take one tablet daily for 5 days 5 tablet 0     Acetaminophen (TYLENOL PO) Take 325 mg by mouth as needed for mild pain or fever       B Complex Vitamins (VITAMIN B COMPLEX PO) Take by mouth daily (with lunch)       fluticasone (FLONASE) 50 MCG/ACT spray Spray 2 sprays into both nostrils 2 times daily (Patient taking differently: Spray 2 sprays into both nostrils every morning ) 2 Bottle 11     polyethylene glycol (MIRALAX/GLYCOLAX) powder Take 1 capful by mouth daily as needed for constipation       LORazepam (ATIVAN)  0.5 MG tablet Take 1 tablet (0.5 mg) by mouth every 8 hours as needed for anxiety (Patient taking differently: Take 0.5 mg by mouth At Bedtime ) 30 tablet 3     amLODIPine (NORVASC) 10 MG tablet Take 1 tablet (10 mg) by mouth daily (Patient taking differently: Take 20 mg by mouth every morning ) 90 tablet 3     omeprazole (PRILOSEC) 2 mg/mL SUSP Take 10 mLs (20 mg) by mouth 2 times daily 280 mL 11     nystatin (MYCOSTATIN) 852896 UNIT/ML suspension Take 5 mLs (500,000 Units) by mouth 4 times daily Swish and spit (Patient taking differently: Take 500,000 Units by mouth as needed Swish and spit) 200 mL 0     docusate sodium (COLACE) 100 MG capsule Take 1 capsule (100 mg) by mouth 2 times daily as needed for constipation 60 capsule 0       Physical Exam:  VS:      , Weight   Wt Readings from Last 2 Encounters:   02/26/18 65.9 kg (145 lb 4.8 oz)   02/07/18 68.4 kg (150 lb 11.2 oz)       Labs:    BMP:  Recent Labs   Lab Test  02/26/18   1452   NA  133   POTASSIUM  3.7   CHLORIDE  101   CO2  24   BUN  12   CR  1.71*   GLC  91   SAADIA  9.9     LFTs:   Recent Labs   Lab Test  12/13/17   0813   PROTTOTAL  7.7   ALBUMIN  3.3*   BILITOTAL  0.3   ALKPHOS  73   AST  28   ALT  12     CBC:   Recent Labs   Lab Test  02/26/18   1452   WBC  5.6   RBC  3.65*   HGB  11.0*   HCT  33.0*   MCV  90   MCH  30.1   MCHC  33.3   RDW  14.1   PLT  269     Coags:  Recent Labs   Lab Test  02/26/18   1452   INR  0.97           Patient discussed with Staff Anesthesiologist.    Ja Griffin  Anesthesia Resident CA-2  Pager [phone]  March 7, 2018, 3:53 PM    Anesthesia Plan      History & Physical Review  History and physical reviewed and following examination; no interval change.    ASA Status:  3 .        Plan for General, ETT, RSI and Epidural (RSI for reported symptoms of diverticulum ) with Intravenous and Propofol induction. Maintenance will be Balanced.    PONV prophylaxis:  Ondansetron (or other 5HT-3) and Dexamethasone or Solumedrol  Additional  equipment: 2nd IV   King Damion is a 72 year old male with urethral cancer requiring penectomy ureterectomy, cystectomy and ileal diversion with Dr. Haddad on 3/8/18. PMH significant for PONV, HTN, GERD, and CRI.         Postoperative Care  Postoperative pain management:  IV analgesics, Multi-modal analgesia and Neuraxial analgesia.      Consents  Anesthetic plan, risks, benefits and alternatives discussed with:  Patient.  Use of blood products discussed: Yes.   Use of blood products discussed with Patient.  Consented to blood products.  .        72M with h/o TB and urethral cancer here for radical penectomy.  ASA 3.  Plan: GETA, RSI due to Zenker's diverticulum, lumbar epidural for perioperative pain.  Risks of anesthesia discussed, including sore throat, PONV and risk of dental or lip trauma.    Rimma Lee MD  Staff Anesthesiologist  Pager [phone]

## 2018-02-26 NOTE — MR AVS SNAPSHOT
After Visit Summary   2018    King Gonsalo Bruno    MRN: [id number]           Patient Information     Date Of Birth          1945        Visit Information        Provider Department      2018 11:30 AM Rn, [facility]        Care Instructions    Preparing for Your Surgery      Name:  King Gonsalo Bruno   MRN:  [id number]   :  1945   Today's Date:  2018     Arriving for surgery:  Surgery date: 3/8/18  Arrival time:  5:15 am    Please come to:     [facility], 3rd floor, [address]    [address] [address]    -   parking is available in front of the hospital from 5:15 am to 8:00 pm    -  Stop at the Information Desk in the lobby    -   Inform the information person that you are here for surgery. An escort to 3c will be provided. If you would not like an escort, please proceed to 3C on the 3rd floor. [phone]     What can I eat or drink?  -  You may have solid food or milk products; drink 8-12 ounces of clear gatorade the evening before surgery until 11:45 pm;   -  You may have water, gatorade, apple juice or 7up/Sprite;  Drink 8-12 ounces of clear gatorade as you are on the way to the hospital until 5:15 am.    Which medicines can I take?        -  Avoid vitamin B complex for 7 days before surgery (take last dose on 18).    -  Do NOT take these medications in the morning, the day of surgery: Miralax powder or colace      -  Take these medications the day of surgery:  Amlodipine and Omeprazole.         -  Do not bring your own medications to the hospital, except for inhalers and eye drops.    How do I prepare myself?  -  Take two showers: one the night before surgery; and one the morning of surgery.         Use Scrubcare or Hibiclens to wash from neck down.  You may use your own shampoo and conditioner. No other hair products.   -  Do NOT use lotion, powder, deodorant, or  antiperspirant the day of your surgery.  -  Do NOT wear any makeup, fingernail polish or jewelry.  -  Bring your ID and insurance card.    Enhanced Recovery After Surgery     This is a team effort, including you, to get you back on your feet, eating and drinking normally and out of the hospital as quickly as possible.       The goals are: 1) NO INFECTIONS and      2) RETURN TO NORMAL DIET    How can we achieve these goals?    1) STAY ACTIVE: Walk every day before your surgery; try to increase the amount every day.  Walk after surgery as much as you can-the nurses will help you.  Walking speeds healing and gets you home quicker, you heal better at home and have less risk of infection.     2) DEEP BREATHING:  Using an Incentive Spirometer    An incentive spirometer is a device that helps you do deep breathing exercises. These exercises expand your lungs, aid in circulation, and help prevent pneumonia. Deep breathing exercises also help you breathe better and improve the function of your lungs by:    Keeping your lungs clear    Strengthening your breathing muscles    Helping prevent respiratory complications or problems  The incentive spirometer gives you a way to take an active part in your care. A nurse or therapist will teach you breathing exercises. To do these exercises, you will breathe in through your mouth and not your nose. The incentive spirometer only works correctly if you breathe in through your mouth.    Steps to clear lungs  Step 1. Exhale normally. Then, inhale normally.    Relax and breathe out.  Step 2. Place your lips tightly around the mouthpiece.    Make sure the device is upright and not tilted.  Step 3. Inhale as much air as you can through the mouthpiece (don't breath through your nose).    Inhale slowly and deeply.    Hold your breath long enough to keep the balls or disk raised for at least 3 to 5 seconds, or as instructed by your healthcare provider.  Step 4. Repeat the exercise  regularly.    Begin using the Incentive Spirometer one week prior to your surgery, 4 times per day, 5-10 breaths each.    3) STAY HYDRATED: Drink clear liquids up until 2 hours before your surgery. We would like you to purchase a drink such as Gatorade or Ensure Clear (not the milkshake type).  Drink this before bedtime and on the way into the hospital, drink between 8-12 ounces or until you feel hydrated.  Keeping well hydrated leads to your veins being plump, you wake up faster, and you are less likely to be nauseated. Start drinking water as soon as you can after surgery and advance to clear liquids and food as tolerated.  IV fluids contain salt, drinking fluids will minimize the amount of IV fluids you need and decrease the amount of salt you get.    The most common reason for the patient to be readmitted is dehydration. Staying hydrated after you go home from the hospital is very important.  Ensure or Ensure Clear are good options to keep you hydrated.     4) PAIN MANAGEMENT: If we minimize the amount of opioids and narcotics, and use regional blocks (which numb the area where your surgery is) along with oral pain medications; you will have less side effects of nausea and constipation. Narcotics can slow down your bowels and cause you to stay in the hospital longer.     Questions or Concerns:  If you have questions or concerns, please call the  Preoperative Assessment Center, Monday-Friday 7AM-7PM:  [phone]          Follow-ups after your visit        Your next 10 appointments already scheduled     Feb 26, 2018  1:00 PM CST   US LOWER EXTREMITY VENOUS DUPLEX BILATERAL with [id number]   [facility]  ([facility])    [address] [address] [phone]           Please bring a list of your medicines (including vitamins, minerals and over-the-counter drugs). Also, tell your doctor about any allergies you may have. Wear comfortable clothes  and leave your valuables at home.  You do not need to do anything special to prepare for your exam.  Please call the Imaging Department at your exam site with any questions.            Feb 26, 2018  2:00 PM CST   (Arrive by 1:45 PM)   Return Visit with  Prostate Cancer Ctr Nurse   [facility]rolog[facility] ([facility])    [address] [address] [phone]            Feb 26, 2018  2:20 PM CST   (Arrive by 2:05 PM)   Return Visit with  Prostate Cancer Ctr Nurse   [facility]state [facility] Urologic [facility] ([facility])    [address] [place] [address] [phone]            Feb 26, 2018  3:00 PM CST   LAB with  LAB   [facility] ([facility])    [address] [place] [id number] [phone]           Please do not eat 10-12 hours before your appointment if you are coming in fasting for labs on lipids, cholesterol, or glucose (sugar). This does not apply to pregnant women. Water, hot tea and black coffee (with nothing added) are okay. Do not drink other fluids, diet soda or chew gum.            Mar 08, 2018   Procedure with Muriel Haddad MD   [facility] [place], Same Day Surgery (--)    [address] [facility] [address]   [phone]            Mar 30, 2018  2:00 PM CDT   Telephone Call with Rebeka Brennan RD   [facility]rolog[facility] ([facility])    [address] [place] [address] [phone]           Note: this is not an onsite visit; there is no need to come to the facility.  Thank you for choosing [facility] for your health care needs. Below is some information for patients who are interested in having their follow-up visit with a physician by telephone. In some cases, a  telephone visit can be an effective and convenient way to manage your follow-up care. Choosing a telephone visit rather than a face to face visit for your follow-up care is a decision that you and your physician can make together to ensure it meets all of your needs.  A face to face visit is always an available option, if you choose to do so.  We want to make sure you have all of the information you need about the telephone visit option and answer all of your questions before you decide to schedule a telephone follow-up visit. If you have any questions, you may talk to a staff member or our financial counselor at [phone].  1. General overview Our clinic sees patients for a variety of conditions and concerns. A face to face visit with your doctor is required for any new concerns or for your initial visit. If you and your doctor decide that a follow up visit by telephone is appropriate, you may decide to opt for a telephone visit.   2. Billing and insurance coverage There is a charge for telephone visits, similar to the charge for an in-person visit. Your bill is based on the amount of time you and your physician are on the phone. We will bill each visit to your insurance company (just like your other medical visits), and you will be responsible for any costs not paid by your insurance company. The charge may be denied by your insurance company, in which case you will be responsible for the entire amount billed. The decision to cover the cost is determined by your insurance company. If you want to know what your insurance company will cover, we encourage you to contact them to determine your coverage. The codes below are the codes we use when billing for telephone visits and the associated charges. This may help you work with your insurance company to determine your benefits.   Billing CPT codes for Telephone visits  87218 ($30), 69083 ($35), 07464 ($40)            Apr 04, 2018 11:30 AM CDT   POST OP OSTOMY NURSE  with Gonsalo Aguilar RN   [facility] Wound Ostomy ([facility])    [address] [facility]  4th Floor  [facility] [address] [phone]            Apr 04, 2018  1:20 PM CDT   (Arrive by 1:05 PM)   Post-Op with Muriel Haddad MD   [facility] for Prostate and Urologic Cancers ([facility])    [address]  4th Floor  [facility] [id number] [phone]            Apr 25, 2018 11:30 AM CDT   [facility] Lab Draw with  [facility] LAB DRAW   [facility] Lab Draw ([facility])    [address]  [address]  [facility] [id number] [phone]            Apr 25, 2018 12:00 PM CDT   (Arrive by 11:45 AM)   Return Visit with Steve Arceo MD   [facility] ([facility])    [address]  [address]  [facility] [id number] [phone]              Future tests that were ordered for you today     Open Future Orders        Priority Expected Expires Ordered    UA reflex to Microscopic and Culture Routine 2/26/2018 3/28/2018 2/26/2018    ABO/Rh type and screen Routine 2/26/2018 3/28/2018 2/26/2018    Basic metabolic panel Routine 2/26/2018 3/28/2018 2/26/2018    CBC with platelets Routine 2/26/2018 3/28/2018 2/26/2018    INR Routine 2/26/2018 3/28/2018 2/26/2018            Who to contact     Please call your clinic at [phone] to:    Ask questions about your health    Make or cancel appointments    Discuss your medicines    Learn about your test results    Speak to your doctor            Additional Information About Your Visit        [employer] Information     [employer] is an electronic gateway that provides easy, online access to your medical records. With [employer], you can request a clinic appointment, read your test results, renew a prescription or communicate with your care team.     To sign up for [employer] visit the website at  [email or web address]   You will be asked to enter the access code listed below, as well as some personal information. Please follow the directions to create your username and password.     Your access code is: [email or web address]  Expires: 2018  6:31 AM     Your access code will  in 90 days. If you need help or a new code, please contact your [facility] or call [phone] for assistance.        Care EveryWhere ID     This is your Care EveryWhere ID. This could be used by other organizations to access your [place] medical records  [id number]         Blood Pressure from Last 3 Encounters:   18 156/76   18 139/77   01/10/18 124/73    Weight from Last 3 Encounters:   18 65.9 kg (145 lb 4.8 oz)   18 68.4 kg (150 lb 11.2 oz)   01/10/18 69.5 kg (153 lb 4.8 oz)              Today, you had the following     No orders found for display         Today's Medication Changes          These changes are accurate as of 18 12:46 PM.  If you have any questions, ask your nurse or doctor.               These medicines have changed or have updated prescriptions.        Dose/Directions    amLODIPine 10 MG tablet   Commonly known as:  NORVASC   This may have changed:    - how much to take  - when to take this   Used for:  Benign essential hypertension        Dose:  10 mg   Take 1 tablet (10 mg) by mouth daily   Quantity:  90 tablet   Refills:  3       fluticasone 50 MCG/ACT spray   Commonly known as:  FLONASE   This may have changed:  when to take this   Used for:  Urethral cancer (H), Allergic sinusitis        Dose:  2 spray   Spray 2 sprays into both nostrils 2 times daily   Quantity:  2 Bottle   Refills:  11       LORazepam 0.5 MG tablet   Commonly known as:  ATIVAN   This may have changed:  when to take this   Used for:  Anxiety        Dose:  0.5 mg   Take 1 tablet (0.5 mg) by mouth every 8 hours as needed for anxiety   Quantity:  30 tablet   Refills:  3        nystatin 000759 UNIT/ML suspension   Commonly known as:  MYCOSTATIN   This may have changed:    - when to take this  - reasons to take this  - additional instructions   Used for:  Thrush        Dose:  483117 Units   Take 5 mLs (500,000 Units) by mouth 4 times daily Swish and spit   Quantity:  200 mL   Refills:  0                Primary Care Provider Office Phone # Fax #    Joan Ventura, MARLENY [phone]       [facility] [address] [facility] [address]        Equal Access to Services     JENELLE RED : Hadii aad ku hadasho Soomaali, waaxda luqadaha, qaybta kaalmada adeegyada, waxay snow haylidian gab srivastava . So [facility] [phone].    ATENCIÓN: Si habla español, tiene a washburn disposición servicios gratuitos de asistencia lingüística. [facility] [phone].    We comply with applicable federal civil rights laws and [place] laws. We do not discriminate on the basis of race, color, national origin, age, disability, sex, sexual orientation, or gender identity.            Thank you!     Thank you for choosing [facility]  for your care. Our goal is always to provide you with excellent care. Hearing back from our patients is one way we can continue to improve our services. Please take a few minutes to complete the written survey that you may receive in the mail after your visit with us. Thank you!             Your Updated Medication List - Protect others around you: Learn how to safely use, store and throw away your medicines at www.disposemymeds.org.          This list is accurate as of 2/26/18 12:46 PM.  Always use your most recent med list.                   Brand Name Dispense Instructions for use Diagnosis    amLODIPine 10 MG tablet    NORVASC    90 tablet    Take 1 tablet (10 mg) by mouth daily    Benign essential hypertension       docusate sodium 100 MG capsule    COLACE    60 capsule    Take 1 capsule (100 mg) by mouth 2 times daily as needed for  constipation    Slow transit constipation       fluticasone 50 MCG/ACT spray    FLONASE    2 Bottle    Spray 2 sprays into both nostrils 2 times daily    Urethral cancer (H), Allergic sinusitis       LORazepam 0.5 MG tablet    ATIVAN    30 tablet    Take 1 tablet (0.5 mg) by mouth every 8 hours as needed for anxiety    Anxiety       nystatin 003882 UNIT/ML suspension    MYCOSTATIN    200 mL    Take 5 mLs (500,000 Units) by mouth 4 times daily Swish and spit    Thrush       omeprazole 2 mg/mL Susp    priLOSEC    280 mL    Take 10 mLs (20 mg) by mouth 2 times daily    Gastroesophageal reflux disease with esophagitis, Urethral cancer (H)       polyethylene glycol powder    MIRALAX/GLYCOLAX     Take 1 capful by mouth daily as needed for constipation        TYLENOL PO      Take 325 mg by mouth as needed for mild pain or fever        VITAMIN B COMPLEX PO      Take by mouth daily (with lunch)

## 2018-02-26 NOTE — NURSING NOTE
King Gonsalo Bruno comes into clinic today at the request of Dr. Muriel Haddad Ordering Provider for Catheter Change.      Catheter insertion documentation on 2/26/2018:    King Gonsalo Bruno presents to the clinic for catheter insertion.  Reason for insertion: scheduled insertion  Order has been verified. YES  Catheter successfully inserted into the suprapubic meatus in the usual sterile fashion without immediate complication.  Type of catheter placed: 16 [place]uvian SILCONE catheter  Urine is yellow in color.  10 cc's of urine output returned.  Balloon was filled with 10cc's of normal saline.  Securement device placed for the catheter.  The patient tolerated the procedure and was instructed to return or call for pain, fever, leakage or decreased urine flow    One Cipro 500 mg tablet given PO prior to catheter change.      This service provided today was under the supervising provider of the day Khalida Grullon PA-C, who was available if needed.    ALL [place]

## 2018-02-26 NOTE — MR AVS SNAPSHOT
After Visit Summary   2/26/2018    King Gonsalo Bruno    MRN: [id number]           Patient Information     Date Of Birth          1945        Visit Information        Provider Department      2/26/2018 2:00 PM Nurse, [facility] and [facility]        Today's Diagnoses     Urethral cancer (H)    -  1       Follow-ups after your visit        Your next 10 appointments already scheduled     Feb 26, 2018  3:00 PM CST   LAB with  LAB   [facility] Lab ([facility])    [address] [place] [id number] [phone]           Please do not eat 10-12 hours before your appointment if you are coming in fasting for labs on lipids, cholesterol, or glucose (sugar). This does not apply to pregnant women. Water, hot tea and black coffee (with nothing added) are okay. Do not drink other fluids, diet soda or chew gum.            Mar 08, 2018   Procedure with Muriel Haddad MD   [facility], [place], Same Day Surgery (--)    [address] [address] [phone]            Mar 30, 2018  2:00 PM CDT   Telephone Call with Rebeka Brennan RD   [facility] and [facility] ([facility])    [address] [place] [id number] [phone]           Note: this is not an onsite visit; there is no need to come to the facility.  Thank you for choosing [facility] for your health care needs. Below is some information for patients who are interested in having their follow-up visit with a physician by telephone. In some cases, a telephone visit can be an effective and convenient way to manage your follow-up care. Choosing a telephone visit rather than a face to face visit for your follow-up care is a decision that you and your physician can make together to ensure it meets all of your needs.  A face to face visit is  always an available option, if you choose to do so.  We want to make sure you have all of the information you need about the telephone visit option and answer all of your questions before you decide to schedule a telephone follow-up visit. If you have any questions, you may talk to a staff member or our financial counselor at [phone].  1. General overview Our clinic sees patients for a variety of conditions and concerns. A face to face visit with your doctor is required for any new concerns or for your initial visit. If you and your doctor decide that a follow up visit by telephone is appropriate, you may decide to opt for a telephone visit.   2. Billing and insurance coverage There is a charge for telephone visits, similar to the charge for an in-person visit. Your bill is based on the amount of time you and your physician are on the phone. We will bill each visit to your insurance company (just like your other medical visits), and you will be responsible for any costs not paid by your insurance company. The charge may be denied by your insurance company, in which case you will be responsible for the entire amount billed. The decision to cover the cost is determined by your insurance company. If you want to know what your insurance company will cover, we encourage you to contact them to determine your coverage. The codes below are the codes we use when billing for telephone visits and the associated charges. This may help you work with your insurance company to determine your benefits.   Billing CPT codes for Telephone visits  75741 ($30), 02647 ($35), 64616 ($40)            Apr 04, 2018 11:30 AM CDT   POST OP OSTOMY NURSE with Gonsalo Aguilar RN   [facility] Wound Ostomy ([facility])    [address] [place] [address]   [phone]            Apr 04, 2018  1:20 PM CDT   (Arrive by 1:05 PM)   Post-Op with Muriel Haddad MD   [facility] and [facility]  Pro[facility]rologic [facility] ([facility])    [address]  [facility] [address]   [phone]            2018 11:30 AM CDT   [facility] Draw with  [facility] DRAW   [facility] Lab Draw ([facility])    [address]  [address]  [facility] [address]   [phone]            2018 12:00 PM CDT   (Arrive by 11:45 AM)   Return Visit with Steve Arceo MD   [facility] ([facility])    [address]  [address]  [facility] [address] [phone]              Future tests that were ordered for you today     Open Future Orders        Priority Expected Expires Ordered    UA reflex to Microscopic and Culture Routine 2018 3/28/2018 2018    ABO/Rh type and screen Routine 2018 3/28/2018 2018    Basic metabolic panel Routine 2018 3/28/2018 2018    CBC with platelets Routine 2018 3/28/2018 2018    INR Routine 2018 3/28/2018 2018            Who to contact     Please call your clinic at [phone] to:    Ask questions about your health    Make or cancel appointments    Discuss your medicines    Learn about your test results    Speak to your doctor            Additional Information About Your Visit        MyChart Information     [email or web address] is an electronic gateway that provides easy, online access to your medical records. With [employer], you can request a clinic appointment, read your test results, renew a prescription or communicate with your care team.     To sign up for [email or web address] visit the website at [email or web address]   You will be asked to enter the access code listed below, as well as some personal information. Please follow the directions to create your username and password.     Your access code is: [email or web address]  Expires: 2018  6:31 AM     Your access code will  in 90 days. If you need help  or a new code, please contact your [facility] or call [phone] for assistance.        Care EveryWhere ID     This is your Care EveryWhere ID. This could be used by other organizations to access your [facility] medical records  [id number]         Blood Pressure from Last 3 Encounters:   02/26/18 156/76   02/07/18 139/77   01/10/18 124/73    Weight from Last 3 Encounters:   02/26/18 65.9 kg (145 lb 4.8 oz)   02/07/18 68.4 kg (150 lb 11.2 oz)   01/10/18 69.5 kg (153 lb 4.8 oz)              Today, you had the following     No orders found for display         Today's Medication Changes          These changes are accurate as of 2/26/18  2:29 PM.  If you have any questions, ask your nurse or doctor.               These medicines have changed or have updated prescriptions.        Dose/Directions    amLODIPine 10 MG tablet   Commonly known as:  NORVASC   This may have changed:    - how much to take  - when to take this   Used for:  Benign essential hypertension        Dose:  10 mg   Take 1 tablet (10 mg) by mouth daily   Quantity:  90 tablet   Refills:  3       fluticasone 50 MCG/ACT spray   Commonly known as:  FLONASE   This may have changed:  when to take this   Used for:  Urethral cancer (H), Allergic sinusitis        Dose:  2 spray   Spray 2 sprays into both nostrils 2 times daily   Quantity:  2 Bottle   Refills:  11       LORazepam 0.5 MG tablet   Commonly known as:  ATIVAN   This may have changed:  when to take this   Used for:  Anxiety        Dose:  0.5 mg   Take 1 tablet (0.5 mg) by mouth every 8 hours as needed for anxiety   Quantity:  30 tablet   Refills:  3       nystatin 178769 UNIT/ML suspension   Commonly known as:  MYCOSTATIN   This may have changed:    - when to take this  - reasons to take this  - additional instructions   Used for:  Thrush        Dose:  681100 Units   Take 5 mLs (500,000 Units) by mouth 4 times daily Swish and spit   Quantity:  200 mL   Refills:  0                 Primary Care Provider Office Phone # Fax #    Joan Ventura, MARLENY [phone]       [facility] [address]  [facility] [address]        Equal Access to Services     JENELLE RED : Tiffanie garcia hadnikkio Soomaali, waaxda luqadaha, qaybta kaalmada adeegrosemaryda, ronald alvarengadyana ericksonaamir marquez laKylereymundo rasmussen. So [facility] [phone].    ATENCIÓN: Si habla español, tiene a washburn disposición servicios gratuitos de asistencia lingüística. Llame al [phone].    We comply with applicable federal civil rights laws and [place] laws. We do not discriminate on the basis of race, color, national origin, age, disability, sex, sexual orientation, or gender identity.            Thank you!     Thank you for choosing [facility] UROLOGY AND [facility] FOR PROSTATE AND UROLOGIC CANCERS  for your care. Our goal is always to provide you with excellent care. Hearing back from our patients is one way we can continue to improve our services. Please take a few minutes to complete the written survey that you may receive in the mail after your visit with us. Thank you!             Your Updated Medication List - Protect others around you: Learn how to safely use, store and throw away your medicines at www.disposemymeds.org.          This list is accurate as of 2/26/18  2:29 PM.  Always use your most recent med list.                   Brand Name Dispense Instructions for use Diagnosis    amLODIPine 10 MG tablet    NORVASC    90 tablet    Take 1 tablet (10 mg) by mouth daily    Benign essential hypertension       docusate sodium 100 MG capsule    COLACE    60 capsule    Take 1 capsule (100 mg) by mouth 2 times daily as needed for constipation    Slow transit constipation       fluticasone 50 MCG/ACT spray    FLONASE    2 Bottle    Spray 2 sprays into both nostrils 2 times daily    Urethral cancer (H), Allergic sinusitis       LORazepam 0.5 MG tablet    ATIVAN    30 tablet    Take 1 tablet (0.5 mg) by mouth every 8 hours as needed  for anxiety    Anxiety       nystatin 288015 UNIT/ML suspension    MYCOSTATIN    200 mL    Take 5 mLs (500,000 Units) by mouth 4 times daily Swish and spit    Thrush       omeprazole 2 mg/mL Susp    priLOSEC    280 mL    Take 10 mLs (20 mg) by mouth 2 times daily    Gastroesophageal reflux disease with esophagitis, Urethral cancer (H)       polyethylene glycol powder    MIRALAX/GLYCOLAX     Take 1 capful by mouth daily as needed for constipation        TYLENOL PO      Take 325 mg by mouth as needed for mild pain or fever        VITAMIN B COMPLEX PO      Take by mouth daily (with lunch)

## 2018-03-01 LAB
BACTERIA SPEC CULT: ABNORMAL
BACTERIA SPEC CULT: ABNORMAL
SPECIMEN SOURCE: ABNORMAL

## 2018-03-02 DIAGNOSIS — N39.0 URINARY TRACT INFECTION: Primary | ICD-10-CM

## 2018-03-02 DIAGNOSIS — B37.2 YEAST INFECTION OF THE SKIN: ICD-10-CM

## 2018-03-02 RX ORDER — FLUCONAZOLE 100 MG/1
TABLET ORAL
Qty: 5 TABLET | Refills: 0 | Status: ON HOLD | OUTPATIENT
Start: 2018-03-02 | End: 2018-03-12

## 2018-03-02 RX ORDER — AMOXICILLIN AND CLAVULANATE POTASSIUM 500; 125 MG/1; MG/1
1 TABLET, FILM COATED ORAL 3 TIMES DAILY
Qty: 21 TABLET | Refills: 0 | Status: ON HOLD | OUTPATIENT
Start: 2018-03-02 | End: 2018-03-12

## 2018-03-08 ENCOUNTER — ANESTHESIA (OUTPATIENT)
Dept: SURGERY | Facility: CLINIC | Age: 73
DRG: 657 | End: 2018-03-08
Payer: MEDICARE

## 2018-03-08 ENCOUNTER — HOSPITAL ENCOUNTER (INPATIENT)
Facility: CLINIC | Age: 73
LOS: 4 days | Discharge: HOME | DRG: 657 | End: 2018-03-12
Attending: UROLOGY | Admitting: UROLOGY
Payer: MEDICARE

## 2018-03-08 DIAGNOSIS — K59.01 SLOW TRANSIT CONSTIPATION: ICD-10-CM

## 2018-03-08 DIAGNOSIS — N39.0 URINARY TRACT INFECTION: ICD-10-CM

## 2018-03-08 DIAGNOSIS — C68.0 URETHRAL CANCER (H): ICD-10-CM

## 2018-03-08 PROBLEM — C60.9 PENILE CANCER (H): Status: ACTIVE | Noted: 2018-03-08

## 2018-03-08 LAB
ABO + RH BLD: NORMAL
ABO + RH BLD: NORMAL
ANION GAP SERPL CALCULATED.3IONS-SCNC: 10 MMOL/L (ref 3–14)
BLD GP AB SCN SERPL QL: NORMAL
BLOOD BANK CMNT PATIENT-IMP: NORMAL
BLOOD BANK CMNT PATIENT-IMP: NORMAL
BUN SERPL-MCNC: 18 MG/DL (ref 7–30)
CALCIUM SERPL-MCNC: 8.6 MG/DL (ref 8.5–10.1)
CHLORIDE SERPL-SCNC: 104 MMOL/L (ref 94–109)
CO2 SERPL-SCNC: 22 MMOL/L (ref 20–32)
CREAT SERPL-MCNC: 1.68 MG/DL (ref 0.66–1.25)
ERYTHROCYTE [DISTWIDTH] IN BLOOD BY AUTOMATED COUNT: 14.3 % (ref 10–15)
GFR SERPL CREATININE-BSD FRML MDRD: 41 ML/MIN/1.7M2
GLUCOSE BLDC GLUCOMTR-MCNC: 90 MG/DL (ref 70–99)
GLUCOSE SERPL-MCNC: 168 MG/DL (ref 70–99)
HCT VFR BLD AUTO: 29.1 % (ref 40–53)
HGB BLD-MCNC: 9.6 G/DL (ref 13.3–17.7)
INTERPRETATION ECG - MUSE: NORMAL
MCH RBC QN AUTO: 29.9 PG (ref 26.5–33)
MCHC RBC AUTO-ENTMCNC: 33 G/DL (ref 31.5–36.5)
MCV RBC AUTO: 91 FL (ref 78–100)
PLATELET # BLD AUTO: 243 10E9/L (ref 150–450)
POTASSIUM SERPL-SCNC: 4.3 MMOL/L (ref 3.4–5.3)
RBC # BLD AUTO: 3.21 10E12/L (ref 4.4–5.9)
SODIUM SERPL-SCNC: 136 MMOL/L (ref 133–144)
SPECIMEN EXP DATE BLD: NORMAL
WBC # BLD AUTO: 13.3 10E9/L (ref 4–11)

## 2018-03-08 PROCEDURE — 25000132 ZZH RX MED GY IP 250 OP 250 PS 637: Mod: GY | Performed by: UROLOGY

## 2018-03-08 PROCEDURE — 0TBD8ZX EXCISION OF URETHRA, VIA NATURAL OR ARTIFICIAL OPENING ENDOSCOPIC, DIAGNOSTIC: ICD-10-PCS | Performed by: UROLOGY

## 2018-03-08 PROCEDURE — 25000125 ZZHC RX 250: Performed by: STUDENT IN AN ORGANIZED HEALTH CARE EDUCATION/TRAINING PROGRAM

## 2018-03-08 PROCEDURE — 88305 TISSUE EXAM BY PATHOLOGIST: CPT | Performed by: UROLOGY

## 2018-03-08 PROCEDURE — 25000128 H RX IP 250 OP 636: Performed by: STUDENT IN AN ORGANIZED HEALTH CARE EDUCATION/TRAINING PROGRAM

## 2018-03-08 PROCEDURE — 80048 BASIC METABOLIC PNL TOTAL CA: CPT | Performed by: UROLOGY

## 2018-03-08 PROCEDURE — 36000059 ZZH SURGERY LEVEL 3 EA 15 ADDTL MIN UMMC: Performed by: UROLOGY

## 2018-03-08 PROCEDURE — 0TBB8ZX EXCISION OF BLADDER, VIA NATURAL OR ARTIFICIAL OPENING ENDOSCOPIC, DIAGNOSTIC: ICD-10-PCS | Performed by: UROLOGY

## 2018-03-08 PROCEDURE — 37000008 ZZH ANESTHESIA TECHNICAL FEE, 1ST 30 MIN: Performed by: UROLOGY

## 2018-03-08 PROCEDURE — 93010 ELECTROCARDIOGRAM REPORT: CPT | Performed by: INTERNAL MEDICINE

## 2018-03-08 PROCEDURE — 25000128 H RX IP 250 OP 636: Performed by: UROLOGY

## 2018-03-08 PROCEDURE — 25000566 ZZH SEVOFLURANE, EA 15 MIN: Performed by: UROLOGY

## 2018-03-08 PROCEDURE — P9041 ALBUMIN (HUMAN),5%, 50ML: HCPCS | Performed by: NURSE ANESTHETIST, CERTIFIED REGISTERED

## 2018-03-08 PROCEDURE — 88332 PATH CONSLTJ SURG EA ADD BLK: CPT | Performed by: UROLOGY

## 2018-03-08 PROCEDURE — 27210794 ZZH OR GENERAL SUPPLY STERILE: Performed by: UROLOGY

## 2018-03-08 PROCEDURE — 88331 PATH CONSLTJ SURG 1 BLK 1SPC: CPT | Performed by: UROLOGY

## 2018-03-08 PROCEDURE — A9270 NON-COVERED ITEM OR SERVICE: HCPCS | Mod: GY | Performed by: NURSE PRACTITIONER

## 2018-03-08 PROCEDURE — 40000065 ZZH STATISTIC EKG NON-CHARGEABLE

## 2018-03-08 PROCEDURE — 71000015 ZZH RECOVERY PHASE 1 LEVEL 2 EA ADDTL HR: Performed by: UROLOGY

## 2018-03-08 PROCEDURE — 37000009 ZZH ANESTHESIA TECHNICAL FEE, EACH ADDTL 15 MIN: Performed by: UROLOGY

## 2018-03-08 PROCEDURE — 25000128 H RX IP 250 OP 636: Performed by: NURSE ANESTHETIST, CERTIFIED REGISTERED

## 2018-03-08 PROCEDURE — A9270 NON-COVERED ITEM OR SERVICE: HCPCS | Mod: GY | Performed by: UROLOGY

## 2018-03-08 PROCEDURE — 36000057 ZZH SURGERY LEVEL 3 1ST 30 MIN - UMMC: Performed by: UROLOGY

## 2018-03-08 PROCEDURE — A9270 NON-COVERED ITEM OR SERVICE: HCPCS | Mod: GY | Performed by: STUDENT IN AN ORGANIZED HEALTH CARE EDUCATION/TRAINING PROGRAM

## 2018-03-08 PROCEDURE — 07TH0ZZ RESECTION OF RIGHT INGUINAL LYMPHATIC, OPEN APPROACH: ICD-10-PCS | Performed by: UROLOGY

## 2018-03-08 PROCEDURE — 71000014 ZZH RECOVERY PHASE 1 LEVEL 2 FIRST HR: Performed by: UROLOGY

## 2018-03-08 PROCEDURE — 88307 TISSUE EXAM BY PATHOLOGIST: CPT | Performed by: UROLOGY

## 2018-03-08 PROCEDURE — 0VTS0ZZ RESECTION OF PENIS, OPEN APPROACH: ICD-10-PCS | Performed by: UROLOGY

## 2018-03-08 PROCEDURE — 12000008 ZZH R&B INTERMEDIATE UMMC

## 2018-03-08 PROCEDURE — 07TJ0ZZ RESECTION OF LEFT INGUINAL LYMPHATIC, OPEN APPROACH: ICD-10-PCS | Performed by: UROLOGY

## 2018-03-08 PROCEDURE — 85027 COMPLETE CBC AUTOMATED: CPT | Performed by: UROLOGY

## 2018-03-08 PROCEDURE — 00000146 ZZHCL STATISTIC GLUCOSE BY METER IP

## 2018-03-08 PROCEDURE — 25000132 ZZH RX MED GY IP 250 OP 250 PS 637: Mod: GY | Performed by: NURSE PRACTITIONER

## 2018-03-08 PROCEDURE — 25000132 ZZH RX MED GY IP 250 OP 250 PS 637: Mod: GY | Performed by: STUDENT IN AN ORGANIZED HEALTH CARE EDUCATION/TRAINING PROGRAM

## 2018-03-08 PROCEDURE — 36415 COLL VENOUS BLD VENIPUNCTURE: CPT | Performed by: UROLOGY

## 2018-03-08 PROCEDURE — C9399 UNCLASSIFIED DRUGS OR BIOLOG: HCPCS | Performed by: NURSE ANESTHETIST, CERTIFIED REGISTERED

## 2018-03-08 PROCEDURE — 0TQD0ZZ REPAIR URETHRA, OPEN APPROACH: ICD-10-PCS | Performed by: UROLOGY

## 2018-03-08 PROCEDURE — 40000170 ZZH STATISTIC PRE-PROCEDURE ASSESSMENT II: Performed by: UROLOGY

## 2018-03-08 RX ORDER — DOCUSATE SODIUM 100 MG/1
100 CAPSULE, LIQUID FILLED ORAL 2 TIMES DAILY
Status: DISCONTINUED | OUTPATIENT
Start: 2018-03-08 | End: 2018-03-12 | Stop reason: HOSPADM

## 2018-03-08 RX ORDER — SODIUM CHLORIDE 9 MG/ML
INJECTION, SOLUTION INTRAVENOUS CONTINUOUS
Status: DISCONTINUED | OUTPATIENT
Start: 2018-03-08 | End: 2018-03-10

## 2018-03-08 RX ORDER — DOCUSATE SODIUM 100 MG/1
100 CAPSULE, LIQUID FILLED ORAL 2 TIMES DAILY PRN
Status: DISCONTINUED | OUTPATIENT
Start: 2018-03-08 | End: 2018-03-12 | Stop reason: HOSPADM

## 2018-03-08 RX ORDER — SODIUM CHLORIDE, SODIUM LACTATE, POTASSIUM CHLORIDE, CALCIUM CHLORIDE 600; 310; 30; 20 MG/100ML; MG/100ML; MG/100ML; MG/100ML
INJECTION, SOLUTION INTRAVENOUS CONTINUOUS PRN
Status: DISCONTINUED | OUTPATIENT
Start: 2018-03-08 | End: 2018-03-08

## 2018-03-08 RX ORDER — DEXAMETHASONE SODIUM PHOSPHATE 4 MG/ML
INJECTION, SOLUTION INTRA-ARTICULAR; INTRALESIONAL; INTRAMUSCULAR; INTRAVENOUS; SOFT TISSUE PRN
Status: DISCONTINUED | OUTPATIENT
Start: 2018-03-08 | End: 2018-03-08

## 2018-03-08 RX ORDER — TOLTERODINE 4 MG/1
4 CAPSULE, EXTENDED RELEASE ORAL DAILY
Status: DISCONTINUED | OUTPATIENT
Start: 2018-03-09 | End: 2018-03-12 | Stop reason: HOSPADM

## 2018-03-08 RX ORDER — CEFAZOLIN SODIUM 2 G/100ML
2 INJECTION, SOLUTION INTRAVENOUS
Status: DISCONTINUED | OUTPATIENT
Start: 2018-03-08 | End: 2018-03-08 | Stop reason: HOSPADM

## 2018-03-08 RX ORDER — SODIUM CHLORIDE 9 MG/ML
INJECTION, SOLUTION INTRAVENOUS CONTINUOUS PRN
Status: DISCONTINUED | OUTPATIENT
Start: 2018-03-08 | End: 2018-03-08

## 2018-03-08 RX ORDER — FLUMAZENIL 0.1 MG/ML
0.2 INJECTION, SOLUTION INTRAVENOUS
Status: DISCONTINUED | OUTPATIENT
Start: 2018-03-08 | End: 2018-03-08 | Stop reason: HOSPADM

## 2018-03-08 RX ORDER — NALBUPHINE HYDROCHLORIDE 10 MG/ML
2.5-5 INJECTION, SOLUTION INTRAMUSCULAR; INTRAVENOUS; SUBCUTANEOUS EVERY 6 HOURS PRN
Status: DISCONTINUED | OUTPATIENT
Start: 2018-03-08 | End: 2018-03-08 | Stop reason: HOSPADM

## 2018-03-08 RX ORDER — ALBUMIN, HUMAN INJ 5% 5 %
SOLUTION INTRAVENOUS CONTINUOUS PRN
Status: DISCONTINUED | OUTPATIENT
Start: 2018-03-08 | End: 2018-03-08

## 2018-03-08 RX ORDER — GABAPENTIN 300 MG/1
300 CAPSULE ORAL ONCE
Status: DISCONTINUED | OUTPATIENT
Start: 2018-03-08 | End: 2018-03-08 | Stop reason: HOSPADM

## 2018-03-08 RX ORDER — PROCHLORPERAZINE MALEATE 5 MG
5 TABLET ORAL EVERY 6 HOURS PRN
Status: DISCONTINUED | OUTPATIENT
Start: 2018-03-08 | End: 2018-03-12 | Stop reason: HOSPADM

## 2018-03-08 RX ORDER — OXYCODONE HYDROCHLORIDE 5 MG/1
5 TABLET ORAL
Status: DISCONTINUED | OUTPATIENT
Start: 2018-03-08 | End: 2018-03-12

## 2018-03-08 RX ORDER — ONDANSETRON 2 MG/ML
4 INJECTION INTRAMUSCULAR; INTRAVENOUS EVERY 30 MIN PRN
Status: DISCONTINUED | OUTPATIENT
Start: 2018-03-08 | End: 2018-03-08 | Stop reason: HOSPADM

## 2018-03-08 RX ORDER — PROPOFOL 10 MG/ML
INJECTION, EMULSION INTRAVENOUS PRN
Status: DISCONTINUED | OUTPATIENT
Start: 2018-03-08 | End: 2018-03-08

## 2018-03-08 RX ORDER — ONDANSETRON 4 MG/1
4 TABLET, ORALLY DISINTEGRATING ORAL EVERY 6 HOURS PRN
Status: DISCONTINUED | OUTPATIENT
Start: 2018-03-08 | End: 2018-03-12 | Stop reason: HOSPADM

## 2018-03-08 RX ORDER — NALOXONE HYDROCHLORIDE 0.4 MG/ML
.1-.4 INJECTION, SOLUTION INTRAMUSCULAR; INTRAVENOUS; SUBCUTANEOUS
Status: ACTIVE | OUTPATIENT
Start: 2018-03-08 | End: 2018-03-09

## 2018-03-08 RX ORDER — ACETAMINOPHEN 10 MG/ML
INJECTION, SOLUTION INTRAVENOUS PRN
Status: DISCONTINUED | OUTPATIENT
Start: 2018-03-08 | End: 2018-03-08

## 2018-03-08 RX ORDER — GABAPENTIN 300 MG/1
300 CAPSULE ORAL ONCE
Status: COMPLETED | OUTPATIENT
Start: 2018-03-08 | End: 2018-03-08

## 2018-03-08 RX ORDER — FLUCONAZOLE 100 MG/1
100 TABLET ORAL DAILY
Status: DISCONTINUED | OUTPATIENT
Start: 2018-03-09 | End: 2018-03-12 | Stop reason: HOSPADM

## 2018-03-08 RX ORDER — LIDOCAINE 40 MG/G
CREAM TOPICAL
Status: DISCONTINUED | OUTPATIENT
Start: 2018-03-08 | End: 2018-03-12 | Stop reason: HOSPADM

## 2018-03-08 RX ORDER — ACETAMINOPHEN 325 MG/1
975 TABLET ORAL ONCE
Status: DISCONTINUED | OUTPATIENT
Start: 2018-03-08 | End: 2018-03-08 | Stop reason: HOSPADM

## 2018-03-08 RX ORDER — ERTAPENEM 1 G/1
1 INJECTION, POWDER, LYOPHILIZED, FOR SOLUTION INTRAMUSCULAR; INTRAVENOUS EVERY 24 HOURS
Status: DISCONTINUED | OUTPATIENT
Start: 2018-03-08 | End: 2018-03-08 | Stop reason: HOSPADM

## 2018-03-08 RX ORDER — ONDANSETRON 2 MG/ML
INJECTION INTRAMUSCULAR; INTRAVENOUS PRN
Status: DISCONTINUED | OUTPATIENT
Start: 2018-03-08 | End: 2018-03-08

## 2018-03-08 RX ORDER — FENTANYL CITRATE 50 UG/ML
INJECTION, SOLUTION INTRAMUSCULAR; INTRAVENOUS PRN
Status: DISCONTINUED | OUTPATIENT
Start: 2018-03-08 | End: 2018-03-08

## 2018-03-08 RX ORDER — NALOXONE HYDROCHLORIDE 0.4 MG/ML
.1-.4 INJECTION, SOLUTION INTRAMUSCULAR; INTRAVENOUS; SUBCUTANEOUS
Status: DISCONTINUED | OUTPATIENT
Start: 2018-03-08 | End: 2018-03-08 | Stop reason: HOSPADM

## 2018-03-08 RX ORDER — FENTANYL CITRATE 50 UG/ML
25-50 INJECTION, SOLUTION INTRAMUSCULAR; INTRAVENOUS
Status: DISCONTINUED | OUTPATIENT
Start: 2018-03-08 | End: 2018-03-08 | Stop reason: HOSPADM

## 2018-03-08 RX ORDER — ONDANSETRON 4 MG/1
4 TABLET, ORALLY DISINTEGRATING ORAL EVERY 30 MIN PRN
Status: DISCONTINUED | OUTPATIENT
Start: 2018-03-08 | End: 2018-03-08 | Stop reason: HOSPADM

## 2018-03-08 RX ORDER — ACETAMINOPHEN 325 MG/1
975 TABLET ORAL EVERY 8 HOURS
Status: DISCONTINUED | OUTPATIENT
Start: 2018-03-08 | End: 2018-03-08

## 2018-03-08 RX ORDER — LIDOCAINE HYDROCHLORIDE 10 MG/ML
INJECTION, SOLUTION INFILTRATION; PERINEURAL PRN
Status: DISCONTINUED | OUTPATIENT
Start: 2018-03-08 | End: 2018-03-08

## 2018-03-08 RX ORDER — AMOXICILLIN AND CLAVULANATE POTASSIUM 500; 125 MG/1; MG/1
1 TABLET, FILM COATED ORAL 3 TIMES DAILY
Status: DISCONTINUED | OUTPATIENT
Start: 2018-03-08 | End: 2018-03-08

## 2018-03-08 RX ORDER — POLYETHYLENE GLYCOL 3350 17 G/17G
17 POWDER, FOR SOLUTION ORAL DAILY PRN
Status: DISCONTINUED | OUTPATIENT
Start: 2018-03-08 | End: 2018-03-12 | Stop reason: HOSPADM

## 2018-03-08 RX ORDER — NALOXONE HYDROCHLORIDE 0.4 MG/ML
.1-.4 INJECTION, SOLUTION INTRAMUSCULAR; INTRAVENOUS; SUBCUTANEOUS
Status: DISCONTINUED | OUTPATIENT
Start: 2018-03-08 | End: 2018-03-12 | Stop reason: HOSPADM

## 2018-03-08 RX ORDER — HYDROMORPHONE HCL/0.9% NACL/PF 0.2MG/0.2
0.2 SYRINGE (ML) INTRAVENOUS
Status: DISCONTINUED | OUTPATIENT
Start: 2018-03-08 | End: 2018-03-12 | Stop reason: HOSPADM

## 2018-03-08 RX ORDER — EPHEDRINE SULFATE 50 MG/ML
INJECTION, SOLUTION INTRAMUSCULAR; INTRAVENOUS; SUBCUTANEOUS PRN
Status: DISCONTINUED | OUTPATIENT
Start: 2018-03-08 | End: 2018-03-08

## 2018-03-08 RX ORDER — ONDANSETRON 2 MG/ML
4 INJECTION INTRAMUSCULAR; INTRAVENOUS EVERY 6 HOURS PRN
Status: DISCONTINUED | OUTPATIENT
Start: 2018-03-08 | End: 2018-03-12 | Stop reason: HOSPADM

## 2018-03-08 RX ORDER — AMOXICILLIN AND CLAVULANATE POTASSIUM 400; 57 MG/5ML; MG/5ML
500 POWDER, FOR SUSPENSION ORAL 3 TIMES DAILY
Status: DISCONTINUED | OUTPATIENT
Start: 2018-03-09 | End: 2018-03-12 | Stop reason: HOSPADM

## 2018-03-08 RX ORDER — CEFAZOLIN SODIUM 1 G/3ML
1 INJECTION, POWDER, FOR SOLUTION INTRAMUSCULAR; INTRAVENOUS SEE ADMIN INSTRUCTIONS
Status: DISCONTINUED | OUTPATIENT
Start: 2018-03-08 | End: 2018-03-08 | Stop reason: HOSPADM

## 2018-03-08 RX ORDER — SODIUM CHLORIDE, SODIUM LACTATE, POTASSIUM CHLORIDE, CALCIUM CHLORIDE 600; 310; 30; 20 MG/100ML; MG/100ML; MG/100ML; MG/100ML
INJECTION, SOLUTION INTRAVENOUS CONTINUOUS
Status: DISCONTINUED | OUTPATIENT
Start: 2018-03-08 | End: 2018-03-08 | Stop reason: HOSPADM

## 2018-03-08 RX ORDER — ACETAMINOPHEN 325 MG/1
650 TABLET ORAL EVERY 4 HOURS PRN
Status: DISCONTINUED | OUTPATIENT
Start: 2018-03-11 | End: 2018-03-08

## 2018-03-08 RX ORDER — HEPARIN SODIUM 5000 [USP'U]/.5ML
5000 INJECTION, SOLUTION INTRAVENOUS; SUBCUTANEOUS ONCE
Status: COMPLETED | OUTPATIENT
Start: 2018-03-08 | End: 2018-03-08

## 2018-03-08 RX ORDER — FLUTICASONE PROPIONATE 50 MCG
2 SPRAY, SUSPENSION (ML) NASAL EVERY MORNING
Status: DISCONTINUED | OUTPATIENT
Start: 2018-03-09 | End: 2018-03-12 | Stop reason: HOSPADM

## 2018-03-08 RX ADMIN — ACETAMINOPHEN 975 MG: 325 SOLUTION ORAL at 06:51

## 2018-03-08 RX ADMIN — PHENYLEPHRINE HYDROCHLORIDE 50 MCG: 10 INJECTION, SOLUTION INTRAMUSCULAR; INTRAVENOUS; SUBCUTANEOUS at 08:22

## 2018-03-08 RX ADMIN — PROPOFOL 20 MG: 10 INJECTION, EMULSION INTRAVENOUS at 14:29

## 2018-03-08 RX ADMIN — SODIUM CHLORIDE, POTASSIUM CHLORIDE, SODIUM LACTATE AND CALCIUM CHLORIDE: 600; 310; 30; 20 INJECTION, SOLUTION INTRAVENOUS at 09:20

## 2018-03-08 RX ADMIN — PHENYLEPHRINE HYDROCHLORIDE 50 MCG: 10 INJECTION, SOLUTION INTRAMUSCULAR; INTRAVENOUS; SUBCUTANEOUS at 09:12

## 2018-03-08 RX ADMIN — PHENYLEPHRINE HYDROCHLORIDE 50 MCG: 10 INJECTION, SOLUTION INTRAMUSCULAR; INTRAVENOUS; SUBCUTANEOUS at 09:39

## 2018-03-08 RX ADMIN — PHENYLEPHRINE HYDROCHLORIDE 100 MCG: 10 INJECTION, SOLUTION INTRAMUSCULAR; INTRAVENOUS; SUBCUTANEOUS at 16:17

## 2018-03-08 RX ADMIN — LIDOCAINE HYDROCHLORIDE 6 ML: 10 INJECTION, SOLUTION INFILTRATION; PERINEURAL at 10:15

## 2018-03-08 RX ADMIN — PROPOFOL 20 MG: 10 INJECTION, EMULSION INTRAVENOUS at 11:02

## 2018-03-08 RX ADMIN — HYDROMORPHONE HYDROCHLORIDE 0.2 MG: 1 INJECTION, SOLUTION INTRAMUSCULAR; INTRAVENOUS; SUBCUTANEOUS at 13:38

## 2018-03-08 RX ADMIN — FENTANYL CITRATE 100 MCG: 50 INJECTION, SOLUTION INTRAMUSCULAR; INTRAVENOUS at 07:55

## 2018-03-08 RX ADMIN — Medication 5 MG: at 15:17

## 2018-03-08 RX ADMIN — ROCURONIUM BROMIDE 10 MG: 10 INJECTION INTRAVENOUS at 14:55

## 2018-03-08 RX ADMIN — OMEPRAZOLE 20 MG: 20 CAPSULE, DELAYED RELEASE ORAL at 22:01

## 2018-03-08 RX ADMIN — ROCURONIUM BROMIDE 10 MG: 10 INJECTION INTRAVENOUS at 11:50

## 2018-03-08 RX ADMIN — LIDOCAINE HYDROCHLORIDE 6 ML: 10 INJECTION, SOLUTION INFILTRATION; PERINEURAL at 11:15

## 2018-03-08 RX ADMIN — LIDOCAINE HYDROCHLORIDE 3 ML: 10; .005 INJECTION, SOLUTION EPIDURAL; INFILTRATION; INTRACAUDAL; PERINEURAL at 07:10

## 2018-03-08 RX ADMIN — ROCURONIUM BROMIDE 10 MG: 10 INJECTION INTRAVENOUS at 15:26

## 2018-03-08 RX ADMIN — MIDAZOLAM 0.5 MG: 1 INJECTION INTRAMUSCULAR; INTRAVENOUS at 07:02

## 2018-03-08 RX ADMIN — PHENYLEPHRINE HYDROCHLORIDE 0.2 MCG/KG/MIN: 10 INJECTION, SOLUTION INTRAMUSCULAR; INTRAVENOUS; SUBCUTANEOUS at 15:31

## 2018-03-08 RX ADMIN — PHENYLEPHRINE HYDROCHLORIDE 50 MCG: 10 INJECTION, SOLUTION INTRAMUSCULAR; INTRAVENOUS; SUBCUTANEOUS at 08:48

## 2018-03-08 RX ADMIN — GABAPENTIN 300 MG: 300 CAPSULE ORAL at 06:45

## 2018-03-08 RX ADMIN — Medication 5 MG: at 08:51

## 2018-03-08 RX ADMIN — LIDOCAINE HYDROCHLORIDE 4 ML: 10 INJECTION, SOLUTION INFILTRATION; PERINEURAL at 08:15

## 2018-03-08 RX ADMIN — ONDANSETRON 4 MG: 2 INJECTION INTRAMUSCULAR; INTRAVENOUS at 16:54

## 2018-03-08 RX ADMIN — PROPOFOL 50 MG: 10 INJECTION, EMULSION INTRAVENOUS at 07:58

## 2018-03-08 RX ADMIN — SUGAMMADEX 125 MG: 100 INJECTION, SOLUTION INTRAVENOUS at 17:44

## 2018-03-08 RX ADMIN — PHENYLEPHRINE HYDROCHLORIDE 100 MCG: 10 INJECTION, SOLUTION INTRAMUSCULAR; INTRAVENOUS; SUBCUTANEOUS at 16:15

## 2018-03-08 RX ADMIN — Medication 5 MG: at 15:28

## 2018-03-08 RX ADMIN — HYDROMORPHONE HYDROCHLORIDE 0.2 MG: 1 INJECTION, SOLUTION INTRAMUSCULAR; INTRAVENOUS; SUBCUTANEOUS at 17:48

## 2018-03-08 RX ADMIN — ROCURONIUM BROMIDE 10 MG: 10 INJECTION INTRAVENOUS at 14:27

## 2018-03-08 RX ADMIN — ERTAPENEM SODIUM 1 G: 1 INJECTION, POWDER, LYOPHILIZED, FOR SOLUTION INTRAMUSCULAR; INTRAVENOUS at 07:47

## 2018-03-08 RX ADMIN — ROCURONIUM BROMIDE 10 MG: 10 INJECTION INTRAVENOUS at 12:46

## 2018-03-08 RX ADMIN — SODIUM CHLORIDE, POTASSIUM CHLORIDE, SODIUM LACTATE AND CALCIUM CHLORIDE: 600; 310; 30; 20 INJECTION, SOLUTION INTRAVENOUS at 16:28

## 2018-03-08 RX ADMIN — FENTANYL CITRATE 50 MCG: 50 INJECTION, SOLUTION INTRAMUSCULAR; INTRAVENOUS at 10:53

## 2018-03-08 RX ADMIN — Medication 5 MG: at 09:10

## 2018-03-08 RX ADMIN — PHENYLEPHRINE HYDROCHLORIDE 50 MCG: 10 INJECTION, SOLUTION INTRAMUSCULAR; INTRAVENOUS; SUBCUTANEOUS at 15:17

## 2018-03-08 RX ADMIN — SODIUM CHLORIDE: 9 INJECTION, SOLUTION INTRAVENOUS at 08:02

## 2018-03-08 RX ADMIN — Medication 10 MG: at 16:32

## 2018-03-08 RX ADMIN — DEXAMETHASONE SODIUM PHOSPHATE 6 MG: 4 INJECTION, SOLUTION INTRA-ARTICULAR; INTRALESIONAL; INTRAMUSCULAR; INTRAVENOUS; SOFT TISSUE at 08:45

## 2018-03-08 RX ADMIN — PROPOFOL 100 MG: 10 INJECTION, EMULSION INTRAVENOUS at 07:55

## 2018-03-08 RX ADMIN — PROPOFOL 40 MG: 10 INJECTION, EMULSION INTRAVENOUS at 10:58

## 2018-03-08 RX ADMIN — ROCURONIUM BROMIDE 70 MG: 10 INJECTION INTRAVENOUS at 07:55

## 2018-03-08 RX ADMIN — PHENYLEPHRINE HYDROCHLORIDE 50 MCG: 10 INJECTION, SOLUTION INTRAMUSCULAR; INTRAVENOUS; SUBCUTANEOUS at 15:08

## 2018-03-08 RX ADMIN — ROCURONIUM BROMIDE 10 MG: 10 INJECTION INTRAVENOUS at 13:14

## 2018-03-08 RX ADMIN — PHENYLEPHRINE HYDROCHLORIDE 50 MCG: 10 INJECTION, SOLUTION INTRAMUSCULAR; INTRAVENOUS; SUBCUTANEOUS at 08:05

## 2018-03-08 RX ADMIN — PHENYLEPHRINE HYDROCHLORIDE 100 MCG: 10 INJECTION, SOLUTION INTRAMUSCULAR; INTRAVENOUS; SUBCUTANEOUS at 15:22

## 2018-03-08 RX ADMIN — AMOXICILLIN AND CLAVULANATE POTASSIUM 1 TABLET: 500; 125 TABLET, FILM COATED ORAL at 22:01

## 2018-03-08 RX ADMIN — FENTANYL CITRATE 50 MCG: 50 INJECTION, SOLUTION INTRAMUSCULAR; INTRAVENOUS at 07:03

## 2018-03-08 RX ADMIN — ERTAPENEM SODIUM 1 G: 1 INJECTION, POWDER, LYOPHILIZED, FOR SOLUTION INTRAMUSCULAR; INTRAVENOUS at 16:00

## 2018-03-08 RX ADMIN — LIDOCAINE HYDROCHLORIDE 4 ML: 10 INJECTION, SOLUTION INFILTRATION; PERINEURAL at 09:15

## 2018-03-08 RX ADMIN — PHENYLEPHRINE HYDROCHLORIDE 100 MCG: 10 INJECTION, SOLUTION INTRAMUSCULAR; INTRAVENOUS; SUBCUTANEOUS at 15:28

## 2018-03-08 RX ADMIN — ROCURONIUM BROMIDE 10 MG: 10 INJECTION INTRAVENOUS at 10:43

## 2018-03-08 RX ADMIN — BUPIVACAINE HYDROCHLORIDE 10 ML/HR: 7.5 INJECTION, SOLUTION EPIDURAL; RETROBULBAR at 12:00

## 2018-03-08 RX ADMIN — ACETAMINOPHEN 975 MG: 325 SOLUTION ORAL at 22:01

## 2018-03-08 RX ADMIN — HYDROMORPHONE HYDROCHLORIDE 0.4 MG: 1 INJECTION, SOLUTION INTRAMUSCULAR; INTRAVENOUS; SUBCUTANEOUS at 11:07

## 2018-03-08 RX ADMIN — SODIUM CHLORIDE: 9 INJECTION, SOLUTION INTRAVENOUS at 18:30

## 2018-03-08 RX ADMIN — FENTANYL CITRATE 50 MCG: 50 INJECTION, SOLUTION INTRAMUSCULAR; INTRAVENOUS at 08:45

## 2018-03-08 RX ADMIN — SODIUM CHLORIDE, POTASSIUM CHLORIDE, SODIUM LACTATE AND CALCIUM CHLORIDE: 600; 310; 30; 20 INJECTION, SOLUTION INTRAVENOUS at 08:20

## 2018-03-08 RX ADMIN — Medication 5 MG: at 16:20

## 2018-03-08 RX ADMIN — DOCUSATE SODIUM 100 MG: 100 CAPSULE, LIQUID FILLED ORAL at 22:01

## 2018-03-08 RX ADMIN — PROPOFOL 30 MG: 10 INJECTION, EMULSION INTRAVENOUS at 09:01

## 2018-03-08 RX ADMIN — HYDROMORPHONE HYDROCHLORIDE 0.2 MG: 1 INJECTION, SOLUTION INTRAMUSCULAR; INTRAVENOUS; SUBCUTANEOUS at 14:28

## 2018-03-08 RX ADMIN — ROCURONIUM BROMIDE 10 MG: 10 INJECTION INTRAVENOUS at 08:45

## 2018-03-08 RX ADMIN — HEPARIN SODIUM 5000 UNITS: 5000 INJECTION, SOLUTION INTRAVENOUS; SUBCUTANEOUS at 07:21

## 2018-03-08 RX ADMIN — ALBUMIN HUMAN: 0.05 INJECTION, SOLUTION INTRAVENOUS at 16:42

## 2018-03-08 RX ADMIN — ACETAMINOPHEN 1000 MG: 10 INJECTION, SOLUTION INTRAVENOUS at 13:23

## 2018-03-08 NOTE — ANESTHESIA PROCEDURE NOTES
Epidural Procedure Note    Staff:     Anesthesiologist:  OSCAR MURRY    Resident/CRNA:  JOHANNA DE LEÓN    Procedure performed by resident/CRNA in the presence of a teaching physician    Location: Pre-op     Procedure start time:  3/8/2018 6:55 AM     Procedure end time:  3/8/2018 7:10 AM   Pre-procedure checklist:   patient identified, IV checked, site marked, risks and benefits discussed, informed consent, monitors and equipment checked, pre-op evaluation, at physician/surgeon's request and post-op pain management      Correct Patient: Yes      Correct Position: Yes      Correct Site: Yes      Correct Procedure: Yes      Correct Laterality:  Yes    Site Marked:  Yes  Procedure:     Procedure:  Epidural catheter    ASA:  3    Position:  Sitting    Sterile Prep: chloraprep, mask, sterile gloves and patient draped      Insertion site:  L4-5    Local skin infiltration:  2% lidocaine    Approach:  Midline    Needle gauge (G):  17    Block Needle Type:  Touhy    Injection Technique:  LORT saline    GERSON at (cm):  5    Attempts:  1    Redirects:  0    Catheter gauge (G):  19    Catheter threaded easily: Yes      Threaded to cm at skin:  10    Paresthesias:  No    Aspiration negative for Heme or CSF: Yes      Test dose (mL):  3     Local anesthetic:  Lidocaine 1.5% w/ 1:200,000 epinephrine    Test dose time:  07:10    Test dose negative for signs of intravascular, subdural or intrathecal injection: Yes

## 2018-03-08 NOTE — IP AVS SNAPSHOT
MRN:[id number]                      After Visit Summary   3/8/2018    King Gonsalo Bruno    MRN: [id number]           Thank you!     Thank you for choosing [facility] for your care. Our goal is always to provide you with excellent care. Hearing back from our patients is one way we can continue to improve our services. Please take a few minutes to complete the written survey that you may receive in the mail after you visit with us. Thank you!        Patient Information     Date Of Birth          1945        Designated Caregiver       Most Recent Value    Caregiver    Will someone help with your care after discharge? yes    Name of designated caregiver Stephanie    Phone number of caregiver      Caregiver address [place]      About your hospital stay     You were admitted on:  March 8, 2018 You last received care in the:  [address] [facility]    You were discharged on:  March 12, 2018        Reason for your hospital stay       On 3/8/18 Mr. Bruno underwent:  1. Perineal urethrostomy creation  2. Flexible cystoscopy with bladder biopsy and fulguration, radical penectomy and urethrectomy, bilateral inguinal lymphadenectomy (by Dr. Muriel Haddad).  Dr. ARY Haddad (Urology)                  Who to Call     For medical emergencies, please call 911.  For non-urgent questions about your medical care, please call your primary care provider or clinic, [phone]  For questions related to your surgery, please call your surgery clinic        Attending Provider     Provider Specialty    Muriel Haddad MD Urology       Primary Care Provider Office Phone # Fax #    Joan Janet Ventura [phone]      After Care Instructions     Activity       Your activity upon discharge: See discharge instructions            Diet       Follow this diet upon discharge: See discharge instructions            Discharge Instructions       Diet:  - Regular    Activity:   - No strenuous exercise  for 6 weeks.   - No lifting, pushing, pulling more than 10 pounds for 6 weeks. Take care when pushing with your arms to stand up.  - Do not strain your belly area.  When you bend, sit up or twice, you could strain the area around your incision.    - Do not strain with bowel movements.    - Do not drive until you can press the brake pedal quickly and fully without pain.    - Do not operate a motor vehicle while taking narcotic pain medications.     Medications:   1) PAIN: Oxycodone is a narcotic medication that has been prescribed for pain.  Narcotics will cause sleepiness and constipation and can become addictive, therefore it is best to stop or reduce them as soon as you can.  Any left over narcotics should be disposed of with an Authorized  for unneeded medications.  Contact your [facility] or [facility] household trash and recycling service to learn about medication disposal options and guidelines for your area.  If you decide to store this medication at home it should be kept in a locked cabinet to prevent access to children or visitors. To reduce your narcotic use, take Tylenol (acetaminophen 625mg) every 4-6 hours.  This has been prescribed for you.  Do not take more than 4,000mg of Tylenol (acetaminophen/ APAP) from all sources in any 24 hour period since this can cause liver damage.      2) CONSTIPATION: Pericolace (senna/docusate sodium) can be taken twice daily for prevention of constipation since surgery, pain medications and bladder spasm medications can all make you constipated.  Please reduce or stop pericolace if you develop loose stools. Other over the counter solutions such as prune juice, miralax, fiber products, senna, and dulcolax can also be used. If you are taking the pericolace but still have not had a bowel movement in 3 days, start over-the-counter Milk of Magnesia taken twice daily until you have a nice bowel movement.  Call the office with any concerns.     3) ANTIBIOTICS:  Continue augmentin (amox/clav) once daily pills while you have your surgical drains in place (for prevention of infection)    Incisions:   - Daily showering is important, and you may get incisions wet starting 48 hours after surgery.  - Do not scrub incisions or soak in a bath, pool, hot tub, etc. Until all incisions have healed and drains have been removed.   - The wound dressing should be removed 48 hours after surgery.   - The purple skin glue on your incisions will flake off with time and should not be scrubbed.  Also avoid applying lotions or ointments to these areas.  - If steri-strips were used you may wash over them normally, as you would wash your remaining skin.  Steri-strips should fall off on their own within 5-10 days.   - Staples will be removed in Urology Clinic at followup (in 18-22 days)   - It is preferable for the incision to be left uncovered, but you may cover with gauze if needed for comfort or to protect clothing from drainage.     PERINEAL URETHROSTOMY:  - Continue voiding as you are.  Avoid tube baths but showering is recommended.  To try your perineum (surgical site), please gently pat.  Do not rub    Tubes / drains:  Groin drains to bulb suction   - Your nurse will show you how to care for your groin drains.   - Please record daily drain outputs and bring this information with you when you follow up with Dr. Haddad's team  - In general, your drain outputs need to be <25mL per 24 hours for two days in a row before the drains can be removed            Supplies       List the supplies the pt needs to go home:  Drain cares, drain dressings, measurement devices.                  Follow-up Appointments     Adult [facility] Follow-up and recommended labs and tests       Follow-Up:   - Call your primary care provider to touch base regarding your recent admission.   - You will need follow up for drain removal.  Keep careful record of both drain outputs.  Once the drains are approaching 25mL per 24  hours for two days in a row call Dr. Haddad's nurse, Lizzy Chowdary [phone] to notify her so an appointment for drain removal can be made with our nurses clinic.   - Follow up with Dr. Haddad as scheduled on 4/4/18  Call or return sooner than your regularly scheduled visit if you develop any of the following:  Fever (greater than 101.3F) or flu-like symptoms, uncontrolled pain, uncontrolled nausea or vomiting, as well as increased redness, swelling, or drainage from your wound.    Phone numbers:  - Nursing phone helpline at the Urology Clinic (8A-5P M-F):  [phone].    - Nights or weekends, call [phone] and ask the  to page the urology resident on call.   - For emergencies, always call 911      Appointments on [facility] and/or [facility] (with [facility] or [facility] provider or service). Call [phone] if you haven't heard regarding these appointments within 7 days of discharge.                  Your next 10 appointments already scheduled     Mar 30, 2018  2:30 PM CDT   Telephone Call with Rebeka Brennan RD   [facility]rologic [facility] ([facility])    [address]  [address] [facility] [address] [phone]           Note: this is not an onsite visit; there is no need to come to the facility.  Thank you for choosing [facility] for your health care needs. Below is some information for patients who are interested in having their follow-up visit with a physician by telephone. In some cases, a telephone visit can be an effective and convenient way to manage your follow-up care. Choosing a telephone visit rather than a face to face visit for your follow-up care is a decision that you and your physician can make together to ensure it meets all of your needs.  A face to face visit is always an available option, if you choose to do so.  We want to make sure you have all of the information you need about the  telephone visit option and answer all of your questions before you decide to schedule a telephone follow-up visit. If you have any questions, you may talk to a staff member or our financial counselor at [phone].  1. General overview Our clinic sees patients for a variety of conditions and concerns. A face to face visit with your doctor is required for any new concerns or for your initial visit. If you and your doctor decide that a follow up visit by telephone is appropriate, you may decide to opt for a telephone visit.   2. Billing and insurance coverage There is a charge for telephone visits, similar to the charge for an in-person visit. Your bill is based on the amount of time you and your physician are on the phone. We will bill each visit to your insurance company (just like your other medical visits), and you will be responsible for any costs not paid by your insurance company. The charge may be denied by your insurance company, in which case you will be responsible for the entire amount billed. The decision to cover the cost is determined by your insurance company. If you want to know what your insurance company will cover, we encourage you to contact them to determine your coverage. The codes below are the codes we use when billing for telephone visits and the associated charges. This may help you work with your insurance company to determine your benefits.   Billing CPT codes for Telephone visits  84775 ($30), 43860 ($35), 94990 ($40)            Apr 04, 2018 11:30 AM CDT   POST OP OSTOMY NURSE with Gonsalo Aguilar RN   [facility] Wound Ostomy ([facility])    [address] [place] [address]   [phone]            Apr 04, 2018  1:20 PM CDT   (Arrive by 1:05 PM)   Post-Op with Muriel Haddad MD   [facility] for Prostate and Urologic Cancers ([facility])    [address] [place] [address]  "[id number]   [phone]            2018 11:30 AM CDT   [facility] Draw with  [facility]   [facility] ([facility])    [address] [place] [address]  [address]  [facility] [address]   [phone]            2018 12:00 PM CDT   (Arrive by 11:45 AM)   Return Visit with Steve Arceo MD   [facility] ([facility])    [address] [place] [address]  [address]  [facility] [address]   [phone]              Pending Results     Date and Time Order Name Status Description    3/8/2018 0931 Surgical pathology exam Preliminary             Statement of Approval     Ordered          18 0663  I have reviewed and agree with all the recommendations and orders detailed in this document.  EFFECTIVE NOW     Approved and electronically signed by:  Fidelia Vizcaino PA             Admission Information     Date & Time Provider Department Dept. Phone    3/8/2018 Muriel Haddad MD [address] [facility] [phone]      Your Vitals Were     Blood Pressure Pulse Temperature Respirations Height Weight    147/74 93 97.2  F (36.2  C) (Oral) 18 1.918 m (6' 3.5\") 63.5 kg (139 lb 14.4 oz)    Pulse Oximetry BMI (Body Mass Index)                99% 17.26 kg/m2          MyChart Information     [facility] lets you send messages to your doctor, view your test results, renew your prescriptions, schedule appointments and more. To sign up, go to [email or web address] . Click on \"Log in\" on the left side of the screen, which will take you to the Welcome page. Then click on \"Sign up Now\" on the right side of the page.     You will be asked to enter the access code listed below, as well as some personal information. Please follow the directions to create your username and password.     Your access code is: [email or web address]  Expires: 2018  7:31 AM     Your access code will  in 90 days. If you need help or a new code, please call " your [facility] clinic or [phone].        Care EveryWhere ID     This is your Care EveryWhere ID. This could be used by other organizations to access your [facility] medical records  [id number]        Equal Access to Services     JENELLE RASMUSSEN: Hadii aad ku hadnikkijanine Jakob, waaxda luqadaha, qaybta kaalmada ademabel, ronald alvarengadyana ericksonaamir marquez carola rasmussen. So [facility] [phone].    ATENCIÓN: Si habla español, tiene a washburn disposición servicios gratuitos de asistencia lingüística. Llame al [phone].    We comply with applicable federal civil rights laws and [place] laws. We do not discriminate on the basis of race, color, national origin, age, disability, sex, sexual orientation, or gender identity.               Review of your medicines      START taking        Dose / Directions    oxyCODONE IR 5 MG tablet   Commonly known as:  ROXICODONE   Used for:  Urethral cancer (H)        Dose:  5-10 mg   Take 1-2 tablets (5-10 mg) by mouth every 3 hours as needed for other (pain control or improvement in physical function. Hold dose for analgesic side effects.)   Quantity:  30 tablet   Refills:  0         CONTINUE these medicines which may have CHANGED, or have new prescriptions. If we are uncertain of the size of tablets/capsules you have at home, strength may be listed as something that might have changed.        Dose / Directions    acetaminophen 325 MG tablet   Commonly known as:  TYLENOL   This may have changed:    - medication strength  - how much to take  - when to take this   Used for:  Urethral cancer (H)        Dose:  650 mg   Take 2 tablets (650 mg) by mouth every 4 hours as needed for mild pain or fever   Quantity:  150 tablet   Refills:  0       amLODIPine 10 MG tablet   Commonly known as:  NORVASC   This may have changed:    - how much to take  - when to take this   Used for:  Benign essential hypertension        Dose:  10 mg   Take 1 tablet (10 mg) by mouth daily   Quantity:  90 tablet   Refills:  3        amoxicillin-clavulanate 500-125 MG per tablet   Commonly known as:  AUGMENTIN   This may have changed:    - when to take this  - additional instructions   Used for:  Urinary tract infection        Dose:  1 tablet   Take 1 tablet by mouth daily While the drains are in place   Quantity:  21 tablet   Refills:  1       fluticasone 50 MCG/ACT spray   Commonly known as:  FLONASE   This may have changed:  when to take this   Used for:  Urethral cancer (H), Allergic sinusitis        Dose:  2 spray   Spray 2 sprays into both nostrils 2 times daily   Quantity:  2 Bottle   Refills:  11       LORazepam 0.5 MG tablet   Commonly known as:  ATIVAN   This may have changed:  when to take this   Used for:  Anxiety        Dose:  0.5 mg   Take 1 tablet (0.5 mg) by mouth every 8 hours as needed for anxiety   Quantity:  30 tablet   Refills:  3       nystatin 961294 UNIT/ML suspension   Commonly known as:  MYCOSTATIN   This may have changed:    - when to take this  - reasons to take this  - additional instructions   Used for:  Thrush        Dose:  190206 Units   Take 5 mLs (500,000 Units) by mouth 4 times daily Swish and spit   Quantity:  200 mL   Refills:  0         CONTINUE these medicines which have NOT CHANGED        Dose / Directions    docusate sodium 100 MG capsule   Commonly known as:  COLACE   Used for:  Slow transit constipation        Dose:  100 mg   Take 1 capsule (100 mg) by mouth 2 times daily as needed for constipation   Quantity:  60 capsule   Refills:  0       omeprazole 2 mg/mL Susp   Commonly known as:  priLOSEC   Used for:  Gastroesophageal reflux disease with esophagitis, Urethral cancer (H)        Dose:  20 mg   Take 10 mLs (20 mg) by mouth 2 times daily   Quantity:  280 mL   Refills:  11       polyethylene glycol powder   Commonly known as:  MIRALAX/GLYCOLAX   Used for:  Urethral cancer (H)        Dose:  1 capful   Take 17 g (1 capful) by mouth daily as needed for constipation   Quantity:  500 g   Refills:  1        VITAMIN B COMPLEX PO        Take by mouth daily (with lunch)   Refills:  0         STOP taking     fluconazole 100 MG tablet   Commonly known as:  DIFLUCAN                Where to get your medicines      These medications were sent to [facility] - [place], [place] - [address]  [address] [address]     Phone:  [phone]     acetaminophen 325 MG tablet    amoxicillin-clavulanate 500-125 MG per tablet    docusate sodium 100 MG capsule    polyethylene glycol powder         Some of these will need a paper prescription and others can be bought over the counter. Ask your nurse if you have questions.     Bring a paper prescription for each of these medications     oxyCODONE IR 5 MG tablet                Protect others around you: Learn how to safely use, store and throw away your medicines at www.disposemymeds.org.        ANTIBIOTIC INSTRUCTION     You've Been Prescribed an Antibiotic - Now What?  Your healthcare team thinks that you or your loved one might have an infection. Some infections can be treated with antibiotics, which are powerful, life-saving drugs. Like all medications, antibiotics have side effects and should only be used when necessary. There are some important things you should know about your antibiotic treatment.      Your healthcare team may run tests before you start taking an antibiotic.    Your team may take samples (e.g., from your blood, urine or other areas) to run tests to look for bacteria. These test can be important to determine if you need an antibiotic at all and, if you do, which antibiotic will work best.      Within a few days, your healthcare team might change or even stop your antibiotic.    Your team may start you on an antibiotic while they are working to find out what is making you sick.    Your team might change your antibiotic because test results show that a different antibiotic would be better to treat your infection.    In  some cases, once your team has more information, they learn that you do not need an antibiotic at all. They may find out that you don't have an infection, or that the antibiotic you're taking won't work against your infection. For example, an infection caused by a virus can't be treated with antibiotics. Staying on an antibiotic when you don't need it is more likely to be harmful than helpful.      You may experience side effects from your antibiotic.    Like all medications, antibiotics have side effects. Some of these can be serious.    Let you healthcare team know if you have any known allergies when you are admitted to the hospital.    One significant side effect of nearly all antibiotics is the risk of severe and sometimes deadly diarrhea caused by Clostridium difficile (C. Difficile). This occurs when a person takes antibiotics because some good germs are destroyed. Antibiotic use allows C. diificile to take over, putting patients at high risk for this serious infection.    As a patient or caregiver, it is important to understand your or your loved one's antibiotic treatment. It is especially important for caregivers to speak up when patients can't speak for themselves. Here are some important questions to ask your healthcare team.    What infection is this antibiotic treating and how do you know I have that infection?    What side effects might occur from this antibiotic?    How long will I need to take this antibiotic?    Is it safe to take this antibiotic with other medications or supplements (e.g., vitamins) that I am taking?     Are there any special directions I need to know about taking this antibiotic? For example, should I take it with food?    How will I be monitored to know whether my infection is responding to the antibiotic?    What tests may help to make sure the right antibiotic is prescribed for me?      Information provided by:  www.cdc.gov/getsmart  U.S. Department of Health and Human  Services  Centers for disease Control and Prevention  National Center for Emerging and Zoonotic Infectious Diseases  Division of Healthcare Quality Promotion        Information about OPIOIDS     PRESCRIPTION OPIOIDS: WHAT YOU NEED TO KNOW    Prescription opioids can be used to help relieve moderate to severe pain and are often prescribed following a surgery or injury, or for certain health conditions. These medications can be an important part of treatment but also come with serious risks. It is important to work with your health care provider to make sure you are getting the safest, most effective care.    WHAT ARE THE RISKS AND SIDE EFFECTS OF OPIOID USE?  Prescription opioids carry serious risks of addiction and overdose, especially with prolonged use. An opioid overdose, often marked by slowed breathing can cause sudden death. The use of prescription opioids can have a number of side effects as well, even when taken as directed:      Tolerance - meaning you might need to take more of a medication for the same pain relief    Physical dependence - meaning you have symptoms of withdrawal when a medication is stopped    Increased sensitivity to pain    Constipation    Nausea, vomiting, and dry mouth    Sleepiness and dizziness    Confusion    Depression    Low levels of testosterone that can result in lower sex drive, energy, and strength    Itching and sweating    RISKS ARE GREATER WITH:    History of drug misuse, substance use disorder, or overdose    Mental health conditions (such as depression or anxiety)    Sleep apnea    Older age (65 years or older)    Pregnancy    Avoid alcohol while taking prescription opioids.   Also, unless specifically advised by your health care provider, medications to avoid include:    Benzodiazepines (such as Xanax or Valium)    Muscle relaxants (such as Soma or Flexeril)    Hypnotics (such as Ambien or Lunesta)    Other prescription opioids    KNOW YOUR OPTIONS:  Talk to your health  care provider about ways to manage your pain that do not involve prescription opioids. Some of these options may actually work better and have fewer risks and side effects:    Pain relievers such as acetaminophen, ibuprofen, and naproxen    Some medications that are also used for depression or seizures    Physical therapy and exercise    Cognitive behavioral therapy, a psychological, goal-directed approach, in which patients learn how to modify physical, behavioral, and emotional triggers of pain and stress    IF YOU ARE PRESCRIBED OPIOIDS FOR PAIN:    Never take opioids in greater amounts or more often than prescribed    Follow up with your primary health care provider and work together to create a plan on how to manage your pain.    Talk about ways to help manage your pain that do not involve prescription opioids    Talk about all concerns and side effects    Help prevent misuse and abuse    Never sell or share prescription opioids    Never use another person's prescription opioids    Store prescription opioids in a secure place and out of reach of others (this may include visitors, children, friends, and family)    Visit www.cdc.gov/drugoverdose to learn about risks of opioid abuse and overdose    If you believe you may be struggling with addiction, tell your health care provider and ask for guidance or call [facility]'s National Helpline at 6-985-318-HELP    LEARN MORE / www.cdc.gov/drugoverdose/prescribing/guideline.html    Safely dispose of unused prescription opioids: Find your local drug take-back programs and more information about the importance of safe disposal at www.doseofreality.[place].gov             Medication List: This is a list of all your medications and when to take them. Check marks below indicate your daily home schedule. Keep this list as a reference.      Medications           Morning Afternoon Evening Bedtime As Needed    acetaminophen 325 MG tablet   Commonly known as:  TYLENOL   Take 2 tablets  (650 mg) by mouth every 4 hours as needed for mild pain or fever                                amLODIPine 10 MG tablet   Commonly known as:  NORVASC   Take 1 tablet (10 mg) by mouth daily                                amoxicillin-clavulanate 500-125 MG per tablet   Commonly known as:  AUGMENTIN   Take 1 tablet by mouth daily While the drains are in place   Last time this was given:  1 tablet on 3/8/2018 10:01 PM                                docusate sodium 100 MG capsule   Commonly known as:  COLACE   Take 1 capsule (100 mg) by mouth 2 times daily as needed for constipation   Last time this was given:  100 mg on 3/12/2018  7:52 AM                                fluticasone 50 MCG/ACT spray   Commonly known as:  FLONASE   Spray 2 sprays into both nostrils 2 times daily   Last time this was given:  2 sprays on 3/12/2018  7:52 AM                                LORazepam 0.5 MG tablet   Commonly known as:  ATIVAN   Take 1 tablet (0.5 mg) by mouth every 8 hours as needed for anxiety                                nystatin 357810 UNIT/ML suspension   Commonly known as:  MYCOSTATIN   Take 5 mLs (500,000 Units) by mouth 4 times daily Swish and spit                                omeprazole 2 mg/mL Susp   Commonly known as:  priLOSEC   Take 10 mLs (20 mg) by mouth 2 times daily                                oxyCODONE IR 5 MG tablet   Commonly known as:  ROXICODONE   Take 1-2 tablets (5-10 mg) by mouth every 3 hours as needed for other (pain control or improvement in physical function. Hold dose for analgesic side effects.)   Last time this was given:  5 mg on 3/12/2018  1:09 PM                                polyethylene glycol powder   Commonly known as:  MIRALAX/GLYCOLAX   Take 17 g (1 capful) by mouth daily as needed for constipation                                VITAMIN B COMPLEX PO   Take by mouth daily (with lunch)

## 2018-03-08 NOTE — LETTER
Transition Communication Hand-off for Care Transitions to Next Level of Care Provider    Name: King Gonsalo Bruno  : 1945  MRN #: [id number]  Primary Care Provider: Joan Ventura     Primary Clinic: [address] [address]     Reason for Hospitalization:  Ureteral Cancer   Penile cancer (H)  Admit Date/Time: 3/8/2018  5:37 AM  Discharge Date: 3/12/2018  Payor Source: Payor: BCBS / Plan: BCBS PLATINUM BLUE / Product Type: PPO /     Reason for Communication Hand-off Referral: Other continuity of care    Discharge Plan: Discharged to home with plan for clinic f/u     Follow-up plan:  Future Appointments  Date Time Provider Department Center   3/30/2018 2:30 PM Rebeka Brennan RD [facility]   2018 11:30 AM Gonsalo Aguilar, RN [facility]   2018 1:20 PM Muriel Haddad MD [facility]   2018 11:30 AM  [facility]   2018 12:00 PM Steve Arceo MD [facility]     Chacha Dudley, [facility]  [phone]    AVS/Discharge Summary is the source of truth; this is a helpful guide for improved communication of patient story

## 2018-03-08 NOTE — IP AVS SNAPSHOT
[address] [address]    Phone:  [phone]                                       After Visit Summary   3/8/2018    King Gonsalo Bruno    MRN: [id number]           After Visit Summary Signature Page     I have received my discharge instructions, and my questions have been answered. I have discussed any challenges I see with this plan with the nurse or doctor.    ..........................................................................................................................................  Patient/Patient Representative Signature      ..........................................................................................................................................  Patient Representative Print Name and Relationship to Patient    ..................................................               ................................................  Date                                            Time    ..........................................................................................................................................  Reviewed by Signature/Title    ...................................................              ..............................................  Date                                                            Time

## 2018-03-08 NOTE — OR NURSING
Preop epidural placed, no complications. VSS throughout procedure. 50 mcg fentanyl and 0.5 mg versed given.

## 2018-03-09 LAB
ANION GAP SERPL CALCULATED.3IONS-SCNC: 10 MMOL/L (ref 3–14)
BUN SERPL-MCNC: 19 MG/DL (ref 7–30)
CALCIUM SERPL-MCNC: 9.1 MG/DL (ref 8.5–10.1)
CHLORIDE SERPL-SCNC: 105 MMOL/L (ref 94–109)
CO2 SERPL-SCNC: 22 MMOL/L (ref 20–32)
CREAT SERPL-MCNC: 1.52 MG/DL (ref 0.66–1.25)
ERYTHROCYTE [DISTWIDTH] IN BLOOD BY AUTOMATED COUNT: 14.4 % (ref 10–15)
GFR SERPL CREATININE-BSD FRML MDRD: 46 ML/MIN/1.7M2
GLUCOSE BLDC GLUCOMTR-MCNC: 136 MG/DL (ref 70–99)
GLUCOSE BLDC GLUCOMTR-MCNC: 94 MG/DL (ref 70–99)
GLUCOSE SERPL-MCNC: 129 MG/DL (ref 70–99)
HCT VFR BLD AUTO: 27.2 % (ref 40–53)
HGB BLD-MCNC: 8.8 G/DL (ref 13.3–17.7)
MCH RBC QN AUTO: 29.5 PG (ref 26.5–33)
MCHC RBC AUTO-ENTMCNC: 32.4 G/DL (ref 31.5–36.5)
MCV RBC AUTO: 91 FL (ref 78–100)
PLATELET # BLD AUTO: 231 10E9/L (ref 150–450)
POTASSIUM SERPL-SCNC: 4.3 MMOL/L (ref 3.4–5.3)
RBC # BLD AUTO: 2.98 10E12/L (ref 4.4–5.9)
SODIUM SERPL-SCNC: 137 MMOL/L (ref 133–144)
WBC # BLD AUTO: 16.3 10E9/L (ref 4–11)

## 2018-03-09 PROCEDURE — 80048 BASIC METABOLIC PNL TOTAL CA: CPT | Performed by: UROLOGY

## 2018-03-09 PROCEDURE — A9270 NON-COVERED ITEM OR SERVICE: HCPCS | Mod: GY | Performed by: UROLOGY

## 2018-03-09 PROCEDURE — 85027 COMPLETE CBC AUTOMATED: CPT | Performed by: UROLOGY

## 2018-03-09 PROCEDURE — 25000132 ZZH RX MED GY IP 250 OP 250 PS 637: Mod: GY | Performed by: UROLOGY

## 2018-03-09 PROCEDURE — 25000128 H RX IP 250 OP 636: Performed by: STUDENT IN AN ORGANIZED HEALTH CARE EDUCATION/TRAINING PROGRAM

## 2018-03-09 PROCEDURE — 00000146 ZZHCL STATISTIC GLUCOSE BY METER IP

## 2018-03-09 PROCEDURE — 25000128 H RX IP 250 OP 636: Performed by: UROLOGY

## 2018-03-09 PROCEDURE — 36415 COLL VENOUS BLD VENIPUNCTURE: CPT | Performed by: UROLOGY

## 2018-03-09 PROCEDURE — 12000008 ZZH R&B INTERMEDIATE UMMC

## 2018-03-09 PROCEDURE — 25000125 ZZHC RX 250: Performed by: UROLOGY

## 2018-03-09 RX ORDER — HEPARIN SODIUM 5000 [USP'U]/.5ML
5000 INJECTION, SOLUTION INTRAVENOUS; SUBCUTANEOUS EVERY 8 HOURS
Status: DISCONTINUED | OUTPATIENT
Start: 2018-03-09 | End: 2018-03-12 | Stop reason: HOSPADM

## 2018-03-09 RX ADMIN — SODIUM CHLORIDE: 9 INJECTION, SOLUTION INTRAVENOUS at 04:48

## 2018-03-09 RX ADMIN — ACETAMINOPHEN 975 MG: 325 SOLUTION ORAL at 06:05

## 2018-03-09 RX ADMIN — BUPIVACAINE HYDROCHLORIDE: 7.5 INJECTION, SOLUTION EPIDURAL; RETROBULBAR at 11:52

## 2018-03-09 RX ADMIN — ACETAMINOPHEN 975 MG: 325 SOLUTION ORAL at 14:29

## 2018-03-09 RX ADMIN — OMEPRAZOLE 20 MG: 20 CAPSULE, DELAYED RELEASE ORAL at 21:05

## 2018-03-09 RX ADMIN — HEPARIN SODIUM 5000 UNITS: 5000 INJECTION, SOLUTION INTRAVENOUS; SUBCUTANEOUS at 16:20

## 2018-03-09 RX ADMIN — DOCUSATE SODIUM 100 MG: 100 CAPSULE, LIQUID FILLED ORAL at 21:06

## 2018-03-09 RX ADMIN — AMOXICILLIN AND CLAVULANATE POTASSIUM 500 MG: 400; 57 POWDER, FOR SUSPENSION ORAL at 08:42

## 2018-03-09 RX ADMIN — ACETAMINOPHEN 975 MG: 325 SOLUTION ORAL at 21:05

## 2018-03-09 RX ADMIN — OMEPRAZOLE 20 MG: 20 CAPSULE, DELAYED RELEASE ORAL at 08:43

## 2018-03-09 RX ADMIN — HEPARIN SODIUM 5000 UNITS: 5000 INJECTION, SOLUTION INTRAVENOUS; SUBCUTANEOUS at 08:54

## 2018-03-09 RX ADMIN — TOLTERODINE 4 MG: 4 CAPSULE, EXTENDED RELEASE ORAL at 08:42

## 2018-03-09 RX ADMIN — FLUCONAZOLE 100 MG: 100 TABLET ORAL at 08:43

## 2018-03-09 RX ADMIN — DOCUSATE SODIUM 100 MG: 100 CAPSULE, LIQUID FILLED ORAL at 08:43

## 2018-03-09 RX ADMIN — FLUTICASONE PROPIONATE 2 SPRAY: 50 SPRAY, METERED NASAL at 08:43

## 2018-03-09 RX ADMIN — AMOXICILLIN AND CLAVULANATE POTASSIUM 500 MG: 400; 57 POWDER, FOR SUSPENSION ORAL at 22:04

## 2018-03-09 RX ADMIN — AMOXICILLIN AND CLAVULANATE POTASSIUM 500 MG: 400; 57 POWDER, FOR SUSPENSION ORAL at 14:29

## 2018-03-09 ASSESSMENT — PAIN DESCRIPTION - DESCRIPTORS: DESCRIPTORS: DISCOMFORT

## 2018-03-09 NOTE — OP NOTE
Procedure Date: 3/08/2018  PREOPERATIVE DIAGNOSIS: Urothelial carcinoma of the urethra.  POSTOPERATIVE DIAGNOSIS:     1.  Urothelial carcinoma of the urethra.   2.  Squamous cell carcinoma of the penis.       PROCEDURES PERFORMED:    1. Perineal urethrostomy creation       STAFF SURGEON: Ferdinand Magaña MD, MS  RESIDENTS: Michael Monahan MD, Jose Cullen MD, and Huber Mesa MD  ESTIMATED BLOOD LOSS: 50 mL for this portion of the procedure.    INDICATIONS: King Gonsalo Bruno is a 72 year old gentleman with a history of carcinoma of the urethra, scheduled to undergo a radical penectomy and inguinal lymph node dissection with Dr. Haddad. This dictation pertains to the perineal urethrostomy portion of the procedure. All risks, benefits, and alternatives were discussed with the patient prior to proceeding.  DESCRIPTION OF PROCEDURE: The patient was in the high lithotomy position and appropriately padded, prepped, and draped. The radical penectomy portion of the procedure had been completed by Dr. Haddad, please see his separate operative note for details. An inverted U shaped incision had been made in the perineum over the expected area of the bulbar urethra. Flexible cystoscopy had been completed and there was about 2 cm of normal appearing urethra distal to the external sphincter. A 24 Fr Bougie calibrated the urethra without issue. The perineal incision was extended superiorly such that we had a Lambda-shaped incision, to allow for a posterior skin flap. The urethra was spatulated ventrally. We used interrupted 3-0 Vicryl to sew the proximal most aspect of the urethrotomy to the posterior skin flap. We started at the 6 o' clock position and worked our way up sequentially laterally around the urethra, ensuring that the skin flaps were anastomosed to the urethra without tension. The Bougie continued to calibrate easily to 24 [place]. The lateral edges of the Lambda skin flap were then sewn to the perineal skin to  complete the skin closure. This was done with interrupted 3-0 Vicryl in two layers to ensure the closure was off tension. A 16-Divehi catheter was inserted. Bacitracin, fluffs and a scrotal supporter were placed. The patient was then taken out of the high lithotomy position and Dr. Haddad continued with the inguinal lymphadenectomy portion of the procedure.    As the attending surgeon I, Ferdinand Magaña, was present and scrubbed throughout the procedure.

## 2018-03-09 NOTE — OR NURSING
Urology at bedside, Dr. Monahan reviewed labs.    Wife Stephanie at bedside.  Dr. Rivera at bedside, patient improving, more alert. Okay for 7B.

## 2018-03-09 NOTE — PLAN OF CARE
"Problem: Patient Care Overview  Goal: Plan of Care/Patient Progress Review  Outcome: No Change  Afeb, vitals stable, 02 sats 100% on 1LPM/nc, states pain is \"OK\", denies nausea, has epidural cath infusing, site dry and intact, cms intact, positive pedal and radial pulses, c/o intermittent numbness on LE extremities, not new, hx of chemo, bilat groin drsg dry and intact with scant amt of old drng, perineal ureterostomy incision intact with penrose draining small amt, penectomy incision intact, has athletic supporter on with guaze fluffs, belly round with few bowel sounds, lungs clear, has good cough, cont on bedrest, sleepy but arouses, SP cath patent with good urine out, ureterostomy cath with small amts out, on cld, offers no further c/o, appeared to rest at short intervals      "

## 2018-03-09 NOTE — ANESTHESIA POST-OP FOLLOW-UP NOTE
"REGIONAL ANESTHESIA PAIN SERVICE EPIDURAL NOTE  King Gonsalo Bruno is a 72 year old male POD #1 s/p PENECTOMY URETERECTOMY DISSECT LYMPH NODE INGUINAL COMBINED CYSTECTOMY BLADDER, ILEAL DIVERSION URETHROSTOMY PERINEUM and placement of L4-5 epidural catheter for pain management.       SUBJECTIVE  Interval History: Overnight no acute events.  Patient awake, sitting semiupright in bed, reports feeling \"stiff\" overall adequate pain control with epidural infusion and current analgesic medications (see below).  Reports intermittent sharp pain right groin where they removed branch of lymph node and LLQ abd, describes as noticeable, but what he would expect from surgery.  Denies any weakness, paresthesias, circumoral numbness, metallic taste or tinnitus.  Currently denies nausea; tolerating a clear liquid diet. palomino catheter iveth urine.                           Clinically Aligned Pain Assessment (CAPA):  Comfort (How is your pain?): Comfortably manageable  Change in Pain (Since your last medication/intervention?): About the same  Pain Control (How are your pain treatments working?):  Fully effective pain control  Functioning (Are you able to do activities to get better?) : Can do most things, but pain gets in the way of some   Sleep (Does your pain management allow you to sleep or rest?): Awake with occasional pain           Anticoagulation/Antiplatelet Therapy:    heparin sodium PF injection 5,000 Units 5,000 Units, SC, Q8H Given: 03/09 0854               Medications related to Pain Management (Future)    Start       Dose/Rate Route Frequency Ordered Stop     03/11/18 0000   acetaminophen (TYLENOL) solution 650 mg       650 mg Oral EVERY 4 HOURS PRN 03/08/18 2151        03/08/18 2200   acetaminophen (TYLENOL) solution 975 mg       975 mg Oral EVERY 8 HOURS 03/08/18 2151 03/11/18 2159     03/08/18 2100   bupivacaine (MARCAINE) 0.125 % in sodium chloride 0.9 % 250 mL EPIDURAL Infusion       10 mL/hr  EPIDURAL CONTINUOUS " "03/08/18 2053 03/08/18 2030   docusate sodium (COLACE) capsule 100 mg       100 mg Oral 2 TIMES DAILY 03/08/18 2025 03/08/18 2026   docusate sodium (COLACE) capsule 100 mg       100 mg Oral 2 TIMES DAILY PRN 03/08/18 2026 03/08/18 2026   polyethylene glycol (MIRALAX/GLYCOLAX) Packet 17 g       17 g Oral DAILY PRN 03/08/18 2026 03/08/18 2025   lidocaine 1 % 1 mL       1 mL Other EVERY 1 HOUR PRN 03/08/18 2025 03/08/18 2025   lidocaine (LMX4) kit         Topical EVERY 1 HOUR PRN 03/08/18 2025 03/08/18 2025   HYDROmorphone (DILAUDID) injection 0.2 mg       0.2 mg Intravenous EVERY 2 HOURS PRN 03/08/18 2025 03/08/18 2025   oxyCODONE IR (ROXICODONE) tablet 5 mg       5 mg Oral EVERY 3 HOURS PRN 03/08/18 2025                OBJECTIVE:  Lab Results:       Recent Labs   Lab Test  03/09/18   0716   WBC  16.3*   RBC  2.98*   HGB  8.8*   HCT  27.2*   MCV  91   MCH  29.5   MCHC  32.4   RDW  14.4   PLT  231               Lab Results   Component Value Date     INR 0.97 02/26/2018         Vitals:                        Temp:  [96.4  F (35.8  C)-98.2  F (36.8  C)] 98.2  F (36.8  C)  Heart Rate:  [] 70  Resp:  [10-16] 12  BP: (106-140)/(58-71) 112/68  SpO2:  [97 %-100 %] 100 %  /68 (BP Location: Left arm)  Pulse 85  Temp 98.2  F (36.8  C) (Oral)  Resp 12  Ht 1.918 m (6' 3.5\")  Wt 65.5 kg (144 lb 6.4 oz)  SpO2 100%  BMI 17.81 kg/m2         Exam:   GEN: alert and no distress  NEURO/MSK: Strength 5/5 and symmetric grossly in bilateral LE, normal muscle tone  SKIN: Epidural catheter site with dressing c/d/i, no tenderness, erythema, heme, edema                 Clinician Epidural Bolus at 1200  Patient reports left lower abdomen and thigh aching pain \"5ish\"/10.  Clinician administered bolus via CADD-Guzman pump bupivacaine 0.125% 5 mL without complication.    Continue BP and P monitoring Q 5 min x 30 min - bedside nurse aware to contact RAPS if hypotension or MAP less " than 60.        ASSESSMENT/PLAN:    Patient receiving adequate analgesia with current mulitmodal plan including epidural infusion Bupivacaine 0.125% at 10mL/hour.  Motor function intact and adequate sensory block, is meeting activity goals.  No evidence of adverse side effects related to local anesthetic.  Leal patent.  Anticipate epidural placed at L4-5 to manage postop pain may affect mobility/motor strength BLE.  Urology Service, patient and bedside nurse made aware      - continue current epidural infusion at 10mL/hour   - Anticoagulation/Antiplatelet Therapy okay to continue heparin SC Q 8 hrs as ordered. Please contact RAPS (#[id number]) prior to any medication changes  - patient can be evaluated to receive local anesthetic bolus Q 12 hr PRN, bedside nurse must page RAPS (#[phone]) to request bolus  - patient will need assistance whenever standing  - will continue to follow and adjust as needed     Audie Varela   [facility]  3/9/2018 5:40 PM      RAPS Contact Info (24 hour job code pager is the last 4 digits) For in-house use only:   [employer] phone: [place] [phone], [place] [phone], [facility] [phone], then enter call-back number.    Text: Use [employer] on the Intranet <Paging/Directory> tab and enter Jobcode ID.   If no call back at any time, contact the hospital  and ask for RAPS attending or backup

## 2018-03-09 NOTE — PLAN OF CARE
Problem: Patient Care Overview  Goal: Plan of Care/Patient Progress Review  Outcome: No Change  Pt arrived from PACU approx 2000. VSS. O2 sats 100% on 1L NC. Capnography on.A&OX4. Sleepy but arouses to voice. Pt c/o discomfort to bilateral groin. Declined prn pain meds. Scheduled tylenol given. Epidural catheter intact infusing at 10ml/hr. Neuro intact. Bilateral Groin JOANA drains with moderate Sanguinous output. perineal urethrostomy catheter with 50cc urine. Suprapubic with 200cc output. Groin athletic support on. Perianal incision with gauze dressing, scant Serosanguinous drainage from penrose. Pt on Clear liquid diet. Tolerating. Denied Nausea. Pt on Bedrest. Has good bed mobility. Continue POC.

## 2018-03-09 NOTE — ANESTHESIA POSTPROCEDURE EVALUATION
Patient: King Gonsalo Bruno    Procedure(s):  Flexible Cystoscopy, Bladder Biopsy, urethral biopsy and penile biopsy, Radical Penectomy and Urethrectomy, Perineal Urethrostomy, Bilateral Inguinal Lymphadenectomy; superficial lymph nodes and deep lymph nodes     - Wound Class: II-Clean Contaminated  Flexible Cystoscopy, Bladder Biopsy, urethral biopsy and penile biopsy, Radical Penectomy and Urethrectomy, Perineal Urethrostomy, Bilateral Inguinal Lymphadenectomy; superficial lymph nodes and deep lymph nodes - Wound Class: II-Clean Contaminated  Flexible Cystoscopy, Bladder Biopsy, urethral biopsy and penile biopsy, Radical Penectomy and Urethrectomy, Perineal Urethrostomy, Bilateral Inguinal Lymphadenectomy; superficial lymph nodes and deep lymph nodes - Wound Class: II-Clean Contaminated    Diagnosis:Ureteral Cancer   Diagnosis Additional Information: No value filed.    Anesthesia Type:  General, ETT, Periph. Nerve Block for postop pain, Peripheral Nerve Block, RSI    Note:  Anesthesia Post Evaluation    Patient location during evaluation: PACU  Patient participation: Able to fully participate in evaluation  Level of consciousness: sleepy but conscious (Patient with initial slow emergence from anesthesia, but per family that saw him in PACU prior to floor he is back to baseline.)  Pain management: adequate  Airway patency: patent  Cardiovascular status: acceptable and hemodynamically stable  Respiratory status: acceptable  Hydration status: acceptable  PONV: none     Anesthetic complications: None          Last vitals:  Vitals:    03/08/18 1900 03/08/18 1915 03/08/18 1930   BP: 115/59 112/64 122/60   Pulse:      Resp: 14 12 10   Temp:   36.7  C (98  F)   SpO2: 99% 100% 100%         Electronically Signed By: Renato Rivera MD  March 8, 2018  7:57 PM

## 2018-03-09 NOTE — PLAN OF CARE
Problem: Patient Care Overview  Goal: Plan of Care/Patient Progress Review  Vitals:    03/09/18 0000 03/09/18 0402 03/09/18 0805 03/09/18 1231   BP: 115/58 111/65 112/68 144/66   BP Location:  Right arm Left arm Left arm   Pulse:       Resp: 11 10 12 12   Temp: 96.6  F (35.9  C) 97.5  F (36.4  C) 98.2  F (36.8  C) 98.2  F (36.8  C)   TempSrc: Oral Oral Oral Oral   SpO2: 100% 100% 100% 100%   Weight:       Height:       Pt has been doing well. Expressed some numbness in the lower extremities. MDs aware and to be expected due to the Epidural  Infusion.   Good urine output JPs  moderate amount. Passing gas. Tolerating clears well.  Family at bedside. Continue to follow up per plan of care.

## 2018-03-09 NOTE — BRIEF OP NOTE
[facility] [place]    Brief Operative Note    Pre-operative diagnosis: Urothelial cancer  Post-operative diagnosis Urothelial cancer, squamous cell carcinoma of the penis  Procedure: Procedure(s):  Flexible Cystoscopy, Bladder Biopsy, urethral biopsy and penile biopsy, Radical Penectomy and Urethrectomy, Perineal Urethrostomy, Bilateral Inguinal Lymphadenectomy; superficial lymph nodes and deep lymph nodes     - Wound Class: II-Clean Contaminated  Flexible Cystoscopy, Bladder Biopsy, urethral biopsy and penile biopsy, Radical Penectomy and Urethrectomy, Perineal Urethrostomy, Bilateral Inguinal Lymphadenectomy; superficial lymph nodes and deep lymph nodes - Wound Class: II-Clean Contaminated  Flexible Cystoscopy, Bladder Biopsy, urethral biopsy and penile biopsy, Radical Penectomy and Urethrectomy, Perineal Urethrostomy, Bilateral Inguinal Lymphadenectomy; superficial lymph nodes and deep lymph nodes - Wound Class: II-Clean Contaminated  Surgeon: Surgeon(s) and Role:     * Muriel Haddad MD - Primary     * Michael Monahan MD - Resident - Assisting     * Huber Mesa MD - Resident - Assisting     * Jose Cullen MD - Resident - Assisting     * Ferdinand Magaña MD - Assisting  Anesthesia: General   Estimated blood loss: 150 cc  Drains:  7 Fr right and left JOANA drains, 16 Fr perineal urethrostomy catheter, 16 Fr suprapubic catheter.  Specimens:   ID Type Source Tests Collected by Time Destination   A : Posterior Wall Bladder Biopsy Biopsy Bladder SURGICAL PATHOLOGY EXAM Muriel Haddad MD 3/8/2018  9:30 AM    B : Penis and Ureter Other (specify in comments) Penis SURGICAL PATHOLOGY EXAM Muriel Haddad MD 3/8/2018 10:55 AM    C : Left Inguinal Lymph Nodes Tissue Lymph Node, Inguinal, Left SURGICAL PATHOLOGY EXAM Muriel Haddad MD 3/8/2018  3:44 PM    D : Right Inguinal Lymph Nodes Tissue Lymph Node, Inguinal, Right SURGICAL PATHOLOGY EXAM Boo  Muriel GARCIA MD 3/8/2018  3:11 PM    E : Right Deep Inguinal Lymph Nodes Tissue Lymph Node, Inguinal, Right SURGICAL PATHOLOGY EXAM Muriel Haddad MD 3/8/2018  5:01 PM      Findings:   Abnormal mucosa on posterior bladder wall biopsied - frozen sections negative for malignancy; penile/urethral malignancy noted up to 2 cm distal to external sphincter, radical penectomy performed with negative urethral margins on frozen sections; superficial inguinal lymphadenectomy bilaterally, with deep inguinal lymphadenectomy on right due to 1 of 7 nodes positive for squamous cell carcinoma.  Complications: None.  Implants: None.

## 2018-03-09 NOTE — OR NURSING
B/P: 112/64, T: 97.8, P: 85, R: 12, Sa02 100% 2L NC    Patient arrived to pacu at 1800, very slow to awake. Speech garbled and confused, flat affect. Strength equal bilaterally and face symmetrical.  Patient slowly improving. Still drowsy, oriented x3.  Dr. Rivera at bedside. Will watch in pacu a bit longer.

## 2018-03-09 NOTE — ANESTHESIA CARE TRANSFER NOTE
Patient: King Gonsalo Bruno    Procedure(s):  Flexible Cystoscopy, Bladder Biopsy, urethral biopsy and penile biopsy, Radical Penectomy and Urethrectomy, Perineal Urethrostomy, Bilateral Inguinal Lymphadenectomy; superficial lymph nodes and deep lymph nodes     - Wound Class: II-Clean Contaminated  Flexible Cystoscopy, Bladder Biopsy, urethral biopsy and penile biopsy, Radical Penectomy and Urethrectomy, Perineal Urethrostomy, Bilateral Inguinal Lymphadenectomy; superficial lymph nodes and deep lymph nodes - Wound Class: II-Clean Contaminated  Flexible Cystoscopy, Bladder Biopsy, urethral biopsy and penile biopsy, Radical Penectomy and Urethrectomy, Perineal Urethrostomy, Bilateral Inguinal Lymphadenectomy; superficial lymph nodes and deep lymph nodes - Wound Class: II-Clean Contaminated    Diagnosis: Ureteral Cancer   Diagnosis Additional Information: No value filed.    Anesthesia Type:   General, ETT, Periph. Nerve Block for postop pain, Peripheral Nerve Block, RSI     Note:  Airway :Face Mask  Patient transferred to:PACU  Comments: Anesthesia Care Transfer Note    Patient: King Gonsalo Bruno    Transferred to: PACU    Patient vital signs: stable    Airway: none    Monitors on, VSS, pt. Stable, Report given to PACU MARIO Walker CRNA  3/8/2018 6:04 PM      Handoff Report: Identifed the Patient, Identified the Reponsible Provider, Reviewed the pertinent medical history, Discussed the surgical course, Reviewed Intra-OP anesthesia mangement and issues during anesthesia, Set expectations for post-procedure period and Allowed opportunity for questions and acknowledgement of understanding      Vitals: (Last set prior to Anesthesia Care Transfer)    CRNA VITALS  3/8/2018 1725 - 3/8/2018 1804      3/8/2018             ART BP: (!)  0/0    ART Mean: 0                Electronically Signed By: ISAURA Meneses CRNA  March 8, 2018  6:04 PM

## 2018-03-09 NOTE — PROGRESS NOTES
"REGIONAL ANESTHESIA PAIN SERVICE EPIDURAL NOTE  King Gonsalo Bruno is a 72 year old male POD #1 s/p PENECTOMY URETERECTOMY DISSECT LYMPH NODE INGUINAL COMBINED CYSTECTOMY BLADDER, ILEAL DIVERSION URETHROSTOMY PERINEUM and placement of L4-5 epidural catheter for pain management.      SUBJECTIVE  Interval History: Overnight no acute events.  Patient awake, sitting semiupright in bed, reports feeling \"stiff\" overall adequate pain control with epidural infusion and current analgesic medications (see below).  Reports intermittent sharp pain right groin where they removed branch of lymph node and LLQ abd, describes as noticeable, but what he would expect from surgery.  Denies any weakness, paresthesias, circumoral numbness, metallic taste or tinnitus.  Currently denies nausea; tolerating a clear liquid diet. palomino catheter iveth urine.     Clinically Aligned Pain Assessment (CAPA):  Comfort (How is your pain?): Comfortably manageable  Change in Pain (Since your last medication/intervention?): About the same  Pain Control (How are your pain treatments working?):  Fully effective pain control  Functioning (Are you able to do activities to get better?) : Can do most things, but pain gets in the way of some   Sleep (Does your pain management allow you to sleep or rest?): Awake with occasional pain        Anticoagulation/Antiplatelet Therapy:    heparin sodium PF injection 5,000 Units 5,000 Units, SC, Q8H Given: 03/09 0854           Medications related to Pain Management (Future)    Start     Dose/Rate Route Frequency Ordered Stop    03/11/18 0000  acetaminophen (TYLENOL) solution 650 mg      650 mg Oral EVERY 4 HOURS PRN 03/08/18 2151      03/08/18 2200  acetaminophen (TYLENOL) solution 975 mg      975 mg Oral EVERY 8 HOURS 03/08/18 2151 03/11/18 2159    03/08/18 2100  bupivacaine (MARCAINE) 0.125 % in sodium chloride 0.9 % 250 mL EPIDURAL Infusion      10 mL/hr  EPIDURAL CONTINUOUS 03/08/18 2053 03/08/18 2030  docusate " "sodium (COLACE) capsule 100 mg      100 mg Oral 2 TIMES DAILY 03/08/18 2025 03/08/18 2026  docusate sodium (COLACE) capsule 100 mg      100 mg Oral 2 TIMES DAILY PRN 03/08/18 2026 03/08/18 2026  polyethylene glycol (MIRALAX/GLYCOLAX) Packet 17 g      17 g Oral DAILY PRN 03/08/18 2026 03/08/18 2025  lidocaine 1 % 1 mL      1 mL Other EVERY 1 HOUR PRN 03/08/18 2025 03/08/18 2025  lidocaine (LMX4) kit       Topical EVERY 1 HOUR PRN 03/08/18 2025 03/08/18 2025  HYDROmorphone (DILAUDID) injection 0.2 mg      0.2 mg Intravenous EVERY 2 HOURS PRN 03/08/18 2025 03/08/18 2025  oxyCODONE IR (ROXICODONE) tablet 5 mg      5 mg Oral EVERY 3 HOURS PRN 03/08/18 2025             OBJECTIVE:  Lab Results:   Recent Labs   Lab Test  03/09/18   0716   WBC  16.3*   RBC  2.98*   HGB  8.8*   HCT  27.2*   MCV  91   MCH  29.5   MCHC  32.4   RDW  14.4   PLT  231       Lab Results   Component Value Date    INR 0.97 02/26/2018       Vitals:    Temp:  [96.4  F (35.8  C)-98.2  F (36.8  C)] 98.2  F (36.8  C)  Heart Rate:  [] 70  Resp:  [10-16] 12  BP: (106-140)/(58-71) 112/68  SpO2:  [97 %-100 %] 100 %  /68 (BP Location: Left arm)  Pulse 85  Temp 98.2  F (36.8  C) (Oral)  Resp 12  Ht 1.918 m (6' 3.5\")  Wt 65.5 kg (144 lb 6.4 oz)  SpO2 100%  BMI 17.81 kg/m2       Exam:   GEN: alert and no distress  NEURO/MSK: Strength 5/5 and symmetric grossly in bilateral LE, normal muscle tone  SKIN: Epidural catheter site with dressing c/d/i, no tenderness, erythema, heme, edema     Clinician Epidural Bolus at 1200  Patient reports left lower abdomen and thigh aching pain \"5ish\"/10.  Clinician administered bolus via CADD-Guzman pump bupivacaine 0.125% 5 mL without complication.    Continue BP and P monitoring Q 5 min x 30 min - bedside nurse aware to contact RAPS if hypotension or MAP less than 60.      ASSESSMENT/PLAN:    Patient receiving adequate analgesia with current mulitmodal plan including epidural infusion " Bupivacaine 0.125% at 10mL/hour.  Motor function intact and adequate sensory block, is meeting activity goals.  No evidence of adverse side effects related to local anesthetic.  Leal patent.  Anticipate epidural placed at L4-5 to manage postop pain may affect mobility/motor strength BLE.  Urology Service, patient and bedside nurse made aware     - continue current epidural infusion at 10mL/hour   - Anticoagulation/Antiplatelet Therapy okay to continue heparin SC Q 8 hrs as ordered. Please contact RAPS (#[phone]) prior to any medication changes  - patient can be evaluated to receive local anesthetic bolus Q 12 hr PRN, bedside nurse must page RAPS (#[phone]) to request bolus  - patient will need assistance whenever standing  - will continue to follow and adjust as needed    - discussed plan with attending anesthesiologist    ISAURA [facility]  Regional Anesthesia Pain Service  3/9/2018 9:24 AM    RAPS Contact Info (24 hour job code pager is the last 4 digits) For in-house use only:   [employer] phone: [place] [phone], [place] [phone], [facility] [phone], then enter call-back number.    Text: Use [employer] on the Intranet <Paging/Directory> tab and enter Jobcode ID.   If no call back at any time, contact the hospital  and ask for RAPS attending or backup

## 2018-03-09 NOTE — PROGRESS NOTES
"Urology  Progress Note    -No acute events overnight  -Tolerating CLD without n/v    Exam  /65 (BP Location: Right arm)  Pulse 85  Temp 97.5  F (36.4  C) (Oral)  Resp 10  Ht 1.918 m (6' 3.5\")  Wt 65.5 kg (144 lb 6.4 oz)  SpO2 100%  BMI 17.81 kg/m2  No acute distress  Unlabored breathing  Abdomen soft, non-tender; SPT in place without clear output;   Perineal wound C/D/I with sutures in place. PU clean and dry with catheter in place. Perineal drain with serosanguinous output  Lower extremities non-edematous bilaterally  JOANA serosanguinous bilaterally    UOP: /1500, Perineal urethrostomy 75/30  JOANA left  25/15; JOANA right 100/45; Perineal penrose 50/scant    Labs  WBC 13.3  Hgb 9.6-->8.8 this AM  Cr 1.7    Assessment/Plan  72 year old y/o male POD#1 s/p radical penectomy, urethrectomy, perineal urethostomy and bilateral inguinal lymphadenectomy.    Neuro: Pain controlled with acetaminophen and epidural with prn hydromorphone and oxycodone; Gabapentin ordered  CV: HDS  Pulm: incentive spirometry while awake  FEN/GI: Advance diet as tolerated, MIVF @ 100/hr  Endo: No acute issues  : Perineal urethrostomy in place; continue palomino and SP tube for now  Heme/ID: No acute issues  Activity: Bedrest x 48 hours post op  PPx: SCDs, initiated SQH q8h given sto    Seen and examined with the chief resident. Will discuss with Dr. Boo Corrales MD  Urology Resident    Patient seen and examined with the resident.    I agree with the resident's note and plan of care.       Muriel Haddad MD  Urology Staff         Contacting the Urology Team     Please use the following job codes to reach the Urology Team. Note that you must use an in house phone and that job codes cannot receive text pages.     On weekdays, dial [phone] (or star-star-star 777 on the new [employer] telephones) then [phone] to reach the Adult Urology resident or PA on call    On weekdays, dial [phone] (or star-star-star 777 on the new [employer]) " then [phone] to reach the Pediatric Urology resident    On weeknights and weekends, dial [phone] (or star-star-star 777 on the new [employer] telephones) then [phone] to reach the Urology resident on call (for both Adult and Pediatrics)

## 2018-03-09 NOTE — OP NOTE
Procedure Date: 03/08/2018      PREOPERATIVE DIAGNOSIS:  Urothelial carcinoma of the urethra.        POSTOPERATIVE DIAGNOSIS:     1.  Urothelial carcinoma of the urethra.   2.  Squamous cell carcinoma of the penis.      PROCEDURES PERFORMED:    1.  Flexible cystoscopy with bladder biopsy and fulguration.   2.  Radical penectomy and urethrectomy.   3.  Perineal urethrostomy (by Dr. Ferdinand Magaña).   4.  Bilateral inguinal lymphadenectomy (superficial and deep on the right, superficial on the left).      STAFF SURGEON:  Muriel Haddad MD      RESIDENTS:     1.  Michael Monahan MD   2.  Jose Cullen MD   3.  Huber Mesa MD     ANESTHESIA: General  ESTIMATED BLOOD LOSS: 100 cc for this portion of the procedure.  DRAINS: 7 Fr right and left JOANA inguinal lymph node drains; 16 Fr perineal urethrostomy catheter; 16 Fr suprapubic catheter, penrose drain in perineum  COMPLICATIONS: None  SPECIMENS: Penis; right superficial and deep inguinal lymph nodes, left superficial inguinal lymph nodes, posterior bladder wall biopsies     INDICATIONS FOR PROCEDURE:  This is a 72-year-old gentleman who was seen in consultation for concern for high grade urothelial carcinoma involving the urethra.  A cystourethroscopy had not been possible prior to bringing him to the operating room today due to the degree of obstruction from a previously biopsied lengthy lesion within his penile urethra.  On exam, he had a firm mass extending to what felt to be about the bulbar urethra.  He manages his bladder currently with a suprapubic catheter.  He elected to undergo the aforementioned procedures after discussion of all risks, benefits and alternatives.  Of note, he has completed 4 cycles of chemotherapy previously.      OPERATIVE DETAILS:  Following verification of informed consent, the patient was brought to the operating room and placed supine on the operating room table.  A formal timeout was performed, confirming the patient's identity, the  procedure being performed and the operative site.  General anesthesia was induced and the patient was repositioned in the high lithotomy position with candy canes.  All appropriate pressure points were padded.  The abdomen, groin and genitalia were shaved, prepped and draped in the standard sterile fashion.  The suprapubic catheter had been removed prior to our prep and drape and we began with a flexible cystoscopy using a 16 Bulgarian flexible cystoscope through the suprapubic catheter tract.  The tract itself was slightly narrowed but was otherwise unremarkable.  A full survey of the bladder was performed.  The posterior bladder wall was noted to have some ruffled mucosa.  It was not clear whether these represented true malignancy.  We identified the bladder neck and performed urethroscopy of the posterior urethra.  We were able to visualize up to the proximal bulbar urethra, just about 2 cm distal to the external sphincter.  After that, the urethra appeared to be completely occluded with papillary tumor and other abnormal appearing mucosa.  We were able to palpate this location externally in the perineum and felt that this was a reasonable location for perineal urethrostomy.  We returned to the previously identified bladder lesions and used a cold cup biopsy forceps to obtain biopsies of these lesions and a Bugbee was used to cauterize any arterial bleeders. The biopsies were sent for frozen section and we proceeded to remove the cystoscope and placed a 16 Bulgarian catheter through the suprapubic tract into the bladder.      We then turned our attention to performing the radical penectomy.  A fish-mouth shaped incision was made around the base of the penis and taken down through the subcutaneous tissue.  All of the other subcutaneous tissues were dissected away from the corpora cavernosa on either side.  The dorsal artery and nerve were identified, double ligated and divided.  With careful circumferential dissection  all around the base of the penis, we dissected up to the point where the attachment of the corpora cavernosa to the inferior pubic rami on either side were located.  The corpora cavernosa were dissected off the inferior pubic rami on both sides.  The corpora spongiosum along with the urethra were carefully  from the ventral aspect of the corpora cavernosa.  Once the corpora cavernosa was divided, we dissected the urethra for a short distance and divided the specimen.  An upside down U-incision was made over the location of the expected proximal end of the tumor in the bulbar urethra and the perineum to serve as our perineal urethrostomy site.  We intussuscepted the penis through this incision and divided the urethra, excised it and marked the margin and passed this off for pathologic examination.  A frozen section of the excised urethral margin returned negative.  We then performed a flexible cystourethroscopy of the remaining urethral stump and confirmed that visually the urethra appeared to be healthy proximal to our point of excision; again about 1-1/2 to 2 cm distal to the external sphincter remained for perineal urethrostomy.  At this point, Dr. Magaña's team performed a perineal urethrostomy.  Please see his separate dictated operative note for details.       Once the perineal urethrostomy was completed, we took the patient out of high lithotomy and reprepped and draped his groins in preparation for an inguinal lymph node dissection.  On physical examination under anesthesia, there was a solitary palpable right inguinal lymph node.  The left side was negative.  We proceeded to make a horizontal incision in the medial thigh 2 fingerbreadths between the inguinal ligament on the right side.  The incision was taken sharply through Moriah's fascia.  We then carefully dissected all the attachments of the superficial inguinal betty tissue from the overlying Moriah's fascia.  We then dissected the betty  packet in the inguinal region medially up to the adductor longus, laterally up to the medial border of the sartorius, cephalad all the way up to the level of the inguinal ligament and below about 4 cm below the incision.  The saphenous vein was carefully identified and preserved.  All tissue lateral and medial to the saphenous, including around the junction with the femoral vein was removed.  Frozen section did reveal 1 of 7 nodes from this right side to be concerning for squamous cell carcinoma.  As a result, we continued our dissection to perform a deep inguinal lymphadenectomy.  We opened the fascia charmaine posteriorly and skeletonized all betty tissue medial to the femoral artery and carried this dissection over the femoral vein, with careful attention paid to avoid any injury to the right femoral nerve.  This was carried proximally.  The patient was quite thin and had very little betty tissue in this area.  All tissue that was excised was passed off the field for pathologic permanent evaluation.  We harvested the sartorius muscle from its attachment to the anterior superior iliac spine laterally and mobilized it circumferentially and brought this over the femoral vessels, and using interrupted 2-0 Vicryl, we attached the sartorius to the inferior border of the inguinal ligament.  We irrigated the wound with sterile water as well as dilute hydrogen peroxide.  We reapproximated Moriah's fascia to the anterior aspect of the thigh musculature to eliminate dead space.  A 7 mm JOANA drain was placed and Moriah's fascia was reapproximated using running 3-0 Vicryl suture.  The subcutaneous tissue was also reapproximated and the skin was closed with staples.        We then turned our attention to the left side where a similar procedure was performed.  We only performed a superficial lymph node dissection on this side.  There were no palpable nodes that were positive.  A 7-Bulgarian JOANA drain was placed on this side as well and  the wound was similarly closed in 2 layers and with staples to reapproximate the skin.  The Leal catheter and the perineal urethrostomy catheter were both placed to gravity drainage and the patient was awakened from anesthesia and transferred to the postanesthesia care unit in stable condition.  There were no immediate complications.         BRANDAN BRUCE MD       As dictated by PETER HECK MD     I was present and involved in the entire procedure           D: 2018   T: 2018   MT: MELINDA      Name:     KING CHAVEZ   MRN:      [id number]        Account:        [id number]   :      1945           Procedure Date: 2018      Document: [id number]

## 2018-03-10 LAB
ANION GAP SERPL CALCULATED.3IONS-SCNC: 7 MMOL/L (ref 3–14)
BUN SERPL-MCNC: 12 MG/DL (ref 7–30)
CALCIUM SERPL-MCNC: 9.2 MG/DL (ref 8.5–10.1)
CHLORIDE SERPL-SCNC: 105 MMOL/L (ref 94–109)
CO2 SERPL-SCNC: 23 MMOL/L (ref 20–32)
CREAT SERPL-MCNC: 1.41 MG/DL (ref 0.66–1.25)
ERYTHROCYTE [DISTWIDTH] IN BLOOD BY AUTOMATED COUNT: 14.5 % (ref 10–15)
GFR SERPL CREATININE-BSD FRML MDRD: 50 ML/MIN/1.7M2
GLUCOSE BLDC GLUCOMTR-MCNC: 120 MG/DL (ref 70–99)
GLUCOSE BLDC GLUCOMTR-MCNC: 398 MG/DL (ref 70–99)
GLUCOSE BLDC GLUCOMTR-MCNC: 92 MG/DL (ref 70–99)
GLUCOSE SERPL-MCNC: 89 MG/DL (ref 70–99)
HCT VFR BLD AUTO: 27.6 % (ref 40–53)
HGB BLD-MCNC: 8.9 G/DL (ref 13.3–17.7)
MCH RBC QN AUTO: 29.3 PG (ref 26.5–33)
MCHC RBC AUTO-ENTMCNC: 32.2 G/DL (ref 31.5–36.5)
MCV RBC AUTO: 91 FL (ref 78–100)
PLATELET # BLD AUTO: 241 10E9/L (ref 150–450)
POTASSIUM SERPL-SCNC: 4 MMOL/L (ref 3.4–5.3)
RBC # BLD AUTO: 3.04 10E12/L (ref 4.4–5.9)
SODIUM SERPL-SCNC: 135 MMOL/L (ref 133–144)
WBC # BLD AUTO: 9.8 10E9/L (ref 4–11)

## 2018-03-10 PROCEDURE — 80048 BASIC METABOLIC PNL TOTAL CA: CPT | Performed by: STUDENT IN AN ORGANIZED HEALTH CARE EDUCATION/TRAINING PROGRAM

## 2018-03-10 PROCEDURE — A9270 NON-COVERED ITEM OR SERVICE: HCPCS | Mod: GY | Performed by: UROLOGY

## 2018-03-10 PROCEDURE — 85027 COMPLETE CBC AUTOMATED: CPT | Performed by: STUDENT IN AN ORGANIZED HEALTH CARE EDUCATION/TRAINING PROGRAM

## 2018-03-10 PROCEDURE — 25000132 ZZH RX MED GY IP 250 OP 250 PS 637: Mod: GY | Performed by: UROLOGY

## 2018-03-10 PROCEDURE — 36415 COLL VENOUS BLD VENIPUNCTURE: CPT | Performed by: STUDENT IN AN ORGANIZED HEALTH CARE EDUCATION/TRAINING PROGRAM

## 2018-03-10 PROCEDURE — 12000008 ZZH R&B INTERMEDIATE UMMC

## 2018-03-10 PROCEDURE — 25000128 H RX IP 250 OP 636: Performed by: UROLOGY

## 2018-03-10 PROCEDURE — 25000125 ZZHC RX 250: Performed by: UROLOGY

## 2018-03-10 PROCEDURE — 00000146 ZZHCL STATISTIC GLUCOSE BY METER IP

## 2018-03-10 PROCEDURE — 25000128 H RX IP 250 OP 636: Performed by: STUDENT IN AN ORGANIZED HEALTH CARE EDUCATION/TRAINING PROGRAM

## 2018-03-10 RX ADMIN — TOLTERODINE 4 MG: 4 CAPSULE, EXTENDED RELEASE ORAL at 09:16

## 2018-03-10 RX ADMIN — FLUTICASONE PROPIONATE 2 SPRAY: 50 SPRAY, METERED NASAL at 09:16

## 2018-03-10 RX ADMIN — ACETAMINOPHEN 975 MG: 325 SOLUTION ORAL at 06:28

## 2018-03-10 RX ADMIN — OMEPRAZOLE 20 MG: 20 CAPSULE, DELAYED RELEASE ORAL at 09:16

## 2018-03-10 RX ADMIN — HEPARIN SODIUM 5000 UNITS: 5000 INJECTION, SOLUTION INTRAVENOUS; SUBCUTANEOUS at 23:45

## 2018-03-10 RX ADMIN — OMEPRAZOLE 20 MG: 20 CAPSULE, DELAYED RELEASE ORAL at 20:12

## 2018-03-10 RX ADMIN — DOCUSATE SODIUM 100 MG: 100 CAPSULE, LIQUID FILLED ORAL at 09:16

## 2018-03-10 RX ADMIN — ACETAMINOPHEN 975 MG: 325 SOLUTION ORAL at 13:51

## 2018-03-10 RX ADMIN — DOCUSATE SODIUM 100 MG: 100 CAPSULE, LIQUID FILLED ORAL at 20:12

## 2018-03-10 RX ADMIN — ACETAMINOPHEN 975 MG: 325 SOLUTION ORAL at 21:04

## 2018-03-10 RX ADMIN — HEPARIN SODIUM 5000 UNITS: 5000 INJECTION, SOLUTION INTRAVENOUS; SUBCUTANEOUS at 09:16

## 2018-03-10 RX ADMIN — AMOXICILLIN AND CLAVULANATE POTASSIUM 500 MG: 400; 57 POWDER, FOR SUSPENSION ORAL at 09:16

## 2018-03-10 RX ADMIN — FLUCONAZOLE 100 MG: 100 TABLET ORAL at 09:16

## 2018-03-10 RX ADMIN — BUPIVACAINE HYDROCHLORIDE: 7.5 INJECTION, SOLUTION EPIDURAL; RETROBULBAR at 13:00

## 2018-03-10 RX ADMIN — AMOXICILLIN AND CLAVULANATE POTASSIUM 500 MG: 400; 57 POWDER, FOR SUSPENSION ORAL at 21:46

## 2018-03-10 RX ADMIN — HEPARIN SODIUM 5000 UNITS: 5000 INJECTION, SOLUTION INTRAVENOUS; SUBCUTANEOUS at 15:53

## 2018-03-10 RX ADMIN — AMOXICILLIN AND CLAVULANATE POTASSIUM 500 MG: 400; 57 POWDER, FOR SUSPENSION ORAL at 13:51

## 2018-03-10 NOTE — PLAN OF CARE
Problem: Pain, Acute (Adult)  Goal: Identify Related Risk Factors and Signs and Symptoms  Related risk factors and signs and symptoms are identified upon initiation of Human [employer] Clinical Practice Guideline (CPG).   Outcome: No Change   03/10/18 0521   Pain, Acute   Related Risk Factors (Acute Pain) fear;positioning;surgery   Signs and Symptoms (Acute Pain) fatigue/weakness     8334-6846: POD #2: radical penectomy, urethrectomy, perineal urethostomy and bilateral inguinal lymphadenectomy. VSS on RA, A&Ox4. Patient on strict bedrest until tomorrow evening, patient and family aware. Tolerated 75% of mashed potatoes and ice cream for dinner. Denies nausea. Pain controlled with epidural at 10cc/hr and scheduled Tylenol. Refusing other PRN medications as they make him nauseous. NS infusing at 100cc/hr through PIV. 2 JOANA drains to bulb suction. L) with serosanguinous drainage and L) yellow/tan drainage. Leal catheter and suprapubic catheters patent and intake with adequate UO. Patient declined repositioning overnight. Resting in-between cares, able to make needs known. Will continue to monitor and follow POC.

## 2018-03-10 NOTE — PROGRESS NOTES
"Urology  Progress Note    -No acute events overnight  -Pain well controlled  -Tolerating regular diet without n/v    Exam  /68 (BP Location: Left arm)  Pulse 85  Temp 98.1  F (36.7  C) (Oral)  Resp 12  Ht 1.918 m (6' 3.5\")  Wt 65.5 kg (144 lb 6.4 oz)  SpO2 99%  BMI 17.81 kg/m2  No acute distress  Unlabored breathing  Abdomen soft, non-tender; SPT in place with clear output;   Incision C/D/I with sutures in place. PU clean and dry with catheter in place. Perineal drain with serosanguinous output  Inguinal incisions c/d/i - secured with staples  Lower extremities non-edematous bilaterally  JOANA serosanguinous bilaterally      UOP - SP 3075/1725; PU 1500/-  JOANA left 125/45; right 200/0     Labs  WBC 16.3  Hgb 8.8  Cr 1.52    AM labs pending    Assessment/Plan - 72 year old y/o male POD#2 s/p radical penectomy, urethrectomy, perineal urethostomy and bilateral inguinal lymphadenectomy.    N - Pain well controlled - continue epidural with acetaminophen, hydromorphone, oxycodone  CV - HDS  FEN/GI - Tolerating regular diet - DC IVF   - Continue PU tube and SPT; Continue bilateral JOANA drains; Penrose to be removed once ambulating  Heme - Continue SQ heparin  Activity - Bedrest x 48 hours postoperatively - to end today; Out of bed with assist following completion of bedrest period      Seen and examined with Dr Boo Son, PGY-3  Urology Resident    Patient seen and examined with the resident.    I agree with the resident's note and plan of care.       Muriel Haddad MD  Urology Staff       Contacting the Urology Team     Please use the following job codes to reach the Urology Team. Note that you must use an in house phone and that job codes cannot receive text pages.     On weekdays, dial [phone] (or star-star-star 777 on the new [employer] telephones) then [phone] to reach the Adult Urology resident or PA on call    On weekdays, dial [phone] (or star-star-star 777 on the new [employer] telephones) then [phone] " to reach the Pediatric Urology resident    On weeknights and weekends, dial [phone] (or star-star-star 777 on the new [employer] telephones) then [phone] to reach the Urology resident on call (for both Adult and Pediatrics)

## 2018-03-10 NOTE — PLAN OF CARE
Problem: Patient Care Overview  Goal: Plan of Care/Patient Progress Review  A&Ox4. Pt slightly hypertensive this morning SBP 140s, AOVSS. Pt c/o R upper leg pain by incision, refusing PRN narcotics, taking scheduled tylenol and 10cc/hr bupivacaine epidural for pain management. Per pt: narcotics make pt nauseous and reports having high pain tolerance. RN visited the idea of taking anti-emetics prior to pain meds to help with pain and prevent nausea, pt hesitant to try. Pt to get epidural bolus today, keep epidural in overnight and have it DC'd tomorrow (changed from original plan of DC'ing epidural catheter today). Pt bedrest until 1230 today, walked 1x, is 2 assist, reported increased pain with walking and is a little unsteady. Pt has bilateral JPs with serosanguinous output, urostomy with good output and perineal drain to palomino bag with no output. Pt has scrotal sling in place, kerlix fluffs replaced, was saturated with serosanguinous output. Pt has 2 incisions with staples on upper thighs, c/d/i.  Pt using IS. Continue to ambulate and address pain control.

## 2018-03-10 NOTE — PROGRESS NOTES
"REGIONAL ANESTHESIA PAIN SERVICE BOLUS EVALUATION:  - TIME: 1409.  Called to evaluate patient for local anesthetic bolus via  L4-5 Epidural catheter.      - EVALUATION:  Subjective: Patient reports with current therapy pain 6/10 at rest and 7/10 with activity  Objective:    General: healthy, alert and no distress   Skin: dressing clean, dry and intact.  Current vitals:   Vital signs:  Temp: 36.3  C (97.4  F) Temp src: Oral BP: 142/76   Heart Rate: 81 Resp: 12 SpO2: 100 % O2 Device: None (Room air) Oxygen Delivery: 1 LPM Height: 191.8 cm (6' 3.5\") Weight: 65.5 kg (144 lb 6.4 oz)  Estimated body mass index is 17.81 kg/(m^2) as calculated from the following:    Height as of this encounter: 1.918 m (6' 3.5\").    Weight as of this encounter: 65.5 kg (144 lb 6.4 oz).    Assessment/Plan  # PAIN: moderately controlled.  Unwilling to take oral medications at this time.  - MEDICATION: PF bupivacaine 0.125% 5 mL   - PROCEDURE: Clinician bolus via epidural catheter; administered incrementally with negative aspirate before and between each mL without complication.    No symptoms of local anesthetic systemic toxicity (LAST). Remained with and assessed patient for 10 min post-injection. BP, P and MAP stable  - POST-PROCEDURE: Bedside RN aware of need to continue BP, P and MAP monitoring Q 10 min for an additional 30 min. Contact RAPS if experiencing any untoward effects or MAP less than 60     FOLLOW UP- TIME 1510: Patient reports pain 4/10 at rest.  RECOMMENDATIONS:  - Will continue to follow  - patient can be evaluated to receive local anesthetic bolus Q 12 hr PRN pain not controlled with continuous infusion.  Bedside nurse must page RAPS to request bolus    Long Soriano Jr., MD  Anesthesia Resident - CA2  Pager: [phone]  3/10/2018  3:10 PM    RAPS Contact Info (24 hour job code pager is the last 4 digits) For in-house use only:  [employer] phone: [place] [phone], [place] [phone], [email or web address] [phone], then enter call-back number.  "   Text: Use [employer] on the Intranet <Paging/Directory> tab and enter Jobcode ID.   If no call back at any time, contact the hospital  and ask for RAPS attending or backup

## 2018-03-10 NOTE — PLAN OF CARE
Problem: Patient Care Overview  Goal: Plan of Care/Patient Progress Review  Outcome: No Change  A&O x4, able to make needs known. VSS, BLE numbness continues and unchanged from earlier today from epidural- MD aware. Strengths 3/5 BLE.Pain controlled at current epidural rate of 10cc/hr, site CDI. Advanced from clear liquids to regular diet this nayely, currently attempting mashed potatoes. S/O supportive and at bedside. Perineal dsg D/I, B groin JOANA's intact with minimal sero/sang o/p noted. SP cath draining clear, yellow drainage, not needing to be irrigated from [id number].   Plan: Continue with POC.

## 2018-03-10 NOTE — PROGRESS NOTES
REGIONAL ANESTHESIA PAIN SERVICE CONTINUOUS NERVE INFUSION NOTE  SUBJECTIVE:  Interval History: Pt reports adequate pain control via continuous peripheral nerve block (CPNB) infusion.  Denies any weakness, paresthesias, circumoral numbness, metallic taste or tinnitus.        Clinically Aligned Pain Assessment (CAPA):   Comfort (How is your pain?): Comfortably manageable  Change in Pain (Since your last medication/intervention?): Getting better  Pain Control (How are your pain treatments working?):  Fully effective pain control  Functioning (Are you able to do activities to get better?) : Can do most things, but pain gets in the way of some   Sleep (Does your pain management allow you to sleep or rest?): Awake with occasional pain     Numerical Rating Scale:  4/10 left, 6/10 right       Anticoagulation:    heparin sodium PF injection 5,000 Units 5,000 Units, SC, Q8H           OBJECTIVE:    Diagnostic:  Lab Results   Component Value Date    WBC 9.8 03/10/2018     Lab Results   Component Value Date    RBC 3.04 03/10/2018     Lab Results   Component Value Date    HGB 8.9 03/10/2018     Lab Results   Component Value Date    HCT 27.6 03/10/2018     Lab Results   Component Value Date     03/10/2018         Vitals:    Temp:  [36.3  C (97.4  F)-36.9  C (98.5  F)] 36.3  C (97.4  F)  Heart Rate:  [73-85] 81  Resp:  [12] 12  BP: (128-144)/(66-76) 142/76  SpO2:  [99 %-100 %] 100 %    Exam:   GEN: alert and no distress  NEURO/MSK: Strength 5/5 and symmetric grossly in bilateral LE, normal muscle tone  SKIN: Epidural catheter site with dressing c/d/i, no tenderness, erythema, heme, edema       ASSESSMENT/PLAN:    Patient receiving adequate analgesia with current mulitmodal plan including epidural infusion Bupivacaine 0.125% at 10mL/hour.  Motor function intact and adequate sensory block, is meeting activity goals.  No evidence of adverse side effects related to local anesthetic.  Leal patent.  Anticipate epidural placed at  L4-5 to manage postop pain may affect mobility/motor strength BLE.  Urology Service, patient and bedside nurse made aware       - continue current epidural infusion at 10mL/hour   - Anticoagulation/Antiplatelet Therapy okay to continue heparin SC Q 8 hrs as ordered. Please contact [facility]APS (#[phone]) prior to any medication changes  - patient can be evaluated to receive local anesthetic bolus Q 12 hr PRN, bedside nurse must page RAPS (#[phone]) to request bolus  - patient will need assistance whenever standing  - will continue to follow and adjust as needed  - Please contact RAPS 24 hours prior to anticipated discharge      Long Soriano Jr., MD  Anesthesia Resident - CA2  Pager: [phone]  3/10/2018  9:18 AM  [facility] Anest[facility] Pain Service      RAPS Contact Info (24 hour job code pager is the last 4 digits) For in-house use only:  [employer] phone: [place] [phone], [place] [phone], Peds [phone], then enter call-back number.    Text: Use [employer] on the Intranet <Paging/Directory> tab and enter Jobcode ID.   If no call back at any time, contact the hospital  and ask for RAPS attending or backup

## 2018-03-11 LAB
ANION GAP SERPL CALCULATED.3IONS-SCNC: 9 MMOL/L (ref 3–14)
BUN SERPL-MCNC: 12 MG/DL (ref 7–30)
CALCIUM SERPL-MCNC: 9.5 MG/DL (ref 8.5–10.1)
CHLORIDE SERPL-SCNC: 104 MMOL/L (ref 94–109)
CO2 SERPL-SCNC: 22 MMOL/L (ref 20–32)
CREAT SERPL-MCNC: 1.41 MG/DL (ref 0.66–1.25)
ERYTHROCYTE [DISTWIDTH] IN BLOOD BY AUTOMATED COUNT: 14.5 % (ref 10–15)
GFR SERPL CREATININE-BSD FRML MDRD: 50 ML/MIN/1.7M2
GLUCOSE BLDC GLUCOMTR-MCNC: 94 MG/DL (ref 70–99)
GLUCOSE SERPL-MCNC: 96 MG/DL (ref 70–99)
HCT VFR BLD AUTO: 28.4 % (ref 40–53)
HGB BLD-MCNC: 9.1 G/DL (ref 13.3–17.7)
MCH RBC QN AUTO: 29.2 PG (ref 26.5–33)
MCHC RBC AUTO-ENTMCNC: 32 G/DL (ref 31.5–36.5)
MCV RBC AUTO: 91 FL (ref 78–100)
PLATELET # BLD AUTO: 239 10E9/L (ref 150–450)
POTASSIUM SERPL-SCNC: 3.9 MMOL/L (ref 3.4–5.3)
RBC # BLD AUTO: 3.12 10E12/L (ref 4.4–5.9)
SODIUM SERPL-SCNC: 135 MMOL/L (ref 133–144)
WBC # BLD AUTO: 8.7 10E9/L (ref 4–11)

## 2018-03-11 PROCEDURE — 80048 BASIC METABOLIC PNL TOTAL CA: CPT | Performed by: STUDENT IN AN ORGANIZED HEALTH CARE EDUCATION/TRAINING PROGRAM

## 2018-03-11 PROCEDURE — 25000128 H RX IP 250 OP 636: Performed by: STUDENT IN AN ORGANIZED HEALTH CARE EDUCATION/TRAINING PROGRAM

## 2018-03-11 PROCEDURE — 85027 COMPLETE CBC AUTOMATED: CPT | Performed by: STUDENT IN AN ORGANIZED HEALTH CARE EDUCATION/TRAINING PROGRAM

## 2018-03-11 PROCEDURE — 25000132 ZZH RX MED GY IP 250 OP 250 PS 637: Mod: GY | Performed by: UROLOGY

## 2018-03-11 PROCEDURE — A9270 NON-COVERED ITEM OR SERVICE: HCPCS | Mod: GY | Performed by: UROLOGY

## 2018-03-11 PROCEDURE — 12000008 ZZH R&B INTERMEDIATE UMMC

## 2018-03-11 PROCEDURE — 00000146 ZZHCL STATISTIC GLUCOSE BY METER IP

## 2018-03-11 PROCEDURE — 36415 COLL VENOUS BLD VENIPUNCTURE: CPT | Performed by: STUDENT IN AN ORGANIZED HEALTH CARE EDUCATION/TRAINING PROGRAM

## 2018-03-11 RX ADMIN — AMOXICILLIN AND CLAVULANATE POTASSIUM 500 MG: 400; 57 POWDER, FOR SUSPENSION ORAL at 21:51

## 2018-03-11 RX ADMIN — OMEPRAZOLE 20 MG: 20 CAPSULE, DELAYED RELEASE ORAL at 19:33

## 2018-03-11 RX ADMIN — ACETAMINOPHEN 650 MG: 325 SOLUTION ORAL at 18:15

## 2018-03-11 RX ADMIN — HEPARIN SODIUM 5000 UNITS: 5000 INJECTION, SOLUTION INTRAVENOUS; SUBCUTANEOUS at 17:00

## 2018-03-11 RX ADMIN — DOCUSATE SODIUM 100 MG: 100 CAPSULE, LIQUID FILLED ORAL at 19:33

## 2018-03-11 RX ADMIN — AMOXICILLIN AND CLAVULANATE POTASSIUM 500 MG: 400; 57 POWDER, FOR SUSPENSION ORAL at 13:25

## 2018-03-11 RX ADMIN — FLUTICASONE PROPIONATE 2 SPRAY: 50 SPRAY, METERED NASAL at 07:59

## 2018-03-11 RX ADMIN — OMEPRAZOLE 20 MG: 20 CAPSULE, DELAYED RELEASE ORAL at 07:59

## 2018-03-11 RX ADMIN — ACETAMINOPHEN 975 MG: 325 SOLUTION ORAL at 06:17

## 2018-03-11 RX ADMIN — OXYCODONE HYDROCHLORIDE 5 MG: 5 TABLET ORAL at 23:58

## 2018-03-11 RX ADMIN — FLUCONAZOLE 100 MG: 100 TABLET ORAL at 07:59

## 2018-03-11 RX ADMIN — ACETAMINOPHEN 975 MG: 325 SOLUTION ORAL at 13:24

## 2018-03-11 RX ADMIN — TOLTERODINE 4 MG: 4 CAPSULE, EXTENDED RELEASE ORAL at 07:59

## 2018-03-11 RX ADMIN — HEPARIN SODIUM 5000 UNITS: 5000 INJECTION, SOLUTION INTRAVENOUS; SUBCUTANEOUS at 07:59

## 2018-03-11 RX ADMIN — OXYCODONE HYDROCHLORIDE 5 MG: 5 TABLET ORAL at 19:37

## 2018-03-11 RX ADMIN — DOCUSATE SODIUM 100 MG: 100 CAPSULE, LIQUID FILLED ORAL at 07:59

## 2018-03-11 RX ADMIN — AMOXICILLIN AND CLAVULANATE POTASSIUM 500 MG: 400; 57 POWDER, FOR SUSPENSION ORAL at 07:59

## 2018-03-11 NOTE — PROGRESS NOTES
REGIONAL ANESTHESIA PAIN SERVICE CONTINUOUS NERVE INFUSION NOTE  SUBJECTIVE:  Interval History: Pt reports adequate pain control via continuous peripheral nerve block (CPNB) infusion.  Denies any weakness, paresthesias, circumoral numbness, metallic taste or tinnitus. Plan for discharge tomorrow, patient has had excellent pain control without use of narcotics       Clinically Aligned Pain Assessment (CAPA):   Comfort (How is your pain?): Comfortably manageable  Change in Pain (Since your last medication/intervention?): Getting better  Pain Control (How are your pain treatments working?):  Fully effective pain control  Functioning (Are you able to do activities to get better?) : Can do most things, but pain gets in the way of some   Sleep (Does your pain management allow you to sleep or rest?): Awake with occasional pain     Numerical Rating Scale:  4/10 bilaterally      Anticoagulation:    heparin sodium PF injection 5,000 Units 5,000 Units, SC, Q8H           OBJECTIVE:    Vitals:    Temp:  [97.3  F (36.3  C)-98.7  F (37.1  C)] 98.7  F (37.1  C)  Pulse:  [80-93] 93  Resp:  [16] 16  BP: (125-140)/(70-84) 125/70  SpO2:  [97 %-100 %] 97 %    Exam:   GEN: alert and no distress  NEURO/MSK: Strength 5/5 and symmetric grossly in bilateral LE, normal muscle tone  SKIN: Epidural catheter site with dressing c/d/i, no tenderness, erythema, heme, edema       ASSESSMENT/PLAN:    Patient receiving adequate analgesia with current mulitmodal plan including epidural infusion.  Motor function intact and adequate sensory block, is meeting activity goals.  No evidence of adverse side effects related to local anesthetic.        -Plan to discontinue epidural catheter in the morning pending APTT, scheduled for draw at 7 AM  -Hold morning heparin dose pending APTT result    Had a long discussion with the patient regarding the plan for discontinuing epidural catheter.  The patient had unfortunately been under the impression that the  catheter would be discontinued sometime in the morning after a clamp trial pending discussion with the attending anesthesiologist to approve the plan.  The patient was not aware that removal would be dependent upon normalization of his laboratory values with respect to his heparin administration and did not want his lab drawn in the evening shift, and his catheter to be pulled after the labs were resulted tonight and preferred to wait until the morning.  Discussed with the patient that we would need to wait for the lab results regardless and that either plan would be a safe plan for removing the catheter and it would certainly be acceptable to remove it in the morning after his lab results had returned.     - Anticoagulation/Antiplatelet Therapy okay to continue heparin SC Q 8 hrs as ordered. Please contact RAPS (#[phone]) prior to any medication changes  - Please contact RAPS 24 hours prior to anticipated discharge      Ry Galvez MD  CA-2/PGY-3 Anesthesiology  [phone]    [facility]      RAPS Contact Info (24 hour job code pager is the last 4 digits) For in-house use only:  [email or web address] phone: [place] [phone], [place] [phone], [facility] [phone], then enter call-back number.    Text: Use [employer] on the Intranet <Paging/Directory> tab and enter Jobcode ID.   If no call back at any time, contact the hospital  and ask for RAPS attending or backup

## 2018-03-11 NOTE — PLAN OF CARE
Problem: Patient Care Overview  Goal: Plan of Care/Patient Progress Review  Outcome: No Change  Vital signs:  Temp: 98.6  F (37  C) Temp src: Oral BP: 140/78 Pulse: 80 Heart Rate: 86 Resp: 16 SpO2: 99 % O2 Device: None (Room air)   VSS. Bilateral incisions C/D/I. Scrotal sling in place. Suprapubic with adequate urine output. Drain to palomino bag with clear yellow output. Bilateral JOANA drains with small serosanguinous output. Epidural infusing at 10 mL/hr for pain control, pt states pain is well controlled. Sleeping between cares.

## 2018-03-11 NOTE — PROGRESS NOTES
"Urology  Progress Note    -No acute events overnight  -Ambulated x 2 with moderate pain  -Received epidural bolus   -Initially preferred to avoid narcotics - now would like to try oxycodone   -Tolerating diet without n/v    Exam  /78 (BP Location: Left arm)  Pulse 80  Temp 98.6  F (37  C) (Oral)  Resp 16  Ht 1.918 m (6' 3.5\")  Wt 63.5 kg (139 lb 14.4 oz)  SpO2 99%  BMI 17.26 kg/m2  No acute distress  Unlabored breathing  Abdomen soft, non-tender; SPT in place with clear output; Penectomy incision C/D/I with sutures in place. No scrotal swelling.  PU clean and dry with catheter in place.   Inguinal incisions c/d/i - secured with staples  Lower extremities non-edematous bilaterally    SPT 2600/825  /100  left JOANA 145/30  Right JOANA 120/15      Labs  WBC 9.8  Hgb 8.9  Cr 1.4    AM labs pending    Assessment/Plan 72 year old y/o male POD#3 s/p radical penectomy, urethrectomy, perineal urethostomy and bilateral inguinal lymphadenectomy.     N - Plan to discontinue epidural and transition to oral medication.  Patient is somewhat hesitant given prior episodes of nausea with narcotics - will trial acetaminophen with prn hydromorphone, oxycodone after stopping epidural this morning  CV - HDS  FEN/GI - Tolerating regular diet - antiemetics PRN   - Continue PU tube and SPT; Continue bilateral JOANA drains  Heme - Continue SQ heparin  Activity - Out of bed with assist     Seen and examined with Dr Boo Son, PGY-3  Urology Resident    Patient seen and examined with the resident.    I agree with the resident's note and plan of care.       Muriel Haddad MD  Urology Staff         Contacting the Urology Team     Please use the following job codes to reach the Urology Team. Note that you must use an in house phone and that job codes cannot receive text pages.     On weekdays, dial [phone] (or star-star-star [phone] on the new [employer] telephones) then 0817 to reach the Adult Urology resident or PA on " call    On weekdays, dial [phone] (or star-star-star [phone] on the new [employer] telephones) then 0818 to reach the Pediatric Urology resident    On weeknights and weekends, dial [phone] (or star-star-star 777 on the new [employer] telephones) then [phone] to reach the Urology resident on call (for both Adult and Pediatrics)

## 2018-03-11 NOTE — PLAN OF CARE
Problem: Pain, Acute (Adult)  Goal: Identify Related Risk Factors and Signs and Symptoms  Related risk factors and signs and symptoms are identified upon initiation of Human Response Clinical Practice Guideline (CPG).   Vitals:    03/10/18 0840 03/10/18 1549 03/10/18 1631 03/10/18 1839   BP: 142/76 140/73     BP Location: Left arm Left arm     Pulse:       Resp: 12 18     Temp: 97.4  F (36.3  C)  98.6  F (37  C)    TempSrc: Oral Oral Oral    SpO2: 100% 100%     Weight:    63.5 kg (139 lb 14.4 oz)   Height:         Pt is alert.Ambulated in the lipscomb way. JOANA to bulb suction bilateral.On scrotal sling.Epidural running 10ml/hr.Penrose drain to gravity.Surgical incision intact with staples.Tolerating diet.Will monitor.

## 2018-03-11 NOTE — PLAN OF CARE
Problem: Pain, Acute (Adult)  Goal: Identify Related Risk Factors and Signs and Symptoms  Related risk factors and signs and symptoms are identified upon initiation of Human Response Clinical Practice Guideline (CPG).   Outcome: No Change  Vitals:    03/10/18 1631 03/10/18 1839 03/10/18 2210 03/11/18 0805   BP:   140/78 127/84   BP Location:   Left arm Left arm   Pulse:   80 93   Resp:   16 16   Temp: 98.6  F (37  C)  98.6  F (37  C) 97.3  F (36.3  C)   TempSrc: Oral  Oral Oral   SpO2:   99% 100%   Weight:  63.5 kg (139 lb 14.4 oz)     Height:       AVSS. Pain well controled with Bupivacaine epidural. Epidural has being clamped this am. Epidural catherter still in place. Talked to pt about PRN po pain medications. On scheduled oral tylenol. Pt denies need for oxycodone at this time. OOB x 1 and was able to walk on unit with SBA. UOP of 350cc via suprapubic cathter. Both JPs with scant out. Left =15 and Right =5cc. Scrotal sling in place. No drainage noted. Fair po intake with no c/o nausea. Had breakfast late and still working on ordering lunch. Stable. Continue to encourage walks and assist as needed. Continue with current cares.

## 2018-03-12 VITALS
WEIGHT: 139.9 LBS | HEART RATE: 93 BPM | DIASTOLIC BLOOD PRESSURE: 78 MMHG | RESPIRATION RATE: 16 BRPM | BODY MASS INDEX: 17.04 KG/M2 | HEIGHT: 76 IN | OXYGEN SATURATION: 100 % | SYSTOLIC BLOOD PRESSURE: 132 MMHG | TEMPERATURE: 97.1 F

## 2018-03-12 LAB
ANION GAP SERPL CALCULATED.3IONS-SCNC: 9 MMOL/L (ref 3–14)
APTT PPP: 32 SEC (ref 22–37)
BUN SERPL-MCNC: 12 MG/DL (ref 7–30)
CALCIUM SERPL-MCNC: 9.8 MG/DL (ref 8.5–10.1)
CHLORIDE SERPL-SCNC: 99 MMOL/L (ref 94–109)
CO2 SERPL-SCNC: 22 MMOL/L (ref 20–32)
CREAT FLD-MCNC: 1.6 MG/DL
CREAT SERPL-MCNC: 1.57 MG/DL (ref 0.66–1.25)
ERYTHROCYTE [DISTWIDTH] IN BLOOD BY AUTOMATED COUNT: 14.3 % (ref 10–15)
GFR SERPL CREATININE-BSD FRML MDRD: 44 ML/MIN/1.7M2
GLUCOSE SERPL-MCNC: 93 MG/DL (ref 70–99)
HCT VFR BLD AUTO: 30.8 % (ref 40–53)
HGB BLD-MCNC: 9.9 G/DL (ref 13.3–17.7)
MCH RBC QN AUTO: 29.4 PG (ref 26.5–33)
MCHC RBC AUTO-ENTMCNC: 32.1 G/DL (ref 31.5–36.5)
MCV RBC AUTO: 91 FL (ref 78–100)
PLATELET # BLD AUTO: 264 10E9/L (ref 150–450)
POTASSIUM SERPL-SCNC: 3.9 MMOL/L (ref 3.4–5.3)
RBC # BLD AUTO: 3.37 10E12/L (ref 4.4–5.9)
SODIUM SERPL-SCNC: 130 MMOL/L (ref 133–144)
SPECIMEN SOURCE FLD: NORMAL
WBC # BLD AUTO: 9.2 10E9/L (ref 4–11)

## 2018-03-12 PROCEDURE — 25000132 ZZH RX MED GY IP 250 OP 250 PS 637: Mod: GY | Performed by: STUDENT IN AN ORGANIZED HEALTH CARE EDUCATION/TRAINING PROGRAM

## 2018-03-12 PROCEDURE — 80048 BASIC METABOLIC PNL TOTAL CA: CPT | Performed by: ANESTHESIOLOGY

## 2018-03-12 PROCEDURE — A9270 NON-COVERED ITEM OR SERVICE: HCPCS | Mod: GY | Performed by: STUDENT IN AN ORGANIZED HEALTH CARE EDUCATION/TRAINING PROGRAM

## 2018-03-12 PROCEDURE — 25000132 ZZH RX MED GY IP 250 OP 250 PS 637: Mod: GY | Performed by: UROLOGY

## 2018-03-12 PROCEDURE — 85027 COMPLETE CBC AUTOMATED: CPT | Performed by: ANESTHESIOLOGY

## 2018-03-12 PROCEDURE — 82570 ASSAY OF URINE CREATININE: CPT | Performed by: PHYSICIAN ASSISTANT

## 2018-03-12 PROCEDURE — 85730 THROMBOPLASTIN TIME PARTIAL: CPT | Performed by: ANESTHESIOLOGY

## 2018-03-12 PROCEDURE — A9270 NON-COVERED ITEM OR SERVICE: HCPCS | Mod: GY | Performed by: UROLOGY

## 2018-03-12 PROCEDURE — 36415 COLL VENOUS BLD VENIPUNCTURE: CPT | Performed by: ANESTHESIOLOGY

## 2018-03-12 RX ORDER — AMOXICILLIN AND CLAVULANATE POTASSIUM 500; 125 MG/1; MG/1
1 TABLET, FILM COATED ORAL DAILY
Qty: 21 TABLET | Refills: 1 | Status: SHIPPED | OUTPATIENT
Start: 2018-03-12 | End: 2018-08-03

## 2018-03-12 RX ORDER — POLYETHYLENE GLYCOL 3350 17 G/17G
1 POWDER, FOR SOLUTION ORAL DAILY PRN
Qty: 500 G | Refills: 1 | Status: SHIPPED | OUTPATIENT
Start: 2018-03-12 | End: 2019-01-01

## 2018-03-12 RX ORDER — DOCUSATE SODIUM 100 MG/1
100 CAPSULE, LIQUID FILLED ORAL 2 TIMES DAILY PRN
Qty: 60 CAPSULE | Refills: 0 | Status: ON HOLD | OUTPATIENT
Start: 2018-03-12 | End: 2019-01-01

## 2018-03-12 RX ORDER — OXYCODONE HYDROCHLORIDE 5 MG/1
5-10 TABLET ORAL
Status: DISCONTINUED | OUTPATIENT
Start: 2018-03-12 | End: 2018-03-12 | Stop reason: HOSPADM

## 2018-03-12 RX ORDER — OXYCODONE HYDROCHLORIDE 5 MG/1
5-10 TABLET ORAL
Qty: 30 TABLET | Refills: 0 | Status: SHIPPED | OUTPATIENT
Start: 2018-03-12 | End: 2018-08-03

## 2018-03-12 RX ORDER — ACETAMINOPHEN 325 MG/1
650 TABLET ORAL EVERY 4 HOURS PRN
Qty: 150 TABLET | Refills: 0 | Status: ON HOLD | OUTPATIENT
Start: 2018-03-12 | End: 2019-01-01

## 2018-03-12 RX ADMIN — AMOXICILLIN AND CLAVULANATE POTASSIUM 500 MG: 400; 57 POWDER, FOR SUSPENSION ORAL at 07:52

## 2018-03-12 RX ADMIN — OMEPRAZOLE 20 MG: 20 CAPSULE, DELAYED RELEASE ORAL at 07:52

## 2018-03-12 RX ADMIN — AMOXICILLIN AND CLAVULANATE POTASSIUM 500 MG: 400; 57 POWDER, FOR SUSPENSION ORAL at 13:54

## 2018-03-12 RX ADMIN — DOCUSATE SODIUM 100 MG: 100 CAPSULE, LIQUID FILLED ORAL at 07:52

## 2018-03-12 RX ADMIN — OXYCODONE HYDROCHLORIDE 5 MG: 5 TABLET ORAL at 13:09

## 2018-03-12 RX ADMIN — OXYCODONE HYDROCHLORIDE 5 MG: 5 TABLET ORAL at 04:12

## 2018-03-12 RX ADMIN — TOLTERODINE 4 MG: 4 CAPSULE, EXTENDED RELEASE ORAL at 07:52

## 2018-03-12 RX ADMIN — FLUCONAZOLE 100 MG: 100 TABLET ORAL at 07:52

## 2018-03-12 RX ADMIN — FLUTICASONE PROPIONATE 2 SPRAY: 50 SPRAY, METERED NASAL at 07:52

## 2018-03-12 RX ADMIN — OXYCODONE HYDROCHLORIDE 5 MG: 5 TABLET ORAL at 08:00

## 2018-03-12 NOTE — PROGRESS NOTES
"Urology  Progress Note  -Reports pain poorly controlled this AM  -Tolerating diet   -Ambulating without difficulty, walked yesterday    Exam  /73 (BP Location: Left arm)  Pulse 93  Temp 98.9  F (37.2  C) (Axillary)  Resp 16  Ht 1.918 m (6' 3.5\")  Wt 63.5 kg (139 lb 14.4 oz)  SpO2 98%  BMI 17.26 kg/m2  No acute distress  Unlabored breathing  Abdomen soft, non-tender; SPT in place with clear output; Penectomy incision C/D/I with sutures in place. No scrotal swelling.  PU clean and dry with catheter in place.   Inguinal incisions c/d/i - secured with staples  Lower extremities non-edematous bilaterally    UOP - SPT 1350/450, /100  JOANA left 105/15, JOANA right 165/30    Labs  WBC 8.7    Hgb 9.1  Cr 1.4      Assessment/Plan  72 year old y/o male POD#4 s/p radical penectomy, urethrectomy, perineal urethostomy and bilateral inguinal lymphadenectomy.      N - Will remove epidural this morning.  Home on oxycodone, will increase dose and tyelenol  CV - HDS  FEN/GI - Tolerating regular diet - antiemetics PRN   - Plan remove PU tube today and keep SPT; Continue bilateral JOANA drains on discharge  Heme - SQ heparin held this morning  Activity - Out of bed with assist  Dispo - DC to home    Seen and examined with the chief resident. Will discuss with Dr. Haddad.    Manjinder Son, PGY-3  Urology Resident    Patient seen and examined with the resident.    I agree with the resident's note and plan of care.   D/c home with both drains and on po daily augmentin 500mg.  Remove drains when daily output is <30cc    Muriel Haddad MD  Urology Staff         Contacting the Urology Team     Please use the following job codes to reach the Urology Team. Note that you must use an in house phone and that job codes cannot receive text pages.     On weekdays, dial [phone] (or star-star-star [phone] on the new [employer]) then 0817 to reach the Adult Urology resident or PA on call    On weekdays, dial [phone] (or star-star-star " [phone] on the new [facility] telephones) then 0818 to reach the Pediatric Urology resident    On weeknights and weekends, dial [phone] (or star-star-star 777 on the new [facility] telephones) then 0039 to reach the Urology resident on call (for both Adult and Pediatrics)

## 2018-03-12 NOTE — ANESTHESIA POST-OP FOLLOW-UP NOTE
REGIONAL ANESTHESIA PAIN SERVICE EPIDURAL NOTE  King Gonsalo Bruno is a 72 year old male POD #4 s/p PENECTOMY URETERECTOMY DISSECT LYMPH NODE INGUINALCOMBINED CYSTECTOMY BLADDER, ILEAL DIVERSION  URETHROSTOMY PERINEUM and placement of L4-5 epidural catheter for pain management.       SUBJECTIVE  Interval History: Overnight events: Epidural infusion was stopped yesterday per RN. Patient reports adequate pain control with epidural infusion and current analgesic medications (see below).  Pt reports more pain on R) lower abdomen vs L) abdomen. Denies any weakness, paresthesias, circumoral numbness, metallic taste or tinnitus.  Patient is ambulating with assistance.  Currently denies nausea; tolerating a diet.                            Clinically Aligned Pain Assessment (CAPA):  Comfort (How is your pain?): Tolerable with discomfort  Change in Pain (Since your last medication/intervention?): Getting better  Pain Control (How are your pain treatments working?):  Partially effective pain control  Functioning (Are you able to do activities to get better?) : Can do everything I need to do  Sleep (Does your pain management allow you to sleep or rest?): Awake with occasional pain           Anticoagulation/Antiplatelet Therapy:      heparin sodium PF injection 5,000 Units 5,000 Units, SC, Q8H Given: 03/11 1700               Medications related to Pain Management (Future)    Start       Dose/Rate Route Frequency Ordered Stop     03/12/18 0611   oxyCODONE IR (ROXICODONE) tablet 5-10 mg       5-10 mg Oral EVERY 3 HOURS PRN 03/12/18 0612        03/11/18 0000   acetaminophen (TYLENOL) solution 650 mg       650 mg Oral EVERY 4 HOURS PRN 03/08/18 2151        03/08/18 2030   docusate sodium (COLACE) capsule 100 mg       100 mg Oral 2 TIMES DAILY 03/08/18 2025 03/08/18 2026   docusate sodium (COLACE) capsule 100 mg       100 mg Oral 2 TIMES DAILY PRN 03/08/18 2026 03/08/18 2026   polyethylene glycol (MIRALAX/GLYCOLAX)  "Packet 17 g       17 g Oral DAILY PRN 03/08/18 2026 03/08/18 2025   lidocaine 1 % 1 mL       1 mL Other EVERY 1 HOUR PRN 03/08/18 2025 03/08/18 2025   lidocaine (LMX4) kit         Topical EVERY 1 HOUR PRN 03/08/18 2025 03/08/18 2025   HYDROmorphone (DILAUDID) injection 0.2 mg       0.2 mg Intravenous EVERY 2 HOURS PRN 03/08/18 2025                OBJECTIVE:  Lab Results:       Recent Labs   Lab Test  03/11/18   0730   WBC  8.7   RBC  3.12*   HGB  9.1*   HCT  28.4*   MCV  91   MCH  29.2   MCHC  32.0   RDW  14.5   PLT  239               Lab Results   Component Value Date     INR 0.97 02/26/2018         Vitals:                        Temp:  [97.3  F (36.3  C)-99  F (37.2  C)] 98.9  F (37.2  C)  Pulse:  [93] 93  Heart Rate:  [83] 83  Resp:  [16] 16  BP: (110-127)/(70-84) 110/73  SpO2:  [97 %-100 %] 98 %  /73 (BP Location: Left arm)  Pulse 93  Temp 98.9  F (37.2  C) (Axillary)  Resp 16  Ht 1.918 m (6' 3.5\")  Wt 63.5 kg (139 lb 14.4 oz)  SpO2 98%  BMI 17.26 kg/m2         Exam:   GEN: alert, no distress and cooperative  NEURO/MSK:Strength 5/5 and symmetric grossly in bilateral LE  SKIN: Epidural catheter site with dressing c/d/i, no tenderness, erythema, heme, edema                    ASSESSMENT/PLAN:    Patient receiving adequate analgesia with current mulitmodal plan.  Epidural infusion Bupivacaine 0.125% was stopped yesterday.  Motor function intact and meeting activity goals.  No evidence of adverse side effects related to local anesthetic.  Pt will discharge home later today.     - 0920 DC'd epidural catheter today POD #4 per Urology Service request  - Anticoagulation/Antiplatelet Therapy:  May give heparin at 1020 or later - RN aware.  - will continue to follow and adjust as needed      Jewel Henderson MD  [facility]  3/12/2018        RAPS Contact Info (24 hour job code pager is the last 4 digits) For in-house use only:   [employer] phone: [place] [phone], [place] " [phone], Peds [phone], then enter call-back number.    Text: Use [employer] on the Intranet <Paging/Directory> tab and enter Jobcode ID.   If no call back at any time, contact the hospital  and ask for RAPS attending or backup

## 2018-03-12 NOTE — PLAN OF CARE
Problem: Pain, Acute (Adult)  Goal: Identify Related Risk Factors and Signs and Symptoms  Related risk factors and signs and symptoms are identified upon initiation of Human Response Clinical Practice Guideline (CPG).   Outcome: Improving  Vitals:    03/10/18 2210 03/11/18 0805 03/11/18 1540 03/11/18 1643   BP: 140/78 127/84 125/70 116/79   BP Location: Left arm Left arm Left arm Left arm   Pulse: 80 93     Resp: 16 16 16 16   Temp: 98.6  F (37  C) 97.3  F (36.3  C) 98.7  F (37.1  C) 99  F (37.2  C)   TempSrc: Oral Oral Oral Oral   SpO2: 99% 100% 97% 99%   Weight:       Height:         Pt is alert.Ambulated in the lipscomb way. Epidural clamped.Possible removal tomorrow. Hold am heparin dose.JOANA right and left clamped.Scrotal sling intact. SPC draining good.Tolerating diet.Will monitor.

## 2018-03-12 NOTE — PLAN OF CARE
Problem: Patient Care Overview  Goal: Plan of Care/Patient Progress Review  Outcome: No Change  Vital signs:  Temp: 98.9  F (37.2  C) Temp src: Axillary BP: 110/73 Pulse: 93 Heart Rate: 83 Resp: 16 SpO2: 98 % O2 Device: None (Room air)   VSS. Scrotal sling in place with gauze. Incisions C/D/I. Suprapubic catheter with adequate clear yellow urine output. Perineal urethrostomy with 250 mL clear yellow output. Bilateral JOANA drains with serosanguinous output, R=90 mL, L=45 mL. Pain controlled with PRN oxycodone. Sleeping between cares.

## 2018-03-12 NOTE — PLAN OF CARE
Problem: Patient Care Overview  Goal: Plan of Care/Patient Progress Review  Outcome: Improving  Vitals:    03/11/18 1643 03/11/18 2346 03/12/18 0722 03/12/18 1038   BP: 116/79 110/73 129/69 147/74   BP Location: Left arm Left arm Left arm    Pulse:       Resp: 16 16 18    Temp: 99  F (37.2  C) 98.9  F (37.2  C) 97.2  F (36.2  C)    TempSrc: Oral Axillary Oral    SpO2: 99% 98% 99%    Weight:       Height:         AVSS, pt A&O x 4, intermittent complaints of pain, PO oxycodone given x 2 with some relief. Groin and scrotal incisions closed with staples with scant amount of SS drainage. Urethral palomino removed this am and s/p palomino cath capped, Pt voiding without difficulty via urethra. No BM this shift, passing flatus. Pt tolerating regular diet. Up with SBA. Possible discharge later today. Continuing to monitor per POC.

## 2018-03-12 NOTE — DISCHARGE SUMMARY
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      GERD (gastroesophageal reflux disease)      Hypertension      PONV (postoperative nausea and vomiting)              Discharge Diagnosis:     Metastatic penile / urethral cancer, final path showing:  - Invasive squamous carcinoma of penile urethra, moderately differentiated   - Tumor size: 12.2 cm (longest dimension)   - Tumor extent: Invades corpus spongiosum and cavernosum of shaft   - LVI and PNI - both present   - Margins: Negative   - Nodes: Positive - left inguinal () AND right inguinal () without evidence of extranodal extension  - Pathological stage: pT3N2     Past Medical History:   Diagnosis Date     Dysphagia     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 Perineal urethrostomy creation  2. Flexible cystoscopy with bladder biopsy and fulguration, radical penectomy and urethrectomy, bilateral inguinal lymphadenectomy   - Dr. Muriel Haddad            Medications Prior to Admission:     Prescriptions Prior to Admission   Medication Sig Dispense Refill Last Dose     B Complex Vitamins (VITAMIN B COMPLEX PO) Take by mouth daily (with lunch)   Past Week at Unknown time     fluticasone (FLONASE) 50 MCG/ACT spray Spray 2 sprays into both nostrils 2 times  daily (Patient taking differently: Spray 2 sprays into both nostrils every morning ) 2 Bottle 11 3/7/2018 at 1700     LORazepam (ATIVAN) 0.5 MG tablet Take 1 tablet (0.5 mg) by mouth every 8 hours as needed for anxiety (Patient taking differently: Take 0.5 mg by mouth At Bedtime ) 30 tablet 3 3/7/2018 at 2100     amLODIPine (NORVASC) 10 MG tablet Take 1 tablet (10 mg) by mouth daily (Patient taking differently: Take 20 mg by mouth every morning ) 90 tablet 3 3/7/2018 at 1200     omeprazole (PRILOSEC) 2 mg/mL SUSP Take 10 mLs (20 mg) by mouth 2 times daily 280 mL 11 Past Week at Unknown time     [DISCONTINUED] fluconazole (DIFLUCAN) 100 MG tablet Take one tablet daily for 5 days 5 tablet 0 3/7/2018 at 0800     nystatin (MYCOSTATIN) 287112 UNIT/ML suspension Take 5 mLs (500,000 Units) by mouth 4 times daily Swish and spit (Patient taking differently: Take 500,000 Units by mouth as needed Swish and spit) 200 mL 0 More than a month at Unknown time             Discharge Medications:     Current Discharge Medication List      START taking these medications    Details   oxyCODONE IR (ROXICODONE) 5 MG tablet Take 1-2 tablets (5-10 mg) by mouth every 3 hours as needed for other (pain control or improvement in physical function. Hold dose for analgesic side effects.)  Qty: 30 tablet, Refills: 0    Associated Diagnoses: Urethral cancer (H)         CONTINUE these medications which have CHANGED    Details   acetaminophen (TYLENOL) 325 MG tablet Take 2 tablets (650 mg) by mouth every 4 hours as needed for mild pain or fever  Qty: 150 tablet, Refills: 0    Associated Diagnoses: Urethral cancer (H)      docusate sodium (COLACE) 100 MG capsule Take 1 capsule (100 mg) by mouth 2 times daily as needed for constipation  Qty: 60 capsule, Refills: 0    Associated Diagnoses: Slow transit constipation      polyethylene glycol (MIRALAX/GLYCOLAX) powder Take 17 g (1 capful) by mouth daily as needed for constipation  Qty: 500 g, Refills: 1     Associated Diagnoses: Urethral cancer (H)      amoxicillin-clavulanate (AUGMENTIN) 500-125 MG per tablet Take 1 tablet by mouth daily While the drains are in place  Qty: 21 tablet, Refills: 1    Associated Diagnoses: Urinary tract infection         CONTINUE these medications which have NOT CHANGED    Details   B Complex Vitamins (VITAMIN B COMPLEX PO) Take by mouth daily (with lunch)      fluticasone (FLONASE) 50 MCG/ACT spray Spray 2 sprays into both nostrils 2 times daily  Qty: 2 Bottle, Refills: 11    Associated Diagnoses: Urethral cancer (H); Allergic sinusitis      LORazepam (ATIVAN) 0.5 MG tablet Take 1 tablet (0.5 mg) by mouth every 8 hours as needed for anxiety  Qty: 30 tablet, Refills: 3    Associated Diagnoses: Anxiety      amLODIPine (NORVASC) 10 MG tablet Take 1 tablet (10 mg) by mouth daily  Qty: 90 tablet, Refills: 3    Associated Diagnoses: Benign essential hypertension      omeprazole (PRILOSEC) 2 mg/mL SUSP Take 10 mLs (20 mg) by mouth 2 times daily  Qty: 280 mL, Refills: 11    Associated Diagnoses: Gastroesophageal reflux disease with esophagitis; Urethral cancer (H)      nystatin (MYCOSTATIN) 726199 UNIT/ML suspension Take 5 mLs (500,000 Units) by mouth 4 times daily Swish and spit  Qty: 200 mL, Refills: 0    Associated Diagnoses: Thrush         STOP taking these medications       fluconazole (DIFLUCAN) 100 MG tablet Comments:   Reason for Stopping:                     Consultations:   Consultation during this admission received: None          Brief History of Illness:   Reason for admission requiring a surgical or invasive procedure:   Ureteral Cancer    The patient underwent the following procedure(s):   See above   There were no immediate complications during this procedure.    Please refer to the full operative summary for details.           Hospital Course:   The patient's hospital course was unremarkable.  King Gonsalo Bruno recovered as anticipated and experienced no post-operative  complications.      On POD #4 he was ambulating without assitance, tolerating the discharge diet, had pain controlled with PO medications to go home with, and was requiring no IV medications or fluids. Prior to discharge he underwent a voiding trial, with removal of his perineal catheter, which was successful.  After voiding twice his postvoid residual was 40cc.  At this point his suprapubic tube was also removed without difficulty. His bilateral inguinal drains were kept in place given persistent high outputs (left 105mL/24 hours, right - 165mL/24 hours).  He was discharged home with both drains.  He was given appropriate contact information, follow-up and instructions as seen below in the discharge paperwork.         Discharge Instructions and Follow-Up:   Diet:  - Regular    Activity:   - No strenuous exercise for 6 weeks.   - No lifting, pushing, pulling more than 10 pounds for 6 weeks. Take care when pushing with your arms to stand up.  - Do not strain your belly area.  When you bend, sit up or twice, you could strain the area around your incision.    - Do not strain with bowel movements.    - Do not drive until you can press the brake pedal quickly and fully without pain.    - Do not operate a motor vehicle while taking narcotic pain medications.     Medications:   1) PAIN: Oxycodone is a narcotic medication that has been prescribed for pain.  Narcotics will cause sleepiness and constipation and can become addictive, therefore it is best to stop or reduce them as soon as you can.  Any left over narcotics should be disposed of with an Authorized  for unneeded medications.  Contact your [facility]'s or [facility] government's household trash and recycling service to learn about medication disposal options and guidelines for your area.  If you decide to store this medication at home it should be kept in a locked cabinet to prevent access to children or visitors. To reduce your narcotic use, take Tylenol  (acetaminophen 625mg) every 4-6 hours.  This has been prescribed for you.  Do not take more than 4,000mg of Tylenol (acetaminophen/ APAP) from all sources in any 24 hour period since this can cause liver damage.      2) CONSTIPATION: Pericolace (senna/docusate sodium) can be taken twice daily for prevention of constipation since surgery, pain medications and bladder spasm medications can all make you constipated.  Please reduce or stop pericolace if you develop loose stools. Other over the counter solutions such as prune juice, miralax, fiber products, senna, and dulcolax can also be used. If you are taking the pericolace but still have not had a bowel movement in 3 days, start over-the-counter Milk of Magnesia taken twice daily until you have a nice bowel movement.  Call the office with any concerns.     3) ANTIBIOTICS: Continue augmentin (amox/clav) once daily pills while you have your surgical drains in place (for prevention of infection)    Incisions:   - Daily showering is important, and you may get incisions wet starting 48 hours after surgery.  - Do not scrub incisions or soak in a bath, pool, hot tub, etc. Until all incisions have healed and drains have been removed.   - The wound dressing should be removed 48 hours after surgery.   - The purple skin glue on your incisions will flake off with time and should not be scrubbed.  Also avoid applying lotions or ointments to these areas.  - If steri-strips were used you may wash over them normally, as you would wash your remaining skin.  Steri-strips should fall off on their own within 5-10 days.   - Staples will be removed in Urology Clinic at followup (in 18-22 days)   - It is preferable for the incision to be left uncovered, but you may cover with gauze if needed for comfort or to protect clothing from drainage.     PERINEAL URETHROSTOMY:  - Continue voiding as you are.  Avoid tube baths but showering is recommended.  To try your perineum (surgical site),  please gently pat.  Do not rub    Tubes / drains:  Groin drains to bulb suction   - Your nurse will show you how to care for your groin drains.   - Please record daily drain outputs and bring this information with you when you follow up with Dr. Haddad's team  - In general, your drain outputs need to be <25mL per 24 hours for two days in a row before the drains can be removed    Follow-Up:   - Call your primary care provider to touch base regarding your recent admission.   - You will need follow up for drain removal.  Keep careful record of both drain outputs.  Once the drains are approaching 25mL per 24 hours for two days in a row call Dr. Haddad's nurse, Lizzyarslan Chowdary [phone] to notify her so an appointment for drain removal can be made with our nurses clinic.   - Follow up with Dr. Haddad as scheduled on 4/4/18  Call or return sooner than your regularly scheduled visit if you develop any of the following:  Fever (greater than 101.3F) or flu-like symptoms, uncontrolled pain, uncontrolled nausea or vomiting, as well as increased redness, swelling, or drainage from your wound.    Phone numbers:  - Nursing phone helpline at the Urology Clinic (8A-5P M-F):  [phone].    - Nights or weekends, call [phone] and ask the  to page the urology resident on call.   - For emergencies, always call [phone]         Discharge Disposition:   Discharged to home      Attestation: I have reviewed today's vital signs, notes, medications, labs and imaging.    Sariah Vizcaino PA-C  Urology Physician Assistant  Personal Pager: [phone]    Please call Job Code:   [phone] to reach the Urology resident or PA on call - Weekdays  [phone] to reach the Urology resident or PA on call - Weeknights and weekends    March 12, 2018

## 2018-03-12 NOTE — PROGRESS NOTES
[facility]IONAL ANESTHESIA PAIN SERVICE EPIDURAL NOTE  King Gonsalo Bruno is a 72 year old male POD #4 s/p PENECTOMY URETERECTOMY DISSECT LYMPH NODE INGUINALCOMBINED CYSTECTOMY BLADDER, ILEAL DIVERSION  URETHROSTOMY PERINEUM and placement of L4-5 epidural catheter for pain management.      SUBJECTIVE  Interval History: Overnight events: Epidural infusion was stopped yesterday per RN. Patient reports adequate pain control with epidural infusion and current analgesic medications (see below).  Pt reports more pain on R) lower abdomen vs L) abdomen. Denies any weakness, paresthesias, circumoral numbness, metallic taste or tinnitus.  Patient is ambulating with assistance.  Currently denies nausea; tolerating a diet.      Clinically Aligned Pain Assessment (CAPA):  Comfort (How is your pain?): Tolerable with discomfort  Change in Pain (Since your last medication/intervention?): Getting better  Pain Control (How are your pain treatments working?):  Partially effective pain control  Functioning (Are you able to do activities to get better?) : Can do everything I need to do  Sleep (Does your pain management allow you to sleep or rest?): Awake with occasional pain        Anticoagulation/Antiplatelet Therapy:     heparin sodium PF injection 5,000 Units 5,000 Units, SC, Q8H Given: 03/11 1700           Medications related to Pain Management (Future)    Start     Dose/Rate Route Frequency Ordered Stop    03/12/18 0611  oxyCODONE IR (ROXICODONE) tablet 5-10 mg      5-10 mg Oral EVERY 3 HOURS PRN 03/12/18 0612      03/11/18 0000  acetaminophen (TYLENOL) solution 650 mg      650 mg Oral EVERY 4 HOURS PRN 03/08/18 2151      03/08/18 2030  docusate sodium (COLACE) capsule 100 mg      100 mg Oral 2 TIMES DAILY 03/08/18 2025 03/08/18 2026  docusate sodium (COLACE) capsule 100 mg      100 mg Oral 2 TIMES DAILY PRN 03/08/18 2026 03/08/18 2026  polyethylene glycol (MIRALAX/GLYCOLAX) Packet 17 g      17 g Oral DAILY PRN 03/08/18 2026       "03/08/18 2025  lidocaine 1 % 1 mL      1 mL Other EVERY 1 HOUR PRN 03/08/18 2025 03/08/18 2025  lidocaine (LMX4) kit       Topical EVERY 1 HOUR PRN 03/08/18 2025 03/08/18 2025  HYDROmorphone (DILAUDID) injection 0.2 mg      0.2 mg Intravenous EVERY 2 HOURS PRN 03/08/18 2025             OBJECTIVE:  Lab Results:   Recent Labs   Lab Test  03/11/18   0730   WBC  8.7   RBC  3.12*   HGB  9.1*   HCT  28.4*   MCV  91   MCH  29.2   MCHC  32.0   RDW  14.5   PLT  239       Lab Results   Component Value Date    INR 0.97 02/26/2018       Vitals:    Temp:  [97.3  F (36.3  C)-99  F (37.2  C)] 98.9  F (37.2  C)  Pulse:  [93] 93  Heart Rate:  [83] 83  Resp:  [16] 16  BP: (110-127)/(70-84) 110/73  SpO2:  [97 %-100 %] 98 %  /73 (BP Location: Left arm)  Pulse 93  Temp 98.9  F (37.2  C) (Axillary)  Resp 16  Ht 1.918 m (6' 3.5\")  Wt 63.5 kg (139 lb 14.4 oz)  SpO2 98%  BMI 17.26 kg/m2       Exam:   GEN: alert, no distress and cooperative  NEURO/MSK:Strength 5/5 and symmetric grossly in bilateral LE  SKIN: Epidural catheter site with dressing c/d/i, no tenderness, erythema, heme, edema       ASSESSMENT/PLAN:    Patient receiving adequate analgesia with current mulitmodal plan.  Epidural infusion Bupivacaine 0.125% was stopped yesterday.  Motor function intact and meeting activity goals.  No evidence of adverse side effects related to local anesthetic.  Pt will discharge home later today.    - 0920 DC'd epidural catheter today POD #4 per Urology Service request  - Anticoagulation/Antiplatelet Therapy:  May give heparin at 1020 or later - RN aware.  - will continue to follow and adjust as needed    - discussed plan with attending anesthesiologist    ISAURA Ba CNP  [facility] Anesthesia Pain Service  3/12/2018 7:18 AM    RAPS Contact Info (24 hour job code pager is the last 4 digits) For in-house use only:   [employer] phone: [place] [phone], [place] [phone], Peds [phone], then enter call-back number.  "   Text: Use [employer] on the Intranet <Paging/Directory> tab and enter Jobcode ID.   If no call back at any time, contact the hospital  and ask for RAPS attending or backup

## 2018-03-12 NOTE — ADDENDUM NOTE
Addendum  created 03/12/18 1316 by Jewel Henderson MD    Anesthesia Event edited, Procedure Event Log accessed, Sign clinical note

## 2018-03-12 NOTE — PLAN OF CARE
Problem: Pain, Acute (Adult)  Goal: Identify Related Risk Factors and Signs and Symptoms  Related risk factors and signs and symptoms are identified upon initiation of Human [facility] Clinical Practice Guideline (CPG).   Outcome: Adequate for Discharge Date Met: 03/12/18  Vitals:    03/11/18 2346 03/12/18 0722 03/12/18 1038 03/12/18 1542   BP: 110/73 129/69 147/74 132/78   BP Location: Left arm Left arm  Left arm   Pulse:       Resp: 16 18 16   Temp: 98.9  F (37.2  C) 97.2  F (36.2  C)  97.1  F (36.2  C)   TempSrc: Axillary Oral  Oral   SpO2: 98% 99%  100%   Weight:       Height:       AVSS. Ready to dc home. JOANA teaching done. Print out of JOANA cares at home and instruction on keeping record of out put of JPs given to PT. Dressing to both JOANA insertion site done. Discussed dc orders and f/u appointments with pt. Dc print out given to pt. Rx being picked up by his wife. Scrotal sling in place. Bilateral groin incisions clean dry and intact. Pt has being voiding independently. Stable. Ready to discharge home around 1700.

## 2018-03-13 ENCOUNTER — CARE COORDINATION (OUTPATIENT)
Dept: CARE COORDINATION | Facility: CLINIC | Age: 73
End: 2018-03-13

## 2018-03-14 LAB — COPATH REPORT: NORMAL

## 2018-03-15 ENCOUNTER — CARE COORDINATION (OUTPATIENT)
Dept: UROLOGY | Facility: CLINIC | Age: 73
End: 2018-03-15

## 2018-03-19 ENCOUNTER — NURSE TRIAGE (OUTPATIENT)
Dept: NURSING | Facility: CLINIC | Age: 73
End: 2018-03-19

## 2018-03-20 ENCOUNTER — OFFICE VISIT (OUTPATIENT)
Dept: UROLOGY | Facility: CLINIC | Age: 73
End: 2018-03-20
Payer: COMMERCIAL

## 2018-03-20 VITALS
BODY MASS INDEX: 18.42 KG/M2 | DIASTOLIC BLOOD PRESSURE: 73 MMHG | TEMPERATURE: 98.3 F | HEIGHT: 76 IN | OXYGEN SATURATION: 99 % | HEART RATE: 76 BPM | WEIGHT: 151.3 LBS | SYSTOLIC BLOOD PRESSURE: 109 MMHG

## 2018-03-20 DIAGNOSIS — C60.9 PENILE CANCER (H): Primary | ICD-10-CM

## 2018-03-20 DIAGNOSIS — M79.89 LEG SWELLING: ICD-10-CM

## 2018-03-20 ASSESSMENT — PAIN SCALES - GENERAL: PAINLEVEL: SEVERE PAIN (7)

## 2018-03-20 NOTE — NURSING NOTE
"Chief Complaint   Patient presents with     Surgical Followup     Post op.  S/P Radical penectomy and urethrectomy on 3-8-18       Initial Ht 1.93 m (6' 4\")  Wt 68.6 kg (151 lb 4.8 oz)  BMI 18.42 kg/m2 Estimated body mass index is 18.42 kg/(m^2) as calculated from the following:    Height as of this encounter: 1.93 m (6' 4\").    Weight as of this encounter: 68.6 kg (151 lb 4.8 oz).  Medication Reconciliation: complete     ALL Am[place]      "

## 2018-03-20 NOTE — TELEPHONE ENCOUNTER
Reason for Disposition    Caller has URGENT question and triager unable to answer question    Additional Information    Negative: Sounds like a life-threatening emergency to the triager    Negative: Chest pain    Negative: Difficulty breathing    Negative: Surgical incision symptoms and questions    Negative: [1] Discomfort (pain, burning or stinging) when passing urine AND [2] male    Negative: [1] Discomfort (pain, burning or stinging) when passing urine AND [2] female    Negative: Constipation    Negative: Calf pain    Negative: Dizziness is severe, or persists > 24 hours after surgery    Negative: Pain, redness, swelling, or pus at IV Site    Negative: Symptoms arising from use of a urinary catheter ([place] or [place])    Negative: Cast problems or questions    Negative: Medication question    Negative: [1] Widespread rash AND [2] bright red, sunburn-like    Negative: [1] SEVERE headache AND [2] after spinal (epidural) anesthesia    Negative: [1] Vomiting AND [2] persists > 4 hours    Negative: [1] Vomiting AND [2] abdomen looks much more swollen than usual    Negative: [1] Drinking very little AND [2] dehydration suspected (e.g., no urine > 12 hours, very dry mouth, very lightheaded)    Negative: Patient sounds very sick or weak to the triager    Negative: Sounds like a serious complication to the triager    Negative: Fever > 100.5 F (38.1 C)    Negative: [1] SEVERE post-op pain (e.g., excruciating, pain scale 8-10) AND [2] not controlled with pain medications    Protocols used: POST-OP SYMPTOMS AND QUESTIONS-ADULT-  Patient had urologic surgery on 3/8/18.  He has two drain tubes, one of which is leaking urine around the tube and is leaking quite a lot.  No fever or increased pain.  Patient is very concerned with the drainage and does not want to wait until the clinic opens in the morning.  He will go into the emergency room for evaluation.

## 2018-03-20 NOTE — MR AVS SNAPSHOT
After Visit Summary   3/20/2018    King Gonsalo Bruno    MRN: [id number]           Patient Information     Date Of Birth          1945        Visit Information        Provider Department      3/20/2018 2:00 PM Fidelia Vizcaino PA [facility]        Today's Diagnoses     Penile cancer (H)    -  1    Leg swelling          Care Instructions    - Continue showering and wound cares.  Keep clean and dry.  May remove the bandage at your left drain site in 1-2 days if the site stays dry.   - Continue stool softeners  - Continue recording drain outputs from right inguinal drain. Once outputs are less than 20-25cc for two days in a row then the drain can be removed.  Continue antibiotics until the drain has been removed.  Call if you run out of antibiotics.   - Schedule a lower extremity ultrasound (to check your left leg swelling)  - Schedule an appointment with a lymphedema therapist.  You should be fitted with compression stockings.   - Schedule an appointment for staple removal with the nurses at the Urology Clinic in about 1 week.   - Follow up with Dr. Haddad on 4/4/18  - Follow up with Dr. Arceo on 4/25/18    Sariah Vizcaino PA-C            Follow-ups after your visit        Additional Services     LYMPHEDEMA THERAPY REFERRAL       *This therapy referral will be filtered to a centralized scheduling office at [facility] and the patient will receive a call to schedule an appointment at a [place] location most convenient for them. *   If you have not heard from the scheduling office within 2 business days, please call [phone] for all locations, with the exception of [place], please call [phone].     Treatment: PT or OT Evaluation & Treatment  Special Instructions: Bilateral inguinal lymphadenectomy - please fit for compression stockings, assist with lymphedema.   PT/OT Treatment Diagnosis: penile cancer    Please be aware that  "coverage of these services is subject to the terms and limitations of your health insurance plan.  Call member services at your health plan with any benefit or coverage questions.      **Note to Provider:  If you are referring outside of [facility] for the therapy appointment, please list the name of the location in the \"special instructions\" above, print the referral and give to the patient to schedule the appointment.                  Your next 10 appointments already scheduled     Mar 23, 2018 10:00 AM CDT   US LOWER EXTREMITY VENOUS DUPLEX BILATERAL with UCUSV2   [facility] ([facility])    [address]  [address] [facility] [id number] [phone]           Please bring a list of your medicines (including vitamins, minerals and over-the-counter drugs). Also, tell your doctor about any allergies you may have. Wear comfortable clothes and leave your valuables at home.  You do not need to do anything special to prepare for your exam.  Please call the Imaging Department at your exam site with any questions.            Mar 30, 2018 11:00 AM CDT   (Arrive by 10:45 AM)   Return Visit with  Prostate Cancer Ctr Nurse   [facility]rologic [facility] ([facility])    [address] [place] [id number] [phone]            Mar 30, 2018  2:30 PM CDT   Telephone Call with Rebeka Brennan RD   [facility] Prostate and Urologic Cancers ([facility])    [address] [place] [id number] [phone]           Note: this is not an onsite visit; there is no need to come to the facility.  Thank you for choosing [facility] for your health care needs. Below is some information for patients who are interested in having their follow-up visit with a physician by telephone. In some cases, a telephone visit can be " an effective and convenient way to manage your follow-up care. Choosing a telephone visit rather than a face to face visit for your follow-up care is a decision that you and your physician can make together to ensure it meets all of your needs.  A face to face visit is always an available option, if you choose to do so.  We want to make sure you have all of the information you need about the telephone visit option and answer all of your questions before you decide to schedule a telephone follow-up visit. If you have any questions, you may talk to a staff member or our financial counselor at [phone].  1. General overview Our clinic sees patients for a variety of conditions and concerns. A face to face visit with your doctor is required for any new concerns or for your initial visit. If you and your doctor decide that a follow up visit by telephone is appropriate, you may decide to opt for a telephone visit.   2. Billing and insurance coverage There is a charge for telephone visits, similar to the charge for an in-person visit. Your bill is based on the amount of time you and your physician are on the phone. We will bill each visit to your insurance company (just like your other medical visits), and you will be responsible for any costs not paid by your insurance company. The charge may be denied by your insurance company, in which case you will be responsible for the entire amount billed. The decision to cover the cost is determined by your insurance company. If you want to know what your insurance company will cover, we encourage you to contact them to determine your coverage. The codes below are the codes we use when billing for telephone visits and the associated charges. This may help you work with your insurance company to determine your benefits.   Billing CPT codes for Telephone visits  75327 ($30), 08133 ($35), 27612 ($40)            Apr 04, 2018 11:30 AM CDT   POST OP OSTOMY NURSE with Gonsalo Aguilar,  RN   [facility]ound Ostomy ([facility])    [address]  4th [address]  [facility] [address]   [phone]            Apr 04, 2018  1:20 PM CDT   (Arrive by 1:05 PM)   Post-Op with Muriel Haddad MD   [facility] for Prostate and Urologic Cancers ([facility])    [address]  [address]th [address]  [facility] [address] [phone]            Apr 25, 2018 11:30 AM CDT   [facility] Lab Draw with  [facility] LAB DRAW   [facility] Lab Draw ([facility])    [address]  [address]  [facility] [address]   [phone]            Apr 25, 2018 12:00 PM CDT   (Arrive by 11:45 AM)   Return Visit with Steve Arceo MD   [facility] ([facility])    [address]  [address]  [facility] [address] [phone]              Future tests that were ordered for you today     Open Future Orders        Priority Expected Expires Ordered    US Lower Extremity Venous Duplex Bilateral Routine  3/20/2019 3/20/2018    US Lower Extremity Venous Duplex Bilateral Routine 3/20/2018 6/18/2018 3/20/2018            Who to contact     Please call your clinic at [phone] to:    Ask questions about your health    Make or cancel appointments    Discuss your medicines    Learn about your test results    Speak to your doctor            Additional Information About Your Visit        MyChart Information     [employer] is an electronic gateway that provides easy, online access to your medical records. With [employer], you can request a clinic appointment, read your test results, renew a prescription or communicate with your care team.     To sign up for [employer] visit the website at [email or web address]   You will be asked to enter the access code listed below, as well as some personal information. Please follow the directions to create your username and password.    "  Your access code is: [email or web address]  Expires: 2018  7:31 AM     Your access code will  in 90 days. If you need help or a new code, please contact your [facility] or call [phone] for assistance.        Care EveryWhere ID     This is your Care EveryWhere ID. This could be used by other organizations to access your [address] medical records  [id number]        Your Vitals Were     Pulse Temperature Height Pulse Oximetry BMI (Body Mass Index)       76 98.3  F (36.8  C) (Oral) 1.93 m (6' 4\") 99% 18.42 kg/m2        Blood Pressure from Last 3 Encounters:   18 109/73   18 132/78   18 156/76    Weight from Last 3 Encounters:   18 68.6 kg (151 lb 4.8 oz)   03/10/18 63.5 kg (139 lb 14.4 oz)   18 65.9 kg (145 lb 4.8 oz)              We Performed the Following     LYMPHEDEMA THERAPY REFERRAL          Today's Medication Changes          These changes are accurate as of 3/20/18  3:18 PM.  If you have any questions, ask your nurse or doctor.               These medicines have changed or have updated prescriptions.        Dose/Directions    amLODIPine 10 MG tablet   Commonly known as:  NORVASC   This may have changed:    - how much to take  - when to take this   Used for:  Benign essential hypertension        Dose:  10 mg   Take 1 tablet (10 mg) by mouth daily   Quantity:  90 tablet   Refills:  3       fluticasone 50 MCG/ACT spray   Commonly known as:  FLONASE   This may have changed:  when to take this   Used for:  Urethral cancer (H), Allergic sinusitis        Dose:  2 spray   Spray 2 sprays into both nostrils 2 times daily   Quantity:  2 Bottle   Refills:  11       LORazepam 0.5 MG tablet   Commonly known as:  ATIVAN   This may have changed:  when to take this   Used for:  Anxiety        Dose:  0.5 mg   Take 1 tablet (0.5 mg) by mouth every 8 hours as needed for anxiety   Quantity:  30 tablet   Refills:  3       nystatin 490321 UNIT/ML suspension "   Commonly known as:  MYCOSTATIN   This may have changed:    - when to take this  - reasons to take this  - additional instructions   Used for:  Thrush        Dose:  894488 Units   Take 5 mLs (500,000 Units) by mouth 4 times daily Swish and spit   Quantity:  200 mL   Refills:  0                Primary Care Provider Office Phone # Fax #    Joan VenturaMARLENY [phone]       [facility] [address]  [facility] [address]        Equal Access to Services     JENELLE RED : Hadii aad ku hadasho Soomaali, waaxda luqadaha, qaybta kaalmada adeegyada, waxay idiin hayaan adeeg kharash lashakira rasmussen. So [facility] [phone].    ATENCIÓN: Si habla español, tiene a washburn disposición servicios gratuitos de asistencia lingüística. [facility] [phone].    We comply with applicable federal civil rights laws and [place] laws. We do not discriminate on the basis of race, color, national origin, age, disability, sex, sexual orientation, or gender identity.            Thank you!     Thank you for choosing [facility] PROSTATE AND UROLOGIC CANCERS  for your care. Our goal is always to provide you with excellent care. Hearing back from our patients is one way we can continue to improve our services. Please take a few minutes to complete the written survey that you may receive in the mail after your visit with us. Thank you!             Your Updated Medication List - Protect others around you: Learn how to safely use, store and throw away your medicines at www.disposemymeds.org.          This list is accurate as of 3/20/18  3:18 PM.  Always use your most recent med list.                   Brand Name Dispense Instructions for use Diagnosis    acetaminophen 325 MG tablet    TYLENOL    150 tablet    Take 2 tablets (650 mg) by mouth every 4 hours as needed for mild pain or fever    Urethral cancer (H)       amLODIPine 10 MG tablet    NORVASC    90 tablet    Take 1 tablet (10 mg) by mouth daily    Benign  essential hypertension       amoxicillin-clavulanate 500-125 MG per tablet    AUGMENTIN    21 tablet    Take 1 tablet by mouth daily While the drains are in place    Urinary tract infection       docusate sodium 100 MG capsule    COLACE    60 capsule    Take 1 capsule (100 mg) by mouth 2 times daily as needed for constipation    Slow transit constipation       fluticasone 50 MCG/ACT spray    FLONASE    2 Bottle    Spray 2 sprays into both nostrils 2 times daily    Urethral cancer (H), Allergic sinusitis       LORazepam 0.5 MG tablet    ATIVAN    30 tablet    Take 1 tablet (0.5 mg) by mouth every 8 hours as needed for anxiety    Anxiety       nystatin 699736 UNIT/ML suspension    MYCOSTATIN    200 mL    Take 5 mLs (500,000 Units) by mouth 4 times daily Swish and spit    Thrush       omeprazole 2 mg/mL Susp    priLOSEC    280 mL    Take 10 mLs (20 mg) by mouth 2 times daily    Gastroesophageal reflux disease with esophagitis, Urethral cancer (H)       oxyCODONE IR 5 MG tablet    ROXICODONE    30 tablet    Take 1-2 tablets (5-10 mg) by mouth every 3 hours as needed for other (pain control or improvement in physical function. Hold dose for analgesic side effects.)    Urethral cancer (H)       polyethylene glycol powder    MIRALAX/GLYCOLAX    500 g    Take 17 g (1 capful) by mouth daily as needed for constipation    Urethral cancer (H)       VITAMIN B COMPLEX PO      Take by mouth daily (with lunch)

## 2018-03-20 NOTE — PROGRESS NOTES
HPI: Mr. King Gonsalo Bruno is a 72 year old year old male presenting today for evaluation of chief complaint(s): Surgical Followup (Post op.  S/P Radical penectomy and urethrectomy on 3-8-18)    Mr. 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 Haddad.  He is s/p neoadjuvant chemotherapy with Dr. Arceo initially with Cisplatin/Meade but this was stopped due to renal dysfunction, then he went on to complete 4 cycles of Carbo/Meade.     On 3/8/18 Mr. Bruno underwent a cystoscopy with bladder biopsies, radical penectomy and urethrectomy, perineal urethrostomy, and bilateral inguinal LAD (superficial and deep on the right, superficial on the left).  He was discharged on POD#4 after undergoing a successful voiding trial then suprapubic tube removal as well.  Today he presents in routine followup. 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      Drain outputs:  Left: <20 cc --> serous  Right: ~120 cc per day --> serous    - Patient is feeling fine.    - He is eating normally and has gained 4lbs since hospital d/c.  Drinking normally.  No nausea/emesis  - Does have urinary frequency but feels he empties.  No dysuria, hematuria, bladder or flank pain. No fevers, chills.  - Wounds have remained dry.    - Still having pain and using narcotics.    - Stools have been soft on stool softeners.    - Has had leakage around the right inguinal drain at times.  Taking daily augmentin abx.   - Notes left LE edema.      Current Outpatient Prescriptions   Medication Sig Dispense Refill     oxyCODONE IR (ROXICODONE) 5 MG tablet Take 1-2 tablets (5-10 mg) by mouth every 3 hours as needed for other (pain control or improvement in physical function. Hold dose for analgesic side effects.) 30 tablet 0     acetaminophen  "(TYLENOL) 325 MG tablet Take 2 tablets (650 mg) by mouth every 4 hours as needed for mild pain or fever 150 tablet 0     docusate sodium (COLACE) 100 MG capsule Take 1 capsule (100 mg) by mouth 2 times daily as needed for constipation 60 capsule 0     polyethylene glycol (MIRALAX/GLYCOLAX) powder Take 17 g (1 capful) by mouth daily as needed for constipation 500 g 1     amoxicillin-clavulanate (AUGMENTIN) 500-125 MG per tablet Take 1 tablet by mouth daily While the drains are in place 21 tablet 1     B Complex Vitamins (VITAMIN B COMPLEX PO) Take by mouth daily (with lunch)       fluticasone (FLONASE) 50 MCG/ACT spray Spray 2 sprays into both nostrils 2 times daily (Patient taking differently: Spray 2 sprays into both nostrils every morning ) 2 Bottle 11     LORazepam (ATIVAN) 0.5 MG tablet Take 1 tablet (0.5 mg) by mouth every 8 hours as needed for anxiety (Patient taking differently: Take 0.5 mg by mouth At Bedtime ) 30 tablet 3     amLODIPine (NORVASC) 10 MG tablet Take 1 tablet (10 mg) by mouth daily (Patient taking differently: Take 20 mg by mouth every morning ) 90 tablet 3     omeprazole (PRILOSEC) 2 mg/mL SUSP Take 10 mLs (20 mg) by mouth 2 times daily 280 mL 11     nystatin (MYCOSTATIN) 915892 UNIT/ML suspension Take 5 mLs (500,000 Units) by mouth 4 times daily Swish and spit (Patient taking differently: Take 500,000 Units by mouth as needed Swish and spit) 200 mL 0       ALLERGIES: Lisinopril; Oxycodone; and Vicodin [hydrocodone-acetaminophen]      REVIEW OF SYSTEMS:  As above in HPI    GENERAL PHYSICAL EXAM:   Vitals: /73  Pulse 76  Temp 98.3  F (36.8  C) (Oral)  Ht 1.93 m (6' 4\")  Wt 68.6 kg (151 lb 4.8 oz)  SpO2 99%  BMI 18.42 kg/m2  Body mass index is 18.42 kg/(m^2).    GENERAL: Well groomed, well developed, well nourished male in NAD.  GI: Soft, NT, ND, no palpable masses.    Incisions - CDI without erythema  Drain sites - serous drainage in both grenades.  Sites are dry without evidence " of infection.  Stripped right drain to assure persistent drainage.   Scrotum - edematous but no testicular pain  Perineum - ostomy patient, dissolvable suture still present  LE - trace BL LE edema.  Left calf slightly larger than right.  Neg zane's    NEURO: Alert and oriented x 3.  PSYCH: Normal mood and affect, pleasant and agreeable during interview and exam.    LABS: The last test results for Mr. King Gonsalo Bruno were reviewed:  PSA - No results found for: PSA  BMP -   Recent Labs   Lab Test  03/12/18   0818  03/11/18   0730  03/10/18   0800   02/07/18   1150  12/13/17   0813   12/08/17   0925   NA  130*  135  135   < >   --   132*   --   135   POTASSIUM  3.9  3.9  4.0   < >   --   4.0   --   4.0   CHLORIDE  99  104  105   < >   --   100   --   103   CO2  22  22  23   < >   --   22   --   23   BUN  12  12  12   < >   --   12   --   9   CR  1.57*  1.41*  1.41*   < >   --   1.65*   --   1.29*   GLC  93  96  89   < >   --   94   < >  79   SAADIA  9.8  9.5  9.2   < >   --   9.1   --   8.6   MAG   --    --    --    --   1.8  1.3*   --   1.5*    < > = values in this interval not displayed.       CBC -   Recent Labs   Lab Test  03/12/18   0818  03/11/18   0730  03/10/18   0800   WBC  9.2  8.7  9.8   HGB  9.9*  9.1*  8.9*   PLT  264  239  241       ASSESSMENT:   1) metastatic penile cancer    PLAN:   - LEFT DRAIN REMOVED TODAY.  RIGHT DRAIN NEEDS TO BE <25cc per day.  Far from that goal.     - Continue showering and wound cares.  Keep clean and dry.  May remove the bandage at your left drain site in 1-2 days if the site stays dry.   - Continue stool softeners  - Continue recording drain outputs from right inguinal drain. Once outputs are less than 20-25cc for two days in a row then the drain can be removed.  Continue antibiotics until the drain has been removed.  Call if you run out of antibiotics.   - Schedule a lower extremity ultrasound (to check your left leg swelling)  - Schedule an appointment with a lymphedema  therapist.  You should be fitted with compression stockings.   - Schedule an appointment for staple removal with the nurses at the Urology Clinic in about 1 week.   - Follow up with Dr. Haddad on 4/4/18  - Follow up with Dr. Arceo on 4/25/18    Sariah Vizcaino PA-C  Department of Urologic Surgery

## 2018-03-20 NOTE — PATIENT INSTRUCTIONS
- Continue showering and wound cares.  Keep clean and dry.  May remove the bandage at your left drain site in 1-2 days if the site stays dry.   - Continue stool softeners  - Continue recording drain outputs from right inguinal drain. Once outputs are less than 20-25cc for two days in a row then the drain can be removed.  Continue antibiotics until the drain has been removed.  Call if you run out of antibiotics.   - Schedule a lower extremity ultrasound (to check your left leg swelling)  - Schedule an appointment with a lymphedema therapist.  You should be fitted with compression stockings.   - Schedule an appointment for staple removal with the nurses at the Urology Clinic in about 1 week.   - Follow up with Dr. Haddad on 4/4/18  - Follow up with Dr. Arceo on 4/25/18    Sariah Vizcaino PA-C

## 2018-03-23 ENCOUNTER — RADIANT APPOINTMENT (OUTPATIENT)
Dept: ULTRASOUND IMAGING | Facility: CLINIC | Age: 73
End: 2018-03-23
Attending: PHYSICIAN ASSISTANT
Payer: COMMERCIAL

## 2018-03-23 ENCOUNTER — PRE VISIT (OUTPATIENT)
Dept: UROLOGY | Facility: CLINIC | Age: 73
End: 2018-03-23

## 2018-03-23 DIAGNOSIS — M79.89 LEG SWELLING: ICD-10-CM

## 2018-03-30 ENCOUNTER — ALLIED HEALTH/NURSE VISIT (OUTPATIENT)
Dept: UROLOGY | Facility: CLINIC | Age: 73
End: 2018-03-30
Payer: COMMERCIAL

## 2018-03-30 VITALS
SYSTOLIC BLOOD PRESSURE: 125 MMHG | BODY MASS INDEX: 17.85 KG/M2 | HEIGHT: 76 IN | DIASTOLIC BLOOD PRESSURE: 74 MMHG | HEART RATE: 71 BPM | WEIGHT: 146.6 LBS

## 2018-03-30 DIAGNOSIS — K22.5 ZENKER DIVERTICULUM: ICD-10-CM

## 2018-03-30 DIAGNOSIS — Z48.02 REMOVAL OF STAPLES: Primary | ICD-10-CM

## 2018-03-30 DIAGNOSIS — R13.12 OROPHARYNGEAL DYSPHAGIA: Primary | ICD-10-CM

## 2018-03-30 ASSESSMENT — PAIN SCALES - GENERAL: PAINLEVEL: SEVERE PAIN (7)

## 2018-03-30 NOTE — PATIENT INSTRUCTIONS
It was a pleasure meeting with you today.  Thank you for allowing me and my team the privilege of caring for you today.  YOU are the reason we are here, and I truly hope we provided you with the excellent service you deserve.  Please let us know if there is anything else we can do for you so that we can be sure you are leaving completely satisfied with your care experience.       Murphy Henry LPN

## 2018-03-30 NOTE — NURSING NOTE
King Gonsalo Bruno comes into clinic today at the request of Dr. Haddad Ordering Provider for Staple Removal:  Incision was dry, clean and intact, incision cleansed with wound cleanser and staples were removed. Pt tolerated the procedure. Benzoin and steristrips were placed per protocol and pt was instructed to leave them in place for 7-10 days. It is okay to shower with these in place, no need to cover. After this time the strerstrips will start loosen and should be removed.   .    21 staples removed without difficulty.    This service provided today was under the supervising provider of the day Dr. Rooney, who was available if needed.    Murphy Henry

## 2018-03-30 NOTE — MR AVS SNAPSHOT
After Visit Summary   3/30/2018    King Gonsalo Bruno    MRN: [id number]           Patient Information     Date Of Birth          1945        Visit Information        Provider Department      3/30/2018 11:00 AM Nurse, [facility] Urology and [facility]        Today's Diagnoses     Removal of staples    -  1      Care Instructions    It was a pleasure meeting with you today.  Thank you for allowing me and my team the privilege of caring for you today.  YOU are the reason we are here, and I truly hope we provided you with the excellent service you deserve.  Please let us know if there is anything else we can do for you so that we can be sure you are leaving completely satisfied with your care experience.       Murphy Henry LPN          Follow-ups after your visit        Your next 10 appointments already scheduled     Mar 30, 2018  2:30 PM CDT   Telephone Call with Rebeka Brennan RD   [facility] Urology and [facility]rolog[facility] ([facility])    [address] [place] [address] [phone]           Note: this is not an onsite visit; there is no need to come to the facility.  Thank you for choosing [facility] for your health care needs. Below is some information for patients who are interested in having their follow-up visit with a physician by telephone. In some cases, a telephone visit can be an effective and convenient way to manage your follow-up care. Choosing a telephone visit rather than a face to face visit for your follow-up care is a decision that you and your physician can make together to ensure it meets all of your needs.  A face to face visit is always an available option, if you choose to do so.  We want to make sure you have all of the information you need about the telephone visit option and answer all of your questions before you decide to  schedule a telephone follow-up visit. If you have any questions, you may talk to a staff member or our financial counselor at [phone].  1. General overview Our clinic sees patients for a variety of conditions and concerns. A face to face visit with your doctor is required for any new concerns or for your initial visit. If you and your doctor decide that a follow up visit by telephone is appropriate, you may decide to opt for a telephone visit.   2. Billing and insurance coverage There is a charge for telephone visits, similar to the charge for an in-person visit. Your bill is based on the amount of time you and your physician are on the phone. We will bill each visit to your insurance company (just like your other medical visits), and you will be responsible for any costs not paid by your insurance company. The charge may be denied by your insurance company, in which case you will be responsible for the entire amount billed. The decision to cover the cost is determined by your insurance company. If you want to know what your insurance company will cover, we encourage you to contact them to determine your coverage. The codes below are the codes we use when billing for telephone visits and the associated charges. This may help you work with your insurance company to determine your benefits.   Billing CPT codes for Telephone visits  95466 ($30), 40751 ($35), 06324 ($40)            Apr 04, 2018 11:30 AM CDT   POST OP OSTOMY NURSE with Gonsalo Aguilar RN   [facility] Wound Ostomy ([facility])    [address] [place] [address]   [phone]            Apr 04, 2018  1:20 PM CDT   (Arrive by 1:05 PM)   Post-Op with Muriel Haddad MD   [facility] Urology and [facility] for Prostate and [facility]rologic Cancers ([facility])    [address] [place] [address]   [phone]            Apr 25, 2018 11:30 AM CDT   [facility]  "with  [facility]   [facility] ([facility])    [address]  [address]  [facility] [id number] [phone]            2018 12:00 PM CDT   (Arrive by 11:45 AM)   Return Visit with Steve Arceo MD   [facility] ([facility])    [address]  [address]  [facility] [id number] [phone]            May 22, 2018  2:15 PM CDT   (Arrive by 2:00 PM)   Return Visit with Arsenio Ortiz MD   [facility] Ear Nose and Throat ([facility])    [address]  4th Floor  [facility] [id number] [phone]              Who to contact     Please call your clinic at [phone] to:    Ask questions about your health    Make or cancel appointments    Discuss your medicines    Learn about your test results    Speak to your doctor            Additional Information About Your Visit        [employer] Information     [employer] is an electronic gateway that provides easy, online access to your medical records. With [employer], you can request a clinic appointment, read your test results, renew a prescription or communicate with your care team.     To sign up for Wanova visit the website at [email or web address]   You will be asked to enter the access code listed below, as well as some personal information. Please follow the directions to create your username and password.     Your access code is: [email or web address]  Expires: 2018  7:31 AM     Your access code will  in 90 days. If you need help or a new code, please contact your [facility] or call [phone] for assistance.        Care EveryWhere ID     This is your Care EveryWhere ID. This could be used by other organizations to access your Branch medical records  [id number]        Your Vitals Were     Pulse Height BMI (Body Mass Index)             71 1.93 m (6' 4\") 17.84 kg/m2    "       Blood Pressure from Last 3 Encounters:   03/30/18 125/74   03/20/18 109/73   03/12/18 132/78    Weight from Last 3 Encounters:   03/30/18 66.5 kg (146 lb 9.6 oz)   03/20/18 68.6 kg (151 lb 4.8 oz)   03/10/18 63.5 kg (139 lb 14.4 oz)              Today, you had the following     No orders found for display         Today's Medication Changes          These changes are accurate as of 3/30/18 11:57 AM.  If you have any questions, ask your nurse or doctor.               These medicines have changed or have updated prescriptions.        Dose/Directions    amLODIPine 10 MG tablet   Commonly known as:  NORVASC   This may have changed:    - how much to take  - when to take this   Used for:  Benign essential hypertension        Dose:  10 mg   Take 1 tablet (10 mg) by mouth daily   Quantity:  90 tablet   Refills:  3       fluticasone 50 MCG/ACT spray   Commonly known as:  FLONASE   This may have changed:  when to take this   Used for:  Urethral cancer (H), Allergic sinusitis        Dose:  2 spray   Spray 2 sprays into both nostrils 2 times daily   Quantity:  2 Bottle   Refills:  11       LORazepam 0.5 MG tablet   Commonly known as:  ATIVAN   This may have changed:  when to take this   Used for:  Anxiety        Dose:  0.5 mg   Take 1 tablet (0.5 mg) by mouth every 8 hours as needed for anxiety   Quantity:  30 tablet   Refills:  3       nystatin 780701 UNIT/ML suspension   Commonly known as:  MYCOSTATIN   This may have changed:    - when to take this  - reasons to take this  - additional instructions   Used for:  Thrush        Dose:  283592 Units   Take 5 mLs (500,000 Units) by mouth 4 times daily Swish and spit   Quantity:  200 mL   Refills:  0                Primary Care Provider Office Phone # Fax #    Joan Janet Ventura [phone]       [address] [address]        Equal Access to Services     JENELLE RED AH: dorie White, paolo  ronlad buckleyaamir srivastava ah. Veronique Redwood -[phone].    ATENCIÓN: Si everett hinson, tiene a washburn disposición servicios gratuitos de asistencia lingüística. Juwan al [phone].    We comply with applicable federal civil rights laws and [place] laws. We do not discriminate on the basis of race, color, national origin, age, disability, sex, sexual orientation, or gender identity.            Thank you!     Thank you for choosing [facility] UROLOGY AND [facility] FOR PROSTATE AND UROLOGIC CANCERS  for your care. Our goal is always to provide you with excellent care. Hearing back from our patients is one way we can continue to improve our services. Please take a few minutes to complete the written survey that you may receive in the mail after your visit with us. Thank you!             Your Updated Medication List - Protect others around you: Learn how to safely use, store and throw away your medicines at www.disposemymeds.org.          This list is accurate as of 3/30/18 11:57 AM.  Always use your most recent med list.                   Brand Name Dispense Instructions for use Diagnosis    acetaminophen 325 MG tablet    TYLENOL    150 tablet    Take 2 tablets (650 mg) by mouth every 4 hours as needed for mild pain or fever    Urethral cancer (H)       amLODIPine 10 MG tablet    NORVASC    90 tablet    Take 1 tablet (10 mg) by mouth daily    Benign essential hypertension       amoxicillin-clavulanate 500-125 MG per tablet    AUGMENTIN    21 tablet    Take 1 tablet by mouth daily While the drains are in place    Urinary tract infection       docusate sodium 100 MG capsule    COLACE    60 capsule    Take 1 capsule (100 mg) by mouth 2 times daily as needed for constipation    Slow transit constipation       fluticasone 50 MCG/ACT spray    FLONASE    2 Bottle    Spray 2 sprays into both nostrils 2 times daily    Urethral cancer (H), Allergic sinusitis       LORazepam 0.5 MG tablet    ATIVAN    30  tablet    Take 1 tablet (0.5 mg) by mouth every 8 hours as needed for anxiety    Anxiety       nystatin [id number] UNIT/ML suspension    MYCOSTATIN    200 mL    Take 5 mLs (500,000 Units) by mouth 4 times daily Swish and spit    Thrush       omeprazole 2 mg/mL Susp    priLOSEC    280 mL    Take 10 mLs (20 mg) by mouth 2 times daily    Gastroesophageal reflux disease with esophagitis, Urethral cancer (H)       oxyCODONE IR 5 MG tablet    ROXICODONE    30 tablet    Take 1-2 tablets (5-10 mg) by mouth every 3 hours as needed for other (pain control or improvement in physical function. Hold dose for analgesic side effects.)    Urethral cancer (H)       polyethylene glycol powder    MIRALAX/GLYCOLAX    500 g    Take 17 g (1 capful) by mouth daily as needed for constipation    Urethral cancer (H)       VITAMIN B COMPLEX PO      Take by mouth daily (with lunch)

## 2018-04-03 ENCOUNTER — PRE VISIT (OUTPATIENT)
Dept: UROLOGY | Facility: CLINIC | Age: 73
End: 2018-04-03

## 2018-04-03 NOTE — TELEPHONE ENCOUNTER
Reason for visit: Post op - S/P Radical penectomy and urethrectomy on 3-8-18     Relevant information: Pt saw Sariah     Records/imaging/labs: all records available    Pt called: No need for a call    Rooming: Regular

## 2018-04-04 ENCOUNTER — OFFICE VISIT (OUTPATIENT)
Dept: UROLOGY | Facility: CLINIC | Age: 73
End: 2018-04-04
Payer: COMMERCIAL

## 2018-04-04 VITALS
HEIGHT: 76 IN | WEIGHT: 151 LBS | SYSTOLIC BLOOD PRESSURE: 133 MMHG | BODY MASS INDEX: 18.39 KG/M2 | DIASTOLIC BLOOD PRESSURE: 64 MMHG | HEART RATE: 74 BPM

## 2018-04-04 DIAGNOSIS — C68.0 MALIGNANT NEOPLASM OF URETHRA (H): Primary | ICD-10-CM

## 2018-04-04 ASSESSMENT — PAIN SCALES - GENERAL: PAINLEVEL: SEVERE PAIN (6)

## 2018-04-04 NOTE — PROGRESS NOTES
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 Haddad.  He is s/p neoadjuvant chemotherapy with Dr. Arceo initially with Cisplatin/[place] but this was stopped due to renal dysfunction, then he went on to complete 4 cycles of Carbo/[place].      On 3/8/18 underwent a cystoscopy with bladder biopsies, radical penectomy and urethrectomy, perineal urethrostomy, and bilateral inguinal LAD (superficial and deep on the right, superficial on the left).  He was discharged on POD#4 after undergoing a successful voiding trial then suprapubic tube removal as well.  Today he presents in routine followup. 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       Right inguinal drain has produced <20cc over the past 5 days.    Current meds    Current Outpatient Prescriptions   Medication Sig Dispense Refill     oxyCODONE IR (ROXICODONE) 5 MG tablet Take 1-2 tablets (5-10 mg) by mouth every 3 hours as needed for other (pain control or improvement in physical function. Hold dose for analgesic side effects.) 30 tablet 0     acetaminophen (TYLENOL) 325 MG tablet Take 2 tablets (650 mg) by mouth every 4 hours as needed for mild pain or fever 150 tablet 0     docusate sodium (COLACE) 100 MG capsule Take 1 capsule (100 mg) by mouth 2 times daily as needed for constipation 60 capsule 0     amoxicillin-clavulanate (AUGMENTIN) 500-125 MG per tablet Take 1 tablet by mouth daily While the drains are in place 21 tablet 1     B Complex Vitamins (VITAMIN B COMPLEX PO) Take by mouth daily (with lunch)       fluticasone (FLONASE) 50 MCG/ACT spray Spray 2 sprays into both nostrils 2 times daily (Patient taking differently: Spray 2 sprays into both  "nostrils every morning ) 2 Bottle 11     LORazepam (ATIVAN) 0.5 MG tablet Take 1 tablet (0.5 mg) by mouth every 8 hours as needed for anxiety (Patient taking differently: Take 0.5 mg by mouth At Bedtime ) 30 tablet 3     amLODIPine (NORVASC) 10 MG tablet Take 1 tablet (10 mg) by mouth daily (Patient taking differently: Take 20 mg by mouth every morning ) 90 tablet 3     omeprazole (PRILOSEC) 2 mg/mL SUSP Take 10 mLs (20 mg) by mouth 2 times daily 280 mL 11     nystatin (MYCOSTATIN) [id number] UNIT/ML suspension Take 5 mLs (500,000 Units) by mouth 4 times daily Swish and spit (Patient taking differently: Take 500,000 Units by mouth as needed Swish and spit) 200 mL 0     OBJECTIVE:  /64  Pulse 74  Ht 1.93 m (6' 4\")  Wt 68.5 kg (151 lb)  BMI 18.38 kg/m2    EXAM:  GENERAL: No acute distress. Well nourished.   HEENT:  Sclerae anicteric.  Conjunctivae pink.  Moist mucous membranes.  LUNGS:  Non-labored breathing.  ABDOMEN: Soft, non-tender, nondistended  : scrotal incision healing appropriately, R inguinal drain serous drainage.  SKIN: No rashes.  Dry.     EXTREMITIES:  Warm, well perfused.  No Lower extremity edema bilaterally.  NEURO: normal gait, no focal deficits.     Labs/imaging: relevant results as above    ASSESSMENT: King Gonsalo Bruno is a 72 year old male with invasive SCC of the penile urethra who is s/p perineal urethrostomy creation on 3/8/2018.    PLAN:   - R inguinal drain removed in clinic  - Follow up in 6 weeks with CXR and CT abd/pelvis    Patient seen and plan discussed with Dr. Haddad    Patient seen and examined with the resident.  Visit time 15 minutes and >50% spent in counseling.  I agree with the resident's note and plan of care.       Muriel Haddad MD  Urology Staff      CC  Patient Care Team:  Joan Ventura NP as PCP - General (Nurse Practitioner)  Ry Dunn as Referring Physician (Surgery)  Muriel Haddad MD as MD (Urology)  Lizzy Chowdary, RN as Registered " Nurse (Urology)  Steve Arceo MD as MD (Oncology)  Janet Murrieta, RN as Nurse Coordinator (Oncology)  KIT SEGAL    Copy to patient  KING AILYN CHAVEZ  [address] [facility] [address]

## 2018-04-04 NOTE — MR AVS SNAPSHOT
After Visit Summary   4/4/2018    King Gonsalo Bruno    MRN: [id number]           Patient Information     Date Of Birth          1945        Visit Information        Provider Department      4/4/2018 1:20 PM Muriel Haddad MD [facility] and [facility] for Prostate and Urologic Cancers        Today's Diagnoses     Malignant neoplasm of urethra (H)    -  1      Care Instructions    Return in 6 weeks 5/23/18 12:20 with imaging.    It was a pleasure meeting with you today.  Thank you for allowing me and my team the privilege of caring for you today.  YOU are the reason we are here, and I truly hope we provided you with the excellent service you deserve.  Please let us know if there is anything else we can do for you so that we can be sure you are leaving completely satisfied with your care experience.                  Follow-ups after your visit        Your next 10 appointments already scheduled     Apr 06, 2018  2:00 PM CDT   XR VIDEO SPEECH EVALUATION WITH ESOPHAGRAM with UCXR2, UC GIGU RAD   [facility] ([facility])    [address] [place] [address] [phone]           Please bring a list of your current medicines to your exam. (Include vitamins, minerals and over-the-counter medicines.) Leave your valuables at home.  Tell the doctor if there is a chance you could be pregnant.  Do not eat for 4 hours before the exam. Keep drinking clear liquids until 2 hours before the exam.  You may take pain medicine (with a sip of water) up to 4 hours before the exam.  Do not swallow any other medicines unless your doctor tells you to. Talk to your doctor to be sure it s safe to stop your medicines.  Please call the Imaging Department at your exam site with any questions.            Apr 06, 2018  2:00 PM CDT   Video Swallow with PEDRO Moffett   [facility] ([facility])    [address] [place] [address]  Floor  [facility] [address]   [phone]           Please check in for this visit in Imaging on the 1st floor.            Apr 25, 2018 11:30 AM CDT   [facility] Draw with  [facility] LAB DRAW   [facility] ([facility])    [address]  [address]  [facility] [address]   [phone]            Apr 25, 2018 12:00 PM CDT   (Arrive by 11:45 AM)   Return Visit with Steve Arceo MD   [facility] ([facility])    [address]  [address]  [facility] [address]   [phone]            May 22, 2018  2:15 PM CDT   (Arrive by 2:00 PM)   Return Visit with Arsenio Ortiz MD   [facility] ([facility])    [address]  4th Floor  [facility] [address]   [phone]            May 23, 2018 11:20 AM CDT   (Arrive by 11:05 AM)   CT CHEST W CONTRAST ABDOMEN PELVIS W/O & W CONTRAST with UCCT1   [facility] CT ([facility])    [address]  1st Floor  [facility] [address]   [phone]           Please bring any scans or X-rays taken at other hospitals, if similar tests were done. Also bring a list of your medicines, including vitamins, minerals and over-the-counter drugs. It is safest to leave personal items at home.  Be sure to tell your doctor:   If you have any allergies.   If there s any chance you are pregnant.   If you are breastfeeding.  You may have contrast for this exam. To prepare:   Do not eat or drink for 2 hours before your exam. If you need to take medicine, you may take it with small sips of water. (We may ask you to take liquid medicine as well.)   The day before your exam, drink extra fluids at least six 8-ounce glasses (unless your doctor tells you to restrict your fluids).   You will be given instructions on how to drink the contrast.  Patients over 70 or patients with diabetes or kidney  problems:   If you haven t had a blood test (creatinine test) within the last 30 days, the Cardiologist/Radiologist may require you to get this test prior to your exam.  If you have diabetes:   Continue to take your metformin medication on the day of your exam  Please wear loose clothing, such as a sweat suit or jogging clothes. Avoid snaps, zippers and other metal. We may ask you to undress and put on a hospital gown.  If you have any questions, please call the Imaging Department where you will have your exam.            May 23, 2018 12:20 PM CDT   (Arrive by 12:05 PM)   Return Visit with Muriel Haddad MD   [facility] and [facility] for Prostate and Urologic Cancers ([facility])    [address] [place] [address] [phone]              Future tests that were ordered for you today     Open Future Orders        Priority Expected Expires Ordered    CT Chest w Cont Abdomen Pelvis w/o & w Cont Routine 2018            Who to contact     Please call your clinic at [phone] to:    Ask questions about your health    Make or cancel appointments    Discuss your medicines    Learn about your test results    Speak to your doctor            Additional Information About Your Visit        MyChart Information     [employer] is an electronic gateway that provides easy, online access to your medical records. With Education Everytime, you can request a clinic appointment, read your test results, renew a prescription or communicate with your care team.     To sign up for [employer] visit the website at [email or web address]   You will be asked to enter the access code listed below, as well as some personal information. Please follow the directions to create your username and password.     Your access code is: [email or web address]  Expires: 2018  7:31 AM     Your access code will  in 90 days. If you need help or a new code, please contact your [facility]  "[facility] [facility] or call [phone] for assistance.        Care EveryWhere ID     This is your Care EveryWhere ID. This could be used by other organizations to access your [facility] medical records  [id number]        Your Vitals Were     Pulse Height BMI (Body Mass Index)             74 1.93 m (6' 4\") 18.38 kg/m2          Blood Pressure from Last 3 Encounters:   04/04/18 133/64   03/30/18 125/74   03/20/18 109/73    Weight from Last 3 Encounters:   04/04/18 68.5 kg (151 lb)   03/30/18 66.5 kg (146 lb 9.6 oz)   03/20/18 68.6 kg (151 lb 4.8 oz)                 Today's Medication Changes          These changes are accurate as of 4/4/18  6:48 PM.  If you have any questions, ask your nurse or doctor.               These medicines have changed or have updated prescriptions.        Dose/Directions    amLODIPine 10 MG tablet   Commonly known as:  NORVASC   This may have changed:    - how much to take  - when to take this   Used for:  Benign essential hypertension        Dose:  10 mg   Take 1 tablet (10 mg) by mouth daily   Quantity:  90 tablet   Refills:  3       fluticasone 50 MCG/ACT spray   Commonly known as:  FLONASE   This may have changed:  when to take this   Used for:  Urethral cancer (H), Allergic sinusitis        Dose:  2 spray   Spray 2 sprays into both nostrils 2 times daily   Quantity:  2 Bottle   Refills:  11       LORazepam 0.5 MG tablet   Commonly known as:  ATIVAN   This may have changed:  when to take this   Used for:  Anxiety        Dose:  0.5 mg   Take 1 tablet (0.5 mg) by mouth every 8 hours as needed for anxiety   Quantity:  30 tablet   Refills:  3       nystatin 632302 UNIT/ML suspension   Commonly known as:  MYCOSTATIN   This may have changed:    - when to take this  - reasons to take this  - additional instructions   Used for:  Thrush        Dose:  111372 Units   Take 5 mLs (500,000 Units) by mouth 4 times daily Swish and spit   Quantity:  200 mL   Refills:  0                Primary " Care Provider Office Phone # Fax #    Joan Ventura, MARLENY [phone]       [facility] [address]  [facility] [address]        Equal Access to Services     JENELLE RED : Hadii светлана ku hadnikkio Soomaali, waaxda luqadaha, qaybta kaalmada adeegyada, ronald alvarengan gab marquez laKylereymundo rasmussen. So [facility] [phone].    ATENCIÓN: Si habla español, tiene a washburn disposición servicios gratuitos de asistencia lingüística. Llame al [phone].    We comply with applicable federal civil rights laws and [place] laws. We do not discriminate on the basis of race, color, national origin, age, disability, sex, sexual orientation, or gender identity.            Thank you!     Thank you for choosing [facility] UROLOGY AND [facility] FOR PROSTATE AND UROLOGIC CANCERS  for your care. Our goal is always to provide you with excellent care. Hearing back from our patients is one way we can continue to improve our services. Please take a few minutes to complete the written survey that you may receive in the mail after your visit with us. Thank you!             Your Updated Medication List - Protect others around you: Learn how to safely use, store and throw away your medicines at www.disposemymeds.org.          This list is accurate as of 4/4/18  6:48 PM.  Always use your most recent med list.                   Brand Name Dispense Instructions for use Diagnosis    acetaminophen 325 MG tablet    TYLENOL    150 tablet    Take 2 tablets (650 mg) by mouth every 4 hours as needed for mild pain or fever    Urethral cancer (H)       amLODIPine 10 MG tablet    NORVASC    90 tablet    Take 1 tablet (10 mg) by mouth daily    Benign essential hypertension       amoxicillin-clavulanate 500-125 MG per tablet    AUGMENTIN    21 tablet    Take 1 tablet by mouth daily While the drains are in place    Urinary tract infection       docusate sodium 100 MG capsule    COLACE    60 capsule    Take 1 capsule (100 mg) by mouth 2 times daily as  needed for constipation    Slow transit constipation       fluticasone 50 MCG/ACT spray    FLONASE    2 Bottle    Spray 2 sprays into both nostrils 2 times daily    Urethral cancer (H), Allergic sinusitis       LORazepam 0.5 MG tablet    ATIVAN    30 tablet    Take 1 tablet (0.5 mg) by mouth every 8 hours as needed for anxiety    Anxiety       nystatin 656644 UNIT/ML suspension    MYCOSTATIN    200 mL    Take 5 mLs (500,000 Units) by mouth 4 times daily Swish and spit    Thrush       omeprazole 2 mg/mL Susp    priLOSEC    280 mL    Take 10 mLs (20 mg) by mouth 2 times daily    Gastroesophageal reflux disease with esophagitis, Urethral cancer (H)       oxyCODONE IR 5 MG tablet    ROXICODONE    30 tablet    Take 1-2 tablets (5-10 mg) by mouth every 3 hours as needed for other (pain control or improvement in physical function. Hold dose for analgesic side effects.)    Urethral cancer (H)       VITAMIN B COMPLEX PO      Take by mouth daily (with lunch)

## 2018-04-04 NOTE — PATIENT INSTRUCTIONS
Return in 6 weeks 5/23/18 12:20 with imaging.    It was a pleasure meeting with you today.  Thank you for allowing me and my team the privilege of caring for you today.  YOU are the reason we are here, and I truly hope we provided you with the excellent service you deserve.  Please let us know if there is anything else we can do for you so that we can be sure you are leaving completely satisfied with your care experience.

## 2018-04-04 NOTE — NURSING NOTE
Chief Complaint   Patient presents with     RECHECK     S/P Radical penectomy and urethrectomy on 3-8-18                      Aleena Tim MA

## 2018-04-04 NOTE — LETTER
4/4/2018       RE: King Gonsalo Bruno  [address]  [facility] [address]     Dear Colleague,    Thank you for referring your patient, King Gonsalo Bruno, to the [facility] UROLOGY AND [facility] FOR PROSTATE AND UROLOGIC CANCERS at [facility]. Please see a copy of my visit note below.    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 Haddad.  He is s/p neoadjuvant chemotherapy with Dr. Arceo initially with Cisplatin/Morrow but this was stopped due to renal dysfunction, then he went on to complete 4 cycles of Carbo/Morrow.      On 3/8/18 underwent a cystoscopy with bladder biopsies, radical penectomy and urethrectomy, perineal urethrostomy, and bilateral inguinal LAD (superficial and deep on the right, superficial on the left).  He was discharged on POD#4 after undergoing a successful voiding trial then suprapubic tube removal as well.  Today he presents in routine followup. 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       Right inguinal drain has produced <20cc over the past 5 days.    Current meds    Current Outpatient Prescriptions   Medication Sig Dispense Refill     oxyCODONE IR (ROXICODONE) 5 MG tablet Take 1-2 tablets (5-10 mg) by mouth every 3 hours as needed for other (pain control or improvement in physical function. Hold dose for analgesic side effects.) 30 tablet 0     acetaminophen (TYLENOL) 325 MG tablet Take 2 tablets (650 mg) by mouth every 4 hours as needed for mild pain or fever 150 tablet 0     docusate sodium (COLACE) 100 MG capsule Take 1 capsule (100 mg) by mouth 2 times daily as needed for constipation 60 capsule 0     amoxicillin-clavulanate  "(AUGMENTIN) 500-125 MG per tablet Take 1 tablet by mouth daily While the drains are in place 21 tablet 1     B Complex Vitamins (VITAMIN B COMPLEX PO) Take by mouth daily (with lunch)       fluticasone (FLONASE) 50 MCG/ACT spray Spray 2 sprays into both nostrils 2 times daily (Patient taking differently: Spray 2 sprays into both nostrils every morning ) 2 Bottle 11     LORazepam (ATIVAN) 0.5 MG tablet Take 1 tablet (0.5 mg) by mouth every 8 hours as needed for anxiety (Patient taking differently: Take 0.5 mg by mouth At Bedtime ) 30 tablet 3     amLODIPine (NORVASC) 10 MG tablet Take 1 tablet (10 mg) by mouth daily (Patient taking differently: Take 20 mg by mouth every morning ) 90 tablet 3     omeprazole (PRILOSEC) 2 mg/mL SUSP Take 10 mLs (20 mg) by mouth 2 times daily 280 mL 11     nystatin (MYCOSTATIN) 466618 UNIT/ML suspension Take 5 mLs (500,000 Units) by mouth 4 times daily Swish and spit (Patient taking differently: Take 500,000 Units by mouth as needed Swish and spit) 200 mL 0     OBJECTIVE:  /64  Pulse 74  Ht 1.93 m (6' 4\")  Wt 68.5 kg (151 lb)  BMI 18.38 kg/m2    EXAM:  GENERAL: No acute distress. Well nourished.   HEENT:  Sclerae anicteric.  Conjunctivae pink.  Moist mucous membranes.  LUNGS:  Non-labored breathing.  ABDOMEN: Soft, non-tender, nondistended  : scrotal incision healing appropriately, R inguinal drain serous drainage.  SKIN: No rashes.  Dry.     EXTREMITIES:  Warm, well perfused.  No Lower extremity edema bilaterally.  NEURO: normal gait, no focal deficits.     Labs/imaging: relevant results as above    ASSESSMENT: King Gonsalo Bruno is a 72 year old male with invasive SCC of the penile urethra who is s/p perineal urethrostomy creation on 3/8/2018.    PLAN:   - R inguinal drain removed in clinic  - Follow up in 6 weeks with CXR and CT abd/pelvis    Patient seen and plan discussed with Dr. Haddad    Patient seen and examined with the resident.  Visit time 15 minutes and >50% spent " in counseling.  I agree with the resident's note and plan of care.       Muriel Haddad MD  Urology Staff      CC  Patient Care Team:  Kit Ventura NP as PCP - General (Nurse Practitioner)  Ry Dunn as Referring Physician (Surgery)  Muriel Haddad MD as MD (Urology)  Lizzy Chowdary, RN as Registered Nurse (Urology)  Steve Arceo MD as MD (Oncology)  Janet Murrieta, RN as Nurse Coordinator (Oncology)  KIT VENTURA    Copy to patient  KING AILYN CHAVEZ  [address]  [facility] [address]

## 2018-04-05 ENCOUNTER — TELEPHONE (OUTPATIENT)
Dept: OTOLARYNGOLOGY | Facility: CLINIC | Age: 73
End: 2018-04-05

## 2018-04-06 ENCOUNTER — RADIANT APPOINTMENT (OUTPATIENT)
Dept: GENERAL RADIOLOGY | Facility: CLINIC | Age: 73
End: 2018-04-06
Attending: OTOLARYNGOLOGY
Payer: COMMERCIAL

## 2018-04-06 ENCOUNTER — THERAPY VISIT (OUTPATIENT)
Dept: SPEECH THERAPY | Facility: CLINIC | Age: 73
End: 2018-04-06
Attending: OTOLARYNGOLOGY
Payer: COMMERCIAL

## 2018-04-06 DIAGNOSIS — K22.5 ZENKER DIVERTICULUM: ICD-10-CM

## 2018-04-06 DIAGNOSIS — R13.12 OROPHARYNGEAL DYSPHAGIA: Primary | ICD-10-CM

## 2018-04-06 RX ORDER — BARIUM SULFATE 400 MG/ML
SUSPENSION ORAL ONCE
Status: COMPLETED | OUTPATIENT
Start: 2018-04-06 | End: 2018-04-06

## 2018-04-06 RX ORDER — BARIUM SULFATE 400 MG/ML
SUSPENSION ORAL ONCE
Status: DISCONTINUED | OUTPATIENT
Start: 2018-04-06 | End: 2019-01-01 | Stop reason: CLARIF

## 2018-04-06 RX ADMIN — BARIUM SULFATE: 400 SUSPENSION ORAL at 14:54

## 2018-04-06 NOTE — MR AVS SNAPSHOT
After Visit Summary   4/6/2018    King Gonsalo Bruno    MRN: [id number]           Patient Information     Date Of Birth          1945        Visit Information        Provider Department      4/6/2018 2:00 PM Khalida March SLP [facility]        Today's Diagnoses     Oropharyngeal dysphagia    -  1    Zenker diverticulum           Follow-ups after your visit        Your next 10 appointments already scheduled     Apr 10, 2018  2:00 PM CDT   (Arrive by 1:45 PM)   Return Visit with Arsenio Ortiz MD   [facility] Ear Nose and Throat ([facility])    [address] [place] [address]  [address] [facility] [address]   [phone]            Apr 10, 2018  2:00 PM CDT   (Arrive by 1:45 PM)   Swallow Treatment with PEDRO Moffett   [facility] ([facility])    [address] [place] [address] [phone]            Apr 25, 2018 11:30 AM CDT   Masonic Lab Draw with  MASONIC LAB DRAW   [facility]    [address] [place] [address]  [address] [place] [address] [phone]            Apr 25, 2018 12:00 PM CDT   (Arrive by 11:45 AM)   Return Visit with Steve Arceo MD   [facility] ([facility])    [address] [place] [address]  [address] [place] [address]   [phone]            May 22, 2018  2:15 PM CDT   (Arrive by 2:00 PM)   Return Visit with Arsenio Ortiz MD   [facility]ose [facility] Throat ([facility])    [address] [place] [address]   [phone]            May 23, 2018 11:20 AM CDT   (Arrive by 11:05 AM)   CT CHEST W CONTRAST ABDOMEN PELVIS W/O & W CONTRAST with UCCT1   [facility] [place] [facility] ([facility])    [address] [place] [address] [phone]           Please bring any scans or  X-rays taken at other hospitals, if similar tests were done. Also bring a list of your medicines, including vitamins, minerals and over-the-counter drugs. It is safest to leave personal items at home.  Be sure to tell your doctor:   If you have any allergies.   If there s any chance you are pregnant.   If you are breastfeeding.  You may have contrast for this exam. To prepare:   Do not eat or drink for 2 hours before your exam. If you need to take medicine, you may take it with small sips of water. (We may ask you to take liquid medicine as well.)   The day before your exam, drink extra fluids at least six 8-ounce glasses (unless your doctor tells you to restrict your fluids).   You will be given instructions on how to drink the contrast.  Patients over 70 or patients with diabetes or kidney problems:   If you haven t had a blood test (creatinine test) within the last 30 days, the Cardiologist/Radiologist may require you to get this test prior to your exam.  If you have diabetes:   Continue to take your metformin medication on the day of your exam  Please wear loose clothing, such as a sweat suit or jogging clothes. Avoid snaps, zippers and other metal. We may ask you to undress and put on a hospital gown.  If you have any questions, please call the Imaging Department where you will have your exam.            May 23, 2018 12:20 PM CDT   (Arrive by 12:05 PM)   Return Visit with Muriel Haddad MD   [facility] Urology and [facility] for Prostate and Urologic Cancers ([facility])    [address] [facility]  [address]th [facility] [address] [phone]              Who to contact     Please call your clinic at [phone] to:    Ask questions about your health    Make or cancel appointments    Discuss your medicines    Learn about your test results    Speak to your doctor            Additional Information About Your Visit        MyChart Information     NCLC is an electronic gateway that  provides easy, online access to your medical records. With Jibestream, you can request a clinic appointment, read your test results, renew a prescription or communicate with your care team.     To sign up for Jibestream visit the website at [email or web address]   You will be asked to enter the access code listed below, as well as some personal information. Please follow the directions to create your username and password.     Your access code is: [email or web address]  Expires: 2018  7:31 AM     Your access code will  in 90 days. If you need help or a new code, please contact your [facility] or call [phone] for assistance.        Care EveryWhere ID     This is your Care EveryWhere ID. This could be used by other organizations to access your [place] medical records  [id number]         Blood Pressure from Last 3 Encounters:   18 133/64   18 125/74   18 109/73    Weight from Last 3 Encounters:   18 68.5 kg (151 lb)   18 66.5 kg (146 lb 9.6 oz)   18 68.6 kg (151 lb 4.8 oz)              Today, you had the following     No orders found for display         Today's Medication Changes          These changes are accurate as of 18 11:59 PM.  If you have any questions, ask your nurse or doctor.               These medicines have changed or have updated prescriptions.        Dose/Directions    amLODIPine 10 MG tablet   Commonly known as:  NORVASC   This may have changed:    - how much to take  - when to take this   Used for:  Benign essential hypertension        Dose:  10 mg   Take 1 tablet (10 mg) by mouth daily   Quantity:  90 tablet   Refills:  3       fluticasone 50 MCG/ACT spray   Commonly known as:  FLONASE   This may have changed:  when to take this   Used for:  Urethral cancer (H), Allergic sinusitis        Dose:  2 spray   Spray 2 sprays into both nostrils 2 times daily   Quantity:  2 Bottle   Refills:  11       LORazepam 0.5 MG tablet    Commonly known as:  ATIVAN   This may have changed:  when to take this   Used for:  Anxiety        Dose:  0.5 mg   Take 1 tablet (0.5 mg) by mouth every 8 hours as needed for anxiety   Quantity:  30 tablet   Refills:  3       nystatin 914962 UNIT/ML suspension   Commonly known as:  MYCOSTATIN   This may have changed:    - when to take this  - reasons to take this  - additional instructions   Used for:  Thrush        Dose:  293900 Units   Take 5 mLs (500,000 Units) by mouth 4 times daily Swish and spit   Quantity:  200 mL   Refills:  0                Primary Care Provider Office Phone # Fax #    Joan Ventura, MARLENY [phone]       [facility] [address] [facility] [place] [address]        Equal Access to Services     JENELLE RED : Tiffanie leono Soveronica, waaxda luqadaha, qaybta kaalmada adeegyada, ronald srivastava . So [facility] [phone].    ATENCIÓN: Si habla español, tiene a washburn disposición servicios gratuitos de asistencia lingüística. [facility] [phone].    We comply with applicable federal civil rights laws and [place] laws. We do not discriminate on the basis of race, color, national origin, age, disability, sex, sexual orientation, or gender identity.            Thank you!     Thank you for choosing [facility]  for your care. Our goal is always to provide you with excellent care. Hearing back from our patients is one way we can continue to improve our services. Please take a few minutes to complete the written survey that you may receive in the mail after your visit with us. Thank you!             Your Updated Medication List - Protect others around you: Learn how to safely use, store and throw away your medicines at www.disposemymeds.org.          This list is accurate as of 4/6/18 11:59 PM.  Always use your most recent med list.                   Brand Name Dispense Instructions for use Diagnosis    acetaminophen 325 MG tablet    TYLENOL    150  tablet    Take 2 tablets (650 mg) by mouth every 4 hours as needed for mild pain or fever    Urethral cancer (H)       amLODIPine 10 MG tablet    NORVASC    90 tablet    Take 1 tablet (10 mg) by mouth daily    Benign essential hypertension       amoxicillin-clavulanate 500-125 MG per tablet    AUGMENTIN    21 tablet    Take 1 tablet by mouth daily While the drains are in place    Urinary tract infection       docusate sodium 100 MG capsule    COLACE    60 capsule    Take 1 capsule (100 mg) by mouth 2 times daily as needed for constipation    Slow transit constipation       fluticasone 50 MCG/ACT spray    FLONASE    2 Bottle    Spray 2 sprays into both nostrils 2 times daily    Urethral cancer (H), Allergic sinusitis       LORazepam 0.5 MG tablet    ATIVAN    30 tablet    Take 1 tablet (0.5 mg) by mouth every 8 hours as needed for anxiety    Anxiety       nystatin 083313 UNIT/ML suspension    MYCOSTATIN    200 mL    Take 5 mLs (500,000 Units) by mouth 4 times daily Swish and spit    Thrush       omeprazole 2 mg/mL Susp    priLOSEC    280 mL    Take 10 mLs (20 mg) by mouth 2 times daily    Gastroesophageal reflux disease with esophagitis, Urethral cancer (H)       oxyCODONE IR 5 MG tablet    ROXICODONE    30 tablet    Take 1-2 tablets (5-10 mg) by mouth every 3 hours as needed for other (pain control or improvement in physical function. Hold dose for analgesic side effects.)    Urethral cancer (H)       VITAMIN B COMPLEX PO      Take by mouth daily (with lunch)

## 2018-04-10 ENCOUNTER — THERAPY VISIT (OUTPATIENT)
Dept: SPEECH THERAPY | Facility: CLINIC | Age: 73
End: 2018-04-10
Payer: COMMERCIAL

## 2018-04-10 ENCOUNTER — OFFICE VISIT (OUTPATIENT)
Dept: OTOLARYNGOLOGY | Facility: CLINIC | Age: 73
End: 2018-04-10
Payer: COMMERCIAL

## 2018-04-10 DIAGNOSIS — K12.1 STOMATITIS AND MUCOSITIS: Primary | ICD-10-CM

## 2018-04-10 DIAGNOSIS — R13.12 OROPHARYNGEAL DYSPHAGIA: Primary | ICD-10-CM

## 2018-04-10 DIAGNOSIS — K12.30 STOMATITIS AND MUCOSITIS: Primary | ICD-10-CM

## 2018-04-10 ASSESSMENT — PAIN SCALES - GENERAL: PAINLEVEL: NO PAIN (0)

## 2018-04-10 NOTE — LETTER
4/10/2018       RE: King Gonsalo Bruno  [address]  [place] [facility] [place] [address]     Dear Colleague,    Thank you for referring your patient, King Gonsalo Bruno, to the [facility]. Please see a copy of my visit note below.    King Damion is a gentleman who is 72 years of age.  He has had a urethral cancer and things of this nature, but he has had these odd swallowing problems that made me think that he had a Zenker's diverticulum.  There was extensive testing done of this that showed that he has some problems with epiglottic inversion and some issues where food might get caught, but he does not have a problem of Zenker's diverticulum to go ahead and do any sort of surgery on it.  Interestingly, there are probably ways that this could be managed with speech pathology and there may be a role for Botox perhaps, but there is no surgical option for this.  We explained all this today over about 15 minutes of counseling time with the individual.  We answered all of his questions, and then I let him go see our speech pathologist to explain some of these techniques for swallowing to him.         Again, thank you for allowing me to participate in the care of your patient.      Sincerely,    Arsenio Ortiz MD

## 2018-04-10 NOTE — MR AVS SNAPSHOT
After Visit Summary   4/10/2018    King Gonsalo Bruno    MRN: [id number]           Patient Information     Date Of Birth          1945        Visit Information        Provider Department      4/10/2018 2:00 PM Arsenio Ortiz MD [facility] Ear Nose and Throat        Care Instructions    The plan will be to have you follow up in several weeks to see our SLP Khalida.  Graeme Dillard ,RN  [phone]              Follow-ups after your visit        Follow-up notes from your care team     Return in about 2 weeks (around 4/24/2018).      Your next 10 appointments already scheduled     Apr 25, 2018 11:30 AM CDT   Masonic Lab Draw with UC MASONIC LAB DRAW   [facility])    [address] [place] [address]  [address] [place] [address]   [phone]            Apr 25, 2018 12:00 PM CDT   (Arrive by 11:45 AM)   Return Visit with Steve Arceo MD   [facility] ([facility])    [address] [place] [address]  [address] [place] [address]   [phone]            Apr 26, 2018 10:45 AM CDT   Treatment 45 with PEDRO Moffett   [facility] ([facility])    [address] [place] [address]  [address] [facility] [address] [phone]            May 22, 2018  2:15 PM CDT   (Arrive by 2:00 PM)   Return Visit with Arsenio Ortiz MD   [facility] Nose and Throat ([facility])    [address] [place] [address]  [address] [facility] [address]   [phone]            May 23, 2018 11:20 AM CDT   (Arrive by 11:05 AM)   CT CHEST W CONTRAST ABDOMEN PELVIS W/O & W CONTRAST with UCCT1   [facility] Imaging [place] [facility] ([facility])    [address] [place] [address]  [address] [facility] [address] [phone]           Please bring any scans or X-rays taken at other hospitals, if similar tests were done. Also bring a list of your medicines, including  vitamins, minerals and over-the-counter drugs. It is safest to leave personal items at home.  Be sure to tell your doctor:   If you have any allergies.   If there s any chance you are pregnant.   If you are breastfeeding.  You may have contrast for this exam. To prepare:   Do not eat or drink for 2 hours before your exam. If you need to take medicine, you may take it with small sips of water. (We may ask you to take liquid medicine as well.)   The day before your exam, drink extra fluids at least six 8-ounce glasses (unless your doctor tells you to restrict your fluids).   You will be given instructions on how to drink the contrast.  Patients over 70 or patients with diabetes or kidney problems:   If you haven t had a blood test (creatinine test) within the last 30 days, the Cardiologist/Radiologist may require you to get this test prior to your exam.  If you have diabetes:   Continue to take your metformin medication on the day of your exam  Please wear loose clothing, such as a sweat suit or jogging clothes. Avoid snaps, zippers and other metal. We may ask you to undress and put on a hospital gown.  If you have any questions, please call the Imaging Department where you will have your exam.            May 23, 2018 12:20 PM CDT   (Arrive by 12:05 PM)   Return Visit with Muriel Haddad MD   [facility]rology and [facility] for Prostate and Urologic Cancers ([facility])    [address] [place] [address] [phone]              Who to contact     Please call your clinic at [phone] to:    Ask questions about your health    Make or cancel appointments    Discuss your medicines    Learn about your test results    Speak to your doctor            Additional Information About Your Visit        ElectraTherm Information     ElectraTherm is an electronic gateway that provides easy, online access to your medical records. With ElectraTherm, you can request a clinic appointment, read  your test results, renew a prescription or communicate with your care team.     To sign up for [employer] visit the website at [email or web address]   You will be asked to enter the access code listed below, as well as some personal information. Please follow the directions to create your username and password.     Your access code is: [email or web address]  Expires: 2018  7:31 AM     Your access code will  in 90 days. If you need help or a new code, please contact your [facility] or call [phone] for assistance.        Care EveryWhere ID     This is your Care EveryWhere ID. This could be used by other organizations to access your [place] medical records  [id number]         Blood Pressure from Last 3 Encounters:   18 133/64   18 125/74   18 109/73    Weight from Last 3 Encounters:   18 68.5 kg (151 lb)   18 66.5 kg (146 lb 9.6 oz)   18 68.6 kg (151 lb 4.8 oz)              Today, you had the following     No orders found for display         Today's Medication Changes          These changes are accurate as of 4/10/18  2:56 PM.  If you have any questions, ask your nurse or doctor.               These medicines have changed or have updated prescriptions.        Dose/Directions    amLODIPine 10 MG tablet   Commonly known as:  NORVASC   This may have changed:    - how much to take  - when to take this   Used for:  Benign essential hypertension        Dose:  10 mg   Take 1 tablet (10 mg) by mouth daily   Quantity:  90 tablet   Refills:  3       fluticasone 50 MCG/ACT spray   Commonly known as:  FLONASE   This may have changed:  when to take this   Used for:  Urethral cancer (H), Allergic sinusitis        Dose:  2 spray   Spray 2 sprays into both nostrils 2 times daily   Quantity:  2 Bottle   Refills:  11       LORazepam 0.5 MG tablet   Commonly known as:  ATIVAN   This may have changed:  when to take this   Used for:  Anxiety        Dose:  0.5 mg    Take 1 tablet (0.5 mg) by mouth every 8 hours as needed for anxiety   Quantity:  30 tablet   Refills:  3                Primary Care Provider Office Phone # Fax #    Joan Ventura, MARLENY [phone]       [facility] [address] [facility] [address]        Equal Access to Services     JENELLE RED : Hadii aad ku hadasho Soomaali, waaxda luqadaha, qaybta kaalmada adeegyada, waxay idiin hayaan adeeg alma lashakira . So [facility] [phone].    ATENCIÓN: Si habla español, tiene a washburn disposición servicios gratuitos de asistencia lingüística. Juwan al [phone].    We comply with applicable federal civil rights laws and [place] laws. We do not discriminate on the basis of race, color, national origin, age, disability, sex, sexual orientation, or gender identity.            Thank you!     Thank you for choosing [facility] E[facility] NOSE AND TH[facility]AT  for your care. Our goal is always to provide you with excellent care. Hearing back from our patients is one way we can continue to improve our services. Please take a few minutes to complete the written survey that you may receive in the mail after your visit with us. Thank you!             Your Updated Medication List - Protect others around you: Learn how to safely use, store and throw away your medicines at www.disposemymeds.org.          This list is accurate as of 4/10/18  2:56 PM.  Always use your most recent med list.                   Brand Name Dispense Instructions for use Diagnosis    acetaminophen 325 MG tablet    TYLENOL    150 tablet    Take 2 tablets (650 mg) by mouth every 4 hours as needed for mild pain or fever    Urethral cancer (H)       amLODIPine 10 MG tablet    NORVASC    90 tablet    Take 1 tablet (10 mg) by mouth daily    Benign essential hypertension       amoxicillin-clavulanate 500-125 MG per tablet    AUGMENTIN    21 tablet    Take 1 tablet by mouth daily While the drains are in place    Urinary tract infection       docusate  sodium 100 MG capsule    COLACE    60 capsule    Take 1 capsule (100 mg) by mouth 2 times daily as needed for constipation    Slow transit constipation       fluticasone 50 MCG/ACT spray    FLONASE    2 Bottle    Spray 2 sprays into both nostrils 2 times daily    Urethral cancer (H), Allergic sinusitis       LORazepam 0.5 MG tablet    ATIVAN    30 tablet    Take 1 tablet (0.5 mg) by mouth every 8 hours as needed for anxiety    Anxiety       nystatin 821546 UNIT/ML suspension    MYCOSTATIN    200 mL    Take 5 mLs (500,000 Units) by mouth 4 times daily Swish and spit    Thrush       omeprazole 2 mg/mL Susp    priLOSEC    280 mL    Take 10 mLs (20 mg) by mouth 2 times daily    Gastroesophageal reflux disease with esophagitis, Urethral cancer (H)       oxyCODONE IR 5 MG tablet    ROXICODONE    30 tablet    Take 1-2 tablets (5-10 mg) by mouth every 3 hours as needed for other (pain control or improvement in physical function. Hold dose for analgesic side effects.)    Urethral cancer (H)       VITAMIN B COMPLEX PO      Take by mouth daily (with lunch)

## 2018-04-10 NOTE — PATIENT INSTRUCTIONS
The plan will be to have you follow up in several weeks to see our SLP Khalida.  Graeme Dillard RN  [phone]

## 2018-04-10 NOTE — PROGRESS NOTES
King Damion is a gentleman who is 72 years of age.  He has had a urethral cancer and things of this nature, but he has had these odd swallowing problems that made me think that he had a Zenker's diverticulum.  There was extensive testing done of this that showed that he has some problems with epiglottic inversion and some issues where food might get caught, but he does not have a problem of Zenker's diverticulum to go ahead and do any sort of surgery on it.  Interestingly, there are probably ways that this could be managed with speech pathology and there may be a role for Botox perhaps, but there is no surgical option for this.  We explained all this today over about 15 minutes of counseling time with the individual.  We answered all of his questions, and then I let him go see our speech pathologist to explain some of these techniques for swallowing to him.

## 2018-04-10 NOTE — NURSING NOTE
Chief Complaint   Patient presents with     RECHECK     Follow up for Zenkers diverticulum      Jim Romero

## 2018-04-10 NOTE — PROGRESS NOTES
04/06/18 1400   General Information   Type Of Visit Initial   Start Of Care Date 04/06/18   Referring Physician Dr. 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 Pt most recently completed a VFSS in 11/2017 which demonstrated mild pharyngeal dysphagia with a small outpouching seen in left hypopharynx that appeared to have mild impact on pt's swallowing function. Pt was referred for a repeat VFSS and esophagram to re-evaluate diverticulum and swallowing function. Upon clinical interview, pt reported ongoing difficulty with swallowing. Endorsed feeling of food sticking in his throat. Stated he swallows ~3x per bite and often ends up regurgitating, re-chewing, and re-swallowing the food to help it go down better. Stated he often feels like regurgitating food also results in reflux. Pt reported an increase in coughing at nighttime. Endorsed ongoing frequent belching. Reported he continues to eat regular textures foods but has more success swallowing softer foods. Stated taking small bites is also helpful but liquid wash is minimally beneficial. He endorsed feeling as if pills become lodged in his throat at times but they eventually seem to clear. Denied odynophagia, unintentional weight loss, or recent pneumonias. Endorsed he feels as if his swallowing has improved slightly since finishing his chemotherapy.    Respiratory Status Room air   Prior Level Of Function Swallowing   Prior Level Of Function  Comment Regular textures, thin liquids   General Observations Pt pleasant and cooperative   Patient/family Goals To improve swallowing function   Pain Assessment   Pain Reported No   Fall Risk Screen   Have you fallen 2 or more times in the past year? No   Have you fallen and had an injury in the past year? No   Is patient a fall risk? No   Clinical Swallow Evaluation   Oral Musculature anomalies present   Structural Abnormalities none present   Dentition other (see comments)  (missing upper and lower molars)   Mucosal Quality adequate   Mandibular Strength and Mobility intact   Oral Labial Strength and Mobility WFL   Lingual Strength and Mobility impaired left lateral movement;impaired right lateral movement;impaired coordination   Velar Elevation intact   Laryngeal Function Swallow;Voicing initiated   Oral Musculature Comments Mild difficulty coordinating lateral lingual movements   VFSS Evaluation   VFSS Additional Documentation Yes   VFSS Eval: Radiology   Radiologist Resident   Views Taken left lateral;A/P   Physical Location of Procedure [facility]   VFSS Eval: Thin Liquid Texture Trial   Mode of Presentation, Thin Liquid spoon;cup;straw;self-fed   Order of Presentation 1, 2, 3, 4, 5, 9, 13, 14, 16  (14-barium tablet with water)   Preparatory Phase WFL   Oral Phase, Thin Liquid WFL   Pharyngeal Phase, Thin Liquid Delayed swallow reflex;Residue in valleculae;Residue in pyriform sinus;other (see comments)  (mildly prominent left outpouching)   Rosenbek's Penetration Aspiration Scale: Thin Liquid Trial Results 5 - contrast contacts vocal cords, visible residue remains (penetration)   Successful Strategies Trialed During Procedure, Thin Liquid other (see comments)  (dry swallow)   Diagnostic Statement Delayed swallow response and epiglottic inversion resulting in consistent penetration. Intermittent deep penetration to level of vocal folds; one time with serial sips via cup and second time with use of chin tuck.  Inadequate epiglottic inversion with use of chin tuck. No aspiration. Frequent mild pharyngeal residuals which are reduced and frequently cleared with use of spontaneous dry swallow. Ongoing small outpouching in left hypopharynx which was inconsistently visualized.    VFSS Eval: Nectar Thick Liquid Texture Trial   Mode of Presentation, Nectar cup;self-fed   Order of Presentation 6   Preparatory Phase WFL   Oral Phase, Nectar WFL   Pharyngeal Phase, Nectar Residue in valleculae;Residue in pyriform sinus   Rosenbek's Penetration Aspiration Scale: Nectar-Thick Liquid Trial Results 1 - no aspiration, contrast does not enter airway   Successful Strategies Trialed During Procedure, Nectar other (see comments)  (dry swallow)   Diagnostic Statement Prompt swallow response with effective airway protection. Minimal to mild pharyngeal residuals are cleared with spontaneous dry swallow.    VFSS Eval: Puree Solid Texture Trial   Mode of Presentation, Puree spoon;self-fed   Order of Presentation 7, 8, 15   Preparatory Phase WFL   Oral Phase, Puree WFL   Pharyngeal Phase, Puree Residue in valleculae;other (see comments)  (BOT residue)   Rosenbek's Penetration Aspiration Scale: Puree Food Trial Results 1 - no aspiration, contrast does not enter airway   Successful Strategies Trialed During Procedure, Puree other (see comments)  (dry swallows)   Diagnostic Statement Prompt swallow response with effective airway protection. Mild pharyngeal residue which is reduced with dry swallows.    VFSS Eval: Solid Food Texture Trial   Mode of Presentation, Solid self-fed   Order of Presentation 10, 12  (11-dry swallow)   Preparatory Phase WFL   Oral Phase, Solid Residue in oral cavity  (minimal )   Pharyngeal Phase, Solid WFL   Rosenbek's Penetration Aspiration Scale: Solid Food Trial Results 1 - no aspiration, contrast does not enter airway   Successful Strategies Trialed During Procedure, Solid other (see comments)  (dry swallow)   Diagnostic  Statement Slow but adequate mastication. Prompt swallow with effective airway protection and pharyngeal clearance.    Swallow Compensations   Swallow Compensations Chin tuck;Multiple swallow   Results Other (see comments)  (Reduction in pharyngeal residuals)   Educational Assessment   Barriers to Learning No barriers   Esophageal Phase of Swallow   Patient reports or presents with symptoms of esophageal dysphagia Yes   Esophageal sweep performed during today s vidofluoroscopic exam  Yes;Please refer to radiologist's report for details   Esophageal comments Pt with history    General Therapy Interventions   Planned Therapy Interventions Dysphagia Treatment   Dysphagia treatment Oropharyngeal exercise training;Instruction of safe swallow strategies;Compensatory strategies for swallowing   Swallow Eval: Clinical Impressions   Skilled Criteria for Therapy Intervention Skilled criteria met.  Treatment indicated.   Dysphagia Outcome Severity Scale (SHILPI) Level 4 - SHILPI   Treatment Diagnosis Mild-moderate oropharyngeal dysphagia   Diet texture recommendations Regular diet;Thin liquids  (soft, moist textures)   Recommended Feeding/Eating Techniques maintain upright posture during/after eating for 30 mins;small sips/bites;alternate between small bites and sips of food/liquid;other (see comments)  (double swallow per bite/sip, oral cares)   Rehab Potential good, to achieve stated therapy goals   Therapy Frequency other (see comments)  (2x/month x 2 months)   Anticipated Discharge Disposition home w/ outpatient services   Risks and Benefits of Treatment have been explained. Yes   Patient, family and/or staff in agreement with Plan of Care Yes   Clinical Impression Comments Pt presents with mild-moderate oropharyngeal dysphagia; swallow functional has slightly worsened since previous VFSS completed 11/2017. Oral phase is marked by adequate mastication and bolus control. Pt exhibits mild oral residuals which are cleared with  spontaneous dry swallows. Pharyngeal phase is characterized by delayed swallow response and delayed epiglottic inversion with thin liquids which results in frequent penetration with residuals. Pt demonstrates intermittent deep penetration which occasionally reach level of vocal folds. Improved timing of swallow response noted with nectar-thick liquids and solid textures. No aspiration is seen during study; however, pt is at increased risk of aspiration with thin liquids given frequent penetration with residuals and intermittent deep penetration to level of vocal folds. Pt exhibits ongoing consistent mild stasis in valleculae and pyriform sinuses after swallows of liquids. Stasis is generally eliminated with subsequent dry swallow. He demonstrates generally adequate pharyngeal clearance of solid textures. Pt shows an ongoing small outpouching in the hypopharynx which appears similiar to study completed in 11/2017. Outpouching was not consistently visualized during study but was noted during image #28. It continues to appear to have a mild impact on pt's swallow as residuals are genearlly eliminated with subsequent dry swallows and additional time. Barium tablet easily passed through pharynx without becoming lodged in pouch. Recommend ongoing soft/moist regular textures and thin liquids with use of swallowing strategies. Recommend short-term SLP services to ensure tolerance of diet, train pt on strategies, and train pt on pharyngeal strengthening exercises.     Swallow Goals   SLP Swallow Goals 1;2   Swallow Goal 1   Goal Identifier Diet   Goal Description 1. Pt will tolerate soft/moist regular textures and thin liquids with independent use of swallowing strategies and no overt s/sx of aspiration or feeling of pharyngeal stasis in 95% of PO trials.    Target Date 07/05/18   Swallow Goal 2   Goal Identifier Exercises   Goal Description 2. Pt will complete pharyngeal strengthening exercises, 10 reps 3x/day, when given  minimal written cues.    Target Date 07/05/18   Total Session Time   Total Session Time 50   Total Evaluation Time 50     Thank you for the referral of King Gonsalo Bruno. If you have any questions about this report, please contact me using the information below.     Khalida March MA, CCC-SLP  Speech-Language Pathologist   [facility]  Department of Otolaryngology/D&T - 4th floor  Pager: [phone]  Phone: [phone]  Email: [email or web address]

## 2018-04-25 ENCOUNTER — OFFICE VISIT (OUTPATIENT)
Dept: UROLOGY | Facility: CLINIC | Age: 73
End: 2018-04-25
Payer: COMMERCIAL

## 2018-04-25 ENCOUNTER — ONCOLOGY VISIT (OUTPATIENT)
Dept: ONCOLOGY | Facility: CLINIC | Age: 73
End: 2018-04-25
Attending: INTERNAL MEDICINE
Payer: MEDICARE

## 2018-04-25 ENCOUNTER — APPOINTMENT (OUTPATIENT)
Dept: LAB | Facility: CLINIC | Age: 73
End: 2018-04-25
Attending: INTERNAL MEDICINE
Payer: MEDICARE

## 2018-04-25 VITALS
SYSTOLIC BLOOD PRESSURE: 131 MMHG | TEMPERATURE: 96.9 F | OXYGEN SATURATION: 100 % | HEART RATE: 72 BPM | WEIGHT: 148 LBS | BODY MASS INDEX: 18.02 KG/M2 | DIASTOLIC BLOOD PRESSURE: 75 MMHG

## 2018-04-25 VITALS
WEIGHT: 149 LBS | HEIGHT: 76 IN | DIASTOLIC BLOOD PRESSURE: 70 MMHG | BODY MASS INDEX: 18.15 KG/M2 | HEART RATE: 65 BPM | SYSTOLIC BLOOD PRESSURE: 133 MMHG

## 2018-04-25 DIAGNOSIS — R11.0 NAUSEA: ICD-10-CM

## 2018-04-25 DIAGNOSIS — J30.9 ALLERGIC SINUSITIS: ICD-10-CM

## 2018-04-25 DIAGNOSIS — E87.6 HYPOKALEMIA: ICD-10-CM

## 2018-04-25 DIAGNOSIS — I10 BENIGN ESSENTIAL HYPERTENSION: ICD-10-CM

## 2018-04-25 DIAGNOSIS — C68.0 URETHRAL CANCER (H): Primary | ICD-10-CM

## 2018-04-25 DIAGNOSIS — K21.00 GASTROESOPHAGEAL REFLUX DISEASE WITH ESOPHAGITIS: ICD-10-CM

## 2018-04-25 DIAGNOSIS — C68.0 URETHRAL CANCER (H): ICD-10-CM

## 2018-04-25 DIAGNOSIS — E86.0 DEHYDRATION: ICD-10-CM

## 2018-04-25 DIAGNOSIS — F41.9 ANXIETY: ICD-10-CM

## 2018-04-25 LAB
ALBUMIN SERPL-MCNC: 4.1 G/DL (ref 3.4–5)
ALP SERPL-CCNC: 71 U/L (ref 40–150)
ALT SERPL W P-5'-P-CCNC: 13 U/L (ref 0–70)
ANION GAP SERPL CALCULATED.3IONS-SCNC: 7 MMOL/L (ref 3–14)
AST SERPL W P-5'-P-CCNC: 22 U/L (ref 0–45)
BASOPHILS # BLD AUTO: 0 10E9/L (ref 0–0.2)
BASOPHILS NFR BLD AUTO: 0.7 %
BILIRUB SERPL-MCNC: 0.4 MG/DL (ref 0.2–1.3)
BUN SERPL-MCNC: 16 MG/DL (ref 7–30)
CALCIUM SERPL-MCNC: 10.2 MG/DL (ref 8.5–10.1)
CHLORIDE SERPL-SCNC: 103 MMOL/L (ref 94–109)
CO2 SERPL-SCNC: 25 MMOL/L (ref 20–32)
CREAT SERPL-MCNC: 1.54 MG/DL (ref 0.66–1.25)
DIFFERENTIAL METHOD BLD: ABNORMAL
EOSINOPHIL # BLD AUTO: 0.1 10E9/L (ref 0–0.7)
EOSINOPHIL NFR BLD AUTO: 1.6 %
ERYTHROCYTE [DISTWIDTH] IN BLOOD BY AUTOMATED COUNT: 15.3 % (ref 10–15)
GFR SERPL CREATININE-BSD FRML MDRD: 45 ML/MIN/1.7M2
GLUCOSE SERPL-MCNC: 87 MG/DL (ref 70–99)
HCT VFR BLD AUTO: 36.6 % (ref 40–53)
HGB BLD-MCNC: 11.8 G/DL (ref 13.3–17.7)
IMM GRANULOCYTES # BLD: 0 10E9/L (ref 0–0.4)
IMM GRANULOCYTES NFR BLD: 0.2 %
LYMPHOCYTES # BLD AUTO: 2.3 10E9/L (ref 0.8–5.3)
LYMPHOCYTES NFR BLD AUTO: 40 %
MAGNESIUM SERPL-MCNC: 1.9 MG/DL (ref 1.6–2.3)
MCH RBC QN AUTO: 29.4 PG (ref 26.5–33)
MCHC RBC AUTO-ENTMCNC: 32.2 G/DL (ref 31.5–36.5)
MCV RBC AUTO: 91 FL (ref 78–100)
MONOCYTES # BLD AUTO: 0.4 10E9/L (ref 0–1.3)
MONOCYTES NFR BLD AUTO: 6.2 %
NEUTROPHILS # BLD AUTO: 2.9 10E9/L (ref 1.6–8.3)
NEUTROPHILS NFR BLD AUTO: 51.3 %
NRBC # BLD AUTO: 0 10*3/UL
NRBC BLD AUTO-RTO: 0 /100
PLATELET # BLD AUTO: 299 10E9/L (ref 150–450)
POTASSIUM SERPL-SCNC: 4.1 MMOL/L (ref 3.4–5.3)
PROT SERPL-MCNC: 8.5 G/DL (ref 6.8–8.8)
RBC # BLD AUTO: 4.02 10E12/L (ref 4.4–5.9)
SODIUM SERPL-SCNC: 136 MMOL/L (ref 133–144)
WBC # BLD AUTO: 5.6 10E9/L (ref 4–11)

## 2018-04-25 PROCEDURE — 80053 COMPREHEN METABOLIC PANEL: CPT | Performed by: PHYSICIAN ASSISTANT

## 2018-04-25 PROCEDURE — 85025 COMPLETE CBC W/AUTO DIFF WBC: CPT | Performed by: PHYSICIAN ASSISTANT

## 2018-04-25 PROCEDURE — G0463 HOSPITAL OUTPT CLINIC VISIT: HCPCS | Mod: ZF

## 2018-04-25 PROCEDURE — 83735 ASSAY OF MAGNESIUM: CPT | Performed by: PHYSICIAN ASSISTANT

## 2018-04-25 PROCEDURE — 36415 COLL VENOUS BLD VENIPUNCTURE: CPT

## 2018-04-25 PROCEDURE — 99214 OFFICE O/P EST MOD 30 MIN: CPT | Mod: ZP | Performed by: INTERNAL MEDICINE

## 2018-04-25 ASSESSMENT — PAIN SCALES - GENERAL
PAINLEVEL: MODERATE PAIN (5)
PAINLEVEL: SEVERE PAIN (6)

## 2018-04-25 NOTE — MR AVS SNAPSHOT
After Visit Summary   4/25/2018    King Gonsalo Bruno    MRN: [id number]           Patient Information     Date Of Birth          1945        Visit Information        Provider Department      4/25/2018 1:30 PM Muriel Haddad MD [facility] Urology and [facility] Prostate and Urologic C[facility]        Today's Diagnoses     Urethral cancer (H)    -  1       Follow-ups after your visit        Your next 10 appointments already scheduled     May 16, 2018 12:00 PM CDT   CT CHEST W CONTRAST ABDOMEN PELVIS W/O & W CONTRAST with UUCT4   [facility], [place], [place] ([facility])    [address] [address] [phone]           Please bring any scans or X-rays taken at other hospitals, if similar tests were done. Also bring a list of your medicines, including vitamins, minerals and over-the-counter drugs. It is safest to leave personal items at home.  Be sure to tell your doctor:   If you have any allergies.   If there s any chance you are pregnant.   If you are breastfeeding.  How to prepare:   Do not eat or drink for 2 hours before your exam. If you need to take medicine, you may take it with small sips of water. (We may ask you to take liquid medicine as well.)   Please wear loose clothing, such as a sweat suit or jogging clothes. Avoid snaps, zippers and other metal. We may ask you to undress and put on a hospital gown.  Please arrive 30 minutes early for your CT. Once in the department you might be asked to drink water 15-20 minutes prior to your exam.  If indicated you may be asked to drink an oral contrast in advance of your CT.  If this is the case, the imaging team will let you know or be in contact with you prior to your appointment  Patients over 70 or patients with diabetes or kidney problems:   If you haven t had a blood test (creatinine test) within the last 30 days, the Cardiologist/Radiologist may require you to get this test prior  to your exam.  If you have diabetes:   Continue to take your metformin medication on the day of your exam  If you have any questions, please call the Imaging Department where you will have your exam.            May 16, 2018  3:00 PM CDT   CONSULT with Cierra Yeung MD   Radiation Oncology Clinic ([facility])    [facility]  1st Floor  [address] [address]   [phone]            May 17, 2018  5:00 PM CDT   (Arrive by 4:45 PM)   Return Visit with Steve Arceo MD   [facility] ([facility])    [address]  [address]  [place] [address] [phone]            May 22, 2018  2:15 PM CDT   (Arrive by 2:00 PM)   Return Visit with Arsenio Ortiz MD   [facility] ([facility])    [address]  4th Floor  [place] [id number] [phone]            May 23, 2018 12:20 PM CDT   (Arrive by 12:05 PM)   Return Visit with Muriel Haddad MD   [facility]rology and [facility] for Prostate and Urologic Cancers ([facility])    [address] [place] [address]  4th [facility] [id number] [phone]              Who to contact     Please call your clinic at [phone] to:    Ask questions about your health    Make or cancel appointments    Discuss your medicines    Learn about your test results    Speak to your doctor            Additional Information About Your Visit        MyChart Information     Z-good is an electronic gateway that provides easy, online access to your medical records. With Z-good, you can request a clinic appointment, read your test results, renew a prescription or communicate with your care team.     To sign up for Z-good visit the website at [email or web address]   You will be asked to enter the access code listed below, as well as some personal information. Please follow the  "directions to create your username and password.     Your access code is: [email or web address]  Expires: 2018  6:30 AM     Your access code will  in 90 days. If you need help or a new code, please contact your [facility] or call [phone] for assistance.        Care EveryWhere ID     This is your Care EveryWhere ID. This could be used by other organizations to access your [place] medical records  [id number]        Your Vitals Were     Pulse Height BMI (Body Mass Index)             65 1.93 m (6' 4\") 18.14 kg/m2          Blood Pressure from Last 3 Encounters:   18 133/70   18 131/75   18 133/64    Weight from Last 3 Encounters:   18 67.6 kg (149 lb)   18 67.1 kg (148 lb)   18 68.5 kg (151 lb)              Today, you had the following     No orders found for display         Today's Medication Changes          These changes are accurate as of 18 11:59 PM.  If you have any questions, ask your nurse or doctor.               These medicines have changed or have updated prescriptions.        Dose/Directions    amLODIPine 10 MG tablet   Commonly known as:  NORVASC   This may have changed:    - how much to take  - when to take this   Used for:  Benign essential hypertension        Dose:  10 mg   Take 1 tablet (10 mg) by mouth daily   Quantity:  90 tablet   Refills:  3       fluticasone 50 MCG/ACT spray   Commonly known as:  FLONASE   This may have changed:  when to take this   Used for:  Urethral cancer (H), Allergic sinusitis        Dose:  2 spray   Spray 2 sprays into both nostrils 2 times daily   Quantity:  2 Bottle   Refills:  11       LORazepam 0.5 MG tablet   Commonly known as:  ATIVAN   This may have changed:  when to take this   Used for:  Anxiety        Dose:  0.5 mg   Take 1 tablet (0.5 mg) by mouth every 8 hours as needed for anxiety   Quantity:  30 tablet   Refills:  3                Primary Care Provider Office Phone # Fax #    Joan " Janet Ventura, MARLENY [phone]       [facility] [address]  [facility] [address]        Equal Access to Services     JENELLE RED : Hadii aad ku hadnikkijanine Veroniqueveronica, wacarlosda bamjacque, rajendrata kanazda rena, ronald nayjordan marquez laKylereymundo rasmussen. So [facility] [phone].    ATENCIÓN: Si habla español, tiene a washburn disposición servicios gratuitos de asistencia lingüística. Llame al [phone].    We comply with applicable federal civil rights laws and [place] laws. We do not discriminate on the basis of race, color, national origin, age, disability, sex, sexual orientation, or gender identity.            Thank you!     Thank you for choosing [facility] UROLOGY AND [facility] FOR PROSTATE AND UROLOGIC CANCERS  for your care. Our goal is always to provide you with excellent care. Hearing back from our patients is one way we can continue to improve our services. Please take a few minutes to complete the written survey that you may receive in the mail after your visit with us. Thank you!             Your Updated Medication List - Protect others around you: Learn how to safely use, store and throw away your medicines at www.disposemymeds.org.          This list is accurate as of 4/25/18 11:59 PM.  Always use your most recent med list.                   Brand Name Dispense Instructions for use Diagnosis    acetaminophen 325 MG tablet    TYLENOL    150 tablet    Take 2 tablets (650 mg) by mouth every 4 hours as needed for mild pain or fever    Urethral cancer (H)       amLODIPine 10 MG tablet    NORVASC    90 tablet    Take 1 tablet (10 mg) by mouth daily    Benign essential hypertension       amoxicillin-clavulanate 500-125 MG per tablet    AUGMENTIN    21 tablet    Take 1 tablet by mouth daily While the drains are in place    Urinary tract infection       docusate sodium 100 MG capsule    COLACE    60 capsule    Take 1 capsule (100 mg) by mouth 2 times daily as needed for constipation    Slow transit  constipation       fluticasone 50 MCG/ACT spray    FLONASE    2 Bottle    Spray 2 sprays into both nostrils 2 times daily    Urethral cancer (H), Allergic sinusitis       LORazepam 0.5 MG tablet    ATIVAN    30 tablet    Take 1 tablet (0.5 mg) by mouth every 8 hours as needed for anxiety    Anxiety       nystatin 915482 UNIT/ML suspension    MYCOSTATIN    200 mL    Take 5 mLs (500,000 Units) by mouth 4 times daily Swish and spit    Thrush       omeprazole 2 mg/mL Susp    priLOSEC    280 mL    Take 10 mLs (20 mg) by mouth 2 times daily    Gastroesophageal reflux disease with esophagitis, Urethral cancer (H)       oxyCODONE IR 5 MG tablet    ROXICODONE    30 tablet    Take 1-2 tablets (5-10 mg) by mouth every 3 hours as needed for other (pain control or improvement in physical function. Hold dose for analgesic side effects.)    Urethral cancer (H)       VITAMIN B COMPLEX PO      Take by mouth daily (with lunch)

## 2018-04-25 NOTE — PROGRESS NOTES
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  At post op check on 4/4/18, he was doing well, and drains were removed without issue.    He returns today, before his scheduled follow-up because of some concern about his scrotal wound.  He denies any fever/chills, pain or swelling around the area, and has been using his urethrostomy without issue.    Current Outpatient Prescriptions   Medication Sig Dispense Refill     acetaminophen (TYLENOL) 325 MG tablet Take 2 tablets (650 mg) by mouth every 4 hours as needed for mild pain or fever 150 tablet 0     amLODIPine (NORVASC) 10 MG tablet Take 1 tablet (10 mg) by mouth daily (Patient taking differently: Take 20 mg by mouth every morning ) 90 tablet 3     amoxicillin-clavulanate (AUGMENTIN) 500-125 MG per tablet Take 1 tablet by mouth daily While the drains are in place 21 tablet 1     B Complex Vitamins (VITAMIN B COMPLEX PO) Take by mouth daily (with lunch)       docusate sodium (COLACE) 100 MG capsule Take 1 capsule (100 mg) by mouth 2 times daily as needed for constipation 60 capsule 0     fluticasone (FLONASE) 50 MCG/ACT spray Spray 2 sprays into both nostrils 2 times daily (Patient taking differently: Spray 2 sprays into both nostrils every morning ) 2 Bottle 11     LORazepam (ATIVAN) 0.5 MG tablet Take 1 tablet (0.5 mg) by mouth every 8 hours as needed for anxiety (Patient taking differently: Take 0.5 mg by mouth At Bedtime ) 30 tablet 3     nystatin (MYCOSTATIN) 250999 UNIT/ML suspension Take 5 mLs (500,000 Units) by mouth 4 times daily Swish and spit 200 mL 0     omeprazole (PRILOSEC) 2 mg/mL SUSP Take  "10 mLs (20 mg) by mouth 2 times daily 280 mL 11     oxyCODONE IR (ROXICODONE) 5 MG tablet Take 1-2 tablets (5-10 mg) by mouth every 3 hours as needed for other (pain control or improvement in physical function. Hold dose for analgesic side effects.) 30 tablet 0     OBJECTIVE:  /70  Pulse 65  Ht 1.93 m (6' 4\")  Wt 67.6 kg (149 lb)  BMI 18.14 kg/m2    Midline scrotal wound with 1cm scab that is falling off.  Scab removed and one vicryl stitch cut.  Fibrinous healthy appearing granulation at wound base, cleaned and covered with gauze.    ASSESSMENT: King Ailyn Bruno is a 72 year old male with invasive SCC of penile urethra, s/p radical penectomy, perineal urethrostomy and bilateral inguinal lymphadenectomy, who presents for evaluation of wound.    PLAN:   - Patient instructed to continue changing gauze dressings as need.  Wound should continue to heal in the next few toure.    Patient seen and plan discussed with Dr. Boo Sheriff, G2  Urology Resident    Patient seen and examined with the resident.  Visit time 15 minutes and >50% spent in counseling.  I agree with the resident's note and plan of care.       Muriel Haddad MD  Urology Staff    CC  Patient Care Team:  Joan Ventura NP as PCP - General (Nurse Practitioner)  Ry Dunn as Referring Physician (Surgery)  Muriel Haddad MD as MD (Urology)  Lizzy Chowdary, RN as Registered Nurse (Urology)  Steve Summers MD as MD (Oncology)  Janet Murrieta, RN as Nurse Coordinator (Oncology)  STEVE SUMMERS    Copy to patient  KING AILYN BRUNO  [address]  [facility] [address]    "

## 2018-04-25 NOTE — LETTER
4/25/2018       RE: King Gonsalo Bruno  [address]  [facility] [address]     Dear Colleague,    Thank you for referring your patient, King Gonsalo Bruno, to the [facility] UROLOGY AND INST FOR PROSTATE AND UROLOGIC CANCERS [facility]. Please see a copy of my visit note below.    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  At post op check on 4/4/18, he was doing well, and drains were removed without issue.    He returns today, before his scheduled follow-up because of some concern about his scrotal wound.  He denies any fever/chills, pain or swelling around the area, and has been using his urethrostomy without issue.    Current Outpatient Prescriptions   Medication Sig Dispense Refill     acetaminophen (TYLENOL) 325 MG tablet Take 2 tablets (650 mg) by mouth every 4 hours as needed for mild pain or fever 150 tablet 0     amLODIPine (NORVASC) 10 MG tablet Take 1 tablet (10 mg) by mouth daily (Patient taking differently: Take 20 mg by mouth every morning ) 90 tablet 3     amoxicillin-clavulanate (AUGMENTIN) 500-125 MG per tablet Take 1 tablet by mouth daily While the drains are in place 21 tablet 1     B Complex Vitamins (VITAMIN B COMPLEX PO) Take by mouth daily (with lunch)       docusate sodium (COLACE) 100 MG capsule Take 1 capsule (100 mg) by mouth 2 times daily as needed for constipation 60 capsule 0     fluticasone (FLONASE) 50 MCG/ACT spray Spray 2 sprays into both nostrils 2 times daily (Patient taking differently: Spray 2 sprays into both nostrils every morning ) 2 Bottle 11     LORazepam  "(ATIVAN) 0.5 MG tablet Take 1 tablet (0.5 mg) by mouth every 8 hours as needed for anxiety (Patient taking differently: Take 0.5 mg by mouth At Bedtime ) 30 tablet 3     nystatin (MYCOSTATIN) 363216 UNIT/ML suspension Take 5 mLs (500,000 Units) by mouth 4 times daily Swish and spit 200 mL 0     omeprazole (PRILOSEC) 2 mg/mL SUSP Take 10 mLs (20 mg) by mouth 2 times daily 280 mL 11     oxyCODONE IR (ROXICODONE) 5 MG tablet Take 1-2 tablets (5-10 mg) by mouth every 3 hours as needed for other (pain control or improvement in physical function. Hold dose for analgesic side effects.) 30 tablet 0     OBJECTIVE:  /70  Pulse 65  Ht 1.93 m (6' 4\")  Wt 67.6 kg (149 lb)  BMI 18.14 kg/m2    Midline scrotal wound with 1cm scab that is falling off.  Scab removed and one vicryl stitch cut.  Fibrinous healthy appearing granulation at wound base, cleaned and covered with gauze.    ASSESSMENT: King Ailyn Bruno is a 72 year old male with invasive SCC of penile urethra, s/p radical penectomy, perineal urethrostomy and bilateral inguinal lymphadenectomy, who presents for evaluation of wound.    PLAN:   - Patient instructed to continue changing gauze dressings as need.  Wound should continue to heal in the next few toure.    Patient seen and plan discussed with Dr. Boo Sheriff, G2  Urology Resident    Patient seen and examined with the resident.  Visit time 15 minutes and >50% spent in counseling.  I agree with the resident's note and plan of care.       Muriel Haddad MD  Urology Staff    CC  Patient Care Team:  Joan Ventura NP as PCP - General (Nurse Practitioner)  Ry Dunn as Referring Physician (Surgery)  Muriel Haddad MD as MD (Urology)  Lizzy Chowdary, RN as Registered Nurse (Urology)  Steve Summers MD as MD (Oncology)  Janet Murrieta, RN as Nurse Coordinator (Oncology)  STEVE SUMMERS    Copy to patient  KING AILYN BRUNO  [address]  [facility] " [id number]

## 2018-04-25 NOTE — NURSING NOTE
Chief Complaint   Patient presents with     Blood Draw     labs drawn via venipuncture     /75 (BP Location: Right arm, Patient Position: Chair, Cuff Size: Adult Regular)  Pulse 72  Temp 96.9  F (36.1  C) (Oral)  Wt 67.1 kg (148 lb)  SpO2 100%  BMI 18.02 kg/m2    Vitals taken.  Labs collected and sent from right forearm venipuncture. Pt tolerated well. Pt checked in for next appointment.    Paula Mcfarlane RN

## 2018-04-25 NOTE — NURSING NOTE
Chief Complaint   Patient presents with     RECHECK     Discuss Surgical Site        Aleena Tim MA

## 2018-04-25 NOTE — LETTER
4/25/2018       RE: King Gonsalo Bruno  [address]  [facility] [address]     Dear Colleague,    Thank you for referring your patient, King Gonsalo Bruno, to the [facility]. Please see a copy of my visit note below.    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 He initiated Cisplatin/Gemzar split on days 1 and 8 given history of CKD. He is currently undergoing curative intent treatment, however he does have indeterminate pulmonary nodules that are being followed. He had a positive response to the 2 cycles which were complicated with issues of dehydration and nausea. He has completed 4 cycles of chemotherapy.     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     He is accompanied by his significant other.  He is now about 8 weeks out from completion of chemotherapy.  He is doing much better at this time and has recovered.  He has since followed up with Dr. Haddad and urologic surgery.    No chest pain, palpitation or dizziness. Eating and drinking very little in the past few days. Is having increased issue with constipation again.  Urine appears stable in the catheter. Denies cloudiness or darkness in color. No bleeding/bruising. Has a mild headache at times. No vision changes. Hearing has decreased.    Current Outpatient Prescriptions   Medication Sig     amLODIPine (NORVASC) 10 MG tablet Take 1 tablet (10 mg) by mouth daily (Patient taking differently: Take 20 mg by mouth every morning )     B Complex Vitamins (VITAMIN B COMPLEX PO) Take by mouth daily (with lunch)     docusate sodium (COLACE) 100 MG capsule Take 1 capsule (100 mg) by mouth 2 times daily as needed for constipation     fluticasone (FLONASE) 50 MCG/ACT spray Spray 2 sprays into both nostrils 2 times daily (Patient taking differently: Spray 2  sprays into both nostrils every morning )     LORazepam (ATIVAN) 0.5 MG tablet Take 1 tablet (0.5 mg) by mouth every 8 hours as needed for anxiety (Patient taking differently: Take 0.5 mg by mouth At Bedtime )     omeprazole (PRILOSEC) 2 mg/mL SUSP Take 10 mLs (20 mg) by mouth 2 times daily     acetaminophen (TYLENOL) 325 MG tablet Take 2 tablets (650 mg) by mouth every 4 hours as needed for mild pain or fever (Patient not taking: Reported on 4/10/2018)     amoxicillin-clavulanate (AUGMENTIN) 500-125 MG per tablet Take 1 tablet by mouth daily While the drains are in place (Patient not taking: Reported on 4/10/2018)     nystatin (MYCOSTATIN) 583833 UNIT/ML suspension Take 5 mLs (500,000 Units) by mouth 4 times daily Swish and spit (Patient not taking: Reported on 4/10/2018)     oxyCODONE IR (ROXICODONE) 5 MG tablet Take 1-2 tablets (5-10 mg) by mouth every 3 hours as needed for other (pain control or improvement in physical function. Hold dose for analgesic side effects.) (Patient not taking: Reported on 4/10/2018)     No current facility-administered medications for this visit.      Facility-Administered Medications Ordered in Other Visits   Medication     barium sulfate (EZ PAQUE) oral suspension 96%     barium sulfate (EZ-HD) oral suspension 98%     barium sulfate 40% (VARIBAR THIN HONEY) oral suspension     sod bicarbonate-citric acid-simethicone (EZ GAS) 2.21-1.53-0.04 G packet 4 g           Allergies   Allergen Reactions     Lisinopril Swelling     Oxycodone Nausea and Vomiting     Vicodin [Hydrocodone-Acetaminophen] Nausea and Vomiting         Exam: alert, appears weak, fatigued Blood pressure 131/75, pulse 72, temperature 96.9  F (36.1  C), temperature source Oral, weight 67.1 kg (148 lb), SpO2 100 %.  Wt Readings from Last 4 Encounters:   04/25/18 67.1 kg (148 lb)   04/04/18 68.5 kg (151 lb)   03/30/18 66.5 kg (146 lb 9.6 oz)   03/20/18 68.6 kg (151 lb 4.8 oz)     Oropharynx is moist, no focal lesion. No  icterus. Neck supple and without adenopathy. Lungs: crackles right base, no wheezes or rub. Heart: RRR. Abdomen: soft, nontender. Extremities: warm, no edema.    Labs:   Recent Labs   Lab Test  04/25/18   1147  03/12/18   0818  03/11/18   0730  03/10/18   0800  03/09/18   0716   NA  136  130*  135  135  137   POTASSIUM  4.1  3.9  3.9  4.0  4.3   CHLORIDE  103  99  104  105  105   CO2  25  22  22  23  22   ANIONGAP  7  9  9  7  10   BUN  16  12  12  12  19   CR  1.54*  1.57*  1.41*  1.41*  1.52*   GLC  87  93  96  89  129*   SAADIA  10.2*  9.8  9.5  9.2  9.1     Recent Labs   Lab Test  04/25/18   1147  02/07/18   1150  12/13/17   0813  12/08/17   0925  12/06/17   1039   MAG  1.9  1.8  1.3*  1.5*  1.3*     Recent Labs   Lab Test  04/25/18   1147  03/12/18   0818  03/11/18   0730  03/10/18   0800  03/09/18   0716   12/13/17   0813  12/01/17   0824  11/24/17   0827  11/10/17   0839   WBC  5.6  9.2  8.7  9.8  16.3*   < >  4.3  2.2*  3.7*  3.0*   HGB  11.8*  9.9*  9.1*  8.9*  8.8*   < >  9.4*  9.1*  7.9*  9.0*   PLT  299  264  239  241  231   < >  262  113*  146*  157   MCV  91  91  91  91  91   < >  91  89  90  86   NEUTROPHIL  51.3   --    --    --    --    --   30.4  66.7  57.3  44.1    < > = values in this interval not displayed.     Recent Labs   Lab Test  04/25/18   1147  12/13/17   0813  12/01/17   0824   BILITOTAL  0.4  0.3  0.3   ALKPHOS  71  73  60   ALT  13  12  21   AST  22  28  42   ALBUMIN  4.1  3.3*  3.2*     TSH   Date Value Ref Range Status   11/24/2017 0.82 0.40 - 4.00 mU/L Final   10/06/2017 1.61 0.40 - 4.00 mU/L Final     Results for orders placed or performed in visit on 03/30/18   XR Video Swallow w Esophagram    Narrative    EXAM: Video speech evaluation and esophagram 4/6/2018.    Fluoroscopy time: 4.9 minutes.    HISTORY: Oral pharyngeal dysphagia. 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    COMPARISON: Swallow study 11/14/2017.    FINDINGS: With the assistance of the speech pathologistemerita  modified  barium swallow was performed with thin liquid, nectar, pudding,  putting with cookie, and pill consistencies. There is consistent  laryngeal penetration with thin liquid, intermittently to the level of  the vocal folds (series 3, 4), which was worse with chin tuck  maneuver. No tracheal aspiration observed. Shallow laryngeal  penetration with thin liquid. No Zenker's diverticulum is demonstrated  on either standard video swallow images or on esophagram when this was  specifically interrogated for (series 26, 27 and 28). There are  prominent lateral pharyngeal pouches (series 28), similar to  11/24/2017.    Double contrast esophagram was then performed in the upright and  horizontal positions. No mucosal lesion or stricture identified.  Mildly prominent aortic impression. No hiatal hernia. No  gastroesophageal reflux shown. Normal motility.      Impression    IMPRESSION:   1. No Zenker's diverticulum identified. There are mildly prominent  lateral pharyngeal pouches which are similar to 11/14/2017.  2. Esophagram demonstrates no stricture, mucosal lesion, hiatal hernia  or dysmotility.  3. Swallow study demonstrates consistent deep laryngeal penetration  with thin liquids, without tracheal aspiration.    Please see note by speech language pathologist for further details and  recommendations.     I have personally reviewed the examination and initial interpretation  and I agree with the findings.    KAITLIN DAVIDSON MD     Lab Results   Component Value Date    PATH  03/08/2018     Patient Name: KING AILYN CHAVEZ  MR#: [id number]  Specimen #: [id number]  Collected: 3/8/2018  Received: 3/8/2018  Reported: 3/14/2018 21:10  Ordering Phy(s): BRANDAN BRUCE    For improved result formatting, select 'View Enhanced Report Format' under   Linked Documents section.    ORIGINAL REPORT:    SPECIMEN(S):  A: Posterior wall bladder biopsy wall  B: Penis and ureter  C: Lymph nodes, left inguinal  D: Right inguinal lymph  nodes  E: Right deep inguinal lymph nodes    FINAL DIAGNOSIS:  A. Posterior wall bladder biopsy:  - Benign urothelium with acute inflammation and reactive changes    B. Penis and ureter, radical penectomy:  - Invasive squamous carcinoma of penile urethra, moderately differentiated  - Tumor size: 12.2 cm (longest dimension)  - Tumor extent: Invades corpus spongiosum and cavernosum of shaft  - Lymphovascular invasion: Present  - Perineural invasion: Present  - Margins: Urethral, soft tissue and skin margins free of malignancy  - Pathological stage: pT3N2    C. Lymph nodes, left inguinal, excision:  - Metastatic squamous carcinoma in one of five lymph nodes (1/5)  - Deposit size: 0.1 cm  - Extranodal extension not identified    D. Lymph nodes, right inguinal, excision:  - Metastatic squamous carcinoma in one of seven lymph nodes (1/7)  - Deposit size: 1 cm  - Extranodal extension not identified    E. Lymph nodes, right deep inguinal, excision:  - Benign connective tissue  - Lymph node tissue not identified    Report Name: Penis       Status: Submitted Checklist Inst: 1      Last Updated By: Jaime Stout M.D., [facility], 03/14/2018  21:09:05  Part(s) Involved:  B: Penis and ureter    Synoptic Report:    SPECIMEN    Procedure:        - Total penectomy    Foreskin:        - Present (uncircumcised)    TUMOR    Tumor Site:        - Shaft        - Penile urethra    Histologic Type:        - Squamous cell carcinoma, usual type    Histologic Grade:        - G2    Tumor Size: 12.2 x 4 x 3.2 Centimeters (cm)    Tumor Deep Borders:        - Infiltrative (jagged)    Tumor Focality:        - Unifocal    Tumor Extent      Tumor Extension:          - Tumor invades corpus spongiosum          - Tumor invades corpus cavernosum          - Tumor invades Lima's fascia          - Tumor invades penile (distal) urethra    Accessory Findings      Lymphovascular Invasion:          - Present      Perineural Invasion:          -  Present    MARGINS    Margins:        - Uninvolved    LYMPH NODES    Number of Lymph Nodes Involved: 2    Site(s):        - Inguinal - left and right      Number of Inguinal Lymph Nodes Involved: 2      Laterality of Inguinal Lymph Nodes Involved:          - Bilateral    Size of Largest Metastatic Deposit: 1 Centimeters (cm)    Site: right inguinal    Size of Largest Lymph Node Involved: 0.1 Centimeters (cm)    Site: left inguinal    Extranodal Extension:        - Not identified    Number of Lymph Nodes Examined: 12    PATHOLOGIC STAGE CLASSIFICATION (PTNM, AJCC 8TH EDITION)    Primary Tumor (pT):        - pT3    Regional Lymph Nodes (pN):        - pN2    ADDITIONAL FINDINGS    Additional Pathologic Findings:        - None identified    2017 June AJCC 8th Edition CAP Annual Release    I have personally reviewed all specimens and/or slides, including the   listed special stains, and used them  with my medical judgement to determine or confirm the final diagnosis.    Electronically signed out by:    Jaime Stout M.D., [facility]        Impression/plan:     1. Stage II penile urethral carcinoma, previously on Cisplatin/Snyder, now on cycle 4 Carbo/Snyder due to complications of CKD with cisplatin  He has completed 4 rounds of chemotherapy with platinum and gemcitabine.  He received split-dose cisplatin for the first 3 cycles and this had to be changed to carboplatin for the last cycle.  He did not receive his day 8 gemcitabine because of his thrombocytopenia.  I reviewed the actual images from his restaging scans which suggested progressive disease.      He has moderate anemia related to his chemotherapy and also his CKD stage III.     He had radical penectomy and urethrectomy with perineal urethrostomy. He had bilateral inguinal lymphadenectomy. He has pT3N2 disease.     He is recovering well from his surgery.     He did not have a good response to chemotherapy radiographically.  I do not feel that there is any role for  adjuvant chemotherapy for him.  I will check with Dr. Yeung if he would recommend radiation therapy with or without radiation.     He had a tissue flap at one end of his sutureline where the skin underneath is not healing and without granulation tissue. He is very worried about it. It seemed to be tethered by a suture and he wanted me to cut it. I have reviewed it with Lizzy Chowdary in Urology and she will follow up with patient.     Over 25 min of direct face to face time spent with patient with more than 50% time spent in counseling and coordinating care.      Again, thank you for allowing me to participate in the care of your patient.      Sincerely,    Steve Arceo MD

## 2018-04-25 NOTE — MR AVS SNAPSHOT
After Visit Summary   4/25/2018    King Gonsalo Bruno    MRN: [id number]           Patient Information     Date Of Birth          1945        Visit Information        Provider Department      4/25/2018 12:00 PM Steve Arceo MD [facility]        Today's Diagnoses     Gastroesophageal reflux disease with esophagitis        Hypokalemia        Nausea        Dehydration        Urethral cancer (H)        Anxiety        Benign essential hypertension        Allergic sinusitis           Follow-ups after your visit        Your next 10 appointments already scheduled     May 22, 2018  2:15 PM CDT   (Arrive by 2:00 PM)   Return Visit with Arsenio Ortiz MD   [facility] ([facility])    [address]  4th Floor  [facility] [id number] [phone]            May 23, 2018 11:20 AM CDT   (Arrive by 11:05 AM)   CT CHEST W CONTRAST ABDOMEN PELVIS W/O & W CONTRAST with UCCT1   [facility] CT ([facility])    [address]  1st Floor  [facility] [id number] [phone]           Please bring any scans or X-rays taken at other hospitals, if similar tests were done. Also bring a list of your medicines, including vitamins, minerals and over-the-counter drugs. It is safest to leave personal items at home.  Be sure to tell your doctor:   If you have any allergies.   If there s any chance you are pregnant.   If you are breastfeeding.  How to prepare:   Do not eat or drink for 2 hours before your exam. If you need to take medicine, you may take it with small sips of water. (We may ask you to take liquid medicine as well.)   Please wear loose clothing, such as a sweat suit or jogging clothes. Avoid snaps, zippers and other metal. We may ask you to undress and put on a hospital gown.  Please arrive 30 minutes early for your CT. Once in the department you might be asked to drink water 15-20  "minutes prior to your exam.  If indicated you may be asked to drink an oral contrast in advance of your CT.  If this is the case, the imaging team will let you know or be in contact with you prior to your appointment  Patients over 70 or patients with diabetes or kidney problems:   If you haven t had a blood test (creatinine test) within the last 30 days, the Cardiologist/Radiologist may require you to get this test prior to your exam.  If you have diabetes:   Continue to take your metformin medication on the day of your exam  If you have any questions, please call the Imaging Department where you will have your exam.            May 23, 2018 12:20 PM CDT   (Arrive by 12:05 PM)   Return Visit with Muriel Haddad MD   [facility] ([facility] [place])    [address] [place] [id number] [phone]              Who to contact     If you have questions or need follow up information about today's clinic visit or your schedule please contact [facility] directly at [phone].  Normal or non-critical lab and imaging results will be communicated to you by MyChart, letter or phone within 4 business days after the clinic has received the results. If you do not hear from us within 7 days, please contact the clinic through [employer] or phone. If you have a critical or abnormal lab result, we will notify you by phone as soon as possible.  Submit refill requests through [employer] or call your pharmacy and they will forward the refill request to us. Please allow 3 business days for your refill to be completed.          Additional Information About Your Visit        [employer] Information     [employer] lets you send messages to your doctor, view your test results, renew your prescriptions, schedule appointments and more. To sign up, go to [email or web address] . Click on \"Log in\" on the left side of the screen, which " "will take you to the Welcome page. Then click on \"Sign up Now\" on the right side of the page.     You will be asked to enter the access code listed below, as well as some personal information. Please follow the directions to create your username and password.     Your access code is: [email or web address]  Expires: 2018  7:31 AM     Your access code will  in 90 days. If you need help or a new code, please call your [place] clinic or [phone].        Care EveryWhere ID     This is your Care EveryWhere ID. This could be used by other organizations to access your [place] medical records  [id number]        Your Vitals Were     Pulse Temperature Pulse Oximetry BMI (Body Mass Index)          72 96.9  F (36.1  C) (Oral) 100% 18.02 kg/m2         Blood Pressure from Last 3 Encounters:   18 133/70   18 131/75   18 133/64    Weight from Last 3 Encounters:   18 67.6 kg (149 lb)   18 67.1 kg (148 lb)   18 68.5 kg (151 lb)              We Performed the Following     *CBC with platelets differential     Comprehensive metabolic panel     Magnesium          Today's Medication Changes          These changes are accurate as of 18 11:59 PM.  If you have any questions, ask your nurse or doctor.               These medicines have changed or have updated prescriptions.        Dose/Directions    amLODIPine 10 MG tablet   Commonly known as:  NORVASC   This may have changed:    - how much to take  - when to take this   Used for:  Benign essential hypertension        Dose:  10 mg   Take 1 tablet (10 mg) by mouth daily   Quantity:  90 tablet   Refills:  3       fluticasone 50 MCG/ACT spray   Commonly known as:  FLONASE   This may have changed:  when to take this   Used for:  Urethral cancer (H), Allergic sinusitis        Dose:  2 spray   Spray 2 sprays into both nostrils 2 times daily   Quantity:  2 Bottle   Refills:  11       LORazepam 0.5 MG tablet   Commonly known as:  ATIVAN   This may have " changed:  when to take this   Used for:  Anxiety        Dose:  0.5 mg   Take 1 tablet (0.5 mg) by mouth every 8 hours as needed for anxiety   Quantity:  30 tablet   Refills:  3                Primary Care Provider Office Phone # Fax #    Joan VenturaMARLENY [phone]       [facility] [address] [facility] [address]        Equal Access to Services     JENELLE RED : Hadii aad ku hadasho Soomaali, waaxda luqadaha, qaybta kaalmada adeegyada, waxay idiin hayaan adeeg kharash la'aan ah. So [facility] [phone].    ATENCIÓN: Si habla español, tiene a washburn disposición servicios gratuitos de asistencia lingüística. Juwan al [phone].    We comply with applicable federal civil rights laws and [place] laws. We do not discriminate on the basis of race, color, national origin, age, disability, sex, sexual orientation, or gender identity.            Thank you!     Thank you for choosing [facility]  for your care. Our goal is always to provide you with excellent care. Hearing back from our patients is one way we can continue to improve our services. Please take a few minutes to complete the written survey that you may receive in the mail after your visit with us. Thank you!             Your Updated Medication List - Protect others around you: Learn how to safely use, store and throw away your medicines at www.disposemymeds.org.          This list is accurate as of 4/25/18 11:59 PM.  Always use your most recent med list.                   Brand Name Dispense Instructions for use Diagnosis    acetaminophen 325 MG tablet    TYLENOL    150 tablet    Take 2 tablets (650 mg) by mouth every 4 hours as needed for mild pain or fever    Urethral cancer (H)       amLODIPine 10 MG tablet    NORVASC    90 tablet    Take 1 tablet (10 mg) by mouth daily    Benign essential hypertension       amoxicillin-clavulanate 500-125 MG per tablet    AUGMENTIN    21 tablet    Take 1 tablet by mouth daily  While the drains are in place    Urinary tract infection       docusate sodium 100 MG capsule    COLACE    60 capsule    Take 1 capsule (100 mg) by mouth 2 times daily as needed for constipation    Slow transit constipation       fluticasone 50 MCG/ACT spray    FLONASE    2 Bottle    Spray 2 sprays into both nostrils 2 times daily    Urethral cancer (H), Allergic sinusitis       LORazepam 0.5 MG tablet    ATIVAN    30 tablet    Take 1 tablet (0.5 mg) by mouth every 8 hours as needed for anxiety    Anxiety       nystatin 738569 UNIT/ML suspension    MYCOSTATIN    200 mL    Take 5 mLs (500,000 Units) by mouth 4 times daily Swish and spit    Thrush       omeprazole 2 mg/mL Susp    priLOSEC    280 mL    Take 10 mLs (20 mg) by mouth 2 times daily    Gastroesophageal reflux disease with esophagitis, Urethral cancer (H)       oxyCODONE IR 5 MG tablet    ROXICODONE    30 tablet    Take 1-2 tablets (5-10 mg) by mouth every 3 hours as needed for other (pain control or improvement in physical function. Hold dose for analgesic side effects.)    Urethral cancer (H)       VITAMIN B COMPLEX PO      Take by mouth daily (with lunch)

## 2018-04-25 NOTE — NURSING NOTE
"Oncology Rooming Note    April 25, 2018 12:02 PM   King Gonsalo Bruno is a 72 year old male who presents for:    Chief Complaint   Patient presents with     Blood Draw     labs drawn via venipuncture     Oncology Clinic Visit     Bladder CA 3 month f/u     Initial Vitals: /75 (BP Location: Right arm, Patient Position: Chair, Cuff Size: Adult Regular)  Pulse 72  Temp 96.9  F (36.1  C) (Oral)  Wt 67.1 kg (148 lb)  SpO2 100%  BMI 18.02 kg/m2 Estimated body mass index is 18.02 kg/(m^2) as calculated from the following:    Height as of 4/4/18: 1.93 m (6' 4\").    Weight as of this encounter: 67.1 kg (148 lb). Body surface area is 1.9 meters squared.  Moderate Pain (5) Comment: Data Unavailable   No LMP for male patient.  Allergies reviewed: Yes  Medications reviewed: Yes    Medications: Medication refills not needed today.  Pharmacy name entered into [employer]:    [id number] - [place], [place] - [address] [place] [address] AT [address] [place] [address] PHARMACY [place], [place] - [phone]    Clinical concerns: Patient states there are no new concerns to discuss with provider.  Dr Arceo was not notified.       8 minutes for nursing intake (face to face time)     Ansley Boston CMA              "

## 2018-04-25 NOTE — PROGRESS NOTES
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 He initiated Cisplatin/Gemzar split on days 1 and 8 given history of CKD. He is currently undergoing curative intent treatment, however he does have indeterminate pulmonary nodules that are being followed. He had a positive response to the 2 cycles which were complicated with issues of dehydration and nausea. He has completed 4 cycles of chemotherapy.     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     He is accompanied by his significant other.  He is now about 8 weeks out from completion of chemotherapy.  He is doing much better at this time and has recovered.  He has since followed up with Dr. Haddad and urologic surgery.    No chest pain, palpitation or dizziness. Eating and drinking very little in the past few days. Is having increased issue with constipation again.  Urine appears stable in the catheter. Denies cloudiness or darkness in color. No bleeding/bruising. Has a mild headache at times. No vision changes. Hearing has decreased.    Current Outpatient Prescriptions   Medication Sig     amLODIPine (NORVASC) 10 MG tablet Take 1 tablet (10 mg) by mouth daily (Patient taking differently: Take 20 mg by mouth every morning )     B Complex Vitamins (VITAMIN B COMPLEX PO) Take by mouth daily (with lunch)     docusate sodium (COLACE) 100 MG capsule Take 1 capsule (100 mg) by mouth 2 times daily as needed for constipation     fluticasone (FLONASE) 50 MCG/ACT spray Spray 2 sprays into both nostrils 2 times daily (Patient taking differently: Spray 2 sprays into both nostrils every morning )     LORazepam (ATIVAN) 0.5 MG tablet Take 1 tablet (0.5 mg) by mouth every 8 hours as needed for anxiety (Patient taking differently: Take 0.5 mg by mouth At Bedtime )     omeprazole (PRILOSEC) 2 mg/mL SUSP Take 10  mLs (20 mg) by mouth 2 times daily     acetaminophen (TYLENOL) 325 MG tablet Take 2 tablets (650 mg) by mouth every 4 hours as needed for mild pain or fever (Patient not taking: Reported on 4/10/2018)     amoxicillin-clavulanate (AUGMENTIN) 500-125 MG per tablet Take 1 tablet by mouth daily While the drains are in place (Patient not taking: Reported on 4/10/2018)     nystatin (MYCOSTATIN) 189743 UNIT/ML suspension Take 5 mLs (500,000 Units) by mouth 4 times daily Swish and spit (Patient not taking: Reported on 4/10/2018)     oxyCODONE IR (ROXICODONE) 5 MG tablet Take 1-2 tablets (5-10 mg) by mouth every 3 hours as needed for other (pain control or improvement in physical function. Hold dose for analgesic side effects.) (Patient not taking: Reported on 4/10/2018)     No current facility-administered medications for this visit.      Facility-Administered Medications Ordered in Other Visits   Medication     barium sulfate (EZ PAQUE) oral suspension 96%     barium sulfate (EZ-HD) oral suspension 98%     barium sulfate 40% (VARIBAR THIN HONEY) oral suspension     sod bicarbonate-citric acid-simethicone (EZ GAS) 2.21-1.53-0.04 G packet 4 g           Allergies   Allergen Reactions     Lisinopril Swelling     Oxycodone Nausea and Vomiting     Vicodin [Hydrocodone-Acetaminophen] Nausea and Vomiting         Exam: alert, appears weak, fatigued Blood pressure 131/75, pulse 72, temperature 96.9  F (36.1  C), temperature source Oral, weight 67.1 kg (148 lb), SpO2 100 %.  Wt Readings from Last 4 Encounters:   04/25/18 67.1 kg (148 lb)   04/04/18 68.5 kg (151 lb)   03/30/18 66.5 kg (146 lb 9.6 oz)   03/20/18 68.6 kg (151 lb 4.8 oz)     Oropharynx is moist, no focal lesion. No icterus. Neck supple and without adenopathy. Lungs: crackles right base, no wheezes or rub. Heart: RRR. Abdomen: soft, nontender. Extremities: warm, no edema.    Labs:   Recent Labs   Lab Test  04/25/18   1147  03/12/18   0818  03/11/18   0730  03/10/18   0876   03/09/18   0716   NA  136  130*  135  135  137   POTASSIUM  4.1  3.9  3.9  4.0  4.3   CHLORIDE  103  99  104  105  105   CO2  25  22  22  23  22   ANIONGAP  7  9  9  7  10   BUN  16  12  12  12  19   CR  1.54*  1.57*  1.41*  1.41*  1.52*   GLC  87  93  96  89  129*   SAADIA  10.2*  9.8  9.5  9.2  9.1     Recent Labs   Lab Test  04/25/18   1147  02/07/18   1150  12/13/17   0813  12/08/17   0925  12/06/17   1039   MAG  1.9  1.8  1.3*  1.5*  1.3*     Recent Labs   Lab Test  04/25/18   1147  03/12/18   0818  03/11/18   0730  03/10/18   0800  03/09/18   0716   12/13/17   0813  12/01/17   0824  11/24/17   0827  11/10/17   0839   WBC  5.6  9.2  8.7  9.8  16.3*   < >  4.3  2.2*  3.7*  3.0*   HGB  11.8*  9.9*  9.1*  8.9*  8.8*   < >  9.4*  9.1*  7.9*  9.0*   PLT  299  264  239  241  231   < >  262  113*  146*  157   MCV  91  91  91  91  91   < >  91  89  90  86   NEUTROPHIL  51.3   --    --    --    --    --   30.4  66.7  57.3  44.1    < > = values in this interval not displayed.     Recent Labs   Lab Test  04/25/18   1147  12/13/17   0813  12/01/17   0824   BILITOTAL  0.4  0.3  0.3   ALKPHOS  71  73  60   ALT  13  12  21   AST  22  28  42   ALBUMIN  4.1  3.3*  3.2*     TSH   Date Value Ref Range Status   11/24/2017 0.82 0.40 - 4.00 mU/L Final   10/06/2017 1.61 0.40 - 4.00 mU/L Final     Results for orders placed or performed in visit on 03/30/18   XR Video Swallow w Esophagram    Narrative    EXAM: Video speech evaluation and esophagram 4/6/2018.    Fluoroscopy time: 4.9 minutes.    HISTORY: Oral pharyngeal dysphagia. 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    COMPARISON: Swallow study 11/14/2017.    FINDINGS: With the assistance of the speech pathologist, a modified  barium swallow was performed with thin liquid, nectar, pudding,  putting with cookie, and pill consistencies. There is consistent  laryngeal penetration with thin liquid, intermittently to the level of  the vocal folds (series 3, 4), which was worse with chin  tuck  maneuver. No tracheal aspiration observed. Shallow laryngeal  penetration with thin liquid. No Zenker's diverticulum is demonstrated  on either standard video swallow images or on esophagram when this was  specifically interrogated for (series 26, 27 and 28). There are  prominent lateral pharyngeal pouches (series 28), similar to  11/24/2017.    Double contrast esophagram was then performed in the upright and  horizontal positions. No mucosal lesion or stricture identified.  Mildly prominent aortic impression. No hiatal hernia. No  gastroesophageal reflux shown. Normal motility.      Impression    IMPRESSION:   1. No Zenker's diverticulum identified. There are mildly prominent  lateral pharyngeal pouches which are similar to 11/14/2017.  2. Esophagram demonstrates no stricture, mucosal lesion, hiatal hernia  or dysmotility.  3. Swallow study demonstrates consistent deep laryngeal penetration  with thin liquids, without tracheal aspiration.    Please see note by speech language pathologist for further details and  recommendations.     I have personally reviewed the examination and initial interpretation  and I agree with the findings.    KAITLIN DAVIDSON MD     Lab Results   Component Value Date    PATH  03/08/2018     Patient Name: KING AILYN CHAVEZ  MR#: [id number]  Specimen #: [id number]  Collected: 3/8/2018  Received: 3/8/2018  Reported: 3/14/2018 21:10  Ordering Phy(s): BRANDAN BRUCE    For improved result formatting, select 'View Enhanced Report Format' under   Linked Documents section.    ORIGINAL REPORT:    SPECIMEN(S):  A: Posterior wall bladder biopsy wall  B: Penis and ureter  C: Lymph nodes, left inguinal  D: Right inguinal lymph nodes  E: Right deep inguinal lymph nodes    FINAL DIAGNOSIS:  A. Posterior wall bladder biopsy:  - Benign urothelium with acute inflammation and reactive changes    B. Penis and ureter, radical penectomy:  - Invasive squamous carcinoma of penile urethra, moderately  differentiated  - Tumor size: 12.2 cm (longest dimension)  - Tumor extent: Invades corpus spongiosum and cavernosum of shaft  - Lymphovascular invasion: Present  - Perineural invasion: Present  - Margins: Urethral, soft tissue and skin margins free of malignancy  - Pathological stage: pT3N2    C. Lymph nodes, left inguinal, excision:  - Metastatic squamous carcinoma in one of five lymph nodes (1/5)  - Deposit size: 0.1 cm  - Extranodal extension not identified    D. Lymph nodes, right inguinal, excision:  - Metastatic squamous carcinoma in one of seven lymph nodes (1/7)  - Deposit size: 1 cm  - Extranodal extension not identified    E. Lymph nodes, right deep inguinal, excision:  - Benign connective tissue  - Lymph node tissue not identified    Report Name: Penis       Status: Submitted Checklist Inst: 1      Last Updated By: Jaime Stout M.D., [facility], 03/14/2018  21:09:05  Part(s) Involved:  B: Penis and ureter    Synoptic Report:    SPECIMEN    Procedure:        - Total penectomy    Foreskin:        - Present (uncircumcised)    TUMOR    Tumor Site:        - Shaft        - Penile urethra    Histologic Type:        - Squamous cell carcinoma, usual type    Histologic Grade:        - G2    Tumor Size: 12.2 x 4 x 3.2 Centimeters (cm)    Tumor Deep Borders:        - Infiltrative (jagged)    Tumor Focality:        - Unifocal    Tumor Extent      Tumor Extension:          - Tumor invades corpus spongiosum          - Tumor invades corpus cavernosum          - Tumor invades Lima's fascia          - Tumor invades penile (distal) urethra    Accessory Findings      Lymphovascular Invasion:          - Present      Perineural Invasion:          - Present    MARGINS    Margins:        - Uninvolved    LYMPH NODES    Number of Lymph Nodes Involved: 2    Site(s):        - Inguinal - left and right      Number of Inguinal Lymph Nodes Involved: 2      Laterality of Inguinal Lymph Nodes Involved:          - Bilateral    Size  of Largest Metastatic Deposit: 1 Centimeters (cm)    Site: right inguinal    Size of Largest Lymph Node Involved: 0.1 Centimeters (cm)    Site: left inguinal    Extranodal Extension:        - Not identified    Number of Lymph Nodes Examined: 12    PATHOLOGIC STAGE CLASSIFICATION (PTNM, AJCC 8TH EDITION)    Primary Tumor (pT):        - pT3    Regional Lymph Nodes (pN):        - pN2    ADDITIONAL FINDINGS    Additional Pathologic Findings:        - None identified    2017 June AJCC 8th Edition CAP Annual Release    I have personally reviewed all specimens and/or slides, including the   listed special stains, and used them  with my medical judgement to determine or confirm the final diagnosis.    Electronically signed out by:    Jaime Stout M.D., [facility]        Impression/plan:     1. Stage II penile urethral carcinoma, previously on Cisplatin/[place], now on cycle 4 Carbo/H[place]don due to complications of CKD with cisplatin  He has completed 4 rounds of chemotherapy with platinum and gemcitabine.  He received split-dose cisplatin for the first 3 cycles and this had to be changed to carboplatin for the last cycle.  He did not receive his day 8 gemcitabine because of his thrombocytopenia.  I reviewed the actual images from his restaging scans which suggested progressive disease.      He has moderate anemia related to his chemotherapy and also his CKD stage III.     He had radical penectomy and urethrectomy with perineal urethrostomy. He had bilateral inguinal lymphadenectomy. He has pT3N2 disease.     He is recovering well from his surgery.     He did not have a good response to chemotherapy radiographically.  I do not feel that there is any role for adjuvant chemotherapy for him.  I will check with Dr. Yeung if he would recommend radiation therapy with or without radiation.     He had a tissue flap at one end of his sutureline where the skin underneath is not healing and without granulation tissue. He is very worried about  it. It seemed to be tethered by a suture and he wanted me to cut it. I have reviewed it with Lizzy Chowdary in Urology and she will follow up with patient.     Over 25 min of direct face to face time spent with patient with more than 50% time spent in counseling and coordinating care.

## 2018-04-26 ENCOUNTER — THERAPY VISIT (OUTPATIENT)
Dept: SPEECH THERAPY | Facility: CLINIC | Age: 73
End: 2018-04-26
Payer: COMMERCIAL

## 2018-04-26 DIAGNOSIS — R13.12 OROPHARYNGEAL DYSPHAGIA: Primary | ICD-10-CM

## 2018-04-26 DIAGNOSIS — Q38.7 PHARYNGEAL DIVERTICULA: ICD-10-CM

## 2018-05-01 ENCOUNTER — CARE COORDINATION (OUTPATIENT)
Dept: ONCOLOGY | Facility: CLINIC | Age: 73
End: 2018-05-01

## 2018-05-02 NOTE — PROGRESS NOTES
Dr. Arceo informed RN he spoke with pt this morning and informed him of plan.    Per Dr. Arceo, pt was reviewed with the tumor board and adjuvant chemoradiation was recommended. Radiation oncology was notified and will be contacting pt to schedule an appointment.

## 2018-05-16 ENCOUNTER — HOSPITAL ENCOUNTER (OUTPATIENT)
Dept: CT IMAGING | Facility: CLINIC | Age: 73
Discharge: HOME | End: 2018-05-16
Attending: UROLOGY | Admitting: UROLOGY
Payer: MEDICARE

## 2018-05-16 ENCOUNTER — OFFICE VISIT (OUTPATIENT)
Dept: RADIATION ONCOLOGY | Facility: CLINIC | Age: 73
End: 2018-05-16
Attending: RADIOLOGY
Payer: MEDICARE

## 2018-05-16 VITALS — WEIGHT: 144.6 LBS | BODY MASS INDEX: 17.61 KG/M2 | HEIGHT: 76 IN

## 2018-05-16 DIAGNOSIS — C60.9 PENILE CANCER (H): Primary | ICD-10-CM

## 2018-05-16 DIAGNOSIS — C68.0 MALIGNANT NEOPLASM OF URETHRA (H): ICD-10-CM

## 2018-05-16 LAB
CREAT BLD-MCNC: 1.8 MG/DL (ref 0.66–1.25)
GFR SERPL CREATININE-BSD FRML MDRD: 37 ML/MIN/1.7M2
RADIOLOGIST FLAGS: ABNORMAL

## 2018-05-16 PROCEDURE — G0463 HOSPITAL OUTPT CLINIC VISIT: HCPCS | Mod: 25 | Performed by: RADIOLOGY

## 2018-05-16 PROCEDURE — 25000128 H RX IP 250 OP 636: Performed by: STUDENT IN AN ORGANIZED HEALTH CARE EDUCATION/TRAINING PROGRAM

## 2018-05-16 PROCEDURE — 82565 ASSAY OF CREATININE: CPT

## 2018-05-16 PROCEDURE — 25000125 ZZHC RX 250: Performed by: STUDENT IN AN ORGANIZED HEALTH CARE EDUCATION/TRAINING PROGRAM

## 2018-05-16 PROCEDURE — 71260 CT THORAX DX C+: CPT

## 2018-05-16 RX ORDER — IOPAMIDOL 755 MG/ML
92 INJECTION, SOLUTION INTRAVASCULAR ONCE
Status: COMPLETED | OUTPATIENT
Start: 2018-05-16 | End: 2018-05-16

## 2018-05-16 RX ADMIN — SODIUM CHLORIDE, PRESERVATIVE FREE 73 ML: 5 INJECTION INTRAVENOUS at 11:48

## 2018-05-16 RX ADMIN — IOPAMIDOL 92 ML: 755 INJECTION, SOLUTION INTRAVENOUS at 11:48

## 2018-05-16 ASSESSMENT — ENCOUNTER SYMPTOMS
NAUSEA: 1
HEARTBURN: 1
CHILLS: 0
FALLS: 0
DIZZINESS: 0
WHEEZING: 0
FEVER: 0
BLURRED VISION: 0
DIARRHEA: 0
DEPRESSION: 0
WEIGHT LOSS: 1
COUGH: 1
SORE THROAT: 0
SHORTNESS OF BREATH: 0
BACK PAIN: 0
NECK PAIN: 0
NERVOUS/ANXIOUS: 1
VOMITING: 1
HEADACHES: 0
CONSTIPATION: 0
FREQUENCY: 0
ABDOMINAL PAIN: 1

## 2018-05-16 NOTE — PROGRESS NOTES
RADIATION ONCOLOGY CONSULT   May 16, 2018    CHIEF COMPLAINT: Mr. Bruno is a 72 year old male referred to our clinic by Dr. Arceo: for urethral cancer.      HISTORY OF PRESENT ILLNESS:   Mr. Bruno is a 72 year old male with diagnosis of urethral cancer. He originally presented to his PCP with burning with urination. A urinalysis showed hematuria.  He was seen by urology at outside hospital and had a cystoscopy on 7/20/2017. This showd a large urethral mass. He had a urethral dilation and biopsy of the mass, which was positive for high grade carcinoma with squamous differentiation.      He was referred to [facility] and was seen by Dr. Haddad with urology and Dr. Arceo with medical oncology. A PET/CT was completed on 8/23/2017. This demonstrated hypermetabolic urethral mass involving the membranous and penile urethra, with concern for local invasion into the right corpus cavernosa. There was a hypermetabolic node in the left groin, measuring 1.0 cm, consistent with disease involvement. There were also bilateral, hypermetabolic pulmonary nodules, measuring 1.1 cm in YOANA and 0.9 cm in RUL. These were concerning for metastatic disease vs primary pulmonary malignancies. It was recommended that he have these nodules biopsied, but the patient refused, as he was convinced that they were related to his previous TB exposure, and not related to cancer.        He was treated with neoadjuvant chemotherapy, receiving cisplatin/gemzar with C1D1 on 9/15/17.A PET/CT was completed on 10/3/2017, which showed decreased size and FDG uptake of urethral mass. The aforementioned pulmonary nodules also decreased in size and uptake, making them more worrisome for metastatic disease. He completed a total of 4 cycles, with his final infusion on 11/24/2017.  A PET/CT was then completed on 12/12/2017. This, unfortunately, showed slightly increased uptake of urethral mass compared to PET in October. The pulmonary nodules were stable.     He underwent   radical penectomy and urethrectomy, perineal urethrostomy, and bilateral inguinal lymph node dissection on 3/8/2018. The surgical pathology demonstrated moderately differentiated SCC of the penile urethra with extension into the corpus spongiosum and cavernosum of the penile shaft. The specimen was positive for LVSI and PNI. The margins were negative. He had 1/5 left inguinal nodes and 1/7 right inguinal nodes positive for disease involvement. Given the presence of positive nodes on pathology, he was referred to our clinic for consideration of adjuvant radiation therapy.     Prior to our appointment today, Mr. Bruno had a chest/abdomen/pelvis CT scan. An official report is pending, however the scan appears to show multiple new pulmonary nodules, most likely representing metastatic disease. We discussed the results of the scan with Mr. Bruno. He was very upset by the news. He reports recovering well from his surgery without any other significant complaints at this time.       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      GERD (gastroesophageal reflux disease)      Hypertension      PONV (postoperative nausea and vomiting)        Past Surgical History:   Procedure Laterality Date     CYSTOSCOPY, BIOPSY BLADDER, COMBINED N/A 8/31/2017    Procedure: COMBINED CYSTOSCOPY, BIOPSY BLADDER;  Cystoscopy, Suprapubic Tube Placement with Ultrasound Guidiance, Uretheral Dilation;  Surgeon: Muriel Haddad MD;  [facility]     CYSTOSTOMY, INSERT TUBE SUPRAPUBIC, COMBINED N/A 8/31/2017    Procedure: COMBINED CYSTOSTOMY, INSERT TUBE SUPRAPUBIC;;  Surgeon: Muriel Haddad MD;  [facility]     DISSECT LYMPH NODE INGUINAL N/A 3/8/2018    Procedure: DISSECT LYMPH NODE INGUINAL;  Flexible Cystoscopy, Bladder Biopsy, urethral biopsy and penile biopsy, Radical  Penectomy and Urethrectomy, Perineal Urethrostomy, Bilateral Inguinal Lymphadenectomy; superficial lymph nodes and deep lymph nodes;  Surgeon: Muriel aHddad MD;  Location: [facility] OR     LARYNGOSCOPY  2013    Direct laryngoscopy with the use of rigid endoscopy and takedown of left hypopharyngeal diverticula.      PENECTOMY N/A 3/8/2018    Procedure: PENECTOMY;  Flexible Cystoscopy, Bladder Biopsy, urethral biopsy and penile biopsy, Radical Penectomy and Urethrectomy, Perineal Urethrostomy, Bilateral Inguinal Lymphadenectomy; superficial lymph nodes and deep lymph nodes    ;  Surgeon: Muriel Haddad MD;  [facility]     URETHROSTOMY PERINEUM N/A 3/8/2018    Procedure: URETHROSTOMY PERINEUM;  Flexible Cystoscopy, Bladder Biopsy, urethral biopsy and penile biopsy, Radical Penectomy and Urethrectomy, Perineal Urethrostomy, Bilateral Inguinal Lymphadenectomy; superficial lymph nodes and deep lymph nodes;  Surgeon: Muriel Haddad MD;  [facility]       CHEMOTHERAPY HISTORY: 4 cycles of neoadjuvant cisplatin/gemzar from 9/15/17 to 11/24/2017.       RADIATION THERAPY HISTORY: None     PREGNANCY STATUS: N/A    IMPLANTED CARDIAC DEVICE: No      MEDICATIONS:  Current Outpatient Prescriptions   Medication Sig Dispense Refill     acetaminophen (TYLENOL) 325 MG tablet Take 2 tablets (650 mg) by mouth every 4 hours as needed for mild pain or fever 150 tablet 0     amLODIPine (NORVASC) 10 MG tablet Take 1 tablet (10 mg) by mouth daily (Patient taking differently: Take 20 mg by mouth every morning ) 90 tablet 3     amoxicillin-clavulanate (AUGMENTIN) 500-125 MG per tablet Take 1 tablet by mouth daily While the drains are in place 21 tablet 1     B Complex Vitamins (VITAMIN B COMPLEX PO) Take by mouth daily (with lunch)       docusate sodium (COLACE) 100 MG capsule Take 1 capsule (100 mg) by mouth 2 times daily as needed for constipation 60 capsule 0     fluticasone (FLONASE) 50 MCG/ACT spray Spray 2 sprays into  both nostrils 2 times daily (Patient taking differently: Spray 2 sprays into both nostrils every morning ) 2 Bottle 11     LORazepam (ATIVAN) 0.5 MG tablet Take 1 tablet (0.5 mg) by mouth every 8 hours as needed for anxiety (Patient taking differently: Take 0.5 mg by mouth At Bedtime ) 30 tablet 3     nystatin (MYCOSTATIN) 412549 UNIT/ML suspension Take 5 mLs (500,000 Units) by mouth 4 times daily Swish and spit 200 mL 0     omeprazole (PRILOSEC) 2 mg/mL SUSP Take 10 mLs (20 mg) by mouth 2 times daily 280 mL 11     oxyCODONE IR (ROXICODONE) 5 MG tablet Take 1-2 tablets (5-10 mg) by mouth every 3 hours as needed for other (pain control or improvement in physical function. Hold dose for analgesic side effects.) 30 tablet 0       ALLERGIES:   Allergies   Allergen Reactions     Lisinopril Swelling     Oxycodone Nausea and Vomiting     Vicodin [Hydrocodone-Acetaminophen] Nausea and Vomiting       SOCIAL HISTORY:  Social History     Social History     Marital status: Single     Spouse name: Stephanie Oh     Number of children: 1     Years of education: N/A     Occupational History     retired crisis  for [employer]      Social History Main Topics     Smoking status: Former Smoker     Packs/day: 1.00     Years: 20.00     Types: Cigarettes     Start date: 9/29/1972     Quit date: 1/1/1992     Smokeless tobacco: Never Used      Comment: quit in 1992     Alcohol use No      Comment: 1987 quit per personal choice.     Drug use: No     Sexual activity: Not on file     Other Topics Concern     Not on file     Social History Narrative           FAMILY HISTORY:   Family History   Problem Relation Age of Onset     CEREBROVASCULAR DISEASE Mother      Hypertension Mother      Prostate Cancer Father 84     Prostate Cancer Brother 63     DIABETES Brother      CANCER Brother      DIABETES Brother         REVIEW OF SYMPTOMS:  A 10-point review of systems was performed. Pertinent findings are noted in the HPI.      PHYSICAL  EXAMINATION:    There were no vitals taken for this visit.    Gen: Alert, in NAD  Pulm: No wheezing, stridor or respiratory distress  CV: Well-perfused, no cyanosis, no pedal edema  Musculoskeletal: Normal muscle bulk and tone  : Deferred  Skin: Normal color and turgor  Psychiatric: Appropriate mood and affect    ASSESSMENT    Mr. 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 He also has bilateral, hypermetabolic pulmonary nodules, concerning for metastatic disease.      PLAN:  Given the results of his recent CT CAP, it is likely that he has progressive disease with multiple pulmonary metastases. We discussed that in patients with metastatic disease, systemic therapy is the primary treatment. There would be no benefit for local therapy with adjuvant radiation therapy. The patient is scheduled to follow up with Dr. Arceo for further discussion regarding systemic treatment options.     Mr. Bruno was seen and discussed with staff, Dr. Yeung.       Abhishek Pete MD  Resident, PGY-5   Department of Radiation Oncology   [facility]    I saw the patient with the resident.  I agree with the resident's note and plan of care.      TEMITOPE Yeung M.D.  Department of Radiation Oncology  [facility]

## 2018-05-16 NOTE — LETTER
5/16/2018       RE: King Gonsalo Bruno  [address]  [place] [facility] [place] [address]     Dear Colleague,    Thank you for referring your patient, King Gonsalo Bruno, to the RADIATION ONCOLOGY CLINIC. Please see a copy of my visit note below.      RADIATION ONCOLOGY CONSULT   May 16, 2018    CHIEF COMPLAINT: Mr. Bruno is a 72 year old male referred to our clinic by Dr. Arceo: for urethral cancer.      HISTORY OF PRESENT ILLNESS:   Mr. Bruno is a 72 year old male with diagnosis of urethral cancer. He originally presented to his PCP with burning with urination. A urinalysis showed hematuria.  He was seen by urology at outside hospital and had a cystoscopy on 7/20/2017. This showd a large urethral mass. He had a urethral dilation and biopsy of the mass, which was positive for high grade carcinoma with squamous differentiation.      He was referred to [facility] and was seen by Dr. Haddad with urology and Dr. Arceo with medical oncology. A PET/CT was completed on 8/23/2017. This demonstrated hypermetabolic urethral mass involving the membranous and penile urethra, with concern for local invasion into the right corpus cavernosa. There was a hypermetabolic node in the left groin, measuring 1.0 cm, consistent with disease involvement. There were also bilateral, hypermetabolic pulmonary nodules, measuring 1.1 cm in YOANA and 0.9 cm in RUL. These were concerning for metastatic disease vs primary pulmonary malignancies. It was recommended that he have these nodules biopsied, but the patient refused, as he was convinced that they were related to his previous TB exposure, and not related to cancer.        He was treated with neoadjuvant chemotherapy, receiving cisplatin/gemzar with C1D1 on 9/15/17.A PET/CT was completed on 10/3/2017, which showed decreased size and FDG uptake of urethral mass. The aforementioned pulmonary nodules also decreased in size and uptake, making them more worrisome for metastatic disease. He completed a total of 4  cycles, with his final infusion on 11/24/2017.  A PET/CT was then completed on 12/12/2017. This, unfortunately, showed slightly increased uptake of urethral mass compared to PET in October. The pulmonary nodules were stable.     He underwent  radical penectomy and urethrectomy, perineal urethrostomy, and bilateral inguinal lymph node dissection on 3/8/2018. The surgical pathology demonstrated moderately differentiated SCC of the penile urethra with extension into the corpus spongiosum and cavernosum of the penile shaft. The specimen was positive for LVSI and PNI. The margins were negative. He had 1/5 left inguinal nodes and 1/7 right inguinal nodes positive for disease involvement. Given the presence of positive nodes on pathology, he was referred to our clinic for consideration of adjuvant radiation therapy.     Prior to our appointment today, Mr. Bruno had a chest/abdomen/pelvis CT scan. An official report is pending, however the scan appears to show multiple new pulmonary nodules, most likely representing metastatic disease. We discussed the results of the scan with Mr. Bruno. He was very upset by the news. He reports recovering well from his surgery without any other significant complaints at this time.       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      GERD (gastroesophageal reflux disease)      Hypertension      PONV (postoperative nausea and vomiting)        Past Surgical History:   Procedure Laterality Date     CYSTOSCOPY, BIOPSY BLADDER, COMBINED N/A 8/31/2017    Procedure: COMBINED CYSTOSCOPY, BIOPSY BLADDER;  Cystoscopy, Suprapubic Tube Placement with Ultrasound Guidiance, Uretheral Dilation;  Surgeon: Muriel Haddad MD;  [facility]     CYSTOSTOMY, INSERT TUBE SUPRAPUBIC, COMBINED N/A 8/31/2017    Procedure: COMBINED CYSTOSTOMY, INSERT  TUBE SUPRAPUBIC;;  Surgeon: Muriel Haddad MD;  [facility]     DISSECT LYMPH NODE INGUINAL N/A 3/8/2018    Procedure: DISSECT LYMPH NODE INGUINAL;  Flexible Cystoscopy, Bladder Biopsy, urethral biopsy and penile biopsy, Radical Penectomy and Urethrectomy, Perineal Urethrostomy, Bilateral Inguinal Lymphadenectomy; superficial lymph nodes and deep lymph nodes;  Surgeon: Muriel Haddad MD;  [facility]     LARYNGOSCOPY  2013    Direct laryngoscopy with the use of rigid endoscopy and takedown of left hypopharyngeal diverticula.      PENECTOMY N/A 3/8/2018    Procedure: PENECTOMY;  Flexible Cystoscopy, Bladder Biopsy, urethral biopsy and penile biopsy, Radical Penectomy and Urethrectomy, Perineal Urethrostomy, Bilateral Inguinal Lymphadenectomy; superficial lymph nodes and deep lymph nodes    ;  Surgeon: Muriel Haddad MD;  [facility]     URETHROSTOMY PERINEUM N/A 3/8/2018    Procedure: URETHROSTOMY PERINEUM;  Flexible Cystoscopy, Bladder Biopsy, urethral biopsy and penile biopsy, Radical Penectomy and Urethrectomy, Perineal Urethrostomy, Bilateral Inguinal Lymphadenectomy; superficial lymph nodes and deep lymph nodes;  Surgeon: Muriel Haddad MD;  [facility]       CHEMOTHERAPY HISTORY: 4 cycles of neoadjuvant cisplatin/gemzar from 9/15/17 to 11/24/2017.       RADIATION THERAPY HISTORY: None     PREGNANCY STATUS: N/A    IMPLANTED CARDIAC DEVICE: No      MEDICATIONS:  Current Outpatient Prescriptions   Medication Sig Dispense Refill     acetaminophen (TYLENOL) 325 MG tablet Take 2 tablets (650 mg) by mouth every 4 hours as needed for mild pain or fever 150 tablet 0     amLODIPine (NORVASC) 10 MG tablet Take 1 tablet (10 mg) by mouth daily (Patient taking differently: Take 20 mg by mouth every morning ) 90 tablet 3     amoxicillin-clavulanate (AUGMENTIN) 500-125 MG per tablet Take 1 tablet by mouth daily While the drains are in place 21 tablet 1     B Complex Vitamins (VITAMIN B  COMPLEX PO) Take by mouth daily (with lunch)       docusate sodium (COLACE) 100 MG capsule Take 1 capsule (100 mg) by mouth 2 times daily as needed for constipation 60 capsule 0     fluticasone (FLONASE) 50 MCG/ACT spray Spray 2 sprays into both nostrils 2 times daily (Patient taking differently: Spray 2 sprays into both nostrils every morning ) 2 Bottle 11     LORazepam (ATIVAN) 0.5 MG tablet Take 1 tablet (0.5 mg) by mouth every 8 hours as needed for anxiety (Patient taking differently: Take 0.5 mg by mouth At Bedtime ) 30 tablet 3     nystatin (MYCOSTATIN) 870160 UNIT/ML suspension Take 5 mLs (500,000 Units) by mouth 4 times daily Swish and spit 200 mL 0     omeprazole (PRILOSEC) 2 mg/mL SUSP Take 10 mLs (20 mg) by mouth 2 times daily 280 mL 11     oxyCODONE IR (ROXICODONE) 5 MG tablet Take 1-2 tablets (5-10 mg) by mouth every 3 hours as needed for other (pain control or improvement in physical function. 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      Social History Main Topics     Smoking status: Former Smoker     Packs/day: 1.00     Years: 20.00     Types: Cigarettes     Start date: 9/29/1972     Quit date: 1/1/1992     Smokeless tobacco: Never Used      Comment: quit in 1992     Alcohol use No      Comment: 1987 quit per personal choice.     Drug use: No     Sexual activity: Not on file     Other Topics Concern     Not on file     Social History Narrative           FAMILY HISTORY:   Family History   Problem Relation Age of Onset     CEREBROVASCULAR DISEASE Mother      Hypertension Mother       Prostate Cancer Father 84     Prostate Cancer Brother 63     DIABETES Brother      CANCER Brother      DIABETES Brother         REVIEW OF SYMPTOMS:  A 10-point review of systems was performed. Pertinent findings are noted in the HPI.      PHYSICAL EXAMINATION:    There were no vitals taken for this visit.    Gen: Alert, in NAD  Pulm: No wheezing, stridor or respiratory distress  CV: Well-perfused, no cyanosis, no pedal edema  Musculoskeletal: Normal muscle bulk and tone  : Deferred  Skin: Normal color and turgor  Psychiatric: Appropriate mood and affect    ASSESSMENT    Mr. 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 He also has bilateral, hypermetabolic pulmonary nodules, concerning for metastatic disease.      PLAN:  Given the results of his recent CT CAP, it is likely that he has progressive disease with multiple pulmonary metastases. We discussed that in patients with metastatic disease, systemic therapy is the primary treatment. There would be no benefit for local therapy with adjuvant radiation therapy. The patient is scheduled to follow up with Dr. Arceo for further discussion regarding systemic treatment options.     Mr. Bruno was seen and discussed with staff, Dr. Yeung.       Abhishek Pete MD  Resident, PGY-5   Department of Radiation Oncology   [facility]    I saw the patient with the resident.  I agree with the resident's note and plan of care.      TEMITOPE Yeung M.D.  Department of Radiation Oncology  [facility]

## 2018-05-16 NOTE — MR AVS SNAPSHOT
After Visit Summary   5/16/2018    King Gonsalo Bruno    MRN: [id number]           Patient Information     Date Of Birth          1945        Visit Information        Provider Department      5/16/2018 3:00 PM Cierra Yeung MD [facility]        Today's Diagnoses     Penile cancer (H)    -  1       Follow-ups after your visit        Your next 10 appointments already scheduled     Jun 26, 2018 11:00 AM CDT   [facility] with  [facility]   [facility] ([facility])    [address]  [address]  [place] [facility] [id number] [phone]            Jun 26, 2018 12:00 PM CDT   PET ONCOLOGY WHOLE BODY with UMPPET1   [facility] ([facility])    [address] [address]   [phone]           Tell your doctor:   If there is any chance you may be pregnant or if you are breastfeeding.   If you have problems lying in small spaces (claustrophobia). If you do, your doctor may give you medicine to help you relax. If you have diabetes:   Have your exam early in the morning. Your blood glucose will go up as the day goes by.   Your glucose level must be 180 or less at the start of the exam. Please take any medicines you need to ensure this blood glucose level.   If you are taking insulin in the morning take with breakfast by 6 am and schedule procedure between 12 and 2:15 pm.   If you are taking insulin at night take nightly dose, fast overnight, schedule procedure before 10 am.   If you take insulin both morning and night take morning dose by 6am and schedule procedure between 12 and 2:15 pm.   24 hours before your scan: Don t do any heavy exercise. (No jogging, aerobics or other workouts.) Exercise will make your pictures less accurate.  At least 7 hours before your scan, or the evening before if you have an early appointment: Eat a low carb, high protein meal (Lean meats,  seafood, beans, soy, low-fat dairy, eggs, nuts & seeds). 6 hours before your scan:   Stop all food and liquids (except water).   Do not chew gum or suck on mints.   If you need to take medicine with food, you may take it with a few crackers.  Please call your Imaging Department at your exam site with any questions.            2018  1:30 PM CDT   (Arrive by 1:15 PM)   Return Visit with Steve Arceo MD   [facility] ([facility])    [address]  [address]  [facility] [id number] [phone]            2018 10:40 AM CDT   (Arrive by 10:25 AM)   Return Visit with Muriel Haddad MD   [facility] ([facility])    [address]  4th [address]  [facility] [id number] [phone]              Who to contact     Please call your clinic at [phone] to:    Ask questions about your health    Make or cancel appointments    Discuss your medicines    Learn about your test results    Speak to your doctor            Additional Information About Your Visit        MyChart Information     [employer] is an electronic gateway that provides easy, online access to your medical records. With [employer], you can request a clinic appointment, read your test results, renew a prescription or communicate with your care team.     To sign up for [employer] visit the website at [email or web address]   You will be asked to enter the access code listed below, as well as some personal information. Please follow the directions to create your username and password.     Your access code is: [email or web address]  Expires: 2018  6:30 AM     Your access code will  in 90 days. If you need help or a new code, please contact your [facility] or call [phone] for assistance.        Care EveryWhere ID     This is your Care EveryWhere ID. This could be used by  "other organizations to access your [facility] medical records  [id number]        Your Vitals Were     Height BMI (Body Mass Index)                1.93 m (6' 4\") 17.6 kg/m2           Blood Pressure from Last 3 Encounters:   05/23/18 130/77   05/17/18 150/72   04/25/18 133/70    Weight from Last 3 Encounters:   05/23/18 65.6 kg (144 lb 11.2 oz)   05/17/18 65.3 kg (144 lb)   05/16/18 65.6 kg (144 lb 9.6 oz)              Today, you had the following     No orders found for display         Today's Medication Changes          These changes are accurate as of 5/16/18 11:59 PM.  If you have any questions, ask your nurse or doctor.               These medicines have changed or have updated prescriptions.        Dose/Directions    amLODIPine 10 MG tablet   Commonly known as:  NORVASC   This may have changed:    - how much to take  - when to take this   Used for:  Benign essential hypertension        Dose:  10 mg   Take 1 tablet (10 mg) by mouth daily   Quantity:  90 tablet   Refills:  3       fluticasone 50 MCG/ACT spray   Commonly known as:  FLONASE   This may have changed:  when to take this   Used for:  Urethral cancer (H), Allergic sinusitis        Dose:  2 spray   Spray 2 sprays into both nostrils 2 times daily   Quantity:  2 Bottle   Refills:  11       LORazepam 0.5 MG tablet   Commonly known as:  ATIVAN   This may have changed:  when to take this   Used for:  Anxiety        Dose:  0.5 mg   Take 1 tablet (0.5 mg) by mouth every 8 hours as needed for anxiety   Quantity:  30 tablet   Refills:  3                Primary Care Provider Office Phone # Fax #    Joan Janet Ventura [phone]       [facility] [address] [facility] [address]        Equal Access to Services     JENELLE RED AH: Tiffanie Robert, dorie weaver, paolo chase, ronald rasmussen. So [facility] [phone].    ATENCIÓN: Si habla español, tiene a washburn disposición servicios gratuitos " de asistencia lingüística. Juwan hoffmann [phone].    We comply with applicable federal civil rights laws and [place] laws. We do not discriminate on the basis of race, color, national origin, age, disability, sex, sexual orientation, or gender identity.            Thank you!     Thank you for choosing [facility]  for your care. Our goal is always to provide you with excellent care. Hearing back from our patients is one way we can continue to improve our services. Please take a few minutes to complete the written survey that you may receive in the mail after your visit with us. Thank you!             Your Updated Medication List - Protect others around you: Learn how to safely use, store and throw away your medicines at www.disposemymeds.org.          This list is accurate as of 5/16/18 11:59 PM.  Always use your most recent med list.                   Brand Name Dispense Instructions for use Diagnosis    acetaminophen 325 MG tablet    TYLENOL    150 tablet    Take 2 tablets (650 mg) by mouth every 4 hours as needed for mild pain or fever    Urethral cancer (H)       amLODIPine 10 MG tablet    NORVASC    90 tablet    Take 1 tablet (10 mg) by mouth daily    Benign essential hypertension       amoxicillin-clavulanate 500-125 MG per tablet    AUGMENTIN    21 tablet    Take 1 tablet by mouth daily While the drains are in place    Urinary tract infection       docusate sodium 100 MG capsule    COLACE    60 capsule    Take 1 capsule (100 mg) by mouth 2 times daily as needed for constipation    Slow transit constipation       fluticasone 50 MCG/ACT spray    FLONASE    2 Bottle    Spray 2 sprays into both nostrils 2 times daily    Urethral cancer (H), Allergic sinusitis       LORazepam 0.5 MG tablet    ATIVAN    30 tablet    Take 1 tablet (0.5 mg) by mouth every 8 hours as needed for anxiety    Anxiety       nystatin 607386 UNIT/ML suspension    MYCOSTATIN    200 mL    Take 5 mLs (500,000 Units) by mouth 4  times daily Swish and spit    Thrush       omeprazole 2 mg/mL Susp    priLOSEC    280 mL    Take 10 mLs (20 mg) by mouth 2 times daily    Gastroesophageal reflux disease with esophagitis, Urethral cancer (H)       oxyCODONE IR 5 MG tablet    ROXICODONE    30 tablet    Take 1-2 tablets (5-10 mg) by mouth every 3 hours as needed for other (pain control or improvement in physical function. Hold dose for analgesic side effects.)    Urethral cancer (H)       VITAMIN B COMPLEX PO      Take by mouth daily (with lunch)

## 2018-05-17 ENCOUNTER — ONCOLOGY VISIT (OUTPATIENT)
Dept: ONCOLOGY | Facility: CLINIC | Age: 73
End: 2018-05-17
Attending: INTERNAL MEDICINE
Payer: COMMERCIAL

## 2018-05-17 VITALS
RESPIRATION RATE: 16 BRPM | DIASTOLIC BLOOD PRESSURE: 72 MMHG | BODY MASS INDEX: 17.53 KG/M2 | SYSTOLIC BLOOD PRESSURE: 150 MMHG | HEART RATE: 80 BPM | TEMPERATURE: 98 F | OXYGEN SATURATION: 98 % | WEIGHT: 144 LBS

## 2018-05-17 DIAGNOSIS — C68.0 URETHRAL CANCER (H): Primary | ICD-10-CM

## 2018-05-17 PROCEDURE — G0463 HOSPITAL OUTPT CLINIC VISIT: HCPCS | Mod: ZF

## 2018-05-17 PROCEDURE — 99215 OFFICE O/P EST HI 40 MIN: CPT | Mod: ZP | Performed by: INTERNAL MEDICINE

## 2018-05-17 ASSESSMENT — PAIN SCALES - GENERAL: PAINLEVEL: NO PAIN (0)

## 2018-05-17 NOTE — NURSING NOTE
"Oncology Rooming Note    May 17, 2018 4:53 PM   King Gonsalo Bruno is a 72 year old male who presents for:    Chief Complaint   Patient presents with     Oncology Clinic Visit     Bladder CA, f/u     Initial Vitals: /72  Pulse 80  Temp 98  F (36.7  C) (Oral)  Resp 16  Wt 65.3 kg (144 lb)  SpO2 98%  BMI 17.53 kg/m2 Estimated body mass index is 17.53 kg/(m^2) as calculated from the following:    Height as of 5/16/18: 1.93 m (6' 4\").    Weight as of this encounter: 65.3 kg (144 lb). Body surface area is 1.87 meters squared.  No Pain (0) Comment: Data Unavailable   No LMP for male patient.  Allergies reviewed: Yes  Medications reviewed: Yes    Medications: Medication refills not needed today.  Pharmacy name entered into [facility]:    [id number] - [place], [place] - [address] [place] [address] AT [address] [place] [address] - [address] [facility] [phone]    Clinical concerns: Patient states there are no new concerns to discuss with provider.  Dr Arceo was not notified.       8 minutes for nursing intake (face to face time)     Ansley Boston CMA              "

## 2018-05-17 NOTE — PROGRESS NOTES
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 He initiated Cisplatin/Gemzar split on days 1 and 8 given history of CKD. He is currently undergoing curative intent treatment, however he does have indeterminate pulmonary nodules that are being followed. He had a positive response to the 2 cycles which were complicated with issues of dehydration and nausea. He has completed 4 cycles of chemotherapy.     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     He is accompanied by his significant other.  He had CT scans done yesterday and met Dr. Yeung for consultation.     No chest pain, palpitation or dizziness. Eating and drinking very little in the past few days. Is having increased issue with constipation again.  Urine appears stable in the catheter. Denies cloudiness or darkness in color. No bleeding/bruising.     Current Outpatient Prescriptions   Medication Sig     acetaminophen (TYLENOL) 325 MG tablet Take 2 tablets (650 mg) by mouth every 4 hours as needed for mild pain or fever     amLODIPine (NORVASC) 10 MG tablet Take 1 tablet (10 mg) by mouth daily (Patient taking differently: Take 20 mg by mouth every morning )     amoxicillin-clavulanate (AUGMENTIN) 500-125 MG per tablet Take 1 tablet by mouth daily While the drains are in place     B Complex Vitamins (VITAMIN B COMPLEX PO) Take by mouth daily (with lunch)     docusate sodium (COLACE) 100 MG capsule Take 1 capsule (100 mg) by mouth 2 times daily as needed for constipation     fluticasone (FLONASE) 50 MCG/ACT spray Spray 2 sprays into both nostrils 2 times daily (Patient taking differently: Spray 2 sprays into both nostrils every morning )     LORazepam (ATIVAN) 0.5 MG tablet Take 1 tablet (0.5 mg) by mouth every 8 hours as needed for anxiety (Patient taking differently: Take 0.5 mg by mouth At  Bedtime )     nystatin (MYCOSTATIN) 164396 UNIT/ML suspension Take 5 mLs (500,000 Units) by mouth 4 times daily Swish and spit     omeprazole (PRILOSEC) 2 mg/mL SUSP Take 10 mLs (20 mg) by mouth 2 times daily     oxyCODONE IR (ROXICODONE) 5 MG tablet Take 1-2 tablets (5-10 mg) by mouth every 3 hours as needed for other (pain control or improvement in physical function. Hold dose for analgesic side effects.)     No current facility-administered medications for this visit.      Facility-Administered Medications Ordered in Other Visits   Medication     barium sulfate (EZ PAQUE) oral suspension 96%     barium sulfate (EZ-HD) oral suspension 98%     barium sulfate 40% (VARIBAR THIN HONEY) oral suspension     sod bicarbonate-citric acid-simethicone (EZ GAS) 2.21-1.53-0.04 G packet 4 g           Allergies   Allergen Reactions     Lisinopril Swelling     Oxycodone Nausea and Vomiting     Vicodin [Hydrocodone-Acetaminophen] Nausea and Vomiting         Exam: alert, appears weak, fatigued There were no vitals taken for this visit.  Wt Readings from Last 4 Encounters:   05/16/18 65.6 kg (144 lb 9.6 oz)   04/25/18 67.6 kg (149 lb)   04/25/18 67.1 kg (148 lb)   04/04/18 68.5 kg (151 lb)     Oropharynx is moist, no focal lesion. No icterus. Neck supple and without adenopathy. Lungs: crackles right base, no wheezes or rub. Heart: RRR. Abdomen: soft, nontender. Extremities: warm, no edema.    Labs:   Recent Labs   Lab Test  04/25/18   1147  03/12/18   0818  03/11/18   0730  03/10/18   0800  03/09/18   0716   NA  136  130*  135  135  137   POTASSIUM  4.1  3.9  3.9  4.0  4.3   CHLORIDE  103  99  104  105  105   CO2  25  22  22  23  22   ANIONGAP  7  9  9  7  10   BUN  16  12  12  12  19   CR  1.54*  1.57*  1.41*  1.41*  1.52*   GLC  87  93  96  89  129*   SAADIA  10.2*  9.8  9.5  9.2  9.1     Recent Labs   Lab Test  04/25/18   1147  02/07/18   1150  12/13/17   0813  12/08/17   0925  12/06/17   1039   MAG  1.9  1.8  1.3*  1.5*  1.3*      Recent Labs   Lab Test  04/25/18   1147  03/12/18   0818  03/11/18   0730  03/10/18   0800  03/09/18   0716   12/13/17   0813  12/01/17   0824  11/24/17   0827  11/10/17   0839   WBC  5.6  9.2  8.7  9.8  16.3*   < >  4.3  2.2*  3.7*  3.0*   HGB  11.8*  9.9*  9.1*  8.9*  8.8*   < >  9.4*  9.1*  7.9*  9.0*   PLT  299  264  239  241  231   < >  262  113*  146*  157   MCV  91  91  91  91  91   < >  91  89  90  86   NEUTROPHIL  51.3   --    --    --    --    --   30.4  66.7  57.3  44.1    < > = values in this interval not displayed.     Recent Labs   Lab Test  04/25/18   1147  12/13/17   0813  12/01/17   0824   BILITOTAL  0.4  0.3  0.3   ALKPHOS  71  73  60   ALT  13  12  21   AST  22  28  42   ALBUMIN  4.1  3.3*  3.2*     TSH   Date Value Ref Range Status   11/24/2017 0.82 0.40 - 4.00 mU/L Final   10/06/2017 1.61 0.40 - 4.00 mU/L Final     No results for input(s): CEA in the last 83792 hours.  Results for orders placed or performed during the hospital encounter of 05/16/18   CT Chest/Abdomen/Pelvis w Contrast     Value    Radiologist flags Pulmonary metastatic disease (Urgent)    Narrative    Examination:  CT CHEST/ABDOMEN/PELVIS W CONTRAST, 5/16/2018 12:23 PM     Comparison: PET CT 12/12/2017    History: ; Malignant neoplasm of urethra (H)    Per EMR: Status post neoadjuvant chemotherapy and radical penectomy  and urethrectomy, perineal urethrostomy, and bilateral inguinal lymph  node dissection?on 3/8/2018    Technique: Volumetric helical acquisition of CT images from the  thoracic inlet to the symphysis pubis after the uncomplicated  administration of 92 cc of isovue 370. Coronal and sagittal images and  axial MIP images were reconstructed from the source data.    Findings:  Chest:  The visualized thyroid is unremarkable. Heart size is normal. The  great vessels are normal in caliber and appearance. There is no  pericardial effusion. No mediastinal, hilar, or axillary  lymphadenopathy. Calcified right hilar lymph  nodes are stable. The  central tracheobronchial tree is patent. Stable severe emphysematous  changes. No focal airspace consolidation, pleural effusion, or  pneumothorax. Numerous pulmonary nodules, some of which are increased  in size from prior and some of which are new from prior.    A 4 mm nodule in the anterior right lower lobe (series 2, image 131)  is slightly increased in size from prior and demonstrates central  hypoattenuation concerning for cavitation.  An 8 mm subpleural nodule in the lateral right upper lobe (series 2,  image 45) may have been present on the previous study as a tiny  groundglass nodule on series 7, image 51 of that study.  14 mm lobulated and spiculated nodule with central cavitation in the  right middle lobe (series 2, image 138) is new from the prior study.  A 23 mm solid nodule in the central right lower lobe (series 2, image  96) is also new from prior.  There are additional scattered nodules, some of which demonstrate  spiculation, lobulation, and/or possible cavitation.    Abdomen/pelvis:  Surgical changes of radical penectomy/urethrectomy, perineal  urethrostomy, and bilateral inguinal lymph node dissections.  Inflammatory changes in the surgical bed without definite mass,  enhancement, or soft tissue thickening to indicate recurrent or  residual disease.     Several tiny subcapsular hypodensity in the liver (for example series  4, images 138 and 151) are unchanged from the prior study and too  small to accurately characterize on CT. The liver is otherwise normal.  Bilateral renal cysts are again seen. Kidneys are otherwise normal.  Gallbladder, adrenal glands, and spleen are normal. Stable appearance  of pancreas divisum. There are no dilated loops of large or small  bowel. No focal bowel wall thickening or mucosal hyperenhancement. The  major intra-abdominal vasculature is patent and within normal limits  for caliber. The bladder is partially distended and unremarkable.  The  prostate is mildly enlarged, measuring up to 4.7 cm in its transverse  dimension.    Bones:  No acute osseus abnormality or suspicious bony lesion. American  degenerative changes in the left femoral acetabular joint, including a  large subchondral cyst, which is unchanged from prior.      Impression    Impression:   1. Multiple new and enlarging pulmonary nodules, some of which  demonstrate cavitation consistent with worsening metastatic disease.  2. Surgical changes of radical penectomy/urethrectomy, perineal  urethrostomy, and bilateral inguinal lymph node dissections without  definite evidence of local disease recurrence.  3. Severe emphysema.    [Urgent Result: Pulmonary metastatic disease]    Finding was identified on 5/16/2018 2:21 PM.     Dr. Yeung was contacted by Dr. Duran at 5/16/2018 2:59 PM and  verbalized understanding of the urgent finding.     I have personally reviewed the examination and initial interpretation  and I agree with the findings.    DARLIN WALL MD       Impression/plan:     Penile carcinoma  ECOG PS 1  Peripheral neuropathy, weight loss, fatigue post chemotherapy   Multiple new pulmonary nodules consistent with metastatic disease    I had a lengthy discussion with Mr. Bruno in the presence of his significant other at this clinic visit.  He was very unhappy with the developments from yesterday.  He had been to see Dr. Yeung and had restaging CT scans done just prior to that visit.  He was informed about the presence of new metastatic disease.  This was quite disheartening.  He was also upset that he had to hear this through the student and then followed by Dr. Yeung.  He notes that he has not been feeling well for the last couple of years and has been struggling continuously.  He felt so poorly after his chemotherapy.  He did not recover from that and had to undergo surgery.  He still continues to feel sick.  He does not want to be in the sick role, which is what has been happening  lately.  He was overall quite frustrated with the way things have been unfolding for him.        I have reviewed the actual images from his recent restaging scans, which do reveal multiple new pulmonary nodules with the largest cavitating lesion being 2.3 cm in size.  Overall, this is concerning for metastatic disease.  I did explain that there was an outside chance that this could be an infection.  Often there is no huge advantage of early initiation of therapy.  It was quite reasonable for us to defer treatment at this point in time.  He is quite opposed to the idea of doing any chemotherapy, and I would not even recommend this for him.  He had a very hard time on chemotherapy with cisplatin and gemcitabine.  He continues to struggle with toxicity from the same, but had almost no response to this regimen.  Our only next choice would be an agent like Taxotere, which would only add to his neuropathy with not a tremendous chance for success.  I would be more hopeful about trying immunotherapy for him.  I have no idea about the likelihood of a response, but I have seen some dramatic responses, particularly in urothelial cancers.  I have seen immunotherapy work even in very advanced disease.  His wife had very specific questions for me and asked if there was a scenario in which it would be too late and detrimental to wait.  I reviewed that we could have a dramatic progression of disease, wherein the next 6 weeks, he would not be in a position to even get immunotherapy.  This is not a very likely scenario.  His current lesions have appeared since the last scan, but his last scan was done on 12/12, about 6 months ago.  His wife wanted to know what would be done for a standard patient.  I explained to her that the typical recommendation would be to consider a CT-guided biopsy of one of the betty metastases in the lung.  If this turns out to be metastatic disease, then one could consider systemic chemotherapy with  palliative intent.      They wanted to know if his disease is terminal, and he very clearly asked me if I could heal him.  I explained to him that it is now very clear that he does have metastatic disease, and almost barring exceptions, this universally means that his disease would be incurable, but would remain treatable.  Towards the end of a lengthy conversation, he was then asking about the downsides of waiting for another 6 weeks when he started off quite vehemently that he does not want to do anything at this time.  Overall, I do not see a downside to waiting.   We eventually decided to get a PET/CT scan done in about 6 weeks' time, and I would follow him again with the scan results.  If he clearly has progressive disease, we would consider immunotherapy.  If he has any improvement, it would suggest that this was not from metastatic disease.     Over 45 min of direct face to face time spent with patient with more than 50% time spent in counseling and coordinating care.

## 2018-05-17 NOTE — MR AVS SNAPSHOT
After Visit Summary   5/17/2018    King Gonsalo Bruno    MRN: [id number]           Patient Information     Date Of Birth          1945        Visit Information        Provider Department      5/17/2018 5:00 PM Steve Arceo MD [facility]        Today's Diagnoses     Urethral cancer (H)    -  1       Follow-ups after your visit        Your next 10 appointments already scheduled     May 23, 2018 12:20 PM CDT   (Arrive by 12:05 PM)   Return Visit with Muriel Haddad MD   [facility] ([facility])    [address]  4th Floor  [place] [facility] [address]   [phone]            Jun 26, 2018  1:00 PM CDT   [facility] Lab Draw with  [facility] LAB DRAW   [facility] ([facility])    [address]  [address]  [place] [facility] [address]   [phone]            Jun 26, 2018  1:30 PM CDT   PET ONCOLOGY WHOLE BODY with UUPET1   [facility] [place] PET CT ([facility])    [address]  [facility] [address]   [phone]           Tell your doctor:   If there is any chance you may be pregnant or if you are breastfeeding.   If you have problems lying in small spaces (claustrophobia). If you do, your doctor may give you medicine to help you relax. If you have diabetes:   Have your exam early in the morning. Your blood glucose will go up as the day goes by.   Your glucose level must be 180 or less at the start of the exam. Please take any medicines you need to ensure this blood glucose level.   If you are taking insulin in the morning take with breakfast by 6 am and schedule procedure between 12 and 2:15 pm.   If you are taking insulin at night take nightly dose, fast overnight, schedule procedure before 10 am.   If you take insulin both morning and night take morning dose by 6am and schedule procedure between 12  and 2:15 pm.   24 hours before your scan: Don t do any heavy exercise. (No jogging, aerobics or other workouts.) Exercise will make your pictures less accurate.  At least 7 hours before your scan, or the evening before if you have an early appointment: Eat a low carb, high protein meal (Lean meats, seafood, beans, soy, low-fat dairy, eggs, nuts & seeds). 6 hours before your scan:   Stop all food and liquids (except water).   Do not chew gum or suck on mints.   If you need to take medicine with food, you may take it with a few crackers.  Please call your Imaging Department at your exam site with any questions.            Jun 27, 2018  1:30 PM CDT   (Arrive by 1:15 PM)   Return Visit with Steve Arceo MD   [facility] ([facility])    [address] [place] [address]  [address] [place] [address] [phone]              Future tests that were ordered for you today     Open Future Orders        Priority Expected Expires Ordered    CBC with platelets differential Routine  5/17/2019 5/17/2018    Comprehensive metabolic panel Routine  5/17/2019 5/17/2018    Lactate Dehydrogenase Routine  5/17/2019 5/17/2018    PET Oncology Whole Body Routine  5/17/2019 5/17/2018    CT Chest/Abdomen/Pelvis w Contrast Routine 5/19/2018 4/4/2019 4/4/2018            Who to contact     If you have questions or need follow up information about today's clinic visit or your schedule please contact [facility] directly at [phone].  Normal or non-critical lab and imaging results will be communicated to you by MyChart, letter or phone within 4 business days after the clinic has received the results. If you do not hear from us within 7 days, please contact the clinic through MyChart or phone. If you have a critical or abnormal lab result, we will notify you by phone as soon as possible.  Submit refill requests through [employer] or call your pharmacy and they will forward the  "refill request to us. Please allow 3 business days for your refill to be completed.          Additional Information About Your Visit        [employer] Information     [employer] lets you send messages to your doctor, view your test results, renew your prescriptions, schedule appointments and more. To sign up, go to [email or web address] . Click on \"Log in\" on the left side of the screen, which will take you to the Welcome page. Then click on \"Sign up Now\" on the right side of the page.     You will be asked to enter the access code listed below, as well as some personal information. Please follow the directions to create your username and password.     Your access code is: [email or web address]  Expires: 2018  6:30 AM     Your access code will  in 90 days. If you need help or a new code, please call your [place] clinic or [phone].        Care EveryWhere ID     This is your Care EveryWhere ID. This could be used by other organizations to access your [facility] medical records  [id number]        Your Vitals Were     Pulse Temperature Respirations Pulse Oximetry BMI (Body Mass Index)       80 98  F (36.7  C) (Oral) 16 98% 17.53 kg/m2        Blood Pressure from Last 3 Encounters:   18 150/72   18 133/70   18 131/75    Weight from Last 3 Encounters:   18 65.3 kg (144 lb)   18 65.6 kg (144 lb 9.6 oz)   18 67.6 kg (149 lb)                 Today's Medication Changes          These changes are accurate as of 18  5:49 PM.  If you have any questions, ask your nurse or doctor.               These medicines have changed or have updated prescriptions.        Dose/Directions    amLODIPine 10 MG tablet   Commonly known as:  NORVASC   This may have changed:  when to take this   Used for:  Benign essential hypertension        Dose:  10 mg   Take 1 tablet (10 mg) by mouth daily   Quantity:  90 tablet   Refills:  3       fluticasone 50 MCG/ACT spray   Commonly known as:  FLONASE   This may " have changed:  when to take this   Used for:  Urethral cancer (H), Allergic sinusitis        Dose:  2 spray   Spray 2 sprays into both nostrils 2 times daily   Quantity:  2 Bottle   Refills:  11       LORazepam 0.5 MG tablet   Commonly known as:  ATIVAN   This may have changed:  when to take this   Used for:  Anxiety        Dose:  0.5 mg   Take 1 tablet (0.5 mg) by mouth every 8 hours as needed for anxiety   Quantity:  30 tablet   Refills:  3                Primary Care Provider Office Phone # Fax #    Joan Janet Ventura [phone]       [facility] [address] [facility] [address]        Equal Access to Services     JENELLE RED AH: Hadii светлана garcia hadnicolás Robert, waaxda luqadaha, qaybta kaalmada rena, ronald rasmussen. So [facility] [phone].    ATENCIÓN: Si habla español, tiene a washburn disposición servicios gratuitos de asistencia lingüística. [facility] [phone].    We comply with applicable federal civil rights laws and [place] laws. We do not discriminate on the basis of race, color, national origin, age, disability, sex, sexual orientation, or gender identity.            Thank you!     Thank you for choosing [facility]  for your care. Our goal is always to provide you with excellent care. Hearing back from our patients is one way we can continue to improve our services. Please take a few minutes to complete the written survey that you may receive in the mail after your visit with us. Thank you!             Your Updated Medication List - Protect others around you: Learn how to safely use, store and throw away your medicines at www.disposemymeds.org.          This list is accurate as of 5/17/18  5:49 PM.  Always use your most recent med list.                   Brand Name Dispense Instructions for use Diagnosis    acetaminophen 325 MG tablet    TYLENOL    150 tablet    Take 2 tablets (650 mg) by mouth every 4 hours as needed for mild pain  or fever    Urethral cancer (H)       amLODIPine 10 MG tablet    NORVASC    90 tablet    Take 1 tablet (10 mg) by mouth daily    Benign essential hypertension       amoxicillin-clavulanate 500-125 MG per tablet    AUGMENTIN    21 tablet    Take 1 tablet by mouth daily While the drains are in place    Urinary tract infection       docusate sodium 100 MG capsule    COLACE    60 capsule    Take 1 capsule (100 mg) by mouth 2 times daily as needed for constipation    Slow transit constipation       fluticasone 50 MCG/ACT spray    FLONASE    2 Bottle    Spray 2 sprays into both nostrils 2 times daily    Urethral cancer (H), Allergic sinusitis       LORazepam 0.5 MG tablet    ATIVAN    30 tablet    Take 1 tablet (0.5 mg) by mouth every 8 hours as needed for anxiety    Anxiety       nystatin 763983 UNIT/ML suspension    MYCOSTATIN    200 mL    Take 5 mLs (500,000 Units) by mouth 4 times daily Swish and spit    Thrush       omeprazole 2 mg/mL Susp    priLOSEC    280 mL    Take 10 mLs (20 mg) by mouth 2 times daily    Gastroesophageal reflux disease with esophagitis, Urethral cancer (H)       oxyCODONE IR 5 MG tablet    ROXICODONE    30 tablet    Take 1-2 tablets (5-10 mg) by mouth every 3 hours as needed for other (pain control or improvement in physical function. Hold dose for analgesic side effects.)    Urethral cancer (H)       VITAMIN B COMPLEX PO      Take by mouth daily (with lunch)

## 2018-05-17 NOTE — LETTER
5/17/2018       RE: King Gonsalo Bruno  [address]  [place] [address]     Dear Colleague,    Thank you for referring your patient, King Gonsalo Bruno, to the [facility]. Please see a copy of my visit note below.    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 He initiated Cisplatin/Gemzar split on days 1 and 8 given history of CKD. He is currently undergoing curative intent treatment, however he does have indeterminate pulmonary nodules that are being followed. He had a positive response to the 2 cycles which were complicated with issues of dehydration and nausea. He has completed 4 cycles of chemotherapy.     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     He is accompanied by his significant other.  He had CT scans done yesterday and met Dr. Yeung for consultation.     No chest pain, palpitation or dizziness. Eating and drinking very little in the past few days. Is having increased issue with constipation again.  Urine appears stable in the catheter. Denies cloudiness or darkness in color. No bleeding/bruising.     Current Outpatient Prescriptions   Medication Sig     acetaminophen (TYLENOL) 325 MG tablet Take 2 tablets (650 mg) by mouth every 4 hours as needed for mild pain or fever     amLODIPine (NORVASC) 10 MG tablet Take 1 tablet (10 mg) by mouth daily (Patient taking differently: Take 20 mg by mouth every morning )     amoxicillin-clavulanate (AUGMENTIN) 500-125 MG per tablet Take 1 tablet by mouth daily While the drains are in place     B Complex Vitamins (VITAMIN B COMPLEX PO) Take by mouth daily (with lunch)     docusate sodium (COLACE) 100 MG capsule Take 1 capsule (100 mg) by mouth 2 times daily as needed for constipation     fluticasone (FLONASE) 50 MCG/ACT spray Spray 2 sprays into both nostrils  2 times daily (Patient taking differently: Spray 2 sprays into both nostrils every morning )     LORazepam (ATIVAN) 0.5 MG tablet Take 1 tablet (0.5 mg) by mouth every 8 hours as needed for anxiety (Patient taking differently: Take 0.5 mg by mouth At Bedtime )     nystatin (MYCOSTATIN) 370441 UNIT/ML suspension Take 5 mLs (500,000 Units) by mouth 4 times daily Swish and spit     omeprazole (PRILOSEC) 2 mg/mL SUSP Take 10 mLs (20 mg) by mouth 2 times daily     oxyCODONE IR (ROXICODONE) 5 MG tablet Take 1-2 tablets (5-10 mg) by mouth every 3 hours as needed for other (pain control or improvement in physical function. Hold dose for analgesic side effects.)     No current facility-administered medications for this visit.      Facility-Administered Medications Ordered in Other Visits   Medication     barium sulfate (EZ PAQUE) oral suspension 96%     barium sulfate (EZ-HD) oral suspension 98%     barium sulfate 40% (VARIBAR THIN HONEY) oral suspension     sod bicarbonate-citric acid-simethicone (EZ GAS) 2.21-1.53-0.04 G packet 4 g           Allergies   Allergen Reactions     Lisinopril Swelling     Oxycodone Nausea and Vomiting     Vicodin [Hydrocodone-Acetaminophen] Nausea and Vomiting         Exam: alert, appears weak, fatigued There were no vitals taken for this visit.  Wt Readings from Last 4 Encounters:   05/16/18 65.6 kg (144 lb 9.6 oz)   04/25/18 67.6 kg (149 lb)   04/25/18 67.1 kg (148 lb)   04/04/18 68.5 kg (151 lb)     Oropharynx is moist, no focal lesion. No icterus. Neck supple and without adenopathy. Lungs: crackles right base, no wheezes or rub. Heart: RRR. Abdomen: soft, nontender. Extremities: warm, no edema.    Labs:   Recent Labs   Lab Test  04/25/18   1147  03/12/18   0818  03/11/18   0730  03/10/18   0800  03/09/18   0716   NA  136  130*  135  135  137   POTASSIUM  4.1  3.9  3.9  4.0  4.3   CHLORIDE  103  99  104  105  105   CO2  25  22  22  23  22   ANIONGAP  7  9  9  7  10   BUN  16  12  12  12  19    CR  1.54*  1.57*  1.41*  1.41*  1.52*   GLC  87  93  96  89  129*   SAADIA  10.2*  9.8  9.5  9.2  9.1     Recent Labs   Lab Test  04/25/18   1147  02/07/18   1150  12/13/17   0813  12/08/17   0925  12/06/17   1039   MAG  1.9  1.8  1.3*  1.5*  1.3*     Recent Labs   Lab Test  04/25/18   1147  03/12/18   0818  03/11/18   0730  03/10/18   0800  03/09/18   0716   12/13/17   0813  12/01/17   0824  11/24/17   0827  11/10/17   0839   WBC  5.6  9.2  8.7  9.8  16.3*   < >  4.3  2.2*  3.7*  3.0*   HGB  11.8*  9.9*  9.1*  8.9*  8.8*   < >  9.4*  9.1*  7.9*  9.0*   PLT  299  264  239  241  231   < >  262  113*  146*  157   MCV  91  91  91  91  91   < >  91  89  90  86   NEUTROPHIL  51.3   --    --    --    --    --   30.4  66.7  57.3  44.1    < > = values in this interval not displayed.     Recent Labs   Lab Test  04/25/18   1147  12/13/17   0813  12/01/17   0824   BILITOTAL  0.4  0.3  0.3   ALKPHOS  71  73  60   ALT  13  12  21   AST  22  28  42   ALBUMIN  4.1  3.3*  3.2*     TSH   Date Value Ref Range Status   11/24/2017 0.82 0.40 - 4.00 mU/L Final   10/06/2017 1.61 0.40 - 4.00 mU/L Final     No results for input(s): CEA in the last 23970 hours.  Results for orders placed or performed during the hospital encounter of 05/16/18   CT Chest/Abdomen/Pelvis w Contrast     Value    Radiologist flags Pulmonary metastatic disease (Urgent)    Narrative    Examination:  CT CHEST/ABDOMEN/PELVIS W CONTRAST, 5/16/2018 12:23 PM     Comparison: PET CT 12/12/2017    History: ; Malignant neoplasm of urethra (H)    Per EMR: Status post neoadjuvant chemotherapy and radical penectomy  and urethrectomy, perineal urethrostomy, and bilateral inguinal lymph  node dissection?on 3/8/2018    Technique: Volumetric helical acquisition of CT images from the  thoracic inlet to the symphysis pubis after the uncomplicated  administration of 92 cc of isovue 370. Coronal and sagittal images and  axial MIP images were reconstructed from the source  data.    Findings:  Chest:  The visualized thyroid is unremarkable. Heart size is normal. The  great vessels are normal in caliber and appearance. There is no  pericardial effusion. No mediastinal, hilar, or axillary  lymphadenopathy. Calcified right hilar lymph nodes are stable. The  central tracheobronchial tree is patent. Stable severe emphysematous  changes. No focal airspace consolidation, pleural effusion, or  pneumothorax. Numerous pulmonary nodules, some of which are increased  in size from prior and some of which are new from prior.    A 4 mm nodule in the anterior right lower lobe (series 2, image 131)  is slightly increased in size from prior and demonstrates central  hypoattenuation concerning for cavitation.  An 8 mm subpleural nodule in the lateral right upper lobe (series 2,  image 45) may have been present on the previous study as a tiny  groundglass nodule on series 7, image 51 of that study.  14 mm lobulated and spiculated nodule with central cavitation in the  right middle lobe (series 2, image 138) is new from the prior study.  A 23 mm solid nodule in the central right lower lobe (series 2, image  96) is also new from prior.  There are additional scattered nodules, some of which demonstrate  spiculation, lobulation, and/or possible cavitation.    Abdomen/pelvis:  Surgical changes of radical penectomy/urethrectomy, perineal  urethrostomy, and bilateral inguinal lymph node dissections.  Inflammatory changes in the surgical bed without definite mass,  enhancement, or soft tissue thickening to indicate recurrent or  residual disease.     Several tiny subcapsular hypodensity in the liver (for example series  4, images 138 and 151) are unchanged from the prior study and too  small to accurately characterize on CT. The liver is otherwise normal.  Bilateral renal cysts are again seen. Kidneys are otherwise normal.  Gallbladder, adrenal glands, and spleen are normal. Stable appearance  of pancreas  divisum. There are no dilated loops of large or small  bowel. No focal bowel wall thickening or mucosal hyperenhancement. The  major intra-abdominal vasculature is patent and within normal limits  for caliber. The bladder is partially distended and unremarkable. The  prostate is mildly enlarged, measuring up to 4.7 cm in its transverse  dimension.    Bones:  No acute osseus abnormality or suspicious bony lesion. American  degenerative changes in the left femoral acetabular joint, including a  large subchondral cyst, which is unchanged from prior.      Impression    Impression:   1. Multiple new and enlarging pulmonary nodules, some of which  demonstrate cavitation consistent with worsening metastatic disease.  2. Surgical changes of radical penectomy/urethrectomy, perineal  urethrostomy, and bilateral inguinal lymph node dissections without  definite evidence of local disease recurrence.  3. Severe emphysema.    [Urgent Result: Pulmonary metastatic disease]    Finding was identified on 5/16/2018 2:21 PM.     Dr. Yeung was contacted by Dr. Duran at 5/16/2018 2:59 PM and  verbalized understanding of the urgent finding.     I have personally reviewed the examination and initial interpretation  and I agree with the findings.    DARLIN WALL MD       Impression/plan:     Penile carcinoma  ECOG PS 1  Peripheral neuropathy, weight loss, fatigue post chemotherapy   Multiple new pulmonary nodules consistent with metastatic disease    I had a lengthy discussion with Mr. Bruno in the presence of his significant other at this clinic visit.  He was very unhappy with the developments from yesterday.  He had been to see Dr. Yeung and had restaging CT scans done just prior to that visit.  He was informed about the presence of new metastatic disease.  This was quite disheartening.  He was also upset that he had to hear this through the student and then followed by Dr. Yeung.  He notes that he has not been feeling well for the last  couple of years and has been struggling continuously.  He felt so poorly after his chemotherapy.  He did not recover from that and had to undergo surgery.  He still continues to feel sick.  He does not want to be in the sick role, which is what has been happening lately.  He was overall quite frustrated with the way things have been unfolding for him.        I have reviewed the actual images from his recent restaging scans, which do reveal multiple new pulmonary nodules with the largest cavitating lesion being 2.3 cm in size.  Overall, this is concerning for metastatic disease.  I did explain that there was an outside chance that this could be an infection.  Often there is no huge advantage of early initiation of therapy.  It was quite reasonable for us to defer treatment at this point in time.  He is quite opposed to the idea of doing any chemotherapy, and I would not even recommend this for him.  He had a very hard time on chemotherapy with cisplatin and gemcitabine.  He continues to struggle with toxicity from the same, but had almost no response to this regimen.  Our only next choice would be an agent like Taxotere, which would only add to his neuropathy with not a tremendous chance for success.  I would be more hopeful about trying immunotherapy for him.  I have no idea about the likelihood of a response, but I have seen some dramatic responses, particularly in urothelial cancers.  I have seen immunotherapy work even in very advanced disease.  His wife had very specific questions for me and asked if there was a scenario in which it would be too late and detrimental to wait.  I reviewed that we could have a dramatic progression of disease, wherein the next 6 weeks, he would not be in a position to even get immunotherapy.  This is not a very likely scenario.  His current lesions have appeared since the last scan, but his last scan was done on 12/12, about 6 months ago.  His wife wanted to know what would be done  for a standard patient.  I explained to her that the typical recommendation would be to consider a CT-guided biopsy of one of the betty metastases in the lung.  If this turns out to be metastatic disease, then one could consider systemic chemotherapy with palliative intent.      They wanted to know if his disease is terminal, and he very clearly asked me if I could heal him.  I explained to him that it is now very clear that he does have metastatic disease, and almost barring exceptions, this universally means that his disease would be incurable, but would remain treatable.  Towards the end of a lengthy conversation, he was then asking about the downsides of waiting for another 6 weeks when he started off quite vehemently that he does not want to do anything at this time.  Overall, I do not see a downside to waiting.   We eventually decided to get a PET/CT scan done in about 6 weeks' time, and I would follow him again with the scan results.  If he clearly has progressive disease, we would consider immunotherapy.  If he has any improvement, it would suggest that this was not from metastatic disease.     Over 45 min of direct face to face time spent with patient with more than 50% time spent in counseling and coordinating care.      Again, thank you for allowing me to participate in the care of your patient.      Sincerely,    Steve Arceo MD

## 2018-05-21 ENCOUNTER — PRE VISIT (OUTPATIENT)
Dept: UROLOGY | Facility: CLINIC | Age: 73
End: 2018-05-21

## 2018-05-21 NOTE — TELEPHONE ENCOUNTER
Reason for visit: Post op      Relevant information: invasive SCC of penile urethra, s/p radical penectomy, perineal urethrostomy and bilateral inguinal lymphadenectomy    Records/imaging/labs: Ct chest/abd/pelvis available    Pt called: No need for a call    Rooming: regular

## 2018-06-26 ENCOUNTER — RADIANT APPOINTMENT (OUTPATIENT)
Dept: PET IMAGING | Facility: CLINIC | Age: 73
End: 2018-06-26
Attending: INTERNAL MEDICINE
Payer: COMMERCIAL

## 2018-06-26 DIAGNOSIS — C68.0 URETHRAL CANCER (H): ICD-10-CM

## 2018-06-26 LAB
ALBUMIN SERPL-MCNC: 4.1 G/DL (ref 3.4–5)
ALP SERPL-CCNC: 76 U/L (ref 40–150)
ALT SERPL W P-5'-P-CCNC: 16 U/L (ref 0–70)
ANION GAP SERPL CALCULATED.3IONS-SCNC: 8 MMOL/L (ref 3–14)
AST SERPL W P-5'-P-CCNC: 27 U/L (ref 0–45)
BASOPHILS # BLD AUTO: 0 10E9/L (ref 0–0.2)
BASOPHILS NFR BLD AUTO: 0.6 %
BILIRUB SERPL-MCNC: 0.4 MG/DL (ref 0.2–1.3)
BUN SERPL-MCNC: 18 MG/DL (ref 7–30)
CALCIUM SERPL-MCNC: 9.9 MG/DL (ref 8.5–10.1)
CHLORIDE SERPL-SCNC: 103 MMOL/L (ref 94–109)
CO2 SERPL-SCNC: 25 MMOL/L (ref 20–32)
CREAT SERPL-MCNC: 1.62 MG/DL (ref 0.66–1.25)
DIFFERENTIAL METHOD BLD: ABNORMAL
EOSINOPHIL # BLD AUTO: 0.1 10E9/L (ref 0–0.7)
EOSINOPHIL NFR BLD AUTO: 1.3 %
ERYTHROCYTE [DISTWIDTH] IN BLOOD BY AUTOMATED COUNT: 14.6 % (ref 10–15)
GFR SERPL CREATININE-BSD FRML MDRD: 42 ML/MIN/1.7M2
GLUCOSE SERPL-MCNC: 82 MG/DL (ref 70–99)
GLUCOSE SERPL-MCNC: 92 MG/DL (ref 70–99)
HCT VFR BLD AUTO: 34.4 % (ref 40–53)
HGB BLD-MCNC: 11.1 G/DL (ref 13.3–17.7)
IMM GRANULOCYTES # BLD: 0 10E9/L (ref 0–0.4)
IMM GRANULOCYTES NFR BLD: 0.2 %
LDH SERPL L TO P-CCNC: 174 U/L (ref 85–227)
LYMPHOCYTES # BLD AUTO: 2.3 10E9/L (ref 0.8–5.3)
LYMPHOCYTES NFR BLD AUTO: 36.6 %
MCH RBC QN AUTO: 28.8 PG (ref 26.5–33)
MCHC RBC AUTO-ENTMCNC: 32.3 G/DL (ref 31.5–36.5)
MCV RBC AUTO: 89 FL (ref 78–100)
MONOCYTES # BLD AUTO: 0.5 10E9/L (ref 0–1.3)
MONOCYTES NFR BLD AUTO: 8.1 %
NEUTROPHILS # BLD AUTO: 3.3 10E9/L (ref 1.6–8.3)
NEUTROPHILS NFR BLD AUTO: 53.2 %
NRBC # BLD AUTO: 0 10*3/UL
NRBC BLD AUTO-RTO: 0 /100
PLATELET # BLD AUTO: 277 10E9/L (ref 150–450)
POTASSIUM SERPL-SCNC: 3.6 MMOL/L (ref 3.4–5.3)
PROT SERPL-MCNC: 8.5 G/DL (ref 6.8–8.8)
RBC # BLD AUTO: 3.85 10E12/L (ref 4.4–5.9)
SODIUM SERPL-SCNC: 136 MMOL/L (ref 133–144)
WBC # BLD AUTO: 6.3 10E9/L (ref 4–11)

## 2018-06-26 PROCEDURE — 85025 COMPLETE CBC W/AUTO DIFF WBC: CPT | Performed by: INTERNAL MEDICINE

## 2018-06-26 PROCEDURE — 40000556 ZZH STATISTIC PERIPHERAL IV START W US GUIDANCE

## 2018-06-26 PROCEDURE — 80053 COMPREHEN METABOLIC PANEL: CPT | Performed by: INTERNAL MEDICINE

## 2018-06-26 PROCEDURE — 83615 LACTATE (LD) (LDH) ENZYME: CPT | Performed by: INTERNAL MEDICINE

## 2018-06-26 RX ORDER — FUROSEMIDE 10 MG/ML
40 INJECTION INTRAMUSCULAR; INTRAVENOUS ONCE
Status: COMPLETED | OUTPATIENT
Start: 2018-06-26 | End: 2018-06-26

## 2018-06-26 RX ADMIN — FUROSEMIDE 40 MG: 10 INJECTION INTRAMUSCULAR; INTRAVENOUS at 13:09

## 2018-06-26 NOTE — DISCHARGE INSTRUCTIONS

## 2018-06-26 NOTE — NURSING NOTE
Chief Complaint   Patient presents with     Blood Draw     piv/labs    PIV started by vascular access RN LION Reid, labs drawn and sent for processing.    Yakelin Arizmendi, CMA

## 2018-06-27 ENCOUNTER — ONCOLOGY VISIT (OUTPATIENT)
Dept: ONCOLOGY | Facility: CLINIC | Age: 73
End: 2018-06-27
Attending: INTERNAL MEDICINE
Payer: COMMERCIAL

## 2018-06-27 ENCOUNTER — CARE COORDINATION (OUTPATIENT)
Dept: ONCOLOGY | Facility: CLINIC | Age: 73
End: 2018-06-27

## 2018-06-27 VITALS
DIASTOLIC BLOOD PRESSURE: 75 MMHG | SYSTOLIC BLOOD PRESSURE: 136 MMHG | RESPIRATION RATE: 16 BRPM | WEIGHT: 145 LBS | HEART RATE: 67 BPM | BODY MASS INDEX: 17.66 KG/M2 | TEMPERATURE: 97.9 F | HEIGHT: 76 IN | OXYGEN SATURATION: 99 %

## 2018-06-27 DIAGNOSIS — C60.9 PENILE CANCER (H): ICD-10-CM

## 2018-06-27 DIAGNOSIS — C68.0 URETHRAL CANCER (H): Primary | ICD-10-CM

## 2018-06-27 DIAGNOSIS — C78.01 MALIGNANT NEOPLASM METASTATIC TO BOTH LUNGS (H): ICD-10-CM

## 2018-06-27 DIAGNOSIS — C78.02 MALIGNANT NEOPLASM METASTATIC TO BOTH LUNGS (H): ICD-10-CM

## 2018-06-27 PROCEDURE — G0463 HOSPITAL OUTPT CLINIC VISIT: HCPCS | Mod: ZF

## 2018-06-27 PROCEDURE — 99215 OFFICE O/P EST HI 40 MIN: CPT | Mod: ZP | Performed by: INTERNAL MEDICINE

## 2018-06-27 RX ORDER — METHYLPREDNISOLONE SODIUM SUCCINATE 125 MG/2ML
125 INJECTION, POWDER, LYOPHILIZED, FOR SOLUTION INTRAMUSCULAR; INTRAVENOUS
Status: CANCELLED
Start: 2018-08-03

## 2018-06-27 RX ORDER — MEPERIDINE HYDROCHLORIDE 25 MG/ML
25 INJECTION INTRAMUSCULAR; INTRAVENOUS; SUBCUTANEOUS EVERY 30 MIN PRN
Status: CANCELLED | OUTPATIENT
Start: 2018-08-03

## 2018-06-27 RX ORDER — SODIUM CHLORIDE 9 MG/ML
1000 INJECTION, SOLUTION INTRAVENOUS CONTINUOUS PRN
Status: CANCELLED
Start: 2018-08-03

## 2018-06-27 RX ORDER — DIPHENHYDRAMINE HYDROCHLORIDE 50 MG/ML
50 INJECTION INTRAMUSCULAR; INTRAVENOUS
Status: CANCELLED
Start: 2018-08-03

## 2018-06-27 RX ORDER — ALBUTEROL SULFATE 0.83 MG/ML
2.5 SOLUTION RESPIRATORY (INHALATION)
Status: CANCELLED | OUTPATIENT
Start: 2018-08-03

## 2018-06-27 RX ORDER — LORAZEPAM 2 MG/ML
0.5 INJECTION INTRAMUSCULAR EVERY 4 HOURS PRN
Status: CANCELLED
Start: 2018-08-03

## 2018-06-27 RX ORDER — EPINEPHRINE 0.3 MG/.3ML
0.3 INJECTION SUBCUTANEOUS EVERY 5 MIN PRN
Status: CANCELLED | OUTPATIENT
Start: 2018-08-03

## 2018-06-27 RX ORDER — ALBUTEROL SULFATE 90 UG/1
1-2 AEROSOL, METERED RESPIRATORY (INHALATION)
Status: CANCELLED
Start: 2018-08-03

## 2018-06-27 RX ORDER — EPINEPHRINE 1 MG/ML
0.3 INJECTION, SOLUTION INTRAMUSCULAR; SUBCUTANEOUS EVERY 5 MIN PRN
Status: CANCELLED | OUTPATIENT
Start: 2018-08-03

## 2018-06-27 ASSESSMENT — PAIN SCALES - GENERAL: PAINLEVEL: NO PAIN (0)

## 2018-06-27 NOTE — PROGRESS NOTES
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 He initiated Cisplatin/Gemzar split on days 1 and 8 given history of CKD. He is currently undergoing curative intent treatment, however he does have indeterminate pulmonary nodules that are being followed. He had a positive response to the 2 cycles which were complicated with issues of dehydration and nausea. He has completed 4 cycles of chemotherapy.     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     He is accompanied by his significant other.  He has been playing golf regularly. He denies any cough, SOB or any other new complains. He is being seen with labs and restaging PET-CT scans.     No chest pain, palpitation or dizziness. Eating and drinking very little in the past few days. Is having increased issue with constipation again.  Urine appears stable in the catheter. Denies cloudiness or darkness in color. No bleeding/bruising.     Current Outpatient Prescriptions   Medication Sig     acetaminophen (TYLENOL) 325 MG tablet Take 2 tablets (650 mg) by mouth every 4 hours as needed for mild pain or fever     amLODIPine (NORVASC) 10 MG tablet Take 1 tablet (10 mg) by mouth daily (Patient taking differently: Take 10 mg by mouth every morning )     amoxicillin-clavulanate (AUGMENTIN) 500-125 MG per tablet Take 1 tablet by mouth daily While the drains are in place     B Complex Vitamins (VITAMIN B COMPLEX PO) Take by mouth daily (with lunch)     docusate sodium (COLACE) 100 MG capsule Take 1 capsule (100 mg) by mouth 2 times daily as needed for constipation     fluticasone (FLONASE) 50 MCG/ACT spray Spray 2 sprays into both nostrils 2 times daily (Patient taking differently: Spray 2 sprays into both nostrils every morning )     LORazepam (ATIVAN) 0.5 MG tablet Take 1 tablet (0.5 mg) by mouth every 8  "hours as needed for anxiety (Patient taking differently: Take 0.5 mg by mouth At Bedtime )     nystatin (MYCOSTATIN) 146855 UNIT/ML suspension Take 5 mLs (500,000 Units) by mouth 4 times daily Swish and spit     omeprazole (PRILOSEC) 2 mg/mL SUSP Take 10 mLs (20 mg) by mouth 2 times daily     oxyCODONE IR (ROXICODONE) 5 MG tablet Take 1-2 tablets (5-10 mg) by mouth every 3 hours as needed for other (pain control or improvement in physical function. Hold dose for analgesic side effects.)     No current facility-administered medications for this visit.      Facility-Administered Medications Ordered in Other Visits   Medication     barium sulfate (EZ PAQUE) oral suspension 96%     barium sulfate (EZ-HD) oral suspension 98%     barium sulfate 40% (VARIBAR THIN HONEY) oral suspension     sod bicarbonate-citric acid-simethicone (EZ GAS) 2.21-1.53-0.04 G packet 4 g           Allergies   Allergen Reactions     Lisinopril Swelling     Oxycodone Nausea and Vomiting     Vicodin [Hydrocodone-Acetaminophen] Nausea and Vomiting         Exam: alert, appears weak, fatigued Blood pressure 136/75, pulse 67, temperature 97.9  F (36.6  C), resp. rate 16, height 1.93 m (6' 4\"), weight 65.8 kg (145 lb), SpO2 99 %.  Wt Readings from Last 4 Encounters:   06/27/18 65.8 kg (145 lb)   05/23/18 65.6 kg (144 lb 11.2 oz)   05/17/18 65.3 kg (144 lb)   05/16/18 65.6 kg (144 lb 9.6 oz)     Oropharynx is moist, no focal lesion. No icterus. Neck supple and without adenopathy. Lungs: crackles right base, no wheezes or rub. Heart: RRR. Abdomen: soft, nontender. Extremities: warm, no edema.    Labs:   Recent Labs   Lab Test  06/26/18   1212  06/26/18   1116  04/25/18   1147  03/12/18   0818  03/11/18   0730  03/10/18   0800   NA   --   136  136  130*  135  135   POTASSIUM   --   3.6  4.1  3.9  3.9  4.0   CHLORIDE   --   103  103  99  104  105   CO2   --   25  25  22  22  23   ANIONGAP   --   8  7  9  9  7   BUN   --   18  16  12  12  12   CR   --   " 1.62*  1.54*  1.57*  1.41*  1.41*   GLC  92  82  87  93  96  89   SAADIA   --   9.9  10.2*  9.8  9.5  9.2     Recent Labs   Lab Test  04/25/18   1147  02/07/18   1150  12/13/17   0813  12/08/17   0925  12/06/17   1039   MAG  1.9  1.8  1.3*  1.5*  1.3*     Recent Labs   Lab Test  06/26/18   1116  04/25/18   1147  03/12/18   0818  03/11/18   0730  03/10/18   0800   12/13/17   0813  12/01/17   0824  11/24/17   0827   WBC  6.3  5.6  9.2  8.7  9.8   < >  4.3  2.2*  3.7*   HGB  11.1*  11.8*  9.9*  9.1*  8.9*   < >  9.4*  9.1*  7.9*   PLT  277  299  264  239  241   < >  262  113*  146*   MCV  89  91  91  91  91   < >  91  89  90   NEUTROPHIL  53.2  51.3   --    --    --    --   30.4  66.7  57.3    < > = values in this interval not displayed.     Recent Labs   Lab Test  06/26/18   1116  04/25/18   1147  12/13/17   0813   BILITOTAL  0.4  0.4  0.3   ALKPHOS  76  71  73   ALT  16  13  12   AST  27  22  28   ALBUMIN  4.1  4.1  3.3*   LDH  174   --    --      TSH   Date Value Ref Range Status   11/24/2017 0.82 0.40 - 4.00 mU/L Final   10/06/2017 1.61 0.40 - 4.00 mU/L Final     No results for input(s): CEA in the last 10400 hours.  Results for orders placed or performed in visit on 06/26/18   PET Oncology Whole Body    Narrative    Combined Report of:    PET and CT on  6/26/2018 2:03 PM :    1. PET of the neck, chest, abdomen, and pelvis.  2. PET CT Fusion for Attenuation Correction and Anatomical  Localization:    3. Diagnostic CT scan of the chest, abdomen, and pelvis with  intravenous contrast for interpretation.  3. CT of the chest, abdomen and pelvis obtained for diagnostic  interpretation.  4. 3D MIP and PET-CT fused images were processed on an independent  workstation and archived to PACS and reviewed by a radiologist.    Technique:    1. PET: The patient received 14.2 mCi of F-18-FDG; the serum glucose  was 92 prior to administration, body weight was 66 kg. Images were  evaluated in the axial, sagittal, and coronal planes as  well as the  rotational whole body MIP. Images were acquired from the Vertex to the  Feet.    UPTAKE WAS MEASURED AT 60 MINUTES.     BACKGROUND:  Liver SUV max= 2.7,   Aorta Blood SUV Max: 2.4.     2. CT: Volumetric acquisition for clinical interpretation of the  chest, abdomen, and pelvis acquired at 3 mm sections . The chest,  abdomen, and pelvis were evaluated at 5 mm sections in bone, soft  tissue, and lung windows.      The patient received 100 cc. Of Optiray 320 intravenously for the  examination.      3. 3D MIP and PET-CT fused images were processed on an independent  workstation and archived to PACS and reviewed by a radiologist.    INDICATION: Follow up penile cancer - evaluation of new cavitary lung  lesions; previous smoker; Urethral cancer (H)    ADDITIONAL INFORMATION OBTAINED FROM EMR: 72 year old?male with stage  II penile urethral carcinoma with a PMH significant for HTN and CKD.  He initiated Cisplatin/Gemzar split on days 1 and 8 given history of  CKD. He is currently undergoing curative intent treatment, however he  does have indeterminate pulmonary nodules that are being followed. He  had a positive response to the 2 cycles which were complicated with  issues of dehydration and nausea. He has completed 4 cycles of  chemotherapy.     On 3/8/2018:  Flexible Cystoscopy, Bladder Biopsy, urethral biopsy and penile  biopsy, Radical Penectomy and Urethrectomy, Perineal Urethrostomy,  Bilateral Inguinal Lymphadenectomy; superficial lymph nodes and deep  lymph nodes    COMPARISON: CT chest/abdomen/pelvis from 5/16/2018. PET/CT 12/12/2017,  10/23/2017, 8/23/2017.    FINDINGS:     HEAD/NECK:  There is no  suspicious FDG uptake in the neck.     The paranasal sinuses are clear. The mastoid air cells are clear.     The mucosal pharyngeal space, the , prevertebral and carotid  spaces are within normal limits.     No masses, mass effect or pathologically enlarged lymph nodes are  evident. The thyroid  gland is unremarkable.    CHEST:  Since CT evaluation on 5/16/2018, numerous, randomly distributed solid  pulmonary nodules of varying sizes, some with cavitation, are again  seen. Several have significantly increased in size, and demonstrate  new significant FDG activity.    The hypoattenuating right infrahilar mass has also significantly  increased since that time, and demonstrates new FDG avidity, with SUV  max of 16.3. This measures 35 x 23 mm, compared to 23 x 16 mm on  5/16/2018. Several other lesions have significantly increased as well,  for example, in the left upper lobe on series 10 image 82 is a lesion  with an SUV max of 7.2, now measuring up to 9 mm, previously 5 mm, and  demonstrates new cavitary change.    No pleural effusion or pneumothorax. The heart size is not enlarged.  No pericardial effusion. Mild calcifications of the coronary arteries.  Enlarged main pulmonary artery measuring 3.7 cm. Calcifications of the  aortic arch otherwise without aortic aneurysm.    Advanced upper lobe predominant centrilobular emphysema of the lungs.    ABDOMEN AND PELVIS:  Mild increased FDG uptake over the right inguinal region within the  lymphadenectomy surgical bed, associated with ill-defined  granulation/scar tissue, with SUV max of 4.1. Otherwise, no suspicious  FDG activity within the abdomen or pelvis.    There are no suspicious hepatic lesions. Stable tiny subcapsular  hypoattenuating lesion in the liver too small to accurately  characterize but otherwise likely represents a cyst. There is no  splenomegaly or evidence for splenic or pancreatic mass lesion.  Pancreas divisum.  There are no suspicious adrenal mass lesions or opaque gallbladder  calculi. There is symmetric nephrographic renal phase without  hydronephrosis. Bilateral renal cysts again seen.    There is no evidence for diverticulitis, bowel obstruction or free  fluid.      Postsurgical changes of penectomy/urethrectomy, peroneal  urethrostomy,  and bilateral inguinal lymph node dissections.    LOWER EXTREMITIES:   No abnormal masses or hypermetabolic lesions.    BONES:   There are no suspicious lytic or blastic osseous lesions.  There is no  abnormal FDG uptake in the skeleton. Degenerative changes of the left  femoral acetabular joint and lumbar spine.        Impression    IMPRESSION:   In this patient with history of penile urothelial carcinoma:  1. Significant increase in size in numerous randomly distributed FDG  avid metastatic pulmonary nodules, several with cavitation, since  5/16/2018. This includes a right infrahilar pulmonary mass, measuring  up to 35 mm with SUV max of 16.3, previously 23 mm.  2. Ill-defined region of mildly increased FDG uptake over the right  inguinal lymphadenectomy bed, favoring reactive postsurgical change.  3. Otherwise, no evidence of metastatic disease in the abdomen or  pelvis.    I have personally reviewed the examination and initial interpretation  and I agree with the findings.    ELIZABETH WHITEHEAD MD       Impression/plan:     Penile carcinoma  ECOG PS 1  Peripheral neuropathy, weight loss, fatigue post chemotherapy   Multiple new pulmonary nodules consistent with metastatic disease    I had a lengthy discussion with Mr. Bruno in the presence of his significant other at this clinic visit. I have reviewed actual images from his restaging PET/CT scan.  He has numerous bilateral pulmonary nodules, all of which are FDG avid and quite concerning for metastatic disease.  We do not have a histologic diagnosis, but the clinical picture does favor metastatic disease.  They had questions again if this could be infectious in origin.  While it is certainly a possibility, the clinical picture again favors metastatic disease.  He notes that he has dysphagia and has difficulty swallowing.  He tends to aspirate at times and was wondering if this could be aspiration pneumonia.  Again, he has lesions in bilateral lungs in  every single lobe, which is not characteristic for aspiration pneumonia.  He has no symptoms from this possible infection or aspiration and denies any cough or shortness of breath, which would argue against infection or aspiration.      I would recommend that we try immunotherapy for his progressive metastatic disease.  He has recently completed his chemotherapy less than 6 months ago.  He had a very poor response to this, and we proceeded with a cystectomy.  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  I will try to see if we could get it covered for him.     I extensively reviewed immunotherapy with a PD-L1 inhibitor - pembrozilumab. I explained the concept of checkpoint inhibitor with a physiological role to sustain an effective but precise and limited immune response. PD-L1 is expressed on healthy, normal tissue in the area of trauma so that the immune response is limited to dead tissue and the infecting agents and does not extend over to the normal tissue. The tumor uses some of these checks and balances to its advantage and successfully evades the immune system. Pembrozilumab is administered intravenously every 3 weeks as an outpatient and interferes with this negative interaction between white cells and PDL1 on tumor cells permitting anti-tumor response.     This places patient at risk for autoimmunity if there are any white cells directed against any part of our own body. When this immune response involves skin give rash and is called dermatitis, with gut it presents with diarrhea and is called colitis, and with lung it gives shortness of breath and is called pneumonitis. Similarly, depending on the affected tissue, we might have hepatitis, nephritis, thyroiditis, hypophysitis and so on. For the most part, only a few percentage of patients has substantial side effects; for example, pneumonitis or hepatitis. In these cases, we identify  and act aggressively. We can use oral steroids to suppress the autoimmune response. The antitumor response is known to persist even beyond that. We would continue to treat as long as there are no unacceptable side effects and tumor is relatively stable without significant disease progression.     I would get him started on therapy as soon as I could get this drug approved for him.       Over 45 min of direct face to face time spent with patient with more than 50% time spent in counseling and coordinating care.

## 2018-06-27 NOTE — NURSING NOTE
"Oncology Rooming Note    June 27, 2018 1:36 PM   King Gonsalo Bruno is a 72 year old male who presents for:    Chief Complaint   Patient presents with     Oncology Clinic Visit     Return-Bladder CA     Initial Vitals: /75 (BP Location: Right arm, Patient Position: Chair, Cuff Size: Adult Regular)  Pulse 67  Temp 97.9  F (36.6  C)  Resp 16  Ht 1.93 m (6' 4\")  Wt 65.8 kg (145 lb)  SpO2 99%  BMI 17.65 kg/m2 Estimated body mass index is 17.65 kg/(m^2) as calculated from the following:    Height as of this encounter: 1.93 m (6' 4\").    Weight as of this encounter: 65.8 kg (145 lb). Body surface area is 1.88 meters squared.  No Pain (0) Comment: Data Unavailable   No LMP for male patient.  Allergies reviewed: Yes  Medications reviewed: Yes    Medications: Medication refills not needed today.  Pharmacy name entered into [employer]:    [id number] - [place], [place] - [address] [place] [address] AT [address] [place] [address] & [facility] [place], [place] - [address] [facility] [address]    Clinical concerns: none     6 minutes for nursing intake (face to face time)     Adeel Qureshi CMA            "

## 2018-06-27 NOTE — PROGRESS NOTES
Met with Gonsalo, and Stephanie, to discuss chemotherapy and resources available at the [facility]. Provided patient with printouts on Pembrolizumab. Reviewed administration, side effects and ongoing symptom support management. Provided phone numbers for triage and after hours care. Highlighted steps to expect when getting chemotherapy (check in, labs, pre meds, infusion). Discussed that chemo may be delayed due to blood counts, infusion schedule or patient s need to modify. Reviewed most concerning symptoms that warrant an immediate call to the clinic nurse line.

## 2018-06-27 NOTE — MR AVS SNAPSHOT
"              After Visit Summary   6/27/2018    King Gonsalo Bruno    MRN: [id number]           Patient Information     Date Of Birth          1945        Visit Information        Provider Department      6/27/2018 1:30 PM Steve Arceo MD [facility]        Today's Diagnoses     Malignant neoplasm metastatic to both lungs (H)           Follow-ups after your visit        Who to contact     If you have questions or need follow up information about today's clinic visit or your schedule please contact [facility] directly at [phone].  Normal or non-critical lab and imaging results will be communicated to you by MyChart, letter or phone within 4 business days after the clinic has received the results. If you do not hear from us within 7 days, please contact the clinic through MyChart or phone. If you have a critical or abnormal lab result, we will notify you by phone as soon as possible.  Submit refill requests through [employer] or call your pharmacy and they will forward the refill request to us. Please allow 3 business days for your refill to be completed.          Additional Information About Your Visit        Care EveryWhere ID     This is your Care EveryWhere ID. This could be used by other organizations to access your [place] medical records  [id number]        Your Vitals Were     Pulse Temperature Respirations Height Pulse Oximetry BMI (Body Mass Index)    67 97.9  F (36.6  C) 16 1.93 m (6' 4\") 99% 17.65 kg/m2       Blood Pressure from Last 3 Encounters:   06/27/18 136/75   05/23/18 130/77   05/17/18 150/72    Weight from Last 3 Encounters:   06/27/18 65.8 kg (145 lb)   05/23/18 65.6 kg (144 lb 11.2 oz)   05/17/18 65.3 kg (144 lb)              Today, you had the following     No orders found for display         Today's Medication Changes          These changes are accurate as of 6/27/18 11:59 PM.  If you have any questions, ask your nurse or doctor.          "      These medicines have changed or have updated prescriptions.        Dose/Directions    amLODIPine 10 MG tablet   Commonly known as:  NORVASC   This may have changed:  when to take this   Used for:  Benign essential hypertension        Dose:  10 mg   Take 1 tablet (10 mg) by mouth daily   Quantity:  90 tablet   Refills:  3       fluticasone 50 MCG/ACT spray   Commonly known as:  FLONASE   This may have changed:  when to take this   Used for:  Urethral cancer (H), Allergic sinusitis        Dose:  2 spray   Spray 2 sprays into both nostrils 2 times daily   Quantity:  2 Bottle   Refills:  11       LORazepam 0.5 MG tablet   Commonly known as:  ATIVAN   This may have changed:  when to take this   Used for:  Anxiety        Dose:  0.5 mg   Take 1 tablet (0.5 mg) by mouth every 8 hours as needed for anxiety   Quantity:  30 tablet   Refills:  3                Primary Care Provider Office Phone # Fax #    Joan Janet Ventura [phone]       [place] [address]        Equal Access to Services     JENELLE RED AH: Hadii светлана ku hadasho Soomaali, waaxda luqadaha, qaybta kaalmada adeegyada, waxay nayin hayreymundo srivastava . So [place] [phone].    ATENCIÓN: Si habla español, tiene a washburn disposición servicios gratuitos de asistencia lingüística. [facility] [phone].    We comply with applicable federal civil rights laws and [place] laws. We do not discriminate on the basis of race, color, national origin, age, disability, sex, sexual orientation, or gender identity.            Thank you!     Thank you for choosing [facility]  for your care. Our goal is always to provide you with excellent care. Hearing back from our patients is one way we can continue to improve our services. Please take a few minutes to complete the written survey that you may receive in the mail after your visit with us. Thank you!             Your Updated Medication List -  Protect others around you: Learn how to safely use, store and throw away your medicines at www.disposemymeds.org.          This list is accurate as of 6/27/18 11:59 PM.  Always use your most recent med list.                   Brand Name Dispense Instructions for use Diagnosis    acetaminophen 325 MG tablet    TYLENOL    150 tablet    Take 2 tablets (650 mg) by mouth every 4 hours as needed for mild pain or fever    Urethral cancer (H)       amLODIPine 10 MG tablet    NORVASC    90 tablet    Take 1 tablet (10 mg) by mouth daily    Benign essential hypertension       amoxicillin-clavulanate 500-125 MG per tablet    AUGMENTIN    21 tablet    Take 1 tablet by mouth daily While the drains are in place    Urinary tract infection       docusate sodium 100 MG capsule    COLACE    60 capsule    Take 1 capsule (100 mg) by mouth 2 times daily as needed for constipation    Slow transit constipation       fluticasone 50 MCG/ACT spray    FLONASE    2 Bottle    Spray 2 sprays into both nostrils 2 times daily    Urethral cancer (H), Allergic sinusitis       LORazepam 0.5 MG tablet    ATIVAN    30 tablet    Take 1 tablet (0.5 mg) by mouth every 8 hours as needed for anxiety    Anxiety       nystatin 480559 UNIT/ML suspension    MYCOSTATIN    200 mL    Take 5 mLs (500,000 Units) by mouth 4 times daily Swish and spit    Thrush       omeprazole 2 mg/mL Susp    priLOSEC    280 mL    Take 10 mLs (20 mg) by mouth 2 times daily    Gastroesophageal reflux disease with esophagitis, Urethral cancer (H)       oxyCODONE IR 5 MG tablet    ROXICODONE    30 tablet    Take 1-2 tablets (5-10 mg) by mouth every 3 hours as needed for other (pain control or improvement in physical function. Hold dose for analgesic side effects.)    Urethral cancer (H)       VITAMIN B COMPLEX PO      Take by mouth daily (with lunch)

## 2018-06-27 NOTE — LETTER
6/27/2018       RE: King Gonsalo Bruno  [address]  [facility] [address]     Dear Colleague,    Thank you for referring your patient, King Gonsalo Bruno, to the [facility]. Please see a copy of my visit note below.    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 He initiated Cisplatin/Gemzar split on days 1 and 8 given history of CKD. He is currently undergoing curative intent treatment, however he does have indeterminate pulmonary nodules that are being followed. He had a positive response to the 2 cycles which were complicated with issues of dehydration and nausea. He has completed 4 cycles of chemotherapy.     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     He is accompanied by his significant other.  He has been playing golf regularly. He denies any cough, SOB or any other new complains. He is being seen with labs and restaging PET-CT scans.     No chest pain, palpitation or dizziness. Eating and drinking very little in the past few days. Is having increased issue with constipation again.  Urine appears stable in the catheter. Denies cloudiness or darkness in color. No bleeding/bruising.     Current Outpatient Prescriptions   Medication Sig     acetaminophen (TYLENOL) 325 MG tablet Take 2 tablets (650 mg) by mouth every 4 hours as needed for mild pain or fever     amLODIPine (NORVASC) 10 MG tablet Take 1 tablet (10 mg) by mouth daily (Patient taking differently: Take 10 mg by mouth every morning )     amoxicillin-clavulanate (AUGMENTIN) 500-125 MG per tablet Take 1 tablet by mouth daily While the drains are in place     B Complex Vitamins (VITAMIN B COMPLEX PO) Take by mouth daily (with lunch)     docusate sodium (COLACE) 100 MG capsule Take 1 capsule (100 mg) by mouth 2 times daily as needed for  "constipation     fluticasone (FLONASE) 50 MCG/ACT spray Spray 2 sprays into both nostrils 2 times daily (Patient taking differently: Spray 2 sprays into both nostrils every morning )     LORazepam (ATIVAN) 0.5 MG tablet Take 1 tablet (0.5 mg) by mouth every 8 hours as needed for anxiety (Patient taking differently: Take 0.5 mg by mouth At Bedtime )     nystatin (MYCOSTATIN) 213663 UNIT/ML suspension Take 5 mLs (500,000 Units) by mouth 4 times daily Swish and spit     omeprazole (PRILOSEC) 2 mg/mL SUSP Take 10 mLs (20 mg) by mouth 2 times daily     oxyCODONE IR (ROXICODONE) 5 MG tablet Take 1-2 tablets (5-10 mg) by mouth every 3 hours as needed for other (pain control or improvement in physical function. Hold dose for analgesic side effects.)     No current facility-administered medications for this visit.      Facility-Administered Medications Ordered in Other Visits   Medication     barium sulfate (EZ PAQUE) oral suspension 96%     barium sulfate (EZ-HD) oral suspension 98%     barium sulfate 40% (VARIBAR THIN HONEY) oral suspension     sod bicarbonate-citric acid-simethicone (EZ GAS) 2.21-1.53-0.04 G packet 4 g           Allergies   Allergen Reactions     Lisinopril Swelling     Oxycodone Nausea and Vomiting     Vicodin [Hydrocodone-Acetaminophen] Nausea and Vomiting         Exam: alert, appears weak, fatigued Blood pressure 136/75, pulse 67, temperature 97.9  F (36.6  C), resp. rate 16, height 1.93 m (6' 4\"), weight 65.8 kg (145 lb), SpO2 99 %.  Wt Readings from Last 4 Encounters:   06/27/18 65.8 kg (145 lb)   05/23/18 65.6 kg (144 lb 11.2 oz)   05/17/18 65.3 kg (144 lb)   05/16/18 65.6 kg (144 lb 9.6 oz)     Oropharynx is moist, no focal lesion. No icterus. Neck supple and without adenopathy. Lungs: crackles right base, no wheezes or rub. Heart: RRR. Abdomen: soft, nontender. Extremities: warm, no edema.    Labs:   Recent Labs   Lab Test  06/26/18   1212  06/26/18   1116  04/25/18   1147  03/12/18   0818  " 03/11/18   0730  03/10/18   0800   NA   --   136  136  130*  135  135   POTASSIUM   --   3.6  4.1  3.9  3.9  4.0   CHLORIDE   --   103  103  99  104  105   CO2   --   25 25 22 22 23   ANIONGAP   --   8  7  9  9  7   BUN   --   18 16 12 12 12   CR   --   1.62*  1.54*  1.57*  1.41*  1.41*   GLC  92  82  87  93  96  89   SAADIA   --   9.9  10.2*  9.8  9.5  9.2     Recent Labs   Lab Test  04/25/18   1147  02/07/18   1150  12/13/17   0813  12/08/17   0925  12/06/17   1039   MAG  1.9  1.8  1.3*  1.5*  1.3*     Recent Labs   Lab Test  06/26/18   1116  04/25/18   1147  03/12/18   0818  03/11/18   0730  03/10/18   0800   12/13/17   0813  12/01/17   0824  11/24/17   0827   WBC  6.3  5.6  9.2  8.7  9.8   < >  4.3  2.2*  3.7*   HGB  11.1*  11.8*  9.9*  9.1*  8.9*   < >  9.4*  9.1*  7.9*   PLT  277  299  264  239  241   < >  262  113*  146*   MCV  89  91  91  91  91   < >  91  89  90   NEUTROPHIL  53.2  51.3   --    --    --    --   30.4  66.7  57.3    < > = values in this interval not displayed.     Recent Labs   Lab Test  06/26/18   1116  04/25/18   1147  12/13/17   0813   BILITOTAL  0.4  0.4  0.3   ALKPHOS  76  71  73   ALT  16  13  12   AST  27  22  28   ALBUMIN  4.1  4.1  3.3*   LDH  174   --    --      TSH   Date Value Ref Range Status   11/24/2017 0.82 0.40 - 4.00 mU/L Final   10/06/2017 1.61 0.40 - 4.00 mU/L Final     No results for input(s): CEA in the last 71115 hours.  Results for orders placed or performed in visit on 06/26/18   PET Oncology Whole Body    Narrative    Combined Report of:    PET and CT on  6/26/2018 2:03 PM :    1. PET of the neck, chest, abdomen, and pelvis.  2. PET CT Fusion for Attenuation Correction and Anatomical  Localization:    3. Diagnostic CT scan of the chest, abdomen, and pelvis with  intravenous contrast for interpretation.  3. CT of the chest, abdomen and pelvis obtained for diagnostic  interpretation.  4. 3D MIP and PET-CT fused images were processed on an independent  workstation  and archived to PACS and reviewed by a radiologist.    Technique:    1. PET: The patient received 14.2 mCi of F-18-FDG; the serum glucose  was 92 prior to administration, body weight was 66 kg. Images were  evaluated in the axial, sagittal, and coronal planes as well as the  rotational whole body MIP. Images were acquired from the Vertex to the  Feet.    UPTAKE WAS MEASURED AT 60 MINUTES.     BACKGROUND:  Liver SUV max= 2.7,   Aorta Blood SUV Max: 2.4.     2. CT: Volumetric acquisition for clinical interpretation of the  chest, abdomen, and pelvis acquired at 3 mm sections . The chest,  abdomen, and pelvis were evaluated at 5 mm sections in bone, soft  tissue, and lung windows.      The patient received 100 cc. Of Optiray 320 intravenously for the  examination.      3. 3D MIP and PET-CT fused images were processed on an independent  workstation and archived to PACS and reviewed by a radiologist.    INDICATION: Follow up penile cancer - evaluation of new cavitary lung  lesions; previous smoker; Urethral cancer (H)    ADDITIONAL INFORMATION OBTAINED FROM EMR: 72 year old?male with stage  II penile urethral carcinoma with a PMH significant for HTN and CKD.  He initiated Cisplatin/Gemzar split on days 1 and 8 given history of  CKD. He is currently undergoing curative intent treatment, however he  does have indeterminate pulmonary nodules that are being followed. He  had a positive response to the 2 cycles which were complicated with  issues of dehydration and nausea. He has completed 4 cycles of  chemotherapy.     On 3/8/2018:  Flexible Cystoscopy, Bladder Biopsy, urethral biopsy and penile  biopsy, Radical Penectomy and Urethrectomy, Perineal Urethrostomy,  Bilateral Inguinal Lymphadenectomy; superficial lymph nodes and deep  lymph nodes    COMPARISON: CT chest/abdomen/pelvis from 5/16/2018. PET/CT 12/12/2017,  10/23/2017, 8/23/2017.    FINDINGS:     HEAD/NECK:  There is no  suspicious FDG uptake in the neck.     The  paranasal sinuses are clear. The mastoid air cells are clear.     The mucosal pharyngeal space, the , prevertebral and carotid  spaces are within normal limits.     No masses, mass effect or pathologically enlarged lymph nodes are  evident. The thyroid gland is unremarkable.    CHEST:  Since CT evaluation on 5/16/2018, numerous, randomly distributed solid  pulmonary nodules of varying sizes, some with cavitation, are again  seen. Several have significantly increased in size, and demonstrate  new significant FDG activity.    The hypoattenuating right infrahilar mass has also significantly  increased since that time, and demonstrates new FDG avidity, with SUV  max of 16.3. This measures 35 x 23 mm, compared to 23 x 16 mm on  5/16/2018. Several other lesions have significantly increased as well,  for example, in the left upper lobe on series 10 image 82 is a lesion  with an SUV max of 7.2, now measuring up to 9 mm, previously 5 mm, and  demonstrates new cavitary change.    No pleural effusion or pneumothorax. The heart size is not enlarged.  No pericardial effusion. Mild calcifications of the coronary arteries.  Enlarged main pulmonary artery measuring 3.7 cm. Calcifications of the  aortic arch otherwise without aortic aneurysm.    Advanced upper lobe predominant centrilobular emphysema of the lungs.    ABDOMEN AND PELVIS:  Mild increased FDG uptake over the right inguinal region within the  lymphadenectomy surgical bed, associated with ill-defined  granulation/scar tissue, with SUV max of 4.1. Otherwise, no suspicious  FDG activity within the abdomen or pelvis.    There are no suspicious hepatic lesions. Stable tiny subcapsular  hypoattenuating lesion in the liver too small to accurately  characterize but otherwise likely represents a cyst. There is no  splenomegaly or evidence for splenic or pancreatic mass lesion.  Pancreas divisum.  There are no suspicious adrenal mass lesions or opaque  gallbladder  calculi. There is symmetric nephrographic renal phase without  hydronephrosis. Bilateral renal cysts again seen.    There is no evidence for diverticulitis, bowel obstruction or free  fluid.      Postsurgical changes of penectomy/urethrectomy, peroneal urethrostomy,  and bilateral inguinal lymph node dissections.    LOWER EXTREMITIES:   No abnormal masses or hypermetabolic lesions.    BONES:   There are no suspicious lytic or blastic osseous lesions.  There is no  abnormal FDG uptake in the skeleton. Degenerative changes of the left  femoral acetabular joint and lumbar spine.        Impression    IMPRESSION:   In this patient with history of penile urothelial carcinoma:  1. Significant increase in size in numerous randomly distributed FDG  avid metastatic pulmonary nodules, several with cavitation, since  5/16/2018. This includes a right infrahilar pulmonary mass, measuring  up to 35 mm with SUV max of 16.3, previously 23 mm.  2. Ill-defined region of mildly increased FDG uptake over the right  inguinal lymphadenectomy bed, favoring reactive postsurgical change.  3. Otherwise, no evidence of metastatic disease in the abdomen or  pelvis.    I have personally reviewed the examination and initial interpretation  and I agree with the findings.    ELIZABETH WHITEHEAD MD       Impression/plan:     Penile carcinoma  ECOG PS 1  Peripheral neuropathy, weight loss, fatigue post chemotherapy   Multiple new pulmonary nodules consistent with metastatic disease    I had a lengthy discussion with Mr. Bruno in the presence of his significant other at this clinic visit. I have reviewed actual images from his restaging PET/CT scan.  He has numerous bilateral pulmonary nodules, all of which are FDG avid and quite concerning for metastatic disease.  We do not have a histologic diagnosis, but the clinical picture does favor metastatic disease.  They had questions again if this could be infectious in origin.  While it is certainly  a possibility, the clinical picture again favors metastatic disease.  He notes that he has dysphagia and has difficulty swallowing.  He tends to aspirate at times and was wondering if this could be aspiration pneumonia.  Again, he has lesions in bilateral lungs in every single lobe, which is not characteristic for aspiration pneumonia.  He has no symptoms from this possible infection or aspiration and denies any cough or shortness of breath, which would argue against infection or aspiration.      I would recommend that we try immunotherapy for his progressive metastatic disease.  He has recently completed his chemotherapy less than 6 months ago.  He had a very poor response to this, and we proceeded with a cystectomy.  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  I will try to see if we could get it covered for him.     I extensively reviewed immunotherapy with a PD-L1 inhibitor - pembrozilumab. I explained the concept of checkpoint inhibitor with a physiological role to sustain an effective but precise and limited immune response. PD-L1 is expressed on healthy, normal tissue in the area of trauma so that the immune response is limited to dead tissue and the infecting agents and does not extend over to the normal tissue. The tumor uses some of these checks and balances to its advantage and successfully evades the immune system. Pembrozilumab is administered intravenously every 3 weeks as an outpatient and interferes with this negative interaction between white cells and PDL1 on tumor cells permitting anti-tumor response.     This places patient at risk for autoimmunity if there are any white cells directed against any part of our own body. When this immune response involves skin give rash and is called dermatitis, with gut it presents with diarrhea and is called colitis, and with lung it gives shortness of breath and is called pneumonitis.  Similarly, depending on the affected tissue, we might have hepatitis, nephritis, thyroiditis, hypophysitis and so on. For the most part, only a few percentage of patients has substantial side effects; for example, pneumonitis or hepatitis. In these cases, we identify and act aggressively. We can use oral steroids to suppress the autoimmune response. The antitumor response is known to persist even beyond that. We would continue to treat as long as there are no unacceptable side effects and tumor is relatively stable without significant disease progression.     I would get him started on therapy as soon as I could get this drug approved for him.       Over 45 min of direct face to face time spent with patient with more than 50% time spent in counseling and coordinating care.      Again, thank you for allowing me to participate in the care of your patient.      Sincerely,    Steve Arceo MD

## 2018-07-19 DIAGNOSIS — F41.9 ANXIETY: ICD-10-CM

## 2018-07-19 RX ORDER — LORAZEPAM 0.5 MG/1
0.5 TABLET ORAL EVERY 8 HOURS PRN
Qty: 30 TABLET | Refills: 3
Start: 2018-07-19 | End: 2018-01-01

## 2018-08-02 ENCOUNTER — RADIANT APPOINTMENT (OUTPATIENT)
Dept: CT IMAGING | Facility: CLINIC | Age: 73
End: 2018-08-02
Attending: INTERNAL MEDICINE
Payer: COMMERCIAL

## 2018-08-02 DIAGNOSIS — C78.01 MALIGNANT NEOPLASM METASTATIC TO BOTH LUNGS (H): ICD-10-CM

## 2018-08-02 DIAGNOSIS — C60.9 PENILE CANCER (H): ICD-10-CM

## 2018-08-02 DIAGNOSIS — C78.02 MALIGNANT NEOPLASM METASTATIC TO BOTH LUNGS (H): ICD-10-CM

## 2018-08-02 DIAGNOSIS — C68.0 URETHRAL CANCER (H): ICD-10-CM

## 2018-08-02 LAB
CREAT BLD-MCNC: 1.8 MG/DL (ref 0.66–1.25)
GFR SERPL CREATININE-BSD FRML MDRD: 37 ML/MIN/1.7M2

## 2018-08-02 RX ORDER — PROCHLORPERAZINE MALEATE 10 MG
5 TABLET ORAL EVERY 6 HOURS PRN
Qty: 30 TABLET | Refills: 2 | Status: SHIPPED | OUTPATIENT
Start: 2018-08-02 | End: 2018-01-01

## 2018-08-02 RX ORDER — IOPAMIDOL 755 MG/ML
89 INJECTION, SOLUTION INTRAVASCULAR ONCE
Status: COMPLETED | OUTPATIENT
Start: 2018-08-02 | End: 2018-08-02

## 2018-08-02 RX ORDER — LORAZEPAM 0.5 MG/1
0.5 TABLET ORAL EVERY 4 HOURS PRN
Qty: 30 TABLET | Refills: 2 | Status: SHIPPED | OUTPATIENT
Start: 2018-08-02 | End: 2018-01-01

## 2018-08-02 RX ADMIN — IOPAMIDOL 89 ML: 755 INJECTION, SOLUTION INTRAVASCULAR at 13:13

## 2018-08-02 NOTE — DISCHARGE INSTRUCTIONS

## 2018-08-03 ENCOUNTER — INFUSION THERAPY VISIT (OUTPATIENT)
Dept: ONCOLOGY | Facility: CLINIC | Age: 73
End: 2018-08-03
Attending: INTERNAL MEDICINE
Payer: MEDICARE

## 2018-08-03 ENCOUNTER — APPOINTMENT (OUTPATIENT)
Dept: LAB | Facility: CLINIC | Age: 73
End: 2018-08-03
Attending: INTERNAL MEDICINE
Payer: MEDICARE

## 2018-08-03 ENCOUNTER — ONCOLOGY VISIT (OUTPATIENT)
Dept: ONCOLOGY | Facility: CLINIC | Age: 73
End: 2018-08-03
Attending: NURSE PRACTITIONER
Payer: MEDICARE

## 2018-08-03 VITALS
DIASTOLIC BLOOD PRESSURE: 72 MMHG | BODY MASS INDEX: 17.54 KG/M2 | TEMPERATURE: 97.6 F | SYSTOLIC BLOOD PRESSURE: 145 MMHG | WEIGHT: 144.1 LBS | OXYGEN SATURATION: 98 % | RESPIRATION RATE: 16 BRPM | HEART RATE: 74 BPM

## 2018-08-03 DIAGNOSIS — K21.00 GASTROESOPHAGEAL REFLUX DISEASE WITH ESOPHAGITIS: ICD-10-CM

## 2018-08-03 DIAGNOSIS — C78.02 MALIGNANT NEOPLASM METASTATIC TO BOTH LUNGS (H): Primary | ICD-10-CM

## 2018-08-03 DIAGNOSIS — E86.0 DEHYDRATION: ICD-10-CM

## 2018-08-03 DIAGNOSIS — E87.6 HYPOKALEMIA: ICD-10-CM

## 2018-08-03 DIAGNOSIS — R11.0 NAUSEA: ICD-10-CM

## 2018-08-03 DIAGNOSIS — C78.00 MALIGNANT NEOPLASM METASTATIC TO LUNG, UNSPECIFIED LATERALITY (H): ICD-10-CM

## 2018-08-03 DIAGNOSIS — C68.0 URETHRAL CANCER (H): ICD-10-CM

## 2018-08-03 DIAGNOSIS — C78.01 MALIGNANT NEOPLASM METASTATIC TO BOTH LUNGS (H): Primary | ICD-10-CM

## 2018-08-03 DIAGNOSIS — C68.0 URETHRAL CANCER (H): Primary | ICD-10-CM

## 2018-08-03 LAB
ALBUMIN SERPL-MCNC: 3.7 G/DL (ref 3.4–5)
ALP SERPL-CCNC: 86 U/L (ref 40–150)
ALT SERPL W P-5'-P-CCNC: 15 U/L (ref 0–70)
ANION GAP SERPL CALCULATED.3IONS-SCNC: 7 MMOL/L (ref 3–14)
AST SERPL W P-5'-P-CCNC: 24 U/L (ref 0–45)
BILIRUB SERPL-MCNC: 0.4 MG/DL (ref 0.2–1.3)
BUN SERPL-MCNC: 18 MG/DL (ref 7–30)
CALCIUM SERPL-MCNC: 9.7 MG/DL (ref 8.5–10.1)
CHLORIDE SERPL-SCNC: 102 MMOL/L (ref 94–109)
CO2 SERPL-SCNC: 25 MMOL/L (ref 20–32)
CREAT SERPL-MCNC: 1.74 MG/DL (ref 0.66–1.25)
GFR SERPL CREATININE-BSD FRML MDRD: 39 ML/MIN/1.7M2
GLUCOSE SERPL-MCNC: 98 MG/DL (ref 70–99)
MAGNESIUM SERPL-MCNC: 2 MG/DL (ref 1.6–2.3)
POTASSIUM SERPL-SCNC: 3.7 MMOL/L (ref 3.4–5.3)
PROT SERPL-MCNC: 8.2 G/DL (ref 6.8–8.8)
SODIUM SERPL-SCNC: 133 MMOL/L (ref 133–144)
TSH SERPL DL<=0.005 MIU/L-ACNC: 0.85 MU/L (ref 0.4–4)

## 2018-08-03 PROCEDURE — 84443 ASSAY THYROID STIM HORMONE: CPT | Performed by: INTERNAL MEDICINE

## 2018-08-03 PROCEDURE — 83735 ASSAY OF MAGNESIUM: CPT | Performed by: INTERNAL MEDICINE

## 2018-08-03 PROCEDURE — 25000128 H RX IP 250 OP 636: Mod: ZF | Performed by: INTERNAL MEDICINE

## 2018-08-03 PROCEDURE — 99214 OFFICE O/P EST MOD 30 MIN: CPT | Mod: ZP | Performed by: NURSE PRACTITIONER

## 2018-08-03 PROCEDURE — 40000556 ZZH STATISTIC PERIPHERAL IV START W US GUIDANCE

## 2018-08-03 PROCEDURE — 96413 CHEMO IV INFUSION 1 HR: CPT

## 2018-08-03 PROCEDURE — 80053 COMPREHEN METABOLIC PANEL: CPT | Performed by: INTERNAL MEDICINE

## 2018-08-03 RX ADMIN — SODIUM CHLORIDE 250 ML: 9 INJECTION, SOLUTION INTRAVENOUS at 13:13

## 2018-08-03 RX ADMIN — SODIUM CHLORIDE 200 MG: 9 INJECTION, SOLUTION INTRAVENOUS at 13:13

## 2018-08-03 ASSESSMENT — PAIN SCALES - GENERAL: PAINLEVEL: NO PAIN (0)

## 2018-08-03 NOTE — NURSING NOTE
"Oncology Rooming Note    August 3, 2018 11:58 AM   King Gonsalo Bruno is a 72 year old male who presents for:    Chief Complaint   Patient presents with     Blood Draw     labs drawn from [facility] by [facility]     Oncology Clinic Visit     Pt is here for labs and to see provider for bladder Cancer     Initial Vitals: /72  Pulse 74  Temp 97.6  F (36.4  C)  Resp 16  Wt 65.4 kg (144 lb 1.6 oz)  SpO2 98%  BMI 17.54 kg/m2 Estimated body mass index is 17.54 kg/(m^2) as calculated from the following:    Height as of 6/27/18: 1.93 m (6' 4\").    Weight as of this encounter: 65.4 kg (144 lb 1.6 oz). Body surface area is 1.87 meters squared.  No Pain (0) Comment: Data Unavailable   No LMP for male patient.  Allergies reviewed: Yes  Medications reviewed: Yes    Medications: Medication refills not needed today.  Pharmacy name entered into spotdock:    [id number] - [place], [place] - [address] [place] [address] AT [address] [place] [address] & [facility] [place], [place] - [address] [facility] [phone]    Clinical concerns: none     6 minutes for nursing intake (face to face time)     Imani Viera MA              "

## 2018-08-03 NOTE — PROGRESS NOTES
Infusion Nursing Note:  King Gonsalo Bruno presents today for Cycle 1 Day 1 Keytruda.    Patient seen by provider today: Yes: Yessica Ovalles DNP   present during visit today: Not Applicable.    Note: pt reports receiving education regarding Keytruda previously. This RN reviewed common side effects and when to call provider for concerns, including temperature >= 100.5 or chills. Pt verbalized understanding of all information and is aware of triage and on-call phone numbers.     Intravenous Access:  Peripheral IV placed via vascular access.    Treatment Conditions:  Lab Results   Component Value Date        Lab Results   Component Value Date     08/03/2018                   Lab Results   Component Value Date    POTASSIUM 3.7 08/03/2018           Lab Results   Component Value Date    MAG 2.0 08/03/2018            Lab Results   Component Value Date    CR 1.74 08/03/2018                   Lab Results   Component Value Date    SAADIA 9.7 08/03/2018                Lab Results   Component Value Date    BILITOTAL 0.4 08/03/2018           Lab Results   Component Value Date    ALBUMIN 3.7 08/03/2018                    Lab Results   Component Value Date    ALT 15 08/03/2018           Lab Results   Component Value Date    AST 24 08/03/2018       Results reviewed, labs MET treatment parameters, ok to proceed with treatment.      Post Infusion Assessment:  Patient tolerated infusion without incident.  Blood return noted pre and post infusion.  Site patent and intact, free from redness, edema or discomfort.  No evidence of extravasations.  Access discontinued per protocol.    Discharge Plan:   Patient declined prescription refills.  Copy of AVS reviewed with patient and/or family.  Patient will return 8/23 for next appointment.  Patient discharged in stable condition accompanied by: significant other.  Departure Mode: Ambulatory.    KAYLEE ANGEL RN

## 2018-08-03 NOTE — NURSING NOTE
Chief Complaint   Patient presents with     Blood Draw     labs drawn from IV started by [facility]     PIV started in left forearm by vascular access RN.  Labs drawn, flushed with NS.  Patient checked in for provider visit.  Citlalli Orosco RN

## 2018-08-03 NOTE — PATIENT INSTRUCTIONS
Contact Numbers    [facility] [place]: [phone]  [facility] Triage and after hours / weekends / holidays:  [phone]      Please call the triage or after hours line if you experience a temperature greater than or equal to 100.5, shaking chills, have uncontrolled nausea, vomiting and/or diarrhea, dizziness, shortness of breath, chest pain, bleeding, unexplained bruising, or if you have any other new/concerning symptoms, questions or concerns.      If you are having any concerning symptoms or wish to speak to a provider before your next infusion visit, please call your care coordinator or triage to notify them so we can adequately serve you.     If you need a refill on a narcotic prescription or other medication, please call before your infusion appointment.                   August 2018 Sunday Monday Tuesday Wednesday Thursday Friday Saturday                  1     2     CT CHEST ABDOMEN PELVIS WWO   12:40 PM   (20 min.)   UCCT1   [facility] CT 3     [facility] DRAW   11:30 AM   (15 min.)    [facility] DRAW   [facility] Lab Draw     [facility] RETURN   11:55 AM   (50 min.)   Yessica Ovalles APRN CNP   [facility]    1:00 PM   (60 min.)    ONCOLOGY INFUSION   [facility] 4       5     6     7     8     9     10     11       12     13     14     15     16     17     18       19     20     21     22     23     [facility] DRAW   12:15 PM   (15 min.)    [facility] DRAW   [facility] Lab Draw     [facility] RETURN   12:45 PM   (50 min.)   Yessica Ovalles APRN CNP   [facility]     [facility] ONC INFUSION 60    2:00 PM   (60 min.)    ONCOLOGY INFUSION   [facility] 24     25       26     27     28     29     30     31 September 2018 Sunday Monday Tuesday Wednesday Thursday Friday Saturday                                 1       2     3     4     5     6     7     8       9      10     11     12     13     [facility] DRAW   12:15 PM   (15 min.)    [facility] DRAW   [facility] Lab Draw     [facility] ONC INFUSION 60    1:00 PM   (60 min.)   [facility] ONCOLOGY INFUSION   [facility] 14     15       16     17     18     19     20     21     22       23     24     25     26     CT CHEST/ABDOMEN/PELVIS W   10:20 AM   (20 min.)   UCCT2   [facility] CT     [facility] RETURN   12:15 PM   (30 min.)   Steve Arceo MD   [facility] Cancer [facility] 27     28     29       30                                               Recent Results (from the past 24 hour(s))   Comprehensive metabolic panel    Collection Time: 08/03/18 11:28 AM   Result Value Ref Range    Sodium 133 133 - 144 mmol/L    Potassium 3.7 3.4 - 5.3 mmol/L    Chloride 102 94 - 109 mmol/L    Carbon Dioxide 25 20 - 32 mmol/L    Anion Gap 7 3 - 14 mmol/L    Glucose 98 70 - 99 mg/dL    Urea Nitrogen 18 7 - 30 mg/dL    Creatinine 1.74 (H) 0.66 - 1.25 mg/dL    GFR Estimate 39 (L) >60 mL/min/1.7m2    GFR Estimate If Black 47 (L) >60 mL/min/1.7m2    Calcium 9.7 8.5 - 10.1 mg/dL    Bilirubin Total 0.4 0.2 - 1.3 mg/dL    Albumin 3.7 3.4 - 5.0 g/dL    Protein Total 8.2 6.8 - 8.8 g/dL    Alkaline Phosphatase 86 40 - 150 U/L    ALT 15 0 - 70 U/L    AST 24 0 - 45 U/L   TSH with free T4 reflex    Collection Time: 08/03/18 11:28 AM   Result Value Ref Range    TSH 0.85 0.40 - 4.00 mU/L   Magnesium    Collection Time: 08/03/18 11:28 AM   Result Value Ref Range    Magnesium 2.0 1.6 - 2.3 mg/dL

## 2018-08-03 NOTE — MR AVS SNAPSHOT
After Visit Summary   8/3/2018    King Gonsalo Bruno    MRN: [id number]           Patient Information     Date Of Birth          1945        Visit Information        Provider Department      8/3/2018 1:00 PM  25 ATC;  ONCOLOGY INFUSION [facility]        Today's Diagnoses     Malignant neoplasm metastatic to both lungs (H)    -  1    Urethral cancer (H)        Gastroesophageal reflux disease with esophagitis        Hypokalemia        Nausea        Dehydration          Care Instructions    Contact Numbers    [facility] Main Line: [phone]  [facility] Triage and after hours / weekends / holidays:  [phone]      Please call the triage or after hours line if you experience a temperature greater than or equal to 100.5, shaking chills, have uncontrolled nausea, vomiting and/or diarrhea, dizziness, shortness of breath, chest pain, bleeding, unexplained bruising, or if you have any other new/concerning symptoms, questions or concerns.      If you are having any concerning symptoms or wish to speak to a provider before your next infusion visit, please call your care coordinator or triage to notify them so we can adequately serve you.     If you need a refill on a narcotic prescription or other medication, please call before your infusion appointment.                   August 2018 Sunday Monday Tuesday Wednesday Thursday Friday Saturday                  1     2     CT CHEST ABDOMEN PELVIS WWO   12:40 PM   (20 min.)   UCCT1   [facility] CT 3     [facility] LAB DRAW   11:30 AM   (15 min.)   [facility] LAB DRAW   [facility] Lab Draw     [facility] RETURN   11:55 AM   (50 min.)   Yessica Ovalles, ISAURA CNP   [facility] ED ONC INFUSION 60    1:00 PM   (60 min.)    ONCOLOGY INFUSION   [facility] 4       5     6     7     8     9     10     11       12     13     14     15     16     17     18       19     20     21     22     23     [facility]  [facility] DRAW   12:15 PM   (15 min.)    [facility] DRAW   [facility] Lab Draw     [facility] RETURN   12:45 PM   (50 min.)   Yessica Ovalles APRN CNP   [facility]     [facility] ONC INFUSION 60    2:00 PM   (60 min.)    ONCOLOGY INFUSION   [facility] 24     25       26     27     28     29     30     31 September 2018 Sunday Monday Tuesday Wednesday Thursday Friday Saturday                                 1       2     3     4     5     6     7     8       9     10     11     12     13     [facility] DRAW   12:15 PM   (15 min.)    [facility] DRAW   [facility] Lab Draw     [facility] ONC INFUSION 60    1:00 PM   (60 min.)    ONCOLOGY INFUSION   [facility] 14     15       16     17     18     19     20     21     22       23     24     25     26     CT CHEST/ABDOMEN/PELVIS W   10:20 AM   (20 min.)   UCCT2   [facility] CT     [facility] RETURN   12:15 PM   (30 min.)   Steve Arceo MD   [facility] 27     28     29       30                                               Recent Results (from the past 24 hour(s))   Comprehensive metabolic panel    Collection Time: 08/03/18 11:28 AM   Result Value Ref Range    Sodium 133 133 - 144 mmol/L    Potassium 3.7 3.4 - 5.3 mmol/L    Chloride 102 94 - 109 mmol/L    Carbon Dioxide 25 20 - 32 mmol/L    Anion Gap 7 3 - 14 mmol/L    Glucose 98 70 - 99 mg/dL    Urea Nitrogen 18 7 - 30 mg/dL    Creatinine 1.74 (H) 0.66 - 1.25 mg/dL    GFR Estimate 39 (L) >60 mL/min/1.7m2    GFR Estimate If Black 47 (L) >60 mL/min/1.7m2    Calcium 9.7 8.5 - 10.1 mg/dL    Bilirubin Total 0.4 0.2 - 1.3 mg/dL    Albumin 3.7 3.4 - 5.0 g/dL    Protein Total 8.2 6.8 - 8.8 g/dL    Alkaline Phosphatase 86 40 - 150 U/L    ALT 15 0 - 70 U/L    AST 24 0 - 45 U/L   TSH with free T4 reflex    Collection Time: 08/03/18 11:28 AM   Result Value Ref Range    TSH 0.85 0.40 - 4.00 mU/L   Magnesium     Collection Time: 08/03/18 11:28 AM   Result Value Ref Range    Magnesium 2.0 1.6 - 2.3 mg/dL                 Follow-ups after your visit        Your next 10 appointments already scheduled     Aug 23, 2018 12:15 PM CDT   [facility] Draw with [facility] DRAW   [facility] ([facility])    [address]  [address]  [facility] [address]   [phone]            Aug 23, 2018  1:00 PM CDT   (Arrive by 12:45 PM)   Return Visit with ISAURA Roach CNP   [facility] ([facility])    [address]  [address]  [place] [facility] [address]   [phone]            Aug 23, 2018  2:00 PM CDT   Infusion 60 with UC ONCOLOGY INFUSION, UC 14 ATC   [facility] ([facility])    [address]  [address]  [place] [facility] [address]   [phone]            Sep 13, 2018 12:15 PM CDT   [facility] with [facility] DRAW   [facility] ([facility])    [address]  [address]  [place] [facility] [address]   [phone]            Sep 13, 2018  1:00 PM CDT   Infusion 60 with UC ONCOLOGY INFUSION, UC 10 ATC   [facility] ([facility])    [address]  [address]  [place] [facility] [address]   [phone]            Sep 26, 2018 10:20 AM CDT   CT CHEST/ABDOMEN/PELVIS W CONTRAST with UCCT2   [facility] [place] CT ([facility])    [address] [place] [address]  1st Floor  [facility] [place] [facility] [address]   [phone]           Please bring any scans or X-rays taken at other hospitals, if similar tests were done. Also bring a list of your medicines, including vitamins, minerals and over-the-counter drugs. It is safest to leave personal items at home.  Be sure to tell your doctor:   If you have any allergies.   If there s any chance you are pregnant.   If you are breastfeeding.   How to prepare:   Do not eat or drink for 2 hours before your exam. If you need to take medicine, you may take it with small sips of water. (We may ask you to take liquid medicine as well.)   Please wear loose clothing, such as a sweat suit or jogging clothes. Avoid snaps, zippers and other metal. We may ask you to undress and put on a hospital gown.  Please arrive 30 minutes early for your CT. Once in the department you might be asked to drink water 15-20 minutes prior to your exam.  If indicated you may be asked to drink an oral contrast in advance of your CT.  If this is the case, the imaging team will let you know or be in contact with you prior to your appointment  Patients over 70 or patients with diabetes or kidney problems:   If you haven t had a blood test (creatinine test) within the last 30 days, the Cardiologist/Radiologist may require you to get this test prior to your exam.  If you have diabetes:   Continue to take your metformin medication on the day of your exam  If you have any questions, please call the Imaging Department where you will have your exam.            Sep 26, 2018 12:30 PM CDT   (Arrive by 12:15 PM)   Return Visit with Steve Arceo MD   [facility] ([facility])    [address] [place] [address]  [address]  [facility] [address] [phone]            Oct 04, 2018 12:15 PM CDT   [facility] Draw with  [facility] DRAW   [facility] Lab Draw ([facility])    [address] [place] [address]  [address]  [facility] [address]   [phone]              Future tests that were ordered for you today     Open Future Orders        Priority Expected Expires Ordered    CBC with platelets differential Routine 10/5/2018 2/3/2019 8/3/2018    Comprehensive metabolic panel Routine 10/5/2018 2/3/2019 8/3/2018    CT Chest/Abdomen/Pelvis w Contrast Routine 10/5/2018 2/3/2019 8/3/2018            Who to contact     If you have questions  or need follow up information about today's clinic visit or your schedule please contact [facility] directly at [phone].  Normal or non-critical lab and imaging results will be communicated to you by MyChart, letter or phone within 4 business days after the clinic has received the results. If you do not hear from us within 7 days, please contact the clinic through MyChart or phone. If you have a critical or abnormal lab result, we will notify you by phone as soon as possible.  Submit refill requests through "[employer] or call your pharmacy and they will forward the refill request to us. Please allow 3 business days for your refill to be completed.          Additional Information About Your Visit        Care EveryWhere ID     This is your Care EveryWhere ID. This could be used by other organizations to access your [place] medical records  [id number]         Blood Pressure from Last 3 Encounters:   08/03/18 145/72   06/27/18 136/75   05/23/18 130/77    Weight from Last 3 Encounters:   08/03/18 65.4 kg (144 lb 1.6 oz)   06/27/18 65.8 kg (145 lb)   05/23/18 65.6 kg (144 lb 11.2 oz)              We Performed the Following     Comprehensive metabolic panel     Magnesium     TSH with free T4 reflex          Today's Medication Changes          These changes are accurate as of 8/3/18  1:47 PM.  If you have any questions, ask your nurse or doctor.               Start taking these medicines.        Dose/Directions    prochlorperazine 10 MG tablet   Commonly known as:  COMPAZINE   Used for:  Malignant neoplasm metastatic to both lungs (H), Urethral cancer (H)        Dose:  5 mg   Take 0.5 tablets (5 mg) by mouth every 6 hours as needed (Nausea/Vomiting)   Quantity:  30 tablet   Refills:  2         These medicines have changed or have updated prescriptions.        Dose/Directions    amLODIPine 10 MG tablet   Commonly known as:  NORVASC   This may have changed:  when to take this   Used for:  Benign  essential hypertension        Dose:  10 mg   Take 1 tablet (10 mg) by mouth daily   Quantity:  90 tablet   Refills:  3       fluticasone 50 MCG/ACT spray   Commonly known as:  FLONASE   This may have changed:  when to take this   Used for:  Urethral cancer (H), Allergic sinusitis        Dose:  2 spray   Spray 2 sprays into both nostrils 2 times daily   Quantity:  2 Bottle   Refills:  11       * LORazepam 0.5 MG tablet   Commonly known as:  ATIVAN   This may have changed:  Another medication with the same name was added. Make sure you understand how and when to take each.   Used for:  Anxiety        Dose:  0.5 mg   Take 1 tablet (0.5 mg) by mouth every 8 hours as needed for anxiety   Quantity:  30 tablet   Refills:  3       * LORazepam 0.5 MG tablet   Commonly known as:  ATIVAN   This may have changed:  You were already taking a medication with the same name, and this prescription was added. Make sure you understand how and when to take each.   Used for:  Malignant neoplasm metastatic to both lungs (H), Urethral cancer (H)        Dose:  0.5 mg   Take 1 tablet (0.5 mg) by mouth every 4 hours as needed (Anxiety, Nausea/Vomiting or Sleep)   Quantity:  30 tablet   Refills:  2       * Notice:  This list has 2 medication(s) that are the same as other medications prescribed for you. Read the directions carefully, and ask your doctor or other care provider to review them with you.         Where to get your medicines      These medications were sent to [place], [place] - [address] [place] [id number]    Hours:  TRANSPLANT PHONE NUMBER [phone] Phone:  [phone]     prochlorperazine 10 MG tablet         Some of these will need a paper prescription and others can be bought over the counter.  Ask your nurse if you have questions.     Bring a paper prescription for each of these medications     LORazepam 0.5 MG tablet                Primary  Care Provider Office Phone # Fax #    Joan Ventura, MARLENY [phone]       [facility] [address]  [facility] [address]        Equal Access to Services     JENELLE RED : Hadii светлана ku hadnikkio Soomaali, waaxda luqadaha, qaybta kaalmada adeegyada, ronald alvarengan gab marquez laKylereymundo rasmussen. So [facility] [phone].    ATENCIÓN: Si habla español, tiene a washburn disposición servicios gratuitos de asistencia lingüística. Llame al [phone].    We comply with applicable federal civil rights laws and [place] laws. We do not discriminate on the basis of race, color, national origin, age, disability, sex, sexual orientation, or gender identity.            Thank you!     Thank you for choosing [facility]  for your care. Our goal is always to provide you with excellent care. Hearing back from our patients is one way we can continue to improve our services. Please take a few minutes to complete the written survey that you may receive in the mail after your visit with us. Thank you!             Your Updated Medication List - Protect others around you: Learn how to safely use, store and throw away your medicines at www.disposemymeds.org.          This list is accurate as of 8/3/18  1:47 PM.  Always use your most recent med list.                   Brand Name Dispense Instructions for use Diagnosis    acetaminophen 325 MG tablet    TYLENOL    150 tablet    Take 2 tablets (650 mg) by mouth every 4 hours as needed for mild pain or fever    Urethral cancer (H)       amLODIPine 10 MG tablet    NORVASC    90 tablet    Take 1 tablet (10 mg) by mouth daily    Benign essential hypertension       amoxicillin-clavulanate 500-125 MG per tablet    AUGMENTIN    21 tablet    Take 1 tablet by mouth daily While the drains are in place    Urinary tract infection       docusate sodium 100 MG capsule    COLACE    60 capsule    Take 1 capsule (100 mg) by mouth 2 times daily as needed for constipation    Slow  transit constipation       fluticasone 50 MCG/ACT spray    FLONASE    2 Bottle    Spray 2 sprays into both nostrils 2 times daily    Urethral cancer (H), Allergic sinusitis       * LORazepam 0.5 MG tablet    ATIVAN    30 tablet    Take 1 tablet (0.5 mg) by mouth every 8 hours as needed for anxiety    Anxiety       * LORazepam 0.5 MG tablet    ATIVAN    30 tablet    Take 1 tablet (0.5 mg) by mouth every 4 hours as needed (Anxiety, Nausea/Vomiting or Sleep)    Malignant neoplasm metastatic to both lungs (H), Urethral cancer (H)       nystatin 128791 UNIT/ML suspension    MYCOSTATIN    200 mL    Take 5 mLs (500,000 Units) by mouth 4 times daily Swish and spit    Thrush       omeprazole 2 mg/mL Susp    priLOSEC    280 mL    Take 10 mLs (20 mg) by mouth 2 times daily    Gastroesophageal reflux disease with esophagitis, Urethral cancer (H)       oxyCODONE IR 5 MG tablet    ROXICODONE    30 tablet    Take 1-2 tablets (5-10 mg) by mouth every 3 hours as needed for other (pain control or improvement in physical function. Hold dose for analgesic side effects.)    Urethral cancer (H)       prochlorperazine 10 MG tablet    COMPAZINE    30 tablet    Take 0.5 tablets (5 mg) by mouth every 6 hours as needed (Nausea/Vomiting)    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 Read the directions carefully, and ask your doctor or other care provider to review them with you.

## 2018-08-03 NOTE — LETTER
8/3/2018       RE: King Gonsalo Bruno  [address]  [place] [facility] [place] [address]     Dear Colleague,    Thank you for referring your patient, King Gonsalo Bruno, to the [facility]. Please see a copy of my visit note below.    Reason for Visit: seen in f/u of penile urethral carcinoma    Oncology HPI: Mr. King Gonsalo Bruno is a 72 year old man with a history of stage II penile urethral carcinoma, treated with cisplatin/gemzar (split on day 1 and 8 given hx of CKD). His first 2 cycles were complicated by dehydration and nausea. He had a positive response and completed 4 cycles in November 2017. He underwent radial penectomy and urethrectomy, perineal urethrostomy and bilateral inguinal node dissection on 3/8/18. Surgical pathology demonstrated moderately differentiated SCC of the penile urethra with extension into the corpus spongiosum and cavernosum of the penile shaft. The specimen was positive for lymphovascular and perineural invasion. Margins were negative. 1/5 L inguinal nodes were positive and 1/7 R inguinal nodes were positive for disease. He was referred for consideration of radiation. At the time of that appointment, he was found to have multiple new pulmonary nodules concerning for metastatic disease. Biopsy was discussed, but after discussion with patient, opted to observe with a PET/CT 6 weeks later. PET/CT on 6/26/18 showed multiple hypermetabolic pulmonary nodules. He was offered palliative intent immunotherapy. He is here to initiate treatment with pembrolizumab today.    Interval history: Mr. Bruno is feeling well. He feels ready to start treatment. No cough, sob, cp, palpitation. Bowels are constipated at times, using miralax and colace prn. Urination is without complication. No dysuria or hematuria. No new rashes, bleeding or bruising. Is hoping this treatment isn't as toxic as prior chemotherapy. Energy is good. Is able to golf a couple times a week. Appetite is good. Weight  is stable. No new bone aches/pains.     Current Outpatient Prescriptions   Medication Sig Dispense Refill     acetaminophen (TYLENOL) 325 MG tablet Take 2 tablets (650 mg) by mouth every 4 hours as needed for mild pain or fever 150 tablet 0     amLODIPine (NORVASC) 10 MG tablet Take 1 tablet (10 mg) by mouth daily (Patient taking differently: Take 10 mg by mouth every morning ) 90 tablet 3     B Complex Vitamins (VITAMIN B COMPLEX PO) Take by mouth daily (with lunch)       docusate sodium (COLACE) 100 MG capsule Take 1 capsule (100 mg) by mouth 2 times daily as needed for constipation 60 capsule 0     fluticasone (FLONASE) 50 MCG/ACT spray Spray 2 sprays into both nostrils 2 times daily (Patient taking differently: Spray 2 sprays into both nostrils every morning ) 2 Bottle 11     LORazepam (ATIVAN) 0.5 MG tablet Take 1 tablet (0.5 mg) by mouth every 8 hours as needed for anxiety 30 tablet 3     omeprazole (PRILOSEC) 2 mg/mL SUSP Take 10 mLs (20 mg) by mouth 2 times daily 280 mL 11     LORazepam (ATIVAN) 0.5 MG tablet Take 1 tablet (0.5 mg) by mouth every 4 hours as needed (Anxiety, Nausea/Vomiting or Sleep) 30 tablet 2     prochlorperazine (COMPAZINE) 10 MG tablet Take 0.5 tablets (5 mg) by mouth every 6 hours as needed (Nausea/Vomiting) (Patient not taking: Reported on 8/3/2018) 30 tablet 2          Allergies   Allergen Reactions     Lisinopril Swelling     Oxycodone Nausea and Vomiting     Vicodin [Hydrocodone-Acetaminophen] Nausea and Vomiting         Exam: alert, appears well. Blood pressure 145/72, pulse 74, temperature 97.6  F (36.4  C), resp. rate 16, weight 65.4 kg (144 lb 1.6 oz), SpO2 98 %.  Wt Readings from Last 4 Encounters:   08/03/18 65.4 kg (144 lb 1.6 oz)   06/27/18 65.8 kg (145 lb)   05/23/18 65.6 kg (144 lb 11.2 oz)   05/17/18 65.3 kg (144 lb)     Oropharynx is moist, no focal lesion. No icterus. Neck supple and without adenopathy. Lungs;CTA. Heart: RRR, no murmur or rub. Abdomen: soft, nontender,  BS active. No masses or organomegaly. Groin: s/p penis resection. Testes descended, perineal urethrostomy intact. Extremities: warm, no edema. Speech is clear. CN wnl. Gait/station wnl.    Labs: Results for KING AILYN CHAVEZ (MRN [id number]) as of 8/3/2018 17:54   Ref. Range 8/3/2018 11:28   Sodium Latest Ref Range: 133 - 144 mmol/L 133   Potassium Latest Ref Range: 3.4 - 5.3 mmol/L 3.7   Chloride Latest Ref Range: 94 - 109 mmol/L 102   Carbon Dioxide Latest Ref Range: 20 - 32 mmol/L 25   Urea Nitrogen Latest Ref Range: 7 - 30 mg/dL 18   Creatinine Latest Ref Range: 0.66 - 1.25 mg/dL 1.74 (H)   GFR Estimate Latest Ref Range: >60 mL/min/1.7m2 39 (L)   GFR Estimate If Black Latest Ref Range: >60 mL/min/1.7m2 47 (L)   Calcium Latest Ref Range: 8.5 - 10.1 mg/dL 9.7   Anion Gap Latest Ref Range: 3 - 14 mmol/L 7   Magnesium Latest Ref Range: 1.6 - 2.3 mg/dL 2.0   Albumin Latest Ref Range: 3.4 - 5.0 g/dL 3.7   Protein Total Latest Ref Range: 6.8 - 8.8 g/dL 8.2   Bilirubin Total Latest Ref Range: 0.2 - 1.3 mg/dL 0.4   Alkaline Phosphatase Latest Ref Range: 40 - 150 U/L 86   ALT Latest Ref Range: 0 - 70 U/L 15   AST Latest Ref Range: 0 - 45 U/L 24   TSH Latest Ref Range: 0.40 - 4.00 mU/L 0.85   Glucose Latest Ref Range: 70 - 99 mg/dL 98       Imaging: PRELIMINARY REPORT  EXAMINATION: CT CHEST/ABDOMEN/PELVIS W CONTRAST, 8/2/2018 1:07 PM     TECHNIQUE:  Helical CT images from the thoracic inlet through the  symphysis pubis were obtained  with contrast.     CONTRAST DOSE: Isovue [id number] 90cc     COMPARISON: PET 6/26/2018, CT 5/16/2018, PET 12/12/2017     HISTORY: Restaging CT baseline prior to starting pembrolizumab for  metastatic urothelial ca; Malignant neoplasm metastatic to both lungs  (H); Malignant neoplasm metastatic to both lungs (H); Urethral cancer  (H); Penile cancer (H)     Per EPIC note  stage II penile urethral carcinoma , initiated Cisplatin/Gemzar split  on days 1 and 8 given history of CKD. He is currently  undergoing  curative intent treatment, completed 4 cycles of chemotherapy     status post Penectomy and Urethrectomy, Perineal Urethrostomy,  Bilateral Inguinal Lymphadenectomy; superficial lymph nodes and deep  lymph nodes on 3/8/2018     FINDINGS:     Chest:   The thyroid gland is unremarkable. No axillary lymphadenopathy.      Heart size is within normal limits. No pericardial effusion. The  pulmonary artery and thoracic aorta are within normal limits.      Worsening thoracic lymphadenopathy, for example the 1.4 cm left  perihilar lymph node (series 3 image 190), previously measured 0.7 cm.  Increased size of the 1 cm subcarinal lymph node compared 0.7 cm.     Increased size of the 4 x 2.5 cm right perihilar mass (series 3 image  205) compared to 3.2 x 2.1 cm.     Central tracheobronchial tree is patent.      Again seen extensive pulmonary emphysema.      Worsening metastatic disease in the lungs, for example on the series  6:  Image 74: Larger 13 x 13 mm centrally cavitary left upper lobe nodule,  previously 10 x 9 mm.  Image 122: Larger 22 x 11 mm left middle lobe subpleural nodule  compared to 12 x 7 mm.  Image 92: Larger 16 x 12 mm left lingular nodule compared to 9 x 9 mm.     No pleural effusion or pneumothorax.     Abdomen and pelvis:  Stable subcentimeter hypodensities, for example hypodensity seen in  the subcapsular right hepatic lobe (series 3 image 356) and left  hepatic lobe hypodensities (On series 3 image 340, 357), dating back  to 8/23/2017. No new suspicious hepatic lesion.     Unremarkable liver and gallbladder. The spleen and adrenals are within  normal limits. Unchanged prominent pancreatic duct. Unremarkable  pancreas. Bilateral renal cysts. Unremarkable kidneys. No  hydronephrosis or renal calculus. Unremarkable urinary bladder.     No bowel obstruction or abnormal bowel wall thickening. Large prostate  measures up to 4.4 cm with central enhancing nodules, not  significantly changed.     No  ascites. No pneumatosis, portal venous gas or free air.      Postsurgical changes of the penectomy and bilateral inguinal lymph  node dissection. Decreased enhancement in the region of the previously  mildly hypermetabolic soft tissue in the right inguinal  lymphadenectomy (series 3 image 653). Stable 1.1 cm left inguinal  lymph node.     Bones and soft tissues:  No suspicious osseous findings. Mild multilevel degenerative changes  of thoracolumbar spine.         IMPRESSION:   In this patient with known history of penile carcinoma with history of  penectomy and bilateral inguinal lymph node dissection:  1a. Worsening metastatic disease in the chest with larger right  perihilar mass, worsening thoracic lymphadenopathy and pulmonary  metastatic nodules- some of which are partially cavitary.  1b. Decreased enhancement in the previously seen mildly hypermetabolic  soft tissue near the right inguinal lymph node dissection site, most  likely representing postsurgical changes.    Impression/plan:   1. Recurrent, metastatic SCC of the penile urethra, s/p resection of primary tumor, now recurrent to the lung  -reviewed CT showing progression of known pulmonary metastases  -reviewed the plan to initiate pembrolizumab. Reviewed the Q21 day treatment schedule. Reviewed that immunotoxities can affect all parts of the body.  Potential immunotoxicities include dermatitis, colitis, pneumonitis, thyroiditis, nephritis, hepatitis, hypophysitis Severe toxicities are managed by oral steroids. Occasionally, side effects are serious enough to warrant hospitalization.   -he felt ready to proceed today. He will be seen by Lela Powell PA-C or myself prior to cycle 2. Plan for 3 cycles prior to response assessment. Reviewed the plan to continue treatment as long as he is tolerating treatment and has a stable to improving disease.    2. FEN: Eating/drinking well. Lytes stable    3. CKD: Cr stable today    4. Mood: doing better. Less  anxious today. Feels ready to start treatment.Trying to not worry to much about things and is open to seeing how things go.     5. HTN-BP stable. On amlodipine 10 mg daily      Again, thank you for allowing me to participate in the care of your patient.      Sincerely,    ISAURA Verma CNP

## 2018-08-03 NOTE — PROGRESS NOTES
Reason for Visit: seen in f/u of penile urethral carcinoma    Oncology HPI: Mr. King Gonsalo Bruno is a 72 year old man with a history of stage II penile urethral carcinoma, treated with cisplatin/gemzar (split on day 1 and 8 given hx of CKD). His first 2 cycles were complicated by dehydration and nausea. He had a positive response and completed 4 cycles in November 2017. He underwent radial penectomy and urethrectomy, perineal urethrostomy and bilateral inguinal node dissection on 3/8/18. Surgical pathology demonstrated moderately differentiated SCC of the penile urethra with extension into the corpus spongiosum and cavernosum of the penile shaft. The specimen was positive for lymphovascular and perineural invasion. Margins were negative. 1/5 L inguinal nodes were positive and 1/7 R inguinal nodes were positive for disease. He was referred for consideration of radiation. At the time of that appointment, he was found to have multiple new pulmonary nodules concerning for metastatic disease. Biopsy was discussed, but after discussion with patient, opted to observe with a PET/CT 6 weeks later. PET/CT on 6/26/18 showed multiple hypermetabolic pulmonary nodules. He was offered palliative intent immunotherapy. He is here to initiate treatment with pembrolizumab today.    Interval history: Mr. Bruno is feeling well. He feels ready to start treatment. No cough, sob, cp, palpitation. Bowels are constipated at times, using miralax and colace prn. Urination is without complication. No dysuria or hematuria. No new rashes, bleeding or bruising. Is hoping this treatment isn't as toxic as prior chemotherapy. Energy is good. Is able to golf a couple times a week. Appetite is good. Weight is stable. No new bone aches/pains.     Current Outpatient Prescriptions   Medication Sig Dispense Refill     acetaminophen (TYLENOL) 325 MG tablet Take 2 tablets (650 mg) by mouth every 4 hours as needed for mild pain or fever 150 tablet 0      amLODIPine (NORVASC) 10 MG tablet Take 1 tablet (10 mg) by mouth daily (Patient taking differently: Take 10 mg by mouth every morning ) 90 tablet 3     B Complex Vitamins (VITAMIN B COMPLEX PO) Take by mouth daily (with lunch)       docusate sodium (COLACE) 100 MG capsule Take 1 capsule (100 mg) by mouth 2 times daily as needed for constipation 60 capsule 0     fluticasone (FLONASE) 50 MCG/ACT spray Spray 2 sprays into both nostrils 2 times daily (Patient taking differently: Spray 2 sprays into both nostrils every morning ) 2 Bottle 11     LORazepam (ATIVAN) 0.5 MG tablet Take 1 tablet (0.5 mg) by mouth every 8 hours as needed for anxiety 30 tablet 3     omeprazole (PRILOSEC) 2 mg/mL SUSP Take 10 mLs (20 mg) by mouth 2 times daily 280 mL 11     LORazepam (ATIVAN) 0.5 MG tablet Take 1 tablet (0.5 mg) by mouth every 4 hours as needed (Anxiety, Nausea/Vomiting or Sleep) 30 tablet 2     prochlorperazine (COMPAZINE) 10 MG tablet Take 0.5 tablets (5 mg) by mouth every 6 hours as needed (Nausea/Vomiting) (Patient not taking: Reported on 8/3/2018) 30 tablet 2          Allergies   Allergen Reactions     Lisinopril Swelling     Oxycodone Nausea and Vomiting     Vicodin [Hydrocodone-Acetaminophen] Nausea and Vomiting         Exam: alert, appears well. Blood pressure 145/72, pulse 74, temperature 97.6  F (36.4  C), resp. rate 16, weight 65.4 kg (144 lb 1.6 oz), SpO2 98 %.  Wt Readings from Last 4 Encounters:   08/03/18 65.4 kg (144 lb 1.6 oz)   06/27/18 65.8 kg (145 lb)   05/23/18 65.6 kg (144 lb 11.2 oz)   05/17/18 65.3 kg (144 lb)     Oropharynx is moist, no focal lesion. No icterus. Neck supple and without adenopathy. Lungs;CTA. Heart: RRR, no murmur or rub. Abdomen: soft, nontender, BS active. No masses or organomegaly. Groin: s/p penis resection. Testes descended, perineal urethrostomy intact. Extremities: warm, no edema. Speech is clear. CN wnl. Gait/station wnl.    Labs: Results for KING AILYN CHAVEZ (MRN [id number]) as  of 8/3/2018 17:54   Ref. Range 8/3/2018 11:28   Sodium Latest Ref Range: 133 - 144 mmol/L 133   Potassium Latest Ref Range: 3.4 - 5.3 mmol/L 3.7   Chloride Latest Ref Range: 94 - 109 mmol/L 102   Carbon Dioxide Latest Ref Range: 20 - 32 mmol/L 25   Urea Nitrogen Latest Ref Range: 7 - 30 mg/dL 18   Creatinine Latest Ref Range: 0.66 - 1.25 mg/dL 1.74 (H)   GFR Estimate Latest Ref Range: >60 mL/min/1.7m2 39 (L)   GFR Estimate If Black Latest Ref Range: >60 mL/min/1.7m2 47 (L)   Calcium Latest Ref Range: 8.5 - 10.1 mg/dL 9.7   Anion Gap Latest Ref Range: 3 - 14 mmol/L 7   Magnesium Latest Ref Range: 1.6 - 2.3 mg/dL 2.0   Albumin Latest Ref Range: 3.4 - 5.0 g/dL 3.7   Protein Total Latest Ref Range: 6.8 - 8.8 g/dL 8.2   Bilirubin Total Latest Ref Range: 0.2 - 1.3 mg/dL 0.4   Alkaline Phosphatase Latest Ref Range: 40 - 150 U/L 86   ALT Latest Ref Range: 0 - 70 U/L 15   AST Latest Ref Range: 0 - 45 U/L 24   TSH Latest Ref Range: 0.40 - 4.00 mU/L 0.85   Glucose Latest Ref Range: 70 - 99 mg/dL 98       Imaging: PRELIMINARY REPORT  EXAMINATION: CT CHEST/ABDOMEN/PELVIS W CONTRAST, 8/2/2018 1:07 PM     TECHNIQUE:  Helical CT images from the thoracic inlet through the  symphysis pubis were obtained  with contrast.     CONTRAST DOSE: Isovue [id number] 90cc     COMPARISON: PET 6/26/2018, CT 5/16/2018, PET 12/12/2017     HISTORY: Restaging CT baseline prior to starting pembrolizumab for  metastatic urothelial ca; Malignant neoplasm metastatic to both lungs  (H); Malignant neoplasm metastatic to both lungs (H); Urethral cancer  (H); Penile cancer (H)     Per EPIC note  stage II penile urethral carcinoma , initiated Cisplatin/Gemzar split  on days 1 and 8 given history of CKD. He is currently undergoing  curative intent treatment, completed 4 cycles of chemotherapy     status post Penectomy and Urethrectomy, Perineal Urethrostomy,  Bilateral Inguinal Lymphadenectomy; superficial lymph nodes and deep  lymph nodes on  3/8/2018     FINDINGS:     Chest:   The thyroid gland is unremarkable. No axillary lymphadenopathy.      Heart size is within normal limits. No pericardial effusion. The  pulmonary artery and thoracic aorta are within normal limits.      Worsening thoracic lymphadenopathy, for example the 1.4 cm left  perihilar lymph node (series 3 image 190), previously measured 0.7 cm.  Increased size of the 1 cm subcarinal lymph node compared 0.7 cm.     Increased size of the 4 x 2.5 cm right perihilar mass (series 3 image  205) compared to 3.2 x 2.1 cm.     Central tracheobronchial tree is patent.      Again seen extensive pulmonary emphysema.      Worsening metastatic disease in the lungs, for example on the series  6:  Image 74: Larger 13 x 13 mm centrally cavitary left upper lobe nodule,  previously 10 x 9 mm.  Image 122: Larger 22 x 11 mm left middle lobe subpleural nodule  compared to 12 x 7 mm.  Image 92: Larger 16 x 12 mm left lingular nodule compared to 9 x 9 mm.     No pleural effusion or pneumothorax.     Abdomen and pelvis:  Stable subcentimeter hypodensities, for example hypodensity seen in  the subcapsular right hepatic lobe (series 3 image 356) and left  hepatic lobe hypodensities (On series 3 image 340, 357), dating back  to 8/23/2017. No new suspicious hepatic lesion.     Unremarkable liver and gallbladder. The spleen and adrenals are within  normal limits. Unchanged prominent pancreatic duct. Unremarkable  pancreas. Bilateral renal cysts. Unremarkable kidneys. No  hydronephrosis or renal calculus. Unremarkable urinary bladder.     No bowel obstruction or abnormal bowel wall thickening. Large prostate  measures up to 4.4 cm with central enhancing nodules, not  significantly changed.     No ascites. No pneumatosis, portal venous gas or free air.      Postsurgical changes of the penectomy and bilateral inguinal lymph  node dissection. Decreased enhancement in the region of the previously  mildly hypermetabolic soft  tissue in the right inguinal  lymphadenectomy (series 3 image 653). Stable 1.1 cm left inguinal  lymph node.     Bones and soft tissues:  No suspicious osseous findings. Mild multilevel degenerative changes  of thoracolumbar spine.         IMPRESSION:   In this patient with known history of penile carcinoma with history of  penectomy and bilateral inguinal lymph node dissection:  1a. Worsening metastatic disease in the chest with larger right  perihilar mass, worsening thoracic lymphadenopathy and pulmonary  metastatic nodules- some of which are partially cavitary.  1b. Decreased enhancement in the previously seen mildly hypermetabolic  soft tissue near the right inguinal lymph node dissection site, most  likely representing postsurgical changes.    Impression/plan:   1. Recurrent, metastatic SCC of the penile urethra, s/p resection of primary tumor, now recurrent to the lung  -reviewed CT showing progression of known pulmonary metastases  -reviewed the plan to initiate pembrolizumab. Reviewed the Q21 day treatment schedule. Reviewed that immunotoxities can affect all parts of the body.  Potential immunotoxicities include dermatitis, colitis, pneumonitis, thyroiditis, nephritis, hepatitis, hypophysitis Severe toxicities are managed by oral steroids. Occasionally, side effects are serious enough to warrant hospitalization.   -he felt ready to proceed today. He will be seen by Lela Powell PA-C or myself prior to cycle 2. Plan for 3 cycles prior to response assessment. Reviewed the plan to continue treatment as long as he is tolerating treatment and has a stable to improving disease.    2. FEN: Eating/drinking well. Lytes stable    3. CKD: Cr stable today    4. Mood: doing better. Less anxious today. Feels ready to start treatment.Trying to not worry to much about things and is open to seeing how things go.     5. HTN-BP stable. On amlodipine 10 mg daily

## 2018-08-03 NOTE — MR AVS SNAPSHOT
After Visit Summary   8/3/2018    King Gonsalo Bruno    MRN: [id number]           Patient Information     Date Of Birth          1945        Visit Information        Provider Department      8/3/2018 12:10 PM Yessica Ovalles APRN CNP [facility]        Today's Diagnoses     Urethral cancer (H)    -  1    Gastroesophageal reflux disease with esophagitis        Hypokalemia        Nausea        Dehydration        Malignant neoplasm metastatic to lung, unspecified laterality (H)           Follow-ups after your visit        Your next 10 appointments already scheduled     Aug 23, 2018 12:15 PM CDT   [facility] Lab Draw with UC [facility] LAB DRAW   [facility]    [address]  [address]  [facility] [address]   [phone]            Aug 23, 2018  1:00 PM CDT   (Arrive by 12:45 PM)   Return Visit with ISAURA Roach CNP   [facility] ([facility])    [address] [place] [address] [address]  [address]  [facility] [address]   [phone]            Aug 23, 2018  2:00 PM CDT   Infusion 60 with UC ONCOLOGY INFUSION, UC 14 ATC   [facility] ([facility])    [address] [place] [address] Se  [address]  [facility] [address]   [phone]            Sep 13, 2018 12:15 PM CDT   Masonic Lab Draw with UC MASONIC LAB DRAW   [facility] Masonic Lab Draw ([facility])    [address]  [address]  [facility] [address]   [phone]            Sep 13, 2018  1:00 PM CDT   Infusion 60 with UC ONCOLOGY INFUSION, UC 10 ATC   [facility] ([facility])    [address] [place] [address] Se  [address]  [facility] [address]   [phone]            Sep 26, 2018 10:20 AM CDT   CT CHEST/ABDOMEN/PELVIS W CONTRAST with UCCT2   [facility] ([facility])    [address] [place] [address]  Se  1st Floor  [facility] [address]   [phone]           Please bring any scans or X-rays taken at other hospitals, if similar tests were done. Also bring a list of your medicines, including vitamins, minerals and over-the-counter drugs. It is safest to leave personal items at home.  Be sure to tell your doctor:   If you have any allergies.   If there s any chance you are pregnant.   If you are breastfeeding.  How to prepare:   Do not eat or drink for 2 hours before your exam. If you need to take medicine, you may take it with small sips of water. (We may ask you to take liquid medicine as well.)   Please wear loose clothing, such as a sweat suit or jogging clothes. Avoid snaps, zippers and other metal. We may ask you to undress and put on a hospital gown.  Please arrive 30 minutes early for your CT. Once in the department you might be asked to drink water 15-20 minutes prior to your exam.  If indicated you may be asked to drink an oral contrast in advance of your CT.  If this is the case, the imaging team will let you know or be in contact with you prior to your appointment  Patients over 70 or patients with diabetes or kidney problems:   If you haven t had a blood test (creatinine test) within the last 30 days, the Cardiologist/Radiologist may require you to get this test prior to your exam.  If you have diabetes:   Continue to take your metformin medication on the day of your exam  If you have any questions, please call the Imaging Department where you will have your exam.            Sep 26, 2018 12:30 PM CDT   (Arrive by 12:15 PM)   Return Visit with Steve Arceo MD   [facility] ([facility])    [address] [place] [address]  [address]  [facility] [address]   [phone]            Oct 04, 2018 12:15 PM CDT   M[facility]ic Lab Draw with  ARINA LAB DRAW   [facility] Lab Draw ([facility])    [address] [place] [address]  [address]  [facility] [id number] [phone]              Future tests that were ordered for you today     Open Future Orders        Priority Expected Expires Ordered    CBC with platelets differential Routine 10/5/2018 2/3/2019 8/3/2018    Comprehensive metabolic panel Routine 10/5/2018 2/3/2019 8/3/2018    CT Chest/Abdomen/Pelvis w Contrast Routine 10/5/2018 2/3/2019 8/3/2018            Who to contact     If you have questions or need follow up information about today's clinic visit or your schedule please contact [facility] directly at [phone].  Normal or non-critical lab and imaging results will be communicated to you by MyChart, letter or phone within 4 business days after the clinic has received the results. If you do not hear from us within 7 days, please contact the clinic through MyChart or phone. If you have a critical or abnormal lab result, we will notify you by phone as soon as possible.  Submit refill requests through [employer] or call your pharmacy and they will forward the refill request to us. Please allow 3 business days for your refill to be completed.          Additional Information About Your Visit        Care EveryWhere ID     This is your Care EveryWhere ID. This could be used by other organizations to access your [place] medical records  [id number]        Your Vitals Were     Pulse Temperature Respirations Pulse Oximetry BMI (Body Mass Index)       74 97.6  F (36.4  C) 16 98% 17.54 kg/m2        Blood Pressure from Last 3 Encounters:   08/03/18 145/72   06/27/18 136/75   05/23/18 130/77    Weight from Last 3 Encounters:   08/03/18 65.4 kg (144 lb 1.6 oz)   06/27/18 65.8 kg (145 lb)   05/23/18 65.6 kg (144 lb 11.2 oz)              We Performed the Following     Magnesium          Today's Medication Changes          These changes are accurate as of 8/3/18  6:02 PM.  If you have any questions, ask your nurse or doctor.               Start taking these medicines.         Dose/Directions    prochlorperazine 10 MG tablet   Commonly known as:  COMPAZINE   Used for:  Malignant neoplasm metastatic to both lungs (H), Urethral cancer (H)        Dose:  5 mg   Take 0.5 tablets (5 mg) by mouth every 6 hours as needed (Nausea/Vomiting)   Quantity:  30 tablet   Refills:  2         These medicines have changed or have updated prescriptions.        Dose/Directions    amLODIPine 10 MG tablet   Commonly known as:  NORVASC   This may have changed:  when to take this   Used for:  Benign essential hypertension        Dose:  10 mg   Take 1 tablet (10 mg) by mouth daily   Quantity:  90 tablet   Refills:  3       fluticasone 50 MCG/ACT spray   Commonly known as:  FLONASE   This may have changed:  when to take this   Used for:  Urethral cancer (H), Allergic sinusitis        Dose:  2 spray   Spray 2 sprays into both nostrils 2 times daily   Quantity:  2 Bottle   Refills:  11       * LORazepam 0.5 MG tablet   Commonly known as:  ATIVAN   This may have changed:  Another medication with the same name was added. Make sure you understand how and when to take each.   Used for:  Anxiety        Dose:  0.5 mg   Take 1 tablet (0.5 mg) by mouth every 8 hours as needed for anxiety   Quantity:  30 tablet   Refills:  3       * LORazepam 0.5 MG tablet   Commonly known as:  ATIVAN   This may have changed:  You were already taking a medication with the same name, and this prescription was added. Make sure you understand how and when to take each.   Used for:  Malignant neoplasm metastatic to both lungs (H), Urethral cancer (H)        Dose:  0.5 mg   Take 1 tablet (0.5 mg) by mouth every 4 hours as needed (Anxiety, Nausea/Vomiting or Sleep)   Quantity:  30 tablet   Refills:  2       * Notice:  This list has 2 medication(s) that are the same as other medications prescribed for you. Read the directions carefully, and ask your doctor or other care provider to review them with you.      Stop taking these medicines if you  haven't already. Please contact your care team if you have questions.     amoxicillin-clavulanate 500-125 MG per tablet   Commonly known as:  AUGMENTIN   Stopped by:  Yessica Ovalles APRN CNP           nystatin [id number] UNIT/ML suspension   Commonly known as:  MYCOSTATIN   Stopped by:  Yessica Ovalles APRN CNP           oxyCODONE IR 5 MG tablet   Commonly known as:  ROXICODONE   Stopped by:  Yessica Ovalles APRN CNP                Where to get your medicines      These medications were sent to [place], [place] - [address]  [address], [facility] [address]    Hours:  TRANSPLANT PHONE NUMBER [phone] Phone:  [phone]     prochlorperazine 10 MG tablet         Some of these will need a paper prescription and others can be bought over the counter.  Ask your nurse if you have questions.     Bring a paper prescription for each of these medications     LORazepam 0.5 MG tablet                Primary Care Provider Office Phone # Fax #    Joan Janet Ventura [phone]       [facility] [address] [address]        Equal Access to Services     SHILPI RED : Hadii aad ku hadasho Sotimali, waaxda luqadaha, qaybta kaalmada adeegyada, ronald srivastava . So [facility] [phone].    ATENCIÓN: Si habla español, tiene a washburn disposición servicios gratuitos de asistencia lingüística. Llame al [phone].    We comply with applicable federal civil rights laws and [place] laws. We do not discriminate on the basis of race, color, national origin, age, disability, sex, sexual orientation, or gender identity.            Thank you!     Thank you for choosing [facility]  for your care. Our goal is always to provide you with excellent care. Hearing back from our patients is one way we can continue to improve our services. Please take a few minutes to complete the written survey that you  may receive in the mail after your visit with us. Thank you!             Your Updated Medication List - Protect others around you: Learn how to safely use, store and throw away your medicines at www.disposemymeds.org.          This list is accurate as of 8/3/18  6:02 PM.  Always use your most recent med list.                   Brand Name Dispense Instructions for use Diagnosis    acetaminophen 325 MG tablet    TYLENOL    150 tablet    Take 2 tablets (650 mg) by mouth every 4 hours as needed for mild pain or fever    Urethral cancer (H)       amLODIPine 10 MG tablet    NORVASC    90 tablet    Take 1 tablet (10 mg) by mouth daily    Benign essential hypertension       docusate sodium 100 MG capsule    COLACE    60 capsule    Take 1 capsule (100 mg) by mouth 2 times daily as needed for constipation    Slow transit constipation       fluticasone 50 MCG/ACT spray    FLONASE    2 Bottle    Spray 2 sprays into both nostrils 2 times daily    Urethral cancer (H), Allergic sinusitis       * LORazepam 0.5 MG tablet    ATIVAN    30 tablet    Take 1 tablet (0.5 mg) by mouth every 8 hours as needed for anxiety    Anxiety       * LORazepam 0.5 MG tablet    ATIVAN    30 tablet    Take 1 tablet (0.5 mg) by mouth every 4 hours as needed (Anxiety, Nausea/Vomiting or Sleep)    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 Read the directions carefully, and ask your  doctor or other care provider to review them with you.

## 2018-08-23 ENCOUNTER — ONCOLOGY VISIT (OUTPATIENT)
Dept: ONCOLOGY | Facility: CLINIC | Age: 73
End: 2018-08-23
Attending: INTERNAL MEDICINE
Payer: MEDICARE

## 2018-08-23 VITALS
TEMPERATURE: 97.8 F | BODY MASS INDEX: 17.35 KG/M2 | WEIGHT: 142.5 LBS | HEART RATE: 73 BPM | OXYGEN SATURATION: 99 % | DIASTOLIC BLOOD PRESSURE: 79 MMHG | HEIGHT: 76 IN | RESPIRATION RATE: 16 BRPM | SYSTOLIC BLOOD PRESSURE: 144 MMHG

## 2018-08-23 DIAGNOSIS — K21.00 GASTROESOPHAGEAL REFLUX DISEASE WITH ESOPHAGITIS: ICD-10-CM

## 2018-08-23 DIAGNOSIS — C78.01 MALIGNANT NEOPLASM METASTATIC TO BOTH LUNGS (H): Primary | ICD-10-CM

## 2018-08-23 DIAGNOSIS — C78.02 MALIGNANT NEOPLASM METASTATIC TO BOTH LUNGS (H): ICD-10-CM

## 2018-08-23 DIAGNOSIS — C78.01 MALIGNANT NEOPLASM METASTATIC TO BOTH LUNGS (H): ICD-10-CM

## 2018-08-23 DIAGNOSIS — C68.0 URETHRAL CANCER (H): ICD-10-CM

## 2018-08-23 DIAGNOSIS — R11.0 NAUSEA: ICD-10-CM

## 2018-08-23 DIAGNOSIS — C78.02 MALIGNANT NEOPLASM METASTATIC TO BOTH LUNGS (H): Primary | ICD-10-CM

## 2018-08-23 DIAGNOSIS — M19.90 ARTHRITIS: Primary | ICD-10-CM

## 2018-08-23 DIAGNOSIS — E87.6 HYPOKALEMIA: ICD-10-CM

## 2018-08-23 DIAGNOSIS — E86.0 DEHYDRATION: ICD-10-CM

## 2018-08-23 LAB
ALBUMIN SERPL-MCNC: 3.7 G/DL (ref 3.4–5)
ALP SERPL-CCNC: 90 U/L (ref 40–150)
ALT SERPL W P-5'-P-CCNC: 16 U/L (ref 0–70)
ANION GAP SERPL CALCULATED.3IONS-SCNC: 8 MMOL/L (ref 3–14)
AST SERPL W P-5'-P-CCNC: 24 U/L (ref 0–45)
BILIRUB SERPL-MCNC: 0.4 MG/DL (ref 0.2–1.3)
BUN SERPL-MCNC: 14 MG/DL (ref 7–30)
CALCIUM SERPL-MCNC: 9.6 MG/DL (ref 8.5–10.1)
CHLORIDE SERPL-SCNC: 100 MMOL/L (ref 94–109)
CO2 SERPL-SCNC: 26 MMOL/L (ref 20–32)
CREAT SERPL-MCNC: 1.67 MG/DL (ref 0.66–1.25)
GFR SERPL CREATININE-BSD FRML MDRD: 41 ML/MIN/1.7M2
GLUCOSE SERPL-MCNC: 88 MG/DL (ref 70–99)
MAGNESIUM SERPL-MCNC: 2 MG/DL (ref 1.6–2.3)
POTASSIUM SERPL-SCNC: 3.9 MMOL/L (ref 3.4–5.3)
PROT SERPL-MCNC: 8.5 G/DL (ref 6.8–8.8)
SODIUM SERPL-SCNC: 134 MMOL/L (ref 133–144)
TSH SERPL DL<=0.005 MIU/L-ACNC: 0.84 MU/L (ref 0.4–4)

## 2018-08-23 PROCEDURE — 80053 COMPREHEN METABOLIC PANEL: CPT | Performed by: NURSE PRACTITIONER

## 2018-08-23 PROCEDURE — 96413 CHEMO IV INFUSION 1 HR: CPT

## 2018-08-23 PROCEDURE — 83735 ASSAY OF MAGNESIUM: CPT | Performed by: NURSE PRACTITIONER

## 2018-08-23 PROCEDURE — 84443 ASSAY THYROID STIM HORMONE: CPT | Performed by: NURSE PRACTITIONER

## 2018-08-23 PROCEDURE — 25000128 H RX IP 250 OP 636: Mod: ZF | Performed by: NURSE PRACTITIONER

## 2018-08-23 PROCEDURE — 40000556 ZZH STATISTIC PERIPHERAL IV START W US GUIDANCE: Mod: ZF

## 2018-08-23 PROCEDURE — G0463 HOSPITAL OUTPT CLINIC VISIT: HCPCS | Mod: ZF

## 2018-08-23 PROCEDURE — 25000125 ZZHC RX 250: Performed by: RADIOLOGY

## 2018-08-23 PROCEDURE — 99214 OFFICE O/P EST MOD 30 MIN: CPT | Mod: ZP | Performed by: NURSE PRACTITIONER

## 2018-08-23 PROCEDURE — 40000556 ZZH STATISTIC PERIPHERAL IV START W US GUIDANCE

## 2018-08-23 RX ORDER — EPINEPHRINE 0.3 MG/.3ML
0.3 INJECTION SUBCUTANEOUS EVERY 5 MIN PRN
Status: CANCELLED | OUTPATIENT
Start: 2018-08-23

## 2018-08-23 RX ORDER — ETHYL CHLORIDE 100 %
AEROSOL, SPRAY (ML) TOPICAL ONCE
Status: COMPLETED | OUTPATIENT
Start: 2018-08-23 | End: 2018-08-23

## 2018-08-23 RX ORDER — LORAZEPAM 2 MG/ML
0.5 INJECTION INTRAMUSCULAR EVERY 4 HOURS PRN
Status: CANCELLED
Start: 2018-08-23

## 2018-08-23 RX ORDER — EPINEPHRINE 1 MG/ML
0.3 INJECTION, SOLUTION INTRAMUSCULAR; SUBCUTANEOUS EVERY 5 MIN PRN
Status: CANCELLED | OUTPATIENT
Start: 2018-08-23

## 2018-08-23 RX ORDER — METHYLPREDNISOLONE SODIUM SUCCINATE 125 MG/2ML
125 INJECTION, POWDER, LYOPHILIZED, FOR SOLUTION INTRAMUSCULAR; INTRAVENOUS
Status: CANCELLED
Start: 2018-08-23

## 2018-08-23 RX ORDER — DIPHENHYDRAMINE HYDROCHLORIDE 50 MG/ML
50 INJECTION INTRAMUSCULAR; INTRAVENOUS
Status: CANCELLED
Start: 2018-08-23

## 2018-08-23 RX ORDER — ALBUTEROL SULFATE 0.83 MG/ML
2.5 SOLUTION RESPIRATORY (INHALATION)
Status: CANCELLED | OUTPATIENT
Start: 2018-08-23

## 2018-08-23 RX ORDER — ALBUTEROL SULFATE 90 UG/1
1-2 AEROSOL, METERED RESPIRATORY (INHALATION)
Status: CANCELLED
Start: 2018-08-23

## 2018-08-23 RX ORDER — SODIUM CHLORIDE 9 MG/ML
1000 INJECTION, SOLUTION INTRAVENOUS CONTINUOUS PRN
Status: CANCELLED
Start: 2018-08-23

## 2018-08-23 RX ORDER — MEPERIDINE HYDROCHLORIDE 25 MG/ML
25 INJECTION INTRAMUSCULAR; INTRAVENOUS; SUBCUTANEOUS EVERY 30 MIN PRN
Status: CANCELLED | OUTPATIENT
Start: 2018-08-23

## 2018-08-23 RX ADMIN — SODIUM CHLORIDE 250 ML: 9 INJECTION, SOLUTION INTRAVENOUS at 14:33

## 2018-08-23 RX ADMIN — SODIUM CHLORIDE 200 MG: 9 INJECTION, SOLUTION INTRAVENOUS at 14:33

## 2018-08-23 RX ADMIN — Medication: at 13:00

## 2018-08-23 ASSESSMENT — PAIN SCALES - GENERAL: PAINLEVEL: NO PAIN (0)

## 2018-08-23 NOTE — LETTER
8/23/2018       RE: King Gonsalo Bruno  [address]  [facility] [address]     Dear Colleague,    Thank you for referring your patient, King Gonsalo Bruno, to the [facility]. Please see a copy of my visit note below.    [facility]  MEDICAL ONCOLOGY PROGRESS NOTE  Aug 23, 2018    CHIEF COMPLAINT: f/u penile urethral carcinoma    ONCOLOGY HPI:   Mr. King Gonsalo Bruno is a 72 year old man with a history of stage II penile urethral carcinoma, treated with cisplatin/gemzar (split on day 1 and 8 given hx of CKD). His first 2 cycles were complicated by dehydration and nausea. He had a positive response and completed 4 cycles in November 2017. He underwent radial penectomy and urethrectomy, perineal urethrostomy and bilateral inguinal node dissection on 3/8/18. Surgical pathology demonstrated moderately differentiated SCC of the penile urethra with extension into the corpus spongiosum and cavernosum of the penile shaft. The specimen was positive for lymphovascular and perineural invasion. Margins were negative. 1/5 L inguinal nodes were positive and 1/7 R inguinal nodes were positive for disease. He was referred for consideration of radiation. At the time of that appointment, he was found to have multiple new pulmonary nodules concerning for metastatic disease. Biopsy was discussed, but after discussion with patient, opted to observe with a PET/CT 6 weeks later. PET/CT on 6/26/18 showed multiple hypermetabolic pulmonary nodules. He was offered palliative intent immunotherapy. He was started on Keytruda on 8/3/18.     He is here today prior to cycle 2.    INTERVAL HISTORY:   Gonsalo presents today with his significant other. He tolerated his first infusion of Keytruda well. He states that his biggest side effect was fatigue and some increased pain in his joints. Even though his fatigue has worsened, he continues to be fairly active and play golf. He is having increased pain in his  "neck, hands (specificially 2nd finger b/l) and L chest. He has known arthritis in his neck for which he has had PT in the past. He states that before his infusion, his pain in his neck was a 5/10, now his pain in up to a 7/10. He also notices that his jaw has been clicking. He is interested in trying acupuncture for the pain. The neuropathy is continuing to improve.     He also notes that he feels like \"everything inside of him felt like it speed up and was going faster but his body was not\".     He has a h/o an esophageal diverticula and he feels like it is a little harder to swallow. He states he needs to go back to his GI doctor.     ROS: Denies HA, visual, hearing or taste changes, dry mouth, mouth sores, N/V, abdominal pain, change in urination, chest pain, SOB, cough, edema, night sweats, fever, chills, mood changes, bleeding      Allergies   Allergen Reactions     Lisinopril Swelling     Oxycodone Nausea and Vomiting     Vicodin [Hydrocodone-Acetaminophen] Nausea and Vomiting       Current Outpatient Prescriptions   Medication     amLODIPine (NORVASC) 10 MG tablet     B Complex Vitamins (VITAMIN B COMPLEX PO)     fluticasone (FLONASE) 50 MCG/ACT spray     LORazepam (ATIVAN) 0.5 MG tablet     LORazepam (ATIVAN) 0.5 MG tablet     omeprazole (PRILOSEC) 2 mg/mL SUSP     acetaminophen (TYLENOL) 325 MG tablet     docusate sodium (COLACE) 100 MG capsule     prochlorperazine (COMPAZINE) 10 MG tablet     No current facility-administered medications for this visit.      Facility-Administered Medications Ordered in Other Visits   Medication     barium sulfate (EZ PAQUE) oral suspension 96%     barium sulfate (EZ-HD) oral suspension 98%     barium sulfate 40% (VARIBAR THIN HONEY) oral suspension     sod bicarbonate-citric acid-simethicone (EZ GAS) 2.21-1.53-0.04 G packet 4 g       EXAM:  /79 (BP Location: Right arm, Patient Position: Sitting, Cuff Size: Adult Regular)  Pulse 73  Temp 97.8  F (36.6  C) (Oral)  " "Resp 16  Ht 1.93 m (6' 3.98\")  Wt 64.6 kg (142 lb 8 oz)  SpO2 99%  BMI 17.35 kg/m2  Wt Readings from Last 4 Encounters:   08/23/18 64.6 kg (142 lb 8 oz)   08/03/18 65.4 kg (144 lb 1.6 oz)   06/27/18 65.8 kg (145 lb)   05/23/18 65.6 kg (144 lb 11.2 oz)        General: No acute distress  HEENT: EOMI, PERRLA, no scleral icterus, mucus membranes moist and no lesions or thrush  Lymph: Neck is supple and shows no nodes in cervical or supraclavicular   Heart:  RRR, no murmur, gallop or rub  Lungs: CTA-B, no wheezes, rhonchi or rales  Abdomen: Normal bowel sounds, soft, non tender, not distended, no rebound or guarding, no palpable masses  Extremities: No pitting edema  Skin: No significant rashes or lesions  Neuro: CN II-XII are intact    LABS:   Results for KING AILYN CHAVEZ (MRN [id number]) as of 8/25/2018 21:22   Ref. Range 8/23/2018 12:40   Sodium Latest Ref Range: 133 - 144 mmol/L 134   Potassium Latest Ref Range: 3.4 - 5.3 mmol/L 3.9   Chloride Latest Ref Range: 94 - 109 mmol/L 100   Carbon Dioxide Latest Ref Range: 20 - 32 mmol/L 26   Urea Nitrogen Latest Ref Range: 7 - 30 mg/dL 14   Creatinine Latest Ref Range: 0.66 - 1.25 mg/dL 1.67 (H)   GFR Estimate Latest Ref Range: >60 mL/min/1.7m2 41 (L)   GFR Estimate If Black Latest Ref Range: >60 mL/min/1.7m2 49 (L)   Calcium Latest Ref Range: 8.5 - 10.1 mg/dL 9.6   Anion Gap Latest Ref Range: 3 - 14 mmol/L 8   Magnesium Latest Ref Range: 1.6 - 2.3 mg/dL 2.0   Albumin Latest Ref Range: 3.4 - 5.0 g/dL 3.7   Protein Total Latest Ref Range: 6.8 - 8.8 g/dL 8.5   Bilirubin Total Latest Ref Range: 0.2 - 1.3 mg/dL 0.4   Alkaline Phosphatase Latest Ref Range: 40 - 150 U/L 90   ALT Latest Ref Range: 0 - 70 U/L 16   AST Latest Ref Range: 0 - 45 U/L 24   TSH Latest Ref Range: 0.40 - 4.00 mU/L 0.84   Glucose Latest Ref Range: 70 - 99 mg/dL 88       IMAGING:   No new imaging    IMPRESSION/PLAN:    Recurrent, metastatic SCC of the penile urethra, s/p resection of primary tumor, now " recurrent to the lung  -CT on 8/2/18 showed progression of known pulmonary metastases  -started pembrolizumab on a Q21 day schedule. S/p Cycle 1. Tolerated it well with some increased fatigue and arthritis. Arthritis will be difficult to assess for immunotoxicities going forward since he started with underlying arthritis. Symptoms seem to be a grade 1 now. Will have to continue to monitor closely.  -proceed with cycle 2 today.   -will have another infusion on 9/13 and then a CT and f/u with Dr. Arceo     Fatigue  -most likely due to Keytruda though will get a CBC next time to make sure there is no underlying anemia    FEN:   -Eating/drinking well. Lytes stable  -should be drinking 64 oz of fluid a day  -he has lost a couple a pounds. Continue to monitor. Will have to discuss nutrition supplements if weight continues to go down     CKD:   -Cr stable today, slightly improved     Mood:   -doing better. Treatment was better tolerated which helped     HTN:  -BP stable. On amlodipine 10 mg daily    Kathleen Orosco PA-C  [facility]  [address] [place] [id number] [phone]    The patient was seen in conjunction with Kathleen Orosco PA-C.  I have reviewed the note and agree with the above findings and plan.TW      Again, thank you for allowing me to participate in the care of your patient.      Sincerely,    ISAURA Verma CNP

## 2018-08-23 NOTE — NURSING NOTE
"Oncology Rooming Note    August 23, 2018 12:54 PM   King Gonsalo Bruno is a 72 year old male who presents for:    Chief Complaint   Patient presents with     Labs Only     venipuncture, vitals checked     Oncology Clinic Visit     Return visit related to Bladder Cancer     Initial Vitals: /79 (BP Location: Right arm, Patient Position: Sitting, Cuff Size: Adult Regular)  Pulse 73  Temp 97.8  F (36.6  C) (Oral)  Resp 16  Ht 1.93 m (6' 3.98\")  Wt 64.6 kg (142 lb 8 oz)  SpO2 99%  BMI 17.35 kg/m2 Estimated body mass index is 17.35 kg/(m^2) as calculated from the following:    Height as of this encounter: 1.93 m (6' 3.98\").    Weight as of this encounter: 64.6 kg (142 lb 8 oz). Body surface area is 1.86 meters squared.  No Pain (0) Comment: Data Unavailable   No LMP for male patient.  Allergies reviewed: Yes  Medications reviewed: Yes    Medications: Medication refills not needed today.  Pharmacy name entered into [employer]:    [id number] - [place], [place] - [address] [place] [address] AT [address] [place] [address] PHARMACY [place], [place] - [phone]    Clinical concerns: No new concerns Provider was notified.    10 minutes for nursing intake (face to face time)     Yessica Stover LPN            "

## 2018-08-23 NOTE — MR AVS SNAPSHOT
After Visit Summary   8/23/2018    King Gonsalo Bruno    MRN: [id number]           Patient Information     Date Of Birth          1945        Visit Information        Provider Department      8/23/2018 2:00 PM DEANGELO 14 ATC;  ONCOLOGY INFUSION [facility]        Today's Diagnoses     Malignant neoplasm metastatic to both lungs (H)    -  1    Urethral cancer (H)          Care Instructions    Contact Numbers    [facility] Main Line: [phone]  [facility] Triage and after hours / weekends / holidays:  [phone]      Please call the triage or after hours line if you experience a temperature greater than or equal to 100.5, shaking chills, have uncontrolled nausea, vomiting and/or diarrhea, dizziness, shortness of breath, chest pain, bleeding, unexplained bruising, or if you have any other new/concerning symptoms, questions or concerns.      If you are having any concerning symptoms or wish to speak to a provider before your next infusion visit, please call your care coordinator or triage to notify them so we can adequately serve you.     If you need a refill on a narcotic prescription or other medication, please call before your infusion appointment.                   August 2018 Sunday Monday Tuesday Wednesday Thursday Friday Saturday                  1     2     CT CHEST ABDOMEN PELVIS WWO   12:40 PM   (20 min.)   UCCT1   [facility] CT 3     [facility] LAB DRAW   11:30 AM   (15 min.)    [facility]     P RETURN   11:55 AM   (50 min.)   Yessica Ovalles, ISAURA CNP   [facility]    1:00 PM   (60 min.)   UC ONCOLOGY INFUSION   [facility] 4       5     6     7     8     9     10     11       12     13     14     15     16     17     18       19     20     21     22     23     [facility] DRAW   12:15 PM   (15 min.)    [facility]     [facility] RETURN    12:45 PM   (50 min.)   Yessica Ovalles APRN CNP   [facility]     UMP ONC INFUSION 60    2:00 PM   (60 min.)    ONCOLOGY INFUSION   [facility] 24     25       26     27     28     29     30     31 September 2018 Sunday Monday Tuesday Wednesday Thursday Friday Saturday                                 1       2     3     4     5     6     7     8       9     10     11     12     13     UMP [facility] DRAW   12:15 PM   (15 min.)   UC [facility] LAB DRAW   [facility] Lab Draw     UMP ONC INFUSION 60    1:00 PM   (60 min.)    ONCOLOGY INFUSION   [facility] 14     15       16     17     18     19     20     21     22       23     24     25     26     CT CHEST/ABDOMEN/PELVIS W   10:20 AM   (20 min.)   UCCT2   [facility] CT     UMP RETURN   12:15 PM   (30 min.)   Steve Arceo MD   [facility] 27     28     29       30                                               Recent Results (from the past 24 hour(s))   Comprehensive metabolic panel    Collection Time: 08/23/18 12:40 PM   Result Value Ref Range    Sodium 134 133 - 144 mmol/L    Potassium 3.9 3.4 - 5.3 mmol/L    Chloride 100 94 - 109 mmol/L    Carbon Dioxide 26 20 - 32 mmol/L    Anion Gap 8 3 - 14 mmol/L    Glucose 88 70 - 99 mg/dL    Urea Nitrogen 14 7 - 30 mg/dL    Creatinine 1.67 (H) 0.66 - 1.25 mg/dL    GFR Estimate 41 (L) >60 mL/min/1.7m2    GFR Estimate If Black 49 (L) >60 mL/min/1.7m2    Calcium 9.6 8.5 - 10.1 mg/dL    Bilirubin Total 0.4 0.2 - 1.3 mg/dL    Albumin 3.7 3.4 - 5.0 g/dL    Protein Total 8.5 6.8 - 8.8 g/dL    Alkaline Phosphatase 90 40 - 150 U/L    ALT 16 0 - 70 U/L    AST 24 0 - 45 U/L   TSH with free T4 reflex    Collection Time: 08/23/18 12:40 PM   Result Value Ref Range    TSH 0.84 0.40 - 4.00 mU/L                 Follow-ups after your visit        Your next 10 appointments already scheduled     Sep 13, 2018 12:15 PM CDT    [facility] Lab Draw with  [facility] LAB DRAW   [facility] Lab Draw ([facility])    [address]  [address]  [facility] [address]   [phone]            Sep 13, 2018  1:00 PM CDT   Infusion 60 with UC ONCOLOGY INFUSION, UC 10 ATC   [facility] ([facility])    [address]  [address]  [facility] [address]   [phone]            Sep 26, 2018 10:20 AM CDT   CT CHEST/ABDOMEN/PELVIS W CONTRAST with UCCT2   [facility] CT ([facility])    [address]  1st Floor  [facility] [address]   [phone]           Please bring any scans or X-rays taken at other hospitals, if similar tests were done. Also bring a list of your medicines, including vitamins, minerals and over-the-counter drugs. It is safest to leave personal items at home.  Be sure to tell your doctor:   If you have any allergies.   If there s any chance you are pregnant.   If you are breastfeeding.  How to prepare:   Do not eat or drink for 2 hours before your exam. If you need to take medicine, you may take it with small sips of water. (We may ask you to take liquid medicine as well.)   Please wear loose clothing, such as a sweat suit or jogging clothes. Avoid snaps, zippers and other metal. We may ask you to undress and put on a hospital gown.  Please arrive 30 minutes early for your CT. Once in the department you might be asked to drink water 15-20 minutes prior to your exam.  If indicated you may be asked to drink an oral contrast in advance of your CT.  If this is the case, the imaging team will let you know or be in contact with you prior to your appointment  Patients over 70 or patients with diabetes or kidney problems:   If you haven t had a blood test (creatinine test) within the last 30 days, the Cardiologist/Radiologist may require you to get this test prior to your exam.  If you have diabetes:    Continue to take your metformin medication on the day of your exam  If you have any questions, please call the Imaging Department where you will have your exam.            Sep 26, 2018 12:30 PM CDT   (Arrive by 12:15 PM)   Return Visit with Steve Arceo MD   [facility] ([facility])    [address] [place] [address]  [address]  [facility] [address]   [phone]            Oct 04, 2018 12:15 PM CDT   [facility] Lab Draw with UC [facility] LAB DRAW   [facility]    [address] [place] [address]  [address]  [facility] [address]   [phone]            Oct 04, 2018  1:00 PM CDT   Infusion 60 with UC ONCOLOGY INFUSION, UC 11 ATC   [facility] ([facility])    [address] [place] [address]  [address]  [facility] [address]   [phone]            Oct 25, 2018 12:15 PM CDT   [facility] Lab Draw with UC [facility] LAB DRAW   [facility] Lab Draw ([facility])    [address] [place] [address]  [address]  [facility] [address]   [phone]            Oct 25, 2018  1:00 PM CDT   Infusion 60 with UC ONCOLOGY INFUSION   [facility] ([facility])    [address] [place] [address]  [address]  [facility] [address]   [phone]              Who to contact     If you have questions or need follow up information about today's clinic visit or your schedule please contact [facility] directly at [phone].  Normal or non-critical lab and imaging results will be communicated to you by MyChart, letter or phone within 4 business days after the clinic has received the results. If you do not hear from us within 7 days, please contact the clinic through MyChart or phone. If you have a critical or abnormal lab result, we will notify you by phone as soon as possible.  Submit refill requests through [employer] or call your pharmacy and  they will forward the refill request to us. Please allow 3 business days for your refill to be completed.          Additional Information About Your Visit        Care EveryWhere ID     This is your Care EveryWhere ID. This could be used by other organizations to access your [place] medical records  [id number]         Blood Pressure from Last 3 Encounters:   08/23/18 144/79   08/03/18 145/72   06/27/18 136/75    Weight from Last 3 Encounters:   08/23/18 64.6 kg (142 lb 8 oz)   08/03/18 65.4 kg (144 lb 1.6 oz)   06/27/18 65.8 kg (145 lb)              We Performed the Following     Comprehensive metabolic panel     TSH with free T4 reflex          Today's Medication Changes          These changes are accurate as of 8/23/18  2:45 PM.  If you have any questions, ask your nurse or doctor.               These medicines have changed or have updated prescriptions.        Dose/Directions    amLODIPine 10 MG tablet   Commonly known as:  NORVASC   This may have changed:  when to take this   Used for:  Benign essential hypertension        Dose:  10 mg   Take 1 tablet (10 mg) by mouth daily   Quantity:  90 tablet   Refills:  3       fluticasone 50 MCG/ACT spray   Commonly known as:  FLONASE   This may have changed:  when to take this   Used for:  Urethral cancer (H), Allergic sinusitis        Dose:  2 spray   Spray 2 sprays into both nostrils 2 times daily   Quantity:  2 Bottle   Refills:  11                Primary Care Provider Office Phone # Fax #    Joan Janet Ventura [phone]       [facility] [address]  [place] [address]        Equal Access to Services     [facility] Center[facility] AH: Hadii светлана garcia hadasho Sotimali, waaxda luqadaha, qaybta kaalmada adeegyada, ronald rasmussen. So [facility] [phone].    ATENCIÓN: Si habla español, tiene a washburn disposición servicios gratuitos de asistencia lingüística. Llame al [phone].    We comply with applicable federal civil rights laws and  [place] laws. We do not discriminate on the basis of race, color, national origin, age, disability, sex, sexual orientation, or gender identity.            Thank you!     Thank you for choosing [facility]  for your care. Our goal is always to provide you with excellent care. Hearing back from our patients is one way we can continue to improve our services. Please take a few minutes to complete the written survey that you may receive in the mail after your visit with us. Thank you!             Your Updated Medication List - Protect others around you: Learn how to safely use, store and throw away your medicines at www.disposemymeds.org.          This list is accurate as of 8/23/18  2:45 PM.  Always use your most recent med list.                   Brand Name Dispense Instructions for use Diagnosis    acetaminophen 325 MG tablet    TYLENOL    150 tablet    Take 2 tablets (650 mg) by mouth every 4 hours as needed for mild pain or fever    Urethral cancer (H)       amLODIPine 10 MG tablet    NORVASC    90 tablet    Take 1 tablet (10 mg) by mouth daily    Benign essential hypertension       docusate sodium 100 MG capsule    COLACE    60 capsule    Take 1 capsule (100 mg) by mouth 2 times daily as needed for constipation    Slow transit constipation       fluticasone 50 MCG/ACT spray    FLONASE    2 Bottle    Spray 2 sprays into both nostrils 2 times daily    Urethral cancer (H), Allergic sinusitis       * LORazepam 0.5 MG tablet    ATIVAN    30 tablet    Take 1 tablet (0.5 mg) by mouth every 8 hours as needed for anxiety    Anxiety       * LORazepam 0.5 MG tablet    ATIVAN    30 tablet    Take 1 tablet (0.5 mg) by mouth every 4 hours as needed (Anxiety, Nausea/Vomiting or Sleep)    Malignant neoplasm metastatic to both lungs (H), Urethral cancer (H)       omeprazole 2 mg/mL Susp    priLOSEC    280 mL    Take 10 mLs (20 mg) by mouth 2 times daily    Gastroesophageal reflux disease with esophagitis,  Urethral cancer (H)       prochlorperazine 10 MG tablet    COMPAZINE    30 tablet    Take 0.5 tablets (5 mg) by mouth every 6 hours as needed (Nausea/Vomiting)    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 Read the directions carefully, and ask your doctor or other care provider to review them with you.

## 2018-08-23 NOTE — PATIENT INSTRUCTIONS
Contact Numbers    [facility] Main Line: [phone]  [facility] Triage and after hours / weekends / holidays:  [phone]      Please call the triage or after hours line if you experience a temperature greater than or equal to 100.5, shaking chills, have uncontrolled nausea, vomiting and/or diarrhea, dizziness, shortness of breath, chest pain, bleeding, unexplained bruising, or if you have any other new/concerning symptoms, questions or concerns.      If you are having any concerning symptoms or wish to speak to a provider before your next infusion visit, please call your care coordinator or triage to notify them so we can adequately serve you.     If you need a refill on a narcotic prescription or other medication, please call before your infusion appointment.                   August 2018 Sunday Monday Tuesday Wednesday Thursday Friday Saturday                  1     2     CT CHEST ABDOMEN PELVIS WWO   12:40 PM   (20 min.)   UCCT1   [facility] CT 3     [facility] DRAW   11:30 AM   (15 min.)    [facility] LAB DRAW   [facility] Lab Draw     UMP RETURN   11:55 AM   (50 min.)   Yessica [facility]valles APRN CNP   [facility]    1:00 PM   (60 min.)    ONCOLOGY INFUSION   [facility] 4       5     6     7     8     9     10     11       12     13     14     15     16     17     18       19     20     21     22     23     [facility] LAB DRAW   12:15 PM   (15 min.)    [facility] LAB DRAW   [facility] Lab Draw     UMP RETURN   12:45 PM   (50 min.)   Yessica Ovalles APRN CNP   [facility]     UMP ONC INFUSION 60    2:00 PM   (60 min.)    ONCOLOGY INFUSION   [facility] 24     25       26     27     28     29     30     31 September 2018 Sunday Monday Tuesday Wednesday Thursday Friday Saturday                                 1       2     3     4     5     6     7     8       9      10     11     12     13     P [facility] LAB DRAW   12:15 PM   (15 min.)    [facility] LAB DRAW   [facility] Lab Draw     [facility] ONC INFUSION 60    1:00 PM   (60 min.)   UC ONCOLOGY INFUSION   [facility] 14     15       16     17     18     19     20     21     22       23     24     25     26     CT CHEST/ABDOMEN/PELVIS W   10:20 AM   (20 min.)   UCCT2   [facility] CT     UMP RETURN   12:15 PM   (30 min.)   Steve Arceo MD   [facility] 27     28     29       30                                               Recent Results (from the past 24 hour(s))   Comprehensive metabolic panel    Collection Time: 08/23/18 12:40 PM   Result Value Ref Range    Sodium 134 133 - 144 mmol/L    Potassium 3.9 3.4 - 5.3 mmol/L    Chloride 100 94 - 109 mmol/L    Carbon Dioxide 26 20 - 32 mmol/L    Anion Gap 8 3 - 14 mmol/L    Glucose 88 70 - 99 mg/dL    Urea Nitrogen 14 7 - 30 mg/dL    Creatinine 1.67 (H) 0.66 - 1.25 mg/dL    GFR Estimate 41 (L) >60 mL/min/1.7m2    GFR Estimate If Black 49 (L) >60 mL/min/1.7m2    Calcium 9.6 8.5 - 10.1 mg/dL    Bilirubin Total 0.4 0.2 - 1.3 mg/dL    Albumin 3.7 3.4 - 5.0 g/dL    Protein Total 8.5 6.8 - 8.8 g/dL    Alkaline Phosphatase 90 40 - 150 U/L    ALT 16 0 - 70 U/L    AST 24 0 - 45 U/L   TSH with free T4 reflex    Collection Time: 08/23/18 12:40 PM   Result Value Ref Range    TSH 0.84 0.40 - 4.00 mU/L

## 2018-08-23 NOTE — PROGRESS NOTES
Infusion Nursing Note:  King Gonsalo Bruno presents today for Cycle 2 Day 1 Keytruda.    Patient seen by provider today: Yes: Yessica Ovalles DNP   present during visit today: Not Applicable.    Note: N/A.    Intravenous Access:  Peripheral IV placed per vascular access. PIV that was placed in lab could not be flushed upon arrival to infusion. PIV d/c'd intact. Vascular access to infusion room to place a second PIV today.     Treatment Conditions:  Lab Results        Lab Results   Component Value Date     08/23/2018                   Lab Results   Component Value Date    POTASSIUM 3.9 08/23/2018           Lab Results   Component Value Date    MAG 2.0 08/23/2018            Lab Results   Component Value Date    CR 1.67 08/23/2018                   Lab Results   Component Value Date    SAADIA 9.6 08/23/2018                Lab Results   Component Value Date    BILITOTAL 0.4 08/23/2018           Lab Results   Component Value Date    ALBUMIN 3.7 08/23/2018                    Lab Results   Component Value Date    ALT 16 08/23/2018           Lab Results   Component Value Date    AST 24 08/23/2018       Results reviewed, labs MET treatment parameters, ok to proceed with treatment.      Post Infusion Assessment:  Patient tolerated infusion without incident.  Blood return noted pre and post infusion.  Site patent and intact, free from redness, edema or discomfort.  No evidence of extravasations.  Access discontinued per protocol.    Discharge Plan:   Prescription refills given for Norvasc.  Copy of AVS reviewed with patient and/or family.  Patient will return 9/13 for next appointment.  Patient discharged in stable condition accompanied by: significant other.  Departure Mode: Ambulatory.    KAYLEE ANGEL RN

## 2018-08-23 NOTE — PROGRESS NOTES
[facility]  MEDICAL ONCOLOGY PROGRESS NOTE  Aug 23, 2018    CHIEF COMPLAINT: f/u penile urethral carcinoma    ONCOLOGY HPI:   Mr. King Gonsalo Bruno is a 72 year old man with a history of stage II penile urethral carcinoma, treated with cisplatin/gemzar (split on day 1 and 8 given hx of CKD). His first 2 cycles were complicated by dehydration and nausea. He had a positive response and completed 4 cycles in November 2017. He underwent radial penectomy and urethrectomy, perineal urethrostomy and bilateral inguinal node dissection on 3/8/18. Surgical pathology demonstrated moderately differentiated SCC of the penile urethra with extension into the corpus spongiosum and cavernosum of the penile shaft. The specimen was positive for lymphovascular and perineural invasion. Margins were negative. 1/5 L inguinal nodes were positive and 1/7 R inguinal nodes were positive for disease. He was referred for consideration of radiation. At the time of that appointment, he was found to have multiple new pulmonary nodules concerning for metastatic disease. Biopsy was discussed, but after discussion with patient, opted to observe with a PET/CT 6 weeks later. PET/CT on 6/26/18 showed multiple hypermetabolic pulmonary nodules. He was offered palliative intent immunotherapy. He was started on Keytruda on 8/3/18.     He is here today prior to cycle 2.    INTERVAL HISTORY:   Gonsalo presents today with his significant other. He tolerated his first infusion of Keytruda well. He states that his biggest side effect was fatigue and some increased pain in his joints. Even though his fatigue has worsened, he continues to be fairly active and play golf. He is having increased pain in his neck, hands (specificially 2nd finger b/l) and L chest. He has known arthritis in his neck for which he has had PT in the past. He states that before his infusion, his pain in his neck was a 5/10, now his pain in up to a 7/10. He also notices that his  "jaw has been clicking. He is interested in trying acupuncture for the pain. The neuropathy is continuing to improve.     He also notes that he feels like \"everything inside of him felt like it speed up and was going faster but his body was not\".     He has a h/o an esophageal diverticula and he feels like it is a little harder to swallow. He states he needs to go back to his GI doctor.     ROS: Denies HA, visual, hearing or taste changes, dry mouth, mouth sores, N/V, abdominal pain, change in urination, chest pain, SOB, cough, edema, night sweats, fever, chills, mood changes, bleeding      Allergies   Allergen Reactions     Lisinopril Swelling     Oxycodone Nausea and Vomiting     Vicodin [Hydrocodone-Acetaminophen] Nausea and Vomiting       Current Outpatient Prescriptions   Medication     amLODIPine (NORVASC) 10 MG tablet     B Complex Vitamins (VITAMIN B COMPLEX PO)     fluticasone (FLONASE) 50 MCG/ACT spray     LORazepam (ATIVAN) 0.5 MG tablet     LORazepam (ATIVAN) 0.5 MG tablet     omeprazole (PRILOSEC) 2 mg/mL SUSP     acetaminophen (TYLENOL) 325 MG tablet     docusate sodium (COLACE) 100 MG capsule     prochlorperazine (COMPAZINE) 10 MG tablet     No current facility-administered medications for this visit.      Facility-Administered Medications Ordered in Other Visits   Medication     barium sulfate (EZ PAQUE) oral suspension 96%     barium sulfate (EZ-HD) oral suspension 98%     barium sulfate 40% (VARIBAR THIN HONEY) oral suspension     sod bicarbonate-citric acid-simethicone (EZ GAS) 2.21-1.53-0.04 G packet 4 g       EXAM:  /79 (BP Location: Right arm, Patient Position: Sitting, Cuff Size: Adult Regular)  Pulse 73  Temp 97.8  F (36.6  C) (Oral)  Resp 16  Ht 1.93 m (6' 3.98\")  Wt 64.6 kg (142 lb 8 oz)  SpO2 99%  BMI 17.35 kg/m2  Wt Readings from Last 4 Encounters:   08/23/18 64.6 kg (142 lb 8 oz)   08/03/18 65.4 kg (144 lb 1.6 oz)   06/27/18 65.8 kg (145 lb)   05/23/18 65.6 kg (144 lb 11.2 " oz)        General: No acute distress  HEENT: EOMI, PERRLA, no scleral icterus, mucus membranes moist and no lesions or thrush  Lymph: Neck is supple and shows no nodes in cervical or supraclavicular   Heart:  RRR, no murmur, gallop or rub  Lungs: CTA-B, no wheezes, rhonchi or rales  Abdomen: Normal bowel sounds, soft, non tender, not distended, no rebound or guarding, no palpable masses  Extremities: No pitting edema  Skin: No significant rashes or lesions  Neuro: CN II-XII are intact    LABS:   Results for KING AILYN CHAVEZ (MRN [id number]) as of 8/25/2018 21:22   Ref. Range 8/23/2018 12:40   Sodium Latest Ref Range: 133 - 144 mmol/L 134   Potassium Latest Ref Range: 3.4 - 5.3 mmol/L 3.9   Chloride Latest Ref Range: 94 - 109 mmol/L 100   Carbon Dioxide Latest Ref Range: 20 - 32 mmol/L 26   Urea Nitrogen Latest Ref Range: 7 - 30 mg/dL 14   Creatinine Latest Ref Range: 0.66 - 1.25 mg/dL 1.67 (H)   GFR Estimate Latest Ref Range: >60 mL/min/1.7m2 41 (L)   GFR Estimate If Black Latest Ref Range: >60 mL/min/1.7m2 49 (L)   Calcium Latest Ref Range: 8.5 - 10.1 mg/dL 9.6   Anion Gap Latest Ref Range: 3 - 14 mmol/L 8   Magnesium Latest Ref Range: 1.6 - 2.3 mg/dL 2.0   Albumin Latest Ref Range: 3.4 - 5.0 g/dL 3.7   Protein Total Latest Ref Range: 6.8 - 8.8 g/dL 8.5   Bilirubin Total Latest Ref Range: 0.2 - 1.3 mg/dL 0.4   Alkaline Phosphatase Latest Ref Range: 40 - 150 U/L 90   ALT Latest Ref Range: 0 - 70 U/L 16   AST Latest Ref Range: 0 - 45 U/L 24   TSH Latest Ref Range: 0.40 - 4.00 mU/L 0.84   Glucose Latest Ref Range: 70 - 99 mg/dL 88       IMAGING:   No new imaging    IMPRESSION/PLAN:    Recurrent, metastatic SCC of the penile urethra, s/p resection of primary tumor, now recurrent to the lung  -CT on 8/2/18 showed progression of known pulmonary metastases  -started pembrolizumab on a Q21 day schedule. S/p Cycle 1. Tolerated it well with some increased fatigue and arthritis. Arthritis will be difficult to assess for  immunotoxicities going forward since he started with underlying arthritis. Symptoms seem to be a grade 1 now. Will have to continue to monitor closely.  -proceed with cycle 2 today.   -will have another infusion on 9/13 and then a CT and f/u with Dr. Arceo     Fatigue  -most likely due to Keytruda though will get a CBC next time to make sure there is no underlying anemia    FEN:   -Eating/drinking well. Lytes stable  -should be drinking 64 oz of fluid a day  -he has lost a couple a pounds. Continue to monitor. Will have to discuss nutrition supplements if weight continues to go down     CKD:   -Cr stable today, slightly improved     Mood:   -doing better. Treatment was better tolerated which helped     HTN:  -BP stable. On amlodipine 10 mg daily    Kathleen Orosco PA-C  [facility]  [address], [place] [id number] [phone]    The patient was seen in conjunction with Kathleen Orosco PA-C.  I have reviewed the note and agree with the above findings and plan.TW

## 2018-08-23 NOTE — MR AVS SNAPSHOT
After Visit Summary   8/23/2018    King Gonsalo Bruno    MRN: [id number]           Patient Information     Date Of Birth          1945        Visit Information        Provider Department      8/23/2018 1:00 PM Yessica Ovalles APRN CNP [facility]        Today's Diagnoses     Gastroesophageal reflux disease with esophagitis        Hypokalemia        Nausea        Dehydration        Urethral cancer (H)        Malignant neoplasm metastatic to both lungs (H)           Follow-ups after your visit        Your next 10 appointments already scheduled     Sep 13, 2018 12:15 PM CDT   [facility] Lab Draw with  [facility] LAB DRAW   [facility] ([facility])    [address]  [address]  [facility] [address]   [phone]            Sep 13, 2018  1:00 PM CDT   Infusion 60 with UC ONCOLOGY INFUSION, UC 10 ATC   [facility] ([facility])    [address]  [address]  [facility] [address]   [phone]            Sep 26, 2018 10:20 AM CDT   CT CHEST/ABDOMEN/PELVIS W CONTRAST with UCCT2   [facility] ([facility])    [address]  1st [address]  [facility] [address]   [phone]           Please bring any scans or X-rays taken at other hospitals, if similar tests were done. Also bring a list of your medicines, including vitamins, minerals and over-the-counter drugs. It is safest to leave personal items at home.  Be sure to tell your doctor:   If you have any allergies.   If there s any chance you are pregnant.   If you are breastfeeding.  How to prepare:   Do not eat or drink for 2 hours before your exam. If you need to take medicine, you may take it with small sips of water. (We may ask you to take liquid medicine as well.)   Please wear loose clothing, such as a sweat suit or jogging clothes. Avoid snaps, zippers and other metal. We may ask you  to undress and put on a hospital gown.  Please arrive 30 minutes early for your CT. Once in the department you might be asked to drink water 15-20 minutes prior to your exam.  If indicated you may be asked to drink an oral contrast in advance of your CT.  If this is the case, the imaging team will let you know or be in contact with you prior to your appointment  Patients over 70 or patients with diabetes or kidney problems:   If you haven t had a blood test (creatinine test) within the last 30 days, the Cardiologist/Radiologist may require you to get this test prior to your exam.  If you have diabetes:   Continue to take your metformin medication on the day of your exam  If you have any questions, please call the Imaging Department where you will have your exam.            Sep 26, 2018 12:30 PM CDT   (Arrive by 12:15 PM)   Return Visit with Steve Arceo MD   [facility] ([facility])    [address] [place] [address]  [address]  [facility] [address]   [phone]            Oct 04, 2018 12:15 PM CDT   [facility] Lab Draw with [facility] LAB DRAW   [facility]    [address] [place] [address]  [address]  [facility] [address]   [phone]            Oct 04, 2018  1:00 PM CDT   Infusion 60 with UC ONCOLOGY INFUSION, UC 11 ATC   [facility] ([facility])    [address] [place] [address]  [address]  [facility] [address]   [phone]            Oct 25, 2018 12:15 PM CDT   [facility] Lab Draw with [facility] DRAW   [facility]    [address] [place] [address]  [address]  [facility] [address]   [phone]            Oct 25, 2018  1:00 PM CDT   Infusion 60 with UC ONCOLOGY INFUSION   [facility] ([facility])    [address] [place] [address]  [address]  [facility] [address]   [phone]              Who  "to contact     If you have questions or need follow up information about today's clinic visit or your schedule please contact [facility] directly at [phone].  Normal or non-critical lab and imaging results will be communicated to you by MyChart, letter or phone within 4 business days after the clinic has received the results. If you do not hear from us within 7 days, please contact the clinic through MyChart or phone. If you have a critical or abnormal lab result, we will notify you by phone as soon as possible.  Submit refill requests through [facility] or call your pharmacy and they will forward the refill request to us. Please allow 3 business days for your refill to be completed.          Additional Information About Your Visit        Care EveryWhere ID     This is your Care EveryWhere ID. This could be used by other organizations to access your [place] medical records  [id number]        Your Vitals Were     Pulse Temperature Respirations Height Pulse Oximetry BMI (Body Mass Index)    73 97.8  F (36.6  C) (Oral) 16 1.93 m (6' 3.98\") 99% 17.35 kg/m2       Blood Pressure from Last 3 Encounters:   08/23/18 144/79   08/03/18 145/72   06/27/18 136/75    Weight from Last 3 Encounters:   08/23/18 64.6 kg (142 lb 8 oz)   08/03/18 65.4 kg (144 lb 1.6 oz)   06/27/18 65.8 kg (145 lb)              Today, you had the following     No orders found for display         Today's Medication Changes          These changes are accurate as of 8/23/18 11:59 PM.  If you have any questions, ask your nurse or doctor.               These medicines have changed or have updated prescriptions.        Dose/Directions    amLODIPine 10 MG tablet   Commonly known as:  NORVASC   This may have changed:  when to take this   Used for:  Benign essential hypertension        Dose:  10 mg   Take 1 tablet (10 mg) by mouth daily   Quantity:  90 tablet   Refills:  3       fluticasone 50 MCG/ACT spray   Commonly known as:  FLONASE "   This may have changed:  when to take this   Used for:  Urethral cancer (H), Allergic sinusitis        Dose:  2 spray   Spray 2 sprays into both nostrils 2 times daily   Quantity:  2 Bottle   Refills:  11                Primary Care Provider Office Phone # Fax #    Joan Ventura [phone]       [facility] [[email or web address]] [address]  [facility] [address]        Equal Access to Services     JENELLE RED AH: Hadii aad ku hadasho Soomaali, waaxda luqadaha, qaybta kaalmada adeegyada, waxay idiin hayaan adeeg kharash la'aan ah. So [facility] [phone].    ATENCIÓN: Si habla joya, tiene a washburn disposición servicios gratuitos de asistencia lingüística. Llame al [phone].    We comply with applicable federal civil rights laws and [place] laws. We do not discriminate on the basis of race, color, national origin, age, disability, sex, sexual orientation, or gender identity.            Thank you!     Thank you for choosing [facility]  for your care. Our goal is always to provide you with excellent care. Hearing back from our patients is one way we can continue to improve our services. Please take a few minutes to complete the written survey that you may receive in the mail after your visit with us. Thank you!             Your Updated Medication List - Protect others around you: Learn how to safely use, store and throw away your medicines at www.disposemymeds.org.          This list is accurate as of 8/23/18 11:59 PM.  Always use your most recent med list.                   Brand Name Dispense Instructions for use Diagnosis    acetaminophen 325 MG tablet    TYLENOL    150 tablet    Take 2 tablets (650 mg) by mouth every 4 hours as needed for mild pain or fever    Urethral cancer (H)       amLODIPine 10 MG tablet    NORVASC    90 tablet    Take 1 tablet (10 mg) by mouth daily    Benign essential hypertension       docusate sodium 100 MG capsule    COLACE    60 capsule    Take 1 capsule (100  mg) by mouth 2 times daily as needed for constipation    Slow transit constipation       fluticasone 50 MCG/ACT spray    FLONASE    2 Bottle    Spray 2 sprays into both nostrils 2 times daily    Urethral cancer (H), Allergic sinusitis       * LORazepam 0.5 MG tablet    ATIVAN    30 tablet    Take 1 tablet (0.5 mg) by mouth every 8 hours as needed for anxiety    Anxiety       * LORazepam 0.5 MG tablet    ATIVAN    30 tablet    Take 1 tablet (0.5 mg) by mouth every 4 hours as needed (Anxiety, Nausea/Vomiting or Sleep)    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 Read the directions carefully, and ask your doctor or other care provider to review them with you.

## 2018-09-10 RX ORDER — SODIUM CHLORIDE 9 MG/ML
1000 INJECTION, SOLUTION INTRAVENOUS CONTINUOUS PRN
Status: CANCELLED
Start: 2018-09-13

## 2018-09-10 RX ORDER — DIPHENHYDRAMINE HYDROCHLORIDE 50 MG/ML
50 INJECTION INTRAMUSCULAR; INTRAVENOUS
Status: CANCELLED
Start: 2018-09-13

## 2018-09-10 RX ORDER — EPINEPHRINE 0.3 MG/.3ML
0.3 INJECTION SUBCUTANEOUS EVERY 5 MIN PRN
Status: CANCELLED | OUTPATIENT
Start: 2018-09-13

## 2018-09-10 RX ORDER — ALBUTEROL SULFATE 90 UG/1
1-2 AEROSOL, METERED RESPIRATORY (INHALATION)
Status: CANCELLED
Start: 2018-09-13

## 2018-09-10 RX ORDER — MEPERIDINE HYDROCHLORIDE 25 MG/ML
25 INJECTION INTRAMUSCULAR; INTRAVENOUS; SUBCUTANEOUS EVERY 30 MIN PRN
Status: CANCELLED | OUTPATIENT
Start: 2018-09-13

## 2018-09-10 RX ORDER — ALBUTEROL SULFATE 0.83 MG/ML
2.5 SOLUTION RESPIRATORY (INHALATION)
Status: CANCELLED | OUTPATIENT
Start: 2018-09-13

## 2018-09-10 RX ORDER — EPINEPHRINE 1 MG/ML
0.3 INJECTION, SOLUTION INTRAMUSCULAR; SUBCUTANEOUS EVERY 5 MIN PRN
Status: CANCELLED | OUTPATIENT
Start: 2018-09-13

## 2018-09-10 RX ORDER — METHYLPREDNISOLONE SODIUM SUCCINATE 125 MG/2ML
125 INJECTION, POWDER, LYOPHILIZED, FOR SOLUTION INTRAMUSCULAR; INTRAVENOUS
Status: CANCELLED
Start: 2018-09-13

## 2018-09-10 RX ORDER — LORAZEPAM 2 MG/ML
0.5 INJECTION INTRAMUSCULAR EVERY 4 HOURS PRN
Status: CANCELLED
Start: 2018-09-13

## 2018-09-13 ENCOUNTER — INFUSION THERAPY VISIT (OUTPATIENT)
Dept: ONCOLOGY | Facility: CLINIC | Age: 73
End: 2018-09-13
Attending: INTERNAL MEDICINE
Payer: MEDICARE

## 2018-09-13 ENCOUNTER — APPOINTMENT (OUTPATIENT)
Dept: LAB | Facility: CLINIC | Age: 73
End: 2018-09-13
Attending: INTERNAL MEDICINE
Payer: MEDICARE

## 2018-09-13 VITALS
OXYGEN SATURATION: 97 % | HEART RATE: 78 BPM | WEIGHT: 140 LBS | HEIGHT: 76 IN | SYSTOLIC BLOOD PRESSURE: 143 MMHG | TEMPERATURE: 98.5 F | DIASTOLIC BLOOD PRESSURE: 73 MMHG | BODY MASS INDEX: 17.05 KG/M2

## 2018-09-13 DIAGNOSIS — C78.00 MALIGNANT NEOPLASM METASTATIC TO LUNG, UNSPECIFIED LATERALITY (H): ICD-10-CM

## 2018-09-13 DIAGNOSIS — C78.01 MALIGNANT NEOPLASM METASTATIC TO BOTH LUNGS (H): Primary | ICD-10-CM

## 2018-09-13 DIAGNOSIS — C78.02 MALIGNANT NEOPLASM METASTATIC TO BOTH LUNGS (H): Primary | ICD-10-CM

## 2018-09-13 DIAGNOSIS — C68.0 URETHRAL CANCER (H): ICD-10-CM

## 2018-09-13 LAB
ALBUMIN SERPL-MCNC: 3.6 G/DL (ref 3.4–5)
ALP SERPL-CCNC: 81 U/L (ref 40–150)
ALT SERPL W P-5'-P-CCNC: 17 U/L (ref 0–70)
ANION GAP SERPL CALCULATED.3IONS-SCNC: 9 MMOL/L (ref 3–14)
AST SERPL W P-5'-P-CCNC: 27 U/L (ref 0–45)
BASOPHILS # BLD AUTO: 0 10E9/L (ref 0–0.2)
BASOPHILS NFR BLD AUTO: 0.4 %
BILIRUB SERPL-MCNC: 0.4 MG/DL (ref 0.2–1.3)
BUN SERPL-MCNC: 20 MG/DL (ref 7–30)
CALCIUM SERPL-MCNC: 10 MG/DL (ref 8.5–10.1)
CHLORIDE SERPL-SCNC: 101 MMOL/L (ref 94–109)
CO2 SERPL-SCNC: 24 MMOL/L (ref 20–32)
CREAT SERPL-MCNC: 1.87 MG/DL (ref 0.66–1.25)
DIFFERENTIAL METHOD BLD: ABNORMAL
EOSINOPHIL # BLD AUTO: 0.1 10E9/L (ref 0–0.7)
EOSINOPHIL NFR BLD AUTO: 0.9 %
ERYTHROCYTE [DISTWIDTH] IN BLOOD BY AUTOMATED COUNT: 14.4 % (ref 10–15)
GFR SERPL CREATININE-BSD FRML MDRD: 36 ML/MIN/1.7M2
GLUCOSE SERPL-MCNC: 83 MG/DL (ref 70–99)
HCT VFR BLD AUTO: 31.1 % (ref 40–53)
HGB BLD-MCNC: 10.1 G/DL (ref 13.3–17.7)
IMM GRANULOCYTES # BLD: 0 10E9/L (ref 0–0.4)
IMM GRANULOCYTES NFR BLD: 0.1 %
LYMPHOCYTES # BLD AUTO: 2.3 10E9/L (ref 0.8–5.3)
LYMPHOCYTES NFR BLD AUTO: 32.3 %
MCH RBC QN AUTO: 27.3 PG (ref 26.5–33)
MCHC RBC AUTO-ENTMCNC: 32.5 G/DL (ref 31.5–36.5)
MCV RBC AUTO: 84 FL (ref 78–100)
MONOCYTES # BLD AUTO: 0.6 10E9/L (ref 0–1.3)
MONOCYTES NFR BLD AUTO: 8.7 %
NEUTROPHILS # BLD AUTO: 4.1 10E9/L (ref 1.6–8.3)
NEUTROPHILS NFR BLD AUTO: 57.6 %
NRBC # BLD AUTO: 0 10*3/UL
NRBC BLD AUTO-RTO: 0 /100
PLATELET # BLD AUTO: 287 10E9/L (ref 150–450)
POTASSIUM SERPL-SCNC: 4 MMOL/L (ref 3.4–5.3)
PROT SERPL-MCNC: 8.2 G/DL (ref 6.8–8.8)
RBC # BLD AUTO: 3.7 10E12/L (ref 4.4–5.9)
SODIUM SERPL-SCNC: 134 MMOL/L (ref 133–144)
TSH SERPL DL<=0.005 MIU/L-ACNC: 0.9 MU/L (ref 0.4–4)
WBC # BLD AUTO: 7.1 10E9/L (ref 4–11)

## 2018-09-13 PROCEDURE — 85025 COMPLETE CBC W/AUTO DIFF WBC: CPT | Performed by: NURSE PRACTITIONER

## 2018-09-13 PROCEDURE — 96413 CHEMO IV INFUSION 1 HR: CPT

## 2018-09-13 PROCEDURE — 40000141 ZZH STATISTIC PERIPHERAL IV START W/O US GUIDANCE: Mod: ZF

## 2018-09-13 PROCEDURE — 25000128 H RX IP 250 OP 636: Mod: ZF | Performed by: INTERNAL MEDICINE

## 2018-09-13 PROCEDURE — 84443 ASSAY THYROID STIM HORMONE: CPT | Performed by: INTERNAL MEDICINE

## 2018-09-13 PROCEDURE — 80053 COMPREHEN METABOLIC PANEL: CPT | Performed by: INTERNAL MEDICINE

## 2018-09-13 RX ADMIN — SODIUM CHLORIDE 200 MG: 9 INJECTION, SOLUTION INTRAVENOUS at 14:20

## 2018-09-13 RX ADMIN — SODIUM CHLORIDE 250 ML: 9 INJECTION, SOLUTION INTRAVENOUS at 14:18

## 2018-09-13 ASSESSMENT — PAIN SCALES - GENERAL: PAINLEVEL: MODERATE PAIN (5)

## 2018-09-13 NOTE — PROGRESS NOTES
Infusion Nursing Note:  King Gonsalo Bruno presents today for cycle 3, day 1 Pembrolizumab.    Patient seen by provider today: No   present during visit today: Not Applicable.    Note: Patient arrived to infusion center with complaints of fatigue, exertional dyspnea, weakness, sore joints, occasional constipation with last BM 9/12, improving numbness and tingling to bilateral feet. Denies complaints of dysgeusia, nausea, dizziness, and diarrhea at this time. Hx of radical penectomy and perineal urethrosomy.     Intravenous Access:  Peripheral IV placed.    Treatment Conditions:  Lab Results   Component Value Date    HGB 10.1 09/13/2018     Lab Results   Component Value Date    WBC 7.1 09/13/2018      Lab Results   Component Value Date    ANEU 4.1 09/13/2018     Lab Results   Component Value Date     09/13/2018      Lab Results   Component Value Date     09/13/2018                   Lab Results   Component Value Date    POTASSIUM 4.0 09/13/2018            Lab Results   Component Value Date    CR 1.87 09/13/2018                   Lab Results   Component Value Date    SAADIA 10.0 09/13/2018                Lab Results   Component Value Date    BILITOTAL 0.4 09/13/2018           Lab Results   Component Value Date    ALBUMIN 3.6 09/13/2018                    Lab Results   Component Value Date    ALT 17 09/13/2018           Lab Results   Component Value Date    AST 27 09/13/2018     Results reviewed, labs MET treatment parameters, ok to proceed with treatment.    Post Infusion Assessment:  Patient tolerated infusion without incident.  Blood return noted pre and post infusion.  Site patent and intact, free from redness, edema or discomfort.  No evidence of extravasations.  Access discontinued per protocol.    Discharge Plan:   Patient declined prescription refills.  Discharge instructions reviewed with: Patient and Family.  Patient and/or family verbalized understanding of discharge instructions and all  questions answered.  Copy of AVS reviewed with patient and/or family.  Patient will return 10/4/2018 for next appointment.  Patient discharged in stable condition accompanied by: self.  Departure Mode: Ambulatory.    Felice Rossi RN

## 2018-09-13 NOTE — MR AVS SNAPSHOT
After Visit Summary   9/13/2018    King Gonsalo Bruno    MRN: [id number]           Patient Information     Date Of Birth          1945        Visit Information        Provider Department      9/13/2018 1:00 PM  10 ATC;  ONCOLOGY INFUSION [facility]        Today's Diagnoses     Malignant neoplasm metastatic to both lungs (H)    -  1    Urethral cancer (H)        Malignant neoplasm metastatic to lung, unspecified laterality (H)          Care Instructions    Contact Numbers    [facility] [place]: [phone]  [facility] [place] and after hours / weekends / holidays:  [phone]      Please call the triage or after hours line if you experience a temperature greater than or equal to 100.5, shaking chills, have uncontrolled nausea, vomiting and/or diarrhea, dizziness, shortness of breath, chest pain, bleeding, unexplained bruising, or if you have any other new/concerning symptoms, questions or concerns.      If you are having any concerning symptoms or wish to speak to a provider before your next infusion visit, please call your care coordinator or triage to notify them so we can adequately serve you.     If you need a refill on a narcotic prescription or other medication, please call before your infusion appointment.           September 2018 Sunday Monday Tuesday Wednesday Thursday Friday Saturday                                 1       2     3     4     5     6     7     8       9     10     11     12     13     [facility] LAB DRAW   12:15 PM   (15 min.)   [facility] LAB DRAW   [facility] Lab Draw     [facility] ONC INFUSION 60    1:00 PM   (60 min.)    ONCOLOGY INFUSION   [facility] 14     15       16     17     18     19     TREATMENT 45    9:45 AM   (45 min.)   Anca, PEDRO Gaxiola   [facility] 20     21     22       23     24     25     26     CT CHEST/ABDOMEN/PELVIS W   10:20 AM   (20 min.)   UCCT2   [facility] CT     [facility] RETURN   12:15 PM    (30 min.)   Steve Arceo MD   [facility] 27 28 29 30 October 2018 Sunday Monday Tuesday Wednesday Thursday Friday Saturday        1     2     3     4     [facility] LAB DRAW   12:15 PM   (15 min.)    [facility] LAB DRAW   [facility] Lab Draw     [facility] ONC INFUSION 60    1:00 PM   (60 min.)    ONCOLOGY INFUSION   [facility] 5     6       7     8     9     10     11     12     13       14     15     16     17     18     19     20       21     22     23     24     25     [facility] LAB DRAW   12:15 PM   (15 min.)    [facility] LAB DRAW   [facility]     [facility] ONC INFUSION 60    1:00 PM   (60 min.)    ONCOLOGY INFUSION   [facility] 26     27       28     29     30     31                                 Lab Results:  Recent Results (from the past 12 hour(s))   Comprehensive metabolic panel    Collection Time: 09/13/18 12:31 PM   Result Value Ref Range    Sodium 134 133 - 144 mmol/L    Potassium 4.0 3.4 - 5.3 mmol/L    Chloride 101 94 - 109 mmol/L    Carbon Dioxide 24 20 - 32 mmol/L    Anion Gap 9 3 - 14 mmol/L    Glucose 83 70 - 99 mg/dL    Urea Nitrogen 20 7 - 30 mg/dL    Creatinine 1.87 (H) 0.66 - 1.25 mg/dL    GFR Estimate 36 (L) >60 mL/min/1.7m2    GFR Estimate If Black 43 (L) >60 mL/min/1.7m2    Calcium 10.0 8.5 - 10.1 mg/dL    Bilirubin Total 0.4 0.2 - 1.3 mg/dL    Albumin 3.6 3.4 - 5.0 g/dL    Protein Total 8.2 6.8 - 8.8 g/dL    Alkaline Phosphatase 81 40 - 150 U/L    ALT 17 0 - 70 U/L    AST 27 0 - 45 U/L   TSH with free T4 reflex    Collection Time: 09/13/18 12:31 PM   Result Value Ref Range    TSH 0.90 0.40 - 4.00 mU/L   CBC with platelets differential    Collection Time: 09/13/18 12:31 PM   Result Value Ref Range    WBC 7.1 4.0 - 11.0 10e9/L    RBC Count 3.70 (L) 4.4 - 5.9 10e12/L    Hemoglobin 10.1 (L) 13.3 - 17.7 g/dL    Hematocrit 31.1 (L) 40.0 - 53.0 %    MCV  84 78 - 100 fl    MCH 27.3 26.5 - 33.0 pg    MCHC 32.5 31.5 - 36.5 g/dL    RDW 14.4 10.0 - 15.0 %    Platelet Count 287 150 - 450 10e9/L    Diff Method Automated Method     % Neutrophils 57.6 %    % Lymphocytes 32.3 %    % Monocytes 8.7 %    % Eosinophils 0.9 %    % Basophils 0.4 %    % Immature Granulocytes 0.1 %    Nucleated RBCs 0 0 /100    Absolute Neutrophil 4.1 1.6 - 8.3 10e9/L    Absolute Lymphocytes 2.3 0.8 - 5.3 10e9/L    Absolute Monocytes 0.6 0.0 - 1.3 10e9/L    Absolute Eosinophils 0.1 0.0 - 0.7 10e9/L    Absolute Basophils 0.0 0.0 - 0.2 10e9/L    Abs Immature Granulocytes 0.0 0 - 0.4 10e9/L    Absolute Nucleated RBC 0.0                Follow-ups after your visit        Your next 10 appointments already scheduled     Sep 19, 2018  9:45 AM CDT   Treatment 45 with PEDRO Moffett   [facility] ([facility])    [address] [place] [address]  4th [facility] [id number] [phone]            Sep 26, 2018 10:20 AM CDT   CT CHEST/ABDOMEN/PELVIS W CONTRAST with UCCT2   [facility] CT ([facility])    [address] [place] [id number] [phone]           How do I prepare for my exam? (Food and drink instructions) To prepare: Do not eat or drink for 2 hours before your exam. If you need to take medicine, you may take it with small sips of water. (We may ask you to take liquid medicine as well.)  How do I prepare for my exam? (Other instructions) Please arrive 30 minutes early for your CT.  Once in the department you might be asked to drink water 15-20 minutes prior to your exam.  If indicated you may be asked to drink an oral contrast in advance of your CT.  If this is the case, the imaging team will let you know or be in contact with you prior to your appointment  Patients over 70 or patients with diabetes or kidney problems: If you haven t had a blood test (creatinine test) within the last 30 days, the  Cardiologist/Radiologist may require you to get this test prior to your exam.  If you have diabetes:  Continue to take your metformin medication on the day of your exam  What should I wear: Please wear loose clothing, such as a sweat suit or jogging clothes. Avoid snaps, zippers and other metal. We may ask you to undress and put on a hospital gown.  How long does the exam take: Most scans take less than 20 minutes.  What should I bring: Please bring any scans or X-rays taken at other hospitals, if similar tests were done. Also bring a list of your medicines, including vitamins, minerals and over-the-counter drugs. It is safest to leave personal items at home.  Do I need a : No  is needed.  What do I need to tell my doctor? Be sure to tell your doctor: * If you have any allergies. * If there s any chance you are pregnant. * If you are breastfeeding.  What should I do after the exam: No restrictions, You may resume normal activities.  What is this test: A CT (computed tomography) scan is a series of pictures that allows us to look inside your body. The scanner creates images of the body in cross sections, much like slices of bread. This helps us see any problems more clearly. You may receive contrast (X-ray dye) before or during your scan. You will be asked to drink the contrast.  Who should I call with questions: If you have any questions, please call the Imaging Department where you will have your exam. Directions, parking instructions, and other information is available on our website, [email or web address].            Sep 26, 2018 12:30 PM CDT   (Arrive by 12:15 PM)   Return Visit with Steve Arceo MD   [facility] ([facility])    [address]  [address]  [facility] [address] [phone]            Oct 04, 2018 12:15 PM CDT   [facility] Lab Draw with  [facility] LAB DRAW   [facility] Lab Draw ([facility])     "[address]  [address]  [facility] [address]   [phone]            Oct 04, 2018  1:00 PM CDT   Infusion 60 with UC ONCOLOGY INFUSION, UC 11 ATC   [facility] ([facility])    [address]  [address]  [facility] [address]   [phone]            Oct 25, 2018 12:15 PM CDT   [facility] Lab Draw with UC [facility] LAB DRAW   [facility] Lab Draw ([facility])    [address] [place] [address]  [address]  [facility] [address]   [phone]            Oct 25, 2018  1:00 PM CDT   Infusion 60 with UC ONCOLOGY INFUSION, UC 25 ATC   [facility] ([facility])    [address] [place] [address]  [address]  [facility] [address] [phone]              Who to contact     If you have questions or need follow up information about today's clinic visit or your schedule please contact [facility] directly at [phone].  Normal or non-critical lab and imaging results will be communicated to you by MyChart, letter or phone within 4 business days after the clinic has received the results. If you do not hear from us within 7 days, please contact the clinic through MyChart or phone. If you have a critical or abnormal lab result, we will notify you by phone as soon as possible.  Submit refill requests through [employer] or call your pharmacy and they will forward the refill request to us. Please allow 3 business days for your refill to be completed.          Additional Information About Your Visit        Care EveryWhere ID     This is your Care EveryWhere ID. This could be used by other organizations to access your [facility] medical records  [id number]        Your Vitals Were     Pulse Temperature Height Pulse Oximetry BMI (Body Mass Index)       78 98.5  F (36.9  C) (Oral) 1.93 m (6' 3.98\") 97% 17.05 kg/m2        Blood Pressure from Last 3 Encounters:   09/13/18 143/73   08/23/18 144/79 "   08/03/18 145/72    Weight from Last 3 Encounters:   09/13/18 63.5 kg (140 lb)   08/23/18 64.6 kg (142 lb 8 oz)   08/03/18 65.4 kg (144 lb 1.6 oz)              We Performed the Following     CBC with platelets differential     Comprehensive metabolic panel     TSH with free T4 reflex          Today's Medication Changes          These changes are accurate as of 9/13/18  3:06 PM.  If you have any questions, ask your nurse or doctor.               These medicines have changed or have updated prescriptions.        Dose/Directions    amLODIPine 10 MG tablet   Commonly known as:  NORVASC   This may have changed:  when to take this   Used for:  Benign essential hypertension        Dose:  10 mg   Take 1 tablet (10 mg) by mouth daily   Quantity:  90 tablet   Refills:  3       fluticasone 50 MCG/ACT spray   Commonly known as:  FLONASE   This may have changed:  when to take this   Used for:  Urethral cancer (H), Allergic sinusitis        Dose:  2 spray   Spray 2 sprays into both nostrils 2 times daily   Quantity:  2 Bottle   Refills:  11                Primary Care Provider Office Phone # Fax #    Joan Janet Ventura [phone]       [facility] [address] [address]        Equal Access to Services     JENELLE RED AH: Tiffanie cui Soveronica, wacarlosda owen, qaybta kaalmada aderonnda, ronald rasmussen. So [facility] [phone].    ATENCIÓN: Si habla español, tiene a washburn disposición servicios gratuitos de asistencia lingüística. Juwan al [phone].    We comply with applicable federal civil rights laws and [place] laws. We do not discriminate on the basis of race, color, national origin, age, disability, sex, sexual orientation, or gender identity.            Thank you!     Thank you for choosing [facility]  for your care. Our goal is always to provide you with excellent care. Hearing back from our patients is one way we can continue to  improve our services. Please take a few minutes to complete the written survey that you may receive in the mail after your visit with us. Thank you!             Your Updated Medication List - Protect others around you: Learn how to safely use, store and throw away your medicines at www.disposemymeds.org.          This list is accurate as of 9/13/18  3:06 PM.  Always use your most recent med list.                   Brand Name Dispense Instructions for use Diagnosis    acetaminophen 325 MG tablet    TYLENOL    150 tablet    Take 2 tablets (650 mg) by mouth every 4 hours as needed for mild pain or fever    Urethral cancer (H)       amLODIPine 10 MG tablet    NORVASC    90 tablet    Take 1 tablet (10 mg) by mouth daily    Benign essential hypertension       docusate sodium 100 MG capsule    COLACE    60 capsule    Take 1 capsule (100 mg) by mouth 2 times daily as needed for constipation    Slow transit constipation       fluticasone 50 MCG/ACT spray    FLONASE    2 Bottle    Spray 2 sprays into both nostrils 2 times daily    Urethral cancer (H), Allergic sinusitis       * LORazepam 0.5 MG tablet    ATIVAN    30 tablet    Take 1 tablet (0.5 mg) by mouth every 8 hours as needed for anxiety    Anxiety       * LORazepam 0.5 MG tablet    ATIVAN    30 tablet    Take 1 tablet (0.5 mg) by mouth every 4 hours as needed (Anxiety, Nausea/Vomiting or Sleep)    Malignant neoplasm metastatic to both lungs (H), Urethral cancer (H)       omeprazole 2 mg/mL Susp    priLOSEC    280 mL    Take 10 mLs (20 mg) by mouth 2 times daily    Gastroesophageal reflux disease with esophagitis, Urethral cancer (H)       prochlorperazine 10 MG tablet    COMPAZINE    30 tablet    Take 0.5 tablets (5 mg) by mouth every 6 hours as needed (Nausea/Vomiting)    Malignant neoplasm metastatic to both lungs (H), Urethral cancer (H)       VITAMIN B COMPLEX PO      Take by mouth daily (with lunch)        * Notice:  This list has 2 medication(s) that are the same  as other medications prescribed for you. Read the directions carefully, and ask your doctor or other care provider to review them with you.

## 2018-09-13 NOTE — PATIENT INSTRUCTIONS
Contact Numbers    [facility] Main [place]: [phone]  [facility] Triage and after hours / weekends / holidays:  [phone]      Please call the triage or after hours line if you experience a temperature greater than or equal to 100.5, shaking chills, have uncontrolled nausea, vomiting and/or diarrhea, dizziness, shortness of breath, chest pain, bleeding, unexplained bruising, or if you have any other new/concerning symptoms, questions or concerns.      If you are having any concerning symptoms or wish to speak to a provider before your next infusion visit, please call your care coordinator or triage to notify them so we can adequately serve you.     If you need a refill on a narcotic prescription or other medication, please call before your infusion appointment.           September 2018 Sunday Monday Tuesday Wednesday Thursday Friday Saturday                                 1       2     3     4     5     6     7     8       9     10     11     12     13     [facility] LAB DRAW   12:15 PM   (15 min.)    [facility] LAB DRAW   [facility] Lab Draw     UMP ONC INFUSION 60    1:00 PM   (60 min.)    ONCOLOGY INFUSION   [facility] 14     15       16     17     18     19     TREATMENT 45    9:45 AM   (45 min.)   Khalida March SLP   [facility] 20     21     22       23     24     25     26     CT CHEST/ABDOMEN/PELVIS W   10:20 AM   (20 min.)   UCCT2   [facility] CT     UMP RETURN   12:15 PM   (30 min.)   Steve Arceo MD   [facility] 27     28     29       30                                              October 2018 Sunday Monday Tuesday Wednesday Thursday Friday Saturday        1     2     3     4     [facility] LAB DRAW   12:15 PM   (15 min.)    [facility] LAB DRAW   [facility] Lab Draw     [facility] ONC INFUSION 60    1:00 PM   (60 min.)    ONCOLOGY INFUSION   [facility] 5     6       7     8     9     10     11     12     13        14     15     16     17     18     19     20       21     22     23     24     25     [facility] LAB DRAW   12:15 PM   (15 min.)   [facility] LAB DRAW   [facility] Lab Draw     [facility] ONC INFUSION 60    1:00 PM   (60 min.)    ONCOLOGY INFUSION   [facility] 26     27       28     29     30     31                                 Lab Results:  Recent Results (from the past 12 hour(s))   Comprehensive metabolic panel    Collection Time: 09/13/18 12:31 PM   Result Value Ref Range    Sodium 134 133 - 144 mmol/L    Potassium 4.0 3.4 - 5.3 mmol/L    Chloride 101 94 - 109 mmol/L    Carbon Dioxide 24 20 - 32 mmol/L    Anion Gap 9 3 - 14 mmol/L    Glucose 83 70 - 99 mg/dL    Urea Nitrogen 20 7 - 30 mg/dL    Creatinine 1.87 (H) 0.66 - 1.25 mg/dL    GFR Estimate 36 (L) >60 mL/min/1.7m2    GFR Estimate If Black 43 (L) >60 mL/min/1.7m2    Calcium 10.0 8.5 - 10.1 mg/dL    Bilirubin Total 0.4 0.2 - 1.3 mg/dL    Albumin 3.6 3.4 - 5.0 g/dL    Protein Total 8.2 6.8 - 8.8 g/dL    Alkaline Phosphatase 81 40 - 150 U/L    ALT 17 0 - 70 U/L    AST 27 0 - 45 U/L   TSH with free T4 reflex    Collection Time: 09/13/18 12:31 PM   Result Value Ref Range    TSH 0.90 0.40 - 4.00 mU/L   CBC with platelets differential    Collection Time: 09/13/18 12:31 PM   Result Value Ref Range    WBC 7.1 4.0 - 11.0 10e9/L    RBC Count 3.70 (L) 4.4 - 5.9 10e12/L    Hemoglobin 10.1 (L) 13.3 - 17.7 g/dL    Hematocrit 31.1 (L) 40.0 - 53.0 %    MCV 84 78 - 100 fl    MCH 27.3 26.5 - 33.0 pg    MCHC 32.5 31.5 - 36.5 g/dL    RDW 14.4 10.0 - 15.0 %    Platelet Count 287 150 - 450 10e9/L    Diff Method Automated Method     % Neutrophils 57.6 %    % Lymphocytes 32.3 %    % Monocytes 8.7 %    % Eosinophils 0.9 %    % Basophils 0.4 %    % Immature Granulocytes 0.1 %    Nucleated RBCs 0 0 /100    Absolute Neutrophil 4.1 1.6 - 8.3 10e9/L    Absolute Lymphocytes 2.3 0.8 - 5.3 10e9/L    Absolute Monocytes 0.6 0.0 - 1.3 10e9/L    Absolute Eosinophils  0.1 0.0 - 0.7 10e9/L    Absolute Basophils 0.0 0.0 - 0.2 10e9/L    Abs Immature Granulocytes 0.0 0 - 0.4 10e9/L    Absolute Nucleated RBC 0.0

## 2018-09-13 NOTE — NURSING NOTE
Chief Complaint   Patient presents with     Blood Draw     IV place by vescular access, vitals completed and checked in for infusion      Kat Braun MA

## 2018-09-26 ENCOUNTER — RADIANT APPOINTMENT (OUTPATIENT)
Dept: CT IMAGING | Facility: CLINIC | Age: 73
End: 2018-09-26
Attending: NURSE PRACTITIONER
Payer: COMMERCIAL

## 2018-09-26 ENCOUNTER — ONCOLOGY VISIT (OUTPATIENT)
Dept: ONCOLOGY | Facility: CLINIC | Age: 73
End: 2018-09-26
Attending: INTERNAL MEDICINE
Payer: COMMERCIAL

## 2018-09-26 VITALS
DIASTOLIC BLOOD PRESSURE: 73 MMHG | HEART RATE: 77 BPM | RESPIRATION RATE: 18 BRPM | OXYGEN SATURATION: 100 % | SYSTOLIC BLOOD PRESSURE: 133 MMHG | WEIGHT: 142.86 LBS | TEMPERATURE: 97.7 F | BODY MASS INDEX: 17.4 KG/M2

## 2018-09-26 DIAGNOSIS — C78.00 MALIGNANT NEOPLASM METASTATIC TO LUNG, UNSPECIFIED LATERALITY (H): ICD-10-CM

## 2018-09-26 DIAGNOSIS — C68.0 URETHRAL CANCER (H): ICD-10-CM

## 2018-09-26 DIAGNOSIS — C78.02 MALIGNANT NEOPLASM METASTATIC TO BOTH LUNGS (H): Primary | ICD-10-CM

## 2018-09-26 DIAGNOSIS — C78.01 MALIGNANT NEOPLASM METASTATIC TO BOTH LUNGS (H): Primary | ICD-10-CM

## 2018-09-26 PROCEDURE — 99214 OFFICE O/P EST MOD 30 MIN: CPT | Mod: ZP | Performed by: INTERNAL MEDICINE

## 2018-09-26 PROCEDURE — G0463 HOSPITAL OUTPT CLINIC VISIT: HCPCS | Mod: ZF

## 2018-09-26 RX ORDER — EPINEPHRINE 0.3 MG/.3ML
0.3 INJECTION SUBCUTANEOUS EVERY 5 MIN PRN
Status: CANCELLED | OUTPATIENT
Start: 2018-10-04

## 2018-09-26 RX ORDER — DIPHENHYDRAMINE HYDROCHLORIDE 50 MG/ML
50 INJECTION INTRAMUSCULAR; INTRAVENOUS
Status: CANCELLED
Start: 2018-10-04

## 2018-09-26 RX ORDER — ALBUTEROL SULFATE 90 UG/1
1-2 AEROSOL, METERED RESPIRATORY (INHALATION)
Status: CANCELLED
Start: 2018-10-04

## 2018-09-26 RX ORDER — LORAZEPAM 2 MG/ML
0.5 INJECTION INTRAMUSCULAR EVERY 4 HOURS PRN
Status: CANCELLED
Start: 2018-10-04

## 2018-09-26 RX ORDER — MEPERIDINE HYDROCHLORIDE 25 MG/ML
25 INJECTION INTRAMUSCULAR; INTRAVENOUS; SUBCUTANEOUS EVERY 30 MIN PRN
Status: CANCELLED | OUTPATIENT
Start: 2018-10-04

## 2018-09-26 RX ORDER — METHYLPREDNISOLONE SODIUM SUCCINATE 125 MG/2ML
125 INJECTION, POWDER, LYOPHILIZED, FOR SOLUTION INTRAMUSCULAR; INTRAVENOUS
Status: CANCELLED
Start: 2018-10-04

## 2018-09-26 RX ORDER — EPINEPHRINE 1 MG/ML
0.3 INJECTION, SOLUTION INTRAMUSCULAR; SUBCUTANEOUS EVERY 5 MIN PRN
Status: CANCELLED | OUTPATIENT
Start: 2018-10-04

## 2018-09-26 RX ORDER — ALBUTEROL SULFATE 0.83 MG/ML
2.5 SOLUTION RESPIRATORY (INHALATION)
Status: CANCELLED | OUTPATIENT
Start: 2018-10-04

## 2018-09-26 RX ORDER — IOPAMIDOL 755 MG/ML
86 INJECTION, SOLUTION INTRAVASCULAR ONCE
Status: COMPLETED | OUTPATIENT
Start: 2018-09-26 | End: 2018-09-26

## 2018-09-26 RX ORDER — SODIUM CHLORIDE 9 MG/ML
1000 INJECTION, SOLUTION INTRAVENOUS CONTINUOUS PRN
Status: CANCELLED
Start: 2018-10-04

## 2018-09-26 RX ADMIN — IOPAMIDOL 86 ML: 755 INJECTION, SOLUTION INTRAVASCULAR at 10:57

## 2018-09-26 ASSESSMENT — PAIN SCALES - GENERAL: PAINLEVEL: MODERATE PAIN (5)

## 2018-09-26 NOTE — DISCHARGE INSTRUCTIONS

## 2018-09-26 NOTE — MR AVS SNAPSHOT
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)    -  1    Urethral cancer (H)           Follow-ups after your visit        Your next 10 appointments already scheduled     Oct 04, 2018 12:15 PM CDT   [facility] Draw with [facility] LAB DRAW   [facility] ([facility]    [address]  [address]  [facility] [address]   [phone]            Oct 04, 2018  1:00 PM CDT   Infusion 60 with UC ONCOLOGY INFUSION, UC 11 ATC   [facility] ([facility])    [address] [place] [address]  [address]  [facility] [address]   [phone]            Oct 09, 2018 10:45 AM CDT   Treatment 45 with PEDRO Moffett   [facility] ([facility])    [address] [place] [address]  4th Floor  [facility] [address]   [phone]            Oct 25, 2018 12:15 PM CDT   [facility] Lab Draw with UC [facility] LAB DRAW   [facility] Lab Draw ([facility])    [address] [place] [address]  [address]  [facility] [address]   [phone]            Oct 25, 2018  1:00 PM CDT   Infusion 60 with UC ONCOLOGY INFUSION, UC 25 ATC   [facility] ([facility])    [address] [place] [address]  [address]  [facility] [address]   [phone]            Nov 15, 2018 11:45 AM CST   [facility] Lab Draw with UC [facility] LAB DRAW   [facility] Lab Draw ([facility])    [address] [place] [address]  [address]  [facility] [address]   [phone]            Nov 15, 2018 12:10 PM CST   (Arrive by 11:55 AM)   Return Visit with ISAURA Roach CNP   [facility]  [facility] ([facility])    [address]  [address]  [facility] [address] [phone]            Nov 15, 2018  1:00 PM CST   Infusion 60 with UC ONCOLOGY INFUSION   [facility] ([facility])    [address]  [address]  [facility] [address] [phone]              Future tests that were ordered for you today     Open Future Orders        Priority Expected Expires Ordered    CT Chest Abdomen Pelvis w/o Contrast Routine 9/26/2018 11/25/2018 9/26/2018            Who to contact     If you have questions or need follow up information about today's clinic visit or your schedule please contact [facility] directly at [phone].  Normal or non-critical lab and imaging results will be communicated to you by MyChart, letter or phone within 4 business days after the clinic has received the results. If you do not hear from us within 7 days, please contact the clinic through MyChart or phone. If you have a critical or abnormal lab result, we will notify you by phone as soon as possible.  Submit refill requests through [employer] or call your pharmacy and they will forward the refill request to us. Please allow 3 business days for your refill to be completed.          Additional Information About Your Visit        Care EveryWhere ID     This is your Care EveryWhere ID. This could be used by other organizations to access your [facility] medical records  [id number]        Your Vitals Were     Pulse Temperature Respirations Pulse Oximetry BMI (Body Mass Index)       77 97.7  F (36.5  C) (Oral) 18 100% 17.4 kg/m2        Blood Pressure from Last 3 Encounters:   09/26/18 133/73   09/13/18 143/73   08/23/18 144/79    Weight from Last 3 Encounters:   09/26/18 64.8 kg (142 lb 13.7 oz)   09/13/18 63.5 kg (140 lb)   08/23/18 64.6 kg (142 lb 8 oz)                 Today's Medication Changes          These changes  are accurate as of 9/26/18  1:57 PM.  If you have any questions, ask your nurse or doctor.               These medicines have changed or have updated prescriptions.        Dose/Directions    amLODIPine 10 MG tablet   Commonly known as:  NORVASC   This may have changed:  when to take this   Used for:  Benign essential hypertension        Dose:  10 mg   Take 1 tablet (10 mg) by mouth daily   Quantity:  90 tablet   Refills:  3       fluticasone 50 MCG/ACT spray   Commonly known as:  FLONASE   This may have changed:  when to take this   Used for:  Urethral cancer (H), Allergic sinusitis        Dose:  2 spray   Spray 2 sprays into both nostrils 2 times daily   Quantity:  2 Bottle   Refills:  11                Primary Care Provider Office Phone # Fax #    Joan Gonzales Lorenzo, MARLENY [phone]       [facility] [address] [facility] [address]        Equal Access to Services     JENELLE RED AH: Hadii светлана garcia hadasho Soomaali, waaxda luqadaha, qaybta kaalmada adeaamiryada, ronald srivastava . So [facility] [phone].    ATENCIÓN: Si habla español, tiene a washburn disposición servicios gratuitos de asistencia lingüística. Juwan al [phone].    We comply with applicable federal civil rights laws and [place] laws. We do not discriminate on the basis of race, color, national origin, age, disability, sex, sexual orientation, or gender identity.            Thank you!     Thank you for choosing [facility]  for your care. Our goal is always to provide you with excellent care. Hearing back from our patients is one way we can continue to improve our services. Please take a few minutes to complete the written survey that you may receive in the mail after your visit with us. Thank you!             Your Updated Medication List - Protect others around you: Learn how to safely use, store and throw away your medicines at www.disposemymeds.org.          This list is accurate as of  9/26/18  1:57 PM.  Always use your most recent med list.                   Brand Name Dispense Instructions for use Diagnosis    acetaminophen 325 MG tablet    TYLENOL    150 tablet    Take 2 tablets (650 mg) by mouth every 4 hours as needed for mild pain or fever    Urethral cancer (H)       amLODIPine 10 MG tablet    NORVASC    90 tablet    Take 1 tablet (10 mg) by mouth daily    Benign essential hypertension       docusate sodium 100 MG capsule    COLACE    60 capsule    Take 1 capsule (100 mg) by mouth 2 times daily as needed for constipation    Slow transit constipation       fluticasone 50 MCG/ACT spray    FLONASE    2 Bottle    Spray 2 sprays into both nostrils 2 times daily    Urethral cancer (H), Allergic sinusitis       * LORazepam 0.5 MG tablet    ATIVAN    30 tablet    Take 1 tablet (0.5 mg) by mouth every 8 hours as needed for anxiety    Anxiety       * LORazepam 0.5 MG tablet    ATIVAN    30 tablet    Take 1 tablet (0.5 mg) by mouth every 4 hours as needed (Anxiety, Nausea/Vomiting or Sleep)    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 Read the directions carefully, and ask your doctor or other care provider to review them with you.

## 2018-09-26 NOTE — NURSING NOTE
"Oncology Rooming Note    September 26, 2018 12:28 PM   King Gonsalo Bruno is a 72 year old male who presents for:    Chief Complaint   Patient presents with     Oncology Clinic Visit     Return for Bladder Ca      Initial Vitals: /73  Pulse 77  Temp 97.7  F (36.5  C) (Oral)  Resp 18  Wt 64.8 kg (142 lb 13.7 oz)  SpO2 100%  BMI 17.4 kg/m2 Estimated body mass index is 17.4 kg/(m^2) as calculated from the following:    Height as of 9/13/18: 1.93 m (6' 3.98\").    Weight as of this encounter: 64.8 kg (142 lb 13.7 oz). Body surface area is 1.86 meters squared.  Moderate Pain (5) Comment: Data Unavailable   No LMP for male patient.  Allergies reviewed: Yes  Medications reviewed: Yes    Medications: Refills Needed   Pharmacy name entered into [employer]:    [id number] - [place], [place] - [address] [place] [address] AT [address] [place] [address] PHARMACY [place], [place] - [phone]    Clinical concerns: Amlodipine  Refills needed  was notified.  7   minutes for nursing intake (face to face time)     Juliana Russell MA              "

## 2018-09-26 NOTE — LETTER
9/26/2018       RE: King Gonsalo Bruno  [address]  [facility] [address]     Dear Colleague,    Thank you for referring your patient, King Gonsalo Bruno, to the [facility]. Please see a copy of my visit note below.    [facility]  MEDICAL ONCOLOGY PROGRESS NOTE  Aug 23, 2018    CHIEF COMPLAINT: f/u penile urethral carcinoma    ONCOLOGY HPI:   Mr. King Gonsalo Bruno is a 72 year old man with a history of stage II penile urethral carcinoma, treated with cisplatin/gemzar (split on day 1 and 8 given hx of CKD). His first 2 cycles were complicated by dehydration and nausea. He had a positive response and completed 4 cycles in November 2017. He underwent radial penectomy and urethrectomy, perineal urethrostomy and bilateral inguinal node dissection on 3/8/18. Surgical pathology demonstrated moderately differentiated SCC of the penile urethra with extension into the corpus spongiosum and cavernosum of the penile shaft. The specimen was positive for lymphovascular and perineural invasion. Margins were negative. 1/5 L inguinal nodes were positive and 1/7 R inguinal nodes were positive for disease. He was referred for consideration of radiation. At the time of that appointment, he was found to have multiple new pulmonary nodules concerning for metastatic disease. Biopsy was discussed, but after discussion with patient, opted to observe with a PET/CT 6 weeks later. PET/CT on 6/26/18 showed multiple hypermetabolic pulmonary nodules. He was offered palliative intent immunotherapy. He was started on Keytruda on 8/3/18.     Chemotherapy 8/3/2018 8/23/2018 9/13/2018   pembrolizumab (KEYTRUDA)  mg 200 mg 200 mg       INTERVAL HISTORY:   Gonsalo presents today with his significant other.    He has tolerated pembrolizumab well. He has no problems with this. He has not noticed any significant side effects. He could play golf through the summer. He has happy with the progress this far.      ROS: Denies HA, visual, hearing or taste changes, dry mouth, mouth sores, N/V, abdominal pain, change in urination, chest pain, SOB, cough, edema, night sweats, fever, chills, mood changes, bleeding      Allergies   Allergen Reactions     Lisinopril Swelling     Oxycodone Nausea and Vomiting     Vicodin [Hydrocodone-Acetaminophen] Nausea and Vomiting       Current Outpatient Prescriptions   Medication     acetaminophen (TYLENOL) 325 MG tablet     amLODIPine (NORVASC) 10 MG tablet     B Complex Vitamins (VITAMIN B COMPLEX PO)     fluticasone (FLONASE) 50 MCG/ACT spray     LORazepam (ATIVAN) 0.5 MG tablet     LORazepam (ATIVAN) 0.5 MG tablet     omeprazole (PRILOSEC) 2 mg/mL SUSP     docusate sodium (COLACE) 100 MG capsule     prochlorperazine (COMPAZINE) 10 MG tablet     No current facility-administered medications for this visit.      Facility-Administered Medications Ordered in Other Visits   Medication     barium sulfate (EZ PAQUE) oral suspension 96%     barium sulfate (EZ-HD) oral suspension 98%     barium sulfate 40% (VARIBAR THIN HONEY) oral suspension     sod bicarbonate-citric acid-simethicone (EZ GAS) 2.21-1.53-0.04 G packet 4 g       EXAM:  /73  Pulse 77  Temp 97.7  F (36.5  C) (Oral)  Resp 18  Wt 64.8 kg (142 lb 13.7 oz)  SpO2 100%  BMI 17.4 kg/m2  Wt Readings from Last 4 Encounters:   09/26/18 64.8 kg (142 lb 13.7 oz)   09/13/18 63.5 kg (140 lb)   08/23/18 64.6 kg (142 lb 8 oz)   08/03/18 65.4 kg (144 lb 1.6 oz)        General: No acute distress  HEENT: EOMI, PERRLA, no scleral icterus, mucus membranes moist and no lesions or thrush  Lymph: Neck is supple and shows no nodes in cervical or supraclavicular   Heart:  RRR, no murmur, gallop or rub  Lungs: CTA-B, no wheezes, rhonchi or rales  Abdomen: Normal bowel sounds, soft, non tender, not distended, no rebound or guarding, no palpable masses  Extremities: No pitting edema  Skin: No significant rashes or lesions  Neuro: CN II-XII are  intact    LABS:     Recent Labs   Lab Test  09/13/18   1231  08/23/18   1240  08/03/18   1128  06/26/18   1212  06/26/18   1116  04/25/18   1147   NA  134  134  133   --   136  136   POTASSIUM  4.0  3.9  3.7   --   3.6  4.1   CHLORIDE  101  100  102   --   103  103   CO2  24  26  25   --   25  25   ANIONGAP  9  8  7   --   8  7   BUN  20  14  18   --   18  16   CR  1.87*  1.67*  1.74*   --   1.62*  1.54*   GLC  83  88  98  92  82  87   SAADIA  10.0  9.6  9.7   --   9.9  10.2*     Recent Labs   Lab Test  08/23/18   1240  08/03/18   1128  04/25/18   1147  02/07/18   1150  12/13/17   0813   MAG  2.0  2.0  1.9  1.8  1.3*     Recent Labs   Lab Test  09/13/18   1231  06/26/18   1116  04/25/18   1147  03/12/18   0818  03/11/18   0730   12/13/17   0813  12/01/17   0824   WBC  7.1  6.3  5.6  9.2  8.7   < >  4.3  2.2*   HGB  10.1*  11.1*  11.8*  9.9*  9.1*   < >  9.4*  9.1*   PLT  287  277  299  264  239   < >  262  113*   MCV  84  89  91  91  91   < >  91  89   NEUTROPHIL  57.6  53.2  51.3   --    --    --   30.4  66.7    < > = values in this interval not displayed.     Recent Labs   Lab Test  09/13/18   1231  08/23/18   1240  08/03/18   1128  06/26/18   1116   BILITOTAL  0.4  0.4  0.4  0.4   ALKPHOS  81  90  86  76   ALT  17  16  15  16   AST  27  24  24  27   ALBUMIN  3.6  3.7  3.7  4.1   LDH   --    --    --   174     TSH   Date Value Ref Range Status   09/13/2018 0.90 0.40 - 4.00 mU/L Final   08/23/2018 0.84 0.40 - 4.00 mU/L Final   08/03/2018 0.85 0.40 - 4.00 mU/L Final     No results for input(s): CEA in the last 96418 hours.  Results for orders placed or performed in visit on 09/26/18   CT Chest/Abdomen/Pelvis w Contrast    Narrative    EXAMINATION: CT CHEST/ABDOMEN/PELVIS W CONTRAST, 9/26/2018 11:06 AM    TECHNIQUE:  Helical CT images from the thoracic inlet through the  symphysis pubis were obtained with contrast. Contrast dose: Isovue 370  86cc.    COMPARISON: CT 8/2/2018, PET/CT 6/26/2018, CT 5/16/2018,  PET/CT  8/23/2017.    HISTORY: Recurrent, metastatic penile urothelial cancer status  penectomy and inguinal lymph node dissection. Assess response to  immunotherapy, cisplatin/gemzar.    FINDINGS:    Chest:   Stable subcentimeter hypodense left thyroid lobe nodule. The aortic  branching pattern, heart size, pericardium, and esophagus appear  normal. The ascending aorta is not enlarged. Mild dilation of the main  pulmonary artery measuring 3.4 cm in diameter. No large central  pulmonary embolism. Unchanged prominent 8 mm short axis precarinal  node, stable since 8/23/2017. No new lymphadenopathy in the chest by  size criteria.    The central tracheal bronchial tree is patent. No pneumothorax or  pleural effusion. Advanced upper lobe predominant centrilobular and  paraseptal emphysema. No new airspace consolidation.    The low density right perihilar mass measures 3.6 x 2.3 cm (series 3,  image 196), compared to 4.0 x 2.5 cm on 8/2/2018 and 3.4 x 2.3 cm on  6/26/2018. Redemonstration of numerous other pulmonary nodules, some  of which have increased in size and others which have decreased in  size, for example a nodule in the left upper lobe measures 20 x 13 mm  compared to 15 x 13 mm previously (series 4, image 83). A nodule in  the anterior left lower lobe measures 22 x 17 mm compared to 16 x 14  mm previously (series 4, image 133). A right middle lobe nodule has  decreased in size, measuring 5 mm compared to 9 mm previously (series  4, image 117). The previously seen cavitary nodules are now mostly  solid. No new pulmonary nodules appreciated.    Abdomen and pelvis:   Stable scattered subcentimeter hepatic hypodensities. The spleen,  gallbladder, and adrenal glands appear normal. Prominent duct of  Santorini with prominence of the main pancreatic duct. No obstructing  stone or mass visualized. Simple renal cysts bilaterally with  additional subcentimeter renal cortical hypodensities which are too  small to  characterize. The urinary bladder is poorly distended and  grossly unremarkable.    No intra-abdominal free air or free fluid. No dilated loops of bowel.  Moderate stool burden. The appendix is not well visualized.  Atherosclerotic calcifications in the normal caliber abdominal aorta.  No new lymphadenopathy in the abdomen or pelvis.    Bones and soft tissues:   Postsurgical changes of bilateral inguinal lymph node dissection with  associated scarring, stable to decreased compared to 5/16/2018. Poorly  visualized penectomy postsurgical changes. Old partially imaged C7  spinous process fracture. Prominent degenerative subchondral cystic  change in the left femoral head and left acetabulum. The subchondral  lucency in the left femoral head, series 3, image 596, is new from the  recent prior CT of 8/2/2018.         Impression    IMPRESSION:   1. Mixed response to therapy with decreased size of the right  perihilar mass and increased size of a few pulmonary nodules. No new  pulmonary nodules identified. Emphysema.   2. No other convincing evidence of metastatic disease in the chest,  abdomen, or pelvis.  3. Subchondral lucency in the left femoral head is new from the recent  prior CT of 8/2/2018 and is most likely related to cystic degenerative  change, although continued attention on follow up is recommended.     4. Stable postsurgical changes of bilateral inguinal lymph node  dissection.      I have personally reviewed the examination and initial interpretation  and I agree with the findings.    KAITLIN DAVIDSON MD       IMPRESSION/PLAN:    Recurrent, metastatic SCC of the penile urethra, s/p resection of primary tumor, now recurrent to the lung  He is tolerating therapy well.   He is being seen with restaging scans. I have reviewed actual images from his CT scan  There are no new lesions. He seems to have stable disease.   Official read was pending at the time of visit and suggests stable disease (mixed response with  shrinking in the nodes and small growth in the pulmonary nodules).   I will schedule for another 4 cycles before we get restaging scans.        Fatigue  - stable and not affecting ADL's and able to play golf and enjoy life    FEN:   -Eating/drinking well. Lytes stable  -should be drinking 64 oz of fluid a day  -he has lost a couple a pounds. Continue to monitor. Will have to discuss nutrition supplements if weight continues to go down     CKD:   -Cr stable today, slightly improved     Mood:   -doing better. Treatment was better tolerated which helped     HTN:  -BP stable. On amlodipine 10 mg daily    Over 25 min of direct face to face time spent with patient with more than 50% time spent in counseling and coordinating care.           Again, thank you for allowing me to participate in the care of your patient.      Sincerely,    Steve Arceo MD

## 2018-10-04 ENCOUNTER — APPOINTMENT (OUTPATIENT)
Dept: LAB | Facility: CLINIC | Age: 73
End: 2018-10-04
Attending: INTERNAL MEDICINE
Payer: MEDICARE

## 2018-10-04 ENCOUNTER — INFUSION THERAPY VISIT (OUTPATIENT)
Dept: ONCOLOGY | Facility: CLINIC | Age: 73
End: 2018-10-04
Attending: INTERNAL MEDICINE
Payer: MEDICARE

## 2018-10-04 VITALS
HEART RATE: 65 BPM | BODY MASS INDEX: 17.79 KG/M2 | WEIGHT: 146.1 LBS | RESPIRATION RATE: 16 BRPM | OXYGEN SATURATION: 97 % | DIASTOLIC BLOOD PRESSURE: 75 MMHG | SYSTOLIC BLOOD PRESSURE: 148 MMHG | TEMPERATURE: 98.4 F

## 2018-10-04 DIAGNOSIS — C68.0 URETHRAL CANCER (H): ICD-10-CM

## 2018-10-04 DIAGNOSIS — E87.6 HYPOKALEMIA: ICD-10-CM

## 2018-10-04 DIAGNOSIS — C78.02 MALIGNANT NEOPLASM METASTATIC TO BOTH LUNGS (H): Primary | ICD-10-CM

## 2018-10-04 DIAGNOSIS — E86.0 DEHYDRATION: ICD-10-CM

## 2018-10-04 DIAGNOSIS — K21.00 GASTROESOPHAGEAL REFLUX DISEASE WITH ESOPHAGITIS: ICD-10-CM

## 2018-10-04 DIAGNOSIS — R11.0 NAUSEA: ICD-10-CM

## 2018-10-04 DIAGNOSIS — C78.01 MALIGNANT NEOPLASM METASTATIC TO BOTH LUNGS (H): Primary | ICD-10-CM

## 2018-10-04 LAB
ALBUMIN SERPL-MCNC: 3.7 G/DL (ref 3.4–5)
ALP SERPL-CCNC: 75 U/L (ref 40–150)
ALT SERPL W P-5'-P-CCNC: 16 U/L (ref 0–70)
ANION GAP SERPL CALCULATED.3IONS-SCNC: 9 MMOL/L (ref 3–14)
AST SERPL W P-5'-P-CCNC: 26 U/L (ref 0–45)
BILIRUB SERPL-MCNC: 0.5 MG/DL (ref 0.2–1.3)
BUN SERPL-MCNC: 18 MG/DL (ref 7–30)
CALCIUM SERPL-MCNC: 9.3 MG/DL (ref 8.5–10.1)
CHLORIDE SERPL-SCNC: 101 MMOL/L (ref 94–109)
CO2 SERPL-SCNC: 25 MMOL/L (ref 20–32)
CREAT SERPL-MCNC: 1.74 MG/DL (ref 0.66–1.25)
GFR SERPL CREATININE-BSD FRML MDRD: 39 ML/MIN/1.7M2
GLUCOSE SERPL-MCNC: 79 MG/DL (ref 70–99)
MAGNESIUM SERPL-MCNC: 1.9 MG/DL (ref 1.6–2.3)
POTASSIUM SERPL-SCNC: 3.9 MMOL/L (ref 3.4–5.3)
PROT SERPL-MCNC: 7.9 G/DL (ref 6.8–8.8)
SODIUM SERPL-SCNC: 136 MMOL/L (ref 133–144)
TSH SERPL DL<=0.005 MIU/L-ACNC: 0.96 MU/L (ref 0.4–4)

## 2018-10-04 PROCEDURE — 25000128 H RX IP 250 OP 636: Mod: ZF | Performed by: INTERNAL MEDICINE

## 2018-10-04 PROCEDURE — 80053 COMPREHEN METABOLIC PANEL: CPT | Performed by: INTERNAL MEDICINE

## 2018-10-04 PROCEDURE — 83735 ASSAY OF MAGNESIUM: CPT | Performed by: PHYSICIAN ASSISTANT

## 2018-10-04 PROCEDURE — 84443 ASSAY THYROID STIM HORMONE: CPT | Performed by: INTERNAL MEDICINE

## 2018-10-04 PROCEDURE — 96413 CHEMO IV INFUSION 1 HR: CPT

## 2018-10-04 PROCEDURE — G0463 HOSPITAL OUTPT CLINIC VISIT: HCPCS | Mod: 25

## 2018-10-04 PROCEDURE — 40000141 ZZH STATISTIC PERIPHERAL IV START W/O US GUIDANCE: Mod: ZF

## 2018-10-04 RX ADMIN — SODIUM CHLORIDE 200 MG: 9 INJECTION, SOLUTION INTRAVENOUS at 14:20

## 2018-10-04 RX ADMIN — SODIUM CHLORIDE 250 ML: 9 INJECTION, SOLUTION INTRAVENOUS at 14:19

## 2018-10-04 ASSESSMENT — PAIN SCALES - GENERAL: PAINLEVEL: MODERATE PAIN (5)

## 2018-10-04 NOTE — NURSING NOTE
Chief Complaint   Patient presents with     Blood Draw     PIV placed by vascular access RN, labs drawn, vitals completed, pt checked in for appt.      Rosalba Fry, CMA

## 2018-10-04 NOTE — MR AVS SNAPSHOT
After Visit Summary   10/4/2018    King Gonsalo Bruno    MRN: [id number]           Patient Information     Date Of Birth          1945        Visit Information        Provider Department      10/4/2018 1:00 PM  11 ATC;  ONCOLOGY INFUSION [facility]        Today's Diagnoses     Malignant neoplasm metastatic to both lungs (H)    -  1    Urethral cancer (H)        Gastroesophageal reflux disease with esophagitis        Hypokalemia        Nausea        Dehydration          Care Instructions    Contact Numbers    [facility] Main Line: [phone]  [facility] Triage and After Hours Nurse Line:  [phone]    Please call the [facility] nurse triage or the after hours nurse line if you experience a temperature greater than or equal to 100.5, shaking chills, have uncontrolled nausea, vomiting and/or diarrhea, dizziness, lightheadedness, shortness of breath, chest pain, bleeding, unexplained bruising, or if you have any other new/concerning symptoms, questions or concerns.     If you are having any concerning symptoms or wish to speak to a provider before your next infusion visit, please call your care coordinator or triage to notify them so we can adequately serve you.     If you need a refill on a narcotic prescription or other medication, please call triage before your infusion appointment.         October 2018 Sunday Monday Tuesday Wednesday Thursday Friday Saturday        1     2     3     4     [facility] LAB DRAW   12:15 PM   (15 min.)   [facility] DRAW   [facility] Lab Draw     [facility] ONC INFUSION 60    1:00 PM   (60 min.)    ONCOLOGY INFUSION   [facility] 5     6       7     8     9     TREATMENT 45   10:45 AM   (45 min.)   Anca, PEDRO Gaxiola   [facility] 10     11     12     13       14     15     16     17     18     19     20       21     22     23     24     25     [facility] LAB DRAW   12:15 PM   (15 min.)    [facility] LAB DRAW   [facility]  [facility] Lab Draw     [facility] ONC INFUSION 60    1:00 PM   (60 min.)   UC ONCOLOGY INFUSION   [facility] 26     27       28     29     30     31 November 2018 Sunday Monday Tuesday Wednesday Thursday Friday Saturday                       1     2     3       4     5     6     7     8     9     10       11     12     13     14     15     [facility] DRAW   11:45 AM   (15 min.)    [facility] LAB DRAW   [facility] Lab Draw     [facility] RETURN   11:55 AM   (50 min.)   Yessica Ovalles APRN CNP   [facility]     [facility] ONC INFUSION 60    1:00 PM   (60 min.)   UC ONCOLOGY INFUSION   [facility] 16     17       18     19     20     21     22     23     24     LAB   12:00 PM   (15 min.)    LAB   [facility] Lab     CT CHEST ABDOMEN PELVIS WO   12:40 PM   (20 min.)   UCCT1   [facility] Imaging Center CT   25  Happy Birthday!     26     27     28     [facility] RETURN   12:45 PM   (30 min.)   Steve Arceo MD   [facility] 29     30                       Lab Results:  Recent Results (from the past 12 hour(s))   Comprehensive metabolic panel    Collection Time: 10/04/18 12:40 PM   Result Value Ref Range    Sodium 136 133 - 144 mmol/L    Potassium 3.9 3.4 - 5.3 mmol/L    Chloride 101 94 - 109 mmol/L    Carbon Dioxide 25 20 - 32 mmol/L    Anion Gap 9 3 - 14 mmol/L    Glucose 79 70 - 99 mg/dL    Urea Nitrogen 18 7 - 30 mg/dL    Creatinine 1.74 (H) 0.66 - 1.25 mg/dL    GFR Estimate 39 (L) >60 mL/min/1.7m2    GFR Estimate If Black 47 (L) >60 mL/min/1.7m2    Calcium 9.3 8.5 - 10.1 mg/dL    Bilirubin Total 0.5 0.2 - 1.3 mg/dL    Albumin 3.7 3.4 - 5.0 g/dL    Protein Total 7.9 6.8 - 8.8 g/dL    Alkaline Phosphatase 75 40 - 150 U/L    ALT 16 0 - 70 U/L    AST 26 0 - 45 U/L   TSH with free T4 reflex    Collection Time: 10/04/18 12:40 PM   Result Value Ref Range    TSH 0.96 0.40 - 4.00 mU/L   Magnesium    Collection Time: 10/04/18 12:40  PM   Result Value Ref Range    Magnesium 1.9 1.6 - 2.3 mg/dL               Follow-ups after your visit        Your next 10 appointments already scheduled     Oct 09, 2018 10:45 AM CDT   Treatment 45 with PEDRO Moffett   [facility] ([facility])    [address] [place] [address]  4th Floor  [facility] [address]   [phone]            Oct 25, 2018 12:15 PM CDT   [facility] Draw with [facility] DRAW   [facility]    [address] [place] [address]  [address]  [facility] [address]   [phone]            Oct 25, 2018  1:00 PM CDT   Infusion 60 with UC ONCOLOGY INFUSION, UC 25 ATC   [facility] ([facility])    [address] [place] [address]  [address]  [facility] [address]   [phone]            Nov 15, 2018 11:45 AM CST   [facility] Draw with [facility] LAB DRAW   [facility] Draw ([facility])    [address] [place] [address]  [address]  [facility] [address]   [phone]            Nov 15, 2018 12:10 PM CST   (Arrive by 11:55 AM)   Return Visit with ISAURA Roach CNP   [facility] ([facility])    [address] [place] [address]  [address] [place] [address]   [phone]            Nov 15, 2018  1:00 PM CST   Infusion 60 with UC ONCOLOGY INFUSION, UC 11 ATC   [facility] ([facility])    [address] [place] [address]  [address]  [facility] [address]   [phone]            Nov 24, 2018 12:00 PM CST   LAB with UC LAB   [facility])    [address] [place] [address]  1st Floor  [facility] [address]   [phone]           Please do not eat 10-12 hours before your appointment if you are coming in fasting for labs on lipids, cholesterol, or glucose (sugar). This does not apply to pregnant women. Water, hot tea and black coffee (with nothing  added) are okay. Do not drink other fluids, diet soda or chew gum.            Nov 24, 2018 12:40 PM CST   CT CHEST ABDOMEN PELVIS W/O CONTRAST with UCCT1   [facility] CT ([facility])    [address] [facility]  1st Floor  [facility] [address] [phone]           How do I prepare for my exam? (Food and drink instructions) To prepare: Do not eat or drink for 2 hours before your exam. If you need to take medicine, you may take it with small sips of water. (We may ask you to take liquid medicine as well.)  How do I prepare for my exam? (Other instructions) Please arrive 30 minutes early for your CT.  Once in the department you might be asked to drink water 15-20 minutes prior to your exam.  If indicated you may be asked to drink an oral contrast in advance of your CT.  If this is the case, the imaging team will let you know or be in contact with you prior to your appointment  Patients over 70 or patients with diabetes or kidney problems: If you haven t had a blood test (creatinine test) within the last 30 days, the Cardiologist/Radiologist may require you to get this test prior to your exam.  If you have diabetes:  Continue to take your metformin medication on the day of your exam  What should I wear: Please wear loose clothing, such as a sweat suit or jogging clothes. Avoid snaps, zippers and other metal. We may ask you to undress and put on a hospital gown.  How long does the exam take: Most scans take less than 20 minutes.  What should I bring: Please bring any scans or X-rays taken at other hospitals, if similar tests were done. Also bring a list of your medicines, including vitamins, minerals and over-the-counter drugs. It is safest to leave personal items at home.  Do I need a : No  is needed.  What do I need to tell my doctor? Be sure to tell your doctor: * If you have any allergies. * If there s any chance you are pregnant. * If you are breastfeeding.   What should I do after the exam: No restrictions, You may resume normal activities.  What is this test: A CT (computed tomography) scan is a series of pictures that allows us to look inside your body. The scanner creates images of the body in cross sections, much like slices of bread. This helps us see any problems more clearly. You may receive contrast (X-ray dye) before or during your scan. You will be asked to drink the contrast.  Who should I call with questions: If you have any questions, please call the Imaging Department where you will have your exam. Directions, parking instructions, and other information is available on our website, [email or web address].              Who to contact     If you have questions or need follow up information about today's clinic visit or your schedule please contact [facility] directly at [phone].  Normal or non-critical lab and imaging results will be communicated to you by MyChart, letter or phone within 4 business days after the clinic has received the results. If you do not hear from us within 7 days, please contact the clinic through MyChart or phone. If you have a critical or abnormal lab result, we will notify you by phone as soon as possible.  Submit refill requests through [employer] or call your pharmacy and they will forward the refill request to us. Please allow 3 business days for your refill to be completed.          Additional Information About Your Visit        Care EveryWhere ID     This is your Care EveryWhere ID. This could be used by other organizations to access your [facility] medical records  [id number]        Your Vitals Were     Pulse Temperature Respirations Pulse Oximetry BMI (Body Mass Index)       65 98.4  F (36.9  C) (Oral) 16 97% 17.79 kg/m2        Blood Pressure from Last 3 Encounters:   10/04/18 148/75   09/26/18 133/73   09/13/18 143/73    Weight from Last 3 Encounters:   10/04/18 66.3 kg (146 lb 1.6 oz)   09/26/18 64.8 kg  (142 lb 13.7 oz)   09/13/18 63.5 kg (140 lb)              We Performed the Following     Comprehensive metabolic panel     Magnesium     TSH with free T4 reflex          Today's Medication Changes          These changes are accurate as of 10/4/18  1:47 PM.  If you have any questions, ask your nurse or doctor.               These medicines have changed or have updated prescriptions.        Dose/Directions    amLODIPine 10 MG tablet   Commonly known as:  NORVASC   This may have changed:  when to take this   Used for:  Benign essential hypertension        Dose:  10 mg   Take 1 tablet (10 mg) by mouth daily   Quantity:  90 tablet   Refills:  3                Primary Care Provider Office Phone # Fax #    Joan Ventura, MARLENY [phone]       [facility] [address] [facility] [address]        Equal Access to Services     JENELLE RED : Tiffanie leono Soveronica, waaxda luqadaha, qaybta kaalmada adeegyada, ronald srivastava . So [facility] [phone].    ATENCIÓN: Si habla español, tiene a washburn disposición servicios gratuitos de asistencia lingüística. Llame al [phone].    We comply with applicable federal civil rights laws and [place] laws. We do not discriminate on the basis of race, color, national origin, age, disability, sex, sexual orientation, or gender identity.            Thank you!     Thank you for choosing [facility]  for your care. Our goal is always to provide you with excellent care. Hearing back from our patients is one way we can continue to improve our services. Please take a few minutes to complete the written survey that you may receive in the mail after your visit with us. Thank you!             Your Updated Medication List - Protect others around you: Learn how to safely use, store and throw away your medicines at www.disposemymeds.org.          This list is accurate as of 10/4/18  1:47 PM.  Always use your most recent med list.                    Brand Name Dispense Instructions for use Diagnosis    acetaminophen 325 MG tablet    TYLENOL    150 tablet    Take 2 tablets (650 mg) by mouth every 4 hours as needed for mild pain or fever    Urethral cancer (H)       amLODIPine 10 MG tablet    NORVASC    90 tablet    Take 1 tablet (10 mg) by mouth daily    Benign essential hypertension       docusate sodium 100 MG capsule    COLACE    60 capsule    Take 1 capsule (100 mg) by mouth 2 times daily as needed for constipation    Slow transit constipation       fluticasone 50 MCG/ACT spray    FLONASE    2 Bottle    Spray 2 sprays into both nostrils 2 times daily    Urethral cancer (H), Allergic sinusitis       * LORazepam 0.5 MG tablet    ATIVAN    30 tablet    Take 1 tablet (0.5 mg) by mouth every 8 hours as needed for anxiety    Anxiety       * LORazepam 0.5 MG tablet    ATIVAN    30 tablet    Take 1 tablet (0.5 mg) by mouth every 4 hours as needed (Anxiety, Nausea/Vomiting or Sleep)    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 Read the directions carefully, and ask your doctor or other care provider to review them with you.

## 2018-10-04 NOTE — PROGRESS NOTES
Infusion Nursing Note:  King Gonsalo Bruno presents today for Cyclle 4 Keytruda.    Patient seen by provider today: No    Note: Patient states he has been doing well for the most part since starting Keytruda. Saw Dr. Arceo a week or so ago and will continue treatment for now. Denies fevers/chills, SOB, chest pain or rash. Reports he's had ongoing back/neck arthritis, but that it seems to have gotten worse since starting Keytruda. Rates pain at a constant 5/10 and that it sometimes goes up to 8/10 and he is not taking any pain medications. He's interested in taking a medication but does not want an opioid; would like something lighter. Paged Yessica Ovalles DNP for recommendations. Yessica would NOT recommend pt to take Ibuprofen due to his renal issues and since pt does not want opioids, pt will try taking Tylenol for now. Patient aware other medications such as Tramadol are available to help with his pain.     Intravenous Access:  Peripheral IV placed.    Treatment Conditions:  Lab Results   Component Value Date     10/04/2018                   Lab Results   Component Value Date    POTASSIUM 3.9 10/04/2018           Lab Results   Component Value Date    MAG 1.9 10/04/2018            Lab Results   Component Value Date    CR 1.74 10/04/2018                   Lab Results   Component Value Date    SAADIA 9.3 10/04/2018                Lab Results   Component Value Date    BILITOTAL 0.5 10/04/2018           Lab Results   Component Value Date    ALBUMIN 3.7 10/04/2018                    Lab Results   Component Value Date    ALT 16 10/04/2018           Lab Results   Component Value Date    AST 26 10/04/2018     Results reviewed, labs MET treatment parameters, ok to proceed with treatment.      Post Infusion Assessment:  Patient tolerated infusion without incident.  Blood return noted pre and post infusion.  Site patent and intact, free from redness, edema or discomfort.  No evidence of extravasations. Access discontinued per  protocol.    Discharge Plan:   Patient declined prescription refills.  Copy of AVS reviewed with patient and/or family.  Patient will return 10/25 for next appointment.  Patient discharged in stable condition accompanied by: self.  Departure Mode: Ambulatory.  Face to Face Time: 4 minutes discussing pain management    Sol Acosat RN

## 2018-10-04 NOTE — PATIENT INSTRUCTIONS
Contact Numbers    [facility] Main Line: [phone]  [facility] Triage and After Hours Nurse Line:  [phone]    Please call the [facility] nurse triage or the after hours nurse line if you experience a temperature greater than or equal to 100.5, shaking chills, have uncontrolled nausea, vomiting and/or diarrhea, dizziness, lightheadedness, shortness of breath, chest pain, bleeding, unexplained bruising, or if you have any other new/concerning symptoms, questions or concerns.     If you are having any concerning symptoms or wish to speak to a provider before your next infusion visit, please call your care coordinator or triage to notify them so we can adequately serve you.     If you need a refill on a narcotic prescription or other medication, please call triage before your infusion appointment.         October 2018 Sunday Monday Tuesday Wednesday Thursday Friday Saturday        1     2     3     4     [facility]   12:15 PM   (15 min.)    [facility]     [facility] ONC INFUSION 60    1:00 PM   (60 min.)    ONCOLOGY INFUSION   [facility] 5     6       7     8     9     TREATMENT 45   10:45 AM   (45 min.)   Khalida March SLP   [facility] 10     11     12     13       14     15     16     17     18     19     20       21     22     23     24     25     [facility] DRAW   12:15 PM   (15 min.)    [facility] LAB DRAW   [facility] Lab Draw     [facility] ONC INFUSION 60    1:00 PM   (60 min.)    ONCOLOGY INFUSION   [facility] 26     27       28     29     30     31                               November 2018 Sunday Monday Tuesday Wednesday Thursday Friday Saturday                       1     2     3       4     5     6     7     8     9     10       11     12     13     14     15     [facility] DRAW   11:45 AM   (15 min.)    [facility] LAB DRAW   [facility] Lab Draw     [facility] RETURN   11:55 AM   (50 min.)   Yessica Ovalles, APRN  CNP   [facility]     [facility] ONC INFUSION 60    1:00 PM   (60 min.)   UC ONCOLOGY INFUSION   [facility] 16     17       18     19     20     21     22     23     24     LAB   12:00 PM   (15 min.)   UC LAB   [facility] Lab     CT CHEST ABDOMEN PELVIS WO   12:40 PM   (20 min.)   UCCT1   [facility] Imaging Center CT   25  Happy Birthday!     26     27     28     [facility] RETURN   12:45 PM   (30 min.)   Steve Arceo MD   [facility] 29     30                       Lab Results:  Recent Results (from the past 12 hour(s))   Comprehensive metabolic panel    Collection Time: 10/04/18 12:40 PM   Result Value Ref Range    Sodium 136 133 - 144 mmol/L    Potassium 3.9 3.4 - 5.3 mmol/L    Chloride 101 94 - 109 mmol/L    Carbon Dioxide 25 20 - 32 mmol/L    Anion Gap 9 3 - 14 mmol/L    Glucose 79 70 - 99 mg/dL    Urea Nitrogen 18 7 - 30 mg/dL    Creatinine 1.74 (H) 0.66 - 1.25 mg/dL    GFR Estimate 39 (L) >60 mL/min/1.7m2    GFR Estimate If Black 47 (L) >60 mL/min/1.7m2    Calcium 9.3 8.5 - 10.1 mg/dL    Bilirubin Total 0.5 0.2 - 1.3 mg/dL    Albumin 3.7 3.4 - 5.0 g/dL    Protein Total 7.9 6.8 - 8.8 g/dL    Alkaline Phosphatase 75 40 - 150 U/L    ALT 16 0 - 70 U/L    AST 26 0 - 45 U/L   TSH with free T4 reflex    Collection Time: 10/04/18 12:40 PM   Result Value Ref Range    TSH 0.96 0.40 - 4.00 mU/L   Magnesium    Collection Time: 10/04/18 12:40 PM   Result Value Ref Range    Magnesium 1.9 1.6 - 2.3 mg/dL

## 2018-10-09 ENCOUNTER — THERAPY VISIT (OUTPATIENT)
Dept: SPEECH THERAPY | Facility: CLINIC | Age: 73
End: 2018-10-09
Payer: COMMERCIAL

## 2018-10-09 DIAGNOSIS — R13.12 OROPHARYNGEAL DYSPHAGIA: ICD-10-CM

## 2018-10-09 DIAGNOSIS — Q38.7 PHARYNGEAL DIVERTICULA: Primary | ICD-10-CM

## 2018-10-09 DIAGNOSIS — R13.10 DYSPHAGIA: Primary | ICD-10-CM

## 2018-10-09 NOTE — MR AVS SNAPSHOT
After Visit Summary   10/9/2018    King Gonsalo Bruno    MRN: [id number]           Patient Information     Date Of Birth          1945        Visit Information        Provider Department      10/9/2018 10:45 AM Khalida March SLP [facility]        Today's Diagnoses     Pharyngeal diverticula    -  1    Oropharyngeal dysphagia           Follow-ups after your visit        Your next 10 appointments already scheduled     Oct 25, 2018 12:15 PM CDT   [facility] Lab Draw with UC [facility] LAB DRAW   [facility] Lab Draw ([facility])    [address] [place] [address]  [address]  [facility] [address]   [phone]            Oct 25, 2018  1:00 PM CDT   Infusion 60 with UC ONCOLOGY INFUSION, UC 25 ATC   [facility] ([facility])    [address] [place] [address]  [address]  [facility] [address]   [phone]            Nov 15, 2018 11:45 AM CST   [facility] Lab Draw with UC MASON[facility] LAB DRAW   [facility] Lab Draw ([facility])    [address] [place] [address]  [address]  [facility] [address]   [phone]            Nov 15, 2018 12:10 PM CST   (Arrive by 11:55 AM)   Return Visit with ISAURA Roach CNP   [facility] ([facility])    [address] [place] [address]  [address]  [facility] [address]   [phone]            Nov 15, 2018  1:00 PM CST   Infusion 60 with UC ONCOLOGY INFUSION, UC 11 ATC   [facility] ([facility])    [address] [place] [address]  [address]  [facility] [address]   [phone]            Nov 24, 2018 12:00 PM CST   LAB with UC LAB   [facility])    [address] [place] [address]  1st Floor  [facility] [address]   [phone]           Please do not eat 10-12 hours before your appointment if you are coming in fasting for labs on lipids, cholesterol, or glucose (sugar). This does  not apply to pregnant women. Water, hot tea and black coffee (with nothing added) are okay. Do not drink other fluids, diet soda or chew gum.            Nov 24, 2018 12:40 PM CST   CT CHEST ABDOMEN PELVIS W/O CONTRAST with UCCT1   [facility] [place] [facility] ([facility])    [address] [address] [phone]           How do I prepare for my exam? (Food and drink instructions) To prepare: Do not eat or drink for 2 hours before your exam. If you need to take medicine, you may take it with small sips of water. (We may ask you to take liquid medicine as well.)  How do I prepare for my exam? (Other instructions) Please arrive 30 minutes early for your CT.  Once in the department you might be asked to drink water 15-20 minutes prior to your exam.  If indicated you may be asked to drink an oral contrast in advance of your CT.  If this is the case, the imaging team will let you know or be in contact with you prior to your appointment  Patients over 70 or patients with diabetes or kidney problems: If you haven t had a blood test (creatinine test) within the last 30 days, the Cardiologist/Radiologist may require you to get this test prior to your exam.  If you have diabetes:  Continue to take your metformin medication on the day of your exam  What should I wear: Please wear loose clothing, such as a sweat suit or jogging clothes. Avoid snaps, zippers and other metal. We may ask you to undress and put on a hospital gown.  How long does the exam take: Most scans take less than 20 minutes.  What should I bring: Please bring any scans or X-rays taken at other hospitals, if similar tests were done. Also bring a list of your medicines, including vitamins, minerals and over-the-counter drugs. It is safest to leave personal items at home.  Do I need a : No  is needed.  What do I need to tell my doctor? Be sure to tell your doctor: * If you have any allergies.  * If there s any chance you are pregnant. * If you are breastfeeding.  What should I do after the exam: No restrictions, You may resume normal activities.  What is this test: A CT (computed tomography) scan is a series of pictures that allows us to look inside your body. The scanner creates images of the body in cross sections, much like slices of bread. This helps us see any problems more clearly. You may receive contrast (X-ray dye) before or during your scan. You will be asked to drink the contrast.  Who should I call with questions: If you have any questions, please call the Imaging Department where you will have your exam. Directions, parking instructions, and other information is available on our website, [email or web address].            Nov 28, 2018  1:00 PM CST   (Arrive by 12:45 PM)   Return Visit with Steve Arceo MD   [facility] ([facility] [facility])    [address] [place] [address]  [address] [place] [address] [phone]              Who to contact     Please call your clinic at [phone] to:    Ask questions about your health    Make or cancel appointments    Discuss your medicines    Learn about your test results    Speak to your doctor            Additional Information About Your Visit        Care EveryWhere ID     This is your Care EveryWhere ID. This could be used by other organizations to access your Sod medical records  [id number]         Blood Pressure from Last 3 Encounters:   10/04/18 148/75   09/26/18 133/73   09/13/18 143/73    Weight from Last 3 Encounters:   10/04/18 66.3 kg (146 lb 1.6 oz)   09/26/18 64.8 kg (142 lb 13.7 oz)   09/13/18 63.5 kg (140 lb)              Today, you had the following     No orders found for display         Today's Medication Changes          These changes are accurate as of 10/9/18 11:28 AM.  If you have any questions, ask your nurse or doctor.               These medicines have changed or have updated  prescriptions.        Dose/Directions    amLODIPine 10 MG tablet   Commonly known as:  NORVASC   This may have changed:  when to take this   Used for:  Benign essential hypertension        Dose:  10 mg   Take 1 tablet (10 mg) by mouth daily   Quantity:  90 tablet   Refills:  3                Primary Care Provider Office Phone # Fax #    Joan Ventura [phone]       [address] [facility] [address]        Equal Access to Services     JENELLE RED AH: Hadii aad ku hadasho Soomaali, waaxda luqadaha, qaybta kaalmada adeegyada, waxay idiin hayaan adeeg kharash lashakira ah. So [facility] [phone].    ATENCIÓN: Si karenla esparnie, tiene a washburn disposición servicios gratuitos de asistencia lingüística. [facility] [phone].    We comply with applicable federal civil rights laws and [place] laws. We do not discriminate on the basis of race, color, national origin, age, disability, sex, sexual orientation, or gender identity.            Thank you!     Thank you for choosing [facility]  for your care. Our goal is always to provide you with excellent care. Hearing back from our patients is one way we can continue to improve our services. Please take a few minutes to complete the written survey that you may receive in the mail after your visit with us. Thank you!             Your Updated Medication List - Protect others around you: Learn how to safely use, store and throw away your medicines at www.disposemymeds.org.          This list is accurate as of 10/9/18 11:28 AM.  Always use your most recent med list.                   Brand Name Dispense Instructions for use Diagnosis    acetaminophen 325 MG tablet    TYLENOL    150 tablet    Take 2 tablets (650 mg) by mouth every 4 hours as needed for mild pain or fever    Urethral cancer (H)       amLODIPine 10 MG tablet    NORVASC    90 tablet    Take 1 tablet (10 mg) by mouth daily    Benign essential hypertension       docusate sodium 100  MG capsule    COLACE    60 capsule    Take 1 capsule (100 mg) by mouth 2 times daily as needed for constipation    Slow transit constipation       fluticasone 50 MCG/ACT spray    FLONASE    2 Bottle    Spray 2 sprays into both nostrils 2 times daily    Urethral cancer (H), Allergic sinusitis       * LORazepam 0.5 MG tablet    ATIVAN    30 tablet    Take 1 tablet (0.5 mg) by mouth every 8 hours as needed for anxiety    Anxiety       * LORazepam 0.5 MG tablet    ATIVAN    30 tablet    Take 1 tablet (0.5 mg) by mouth every 4 hours as needed (Anxiety, Nausea/Vomiting or Sleep)    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 Read the directions carefully, and ask your doctor or other care provider to review them with you.

## 2018-10-25 NOTE — MR AVS SNAPSHOT
After Visit Summary   10/25/2018    King Gonsalo Bruno    MRN: [id number]           Patient Information     Date Of Birth          1945        Visit Information        Provider Department      10/25/2018 1:00 PM  25 ATC;  ONCOLOGY INFUSION [facility]        Today's Diagnoses     Malignant neoplasm metastatic to both lungs (H)    -  1    Urethral cancer (H)        Gastroesophageal reflux disease with esophagitis        Hypokalemia        Nausea        Dehydration          Care Instructions    Contact Numbers    [facility] Main Line: [phone]  [facility] Triage and After Hours Nurse Line:  [phone]    Please call the [facility] nurse triage or the after hours nurse line if you experience a temperature greater than or equal to 100.5, shaking chills, have uncontrolled nausea, vomiting and/or diarrhea, dizziness, lightheadedness, shortness of breath, chest pain, bleeding, unexplained bruising, or if you have any other new/concerning symptoms, questions or concerns.     If you are having any concerning symptoms or wish to speak to a provider before your next infusion visit, please call your care coordinator or triage to notify them so we can adequately serve you.     If you need a refill on a narcotic prescription or other medication, please call triage before your infusion appointment.           October 2018 Sunday Monday Tuesday Wednesday Thursday Friday Saturday        1     2     3     4     [facility] LAB DRAW   12:15 PM   (15 min.)   [facility]   [facility] Lab Draw     [facility] ONC INFUSION 60    1:00 PM   (60 min.)    ONCOLOGY INFUSION   [facility] Cancer [facility] 5     6       7     8     9     TREATMENT 45   10:45 AM   (45 min.)   Khalida March SLP   [facility] Rehab 10     11     12     13       14     15     16     17     18     19     20       21     22     23     24     25     [facility] LAB DRAW   12:15 PM   (15 min.)    [facility] LAB DRAW   [facility]  [facility] Lab Draw     [facility] ONC INFUSION 60    1:00 PM   (60 min.)   UC ONCOLOGY INFUSION   [facility] 26     27       28     29     30     31 November 2018 Sunday Monday Tuesday Wednesday Thursday Friday Saturday                       1     2     3       4     5     6     7     8     9     10       11     12     13     14     15     [facility] DRAW   11:45 AM   (15 min.)    [facility] LAB DRAW   [facility] Lab Draw     [facility] RETURN   11:55 AM   (50 min.)   Yessica Ovalles APRN CNP   [facility]     [facility] ONC INFUSION 60    1:00 PM   (60 min.)   UC ONCOLOGY INFUSION   [facility] 16     17       18     19     20     21     22     23     24     LAB   12:00 PM   (15 min.)    LAB   [facility] Lab     CT CHEST ABDOMEN PELVIS WO   12:40 PM   (20 min.)   UCCT1   [facility] CT   25  Happy Birthday!     26     27     28     [facility] RETURN   12:45 PM   (30 min.)   Steve Arceo MD   [facility] 29     30                       Lab Results:  Recent Results (from the past 12 hour(s))   Comprehensive metabolic panel    Collection Time: 10/25/18 12:42 PM   Result Value Ref Range    Sodium 136 133 - 144 mmol/L    Potassium 4.5 3.4 - 5.3 mmol/L    Chloride 104 94 - 109 mmol/L    Carbon Dioxide 25 20 - 32 mmol/L    Anion Gap 7 3 - 14 mmol/L    Glucose 78 70 - 99 mg/dL    Urea Nitrogen 13 7 - 30 mg/dL    Creatinine 1.54 (H) 0.66 - 1.25 mg/dL    GFR Estimate 45 (L) >60 mL/min/1.7m2    GFR Estimate If Black 54 (L) >60 mL/min/1.7m2    Calcium 9.5 8.5 - 10.1 mg/dL    Bilirubin Total 0.4 0.2 - 1.3 mg/dL    Albumin 3.7 3.4 - 5.0 g/dL    Protein Total 7.7 6.8 - 8.8 g/dL    Alkaline Phosphatase 72 40 - 150 U/L    ALT 18 0 - 70 U/L    AST 35 0 - 45 U/L   TSH with free T4 reflex    Collection Time: 10/25/18 12:42 PM   Result Value Ref Range    TSH 0.56 0.40 - 4.00 mU/L   Magnesium    Collection Time: 10/25/18 12:42  PM   Result Value Ref Range    Magnesium 1.9 1.6 - 2.3 mg/dL               Follow-ups after your visit        Your next 10 appointments already scheduled     Nov 15, 2018 11:45 AM CST   [facility] Draw with [facility] LAB DRAW   [facility] Lab Draw ([facility])    [address]  [address]  [facility] [address]   [phone]            Nov 15, 2018 12:10 PM CST   (Arrive by 11:55 AM)   Return Visit with ISAURA Roach CNP   [facility] ([facility])    [address]  [address]  [place] [address]   [phone]            Nov 15, 2018  1:00 PM CST   Infusion 60 with  ONCOLOGY INFUSION, UC 11 ATC   [facility] ([facility])    [address] [place] [address]  [address]  [place] [address]   [phone]            Nov 24, 2018 12:00 PM CST   LAB with  LAB   [facility] Lab ([facility])    [address] [place] [address]  1st Floor  [facility] [address]   [phone]           Please do not eat 10-12 hours before your appointment if you are coming in fasting for labs on lipids, cholesterol, or glucose (sugar). This does not apply to pregnant women. Water, hot tea and black coffee (with nothing added) are okay. Do not drink other fluids, diet soda or chew gum.            Nov 24, 2018 12:40 PM CST   CT CHEST ABDOMEN PELVIS W/O CONTRAST with UCCT1   [facility] CT ([facility])    [address]  1st Floor  [place] [facility] [address]   [phone]           How do I prepare for my exam? (Food and drink instructions) No Food and Drink Restrictions.  How do I prepare for my exam? (Other instructions) You do not need to do anything special to prepare for this exam. For a sinus scan: Use your nose spray (nasal decongestant spray) as directed.  What should I wear: Please wear loose clothing, such as a sweat suit or  jogging clothes. Avoid snaps, zippers and other metal. We may ask you to undress and put on a hospital gown.  How long does the exam take: Most scans take less than 20 minutes.  What should I bring: Please bring any scans or X-rays taken at other hospitals, if similar tests were done. Also bring a list of your medicines, including vitamins, minerals and over-the-counter drugs. It is safest to leave personal items at home.  Do I need a : No  is needed.  What do I need to tell my doctor? Be sure to tell your doctor: * If you have any allergies. * If there s any chance you are pregnant. * If you are breastfeeding.  What should I do after the exam: No restrictions, you may resume normal activities.  What is this test: A CT (computed tomography) scan is a series of pictures that allows us to look inside your body. The scanner creates images of the body in cross sections, much like slices of bread. This helps us see any problems more clearly.  Who should I call with questions: If you have any questions, please call the Imaging Department where you will have your exam. Directions, parking instructions, and other information are available on our website, [email or web address].            Nov 28, 2018  1:00 PM CST   (Arrive by 12:45 PM)   Return Visit with Steve Arceo MD   [facility] ([facility])    [address] [place] [address]  [address]  [facility] [address] [phone]            Jan 24, 2019 11:00 AM CST   XR VIDEO SPEECH EVALUATION WITH ESOPHAGRAM with UCXR2, DEANGELO GIGU RAD   [facility] Xray ([facility])    [address] [place] [address]  1st Floor  [facility] [id number] [phone]           How do I prepare for my exam? (Food and drink instructions) Do not eat for 4 hours before the exam. Keep drinking clear liquids until 2 hours before the exam.  How do I prepare for my exam? (Other instructions) You may take pain medicine  (with a sip of water) up to 4 hours before the exam. Do not swallow any other medicines unless your doctor tells you to. Talk to your doctor to be sure it s safe to stop your medicines.  What should I wear: Wear comfortable clothes.  How long does the exam take: The exam usually takes about 30-45 minutes.  What should I bring: Bring a list of your current medicines to your exam. (Include vitamins, minerals and over-the-counter medicines.) Leave your valuables at home. Do I need a :  No  is needed.  What do I need to tell my doctor:  If you think you might be pregnant, tell your doctor.  What should I do after the exam: Drink lots of fluids in the next one to two days. This will help you pass the rest of the barium. Your stool may be white. Take walks if possible. You may take a mild laxative (medicine to loosten your stools), but check with your doctor first. If you don t have a bowel movement within two days, call your doctor.  What is this test: This X-ray exam look at your esophagus. This exam uses barium (a white liquid) to help the X-rays show up more clearly.  Who should I call with questions: If you have any questions, please call the Imaging Department where you will have your exam. Directions, parking instructions, and other information is available on our website, [email or web address].            Jan 24, 2019 11:00 AM CST   Video Swallow with PEDRO Mcclellan   [facility] ([facility])    [address]  4th Floor  [facility] [address] [phone]           Please check in for this visit in Imaging on the 1st floor.              Who to contact     If you have questions or need follow up information about today's clinic visit or your schedule please contact [facility] directly at [phone].  Normal or non-critical lab and imaging results will be communicated to you by MyChart, letter or phone within 4 business days after the  clinic has received the results. If you do not hear from us within 7 days, please contact the clinic through [employer] or phone. If you have a critical or abnormal lab result, we will notify you by phone as soon as possible.  Submit refill requests through [employer] or call your pharmacy and they will forward the refill request to us. Please allow 3 business days for your refill to be completed.          Additional Information About Your Visit        Care EveryWhere ID     This is your Care EveryWhere ID. This could be used by other organizations to access your [place] medical records  [id number]        Your Vitals Were     Pulse Temperature Respirations Pulse Oximetry BMI (Body Mass Index)       67 98  F (36.7  C) (Oral) 18 100% 17.41 kg/m2        Blood Pressure from Last 3 Encounters:   10/25/18 151/80   10/04/18 148/75   09/26/18 133/73    Weight from Last 3 Encounters:   10/25/18 64.9 kg (143 lb)   10/04/18 66.3 kg (146 lb 1.6 oz)   09/26/18 64.8 kg (142 lb 13.7 oz)              We Performed the Following     Comprehensive metabolic panel     Magnesium     TSH with free T4 reflex          Today's Medication Changes          These changes are accurate as of 10/25/18  2:28 PM.  If you have any questions, ask your nurse or doctor.               These medicines have changed or have updated prescriptions.        Dose/Directions    amLODIPine 10 MG tablet   Commonly known as:  NORVASC   This may have changed:  when to take this   Used for:  Benign essential hypertension        Dose:  10 mg   Take 1 tablet (10 mg) by mouth daily   Quantity:  90 tablet   Refills:  3         Stop taking these medicines if you haven't already. Please contact your care team if you have questions.     prochlorperazine 10 MG tablet   Commonly known as:  COMPAZINE                    Primary Care Provider Office Phone # Fax #    Joan Ventura [phone]       [address] [address]         Equal Access to Services     [facility] : Hadii aad ku hadnikkijanine Robert, wacarlosda luqirisha, qagordonta meghannazronald montoya. So [facility] [place] [facility] [phone].    ATENCIÓN: Si habla español, tiene a washburn disposición servicios gratuitos de asistencia lingüística. Leonidame al [phone].    We comply with applicable federal civil rights laws and [place] laws. We do not discriminate on the basis of race, color, national origin, age, disability, sex, sexual orientation, or gender identity.            Thank you!     Thank you for choosing [facility]  for your care. Our goal is always to provide you with excellent care. Hearing back from our patients is one way we can continue to improve our services. Please take a few minutes to complete the written survey that you may receive in the mail after your visit with us. Thank you!             Your Updated Medication List - Protect others around you: Learn how to safely use, store and throw away your medicines at www.disposemymeds.org.          This list is accurate as of 10/25/18  2:28 PM.  Always use your most recent med list.                   Brand Name Dispense Instructions for use Diagnosis    acetaminophen 325 MG tablet    TYLENOL    150 tablet    Take 2 tablets (650 mg) by mouth every 4 hours as needed for mild pain or fever    Urethral cancer (H)       amLODIPine 10 MG tablet    NORVASC    90 tablet    Take 1 tablet (10 mg) by mouth daily    Benign essential hypertension       docusate sodium 100 MG capsule    COLACE    60 capsule    Take 1 capsule (100 mg) by mouth 2 times daily as needed for constipation    Slow transit constipation       fluticasone 50 MCG/ACT spray    FLONASE    2 Bottle    Spray 2 sprays into both nostrils 2 times daily    Urethral cancer (H), Allergic sinusitis       LORazepam 0.5 MG tablet    ATIVAN    30 tablet    Take 1 tablet (0.5 mg) by mouth every 8 hours as needed for anxiety    Anxiety        omeprazole 2 mg/mL Susp    priLOSEC    280 mL    Take 10 mLs (20 mg) by mouth 2 times daily    Gastroesophageal reflux disease with esophagitis, Urethral cancer (H)       VITAMIN B COMPLEX PO      Take by mouth daily (with lunch)

## 2018-10-25 NOTE — PROGRESS NOTES
Infusion Nursing Note:  King Gonsalo Bruno presents today for Cycle 5 Day 1 Keytruda.    Patient seen by provider today: No   present during visit today: Not Applicable.    Note: Patient feeling well today. No pain. Had an episode of diarrhea this morning but pt states it was food-related. Still experiencing numbness/tingling in his feet.    Intravenous Access:  Peripheral IV placed.    Treatment Conditions:  Lab Results   Component Value Date    HGB 10.1 09/13/2018     Lab Results   Component Value Date    WBC 7.1 09/13/2018      Lab Results   Component Value Date    ANEU 4.1 09/13/2018     Lab Results   Component Value Date     09/13/2018      Lab Results   Component Value Date     10/25/2018                   Lab Results   Component Value Date    POTASSIUM 4.5 10/25/2018           Lab Results   Component Value Date    MAG 1.9 10/25/2018            Lab Results   Component Value Date    CR 1.54 10/25/2018                   Lab Results   Component Value Date    SAADIA 9.5 10/25/2018                Lab Results   Component Value Date    BILITOTAL 0.4 10/25/2018           Lab Results   Component Value Date    ALBUMIN 3.7 10/25/2018                    Lab Results   Component Value Date    ALT 18 10/25/2018           Lab Results   Component Value Date    AST 35 10/25/2018       Results reviewed, labs MET treatment parameters, ok to proceed with treatment.      Post Infusion Assessment:  Patient tolerated infusion without incident.  Blood return noted pre and post infusion.  Site patent and intact, free from redness, edema or discomfort.  No evidence of extravasations.  Access discontinued per protocol.    Discharge Plan:   Patient declined prescription refills.  Discharge instructions reviewed with: Patient and wife.  Patient and/or family verbalized understanding of discharge instructions and all questions answered.  Copy of AVS reviewed with patient and/or family.  Patient will return 11/15 for  infusion and clinic visit with Yessica Ovalles NP.  Patient discharged in stable condition accompanied by: wife.  Departure Mode: Ambulatory.    Mariela eRes RN

## 2018-10-25 NOTE — PATIENT INSTRUCTIONS
Contact Numbers    [facility] Main Line: [phone]  [facility] Triage and After Hours Nurse Line:  [phone]    Please call the [facility] nurse triage or the after hours nurse line if you experience a temperature greater than or equal to 100.5, shaking chills, have uncontrolled nausea, vomiting and/or diarrhea, dizziness, lightheadedness, shortness of breath, chest pain, bleeding, unexplained bruising, or if you have any other new/concerning symptoms, questions or concerns.     If you are having any concerning symptoms or wish to speak to a provider before your next infusion visit, please call your care coordinator or triage to notify them so we can adequately serve you.     If you need a refill on a narcotic prescription or other medication, please call triage before your infusion appointment.           October 2018 Sunday Monday Tuesday Wednesday Thursday Friday Saturday        1     2     3     4     [facility]   12:15 PM   (15 min.)    [facility]     [facility] ONC INFUSION 60    1:00 PM   (60 min.)    ONCOLOGY INFUSION   [facility] 5     6       7     8     9     TREATMENT 45   10:45 AM   (45 min.)   Khalida March SLP   [facility] 10     11     12     13       14     15     16     17     18     19     20       21     22     23     24     25     [facility] DRAW   12:15 PM   (15 min.)    [facility] LAB DRAW   [facility] Lab Draw     UMP ONC INFUSION 60    1:00 PM   (60 min.)    ONCOLOGY INFUSION   [facility] 26     27       28     29     30     31                               November 2018 Sunday Monday Tuesday Wednesday Thursday Friday Saturday                       1     2     3       4     5     6     7     8     9     10       11     12     13     14     15     [facility] DRAW   11:45 AM   (15 min.)    [facility] LAB DRAW   [facility] Lab Draw     UMP RETURN   11:55 AM   (50 min.)   Yessica Ovalles, APRN  CNP   [facility]     [facility] ONC INFUSION 60    1:00 PM   (60 min.)   UC ONCOLOGY INFUSION   [facility] 16     17       18     19     20     21     22     23     24     LAB   12:00 PM   (15 min.)   UC LAB   [facility] Lab     CT CHEST ABDOMEN PELVIS WO   12:40 PM   (20 min.)   UCCT1   [facility] CT   25  Happy Birthday!     26     27     28     [facility] RETURN   12:45 PM   (30 min.)   Steve Arceo MD   [facility] 29     30                       Lab Results:  Recent Results (from the past 12 hour(s))   Comprehensive metabolic panel    Collection Time: 10/25/18 12:42 PM   Result Value Ref Range    Sodium 136 133 - 144 mmol/L    Potassium 4.5 3.4 - 5.3 mmol/L    Chloride 104 94 - 109 mmol/L    Carbon Dioxide 25 20 - 32 mmol/L    Anion Gap 7 3 - 14 mmol/L    Glucose 78 70 - 99 mg/dL    Urea Nitrogen 13 7 - 30 mg/dL    Creatinine 1.54 (H) 0.66 - 1.25 mg/dL    GFR Estimate 45 (L) >60 mL/min/1.7m2    GFR Estimate If Black 54 (L) >60 mL/min/1.7m2    Calcium 9.5 8.5 - 10.1 mg/dL    Bilirubin Total 0.4 0.2 - 1.3 mg/dL    Albumin 3.7 3.4 - 5.0 g/dL    Protein Total 7.7 6.8 - 8.8 g/dL    Alkaline Phosphatase 72 40 - 150 U/L    ALT 18 0 - 70 U/L    AST 35 0 - 45 U/L   TSH with free T4 reflex    Collection Time: 10/25/18 12:42 PM   Result Value Ref Range    TSH 0.56 0.40 - 4.00 mU/L   Magnesium    Collection Time: 10/25/18 12:42 PM   Result Value Ref Range    Magnesium 1.9 1.6 - 2.3 mg/dL

## 2018-11-15 NOTE — LETTER
11/15/2018       RE: King Gonsalo Bruno  [address]  [facility] [address]     Dear Colleague,    Thank you for referring your patient, King Gonsalo Bruno, to the [facility]. Please see a copy of my visit note below.    Reason for Visit: seen in f/u of penile urethral carcinoma    Oncology HPI:   Mr. King Gonsalo Bruno is a 72 year old man with a history of stage II penile urethral carcinoma, treated with cisplatin/gemzar (split on day 1 and 8 given hx of CKD). His first 2 cycles were complicated by dehydration and nausea. He had a positive response and completed 4 cycles in November 2017. He underwent radial penectomy and urethrectomy, perineal urethrostomy and bilateral inguinal node dissection on 3/8/18. Surgical pathology demonstrated moderately differentiated SCC of the penile urethra with extension into the corpus spongiosum and cavernosum of the penile shaft. The specimen was positive for lymphovascular and perineural invasion. Margins were negative. 1/5 L inguinal nodes were positive and 1/7 R inguinal nodes were positive for disease. He was referred for consideration of radiation. At the time of that appointment, he was found to have multiple new pulmonary nodules concerning for metastatic disease. Biopsy was discussed, but after discussion with patient, opted to observe with a PET/CT 6 weeks later. PET/CT on 6/26/18 showed multiple hypermetabolic pulmonary nodules. He was offered palliative intent immunotherapy. He was started on Keytruda on 8/3/18. He had stable disease after 3 cycles and is here for evaluation prior to cycle 6.    Interval history: King Gonsalo is here with his significant other, Stephanie. He has been feeling pretty well. His biggest complaint relates to neck pain. Has had chronic arthritis in the neck that seems to have gotten worse. No focal weakness or neuropathy. Is nervous about using medications to manage the pain. Does Anthony Chi with some improvement. Uses  1/2 tab of tylenol on occasion, but doesn't think that is doing much. No cough, sob, cp, palpitation. No change to urination. Bowels are regular. Eating/drinking well. Has a sweet tooth and eats a lot of snacks at night. Continues to be active, visiting with friends and golfing when the weather accomodates.    Current Outpatient Prescriptions   Medication Sig Dispense Refill     acetaminophen (TYLENOL) 325 MG tablet Take 2 tablets (650 mg) by mouth every 4 hours as needed for mild pain or fever 150 tablet 0     amLODIPine (NORVASC) 10 MG tablet Take 1 tablet (10 mg) by mouth daily (Patient taking differently: Take 10 mg by mouth every morning ) 90 tablet 3     B Complex Vitamins (VITAMIN B COMPLEX PO) Take by mouth daily (with lunch)       docusate sodium (COLACE) 100 MG capsule Take 1 capsule (100 mg) by mouth 2 times daily as needed for constipation (Patient not taking: Reported on 10/25/2018) 60 capsule 0     fluticasone (FLONASE) 50 MCG/ACT spray Spray 2 sprays into both nostrils 2 times daily 2 Bottle 11     LORazepam (ATIVAN) 0.5 MG tablet Take 1 tablet (0.5 mg) by mouth every 8 hours as needed for anxiety 30 tablet 3     omeprazole (PRILOSEC) 2 mg/mL SUSP Take 10 mLs (20 mg) by mouth 2 times daily 280 mL 11          Allergies   Allergen Reactions     Lisinopril Swelling     Oxycodone Nausea and Vomiting     Vicodin [Hydrocodone-Acetaminophen] Nausea and Vomiting         Exam: alert, appears well.  Blood pressure 125/70, pulse 71, temperature 98.7  F (37.1  C), temperature source Oral, resp. rate 16, weight 65.7 kg (144 lb 13.5 oz), SpO2 100 %.  Wt Readings from Last 4 Encounters:   10/25/18 64.9 kg (143 lb)   10/04/18 66.3 kg (146 lb 1.6 oz)   09/26/18 64.8 kg (142 lb 13.7 oz)   09/13/18 63.5 kg (140 lb)     Oropharynx is moist, no focal lesion. No icterus. Neck supple and without adenopathy. Lungs:CTA. Heart:RRR, no murmur or rub. Abdomen: soft, nontender, BS active. No masses or organomegaly. Extremities:  warm, no edema. Speech is clear. CN wnl. Gait/station    Labs: Results for KING AILYN CHAVEZ (MRN [id number]) as of 11/16/2018 17:49   Ref. Range 11/15/2018 12:23   Sodium Latest Ref Range: 133 - 144 mmol/L 137   Potassium Latest Ref Range: 3.4 - 5.3 mmol/L 4.3   Chloride Latest Ref Range: 94 - 109 mmol/L 104   Carbon Dioxide Latest Ref Range: 20 - 32 mmol/L 24   Urea Nitrogen Latest Ref Range: 7 - 30 mg/dL 17   Creatinine Latest Ref Range: 0.66 - 1.25 mg/dL 1.59 (H)   GFR Estimate Latest Ref Range: >60 mL/min/1.7m2 43 (L)   GFR Estimate If Black Latest Ref Range: >60 mL/min/1.7m2 52 (L)   Calcium Latest Ref Range: 8.5 - 10.1 mg/dL 9.6   Anion Gap Latest Ref Range: 3 - 14 mmol/L 9   Magnesium Latest Ref Range: 1.6 - 2.3 mg/dL 1.8   Albumin Latest Ref Range: 3.4 - 5.0 g/dL 3.8   Protein Total Latest Ref Range: 6.8 - 8.8 g/dL 8.1   Bilirubin Total Latest Ref Range: 0.2 - 1.3 mg/dL 0.4   Alkaline Phosphatase Latest Ref Range: 40 - 150 U/L 83   ALT Latest Ref Range: 0 - 70 U/L 18   AST Latest Ref Range: 0 - 45 U/L 28   TSH Latest Ref Range: 0.40 - 4.00 mU/L 0.68   Glucose Latest Ref Range: 70 - 99 mg/dL 78       Imaging: n/a    Impression/plan:   1. Recurrent, metastatic penile urethral carcinoma  -tolerating well and has had stable disease. Mild increased in arthritis and fatigue since starting pembrolizumab, but feels it is manageable-  -will proceed with cycle 6 today. F/u with Dr. Arceo prior to the next infusion with restaging scans    2. Arthritis: has worsened on immunotherapy. Only using 1/2 tylenol at time  -recommended increasing to 1 gm q 6 hour prn to manage the neck pain. Max 4g/day  -ok to try topical treatments, accupuncture, stretching as he has been doing    3. CKD-creatinine stable and consistent with baseline. Continue to monitor    5. HTN  -BP stable, on amlodipine 10 mg daily      Again, thank you for allowing me to participate in the care of your patient.      Sincerely,    ISAURA Verma  CNP

## 2018-11-15 NOTE — PROGRESS NOTES
Infusion Nursing Note:  King Gonsalo Bruno presents today for Cycle 6 keytruda.    Patient seen and examined by Yessica Ovalles in clinic prior to infusion.        Intravenous Access:  Peripheral IV placed in lab    Treatment Conditions:  Lab Results   Component Value Date    HGB 10.1 09/13/2018     Lab Results   Component Value Date    WBC 7.1 09/13/2018      Lab Results   Component Value Date    ANEU 4.1 09/13/2018     Lab Results   Component Value Date     09/13/2018      Lab Results   Component Value Date     11/15/2018                   Lab Results   Component Value Date    POTASSIUM 4.3 11/15/2018           Lab Results   Component Value Date    MAG 1.8 11/15/2018            Lab Results   Component Value Date    CR 1.59 11/15/2018                   Lab Results   Component Value Date    SAADIA 9.6 11/15/2018                Lab Results   Component Value Date    BILITOTAL 0.4 11/15/2018           Lab Results   Component Value Date    ALBUMIN 3.8 11/15/2018                    Lab Results   Component Value Date    ALT 18 11/15/2018           Lab Results   Component Value Date    AST 28 11/15/2018       Results reviewed, labs MET treatment parameters, ok to proceed with treatment.      Post Infusion Assessment:  Patient tolerated infusion without incident.    Discharge Plan:   Patient declined prescription refills.  Copy of AVS reviewed with patient and/or family.  Patient will return to [facility] for cycle 7 on 12/6/18.    Face to Face time: 0.    Montserrat Moyer RN

## 2018-11-15 NOTE — NURSING NOTE
"Oncology Rooming Note    November 15, 2018 12:41 PM   King Gonsalo Bruno is a 72 year old male who presents for:    Chief Complaint   Patient presents with     Oncology Clinic Visit     Return for Bladder Ca , labs, Tx     Initial Vitals: /70  Pulse 71  Temp 98.7  F (37.1  C) (Oral)  Resp 16  Wt 65.7 kg (144 lb 13.5 oz)  SpO2 100%  BMI 17.64 kg/m2 Estimated body mass index is 17.64 kg/(m^2) as calculated from the following:    Height as of 9/13/18: 1.93 m (6' 3.98\").    Weight as of this encounter: 65.7 kg (144 lb 13.5 oz). Body surface area is 1.88 meters squared.  Moderate Pain (4) Comment: Data Unavailable   No LMP for male patient.  Allergies reviewed: Yes  Medications reviewed: Yes    Medications: MEDICATION REFILLS NEEDED TODAY. Provider was notified.  Pharmacy name entered into Aibo:    [id number] - [place], [place] - [address] [place] [address] AT [address] [place] [address] & [facility] [place], [place] -  [facility] [phone]    [facility].    6  minutes for nursing intake (face to face time)     Juliana Russell MA              "

## 2018-11-15 NOTE — PROGRESS NOTES
Reason for Visit: seen in f/u of penile urethral carcinoma    Oncology HPI:   Mr. King Gonsalo Bruno is a 72 year old man with a history of stage II penile urethral carcinoma, treated with cisplatin/gemzar (split on day 1 and 8 given hx of CKD). His first 2 cycles were complicated by dehydration and nausea. He had a positive response and completed 4 cycles in November 2017. He underwent radial penectomy and urethrectomy, perineal urethrostomy and bilateral inguinal node dissection on 3/8/18. Surgical pathology demonstrated moderately differentiated SCC of the penile urethra with extension into the corpus spongiosum and cavernosum of the penile shaft. The specimen was positive for lymphovascular and perineural invasion. Margins were negative. 1/5 L inguinal nodes were positive and 1/7 R inguinal nodes were positive for disease. He was referred for consideration of radiation. At the time of that appointment, he was found to have multiple new pulmonary nodules concerning for metastatic disease. Biopsy was discussed, but after discussion with patient, opted to observe with a PET/CT 6 weeks later. PET/CT on 6/26/18 showed multiple hypermetabolic pulmonary nodules. He was offered palliative intent immunotherapy. He was started on Keytruda on 8/3/18. He had stable disease after 3 cycles and is here for evaluation prior to cycle 6.    Interval history: King Gonsalo is here with his significant other, Stephanie. He has been feeling pretty well. His biggest complaint relates to neck pain. Has had chronic arthritis in the neck that seems to have gotten worse. No focal weakness or neuropathy. Is nervous about using medications to manage the pain. Does Anthony Chi with some improvement. Uses 1/2 tab of tylenol on occasion, but doesn't think that is doing much. No cough, sob, cp, palpitation. No change to urination. Bowels are regular. Eating/drinking well. Has a sweet tooth and eats a lot of snacks at night. Continues to be active, visiting  with friends and golfing when the weather accomodates.    Current Outpatient Prescriptions   Medication Sig Dispense Refill     acetaminophen (TYLENOL) 325 MG tablet Take 2 tablets (650 mg) by mouth every 4 hours as needed for mild pain or fever 150 tablet 0     amLODIPine (NORVASC) 10 MG tablet Take 1 tablet (10 mg) by mouth daily (Patient taking differently: Take 10 mg by mouth every morning ) 90 tablet 3     B Complex Vitamins (VITAMIN B COMPLEX PO) Take by mouth daily (with lunch)       docusate sodium (COLACE) 100 MG capsule Take 1 capsule (100 mg) by mouth 2 times daily as needed for constipation (Patient not taking: Reported on 10/25/2018) 60 capsule 0     fluticasone (FLONASE) 50 MCG/ACT spray Spray 2 sprays into both nostrils 2 times daily 2 Bottle 11     LORazepam (ATIVAN) 0.5 MG tablet Take 1 tablet (0.5 mg) by mouth every 8 hours as needed for anxiety 30 tablet 3     omeprazole (PRILOSEC) 2 mg/mL SUSP Take 10 mLs (20 mg) by mouth 2 times daily 280 mL 11          Allergies   Allergen Reactions     Lisinopril Swelling     Oxycodone Nausea and Vomiting     Vicodin [Hydrocodone-Acetaminophen] Nausea and Vomiting         Exam: alert, appears well.  Blood pressure 125/70, pulse 71, temperature 98.7  F (37.1  C), temperature source Oral, resp. rate 16, weight 65.7 kg (144 lb 13.5 oz), SpO2 100 %.  Wt Readings from Last 4 Encounters:   10/25/18 64.9 kg (143 lb)   10/04/18 66.3 kg (146 lb 1.6 oz)   09/26/18 64.8 kg (142 lb 13.7 oz)   09/13/18 63.5 kg (140 lb)     Oropharynx is moist, no focal lesion. No icterus. Neck supple and without adenopathy. Lungs:CTA. Heart:RRR, no murmur or rub. Abdomen: soft, nontender, BS active. No masses or organomegaly. Extremities: warm, no edema. Speech is clear. CN wnl. Gait/station    Labs: Results for KING AILYN CHAVEZ (MRN [id number]) as of 11/16/2018 17:49   Ref. Range 11/15/2018 12:23   Sodium Latest Ref Range: 133 - 144 mmol/L 137   Potassium Latest Ref Range: 3.4 - 5.3  mmol/L 4.3   Chloride Latest Ref Range: 94 - 109 mmol/L 104   Carbon Dioxide Latest Ref Range: 20 - 32 mmol/L 24   Urea Nitrogen Latest Ref Range: 7 - 30 mg/dL 17   Creatinine Latest Ref Range: 0.66 - 1.25 mg/dL 1.59 (H)   GFR Estimate Latest Ref Range: >60 mL/min/1.7m2 43 (L)   GFR Estimate If Black Latest Ref Range: >60 mL/min/1.7m2 52 (L)   Calcium Latest Ref Range: 8.5 - 10.1 mg/dL 9.6   Anion Gap Latest Ref Range: 3 - 14 mmol/L 9   Magnesium Latest Ref Range: 1.6 - 2.3 mg/dL 1.8   Albumin Latest Ref Range: 3.4 - 5.0 g/dL 3.8   Protein Total Latest Ref Range: 6.8 - 8.8 g/dL 8.1   Bilirubin Total Latest Ref Range: 0.2 - 1.3 mg/dL 0.4   Alkaline Phosphatase Latest Ref Range: 40 - 150 U/L 83   ALT Latest Ref Range: 0 - 70 U/L 18   AST Latest Ref Range: 0 - 45 U/L 28   TSH Latest Ref Range: 0.40 - 4.00 mU/L 0.68   Glucose Latest Ref Range: 70 - 99 mg/dL 78       Imaging: n/a    Impression/plan:   1. Recurrent, metastatic penile urethral carcinoma  -tolerating well and has had stable disease. Mild increased in arthritis and fatigue since starting pembrolizumab, but feels it is manageable-  -will proceed with cycle 6 today. F/u with Dr. Arceo prior to the next infusion with restaging scans    2. Arthritis: has worsened on immunotherapy. Only using 1/2 tylenol at time  -recommended increasing to 1 gm q 6 hour prn to manage the neck pain. Max 4g/day  -ok to try topical treatments, accupuncture, stretching as he has been doing    3. CKD-creatinine stable and consistent with baseline. Continue to monitor    5. HTN  -BP stable, on amlodipine 10 mg daily

## 2018-11-15 NOTE — MR AVS SNAPSHOT
After Visit Summary   11/15/2018    King Gonsalo Bruno    MRN: [id number]           Patient Information     Date Of Birth          1945        Visit Information        Provider Department      11/15/2018 12:10 PM Yessica Ovalles APRN [facility]        Today's Diagnoses     Anxiety        Urethral cancer (H)        Malignant neoplasm metastatic to both lungs (H)           Follow-ups after your visit        Your next 10 appointments already scheduled     Nov 24, 2018 12:00 PM CST   LAB with  LAB   [facility] ([facility])    [address] [place] [id number] [phone]           Please do not eat 10-12 hours before your appointment if you are coming in fasting for labs on lipids, cholesterol, or glucose (sugar). This does not apply to pregnant women. Water, hot tea and black coffee (with nothing added) are okay. Do not drink other fluids, diet soda or chew gum.            Nov 24, 2018 12:40 PM CST   CT CHEST ABDOMEN PELVIS W/O CONTRAST with UCCT1   [facility] [place] [facility] ([facility])    [address] [place] [id number] [phone]           How do I prepare for my exam? (Food and drink instructions) No Food and Drink Restrictions.  How do I prepare for my exam? (Other instructions) You do not need to do anything special to prepare for this exam. For a sinus scan: Use your nose spray (nasal decongestant spray) as directed.  What should I wear: Please wear loose clothing, such as a sweat suit or jogging clothes. Avoid snaps, zippers and other metal. We may ask you to undress and put on a hospital gown.  How long does the exam take: Most scans take less than 20 minutes.  What should I bring: Please bring any scans or X-rays taken at other hospitals, if similar tests were done. Also bring a list of your medicines, including vitamins, minerals and over-the-counter  drugs. It is safest to leave personal items at home.  Do I need a : No  is needed.  What do I need to tell my doctor? Be sure to tell your doctor: * If you have any allergies. * If there s any chance you are pregnant. * If you are breastfeeding.  What should I do after the exam: No restrictions, you may resume normal activities.  What is this test: A CT (computed tomography) scan is a series of pictures that allows us to look inside your body. The scanner creates images of the body in cross sections, much like slices of bread. This helps us see any problems more clearly.  Who should I call with questions: If you have any questions, please call the Imaging Department where you will have your exam. Directions, parking instructions, and other information are available on our website, [facility].            Nov 28, 2018  1:00 PM CST   (Arrive by 12:45 PM)   Return Visit with Steve Arceo MD   [facility])    [address] [place] [address]  [address] [place] [id number]   [phone]            Dec 06, 2018  9:00 AM CST   [facility] Lab Draw with UC [facility] LAB DRAW   [facility] ([facility])    [address] [place] [address]  [address] [place] [address]   [phone]            Dec 06, 2018  9:30 AM CST   Infusion 60 with UC ONCOLOGY INFUSION, UC 22 ATC   [facility] ([facility])    [address]  [address]  [facility] [address]   [phone]            Dec 27, 2018 11:45 AM CST   [facility] Lab Draw with UC [facility] LAB DRAW   [facility] ([facility])    [address] [place] [address]  [address]  [facility] [address]   [phone]            Dec 27, 2018 12:10 PM CST   (Arrive by 11:55 AM)   Return Visit with ISAURA Roach CNP   [facility] ([facility])    [address]  [address]  [facility] [id number] [phone]            Dec 27, 2018  1:30 PM CST   Infusion 60 with UC ONCOLOGY INFUSION, UC 28 ATC   [facility] ([facility])    [address]  [address]  [facility] [id number] [phone]              Who to contact     If you have questions or need follow up information about today's clinic visit or your schedule please contact [facility] directly at [phone].  Normal or non-critical lab and imaging results will be communicated to you by MyChart, letter or phone within 4 business days after the clinic has received the results. If you do not hear from us within 7 days, please contact the clinic through MyChart or phone. If you have a critical or abnormal lab result, we will notify you by phone as soon as possible.  Submit refill requests through iLike or call your pharmacy and they will forward the refill request to us. Please allow 3 business days for your refill to be completed.          Additional Information About Your Visit        Care EveryWhere ID     This is your Care EveryWhere ID. This could be used by other organizations to access your [facility] medical records  [id number]        Your Vitals Were     Pulse Temperature Respirations Pulse Oximetry BMI (Body Mass Index)       71 98.7  F (37.1  C) (Oral) 16 100% 17.64 kg/m2        Blood Pressure from Last 3 Encounters:   11/15/18 125/70   11/15/18 125/70   10/25/18 151/80    Weight from Last 3 Encounters:   11/15/18 65.7 kg (144 lb 14.4 oz)   11/15/18 65.7 kg (144 lb 13.5 oz)   10/25/18 64.9 kg (143 lb)              Today, you had the following     No orders found for display         Today's Medication Changes          These changes are accurate as of 11/15/18 11:59 PM.  If you have any questions, ask your nurse or doctor.               These medicines have changed or have updated prescriptions.         Dose/Directions    amLODIPine 10 MG tablet   Commonly known as:  NORVASC   This may have changed:  when to take this   Used for:  Benign essential hypertension        Dose:  10 mg   Take 1 tablet (10 mg) by mouth daily   Quantity:  90 tablet   Refills:  3       LORazepam 0.5 MG tablet   Commonly known as:  ATIVAN   This may have changed:  when to take this   Used for:  Anxiety   Changed by:  Yessica Ovalles, ISAURA CNP        Dose:  0.5 mg   Take 1 tablet (0.5 mg) by mouth nightly as needed for anxiety   Quantity:  30 tablet   Refills:  3            Where to get your medicines      Some of these will need a paper prescription and others can be bought over the counter.  Ask your nurse if you have questions.     Bring a paper prescription for each of these medications     LORazepam 0.5 MG tablet                Primary Care Provider Office Phone # Fax #    Joan Janet Ventura [phone]       [place] [address]        Equal Access to Services     JENELLE RED : Hadii светлана leono Soveronica, waaxda luqadaha, qaybta kaalmada ademabel, ronald srivastava . So [facility] [phone].    ATENCIÓN: Si habla español, tiene a washburn disposición servicios gratuitos de asistencia lingüística. Llame al [phone].    We comply with applicable federal civil rights laws and [place] laws. We do not discriminate on the basis of race, color, national origin, age, disability, sex, sexual orientation, or gender identity.            Thank you!     Thank you for choosing [facility]  for your care. Our goal is always to provide you with excellent care. Hearing back from our patients is one way we can continue to improve our services. Please take a few minutes to complete the written survey that you may receive in the mail after your visit with us. Thank you!             Your Updated Medication List - Protect others around you: Learn how to safely use,  store and throw away your medicines at www.disposemymeds.org.          This list is accurate as of 11/15/18 11:59 PM.  Always use your most recent med list.                   Brand Name Dispense Instructions for use Diagnosis    acetaminophen 325 MG tablet    TYLENOL    150 tablet    Take 2 tablets (650 mg) by mouth every 4 hours as needed for mild pain or fever    Urethral cancer (H)       amLODIPine 10 MG tablet    NORVASC    90 tablet    Take 1 tablet (10 mg) by mouth daily    Benign essential hypertension       docusate sodium 100 MG capsule    COLACE    60 capsule    Take 1 capsule (100 mg) by mouth 2 times daily as needed for constipation    Slow transit constipation       fluticasone 50 MCG/ACT spray    FLONASE    2 Bottle    Spray 2 sprays into both nostrils 2 times daily    Urethral cancer (H), Allergic sinusitis       LORazepam 0.5 MG tablet    ATIVAN    30 tablet    Take 1 tablet (0.5 mg) by mouth nightly as needed for anxiety    Anxiety       omeprazole 2 mg/mL Susp    priLOSEC    280 mL    Take 10 mLs (20 mg) by mouth 2 times daily    Gastroesophageal reflux disease with esophagitis, Urethral cancer (H)       VITAMIN B COMPLEX PO      Take by mouth daily (with lunch)

## 2018-11-15 NOTE — MR AVS SNAPSHOT
After Visit Summary   11/15/2018    King Gonsalo Bruno    MRN: [id number]           Patient Information     Date Of Birth          1945        Visit Information        Provider Department      11/15/2018 1:00 PM  11 ATC;  ONCOLOGY INFUSION [facility]        Today's Diagnoses     Malignant neoplasm metastatic to both lungs (H)    -  1    Urethral cancer (H)        Gastroesophageal reflux disease with esophagitis        Hypokalemia        Nausea        Dehydration          Care Instructions    Contact Numbers    [facility] Main Line: [phone]  [facility] Triage and After Hours Nurse Line:  [phone]    Please call the [facility] nurse triage or the after hours nurse line if you experience a temperature greater than or equal to 100.5, shaking chills, have uncontrolled nausea, vomiting and/or diarrhea, dizziness, lightheadedness, shortness of breath, chest pain, bleeding, unexplained bruising, or if you have any other new/concerning symptoms, questions or concerns.     If you are having any concerning symptoms or wish to speak to a provider before your next infusion visit, please call your care coordinator or triage to notify them so we can adequately serve you.     If you need a refill on a narcotic prescription or other medication, please call triage before your infusion appointment.             November 2018 Sunday Monday Tuesday Wednesday Thursday Friday Saturday                       1     2     3       4     5     6     7     8     9     10       11     12     13     14     15     [facility] LAB DRAW   11:45 AM   (15 min.)   [facility] LAB DRAW   [facility] Lab Draw     [facility] RETURN   11:55 AM   (50 min.)   Yessica Ovalles APRN CNP   [facility] ONC INFUSION 60    1:00 PM   (60 min.)    ONCOLOGY INFUSION   [facility] 16     17       18     19     20     21     22     23     24     LAB   12:00 PM   (15 min.)    LAB   [facility]  Lab     CT CHEST ABDOMEN PELVIS WO   12:40 PM   (20 min.)   UCCT1   [facility] CT   27  Happy Birthday!     26     27     28     [facility] RETURN   12:45 PM   (30 min.)   Steve Arceo MD   [facility] 29 30 December 2018 Sunday Monday Tuesday Wednesday Thursday Friday Saturday                                 1       2     3     4     5     6     7     8       9     10     11     12     13     14     15       16     17     18     19     20     21     22       23     24     25     26     27     28     29       30     31                                          Recent Results (from the past 24 hour(s))   Comprehensive metabolic panel    Collection Time: 11/15/18 12:23 PM   Result Value Ref Range    Sodium 137 133 - 144 mmol/L    Potassium 4.3 3.4 - 5.3 mmol/L    Chloride 104 94 - 109 mmol/L    Carbon Dioxide 24 20 - 32 mmol/L    Anion Gap 9 3 - 14 mmol/L    Glucose 78 70 - 99 mg/dL    Urea Nitrogen 17 7 - 30 mg/dL    Creatinine 1.59 (H) 0.66 - 1.25 mg/dL    GFR Estimate 43 (L) >60 mL/min/1.7m2    GFR Estimate If Black 52 (L) >60 mL/min/1.7m2    Calcium 9.6 8.5 - 10.1 mg/dL    Bilirubin Total 0.4 0.2 - 1.3 mg/dL    Albumin 3.8 3.4 - 5.0 g/dL    Protein Total 8.1 6.8 - 8.8 g/dL    Alkaline Phosphatase 83 40 - 150 U/L    ALT 18 0 - 70 U/L    AST 28 0 - 45 U/L   TSH with free T4 reflex    Collection Time: 11/15/18 12:23 PM   Result Value Ref Range    TSH 0.68 0.40 - 4.00 mU/L   Magnesium    Collection Time: 11/15/18 12:23 PM   Result Value Ref Range    Magnesium 1.8 1.6 - 2.3 mg/dL                 Follow-ups after your visit        Your next 10 appointments already scheduled     Nov 24, 2018 12:00 PM CST   LAB with  LAB    [facility] ([facility])    [address] [place] [address] [phone]           Please do not eat 10-12 hours before your appointment if you are coming in fasting for labs on lipids,  cholesterol, or glucose (sugar). This does not apply to pregnant women. Water, hot tea and black coffee (with nothing added) are okay. Do not drink other fluids, diet soda or chew gum.            Nov 24, 2018 12:40 PM CST   CT CHEST ABDOMEN PELVIS W/O CONTRAST with UCCT1   [facility] CT ([facility])    [address]  1st Floor  [facility] [address] [phone]           How do I prepare for my exam? (Food and drink instructions) No Food and Drink Restrictions.  How do I prepare for my exam? (Other instructions) You do not need to do anything special to prepare for this exam. For a sinus scan: Use your nose spray (nasal decongestant spray) as directed.  What should I wear: Please wear loose clothing, such as a sweat suit or jogging clothes. Avoid snaps, zippers and other metal. We may ask you to undress and put on a hospital gown.  How long does the exam take: Most scans take less than 20 minutes.  What should I bring: Please bring any scans or X-rays taken at other hospitals, if similar tests were done. Also bring a list of your medicines, including vitamins, minerals and over-the-counter drugs. It is safest to leave personal items at home.  Do I need a : No  is needed.  What do I need to tell my doctor? Be sure to tell your doctor: * If you have any allergies. * If there s any chance you are pregnant. * If you are breastfeeding.  What should I do after the exam: No restrictions, you may resume normal activities.  What is this test: A CT (computed tomography) scan is a series of pictures that allows us to look inside your body. The scanner creates images of the body in cross sections, much like slices of bread. This helps us see any problems more clearly.  Who should I call with questions: If you have any questions, please call the Imaging Department where you will have your exam. Directions, parking instructions, and other information are available on  our website, [email or web address][email or web address].            Nov 28, 2018  1:00 PM CST   (Arrive by 12:45 PM)   Return Visit with Steve Arceo MD   [facility] ([facility])    [address]  [address]  [facility] [address] [phone]            Jan 24, 2019 11:00 AM CST   XR VIDEO SPEECH EVALUATION WITH ESOPHAGRAM with UCXR2, UC GIGU RAD   [facility] Xray ([facility])    [address]  1st Floor  [facility] [id number] [phone]           How do I prepare for my exam? (Food and drink instructions) Do not eat for 4 hours before the exam. Keep drinking clear liquids until 2 hours before the exam.  How do I prepare for my exam? (Other instructions) You may take pain medicine (with a sip of water) up to 4 hours before the exam. Do not swallow any other medicines unless your doctor tells you to. Talk to your doctor to be sure it s safe to stop your medicines.  What should I wear: Wear comfortable clothes.  How long does the exam take: The exam usually takes about 30-45 minutes.  What should I bring: Bring a list of your current medicines to your exam. (Include vitamins, minerals and over-the-counter medicines.) Leave your valuables at home. Do I need a :  No  is needed.  What do I need to tell my doctor:  If you think you might be pregnant, tell your doctor.  What should I do after the exam: Drink lots of fluids in the next one to two days. This will help you pass the rest of the barium. Your stool may be white. Take walks if possible. You may take a mild laxative (medicine to loosten your stools), but check with your doctor first. If you don t have a bowel movement within two days, call your doctor.  What is this test: This X-ray exam look at your esophagus. This exam uses barium (a white liquid) to help the X-rays show up more clearly.  Who should I call with questions: If you have any questions, please  call the Imaging Department where you will have your exam. Directions, parking instructions, and other information is available on our website, [email or web address].            Jan 24, 2019 11:00 AM CST   Video Swallow with PEDRO Mcclellan   [facility] ([facility])    [address]  4th Floor  [facility] [address] [phone]           Please check in for this visit in Imaging on the 1st floor.              Who to contact     If you have questions or need follow up information about today's clinic visit or your schedule please contact [facility] directly at [phone].  Normal or non-critical lab and imaging results will be communicated to you by MyChart, letter or phone within 4 business days after the clinic has received the results. If you do not hear from us within 7 days, please contact the clinic through MyChart or phone. If you have a critical or abnormal lab result, we will notify you by phone as soon as possible.  Submit refill requests through [employer] or call your pharmacy and they will forward the refill request to us. Please allow 3 business days for your refill to be completed.          Additional Information About Your Visit        Care EveryWhere ID     This is your Care EveryWhere ID. This could be used by other organizations to access your [place] medical records  [id number]        Your Vitals Were     Pulse Temperature Respirations Pulse Oximetry BMI (Body Mass Index)       71 98.7  F (37.1  C) (Oral) 16 100% 17.65 kg/m2        Blood Pressure from Last 3 Encounters:   11/15/18 125/70   11/15/18 125/70   10/25/18 151/80    Weight from Last 3 Encounters:   11/15/18 65.7 kg (144 lb 14.4 oz)   11/15/18 65.7 kg (144 lb 13.5 oz)   10/25/18 64.9 kg (143 lb)              We Performed the Following     Comprehensive metabolic panel     Magnesium     TSH with free T4 reflex          Today's Medication Changes          These changes  are accurate as of 11/15/18  1:42 PM.  If you have any questions, ask your nurse or doctor.               These medicines have changed or have updated prescriptions.        Dose/Directions    amLODIPine 10 MG tablet   Commonly known as:  NORVASC   This may have changed:  when to take this   Used for:  Benign essential hypertension        Dose:  10 mg   Take 1 tablet (10 mg) by mouth daily   Quantity:  90 tablet   Refills:  3       LORazepam 0.5 MG tablet   Commonly known as:  ATIVAN   This may have changed:  when to take this   Used for:  Anxiety   Changed by:  Yessica Ovalles APRN CNP        Dose:  0.5 mg   Take 1 tablet (0.5 mg) by mouth nightly as needed for anxiety   Quantity:  30 tablet   Refills:  3            Where to get your medicines      Some of these will need a paper prescription and others can be bought over the counter.  Ask your nurse if you have questions.     Bring a paper prescription for each of these medications     LORazepam 0.5 MG tablet                Primary Care Provider Office Phone # Fax #    Joan Janet Ventura [phone]       [place] [address]        Equal Access to Services     [facility] : Hadii светлана garcia hadasho Soomaali, waaxda luqadaha, qaybta kaalmada ademabel, ronald rasmussen. So [facility] [phone].    ATENCIÓN: Si habla español, tiene a washburn disposición servicios gratuitos de asistencia lingüística. Juwan al [phone].    We comply with applicable federal civil rights laws and [place] laws. We do not discriminate on the basis of race, color, national origin, age, disability, sex, sexual orientation, or gender identity.            Thank you!     Thank you for choosing [facility]  for your care. Our goal is always to provide you with excellent care. Hearing back from our patients is one way we can continue to improve our services. Please take a few minutes to complete the written  survey that you may receive in the mail after your visit with us. Thank you!             Your Updated Medication List - Protect others around you: Learn how to safely use, store and throw away your medicines at www.disposemymeds.org.          This list is accurate as of 11/15/18  1:42 PM.  Always use your most recent med list.                   Brand Name Dispense Instructions for use Diagnosis    acetaminophen 325 MG tablet    TYLENOL    150 tablet    Take 2 tablets (650 mg) by mouth every 4 hours as needed for mild pain or fever    Urethral cancer (H)       amLODIPine 10 MG tablet    NORVASC    90 tablet    Take 1 tablet (10 mg) by mouth daily    Benign essential hypertension       docusate sodium 100 MG capsule    COLACE    60 capsule    Take 1 capsule (100 mg) by mouth 2 times daily as needed for constipation    Slow transit constipation       fluticasone 50 MCG/ACT spray    FLONASE    2 Bottle    Spray 2 sprays into both nostrils 2 times daily    Urethral cancer (H), Allergic sinusitis       LORazepam 0.5 MG tablet    ATIVAN    30 tablet    Take 1 tablet (0.5 mg) by mouth nightly as needed for anxiety    Anxiety       omeprazole 2 mg/mL Susp    priLOSEC    280 mL    Take 10 mLs (20 mg) by mouth 2 times daily    Gastroesophageal reflux disease with esophagitis, Urethral cancer (H)       VITAMIN B COMPLEX PO      Take by mouth daily (with lunch)

## 2018-11-15 NOTE — PATIENT INSTRUCTIONS
Contact Numbers    [facility] Main Line: [phone]  [facility] Triage and After Hours Nurse Line:  [phone]    Please call the [facility] nurse triage or the after hours nurse line if you experience a temperature greater than or equal to 100.5, shaking chills, have uncontrolled nausea, vomiting and/or diarrhea, dizziness, lightheadedness, shortness of breath, chest pain, bleeding, unexplained bruising, or if you have any other new/concerning symptoms, questions or concerns.     If you are having any concerning symptoms or wish to speak to a provider before your next infusion visit, please call your care coordinator or triage to notify them so we can adequately serve you.     If you need a refill on a narcotic prescription or other medication, please call triage before your infusion appointment.             November 2018 Sunday Monday Tuesday Wednesday Thursday Friday Saturday                       1     2     3       4     5     6     7     8     9     10       11     12     13     14     15     [facility]   11[facility]45 AM   (15 min.)   [facility] LAB DRAW   [facility] Lab Draw     [facility] RETURN   11:55 AM   (50 min.)   Yessica Ovalles, ISAURA CNP   [facility]     [facility] ONC INFUSION 60    1:00 PM   (60 min.)   [facility] ONCOLOGY INFUSION   [facility] 16     17       18     19     20     21     22     23     24     LAB   12:00 PM   (15 min.)    LAB   [facility] Lab     CT CHEST ABDOMEN PELVIS WO   12:40 PM   (20 min.)   UCCT1   [facility] CT   25  Happy Birthday!     26     27     28     [facility] RETURN   12:45 PM   (30 min.)   Steve Arceo MD   [facility] 29 30 December 2018 Sunday Monday Tuesday Wednesday Thursday Friday Saturday                                 1       2     3     4     5     6     7     8       9     10     11     12     13     14     15       16     17     18     19     20     21     22       23     24      25     26     27     28     29       30     31                                          Recent Results (from the past 24 hour(s))   Comprehensive metabolic panel    Collection Time: 11/15/18 12:23 PM   Result Value Ref Range    Sodium 137 133 - 144 mmol/L    Potassium 4.3 3.4 - 5.3 mmol/L    Chloride 104 94 - 109 mmol/L    Carbon Dioxide 24 20 - 32 mmol/L    Anion Gap 9 3 - 14 mmol/L    Glucose 78 70 - 99 mg/dL    Urea Nitrogen 17 7 - 30 mg/dL    Creatinine 1.59 (H) 0.66 - 1.25 mg/dL    GFR Estimate 43 (L) >60 mL/min/1.7m2    GFR Estimate If Black 52 (L) >60 mL/min/1.7m2    Calcium 9.6 8.5 - 10.1 mg/dL    Bilirubin Total 0.4 0.2 - 1.3 mg/dL    Albumin 3.8 3.4 - 5.0 g/dL    Protein Total 8.1 6.8 - 8.8 g/dL    Alkaline Phosphatase 83 40 - 150 U/L    ALT 18 0 - 70 U/L    AST 28 0 - 45 U/L   TSH with free T4 reflex    Collection Time: 11/15/18 12:23 PM   Result Value Ref Range    TSH 0.68 0.40 - 4.00 mU/L   Magnesium    Collection Time: 11/15/18 12:23 PM   Result Value Ref Range    Magnesium 1.8 1.6 - 2.3 mg/dL

## 2018-11-15 NOTE — NURSING NOTE
Chief Complaint   Patient presents with     Blood Draw     Labs collected via PIV by RN. VS taken. Pt checked in for next appt     Labs drawn from PIV placed by RN. Line flushed with saline. Vitals taken. Pt checked in for appointment(s).    Lisa BULLARD RN PHN BSN  BMT/Oncology Lab

## 2018-11-28 NOTE — LETTER
11/28/2018       RE: King Gonsalo Bruno  [address]  [facility] [address]     Dear Colleague,    Thank you for referring your patient, King Gonsalo Bruno, to the [facility]. Please see a copy of my visit note below.    [facility]  MEDICAL ONCOLOGY PROGRESS NOTE  Nov 28, 2018     CHIEF COMPLAINT: f/u penile urethral carcinoma    ONCOLOGY HPI:   Mr. King Gonsalo Bruno is a 72 year old man with a history of stage II penile urethral carcinoma, treated with cisplatin/gemzar (split on day 1 and 8 given hx of CKD). His first 2 cycles were complicated by dehydration and nausea. He had a positive response and completed 4 cycles in November 2017. He underwent radial penectomy and urethrectomy, perineal urethrostomy and bilateral inguinal node dissection on 3/8/18. Surgical pathology demonstrated moderately differentiated SCC of the penile urethra with extension into the corpus spongiosum and cavernosum of the penile shaft. The specimen was positive for lymphovascular and perineural invasion. Margins were negative. 1/5 L inguinal nodes were positive and 1/7 R inguinal nodes were positive for disease. He was referred for consideration of radiation. At the time of that appointment, he was found to have multiple new pulmonary nodules concerning for metastatic disease. Biopsy was discussed, but after discussion with patient, opted to observe with a PET/CT 6 weeks later. PET/CT on 6/26/18 showed multiple hypermetabolic pulmonary nodules. He was offered palliative intent immunotherapy. He was started on Keytruda on 8/3/18.      INTERVAL HISTORY:   Gonsalo presents today with his significant other.    He has tolerated pembrolizumab well. He has no problems with this. He has not noticed any significant side effects. He could play golf through the summer. He has happy with the progress this far.     He does admit to having some fatigue and worsening of his arthritis in his neck. He notes that  both of these were expected for him and he is not bothered.     He does get abdominal pain and constipation/diarrhea after eating from bakery. He notes that he has a hard time resisting and eats about 5 pastries at a time including cinnamon buns, [place] [employer],     ROS: Denies HA, visual, hearing or taste changes, dry mouth, mouth sores, N/V, abdominal pain, change in urination, chest pain, SOB, cough, edema, night sweats, fever, chills, mood changes, bleeding      Allergies   Allergen Reactions     Lisinopril Swelling     Oxycodone Nausea and Vomiting     Vicodin [Hydrocodone-Acetaminophen] Nausea and Vomiting       Current Outpatient Prescriptions   Medication     acetaminophen (TYLENOL) 325 MG tablet     amLODIPine (NORVASC) 10 MG tablet     B Complex Vitamins (VITAMIN B COMPLEX PO)     fluticasone (FLONASE) 50 MCG/ACT spray     LORazepam (ATIVAN) 0.5 MG tablet     omeprazole (PRILOSEC) 2 mg/mL SUSP     polyethylene glycol (MIRALAX/GLYCOLAX) packet     docusate sodium (COLACE) 100 MG capsule     No current facility-administered medications for this visit.      Facility-Administered Medications Ordered in Other Visits   Medication     barium sulfate (EZ PAQUE) oral suspension 96%     barium sulfate (EZ-HD) oral suspension 98%     barium sulfate 40% (VARIBAR THIN HONEY) oral suspension     sod bicarbonate-citric acid-simethicone (EZ GAS) 2.21-1.53-0.04 G packet 4 g       EXAM:  /69  Pulse 75  Temp 97.8  F (36.6  C) (Oral)  Wt 66.2 kg (146 lb)  SpO2 100%  BMI 17.78 kg/m2  Wt Readings from Last 4 Encounters:   11/28/18 66.2 kg (146 lb)   11/15/18 65.7 kg (144 lb 14.4 oz)   11/15/18 65.7 kg (144 lb 13.5 oz)   10/25/18 64.9 kg (143 lb)        General: No acute distress  HEENT: EOMI, PERRLA, no scleral icterus, mucus membranes moist and no lesions or thrush  Lymph: Neck is supple and shows no nodes in cervical or supraclavicular   Heart:  RRR, no murmur, gallop or rub  Lungs: CTA-B, no wheezes, rhonchi or  rales  Abdomen: Normal bowel sounds, soft, non tender, not distended, no rebound or guarding, no palpable masses  Extremities: No pitting edema  Skin: No significant rashes or lesions  Neuro: CN II-XII are intact    LABS:   Recent Labs   Lab Test  11/24/18   1223  11/15/18   1223  10/25/18   1242  10/04/18   1240  09/13/18   1231   NA  135  137  136  136  134   POTASSIUM  3.8  4.3  4.5  3.9  4.0   CHLORIDE  102  104  104  101  101   CO2  26  24  25  25  24   ANIONGAP  7  9  7  9  9   BUN  18  17  13  18  20   CR  1.67*  1.59*  1.54*  1.74*  1.87*   GLC  88  78  78  79  83   SAADIA  9.3  9.6  9.5  9.3  10.0     Recent Labs   Lab Test  11/24/18   1223  11/15/18   1223  10/25/18   1242  10/04/18   1240  08/23/18   1240   MAG  1.8  1.8  1.9  1.9  2.0     Recent Labs   Lab Test  09/13/18   1231  06/26/18   1116  04/25/18   1147  03/12/18   0818  03/11/18   0730   12/13/17   0813  12/01/17   0824   WBC  7.1  6.3  5.6  9.2  8.7   < >  4.3  2.2*   HGB  10.1*  11.1*  11.8*  9.9*  9.1*   < >  9.4*  9.1*   PLT  287  277  299  264  239   < >  262  113*   MCV  84  89  91  91  91   < >  91  89   NEUTROPHIL  57.6  53.2  51.3   --    --    --   30.4  66.7    < > = values in this interval not displayed.     Recent Labs   Lab Test  11/24/18   1223  11/15/18   1223  10/25/18   1242   06/26/18   1116   BILITOTAL  0.5  0.4  0.4   < >  0.4   ALKPHOS  83  83  72   < >  76   ALT  20  18  18   < >  16   AST  30  28  35   < >  27   ALBUMIN  4.1  3.8  3.7   < >  4.1   LDH   --    --    --    --   174    < > = values in this interval not displayed.     TSH   Date Value Ref Range Status   11/15/2018 0.68 0.40 - 4.00 mU/L Final   10/25/2018 0.56 0.40 - 4.00 mU/L Final   10/04/2018 0.96 0.40 - 4.00 mU/L Final     No results for input(s): CEA in the last 13974 hours.  Results for orders placed or performed in visit on 11/24/18   CT Chest Abdomen Pelvis w/o Contrast    Narrative    EXAMINATION: CT CHEST ABDOMEN PELVIS W/O CONTRAST, 11/24/2018 12:23  PM    TECHNIQUE:  Helical CT images from the thoracic inlet through the  symphysis pubis were obtained  without contrast.     COMPARISON: CT 9/26/2018    HISTORY: Follow up immunotherapy for penile cancer; Malignant neoplasm  metastatic to both lungs (H); Malignant neoplasm metastatic to both  lungs (H); Urethral cancer (H)    FINDINGS:    Chest: Symmetric appearing thyroid gland. Main pulmonary trunk  measures 3.3 cm in diameter. Thoracic aorta is normal in size. Heart  is normal in size. No pericardial or pleural effusion. Accurate  measurement of the bilateral hilar lymphadenopathy is difficult  without intravenous contrast, although the hilar adenopathy appears  slightly improved, now with concave borders on the right rather than  previously convex borders (for example series 3 image 198). Similarly,  the left hilar lymphadenopathy appears improved (series 3 image 186).    Severe emphysematous changes throughout the lungs. Multiple bilateral  pulmonary nodules which have decreased in size, for example (series  4):  Image 54:8 mm left upper lobe nodule, previously 11 mm  Image 46:1.3 cm right upper lobe nodule, previously 1.9 cm  Image 150: 8mm left lower lobe nodule, previously 12 mm  Image 136:1.4 cm left lower lobe nodule, previously 2.0 cm    In the left upper lobe, there is a 1.9 cm pulmonary nodule with a  mixed response to therapy. Portions of the nodule appears smaller  while other portions have enlarged, in particular the inferoanterior  component (series 4 image 91).    In the right middle lobe, there is a pulmonary nodule with a mixed  response to therapy. Bilaterally, the nodule appears larger measuring  1.8 cm (series 4 image 137), and medially the nodule has decreased in  size, measuring 8 mm on today's study and 1.3 cm on 9/26/2018.    Abdomen and pelvis: Liver and gallbladder are unremarkable. Small  spleen. Bilateral near water density renal cysts. No hydronephrosis.  Normal adrenal glands.  Pancreas divisum versus prominent duct of  Santorini. Small and large bowel are nondistended.     Postsurgical changes of the radical penectomy, urethrectomy, and  bilateral inguinal lymph node dissection. Slightly enlarged 1.2 cm  right iliac chain lymph node, previously 1.0 cm (series 3 image 599).  There is a 2.0 cm left inguinal lymph node which previously measured  1.5 cm (series 3 image 602).    Bones and soft tissues: The 1.8 cm left femoral head subchondral  lucency now has internal bony matrix (series 3 image 589), previously  1.6 cm without a distinct bony matrix. There are advanced degenerative  changes of the left acetabulum. Degenerative changes throughout the  spine. Old C7 spinous process fracture.      Impression    IMPRESSION: In this patient with a history of metastatic penile  cancer, there has been a mixed response to therapy:  1. Two pulmonary nodules have increased in size, while the remainder  of the pulmonary nodules have decreased in size. Decreased bilateral  hilar lymphadenopathy.  2. Enlarged left inguinal and right iliac chain lymph nodes.  3. Slightly enlarged left femoral head subchondral lucency which now  has an internal bony matrix. Although these could represent  degenerative changes, this rapid change in cyst formation from  8/2/2018 is atypical. Recommend continued attention on follow-up.  Alternative follow-up options include PET/CT and bone scan. MRI would  most likely be nonspecific.  4. Advanced emphysematous changes throughout the lungs. Enlarged  pulmonary trunk may represent pulmonary hypertension.    RACHEL HANKS MD         IMPRESSION/PLAN:    Recurrent, metastatic SCC of the penile urethra, s/p resection of primary tumor, now recurrent to the lung  He is tolerating therapy well.   He is being seen with restaging scans. I have reviewed actual images from his CT scan  There are no new lesions. He seems to have stable disease.   Official read suggests stable disease  (mixed response with shrinking in the nodes, pulmonary and small growth 2 of the pulmonary nodules).   I will schedule for another 4 cycles before we get restaging scans.        Fatigue  - stable and not affecting ADL's and able to play golf and enjoy life    FEN:   -Eating/drinking well. Lytes stable  -should be drinking 64 oz of fluid a day  -he has lost a couple a pounds. Continue to monitor. Will have to discuss nutrition supplements if weight continues to go down     CKD:   -Cr stable today, slightly improved     Mood:   -doing better. Treatment was better tolerated which helped     HTN:  -BP stable. On amlodipine 10 mg daily    Over 25 min of direct face to face time spent with patient with more than 50% time spent in counseling and coordinating care.           Again, thank you for allowing me to participate in the care of your patient.      Sincerely,    Steve Arceo MD

## 2018-11-28 NOTE — NURSING NOTE
"Oncology Rooming Note    November 28, 2018 1:01 PM   King Gonsalo Bruno is a 73 year old male who presents for:    Chief Complaint   Patient presents with     Oncology Clinic Visit     Bladder CA; CT results     Initial Vitals: /69  Pulse 75  Temp 97.8  F (36.6  C) (Oral)  Wt 66.2 kg (146 lb)  SpO2 100%  BMI 17.78 kg/m2 Estimated body mass index is 17.78 kg/(m^2) as calculated from the following:    Height as of 9/13/18: 1.93 m (6' 3.98\").    Weight as of this encounter: 66.2 kg (146 lb). Body surface area is 1.88 meters squared.  No Pain (0) Comment: Data Unavailable   No LMP for male patient.  Allergies reviewed: Yes  Medications reviewed: Yes    Medications: Medication refills not needed today.  Pharmacy name entered into Mot[employer]:    [id number] - [place], [place] - [address] [place] [address] AT [address] [place] [address] & [facility] [place], [place] - [address] [place] [address] [phone]    Clinical concerns:  CT results    8 minutes for nursing intake (face to face time)     Ansley Boston CMA              "

## 2018-11-28 NOTE — MR AVS SNAPSHOT
After Visit Summary   11/28/2018    King Gonsalo Bruno    MRN: [id number]           Patient Information     Date Of Birth          1945        Visit Information        Provider Department      11/28/2018 1:00 PM Steve Arceo MD [facility]        Today's Diagnoses     Urethral cancer (H)    -  1    Malignant neoplasm metastatic to both lungs (H)           Follow-ups after your visit        Your next 10 appointments already scheduled     Dec 06, 2018  9:00 AM CST   [facility] Lab Draw with [facility] LAB DRAW   [facility] ([facility])    [address] [place] [address]  [address]  [facility] [address]   [phone]            Dec 06, 2018  9:30 AM CST   Infusion 60 with UC ONCOLOGY INFUSION, UC 22 ATC   [facility] ([facility])    [address] [place] [address]  [address]  [facility] [address]   [phone]            Dec 27, 2018 11:45 AM CST   [facility] Lab Draw with [facility] LAB DRAW   [facility]    [address] [place] [address]  [address]  [facility] [address]   [phone]            Dec 27, 2018 12:10 PM CST   (Arrive by 11:55 AM)   Return Visit with ISAURA Roach CNP   [facility] ([facility])    [address] [place] [address]  [address]  [facility] [address]   [phone]            Dec 27, 2018  1:30 PM CST   Infusion 60 with UC ONCOLOGY INFUSION, UC 28 ATC   [facility] ([facility])    [address] [place] [address]  [address]  [facility] [address]   [phone]            Jan 17, 2019  1:00 PM CST   [facility] Lab Draw with [facility] LAB DRAW   [facility] ([facility])    [address] [place] [address]  [address]  [facility] [address]   [phone]            Jan 17, 2019  1:30 PM CST   Infusion 60 with UC ONCOLOGY INFUSION,  UC 28 ATC   [facility] ([facility])    [address]  [address]  [facility] [address] [phone]              Future tests that were ordered for you today     Open Future Orders        Priority Expected Expires Ordered    CT Chest/Abdomen/Pelvis w Contrast Routine  11/28/2019 11/28/2018            Who to contact     If you have questions or need follow up information about today's clinic visit or your schedule please contact [facility] directly at [phone].  Normal or non-critical lab and imaging results will be communicated to you by MyChart, letter or phone within 4 business days after the clinic has received the results. If you do not hear from us within 7 days, please contact the clinic through MyChart or phone. If you have a critical or abnormal lab result, we will notify you by phone as soon as possible.  Submit refill requests through [employer] or call your pharmacy and they will forward the refill request to us. Please allow 3 business days for your refill to be completed.          Additional Information About Your Visit        Care EveryWhere ID     This is your Care EveryWhere ID. This could be used by other organizations to access your [place] medical records  [id number]        Your Vitals Were     Pulse Temperature Pulse Oximetry BMI (Body Mass Index)          75 97.8  F (36.6  C) (Oral) 100% 17.78 kg/m2         Blood Pressure from Last 3 Encounters:   11/28/18 132/69   11/15/18 125/70   11/15/18 125/70    Weight from Last 3 Encounters:   11/28/18 66.2 kg (146 lb)   11/15/18 65.7 kg (144 lb 14.4 oz)   11/15/18 65.7 kg (144 lb 13.5 oz)                 Today's Medication Changes          These changes are accurate as of 11/28/18  2:01 PM.  If you have any questions, ask your nurse or doctor.               These medicines have changed or have updated prescriptions.        Dose/Directions    amLODIPine 10 MG tablet    Commonly known as:  NORVASC   This may have changed:  when to take this   Used for:  Benign essential hypertension        Dose:  10 mg   Take 1 tablet (10 mg) by mouth daily   Quantity:  90 tablet   Refills:  3                Primary Care Provider Office Phone # Fax #    Joan VenturaMARLENY [phone]       [facility] [address]  [facility] [address]        Equal Access to Services     JENELLE RED AH: Hadii aad ku hadasho Soomaali, waaxda luqadaha, qaybta kaalmada adeegyada, waxay idiin hayaan adeeg kharash la'aan ah. So [facility] [phone].    ATENCIÓN: Si habla esparnie, tiene a washburn disposición servicios gratuitos de asistencia lingüística. Llame al [phone].    We comply with applicable federal civil rights laws and [place] laws. We do not discriminate on the basis of race, color, national origin, age, disability, sex, sexual orientation, or gender identity.            Thank you!     Thank you for choosing [facility]  for your care. Our goal is always to provide you with excellent care. Hearing back from our patients is one way we can continue to improve our services. Please take a few minutes to complete the written survey that you may receive in the mail after your visit with us. Thank you!             Your Updated Medication List - Protect others around you: Learn how to safely use, store and throw away your medicines at www.disposemymeds.org.          This list is accurate as of 11/28/18  2:01 PM.  Always use your most recent med list.                   Brand Name Dispense Instructions for use Diagnosis    acetaminophen 325 MG tablet    TYLENOL    150 tablet    Take 2 tablets (650 mg) by mouth every 4 hours as needed for mild pain or fever    Urethral cancer (H)       amLODIPine 10 MG tablet    NORVASC    90 tablet    Take 1 tablet (10 mg) by mouth daily    Benign essential hypertension       docusate sodium 100 MG capsule    COLACE    60 capsule    Take 1  capsule (100 mg) by mouth 2 times daily as needed for constipation    Slow transit constipation       fluticasone 50 MCG/ACT nasal spray    FLONASE    2 Bottle    Spray 2 sprays into both nostrils 2 times daily    Urethral cancer (H), Allergic sinusitis       LORazepam 0.5 MG tablet    ATIVAN    30 tablet    Take 1 tablet (0.5 mg) by mouth nightly as needed for anxiety    Anxiety       omeprazole 2 mg/mL suspension    priLOSEC    280 mL    Take 10 mLs (20 mg) by mouth 2 times daily    Gastroesophageal reflux disease with esophagitis, Urethral cancer (H)       polyethylene glycol packet    MIRALAX/GLYCOLAX     Take 1 packet by mouth daily    Urethral cancer (H), Malignant neoplasm metastatic to both lungs (H)       VITAMIN B COMPLEX PO      Take by mouth daily (with lunch)

## 2018-11-28 NOTE — PROGRESS NOTES
[facility]  MEDICAL ONCOLOGY PROGRESS NOTE  Nov 28, 2018     CHIEF COMPLAINT: f/u penile urethral carcinoma    ONCOLOGY HPI:   Mr. King Gonsalo Bruno is a 72 year old man with a history of stage II penile urethral carcinoma, treated with cisplatin/gemzar (split on day 1 and 8 given hx of CKD). His first 2 cycles were complicated by dehydration and nausea. He had a positive response and completed 4 cycles in November 2017. He underwent radial penectomy and urethrectomy, perineal urethrostomy and bilateral inguinal node dissection on 3/8/18. Surgical pathology demonstrated moderately differentiated SCC of the penile urethra with extension into the corpus spongiosum and cavernosum of the penile shaft. The specimen was positive for lymphovascular and perineural invasion. Margins were negative. 1/5 L inguinal nodes were positive and 1/7 R inguinal nodes were positive for disease. He was referred for consideration of radiation. At the time of that appointment, he was found to have multiple new pulmonary nodules concerning for metastatic disease. Biopsy was discussed, but after discussion with patient, opted to observe with a PET/CT 6 weeks later. PET/CT on 6/26/18 showed multiple hypermetabolic pulmonary nodules. He was offered palliative intent immunotherapy. He was started on Keytruda on 8/3/18.      INTERVAL HISTORY:   Gonsalo presents today with his significant other.    He has tolerated pembrolizumab well. He has no problems with this. He has not noticed any significant side effects. He could play golf through the summer. He has happy with the progress this far.     He does admit to having some fatigue and worsening of his arthritis in his neck. He notes that both of these were expected for him and he is not bothered.     He does get abdominal pain and constipation/diarrhea after eating from bakery. He notes that he has a hard time resisting and eats about 5 pastries at a time including cinnamon buns,  [place] bar,     ROS: Denies HA, visual, hearing or taste changes, dry mouth, mouth sores, N/V, abdominal pain, change in urination, chest pain, SOB, cough, edema, night sweats, fever, chills, mood changes, bleeding      Allergies   Allergen Reactions     Lisinopril Swelling     Oxycodone Nausea and Vomiting     Vicodin [Hydrocodone-Acetaminophen] Nausea and Vomiting       Current Outpatient Prescriptions   Medication     acetaminophen (TYLENOL) 325 MG tablet     amLODIPine (NORVASC) 10 MG tablet     B Complex Vitamins (VITAMIN B COMPLEX PO)     fluticasone (FLONASE) 50 MCG/ACT spray     LORazepam (ATIVAN) 0.5 MG tablet     omeprazole (PRILOSEC) 2 mg/mL SUSP     polyethylene glycol (MIRALAX/GLYCOLAX) packet     docusate sodium (COLACE) 100 MG capsule     No current facility-administered medications for this visit.      Facility-Administered Medications Ordered in Other Visits   Medication     barium sulfate (EZ PAQUE) oral suspension 96%     barium sulfate (EZ-HD) oral suspension 98%     barium sulfate 40% (VARIBAR THIN HONEY) oral suspension     sod bicarbonate-citric acid-simethicone (EZ GAS) 2.21-1.53-0.04 G packet 4 g       EXAM:  /69  Pulse 75  Temp 97.8  F (36.6  C) (Oral)  Wt 66.2 kg (146 lb)  SpO2 100%  BMI 17.78 kg/m2  Wt Readings from Last 4 Encounters:   11/28/18 66.2 kg (146 lb)   11/15/18 65.7 kg (144 lb 14.4 oz)   11/15/18 65.7 kg (144 lb 13.5 oz)   10/25/18 64.9 kg (143 lb)        General: No acute distress  HEENT: EOMI, PERRLA, no scleral icterus, mucus membranes moist and no lesions or thrush  Lymph: Neck is supple and shows no nodes in cervical or supraclavicular   Heart:  RRR, no murmur, gallop or rub  Lungs: CTA-B, no wheezes, rhonchi or rales  Abdomen: Normal bowel sounds, soft, non tender, not distended, no rebound or guarding, no palpable masses  Extremities: No pitting edema  Skin: No significant rashes or lesions  Neuro: CN II-XII are intact    LABS:   Recent Labs   Lab Test   11/24/18   1223  11/15/18   1223  10/25/18   1242  10/04/18   1240  09/13/18   1231   NA  135  137  136  136  134   POTASSIUM  3.8  4.3  4.5  3.9  4.0   CHLORIDE  102  104  104  101  101   CO2  26  24  25  25  24   ANIONGAP  7  9  7  9  9   BUN  18  17  13  18  20   CR  1.67*  1.59*  1.54*  1.74*  1.87*   GLC  88  78  78  79  83   SAADIA  9.3  9.6  9.5  9.3  10.0     Recent Labs   Lab Test  11/24/18   1223  11/15/18   1223  10/25/18   1242  10/04/18   1240  08/23/18   1240   MAG  1.8  1.8  1.9  1.9  2.0     Recent Labs   Lab Test  09/13/18   1231  06/26/18   1116  04/25/18   1147  03/12/18   0818  03/11/18   0730   12/13/17   0813  12/01/17   0824   WBC  7.1  6.3  5.6  9.2  8.7   < >  4.3  2.2*   HGB  10.1*  11.1*  11.8*  9.9*  9.1*   < >  9.4*  9.1*   PLT  287  277  299  264  239   < >  262  113*   MCV  84  89  91  91  91   < >  91  89   NEUTROPHIL  57.6  53.2  51.3   --    --    --   30.4  66.7    < > = values in this interval not displayed.     Recent Labs   Lab Test  11/24/18   1223  11/15/18   1223  10/25/18   1242   06/26/18   1116   BILITOTAL  0.5  0.4  0.4   < >  0.4   ALKPHOS  83  83  72   < >  76   ALT  20  18  18   < >  16   AST  30  28  35   < >  27   ALBUMIN  4.1  3.8  3.7   < >  4.1   LDH   --    --    --    --   174    < > = values in this interval not displayed.     TSH   Date Value Ref Range Status   11/15/2018 0.68 0.40 - 4.00 mU/L Final   10/25/2018 0.56 0.40 - 4.00 mU/L Final   10/04/2018 0.96 0.40 - 4.00 mU/L Final     No results for input(s): CEA in the last [id number] hours.  Results for orders placed or performed in visit on 11/24/18   CT Chest Abdomen Pelvis w/o Contrast    Narrative    EXAMINATION: CT CHEST ABDOMEN PELVIS W/O CONTRAST, 11/24/2018 12:23 PM    TECHNIQUE:  Helical CT images from the thoracic inlet through the  symphysis pubis were obtained  without contrast.     COMPARISON: CT 9/26/2018    HISTORY: Follow up immunotherapy for penile cancer; Malignant neoplasm  metastatic to both lungs  (H); Malignant neoplasm metastatic to both  lungs (H); Urethral cancer (H)    FINDINGS:    Chest: Symmetric appearing thyroid gland. Main pulmonary trunk  measures 3.3 cm in diameter. Thoracic aorta is normal in size. Heart  is normal in size. No pericardial or pleural effusion. Accurate  measurement of the bilateral hilar lymphadenopathy is difficult  without intravenous contrast, although the hilar adenopathy appears  slightly improved, now with concave borders on the right rather than  previously convex borders (for example series 3 image 198). Similarly,  the left hilar lymphadenopathy appears improved (series 3 image 186).    Severe emphysematous changes throughout the lungs. Multiple bilateral  pulmonary nodules which have decreased in size, for example (series  4):  Image 54:8 mm left upper lobe nodule, previously 11 mm  Image 46:1.3 cm right upper lobe nodule, previously 1.9 cm  Image 150: 8mm left lower lobe nodule, previously 12 mm  Image 136:1.4 cm left lower lobe nodule, previously 2.0 cm    In the left upper lobe, there is a 1.9 cm pulmonary nodule with a  mixed response to therapy. Portions of the nodule appears smaller  while other portions have enlarged, in particular the inferoanterior  component (series 4 image 91).    In the right middle lobe, there is a pulmonary nodule with a mixed  response to therapy. Bilaterally, the nodule appears larger measuring  1.8 cm (series 4 image 137), and medially the nodule has decreased in  size, measuring 8 mm on today's study and 1.3 cm on 9/26/2018.    Abdomen and pelvis: Liver and gallbladder are unremarkable. Small  spleen. Bilateral near water density renal cysts. No hydronephrosis.  Normal adrenal glands. Pancreas divisum versus prominent duct of  Santorini. Small and large bowel are nondistended.     Postsurgical changes of the radical penectomy, urethrectomy, and  bilateral inguinal lymph node dissection. Slightly enlarged 1.2 cm  right iliac chain lymph  node, previously 1.0 cm (series 3 image 599).  There is a 2.0 cm left inguinal lymph node which previously measured  1.5 cm (series 3 image 602).    Bones and soft tissues: The 1.8 cm left femoral head subchondral  lucency now has internal bony matrix (series 3 image 589), previously  1.6 cm without a distinct bony matrix. There are advanced degenerative  changes of the left acetabulum. Degenerative changes throughout the  spine. Old C7 spinous process fracture.      Impression    IMPRESSION: In this patient with a history of metastatic penile  cancer, there has been a mixed response to therapy:  1. Two pulmonary nodules have increased in size, while the remainder  of the pulmonary nodules have decreased in size. Decreased bilateral  hilar lymphadenopathy.  2. Enlarged left inguinal and right iliac chain lymph nodes.  3. Slightly enlarged left femoral head subchondral lucency which now  has an internal bony matrix. Although these could represent  degenerative changes, this rapid change in cyst formation from  8/2/2018 is atypical. Recommend continued attention on follow-up.  Alternative follow-up options include PET/CT and bone scan. MRI would  most likely be nonspecific.  4. Advanced emphysematous changes throughout the lungs. Enlarged  pulmonary trunk may represent pulmonary hypertension.    RACHEL HANKS MD         IMPRESSION/PLAN:    Recurrent, metastatic SCC of the penile urethra, s/p resection of primary tumor, now recurrent to the lung  He is tolerating therapy well.   He is being seen with restaging scans. I have reviewed actual images from his CT scan  There are no new lesions. He seems to have stable disease.   Official read suggests stable disease (mixed response with shrinking in the nodes, pulmonary and small growth 2 of the pulmonary nodules).   I will schedule for another 4 cycles before we get restaging scans.        Fatigue  - stable and not affecting ADL's and able to play golf and enjoy  life    FEN:   -Eating/drinking well. Lytes stable  -should be drinking 64 oz of fluid a day  -he has lost a couple a pounds. Continue to monitor. Will have to discuss nutrition supplements if weight continues to go down     CKD:   -Cr stable today, slightly improved     Mood:   -doing better. Treatment was better tolerated which helped     HTN:  -BP stable. On amlodipine 10 mg daily    Over 25 min of direct face to face time spent with patient with more than 50% time spent in counseling and coordinating care.

## 2018-12-05 NOTE — PROGRESS NOTES
OUTPATIENT SWALLOW  EVALUATION  PLAN OF TREATMENT FOR OUTPATIENT REHABILITATION  (COMPLETE FOR INITIAL CLAIMS ONLY)  Patient's Last Name, First Name, M.I.  YOB: 1945  King Gonsalo Bruno     Provider's Name   Khalida March   Medical Record No.  [id number]     Start of Care Date:   4/6/18   Onset Date:   10/24/17   Type:     ___PT   ____OT  ___X_SLP Medical Diagnosis:   Oropharyngeal dysphagia; Zenker diverticulum     Treatment Diagnosis:  Mild-moderate oropharyngeal dysphagia Visits from SOC:  3     _________________________________________________________________________________  Plan of Treatment/Functional Goals:     Planned Therapy Interventions: Dysphagia Treatment  Dysphagia treatment: Oropharyngeal exercise training, Modified diet education, Instruction of safe swallow strategies, Compensatory strategies for swallowing                     Goals   1. Goal Identifier: Diet       Goal Description: 1. Pt will tolerate soft/moist regular textures and thin liquids with independent use of swallowing strategies and no overt s/sx of aspiration or feeling of pharyngeal stasis in 95% of PO trials.        Target Date: 01/07/19           2. Goal Identifier: Exercises       Goal Description: 2. Pt will complete pharyngeal strengthening exercises, 10 reps 3x/day, when given minimal written cues.        Target Date: 01/07/19           3.                             4.                             5.                            6.                              7.                              8.                               Therapy Frequency: other (see comments) (2x/month x 3 months)       Khalida March, SLP         I CERTIFY THE NEED FOR THESE SERVICES FURNISHED UNDER                             THIS PLAN OF TREATMENT AND WHILE UNDER MY CARE     (Physician attestation of this document indicates review and certification of the therapy  plan).                                        Certification date from: 10/9/18 Certification date to: 1/7/19                                                                                                                                                                     Referring Physician: Dr. Arsenio Ortiz     Initial Assessment                                                                                                                                                     See Epic Evaluation Start Of Care Date: 4/6/18

## 2018-12-06 NOTE — PATIENT INSTRUCTIONS
Contact Numbers    [facility] Main Line: [phone]  [facility] Triage and after hours / weekends / holidays:  [phone]      Please call the triage or after hours line if you experience a temperature greater than or equal to 100.5, shaking chills, have uncontrolled nausea, vomiting and/or diarrhea, dizziness, shortness of breath, chest pain, bleeding, unexplained bruising, or if you have any other new/concerning symptoms, questions or concerns.      If you are having any concerning symptoms or wish to speak to a provider before your next infusion visit, please call your care coordinator or triage to notify them so we can adequately serve you.     If you need a refill on a narcotic prescription or other medication, please call before your infusion appointment.               December 2018 Sunday Monday Tuesday Wednesday Thursday Friday Saturday                                 1       2     3     4     5     6     [facility]    9:00 AM   (15 min.)    [facility]   [facility]     [facility] INFUSION 60    9:30 AM   (60 min.)    ONCOLOGY INFUSION   [facility] 7     8       9     10     11     12     13     14     15       16     17     18     19     20     21     22       23     24     25     26     27     [facility]   11:45 AM   (15 min.)    [facility]   [facility]     [facility] RETURN   11:55 AM   (50 min.)   Yessica Ovalles APRN CNP   [facility]     [facility] ONC INFUSION 60    1:30 PM   (60 min.)    ONCOLOGY INFUSION   [facility] 28     29       30     31                                         January 2019 Sunday Monday Tuesday Wednesday Thursday Friday Saturday             1     2     3     4     5       6     7     8     9     10     11     12       13     14     15     16     17     [facility]    1:00 PM   (15 min.)    [facility]   [facility]     [facility] INFUSION 60     1:30 PM   (60 min.)    ONCOLOGY INFUSION   [facility] 18     19       20     21     22     23     24     VIDEO SWALLOW STUDY   11:00 AM   (60 min.)   Chacha Dillard, SLP   [facility]     XR VIDEO SPEECH W ESOPHAGRAM   11:00 AM   (30 min.)   XR2   [facility] Xray 25     26       27     28     29     30     31                            Lab Results:  Recent Results (from the past 12 hour(s))   Comprehensive metabolic panel    Collection Time: 12/06/18  9:26 AM   Result Value Ref Range    Sodium 137 133 - 144 mmol/L    Potassium 4.7 3.4 - 5.3 mmol/L    Chloride 103 94 - 109 mmol/L    Carbon Dioxide 25 20 - 32 mmol/L    Anion Gap 9 3 - 14 mmol/L    Glucose 89 70 - 99 mg/dL    Urea Nitrogen 13 7 - 30 mg/dL    Creatinine 1.56 (H) 0.66 - 1.25 mg/dL    GFR Estimate 44 (L) >60 mL/min/1.7m2    GFR Estimate If Black 53 (L) >60 mL/min/1.7m2    Calcium 9.6 8.5 - 10.1 mg/dL    Bilirubin Total 0.5 0.2 - 1.3 mg/dL    Albumin 3.9 3.4 - 5.0 g/dL    Protein Total 8.3 6.8 - 8.8 g/dL    Alkaline Phosphatase 78 40 - 150 U/L    ALT 18 0 - 70 U/L    AST 34 0 - 45 U/L   TSH with free T4 reflex    Collection Time: 12/06/18  9:26 AM   Result Value Ref Range    TSH 0.71 0.40 - 4.00 mU/L

## 2018-12-06 NOTE — NURSING NOTE
Chief Complaint   Patient presents with     Blood Draw     Labs drawn from PIV placed by RN. Saline locked. Vs taken and pt checked in for appt.     Labs drawn from PIV placed by RN. Line flushed with saline. Vitals taken. Pt checked in for appointment(s).    Mariela Rees RN

## 2018-12-06 NOTE — PROGRESS NOTES
Infusion Nursing Note:  King Gonsalo Bruno presents today for Cycle 7, Day 1 Pembrolizumab.    Patient seen by provider today: No   present during visit today: Not Applicable.    Note: Pt assessed prior to initiating Keytruda infusion. No new issues or symptoms to report. Tolerated infusion w/o issue.     Intravenous Access:  Peripheral IV placed.      Treatment Conditions:  Lab Results   Component Value Date    HGB 10.1 09/13/2018     Lab Results   Component Value Date    WBC 7.1 09/13/2018      Lab Results   Component Value Date    ANEU 4.1 09/13/2018     Lab Results   Component Value Date     09/13/2018      Lab Results   Component Value Date     12/06/2018                   Lab Results   Component Value Date    POTASSIUM 4.7 12/06/2018           Lab Results   Component Value Date    MAG 1.8 11/24/2018            Lab Results   Component Value Date    CR 1.56 12/06/2018                   Lab Results   Component Value Date    SAADIA 9.6 12/06/2018                Lab Results   Component Value Date    BILITOTAL 0.5 12/06/2018           Lab Results   Component Value Date    ALBUMIN 3.9 12/06/2018                    Lab Results   Component Value Date    ALT 18 12/06/2018           Lab Results   Component Value Date    AST 34 12/06/2018       Results reviewed, labs MET treatment parameters, ok to proceed with treatment.    Post Infusion Assessment:  Patient tolerated infusion without incident.  Blood return noted pre and post infusion.  Site patent and intact, free from redness, edema or discomfort.  No evidence of extravasations.  Access discontinued per protocol.    Discharge Plan:   Patient declined prescription refills.  Copy of AVS reviewed with patient and/or family.  Patient will return 12/27/18 for next appointment.  Patient discharged in stable condition accompanied by: self.  Departure Mode: Ambulatory.    Sana Bonilla RN

## 2018-12-06 NOTE — MR AVS SNAPSHOT
After Visit Summary   12/6/2018    King Gonsalo Bruno    MRN: [id number]           Patient Information     Date Of Birth          1945        Visit Information        Provider Department      12/6/2018 9:30 AM  22 ATC;  ONCOLOGY INFUSION [facility]        Today's Diagnoses     Malignant neoplasm metastatic to both lungs (H)    -  1    Urethral cancer (H)          Care Instructions    Contact Numbers    [facility] Main Line: [phone]  [facility] Triage and after hours / weekends / holidays:  [phone]      Please call the triage or after hours line if you experience a temperature greater than or equal to 100.5, shaking chills, have uncontrolled nausea, vomiting and/or diarrhea, dizziness, shortness of breath, chest pain, bleeding, unexplained bruising, or if you have any other new/concerning symptoms, questions or concerns.      If you are having any concerning symptoms or wish to speak to a provider before your next infusion visit, please call your care coordinator or triage to notify them so we can adequately serve you.     If you need a refill on a narcotic prescription or other medication, please call before your infusion appointment.               December 2018 Sunday Monday Tuesday Wednesday Thursday Friday Saturday                                 1       2     3     4     5     6     P [facility] DRAW    9:00 AM   (15 min.)   [facility]   [facility] Draw     P ONC INFUSION 60    9:30 AM   (60 min.)    ONCOLOGY INFUSION   [facility] 7     8       9     10     11     12     13     14     15       16     17     18     19     20     21     22       23     24     25     26     27     P [facility] DRAW   11:45 AM   (15 min.)    [facility] D[facility]   [facility] Draw     UMP RETURN   11:55 AM   (50 min.)   Yessica Ovalles APRN CNP   [facility]     UMP ONC INFUSION 60    1:30 PM   (60 min.)    ONCOLOGY  INFUSION   [facility] 28 29 30 31 January 2019 Sunday Monday Tuesday Wednesday Thursday Friday Saturday             1     2     3     4     5       6     7     8     9     10     11     12       13     14     15     16     17     [facility] DRAW    1:00 PM   (15 min.)    [facility] DRAW   [facility] Draw     UM ONC INFUSION 60    1:30 PM   (60 min.)   UC ONCOLOGY INFUSION   [facility] 18     19       20     21     22     23     24     VIDEO SWALLOW STUDY   11:00 AM   (60 min.)   Chacha Dillard, SLP   [facility] Re[facility]     XR VIDEO SPEECH W ESOPHAGRAM   11:00 AM   (30 min.)   UCXR2   [facility] Xray 25     26       27     28     29     30     31                            Lab Results:  Recent Results (from the past 12 hour(s))   Comprehensive metabolic panel    Collection Time: 12/06/18  9:26 AM   Result Value Ref Range    Sodium 137 133 - 144 mmol/L    Potassium 4.7 3.4 - 5.3 mmol/L    Chloride 103 94 - 109 mmol/L    Carbon Dioxide 25 20 - 32 mmol/L    Anion Gap 9 3 - 14 mmol/L    Glucose 89 70 - 99 mg/dL    Urea Nitrogen 13 7 - 30 mg/dL    Creatinine 1.56 (H) 0.66 - 1.25 mg/dL    GFR Estimate 44 (L) >60 mL/min/1.7m2    GFR Estimate If Black 53 (L) >60 mL/min/1.7m2    Calcium 9.6 8.5 - 10.1 mg/dL    Bilirubin Total 0.5 0.2 - 1.3 mg/dL    Albumin 3.9 3.4 - 5.0 g/dL    Protein Total 8.3 6.8 - 8.8 g/dL    Alkaline Phosphatase 78 40 - 150 U/L    ALT 18 0 - 70 U/L    AST 34 0 - 45 U/L   TSH with free T4 reflex    Collection Time: 12/06/18  9:26 AM   Result Value Ref Range    TSH 0.71 0.40 - 4.00 mU/L                         Follow-ups after your visit        Your next 10 appointments already scheduled     Dec 27, 2018 11:45 AM [facility]   [facility] Draw with  [facility] DRAW   [facility] Draw ([facility])    [address]  [address] [place] [address]  [address]   [phone]            Dec 27, 2018 12:10 PM CST   (Arrive by 11:55 AM)   Return Visit with ISAURA Roach CNP   [facility] ([facility])    [address]  [address]  [facility] [address]   [phone]            Dec 27, 2018  1:30 PM CST   Infusion 60 with UC ONCOLOGY INFUSION, UC 28 ATC   [facility] ([facility])    [address]  [address]  [facility] [address]   [phone]            Jan 17, 2019  1:00 PM CST   [facility] Draw with  [facility] DRAW   [facility] Lab Draw ([facility])    [address]  [address]  [facility] [address]   [phone]            Jan 17, 2019  1:30 PM CST   Infusion 60 with UC ONCOLOGY INFUSION, UC 28 ATC   [facility] ([facility])    [address] [place] [address]  [address]  [facility] [address]   [phone]            Jan 24, 2019 11:00 AM CST   XR VIDEO SPEECH EVALUATION WITH ESOPHAGRAM with UCXR2, UC GIGU RAD   [facility] Xray ([facility])    [address] [place] [address]  1st Floor  [facility] [address]   [phone]           How do I prepare for my exam? (Food and drink instructions) Do not eat for 4 hours before the exam. Keep drinking clear liquids until 2 hours before the exam.  How do I prepare for my exam? (Other instructions) You may take pain medicine (with a sip of water) up to 4 hours before the exam. Do not swallow any other medicines unless your doctor tells you to. Talk to your doctor to be sure it s safe to stop your medicines.  What should I wear: Wear comfortable clothes.  How long does the exam take: The exam usually takes about 30-45 minutes.  What should I bring: Bring a list of your current medicines to your exam. (Include vitamins, minerals and over-the-counter medicines.) Leave your valuables at home.  Do I need a :  No  is needed.  What do I need to tell my doctor:  If you think you might be pregnant, tell your doctor.  What should I do after the exam: Drink lots of fluids in the next one to two days. This will help you pass the rest of the barium. Your stool may be white. Take walks if possible. You may take a mild laxative (medicine to loosten your stools), but check with your doctor first. If you don t have a bowel movement within two days, call your doctor.  What is this test: This X-ray exam look at your esophagus. This exam uses barium (a white liquid) to help the X-rays show up more clearly.  Who should I call with questions: If you have any questions, please call the Imaging Department where you will have your exam. Directions, parking instructions, and other information is available on our website, [email or web address].            Jan 24, 2019 11:00 AM CST   Video Swallow with PEDRO Mcclellan   [facility] ([facility])    [address] [place] [address]  4th Floor  [facility] [address] [phone]           Please check in for this visit in Imaging on the 1st floor.              Who to contact     If you have questions or need follow up information about today's clinic visit or your schedule please contact [facility] CLINIC directly at [phone].  Normal or non-critical lab and imaging results will be communicated to you by MyChart, letter or phone within 4 business days after the clinic has received the results. If you do not hear from us within 7 days, please contact the clinic through MyChart or phone. If you have a critical or abnormal lab result, we will notify you by phone as soon as possible.  Submit refill requests through [employer] or call your pharmacy and they will forward the refill request to us. Please allow 3 business days for your refill to be completed.          Additional Information About Your Visit        Care EveryWhere ID      This is your Care EveryWhere ID. This could be used by other organizations to access your [place] medical records  [id number]        Your Vitals Were     Pulse Temperature Respirations Pulse Oximetry BMI (Body Mass Index)       69 97.3  F (36.3  C) (Oral) 16 98% 17.46 kg/m2        Blood Pressure from Last 3 Encounters:   12/06/18 137/77   11/28/18 132/69   11/15/18 125/70    Weight from Last 3 Encounters:   12/06/18 65 kg (143 lb 6.4 oz)   11/28/18 66.2 kg (146 lb)   11/15/18 65.7 kg (144 lb 14.4 oz)              We Performed the Following     Comprehensive metabolic panel     TSH with free T4 reflex          Today's Medication Changes          These changes are accurate as of 12/6/18 10:18 AM.  If you have any questions, ask your nurse or doctor.               These medicines have changed or have updated prescriptions.        Dose/Directions    amLODIPine 10 MG tablet   Commonly known as:  NORVASC   This may have changed:  when to take this   Used for:  Benign essential hypertension        Dose:  10 mg   Take 1 tablet (10 mg) by mouth daily   Quantity:  90 tablet   Refills:  3                Primary Care Provider Office Phone # Fax #    Joan Janet Ventura, MARLENY [phone]       [place] [address]        Equal Access to Services     JENELLE RED AH: Tiffanie leono Soveronica, waaxda luqadaha, qaybta kaalmada adeegyada, ronald rasmussen. So [facility] [phone].    ATENCIÓN: Si habla español, tiene a washburn disposición servicios gratuitos de asistencia lingüística. Llame al [phone].    We comply with applicable federal civil rights laws and [place] laws. We do not discriminate on the basis of race, color, national origin, age, disability, sex, sexual orientation, or gender identity.            Thank you!     Thank you for choosing [facility]  for your care. Our goal is always to provide you with excellent care. Hearing  back from our patients is one way we can continue to improve our services. Please take a few minutes to complete the written survey that you may receive in the mail after your visit with us. Thank you!             Your Updated Medication List - Protect others around you: Learn how to safely use, store and throw away your medicines at www.disposemymeds.org.          This list is accurate as of 12/6/18 10:18 AM.  Always use your most recent med list.                   Brand Name Dispense Instructions for use Diagnosis    acetaminophen 325 MG tablet    TYLENOL    150 tablet    Take 2 tablets (650 mg) by mouth every 4 hours as needed for mild pain or fever    Urethral cancer (H)       amLODIPine 10 MG tablet    NORVASC    90 tablet    Take 1 tablet (10 mg) by mouth daily    Benign essential hypertension       docusate sodium 100 MG capsule    COLACE    60 capsule    Take 1 capsule (100 mg) by mouth 2 times daily as needed for constipation    Slow transit constipation       fluticasone 50 MCG/ACT nasal spray    FLONASE    2 Bottle    Spray 2 sprays into both nostrils 2 times daily    Urethral cancer (H), Allergic sinusitis       LORazepam 0.5 MG tablet    ATIVAN    30 tablet    Take 1 tablet (0.5 mg) by mouth nightly as needed for anxiety    Anxiety       omeprazole 2 mg/mL suspension    priLOSEC    280 mL    Take 10 mLs (20 mg) by mouth 2 times daily    Gastroesophageal reflux disease with esophagitis, Urethral cancer (H)       polyethylene glycol packet    MIRALAX/GLYCOLAX     Take 1 packet by mouth daily    Urethral cancer (H), Malignant neoplasm metastatic to both lungs (H)       VITAMIN B COMPLEX PO      Take by mouth daily (with lunch)

## 2018-12-27 NOTE — PROGRESS NOTES
Infusion Nursing Note:  King Gonsalo Bruno presents today for Cycle 8 Day 1 Pembrolizumab.    Patient seen by provider today: Yes: Yessica Ovalles NP.    Intravenous Access:  Peripheral IV placed in lab today.    Treatment Conditions:  Lab Results   Component Value Date    HGB 10.1 09/13/2018     Lab Results   Component Value Date    WBC 7.1 09/13/2018      Lab Results   Component Value Date    ANEU 4.1 09/13/2018     Lab Results   Component Value Date     09/13/2018      Lab Results   Component Value Date     12/27/2018                   Lab Results   Component Value Date    POTASSIUM 4.0 12/27/2018           Lab Results   Component Value Date    MAG 2.0 12/27/2018            Lab Results   Component Value Date    CR 1.73 12/27/2018                   Lab Results   Component Value Date    SAADIA 9.5 12/27/2018                Lab Results   Component Value Date    BILITOTAL 0.5 12/27/2018           Lab Results   Component Value Date    ALBUMIN 3.8 12/27/2018                    Lab Results   Component Value Date    ALT 17 12/27/2018           Lab Results   Component Value Date    AST 34 12/27/2018       Results reviewed, labs MET treatment parameters, ok to proceed with treatment.    12/27/18 1320 TORB:  Yessica Ovalles NP/Tejas Dillard RN  - OK to give Keytruda today  - Please redraw potassium.  If redraw result is still elevated please give 1L NS bolus.  If result is normal, patient does not need additional IV fluids.    Recheck potassium as 4.  Patient did not require additional IV fluids today.    Post Infusion Assessment:  Patient tolerated infusion without incident.  Blood return noted pre and post infusion.  Site patent and intact, free from redness, edema or discomfort.  No evidence of extravasations.  Access discontinued per protocol.    Discharge Plan:   Patient declined prescription refills.  Discharge instructions reviewed with: Patient and Family.  Patient and/or family verbalized understanding of  discharge instructions and all questions answered.  Copy of AVS reviewed with patient and/or family.  Patient will return 1/17/19 for next appointment.  Patient discharged in stable condition accompanied by: friend.  Departure Mode: Ambulatory.    ROSS JO RN

## 2018-12-27 NOTE — PATIENT INSTRUCTIONS
Contact Numbers    [facility] Main Line: [phone]  [facility] Triage and After Hours Nurse Line:  [phone]    [facility] call [facility] nurse [facility] or the after hours nurse line if you experience a temperature greater than or equal to 100.5, shaking chills, have uncontrolled nausea, vomiting and/or diarrhea, dizziness, lightheadedness, shortness of breath, chest pain, bleeding, unexplained bruising, or if you have any other new/concerning symptoms, questions or concerns.     If you are having any concerning symptoms or wish to speak to a provider before your next infusion visit, please call your care coordinator or triage to notify them so we can adequately serve you.     If you need a refill on a narcotic prescription or other medication, please call triage before your infusion appointment.         December 2018 Sunday Monday Tuesday Wednesday Thursday Friday Saturday                                 1       2     3     4     5     6    [facility] DRAW   9:00 AM   (15 min.)    [facility] DRAW   [facility] Lab Draw    UMP ONC INFUSION 60   9:30 AM   (60 min.)    ONCOLOGY INFUSION   [facility] 7     8       9     10     11     12     13     14     15       16     17     18     19     20     21     22       23     24     25     26     27    [facility] DRAW  11:45 AM   (15 min.)    [facility] DRAW   [facility] Lab Draw    UMP RETURN  11:55 AM   (50 min.)   Yessica Ovalles, ISAURA CNP   [facility]    UMP ONC INFUSION 60   1:30 PM   (60 min.)    ONCOLOGY INFUSION   [facility] 28     29       30     31 January 2019 Sunday Monday Tuesday Wednesday Thursday Friday Saturday             1     2     3     4     5       6     7     8     9     10     11     12       13     14     15     16     17    [facility] DRAW   1:00 PM   (15 min.)    [facility]   [facility]    UMP  ONC INFUSION 60   1:30 PM   (60 min.)    ONCOLOGY INFUSION   [facility] 18     19       20     21     22     23     24    VIDEO SWALLOW STUDY  11:00 AM   (60 min.)   Chacha Dillard, SLP   [facility]    XR VIDEO SPEECH W ESOPHAGRAM  11:00 AM   (30 min.)   UCXR2   [facility] Xray 25     26       27     28     29     30     31                              Recent Results (from the past 24 hour(s))   Comprehensive metabolic panel    Collection Time: 12/27/18 11:47 AM   Result Value Ref Range    Sodium 134 133 - 144 mmol/L    Potassium 5.5 (H) 3.4 - 5.3 mmol/L    Chloride 103 94 - 109 mmol/L    Carbon Dioxide 24 20 - 32 mmol/L    Anion Gap 8 3 - 14 mmol/L    Glucose 79 70 - 99 mg/dL    Urea Nitrogen 18 7 - 30 mg/dL    Creatinine 1.73 (H) 0.66 - 1.25 mg/dL    GFR Estimate 39 (L) >60 mL/min/[1.73_m2]    GFR Estimate If Black 45 (L) >60 mL/min/[1.73_m2]    Calcium 9.5 8.5 - 10.1 mg/dL    Bilirubin Total 0.5 0.2 - 1.3 mg/dL    Albumin 3.8 3.4 - 5.0 g/dL    Protein Total 8.1 6.8 - 8.8 g/dL    Alkaline Phosphatase 83 40 - 150 U/L    ALT 17 0 - 70 U/L    AST 34 0 - 45 U/L   TSH with free T4 reflex    Collection Time: 12/27/18 11:47 AM   Result Value Ref Range    TSH 0.83 0.40 - 4.00 mU/L   Magnesium    Collection Time: 12/27/18 11:47 AM   Result Value Ref Range    Magnesium 2.0 1.6 - 2.3 mg/dL   Potassium    Collection Time: 12/27/18  1:25 PM   Result Value Ref Range    Potassium 4.0 3.4 - 5.3 mmol/L

## 2018-12-27 NOTE — NURSING NOTE
Chief Complaint   Patient presents with     Blood Draw     Labs drawn from PIV placed by RN in lab. Saline locked. Vs taken and pt checked in for appt.     Labs drawn from PIV placed by RN. Line flushed with saline. Vitals taken. Pt checked in for appointment(s).    Mariela Rees RN

## 2018-12-27 NOTE — LETTER
12/27/2018       RE: King Gonsalo Bruno  [address]  [place] [facility] [place] [facility] [address]     Dear Colleague,    Thank you for referring your patient, King Gonsalo Bruno, to the [facility]. Please see a copy of my visit note below.    Reason for Visit: seen in f/u of recurrent, metastatic penile urethral carcinoma    Oncology HPI: Mr. King Gonsalo Bruno is a 72 year old man with a history of stage II penile urethral carcinoma, treated with cisplatin/gemzar (split on day 1 and 8 given hx of CKD). His first 2 cycles were complicated by dehydration and nausea. He had a positive response and completed 4 cycles in November 2017. He underwent radial penectomy and urethrectomy, perineal urethrostomy and bilateral inguinal node dissection on 3/8/18. Surgical pathology demonstrated moderately differentiated SCC of the penile urethra with extension into the corpus spongiosum and cavernosum of the penile shaft. The specimen was positive for lymphovascular and perineural invasion. Margins were negative. 1/5 L inguinal nodes were positive and 1/7 R inguinal nodes were positive for disease. He was referred for consideration of radiation. At the time of that appointment, he was found to have multiple new pulmonary nodules concerning for metastatic disease. Biopsy was discussed, but after discussion with patient, opted to observe with a PET/CT 6 weeks later. PET/CT on 6/26/18 showed multiple hypermetabolic pulmonary nodules. He was offered palliative intent immunotherapy. He was started on Keytruda on 8/3/18.  He has tolerated well and has had stable disease on treatment.     Interval history: Gonsalo has been doing well. Has some fatigue with treatment, but that isn't significantly worsening. Was able to do some indoor golf in the past few weeks. This helped his energy and mood. Also helped some of his neck pain. Bowel and bladder function is wnl. No cough, sob, wheezing. No rashes, bleeding or bruising.    Current  "Outpatient Medications   Medication Sig Dispense Refill     acetaminophen (TYLENOL) 325 MG tablet Take 2 tablets (650 mg) by mouth every 4 hours as needed for mild pain or fever 150 tablet 0     amLODIPine (NORVASC) 10 MG tablet Take 1 tablet (10 mg) by mouth daily (Patient taking differently: Take 10 mg by mouth every morning ) 90 tablet 3     B Complex Vitamins (VITAMIN B COMPLEX PO) Take by mouth daily (with lunch)       fluticasone (FLONASE) 50 MCG/ACT spray Spray 2 sprays into both nostrils 2 times daily 2 Bottle 11     LORazepam (ATIVAN) 0.5 MG tablet Take 1 tablet (0.5 mg) by mouth nightly as needed for anxiety 30 tablet 3     omeprazole (PRILOSEC) 2 mg/mL SUSP Take 10 mLs (20 mg) by mouth 2 times daily 280 mL 11     polyethylene glycol (MIRALAX/GLYCOLAX) packet Take 1 packet by mouth daily       docusate sodium (COLACE) 100 MG capsule Take 1 capsule (100 mg) by mouth 2 times daily as needed for constipation (Patient not taking: Reported on 10/25/2018) 60 capsule 0          Allergies   Allergen Reactions     Lisinopril Swelling     Oxycodone Nausea and Vomiting     Vicodin [Hydrocodone-Acetaminophen] Nausea and Vomiting         Exam: alert, appears well. Blood pressure 135/74, pulse 64, temperature 98.4  F (36.9  C), temperature source Oral, resp. rate 16, height 1.93 m (6' 3.98\"), weight 65.2 kg (143 lb 11.2 oz), SpO2 100 %.  Wt Readings from Last 4 Encounters:   12/27/18 65.2 kg (143 lb 11.2 oz)   12/06/18 65 kg (143 lb 6.4 oz)   11/28/18 66.2 kg (146 lb)   11/15/18 65.7 kg (144 lb 14.4 oz)     Oropharynx is moist and without lesion. Neck supple and without adenopathy. Lungs:CTA. Heart: RRR, no murmur or rub. Abdomen: soft, nontender, BS active, no masses or organomegaly.  Extremities: warm, no edema. Speech is clear. CN wnl. Gait/station wnl.    Labs: Results for AILYN CHAVEZ (MRN [id number]) as of 12/27/2018 12:35   Ref. Range 12/27/2018 11:47   Sodium Latest Ref Range: 133 - 144 mmol/L 134   Potassium " Latest Ref Range: 3.4 - 5.3 mmol/L 5.5 (H)   Chloride Latest Ref Range: 94 - 109 mmol/L 103   Carbon Dioxide Latest Ref Range: 20 - 32 mmol/L 24   Urea Nitrogen Latest Ref Range: 7 - 30 mg/dL 18   Creatinine Latest Ref Range: 0.66 - 1.25 mg/dL 1.73 (H)   GFR Estimate Latest Ref Range: >60 mL/min/1.73_m2 39 (L)   GFR Estimate If Black Latest Ref Range: >60 mL/min/1.73_m2 45 (L)   Calcium Latest Ref Range: 8.5 - 10.1 mg/dL 9.5   Anion Gap Latest Ref Range: 3 - 14 mmol/L 8   Magnesium Latest Ref Range: 1.6 - 2.3 mg/dL 2.0   Albumin Latest Ref Range: 3.4 - 5.0 g/dL 3.8   Protein Total Latest Ref Range: 6.8 - 8.8 g/dL 8.1   Bilirubin Total Latest Ref Range: 0.2 - 1.3 mg/dL 0.5   Alkaline Phosphatase Latest Ref Range: 40 - 150 U/L 83   ALT Latest Ref Range: 0 - 70 U/L 17   AST Latest Ref Range: 0 - 45 U/L 34   TSH Latest Ref Range: 0.40 - 4.00 mU/L 0.83   Glucose Latest Ref Range: 70 - 99 mg/dL 79       Imaging: n/a    Impression/plan:   1. Recurrent, metastatic penile urethral carcinoma  -on pembrolizumab with stable disease and good tolerance  -no dose-limiting side effects. Fatigue and arthritis are stable  -will continue with pembrolizumab today and every 3 weeks. Plan for restaging in March 2019    2. Arthritis  -stable. Uses tylenol prn. Also does stretching and Anthony Chi    3.CKD  -Cr up slightly from baseline of 1.73. Push hydration  -mild hyperkalemia-sample was slightly hemolyzed. Will recheck. If still elevated, give IV saline 1 Liter and recheck    4. HTN  -on amlodipine 10 mg daily, BP stable      Again, thank you for allowing me to participate in the care of your patient.      Sincerely,    Yessica Ovalles, ISAURA CNP

## 2018-12-27 NOTE — NURSING NOTE
"Oncology Rooming Note    December 27, 2018 12:04 PM   King Gonsalo Bruno is a 73 year old male who presents for:    Chief Complaint   Patient presents with     Blood Draw     Labs drawn from PIV placed by RN in lab. Saline locked. Vs taken and pt checked in for appt.     Oncology Clinic Visit     F/U bilateral lung Ca     Initial Vitals: /74 (BP Location: Right arm, Patient Position: Sitting, Cuff Size: Adult Regular)   Pulse 64   Temp 98.4  F (36.9  C) (Oral)   Resp 16   Ht 1.93 m (6' 3.98\")   Wt 65.2 kg (143 lb 11.2 oz)   SpO2 100%   BMI 17.50 kg/m   Estimated body mass index is 17.5 kg/m  as calculated from the following:    Height as of this encounter: 1.93 m (6' 3.98\").    Weight as of this encounter: 65.2 kg (143 lb 11.2 oz). Body surface area is 1.87 meters squared.  No Pain (0) Comment: Data Unavailable   No LMP for male patient.  Allergies reviewed: Yes  Medications reviewed: Yes    Medications: Medication refills not needed today.  Pharmacy name entered into auctionPAL:    [id number] - [place], [place] - [address] [place] [address] AT [address] [facility] [place], [place] - [address] [place] [address] [phone]    Clinical concerns: Yes, Patient complains of feeling tired at times. Yessica was notified.    10 minutes for nursing intake (face to face time)     SANDRO SULLIVAN LPN            "

## 2018-12-27 NOTE — PROGRESS NOTES
Reason for Visit: seen in f/u of recurrent, metastatic penile urethral carcinoma    Oncology HPI: Mr. King Gonsalo Bruno is a 72 year old man with a history of stage II penile urethral carcinoma, treated with cisplatin/gemzar (split on day 1 and 8 given hx of CKD). His first 2 cycles were complicated by dehydration and nausea. He had a positive response and completed 4 cycles in November 2017. He underwent radial penectomy and urethrectomy, perineal urethrostomy and bilateral inguinal node dissection on 3/8/18. Surgical pathology demonstrated moderately differentiated SCC of the penile urethra with extension into the corpus spongiosum and cavernosum of the penile shaft. The specimen was positive for lymphovascular and perineural invasion. Margins were negative. 1/5 L inguinal nodes were positive and 1/7 R inguinal nodes were positive for disease. He was referred for consideration of radiation. At the time of that appointment, he was found to have multiple new pulmonary nodules concerning for metastatic disease. Biopsy was discussed, but after discussion with patient, opted to observe with a PET/CT 6 weeks later. PET/CT on 6/26/18 showed multiple hypermetabolic pulmonary nodules. He was offered palliative intent immunotherapy. He was started on Keytruda on 8/3/18.  He has tolerated well and has had stable disease on treatment.     Interval history: Gonsalo has been doing well. Has some fatigue with treatment, but that isn't significantly worsening. Was able to do some indoor golf in the past few weeks. This helped his energy and mood. Also helped some of his neck pain. Bowel and bladder function is wnl. No cough, sob, wheezing. No rashes, bleeding or bruising.    Current Outpatient Medications   Medication Sig Dispense Refill     acetaminophen (TYLENOL) 325 MG tablet Take 2 tablets (650 mg) by mouth every 4 hours as needed for mild pain or fever 150 tablet 0     amLODIPine (NORVASC) 10 MG tablet Take 1 tablet (10 mg) by  "mouth daily (Patient taking differently: Take 10 mg by mouth every morning ) 90 tablet 3     B Complex Vitamins (VITAMIN B COMPLEX PO) Take by mouth daily (with lunch)       fluticasone (FLONASE) 50 MCG/ACT spray Spray 2 sprays into both nostrils 2 times daily 2 Bottle 11     LORazepam (ATIVAN) 0.5 MG tablet Take 1 tablet (0.5 mg) by mouth nightly as needed for anxiety 30 tablet 3     omeprazole (PRILOSEC) 2 mg/mL SUSP Take 10 mLs (20 mg) by mouth 2 times daily 280 mL 11     polyethylene glycol (MIRALAX/GLYCOLAX) packet Take 1 packet by mouth daily       docusate sodium (COLACE) 100 MG capsule Take 1 capsule (100 mg) by mouth 2 times daily as needed for constipation (Patient not taking: Reported on 10/25/2018) 60 capsule 0          Allergies   Allergen Reactions     Lisinopril Swelling     Oxycodone Nausea and Vomiting     Vicodin [Hydrocodone-Acetaminophen] Nausea and Vomiting         Exam: alert, appears well. Blood pressure 135/74, pulse 64, temperature 98.4  F (36.9  C), temperature source Oral, resp. rate 16, height 1.93 m (6' 3.98\"), weight 65.2 kg (143 lb 11.2 oz), SpO2 100 %.  Wt Readings from Last 4 Encounters:   12/27/18 65.2 kg (143 lb 11.2 oz)   12/06/18 65 kg (143 lb 6.4 oz)   11/28/18 66.2 kg (146 lb)   11/15/18 65.7 kg (144 lb 14.4 oz)     Oropharynx is moist and without lesion. Neck supple and without adenopathy. Lungs:CTA. Heart: RRR, no murmur or rub. Abdomen: soft, nontender, BS active, no masses or organomegaly.  Extremities: warm, no edema. Speech is clear. CN wnl. Gait/station wnl.    Labs: Results for AILYN CHAVEZ (MRN [id number]) as of 12/27/2018 12:35   Ref. Range 12/27/2018 11:47   Sodium Latest Ref Range: 133 - 144 mmol/L 134   Potassium Latest Ref Range: 3.4 - 5.3 mmol/L 5.5 (H)   Chloride Latest Ref Range: 94 - 109 mmol/L 103   Carbon Dioxide Latest Ref Range: 20 - 32 mmol/L 24   Urea Nitrogen Latest Ref Range: 7 - 30 mg/dL 18   Creatinine Latest Ref Range: 0.66 - 1.25 mg/dL 1.73 (H) "   GFR Estimate Latest Ref Range: >60 mL/min/1.73_m2 39 (L)   GFR Estimate If Black Latest Ref Range: >60 mL/min/1.73_m2 45 (L)   Calcium Latest Ref Range: 8.5 - 10.1 mg/dL 9.5   Anion Gap Latest Ref Range: 3 - 14 mmol/L 8   Magnesium Latest Ref Range: 1.6 - 2.3 mg/dL 2.0   Albumin Latest Ref Range: 3.4 - 5.0 g/dL 3.8   Protein Total Latest Ref Range: 6.8 - 8.8 g/dL 8.1   Bilirubin Total Latest Ref Range: 0.2 - 1.3 mg/dL 0.5   Alkaline Phosphatase Latest Ref Range: 40 - 150 U/L 83   ALT Latest Ref Range: 0 - 70 U/L 17   AST Latest Ref Range: 0 - 45 U/L 34   TSH Latest Ref Range: 0.40 - 4.00 mU/L 0.83   Glucose Latest Ref Range: 70 - 99 mg/dL 79       Imaging: n/a    Impression/plan:   1. Recurrent, metastatic penile urethral carcinoma  -on pembrolizumab with stable disease and good tolerance  -no dose-limiting side effects. Fatigue and arthritis are stable  -will continue with pembrolizumab today and every 3 weeks. Plan for restaging in March 2019    2. Arthritis  -stable. Uses tylenol prn. Also does stretching and Anthony Chi    3.CKD  -Cr up slightly from baseline of 1.73. Push hydration  -mild hyperkalemia-sample was slightly hemolyzed. Will recheck. If still elevated, give IV saline 1 Liter and recheck    4. HTN  -on amlodipine 10 mg daily, BP stable

## 2019-01-01 ENCOUNTER — OFFICE VISIT (OUTPATIENT)
Dept: RADIATION ONCOLOGY | Facility: CLINIC | Age: 74
DRG: 025 | End: 2019-01-01
Attending: RADIOLOGY
Payer: COMMERCIAL

## 2019-01-01 ENCOUNTER — APPOINTMENT (OUTPATIENT)
Dept: GENERAL RADIOLOGY | Facility: CLINIC | Age: 74
DRG: 949 | End: 2019-01-01
Attending: PHYSICAL MEDICINE & REHABILITATION
Payer: COMMERCIAL

## 2019-01-01 ENCOUNTER — ANESTHESIA EVENT (OUTPATIENT)
Dept: SURGERY | Facility: CLINIC | Age: 74
DRG: 025 | End: 2019-01-01
Payer: COMMERCIAL

## 2019-01-01 ENCOUNTER — APPOINTMENT (OUTPATIENT)
Dept: MRI IMAGING | Facility: CLINIC | Age: 74
DRG: 025 | End: 2019-01-01
Payer: COMMERCIAL

## 2019-01-01 ENCOUNTER — HOSPITAL ENCOUNTER (INPATIENT)
Facility: CLINIC | Age: 74
LOS: 10 days | Discharge: NURSING, REHAB OR LONG-TERM CARE | DRG: 025 | End: 2019-08-01
Attending: INTERNAL MEDICINE | Admitting: INTERNAL MEDICINE
Payer: COMMERCIAL

## 2019-01-01 ENCOUNTER — APPOINTMENT (OUTPATIENT)
Dept: PHYSICAL THERAPY | Facility: CLINIC | Age: 74
DRG: 025 | End: 2019-01-01
Payer: COMMERCIAL

## 2019-01-01 ENCOUNTER — APPOINTMENT (OUTPATIENT)
Dept: CT IMAGING | Facility: CLINIC | Age: 74
DRG: 025 | End: 2019-01-01
Attending: EMERGENCY MEDICINE
Payer: COMMERCIAL

## 2019-01-01 ENCOUNTER — PATIENT OUTREACH (OUTPATIENT)
Dept: ONCOLOGY | Facility: CLINIC | Age: 74
End: 2019-01-01

## 2019-01-01 ENCOUNTER — PRE VISIT (OUTPATIENT)
Dept: RADIATION ONCOLOGY | Facility: CLINIC | Age: 74
End: 2019-01-01

## 2019-01-01 ENCOUNTER — APPOINTMENT (OUTPATIENT)
Dept: OCCUPATIONAL THERAPY | Facility: CLINIC | Age: 74
DRG: 025 | End: 2019-01-01
Attending: STUDENT IN AN ORGANIZED HEALTH CARE EDUCATION/TRAINING PROGRAM
Payer: COMMERCIAL

## 2019-01-01 ENCOUNTER — APPOINTMENT (OUTPATIENT)
Dept: CT IMAGING | Facility: CLINIC | Age: 74
DRG: 025 | End: 2019-01-01
Attending: STUDENT IN AN ORGANIZED HEALTH CARE EDUCATION/TRAINING PROGRAM
Payer: COMMERCIAL

## 2019-01-01 ENCOUNTER — ONCOLOGY VISIT (OUTPATIENT)
Dept: ONCOLOGY | Facility: CLINIC | Age: 74
End: 2019-01-01
Attending: INTERNAL MEDICINE
Payer: COMMERCIAL

## 2019-01-01 ENCOUNTER — DOCUMENTATION ONLY (OUTPATIENT)
Dept: OTHER | Facility: CLINIC | Age: 74
End: 2019-01-01

## 2019-01-01 ENCOUNTER — HOSPITAL ENCOUNTER (INPATIENT)
Facility: CLINIC | Age: 74
LOS: 11 days | Discharge: NURSING, REHAB OR LONG-TERM CARE | DRG: 025 | End: 2019-08-27
Attending: FAMILY MEDICINE | Admitting: NEUROLOGICAL SURGERY
Payer: COMMERCIAL

## 2019-01-01 ENCOUNTER — THERAPY VISIT (OUTPATIENT)
Dept: SPEECH THERAPY | Facility: CLINIC | Age: 74
End: 2019-01-01
Payer: COMMERCIAL

## 2019-01-01 ENCOUNTER — CARE COORDINATION (OUTPATIENT)
Dept: ONCOLOGY | Facility: CLINIC | Age: 74
End: 2019-01-01

## 2019-01-01 ENCOUNTER — OFFICE VISIT (OUTPATIENT)
Dept: NEUROSURGERY | Facility: CLINIC | Age: 74
End: 2019-01-01
Payer: COMMERCIAL

## 2019-01-01 ENCOUNTER — HOSPITAL ENCOUNTER (OUTPATIENT)
Facility: CLINIC | Age: 74
Setting detail: SPECIMEN
Discharge: HOME | End: 2019-07-07
Admitting: PHYSICIAN ASSISTANT
Payer: COMMERCIAL

## 2019-01-01 ENCOUNTER — APPOINTMENT (OUTPATIENT)
Dept: PHYSICAL THERAPY | Facility: CLINIC | Age: 74
DRG: 025 | End: 2019-01-01
Attending: PHYSICIAN ASSISTANT
Payer: COMMERCIAL

## 2019-01-01 ENCOUNTER — ANCILLARY PROCEDURE (OUTPATIENT)
Dept: GENERAL RADIOLOGY | Facility: CLINIC | Age: 74
End: 2019-01-01
Payer: COMMERCIAL

## 2019-01-01 ENCOUNTER — APPOINTMENT (OUTPATIENT)
Dept: OCCUPATIONAL THERAPY | Facility: CLINIC | Age: 74
DRG: 025 | End: 2019-01-01
Payer: COMMERCIAL

## 2019-01-01 ENCOUNTER — APPOINTMENT (OUTPATIENT)
Dept: LAB | Facility: CLINIC | Age: 74
DRG: 025 | End: 2019-01-01
Attending: INTERNAL MEDICINE
Payer: COMMERCIAL

## 2019-01-01 ENCOUNTER — HOSPITAL ENCOUNTER (INPATIENT)
Facility: CLINIC | Age: 74
LOS: 12 days | Discharge: HOME HEALTH | DRG: 949 | End: 2019-09-08
Attending: PHYSICAL MEDICINE & REHABILITATION | Admitting: PHYSICAL MEDICINE & REHABILITATION
Payer: COMMERCIAL

## 2019-01-01 ENCOUNTER — APPOINTMENT (OUTPATIENT)
Dept: SPEECH THERAPY | Facility: CLINIC | Age: 74
DRG: 025 | End: 2019-01-01
Attending: STUDENT IN AN ORGANIZED HEALTH CARE EDUCATION/TRAINING PROGRAM
Payer: COMMERCIAL

## 2019-01-01 ENCOUNTER — ONCOLOGY VISIT (OUTPATIENT)
Dept: ONCOLOGY | Facility: CLINIC | Age: 74
End: 2019-01-01
Attending: PHYSICIAN ASSISTANT
Payer: COMMERCIAL

## 2019-01-01 ENCOUNTER — ANCILLARY PROCEDURE (OUTPATIENT)
Dept: CT IMAGING | Facility: CLINIC | Age: 74
End: 2019-01-01
Attending: NURSE PRACTITIONER
Payer: COMMERCIAL

## 2019-01-01 ENCOUNTER — APPOINTMENT (OUTPATIENT)
Dept: LAB | Facility: CLINIC | Age: 74
End: 2019-01-01
Attending: INTERNAL MEDICINE
Payer: COMMERCIAL

## 2019-01-01 ENCOUNTER — APPOINTMENT (OUTPATIENT)
Dept: LAB | Facility: CLINIC | Age: 74
End: 2019-01-01
Attending: NURSE PRACTITIONER
Payer: COMMERCIAL

## 2019-01-01 ENCOUNTER — ONCOLOGY VISIT (OUTPATIENT)
Dept: ONCOLOGY | Facility: CLINIC | Age: 74
End: 2019-01-01
Attending: NURSE PRACTITIONER
Payer: COMMERCIAL

## 2019-01-01 ENCOUNTER — THERAPY VISIT (OUTPATIENT)
Dept: SPEECH THERAPY | Facility: CLINIC | Age: 74
End: 2019-01-01
Attending: OTOLARYNGOLOGY
Payer: COMMERCIAL

## 2019-01-01 ENCOUNTER — APPOINTMENT (OUTPATIENT)
Dept: SPEECH THERAPY | Facility: CLINIC | Age: 74
DRG: 025 | End: 2019-01-01
Payer: COMMERCIAL

## 2019-01-01 ENCOUNTER — MEDICAL CORRESPONDENCE (OUTPATIENT)
Dept: HEALTH INFORMATION MANAGEMENT | Facility: CLINIC | Age: 74
End: 2019-01-01

## 2019-01-01 ENCOUNTER — TELEPHONE (OUTPATIENT)
Dept: RADIATION ONCOLOGY | Facility: CLINIC | Age: 74
End: 2019-01-01

## 2019-01-01 ENCOUNTER — TELEPHONE (OUTPATIENT)
Dept: ONCOLOGY | Facility: CLINIC | Age: 74
End: 2019-01-01

## 2019-01-01 ENCOUNTER — ANESTHESIA EVENT (OUTPATIENT)
Dept: REHABILITATION | Facility: SKILLED NURSING FACILITY | Age: 74
End: 2019-01-01

## 2019-01-01 ENCOUNTER — APPOINTMENT (OUTPATIENT)
Dept: MRI IMAGING | Facility: CLINIC | Age: 74
DRG: 025 | End: 2019-01-01
Attending: EMERGENCY MEDICINE
Payer: COMMERCIAL

## 2019-01-01 ENCOUNTER — APPOINTMENT (OUTPATIENT)
Dept: GENERAL RADIOLOGY | Facility: CLINIC | Age: 74
DRG: 025 | End: 2019-01-01
Attending: EMERGENCY MEDICINE
Payer: COMMERCIAL

## 2019-01-01 ENCOUNTER — TUMOR CONFERENCE (OUTPATIENT)
Dept: ONCOLOGY | Facility: CLINIC | Age: 74
End: 2019-01-01

## 2019-01-01 ENCOUNTER — APPOINTMENT (OUTPATIENT)
Dept: PHYSICAL THERAPY | Facility: CLINIC | Age: 74
DRG: 025 | End: 2019-01-01
Attending: NURSE PRACTITIONER
Payer: COMMERCIAL

## 2019-01-01 ENCOUNTER — TELEPHONE (OUTPATIENT)
Dept: NEUROSURGERY | Facility: CLINIC | Age: 74
End: 2019-01-01

## 2019-01-01 ENCOUNTER — ANESTHESIA (OUTPATIENT)
Dept: SURGERY | Facility: CLINIC | Age: 74
DRG: 025 | End: 2019-01-01
Payer: COMMERCIAL

## 2019-01-01 ENCOUNTER — HEALTH MAINTENANCE LETTER (OUTPATIENT)
Age: 74
End: 2019-01-01

## 2019-01-01 ENCOUNTER — ANCILLARY PROCEDURE (OUTPATIENT)
Dept: CT IMAGING | Facility: CLINIC | Age: 74
End: 2019-01-01
Attending: INTERNAL MEDICINE
Payer: COMMERCIAL

## 2019-01-01 ENCOUNTER — NURSE TRIAGE (OUTPATIENT)
Dept: NURSING | Facility: CLINIC | Age: 74
End: 2019-01-01

## 2019-01-01 ENCOUNTER — APPOINTMENT (OUTPATIENT)
Dept: GENERAL RADIOLOGY | Facility: CLINIC | Age: 74
DRG: 025 | End: 2019-01-01
Attending: STUDENT IN AN ORGANIZED HEALTH CARE EDUCATION/TRAINING PROGRAM
Payer: COMMERCIAL

## 2019-01-01 ENCOUNTER — ANCILLARY PROCEDURE (OUTPATIENT)
Dept: MRI IMAGING | Facility: CLINIC | Age: 74
End: 2019-01-01
Attending: NURSE PRACTITIONER
Payer: COMMERCIAL

## 2019-01-01 ENCOUNTER — ANESTHESIA (OUTPATIENT)
Dept: REHABILITATION | Facility: SKILLED NURSING FACILITY | Age: 74
End: 2019-01-01

## 2019-01-01 ENCOUNTER — OFFICE VISIT (OUTPATIENT)
Dept: NEUROSURGERY | Facility: CLINIC | Age: 74
End: 2019-01-01
Attending: NEUROLOGICAL SURGERY
Payer: COMMERCIAL

## 2019-01-01 ENCOUNTER — ONCOLOGY VISIT (OUTPATIENT)
Dept: ADMINISTRATIVE | Facility: CLINIC | Age: 74
End: 2019-01-01

## 2019-01-01 ENCOUNTER — APPOINTMENT (OUTPATIENT)
Dept: LAB | Facility: CLINIC | Age: 74
End: 2019-01-01
Attending: PHYSICIAN ASSISTANT
Payer: COMMERCIAL

## 2019-01-01 ENCOUNTER — HOSPITAL ENCOUNTER (INPATIENT)
Facility: SKILLED NURSING FACILITY | Age: 74
LOS: 10 days | Discharge: HOME HEALTH | DRG: 949 | End: 2019-08-11
Attending: INTERNAL MEDICINE | Admitting: INTERNAL MEDICINE
Payer: COMMERCIAL

## 2019-01-01 ENCOUNTER — APPOINTMENT (OUTPATIENT)
Dept: CT IMAGING | Facility: CLINIC | Age: 74
DRG: 025 | End: 2019-01-01
Attending: FAMILY MEDICINE
Payer: COMMERCIAL

## 2019-01-01 ENCOUNTER — APPOINTMENT (OUTPATIENT)
Dept: OCCUPATIONAL THERAPY | Facility: CLINIC | Age: 74
DRG: 025 | End: 2019-01-01
Attending: PHYSICIAN ASSISTANT
Payer: COMMERCIAL

## 2019-01-01 ENCOUNTER — APPOINTMENT (OUTPATIENT)
Dept: GENERAL RADIOLOGY | Facility: CLINIC | Age: 74
DRG: 025 | End: 2019-01-01
Payer: COMMERCIAL

## 2019-01-01 ENCOUNTER — HOME INFUSION (PRE-WILLOW HOME INFUSION) (OUTPATIENT)
Dept: PHARMACY | Facility: CLINIC | Age: 74
End: 2019-01-01

## 2019-01-01 ENCOUNTER — APPOINTMENT (OUTPATIENT)
Dept: OCCUPATIONAL THERAPY | Facility: SKILLED NURSING FACILITY | Age: 74
End: 2019-01-01
Attending: NURSE PRACTITIONER
Payer: COMMERCIAL

## 2019-01-01 ENCOUNTER — APPOINTMENT (OUTPATIENT)
Dept: INTERVENTIONAL RADIOLOGY/VASCULAR | Facility: CLINIC | Age: 74
DRG: 025 | End: 2019-01-01
Attending: RADIOLOGY
Payer: COMMERCIAL

## 2019-01-01 ENCOUNTER — APPOINTMENT (OUTPATIENT)
Dept: MRI IMAGING | Facility: CLINIC | Age: 74
DRG: 025 | End: 2019-01-01
Attending: STUDENT IN AN ORGANIZED HEALTH CARE EDUCATION/TRAINING PROGRAM
Payer: COMMERCIAL

## 2019-01-01 VITALS
DIASTOLIC BLOOD PRESSURE: 78 MMHG | WEIGHT: 142.4 LBS | TEMPERATURE: 98.2 F | SYSTOLIC BLOOD PRESSURE: 128 MMHG | OXYGEN SATURATION: 97 % | HEIGHT: 75 IN | RESPIRATION RATE: 16 BRPM | HEART RATE: 95 BPM | BODY MASS INDEX: 17.71 KG/M2

## 2019-01-01 VITALS
TEMPERATURE: 97.5 F | DIASTOLIC BLOOD PRESSURE: 72 MMHG | TEMPERATURE: 97.4 F | WEIGHT: 138.6 LBS | HEIGHT: 75 IN | DIASTOLIC BLOOD PRESSURE: 64 MMHG | RESPIRATION RATE: 16 BRPM | HEART RATE: 85 BPM | OXYGEN SATURATION: 97 % | SYSTOLIC BLOOD PRESSURE: 116 MMHG | BODY MASS INDEX: 17.23 KG/M2 | OXYGEN SATURATION: 100 % | RESPIRATION RATE: 16 BRPM | SYSTOLIC BLOOD PRESSURE: 117 MMHG | HEART RATE: 80 BPM

## 2019-01-01 VITALS
WEIGHT: 141.9 LBS | DIASTOLIC BLOOD PRESSURE: 73 MMHG | OXYGEN SATURATION: 98 % | SYSTOLIC BLOOD PRESSURE: 118 MMHG | HEART RATE: 76 BPM | BODY MASS INDEX: 17.28 KG/M2

## 2019-01-01 VITALS
TEMPERATURE: 97.4 F | DIASTOLIC BLOOD PRESSURE: 87 MMHG | WEIGHT: 144 LBS | RESPIRATION RATE: 18 BRPM | OXYGEN SATURATION: 97 % | SYSTOLIC BLOOD PRESSURE: 153 MMHG | BODY MASS INDEX: 18 KG/M2 | HEART RATE: 86 BPM

## 2019-01-01 VITALS
OXYGEN SATURATION: 98 % | HEART RATE: 78 BPM | DIASTOLIC BLOOD PRESSURE: 65 MMHG | TEMPERATURE: 95.3 F | WEIGHT: 134.48 LBS | RESPIRATION RATE: 16 BRPM | BODY MASS INDEX: 16.38 KG/M2 | HEIGHT: 76 IN | SYSTOLIC BLOOD PRESSURE: 133 MMHG

## 2019-01-01 VITALS
TEMPERATURE: 98.6 F | DIASTOLIC BLOOD PRESSURE: 70 MMHG | WEIGHT: 143.1 LBS | OXYGEN SATURATION: 100 % | BODY MASS INDEX: 17.79 KG/M2 | HEIGHT: 75 IN | HEART RATE: 75 BPM | SYSTOLIC BLOOD PRESSURE: 141 MMHG | RESPIRATION RATE: 16 BRPM

## 2019-01-01 VITALS
TEMPERATURE: 98.1 F | HEART RATE: 76 BPM | WEIGHT: 142.9 LBS | OXYGEN SATURATION: 98 % | SYSTOLIC BLOOD PRESSURE: 146 MMHG | DIASTOLIC BLOOD PRESSURE: 70 MMHG | RESPIRATION RATE: 16 BRPM | BODY MASS INDEX: 17.4 KG/M2

## 2019-01-01 VITALS
SYSTOLIC BLOOD PRESSURE: 131 MMHG | OXYGEN SATURATION: 100 % | HEIGHT: 75 IN | BODY MASS INDEX: 16.93 KG/M2 | WEIGHT: 136.2 LBS | TEMPERATURE: 96.4 F | DIASTOLIC BLOOD PRESSURE: 71 MMHG | RESPIRATION RATE: 17 BRPM | HEART RATE: 72 BPM

## 2019-01-01 VITALS
OXYGEN SATURATION: 97 % | HEART RATE: 74 BPM | TEMPERATURE: 98 F | SYSTOLIC BLOOD PRESSURE: 130 MMHG | DIASTOLIC BLOOD PRESSURE: 74 MMHG | WEIGHT: 142.7 LBS | BODY MASS INDEX: 17.38 KG/M2 | RESPIRATION RATE: 16 BRPM

## 2019-01-01 VITALS
HEIGHT: 73 IN | DIASTOLIC BLOOD PRESSURE: 69 MMHG | OXYGEN SATURATION: 100 % | WEIGHT: 139 LBS | SYSTOLIC BLOOD PRESSURE: 149 MMHG | TEMPERATURE: 98.4 F | HEART RATE: 86 BPM | BODY MASS INDEX: 18.42 KG/M2 | RESPIRATION RATE: 16 BRPM

## 2019-01-01 VITALS
OXYGEN SATURATION: 94 % | WEIGHT: 136.5 LBS | SYSTOLIC BLOOD PRESSURE: 129 MMHG | TEMPERATURE: 98.8 F | BODY MASS INDEX: 17.06 KG/M2 | DIASTOLIC BLOOD PRESSURE: 79 MMHG | RESPIRATION RATE: 12 BRPM | HEART RATE: 95 BPM

## 2019-01-01 VITALS
DIASTOLIC BLOOD PRESSURE: 72 MMHG | WEIGHT: 143 LBS | BODY MASS INDEX: 17.78 KG/M2 | HEART RATE: 77 BPM | SYSTOLIC BLOOD PRESSURE: 126 MMHG | TEMPERATURE: 97.1 F | RESPIRATION RATE: 16 BRPM | OXYGEN SATURATION: 97 % | HEIGHT: 75 IN

## 2019-01-01 VITALS
HEIGHT: 75 IN | TEMPERATURE: 98.2 F | HEART RATE: 86 BPM | SYSTOLIC BLOOD PRESSURE: 120 MMHG | BODY MASS INDEX: 17.82 KG/M2 | DIASTOLIC BLOOD PRESSURE: 73 MMHG | OXYGEN SATURATION: 99 % | RESPIRATION RATE: 16 BRPM | WEIGHT: 143.3 LBS

## 2019-01-01 VITALS
SYSTOLIC BLOOD PRESSURE: 149 MMHG | HEART RATE: 86 BPM | DIASTOLIC BLOOD PRESSURE: 69 MMHG | RESPIRATION RATE: 16 BRPM | BODY MASS INDEX: 18.47 KG/M2 | WEIGHT: 139 LBS | TEMPERATURE: 98.4 F

## 2019-01-01 VITALS
RESPIRATION RATE: 18 BRPM | SYSTOLIC BLOOD PRESSURE: 103 MMHG | TEMPERATURE: 97.8 F | DIASTOLIC BLOOD PRESSURE: 64 MMHG | OXYGEN SATURATION: 99 % | BODY MASS INDEX: 19.13 KG/M2 | WEIGHT: 144 LBS | HEART RATE: 82 BPM

## 2019-01-01 VITALS
DIASTOLIC BLOOD PRESSURE: 73 MMHG | SYSTOLIC BLOOD PRESSURE: 127 MMHG | HEART RATE: 82 BPM | RESPIRATION RATE: 18 BRPM | TEMPERATURE: 97.9 F | OXYGEN SATURATION: 100 % | HEIGHT: 73 IN | BODY MASS INDEX: 18.32 KG/M2 | WEIGHT: 138.2 LBS

## 2019-01-01 VITALS
RESPIRATION RATE: 16 BRPM | TEMPERATURE: 97.7 F | DIASTOLIC BLOOD PRESSURE: 71 MMHG | OXYGEN SATURATION: 98 % | HEART RATE: 72 BPM | BODY MASS INDEX: 17.22 KG/M2 | SYSTOLIC BLOOD PRESSURE: 147 MMHG | WEIGHT: 141.4 LBS

## 2019-01-01 VITALS
DIASTOLIC BLOOD PRESSURE: 69 MMHG | OXYGEN SATURATION: 99 % | WEIGHT: 141.2 LBS | BODY MASS INDEX: 17.2 KG/M2 | HEIGHT: 76 IN | TEMPERATURE: 97.8 F | SYSTOLIC BLOOD PRESSURE: 122 MMHG | HEART RATE: 73 BPM

## 2019-01-01 VITALS
WEIGHT: 141 LBS | SYSTOLIC BLOOD PRESSURE: 123 MMHG | DIASTOLIC BLOOD PRESSURE: 79 MMHG | BODY MASS INDEX: 17.53 KG/M2 | HEIGHT: 75 IN | TEMPERATURE: 98.3 F | HEART RATE: 86 BPM | RESPIRATION RATE: 18 BRPM | OXYGEN SATURATION: 100 %

## 2019-01-01 VITALS
DIASTOLIC BLOOD PRESSURE: 63 MMHG | RESPIRATION RATE: 18 BRPM | SYSTOLIC BLOOD PRESSURE: 129 MMHG | BODY MASS INDEX: 17.51 KG/M2 | TEMPERATURE: 97.9 F | WEIGHT: 140.1 LBS | HEART RATE: 93 BPM | OXYGEN SATURATION: 100 %

## 2019-01-01 DIAGNOSIS — E55.9 VITAMIN D DEFICIENCY: ICD-10-CM

## 2019-01-01 DIAGNOSIS — C78.02 MALIGNANT NEOPLASM METASTATIC TO BOTH LUNGS (H): ICD-10-CM

## 2019-01-01 DIAGNOSIS — D49.6 BRAIN TUMOR (H): Primary | ICD-10-CM

## 2019-01-01 DIAGNOSIS — C68.0 URETHRAL CANCER (H): ICD-10-CM

## 2019-01-01 DIAGNOSIS — C60.9 PENILE CANCER (H): ICD-10-CM

## 2019-01-01 DIAGNOSIS — E83.52 HYPERCALCEMIA: ICD-10-CM

## 2019-01-01 DIAGNOSIS — C78.01 MALIGNANT NEOPLASM METASTATIC TO BOTH LUNGS (H): ICD-10-CM

## 2019-01-01 DIAGNOSIS — C79.31 BRAIN METASTASES: ICD-10-CM

## 2019-01-01 DIAGNOSIS — C78.01 MALIGNANT NEOPLASM METASTATIC TO BOTH LUNGS (H): Primary | ICD-10-CM

## 2019-01-01 DIAGNOSIS — C71.1 MALIGNANT NEOPLASM OF FRONTAL LOBE OF BRAIN (H): Primary | ICD-10-CM

## 2019-01-01 DIAGNOSIS — C78.02 MALIGNANT NEOPLASM METASTATIC TO BOTH LUNGS (H): Primary | ICD-10-CM

## 2019-01-01 DIAGNOSIS — R11.0 NAUSEA: ICD-10-CM

## 2019-01-01 DIAGNOSIS — C68.0 URETHRAL CANCER (H): Primary | ICD-10-CM

## 2019-01-01 DIAGNOSIS — K21.00 GASTROESOPHAGEAL REFLUX DISEASE WITH ESOPHAGITIS: ICD-10-CM

## 2019-01-01 DIAGNOSIS — C79.31 BRAIN METASTASIS: Primary | ICD-10-CM

## 2019-01-01 DIAGNOSIS — K22.5 ZENKER DIVERTICULUM: ICD-10-CM

## 2019-01-01 DIAGNOSIS — C60.9 PENILE CANCER (H): Primary | ICD-10-CM

## 2019-01-01 DIAGNOSIS — R90.0 INTRACRANIAL MASS: ICD-10-CM

## 2019-01-01 DIAGNOSIS — R13.10 DYSPHAGIA, UNSPECIFIED TYPE: Primary | ICD-10-CM

## 2019-01-01 DIAGNOSIS — R13.12 OROPHARYNGEAL DYSPHAGIA: Primary | ICD-10-CM

## 2019-01-01 DIAGNOSIS — Z48.02 VISIT FOR SUTURE REMOVAL: ICD-10-CM

## 2019-01-01 DIAGNOSIS — Z71.89 COUNSELING REGARDING ADVANCE CARE PLANNING AND GOALS OF CARE: ICD-10-CM

## 2019-01-01 DIAGNOSIS — C79.31 METASTASIS TO BRAIN (H): Primary | ICD-10-CM

## 2019-01-01 DIAGNOSIS — I10 BENIGN ESSENTIAL HYPERTENSION: ICD-10-CM

## 2019-01-01 DIAGNOSIS — D50.9 IRON DEFICIENCY ANEMIA, UNSPECIFIED IRON DEFICIENCY ANEMIA TYPE: ICD-10-CM

## 2019-01-01 DIAGNOSIS — Z98.890 S/P CRANIOTOMY: ICD-10-CM

## 2019-01-01 DIAGNOSIS — C79.31 BRAIN METASTASES: Primary | ICD-10-CM

## 2019-01-01 DIAGNOSIS — F41.9 ANXIETY: ICD-10-CM

## 2019-01-01 DIAGNOSIS — K59.00 CONSTIPATION, UNSPECIFIED CONSTIPATION TYPE: ICD-10-CM

## 2019-01-01 DIAGNOSIS — D63.0 ANEMIA IN NEOPLASTIC DISEASE: ICD-10-CM

## 2019-01-01 DIAGNOSIS — T14.8XXA EXTRAVASATION INJURY: Primary | ICD-10-CM

## 2019-01-01 DIAGNOSIS — Q38.7 PHARYNGEAL DIVERTICULA: Primary | ICD-10-CM

## 2019-01-01 DIAGNOSIS — R13.12 OROPHARYNGEAL DYSPHAGIA: ICD-10-CM

## 2019-01-01 DIAGNOSIS — J30.9 ALLERGIC SINUSITIS: ICD-10-CM

## 2019-01-01 DIAGNOSIS — G93.6 CEREBRAL EDEMA (H): ICD-10-CM

## 2019-01-01 DIAGNOSIS — R13.10 DYSPHAGIA: ICD-10-CM

## 2019-01-01 LAB
ABO + RH BLD: NORMAL
ALBUMIN SERPL-MCNC: 2.4 G/DL (ref 3.4–5)
ALBUMIN SERPL-MCNC: 2.6 G/DL (ref 3.4–5)
ALBUMIN SERPL-MCNC: 3 G/DL (ref 3.4–5)
ALBUMIN SERPL-MCNC: 3 G/DL (ref 3.4–5)
ALBUMIN SERPL-MCNC: 3.2 G/DL (ref 3.4–5)
ALBUMIN SERPL-MCNC: 3.3 G/DL (ref 3.4–5)
ALBUMIN SERPL-MCNC: 3.5 G/DL (ref 3.4–5)
ALBUMIN SERPL-MCNC: 3.5 G/DL (ref 3.4–5)
ALBUMIN SERPL-MCNC: 3.6 G/DL (ref 3.4–5)
ALBUMIN SERPL-MCNC: NORMAL G/DL (ref 3.4–5)
ALBUMIN UR-MCNC: 10 MG/DL
ALP SERPL-CCNC: 100 U/L (ref 40–150)
ALP SERPL-CCNC: 102 U/L (ref 40–150)
ALP SERPL-CCNC: 62 U/L (ref 40–150)
ALP SERPL-CCNC: 65 U/L (ref 40–150)
ALP SERPL-CCNC: 74 U/L (ref 40–150)
ALP SERPL-CCNC: 80 U/L (ref 40–150)
ALP SERPL-CCNC: 81 U/L (ref 40–150)
ALP SERPL-CCNC: 84 U/L (ref 40–150)
ALP SERPL-CCNC: 86 U/L (ref 40–150)
ALP SERPL-CCNC: 88 U/L (ref 40–150)
ALP SERPL-CCNC: 88 U/L (ref 40–150)
ALP SERPL-CCNC: 89 U/L (ref 40–150)
ALP SERPL-CCNC: 89 U/L (ref 40–150)
ALP SERPL-CCNC: 94 U/L (ref 40–150)
ALP SERPL-CCNC: 97 U/L (ref 40–150)
ALP SERPL-CCNC: NORMAL U/L (ref 40–150)
ALT SERPL W P-5'-P-CCNC: 12 U/L (ref 0–70)
ALT SERPL W P-5'-P-CCNC: 14 U/L (ref 0–70)
ALT SERPL W P-5'-P-CCNC: 15 U/L (ref 0–70)
ALT SERPL W P-5'-P-CCNC: 15 U/L (ref 0–70)
ALT SERPL W P-5'-P-CCNC: 16 U/L (ref 0–70)
ALT SERPL W P-5'-P-CCNC: 16 U/L (ref 0–70)
ALT SERPL W P-5'-P-CCNC: 21 U/L (ref 0–70)
ALT SERPL W P-5'-P-CCNC: 22 U/L (ref 0–70)
ALT SERPL W P-5'-P-CCNC: 23 U/L (ref 0–70)
ALT SERPL W P-5'-P-CCNC: 24 U/L (ref 0–70)
ALT SERPL W P-5'-P-CCNC: 25 U/L (ref 0–70)
ALT SERPL W P-5'-P-CCNC: NORMAL U/L (ref 0–70)
ANION GAP SERPL CALCULATED.3IONS-SCNC: 10 MMOL/L (ref 3–14)
ANION GAP SERPL CALCULATED.3IONS-SCNC: 11 MMOL/L (ref 3–14)
ANION GAP SERPL CALCULATED.3IONS-SCNC: 5 MMOL/L (ref 3–14)
ANION GAP SERPL CALCULATED.3IONS-SCNC: 6 MMOL/L (ref 3–14)
ANION GAP SERPL CALCULATED.3IONS-SCNC: 7 MMOL/L (ref 3–14)
ANION GAP SERPL CALCULATED.3IONS-SCNC: 8 MMOL/L (ref 3–14)
ANION GAP SERPL CALCULATED.3IONS-SCNC: 9 MMOL/L (ref 3–14)
ANION GAP SERPL CALCULATED.3IONS-SCNC: NORMAL MMOL/L (ref 6–17)
ANISOCYTOSIS BLD QL SMEAR: ABNORMAL
APPEARANCE UR: CLEAR
APTT PPP: 24 SEC (ref 22–37)
APTT PPP: 24 SEC (ref 22–37)
APTT PPP: 25 SEC (ref 22–37)
APTT PPP: 31 SEC (ref 22–37)
AST SERPL W P-5'-P-CCNC: 21 U/L (ref 0–45)
AST SERPL W P-5'-P-CCNC: 21 U/L (ref 0–45)
AST SERPL W P-5'-P-CCNC: 22 U/L (ref 0–45)
AST SERPL W P-5'-P-CCNC: 22 U/L (ref 0–45)
AST SERPL W P-5'-P-CCNC: 23 U/L (ref 0–45)
AST SERPL W P-5'-P-CCNC: 24 U/L (ref 0–45)
AST SERPL W P-5'-P-CCNC: 25 U/L (ref 0–45)
AST SERPL W P-5'-P-CCNC: 26 U/L (ref 0–45)
AST SERPL W P-5'-P-CCNC: 26 U/L (ref 0–45)
AST SERPL W P-5'-P-CCNC: 31 U/L (ref 0–45)
AST SERPL W P-5'-P-CCNC: 31 U/L (ref 0–45)
AST SERPL W P-5'-P-CCNC: 35 U/L (ref 0–45)
AST SERPL W P-5'-P-CCNC: 37 U/L (ref 0–45)
AST SERPL W P-5'-P-CCNC: 43 U/L (ref 0–45)
AST SERPL W P-5'-P-CCNC: 60 U/L (ref 0–45)
AST SERPL W P-5'-P-CCNC: NORMAL U/L (ref 0–45)
BACTERIA SPEC CULT: ABNORMAL
BACTERIA SPEC CULT: NO GROWTH
BASE EXCESS BLDA CALC-SCNC: 1.2 MMOL/L
BASOPHILS # BLD AUTO: 0 10E9/L (ref 0–0.2)
BASOPHILS # BLD AUTO: 0.1 10E9/L (ref 0–0.2)
BASOPHILS # BLD AUTO: 0.1 10E9/L (ref 0–0.2)
BASOPHILS # BLD AUTO: NORMAL 10E9/L (ref 0–0.2)
BASOPHILS NFR BLD AUTO: 0 %
BASOPHILS NFR BLD AUTO: 0.1 %
BASOPHILS NFR BLD AUTO: 0.2 %
BASOPHILS NFR BLD AUTO: 0.4 %
BASOPHILS NFR BLD AUTO: 0.5 %
BASOPHILS NFR BLD AUTO: 0.5 %
BASOPHILS NFR BLD AUTO: 0.6 %
BASOPHILS NFR BLD AUTO: 1.7 %
BASOPHILS NFR BLD AUTO: NORMAL %
BILIRUB DIRECT SERPL-MCNC: 0.1 MG/DL (ref 0–0.2)
BILIRUB DIRECT SERPL-MCNC: <0.1 MG/DL (ref 0–0.2)
BILIRUB SERPL-MCNC: 0.1 MG/DL (ref 0.2–1.3)
BILIRUB SERPL-MCNC: 0.2 MG/DL (ref 0.2–1.3)
BILIRUB SERPL-MCNC: 0.2 MG/DL (ref 0.2–1.3)
BILIRUB SERPL-MCNC: 0.3 MG/DL (ref 0.2–1.3)
BILIRUB SERPL-MCNC: 0.4 MG/DL (ref 0.2–1.3)
BILIRUB SERPL-MCNC: 0.4 MG/DL (ref 0.2–1.3)
BILIRUB SERPL-MCNC: 0.5 MG/DL (ref 0.2–1.3)
BILIRUB SERPL-MCNC: 0.6 MG/DL (ref 0.2–1.3)
BILIRUB SERPL-MCNC: NORMAL MG/DL (ref 0.2–1.3)
BILIRUB UR QL STRIP: NEGATIVE
BLD GP AB SCN SERPL QL: NORMAL
BLD GP AB SCN SERPL QL: NORMAL
BLD PROD TYP BPU: NORMAL
BLD UNIT ID BPU: 0
BLD UNIT ID BPU: 0
BLOOD BANK CMNT PATIENT-IMP: NORMAL
BLOOD BANK CMNT PATIENT-IMP: NORMAL
BLOOD PRODUCT CODE: NORMAL
BLOOD PRODUCT CODE: NORMAL
BPU ID: NORMAL
BPU ID: NORMAL
BUN SERPL-MCNC: 12 MG/DL (ref 7–30)
BUN SERPL-MCNC: 13 MG/DL (ref 7–30)
BUN SERPL-MCNC: 13 MG/DL (ref 7–30)
BUN SERPL-MCNC: 14 MG/DL (ref 7–30)
BUN SERPL-MCNC: 15 MG/DL (ref 7–30)
BUN SERPL-MCNC: 15 MG/DL (ref 7–30)
BUN SERPL-MCNC: 16 MG/DL (ref 7–30)
BUN SERPL-MCNC: 16 MG/DL (ref 7–30)
BUN SERPL-MCNC: 17 MG/DL (ref 7–30)
BUN SERPL-MCNC: 17 MG/DL (ref 7–30)
BUN SERPL-MCNC: 18 MG/DL (ref 7–30)
BUN SERPL-MCNC: 19 MG/DL (ref 7–30)
BUN SERPL-MCNC: 19 MG/DL (ref 7–30)
BUN SERPL-MCNC: 20 MG/DL (ref 7–30)
BUN SERPL-MCNC: 20 MG/DL (ref 7–30)
BUN SERPL-MCNC: 21 MG/DL (ref 7–30)
BUN SERPL-MCNC: 21 MG/DL (ref 7–30)
BUN SERPL-MCNC: 22 MG/DL (ref 7–30)
BUN SERPL-MCNC: 22 MG/DL (ref 7–30)
BUN SERPL-MCNC: 24 MG/DL (ref 7–30)
BUN SERPL-MCNC: 24 MG/DL (ref 7–30)
BUN SERPL-MCNC: 25 MG/DL (ref 7–30)
BUN SERPL-MCNC: 25 MG/DL (ref 7–30)
BUN SERPL-MCNC: 26 MG/DL (ref 7–30)
BUN SERPL-MCNC: 28 MG/DL (ref 7–30)
BUN SERPL-MCNC: 29 MG/DL (ref 7–30)
BUN SERPL-MCNC: 30 MG/DL (ref 7–30)
BUN SERPL-MCNC: 31 MG/DL (ref 7–30)
BUN SERPL-MCNC: 32 MG/DL (ref 7–30)
BUN SERPL-MCNC: 32 MG/DL (ref 7–30)
BUN SERPL-MCNC: 33 MG/DL (ref 7–30)
BUN SERPL-MCNC: 36 MG/DL (ref 7–30)
BUN SERPL-MCNC: 36 MG/DL (ref 7–30)
BUN SERPL-MCNC: NORMAL MG/DL (ref 7–30)
BURR CELLS BLD QL SMEAR: ABNORMAL
CA-I BLD-MCNC: 5.3 MG/DL (ref 4.4–5.2)
CALCIUM SERPL-MCNC: 10 MG/DL (ref 8.5–10.1)
CALCIUM SERPL-MCNC: 10.1 MG/DL (ref 8.5–10.1)
CALCIUM SERPL-MCNC: 10.1 MG/DL (ref 8.5–10.1)
CALCIUM SERPL-MCNC: 10.3 MG/DL (ref 8.5–10.1)
CALCIUM SERPL-MCNC: 10.4 MG/DL (ref 8.5–10.1)
CALCIUM SERPL-MCNC: 8.8 MG/DL (ref 8.5–10.1)
CALCIUM SERPL-MCNC: 8.9 MG/DL (ref 8.5–10.1)
CALCIUM SERPL-MCNC: 8.9 MG/DL (ref 8.5–10.1)
CALCIUM SERPL-MCNC: 9 MG/DL (ref 8.5–10.1)
CALCIUM SERPL-MCNC: 9.2 MG/DL (ref 8.5–10.1)
CALCIUM SERPL-MCNC: 9.3 MG/DL (ref 8.5–10.1)
CALCIUM SERPL-MCNC: 9.4 MG/DL (ref 8.5–10.1)
CALCIUM SERPL-MCNC: 9.5 MG/DL (ref 8.5–10.1)
CALCIUM SERPL-MCNC: 9.5 MG/DL (ref 8.5–10.1)
CALCIUM SERPL-MCNC: 9.6 MG/DL (ref 8.5–10.1)
CALCIUM SERPL-MCNC: 9.7 MG/DL (ref 8.5–10.1)
CALCIUM SERPL-MCNC: 9.9 MG/DL (ref 8.5–10.1)
CALCIUM SERPL-MCNC: NORMAL MG/DL (ref 8.5–10.1)
CHLORIDE SERPL-SCNC: 100 MMOL/L (ref 94–109)
CHLORIDE SERPL-SCNC: 101 MMOL/L (ref 94–109)
CHLORIDE SERPL-SCNC: 102 MMOL/L (ref 94–109)
CHLORIDE SERPL-SCNC: 103 MMOL/L (ref 94–109)
CHLORIDE SERPL-SCNC: 103 MMOL/L (ref 94–109)
CHLORIDE SERPL-SCNC: 104 MMOL/L (ref 94–109)
CHLORIDE SERPL-SCNC: 95 MMOL/L (ref 94–109)
CHLORIDE SERPL-SCNC: 96 MMOL/L (ref 94–109)
CHLORIDE SERPL-SCNC: 97 MMOL/L (ref 94–109)
CHLORIDE SERPL-SCNC: 98 MMOL/L (ref 94–109)
CHLORIDE SERPL-SCNC: 99 MMOL/L (ref 94–109)
CHLORIDE SERPL-SCNC: NORMAL MMOL/L (ref 94–109)
CO2 SERPL-SCNC: 20 MMOL/L (ref 20–32)
CO2 SERPL-SCNC: 23 MMOL/L (ref 20–32)
CO2 SERPL-SCNC: 24 MMOL/L (ref 20–32)
CO2 SERPL-SCNC: 25 MMOL/L (ref 20–32)
CO2 SERPL-SCNC: 26 MMOL/L (ref 20–32)
CO2 SERPL-SCNC: 27 MMOL/L (ref 20–32)
CO2 SERPL-SCNC: 28 MMOL/L (ref 20–32)
CO2 SERPL-SCNC: 29 MMOL/L (ref 20–32)
CO2 SERPL-SCNC: NORMAL MMOL/L (ref 20–32)
COLOR UR AUTO: ABNORMAL
COPATH REPORT: NORMAL
COPATH REPORT: NORMAL
CREAT SERPL-MCNC: 0.81 MG/DL (ref 0.66–1.25)
CREAT SERPL-MCNC: 0.96 MG/DL (ref 0.66–1.25)
CREAT SERPL-MCNC: 0.97 MG/DL (ref 0.66–1.25)
CREAT SERPL-MCNC: 0.98 MG/DL (ref 0.66–1.25)
CREAT SERPL-MCNC: 0.98 MG/DL (ref 0.66–1.25)
CREAT SERPL-MCNC: 0.99 MG/DL (ref 0.66–1.25)
CREAT SERPL-MCNC: 1.01 MG/DL (ref 0.66–1.25)
CREAT SERPL-MCNC: 1.02 MG/DL (ref 0.66–1.25)
CREAT SERPL-MCNC: 1.02 MG/DL (ref 0.66–1.25)
CREAT SERPL-MCNC: 1.03 MG/DL (ref 0.66–1.25)
CREAT SERPL-MCNC: 1.04 MG/DL (ref 0.66–1.25)
CREAT SERPL-MCNC: 1.06 MG/DL (ref 0.66–1.25)
CREAT SERPL-MCNC: 1.06 MG/DL (ref 0.66–1.25)
CREAT SERPL-MCNC: 1.07 MG/DL (ref 0.66–1.25)
CREAT SERPL-MCNC: 1.1 MG/DL (ref 0.66–1.25)
CREAT SERPL-MCNC: 1.11 MG/DL (ref 0.66–1.25)
CREAT SERPL-MCNC: 1.14 MG/DL (ref 0.66–1.25)
CREAT SERPL-MCNC: 1.16 MG/DL (ref 0.66–1.25)
CREAT SERPL-MCNC: 1.19 MG/DL (ref 0.66–1.25)
CREAT SERPL-MCNC: 1.2 MG/DL (ref 0.66–1.25)
CREAT SERPL-MCNC: 1.25 MG/DL (ref 0.66–1.25)
CREAT SERPL-MCNC: 1.26 MG/DL (ref 0.66–1.25)
CREAT SERPL-MCNC: 1.34 MG/DL (ref 0.66–1.25)
CREAT SERPL-MCNC: 1.34 MG/DL (ref 0.66–1.25)
CREAT SERPL-MCNC: 1.4 MG/DL (ref 0.66–1.25)
CREAT SERPL-MCNC: 1.41 MG/DL (ref 0.66–1.25)
CREAT SERPL-MCNC: 1.43 MG/DL (ref 0.66–1.25)
CREAT SERPL-MCNC: 1.44 MG/DL (ref 0.66–1.25)
CREAT SERPL-MCNC: 1.47 MG/DL (ref 0.66–1.25)
CREAT SERPL-MCNC: 1.49 MG/DL (ref 0.66–1.25)
CREAT SERPL-MCNC: 1.56 MG/DL (ref 0.66–1.25)
CREAT SERPL-MCNC: 1.58 MG/DL (ref 0.66–1.25)
CREAT SERPL-MCNC: 1.58 MG/DL (ref 0.66–1.25)
CREAT SERPL-MCNC: 1.63 MG/DL (ref 0.66–1.25)
CREAT SERPL-MCNC: NORMAL MG/DL (ref 0.66–1.25)
DEPRECATED CALCIDIOL+CALCIFEROL SERPL-MC: 17 UG/L (ref 20–75)
DIFFERENTIAL METHOD BLD: ABNORMAL
DIFFERENTIAL METHOD BLD: NORMAL
EOSINOPHIL # BLD AUTO: 0 10E9/L (ref 0–0.7)
EOSINOPHIL # BLD AUTO: 0.1 10E9/L (ref 0–0.7)
EOSINOPHIL # BLD AUTO: NORMAL 10E9/L (ref 0–0.7)
EOSINOPHIL NFR BLD AUTO: 0 %
EOSINOPHIL NFR BLD AUTO: 0.2 %
EOSINOPHIL NFR BLD AUTO: 1 %
EOSINOPHIL NFR BLD AUTO: 1.1 %
EOSINOPHIL NFR BLD AUTO: 1.3 %
EOSINOPHIL NFR BLD AUTO: 1.8 %
EOSINOPHIL NFR BLD AUTO: 2.4 %
EOSINOPHIL NFR BLD AUTO: NORMAL %
ERYTHROCYTE [DISTWIDTH] IN BLOOD BY AUTOMATED COUNT: 14 % (ref 10–15)
ERYTHROCYTE [DISTWIDTH] IN BLOOD BY AUTOMATED COUNT: 14.9 % (ref 10–15)
ERYTHROCYTE [DISTWIDTH] IN BLOOD BY AUTOMATED COUNT: 15.2 % (ref 10–15)
ERYTHROCYTE [DISTWIDTH] IN BLOOD BY AUTOMATED COUNT: 15.6 % (ref 10–15)
ERYTHROCYTE [DISTWIDTH] IN BLOOD BY AUTOMATED COUNT: 16 % (ref 10–15)
ERYTHROCYTE [DISTWIDTH] IN BLOOD BY AUTOMATED COUNT: 17.2 % (ref 10–15)
ERYTHROCYTE [DISTWIDTH] IN BLOOD BY AUTOMATED COUNT: 17.4 % (ref 10–15)
ERYTHROCYTE [DISTWIDTH] IN BLOOD BY AUTOMATED COUNT: 18.5 % (ref 10–15)
ERYTHROCYTE [DISTWIDTH] IN BLOOD BY AUTOMATED COUNT: 18.5 % (ref 10–15)
ERYTHROCYTE [DISTWIDTH] IN BLOOD BY AUTOMATED COUNT: 18.6 % (ref 10–15)
ERYTHROCYTE [DISTWIDTH] IN BLOOD BY AUTOMATED COUNT: 19.1 % (ref 10–15)
ERYTHROCYTE [DISTWIDTH] IN BLOOD BY AUTOMATED COUNT: 19.4 % (ref 10–15)
ERYTHROCYTE [DISTWIDTH] IN BLOOD BY AUTOMATED COUNT: 20 % (ref 10–15)
ERYTHROCYTE [DISTWIDTH] IN BLOOD BY AUTOMATED COUNT: 20.1 % (ref 10–15)
ERYTHROCYTE [DISTWIDTH] IN BLOOD BY AUTOMATED COUNT: 20.7 % (ref 10–15)
ERYTHROCYTE [DISTWIDTH] IN BLOOD BY AUTOMATED COUNT: 20.9 % (ref 10–15)
ERYTHROCYTE [DISTWIDTH] IN BLOOD BY AUTOMATED COUNT: 21.2 % (ref 10–15)
ERYTHROCYTE [DISTWIDTH] IN BLOOD BY AUTOMATED COUNT: 21.5 % (ref 10–15)
ERYTHROCYTE [DISTWIDTH] IN BLOOD BY AUTOMATED COUNT: 21.8 % (ref 10–15)
ERYTHROCYTE [DISTWIDTH] IN BLOOD BY AUTOMATED COUNT: 21.9 % (ref 10–15)
ERYTHROCYTE [DISTWIDTH] IN BLOOD BY AUTOMATED COUNT: 22.1 % (ref 10–15)
ERYTHROCYTE [DISTWIDTH] IN BLOOD BY AUTOMATED COUNT: 22.3 % (ref 10–15)
ERYTHROCYTE [DISTWIDTH] IN BLOOD BY AUTOMATED COUNT: 22.3 % (ref 10–15)
ERYTHROCYTE [DISTWIDTH] IN BLOOD BY AUTOMATED COUNT: 22.4 % (ref 10–15)
ERYTHROCYTE [DISTWIDTH] IN BLOOD BY AUTOMATED COUNT: 22.4 % (ref 10–15)
ERYTHROCYTE [DISTWIDTH] IN BLOOD BY AUTOMATED COUNT: 22.5 % (ref 10–15)
ERYTHROCYTE [DISTWIDTH] IN BLOOD BY AUTOMATED COUNT: 22.5 % (ref 10–15)
ERYTHROCYTE [DISTWIDTH] IN BLOOD BY AUTOMATED COUNT: 22.6 % (ref 10–15)
ERYTHROCYTE [DISTWIDTH] IN BLOOD BY AUTOMATED COUNT: 22.6 % (ref 10–15)
ERYTHROCYTE [DISTWIDTH] IN BLOOD BY AUTOMATED COUNT: 22.9 % (ref 10–15)
ERYTHROCYTE [DISTWIDTH] IN BLOOD BY AUTOMATED COUNT: 23.4 % (ref 10–15)
ERYTHROCYTE [DISTWIDTH] IN BLOOD BY AUTOMATED COUNT: 23.6 % (ref 10–15)
ERYTHROCYTE [DISTWIDTH] IN BLOOD BY AUTOMATED COUNT: 23.8 % (ref 10–15)
ERYTHROCYTE [DISTWIDTH] IN BLOOD BY AUTOMATED COUNT: NORMAL % (ref 10–15)
ERYTHROCYTE [DISTWIDTH] IN BLOOD BY AUTOMATED COUNT: NORMAL % (ref 10–15)
ETHANOL SERPL-MCNC: <0.01 G/DL
FERRITIN SERPL-MCNC: 204 NG/ML (ref 26–388)
FUNGUS SPEC CULT: NORMAL
GAMMA INTERFERON BACKGROUND BLD IA-ACNC: 0.04 IU/ML
GFR SERPL CREATININE-BSD FRML MDRD: 42 ML/MIN/{1.73_M2}
GFR SERPL CREATININE-BSD FRML MDRD: 43 ML/MIN/{1.73_M2}
GFR SERPL CREATININE-BSD FRML MDRD: 43 ML/MIN/{1.73_M2}
GFR SERPL CREATININE-BSD FRML MDRD: 44 ML/MIN/{1.73_M2}
GFR SERPL CREATININE-BSD FRML MDRD: 47 ML/MIN/{1.73_M2}
GFR SERPL CREATININE-BSD FRML MDRD: 48 ML/MIN/{1.73_M2}
GFR SERPL CREATININE-BSD FRML MDRD: 49 ML/MIN/{1.73_M2}
GFR SERPL CREATININE-BSD FRML MDRD: 50 ML/MIN/{1.73_M2}
GFR SERPL CREATININE-BSD FRML MDRD: 50 ML/MIN/{1.73_M2}
GFR SERPL CREATININE-BSD FRML MDRD: 53 ML/MIN/{1.73_M2}
GFR SERPL CREATININE-BSD FRML MDRD: 53 ML/MIN/{1.73_M2}
GFR SERPL CREATININE-BSD FRML MDRD: 57 ML/MIN/{1.73_M2}
GFR SERPL CREATININE-BSD FRML MDRD: 57 ML/MIN/{1.73_M2}
GFR SERPL CREATININE-BSD FRML MDRD: 60 ML/MIN/{1.73_M2}
GFR SERPL CREATININE-BSD FRML MDRD: 61 ML/MIN/{1.73_M2}
GFR SERPL CREATININE-BSD FRML MDRD: 63 ML/MIN/{1.73_M2}
GFR SERPL CREATININE-BSD FRML MDRD: 64 ML/MIN/{1.73_M2}
GFR SERPL CREATININE-BSD FRML MDRD: 66 ML/MIN/{1.73_M2}
GFR SERPL CREATININE-BSD FRML MDRD: 67 ML/MIN/{1.73_M2}
GFR SERPL CREATININE-BSD FRML MDRD: 69 ML/MIN/{1.73_M2}
GFR SERPL CREATININE-BSD FRML MDRD: 70 ML/MIN/{1.73_M2}
GFR SERPL CREATININE-BSD FRML MDRD: 70 ML/MIN/{1.73_M2}
GFR SERPL CREATININE-BSD FRML MDRD: 72 ML/MIN/{1.73_M2}
GFR SERPL CREATININE-BSD FRML MDRD: 72 ML/MIN/{1.73_M2}
GFR SERPL CREATININE-BSD FRML MDRD: 73 ML/MIN/{1.73_M2}
GFR SERPL CREATININE-BSD FRML MDRD: 73 ML/MIN/{1.73_M2}
GFR SERPL CREATININE-BSD FRML MDRD: 74 ML/MIN/{1.73_M2}
GFR SERPL CREATININE-BSD FRML MDRD: 76 ML/MIN/{1.73_M2}
GFR SERPL CREATININE-BSD FRML MDRD: 77 ML/MIN/{1.73_M2}
GFR SERPL CREATININE-BSD FRML MDRD: 77 ML/MIN/{1.73_M2}
GFR SERPL CREATININE-BSD FRML MDRD: 78 ML/MIN/{1.73_M2}
GFR SERPL CREATININE-BSD FRML MDRD: 79 ML/MIN/{1.73_M2}
GFR SERPL CREATININE-BSD FRML MDRD: 89 ML/MIN/{1.73_M2}
GFR SERPL CREATININE-BSD FRML MDRD: NORMAL ML/MIN/{1.73_M2}
GLUCOSE BLD-MCNC: 133 MG/DL (ref 70–99)
GLUCOSE BLDC GLUCOMTR-MCNC: 110 MG/DL (ref 70–99)
GLUCOSE BLDC GLUCOMTR-MCNC: 115 MG/DL (ref 70–99)
GLUCOSE BLDC GLUCOMTR-MCNC: 119 MG/DL (ref 70–99)
GLUCOSE BLDC GLUCOMTR-MCNC: 136 MG/DL (ref 70–99)
GLUCOSE BLDC GLUCOMTR-MCNC: 136 MG/DL (ref 70–99)
GLUCOSE BLDC GLUCOMTR-MCNC: 140 MG/DL (ref 70–99)
GLUCOSE BLDC GLUCOMTR-MCNC: 141 MG/DL (ref 70–99)
GLUCOSE BLDC GLUCOMTR-MCNC: 97 MG/DL (ref 70–99)
GLUCOSE BLDC GLUCOMTR-MCNC: 97 MG/DL (ref 70–99)
GLUCOSE SERPL-MCNC: 102 MG/DL (ref 70–99)
GLUCOSE SERPL-MCNC: 105 MG/DL (ref 70–99)
GLUCOSE SERPL-MCNC: 105 MG/DL (ref 70–99)
GLUCOSE SERPL-MCNC: 106 MG/DL (ref 70–99)
GLUCOSE SERPL-MCNC: 107 MG/DL (ref 70–99)
GLUCOSE SERPL-MCNC: 108 MG/DL (ref 70–99)
GLUCOSE SERPL-MCNC: 112 MG/DL (ref 70–99)
GLUCOSE SERPL-MCNC: 112 MG/DL (ref 70–99)
GLUCOSE SERPL-MCNC: 115 MG/DL (ref 70–99)
GLUCOSE SERPL-MCNC: 115 MG/DL (ref 70–99)
GLUCOSE SERPL-MCNC: 116 MG/DL (ref 70–99)
GLUCOSE SERPL-MCNC: 119 MG/DL (ref 70–99)
GLUCOSE SERPL-MCNC: 122 MG/DL (ref 70–99)
GLUCOSE SERPL-MCNC: 123 MG/DL (ref 70–99)
GLUCOSE SERPL-MCNC: 124 MG/DL (ref 70–99)
GLUCOSE SERPL-MCNC: 130 MG/DL (ref 70–99)
GLUCOSE SERPL-MCNC: 133 MG/DL (ref 70–99)
GLUCOSE SERPL-MCNC: 136 MG/DL (ref 70–99)
GLUCOSE SERPL-MCNC: 137 MG/DL (ref 70–99)
GLUCOSE SERPL-MCNC: 138 MG/DL (ref 70–99)
GLUCOSE SERPL-MCNC: 139 MG/DL (ref 70–99)
GLUCOSE SERPL-MCNC: 74 MG/DL (ref 70–99)
GLUCOSE SERPL-MCNC: 77 MG/DL (ref 70–99)
GLUCOSE SERPL-MCNC: 79 MG/DL (ref 70–99)
GLUCOSE SERPL-MCNC: 80 MG/DL (ref 70–99)
GLUCOSE SERPL-MCNC: 81 MG/DL (ref 70–99)
GLUCOSE SERPL-MCNC: 83 MG/DL (ref 70–99)
GLUCOSE SERPL-MCNC: 84 MG/DL (ref 70–99)
GLUCOSE SERPL-MCNC: 86 MG/DL (ref 70–99)
GLUCOSE SERPL-MCNC: 87 MG/DL (ref 70–99)
GLUCOSE SERPL-MCNC: 88 MG/DL (ref 70–99)
GLUCOSE SERPL-MCNC: 90 MG/DL (ref 70–99)
GLUCOSE SERPL-MCNC: 91 MG/DL (ref 70–99)
GLUCOSE SERPL-MCNC: 95 MG/DL (ref 70–99)
GLUCOSE SERPL-MCNC: 98 MG/DL (ref 70–99)
GLUCOSE SERPL-MCNC: NORMAL MG/DL (ref 70–99)
GLUCOSE UR STRIP-MCNC: NEGATIVE MG/DL
GRAM STN SPEC: NORMAL
HCO3 BLD-SCNC: 27 MMOL/L (ref 21–28)
HCT VFR BLD AUTO: 23.7 % (ref 40–53)
HCT VFR BLD AUTO: 25.4 % (ref 40–53)
HCT VFR BLD AUTO: 25.5 % (ref 40–53)
HCT VFR BLD AUTO: 25.5 % (ref 40–53)
HCT VFR BLD AUTO: 26.4 % (ref 40–53)
HCT VFR BLD AUTO: 26.5 % (ref 40–53)
HCT VFR BLD AUTO: 26.9 % (ref 40–53)
HCT VFR BLD AUTO: 26.9 % (ref 40–53)
HCT VFR BLD AUTO: 27.4 % (ref 40–53)
HCT VFR BLD AUTO: 27.7 % (ref 40–53)
HCT VFR BLD AUTO: 27.8 % (ref 40–53)
HCT VFR BLD AUTO: 28 % (ref 40–53)
HCT VFR BLD AUTO: 28 % (ref 40–53)
HCT VFR BLD AUTO: 28.2 % (ref 40–53)
HCT VFR BLD AUTO: 28.2 % (ref 40–53)
HCT VFR BLD AUTO: 28.6 % (ref 40–53)
HCT VFR BLD AUTO: 28.7 % (ref 40–53)
HCT VFR BLD AUTO: 28.9 % (ref 40–53)
HCT VFR BLD AUTO: 28.9 % (ref 40–53)
HCT VFR BLD AUTO: 29.2 % (ref 40–53)
HCT VFR BLD AUTO: 29.8 % (ref 40–53)
HCT VFR BLD AUTO: 30.1 % (ref 40–53)
HCT VFR BLD AUTO: 30.3 % (ref 40–53)
HCT VFR BLD AUTO: 30.4 % (ref 40–53)
HCT VFR BLD AUTO: 30.8 % (ref 40–53)
HCT VFR BLD AUTO: 31 % (ref 40–53)
HCT VFR BLD AUTO: 31.3 % (ref 40–53)
HCT VFR BLD AUTO: 32.5 % (ref 40–53)
HCT VFR BLD AUTO: 32.6 % (ref 40–53)
HCT VFR BLD AUTO: 32.9 % (ref 40–53)
HCT VFR BLD AUTO: 33.8 % (ref 40–53)
HCT VFR BLD AUTO: NORMAL % (ref 40–53)
HCT VFR BLD AUTO: NORMAL % (ref 40–53)
HEMOCCULT STL QL IA: NEGATIVE
HGB BLD-MCNC: 10 G/DL (ref 13.3–17.7)
HGB BLD-MCNC: 10.2 G/DL (ref 13.3–17.7)
HGB BLD-MCNC: 10.3 G/DL (ref 13.3–17.7)
HGB BLD-MCNC: 7.2 G/DL (ref 13.3–17.7)
HGB BLD-MCNC: 7.5 G/DL (ref 13.3–17.7)
HGB BLD-MCNC: 7.6 G/DL (ref 13.3–17.7)
HGB BLD-MCNC: 7.7 G/DL (ref 13.3–17.7)
HGB BLD-MCNC: 8 G/DL (ref 13.3–17.7)
HGB BLD-MCNC: 8 G/DL (ref 13.3–17.7)
HGB BLD-MCNC: 8.1 G/DL (ref 13.3–17.7)
HGB BLD-MCNC: 8.2 G/DL (ref 13.3–17.7)
HGB BLD-MCNC: 8.3 G/DL (ref 13.3–17.7)
HGB BLD-MCNC: 8.4 G/DL (ref 13.3–17.7)
HGB BLD-MCNC: 8.4 G/DL (ref 13.3–17.7)
HGB BLD-MCNC: 8.5 G/DL (ref 13.3–17.7)
HGB BLD-MCNC: 8.6 G/DL (ref 13.3–17.7)
HGB BLD-MCNC: 8.7 G/DL (ref 13.3–17.7)
HGB BLD-MCNC: 8.9 G/DL (ref 13.3–17.7)
HGB BLD-MCNC: 9 G/DL (ref 13.3–17.7)
HGB BLD-MCNC: 9 G/DL (ref 13.3–17.7)
HGB BLD-MCNC: 9.1 G/DL (ref 13.3–17.7)
HGB BLD-MCNC: 9.3 G/DL (ref 13.3–17.7)
HGB BLD-MCNC: 9.3 G/DL (ref 13.3–17.7)
HGB BLD-MCNC: 9.6 G/DL (ref 13.3–17.7)
HGB BLD-MCNC: 9.7 G/DL (ref 13.3–17.7)
HGB BLD-MCNC: 9.7 G/DL (ref 13.3–17.7)
HGB BLD-MCNC: 9.9 G/DL (ref 13.3–17.7)
HGB BLD-MCNC: 9.9 G/DL (ref 13.3–17.7)
HGB BLD-MCNC: NORMAL G/DL (ref 13.3–17.7)
HGB BLD-MCNC: NORMAL G/DL (ref 13.3–17.7)
HGB UR QL STRIP: ABNORMAL
IMM GRANULOCYTES # BLD: 0 10E9/L (ref 0–0.4)
IMM GRANULOCYTES # BLD: 0.1 10E9/L (ref 0–0.4)
IMM GRANULOCYTES # BLD: 0.2 10E9/L (ref 0–0.4)
IMM GRANULOCYTES # BLD: 0.2 10E9/L (ref 0–0.4)
IMM GRANULOCYTES # BLD: NORMAL 10E9/L (ref 0–0.4)
IMM GRANULOCYTES NFR BLD: 0.3 %
IMM GRANULOCYTES NFR BLD: 0.3 %
IMM GRANULOCYTES NFR BLD: 0.4 %
IMM GRANULOCYTES NFR BLD: 0.5 %
IMM GRANULOCYTES NFR BLD: 0.6 %
IMM GRANULOCYTES NFR BLD: 0.6 %
IMM GRANULOCYTES NFR BLD: 0.7 %
IMM GRANULOCYTES NFR BLD: 0.7 %
IMM GRANULOCYTES NFR BLD: 0.8 %
IMM GRANULOCYTES NFR BLD: 0.8 %
IMM GRANULOCYTES NFR BLD: 1.4 %
IMM GRANULOCYTES NFR BLD: 1.4 %
IMM GRANULOCYTES NFR BLD: NORMAL %
INR PPP: 0.97 (ref 0.86–1.14)
INR PPP: 1 (ref 0.86–1.14)
INR PPP: 1 (ref 0.86–1.14)
INR PPP: 1.04 (ref 0.86–1.14)
INR PPP: 1.05 (ref 0.86–1.14)
INTERPRETATION ECG - MUSE: NORMAL
IRON SATN MFR SERPL: 10 % (ref 15–46)
IRON SERPL-MCNC: 26 UG/DL (ref 35–180)
KETONES UR STRIP-MCNC: NEGATIVE MG/DL
LACTATE BLD-SCNC: 0.9 MMOL/L (ref 0.7–2)
LDH SERPL L TO P-CCNC: 172 U/L (ref 85–227)
LDH SERPL L TO P-CCNC: 188 U/L (ref 85–227)
LDH SERPL L TO P-CCNC: 202 U/L (ref 85–227)
LDH SERPL L TO P-CCNC: 251 U/L (ref 85–227)
LDH SERPL L TO P-CCNC: 271 U/L (ref 85–227)
LDH SERPL L TO P-CCNC: NORMAL U/L (ref 85–227)
LEUKOCYTE ESTERASE UR QL STRIP: NEGATIVE
LYMPHOCYTES # BLD AUTO: 0.3 10E9/L (ref 0.8–5.3)
LYMPHOCYTES # BLD AUTO: 0.9 10E9/L (ref 0.8–5.3)
LYMPHOCYTES # BLD AUTO: 1 10E9/L (ref 0.8–5.3)
LYMPHOCYTES # BLD AUTO: 1.2 10E9/L (ref 0.8–5.3)
LYMPHOCYTES # BLD AUTO: 1.2 10E9/L (ref 0.8–5.3)
LYMPHOCYTES # BLD AUTO: 1.3 10E9/L (ref 0.8–5.3)
LYMPHOCYTES # BLD AUTO: 1.4 10E9/L (ref 0.8–5.3)
LYMPHOCYTES # BLD AUTO: 1.4 10E9/L (ref 0.8–5.3)
LYMPHOCYTES # BLD AUTO: 1.6 10E9/L (ref 0.8–5.3)
LYMPHOCYTES # BLD AUTO: 1.8 10E9/L (ref 0.8–5.3)
LYMPHOCYTES # BLD AUTO: 2 10E9/L (ref 0.8–5.3)
LYMPHOCYTES # BLD AUTO: NORMAL 10E9/L (ref 0.8–5.3)
LYMPHOCYTES NFR BLD AUTO: 1.8 %
LYMPHOCYTES NFR BLD AUTO: 11.2 %
LYMPHOCYTES NFR BLD AUTO: 11.9 %
LYMPHOCYTES NFR BLD AUTO: 13.3 %
LYMPHOCYTES NFR BLD AUTO: 13.9 %
LYMPHOCYTES NFR BLD AUTO: 14.9 %
LYMPHOCYTES NFR BLD AUTO: 15.3 %
LYMPHOCYTES NFR BLD AUTO: 18.9 %
LYMPHOCYTES NFR BLD AUTO: 21.4 %
LYMPHOCYTES NFR BLD AUTO: 26.1 %
LYMPHOCYTES NFR BLD AUTO: 47.6 %
LYMPHOCYTES NFR BLD AUTO: 5.5 %
LYMPHOCYTES NFR BLD AUTO: 6.8 %
LYMPHOCYTES NFR BLD AUTO: NORMAL %
Lab: ABNORMAL
M TB IFN-G BLD-IMP: NEGATIVE
M TB IFN-G CD4+ BCKGRND COR BLD-ACNC: 6.87 IU/ML
MAGNESIUM SERPL-MCNC: 1.2 MG/DL (ref 1.6–2.3)
MAGNESIUM SERPL-MCNC: 1.5 MG/DL (ref 1.6–2.3)
MAGNESIUM SERPL-MCNC: 1.6 MG/DL (ref 1.6–2.3)
MAGNESIUM SERPL-MCNC: 1.6 MG/DL (ref 1.6–2.3)
MAGNESIUM SERPL-MCNC: 1.7 MG/DL (ref 1.6–2.3)
MAGNESIUM SERPL-MCNC: 1.7 MG/DL (ref 1.6–2.3)
MAGNESIUM SERPL-MCNC: 1.8 MG/DL (ref 1.6–2.3)
MAGNESIUM SERPL-MCNC: 1.9 MG/DL (ref 1.6–2.3)
MAGNESIUM SERPL-MCNC: 2 MG/DL (ref 1.6–2.3)
MAGNESIUM SERPL-MCNC: 2.2 MG/DL (ref 1.6–2.3)
MAGNESIUM SERPL-MCNC: 2.3 MG/DL (ref 1.6–2.3)
MAGNESIUM SERPL-MCNC: 2.5 MG/DL (ref 1.6–2.3)
MAGNESIUM SERPL-MCNC: 2.8 MG/DL (ref 1.6–2.3)
MCH RBC QN AUTO: 23.1 PG (ref 26.5–33)
MCH RBC QN AUTO: 23.3 PG (ref 26.5–33)
MCH RBC QN AUTO: 23.3 PG (ref 26.5–33)
MCH RBC QN AUTO: 23.4 PG (ref 26.5–33)
MCH RBC QN AUTO: 23.4 PG (ref 26.5–33)
MCH RBC QN AUTO: 23.5 PG (ref 26.5–33)
MCH RBC QN AUTO: 23.7 PG (ref 26.5–33)
MCH RBC QN AUTO: 23.7 PG (ref 26.5–33)
MCH RBC QN AUTO: 23.9 PG (ref 26.5–33)
MCH RBC QN AUTO: 24 PG (ref 26.5–33)
MCH RBC QN AUTO: 24.2 PG (ref 26.5–33)
MCH RBC QN AUTO: 24.4 PG (ref 26.5–33)
MCH RBC QN AUTO: 24.5 PG (ref 26.5–33)
MCH RBC QN AUTO: 24.6 PG (ref 26.5–33)
MCH RBC QN AUTO: 24.7 PG (ref 26.5–33)
MCH RBC QN AUTO: 24.8 PG (ref 26.5–33)
MCH RBC QN AUTO: 24.9 PG (ref 26.5–33)
MCH RBC QN AUTO: 24.9 PG (ref 26.5–33)
MCH RBC QN AUTO: 25.1 PG (ref 26.5–33)
MCH RBC QN AUTO: 25.2 PG (ref 26.5–33)
MCH RBC QN AUTO: 25.8 PG (ref 26.5–33)
MCH RBC QN AUTO: 25.8 PG (ref 26.5–33)
MCH RBC QN AUTO: 25.9 PG (ref 26.5–33)
MCH RBC QN AUTO: 26.2 PG (ref 26.5–33)
MCH RBC QN AUTO: 26.2 PG (ref 26.5–33)
MCH RBC QN AUTO: 27 PG (ref 26.5–33)
MCH RBC QN AUTO: 27.2 PG (ref 26.5–33)
MCH RBC QN AUTO: 27.4 PG (ref 26.5–33)
MCH RBC QN AUTO: NORMAL PG (ref 26.5–33)
MCH RBC QN AUTO: NORMAL PG (ref 26.5–33)
MCHC RBC AUTO-ENTMCNC: 29.1 G/DL (ref 31.5–36.5)
MCHC RBC AUTO-ENTMCNC: 29.4 G/DL (ref 31.5–36.5)
MCHC RBC AUTO-ENTMCNC: 29.4 G/DL (ref 31.5–36.5)
MCHC RBC AUTO-ENTMCNC: 29.5 G/DL (ref 31.5–36.5)
MCHC RBC AUTO-ENTMCNC: 29.6 G/DL (ref 31.5–36.5)
MCHC RBC AUTO-ENTMCNC: 29.6 G/DL (ref 31.5–36.5)
MCHC RBC AUTO-ENTMCNC: 29.7 G/DL (ref 31.5–36.5)
MCHC RBC AUTO-ENTMCNC: 29.8 G/DL (ref 31.5–36.5)
MCHC RBC AUTO-ENTMCNC: 29.9 G/DL (ref 31.5–36.5)
MCHC RBC AUTO-ENTMCNC: 29.9 G/DL (ref 31.5–36.5)
MCHC RBC AUTO-ENTMCNC: 30.2 G/DL (ref 31.5–36.5)
MCHC RBC AUTO-ENTMCNC: 30.3 G/DL (ref 31.5–36.5)
MCHC RBC AUTO-ENTMCNC: 30.4 G/DL (ref 31.5–36.5)
MCHC RBC AUTO-ENTMCNC: 30.7 G/DL (ref 31.5–36.5)
MCHC RBC AUTO-ENTMCNC: 30.9 G/DL (ref 31.5–36.5)
MCHC RBC AUTO-ENTMCNC: 31.2 G/DL (ref 31.5–36.5)
MCHC RBC AUTO-ENTMCNC: 31.2 G/DL (ref 31.5–36.5)
MCHC RBC AUTO-ENTMCNC: 31.3 G/DL (ref 31.5–36.5)
MCHC RBC AUTO-ENTMCNC: 31.5 G/DL (ref 31.5–36.5)
MCHC RBC AUTO-ENTMCNC: 31.8 G/DL (ref 31.5–36.5)
MCHC RBC AUTO-ENTMCNC: 31.9 G/DL (ref 31.5–36.5)
MCHC RBC AUTO-ENTMCNC: 31.9 G/DL (ref 31.5–36.5)
MCHC RBC AUTO-ENTMCNC: 32.2 G/DL (ref 31.5–36.5)
MCHC RBC AUTO-ENTMCNC: 32.2 G/DL (ref 31.5–36.5)
MCHC RBC AUTO-ENTMCNC: 32.4 G/DL (ref 31.5–36.5)
MCHC RBC AUTO-ENTMCNC: 32.7 G/DL (ref 31.5–36.5)
MCHC RBC AUTO-ENTMCNC: 33.2 G/DL (ref 31.5–36.5)
MCHC RBC AUTO-ENTMCNC: NORMAL G/DL (ref 31.5–36.5)
MCHC RBC AUTO-ENTMCNC: NORMAL G/DL (ref 31.5–36.5)
MCV RBC AUTO: 75 FL (ref 78–100)
MCV RBC AUTO: 77 FL (ref 78–100)
MCV RBC AUTO: 78 FL (ref 78–100)
MCV RBC AUTO: 79 FL (ref 78–100)
MCV RBC AUTO: 80 FL (ref 78–100)
MCV RBC AUTO: 81 FL (ref 78–100)
MCV RBC AUTO: 82 FL (ref 78–100)
MCV RBC AUTO: 83 FL (ref 78–100)
MCV RBC AUTO: 84 FL (ref 78–100)
MCV RBC AUTO: 84 FL (ref 78–100)
MCV RBC AUTO: 85 FL (ref 78–100)
MCV RBC AUTO: NORMAL FL (ref 78–100)
MCV RBC AUTO: NORMAL FL (ref 78–100)
MITOGEN IGNF BCKGRD COR BLD-ACNC: 0 IU/ML
MITOGEN IGNF BCKGRD COR BLD-ACNC: 0 IU/ML
MONOCYTES # BLD AUTO: 0.1 10E9/L (ref 0–1.3)
MONOCYTES # BLD AUTO: 0.1 10E9/L (ref 0–1.3)
MONOCYTES # BLD AUTO: 0.3 10E9/L (ref 0–1.3)
MONOCYTES # BLD AUTO: 0.4 10E9/L (ref 0–1.3)
MONOCYTES # BLD AUTO: 0.5 10E9/L (ref 0–1.3)
MONOCYTES # BLD AUTO: 0.6 10E9/L (ref 0–1.3)
MONOCYTES # BLD AUTO: 0.7 10E9/L (ref 0–1.3)
MONOCYTES # BLD AUTO: 0.7 10E9/L (ref 0–1.3)
MONOCYTES # BLD AUTO: 0.9 10E9/L (ref 0–1.3)
MONOCYTES # BLD AUTO: 1 10E9/L (ref 0–1.3)
MONOCYTES # BLD AUTO: 1 10E9/L (ref 0–1.3)
MONOCYTES # BLD AUTO: NORMAL 10E9/L (ref 0–1.3)
MONOCYTES NFR BLD AUTO: 1 %
MONOCYTES NFR BLD AUTO: 1.6 %
MONOCYTES NFR BLD AUTO: 10.5 %
MONOCYTES NFR BLD AUTO: 11.7 %
MONOCYTES NFR BLD AUTO: 12.6 %
MONOCYTES NFR BLD AUTO: 13.4 %
MONOCYTES NFR BLD AUTO: 2 %
MONOCYTES NFR BLD AUTO: 2.4 %
MONOCYTES NFR BLD AUTO: 2.8 %
MONOCYTES NFR BLD AUTO: 4.3 %
MONOCYTES NFR BLD AUTO: 5.3 %
MONOCYTES NFR BLD AUTO: 9.4 %
MONOCYTES NFR BLD AUTO: 9.8 %
MONOCYTES NFR BLD AUTO: NORMAL %
MRSA DNA SPEC QL NAA+PROBE: NEGATIVE
MRSA DNA SPEC QL NAA+PROBE: NEGATIVE
NEUTROPHILS # BLD AUTO: 1.1 10E9/L (ref 1.6–8.3)
NEUTROPHILS # BLD AUTO: 10.4 10E9/L (ref 1.6–8.3)
NEUTROPHILS # BLD AUTO: 14.2 10E9/L (ref 1.6–8.3)
NEUTROPHILS # BLD AUTO: 15.3 10E9/L (ref 1.6–8.3)
NEUTROPHILS # BLD AUTO: 17.2 10E9/L (ref 1.6–8.3)
NEUTROPHILS # BLD AUTO: 4.1 10E9/L (ref 1.6–8.3)
NEUTROPHILS # BLD AUTO: 4.3 10E9/L (ref 1.6–8.3)
NEUTROPHILS # BLD AUTO: 4.6 10E9/L (ref 1.6–8.3)
NEUTROPHILS # BLD AUTO: 5.6 10E9/L (ref 1.6–8.3)
NEUTROPHILS # BLD AUTO: 5.7 10E9/L (ref 1.6–8.3)
NEUTROPHILS # BLD AUTO: 5.9 10E9/L (ref 1.6–8.3)
NEUTROPHILS # BLD AUTO: 7.4 10E9/L (ref 1.6–8.3)
NEUTROPHILS # BLD AUTO: 8.5 10E9/L (ref 1.6–8.3)
NEUTROPHILS # BLD AUTO: NORMAL 10E9/L (ref 1.6–8.3)
NEUTROPHILS NFR BLD AUTO: 37.5 %
NEUTROPHILS NFR BLD AUTO: 62 %
NEUTROPHILS NFR BLD AUTO: 64.7 %
NEUTROPHILS NFR BLD AUTO: 65.1 %
NEUTROPHILS NFR BLD AUTO: 71.1 %
NEUTROPHILS NFR BLD AUTO: 75.7 %
NEUTROPHILS NFR BLD AUTO: 78.9 %
NEUTROPHILS NFR BLD AUTO: 83.8 %
NEUTROPHILS NFR BLD AUTO: 83.8 %
NEUTROPHILS NFR BLD AUTO: 84.9 %
NEUTROPHILS NFR BLD AUTO: 90.3 %
NEUTROPHILS NFR BLD AUTO: 90.6 %
NEUTROPHILS NFR BLD AUTO: 95.4 %
NEUTROPHILS NFR BLD AUTO: NORMAL %
NITRATE UR QL: NEGATIVE
NRBC # BLD AUTO: 0 10*3/UL
NRBC # BLD AUTO: NORMAL 10*3/UL
NRBC BLD AUTO-RTO: 0 /100
NRBC BLD AUTO-RTO: NORMAL /100
NUM BPU REQUESTED: 2
O2/TOTAL GAS SETTING VFR VENT: 50 %
OSMOLALITY SERPL: 279 MMOL/KG (ref 280–301)
OSMOLALITY SERPL: 281 MMOL/KG (ref 280–301)
OSMOLALITY SERPL: 284 MMOL/KG (ref 280–301)
OSMOLALITY UR: 244 MMOL/KG (ref 100–1200)
OSMOLALITY UR: 364 MMOL/KG (ref 100–1200)
OSMOLALITY UR: 409 MMOL/KG (ref 100–1200)
PCO2 BLD: 45 MM HG (ref 35–45)
PH BLD: 7.38 PH (ref 7.35–7.45)
PH UR STRIP: 7 PH (ref 5–7)
PHOSPHATE SERPL-MCNC: 2 MG/DL (ref 2.5–4.5)
PHOSPHATE SERPL-MCNC: 2 MG/DL (ref 2.5–4.5)
PHOSPHATE SERPL-MCNC: 2.1 MG/DL (ref 2.5–4.5)
PHOSPHATE SERPL-MCNC: 2.2 MG/DL (ref 2.5–4.5)
PHOSPHATE SERPL-MCNC: 2.4 MG/DL (ref 2.5–4.5)
PHOSPHATE SERPL-MCNC: 2.5 MG/DL (ref 2.5–4.5)
PHOSPHATE SERPL-MCNC: 2.6 MG/DL (ref 2.5–4.5)
PHOSPHATE SERPL-MCNC: 2.6 MG/DL (ref 2.5–4.5)
PHOSPHATE SERPL-MCNC: 2.8 MG/DL (ref 2.5–4.5)
PHOSPHATE SERPL-MCNC: 3 MG/DL (ref 2.5–4.5)
PHOSPHATE SERPL-MCNC: 3 MG/DL (ref 2.5–4.5)
PHOSPHATE SERPL-MCNC: 3.2 MG/DL (ref 2.5–4.5)
PHOSPHATE SERPL-MCNC: 3.3 MG/DL (ref 2.5–4.5)
PHOSPHATE SERPL-MCNC: 3.4 MG/DL (ref 2.5–4.5)
PHOSPHATE SERPL-MCNC: 3.5 MG/DL (ref 2.5–4.5)
PHOSPHATE SERPL-MCNC: 3.6 MG/DL (ref 2.5–4.5)
PHOSPHATE SERPL-MCNC: 4.3 MG/DL (ref 2.5–4.5)
PLATELET # BLD AUTO: 167 10E9/L (ref 150–450)
PLATELET # BLD AUTO: 222 10E9/L (ref 150–450)
PLATELET # BLD AUTO: 231 10E9/L (ref 150–450)
PLATELET # BLD AUTO: 239 10E9/L (ref 150–450)
PLATELET # BLD AUTO: 268 10E9/L (ref 150–450)
PLATELET # BLD AUTO: 273 10E9/L (ref 150–450)
PLATELET # BLD AUTO: 282 10E9/L (ref 150–450)
PLATELET # BLD AUTO: 288 10E9/L (ref 150–450)
PLATELET # BLD AUTO: 293 10E9/L (ref 150–450)
PLATELET # BLD AUTO: 294 10E9/L (ref 150–450)
PLATELET # BLD AUTO: 301 10E9/L (ref 150–450)
PLATELET # BLD AUTO: 306 10E9/L (ref 150–450)
PLATELET # BLD AUTO: 310 10E9/L (ref 150–450)
PLATELET # BLD AUTO: 325 10E9/L (ref 150–450)
PLATELET # BLD AUTO: 332 10E9/L (ref 150–450)
PLATELET # BLD AUTO: 336 10E9/L (ref 150–450)
PLATELET # BLD AUTO: 346 10E9/L (ref 150–450)
PLATELET # BLD AUTO: 378 10E9/L (ref 150–450)
PLATELET # BLD AUTO: 379 10E9/L (ref 150–450)
PLATELET # BLD AUTO: 387 10E9/L (ref 150–450)
PLATELET # BLD AUTO: 389 10E9/L (ref 150–450)
PLATELET # BLD AUTO: 396 10E9/L (ref 150–450)
PLATELET # BLD AUTO: 400 10E9/L (ref 150–450)
PLATELET # BLD AUTO: 401 10E9/L (ref 150–450)
PLATELET # BLD AUTO: 405 10E9/L (ref 150–450)
PLATELET # BLD AUTO: 408 10E9/L (ref 150–450)
PLATELET # BLD AUTO: 441 10E9/L (ref 150–450)
PLATELET # BLD AUTO: 444 10E9/L (ref 150–450)
PLATELET # BLD AUTO: 452 10E9/L (ref 150–450)
PLATELET # BLD AUTO: 452 10E9/L (ref 150–450)
PLATELET # BLD AUTO: 454 10E9/L (ref 150–450)
PLATELET # BLD AUTO: 456 10E9/L (ref 150–450)
PLATELET # BLD AUTO: 465 10E9/L (ref 150–450)
PLATELET # BLD AUTO: NORMAL 10E9/L (ref 150–450)
PLATELET # BLD AUTO: NORMAL 10E9/L (ref 150–450)
PLATELET # BLD EST: ABNORMAL 10*3/UL
PO2 BLD: 221 MM HG (ref 80–105)
POIKILOCYTOSIS BLD QL SMEAR: ABNORMAL
POTASSIUM BLD-SCNC: 3.9 MMOL/L (ref 3.4–5.3)
POTASSIUM SERPL-SCNC: 3.5 MMOL/L (ref 3.4–5.3)
POTASSIUM SERPL-SCNC: 3.7 MMOL/L (ref 3.4–5.3)
POTASSIUM SERPL-SCNC: 3.8 MMOL/L (ref 3.4–5.3)
POTASSIUM SERPL-SCNC: 3.8 MMOL/L (ref 3.4–5.3)
POTASSIUM SERPL-SCNC: 3.9 MMOL/L (ref 3.4–5.3)
POTASSIUM SERPL-SCNC: 3.9 MMOL/L (ref 3.4–5.3)
POTASSIUM SERPL-SCNC: 4 MMOL/L (ref 3.4–5.3)
POTASSIUM SERPL-SCNC: 4.1 MMOL/L (ref 3.4–5.3)
POTASSIUM SERPL-SCNC: 4.2 MMOL/L (ref 3.4–5.3)
POTASSIUM SERPL-SCNC: 4.3 MMOL/L (ref 3.4–5.3)
POTASSIUM SERPL-SCNC: 4.4 MMOL/L (ref 3.4–5.3)
POTASSIUM SERPL-SCNC: 4.5 MMOL/L (ref 3.4–5.3)
POTASSIUM SERPL-SCNC: 4.6 MMOL/L (ref 3.4–5.3)
POTASSIUM SERPL-SCNC: 4.7 MMOL/L (ref 3.4–5.3)
POTASSIUM SERPL-SCNC: 4.7 MMOL/L (ref 3.4–5.3)
POTASSIUM SERPL-SCNC: NORMAL MMOL/L (ref 3.4–5.3)
PROCALCITONIN SERPL-MCNC: 0.07 NG/ML
PROCALCITONIN SERPL-MCNC: 0.08 NG/ML
PROT SERPL-MCNC: 5.7 G/DL (ref 6.8–8.8)
PROT SERPL-MCNC: 6 G/DL (ref 6.8–8.8)
PROT SERPL-MCNC: 6.7 G/DL (ref 6.8–8.8)
PROT SERPL-MCNC: 7.4 G/DL (ref 6.8–8.8)
PROT SERPL-MCNC: 7.6 G/DL (ref 6.8–8.8)
PROT SERPL-MCNC: 7.6 G/DL (ref 6.8–8.8)
PROT SERPL-MCNC: 7.7 G/DL (ref 6.8–8.8)
PROT SERPL-MCNC: 7.7 G/DL (ref 6.8–8.8)
PROT SERPL-MCNC: 7.9 G/DL (ref 6.8–8.8)
PROT SERPL-MCNC: 8 G/DL (ref 6.8–8.8)
PROT SERPL-MCNC: 8 G/DL (ref 6.8–8.8)
PROT SERPL-MCNC: 8.1 G/DL (ref 6.8–8.8)
PROT SERPL-MCNC: 8.2 G/DL (ref 6.8–8.8)
PROT SERPL-MCNC: 8.2 G/DL (ref 6.8–8.8)
PROT SERPL-MCNC: 8.3 G/DL (ref 6.8–8.8)
PROT SERPL-MCNC: NORMAL G/DL (ref 6.8–8.8)
PTH-INTACT SERPL-MCNC: 88 PG/ML (ref 18–80)
RADIOLOGIST FLAGS: ABNORMAL
RBC # BLD AUTO: 2.94 10E12/L (ref 4.4–5.9)
RBC # BLD AUTO: 3.17 10E12/L (ref 4.4–5.9)
RBC # BLD AUTO: 3.25 10E12/L (ref 4.4–5.9)
RBC # BLD AUTO: 3.31 10E12/L (ref 4.4–5.9)
RBC # BLD AUTO: 3.31 10E12/L (ref 4.4–5.9)
RBC # BLD AUTO: 3.33 10E12/L (ref 4.4–5.9)
RBC # BLD AUTO: 3.35 10E12/L (ref 4.4–5.9)
RBC # BLD AUTO: 3.41 10E12/L (ref 4.4–5.9)
RBC # BLD AUTO: 3.41 10E12/L (ref 4.4–5.9)
RBC # BLD AUTO: 3.42 10E12/L (ref 4.4–5.9)
RBC # BLD AUTO: 3.43 10E12/L (ref 4.4–5.9)
RBC # BLD AUTO: 3.47 10E12/L (ref 4.4–5.9)
RBC # BLD AUTO: 3.47 10E12/L (ref 4.4–5.9)
RBC # BLD AUTO: 3.48 10E12/L (ref 4.4–5.9)
RBC # BLD AUTO: 3.54 10E12/L (ref 4.4–5.9)
RBC # BLD AUTO: 3.57 10E12/L (ref 4.4–5.9)
RBC # BLD AUTO: 3.59 10E12/L (ref 4.4–5.9)
RBC # BLD AUTO: 3.6 10E12/L (ref 4.4–5.9)
RBC # BLD AUTO: 3.66 10E12/L (ref 4.4–5.9)
RBC # BLD AUTO: 3.67 10E12/L (ref 4.4–5.9)
RBC # BLD AUTO: 3.67 10E12/L (ref 4.4–5.9)
RBC # BLD AUTO: 3.72 10E12/L (ref 4.4–5.9)
RBC # BLD AUTO: 3.72 10E12/L (ref 4.4–5.9)
RBC # BLD AUTO: 3.76 10E12/L (ref 4.4–5.9)
RBC # BLD AUTO: 3.76 10E12/L (ref 4.4–5.9)
RBC # BLD AUTO: 3.83 10E12/L (ref 4.4–5.9)
RBC # BLD AUTO: 3.93 10E12/L (ref 4.4–5.9)
RBC # BLD AUTO: 3.93 10E12/L (ref 4.4–5.9)
RBC # BLD AUTO: 3.97 10E12/L (ref 4.4–5.9)
RBC # BLD AUTO: 4.17 10E12/L (ref 4.4–5.9)
RBC # BLD AUTO: NORMAL 10E12/L (ref 4.4–5.9)
RBC # BLD AUTO: NORMAL 10E12/L (ref 4.4–5.9)
RBC #/AREA URNS AUTO: 2 /HPF (ref 0–2)
SODIUM BLD-SCNC: 135 MMOL/L (ref 133–144)
SODIUM SERPL-SCNC: 129 MMOL/L (ref 133–144)
SODIUM SERPL-SCNC: 129 MMOL/L (ref 133–144)
SODIUM SERPL-SCNC: 130 MMOL/L (ref 133–144)
SODIUM SERPL-SCNC: 130 MMOL/L (ref 133–144)
SODIUM SERPL-SCNC: 131 MMOL/L (ref 133–144)
SODIUM SERPL-SCNC: 131 MMOL/L (ref 133–144)
SODIUM SERPL-SCNC: 132 MMOL/L (ref 133–144)
SODIUM SERPL-SCNC: 133 MMOL/L (ref 133–144)
SODIUM SERPL-SCNC: 134 MMOL/L (ref 133–144)
SODIUM SERPL-SCNC: 135 MMOL/L (ref 133–144)
SODIUM SERPL-SCNC: 136 MMOL/L (ref 133–144)
SODIUM SERPL-SCNC: 136 MMOL/L (ref 133–144)
SODIUM SERPL-SCNC: 138 MMOL/L (ref 133–144)
SODIUM SERPL-SCNC: 138 MMOL/L (ref 133–144)
SODIUM SERPL-SCNC: NORMAL MMOL/L (ref 133–144)
SODIUM UR-SCNC: 126 MMOL/L
SODIUM UR-SCNC: 35 MMOL/L
SODIUM UR-SCNC: 56 MMOL/L
SOURCE: ABNORMAL
SP GR UR STRIP: 1.01 (ref 1–1.03)
SPECIMEN EXP DATE BLD: NORMAL
SPECIMEN EXP DATE BLD: NORMAL
SPECIMEN SOURCE: ABNORMAL
SPECIMEN SOURCE: NORMAL
TIBC SERPL-MCNC: 253 UG/DL (ref 240–430)
TRANSFUSION STATUS PATIENT QL: NORMAL
TROPONIN I SERPL-MCNC: <0.015 UG/L (ref 0–0.04)
TROPONIN I SERPL-MCNC: NORMAL UG/L (ref 0–0.04)
TSH SERPL DL<=0.005 MIU/L-ACNC: 0.81 MU/L (ref 0.4–4)
TSH SERPL DL<=0.005 MIU/L-ACNC: 0.85 MU/L (ref 0.4–4)
TSH SERPL DL<=0.005 MIU/L-ACNC: 0.97 MU/L (ref 0.4–4)
UROBILINOGEN UR STRIP-MCNC: NORMAL MG/DL (ref 0–2)
WBC # BLD AUTO: 10.7 10E9/L (ref 4–11)
WBC # BLD AUTO: 11.2 10E9/L (ref 4–11)
WBC # BLD AUTO: 11.4 10E9/L (ref 4–11)
WBC # BLD AUTO: 11.4 10E9/L (ref 4–11)
WBC # BLD AUTO: 12.1 10E9/L (ref 4–11)
WBC # BLD AUTO: 12.5 10E9/L (ref 4–11)
WBC # BLD AUTO: 13 10E9/L (ref 4–11)
WBC # BLD AUTO: 13.9 10E9/L (ref 4–11)
WBC # BLD AUTO: 15.2 10E9/L (ref 4–11)
WBC # BLD AUTO: 15.3 10E9/L (ref 4–11)
WBC # BLD AUTO: 15.6 10E9/L (ref 4–11)
WBC # BLD AUTO: 15.7 10E9/L (ref 4–11)
WBC # BLD AUTO: 16 10E9/L (ref 4–11)
WBC # BLD AUTO: 16.6 10E9/L (ref 4–11)
WBC # BLD AUTO: 16.7 10E9/L (ref 4–11)
WBC # BLD AUTO: 16.8 10E9/L (ref 4–11)
WBC # BLD AUTO: 18.1 10E9/L (ref 4–11)
WBC # BLD AUTO: 19 10E9/L (ref 4–11)
WBC # BLD AUTO: 2.9 10E9/L (ref 4–11)
WBC # BLD AUTO: 20.1 10E9/L (ref 4–11)
WBC # BLD AUTO: 5.8 10E9/L (ref 4–11)
WBC # BLD AUTO: 6.6 10E9/L (ref 4–11)
WBC # BLD AUTO: 6.8 10E9/L (ref 4–11)
WBC # BLD AUTO: 7.1 10E9/L (ref 4–11)
WBC # BLD AUTO: 7.5 10E9/L (ref 4–11)
WBC # BLD AUTO: 7.9 10E9/L (ref 4–11)
WBC # BLD AUTO: 8.6 10E9/L (ref 4–11)
WBC # BLD AUTO: 9 10E9/L (ref 4–11)
WBC # BLD AUTO: 9 10E9/L (ref 4–11)
WBC # BLD AUTO: 9.7 10E9/L (ref 4–11)
WBC # BLD AUTO: 9.8 10E9/L (ref 4–11)
WBC # BLD AUTO: 9.9 10E9/L (ref 4–11)
WBC # BLD AUTO: 9.9 10E9/L (ref 4–11)
WBC # BLD AUTO: NORMAL 10E9/L (ref 4–11)
WBC # BLD AUTO: NORMAL 10E9/L (ref 4–11)
WBC #/AREA URNS AUTO: <1 /HPF (ref 0–5)

## 2019-01-01 PROCEDURE — 36592 COLLECT BLOOD FROM PICC: CPT | Performed by: STUDENT IN AN ORGANIZED HEALTH CARE EDUCATION/TRAINING PROGRAM

## 2019-01-01 PROCEDURE — 25000131 ZZH RX MED GY IP 250 OP 636 PS 637: Performed by: STUDENT IN AN ORGANIZED HEALTH CARE EDUCATION/TRAINING PROGRAM

## 2019-01-01 PROCEDURE — 85025 COMPLETE CBC W/AUTO DIFF WBC: CPT | Performed by: INTERNAL MEDICINE

## 2019-01-01 PROCEDURE — 97116 GAIT TRAINING THERAPY: CPT | Mod: GP

## 2019-01-01 PROCEDURE — 20000004 ZZH R&B ICU UMMC

## 2019-01-01 PROCEDURE — 99153 MOD SED SAME PHYS/QHP EA: CPT

## 2019-01-01 PROCEDURE — 97535 SELF CARE MNGMENT TRAINING: CPT | Mod: GO | Performed by: OCCUPATIONAL THERAPIST

## 2019-01-01 PROCEDURE — 12000012 ZZH R&B MS OVERFLOW UMMC

## 2019-01-01 PROCEDURE — 99215 OFFICE O/P EST HI 40 MIN: CPT | Mod: ZP | Performed by: PHYSICIAN ASSISTANT

## 2019-01-01 PROCEDURE — 83615 LACTATE (LD) (LDH) ENZYME: CPT | Performed by: NURSE PRACTITIONER

## 2019-01-01 PROCEDURE — 84300 ASSAY OF URINE SODIUM: CPT | Performed by: STUDENT IN AN ORGANIZED HEALTH CARE EDUCATION/TRAINING PROGRAM

## 2019-01-01 PROCEDURE — 25800030 ZZH RX IP 258 OP 636: Mod: ZF | Performed by: INTERNAL MEDICINE

## 2019-01-01 PROCEDURE — 97535 SELF CARE MNGMENT TRAINING: CPT | Mod: GO

## 2019-01-01 PROCEDURE — 93005 ELECTROCARDIOGRAM TRACING: CPT

## 2019-01-01 PROCEDURE — 25000132 ZZH RX MED GY IP 250 OP 250 PS 637: Performed by: INTERNAL MEDICINE

## 2019-01-01 PROCEDURE — 97530 THERAPEUTIC ACTIVITIES: CPT | Mod: GP

## 2019-01-01 PROCEDURE — 12000001 ZZH R&B MED SURG/OB UMMC

## 2019-01-01 PROCEDURE — 70553 MRI BRAIN STEM W/O & W/DYE: CPT

## 2019-01-01 PROCEDURE — 84100 ASSAY OF PHOSPHORUS: CPT | Performed by: INTERNAL MEDICINE

## 2019-01-01 PROCEDURE — 77336 RADIATION PHYSICS CONSULT: CPT | Performed by: RADIOLOGY

## 2019-01-01 PROCEDURE — 25500064 ZZH RX 255 OP 636: Performed by: NEUROLOGICAL SURGERY

## 2019-01-01 PROCEDURE — 25000128 H RX IP 250 OP 636: Performed by: PHYSICAL MEDICINE & REHABILITATION

## 2019-01-01 PROCEDURE — 25800030 ZZH RX IP 258 OP 636: Performed by: STUDENT IN AN ORGANIZED HEALTH CARE EDUCATION/TRAINING PROGRAM

## 2019-01-01 PROCEDURE — 40000556 ZZH STATISTIC PERIPHERAL IV START W US GUIDANCE

## 2019-01-01 PROCEDURE — 80048 BASIC METABOLIC PNL TOTAL CA: CPT | Performed by: PHYSICAL MEDICINE & REHABILITATION

## 2019-01-01 PROCEDURE — 97166 OT EVAL MOD COMPLEX 45 MIN: CPT | Mod: GO | Performed by: OCCUPATIONAL THERAPIST

## 2019-01-01 PROCEDURE — 80053 COMPREHEN METABOLIC PANEL: CPT | Performed by: NURSE PRACTITIONER

## 2019-01-01 PROCEDURE — 92526 ORAL FUNCTION THERAPY: CPT | Mod: GN | Performed by: SPEECH-LANGUAGE PATHOLOGIST

## 2019-01-01 PROCEDURE — 25000132 ZZH RX MED GY IP 250 OP 250 PS 637: Performed by: PHYSICAL MEDICINE & REHABILITATION

## 2019-01-01 PROCEDURE — 97112 NEUROMUSCULAR REEDUCATION: CPT | Mod: GP

## 2019-01-01 PROCEDURE — 83930 ASSAY OF BLOOD OSMOLALITY: CPT | Performed by: STUDENT IN AN ORGANIZED HEALTH CARE EDUCATION/TRAINING PROGRAM

## 2019-01-01 PROCEDURE — 36592 COLLECT BLOOD FROM PICC: CPT | Performed by: PHYSICAL MEDICINE & REHABILITATION

## 2019-01-01 PROCEDURE — 40000556 ZZH STATISTIC PERIPHERAL IV START W US GUIDANCE: Mod: ZF

## 2019-01-01 PROCEDURE — 83540 ASSAY OF IRON: CPT | Performed by: INTERNAL MEDICINE

## 2019-01-01 PROCEDURE — 85027 COMPLETE CBC AUTOMATED: CPT | Performed by: INTERNAL MEDICINE

## 2019-01-01 PROCEDURE — 85025 COMPLETE CBC W/AUTO DIFF WBC: CPT | Performed by: NURSE PRACTITIONER

## 2019-01-01 PROCEDURE — 36415 COLL VENOUS BLD VENIPUNCTURE: CPT | Performed by: PHYSICIAN ASSISTANT

## 2019-01-01 PROCEDURE — 82330 ASSAY OF CALCIUM: CPT | Performed by: NEUROLOGICAL SURGERY

## 2019-01-01 PROCEDURE — 25000131 ZZH RX MED GY IP 250 OP 636 PS 637: Performed by: PHYSICAL MEDICINE & REHABILITATION

## 2019-01-01 PROCEDURE — 25000128 H RX IP 250 OP 636: Performed by: EMERGENCY MEDICINE

## 2019-01-01 PROCEDURE — 71000014 ZZH RECOVERY PHASE 1 LEVEL 2 FIRST HR: Performed by: NEUROLOGICAL SURGERY

## 2019-01-01 PROCEDURE — 36415 COLL VENOUS BLD VENIPUNCTURE: CPT | Performed by: INTERNAL MEDICINE

## 2019-01-01 PROCEDURE — 40000141 ZZH STATISTIC PERIPHERAL IV START W/O US GUIDANCE

## 2019-01-01 PROCEDURE — 36415 COLL VENOUS BLD VENIPUNCTURE: CPT | Performed by: STUDENT IN AN ORGANIZED HEALTH CARE EDUCATION/TRAINING PROGRAM

## 2019-01-01 PROCEDURE — 84295 ASSAY OF SERUM SODIUM: CPT | Performed by: NEUROLOGICAL SURGERY

## 2019-01-01 PROCEDURE — 99309 SBSQ NF CARE MODERATE MDM 30: CPT | Performed by: HOSPITALIST

## 2019-01-01 PROCEDURE — P9016 RBC LEUKOCYTES REDUCED: HCPCS | Performed by: STUDENT IN AN ORGANIZED HEALTH CARE EDUCATION/TRAINING PROGRAM

## 2019-01-01 PROCEDURE — 40000014 ZZH STATISTIC ARTERIAL MONITORING DAILY

## 2019-01-01 PROCEDURE — 25000132 ZZH RX MED GY IP 250 OP 250 PS 637: Performed by: NURSE PRACTITIONER

## 2019-01-01 PROCEDURE — 36000076 ZZH SURGERY LEVEL 6 EA 15 ADDTL MIN - UMMC: Performed by: NEUROLOGICAL SURGERY

## 2019-01-01 PROCEDURE — 25000125 ZZHC RX 250: Performed by: NEUROLOGICAL SURGERY

## 2019-01-01 PROCEDURE — 97127 ZZHC SP THERAPEUTIC INTERVENTION W/FOCUS ON COGNITIVE FUNCTION,EA 15 MIN: CPT | Mod: GN | Performed by: SPEECH-LANGUAGE PATHOLOGIST

## 2019-01-01 PROCEDURE — 36416 COLLJ CAPILLARY BLOOD SPEC: CPT | Performed by: INTERNAL MEDICINE

## 2019-01-01 PROCEDURE — 84295 ASSAY OF SERUM SODIUM: CPT

## 2019-01-01 PROCEDURE — 40000114 ZZH STATISTIC NO CHARGE CLINIC VISIT

## 2019-01-01 PROCEDURE — 83605 ASSAY OF LACTIC ACID: CPT | Performed by: FAMILY MEDICINE

## 2019-01-01 PROCEDURE — 77763 APPLY INTRCAV RADIAT COMPL: CPT | Performed by: RADIOLOGY

## 2019-01-01 PROCEDURE — 97530 THERAPEUTIC ACTIVITIES: CPT | Mod: GO

## 2019-01-01 PROCEDURE — 82962 GLUCOSE BLOOD TEST: CPT

## 2019-01-01 PROCEDURE — 99232 SBSQ HOSP IP/OBS MODERATE 35: CPT | Mod: GC | Performed by: INTERNAL MEDICINE

## 2019-01-01 PROCEDURE — 80053 COMPREHEN METABOLIC PANEL: CPT | Performed by: EMERGENCY MEDICINE

## 2019-01-01 PROCEDURE — 92526 ORAL FUNCTION THERAPY: CPT | Mod: GN

## 2019-01-01 PROCEDURE — 97530 THERAPEUTIC ACTIVITIES: CPT | Mod: GO | Performed by: OCCUPATIONAL THERAPIST

## 2019-01-01 PROCEDURE — 12800006 ZZH R&B REHAB

## 2019-01-01 PROCEDURE — 85027 COMPLETE CBC AUTOMATED: CPT | Performed by: STUDENT IN AN ORGANIZED HEALTH CARE EDUCATION/TRAINING PROGRAM

## 2019-01-01 PROCEDURE — 25000132 ZZH RX MED GY IP 250 OP 250 PS 637: Performed by: STUDENT IN AN ORGANIZED HEALTH CARE EDUCATION/TRAINING PROGRAM

## 2019-01-01 PROCEDURE — 12000022 ZZH R&B SNF

## 2019-01-01 PROCEDURE — 25000132 ZZH RX MED GY IP 250 OP 250 PS 637: Performed by: PHYSICIAN ASSISTANT

## 2019-01-01 PROCEDURE — 84443 ASSAY THYROID STIM HORMONE: CPT | Performed by: INTERNAL MEDICINE

## 2019-01-01 PROCEDURE — 85610 PROTHROMBIN TIME: CPT | Performed by: EMERGENCY MEDICINE

## 2019-01-01 PROCEDURE — 36592 COLLECT BLOOD FROM PICC: CPT | Performed by: NEUROLOGICAL SURGERY

## 2019-01-01 PROCEDURE — 80053 COMPREHEN METABOLIC PANEL: CPT | Performed by: INTERNAL MEDICINE

## 2019-01-01 PROCEDURE — 80048 BASIC METABOLIC PNL TOTAL CA: CPT | Performed by: INTERNAL MEDICINE

## 2019-01-01 PROCEDURE — 25000128 H RX IP 250 OP 636: Mod: ZF | Performed by: PHYSICIAN ASSISTANT

## 2019-01-01 PROCEDURE — 8E09XBG COMPUTER ASSISTED PROCEDURE OF HEAD AND NECK REGION, WITH COMPUTERIZED TOMOGRAPHY: ICD-10-PCS | Performed by: NEUROLOGICAL SURGERY

## 2019-01-01 PROCEDURE — 82306 VITAMIN D 25 HYDROXY: CPT | Performed by: PHYSICIAN ASSISTANT

## 2019-01-01 PROCEDURE — G0463 HOSPITAL OUTPT CLINIC VISIT: HCPCS | Mod: 25 | Performed by: RADIOLOGY

## 2019-01-01 PROCEDURE — G0463 HOSPITAL OUTPT CLINIC VISIT: HCPCS | Mod: ZF

## 2019-01-01 PROCEDURE — 84100 ASSAY OF PHOSPHORUS: CPT | Performed by: STUDENT IN AN ORGANIZED HEALTH CARE EDUCATION/TRAINING PROGRAM

## 2019-01-01 PROCEDURE — 97162 PT EVAL MOD COMPLEX 30 MIN: CPT | Mod: GP | Performed by: PHYSICAL THERAPIST

## 2019-01-01 PROCEDURE — 25000125 ZZHC RX 250: Performed by: STUDENT IN AN ORGANIZED HEALTH CARE EDUCATION/TRAINING PROGRAM

## 2019-01-01 PROCEDURE — 25000125 ZZHC RX 250: Performed by: NURSE ANESTHETIST, CERTIFIED REGISTERED

## 2019-01-01 PROCEDURE — 87076 CULTURE ANAEROBE IDENT EACH: CPT | Performed by: NEUROLOGICAL SURGERY

## 2019-01-01 PROCEDURE — 92611 MOTION FLUOROSCOPY/SWALLOW: CPT | Mod: GN | Performed by: SPEECH-LANGUAGE PATHOLOGIST

## 2019-01-01 PROCEDURE — 27210732 ZZH ACCESSORY CR1

## 2019-01-01 PROCEDURE — 97110 THERAPEUTIC EXERCISES: CPT | Mod: GP

## 2019-01-01 PROCEDURE — 25800030 ZZH RX IP 258 OP 636: Mod: ZF | Performed by: NURSE PRACTITIONER

## 2019-01-01 PROCEDURE — 85730 THROMBOPLASTIN TIME PARTIAL: CPT | Performed by: STUDENT IN AN ORGANIZED HEALTH CARE EDUCATION/TRAINING PROGRAM

## 2019-01-01 PROCEDURE — 99223 1ST HOSP IP/OBS HIGH 75: CPT | Mod: AI | Performed by: INTERNAL MEDICINE

## 2019-01-01 PROCEDURE — 99214 OFFICE O/P EST MOD 30 MIN: CPT | Mod: ZP | Performed by: PHYSICIAN ASSISTANT

## 2019-01-01 PROCEDURE — 82803 BLOOD GASES ANY COMBINATION: CPT | Performed by: NEUROLOGICAL SURGERY

## 2019-01-01 PROCEDURE — 25800030 ZZH RX IP 258 OP 636: Performed by: EMERGENCY MEDICINE

## 2019-01-01 PROCEDURE — 86901 BLOOD TYPING SEROLOGIC RH(D): CPT | Performed by: STUDENT IN AN ORGANIZED HEALTH CARE EDUCATION/TRAINING PROGRAM

## 2019-01-01 PROCEDURE — 25000128 H RX IP 250 OP 636: Performed by: HOSPITALIST

## 2019-01-01 PROCEDURE — 80048 BASIC METABOLIC PNL TOTAL CA: CPT | Performed by: STUDENT IN AN ORGANIZED HEALTH CARE EDUCATION/TRAINING PROGRAM

## 2019-01-01 PROCEDURE — 93010 ELECTROCARDIOGRAM REPORT: CPT | Performed by: INTERNAL MEDICINE

## 2019-01-01 PROCEDURE — 25000128 H RX IP 250 OP 636: Performed by: NEUROLOGICAL SURGERY

## 2019-01-01 PROCEDURE — A9585 GADOBUTROL INJECTION: HCPCS | Performed by: INTERNAL MEDICINE

## 2019-01-01 PROCEDURE — 99232 SBSQ HOSP IP/OBS MODERATE 35: CPT | Performed by: INTERNAL MEDICINE

## 2019-01-01 PROCEDURE — 87015 SPECIMEN INFECT AGNT CONCNTJ: CPT | Performed by: NEUROLOGICAL SURGERY

## 2019-01-01 PROCEDURE — A9585 GADOBUTROL INJECTION: HCPCS | Performed by: FAMILY MEDICINE

## 2019-01-01 PROCEDURE — 83735 ASSAY OF MAGNESIUM: CPT | Performed by: STUDENT IN AN ORGANIZED HEALTH CARE EDUCATION/TRAINING PROGRAM

## 2019-01-01 PROCEDURE — 86850 RBC ANTIBODY SCREEN: CPT | Performed by: STUDENT IN AN ORGANIZED HEALTH CARE EDUCATION/TRAINING PROGRAM

## 2019-01-01 PROCEDURE — 36000074 ZZH SURGERY LEVEL 6 1ST 30 MIN - UMMC: Performed by: NEUROLOGICAL SURGERY

## 2019-01-01 PROCEDURE — 77334 RADIATION TREATMENT AID(S): CPT | Performed by: RADIOLOGY

## 2019-01-01 PROCEDURE — 25000128 H RX IP 250 OP 636: Performed by: PHYSICIAN ASSISTANT

## 2019-01-01 PROCEDURE — 87070 CULTURE OTHR SPECIMN AEROBIC: CPT | Performed by: NEUROLOGICAL SURGERY

## 2019-01-01 PROCEDURE — 99214 OFFICE O/P EST MOD 30 MIN: CPT | Mod: ZP | Performed by: NURSE PRACTITIONER

## 2019-01-01 PROCEDURE — 96413 CHEMO IV INFUSION 1 HR: CPT

## 2019-01-01 PROCEDURE — 25000128 H RX IP 250 OP 636: Mod: ZF | Performed by: NURSE PRACTITIONER

## 2019-01-01 PROCEDURE — 84295 ASSAY OF SERUM SODIUM: CPT | Performed by: INTERNAL MEDICINE

## 2019-01-01 PROCEDURE — 83735 ASSAY OF MAGNESIUM: CPT | Performed by: INTERNAL MEDICINE

## 2019-01-01 PROCEDURE — 84295 ASSAY OF SERUM SODIUM: CPT | Performed by: STUDENT IN AN ORGANIZED HEALTH CARE EDUCATION/TRAINING PROGRAM

## 2019-01-01 PROCEDURE — 85025 COMPLETE CBC W/AUTO DIFF WBC: CPT | Performed by: STUDENT IN AN ORGANIZED HEALTH CARE EDUCATION/TRAINING PROGRAM

## 2019-01-01 PROCEDURE — 96413 CHEMO IV INFUSION 1 HR: CPT | Mod: ZF

## 2019-01-01 PROCEDURE — C2643 BRACHYTX, NON-STRANDED,C-131: HCPCS | Performed by: RADIOLOGY

## 2019-01-01 PROCEDURE — 80053 COMPREHEN METABOLIC PANEL: CPT | Performed by: PHYSICIAN ASSISTANT

## 2019-01-01 PROCEDURE — 72170 X-RAY EXAM OF PELVIS: CPT

## 2019-01-01 PROCEDURE — C1763 CONN TISS, NON-HUMAN: HCPCS | Performed by: NEUROLOGICAL SURGERY

## 2019-01-01 PROCEDURE — 83550 IRON BINDING TEST: CPT | Performed by: INTERNAL MEDICINE

## 2019-01-01 PROCEDURE — 25000128 H RX IP 250 OP 636: Performed by: NURSE ANESTHETIST, CERTIFIED REGISTERED

## 2019-01-01 PROCEDURE — 82248 BILIRUBIN DIRECT: CPT | Performed by: INTERNAL MEDICINE

## 2019-01-01 PROCEDURE — 85025 COMPLETE CBC W/AUTO DIFF WBC: CPT | Performed by: EMERGENCY MEDICINE

## 2019-01-01 PROCEDURE — 87075 CULTR BACTERIA EXCEPT BLOOD: CPT | Performed by: NEUROLOGICAL SURGERY

## 2019-01-01 PROCEDURE — 25500064 ZZH RX 255 OP 636: Performed by: INTERNAL MEDICINE

## 2019-01-01 PROCEDURE — 96367 TX/PROPH/DG ADDL SEQ IV INF: CPT

## 2019-01-01 PROCEDURE — 99285 EMERGENCY DEPT VISIT HI MDM: CPT | Mod: 25 | Performed by: FAMILY MEDICINE

## 2019-01-01 PROCEDURE — 97127 ZZHC SP THERAPEUTIC INTERVENTION W/FOCUS ON COGNITIVE FUNCTION,EA 15 MIN: CPT | Mod: GN

## 2019-01-01 PROCEDURE — 70450 CT HEAD/BRAIN W/O DYE: CPT

## 2019-01-01 PROCEDURE — 82274 ASSAY TEST FOR BLOOD FECAL: CPT | Performed by: PHYSICIAN ASSISTANT

## 2019-01-01 PROCEDURE — 82947 ASSAY GLUCOSE BLOOD QUANT: CPT | Performed by: NEUROLOGICAL SURGERY

## 2019-01-01 PROCEDURE — 83935 ASSAY OF URINE OSMOLALITY: CPT | Performed by: STUDENT IN AN ORGANIZED HEALTH CARE EDUCATION/TRAINING PROGRAM

## 2019-01-01 PROCEDURE — 87641 MR-STAPH DNA AMP PROBE: CPT | Performed by: INTERNAL MEDICINE

## 2019-01-01 PROCEDURE — 40000141 ZZH STATISTIC PERIPHERAL IV START W/O US GUIDANCE: Mod: ZF

## 2019-01-01 PROCEDURE — 87640 STAPH A DNA AMP PROBE: CPT | Performed by: STUDENT IN AN ORGANIZED HEALTH CARE EDUCATION/TRAINING PROGRAM

## 2019-01-01 PROCEDURE — 02H633Z INSERTION OF INFUSION DEVICE INTO RIGHT ATRIUM, PERCUTANEOUS APPROACH: ICD-10-PCS | Performed by: PHYSICIAN ASSISTANT

## 2019-01-01 PROCEDURE — 25500064 ZZH RX 255 OP 636: Performed by: FAMILY MEDICINE

## 2019-01-01 PROCEDURE — 25800030 ZZH RX IP 258 OP 636: Mod: ZF | Performed by: PHYSICIAN ASSISTANT

## 2019-01-01 PROCEDURE — 97110 THERAPEUTIC EXERCISES: CPT | Mod: GO

## 2019-01-01 PROCEDURE — 25000128 H RX IP 250 OP 636: Performed by: STUDENT IN AN ORGANIZED HEALTH CARE EDUCATION/TRAINING PROGRAM

## 2019-01-01 PROCEDURE — 97116 GAIT TRAINING THERAPY: CPT | Mod: GP | Performed by: PHYSICAL THERAPIST

## 2019-01-01 PROCEDURE — 99215 OFFICE O/P EST HI 40 MIN: CPT | Mod: ZP | Performed by: INTERNAL MEDICINE

## 2019-01-01 PROCEDURE — 36415 COLL VENOUS BLD VENIPUNCTURE: CPT | Performed by: HOSPITALIST

## 2019-01-01 PROCEDURE — 25800030 ZZH RX IP 258 OP 636: Performed by: NURSE ANESTHETIST, CERTIFIED REGISTERED

## 2019-01-01 PROCEDURE — 99285 EMERGENCY DEPT VISIT HI MDM: CPT | Mod: 25 | Performed by: EMERGENCY MEDICINE

## 2019-01-01 PROCEDURE — 85025 COMPLETE CBC W/AUTO DIFF WBC: CPT | Performed by: PHYSICAL MEDICINE & REHABILITATION

## 2019-01-01 PROCEDURE — C1713 ANCHOR/SCREW BN/BN,TIS/BN: HCPCS | Performed by: NEUROLOGICAL SURGERY

## 2019-01-01 PROCEDURE — 97110 THERAPEUTIC EXERCISES: CPT | Mod: GP | Performed by: PHYSICAL THERAPIST

## 2019-01-01 PROCEDURE — 72125 CT NECK SPINE W/O DYE: CPT

## 2019-01-01 PROCEDURE — 86923 COMPATIBILITY TEST ELECTRIC: CPT | Performed by: STUDENT IN AN ORGANIZED HEALTH CARE EDUCATION/TRAINING PROGRAM

## 2019-01-01 PROCEDURE — 71045 X-RAY EXAM CHEST 1 VIEW: CPT

## 2019-01-01 PROCEDURE — 97165 OT EVAL LOW COMPLEX 30 MIN: CPT | Mod: GO | Performed by: OCCUPATIONAL THERAPIST

## 2019-01-01 PROCEDURE — 00B70ZZ EXCISION OF CEREBRAL HEMISPHERE, OPEN APPROACH: ICD-10-PCS | Performed by: NEUROLOGICAL SURGERY

## 2019-01-01 PROCEDURE — 25000128 H RX IP 250 OP 636: Performed by: INTERNAL MEDICINE

## 2019-01-01 PROCEDURE — 80076 HEPATIC FUNCTION PANEL: CPT | Performed by: INTERNAL MEDICINE

## 2019-01-01 PROCEDURE — 85730 THROMBOPLASTIN TIME PARTIAL: CPT | Performed by: NURSE PRACTITIONER

## 2019-01-01 PROCEDURE — 37000009 ZZH ANESTHESIA TECHNICAL FEE, EACH ADDTL 15 MIN: Performed by: NEUROLOGICAL SURGERY

## 2019-01-01 PROCEDURE — 85730 THROMBOPLASTIN TIME PARTIAL: CPT | Performed by: FAMILY MEDICINE

## 2019-01-01 PROCEDURE — 84145 PROCALCITONIN (PCT): CPT | Performed by: PHYSICAL MEDICINE & REHABILITATION

## 2019-01-01 PROCEDURE — 97112 NEUROMUSCULAR REEDUCATION: CPT | Mod: GO

## 2019-01-01 PROCEDURE — 87181 SC STD AGAR DILUTION PER AGT: CPT | Performed by: NEUROLOGICAL SURGERY

## 2019-01-01 PROCEDURE — 40000170 ZZH STATISTIC PRE-PROCEDURE ASSESSMENT II: Performed by: NEUROLOGICAL SURGERY

## 2019-01-01 PROCEDURE — C1788 PORT, INDWELLING, IMP: HCPCS

## 2019-01-01 PROCEDURE — 27210794 ZZH OR GENERAL SUPPLY STERILE: Performed by: NEUROLOGICAL SURGERY

## 2019-01-01 PROCEDURE — 93005 ELECTROCARDIOGRAM TRACING: CPT | Performed by: EMERGENCY MEDICINE

## 2019-01-01 PROCEDURE — 25000131 ZZH RX MED GY IP 250 OP 636 PS 637: Performed by: INTERNAL MEDICINE

## 2019-01-01 PROCEDURE — 84295 ASSAY OF SERUM SODIUM: CPT | Performed by: PHYSICAL MEDICINE & REHABILITATION

## 2019-01-01 PROCEDURE — 87186 SC STD MICRODIL/AGAR DIL: CPT | Performed by: NEUROLOGICAL SURGERY

## 2019-01-01 PROCEDURE — 99285 EMERGENCY DEPT VISIT HI MDM: CPT | Mod: Z6 | Performed by: FAMILY MEDICINE

## 2019-01-01 PROCEDURE — 82248 BILIRUBIN DIRECT: CPT | Performed by: NURSE PRACTITIONER

## 2019-01-01 PROCEDURE — 84132 ASSAY OF SERUM POTASSIUM: CPT

## 2019-01-01 PROCEDURE — 99214 OFFICE O/P EST MOD 30 MIN: CPT | Mod: GC | Performed by: INTERNAL MEDICINE

## 2019-01-01 PROCEDURE — 88307 TISSUE EXAM BY PATHOLOGIST: CPT | Performed by: NEUROLOGICAL SURGERY

## 2019-01-01 PROCEDURE — 83615 LACTATE (LD) (LDH) ENZYME: CPT | Performed by: INTERNAL MEDICINE

## 2019-01-01 PROCEDURE — 36415 COLL VENOUS BLD VENIPUNCTURE: CPT | Performed by: PHYSICAL MEDICINE & REHABILITATION

## 2019-01-01 PROCEDURE — 85025 COMPLETE CBC W/AUTO DIFF WBC: CPT | Performed by: PHYSICIAN ASSISTANT

## 2019-01-01 PROCEDURE — 84443 ASSAY THYROID STIM HORMONE: CPT | Performed by: NURSE PRACTITIONER

## 2019-01-01 PROCEDURE — 76937 US GUIDE VASCULAR ACCESS: CPT

## 2019-01-01 PROCEDURE — 99213 OFFICE O/P EST LOW 20 MIN: CPT | Mod: ZP | Performed by: PHYSICIAN ASSISTANT

## 2019-01-01 PROCEDURE — 85027 COMPLETE CBC AUTOMATED: CPT | Performed by: PHYSICAL MEDICINE & REHABILITATION

## 2019-01-01 PROCEDURE — 0JH60WZ INSERTION OF TOTALLY IMPLANTABLE VASCULAR ACCESS DEVICE INTO CHEST SUBCUTANEOUS TISSUE AND FASCIA, OPEN APPROACH: ICD-10-PCS | Performed by: PHYSICIAN ASSISTANT

## 2019-01-01 PROCEDURE — 97530 THERAPEUTIC ACTIVITIES: CPT | Mod: GP | Performed by: PHYSICAL THERAPIST

## 2019-01-01 PROCEDURE — 92610 EVALUATE SWALLOWING FUNCTION: CPT | Mod: GN | Performed by: SPEECH-LANGUAGE PATHOLOGIST

## 2019-01-01 PROCEDURE — 40000275 ZZH STATISTIC RCP TIME EA 10 MIN

## 2019-01-01 PROCEDURE — 97165 OT EVAL LOW COMPLEX 30 MIN: CPT | Mod: GO

## 2019-01-01 PROCEDURE — 36415 COLL VENOUS BLD VENIPUNCTURE: CPT | Performed by: NURSE PRACTITIONER

## 2019-01-01 PROCEDURE — 99238 HOSP IP/OBS DSCHRG MGMT 30/<: CPT | Performed by: INTERNAL MEDICINE

## 2019-01-01 PROCEDURE — 40000183 ZZH STATISTIC PT MED CONFERENCE < 30 MIN

## 2019-01-01 PROCEDURE — 97167 OT EVAL HIGH COMPLEX 60 MIN: CPT | Mod: GO | Performed by: OCCUPATIONAL THERAPIST

## 2019-01-01 PROCEDURE — C9113 INJ PANTOPRAZOLE SODIUM, VIA: HCPCS | Performed by: STUDENT IN AN ORGANIZED HEALTH CARE EDUCATION/TRAINING PROGRAM

## 2019-01-01 PROCEDURE — 00B00ZZ EXCISION OF BRAIN, OPEN APPROACH: ICD-10-PCS | Performed by: NEUROLOGICAL SURGERY

## 2019-01-01 PROCEDURE — 83970 ASSAY OF PARATHORMONE: CPT | Performed by: NURSE PRACTITIONER

## 2019-01-01 PROCEDURE — 80053 COMPREHEN METABOLIC PANEL: CPT | Performed by: STUDENT IN AN ORGANIZED HEALTH CARE EDUCATION/TRAINING PROGRAM

## 2019-01-01 PROCEDURE — 99308 SBSQ NF CARE LOW MDM 20: CPT | Performed by: INTERNAL MEDICINE

## 2019-01-01 PROCEDURE — 96360 HYDRATION IV INFUSION INIT: CPT | Performed by: FAMILY MEDICINE

## 2019-01-01 PROCEDURE — 88331 PATH CONSLTJ SURG 1 BLK 1SPC: CPT | Performed by: NEUROLOGICAL SURGERY

## 2019-01-01 PROCEDURE — 87640 STAPH A DNA AMP PROBE: CPT | Performed by: INTERNAL MEDICINE

## 2019-01-01 PROCEDURE — 87641 MR-STAPH DNA AMP PROBE: CPT | Performed by: STUDENT IN AN ORGANIZED HEALTH CARE EDUCATION/TRAINING PROGRAM

## 2019-01-01 PROCEDURE — 40000559 ZZH STATISTIC FAILED PERIPHERAL IV START

## 2019-01-01 PROCEDURE — 37000008 ZZH ANESTHESIA TECHNICAL FEE, 1ST 30 MIN: Performed by: NEUROLOGICAL SURGERY

## 2019-01-01 PROCEDURE — 85025 COMPLETE CBC W/AUTO DIFF WBC: CPT | Performed by: FAMILY MEDICINE

## 2019-01-01 PROCEDURE — 82330 ASSAY OF CALCIUM: CPT

## 2019-01-01 PROCEDURE — 87205 SMEAR GRAM STAIN: CPT | Performed by: NEUROLOGICAL SURGERY

## 2019-01-01 PROCEDURE — 85610 PROTHROMBIN TIME: CPT | Performed by: FAMILY MEDICINE

## 2019-01-01 PROCEDURE — 25000128 H RX IP 250 OP 636: Performed by: ANESTHESIOLOGY

## 2019-01-01 PROCEDURE — 74230 X-RAY XM SWLNG FUNCJ C+: CPT

## 2019-01-01 PROCEDURE — 85610 PROTHROMBIN TIME: CPT | Performed by: NURSE PRACTITIONER

## 2019-01-01 PROCEDURE — 25800030 ZZH RX IP 258 OP 636: Performed by: NEUROLOGICAL SURGERY

## 2019-01-01 PROCEDURE — 25000565 ZZH ISOFLURANE, EA 15 MIN: Performed by: NEUROLOGICAL SURGERY

## 2019-01-01 PROCEDURE — 84132 ASSAY OF SERUM POTASSIUM: CPT | Performed by: NEUROLOGICAL SURGERY

## 2019-01-01 PROCEDURE — 70498 CT ANGIOGRAPHY NECK: CPT

## 2019-01-01 PROCEDURE — 27810169 ZZH OR IMPLANT GENERAL: Performed by: NEUROLOGICAL SURGERY

## 2019-01-01 PROCEDURE — 77331 SPECIAL RADIATION DOSIMETRY: CPT | Performed by: RADIOLOGY

## 2019-01-01 PROCEDURE — A9585 GADOBUTROL INJECTION: HCPCS | Performed by: NEUROLOGICAL SURGERY

## 2019-01-01 PROCEDURE — 99366 TEAM CONF W/PAT BY HC PROF: CPT | Performed by: SPEECH-LANGUAGE PATHOLOGIST

## 2019-01-01 PROCEDURE — 27210995 ZZH RX 272: Performed by: NEUROLOGICAL SURGERY

## 2019-01-01 PROCEDURE — 84100 ASSAY OF PHOSPHORUS: CPT | Performed by: PHYSICAL MEDICINE & REHABILITATION

## 2019-01-01 PROCEDURE — 8E09XBH COMPUTER ASSISTED PROCEDURE OF HEAD AND NECK REGION, WITH MAGNETIC RESONANCE IMAGING: ICD-10-PCS | Performed by: NEUROLOGICAL SURGERY

## 2019-01-01 PROCEDURE — 82728 ASSAY OF FERRITIN: CPT | Performed by: INTERNAL MEDICINE

## 2019-01-01 PROCEDURE — 80048 BASIC METABOLIC PNL TOTAL CA: CPT | Performed by: PHYSICIAN ASSISTANT

## 2019-01-01 PROCEDURE — 99233 SBSQ HOSP IP/OBS HIGH 50: CPT | Performed by: INTERNAL MEDICINE

## 2019-01-01 PROCEDURE — 80048 BASIC METABOLIC PNL TOTAL CA: CPT | Performed by: ANESTHESIOLOGY

## 2019-01-01 PROCEDURE — 99152 MOD SED SAME PHYS/QHP 5/>YRS: CPT

## 2019-01-01 PROCEDURE — 25000128 H RX IP 250 OP 636: Mod: ZF | Performed by: INTERNAL MEDICINE

## 2019-01-01 PROCEDURE — 36415 COLL VENOUS BLD VENIPUNCTURE: CPT

## 2019-01-01 PROCEDURE — 81001 URINALYSIS AUTO W/SCOPE: CPT | Performed by: STUDENT IN AN ORGANIZED HEALTH CARE EDUCATION/TRAINING PROGRAM

## 2019-01-01 PROCEDURE — 85027 COMPLETE CBC AUTOMATED: CPT | Performed by: PHYSICIAN ASSISTANT

## 2019-01-01 PROCEDURE — 80320 DRUG SCREEN QUANTALCOHOLS: CPT | Performed by: EMERGENCY MEDICINE

## 2019-01-01 PROCEDURE — 83735 ASSAY OF MAGNESIUM: CPT | Performed by: PHYSICAL MEDICINE & REHABILITATION

## 2019-01-01 PROCEDURE — 99239 HOSP IP/OBS DSCHRG MGMT >30: CPT | Performed by: HOSPITALIST

## 2019-01-01 PROCEDURE — 99214 OFFICE O/P EST MOD 30 MIN: CPT | Mod: ZP | Performed by: INTERNAL MEDICINE

## 2019-01-01 PROCEDURE — 99366 TEAM CONF W/PAT BY HC PROF: CPT

## 2019-01-01 PROCEDURE — 82947 ASSAY GLUCOSE BLOOD QUANT: CPT

## 2019-01-01 PROCEDURE — 86900 BLOOD TYPING SEROLOGIC ABO: CPT | Performed by: STUDENT IN AN ORGANIZED HEALTH CARE EDUCATION/TRAINING PROGRAM

## 2019-01-01 PROCEDURE — 97535 SELF CARE MNGMENT TRAINING: CPT | Mod: GO | Performed by: STUDENT IN AN ORGANIZED HEALTH CARE EDUCATION/TRAINING PROGRAM

## 2019-01-01 PROCEDURE — 85610 PROTHROMBIN TIME: CPT | Performed by: STUDENT IN AN ORGANIZED HEALTH CARE EDUCATION/TRAINING PROGRAM

## 2019-01-01 PROCEDURE — 97112 NEUROMUSCULAR REEDUCATION: CPT | Mod: GP | Performed by: PHYSICAL THERAPIST

## 2019-01-01 PROCEDURE — 27210908 ZZH NEEDLE CR4

## 2019-01-01 PROCEDURE — 93010 ELECTROCARDIOGRAM REPORT: CPT | Mod: Z6 | Performed by: EMERGENCY MEDICINE

## 2019-01-01 PROCEDURE — 25000566 ZZH SEVOFLURANE, EA 15 MIN: Performed by: NEUROLOGICAL SURGERY

## 2019-01-01 PROCEDURE — 96360 HYDRATION IV INFUSION INIT: CPT | Performed by: EMERGENCY MEDICINE

## 2019-01-01 PROCEDURE — 88341 IMHCHEM/IMCYTCHM EA ADD ANTB: CPT | Performed by: NEUROLOGICAL SURGERY

## 2019-01-01 PROCEDURE — 87077 CULTURE AEROBIC IDENTIFY: CPT | Performed by: NEUROLOGICAL SURGERY

## 2019-01-01 PROCEDURE — 97750 PHYSICAL PERFORMANCE TEST: CPT | Mod: GP

## 2019-01-01 PROCEDURE — 84484 ASSAY OF TROPONIN QUANT: CPT | Performed by: EMERGENCY MEDICINE

## 2019-01-01 PROCEDURE — 82803 BLOOD GASES ANY COMBINATION: CPT

## 2019-01-01 PROCEDURE — 25000128 H RX IP 250 OP 636: Performed by: NURSE PRACTITIONER

## 2019-01-01 PROCEDURE — 00000146 ZZHCL STATISTIC GLUCOSE BY METER IP

## 2019-01-01 PROCEDURE — 71000015 ZZH RECOVERY PHASE 1 LEVEL 2 EA ADDTL HR: Performed by: NEUROLOGICAL SURGERY

## 2019-01-01 PROCEDURE — 96367 TX/PROPH/DG ADDL SEQ IV INF: CPT | Mod: ZF

## 2019-01-01 PROCEDURE — 27210738 ZZH ACCESSORY CR2

## 2019-01-01 PROCEDURE — 36561 INSERT TUNNELED CV CATH: CPT

## 2019-01-01 PROCEDURE — 80053 COMPREHEN METABOLIC PANEL: CPT | Performed by: FAMILY MEDICINE

## 2019-01-01 PROCEDURE — 97162 PT EVAL MOD COMPLEX 30 MIN: CPT | Mod: GP

## 2019-01-01 PROCEDURE — 96374 THER/PROPH/DIAG INJ IV PUSH: CPT | Performed by: EMERGENCY MEDICINE

## 2019-01-01 PROCEDURE — 92610 EVALUATE SWALLOWING FUNCTION: CPT | Mod: GN

## 2019-01-01 PROCEDURE — 97110 THERAPEUTIC EXERCISES: CPT | Mod: GO | Performed by: OCCUPATIONAL THERAPIST

## 2019-01-01 PROCEDURE — 84295 ASSAY OF SERUM SODIUM: CPT | Performed by: PHYSICIAN ASSISTANT

## 2019-01-01 PROCEDURE — 96361 HYDRATE IV INFUSION ADD-ON: CPT | Performed by: FAMILY MEDICINE

## 2019-01-01 PROCEDURE — DW11BYZ LOW DOSE RATE (LDR) BRACHYTHERAPY OF HEAD AND NECK USING OTHER ISOTOPE: ICD-10-PCS | Performed by: RADIOLOGY

## 2019-01-01 PROCEDURE — 83540 ASSAY OF IRON: CPT | Performed by: PHYSICIAN ASSISTANT

## 2019-01-01 PROCEDURE — 77370 RADIATION PHYSICS CONSULT: CPT | Performed by: RADIOLOGY

## 2019-01-01 PROCEDURE — 68200002 ZZH TRAUMA EVALUATION W/O CC LEVEL II: Performed by: EMERGENCY MEDICINE

## 2019-01-01 PROCEDURE — C1769 GUIDE WIRE: HCPCS

## 2019-01-01 PROCEDURE — 93005 ELECTROCARDIOGRAM TRACING: CPT | Performed by: FAMILY MEDICINE

## 2019-01-01 PROCEDURE — 85027 COMPLETE CBC AUTOMATED: CPT | Performed by: NURSE PRACTITIONER

## 2019-01-01 PROCEDURE — 88342 IMHCHEM/IMCYTCHM 1ST ANTB: CPT | Performed by: NEUROLOGICAL SURGERY

## 2019-01-01 PROCEDURE — 97167 OT EVAL HIGH COMPLEX 60 MIN: CPT | Mod: GO

## 2019-01-01 PROCEDURE — 99207 ZZC CDG-HISTORY COMP: MEETS EXP. PROBLEM FOCUSED - DOWN CODED LACK OF HPI: CPT | Performed by: INTERNAL MEDICINE

## 2019-01-01 PROCEDURE — 84484 ASSAY OF TROPONIN QUANT: CPT | Performed by: STUDENT IN AN ORGANIZED HEALTH CARE EDUCATION/TRAINING PROGRAM

## 2019-01-01 PROCEDURE — 97168 OT RE-EVAL EST PLAN CARE: CPT | Mod: GO | Performed by: OCCUPATIONAL THERAPIST

## 2019-01-01 PROCEDURE — 86481 TB AG RESPONSE T-CELL SUSP: CPT | Performed by: HOSPITALIST

## 2019-01-01 PROCEDURE — 00220000 ZZH SNF RUG CODE OPNP

## 2019-01-01 PROCEDURE — 97164 PT RE-EVAL EST PLAN CARE: CPT | Mod: GP | Performed by: PHYSICAL THERAPIST

## 2019-01-01 PROCEDURE — 87102 FUNGUS ISOLATION CULTURE: CPT | Performed by: NEUROLOGICAL SURGERY

## 2019-01-01 DEVICE — IMP BUR HOLE COVER 17MM LOW PROFILE TI 421.527: Type: IMPLANTABLE DEVICE | Site: CRANIAL | Status: FUNCTIONAL

## 2019-01-01 DEVICE — IMP SCR SYN MATRIX LOW PRO 1.5X04MM SELF DRILL 04.503.104.01: Type: IMPLANTABLE DEVICE | Site: CRANIAL | Status: FUNCTIONAL

## 2019-01-01 DEVICE — GRAFT DURAGEN 2X2" ID220: Type: IMPLANTABLE DEVICE | Site: BRAIN | Status: FUNCTIONAL

## 2019-01-01 DEVICE — IMP PLATE SYN BOX LOW PROFILE 04H TI 421.511: Type: IMPLANTABLE DEVICE | Site: CRANIAL | Status: FUNCTIONAL

## 2019-01-01 DEVICE — IMP PLATE SYN STR DOG BONE LOW PROFILE 2H TI 421.502: Type: IMPLANTABLE DEVICE | Site: CRANIAL | Status: FUNCTIONAL

## 2019-01-01 RX ORDER — IOPAMIDOL 755 MG/ML
86 INJECTION, SOLUTION INTRAVASCULAR ONCE
Status: COMPLETED | OUTPATIENT
Start: 2019-01-01 | End: 2019-01-01

## 2019-01-01 RX ORDER — ONDANSETRON 8 MG/1
TABLET, FILM COATED ORAL
Refills: 0 | COMMUNITY
Start: 2019-01-01 | End: 2019-01-01

## 2019-01-01 RX ORDER — OMEPRAZOLE 20 MG/1
20 TABLET, DELAYED RELEASE ORAL 2 TIMES DAILY
Refills: 0 | Status: ON HOLD
Start: 2019-01-01 | End: 2019-01-01

## 2019-01-01 RX ORDER — LORAZEPAM 0.5 MG/1
0.5 TABLET ORAL
Status: COMPLETED | OUTPATIENT
Start: 2019-01-01 | End: 2019-01-01

## 2019-01-01 RX ORDER — DEXAMETHASONE SODIUM PHOSPHATE 4 MG/ML
3 INJECTION, SOLUTION INTRA-ARTICULAR; INTRALESIONAL; INTRAMUSCULAR; INTRAVENOUS; SOFT TISSUE EVERY 8 HOURS
Status: DISCONTINUED | OUTPATIENT
Start: 2019-01-01 | End: 2019-01-01

## 2019-01-01 RX ORDER — PANTOPRAZOLE SODIUM 40 MG/1
40 TABLET, DELAYED RELEASE ORAL
Status: DISCONTINUED | OUTPATIENT
Start: 2019-01-01 | End: 2019-01-01 | Stop reason: HOSPADM

## 2019-01-01 RX ORDER — FENTANYL CITRATE 50 UG/ML
INJECTION, SOLUTION INTRAMUSCULAR; INTRAVENOUS PRN
Status: DISCONTINUED | OUTPATIENT
Start: 2019-01-01 | End: 2019-01-01

## 2019-01-01 RX ORDER — ALBUTEROL SULFATE 90 UG/1
1-2 AEROSOL, METERED RESPIRATORY (INHALATION)
Status: CANCELLED
Start: 2019-01-01

## 2019-01-01 RX ORDER — SODIUM CHLORIDE 1 G/1
1 TABLET ORAL DAILY
Status: DISCONTINUED | OUTPATIENT
Start: 2019-01-01 | End: 2019-01-01

## 2019-01-01 RX ORDER — ALBUTEROL SULFATE 0.83 MG/ML
2.5 SOLUTION RESPIRATORY (INHALATION)
Status: CANCELLED | OUTPATIENT
Start: 2019-01-01

## 2019-01-01 RX ORDER — MEPERIDINE HYDROCHLORIDE 25 MG/ML
25 INJECTION INTRAMUSCULAR; INTRAVENOUS; SUBCUTANEOUS EVERY 30 MIN PRN
Status: CANCELLED | OUTPATIENT
Start: 2019-01-01

## 2019-01-01 RX ORDER — ONDANSETRON 2 MG/ML
8 INJECTION INTRAMUSCULAR; INTRAVENOUS EVERY 8 HOURS PRN
Status: DISCONTINUED | OUTPATIENT
Start: 2019-01-01 | End: 2019-01-01 | Stop reason: HOSPADM

## 2019-01-01 RX ORDER — TRIAMCINOLONE ACETONIDE 1 MG/G
CREAM TOPICAL 2 TIMES DAILY
Qty: 30 G | Refills: 1 | Status: ON HOLD | OUTPATIENT
Start: 2019-01-01 | End: 2019-01-01

## 2019-01-01 RX ORDER — HYDROMORPHONE HYDROCHLORIDE 1 MG/ML
.3-.5 INJECTION, SOLUTION INTRAMUSCULAR; INTRAVENOUS; SUBCUTANEOUS EVERY 5 MIN PRN
Status: DISCONTINUED | OUTPATIENT
Start: 2019-01-01 | End: 2019-01-01

## 2019-01-01 RX ORDER — FLUTICASONE PROPIONATE 50 MCG
2 SPRAY, SUSPENSION (ML) NASAL DAILY
Status: DISCONTINUED | OUTPATIENT
Start: 2019-01-01 | End: 2019-01-01

## 2019-01-01 RX ORDER — DEXAMETHASONE 4 MG/1
4 TABLET ORAL EVERY 8 HOURS SCHEDULED
Status: DISCONTINUED | OUTPATIENT
Start: 2019-01-01 | End: 2019-01-01

## 2019-01-01 RX ORDER — SODIUM CHLORIDE 1 G/1
TABLET ORAL
Status: ON HOLD | DISCHARGE
Start: 2019-01-01 | End: 2019-01-01

## 2019-01-01 RX ORDER — POLYETHYLENE GLYCOL 3350 17 G/17G
17 POWDER, FOR SOLUTION ORAL DAILY PRN
Status: DISCONTINUED | OUTPATIENT
Start: 2019-01-01 | End: 2019-01-01 | Stop reason: HOSPADM

## 2019-01-01 RX ORDER — PANTOPRAZOLE SODIUM 40 MG/1
40 TABLET, DELAYED RELEASE ORAL
Status: ON HOLD | DISCHARGE
Start: 2019-01-01 | End: 2019-01-01

## 2019-01-01 RX ORDER — METHYLPREDNISOLONE SODIUM SUCCINATE 125 MG/2ML
125 INJECTION, POWDER, LYOPHILIZED, FOR SOLUTION INTRAMUSCULAR; INTRAVENOUS
Status: CANCELLED
Start: 2019-01-01

## 2019-01-01 RX ORDER — NALOXONE HYDROCHLORIDE 0.4 MG/ML
.1-.4 INJECTION, SOLUTION INTRAMUSCULAR; INTRAVENOUS; SUBCUTANEOUS
Status: ACTIVE | OUTPATIENT
Start: 2019-01-01 | End: 2019-01-01

## 2019-01-01 RX ORDER — FLUTICASONE PROPIONATE 50 MCG
SPRAY, SUSPENSION (ML) NASAL
Refills: 4 | COMMUNITY
Start: 2019-01-01 | End: 2019-01-01

## 2019-01-01 RX ORDER — ONDANSETRON 8 MG/1
8 TABLET, FILM COATED ORAL EVERY 8 HOURS PRN
Qty: 30 TABLET | Refills: 3 | Status: ON HOLD | OUTPATIENT
Start: 2019-01-01 | End: 2019-01-01

## 2019-01-01 RX ORDER — ONDANSETRON 2 MG/ML
4 INJECTION INTRAMUSCULAR; INTRAVENOUS EVERY 30 MIN PRN
Status: DISCONTINUED | OUTPATIENT
Start: 2019-01-01 | End: 2019-01-01 | Stop reason: HOSPADM

## 2019-01-01 RX ORDER — NALOXONE HYDROCHLORIDE 0.4 MG/ML
.1-.4 INJECTION, SOLUTION INTRAMUSCULAR; INTRAVENOUS; SUBCUTANEOUS
Status: DISCONTINUED | OUTPATIENT
Start: 2019-01-01 | End: 2019-01-01 | Stop reason: HOSPADM

## 2019-01-01 RX ORDER — AMLODIPINE BESYLATE 5 MG/1
5 TABLET ORAL 2 TIMES DAILY
Status: DISCONTINUED | OUTPATIENT
Start: 2019-01-01 | End: 2019-01-01

## 2019-01-01 RX ORDER — NICOTINE POLACRILEX 4 MG
15-30 LOZENGE BUCCAL
Status: CANCELLED | OUTPATIENT
Start: 2019-01-01

## 2019-01-01 RX ORDER — DEXAMETHASONE 4 MG/1
TABLET ORAL
Qty: 4 TABLET | Refills: 9 | Status: ON HOLD | OUTPATIENT
Start: 2019-01-01 | End: 2019-01-01

## 2019-01-01 RX ORDER — HYDROCHLOROTHIAZIDE 12.5 MG/1
12.5 CAPSULE ORAL DAILY
Status: DISCONTINUED | OUTPATIENT
Start: 2019-01-01 | End: 2019-01-01 | Stop reason: HOSPADM

## 2019-01-01 RX ORDER — EPINEPHRINE 1 MG/ML
0.3 INJECTION, SOLUTION INTRAMUSCULAR; SUBCUTANEOUS EVERY 5 MIN PRN
Status: CANCELLED | OUTPATIENT
Start: 2019-01-01

## 2019-01-01 RX ORDER — AMLODIPINE BESYLATE 10 MG/1
10 TABLET ORAL DAILY
Status: DISCONTINUED | OUTPATIENT
Start: 2019-01-01 | End: 2019-01-01 | Stop reason: HOSPADM

## 2019-01-01 RX ORDER — LIDOCAINE HYDROCHLORIDE 20 MG/ML
INJECTION, SOLUTION INFILTRATION; PERINEURAL PRN
Status: DISCONTINUED | OUTPATIENT
Start: 2019-01-01 | End: 2019-01-01

## 2019-01-01 RX ORDER — MAGNESIUM SULFATE HEPTAHYDRATE 40 MG/ML
4 INJECTION, SOLUTION INTRAVENOUS EVERY 4 HOURS PRN
Status: DISCONTINUED | OUTPATIENT
Start: 2019-01-01 | End: 2019-01-01 | Stop reason: HOSPADM

## 2019-01-01 RX ORDER — EPHEDRINE SULFATE 50 MG/ML
INJECTION, SOLUTION INTRAMUSCULAR; INTRAVENOUS; SUBCUTANEOUS PRN
Status: DISCONTINUED | OUTPATIENT
Start: 2019-01-01 | End: 2019-01-01

## 2019-01-01 RX ORDER — DEXAMETHASONE SODIUM PHOSPHATE 10 MG/ML
10 INJECTION, SOLUTION INTRAMUSCULAR; INTRAVENOUS ONCE
Status: DISCONTINUED | OUTPATIENT
Start: 2019-01-01 | End: 2019-01-01

## 2019-01-01 RX ORDER — SODIUM CHLORIDE 9 MG/ML
1000 INJECTION, SOLUTION INTRAVENOUS CONTINUOUS PRN
Status: CANCELLED
Start: 2019-01-01

## 2019-01-01 RX ORDER — EPINEPHRINE 0.3 MG/.3ML
0.3 INJECTION SUBCUTANEOUS EVERY 5 MIN PRN
Status: CANCELLED | OUTPATIENT
Start: 2019-01-01

## 2019-01-01 RX ORDER — DEXAMETHASONE 1 MG
1 TABLET ORAL DAILY
Status: ON HOLD | DISCHARGE
Start: 2019-01-01 | End: 2019-01-01

## 2019-01-01 RX ORDER — ASCORBIC ACID 250 MG
500 TABLET,CHEWABLE ORAL 2 TIMES DAILY
Status: COMPLETED | OUTPATIENT
Start: 2019-01-01 | End: 2019-01-01

## 2019-01-01 RX ORDER — FLUMAZENIL 0.1 MG/ML
0.2 INJECTION, SOLUTION INTRAVENOUS
Status: DISCONTINUED | OUTPATIENT
Start: 2019-01-01 | End: 2019-01-01 | Stop reason: HOSPADM

## 2019-01-01 RX ORDER — ACETAMINOPHEN 325 MG/1
650 TABLET ORAL EVERY 4 HOURS PRN
Status: DISCONTINUED | OUTPATIENT
Start: 2019-01-01 | End: 2019-01-01

## 2019-01-01 RX ORDER — DEXAMETHASONE 2 MG/1
4 TABLET ORAL EVERY 8 HOURS SCHEDULED
Status: COMPLETED | OUTPATIENT
Start: 2019-01-01 | End: 2019-01-01

## 2019-01-01 RX ORDER — FENTANYL CITRATE 50 UG/ML
25-50 INJECTION, SOLUTION INTRAMUSCULAR; INTRAVENOUS
Status: DISCONTINUED | OUTPATIENT
Start: 2019-01-01 | End: 2019-01-01

## 2019-01-01 RX ORDER — ONDANSETRON 4 MG/1
4-8 TABLET, ORALLY DISINTEGRATING ORAL EVERY 6 HOURS PRN
Status: DISCONTINUED | OUTPATIENT
Start: 2019-01-01 | End: 2019-01-01 | Stop reason: HOSPADM

## 2019-01-01 RX ORDER — CEFAZOLIN SODIUM 2 G/100ML
2 INJECTION, SOLUTION INTRAVENOUS
Status: CANCELLED | OUTPATIENT
Start: 2019-01-01

## 2019-01-01 RX ORDER — SODIUM CHLORIDE 9 MG/ML
INJECTION, SOLUTION INTRAVENOUS CONTINUOUS PRN
Status: DISCONTINUED | OUTPATIENT
Start: 2019-01-01 | End: 2019-01-01

## 2019-01-01 RX ORDER — DEXAMETHASONE SODIUM PHOSPHATE 4 MG/ML
2 INJECTION, SOLUTION INTRA-ARTICULAR; INTRALESIONAL; INTRAMUSCULAR; INTRAVENOUS; SOFT TISSUE EVERY 12 HOURS
Status: DISCONTINUED | OUTPATIENT
Start: 2019-01-01 | End: 2019-01-01

## 2019-01-01 RX ORDER — POTASSIUM CL/LIDO/0.9 % NACL 10MEQ/0.1L
10 INTRAVENOUS SOLUTION, PIGGYBACK (ML) INTRAVENOUS
Status: DISCONTINUED | OUTPATIENT
Start: 2019-01-01 | End: 2019-01-01 | Stop reason: HOSPADM

## 2019-01-01 RX ORDER — ONDANSETRON 2 MG/ML
INJECTION INTRAMUSCULAR; INTRAVENOUS PRN
Status: DISCONTINUED | OUTPATIENT
Start: 2019-01-01 | End: 2019-01-01

## 2019-01-01 RX ORDER — DEXAMETHASONE 1 MG
1 TABLET ORAL DAILY
Status: DISCONTINUED | OUTPATIENT
Start: 2019-01-01 | End: 2019-01-01 | Stop reason: HOSPADM

## 2019-01-01 RX ORDER — PROCHLORPERAZINE MALEATE 5 MG
5 TABLET ORAL EVERY 6 HOURS PRN
Qty: 30 TABLET | Refills: 3 | Status: ON HOLD | OUTPATIENT
Start: 2019-01-01 | End: 2019-01-01

## 2019-01-01 RX ORDER — POTASSIUM CHLORIDE 1.5 G/1.58G
20-40 POWDER, FOR SOLUTION ORAL
Status: DISCONTINUED | OUTPATIENT
Start: 2019-01-01 | End: 2019-01-01 | Stop reason: HOSPADM

## 2019-01-01 RX ORDER — DIPHENHYDRAMINE HYDROCHLORIDE 50 MG/ML
50 INJECTION INTRAMUSCULAR; INTRAVENOUS
Status: CANCELLED
Start: 2019-01-01

## 2019-01-01 RX ORDER — ACETAMINOPHEN 325 MG/1
650 TABLET ORAL EVERY 4 HOURS PRN
Status: DISCONTINUED | OUTPATIENT
Start: 2019-01-01 | End: 2019-01-01 | Stop reason: HOSPADM

## 2019-01-01 RX ORDER — BARIUM SULFATE 400 MG/ML
20 SUSPENSION ORAL ONCE
Status: DISCONTINUED | OUTPATIENT
Start: 2019-01-01 | End: 2019-01-01 | Stop reason: CLARIF

## 2019-01-01 RX ORDER — SODIUM CHLORIDE, SODIUM LACTATE, POTASSIUM CHLORIDE, CALCIUM CHLORIDE 600; 310; 30; 20 MG/100ML; MG/100ML; MG/100ML; MG/100ML
INJECTION, SOLUTION INTRAVENOUS CONTINUOUS PRN
Status: DISCONTINUED | OUTPATIENT
Start: 2019-01-01 | End: 2019-01-01

## 2019-01-01 RX ORDER — GLYCOPYRROLATE 0.2 MG/ML
INJECTION, SOLUTION INTRAMUSCULAR; INTRAVENOUS PRN
Status: DISCONTINUED | OUTPATIENT
Start: 2019-01-01 | End: 2019-01-01

## 2019-01-01 RX ORDER — ZINC SULFATE 50(220)MG
220 CAPSULE ORAL DAILY
Status: COMPLETED | OUTPATIENT
Start: 2019-01-01 | End: 2019-01-01

## 2019-01-01 RX ORDER — SODIUM CHLORIDE 1 G/1
1 TABLET ORAL DAILY
Qty: 2 TABLET | Refills: 0 | Status: SHIPPED | OUTPATIENT
Start: 2019-01-01 | End: 2019-01-01

## 2019-01-01 RX ORDER — DEXAMETHASONE 2 MG/1
2 TABLET ORAL EVERY 8 HOURS SCHEDULED
Status: DISCONTINUED | OUTPATIENT
Start: 2019-01-01 | End: 2019-01-01 | Stop reason: HOSPADM

## 2019-01-01 RX ORDER — LORAZEPAM 2 MG/ML
0.5 INJECTION INTRAMUSCULAR EVERY 4 HOURS PRN
Status: CANCELLED
Start: 2019-01-01

## 2019-01-01 RX ORDER — HYDROXYZINE HYDROCHLORIDE 10 MG/1
TABLET, FILM COATED ORAL
COMMUNITY
Start: 2017-08-21 | End: 2019-01-01

## 2019-01-01 RX ORDER — ASCORBIC ACID 250 MG
500 TABLET,CHEWABLE ORAL 2 TIMES DAILY
Status: ON HOLD | COMMUNITY
End: 2019-01-01

## 2019-01-01 RX ORDER — AMLODIPINE BESYLATE 2.5 MG/1
10 TABLET ORAL DAILY
Status: DISCONTINUED | OUTPATIENT
Start: 2019-01-01 | End: 2019-01-01

## 2019-01-01 RX ORDER — LORAZEPAM 0.5 MG/1
TABLET ORAL
COMMUNITY
Start: 2019-01-01 | End: 2019-01-01

## 2019-01-01 RX ORDER — FLUTICASONE PROPIONATE 50 MCG
2 SPRAY, SUSPENSION (ML) NASAL 2 TIMES DAILY
Qty: 16 G | Refills: 11 | Status: ON HOLD | OUTPATIENT
Start: 2019-01-01 | End: 2019-01-01

## 2019-01-01 RX ORDER — LORAZEPAM 0.5 MG/1
.5-1 TABLET ORAL EVERY 6 HOURS PRN
Qty: 60 TABLET | Refills: 0 | Status: SHIPPED | OUTPATIENT
Start: 2019-01-01

## 2019-01-01 RX ORDER — CALCIUM CARBONATE 500(1250)
500 TABLET ORAL 2 TIMES DAILY WITH MEALS
Qty: 60 TABLET | Refills: 0 | COMMUNITY
Start: 2019-01-01

## 2019-01-01 RX ORDER — PANTOPRAZOLE SODIUM 40 MG/1
40 TABLET, DELAYED RELEASE ORAL
Status: DISCONTINUED | OUTPATIENT
Start: 2019-01-01 | End: 2019-01-01

## 2019-01-01 RX ORDER — SODIUM CHLORIDE, SODIUM LACTATE, POTASSIUM CHLORIDE, CALCIUM CHLORIDE 600; 310; 30; 20 MG/100ML; MG/100ML; MG/100ML; MG/100ML
INJECTION, SOLUTION INTRAVENOUS CONTINUOUS
Status: DISCONTINUED | OUTPATIENT
Start: 2019-01-01 | End: 2019-01-01 | Stop reason: HOSPADM

## 2019-01-01 RX ORDER — HEPARIN SODIUM,PORCINE 10 UNIT/ML
5-10 VIAL (ML) INTRAVENOUS EVERY 24 HOURS
Status: DISCONTINUED | OUTPATIENT
Start: 2019-01-01 | End: 2019-01-01 | Stop reason: HOSPADM

## 2019-01-01 RX ORDER — DEXAMETHASONE 1 MG
1 TABLET ORAL EVERY 8 HOURS SCHEDULED
Status: DISCONTINUED | OUTPATIENT
Start: 2019-01-01 | End: 2019-01-01 | Stop reason: HOSPADM

## 2019-01-01 RX ORDER — PANTOPRAZOLE SODIUM 40 MG/1
40 TABLET, DELAYED RELEASE ORAL
Status: ON HOLD | COMMUNITY
Start: 2019-01-01 | End: 2019-01-01

## 2019-01-01 RX ORDER — SODIUM CHLORIDE 1 G/1
2 TABLET ORAL
Status: DISCONTINUED | OUTPATIENT
Start: 2019-01-01 | End: 2019-01-01

## 2019-01-01 RX ORDER — DEXAMETHASONE 2 MG/1
2 TABLET ORAL EVERY 12 HOURS SCHEDULED
Status: COMPLETED | OUTPATIENT
Start: 2019-01-01 | End: 2019-01-01

## 2019-01-01 RX ORDER — FENTANYL CITRATE 50 UG/ML
25-50 INJECTION, SOLUTION INTRAMUSCULAR; INTRAVENOUS
Status: DISCONTINUED | OUTPATIENT
Start: 2019-01-01 | End: 2019-01-01 | Stop reason: HOSPADM

## 2019-01-01 RX ORDER — CALCIUM CARBONATE 500(1250)
500 TABLET ORAL 2 TIMES DAILY WITH MEALS
Status: DISCONTINUED | OUTPATIENT
Start: 2019-01-01 | End: 2019-01-01 | Stop reason: HOSPADM

## 2019-01-01 RX ORDER — ONDANSETRON 2 MG/ML
4 INJECTION INTRAMUSCULAR; INTRAVENOUS EVERY 30 MIN PRN
Status: DISCONTINUED | OUTPATIENT
Start: 2019-01-01 | End: 2019-01-01

## 2019-01-01 RX ORDER — DEXAMETHASONE SODIUM PHOSPHATE 4 MG/ML
4 INJECTION, SOLUTION INTRA-ARTICULAR; INTRALESIONAL; INTRAMUSCULAR; INTRAVENOUS; SOFT TISSUE EVERY 6 HOURS
Status: DISCONTINUED | OUTPATIENT
Start: 2019-01-01 | End: 2019-01-01

## 2019-01-01 RX ORDER — SALIVA STIMULANT COMB. NO.3
2 SPRAY, NON-AEROSOL (ML) MUCOUS MEMBRANE 4 TIMES DAILY
Qty: 1 BOTTLE | Refills: 0 | Status: SHIPPED | OUTPATIENT
Start: 2019-01-01

## 2019-01-01 RX ORDER — SODIUM CHLORIDE 9 MG/ML
INJECTION, SOLUTION INTRAVENOUS CONTINUOUS
Status: DISCONTINUED | OUTPATIENT
Start: 2019-01-01 | End: 2019-01-01

## 2019-01-01 RX ORDER — HEPARIN SODIUM (PORCINE) LOCK FLUSH IV SOLN 100 UNIT/ML 100 UNIT/ML
500 SOLUTION INTRAVENOUS ONCE
Status: COMPLETED | OUTPATIENT
Start: 2019-01-01 | End: 2019-01-01

## 2019-01-01 RX ORDER — AMLODIPINE BESYLATE 10 MG/1
10 TABLET ORAL DAILY
Status: DISCONTINUED | OUTPATIENT
Start: 2019-01-01 | End: 2019-01-01

## 2019-01-01 RX ORDER — LEVETIRACETAM 10 MG/ML
1000 INJECTION INTRAVASCULAR EVERY 12 HOURS
Status: DISCONTINUED | OUTPATIENT
Start: 2019-01-01 | End: 2019-01-01 | Stop reason: HOSPADM

## 2019-01-01 RX ORDER — FERROUS SULFATE 325(65) MG
325 TABLET ORAL
Qty: 30 TABLET | Refills: 11 | Status: CANCELLED | OUTPATIENT
Start: 2019-01-01

## 2019-01-01 RX ORDER — LEVETIRACETAM 500 MG/1
1000 TABLET ORAL EVERY 12 HOURS
Status: DISCONTINUED | OUTPATIENT
Start: 2019-01-01 | End: 2019-01-01 | Stop reason: HOSPADM

## 2019-01-01 RX ORDER — LORAZEPAM 0.5 MG/1
0.5 TABLET ORAL
Status: DISCONTINUED | OUTPATIENT
Start: 2019-01-01 | End: 2019-01-01 | Stop reason: HOSPADM

## 2019-01-01 RX ORDER — ACETAMINOPHEN 325 MG/1
650 TABLET ORAL EVERY 4 HOURS PRN
COMMUNITY
Start: 2019-01-01

## 2019-01-01 RX ORDER — ACETAMINOPHEN 325 MG/1
975 TABLET ORAL EVERY 8 HOURS
Status: COMPLETED | OUTPATIENT
Start: 2019-01-01 | End: 2019-01-01

## 2019-01-01 RX ORDER — DEXAMETHASONE 1.5 MG/1
3 TABLET ORAL EVERY 8 HOURS SCHEDULED
Status: DISCONTINUED | OUTPATIENT
Start: 2019-01-01 | End: 2019-01-01

## 2019-01-01 RX ORDER — ZINC SULFATE 50(220)MG
220 CAPSULE ORAL DAILY
Status: DISCONTINUED | OUTPATIENT
Start: 2019-01-01 | End: 2019-01-01 | Stop reason: HOSPADM

## 2019-01-01 RX ORDER — SODIUM CHLORIDE 1 G/1
1 TABLET ORAL 2 TIMES DAILY WITH MEALS
Status: DISCONTINUED | OUTPATIENT
Start: 2019-01-01 | End: 2019-01-01

## 2019-01-01 RX ORDER — POLYETHYLENE GLYCOL 3350 17 G/17G
17 POWDER, FOR SOLUTION ORAL DAILY
Status: DISCONTINUED | OUTPATIENT
Start: 2019-01-01 | End: 2019-01-01

## 2019-01-01 RX ORDER — PROPOFOL 10 MG/ML
INJECTION, EMULSION INTRAVENOUS PRN
Status: DISCONTINUED | OUTPATIENT
Start: 2019-01-01 | End: 2019-01-01

## 2019-01-01 RX ORDER — HEPARIN SODIUM (PORCINE) LOCK FLUSH IV SOLN 100 UNIT/ML 100 UNIT/ML
5 SOLUTION INTRAVENOUS
Status: DISCONTINUED | OUTPATIENT
Start: 2019-01-01 | End: 2019-01-01

## 2019-01-01 RX ORDER — LIDOCAINE 40 MG/G
CREAM TOPICAL
Status: DISCONTINUED | OUTPATIENT
Start: 2019-01-01 | End: 2019-01-01 | Stop reason: HOSPADM

## 2019-01-01 RX ORDER — GENTAMICIN SULFATE 80 MG/100ML
80 INJECTION, SOLUTION INTRAVENOUS ONCE
Status: COMPLETED | OUTPATIENT
Start: 2019-01-01 | End: 2019-01-01

## 2019-01-01 RX ORDER — SODIUM CHLORIDE 1 G/1
1 TABLET ORAL DAILY
Status: COMPLETED | OUTPATIENT
Start: 2019-01-01 | End: 2019-01-01

## 2019-01-01 RX ORDER — DEXAMETHASONE 2 MG/1
2 TABLET ORAL EVERY 8 HOURS
Qty: 21 TABLET | Refills: 0 | Status: ON HOLD | OUTPATIENT
Start: 2019-01-01 | End: 2019-01-01

## 2019-01-01 RX ORDER — ONDANSETRON 4 MG/1
4 TABLET, ORALLY DISINTEGRATING ORAL EVERY 30 MIN PRN
Status: DISCONTINUED | OUTPATIENT
Start: 2019-01-01 | End: 2019-01-01 | Stop reason: HOSPADM

## 2019-01-01 RX ORDER — ERGOCALCIFEROL 1.25 MG/1
50000 CAPSULE, LIQUID FILLED ORAL WEEKLY
Qty: 8 CAPSULE | Refills: 0 | Status: ON HOLD | OUTPATIENT
Start: 2019-01-01 | End: 2019-01-01

## 2019-01-01 RX ORDER — ONDANSETRON 8 MG/1
8 TABLET, FILM COATED ORAL EVERY 8 HOURS PRN
Status: DISCONTINUED | OUTPATIENT
Start: 2019-01-01 | End: 2019-01-01 | Stop reason: HOSPADM

## 2019-01-01 RX ORDER — AMLODIPINE BESYLATE 10 MG/1
TABLET ORAL
Qty: 90 TABLET | Refills: 3 | Status: ON HOLD | OUTPATIENT
Start: 2019-01-01 | End: 2019-01-01

## 2019-01-01 RX ORDER — AMOXICILLIN 250 MG
2 CAPSULE ORAL 2 TIMES DAILY
Status: ON HOLD | COMMUNITY
Start: 2019-01-01 | End: 2019-01-01

## 2019-01-01 RX ORDER — SALIVA STIMULANT COMB. NO.3
2 SPRAY, NON-AEROSOL (ML) MUCOUS MEMBRANE 4 TIMES DAILY
Status: DISCONTINUED | OUTPATIENT
Start: 2019-01-01 | End: 2019-01-01 | Stop reason: HOSPADM

## 2019-01-01 RX ORDER — HEPARIN SODIUM,PORCINE 10 UNIT/ML
5 VIAL (ML) INTRAVENOUS
Status: COMPLETED | OUTPATIENT
Start: 2019-01-01 | End: 2019-01-01

## 2019-01-01 RX ORDER — PROCHLORPERAZINE MALEATE 5 MG
5 TABLET ORAL EVERY 6 HOURS PRN
Status: DISCONTINUED | OUTPATIENT
Start: 2019-01-01 | End: 2019-01-01 | Stop reason: HOSPADM

## 2019-01-01 RX ORDER — POTASSIUM CHLORIDE 750 MG/1
20-40 TABLET, EXTENDED RELEASE ORAL
Status: DISCONTINUED | OUTPATIENT
Start: 2019-01-01 | End: 2019-01-01 | Stop reason: HOSPADM

## 2019-01-01 RX ORDER — SODIUM CHLORIDE 1 G/1
2 TABLET ORAL 2 TIMES DAILY WITH MEALS
Status: DISCONTINUED | OUTPATIENT
Start: 2019-01-01 | End: 2019-01-01

## 2019-01-01 RX ORDER — GADOBUTROL 604.72 MG/ML
7.5 INJECTION INTRAVENOUS ONCE
Status: COMPLETED | OUTPATIENT
Start: 2019-01-01 | End: 2019-01-01

## 2019-01-01 RX ORDER — SODIUM CHLORIDE 1 G/1
4 TABLET ORAL
Status: DISCONTINUED | OUTPATIENT
Start: 2019-01-01 | End: 2019-01-01

## 2019-01-01 RX ORDER — POTASSIUM CHLORIDE 7.45 MG/ML
10 INJECTION INTRAVENOUS
Status: DISCONTINUED | OUTPATIENT
Start: 2019-01-01 | End: 2019-01-01 | Stop reason: HOSPADM

## 2019-01-01 RX ORDER — AMOXICILLIN 250 MG
2 CAPSULE ORAL 2 TIMES DAILY
Status: DISCONTINUED | OUTPATIENT
Start: 2019-01-01 | End: 2019-01-01 | Stop reason: HOSPADM

## 2019-01-01 RX ORDER — ASCORBIC ACID 250 MG
500 TABLET,CHEWABLE ORAL 2 TIMES DAILY
Status: DISPENSED | OUTPATIENT
Start: 2019-01-01 | End: 2019-01-01

## 2019-01-01 RX ORDER — DEXAMETHASONE 1.5 MG/1
3 TABLET ORAL EVERY 8 HOURS SCHEDULED
Status: COMPLETED | OUTPATIENT
Start: 2019-01-01 | End: 2019-01-01

## 2019-01-01 RX ORDER — CEFAZOLIN SODIUM 2 G/100ML
2 INJECTION, SOLUTION INTRAVENOUS
Status: COMPLETED | OUTPATIENT
Start: 2019-01-01 | End: 2019-01-01

## 2019-01-01 RX ORDER — AMLODIPINE BESYLATE 10 MG/1
10 TABLET ORAL DAILY
Qty: 30 TABLET | Refills: 0 | Status: SHIPPED | OUTPATIENT
Start: 2019-01-01 | End: 2019-01-01

## 2019-01-01 RX ORDER — LORAZEPAM 0.5 MG/1
0.5 TABLET ORAL
Qty: 30 TABLET | Refills: 3 | Status: ON HOLD | OUTPATIENT
Start: 2019-01-01 | End: 2019-01-01

## 2019-01-01 RX ORDER — CEFAZOLIN SODIUM 2 G/100ML
2 INJECTION, SOLUTION INTRAVENOUS ONCE
Status: COMPLETED | OUTPATIENT
Start: 2019-01-01 | End: 2019-01-01

## 2019-01-01 RX ORDER — DEXTROSE MONOHYDRATE 25 G/50ML
25-50 INJECTION, SOLUTION INTRAVENOUS
Status: CANCELLED | OUTPATIENT
Start: 2019-01-01

## 2019-01-01 RX ORDER — DEXAMETHASONE 1 MG
TABLET ORAL
Qty: 50 TABLET | Refills: 0 | Status: SHIPPED | OUTPATIENT
Start: 2019-01-01 | End: 2019-01-01

## 2019-01-01 RX ORDER — POTASSIUM CHLORIDE 29.8 MG/ML
20 INJECTION INTRAVENOUS
Status: DISCONTINUED | OUTPATIENT
Start: 2019-01-01 | End: 2019-01-01 | Stop reason: HOSPADM

## 2019-01-01 RX ORDER — ESMOLOL HYDROCHLORIDE 10 MG/ML
INJECTION INTRAVENOUS PRN
Status: DISCONTINUED | OUTPATIENT
Start: 2019-01-01 | End: 2019-01-01

## 2019-01-01 RX ORDER — LABETALOL HYDROCHLORIDE 5 MG/ML
10 INJECTION, SOLUTION INTRAVENOUS
Status: COMPLETED | OUTPATIENT
Start: 2019-01-01 | End: 2019-01-01

## 2019-01-01 RX ORDER — FLUTICASONE PROPIONATE 50 MCG
2 SPRAY, SUSPENSION (ML) NASAL DAILY
Status: ON HOLD | COMMUNITY
Start: 2019-01-01 | End: 2019-01-01

## 2019-01-01 RX ORDER — BUPIVACAINE HYDROCHLORIDE AND EPINEPHRINE 5; 5 MG/ML; UG/ML
INJECTION, SOLUTION PERINEURAL PRN
Status: DISCONTINUED | OUTPATIENT
Start: 2019-01-01 | End: 2019-01-01 | Stop reason: HOSPADM

## 2019-01-01 RX ORDER — DEXAMETHASONE 1 MG
2 TABLET ORAL EVERY 8 HOURS SCHEDULED
Status: DISCONTINUED | OUTPATIENT
Start: 2019-01-01 | End: 2019-01-01 | Stop reason: HOSPADM

## 2019-01-01 RX ORDER — HEPARIN SODIUM,PORCINE 10 UNIT/ML
5-10 VIAL (ML) INTRAVENOUS
Status: DISCONTINUED | OUTPATIENT
Start: 2019-01-01 | End: 2019-01-01 | Stop reason: HOSPADM

## 2019-01-01 RX ORDER — ONDANSETRON 2 MG/ML
4-8 INJECTION INTRAMUSCULAR; INTRAVENOUS EVERY 6 HOURS PRN
Status: DISCONTINUED | OUTPATIENT
Start: 2019-01-01 | End: 2019-01-01 | Stop reason: HOSPADM

## 2019-01-01 RX ORDER — CALCIUM CARBONATE 500(1250)
500 TABLET ORAL 2 TIMES DAILY WITH MEALS
Qty: 60 TABLET | Refills: 0 | Status: ON HOLD | OUTPATIENT
Start: 2019-01-01 | End: 2019-01-01

## 2019-01-01 RX ORDER — OXYCODONE HYDROCHLORIDE 5 MG/1
5 TABLET ORAL EVERY 4 HOURS PRN
Qty: 50 TABLET | Refills: 0 | Status: ON HOLD | OUTPATIENT
Start: 2019-01-01 | End: 2019-01-01

## 2019-01-01 RX ORDER — OXYCODONE HYDROCHLORIDE 5 MG/1
5 TABLET ORAL
Status: DISCONTINUED | OUTPATIENT
Start: 2019-01-01 | End: 2019-01-01 | Stop reason: HOSPADM

## 2019-01-01 RX ORDER — ZINC SULFATE 50(220)MG
220 CAPSULE ORAL DAILY
Status: ON HOLD | COMMUNITY
End: 2019-01-01

## 2019-01-01 RX ORDER — LORAZEPAM 0.5 MG/1
0.5 TABLET ORAL EVERY 4 HOURS PRN
Status: DISCONTINUED | OUTPATIENT
Start: 2019-01-01 | End: 2019-01-01

## 2019-01-01 RX ORDER — HEPARIN SODIUM (PORCINE) LOCK FLUSH IV SOLN 100 UNIT/ML 100 UNIT/ML
5 SOLUTION INTRAVENOUS
Status: DISCONTINUED | OUTPATIENT
Start: 2019-01-01 | End: 2019-01-01 | Stop reason: HOSPADM

## 2019-01-01 RX ORDER — AMOXICILLIN 250 MG
2 CAPSULE ORAL 2 TIMES DAILY PRN
Status: DISCONTINUED | OUTPATIENT
Start: 2019-01-01 | End: 2019-01-01 | Stop reason: HOSPADM

## 2019-01-01 RX ORDER — HEPARIN SODIUM,PORCINE 10 UNIT/ML
5 VIAL (ML) INTRAVENOUS EVERY 24 HOURS
Status: DISCONTINUED | OUTPATIENT
Start: 2019-01-01 | End: 2019-01-01

## 2019-01-01 RX ORDER — CEFAZOLIN SODIUM 1 G/3ML
1 INJECTION, POWDER, FOR SOLUTION INTRAMUSCULAR; INTRAVENOUS SEE ADMIN INSTRUCTIONS
Status: DISCONTINUED | OUTPATIENT
Start: 2019-01-01 | End: 2019-01-01 | Stop reason: HOSPADM

## 2019-01-01 RX ORDER — ONDANSETRON 8 MG/1
8 TABLET, ORALLY DISINTEGRATING ORAL EVERY 8 HOURS PRN
Status: DISCONTINUED | OUTPATIENT
Start: 2019-01-01 | End: 2019-01-01 | Stop reason: HOSPADM

## 2019-01-01 RX ORDER — DEXAMETHASONE 4 MG/1
4 TABLET ORAL EVERY 6 HOURS SCHEDULED
Status: DISCONTINUED | OUTPATIENT
Start: 2019-01-01 | End: 2019-01-01

## 2019-01-01 RX ORDER — DIFLUPREDNATE OPHTHALMIC 0.5 MG/ML
1 EMULSION OPHTHALMIC
COMMUNITY
Start: 2019-01-01 | End: 2019-01-01

## 2019-01-01 RX ORDER — CEFAZOLIN SODIUM 1 G/3ML
1 INJECTION, POWDER, FOR SOLUTION INTRAMUSCULAR; INTRAVENOUS SEE ADMIN INSTRUCTIONS
Status: CANCELLED | OUTPATIENT
Start: 2019-01-01

## 2019-01-01 RX ORDER — DEXAMETHASONE 4 MG/1
4 TABLET ORAL EVERY 8 HOURS
Status: ON HOLD | COMMUNITY
Start: 2019-01-01 | End: 2019-01-01

## 2019-01-01 RX ORDER — FENTANYL CITRATE 50 UG/ML
25-50 INJECTION, SOLUTION INTRAMUSCULAR; INTRAVENOUS EVERY 5 MIN PRN
Status: DISCONTINUED | OUTPATIENT
Start: 2019-01-01 | End: 2019-01-01 | Stop reason: HOSPADM

## 2019-01-01 RX ORDER — LEVETIRACETAM 1000 MG/1
1000 TABLET ORAL EVERY 12 HOURS
Status: ON HOLD | COMMUNITY
Start: 2019-01-01 | End: 2019-01-01

## 2019-01-01 RX ORDER — DOCUSATE SODIUM 100 MG/1
100 CAPSULE, LIQUID FILLED ORAL 2 TIMES DAILY PRN
Status: DISCONTINUED | OUTPATIENT
Start: 2019-01-01 | End: 2019-01-01 | Stop reason: HOSPADM

## 2019-01-01 RX ORDER — FLUTICASONE PROPIONATE 50 MCG
2 SPRAY, SUSPENSION (ML) NASAL DAILY
Status: DISCONTINUED | OUTPATIENT
Start: 2019-01-01 | End: 2019-01-01 | Stop reason: HOSPADM

## 2019-01-01 RX ORDER — OMEPRAZOLE 20 MG/1
20 TABLET, DELAYED RELEASE ORAL 2 TIMES DAILY
Refills: 0 | Status: ON HOLD | COMMUNITY
Start: 2019-01-01 | End: 2019-01-01

## 2019-01-01 RX ORDER — AMLODIPINE BESYLATE 10 MG/1
10 TABLET ORAL DAILY
Status: ON HOLD | COMMUNITY
Start: 2019-01-01 | End: 2019-01-01

## 2019-01-01 RX ORDER — HYDROCHLOROTHIAZIDE 12.5 MG/1
12.5 CAPSULE ORAL DAILY
Qty: 30 CAPSULE | Refills: 0 | Status: SHIPPED | OUTPATIENT
Start: 2019-01-01 | End: 2019-01-01

## 2019-01-01 RX ORDER — SODIUM CHLORIDE 1 G/1
1 TABLET ORAL
Status: DISCONTINUED | OUTPATIENT
Start: 2019-01-01 | End: 2019-01-01 | Stop reason: HOSPADM

## 2019-01-01 RX ORDER — DEXAMETHASONE 4 MG/1
4 TABLET ORAL EVERY 6 HOURS SCHEDULED
Status: COMPLETED | OUTPATIENT
Start: 2019-01-01 | End: 2019-01-01

## 2019-01-01 RX ORDER — DEXAMETHASONE 1 MG
3 TABLET ORAL EVERY 8 HOURS SCHEDULED
Status: DISCONTINUED | OUTPATIENT
Start: 2019-01-01 | End: 2019-01-01 | Stop reason: HOSPADM

## 2019-01-01 RX ORDER — DEXAMETHASONE 1 MG
1 TABLET ORAL EVERY 8 HOURS
Qty: 21 TABLET | Refills: 0 | Status: ON HOLD | OUTPATIENT
Start: 2019-01-01 | End: 2019-01-01

## 2019-01-01 RX ORDER — DEXAMETHASONE 4 MG/1
4 TABLET ORAL EVERY 8 HOURS SCHEDULED
Status: DISCONTINUED | OUTPATIENT
Start: 2019-01-01 | End: 2019-01-01 | Stop reason: HOSPADM

## 2019-01-01 RX ORDER — ALUMINA, MAGNESIA, AND SIMETHICONE 2400; 2400; 240 MG/30ML; MG/30ML; MG/30ML
30 SUSPENSION ORAL EVERY 4 HOURS PRN
Status: DISCONTINUED | OUTPATIENT
Start: 2019-01-01 | End: 2019-01-01 | Stop reason: HOSPADM

## 2019-01-01 RX ORDER — AMLODIPINE BESYLATE 10 MG/1
10 TABLET ORAL DAILY
Status: ON HOLD | DISCHARGE
Start: 2019-01-01 | End: 2019-01-01

## 2019-01-01 RX ORDER — DEXAMETHASONE SODIUM PHOSPHATE 4 MG/ML
1 INJECTION, SOLUTION INTRA-ARTICULAR; INTRALESIONAL; INTRAMUSCULAR; INTRAVENOUS; SOFT TISSUE EVERY 24 HOURS
Status: DISCONTINUED | OUTPATIENT
Start: 2019-01-01 | End: 2019-01-01

## 2019-01-01 RX ORDER — PROCHLORPERAZINE MALEATE 5 MG
5 TABLET ORAL EVERY 6 HOURS PRN
Status: DISCONTINUED | OUTPATIENT
Start: 2019-01-01 | End: 2019-01-01

## 2019-01-01 RX ORDER — LORAZEPAM 0.5 MG/1
0.5 TABLET ORAL
Status: DISCONTINUED | OUTPATIENT
Start: 2019-01-01 | End: 2019-01-01

## 2019-01-01 RX ORDER — SODIUM CHLORIDE 1 G/1
3 TABLET ORAL
Status: DISCONTINUED | OUTPATIENT
Start: 2019-01-01 | End: 2019-01-01

## 2019-01-01 RX ORDER — CEFAZOLIN SODIUM 1 G/50ML
2 SOLUTION INTRAVENOUS
Status: DISCONTINUED | OUTPATIENT
Start: 2019-01-01 | End: 2019-01-01 | Stop reason: HOSPADM

## 2019-01-01 RX ORDER — ACETAMINOPHEN 325 MG/1
650 TABLET ORAL EVERY 4 HOURS PRN
Qty: 150 TABLET | Refills: 0 | Status: ON HOLD | COMMUNITY
Start: 2019-01-01 | End: 2019-01-01

## 2019-01-01 RX ORDER — DEXTROSE MONOHYDRATE 25 G/50ML
25-50 INJECTION, SOLUTION INTRAVENOUS
Status: DISCONTINUED | OUTPATIENT
Start: 2019-01-01 | End: 2019-01-01 | Stop reason: HOSPADM

## 2019-01-01 RX ORDER — PANTOPRAZOLE SODIUM 40 MG/1
40 TABLET, DELAYED RELEASE ORAL
Qty: 30 TABLET | Refills: 0 | Status: SHIPPED | OUTPATIENT
Start: 2019-01-01 | End: 2019-01-01

## 2019-01-01 RX ORDER — LABETALOL 20 MG/4 ML (5 MG/ML) INTRAVENOUS SYRINGE
10-40 EVERY 10 MIN PRN
Status: DISCONTINUED | OUTPATIENT
Start: 2019-01-01 | End: 2019-01-01 | Stop reason: HOSPADM

## 2019-01-01 RX ORDER — POLYETHYLENE GLYCOL 3350 17 G/17G
1 POWDER, FOR SOLUTION ORAL DAILY PRN
Status: ON HOLD | COMMUNITY
Start: 2019-01-01 | End: 2019-01-01

## 2019-01-01 RX ORDER — POLYETHYLENE GLYCOL 3350 17 G/17G
17 POWDER, FOR SOLUTION ORAL DAILY
Status: DISCONTINUED | OUTPATIENT
Start: 2019-01-01 | End: 2019-01-01 | Stop reason: HOSPADM

## 2019-01-01 RX ORDER — NICOTINE POLACRILEX 4 MG
15-30 LOZENGE BUCCAL
Status: DISCONTINUED | OUTPATIENT
Start: 2019-01-01 | End: 2019-01-01 | Stop reason: HOSPADM

## 2019-01-01 RX ORDER — AMLODIPINE BESYLATE 5 MG/1
5 TABLET ORAL 2 TIMES DAILY
Status: COMPLETED | OUTPATIENT
Start: 2019-01-01 | End: 2019-01-01

## 2019-01-01 RX ORDER — NALOXONE HYDROCHLORIDE 0.4 MG/ML
.1-.4 INJECTION, SOLUTION INTRAMUSCULAR; INTRAVENOUS; SUBCUTANEOUS
Status: DISCONTINUED | OUTPATIENT
Start: 2019-01-01 | End: 2019-01-01

## 2019-01-01 RX ORDER — CALCIUM CARBONATE 500(1250)
500 TABLET ORAL 2 TIMES DAILY WITH MEALS
Status: ON HOLD | DISCHARGE
Start: 2019-01-01 | End: 2019-01-01

## 2019-01-01 RX ORDER — DEXAMETHASONE 1.5 MG/1
3 TABLET ORAL EVERY 8 HOURS
Qty: 42 TABLET | Refills: 0 | Status: ON HOLD | OUTPATIENT
Start: 2019-01-01 | End: 2019-01-01

## 2019-01-01 RX ORDER — DEXAMETHASONE 1 MG
1 TABLET ORAL DAILY
Status: COMPLETED | OUTPATIENT
Start: 2019-01-01 | End: 2019-01-01

## 2019-01-01 RX ORDER — MANNITOL 20 G/100ML
INJECTION, SOLUTION INTRAVENOUS PRN
Status: DISCONTINUED | OUTPATIENT
Start: 2019-01-01 | End: 2019-01-01

## 2019-01-01 RX ORDER — PROCHLORPERAZINE MALEATE 5 MG
TABLET ORAL
Refills: 0 | COMMUNITY
Start: 2019-01-01 | End: 2019-01-01

## 2019-01-01 RX ORDER — CALCIUM CARBONATE 500(1250)
500 TABLET ORAL 2 TIMES DAILY WITH MEALS
Status: DISCONTINUED | OUTPATIENT
Start: 2019-01-01 | End: 2019-01-01

## 2019-01-01 RX ORDER — DEXAMETHASONE SODIUM PHOSPHATE 10 MG/ML
10 INJECTION, SOLUTION INTRAMUSCULAR; INTRAVENOUS ONCE
Status: COMPLETED | OUTPATIENT
Start: 2019-01-01 | End: 2019-01-01

## 2019-01-01 RX ORDER — POLYETHYLENE GLYCOL 3350 17 G/17G
17 POWDER, FOR SOLUTION ORAL 3 TIMES DAILY
Status: DISCONTINUED | OUTPATIENT
Start: 2019-01-01 | End: 2019-01-01 | Stop reason: HOSPADM

## 2019-01-01 RX ORDER — LEVETIRACETAM 500 MG/1
1000 TABLET ORAL 2 TIMES DAILY
Status: DISCONTINUED | OUTPATIENT
Start: 2019-01-01 | End: 2019-01-01

## 2019-01-01 RX ORDER — HYDROMORPHONE HYDROCHLORIDE 1 MG/ML
.3-.5 INJECTION, SOLUTION INTRAMUSCULAR; INTRAVENOUS; SUBCUTANEOUS EVERY 5 MIN PRN
Status: DISCONTINUED | OUTPATIENT
Start: 2019-01-01 | End: 2019-01-01 | Stop reason: HOSPADM

## 2019-01-01 RX ORDER — ZINC SULFATE 50(220)MG
220 CAPSULE ORAL DAILY
Status: DISCONTINUED | OUTPATIENT
Start: 2019-01-01 | End: 2019-01-01

## 2019-01-01 RX ORDER — POLYETHYLENE GLYCOL 3350 17 G/17G
17 POWDER, FOR SOLUTION ORAL 3 TIMES DAILY
Status: DISCONTINUED | OUTPATIENT
Start: 2019-01-01 | End: 2019-01-01

## 2019-01-01 RX ORDER — LABETALOL HYDROCHLORIDE 5 MG/ML
INJECTION, SOLUTION INTRAVENOUS PRN
Status: DISCONTINUED | OUTPATIENT
Start: 2019-01-01 | End: 2019-01-01

## 2019-01-01 RX ORDER — FLUTICASONE PROPIONATE 50 MCG
2 SPRAY, SUSPENSION (ML) NASAL DAILY
Status: ON HOLD | DISCHARGE
Start: 2019-01-01 | End: 2019-01-01

## 2019-01-01 RX ORDER — DOCUSATE SODIUM 100 MG/1
100 CAPSULE, LIQUID FILLED ORAL 2 TIMES DAILY
Status: DISCONTINUED | OUTPATIENT
Start: 2019-01-01 | End: 2019-01-01 | Stop reason: HOSPADM

## 2019-01-01 RX ORDER — GADOBUTROL 604.72 MG/ML
10 INJECTION INTRAVENOUS ONCE
Status: COMPLETED | OUTPATIENT
Start: 2019-01-01 | End: 2019-01-01

## 2019-01-01 RX ORDER — ONDANSETRON 4 MG/1
8 TABLET, FILM COATED ORAL EVERY 8 HOURS PRN
Status: DISCONTINUED | OUTPATIENT
Start: 2019-01-01 | End: 2019-01-01 | Stop reason: HOSPADM

## 2019-01-01 RX ORDER — HYDROXYZINE HYDROCHLORIDE 50 MG/1
50 TABLET, FILM COATED ORAL EVERY 6 HOURS PRN
Status: DISCONTINUED | OUTPATIENT
Start: 2019-01-01 | End: 2019-01-01

## 2019-01-01 RX ORDER — LORAZEPAM 0.5 MG/1
0.5 TABLET ORAL EVERY 4 HOURS PRN
Qty: 30 TABLET | Refills: 5 | Status: SHIPPED | OUTPATIENT
Start: 2019-01-01 | End: 2019-01-01

## 2019-01-01 RX ORDER — SODIUM CHLORIDE 1 G/1
1 TABLET ORAL 2 TIMES DAILY WITH MEALS
Status: COMPLETED | OUTPATIENT
Start: 2019-01-01 | End: 2019-01-01

## 2019-01-01 RX ORDER — HYDRALAZINE HYDROCHLORIDE 20 MG/ML
10-20 INJECTION INTRAMUSCULAR; INTRAVENOUS EVERY 30 MIN PRN
Status: DISCONTINUED | OUTPATIENT
Start: 2019-01-01 | End: 2019-01-01 | Stop reason: HOSPADM

## 2019-01-01 RX ORDER — ONDANSETRON 4 MG/1
4 TABLET, FILM COATED ORAL EVERY 6 HOURS PRN
Status: DISCONTINUED | OUTPATIENT
Start: 2019-01-01 | End: 2019-01-01 | Stop reason: HOSPADM

## 2019-01-01 RX ORDER — VANCOMYCIN HYDROCHLORIDE 1 G/200ML
1000 INJECTION, SOLUTION INTRAVENOUS ONCE
Status: COMPLETED | OUTPATIENT
Start: 2019-01-01 | End: 2019-01-01

## 2019-01-01 RX ORDER — DEXAMETHASONE 2 MG/1
TABLET ORAL
Refills: 0 | COMMUNITY
Start: 2019-01-01 | End: 2019-01-01

## 2019-01-01 RX ORDER — LIDOCAINE 40 MG/G
CREAM TOPICAL
Status: DISCONTINUED | OUTPATIENT
Start: 2019-01-01 | End: 2019-01-01

## 2019-01-01 RX ORDER — DOCUSATE SODIUM 100 MG/1
100 CAPSULE, LIQUID FILLED ORAL 2 TIMES DAILY
Qty: 60 CAPSULE | Refills: 0 | Status: SHIPPED | OUTPATIENT
Start: 2019-01-01 | End: 2019-01-01

## 2019-01-01 RX ORDER — IOPAMIDOL 755 MG/ML
75 INJECTION, SOLUTION INTRAVASCULAR ONCE
Status: COMPLETED | OUTPATIENT
Start: 2019-01-01 | End: 2019-01-01

## 2019-01-01 RX ORDER — ACETAMINOPHEN 325 MG/1
975 TABLET ORAL EVERY 8 HOURS PRN
Status: DISCONTINUED | OUTPATIENT
Start: 2019-01-01 | End: 2019-01-01

## 2019-01-01 RX ORDER — HYDROXYZINE HYDROCHLORIDE 25 MG/1
25 TABLET, FILM COATED ORAL EVERY 6 HOURS PRN
Status: DISCONTINUED | OUTPATIENT
Start: 2019-01-01 | End: 2019-01-01

## 2019-01-01 RX ORDER — OMEPRAZOLE 40 MG/1
40 CAPSULE, DELAYED RELEASE ORAL DAILY
Qty: 30 CAPSULE | Refills: 1 | Status: SHIPPED | OUTPATIENT
Start: 2019-01-01 | End: 2019-01-01

## 2019-01-01 RX ORDER — DEXAMETHASONE SODIUM PHOSPHATE 4 MG/ML
INJECTION, SOLUTION INTRA-ARTICULAR; INTRALESIONAL; INTRAMUSCULAR; INTRAVENOUS; SOFT TISSUE PRN
Status: DISCONTINUED | OUTPATIENT
Start: 2019-01-01 | End: 2019-01-01

## 2019-01-01 RX ORDER — FERROUS SULFATE 325(65) MG
325 TABLET ORAL
Qty: 30 TABLET | Refills: 0 | Status: ON HOLD | OUTPATIENT
Start: 2019-01-01 | End: 2019-01-01

## 2019-01-01 RX ORDER — PROCHLORPERAZINE MALEATE 5 MG
5 TABLET ORAL EVERY 6 HOURS PRN
Status: ON HOLD | DISCHARGE
Start: 2019-01-01 | End: 2019-01-01

## 2019-01-01 RX ORDER — ASCORBIC ACID 250 MG
500 TABLET,CHEWABLE ORAL 2 TIMES DAILY
Status: DISCONTINUED | OUTPATIENT
Start: 2019-01-01 | End: 2019-01-01

## 2019-01-01 RX ORDER — SODIUM CHLORIDE 1 G/1
1 TABLET ORAL
Status: DISCONTINUED | OUTPATIENT
Start: 2019-01-01 | End: 2019-01-01

## 2019-01-01 RX ORDER — DEXAMETHASONE 2 MG/1
2 TABLET ORAL EVERY 12 HOURS SCHEDULED
Status: DISCONTINUED | OUTPATIENT
Start: 2019-01-01 | End: 2019-01-01

## 2019-01-01 RX ORDER — DEXAMETHASONE 1 MG
1 TABLET ORAL DAILY
Status: DISCONTINUED | OUTPATIENT
Start: 2019-01-01 | End: 2019-01-01

## 2019-01-01 RX ORDER — ASCORBIC ACID 250 MG
500 TABLET,CHEWABLE ORAL 2 TIMES DAILY
Status: DISCONTINUED | OUTPATIENT
Start: 2019-01-01 | End: 2019-01-01 | Stop reason: HOSPADM

## 2019-01-01 RX ORDER — ONDANSETRON 4 MG/1
4 TABLET, ORALLY DISINTEGRATING ORAL EVERY 30 MIN PRN
Status: DISCONTINUED | OUTPATIENT
Start: 2019-01-01 | End: 2019-01-01

## 2019-01-01 RX ORDER — SODIUM CHLORIDE 1 G/1
1 TABLET ORAL 3 TIMES DAILY
Status: ON HOLD | COMMUNITY
End: 2019-01-01

## 2019-01-01 RX ORDER — ONDANSETRON 8 MG/1
8 TABLET, FILM COATED ORAL EVERY 8 HOURS PRN
Status: ON HOLD | DISCHARGE
Start: 2019-01-01 | End: 2019-01-01

## 2019-01-01 RX ORDER — PREDNISOLONE ACETATE 10 MG/ML
1 SUSPENSION/ DROPS OPHTHALMIC
COMMUNITY
Start: 2019-01-01 | End: 2019-01-01

## 2019-01-01 RX ORDER — LORAZEPAM 0.5 MG/1
0.5 TABLET ORAL EVERY 6 HOURS PRN
Status: ON HOLD | COMMUNITY
End: 2019-01-01

## 2019-01-01 RX ORDER — DEXAMETHASONE SODIUM PHOSPHATE 4 MG/ML
4 INJECTION, SOLUTION INTRA-ARTICULAR; INTRALESIONAL; INTRAMUSCULAR; INTRAVENOUS; SOFT TISSUE EVERY 6 HOURS
Status: COMPLETED | OUTPATIENT
Start: 2019-01-01 | End: 2019-01-01

## 2019-01-01 RX ADMIN — ROCURONIUM BROMIDE 20 MG: 10 INJECTION INTRAVENOUS at 12:27

## 2019-01-01 RX ADMIN — Medication 5 ML: at 13:28

## 2019-01-01 RX ADMIN — AMLODIPINE BESYLATE 10 MG: 10 TABLET ORAL at 08:23

## 2019-01-01 RX ADMIN — AMLODIPINE BESYLATE 10 MG: 10 TABLET ORAL at 07:44

## 2019-01-01 RX ADMIN — MELATONIN 1000 UNITS: at 08:09

## 2019-01-01 RX ADMIN — GADOBUTROL 10 ML: 604.72 INJECTION INTRAVENOUS at 17:22

## 2019-01-01 RX ADMIN — MELATONIN 1000 UNITS: at 08:12

## 2019-01-01 RX ADMIN — DOCUSATE SODIUM 100 MG: 100 CAPSULE, LIQUID FILLED ORAL at 09:03

## 2019-01-01 RX ADMIN — AMLODIPINE BESYLATE 10 MG: 10 TABLET ORAL at 08:20

## 2019-01-01 RX ADMIN — CALCIUM 500 MG: 500 TABLET ORAL at 17:50

## 2019-01-01 RX ADMIN — DEXAMETHASONE SODIUM PHOSPHATE 150 MG: 10 INJECTION, SOLUTION INTRAMUSCULAR; INTRAVENOUS at 12:29

## 2019-01-01 RX ADMIN — RANITIDINE 150 MG: 150 TABLET ORAL at 20:23

## 2019-01-01 RX ADMIN — POLYETHYLENE GLYCOL 3350 17 G: 17 POWDER, FOR SOLUTION ORAL at 08:13

## 2019-01-01 RX ADMIN — AMLODIPINE BESYLATE 10 MG: 10 TABLET ORAL at 15:01

## 2019-01-01 RX ADMIN — SODIUM CHLORIDE: 234 INJECTION INTRAMUSCULAR; INTRAVENOUS; SUBCUTANEOUS at 08:19

## 2019-01-01 RX ADMIN — DEXAMETHASONE 4 MG: 4 TABLET ORAL at 12:12

## 2019-01-01 RX ADMIN — RANITIDINE 150 MG: 150 TABLET ORAL at 20:32

## 2019-01-01 RX ADMIN — CALCIUM 500 MG: 500 TABLET ORAL at 17:49

## 2019-01-01 RX ADMIN — MELATONIN 1000 UNITS: at 08:54

## 2019-01-01 RX ADMIN — GADOBUTROL 7.5 ML: 604.72 INJECTION INTRAVENOUS at 16:11

## 2019-01-01 RX ADMIN — PHENYLEPHRINE HYDROCHLORIDE 100 MCG: 10 INJECTION INTRAVENOUS at 11:50

## 2019-01-01 RX ADMIN — SODIUM CHLORIDE TAB 1 GM 3 G: 1 TAB at 18:23

## 2019-01-01 RX ADMIN — Medication 2 SPRAY: at 21:20

## 2019-01-01 RX ADMIN — CALCIUM 500 MG: 500 TABLET ORAL at 08:11

## 2019-01-01 RX ADMIN — POLYETHYLENE GLYCOL 3350 17 G: 17 POWDER, FOR SOLUTION ORAL at 08:53

## 2019-01-01 RX ADMIN — Medication 500 MG: at 20:47

## 2019-01-01 RX ADMIN — MELATONIN 1000 UNITS: at 19:55

## 2019-01-01 RX ADMIN — RANITIDINE 150 MG: 150 TABLET ORAL at 08:02

## 2019-01-01 RX ADMIN — DEXAMETHASONE 3 MG: 1.5 TABLET ORAL at 13:25

## 2019-01-01 RX ADMIN — SODIUM CHLORIDE TAB 1 GM 1 G: 1 TAB at 17:16

## 2019-01-01 RX ADMIN — POLYETHYLENE GLYCOL 3350 17 G: 17 POWDER, FOR SOLUTION ORAL at 07:59

## 2019-01-01 RX ADMIN — BENZOCAINE, MENTHOL 1 LOZENGE: 15; 3.6 LOZENGE ORAL at 18:32

## 2019-01-01 RX ADMIN — AMLODIPINE BESYLATE 10 MG: 10 TABLET ORAL at 08:43

## 2019-01-01 RX ADMIN — DEXAMETHASONE 4 MG: 4 TABLET ORAL at 13:22

## 2019-01-01 RX ADMIN — CALCIUM 500 MG: 500 TABLET ORAL at 07:57

## 2019-01-01 RX ADMIN — PHENYLEPHRINE HYDROCHLORIDE 0.4 MCG/KG/MIN: 10 INJECTION INTRAVENOUS at 13:46

## 2019-01-01 RX ADMIN — MANNITOL 50 G: 20 INJECTION, SOLUTION INTRAVENOUS at 13:23

## 2019-01-01 RX ADMIN — Medication 500 MG: at 20:32

## 2019-01-01 RX ADMIN — DEXAMETHASONE 2 MG: 2 TABLET ORAL at 07:56

## 2019-01-01 RX ADMIN — MELATONIN 1000 UNITS: at 20:21

## 2019-01-01 RX ADMIN — BENZOCAINE, MENTHOL 1 LOZENGE: 15; 3.6 LOZENGE ORAL at 10:17

## 2019-01-01 RX ADMIN — CALCIUM 500 MG: 500 TABLET ORAL at 19:37

## 2019-01-01 RX ADMIN — POLYETHYLENE GLYCOL 3350 17 G: 17 POWDER, FOR SOLUTION ORAL at 08:42

## 2019-01-01 RX ADMIN — ONDANSETRON HYDROCHLORIDE 8 MG: 4 TABLET, FILM COATED ORAL at 21:24

## 2019-01-01 RX ADMIN — MELATONIN 25 MCG: at 21:21

## 2019-01-01 RX ADMIN — DEXAMETHASONE SODIUM PHOSPHATE 4 MG: 4 INJECTION, SOLUTION INTRAMUSCULAR; INTRAVENOUS at 04:58

## 2019-01-01 RX ADMIN — MELATONIN 1000 UNITS: at 08:44

## 2019-01-01 RX ADMIN — SODIUM CHLORIDE: 9 INJECTION, SOLUTION INTRAVENOUS at 13:00

## 2019-01-01 RX ADMIN — Medication 500 MG: at 08:45

## 2019-01-01 RX ADMIN — DEXAMETHASONE 4 MG: 4 TABLET ORAL at 06:25

## 2019-01-01 RX ADMIN — POTASSIUM PHOSPHATE, MONOBASIC 500 MG: 500 TABLET, SOLUBLE ORAL at 08:20

## 2019-01-01 RX ADMIN — POLYETHYLENE GLYCOL 3350 17 G: 17 POWDER, FOR SOLUTION ORAL at 08:44

## 2019-01-01 RX ADMIN — SODIUM CHLORIDE: 9 INJECTION, SOLUTION INTRAVENOUS at 21:28

## 2019-01-01 RX ADMIN — Medication 2 G: at 05:42

## 2019-01-01 RX ADMIN — VITAMIN E CAP 100 UNIT 100 UNITS: 100 CAP at 07:53

## 2019-01-01 RX ADMIN — DEXAMETHASONE 2 MG: 2 TABLET ORAL at 15:05

## 2019-01-01 RX ADMIN — SODIUM CHLORIDE TAB 1 GM 1 G: 1 TAB at 17:00

## 2019-01-01 RX ADMIN — AMLODIPINE BESYLATE 10 MG: 10 TABLET ORAL at 08:04

## 2019-01-01 RX ADMIN — PROPOFOL 30 MG: 10 INJECTION, EMULSION INTRAVENOUS at 16:22

## 2019-01-01 RX ADMIN — ROCURONIUM BROMIDE 20 MG: 10 INJECTION INTRAVENOUS at 13:21

## 2019-01-01 RX ADMIN — DEXAMETHASONE 3 MG: 1.5 TABLET ORAL at 14:15

## 2019-01-01 RX ADMIN — AMLODIPINE BESYLATE 10 MG: 10 TABLET ORAL at 07:53

## 2019-01-01 RX ADMIN — ACETAMINOPHEN 650 MG: 325 TABLET ORAL at 16:21

## 2019-01-01 RX ADMIN — FENTANYL CITRATE 50 MCG: 50 INJECTION, SOLUTION INTRAMUSCULAR; INTRAVENOUS at 11:50

## 2019-01-01 RX ADMIN — DEXAMETHASONE 4 MG: 4 TABLET ORAL at 06:22

## 2019-01-01 RX ADMIN — Medication 5 ML: at 07:08

## 2019-01-01 RX ADMIN — DEXAMETHASONE 3 MG: 1.5 TABLET ORAL at 05:53

## 2019-01-01 RX ADMIN — SODIUM CHLORIDE, POTASSIUM CHLORIDE, SODIUM LACTATE AND CALCIUM CHLORIDE: 600; 310; 30; 20 INJECTION, SOLUTION INTRAVENOUS at 15:49

## 2019-01-01 RX ADMIN — CALCIUM 500 MG: 500 TABLET ORAL at 09:03

## 2019-01-01 RX ADMIN — CALCIUM 500 MG: 500 TABLET ORAL at 09:46

## 2019-01-01 RX ADMIN — VITAMIN E CAP 100 UNIT 100 UNITS: 100 CAP at 08:33

## 2019-01-01 RX ADMIN — POTASSIUM PHOSPHATE, MONOBASIC 500 MG: 500 TABLET, SOLUBLE ORAL at 08:12

## 2019-01-01 RX ADMIN — DEXAMETHASONE SODIUM PHOSPHATE: 10 INJECTION, SOLUTION INTRAMUSCULAR; INTRAVENOUS at 15:26

## 2019-01-01 RX ADMIN — MELATONIN 1000 UNITS: at 20:03

## 2019-01-01 RX ADMIN — DEXAMETHASONE SODIUM PHOSPHATE 4 MG: 4 INJECTION, SOLUTION INTRA-ARTICULAR; INTRALESIONAL; INTRAMUSCULAR; INTRAVENOUS; SOFT TISSUE at 01:02

## 2019-01-01 RX ADMIN — RANITIDINE 150 MG: 150 TABLET ORAL at 21:33

## 2019-01-01 RX ADMIN — LEVETIRACETAM 1000 MG: 500 TABLET, FILM COATED ORAL at 17:00

## 2019-01-01 RX ADMIN — DOCETAXEL 140 MG: 80 INJECTION, SOLUTION, CONCENTRATE INTRAVENOUS at 14:43

## 2019-01-01 RX ADMIN — DEXAMETHASONE 4 MG: 2 TABLET ORAL at 21:14

## 2019-01-01 RX ADMIN — RANITIDINE 150 MG: 150 TABLET ORAL at 20:22

## 2019-01-01 RX ADMIN — VITAMIN E CAP 100 UNIT 100 UNITS: 100 CAP at 08:47

## 2019-01-01 RX ADMIN — DEXAMETHASONE 3 MG: 1.5 TABLET ORAL at 22:10

## 2019-01-01 RX ADMIN — AMLODIPINE BESYLATE 5 MG: 5 TABLET ORAL at 20:15

## 2019-01-01 RX ADMIN — CALCIUM 500 MG: 500 TABLET ORAL at 18:53

## 2019-01-01 RX ADMIN — SODIUM CHLORIDE TAB 1 GM 1 G: 1 TAB at 09:27

## 2019-01-01 RX ADMIN — Medication 500 MG: at 08:02

## 2019-01-01 RX ADMIN — IOPAMIDOL 86 ML: 755 INJECTION, SOLUTION INTRAVASCULAR at 12:34

## 2019-01-01 RX ADMIN — MELATONIN 25 MCG: at 08:21

## 2019-01-01 RX ADMIN — CALCIUM 500 MG: 500 TABLET ORAL at 08:39

## 2019-01-01 RX ADMIN — DEXAMETHASONE 3 MG: 1.5 TABLET ORAL at 22:16

## 2019-01-01 RX ADMIN — DOCETAXEL 140 MG: 20 INJECTION, SOLUTION, CONCENTRATE INTRAVENOUS at 13:12

## 2019-01-01 RX ADMIN — Medication 20000 UNITS: at 08:14

## 2019-01-01 RX ADMIN — Medication 2 SPRAY: at 19:43

## 2019-01-01 RX ADMIN — CALCIUM 500 MG: 500 TABLET ORAL at 17:31

## 2019-01-01 RX ADMIN — PHENYLEPHRINE HYDROCHLORIDE 0.5 MCG/KG/MIN: 10 INJECTION INTRAVENOUS at 17:00

## 2019-01-01 RX ADMIN — MELATONIN 25 MCG: at 07:44

## 2019-01-01 RX ADMIN — BENZOCAINE, MENTHOL 1 LOZENGE: 15; 3.6 LOZENGE ORAL at 17:16

## 2019-01-01 RX ADMIN — Medication 2 SPRAY: at 16:42

## 2019-01-01 RX ADMIN — SODIUM CHLORIDE: 9 INJECTION, SOLUTION INTRAVENOUS at 00:06

## 2019-01-01 RX ADMIN — VITAMIN E CAP 100 UNIT 100 UNITS: 100 CAP at 07:57

## 2019-01-01 RX ADMIN — SODIUM CHLORIDE 250 ML: 9 INJECTION, SOLUTION INTRAVENOUS at 15:26

## 2019-01-01 RX ADMIN — HEPARIN SODIUM (PORCINE) LOCK FLUSH IV SOLN 100 UNIT/ML 500 UNITS: 100 SOLUTION at 12:37

## 2019-01-01 RX ADMIN — MELATONIN 1000 UNITS: at 21:33

## 2019-01-01 RX ADMIN — ESMOLOL HYDROCHLORIDE 40 MG: 10 INJECTION, SOLUTION INTRAVENOUS at 12:31

## 2019-01-01 RX ADMIN — SODIUM CHLORIDE 250 ML: 9 INJECTION, SOLUTION INTRAVENOUS at 13:14

## 2019-01-01 RX ADMIN — DEXAMETHASONE 4 MG: 4 TABLET ORAL at 21:16

## 2019-01-01 RX ADMIN — MELATONIN 1000 UNITS: at 08:02

## 2019-01-01 RX ADMIN — DOCUSATE SODIUM 100 MG: 100 CAPSULE, LIQUID FILLED ORAL at 21:20

## 2019-01-01 RX ADMIN — LABETALOL HYDROCHLORIDE 5 MG: 5 INJECTION INTRAVENOUS at 18:29

## 2019-01-01 RX ADMIN — IOPAMIDOL 86 ML: 755 INJECTION, SOLUTION INTRAVASCULAR at 09:30

## 2019-01-01 RX ADMIN — FLUTICASONE PROPIONATE 2 SPRAY: 50 SPRAY, METERED NASAL at 08:25

## 2019-01-01 RX ADMIN — BENZOCAINE, MENTHOL 1 LOZENGE: 15; 3.6 LOZENGE ORAL at 23:46

## 2019-01-01 RX ADMIN — LABETALOL HYDROCHLORIDE 10 MG: 5 INJECTION INTRAVENOUS at 18:20

## 2019-01-01 RX ADMIN — CALCIUM 500 MG: 500 TABLET ORAL at 18:07

## 2019-01-01 RX ADMIN — AMLODIPINE BESYLATE 10 MG: 10 TABLET ORAL at 07:58

## 2019-01-01 RX ADMIN — SODIUM CHLORIDE 250 ML: 9 INJECTION, SOLUTION INTRAVENOUS at 14:22

## 2019-01-01 RX ADMIN — LEVETIRACETAM 1000 MG: 500 TABLET, FILM COATED ORAL at 16:33

## 2019-01-01 RX ADMIN — LORAZEPAM 0.5 MG: 0.5 TABLET ORAL at 21:20

## 2019-01-01 RX ADMIN — DOCUSATE SODIUM 100 MG: 100 CAPSULE, LIQUID FILLED ORAL at 07:45

## 2019-01-01 RX ADMIN — SODIUM CHLORIDE TAB 1 GM 1 G: 1 TAB at 17:41

## 2019-01-01 RX ADMIN — MELATONIN 1000 UNITS: at 08:27

## 2019-01-01 RX ADMIN — SODIUM CHLORIDE TAB 1 GM 1 G: 1 TAB at 17:26

## 2019-01-01 RX ADMIN — ZINC SULFATE CAP 220 MG (50 MG ELEMENTAL ZN) 220 MG: 220 (50 ZN) CAP at 08:02

## 2019-01-01 RX ADMIN — DEXAMETHASONE 4 MG: 2 TABLET ORAL at 20:14

## 2019-01-01 RX ADMIN — SODIUM CHLORIDE: 234 INJECTION INTRAMUSCULAR; INTRAVENOUS; SUBCUTANEOUS at 05:11

## 2019-01-01 RX ADMIN — MELATONIN 25 MCG: at 08:33

## 2019-01-01 RX ADMIN — MELATONIN 25 MCG: at 20:31

## 2019-01-01 RX ADMIN — LABETALOL 20 MG/4 ML (5 MG/ML) INTRAVENOUS SYRINGE 5 MG: at 18:47

## 2019-01-01 RX ADMIN — VITAMIN E CAP 100 UNIT 100 UNITS: 100 CAP at 07:44

## 2019-01-01 RX ADMIN — POLYETHYLENE GLYCOL 3350 17 G: 17 POWDER, FOR SOLUTION ORAL at 08:41

## 2019-01-01 RX ADMIN — ONDANSETRON HYDROCHLORIDE 8 MG: 4 TABLET, FILM COATED ORAL at 05:54

## 2019-01-01 RX ADMIN — PANTOPRAZOLE SODIUM 40 MG: 40 TABLET, DELAYED RELEASE ORAL at 07:56

## 2019-01-01 RX ADMIN — Medication 2 G: at 04:08

## 2019-01-01 RX ADMIN — SODIUM CHLORIDE 50 ML/HR: 9 INJECTION, SOLUTION INTRAVENOUS at 20:48

## 2019-01-01 RX ADMIN — DEXAMETHASONE 4 MG: 4 TABLET ORAL at 21:36

## 2019-01-01 RX ADMIN — MELATONIN 1000 UNITS: at 20:31

## 2019-01-01 RX ADMIN — AMLODIPINE BESYLATE 10 MG: 10 TABLET ORAL at 10:12

## 2019-01-01 RX ADMIN — VITAMIN E CAP 100 UNIT 100 UNITS: 100 CAP at 09:03

## 2019-01-01 RX ADMIN — OMEPRAZOLE 20 MG: 20 CAPSULE, DELAYED RELEASE ORAL at 07:57

## 2019-01-01 RX ADMIN — AMLODIPINE BESYLATE 10 MG: 10 TABLET ORAL at 08:54

## 2019-01-01 RX ADMIN — RANITIDINE 150 MG: 150 TABLET ORAL at 09:46

## 2019-01-01 RX ADMIN — Medication 5 ML: at 05:58

## 2019-01-01 RX ADMIN — CALCIUM 500 MG: 500 TABLET ORAL at 08:43

## 2019-01-01 RX ADMIN — DEXAMETHASONE 4 MG: 4 TABLET ORAL at 06:26

## 2019-01-01 RX ADMIN — DEXAMETHASONE 3 MG: 2 TABLET ORAL at 06:41

## 2019-01-01 RX ADMIN — MELATONIN 1000 UNITS: at 08:20

## 2019-01-01 RX ADMIN — DEXAMETHASONE 4 MG: 4 TABLET ORAL at 23:16

## 2019-01-01 RX ADMIN — PANTOPRAZOLE SODIUM 40 MG: 40 TABLET, DELAYED RELEASE ORAL at 07:55

## 2019-01-01 RX ADMIN — DEXAMETHASONE 2 MG: 2 TABLET ORAL at 06:13

## 2019-01-01 RX ADMIN — SODIUM CHLORIDE 250 ML: 9 INJECTION, SOLUTION INTRAVENOUS at 12:28

## 2019-01-01 RX ADMIN — POLYETHYLENE GLYCOL 3350 17 G: 17 POWDER, FOR SOLUTION ORAL at 19:37

## 2019-01-01 RX ADMIN — MELATONIN 1000 UNITS: at 08:14

## 2019-01-01 RX ADMIN — POTASSIUM PHOSPHATE, MONOBASIC AND POTASSIUM PHOSPHATE, DIBASIC 15 MMOL: 224; 236 INJECTION, SOLUTION INTRAVENOUS at 10:06

## 2019-01-01 RX ADMIN — DEXAMETHASONE 4 MG: 4 TABLET ORAL at 14:17

## 2019-01-01 RX ADMIN — MELATONIN 1000 UNITS: at 09:47

## 2019-01-01 RX ADMIN — CALCIUM 500 MG: 500 TABLET ORAL at 17:00

## 2019-01-01 RX ADMIN — CALCIUM 500 MG: 500 TABLET ORAL at 17:17

## 2019-01-01 RX ADMIN — ROCURONIUM BROMIDE 50 MG: 10 INJECTION INTRAVENOUS at 11:50

## 2019-01-01 RX ADMIN — Medication 20000 UNITS: at 07:59

## 2019-01-01 RX ADMIN — POLYETHYLENE GLYCOL 3350 17 G: 17 POWDER, FOR SOLUTION ORAL at 07:57

## 2019-01-01 RX ADMIN — VITAMIN E CAP 100 UNIT 100 UNITS: 100 CAP at 07:59

## 2019-01-01 RX ADMIN — Medication 10 MG: at 12:45

## 2019-01-01 RX ADMIN — DEXAMETHASONE 4 MG: 4 TABLET ORAL at 14:03

## 2019-01-01 RX ADMIN — Medication 2 SPRAY: at 06:14

## 2019-01-01 RX ADMIN — POTASSIUM PHOSPHATE, MONOBASIC 500 MG: 500 TABLET, SOLUBLE ORAL at 08:54

## 2019-01-01 RX ADMIN — SODIUM CHLORIDE 500 ML: 9 INJECTION, SOLUTION INTRAVENOUS at 16:30

## 2019-01-01 RX ADMIN — FLUTICASONE PROPIONATE 2 SPRAY: 50 SPRAY, METERED NASAL at 07:57

## 2019-01-01 RX ADMIN — AMLODIPINE BESYLATE 5 MG: 5 TABLET ORAL at 08:09

## 2019-01-01 RX ADMIN — PHENYLEPHRINE HYDROCHLORIDE 100 MCG: 10 INJECTION INTRAVENOUS at 17:14

## 2019-01-01 RX ADMIN — RANITIDINE 150 MG: 150 TABLET ORAL at 06:22

## 2019-01-01 RX ADMIN — BENZOCAINE, MENTHOL 1 LOZENGE: 15; 3.6 LOZENGE ORAL at 20:34

## 2019-01-01 RX ADMIN — MELATONIN 1000 UNITS: at 19:29

## 2019-01-01 RX ADMIN — AMLODIPINE BESYLATE 10 MG: 10 TABLET ORAL at 08:41

## 2019-01-01 RX ADMIN — PHENYLEPHRINE HYDROCHLORIDE 100 MCG: 10 INJECTION INTRAVENOUS at 16:16

## 2019-01-01 RX ADMIN — POTASSIUM PHOSPHATE, MONOBASIC 500 MG: 500 TABLET, SOLUBLE ORAL at 08:29

## 2019-01-01 RX ADMIN — SODIUM CHLORIDE TAB 1 GM 1 G: 1 TAB at 08:42

## 2019-01-01 RX ADMIN — CALCIUM 500 MG: 500 TABLET ORAL at 18:03

## 2019-01-01 RX ADMIN — DEXAMETHASONE 2 MG: 2 TABLET ORAL at 06:14

## 2019-01-01 RX ADMIN — POLYETHYLENE GLYCOL 3350 17 G: 17 POWDER, FOR SOLUTION ORAL at 19:30

## 2019-01-01 RX ADMIN — Medication 2 SPRAY: at 08:43

## 2019-01-01 RX ADMIN — Medication 500 MG: at 08:14

## 2019-01-01 RX ADMIN — ACETAMINOPHEN 975 MG: 325 TABLET, FILM COATED ORAL at 21:54

## 2019-01-01 RX ADMIN — ONDANSETRON 4 MG: 2 INJECTION INTRAMUSCULAR; INTRAVENOUS at 17:03

## 2019-01-01 RX ADMIN — Medication 5 MG: at 15:36

## 2019-01-01 RX ADMIN — RANITIDINE 150 MG: 150 TABLET ORAL at 20:47

## 2019-01-01 RX ADMIN — DEXAMETHASONE SODIUM PHOSPHATE 4 MG: 4 INJECTION, SOLUTION INTRAMUSCULAR; INTRAVENOUS at 17:43

## 2019-01-01 RX ADMIN — RANITIDINE 150 MG: 150 TABLET ORAL at 20:48

## 2019-01-01 RX ADMIN — DEXAMETHASONE 3 MG: 1.5 TABLET ORAL at 05:39

## 2019-01-01 RX ADMIN — RANITIDINE 150 MG: 150 TABLET ORAL at 20:14

## 2019-01-01 RX ADMIN — Medication 5 ML: at 05:40

## 2019-01-01 RX ADMIN — DEXAMETHASONE 1 MG: 1 TABLET ORAL at 17:16

## 2019-01-01 RX ADMIN — AMLODIPINE BESYLATE 5 MG: 5 TABLET ORAL at 20:36

## 2019-01-01 RX ADMIN — DEXAMETHASONE 3 MG: 1.5 TABLET ORAL at 06:29

## 2019-01-01 RX ADMIN — PROPOFOL 30 MG: 10 INJECTION, EMULSION INTRAVENOUS at 17:24

## 2019-01-01 RX ADMIN — DEXAMETHASONE SODIUM PHOSPHATE 10 MG: 10 INJECTION, SOLUTION INTRAMUSCULAR; INTRAVENOUS at 11:43

## 2019-01-01 RX ADMIN — PANTOPRAZOLE SODIUM 40 MG: 40 TABLET, DELAYED RELEASE ORAL at 08:14

## 2019-01-01 RX ADMIN — RANITIDINE 150 MG: 150 TABLET ORAL at 07:56

## 2019-01-01 RX ADMIN — DEXAMETHASONE SODIUM PHOSPHATE 4 MG: 4 INJECTION, SOLUTION INTRAMUSCULAR; INTRAVENOUS at 12:20

## 2019-01-01 RX ADMIN — DOCETAXEL 140 MG: 80 INJECTION, SOLUTION, CONCENTRATE INTRAVENOUS at 14:19

## 2019-01-01 RX ADMIN — CALCIUM 500 MG: 500 TABLET ORAL at 18:14

## 2019-01-01 RX ADMIN — DEXAMETHASONE 4 MG: 4 TABLET ORAL at 06:58

## 2019-01-01 RX ADMIN — MELATONIN 25 MCG: at 19:38

## 2019-01-01 RX ADMIN — AMLODIPINE BESYLATE 10 MG: 10 TABLET ORAL at 08:15

## 2019-01-01 RX ADMIN — Medication 5 MG: at 12:37

## 2019-01-01 RX ADMIN — DEXAMETHASONE 2 MG: 2 TABLET ORAL at 06:04

## 2019-01-01 RX ADMIN — Medication 500 MG: at 08:09

## 2019-01-01 RX ADMIN — LEVETIRACETAM 1000 MG: 500 TABLET, FILM COATED ORAL at 04:32

## 2019-01-01 RX ADMIN — CEFAZOLIN 1 G: 1 INJECTION, POWDER, FOR SOLUTION INTRAMUSCULAR; INTRAVENOUS at 16:39

## 2019-01-01 RX ADMIN — GADOBUTROL 6 ML: 604.72 INJECTION INTRAVENOUS at 12:01

## 2019-01-01 RX ADMIN — SODIUM CHLORIDE: 9 INJECTION, SOLUTION INTRAVENOUS at 18:48

## 2019-01-01 RX ADMIN — ACETAMINOPHEN 650 MG: 325 TABLET ORAL at 00:24

## 2019-01-01 RX ADMIN — PANTOPRAZOLE SODIUM 40 MG: 40 TABLET, DELAYED RELEASE ORAL at 06:22

## 2019-01-01 RX ADMIN — ONDANSETRON 4 MG: 2 INJECTION INTRAMUSCULAR; INTRAVENOUS at 17:47

## 2019-01-01 RX ADMIN — PHENYLEPHRINE HYDROCHLORIDE 100 MCG: 10 INJECTION INTRAVENOUS at 16:04

## 2019-01-01 RX ADMIN — ACETAMINOPHEN 650 MG: 325 TABLET, FILM COATED ORAL at 02:30

## 2019-01-01 RX ADMIN — LABETALOL HYDROCHLORIDE 5 MG: 5 INJECTION INTRAVENOUS at 18:25

## 2019-01-01 RX ADMIN — MELATONIN 25 MCG: at 20:47

## 2019-01-01 RX ADMIN — PANTOPRAZOLE SODIUM 40 MG: 40 TABLET, DELAYED RELEASE ORAL at 08:08

## 2019-01-01 RX ADMIN — RANITIDINE 150 MG: 150 TABLET ORAL at 08:08

## 2019-01-01 RX ADMIN — LORAZEPAM 0.5 MG: 0.5 TABLET ORAL at 20:47

## 2019-01-01 RX ADMIN — IOPAMIDOL 75 ML: 755 INJECTION, SOLUTION INTRAVENOUS at 09:32

## 2019-01-01 RX ADMIN — SENNOSIDES AND DOCUSATE SODIUM 2 TABLET: 8.6; 5 TABLET ORAL at 08:27

## 2019-01-01 RX ADMIN — BENZOCAINE, MENTHOL 1 LOZENGE: 15; 3.6 LOZENGE ORAL at 20:43

## 2019-01-01 RX ADMIN — SODIUM CHLORIDE TAB 1 GM 1 G: 1 TAB at 18:44

## 2019-01-01 RX ADMIN — LORAZEPAM 0.5 MG: 0.5 TABLET ORAL at 21:32

## 2019-01-01 RX ADMIN — AMLODIPINE BESYLATE 5 MG: 5 TABLET ORAL at 19:38

## 2019-01-01 RX ADMIN — Medication 20000 UNITS: at 08:21

## 2019-01-01 RX ADMIN — ACETAMINOPHEN 650 MG: 325 TABLET, FILM COATED ORAL at 00:03

## 2019-01-01 RX ADMIN — DEXAMETHASONE 4 MG: 4 TABLET ORAL at 18:03

## 2019-01-01 RX ADMIN — CALCIUM 500 MG: 500 TABLET ORAL at 08:13

## 2019-01-01 RX ADMIN — CALCIUM 500 MG: 500 TABLET ORAL at 17:16

## 2019-01-01 RX ADMIN — FLUTICASONE PROPIONATE 2 SPRAY: 50 SPRAY, METERED NASAL at 07:55

## 2019-01-01 RX ADMIN — MELATONIN 1000 UNITS: at 19:46

## 2019-01-01 RX ADMIN — RANITIDINE 150 MG: 150 TABLET ORAL at 08:04

## 2019-01-01 RX ADMIN — SODIUM CHLORIDE TAB 1 GM 1 G: 1 TAB at 17:39

## 2019-01-01 RX ADMIN — PANTOPRAZOLE SODIUM 40 MG: 40 TABLET, DELAYED RELEASE ORAL at 06:27

## 2019-01-01 RX ADMIN — FLUTICASONE PROPIONATE 2 SPRAY: 50 SPRAY, METERED NASAL at 08:10

## 2019-01-01 RX ADMIN — AMLODIPINE BESYLATE 10 MG: 10 TABLET ORAL at 09:45

## 2019-01-01 RX ADMIN — Medication 5 MG: at 17:16

## 2019-01-01 RX ADMIN — MELATONIN 1000 UNITS: at 20:39

## 2019-01-01 RX ADMIN — GADOBUTROL 6 ML: 604.72 INJECTION INTRAVENOUS at 16:51

## 2019-01-01 RX ADMIN — FLUTICASONE PROPIONATE 2 SPRAY: 50 SPRAY, METERED NASAL at 08:05

## 2019-01-01 RX ADMIN — DEXAMETHASONE 4 MG: 4 TABLET ORAL at 22:03

## 2019-01-01 RX ADMIN — RANITIDINE 150 MG: 150 TABLET ORAL at 21:55

## 2019-01-01 RX ADMIN — POLYETHYLENE GLYCOL 3350 17 G: 17 POWDER, FOR SOLUTION ORAL at 08:10

## 2019-01-01 RX ADMIN — ACETAMINOPHEN 975 MG: 325 TABLET, FILM COATED ORAL at 20:55

## 2019-01-01 RX ADMIN — ZINC SULFATE CAP 220 MG (50 MG ELEMENTAL ZN) 220 MG: 220 (50 ZN) CAP at 08:47

## 2019-01-01 RX ADMIN — PANTOPRAZOLE SODIUM 40 MG: 40 TABLET, DELAYED RELEASE ORAL at 06:15

## 2019-01-01 RX ADMIN — POLYETHYLENE GLYCOL 3350 17 G: 17 POWDER, FOR SOLUTION ORAL at 20:47

## 2019-01-01 RX ADMIN — SODIUM CHLORIDE 250 ML: 9 INJECTION, SOLUTION INTRAVENOUS at 14:19

## 2019-01-01 RX ADMIN — LEVETIRACETAM 1000 MG: 500 TABLET, FILM COATED ORAL at 03:27

## 2019-01-01 RX ADMIN — SODIUM CHLORIDE: 234 INJECTION INTRAMUSCULAR; INTRAVENOUS; SUBCUTANEOUS at 07:21

## 2019-01-01 RX ADMIN — DEXAMETHASONE 4 MG: 4 TABLET ORAL at 17:48

## 2019-01-01 RX ADMIN — POLYETHYLENE GLYCOL 3350 17 G: 17 POWDER, FOR SOLUTION ORAL at 08:23

## 2019-01-01 RX ADMIN — PHENYLEPHRINE HYDROCHLORIDE 100 MCG: 10 INJECTION INTRAVENOUS at 16:52

## 2019-01-01 RX ADMIN — MELATONIN 1000 UNITS: at 22:27

## 2019-01-01 RX ADMIN — FLUTICASONE PROPIONATE 2 SPRAY: 50 SPRAY, METERED NASAL at 08:53

## 2019-01-01 RX ADMIN — BENZOCAINE, MENTHOL 1 LOZENGE: 15; 3.6 LOZENGE ORAL at 15:07

## 2019-01-01 RX ADMIN — Medication 2 G: at 04:52

## 2019-01-01 RX ADMIN — POTASSIUM PHOSPHATE, MONOBASIC AND POTASSIUM PHOSPHATE, DIBASIC 10 MMOL: 224; 236 INJECTION, SOLUTION INTRAVENOUS at 21:54

## 2019-01-01 RX ADMIN — DEXAMETHASONE 3 MG: 2 TABLET ORAL at 23:31

## 2019-01-01 RX ADMIN — ACETAMINOPHEN 650 MG: 325 TABLET, FILM COATED ORAL at 07:58

## 2019-01-01 RX ADMIN — CALCIUM 500 MG: 500 TABLET ORAL at 17:46

## 2019-01-01 RX ADMIN — Medication 500 MG: at 07:57

## 2019-01-01 RX ADMIN — DEXAMETHASONE 3 MG: 1.5 TABLET ORAL at 05:44

## 2019-01-01 RX ADMIN — PANTOPRAZOLE SODIUM 40 MG: 40 TABLET, DELAYED RELEASE ORAL at 06:16

## 2019-01-01 RX ADMIN — POTASSIUM PHOSPHATE, MONOBASIC 500 MG: 500 TABLET, SOLUBLE ORAL at 08:42

## 2019-01-01 RX ADMIN — DEXAMETHASONE 2 MG: 2 TABLET ORAL at 14:45

## 2019-01-01 RX ADMIN — CALCIUM 500 MG: 500 TABLET ORAL at 07:53

## 2019-01-01 RX ADMIN — Medication 500 MG: at 20:10

## 2019-01-01 RX ADMIN — SODIUM CHLORIDE: 234 INJECTION INTRAMUSCULAR; INTRAVENOUS; SUBCUTANEOUS at 14:50

## 2019-01-01 RX ADMIN — LIDOCAINE HYDROCHLORIDE 100 MG: 20 INJECTION, SOLUTION INFILTRATION; PERINEURAL at 15:59

## 2019-01-01 RX ADMIN — SUGAMMADEX 200 MG: 100 INJECTION, SOLUTION INTRAVENOUS at 18:13

## 2019-01-01 RX ADMIN — MANNITOL 70 G: 20 INJECTION, SOLUTION INTRAVENOUS at 16:16

## 2019-01-01 RX ADMIN — Medication 2 SPRAY: at 09:10

## 2019-01-01 RX ADMIN — POLYETHYLENE GLYCOL 3350 17 G: 17 POWDER, FOR SOLUTION ORAL at 20:31

## 2019-01-01 RX ADMIN — CALCIUM 500 MG: 500 TABLET ORAL at 19:28

## 2019-01-01 RX ADMIN — Medication 2 SPRAY: at 17:06

## 2019-01-01 RX ADMIN — PANTOPRAZOLE SODIUM 40 MG: 40 TABLET, DELAYED RELEASE ORAL at 06:13

## 2019-01-01 RX ADMIN — Medication 2 G: at 08:44

## 2019-01-01 RX ADMIN — DEXAMETHASONE 3 MG: 2 TABLET ORAL at 22:12

## 2019-01-01 RX ADMIN — RANITIDINE 150 MG: 150 TABLET ORAL at 08:44

## 2019-01-01 RX ADMIN — LIDOCAINE HYDROCHLORIDE 10 ML: 10 INJECTION, SOLUTION EPIDURAL; INFILTRATION; INTRACAUDAL; PERINEURAL at 10:56

## 2019-01-01 RX ADMIN — DEXAMETHASONE 3 MG: 1.5 TABLET ORAL at 14:10

## 2019-01-01 RX ADMIN — SODIUM CHLORIDE TAB 1 GM 2 G: 1 TAB at 07:56

## 2019-01-01 RX ADMIN — VITAMIN E CAP 100 UNIT 100 UNITS: 100 CAP at 07:45

## 2019-01-01 RX ADMIN — DEXAMETHASONE 4 MG: 4 TABLET ORAL at 14:16

## 2019-01-01 RX ADMIN — DEXAMETHASONE 3 MG: 1.5 TABLET ORAL at 06:36

## 2019-01-01 RX ADMIN — Medication 500 MG: at 19:46

## 2019-01-01 RX ADMIN — SODIUM CHLORIDE TAB 1 GM 1 G: 1 TAB at 08:21

## 2019-01-01 RX ADMIN — MELATONIN 25 MCG: at 08:38

## 2019-01-01 RX ADMIN — LORAZEPAM 0.5 MG: 0.5 TABLET ORAL at 19:24

## 2019-01-01 RX ADMIN — RANITIDINE 150 MG: 150 TABLET ORAL at 21:17

## 2019-01-01 RX ADMIN — POLYETHYLENE GLYCOL 3350 17 G: 17 POWDER, FOR SOLUTION ORAL at 13:17

## 2019-01-01 RX ADMIN — AMLODIPINE BESYLATE 10 MG: 10 TABLET ORAL at 09:08

## 2019-01-01 RX ADMIN — CHLORASEPTIC 1 ML: 1.5 LIQUID ORAL at 04:02

## 2019-01-01 RX ADMIN — ONDANSETRON HYDROCHLORIDE 8 MG: 4 TABLET, FILM COATED ORAL at 16:15

## 2019-01-01 RX ADMIN — MELATONIN 1000 UNITS: at 08:47

## 2019-01-01 RX ADMIN — PHENYLEPHRINE HYDROCHLORIDE 100 MCG: 10 INJECTION INTRAVENOUS at 13:38

## 2019-01-01 RX ADMIN — ACETAMINOPHEN 650 MG: 325 TABLET ORAL at 04:18

## 2019-01-01 RX ADMIN — ZINC SULFATE CAP 220 MG (50 MG ELEMENTAL ZN) 220 MG: 220 (50 ZN) CAP at 08:27

## 2019-01-01 RX ADMIN — MELATONIN 1000 UNITS: at 20:48

## 2019-01-01 RX ADMIN — MELATONIN 25 MCG: at 20:15

## 2019-01-01 RX ADMIN — PANTOPRAZOLE SODIUM 40 MG: 40 TABLET, DELAYED RELEASE ORAL at 06:36

## 2019-01-01 RX ADMIN — VANCOMYCIN HYDROCHLORIDE 1000 MG: 1 INJECTION, SOLUTION INTRAVENOUS at 16:12

## 2019-01-01 RX ADMIN — POLYETHYLENE GLYCOL 3350 17 G: 17 POWDER, FOR SOLUTION ORAL at 07:56

## 2019-01-01 RX ADMIN — AMLODIPINE BESYLATE 5 MG: 5 TABLET ORAL at 20:46

## 2019-01-01 RX ADMIN — PHENYLEPHRINE HYDROCHLORIDE 100 MCG: 10 INJECTION INTRAVENOUS at 17:42

## 2019-01-01 RX ADMIN — MELATONIN 25 MCG: at 07:59

## 2019-01-01 RX ADMIN — MELATONIN 1000 UNITS: at 21:04

## 2019-01-01 RX ADMIN — POLYETHYLENE GLYCOL 3350 17 G: 17 POWDER, FOR SOLUTION ORAL at 21:21

## 2019-01-01 RX ADMIN — CALCIUM 500 MG: 500 TABLET ORAL at 07:45

## 2019-01-01 RX ADMIN — PANTOPRAZOLE SODIUM 40 MG: 40 TABLET, DELAYED RELEASE ORAL at 08:20

## 2019-01-01 RX ADMIN — POTASSIUM PHOSPHATE, MONOBASIC 500 MG: 500 TABLET, SOLUBLE ORAL at 08:37

## 2019-01-01 RX ADMIN — Medication 2 SPRAY: at 08:07

## 2019-01-01 RX ADMIN — DEXAMETHASONE SODIUM PHOSPHATE 4 MG: 4 INJECTION, SOLUTION INTRAMUSCULAR; INTRAVENOUS at 07:24

## 2019-01-01 RX ADMIN — SODIUM CHLORIDE 250 ML: 9 INJECTION, SOLUTION INTRAVENOUS at 14:28

## 2019-01-01 RX ADMIN — CALCIUM 500 MG: 500 TABLET ORAL at 17:37

## 2019-01-01 RX ADMIN — FLUTICASONE PROPIONATE 2 SPRAY: 50 SPRAY, METERED NASAL at 08:29

## 2019-01-01 RX ADMIN — SODIUM CHLORIDE TAB 1 GM 1 G: 1 TAB at 11:25

## 2019-01-01 RX ADMIN — Medication 500 MG: at 08:27

## 2019-01-01 RX ADMIN — MELATONIN 1000 UNITS: at 21:01

## 2019-01-01 RX ADMIN — PANTOPRAZOLE SODIUM 40 MG: 40 TABLET, DELAYED RELEASE ORAL at 05:44

## 2019-01-01 RX ADMIN — RANITIDINE 150 MG: 150 TABLET ORAL at 08:10

## 2019-01-01 RX ADMIN — AMLODIPINE BESYLATE 10 MG: 10 TABLET ORAL at 08:29

## 2019-01-01 RX ADMIN — DEXAMETHASONE SODIUM PHOSPHATE 4 MG: 4 INJECTION, SOLUTION INTRAMUSCULAR; INTRAVENOUS at 23:37

## 2019-01-01 RX ADMIN — Medication 20000 UNITS: at 07:52

## 2019-01-01 RX ADMIN — MELATONIN 1000 UNITS: at 19:37

## 2019-01-01 RX ADMIN — PANTOPRAZOLE SODIUM 40 MG: 40 TABLET, DELAYED RELEASE ORAL at 06:04

## 2019-01-01 RX ADMIN — DEXAMETHASONE 4 MG: 4 TABLET ORAL at 06:23

## 2019-01-01 RX ADMIN — CALCIUM 500 MG: 500 TABLET ORAL at 09:08

## 2019-01-01 RX ADMIN — DEXAMETHASONE 3 MG: 2 TABLET ORAL at 13:22

## 2019-01-01 RX ADMIN — Medication 2 G: at 10:10

## 2019-01-01 RX ADMIN — Medication 5 ML: at 08:21

## 2019-01-01 RX ADMIN — PANTOPRAZOLE SODIUM 40 MG: 40 TABLET, DELAYED RELEASE ORAL at 06:30

## 2019-01-01 RX ADMIN — DEXAMETHASONE 2 MG: 2 TABLET ORAL at 21:21

## 2019-01-01 RX ADMIN — BENZOCAINE, MENTHOL 1 LOZENGE: 15; 3.6 LOZENGE ORAL at 21:01

## 2019-01-01 RX ADMIN — VITAMIN E CAP 100 UNIT 100 UNITS: 100 CAP at 09:08

## 2019-01-01 RX ADMIN — AMLODIPINE BESYLATE 10 MG: 10 TABLET ORAL at 08:02

## 2019-01-01 RX ADMIN — POTASSIUM PHOSPHATE, MONOBASIC 500 MG: 500 TABLET, SOLUBLE ORAL at 08:25

## 2019-01-01 RX ADMIN — POTASSIUM PHOSPHATE, MONOBASIC AND POTASSIUM PHOSPHATE, DIBASIC 10 MMOL: 224; 236 INJECTION, SOLUTION INTRAVENOUS at 06:30

## 2019-01-01 RX ADMIN — POLYETHYLENE GLYCOL 3350 17 G: 17 POWDER, FOR SOLUTION ORAL at 08:09

## 2019-01-01 RX ADMIN — RANITIDINE 150 MG: 150 TABLET ORAL at 00:06

## 2019-01-01 RX ADMIN — SODIUM CHLORIDE 200 MG: 9 INJECTION, SOLUTION INTRAVENOUS at 14:43

## 2019-01-01 RX ADMIN — CALCIUM 500 MG: 500 TABLET ORAL at 09:09

## 2019-01-01 RX ADMIN — Medication 2 SPRAY: at 19:27

## 2019-01-01 RX ADMIN — AMLODIPINE BESYLATE 5 MG: 5 TABLET ORAL at 20:18

## 2019-01-01 RX ADMIN — POTASSIUM PHOSPHATE, MONOBASIC 500 MG: 500 TABLET, SOLUBLE ORAL at 09:11

## 2019-01-01 RX ADMIN — PHENYLEPHRINE HYDROCHLORIDE 100 MCG: 10 INJECTION INTRAVENOUS at 16:11

## 2019-01-01 RX ADMIN — SODIUM CHLORIDE TAB 1 GM 2 G: 1 TAB at 12:05

## 2019-01-01 RX ADMIN — SODIUM CHLORIDE 200 MG: 900 INJECTION, SOLUTION INTRAVENOUS at 14:22

## 2019-01-01 RX ADMIN — Medication 5 MG: at 14:56

## 2019-01-01 RX ADMIN — Medication 500 MG: at 21:17

## 2019-01-01 RX ADMIN — AMLODIPINE BESYLATE 10 MG: 10 TABLET ORAL at 08:12

## 2019-01-01 RX ADMIN — AMLODIPINE BESYLATE 10 MG: 10 TABLET ORAL at 07:57

## 2019-01-01 RX ADMIN — BENZOCAINE, MENTHOL 1 LOZENGE: 15; 3.6 LOZENGE ORAL at 22:42

## 2019-01-01 RX ADMIN — Medication 20000 UNITS: at 08:02

## 2019-01-01 RX ADMIN — MELATONIN 1000 UNITS: at 08:29

## 2019-01-01 RX ADMIN — CALCIUM 500 MG: 500 TABLET ORAL at 09:37

## 2019-01-01 RX ADMIN — SODIUM CHLORIDE 1000 ML: 9 INJECTION, SOLUTION INTRAVENOUS at 18:40

## 2019-01-01 RX ADMIN — SODIUM CHLORIDE TAB 1 GM 1 G: 1 TAB at 08:03

## 2019-01-01 RX ADMIN — SODIUM CHLORIDE: 9 INJECTION, SOLUTION INTRAVENOUS at 12:50

## 2019-01-01 RX ADMIN — MELATONIN 25 MCG: at 19:27

## 2019-01-01 RX ADMIN — Medication 2 SPRAY: at 22:00

## 2019-01-01 RX ADMIN — AMLODIPINE BESYLATE 10 MG: 10 TABLET ORAL at 07:34

## 2019-01-01 RX ADMIN — MELATONIN 1000 UNITS: at 10:17

## 2019-01-01 RX ADMIN — DEXAMETHASONE 3 MG: 1.5 TABLET ORAL at 14:00

## 2019-01-01 RX ADMIN — CALCIUM 500 MG: 500 TABLET ORAL at 08:20

## 2019-01-01 RX ADMIN — Medication 500 MG: at 07:58

## 2019-01-01 RX ADMIN — POLYETHYLENE GLYCOL 3350 17 G: 17 POWDER, FOR SOLUTION ORAL at 08:15

## 2019-01-01 RX ADMIN — FENTANYL CITRATE 50 MCG: 50 INJECTION, SOLUTION INTRAMUSCULAR; INTRAVENOUS at 17:53

## 2019-01-01 RX ADMIN — AMLODIPINE BESYLATE 5 MG: 5 TABLET ORAL at 07:59

## 2019-01-01 RX ADMIN — Medication 2 SPRAY: at 15:06

## 2019-01-01 RX ADMIN — AMLODIPINE BESYLATE 10 MG: 10 TABLET ORAL at 08:44

## 2019-01-01 RX ADMIN — PANTOPRAZOLE SODIUM 40 MG: 40 TABLET, DELAYED RELEASE ORAL at 08:54

## 2019-01-01 RX ADMIN — RANITIDINE 150 MG: 150 TABLET ORAL at 19:26

## 2019-01-01 RX ADMIN — MELATONIN 25 MCG: at 20:09

## 2019-01-01 RX ADMIN — MELATONIN 1000 UNITS: at 21:16

## 2019-01-01 RX ADMIN — PANTOPRAZOLE SODIUM 40 MG: 40 TABLET, DELAYED RELEASE ORAL at 05:53

## 2019-01-01 RX ADMIN — SODIUM CHLORIDE TAB 1 GM 1 G: 1 TAB at 07:53

## 2019-01-01 RX ADMIN — Medication 5 MG: at 17:20

## 2019-01-01 RX ADMIN — PANTOPRAZOLE SODIUM 40 MG: 40 TABLET, DELAYED RELEASE ORAL at 08:47

## 2019-01-01 RX ADMIN — POTASSIUM CHLORIDE 20 MEQ: 1.5 POWDER, FOR SOLUTION ORAL at 05:06

## 2019-01-01 RX ADMIN — FLUTICASONE PROPIONATE 2 SPRAY: 50 SPRAY, METERED NASAL at 09:44

## 2019-01-01 RX ADMIN — AMLODIPINE BESYLATE 10 MG: 10 TABLET ORAL at 07:56

## 2019-01-01 RX ADMIN — Medication 5 MG: at 16:59

## 2019-01-01 RX ADMIN — PANTOPRAZOLE SODIUM 40 MG: 40 TABLET, DELAYED RELEASE ORAL at 08:23

## 2019-01-01 RX ADMIN — Medication 2 SPRAY: at 08:09

## 2019-01-01 RX ADMIN — DEXAMETHASONE SODIUM PHOSPHATE: 10 INJECTION, SOLUTION INTRAMUSCULAR; INTRAVENOUS at 13:33

## 2019-01-01 RX ADMIN — MELATONIN 1000 UNITS: at 21:17

## 2019-01-01 RX ADMIN — DEXAMETHASONE 4 MG: 4 TABLET ORAL at 05:30

## 2019-01-01 RX ADMIN — CALCIUM 500 MG: 500 TABLET ORAL at 08:33

## 2019-01-01 RX ADMIN — Medication 2 SPRAY: at 02:06

## 2019-01-01 RX ADMIN — DEXAMETHASONE 4 MG: 4 TABLET ORAL at 05:45

## 2019-01-01 RX ADMIN — RANITIDINE 150 MG: 150 TABLET ORAL at 22:28

## 2019-01-01 RX ADMIN — RANITIDINE 150 MG: 150 TABLET ORAL at 08:12

## 2019-01-01 RX ADMIN — RANITIDINE 150 MG: 150 TABLET ORAL at 08:14

## 2019-01-01 RX ADMIN — HYDROCHLOROTHIAZIDE 12.5 MG: 12.5 CAPSULE ORAL at 09:08

## 2019-01-01 RX ADMIN — CALCIUM 500 MG: 500 TABLET ORAL at 17:51

## 2019-01-01 RX ADMIN — SODIUM CHLORIDE TAB 1 GM 1 G: 1 TAB at 08:45

## 2019-01-01 RX ADMIN — BENZOCAINE, MENTHOL 1 LOZENGE: 15; 3.6 LOZENGE ORAL at 05:47

## 2019-01-01 RX ADMIN — SODIUM CHLORIDE 200 MG: 900 INJECTION, SOLUTION INTRAVENOUS at 14:19

## 2019-01-01 RX ADMIN — Medication 5 ML: at 10:15

## 2019-01-01 RX ADMIN — RANITIDINE 150 MG: 150 TABLET ORAL at 08:25

## 2019-01-01 RX ADMIN — LIDOCAINE HYDROCHLORIDE 50 MG: 20 INJECTION, SOLUTION INFILTRATION; PERINEURAL at 11:50

## 2019-01-01 RX ADMIN — PROPOFOL 120 MG: 10 INJECTION, EMULSION INTRAVENOUS at 11:50

## 2019-01-01 RX ADMIN — PHENYLEPHRINE HYDROCHLORIDE 100 MCG: 10 INJECTION INTRAVENOUS at 16:46

## 2019-01-01 RX ADMIN — DEXAMETHASONE 2 MG: 2 TABLET ORAL at 21:20

## 2019-01-01 RX ADMIN — PANTOPRAZOLE SODIUM 40 MG: 40 TABLET, DELAYED RELEASE ORAL at 06:35

## 2019-01-01 RX ADMIN — RANITIDINE 150 MG: 150 TABLET ORAL at 20:21

## 2019-01-01 RX ADMIN — HYDRALAZINE HYDROCHLORIDE 10 MG: 20 INJECTION INTRAMUSCULAR; INTRAVENOUS at 22:35

## 2019-01-01 RX ADMIN — PANTOPRAZOLE SODIUM 40 MG: 40 TABLET, DELAYED RELEASE ORAL at 09:09

## 2019-01-01 RX ADMIN — CALCIUM 500 MG: 500 TABLET ORAL at 17:47

## 2019-01-01 RX ADMIN — Medication 500 MG: at 07:56

## 2019-01-01 RX ADMIN — DEXAMETHASONE 3 MG: 2 TABLET ORAL at 05:47

## 2019-01-01 RX ADMIN — SODIUM CHLORIDE TAB 1 GM 1 G: 1 TAB at 10:13

## 2019-01-01 RX ADMIN — FENTANYL CITRATE 50 MCG: 50 INJECTION, SOLUTION INTRAMUSCULAR; INTRAVENOUS at 10:56

## 2019-01-01 RX ADMIN — MELATONIN 1000 UNITS: at 21:54

## 2019-01-01 RX ADMIN — MAGNESIUM SULFATE HEPTAHYDRATE 4 G: 40 INJECTION, SOLUTION INTRAVENOUS at 05:14

## 2019-01-01 RX ADMIN — LEVETIRACETAM 1000 MG: 500 TABLET, FILM COATED ORAL at 04:31

## 2019-01-01 RX ADMIN — ACETAMINOPHEN 650 MG: 325 TABLET ORAL at 21:27

## 2019-01-01 RX ADMIN — SODIUM CHLORIDE TAB 1 GM 2 G: 1 TAB at 17:13

## 2019-01-01 RX ADMIN — ACETAMINOPHEN 975 MG: 325 TABLET, FILM COATED ORAL at 05:05

## 2019-01-01 RX ADMIN — POTASSIUM PHOSPHATE, MONOBASIC 500 MG: 500 TABLET, SOLUBLE ORAL at 07:34

## 2019-01-01 RX ADMIN — Medication 10 ML: at 08:33

## 2019-01-01 RX ADMIN — VITAMIN E CAP 100 UNIT 100 UNITS: 100 CAP at 08:05

## 2019-01-01 RX ADMIN — ONDANSETRON HYDROCHLORIDE 8 MG: 4 TABLET, FILM COATED ORAL at 20:49

## 2019-01-01 RX ADMIN — AMLODIPINE BESYLATE 10 MG: 10 TABLET ORAL at 08:57

## 2019-01-01 RX ADMIN — POLYETHYLENE GLYCOL 3350 17 G: 17 POWDER, FOR SOLUTION ORAL at 08:54

## 2019-01-01 RX ADMIN — SODIUM CHLORIDE 1000 ML: 9 INJECTION, SOLUTION INTRAVENOUS at 14:00

## 2019-01-01 RX ADMIN — MELATONIN 1000 UNITS: at 21:55

## 2019-01-01 RX ADMIN — Medication 2 SPRAY: at 14:44

## 2019-01-01 RX ADMIN — CALCIUM 500 MG: 500 TABLET ORAL at 17:40

## 2019-01-01 RX ADMIN — HYDRALAZINE HYDROCHLORIDE 10 MG: 20 INJECTION INTRAMUSCULAR; INTRAVENOUS at 21:48

## 2019-01-01 RX ADMIN — VITAMIN E CAP 100 UNIT 100 UNITS: 100 CAP at 08:27

## 2019-01-01 RX ADMIN — OMEPRAZOLE 20 MG: 20 CAPSULE, DELAYED RELEASE ORAL at 20:23

## 2019-01-01 RX ADMIN — PANTOPRAZOLE SODIUM 40 MG: 40 TABLET, DELAYED RELEASE ORAL at 07:57

## 2019-01-01 RX ADMIN — SODIUM CHLORIDE TAB 1 GM 1 G: 1 TAB at 18:05

## 2019-01-01 RX ADMIN — Medication 2 SPRAY: at 08:40

## 2019-01-01 RX ADMIN — PROCHLORPERAZINE MALEATE 5 MG: 5 TABLET, FILM COATED ORAL at 19:58

## 2019-01-01 RX ADMIN — PANTOPRAZOLE SODIUM 40 MG: 40 TABLET, DELAYED RELEASE ORAL at 06:48

## 2019-01-01 RX ADMIN — SODIUM CHLORIDE TAB 1 GM 4 G: 1 TAB at 13:14

## 2019-01-01 RX ADMIN — DEXAMETHASONE 4 MG: 4 TABLET ORAL at 12:31

## 2019-01-01 RX ADMIN — DEXAMETHASONE SODIUM PHOSPHATE 4 MG: 4 INJECTION, SOLUTION INTRAMUSCULAR; INTRAVENOUS at 23:03

## 2019-01-01 RX ADMIN — SENNOSIDES AND DOCUSATE SODIUM 2 TABLET: 8.6; 5 TABLET ORAL at 19:46

## 2019-01-01 RX ADMIN — SODIUM CHLORIDE: 234 INJECTION INTRAMUSCULAR; INTRAVENOUS; SUBCUTANEOUS at 06:57

## 2019-01-01 RX ADMIN — BENZOCAINE, MENTHOL 1 LOZENGE: 15; 3.6 LOZENGE ORAL at 00:31

## 2019-01-01 RX ADMIN — POTASSIUM PHOSPHATE, MONOBASIC AND POTASSIUM PHOSPHATE, DIBASIC 10 MMOL: 224; 236 INJECTION, SOLUTION INTRAVENOUS at 07:30

## 2019-01-01 RX ADMIN — Medication 500 MG: at 19:26

## 2019-01-01 RX ADMIN — Medication 2 SPRAY: at 08:22

## 2019-01-01 RX ADMIN — SODIUM CHLORIDE TAB 1 GM 1 G: 1 TAB at 18:00

## 2019-01-01 RX ADMIN — LIDOCAINE: 40 CREAM TOPICAL at 21:36

## 2019-01-01 RX ADMIN — AMLODIPINE BESYLATE 10 MG: 10 TABLET ORAL at 07:59

## 2019-01-01 RX ADMIN — GADOBUTROL 7.5 ML: 604.72 INJECTION INTRAVENOUS at 20:54

## 2019-01-01 RX ADMIN — AMLODIPINE BESYLATE 10 MG: 10 TABLET ORAL at 08:47

## 2019-01-01 RX ADMIN — DOCUSATE SODIUM 100 MG: 100 CAPSULE, LIQUID FILLED ORAL at 08:33

## 2019-01-01 RX ADMIN — SODIUM CHLORIDE 250 ML: 9 INJECTION, SOLUTION INTRAVENOUS at 13:40

## 2019-01-01 RX ADMIN — PHENYLEPHRINE HYDROCHLORIDE 100 MCG: 10 INJECTION INTRAVENOUS at 15:06

## 2019-01-01 RX ADMIN — Medication 5 ML: at 12:48

## 2019-01-01 RX ADMIN — PHENYLEPHRINE HYDROCHLORIDE 100 MCG: 10 INJECTION INTRAVENOUS at 16:29

## 2019-01-01 RX ADMIN — ZINC SULFATE CAP 220 MG (50 MG ELEMENTAL ZN) 220 MG: 220 (50 ZN) CAP at 07:58

## 2019-01-01 RX ADMIN — PHENYLEPHRINE HYDROCHLORIDE 100 MCG: 10 INJECTION INTRAVENOUS at 17:21

## 2019-01-01 RX ADMIN — PANTOPRAZOLE SODIUM 40 MG: 40 TABLET, DELAYED RELEASE ORAL at 05:48

## 2019-01-01 RX ADMIN — ONDANSETRON HYDROCHLORIDE 8 MG: 4 TABLET, FILM COATED ORAL at 15:58

## 2019-01-01 RX ADMIN — DOCUSATE SODIUM 100 MG: 100 CAPSULE, LIQUID FILLED ORAL at 08:15

## 2019-01-01 RX ADMIN — FENTANYL CITRATE 150 MCG: 50 INJECTION, SOLUTION INTRAMUSCULAR; INTRAVENOUS at 15:59

## 2019-01-01 RX ADMIN — MAGNESIUM SULFATE HEPTAHYDRATE 4 G: 40 INJECTION, SOLUTION INTRAVENOUS at 04:50

## 2019-01-01 RX ADMIN — MELATONIN 1000 UNITS: at 09:09

## 2019-01-01 RX ADMIN — RANITIDINE 150 MG: 150 TABLET ORAL at 21:04

## 2019-01-01 RX ADMIN — Medication 20000 UNITS: at 08:45

## 2019-01-01 RX ADMIN — CALCIUM 500 MG: 500 TABLET ORAL at 17:54

## 2019-01-01 RX ADMIN — CALCIUM 500 MG: 500 TABLET ORAL at 08:14

## 2019-01-01 RX ADMIN — PROPOFOL 40 MG: 10 INJECTION, EMULSION INTRAVENOUS at 17:54

## 2019-01-01 RX ADMIN — SODIUM CHLORIDE TAB 1 GM 1 G: 1 TAB at 17:59

## 2019-01-01 RX ADMIN — SODIUM CHLORIDE TAB 1 GM 1 G: 1 TAB at 08:25

## 2019-01-01 RX ADMIN — Medication 5 ML: at 12:47

## 2019-01-01 RX ADMIN — SODIUM CHLORIDE TAB 1 GM 1 G: 1 TAB at 19:29

## 2019-01-01 RX ADMIN — CALCIUM 500 MG: 500 TABLET ORAL at 08:02

## 2019-01-01 RX ADMIN — MELATONIN 1000 UNITS: at 07:56

## 2019-01-01 RX ADMIN — CALCIUM 500 MG: 500 TABLET ORAL at 18:05

## 2019-01-01 RX ADMIN — DEXAMETHASONE SODIUM PHOSPHATE 4 MG: 4 INJECTION, SOLUTION INTRA-ARTICULAR; INTRALESIONAL; INTRAMUSCULAR; INTRAVENOUS; SOFT TISSUE at 19:42

## 2019-01-01 RX ADMIN — DEXAMETHASONE 3 MG: 1.5 TABLET ORAL at 21:24

## 2019-01-01 RX ADMIN — MELATONIN 25 MCG: at 21:24

## 2019-01-01 RX ADMIN — RANITIDINE 150 MG: 150 TABLET ORAL at 21:02

## 2019-01-01 RX ADMIN — AMLODIPINE BESYLATE 5 MG: 5 TABLET ORAL at 21:25

## 2019-01-01 RX ADMIN — CALCIUM 500 MG: 500 TABLET ORAL at 08:05

## 2019-01-01 RX ADMIN — MELATONIN 25 MCG: at 08:17

## 2019-01-01 RX ADMIN — PANTOPRAZOLE SODIUM 40 MG: 40 TABLET, DELAYED RELEASE ORAL at 05:39

## 2019-01-01 RX ADMIN — MELATONIN 1000 UNITS: at 08:42

## 2019-01-01 RX ADMIN — Medication 5 ML: at 09:11

## 2019-01-01 RX ADMIN — MELATONIN 25 MCG: at 09:09

## 2019-01-01 RX ADMIN — DOCUSATE SODIUM 100 MG: 100 CAPSULE, LIQUID FILLED ORAL at 13:25

## 2019-01-01 RX ADMIN — CALCIUM 500 MG: 500 TABLET ORAL at 07:56

## 2019-01-01 RX ADMIN — Medication 5 ML: at 09:40

## 2019-01-01 RX ADMIN — SODIUM CHLORIDE TAB 1 GM 1 G: 1 TAB at 13:19

## 2019-01-01 RX ADMIN — CALCIUM 500 MG: 500 TABLET ORAL at 08:23

## 2019-01-01 RX ADMIN — FLUTICASONE PROPIONATE 2 SPRAY: 50 SPRAY, METERED NASAL at 08:14

## 2019-01-01 RX ADMIN — DOCUSATE SODIUM 100 MG: 100 CAPSULE, LIQUID FILLED ORAL at 09:09

## 2019-01-01 RX ADMIN — DEXAMETHASONE SODIUM PHOSPHATE 4 MG: 4 INJECTION, SOLUTION INTRAMUSCULAR; INTRAVENOUS at 06:17

## 2019-01-01 RX ADMIN — ACETAMINOPHEN 975 MG: 325 TABLET, FILM COATED ORAL at 13:02

## 2019-01-01 RX ADMIN — CALCIUM 500 MG: 500 TABLET ORAL at 08:27

## 2019-01-01 RX ADMIN — SODIUM CHLORIDE TAB 1 GM 1 G: 1 TAB at 08:10

## 2019-01-01 RX ADMIN — SODIUM CHLORIDE TAB 1 GM 1 G: 1 TAB at 14:26

## 2019-01-01 RX ADMIN — ACETAMINOPHEN 975 MG: 325 TABLET, FILM COATED ORAL at 14:26

## 2019-01-01 RX ADMIN — MIDAZOLAM 2 MG: 1 INJECTION INTRAMUSCULAR; INTRAVENOUS at 10:57

## 2019-01-01 RX ADMIN — MELATONIN 1000 UNITS: at 21:32

## 2019-01-01 RX ADMIN — Medication 500 MG: at 21:55

## 2019-01-01 RX ADMIN — Medication 2 SPRAY: at 07:45

## 2019-01-01 RX ADMIN — DEXAMETHASONE 1 MG: 1 TABLET ORAL at 08:11

## 2019-01-01 RX ADMIN — CALCIUM 500 MG: 500 TABLET ORAL at 07:58

## 2019-01-01 RX ADMIN — RANITIDINE 150 MG: 150 TABLET ORAL at 10:13

## 2019-01-01 RX ADMIN — DEXAMETHASONE 4 MG: 2 TABLET ORAL at 14:27

## 2019-01-01 RX ADMIN — CEFAZOLIN SODIUM 2 G: 2 INJECTION, SOLUTION INTRAVENOUS at 10:56

## 2019-01-01 RX ADMIN — SODIUM CHLORIDE TAB 1 GM 1 G: 1 TAB at 18:53

## 2019-01-01 RX ADMIN — CALCIUM 500 MG: 500 TABLET ORAL at 18:44

## 2019-01-01 RX ADMIN — VITAMIN E CAP 100 UNIT 100 UNITS: 100 CAP at 08:22

## 2019-01-01 RX ADMIN — Medication 20000 UNITS: at 07:57

## 2019-01-01 RX ADMIN — Medication 1 G: at 09:39

## 2019-01-01 RX ADMIN — DEXAMETHASONE SODIUM PHOSPHATE 4 MG: 4 INJECTION, SOLUTION INTRAMUSCULAR; INTRAVENOUS at 13:54

## 2019-01-01 RX ADMIN — DEXAMETHASONE 3 MG: 1.5 TABLET ORAL at 14:40

## 2019-01-01 RX ADMIN — CALCIUM 500 MG: 500 TABLET ORAL at 17:26

## 2019-01-01 RX ADMIN — FLUTICASONE PROPIONATE 2 SPRAY: 50 SPRAY, METERED NASAL at 15:10

## 2019-01-01 RX ADMIN — MELATONIN 1000 UNITS: at 08:00

## 2019-01-01 RX ADMIN — DEXAMETHASONE 4 MG: 4 TABLET ORAL at 05:50

## 2019-01-01 RX ADMIN — SODIUM CHLORIDE 1000 ML: 9 INJECTION, SOLUTION INTRAVENOUS at 11:37

## 2019-01-01 RX ADMIN — Medication 2 SPRAY: at 12:47

## 2019-01-01 RX ADMIN — SENNOSIDES AND DOCUSATE SODIUM 2 TABLET: 8.6; 5 TABLET ORAL at 19:37

## 2019-01-01 RX ADMIN — AMLODIPINE BESYLATE 5 MG: 5 TABLET ORAL at 09:27

## 2019-01-01 RX ADMIN — HYDROCHLOROTHIAZIDE 12.5 MG: 12.5 CAPSULE ORAL at 07:44

## 2019-01-01 RX ADMIN — Medication 2 SPRAY: at 08:44

## 2019-01-01 RX ADMIN — BENZOCAINE, MENTHOL 1 LOZENGE: 15; 3.6 LOZENGE ORAL at 02:24

## 2019-01-01 RX ADMIN — CALCIUM 500 MG: 500 TABLET ORAL at 07:59

## 2019-01-01 RX ADMIN — FLUTICASONE PROPIONATE 2 SPRAY: 50 SPRAY, METERED NASAL at 09:09

## 2019-01-01 RX ADMIN — Medication 2 SPRAY: at 16:34

## 2019-01-01 RX ADMIN — ZINC SULFATE CAP 220 MG (50 MG ELEMENTAL ZN) 220 MG: 220 (50 ZN) CAP at 07:53

## 2019-01-01 RX ADMIN — SODIUM CHLORIDE TAB 1 GM 4 G: 1 TAB at 08:14

## 2019-01-01 RX ADMIN — BENZOCAINE, MENTHOL 1 LOZENGE: 15; 3.6 LOZENGE ORAL at 09:34

## 2019-01-01 RX ADMIN — MELATONIN 1000 UNITS: at 07:58

## 2019-01-01 RX ADMIN — SODIUM CHLORIDE TAB 1 GM 1 G: 1 TAB at 08:33

## 2019-01-01 RX ADMIN — PHENYLEPHRINE HYDROCHLORIDE 100 MCG: 10 INJECTION INTRAVENOUS at 13:33

## 2019-01-01 RX ADMIN — LEVETIRACETAM 1000 MG: 10 INJECTION INTRAVENOUS at 16:07

## 2019-01-01 RX ADMIN — CALCIUM 500 MG: 500 TABLET ORAL at 08:09

## 2019-01-01 RX ADMIN — HYDROXYZINE HYDROCHLORIDE 25 MG: 25 TABLET ORAL at 21:29

## 2019-01-01 RX ADMIN — Medication 5 ML: at 10:58

## 2019-01-01 RX ADMIN — POTASSIUM PHOSPHATE, MONOBASIC 500 MG: 500 TABLET, SOLUBLE ORAL at 08:44

## 2019-01-01 RX ADMIN — CALCIUM 500 MG: 500 TABLET ORAL at 17:59

## 2019-01-01 RX ADMIN — BENZOCAINE, MENTHOL 1 LOZENGE: 15; 3.6 LOZENGE ORAL at 07:26

## 2019-01-01 RX ADMIN — RANITIDINE 150 MG: 150 TABLET ORAL at 20:03

## 2019-01-01 RX ADMIN — VITAMIN E CAP 100 UNIT 100 UNITS: 100 CAP at 07:56

## 2019-01-01 RX ADMIN — MELATONIN 25 MCG: at 20:46

## 2019-01-01 RX ADMIN — BENZOCAINE, MENTHOL 1 LOZENGE: 15; 3.6 LOZENGE ORAL at 06:10

## 2019-01-01 RX ADMIN — SODIUM CHLORIDE TAB 1 GM 1 G: 1 TAB at 09:46

## 2019-01-01 RX ADMIN — CALCIUM 500 MG: 500 TABLET ORAL at 08:25

## 2019-01-01 RX ADMIN — FLUTICASONE PROPIONATE 2 SPRAY: 50 SPRAY, METERED NASAL at 08:41

## 2019-01-01 RX ADMIN — CALCIUM 500 MG: 500 TABLET ORAL at 10:13

## 2019-01-01 RX ADMIN — SODIUM CHLORIDE TAB 1 GM 1 G: 1 TAB at 17:46

## 2019-01-01 RX ADMIN — CALCIUM 500 MG: 500 TABLET ORAL at 18:23

## 2019-01-01 RX ADMIN — SODIUM CHLORIDE, POTASSIUM CHLORIDE, SODIUM LACTATE AND CALCIUM CHLORIDE: 600; 310; 30; 20 INJECTION, SOLUTION INTRAVENOUS at 11:40

## 2019-01-01 RX ADMIN — FLUTICASONE PROPIONATE 2 SPRAY: 50 SPRAY, METERED NASAL at 08:21

## 2019-01-01 RX ADMIN — CHLORASEPTIC 1 ML: 1.5 LIQUID ORAL at 01:05

## 2019-01-01 RX ADMIN — LORAZEPAM 0.5 MG: 0.5 TABLET ORAL at 22:28

## 2019-01-01 RX ADMIN — SODIUM CHLORIDE TAB 1 GM 4 G: 1 TAB at 18:03

## 2019-01-01 RX ADMIN — SODIUM CHLORIDE TAB 1 GM 1 G: 1 TAB at 07:59

## 2019-01-01 RX ADMIN — CALCIUM 500 MG: 500 TABLET ORAL at 18:25

## 2019-01-01 RX ADMIN — DEXAMETHASONE 3 MG: 1.5 TABLET ORAL at 21:59

## 2019-01-01 RX ADMIN — CEFAZOLIN 1 G: 1 INJECTION, POWDER, FOR SOLUTION INTRAMUSCULAR; INTRAVENOUS at 14:50

## 2019-01-01 RX ADMIN — DEXAMETHASONE 4 MG: 4 TABLET ORAL at 06:09

## 2019-01-01 RX ADMIN — Medication 500 MG: at 19:28

## 2019-01-01 RX ADMIN — PHENYLEPHRINE HYDROCHLORIDE 200 MCG: 10 INJECTION INTRAVENOUS at 16:58

## 2019-01-01 RX ADMIN — Medication 2 SPRAY: at 10:11

## 2019-01-01 RX ADMIN — POTASSIUM PHOSPHATE, MONOBASIC 500 MG: 500 TABLET, SOLUBLE ORAL at 10:12

## 2019-01-01 RX ADMIN — CALCIUM 500 MG: 500 TABLET ORAL at 08:29

## 2019-01-01 RX ADMIN — MELATONIN 1000 UNITS: at 08:13

## 2019-01-01 RX ADMIN — RANITIDINE 150 MG: 150 TABLET ORAL at 05:47

## 2019-01-01 RX ADMIN — CALCIUM 500 MG: 500 TABLET ORAL at 08:54

## 2019-01-01 RX ADMIN — SUGAMMADEX 200 MG: 100 INJECTION, SOLUTION INTRAVENOUS at 17:26

## 2019-01-01 RX ADMIN — Medication 5 MG: at 17:45

## 2019-01-01 RX ADMIN — SODIUM CHLORIDE 250 ML: 9 INJECTION, SOLUTION INTRAVENOUS at 14:43

## 2019-01-01 RX ADMIN — Medication 2 SPRAY: at 09:04

## 2019-01-01 RX ADMIN — VITAMIN E CAP 100 UNIT 100 UNITS: 100 CAP at 09:38

## 2019-01-01 RX ADMIN — DEXAMETHASONE SODIUM PHOSPHATE 4 MG: 4 INJECTION, SOLUTION INTRAMUSCULAR; INTRAVENOUS at 00:29

## 2019-01-01 RX ADMIN — DEXAMETHASONE 3 MG: 1.5 TABLET ORAL at 06:16

## 2019-01-01 RX ADMIN — DEXAMETHASONE 4 MG: 4 TABLET ORAL at 14:07

## 2019-01-01 RX ADMIN — POTASSIUM PHOSPHATE, MONOBASIC AND POTASSIUM PHOSPHATE, DIBASIC 15 MMOL: 224; 236 INJECTION, SOLUTION INTRAVENOUS at 05:24

## 2019-01-01 RX ADMIN — FLUTICASONE PROPIONATE 2 SPRAY: 50 SPRAY, METERED NASAL at 08:13

## 2019-01-01 RX ADMIN — POLYETHYLENE GLYCOL 3350 17 G: 17 POWDER, FOR SOLUTION ORAL at 08:27

## 2019-01-01 RX ADMIN — PANTOPRAZOLE SODIUM 40 MG: 40 TABLET, DELAYED RELEASE ORAL at 05:47

## 2019-01-01 RX ADMIN — SODIUM CHLORIDE: 9 INJECTION, SOLUTION INTRAVENOUS at 19:28

## 2019-01-01 RX ADMIN — ROCURONIUM BROMIDE 50 MG: 10 INJECTION INTRAVENOUS at 15:59

## 2019-01-01 RX ADMIN — ACETAMINOPHEN 975 MG: 325 TABLET, FILM COATED ORAL at 05:30

## 2019-01-01 RX ADMIN — VITAMIN E CAP 100 UNIT 100 UNITS: 100 CAP at 08:02

## 2019-01-01 RX ADMIN — FENTANYL CITRATE 50 MCG: 50 INJECTION, SOLUTION INTRAMUSCULAR; INTRAVENOUS at 12:27

## 2019-01-01 RX ADMIN — PANTOPRAZOLE SODIUM 40 MG: 40 TABLET, DELAYED RELEASE ORAL at 08:13

## 2019-01-01 RX ADMIN — LEVETIRACETAM 1000 MG: 500 TABLET, FILM COATED ORAL at 05:45

## 2019-01-01 RX ADMIN — HYDROCHLOROTHIAZIDE 12.5 MG: 12.5 CAPSULE ORAL at 10:09

## 2019-01-01 RX ADMIN — DEXAMETHASONE 4 MG: 4 TABLET ORAL at 21:17

## 2019-01-01 RX ADMIN — DEXAMETHASONE 4 MG: 4 TABLET ORAL at 13:27

## 2019-01-01 RX ADMIN — SODIUM CHLORIDE, PRESERVATIVE FREE 5 ML: 5 INJECTION INTRAVENOUS at 11:23

## 2019-01-01 RX ADMIN — MELATONIN 1000 UNITS: at 20:43

## 2019-01-01 RX ADMIN — DEXAMETHASONE 4 MG: 2 TABLET ORAL at 06:38

## 2019-01-01 RX ADMIN — FLUTICASONE PROPIONATE 2 SPRAY: 50 SPRAY, METERED NASAL at 08:04

## 2019-01-01 RX ADMIN — CALCIUM 500 MG: 500 TABLET ORAL at 07:44

## 2019-01-01 RX ADMIN — CALCIUM 500 MG: 500 TABLET ORAL at 17:10

## 2019-01-01 RX ADMIN — RANITIDINE 150 MG: 150 TABLET ORAL at 07:58

## 2019-01-01 RX ADMIN — DEXAMETHASONE 4 MG: 4 TABLET ORAL at 05:11

## 2019-01-01 RX ADMIN — SODIUM CHLORIDE TAB 1 GM 1 G: 1 TAB at 14:17

## 2019-01-01 RX ADMIN — PANTOPRAZOLE SODIUM 40 MG: 40 TABLET, DELAYED RELEASE ORAL at 06:10

## 2019-01-01 RX ADMIN — DEXAMETHASONE SODIUM PHOSPHATE 10 MG: 4 INJECTION, SOLUTION INTRA-ARTICULAR; INTRALESIONAL; INTRAMUSCULAR; INTRAVENOUS; SOFT TISSUE at 16:26

## 2019-01-01 RX ADMIN — MELATONIN 25 MCG: at 21:25

## 2019-01-01 RX ADMIN — DEXAMETHASONE 3 MG: 1.5 TABLET ORAL at 05:48

## 2019-01-01 RX ADMIN — DEXAMETHASONE 4 MG: 2 TABLET ORAL at 21:24

## 2019-01-01 RX ADMIN — DOCETAXEL 140 MG: 80 INJECTION, SOLUTION, CONCENTRATE INTRAVENOUS at 15:51

## 2019-01-01 RX ADMIN — MELATONIN 1000 UNITS: at 07:57

## 2019-01-01 RX ADMIN — PHENYLEPHRINE HYDROCHLORIDE 200 MCG: 10 INJECTION INTRAVENOUS at 16:35

## 2019-01-01 RX ADMIN — ZINC SULFATE CAP 220 MG (50 MG ELEMENTAL ZN) 220 MG: 220 (50 ZN) CAP at 07:56

## 2019-01-01 RX ADMIN — MELATONIN 1000 UNITS: at 20:14

## 2019-01-01 RX ADMIN — DEXAMETHASONE 3 MG: 1.5 TABLET ORAL at 20:47

## 2019-01-01 RX ADMIN — MELATONIN 25 MCG: at 07:53

## 2019-01-01 RX ADMIN — Medication 2 SPRAY: at 06:10

## 2019-01-01 RX ADMIN — PANTOPRAZOLE SODIUM 40 MG: 40 TABLET, DELAYED RELEASE ORAL at 08:15

## 2019-01-01 RX ADMIN — RANITIDINE 150 MG: 150 TABLET ORAL at 19:55

## 2019-01-01 RX ADMIN — FLUTICASONE PROPIONATE 2 SPRAY: 50 SPRAY, METERED NASAL at 08:09

## 2019-01-01 RX ADMIN — Medication 2 SPRAY: at 15:07

## 2019-01-01 RX ADMIN — SODIUM CHLORIDE: 234 INJECTION INTRAMUSCULAR; INTRAVENOUS; SUBCUTANEOUS at 11:09

## 2019-01-01 RX ADMIN — MELATONIN 1000 UNITS: at 20:47

## 2019-01-01 RX ADMIN — VITAMIN E CAP 100 UNIT 100 UNITS: 100 CAP at 08:38

## 2019-01-01 RX ADMIN — MELATONIN 1000 UNITS: at 19:26

## 2019-01-01 RX ADMIN — OMEPRAZOLE 20 MG: 20 CAPSULE, DELAYED RELEASE ORAL at 19:42

## 2019-01-01 RX ADMIN — Medication 5 ML: at 06:00

## 2019-01-01 RX ADMIN — SODIUM CHLORIDE TAB 1 GM 1 G: 1 TAB at 08:54

## 2019-01-01 RX ADMIN — LORAZEPAM 0.5 MG: 0.5 TABLET ORAL at 21:22

## 2019-01-01 RX ADMIN — Medication 1 G: at 03:38

## 2019-01-01 RX ADMIN — POLYETHYLENE GLYCOL 3350 17 G: 17 POWDER, FOR SOLUTION ORAL at 07:35

## 2019-01-01 RX ADMIN — AMLODIPINE BESYLATE 10 MG: 10 TABLET ORAL at 08:25

## 2019-01-01 RX ADMIN — ZINC SULFATE CAP 220 MG (50 MG ELEMENTAL ZN) 220 MG: 220 (50 ZN) CAP at 07:57

## 2019-01-01 RX ADMIN — SODIUM CHLORIDE: 9 INJECTION, SOLUTION INTRAVENOUS at 17:34

## 2019-01-01 RX ADMIN — AMLODIPINE BESYLATE 5 MG: 5 TABLET ORAL at 08:33

## 2019-01-01 RX ADMIN — PANTOPRAZOLE SODIUM 40 MG: 40 TABLET, DELAYED RELEASE ORAL at 08:02

## 2019-01-01 RX ADMIN — PHENYLEPHRINE HYDROCHLORIDE 100 MCG: 10 INJECTION INTRAVENOUS at 14:13

## 2019-01-01 RX ADMIN — RANITIDINE 150 MG: 150 TABLET ORAL at 19:42

## 2019-01-01 RX ADMIN — ACETAMINOPHEN 650 MG: 325 TABLET ORAL at 04:24

## 2019-01-01 RX ADMIN — CALCIUM 500 MG: 500 TABLET ORAL at 17:14

## 2019-01-01 RX ADMIN — OMEPRAZOLE 20 MG: 20 CAPSULE, DELAYED RELEASE ORAL at 08:10

## 2019-01-01 RX ADMIN — SODIUM CHLORIDE TAB 1 GM 3 G: 1 TAB at 08:02

## 2019-01-01 RX ADMIN — SODIUM CHLORIDE TAB 1 GM 1 G: 1 TAB at 14:27

## 2019-01-01 RX ADMIN — CEFAZOLIN SODIUM 2 G: 2 INJECTION, SOLUTION INTRAVENOUS at 12:50

## 2019-01-01 RX ADMIN — AMLODIPINE BESYLATE 10 MG: 10 TABLET ORAL at 09:10

## 2019-01-01 RX ADMIN — BENZOCAINE, MENTHOL 1 LOZENGE: 15; 3.6 LOZENGE ORAL at 06:15

## 2019-01-01 RX ADMIN — SODIUM CHLORIDE TAB 1 GM 1 G: 1 TAB at 07:57

## 2019-01-01 RX ADMIN — MELATONIN 1000 UNITS: at 07:34

## 2019-01-01 RX ADMIN — DEXAMETHASONE 2 MG: 2 TABLET ORAL at 20:23

## 2019-01-01 RX ADMIN — ONDANSETRON 8 MG: 4 TABLET, ORALLY DISINTEGRATING ORAL at 18:05

## 2019-01-01 RX ADMIN — PROPOFOL 50 MG: 10 INJECTION, EMULSION INTRAVENOUS at 15:59

## 2019-01-01 RX ADMIN — DEXAMETHASONE 4 MG: 4 TABLET ORAL at 13:32

## 2019-01-01 RX ADMIN — SODIUM CHLORIDE TAB 1 GM 1 G: 1 TAB at 17:31

## 2019-01-01 RX ADMIN — CALCIUM 500 MG: 500 TABLET ORAL at 18:00

## 2019-01-01 RX ADMIN — CALCIUM 500 MG: 500 TABLET ORAL at 08:44

## 2019-01-01 RX ADMIN — GENTAMICIN SULFATE 80 MG: 80 INJECTION, SOLUTION INTRAVENOUS at 16:09

## 2019-01-01 RX ADMIN — PHENYLEPHRINE HYDROCHLORIDE 100 MCG: 10 INJECTION INTRAVENOUS at 14:30

## 2019-01-01 RX ADMIN — DEXAMETHASONE 3 MG: 2 TABLET ORAL at 13:33

## 2019-01-01 RX ADMIN — CALCIUM 500 MG: 500 TABLET ORAL at 08:47

## 2019-01-01 RX ADMIN — OMEPRAZOLE 20 MG: 20 CAPSULE, DELAYED RELEASE ORAL at 08:04

## 2019-01-01 RX ADMIN — Medication 2 G: at 05:13

## 2019-01-01 RX ADMIN — MELATONIN 25 MCG: at 09:37

## 2019-01-01 RX ADMIN — POTASSIUM PHOSPHATE, MONOBASIC AND POTASSIUM PHOSPHATE, DIBASIC 15 MMOL: 224; 236 INJECTION, SOLUTION INTRAVENOUS at 14:18

## 2019-01-01 RX ADMIN — SODIUM CHLORIDE TAB 1 GM 4 G: 1 TAB at 08:21

## 2019-01-01 RX ADMIN — PHENYLEPHRINE HYDROCHLORIDE 100 MCG: 10 INJECTION INTRAVENOUS at 12:52

## 2019-01-01 RX ADMIN — DOCUSATE SODIUM 100 MG: 100 CAPSULE, LIQUID FILLED ORAL at 20:36

## 2019-01-01 RX ADMIN — Medication 2 SPRAY: at 08:15

## 2019-01-01 RX ADMIN — AMLODIPINE BESYLATE 10 MG: 10 TABLET ORAL at 08:09

## 2019-01-01 RX ADMIN — RANITIDINE 150 MG: 150 TABLET ORAL at 08:43

## 2019-01-01 RX ADMIN — Medication 2 SPRAY: at 08:54

## 2019-01-01 RX ADMIN — POTASSIUM PHOSPHATE, MONOBASIC 500 MG: 500 TABLET, SOLUBLE ORAL at 07:59

## 2019-01-01 RX ADMIN — DEXAMETHASONE 2 MG: 2 TABLET ORAL at 22:03

## 2019-01-01 RX ADMIN — DEXAMETHASONE 4 MG: 4 TABLET ORAL at 21:08

## 2019-01-01 RX ADMIN — MELATONIN 25 MCG: at 20:36

## 2019-01-01 RX ADMIN — MELATONIN 1000 UNITS: at 00:06

## 2019-01-01 RX ADMIN — MELATONIN 1000 UNITS: at 08:25

## 2019-01-01 RX ADMIN — DEXAMETHASONE 4 MG: 4 TABLET ORAL at 21:07

## 2019-01-01 RX ADMIN — DEXAMETHASONE 3 MG: 1.5 TABLET ORAL at 13:30

## 2019-01-01 RX ADMIN — SODIUM CHLORIDE TAB 1 GM 2 G: 1 TAB at 19:28

## 2019-01-01 RX ADMIN — DEXAMETHASONE 3 MG: 1.5 TABLET ORAL at 21:35

## 2019-01-01 RX ADMIN — CALCIUM 500 MG: 500 TABLET ORAL at 08:42

## 2019-01-01 RX ADMIN — CALCIUM 500 MG: 500 TABLET ORAL at 17:41

## 2019-01-01 RX ADMIN — IOPAMIDOL 86 ML: 755 INJECTION, SOLUTION INTRAVASCULAR at 12:29

## 2019-01-01 RX ADMIN — Medication 2 SPRAY: at 12:11

## 2019-01-01 RX ADMIN — DEXAMETHASONE 4 MG: 4 TABLET ORAL at 21:51

## 2019-01-01 RX ADMIN — CALCIUM 500 MG: 500 TABLET ORAL at 08:38

## 2019-01-01 RX ADMIN — AMLODIPINE BESYLATE 10 MG: 10 TABLET ORAL at 08:27

## 2019-01-01 RX ADMIN — SODIUM CHLORIDE TAB 1 GM 1 G: 1 TAB at 13:10

## 2019-01-01 RX ADMIN — CALCIUM 500 MG: 500 TABLET ORAL at 08:21

## 2019-01-01 RX ADMIN — PANTOPRAZOLE SODIUM 40 MG: 40 TABLET, DELAYED RELEASE ORAL at 05:49

## 2019-01-01 RX ADMIN — DEXAMETHASONE SODIUM PHOSPHATE 4 MG: 4 INJECTION, SOLUTION INTRAMUSCULAR; INTRAVENOUS at 19:09

## 2019-01-01 RX ADMIN — AMLODIPINE BESYLATE 10 MG: 10 TABLET ORAL at 09:03

## 2019-01-01 RX ADMIN — RANITIDINE 150 MG: 150 TABLET ORAL at 21:32

## 2019-01-01 RX ADMIN — Medication 1 G: at 22:11

## 2019-01-01 RX ADMIN — PANTOPRAZOLE SODIUM 40 MG: 40 TABLET, DELAYED RELEASE ORAL at 06:49

## 2019-01-01 RX ADMIN — MELATONIN 25 MCG: at 08:04

## 2019-01-01 RX ADMIN — MELATONIN 25 MCG: at 20:18

## 2019-01-01 RX ADMIN — GADOBUTROL 6 ML: 604.72 INJECTION INTRAVENOUS at 18:10

## 2019-01-01 RX ADMIN — PROPOFOL 50 MG: 10 INJECTION, EMULSION INTRAVENOUS at 12:31

## 2019-01-01 RX ADMIN — ROCURONIUM BROMIDE 20 MG: 10 INJECTION INTRAVENOUS at 14:04

## 2019-01-01 RX ADMIN — VITAMIN E CAP 100 UNIT 100 UNITS: 100 CAP at 08:14

## 2019-01-01 RX ADMIN — PANTOPRAZOLE SODIUM 40 MG: 40 TABLET, DELAYED RELEASE ORAL at 07:34

## 2019-01-01 RX ADMIN — RANITIDINE 150 MG: 150 TABLET ORAL at 20:11

## 2019-01-01 RX ADMIN — ZINC SULFATE CAP 220 MG (50 MG ELEMENTAL ZN) 220 MG: 220 (50 ZN) CAP at 08:15

## 2019-01-01 RX ADMIN — ACETAMINOPHEN 975 MG: 325 TABLET, FILM COATED ORAL at 13:22

## 2019-01-01 RX ADMIN — MELATONIN 1000 UNITS: at 20:11

## 2019-01-01 RX ADMIN — SODIUM CHLORIDE TAB 1 GM 1 G: 1 TAB at 13:27

## 2019-01-01 RX ADMIN — DOCETAXEL 140 MG: 80 INJECTION, SOLUTION, CONCENTRATE INTRAVENOUS at 15:01

## 2019-01-01 RX ADMIN — ACETAMINOPHEN 975 MG: 325 TABLET, FILM COATED ORAL at 04:31

## 2019-01-01 RX ADMIN — ZINC SULFATE CAP 220 MG (50 MG ELEMENTAL ZN) 220 MG: 220 (50 ZN) CAP at 12:13

## 2019-01-01 RX ADMIN — PHENYLEPHRINE HYDROCHLORIDE 100 MCG: 10 INJECTION INTRAVENOUS at 16:32

## 2019-01-01 RX ADMIN — Medication 20000 UNITS: at 08:27

## 2019-01-01 RX ADMIN — DOCUSATE SODIUM 100 MG: 100 CAPSULE, LIQUID FILLED ORAL at 19:27

## 2019-01-01 RX ADMIN — PHENYLEPHRINE HYDROCHLORIDE 100 MCG: 10 INJECTION INTRAVENOUS at 16:26

## 2019-01-01 RX ADMIN — CALCIUM 500 MG: 500 TABLET ORAL at 21:54

## 2019-01-01 RX ADMIN — DEXAMETHASONE SODIUM PHOSPHATE 4 MG: 4 INJECTION, SOLUTION INTRA-ARTICULAR; INTRALESIONAL; INTRAMUSCULAR; INTRAVENOUS; SOFT TISSUE at 13:52

## 2019-01-01 RX ADMIN — LEVETIRACETAM 1000 MG: 500 TABLET, FILM COATED ORAL at 16:19

## 2019-01-01 RX ADMIN — DEXAMETHASONE SODIUM PHOSPHATE: 10 INJECTION, SOLUTION INTRAMUSCULAR; INTRAVENOUS at 14:28

## 2019-01-01 RX ADMIN — MELATONIN 1000 UNITS: at 20:32

## 2019-01-01 RX ADMIN — DEXAMETHASONE 4 MG: 4 TABLET ORAL at 14:26

## 2019-01-01 RX ADMIN — DEXAMETHASONE 4 MG: 4 TABLET ORAL at 13:57

## 2019-01-01 RX ADMIN — Medication 500 MG: at 19:37

## 2019-01-01 RX ADMIN — Medication 2 SPRAY: at 20:14

## 2019-01-01 RX ADMIN — AMLODIPINE BESYLATE 5 MG: 5 TABLET ORAL at 07:45

## 2019-01-01 RX ADMIN — DEXAMETHASONE 4 MG: 4 TABLET ORAL at 21:18

## 2019-01-01 RX ADMIN — SODIUM CHLORIDE TAB 1 GM 1 G: 1 TAB at 07:45

## 2019-01-01 RX ADMIN — DEXAMETHASONE SODIUM PHOSPHATE: 10 INJECTION, SOLUTION INTRAMUSCULAR; INTRAVENOUS at 14:00

## 2019-01-01 RX ADMIN — MELATONIN 25 MCG: at 09:03

## 2019-01-01 RX ADMIN — PANTOPRAZOLE SODIUM 40 MG: 40 INJECTION, POWDER, FOR SOLUTION INTRAVENOUS at 07:59

## 2019-01-01 RX ADMIN — OMEPRAZOLE 20 MG: 20 CAPSULE, DELAYED RELEASE ORAL at 21:10

## 2019-01-01 RX ADMIN — BENZOCAINE, MENTHOL 1 LOZENGE: 15; 3.6 LOZENGE ORAL at 12:32

## 2019-01-01 RX ADMIN — POLYETHYLENE GLYCOL 3350 17 G: 17 POWDER, FOR SOLUTION ORAL at 08:48

## 2019-01-01 RX ADMIN — Medication 500 MG: at 19:54

## 2019-01-01 RX ADMIN — Medication 20000 UNITS: at 07:56

## 2019-01-01 RX ADMIN — VITAMIN E CAP 100 UNIT 100 UNITS: 100 CAP at 08:21

## 2019-01-01 RX ADMIN — ACETAMINOPHEN 650 MG: 325 TABLET ORAL at 19:27

## 2019-01-01 RX ADMIN — PANTOPRAZOLE SODIUM 40 MG: 40 TABLET, DELAYED RELEASE ORAL at 06:14

## 2019-01-01 RX ADMIN — DOCETAXEL 140 MG: 80 INJECTION, SOLUTION, CONCENTRATE INTRAVENOUS at 14:03

## 2019-01-01 RX ADMIN — MELATONIN 1000 UNITS: at 08:23

## 2019-01-01 RX ADMIN — AMLODIPINE BESYLATE 5 MG: 5 TABLET ORAL at 09:37

## 2019-01-01 RX ADMIN — MELATONIN 1000 UNITS: at 20:23

## 2019-01-01 RX ADMIN — POLYETHYLENE GLYCOL 3350 17 G: 17 POWDER, FOR SOLUTION ORAL at 05:01

## 2019-01-01 RX ADMIN — LEVETIRACETAM 1000 MG: 500 TABLET, FILM COATED ORAL at 03:22

## 2019-01-01 RX ADMIN — DEXAMETHASONE 4 MG: 2 TABLET ORAL at 13:19

## 2019-01-01 RX ADMIN — PANTOPRAZOLE SODIUM 40 MG: 40 TABLET, DELAYED RELEASE ORAL at 06:38

## 2019-01-01 RX ADMIN — DOCUSATE SODIUM 100 MG: 100 CAPSULE, LIQUID FILLED ORAL at 20:15

## 2019-01-01 RX ADMIN — POLYETHYLENE GLYCOL 3350 17 G: 17 POWDER, FOR SOLUTION ORAL at 13:23

## 2019-01-01 RX ADMIN — FLUTICASONE PROPIONATE 2 SPRAY: 50 SPRAY, METERED NASAL at 07:35

## 2019-01-01 RX ADMIN — RANITIDINE 150 MG: 150 TABLET ORAL at 20:39

## 2019-01-01 RX ADMIN — RANITIDINE 150 MG: 150 TABLET ORAL at 20:43

## 2019-01-01 RX ADMIN — SODIUM CHLORIDE: 9 INJECTION, SOLUTION INTRAVENOUS at 16:06

## 2019-01-01 RX ADMIN — VITAMIN E CAP 100 UNIT 100 UNITS: 100 CAP at 08:18

## 2019-01-01 RX ADMIN — RANITIDINE 150 MG: 150 TABLET ORAL at 21:10

## 2019-01-01 RX ADMIN — PHENYLEPHRINE HYDROCHLORIDE 100 MCG: 10 INJECTION INTRAVENOUS at 13:52

## 2019-01-01 RX ADMIN — DEXAMETHASONE SODIUM PHOSPHATE: 10 INJECTION, SOLUTION INTRAMUSCULAR; INTRAVENOUS at 13:40

## 2019-01-01 RX ADMIN — LEVETIRACETAM 1000 MG: 500 TABLET, FILM COATED ORAL at 18:05

## 2019-01-01 RX ADMIN — DEXAMETHASONE 4 MG: 4 TABLET ORAL at 21:21

## 2019-01-01 RX ADMIN — HYDROMORPHONE HYDROCHLORIDE 0.3 MG: 1 INJECTION, SOLUTION INTRAMUSCULAR; INTRAVENOUS; SUBCUTANEOUS at 22:09

## 2019-01-01 RX ADMIN — MELATONIN 25 MCG: at 07:45

## 2019-01-01 RX ADMIN — DEXAMETHASONE 4 MG: 2 TABLET ORAL at 05:58

## 2019-01-01 RX ADMIN — SODIUM CHLORIDE TAB 1 GM 1 G: 1 TAB at 08:43

## 2019-01-01 RX ADMIN — DEXAMETHASONE 3 MG: 1.5 TABLET ORAL at 13:57

## 2019-01-01 RX ADMIN — CALCIUM 500 MG: 500 TABLET ORAL at 17:35

## 2019-01-01 RX ADMIN — HYDROXYZINE HYDROCHLORIDE 25 MG: 25 TABLET ORAL at 05:47

## 2019-01-01 RX ADMIN — DEXAMETHASONE 4 MG: 4 TABLET ORAL at 06:12

## 2019-01-01 RX ADMIN — AMLODIPINE BESYLATE 10 MG: 10 TABLET ORAL at 08:10

## 2019-01-01 RX ADMIN — AMLODIPINE BESYLATE 10 MG: 10 TABLET ORAL at 08:21

## 2019-01-01 RX ADMIN — DEXAMETHASONE 4 MG: 4 TABLET ORAL at 13:35

## 2019-01-01 RX ADMIN — RANITIDINE 150 MG: 150 TABLET ORAL at 08:41

## 2019-01-01 RX ADMIN — AMLODIPINE BESYLATE 5 MG: 5 TABLET ORAL at 08:05

## 2019-01-01 RX ADMIN — POTASSIUM PHOSPHATE, MONOBASIC AND POTASSIUM PHOSPHATE, DIBASIC 15 MMOL: 224; 236 INJECTION, SOLUTION INTRAVENOUS at 06:52

## 2019-01-01 RX ADMIN — VITAMIN E CAP 100 UNIT 100 UNITS: 100 CAP at 07:58

## 2019-01-01 RX ADMIN — ACETAMINOPHEN 975 MG: 325 TABLET, FILM COATED ORAL at 21:08

## 2019-01-01 RX ADMIN — GLYCOPYRROLATE 0.1 MG: 0.2 INJECTION, SOLUTION INTRAMUSCULAR; INTRAVENOUS at 17:25

## 2019-01-01 RX ADMIN — DEXAMETHASONE 3 MG: 1.5 TABLET ORAL at 21:08

## 2019-01-01 RX ADMIN — ROCURONIUM BROMIDE 10 MG: 10 INJECTION INTRAVENOUS at 14:56

## 2019-01-01 RX ADMIN — PANTOPRAZOLE SODIUM 40 MG: 40 TABLET, DELAYED RELEASE ORAL at 08:29

## 2019-01-01 RX ADMIN — DEXAMETHASONE SODIUM PHOSPHATE 4 MG: 4 INJECTION, SOLUTION INTRA-ARTICULAR; INTRALESIONAL; INTRAMUSCULAR; INTRAVENOUS; SOFT TISSUE at 08:04

## 2019-01-01 RX ADMIN — POTASSIUM PHOSPHATE, MONOBASIC 500 MG: 500 TABLET, SOLUBLE ORAL at 09:47

## 2019-01-01 RX ADMIN — PANTOPRAZOLE SODIUM 40 MG: 40 TABLET, DELAYED RELEASE ORAL at 06:00

## 2019-01-01 RX ADMIN — SODIUM CHLORIDE TAB 1 GM 1 G: 1 TAB at 09:38

## 2019-01-01 RX ADMIN — RANITIDINE 150 MG: 150 TABLET ORAL at 08:54

## 2019-01-01 RX ADMIN — PHENYLEPHRINE HYDROCHLORIDE 100 MCG: 10 INJECTION INTRAVENOUS at 16:48

## 2019-01-01 ASSESSMENT — ACTIVITIES OF DAILY LIVING (ADL)
ADLS_ACUITY_SCORE: 13
ADLS_ACUITY_SCORE: 15
ADLS_ACUITY_SCORE: 15
ADLS_ACUITY_SCORE: 13
ADLS_ACUITY_SCORE: 20
ADLS_ACUITY_SCORE: 27
ADLS_ACUITY_SCORE: 15
ADLS_ACUITY_SCORE: 19
ADLS_ACUITY_SCORE: 19
ADLS_ACUITY_SCORE: 20
ADLS_ACUITY_SCORE: 17
ADLS_ACUITY_SCORE: 19
ADLS_ACUITY_SCORE: 27
ADLS_ACUITY_SCORE: 13
ADLS_ACUITY_SCORE: 19
ADLS_ACUITY_SCORE: 13
ADLS_ACUITY_SCORE: 21
ADLS_ACUITY_SCORE: 14
ADLS_ACUITY_SCORE: 15
ADLS_ACUITY_SCORE: 18
ADLS_ACUITY_SCORE: 14
WHICH_OF_THE_ABOVE_FUNCTIONAL_RISKS_HAD_A_RECENT_ONSET_OR_CHANGE?: AMBULATION;TRANSFERRING;TOILETING;BATHING;COGNITION
ADLS_ACUITY_SCORE: 14
ADLS_ACUITY_SCORE: 17
ADLS_ACUITY_SCORE: 16
RETIRED_EATING: 0-->INDEPENDENT
ADLS_ACUITY_SCORE: 15
PREVIOUS_RESPONSIBILITIES: MEAL PREP;HOUSEKEEPING;LAUNDRY;SHOPPING;YARDWORK;MEDICATION MANAGEMENT;FINANCES;DRIVING
ADLS_ACUITY_SCORE: 13
ADLS_ACUITY_SCORE: 23
ADLS_ACUITY_SCORE: 18
ADLS_ACUITY_SCORE: 15
TOILETING: 1-->ASSISTIVE EQUIPMENT
SWALLOWING: 0-->SWALLOWS FOODS/LIQUIDS WITHOUT DIFFICULTY
ADLS_ACUITY_SCORE: 13
RETIRED_COMMUNICATION: 0-->UNDERSTANDS/COMMUNICATES WITHOUT DIFFICULTY
ADLS_ACUITY_SCORE: 21
ADLS_ACUITY_SCORE: 15
ADLS_ACUITY_SCORE: 19
ADLS_ACUITY_SCORE: 13
ADLS_ACUITY_SCORE: 16
ADLS_ACUITY_SCORE: 21
ADLS_ACUITY_SCORE: 14
ADLS_ACUITY_SCORE: 13
ADLS_ACUITY_SCORE: 23
ADLS_ACUITY_SCORE: 14
ADLS_ACUITY_SCORE: 20
ADLS_ACUITY_SCORE: 15
ADLS_ACUITY_SCORE: 20
ADLS_ACUITY_SCORE: 27
ADLS_ACUITY_SCORE: 18
ADLS_ACUITY_SCORE: 14
ADLS_ACUITY_SCORE: 15
ADLS_ACUITY_SCORE: 23
ADLS_ACUITY_SCORE: 13
ADLS_ACUITY_SCORE: 13
ADLS_ACUITY_SCORE: 20
ADLS_ACUITY_SCORE: 19
ADLS_ACUITY_SCORE: 19
ADLS_ACUITY_SCORE: 15
ADLS_ACUITY_SCORE: 19
ADLS_ACUITY_SCORE: 19
ADLS_ACUITY_SCORE: 13
ADLS_ACUITY_SCORE: 18
COGNITION: 1 - ATTENTION OR MEMORY DEFICITS
ADLS_ACUITY_SCORE: 14
ADLS_ACUITY_SCORE: 18
ADLS_ACUITY_SCORE: 20
ADLS_ACUITY_SCORE: 17
ADLS_ACUITY_SCORE: 15
ADLS_ACUITY_SCORE: 16
ADLS_ACUITY_SCORE: 23
ADLS_ACUITY_SCORE: 21
ADLS_ACUITY_SCORE: 21
ADLS_ACUITY_SCORE: 18
PREVIOUS_RESPONSIBILITIES: MEAL PREP;HOUSEKEEPING;LAUNDRY;SHOPPING;MEDICATION MANAGEMENT;FINANCES;DRIVING
ADLS_ACUITY_SCORE: 21
ADLS_ACUITY_SCORE: 21
ADLS_ACUITY_SCORE: 13
ADLS_ACUITY_SCORE: 18
ADLS_ACUITY_SCORE: 23
NUMBER_OF_TIMES_PATIENT_HAS_FALLEN_WITHIN_LAST_SIX_MONTHS: 1
ADLS_ACUITY_SCORE: 18
FALL_HISTORY_WITHIN_LAST_SIX_MONTHS: YES
ADLS_ACUITY_SCORE: 15
PREVIOUS_RESPONSIBILITIES: MEAL PREP;HOUSEKEEPING;LAUNDRY;MEDICATION MANAGEMENT;FINANCES;SHOPPING
ADLS_ACUITY_SCORE: 15
ADLS_ACUITY_SCORE: 20
ADLS_ACUITY_SCORE: 21
ADLS_ACUITY_SCORE: 19
ADLS_ACUITY_SCORE: 13
ADLS_ACUITY_SCORE: 14
ADLS_ACUITY_SCORE: 23
COGNITION: 1 - ATTENTION OR MEMORY DEFICITS
ADLS_ACUITY_SCORE: 18
ADLS_ACUITY_SCORE: 15
BATHING: 1-->ASSISTIVE EQUIPMENT
ADLS_ACUITY_SCORE: 13
ADLS_ACUITY_SCORE: 19
ADLS_ACUITY_SCORE: 14
ADLS_ACUITY_SCORE: 14
TRANSFERRING: 1-->ASSISTIVE EQUIPMENT
IADL_COMMENTS: SEE INITIAL EVAL.
ADLS_ACUITY_SCORE: 21
ADLS_ACUITY_SCORE: 14
ADLS_ACUITY_SCORE: 19
ADLS_ACUITY_SCORE: 18
ADLS_ACUITY_SCORE: 21
ADLS_ACUITY_SCORE: 19
ADLS_ACUITY_SCORE: 15
PREVIOUS_RESPONSIBILITIES: MEAL PREP;HOUSEKEEPING;LAUNDRY;SHOPPING;MEDICATION MANAGEMENT;FINANCES;DRIVING
ADLS_ACUITY_SCORE: 19
ADLS_ACUITY_SCORE: 14
ADLS_ACUITY_SCORE: 15
DRESS: 1-->ASSISTIVE EQUIPMENT
FALL_HISTORY_WITHIN_LAST_SIX_MONTHS: YES
ADLS_ACUITY_SCORE: 13
ADLS_ACUITY_SCORE: 14
ADLS_ACUITY_SCORE: 19
ADLS_ACUITY_SCORE: 15
ADLS_ACUITY_SCORE: 14
ADLS_ACUITY_SCORE: 19
ADLS_ACUITY_SCORE: 15
ADLS_ACUITY_SCORE: 19
AMBULATION: 1-->ASSISTIVE EQUIPMENT
ADLS_ACUITY_SCORE: 16
ADLS_ACUITY_SCORE: 17
ADLS_ACUITY_SCORE: 19
ADLS_ACUITY_SCORE: 15
ADLS_ACUITY_SCORE: 19
ADLS_ACUITY_SCORE: 15
SWALLOWING: 0-->SWALLOWS FOODS/LIQUIDS WITHOUT DIFFICULTY
ADLS_ACUITY_SCORE: 19
ADLS_ACUITY_SCORE: 14
ADLS_ACUITY_SCORE: 15

## 2019-01-01 ASSESSMENT — PAIN SCALES - GENERAL
PAINLEVEL: NO PAIN (0)
PAINLEVEL: MODERATE PAIN (4)
PAINLEVEL: MILD PAIN (3)
PAINLEVEL: MODERATE PAIN (4)
PAINLEVEL: NO PAIN (1)
PAINLEVEL: MODERATE PAIN (4)
PAINLEVEL: MILD PAIN (3)
PAINLEVEL: MODERATE PAIN (4)
PAINLEVEL: NO PAIN (0)
PAINLEVEL: MODERATE PAIN (4)
PAINLEVEL: NO PAIN (0)
PAINLEVEL: MODERATE PAIN (5)
PAINLEVEL: MODERATE PAIN (5)
PAINLEVEL: NO PAIN (0)

## 2019-01-01 ASSESSMENT — ENCOUNTER SYMPTOMS
VOICE CHANGE: 0
VOMITING: 1
ACTIVITY CHANGE: 1
FEVER: 0
CONFUSION: 0
WEAKNESS: 1
ABDOMINAL PAIN: 0
DYSURIA: 0
GASTROINTESTINAL NEGATIVE: 1
CONSTITUTIONAL NEGATIVE: 1
CARDIOVASCULAR NEGATIVE: 1
MUSCULOSKELETAL NEGATIVE: 1
FATIGUE: 0
NECK STIFFNESS: 0
DECREASED CONCENTRATION: 1
FEVER: 0
EYES NEGATIVE: 1
COUGH: 0
WEAKNESS: 1
TROUBLE SWALLOWING: 0
RESPIRATORY NEGATIVE: 1
CHEST TIGHTNESS: 0
PSYCHIATRIC NEGATIVE: 1
WEAKNESS: 1
DYSPHORIC MOOD: 1
VOMITING: 0
DIZZINESS: 0
SHORTNESS OF BREATH: 0
NECK PAIN: 1
NAUSEA: 0
FACIAL ASYMMETRY: 1
WOUND: 1
NAUSEA: 1
SHORTNESS OF BREATH: 0
NECK PAIN: 0
APPETITE CHANGE: 0

## 2019-01-01 ASSESSMENT — VISUAL ACUITY
OU: NORMAL ACUITY
OU: OTHER (SEE COMMENT)
OU: NORMAL ACUITY

## 2019-01-01 ASSESSMENT — MIFFLIN-ST. JEOR
SCORE: 1398.66
SCORE: 1490.63
SCORE: 1435.41
SCORE: 1433.13
SCORE: 1393.21
SCORE: 1466.59
SCORE: 1496.73
SCORE: 1412.72
SCORE: 1412.26
SCORE: 1514.22
SCORE: 1469.32
SCORE: 1486.55
SCORE: 1459.25
SCORE: 1480.2
SCORE: 1458.43
SCORE: 1491.25
SCORE: 1412.72
SCORE: 1403.19
SCORE: 1489.73
SCORE: 1489.27
SCORE: 1414.26
SCORE: 1398.26
SCORE: 1466.25

## 2019-01-01 ASSESSMENT — PAIN DESCRIPTION - DESCRIPTORS: DESCRIPTORS: ACHING

## 2019-01-08 NOTE — MR AVS SNAPSHOT
After Visit Summary   11/10/2017    King Gonsalo Bruno    MRN: [id number]           Patient Information     Date Of Birth          1945        Visit Information        Provider Department      11/10/2017 10:00 AM  12 ATC;  ONCOLOGY INFUSION [facility]        Today's Diagnoses     Urethral cancer (H)    -  1      Care Instructions    Contact Numbers    [facility] Main Line: [phone]  [facility] Triage:  [phone]    Call triage with chills and/or temperature greater than or equal to 100.5, uncontrolled nausea/vomiting, diarrhea, constipation, dizziness, shortness of breath, chest pain, bleeding, unexplained bruising, or any new/concerning symptoms, questions/concerns.     If you are having any concerning symptoms or wish to speak to a provider before your next infusion visit, please call your care coordinator or triage to notify them so we can adequately serve you.       After Hours: [phone]    If after hours, weekends, or holidays, call main hospital  and ask for Oncology doctor on call.         November 2017 Sunday Monday Tuesday Wednesday Thursday Friday Saturday                  1     2     3     [facility] DRAW    7:15 AM   (15 min.)   [facility] D[facility]   [facility] Lab Draw     [facility] RETURN    7:35 AM   (50 min.)   Ramona Greer PA   [facility] ONC INFUSION 360    8:30 AM   (360 min.)    ONCOLOGY INFUSION   [facility] 4       5     6     [facility] RETURN   12:45 PM   (30 min.)   Nurse,  Prostate Cancer Ctr   [facility] Urology and Inst for Prostate and Urologic Cancers     [facility] BMT INFUSION 120    2:00 PM   (120 min.)    BMT INFUSION   [facility] Blood and Marrow Transplant 7     8     [facility] DRAW   12:30 PM   (15 min.)    [facility] DRAW   [facility] Lab Draw     [facility] ONC INFUSION 120    1:00 PM   (120 min.)    ONCOLOGY INFUSION   [facility]     [facility] NEW    2:45  ----- Message from Jerzy Wilkerson MD sent at 1/7/2019  9:46 PM CST -----  Pls tell pt that her hip has moderate arthritis, but her knee has minimal arthritis. The spine X-ray is pending.   PM   (60 min.)   Ignacia Wallace RD   [facility] 9     10     [facility] DRAW    8:15 AM   (15 min.)    [facility]   [facility]     [facility] RETURN    8:25 AM   (50 min.)   Ramona Greer PA   [facility]     [facility] ONC INFUSION 360   10:00 AM   (360 min.)   [facility] ONCOLOGY INFUSION   [facility] 11       12     [facility] ONC INFUSION 120    9:30 AM   (120 min.)   [facility] ONCOLOGY INFUSION   [facility] 13     14     [facility] NEW    1:15 PM   (30 min.)   Arsenio Ortiz MD   [facility]ose [facility] Throat 15     [facility] DRAW   12:00 PM   (15 min.)    [facility] DRAW   [facility]     [facility] ONC INFUSION 120   12:30 PM   (120 min.)    ONCOLOGY INFUSION   [facility] 16     17     [facility] DRAW   12:30 PM   (15 min.)    [facility] DRAW   [facility]     [facility] ONC INFUSION 120    1:00 PM   (120 min.)    ONCOLOGY INFUSION   [facility] 18       19     20     [facility] ONC INFUSION 120    1:00 PM   (120 min.)    ONCOLOGY INFUSION   [facility] 21     22     [facility] DRAW   12:30 PM   (15 min.)    [facility] LAB DRAW   [facility] Lab Draw     [facility] ONC INFUSION 120    1:00 PM   (120 min.)    ONCOLOGY INFUSION   [facility] 23     24     [facility] DRAW    8:15 AM   (15 min.)    [facility] DRAW   [facility] Draw     [facility] RETURN    8:25 AM   (50 min.)   Ramona Greer PA   [facility]     [facility] ONC INFUSION 360   10:00 AM   (360 min.)    ONCOLOGY INFUSION   [facility] 25  Happy Birthday!       26     27     28     29     [facility] DRAW   10:30 AM   (15 min.)    [facility] LAB DRAW   [facility] Lab Draw     [facility] ONC INFUSION 120   11:00 AM   (120 min.)    ONCOLOGY INFUSION   [facility] 30                           December 2017 Sunday Monday Tuesday Wednesday Thursday Friday Saturday                            1     [facility] LAB DRAW    8:15 AM   (15 min.)   [facility] DRAW   [facility] Lab Draw     [facility] RETURN    8:25 AM   (50 min.)   Yessica Ovallse APRN CNP   [facility]     [facility] ONC INFUSION 360   10:00 AM   (360 min.)    ONCOLOGY INFUSION   [facility] Cancer [facility] 2       3     4     5     6     7     8     9       10     11     [facility] RETURN   12:45 PM   (30 min.)   Nurse,  Prostate Cancer Ctr   [facility]rology and Inst for Prostate and Urologic Cancers 12     13     14     15     16       17     18     19     20     21     22     23       24     25     26     27     28     29     30       31                                               Recent Results (from the past 24 hour(s))   CBC with platelets differential    Collection Time: 11/10/17  8:39 AM   Result Value Ref Range    WBC 3.0 (L) 4.0 - 11.0 10e9/L    RBC Count 3.06 (L) 4.4 - 5.9 10e12/L    Hemoglobin 9.0 (L) 13.3 - 17.7 g/dL    Hematocrit 26.4 (L) 40.0 - 53.0 %    MCV 86 78 - 100 fl    MCH 29.4 26.5 - 33.0 pg    MCHC 34.1 31.5 - 36.5 g/dL    RDW 15.9 (H) 10.0 - 15.0 %    Platelet Count 157 150 - 450 10e9/L    Diff Method Automated Method     % Neutrophils 44.1 %    % Lymphocytes 44.9 %    % Monocytes 6.3 %    % Eosinophils 0.7 %    % Basophils 0.7 %    % Immature Granulocytes 3.3 %    Nucleated RBCs 1 (H) 0 /100    Absolute Neutrophil 1.3 (L) 1.6 - 8.3 10e9/L    Absolute Lymphocytes 1.4 0.8 - 5.3 10e9/L    Absolute Monocytes 0.2 0.0 - 1.3 10e9/L    Absolute Eosinophils 0.0 0.0 - 0.7 10e9/L    Absolute Basophils 0.0 0.0 - 0.2 10e9/L    Abs Immature Granulocytes 0.1 0 - 0.4 10e9/L    Absolute Nucleated RBC 0.0    Comprehensive metabolic panel    Collection Time: 11/10/17  8:39 AM   Result Value Ref Range    Sodium 128 (L) 133 - 144 mmol/L    Potassium 3.2 (L) 3.4 - 5.3 mmol/L    Chloride 94 94 - 109 mmol/L     Carbon Dioxide 25 20 - 32 mmol/L    Anion Gap 9 3 - 14 mmol/L    Glucose 92 70 - 99 mg/dL    Urea Nitrogen 15 7 - 30 mg/dL    Creatinine 1.60 (H) 0.66 - 1.25 mg/dL    GFR Estimate 43 (L) >60 mL/min/1.7m2    GFR Estimate If Black 52 (L) >60 mL/min/1.7m2    Calcium 9.3 8.5 - 10.1 mg/dL    Bilirubin Total 0.4 0.2 - 1.3 mg/dL    Albumin 3.3 (L) 3.4 - 5.0 g/dL    Protein Total 7.1 6.8 - 8.8 g/dL    Alkaline Phosphatase 70 40 - 150 U/L    ALT 18 0 - 70 U/L    AST 26 0 - 45 U/L   Magnesium    Collection Time: 11/10/17  8:39 AM   Result Value Ref Range    Magnesium 1.3 (L) 1.6 - 2.3 mg/dL   Creatinine    Collection Time: 11/10/17 11:20 AM   Result Value Ref Range    Creatinine 1.52 (H) 0.66 - 1.25 mg/dL    GFR Estimate 46 (L) >60 mL/min/1.7m2    GFR Estimate If Black 55 (L) >60 mL/min/1.7m2             Follow-ups after your visit        Your next 10 appointments already scheduled     Nov 12, 2017  9:30 AM CST   Infusion 120 with [facility] ONCOLOGY INFUSION, UC 11 ATC   [facility] Cancer [facility] ([facility])    [address] [place] [address]  2nd [facility] [id number] [phone]            Nov 14, 2017  1:30 PM CST   (Arrive by 1:15 PM)   New Patient Visit with Arsenio Ortiz MD   [facility] Ear Nose and Throat ([facility])    [address] [place] [address]  4th [facility] [place] [id number] [phone]            Nov 15, 2017 12:00 PM CST   [facility] Lab Draw with  [facility] LAB DRAW   [facility] Lab Draw ([facility])    [address] [place] [address]  2nd [facility] [place] [address]   [phone]            Nov 15, 2017 12:30 PM CST   Infusion 120 with UC ONCOLOGY INFUSION, UC 29 ATC   [facility] Cancer [facility] ([facility])    [address] [place] [id number]   [phone]            Nov 17, 2017 12:30 PM [facility]   [facility] Lab Draw with  [facility] LAB DRAW   [facility] Lab Draw ([facility]  "[facility])    [address] [place] [id number]   [phone]            Nov 17, 2017  1:00 PM CST   Infusion 120 with UC ONCOLOGY INFUSION, UC 18 ATC   [facility] ([facility])    [address] [place] [id number] [phone]            Nov 20, 2017  1:00 PM CST   Infusion 120 with UC ONCOLOGY INFUSION   [facility] ([facility])    [address] [place] [id number] [phone]              Who to contact     If you have questions or need follow up information about today's clinic visit or your schedule please contact [facility] directly at [phone].  Normal or non-critical lab and imaging results will be communicated to you by Cmedhart, letter or phone within 4 business days after the clinic has received the results. If you do not hear from us within 7 days, please contact the clinic through Cmedhart or phone. If you have a critical or abnormal lab result, we will notify you by phone as soon as possible.  Submit refill requests through Dataminr or call your pharmacy and they will forward the refill request to us. Please allow 3 business days for your refill to be completed.          Additional Information About Your Visit        Dataminr Information     Dataminr lets you send messages to your doctor, view your test results, renew your prescriptions, schedule appointments and more. To sign up, go to [email or web address] . Click on \"Log in\" on the left side of the screen, which will take you to the Welcome page. Then click on \"Sign up Now\" on the right side of the page.     You will be asked to enter the access code listed below, as well as some personal information. Please follow the directions to create your username and password.     Your access code is: [email or web address]  Expires: 1/30/2018  5:30 AM     Your " access code will  in 90 days. If you need help or a new code, please call your [facility] clinic or [phone].        Care EveryWhere ID     This is your Care EveryWhere ID. This could be used by other organizations to access your [facility] medical records  [id number]         Blood Pressure from Last 3 Encounters:   11/10/17 135/76   17 130/74   17 137/81    Weight from Last 3 Encounters:   11/10/17 70.7 kg (155 lb 12.8 oz)   17 70.6 kg (155 lb 9.6 oz)   17 71.7 kg (158 lb)              We Performed the Following     CBC with platelets differential     Comprehensive metabolic panel     Creatinine     Magnesium        Primary Care Provider Office Phone # Fax #    Joan Janet Ventura [phone]       [facility] [address] [facility] [address]        Equal Access to Services     SHILPI RED : Hadii aad ku hadasho Soomaali, waaxda luqadaha, qaybta kaalmada adeegyada, waxay idiin haylidian gab srivastava . So [facility] [phone].    ATENCIÓN: Si habla español, tiene a washburn disposición servicios gratuitos de asistencia lingüística. Juwan al [phone].    We comply with applicable federal civil rights laws and [place] laws. We do not discriminate on the basis of race, color, national origin, age, disability, sex, sexual orientation, or gender identity.            Thank you!     Thank you for choosing [facility] CANCER [facility]  for your care. Our goal is always to provide you with excellent care. Hearing back from our patients is one way we can continue to improve our services. Please take a few minutes to complete the written survey that you may receive in the mail after your visit with us. Thank you!             Your Updated Medication List - Protect others around you: Learn how to safely use, store and throw away your medicines at www.disposemymeds.org.          This list is accurate as of: 11/10/17  1:07 PM.  Always use your most recent med list.                    Brand Name Dispense Instructions for use Diagnosis    alum & mag hydroxide-simethicone 200-200-20 MG/5ML Susp suspension    MYLANTA/MAALOX    355 mL    Take 30 mLs by mouth every 4 hours as needed for indigestion        amLODIPine 5 MG tablet    NORVASC    60 tablet    Take 2 tablets (10 mg) by mouth daily    Benign essential hypertension       dexamethasone 4 MG tablet    DECADRON    12 tablet    Take 2 tablets (8 mg) by mouth daily Take for 3 days, starting the day after cisplatin (Day 2 and Day 9).    Urethral cancer (H)       docusate sodium 100 MG capsule    COLACE    60 capsule    Take 1 capsule (100 mg) by mouth 2 times daily as needed for constipation    Slow transit constipation       * FIRST-OMEPRAZOLE 2 MG/ML Susp     300 mL    Take 10 mLs (20 mg) by mouth 2 times daily    Urethral cancer (H), Gastroesophageal reflux disease with esophagitis       * omeprazole 2 mg/mL Susp    priLOSEC    280 mL    Take 10 mLs (20 mg) by mouth 2 times daily    Gastroesophageal reflux disease with esophagitis, Urethral cancer (H)       fluticasone 50 MCG/ACT spray    FLONASE     Spray 1 spray into both nostrils every morning        HYDROXYZINE HCL PO      Take 5 mg by mouth At Bedtime        LORazepam 0.5 MG tablet    ATIVAN    30 tablet    Take 1 tablet (0.5 mg) by mouth every 4 hours as needed (Anxiety, Nausea/Vomiting or Sleep)    Nausea       nystatin 356276 UNIT/ML suspension    MYCOSTATIN    200 mL    Take 5 mLs (500,000 Units) by mouth 4 times daily Swish and spit    Thrush       OLANZapine 5 MG tablet    zyPREXA    30 tablet    Take 1 tablet (5 mg) by mouth At Bedtime    Urethral cancer (H), Nausea       ondansetron 8 MG tablet    ZOFRAN    20 tablet    Take 1 tablet (8 mg) by mouth every 8 hours as needed for nausea    Nausea       prochlorperazine 10 MG tablet    COMPAZINE    30 tablet    Take 0.5 tablets (5 mg) by mouth every 6 hours as needed (Nausea/Vomiting)    Nausea       * Notice:  This list  has 2 medication(s) that are the same as other medications prescribed for you. Read the directions carefully, and ask your doctor or other care provider to review them with you.

## 2019-01-17 NOTE — NURSING NOTE
Chief Complaint   Patient presents with     Blood Draw     Labs drawn from PIV placed by vascular access RN. Line flushed with saliene. Vs taken and pt checked in for appt.     Mariela Rees RN

## 2019-01-17 NOTE — PATIENT INSTRUCTIONS
Contact numbers:  Triage Main/After hours Line: [phone]    Main (scheduling) line: [phone]    Call with chills and/or temperature greater than or equal to 100.5 and questions or concerns.    If after hours, weekends, or holidays, call main hospital  at  [phone] and ask for Oncology doctor on call.         January 2019 Sunday Monday Tuesday Wednesday Thursday Friday Saturday             1     2     3     4     5       6     7     8     9     10     11     12       13     14     15     16     17    [facility]   1:00 PM   (15 min.)    [facility]   [facility] Draw    [facility] ONC INFUSION 60   1:30 PM   (60 min.)    ONCOLOGY INFUSION   [facility] 18     19       20     21     22     23     24    VIDEO SWALLOW STUDY  11:00 AM   (60 min.)   Chacha Dillard SLP   [facility]    XR VIDEO SPEECH W ESOPHAGRAM  11:00 AM   (30 min.)   UCXR2   [facility] Xray 25     26       27     28     29     30     31                           February 2019 Sunday Monday Tuesday Wednesday Thursday Friday Saturday                            1     2       3     4     5     6     7    [facility]   1:00 PM   (15 min.)    [facility]   [facility] Draw    [facility] ONC INFUSION 60   1:30 PM   (60 min.)    ONCOLOGY INFUSION   [facility] 8     9       10     11     12     13     14     15     16       17     18     19     20     21     22     23       24     25     26     27     28    [facility]  12:30 PM   (15 min.)    [facility] D[facility]   [facility] Draw    [facility] RETURN  12:45 PM   (50 min.)   Yessica Ovalles, ISAURA CNP   [facility]    [facility] ONC INFUSION 60   2:00 PM   (60 min.)    ONCOLOGY INFUSION   [facility]                           Lab Results:  Recent Results (from the past 12 hour(s))   Comprehensive metabolic panel    Collection Time: 01/17/19   1:13 PM   Result Value Ref Range    Sodium 132 (L) 133 - 144 mmol/L    Potassium 4.6 3.4 - 5.3 mmol/L    Chloride 101 94 - 109 mmol/L    Carbon Dioxide 24 20 - 32 mmol/L    Anion Gap 8 3 - 14 mmol/L    Glucose 74 70 - 99 mg/dL    Urea Nitrogen 16 7 - 30 mg/dL    Creatinine 1.49 (H) 0.66 - 1.25 mg/dL    GFR Estimate 47 (L) >60 mL/min/[1.73_m2]    GFR Estimate If Black 54 (L) >60 mL/min/[1.73_m2]    Calcium 9.5 8.5 - 10.1 mg/dL    Bilirubin Total 0.5 0.2 - 1.3 mg/dL    Albumin 3.6 3.4 - 5.0 g/dL    Protein Total 8.2 6.8 - 8.8 g/dL    Alkaline Phosphatase 80 40 - 150 U/L    ALT 16 0 - 70 U/L    AST 37 0 - 45 U/L   TSH with free T4 reflex    Collection Time: 01/17/19  1:13 PM   Result Value Ref Range    TSH 0.81 0.40 - 4.00 mU/L

## 2019-01-24 NOTE — PROGRESS NOTES
Patient called and left a voicemail requesting writer send a message to ISAURA Jones regarding his omeprazole.  Deandre states his insurance will no longer cover the current prescription of omeprazole because it has bicarbonate in it.  RNCC has sent a message to Yessica and is awaiting a response.     Rimma Lanza RN BSN   [facility]  Nurse Coordinator

## 2019-01-25 NOTE — TELEPHONE ENCOUNTER
Dr. Marielos vanegas'd Omeprazole order be changed to capsules per patient's request.   Rimma Lanza, RN BSN   [facility]  Nurse Coordinator

## 2019-01-25 NOTE — PROGRESS NOTES
Patient left a vociemail stating the omeprazole was refill was supposed to be 40mg.  Writer called patient to state the changes have been made to the refill.   He is very grateful and appreciates everything we have done for him.   Rimma Lanza RN BSN   [facility]  Nurse Coordinator

## 2019-01-25 NOTE — PROGRESS NOTES
Left voicemail for  regarding his request for omeprazole to be changed to capsules.  Dr. Marielos vanegas'd that change to be made, writer sent that refill request to the [facility] pharmacy and left a detailed message on Deandre's home phone.   Rimma Lanza, RN BSN   [facility]  Nurse Coordinator

## 2019-02-07 NOTE — PROGRESS NOTES
Infusion Nursing Note:  King Gonsalo Bruno presents today for Cycle 10 Day 1 Keytruda.    Patient seen by provider today: No   present during visit today: Not Applicable.    Note: pt presents to infusion room feeling generally well. Pt's GERD is currently well-controlled with Prilosec, and pt is tolerating Prilosec capsules at this time (vs oral suspension as previous). Pt continues with arthritis pain, but this is also well-controlled and pt denies any intervention from this RN.      Intravenous Access:  Peripheral IV placed.    Treatment Conditions:  Lab Results        Lab Results   Component Value Date     02/07/2019                   Lab Results   Component Value Date    POTASSIUM 3.8 02/07/2019           Lab Results                       Lab Results   Component Value Date    CR 1.63 02/07/2019                   Lab Results   Component Value Date    SAADIA 9.7 02/07/2019                Lab Results   Component Value Date    BILITOTAL 0.5 02/07/2019           Lab Results   Component Value Date    ALBUMIN 3.6 02/07/2019                    Lab Results   Component Value Date    ALT 12 02/07/2019           Lab Results   Component Value Date    AST 26 02/07/2019       Results reviewed, labs MET treatment parameters, ok to proceed with treatment.      Post Infusion Assessment:  Patient tolerated infusion without incident.  Blood return noted pre and post infusion.  Site patent and intact, free from redness, edema or discomfort.  No evidence of extravasations.  Access discontinued per protocol.    Discharge Plan:   Patient declined prescription refills.  Copy of AVS reviewed with patient and/or family.  Patient will return 2/28 for next appointment.  Patient discharged in stable condition accompanied by: significant other.  Departure Mode: Ambulatory.    KAYLEE ANGEL RN

## 2019-02-07 NOTE — PATIENT INSTRUCTIONS
Contact Numbers    [facility] Main Line: [phone]  [facility] Triage and after hours / weekends / holidays:  [phone]      Please call the triage or after hours line if you experience a temperature greater than or equal to 100.5, shaking chills, have uncontrolled nausea, vomiting and/or diarrhea, dizziness, shortness of breath, chest pain, bleeding, unexplained bruising, or if you have any other new/concerning symptoms, questions or concerns.      If you are having any concerning symptoms or wish to speak to a provider before your next infusion visit, please call your care coordinator or triage to notify them so we can adequately serve you.     If you need a refill on a narcotic prescription or other medication, please call before your infusion appointment.                 February 2019 Sunday Monday Tuesday Wednesday Thursday Friday Saturday                            1    XR VIDEO SPEECH W ESOPHAGRAM   2:00 PM   (60 min.)   UCXR2   [facility] Imaging [place] Xray    VIDEO SWALLOW STUDY   2:00 PM   (60 min.)   Khalida March SLP   [facility] 2       3     4     5     6     7    [facility]   1:00 PM   (15 min.)    [facility] DRAW   [facility] Lab Draw    [facility] ONC INFUSION 60   1:30 PM   (60 min.)    ONCOLOGY INFUSION   [facility] 8     9       10     11     12     13     14     15     16       17     18     19     20     21     22     23       24     25     26     27     28    [facility]  12:30 PM   (15 min.)    [facility] DRAW   [facility] Lab Draw    [facility] RETURN  12:45 PM   (50 min.)   Yessica Ovalles, APRN CNP   [facility]    [facility] ONC INFUSION 60   2:00 PM   (60 min.)    ONCOLOGY INFUSION   [facility]                       March 2019 Sunday Monday Tuesday Wednesday Thursday Friday Saturday                            1     2       3     4     5    CT CHEST/ABDOMEN/PELVIS W  12:00 PM   (20 min.)   CT2     [facility] Imaging Center CT    LAB  12:30 PM   (15 min.)   [facility]   [facility] Lab 6    [facility] RETURN  12:45 PM   (30 min.)   Steve Arceo MD   [facility] 7     8     9       10     11     12     13     14     15     16       17     18     19     20     21     22     23       24     25     26     27     28     29     30       31                                                  Recent Results (from the past 24 hour(s))   Comprehensive metabolic panel    Collection Time: 02/07/19  1:14 PM   Result Value Ref Range    Sodium 135 133 - 144 mmol/L    Potassium 3.8 3.4 - 5.3 mmol/L    Chloride 101 94 - 109 mmol/L    Carbon Dioxide 24 20 - 32 mmol/L    Anion Gap 10 3 - 14 mmol/L    Glucose 83 70 - 99 mg/dL    Urea Nitrogen 16 7 - 30 mg/dL    Creatinine 1.63 (H) 0.66 - 1.25 mg/dL    GFR Estimate 42 (L) >60 mL/min/[1.73_m2]    GFR Estimate If Black 48 (L) >60 mL/min/[1.73_m2]    Calcium 9.7 8.5 - 10.1 mg/dL    Bilirubin Total 0.5 0.2 - 1.3 mg/dL    Albumin 3.6 3.4 - 5.0 g/dL    Protein Total 7.9 6.8 - 8.8 g/dL    Alkaline Phosphatase 88 40 - 150 U/L    ALT 12 0 - 70 U/L    AST 26 0 - 45 U/L   TSH with free T4 reflex    Collection Time: 02/07/19  1:14 PM   Result Value Ref Range    TSH 0.97 0.40 - 4.00 mU/L

## 2019-02-28 NOTE — PROGRESS NOTES
Infusion Nursing Note:  King Gonsalo Bruno presents today for cycle 11, day 1 keytruda  Patient seen by provider today: Yes: Yessica Ovalles NP   present during visit today: Not Applicable.    Note: N/A.    Intravenous Access:  Peripheral IV placed in lab    Treatment Conditions:  Lab Results   Component Value Date     02/28/2019                   Lab Results   Component Value Date    POTASSIUM 4.1 02/28/2019           Lab Results   Component Value Date    MAG 2.0 12/27/2018            Lab Results   Component Value Date    CR 1.56 02/28/2019                   Lab Results   Component Value Date    SAADIA 9.9 02/28/2019                Lab Results   Component Value Date    BILITOTAL 0.5 02/28/2019           Lab Results   Component Value Date    ALBUMIN 3.5 02/28/2019                    Lab Results   Component Value Date    ALT 14 02/28/2019           Lab Results   Component Value Date    AST 23 02/28/2019       Results reviewed, labs MET treatment parameters, ok to proceed with treatment.      Post Infusion Assessment:  Patient tolerated infusion without incident.  Blood return noted pre and post infusion.  Site patent and intact, free from redness, edema or discomfort.  No evidence of extravasations.  Access discontinued per protocol.    Discharge Plan:   Prescription refills given for ativan, flonase, and omeprazole.  Discharge instructions reviewed with: Patient.  Patient and/or family verbalized understanding of discharge instructions and all questions answered.  Copy of AVS reviewed with patient and/or family.  Patient will return 3/6 for next appointment with Dr Arceo  Patient discharged in stable condition accompanied by: self.  Departure Mode: Ambulatory.  Face to Face time: 0.    Sol Sher RN

## 2019-02-28 NOTE — PATIENT INSTRUCTIONS
Contact Numbers    [facility] Main Line: [phone]  [facility] Triage and after hours / weekends / holidays:  [phone]      Please call the triage or after hours line if you experience a temperature greater than or equal to 100.5, shaking chills, have uncontrolled nausea, vomiting and/or diarrhea, dizziness, shortness of breath, chest pain, bleeding, unexplained bruising, or if you have any other new/concerning symptoms, questions or concerns.      If you are having any concerning symptoms or wish to speak to a provider before your next infusion visit, please call your care coordinator or triage to notify them so we can adequately serve you.     If you need a refill on a narcotic prescription or other medication, please call before your infusion appointment.           February 2019 Sunday Monday Tuesday Wednesday Thursday Friday Saturday                            1    XR VIDEO SPEECH W ESOPHAGRAM   2:00 PM   (60 min.)   [address] [place] [address] Imaging [place] Xray    VIDEO SWALLOW STUDY   2:00 PM   (60 min.)   Khalida March SLP   [facility] 2       3     4     5     6     7    [facility]   1:00 PM   (15 min.)    [facility] DRAW   [facility] Lab Draw    [facility] ONC INFUSION 60   1:30 PM   (60 min.)    ONCOLOGY INFUSION   [facility] 8     9       10     11     12     13     14     15     16       17     18     19     20     21     22     23       24     25     26     27     28    [facility]  12:30 PM   (15 min.)    [facility] DRAW   [facility] Lab Draw    [facility] RETURN  12:45 PM   (50 min.)   Yessica Ovalles, APRN CNP   [facility]    [facility] ONC INFUSION 60   2:00 PM   (60 min.)    ONCOLOGY INFUSION   [facility]                       March 2019 Sunday Monday Tuesday Wednesday Thursday Friday Saturday                            1     2       3     4     5    CT CHEST/ABDOMEN/PELVIS W  12:00 PM   (20 min.)   [address]  Imaging Center CT    LAB  12:30 PM   (15 min.)   [facility]   [facility] Lab 6    [facility] RETURN  12:45 PM   (30 min.)   Steve Arceo MD   [facility] 7     8     9       10     11     12     13     14     15     16       17     18     19     20     21     22     23       24     25     26     27     28     29     30       31                                                  Recent Results (from the past 24 hour(s))   Comprehensive metabolic panel    Collection Time: 02/28/19 12:47 PM   Result Value Ref Range    Sodium 135 133 - 144 mmol/L    Potassium 4.1 3.4 - 5.3 mmol/L    Chloride 101 94 - 109 mmol/L    Carbon Dioxide 26 20 - 32 mmol/L    Anion Gap 7 3 - 14 mmol/L    Glucose 77 70 - 99 mg/dL    Urea Nitrogen 17 7 - 30 mg/dL    Creatinine 1.56 (H) 0.66 - 1.25 mg/dL    GFR Estimate 44 (L) >60 mL/min/[1.73_m2]    GFR Estimate If Black 51 (L) >60 mL/min/[1.73_m2]    Calcium 9.9 8.5 - 10.1 mg/dL    Bilirubin Total 0.5 0.2 - 1.3 mg/dL    Albumin 3.5 3.4 - 5.0 g/dL    Protein Total 7.9 6.8 - 8.8 g/dL    Alkaline Phosphatase 94 40 - 150 U/L    ALT 14 0 - 70 U/L    AST 23 0 - 45 U/L   TSH with free T4 reflex    Collection Time: 02/28/19 12:47 PM   Result Value Ref Range    TSH 0.85 0.40 - 4.00 mU/L

## 2019-02-28 NOTE — PROGRESS NOTES
Reason for Visit: seen in f/u of recurrent, metastatic penile urethral carcinoma    Oncology HPI: Mr. King Gonsalo Bruno is a 73 year old man with a history of stage II penile urethral carcinoma, treated with cisplatin/gemzar (split on day 1 and 8 given hx of CKD). His first 2 cycles were complicated by dehydration and nausea. He had a positive response and completed 4 cycles in November 2017. He underwent radial penectomy and urethrectomy, perineal urethrostomy and bilateral inguinal node dissection on 3/8/18. Surgical pathology demonstrated moderately differentiated SCC of the penile urethra with extension into the corpus spongiosum and cavernosum of the penile shaft. The specimen was positive for lymphovascular and perineural invasion. Margins were negative. 1/5 L inguinal nodes were positive and 1/7 R inguinal nodes were positive for disease. He was referred for consideration of radiation. At the time of that appointment, he was found to have multiple new pulmonary nodules concerning for metastatic disease. Biopsy was discussed, but after discussion with patient, opted to observe with a PET/CT 6 weeks later. PET/CT on 6/26/18 showed multiple hypermetabolic pulmonary nodules. He was offered palliative intent immunotherapy. He was started on Keytruda on 8/3/18.  He has tolerated well and has had stable disease on treatment.     Interval history: He is feeling well. No significant change to fatigue, naps at times. No change to arthritis. No cough, sob, cp, palpitation. Has some chronic nasal congestion, improves with flonase and saline nasal spray. Mild constipation, uses miralax with improvement. Feels the omeprazole causes constiaption. No breakthrough reflux.  Bladder function is wnl. Has an occasional rash on the back, comes and goes. Not pruritic. No bleeding or bruising.     Current Outpatient Medications   Medication Sig Dispense Refill     acetaminophen (TYLENOL) 325 MG tablet Take 2 tablets (650 mg) by mouth  every 4 hours as needed for mild pain or fever 150 tablet 0     amLODIPine (NORVASC) 10 MG tablet TAKE ONE TABLET BY MOUTH EVERY DAY 90 tablet 3     B Complex Vitamins (VITAMIN B COMPLEX PO) Take by mouth daily (with lunch)       docusate sodium (COLACE) 100 MG capsule Take 1 capsule (100 mg) by mouth 2 times daily as needed for constipation (Patient not taking: Reported on 10/25/2018) 60 capsule 0     fluticasone (FLONASE) 50 MCG/ACT spray Spray 2 sprays into both nostrils 2 times daily 2 Bottle 11     LORazepam (ATIVAN) 0.5 MG tablet Take 1 tablet (0.5 mg) by mouth nightly as needed for anxiety 30 tablet 3     omeprazole (PRILOSEC) 2 mg/mL SUSP Take 10 mLs (20 mg) by mouth 2 times daily (Patient not taking: Reported on 2/7/2019) 280 mL 11     omeprazole (PRILOSEC) 40 MG DR capsule Take 1 capsule (40 mg) by mouth daily 30 capsule 1     polyethylene glycol (MIRALAX/GLYCOLAX) packet Take 1 packet by mouth daily            Allergies   Allergen Reactions     Lisinopril Swelling     Oxycodone Nausea and Vomiting     Vicodin [Hydrocodone-Acetaminophen] Nausea and Vomiting         Exam: alert, appears well.  Blood pressure 146/70, pulse 76, temperature 98.1  F (36.7  C), temperature source Oral, resp. rate 16, weight 64.8 kg (142 lb 14.4 oz), SpO2 98 %.  Wt Readings from Last 4 Encounters:   02/28/19 64.8 kg (142 lb 14.4 oz)   02/07/19 64.1 kg (141 lb 6.4 oz)   01/17/19 64.7 kg (142 lb 11.2 oz)   12/27/18 65.2 kg (143 lb 11.2 oz)     Oropharynx is moist and without lesion. Neck supple and without adenopathy. Lungs:CTA. Heart: RRR, no murmur or rub. Abdomen: soft, nontender, BS active, no masses or organomegaly.  Extremities: warm, no edema. Speech is clear. CN wnl. Gait/station wnl.        Labs: Results for KING AILYN CHAVEZ (MRN [id number]) as of 2/28/2019 13:39   Ref. Range 2/28/2019 12:47   Sodium Latest Ref Range: 133 - 144 mmol/L 135   Potassium Latest Ref Range: 3.4 - 5.3 mmol/L 4.1   Chloride Latest Ref Range: 94 -  109 mmol/L 101   Carbon Dioxide Latest Ref Range: 20 - 32 mmol/L 26   Urea Nitrogen Latest Ref Range: 7 - 30 mg/dL 17   Creatinine Latest Ref Range: 0.66 - 1.25 mg/dL 1.56 (H)   GFR Estimate Latest Ref Range: >60 mL/min/1.73_m2 44 (L)   GFR Estimate If Black Latest Ref Range: >60 mL/min/1.73_m2 51 (L)   Calcium Latest Ref Range: 8.5 - 10.1 mg/dL 9.9   Anion Gap Latest Ref Range: 3 - 14 mmol/L 7   Albumin Latest Ref Range: 3.4 - 5.0 g/dL 3.5   Protein Total Latest Ref Range: 6.8 - 8.8 g/dL 7.9   Bilirubin Total Latest Ref Range: 0.2 - 1.3 mg/dL 0.5   Alkaline Phosphatase Latest Ref Range: 40 - 150 U/L 94   ALT Latest Ref Range: 0 - 70 U/L 14   AST Latest Ref Range: 0 - 45 U/L 23   Glucose Latest Ref Range: 70 - 99 mg/dL 77       Imaging: n/a    Impression/plan:   1. Recurrent, metastatic penile urethral carcinoma  -on pembrolizumab sine August 2018 with stable disease and good tolerance  -has mild fatigue and arthritis with treatment, stable  -continue with pembrolizumab today, will f/u with Dr. Arceo next week with restaging    2. CKD  -Cr stable at 1.56    3. HTN  -on amlodipine 10 mg daily, BP elevated, Will monitor rather than dose adjust    4. Arthritis  -stable. Using tylenol prn, stretching and Anthony Chi     5. GERD  -no reflux symptoms, will decreased omeprazole to 20 mg daily    6. Constipation-mild  -continue miralax prn

## 2019-02-28 NOTE — LETTER
2/28/2019       RE: King Gonsalo Bruno  [address]  [facility] [id number]     Dear Colleague,    Thank you for referring your patient, King Gonsalo Bruno, to the [facility]. Please see a copy of my visit note below.    Reason for Visit: seen in f/u of recurrent, metastatic penile urethral carcinoma    Oncology HPI: Mr. King Gonsalo Bruno is a 73 year old man with a history of stage II penile urethral carcinoma, treated with cisplatin/gemzar (split on day 1 and 8 given hx of CKD). His first 2 cycles were complicated by dehydration and nausea. He had a positive response and completed 4 cycles in November 2017. He underwent radial penectomy and urethrectomy, perineal urethrostomy and bilateral inguinal node dissection on 3/8/18. Surgical pathology demonstrated moderately differentiated SCC of the penile urethra with extension into the corpus spongiosum and cavernosum of the penile shaft. The specimen was positive for lymphovascular and perineural invasion. Margins were negative. 1/5 L inguinal nodes were positive and 1/7 R inguinal nodes were positive for disease. He was referred for consideration of radiation. At the time of that appointment, he was found to have multiple new pulmonary nodules concerning for metastatic disease. Biopsy was discussed, but after discussion with patient, opted to observe with a PET/CT 6 weeks later. PET/CT on 6/26/18 showed multiple hypermetabolic pulmonary nodules. He was offered palliative intent immunotherapy. He was started on Keytruda on 8/3/18.  He has tolerated well and has had stable disease on treatment.     Interval history: He is feeling well. No significant change to fatigue, naps at times. No change to arthritis. No cough, sob, cp, palpitation. Has some chronic nasal congestion, improves with flonase and saline nasal spray. Mild constipation, uses miralax with improvement. Feels the omeprazole causes constiaption. No breakthrough reflux.  Bladder  function is wnl. Has an occasional rash on the back, comes and goes. Not pruritic. No bleeding or bruising.     Current Outpatient Medications   Medication Sig Dispense Refill     acetaminophen (TYLENOL) 325 MG tablet Take 2 tablets (650 mg) by mouth every 4 hours as needed for mild pain or fever 150 tablet 0     amLODIPine (NORVASC) 10 MG tablet TAKE ONE TABLET BY MOUTH EVERY DAY 90 tablet 3     B Complex Vitamins (VITAMIN B COMPLEX PO) Take by mouth daily (with lunch)       docusate sodium (COLACE) 100 MG capsule Take 1 capsule (100 mg) by mouth 2 times daily as needed for constipation (Patient not taking: Reported on 10/25/2018) 60 capsule 0     fluticasone (FLONASE) 50 MCG/ACT spray Spray 2 sprays into both nostrils 2 times daily 2 Bottle 11     LORazepam (ATIVAN) 0.5 MG tablet Take 1 tablet (0.5 mg) by mouth nightly as needed for anxiety 30 tablet 3     omeprazole (PRILOSEC) 2 mg/mL SUSP Take 10 mLs (20 mg) by mouth 2 times daily (Patient not taking: Reported on 2/7/2019) 280 mL 11     omeprazole (PRILOSEC) 40 MG DR capsule Take 1 capsule (40 mg) by mouth daily 30 capsule 1     polyethylene glycol (MIRALAX/GLYCOLAX) packet Take 1 packet by mouth daily            Allergies   Allergen Reactions     Lisinopril Swelling     Oxycodone Nausea and Vomiting     Vicodin [Hydrocodone-Acetaminophen] Nausea and Vomiting       Exam: alert, appears well.  Blood pressure 146/70, pulse 76, temperature 98.1  F (36.7  C), temperature source Oral, resp. rate 16, weight 64.8 kg (142 lb 14.4 oz), SpO2 98 %.  Wt Readings from Last 4 Encounters:   02/28/19 64.8 kg (142 lb 14.4 oz)   02/07/19 64.1 kg (141 lb 6.4 oz)   01/17/19 64.7 kg (142 lb 11.2 oz)   12/27/18 65.2 kg (143 lb 11.2 oz)     Oropharynx is moist and without lesion. Neck supple and without adenopathy. Lungs:CTA. Heart: RRR, no murmur or rub. Abdomen: soft, nontender, BS active, no masses or organomegaly.  Extremities: warm, no edema. Speech is clear. CN wnl. Gait/station  wnl.      Labs: Results for KING AILYN CHAVEZ (MRN [id number]) as of 2/28/2019 13:39   Ref. Range 2/28/2019 12:47   Sodium Latest Ref Range: 133 - 144 mmol/L 135   Potassium Latest Ref Range: 3.4 - 5.3 mmol/L 4.1   Chloride Latest Ref Range: 94 - 109 mmol/L 101   Carbon Dioxide Latest Ref Range: 20 - 32 mmol/L 26   Urea Nitrogen Latest Ref Range: 7 - 30 mg/dL 17   Creatinine Latest Ref Range: 0.66 - 1.25 mg/dL 1.56 (H)   GFR Estimate Latest Ref Range: >60 mL/min/1.73_m2 44 (L)   GFR Estimate If Black Latest Ref Range: >60 mL/min/1.73_m2 51 (L)   Calcium Latest Ref Range: 8.5 - 10.1 mg/dL 9.9   Anion Gap Latest Ref Range: 3 - 14 mmol/L 7   Albumin Latest Ref Range: 3.4 - 5.0 g/dL 3.5   Protein Total Latest Ref Range: 6.8 - 8.8 g/dL 7.9   Bilirubin Total Latest Ref Range: 0.2 - 1.3 mg/dL 0.5   Alkaline Phosphatase Latest Ref Range: 40 - 150 U/L 94   ALT Latest Ref Range: 0 - 70 U/L 14   AST Latest Ref Range: 0 - 45 U/L 23   Glucose Latest Ref Range: 70 - 99 mg/dL 77       Imaging: n/a    Impression/plan:   1. Recurrent, metastatic penile urethral carcinoma  -on pembrolizumab sine August 2018 with stable disease and good tolerance  -has mild fatigue and arthritis with treatment, stable  -continue with pembrolizumab today, will f/u with Dr. Arceo next week with restaging    2. CKD  -Cr stable at 1.56    3. HTN  -on amlodipine 10 mg daily, BP elevated, Will monitor rather than dose adjust    4. Arthritis  -stable. Using tylenol prn, stretching and Anthony Chi     5. GERD  -no reflux symptoms, will decreased omeprazole to 20 mg daily    6. Constipation-mild  -continue miralax prn    Again, thank you for allowing me to participate in the care of your patient.      Sincerely,    Yessica Ovalles, ISAURA CNP

## 2019-02-28 NOTE — NURSING NOTE
Chief Complaint   Patient presents with     Blood Draw     labs drawn with piv start by rn.  vs taken     Labs drawn with PIV start by rn.  Pt tolerated well.  VS taken and pt checked in for next appt.  Ana Aldana RN

## 2019-03-05 NOTE — DISCHARGE INSTRUCTIONS

## 2019-03-06 NOTE — LETTER
3/6/2019     RE: King Gonsalo Bruno  [address]  [facility] [address]     Dear Colleague,    Thank you for referring your patient, King Gonsalo Bruno, to the [facility]. Please see a copy of my visit note below.    Reason for Visit: seen in f/u of recurrent, metastatic penile urethral carcinoma    Oncology HPI: Mr. King Gonsalo Bruno is a 73 year old man with a history of stage II penile urethral carcinoma, treated with cisplatin/gemzar (split on day 1 and 8 given hx of CKD). His first 2 cycles were complicated by dehydration and nausea. He had a positive response and completed 4 cycles in November 2017. He underwent radial penectomy and urethrectomy, perineal urethrostomy and bilateral inguinal node dissection on 3/8/18. Surgical pathology demonstrated moderately differentiated SCC of the penile urethra with extension into the corpus spongiosum and cavernosum of the penile shaft. The specimen was positive for lymphovascular and perineural invasion. Margins were negative. 1/5 L inguinal nodes were positive and 1/7 R inguinal nodes were positive for disease. He was referred for consideration of radiation. At the time of that appointment, he was found to have multiple new pulmonary nodules concerning for metastatic disease. Biopsy was discussed, but after discussion with patient, opted to observe with a PET/CT 6 weeks later. PET/CT on 6/26/18 showed multiple hypermetabolic pulmonary nodules. He was offered palliative intent immunotherapy. He was started on Keytruda on 8/3/18.  He has tolerated well and has had stable disease on treatment.     Interval history: He is feeling well. No significant change to fatigue, naps at times. No change to arthritis. No cough, sob, cp, palpitation. Has some chronic nasal congestion, improves with flonase and saline nasal spray. Mild constipation, uses miralax with improvement. Feels the omeprazole causes constiaption. No breakthrough reflux.  Bladder  function is wnl. Has an occasional rash on the back, comes and goes. Not pruritic. No bleeding or bruising.     Current Outpatient Medications   Medication Sig     acetaminophen (TYLENOL) 325 MG tablet Take 2 tablets (650 mg) by mouth every 4 hours as needed for mild pain or fever     amLODIPine (NORVASC) 10 MG tablet TAKE ONE TABLET BY MOUTH EVERY DAY     B Complex Vitamins (VITAMIN B COMPLEX PO) Take by mouth daily (with lunch)     docusate sodium (COLACE) 100 MG capsule Take 1 capsule (100 mg) by mouth 2 times daily as needed for constipation     fluticasone (FLONASE) 50 MCG/ACT nasal spray Spray 2 sprays into both nostrils 2 times daily     LORazepam (ATIVAN) 0.5 MG tablet Take 1 tablet (0.5 mg) by mouth nightly as needed for anxiety     omeprazole (PRILOSEC) 20 MG DR capsule Take 1 capsule (20 mg) by mouth daily     omeprazole (PRILOSEC) 40 MG DR capsule Take 1 capsule (40 mg) by mouth daily     polyethylene glycol (MIRALAX/GLYCOLAX) packet Take 1 packet by mouth daily     No current facility-administered medications for this visit.      Facility-Administered Medications Ordered in Other Visits   Medication     barium sulfate (EZ PAQUE) oral suspension 96%     barium sulfate (EZ-HD) oral suspension 98%     barium sulfate 40% (VARIBAR NECTAR) oral suspension 20 mL     barium sulfate 40% (VARIBAR THIN HONEY) oral suspension 20 mL     barium sulfate 40% (VARIBAR THIN HONEY) oral suspension     sod bicarbonate-citric acid-simethicone (EZ GAS) 2.21-1.53-0.04 G packet 4 g           Allergies   Allergen Reactions     Lisinopril Swelling     Oxycodone Nausea and Vomiting     Vicodin [Hydrocodone-Acetaminophen] Nausea and Vomiting         Exam: alert, appears well.  Blood pressure 118/73, pulse 76, weight 64.4 kg (141 lb 14.4 oz), SpO2 98 %.  Wt Readings from Last 4 Encounters:   03/06/19 64.4 kg (141 lb 14.4 oz)   02/28/19 64.8 kg (142 lb 14.4 oz)   02/07/19 64.1 kg (141 lb 6.4 oz)   01/17/19 64.7 kg (142 lb 11.2 oz)      Oropharynx is moist and without lesion. Neck supple and without adenopathy. Lungs:CTA. Heart: RRR, no murmur or rub. Abdomen: soft, nontender, BS active, no masses or organomegaly.  Extremities: warm, no edema. Speech is clear. CN wnl. Gait/station wnl.        Labs:    Recent Labs   Lab Test 02/28/19  1247 02/07/19  1314 01/17/19  1313 12/27/18  1325 12/27/18  1147 12/06/18  0926    135 132*  --  134 137   POTASSIUM 4.1 3.8 4.6 4.0 5.5* 4.7   CHLORIDE 101 101 101  --  103 103   CO2 26 24 24  --  24 25   ANIONGAP 7 10 8  --  8 9   BUN 17 16 16  --  18 13   CR 1.56* 1.63* 1.49*  --  1.73* 1.56*   GLC 77 83 74  --  79 89   SAADIA 9.9 9.7 9.5  --  9.5 9.6     Recent Labs   Lab Test 12/27/18  1147 11/24/18  1223 11/15/18  1223 10/25/18  1242 10/04/18  1240   MAG 2.0 1.8 1.8 1.9 1.9     Recent Labs   Lab Test 09/13/18  1231 06/26/18  1116 04/25/18  1147 03/12/18  0818 03/11/18  0730  12/13/17  0813 12/01/17  0824   WBC 7.1 6.3 5.6 9.2 8.7   < > 4.3 2.2*   HGB 10.1* 11.1* 11.8* 9.9* 9.1*   < > 9.4* 9.1*    277 299 264 239   < > 262 113*   MCV 84 89 91 91 91   < > 91 89   NEUTROPHIL 57.6 53.2 51.3  --   --   --  30.4 66.7    < > = values in this interval not displayed.     Recent Labs   Lab Test 02/28/19  1247 02/07/19  1314 01/17/19  1313  06/26/18  1116   BILITOTAL 0.5 0.5 0.5   < > 0.4   ALKPHOS 94 88 80   < > 76   ALT 14 12 16   < > 16   AST 23 26 37   < > 27   ALBUMIN 3.5 3.6 3.6   < > 4.1   LDH  --   --   --   --  174    < > = values in this interval not displayed.     TSH   Date Value Ref Range Status   02/28/2019 0.85 0.40 - 4.00 mU/L Final   02/07/2019 0.97 0.40 - 4.00 mU/L Final   01/17/2019 0.81 0.40 - 4.00 mU/L Final     No results for input(s): CEA in the last [id number] hours.  Results for orders placed or performed in visit on 03/05/19   CT Chest/Abdomen/Pelvis w Contrast    Narrative    EXAMINATION: CT CHEST/ABDOMEN/PELVIS W CONTRAST, 3/5/2019 12:37 PM    TECHNIQUE:  Helical CT images from the  thoracic inlet through the  symphysis pubis were obtained  with contrast. Contrast dose: Isovue  370 86cc    COMPARISON: CT cap 11/24/2018 and 9/26/2018    HISTORY: Restaging urethral cancer with known lung metastasis and  inguinal does - mixed response on immunotherapy; Urethral cancer (H);  Malignant neoplasm metastatic to both lungs (H); Malignant neoplasm  metastatic to both lungs (H)    FINDINGS:    Chest: A few tiny hypodense thyroid nodules, similar to prior. Heart  is not enlarged. No pericardial effusion. Mild coronary artery  calcifications. No main or lobar pulmonary embolus. Branches of the  lower lobar pulmonary artery and vein are encased and narrowed by  extensive metastatic disease below; however, these vessels appear to  be patent to the degree that can be evaluated on this single phase  exam. Subcarinal lymphadenopathy, increased in size since prior, now  measuring 1.7 cm in short axis dimension. Trachea and central airways  are clear. Several right lower lobar segmental airways are encased by  extensive conglomerate metastatic disease; and these airways remain  patent, but narrowed. Extensive centrilobular and paraseptal  emphysematous changes with an upper lobar predominance.    When compared to prior CT, numerous pulmonary metastases have  increased in size:    Conglomerate right infrahilar metastatic disease measures  approximately 7.1 x 5.4 cm. Previously, 2.9 x 4.0 cm. This mass abuts  the mediastinum without convincing evidence of invasion. Left upper  lobe paramediastinal metastasis measures 4.8 x 3.7 cm. Previously, 1.3  x 1.8 cm. This mass abuts the mediastinum without definitive evidence  of invasion. There are numerous additional pulmonary metastases, which  have similarly increased in size. A right upper lobe nodule (series 4,  image 46) measures 1.1 x 0.8 cm, stable from the prior. Unchanged left  upper lobe pleural-based nodule (series 4, image 32) less than 1 cm in  maximal  dimension.    Abdomen and pelvis: A few scattered and tiny hypodense lesions in the  liver are too small to accurately characterize, but are stable when  compared to CT 9/26/2018. No frankly worrisome liver lesion.  Gallbladder is normal; no dense stones. Spleen is normal, not  enlarged. Negative adrenal glands. Numerous simple right and left  renal cysts are again seen. A few hypodense lesions in each kidney are  too small to accurately characterize, but are stable. Dominant dorsal  pancreatic duct which is prominent, but not frankly dilated.  Appearance is unchanged. Pancreas is otherwise normal. No abdominal  aortic aneurysm. Moderate aorta biiliac atherosclerotic plaque  deposition. Hepatic and portal venous systems are patent. There is no  bowel obstruction prostate is enlarged. Urinary bladder is  well-distended and normal. Postoperative changes in both groins,  presumably relating to lymph node dissection and/or biopsy. A few  prominent left inguinal lymph nodes are again seen, but have decreased  in size. Dominant left inguinal node on series 3, image 599 measures  11 mm in short axis dimension. Previously, 1.3 cm. No intraperitoneal  lymphadenopathy.    Bones and soft tissues: No suspicious lesions in the bones. Old  left-sided rib fractures. Degenerative changes in the spine and at  both hips. Unchanged lucency in the subarticular SPECT of the left  femoral head      Impression    IMPRESSION: In this patient with history of urethral cancer on  immunotherapy:  1. Numerous pulmonary metastases in both lungs have increased in size.  A few smaller pulmonary nodules are stable  2. Increasing subcarinal lymphadenopathy.  3. Postoperative/postprocedural changes in the right and left groin  with slightly decreased size of a dominant left inguinal lymph node.  4. Extensive emphysematous changes in both lungs.  5. Left femoral head subarticular lucency does not appear to be  appreciably changed when compared to  prior. Given associated  acetabular changes, findings are favored as degenerative.    LIZ FORD MD         Impression/plan:   1. Recurrent, metastatic penile urethral carcinoma  -on pembrolizumab sine August 2018 and tolerating well  -has mild fatigue and arthritis with treatment, stable    I had a lengthy discussion with . He has marked disease progression. This is quite concerning. I do feel that we are not gaining any benefit from pembrolizumab. I would recommend hospice for him. He is relatively asymptomatic from his disease. He denies cough, SOB, chest pain, loss of appetite/energy, somnolence. Hospice team can help his address symptoms preemptively.    He expressed that he is not prepared to die. He had been previously opposed to idea of chemotherapy. He wanted to know his chemotherapy choices. We could consider single agent Taxotere (docetaxel). I explained him that this may or may not work but the side effects are almost a given.     I reviewed chemotherapy with docetaxel. I extensively reviewed the side effects from this chemotherapy.  We reviewed the peripheral neuropathy, alopecia, neutropenic fevers, myelosuppression, mucositis, diarrhea, allergies, pedal edema and rash.  Of these, alopecia and nail changes have more cosmetic bearings.  Neutropenic fevers are something to be quite cognizant about. He should take every fever seriously because if his counts are low, then he would not have the defenses and he should give us a call immediately.  He should not even wait for the next morning.  He should be seen either in the clinic or in the ER the same day.  We would get basic blood tests including the CBC and blood cultures.  We would do a fever workup for him.  If the counts were adequate, we could discharge him home on antibiotics if he looked stable.  If his counts were low, even if he was feeling fine, we would have to admit him to ensure that he was safe and stable. We reviewed the palliative  intent, side effects, benefits, rationale, alternatives and outpatient regimen with him.       He would like to pursue chemotherapy. I will refer him to palliative care team for help with goals of care and grief with dying.     2. CKD  -Cr stable at 1.56    3. HTN  -on amlodipine 10 mg daily, BP elevated, Will monitor rather than dose adjust    4. Arthritis  -stable. Using tylenol prn, stretching and Anthony Chi     5. GERD  -no reflux symptoms, will decreased omeprazole to 20 mg daily    6. Constipation-mild  -continue miralax prn    Over 45 min of direct face to face time spent with patient with more than 50% time spent in counseling and coordinating care.      Again, thank you for allowing me to participate in the care of your patient.      Sincerely,    Steve Arceo MD

## 2019-03-06 NOTE — PROGRESS NOTES
Reason for Visit: seen in f/u of recurrent, metastatic penile urethral carcinoma    Oncology HPI: Mr. King Gonsalo Bruno is a 73 year old man with a history of stage II penile urethral carcinoma, treated with cisplatin/gemzar (split on day 1 and 8 given hx of CKD). His first 2 cycles were complicated by dehydration and nausea. He had a positive response and completed 4 cycles in November 2017. He underwent radial penectomy and urethrectomy, perineal urethrostomy and bilateral inguinal node dissection on 3/8/18. Surgical pathology demonstrated moderately differentiated SCC of the penile urethra with extension into the corpus spongiosum and cavernosum of the penile shaft. The specimen was positive for lymphovascular and perineural invasion. Margins were negative. 1/5 L inguinal nodes were positive and 1/7 R inguinal nodes were positive for disease. He was referred for consideration of radiation. At the time of that appointment, he was found to have multiple new pulmonary nodules concerning for metastatic disease. Biopsy was discussed, but after discussion with patient, opted to observe with a PET/CT 6 weeks later. PET/CT on 6/26/18 showed multiple hypermetabolic pulmonary nodules. He was offered palliative intent immunotherapy. He was started on Keytruda on 8/3/18.  He has tolerated well and has had stable disease on treatment.     Interval history: He is feeling well. No significant change to fatigue, naps at times. No change to arthritis. No cough, sob, cp, palpitation. Has some chronic nasal congestion, improves with flonase and saline nasal spray. Mild constipation, uses miralax with improvement. Feels the omeprazole causes constiaption. No breakthrough reflux.  Bladder function is wnl. Has an occasional rash on the back, comes and goes. Not pruritic. No bleeding or bruising.     Current Outpatient Medications   Medication Sig     acetaminophen (TYLENOL) 325 MG tablet Take 2 tablets (650 mg) by mouth every 4 hours  as needed for mild pain or fever     amLODIPine (NORVASC) 10 MG tablet TAKE ONE TABLET BY MOUTH EVERY DAY     B Complex Vitamins (VITAMIN B COMPLEX PO) Take by mouth daily (with lunch)     docusate sodium (COLACE) 100 MG capsule Take 1 capsule (100 mg) by mouth 2 times daily as needed for constipation     fluticasone (FLONASE) 50 MCG/ACT nasal spray Spray 2 sprays into both nostrils 2 times daily     LORazepam (ATIVAN) 0.5 MG tablet Take 1 tablet (0.5 mg) by mouth nightly as needed for anxiety     omeprazole (PRILOSEC) 20 MG DR capsule Take 1 capsule (20 mg) by mouth daily     omeprazole (PRILOSEC) 40 MG DR capsule Take 1 capsule (40 mg) by mouth daily     polyethylene glycol (MIRALAX/GLYCOLAX) packet Take 1 packet by mouth daily     No current facility-administered medications for this visit.      Facility-Administered Medications Ordered in Other Visits   Medication     barium sulfate (EZ PAQUE) oral suspension 96%     barium sulfate (EZ-HD) oral suspension 98%     barium sulfate 40% (VARIBAR NECTAR) oral suspension 20 mL     barium sulfate 40% (VARIBAR THIN HONEY) oral suspension 20 mL     barium sulfate 40% (VARIBAR THIN HONEY) oral suspension     sod bicarbonate-citric acid-simethicone (EZ GAS) 2.21-1.53-0.04 G packet 4 g           Allergies   Allergen Reactions     Lisinopril Swelling     Oxycodone Nausea and Vomiting     Vicodin [Hydrocodone-Acetaminophen] Nausea and Vomiting         Exam: alert, appears well.  Blood pressure 118/73, pulse 76, weight 64.4 kg (141 lb 14.4 oz), SpO2 98 %.  Wt Readings from Last 4 Encounters:   03/06/19 64.4 kg (141 lb 14.4 oz)   02/28/19 64.8 kg (142 lb 14.4 oz)   02/07/19 64.1 kg (141 lb 6.4 oz)   01/17/19 64.7 kg (142 lb 11.2 oz)     Oropharynx is moist and without lesion. Neck supple and without adenopathy. Lungs:CTA. Heart: RRR, no murmur or rub. Abdomen: soft, nontender, BS active, no masses or organomegaly.  Extremities: warm, no edema. Speech is clear. CN wnl.  Gait/station wnl.        Labs:    Recent Labs   Lab Test 02/28/19  1247 02/07/19  1314 01/17/19  1313 12/27/18  1325 12/27/18  1147 12/06/18  0926    135 132*  --  134 137   POTASSIUM 4.1 3.8 4.6 4.0 5.5* 4.7   CHLORIDE 101 101 101  --  103 103   CO2 26 24 24  --  24 25   ANIONGAP 7 10 8  --  8 9   BUN 17 16 16  --  18 13   CR 1.56* 1.63* 1.49*  --  1.73* 1.56*   GLC 77 83 74  --  79 89   SAADIA 9.9 9.7 9.5  --  9.5 9.6     Recent Labs   Lab Test 12/27/18  1147 11/24/18  1223 11/15/18  1223 10/25/18  1242 10/04/18  1240   MAG 2.0 1.8 1.8 1.9 1.9     Recent Labs   Lab Test 09/13/18  1231 06/26/18  1116 04/25/18  1147 03/12/18  0818 03/11/18  0730  12/13/17  0813 12/01/17  0824   WBC 7.1 6.3 5.6 9.2 8.7   < > 4.3 2.2*   HGB 10.1* 11.1* 11.8* 9.9* 9.1*   < > 9.4* 9.1*    277 299 264 239   < > 262 113*   MCV 84 89 91 91 91   < > 91 89   NEUTROPHIL 57.6 53.2 51.3  --   --   --  30.4 66.7    < > = values in this interval not displayed.     Recent Labs   Lab Test 02/28/19  1247 02/07/19  1314 01/17/19  1313  06/26/18  1116   BILITOTAL 0.5 0.5 0.5   < > 0.4   ALKPHOS 94 88 80   < > 76   ALT 14 12 16   < > 16   AST 23 26 37   < > 27   ALBUMIN 3.5 3.6 3.6   < > 4.1   LDH  --   --   --   --  174    < > = values in this interval not displayed.     TSH   Date Value Ref Range Status   02/28/2019 0.85 0.40 - 4.00 mU/L Final   02/07/2019 0.97 0.40 - 4.00 mU/L Final   01/17/2019 0.81 0.40 - 4.00 mU/L Final     No results for input(s): CEA in the last [id number] hours.  Results for orders placed or performed in visit on 03/05/19   CT Chest/Abdomen/Pelvis w Contrast    Narrative    EXAMINATION: CT CHEST/ABDOMEN/PELVIS W CONTRAST, 3/5/2019 12:37 PM    TECHNIQUE:  Helical CT images from the thoracic inlet through the  symphysis pubis were obtained  with contrast. Contrast dose: Isovue  370 86cc    COMPARISON: CT cap 11/24/2018 and 9/26/2018    HISTORY: Restaging urethral cancer with known lung metastasis and  inguinal does - mixed  response on immunotherapy; Urethral cancer (H);  Malignant neoplasm metastatic to both lungs (H); Malignant neoplasm  metastatic to both lungs (H)    FINDINGS:    Chest: A few tiny hypodense thyroid nodules, similar to prior. Heart  is not enlarged. No pericardial effusion. Mild coronary artery  calcifications. No main or lobar pulmonary embolus. Branches of the  lower lobar pulmonary artery and vein are encased and narrowed by  extensive metastatic disease below; however, these vessels appear to  be patent to the degree that can be evaluated on this single phase  exam. Subcarinal lymphadenopathy, increased in size since prior, now  measuring 1.7 cm in short axis dimension. Trachea and central airways  are clear. Several right lower lobar segmental airways are encased by  extensive conglomerate metastatic disease; and these airways remain  patent, but narrowed. Extensive centrilobular and paraseptal  emphysematous changes with an upper lobar predominance.    When compared to prior CT, numerous pulmonary metastases have  increased in size:    Conglomerate right infrahilar metastatic disease measures  approximately 7.1 x 5.4 cm. Previously, 2.9 x 4.0 cm. This mass abuts  the mediastinum without convincing evidence of invasion. Left upper  lobe paramediastinal metastasis measures 4.8 x 3.7 cm. Previously, 1.3  x 1.8 cm. This mass abuts the mediastinum without definitive evidence  of invasion. There are numerous additional pulmonary metastases, which  have similarly increased in size. A right upper lobe nodule (series 4,  image 46) measures 1.1 x 0.8 cm, stable from the prior. Unchanged left  upper lobe pleural-based nodule (series 4, image 32) less than 1 cm in  maximal dimension.    Abdomen and pelvis: A few scattered and tiny hypodense lesions in the  liver are too small to accurately characterize, but are stable when  compared to CT 9/26/2018. No frankly worrisome liver lesion.  Gallbladder is normal; no dense  stones. Spleen is normal, not  enlarged. Negative adrenal glands. Numerous simple right and left  renal cysts are again seen. A few hypodense lesions in each kidney are  too small to accurately characterize, but are stable. Dominant dorsal  pancreatic duct which is prominent, but not frankly dilated.  Appearance is unchanged. Pancreas is otherwise normal. No abdominal  aortic aneurysm. Moderate aorta biiliac atherosclerotic plaque  deposition. Hepatic and portal venous systems are patent. There is no  bowel obstruction prostate is enlarged. Urinary bladder is  well-distended and normal. Postoperative changes in both groins,  presumably relating to lymph node dissection and/or biopsy. A few  prominent left inguinal lymph nodes are again seen, but have decreased  in size. Dominant left inguinal node on series 3, image 599 measures  11 mm in short axis dimension. Previously, 1.3 cm. No intraperitoneal  lymphadenopathy.    Bones and soft tissues: No suspicious lesions in the bones. Old  left-sided rib fractures. Degenerative changes in the spine and at  both hips. Unchanged lucency in the subarticular SPECT of the left  femoral head      Impression    IMPRESSION: In this patient with history of urethral cancer on  immunotherapy:  1. Numerous pulmonary metastases in both lungs have increased in size.  A few smaller pulmonary nodules are stable  2. Increasing subcarinal lymphadenopathy.  3. Postoperative/postprocedural changes in the right and left groin  with slightly decreased size of a dominant left inguinal lymph node.  4. Extensive emphysematous changes in both lungs.  5. Left femoral head subarticular lucency does not appear to be  appreciably changed when compared to prior. Given associated  acetabular changes, findings are favored as degenerative.    LIZ FORD MD         Impression/plan:   1. Recurrent, metastatic penile urethral carcinoma  -on pembrolizumab sine August 2018 and tolerating well  -has mild  fatigue and arthritis with treatment, stable    I had a lengthy discussion with . He has marked disease progression. This is quite concerning. I do feel that we are not gaining any benefit from pembrolizumab. I would recommend hospice for him. He is relatively asymptomatic from his disease. He denies cough, SOB, chest pain, loss of appetite/energy, somnolence. Hospice team can help his address symptoms preemptively.    He expressed that he is not prepared to die. He had been previously opposed to idea of chemotherapy. He wanted to know his chemotherapy choices. We could consider single agent Taxotere (docetaxel). I explained him that this may or may not work but the side effects are almost a given.     I reviewed chemotherapy with docetaxel. I extensively reviewed the side effects from this chemotherapy.  We reviewed the peripheral neuropathy, alopecia, neutropenic fevers, myelosuppression, mucositis, diarrhea, allergies, pedal edema and rash.  Of these, alopecia and nail changes have more cosmetic bearings.  Neutropenic fevers are something to be quite cognizant about. He should take every fever seriously because if his counts are low, then he would not have the defenses and he should give us a call immediately.  He should not even wait for the next morning.  He should be seen either in the clinic or in the ER the same day.  We would get basic blood tests including the CBC and blood cultures.  We would do a fever workup for him.  If the counts were adequate, we could discharge him home on antibiotics if he looked stable.  If his counts were low, even if he was feeling fine, we would have to admit him to ensure that he was safe and stable. We reviewed the palliative intent, side effects, benefits, rationale, alternatives and outpatient regimen with him.       He would like to pursue chemotherapy. I will refer him to palliative care team for help with goals of care and grief with dying.     2. CKD  -Cr stable at  1.56    3. HTN  -on amlodipine 10 mg daily, BP elevated, Will monitor rather than dose adjust    4. Arthritis  -stable. Using tylenol prn, stretching and Anthony Chi     5. GERD  -no reflux symptoms, will decreased omeprazole to 20 mg daily    6. Constipation-mild  -continue miralax prn    Over 45 min of direct face to face time spent with patient with more than 50% time spent in counseling and coordinating care.

## 2019-03-13 NOTE — PROGRESS NOTES
Reason for Visit: seen in f/u of recurrent, metastatic     Oncology HPI: Mr. King Gonsalo Bruno is a 73 year old man with a history of stage II penile urethral carcinoma, treated with cisplatin/gemzar (split on day 1 and 8 given hx of CKD). His first 2 cycles were complicated by dehydration and nausea. He had a positive response and completed 4 cycles in November 2017. He underwent radial penectomy and urethrectomy, perineal urethrostomy and bilateral inguinal node dissection on 3/8/18. Surgical pathology demonstrated moderately differentiated SCC of the penile urethra with extension into the corpus spongiosum and cavernosum of the penile shaft. The specimen was positive for lymphovascular and perineural invasion. Margins were negative. 1/5 L inguinal nodes were positive and 1/7 R inguinal nodes were positive for disease. He was referred for consideration of radiation. At the time of that appointment, he was found to have multiple new pulmonary nodules concerning for metastatic disease. Biopsy was discussed, but after discussion with patient, opted to observe with a PET/CT 6 weeks later. PET/CT on 6/26/18 showed multiple hypermetabolic pulmonary nodules. He was offered palliative intent immunotherapy. He was started on Keytruda on 8/3/18 and continued on that through 2/28/19. On 3/6/19, he had a restaging evaluation and was found to have progression. Hospice was discussed, but he wanted to try alternate chemotherapy. He is here to initiate docetaxel today.    Interval history:  is feeling well today. He and Stephanie are concerned about feeling sick with chemotherapy. When on cisplatin and gemzar previously, had increased N/V, dehydration and anemia. He is not eager to feel poorly again. However, he really wants to try chemo. He would rather try and quit, than not have tried. No fevers/chills. No cough, sob, cp, palpitation. No headache, vision change. Has some chronic arthritic pains in the neck and joints.  "Bowel/bladder function wnl. No neuropathy. He is coping OK with the diagnosis of progression. He is disappointed, but feeling hopeful that chemotherapy may provide him some control.    Current Outpatient Medications   Medication Sig Dispense Refill     acetaminophen (TYLENOL) 325 MG tablet Take 2 tablets (650 mg) by mouth every 4 hours as needed for mild pain or fever 150 tablet 0     amLODIPine (NORVASC) 10 MG tablet TAKE ONE TABLET BY MOUTH EVERY DAY 90 tablet 3     B Complex Vitamins (VITAMIN B COMPLEX PO) Take by mouth daily (with lunch)       docusate sodium (COLACE) 100 MG capsule Take 1 capsule (100 mg) by mouth 2 times daily as needed for constipation 60 capsule 0     fluticasone (FLONASE) 50 MCG/ACT nasal spray Spray 2 sprays into both nostrils 2 times daily 16 g 11     LORazepam (ATIVAN) 0.5 MG tablet Take 1 tablet (0.5 mg) by mouth nightly as needed for anxiety 30 tablet 3     omeprazole (PRILOSEC) 20 MG DR capsule Take 1 capsule (20 mg) by mouth daily 30 capsule 3     omeprazole (PRILOSEC) 40 MG DR capsule Take 1 capsule (40 mg) by mouth daily 30 capsule 1     polyethylene glycol (MIRALAX/GLYCOLAX) packet Take 1 packet by mouth daily            Allergies   Allergen Reactions     Lisinopril Swelling     Oxycodone Nausea and Vomiting     Vicodin [Hydrocodone-Acetaminophen] Nausea and Vomiting         Exam: alert, appears well.  Blood pressure 122/69, pulse 73, temperature 97.8  F (36.6  C), temperature source Oral, height 1.93 m (6' 3.98\"), weight 64 kg (141 lb 3.2 oz), SpO2 99 %.  Wt Readings from Last 4 Encounters:   03/14/19 64 kg (141 lb 3.2 oz)   03/06/19 64.4 kg (141 lb 14.4 oz)   02/28/19 64.8 kg (142 lb 14.4 oz)   02/07/19 64.1 kg (141 lb 6.4 oz)   Not otherwise examined today.      Labs: Results for KING AILYN CHAVEZ (MRN [id number]) as of 3/14/2019 12:09   Ref. Range 3/14/2019 10:45   Sodium Latest Ref Range: 133 - 144 mmol/L 135   Potassium Latest Ref Range: 3.4 - 5.3 mmol/L 4.7   Chloride " Latest Ref Range: 94 - 109 mmol/L 102   Carbon Dioxide Latest Ref Range: 20 - 32 mmol/L 24   Urea Nitrogen Latest Ref Range: 7 - 30 mg/dL 12   Creatinine Latest Ref Range: 0.66 - 1.25 mg/dL 1.47 (H)   GFR Estimate Latest Ref Range: >60 mL/min/1.73_m2 47 (L)   GFR Estimate If Black Latest Ref Range: >60 mL/min/1.73_m2 55 (L)   Calcium Latest Ref Range: 8.5 - 10.1 mg/dL 9.3   Anion Gap Latest Ref Range: 3 - 14 mmol/L 8   Albumin Latest Ref Range: 3.4 - 5.0 g/dL 3.2 (L)   Protein Total Latest Ref Range: 6.8 - 8.8 g/dL 7.7   Bilirubin Total Latest Ref Range: 0.2 - 1.3 mg/dL 0.4   Alkaline Phosphatase Latest Ref Range: 40 - 150 U/L 86   ALT Latest Ref Range: 0 - 70 U/L 14   AST Latest Ref Range: 0 - 45 U/L 31   Glucose Latest Ref Range: 70 - 99 mg/dL 84   WBC Latest Ref Range: 4.0 - 11.0 10e9/L 7.5   Hemoglobin Latest Ref Range: 13.3 - 17.7 g/dL 10.0 (L)   Hematocrit Latest Ref Range: 40.0 - 53.0 % 31.3 (L)   Platelet Count Latest Ref Range: 150 - 450 10e9/L 346   RBC Count Latest Ref Range: 4.4 - 5.9 10e12/L 3.67 (L)   MCV Latest Ref Range: 78 - 100 fl 85   MCH Latest Ref Range: 26.5 - 33.0 pg 27.2   MCHC Latest Ref Range: 31.5 - 36.5 g/dL 31.9   RDW Latest Ref Range: 10.0 - 15.0 % 14.0   Diff Method Unknown Automated Method   % Neutrophils Latest Units: % 62.0   % Lymphocytes Latest Units: % 26.1   % Monocytes Latest Units: % 9.8   % Eosinophils Latest Units: % 1.3   % Basophils Latest Units: % 0.4   % Immature Granulocytes Latest Units: % 0.4   Nucleated RBCs Latest Ref Range: 0 /100 0   Absolute Neutrophil Latest Ref Range: 1.6 - 8.3 10e9/L 4.6   Absolute Lymphocytes Latest Ref Range: 0.8 - 5.3 10e9/L 2.0   Absolute Monocytes Latest Ref Range: 0.0 - 1.3 10e9/L 0.7   Absolute Eosinophils Latest Ref Range: 0.0 - 0.7 10e9/L 0.1   Absolute Basophils Latest Ref Range: 0.0 - 0.2 10e9/L 0.0   Abs Immature Granulocytes Latest Ref Range: 0 - 0.4 10e9/L 0.0   Absolute Nucleated RBC Unknown 0.0       Imaging:  n/a    Impression/plan:   1. 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 He did not get dexamethasone to pre-medicate prior to today, so will increase IV dex to 20 mg. Also will add emend to IV due to his prior intolerance/nausea with treatment  -reviewed potential side effects including infusion reaction, N/V, myelosuppression, risk of infection, bleeding, alopecia, neuropathy, fatigue. Reviewed use of antiemetics and dex prep  -he felt ready to proceed. Will see him in a week to see how he is tolerating chemo. Will see him prior to cycle 2 and 3. Plan for restaging after 3 cycles    2. CKD  -Cr stable at 1.47    3. HTN  -on amlodipine 10 mg daily, BP stable    4. Arthritis  -stable, using tylenol prn, stretching and Anthony Chi    5. GERD  -on omeprazole 20 mg daily, no reflux sx    6. Mild constipation  -using miralax prn.

## 2019-03-14 NOTE — NURSING NOTE
"Oncology Rooming Note    March 14, 2019 11:00 AM   King Gonsalo Bruno is a 73 year old male who presents for:    Chief Complaint   Patient presents with     Blood Draw     labs drawn via PIV placed by vascular access RN     Oncology Clinic Visit     F/U Urethral CA with mets     Initial Vitals: /69 (BP Location: Left arm, Patient Position: Chair, Cuff Size: Adult Regular)   Pulse 73   Temp 97.8  F (36.6  C) (Oral)   Ht 1.93 m (6' 3.98\")   Wt 64 kg (141 lb 3.2 oz)   SpO2 99%   BMI 17.19 kg/m   Estimated body mass index is 17.19 kg/m  as calculated from the following:    Height as of this encounter: 1.93 m (6' 3.98\").    Weight as of this encounter: 64 kg (141 lb 3.2 oz). Body surface area is 1.85 meters squared.  Moderate Pain (4) Comment: Data Unavailable   No LMP for male patient.  Allergies reviewed: Yes  Medications reviewed: Yes    Medications: MEDICATION REFILLS NEEDED TODAY. Provider was notified.  Pharmacy name entered into [employer]:    [id number] - [place], [place] - [address] [place] [address] AT [address] [place] [address] PHARMACY [place], [place] - [address] [facility] [phone]    Clinical concerns: None, Yessica was NOT notified.      SANDRO SULLIVAN LPN            "

## 2019-03-14 NOTE — NURSING NOTE
Chief Complaint   Patient presents with     Blood Draw     labs drawn via PIV placed by vascular access RN     /69 (BP Location: Left arm, Patient Position: Chair, Cuff Size: Adult Regular)   Pulse 73   Temp 97.8  F (36.6  C) (Oral)   Wt 64 kg (141 lb 3.2 oz)   SpO2 99%   BMI 17.19 kg/m      PIV placed right lower forearm in lab for infusion and labs. Labs drawn and sent. Pt tolerated well.   Pt checked in for next appointment.    Paula Mcfarlane RN

## 2019-03-14 NOTE — PATIENT INSTRUCTIONS
Contact Numbers    [facility] Main Line: [phone]  [facility] Triage and after hours / weekends / holidays:  [phone]      Please call the triage or after hours line if you experience a temperature greater than or equal to 100.5, shaking chills, have uncontrolled nausea, vomiting and/or diarrhea, dizziness, shortness of breath, chest pain, bleeding, unexplained bruising, or if you have any other new/concerning symptoms, questions or concerns.      If you are having any concerning symptoms or wish to speak to a provider before your next infusion visit, please call your care coordinator or triage to notify them so we can adequately serve you.     If you need a refill on a narcotic prescription or other medication, please call before your infusion appointment.

## 2019-03-14 NOTE — LETTER
3/14/2019       RE: King Gonsalo Bruno  [address]  [facility] [address]     Dear Colleague,    Thank you for referring your patient, King Gonsalo Bruno, to the [facility]. Please see a copy of my visit note below.    Reason for Visit: seen in f/u of recurrent, metastatic     Oncology HPI: Mr. King Gonsalo Bruno is a 73 year old man with a history of stage II penile urethral carcinoma, treated with cisplatin/gemzar (split on day 1 and 8 given hx of CKD). His first 2 cycles were complicated by dehydration and nausea. He had a positive response and completed 4 cycles in November 2017. He underwent radial penectomy and urethrectomy, perineal urethrostomy and bilateral inguinal node dissection on 3/8/18. Surgical pathology demonstrated moderately differentiated SCC of the penile urethra with extension into the corpus spongiosum and cavernosum of the penile shaft. The specimen was positive for lymphovascular and perineural invasion. Margins were negative. 1/5 L inguinal nodes were positive and 1/7 R inguinal nodes were positive for disease. He was referred for consideration of radiation. At the time of that appointment, he was found to have multiple new pulmonary nodules concerning for metastatic disease. Biopsy was discussed, but after discussion with patient, opted to observe with a PET/CT 6 weeks later. PET/CT on 6/26/18 showed multiple hypermetabolic pulmonary nodules. He was offered palliative intent immunotherapy. He was started on Keytruda on 8/3/18 and continued on that through 2/28/19. On 3/6/19, he had a restaging evaluation and was found to have progression. Hospice was discussed, but he wanted to try alternate chemotherapy. He is here to initiate docetaxel today.    Interval history:  is feeling well today. He and Stephanie are concerned about feeling sick with chemotherapy. When on cisplatin and gemzar previously, had increased N/V, dehydration and anemia. He is not eager to  "feel poorly again. However, he really wants to try chemo. He would rather try and quit, than not have tried. No fevers/chills. No cough, sob, cp, palpitation. No headache, vision change. Has some chronic arthritic pains in the neck and joints. Bowel/bladder function wnl. No neuropathy. He is coping OK with the diagnosis of progression. He is disappointed, but feeling hopeful that chemotherapy may provide him some control.    Current Outpatient Medications   Medication Sig Dispense Refill     acetaminophen (TYLENOL) 325 MG tablet Take 2 tablets (650 mg) by mouth every 4 hours as needed for mild pain or fever 150 tablet 0     amLODIPine (NORVASC) 10 MG tablet TAKE ONE TABLET BY MOUTH EVERY DAY 90 tablet 3     B Complex Vitamins (VITAMIN B COMPLEX PO) Take by mouth daily (with lunch)       docusate sodium (COLACE) 100 MG capsule Take 1 capsule (100 mg) by mouth 2 times daily as needed for constipation 60 capsule 0     fluticasone (FLONASE) 50 MCG/ACT nasal spray Spray 2 sprays into both nostrils 2 times daily 16 g 11     LORazepam (ATIVAN) 0.5 MG tablet Take 1 tablet (0.5 mg) by mouth nightly as needed for anxiety 30 tablet 3     omeprazole (PRILOSEC) 20 MG DR capsule Take 1 capsule (20 mg) by mouth daily 30 capsule 3     omeprazole (PRILOSEC) 40 MG DR capsule Take 1 capsule (40 mg) by mouth daily 30 capsule 1     polyethylene glycol (MIRALAX/GLYCOLAX) packet Take 1 packet by mouth daily            Allergies   Allergen Reactions     Lisinopril Swelling     Oxycodone Nausea and Vomiting     Vicodin [Hydrocodone-Acetaminophen] Nausea and Vomiting         Exam: alert, appears well.  Blood pressure 122/69, pulse 73, temperature 97.8  F (36.6  C), temperature source Oral, height 1.93 m (6' 3.98\"), weight 64 kg (141 lb 3.2 oz), SpO2 99 %.  Wt Readings from Last 4 Encounters:   03/14/19 64 kg (141 lb 3.2 oz)   03/06/19 64.4 kg (141 lb 14.4 oz)   02/28/19 64.8 kg (142 lb 14.4 oz)   02/07/19 64.1 kg (141 lb 6.4 oz)   Not " otherwise examined today.      Labs: Results for KING AILYN CHAVEZ (MRN [id number]) as of 3/14/2019 12:09   Ref. Range 3/14/2019 10:45   Sodium Latest Ref Range: 133 - 144 mmol/L 135   Potassium Latest Ref Range: 3.4 - 5.3 mmol/L 4.7   Chloride Latest Ref Range: 94 - 109 mmol/L 102   Carbon Dioxide Latest Ref Range: 20 - 32 mmol/L 24   Urea Nitrogen Latest Ref Range: 7 - 30 mg/dL 12   Creatinine Latest Ref Range: 0.66 - 1.25 mg/dL 1.47 (H)   GFR Estimate Latest Ref Range: >60 mL/min/1.73_m2 47 (L)   GFR Estimate If Black Latest Ref Range: >60 mL/min/1.73_m2 55 (L)   Calcium Latest Ref Range: 8.5 - 10.1 mg/dL 9.3   Anion Gap Latest Ref Range: 3 - 14 mmol/L 8   Albumin Latest Ref Range: 3.4 - 5.0 g/dL 3.2 (L)   Protein Total Latest Ref Range: 6.8 - 8.8 g/dL 7.7   Bilirubin Total Latest Ref Range: 0.2 - 1.3 mg/dL 0.4   Alkaline Phosphatase Latest Ref Range: 40 - 150 U/L 86   ALT Latest Ref Range: 0 - 70 U/L 14   AST Latest Ref Range: 0 - 45 U/L 31   Glucose Latest Ref Range: 70 - 99 mg/dL 84   WBC Latest Ref Range: 4.0 - 11.0 10e9/L 7.5   Hemoglobin Latest Ref Range: 13.3 - 17.7 g/dL 10.0 (L)   Hematocrit Latest Ref Range: 40.0 - 53.0 % 31.3 (L)   Platelet Count Latest Ref Range: 150 - 450 10e9/L 346   RBC Count Latest Ref Range: 4.4 - 5.9 10e12/L 3.67 (L)   MCV Latest Ref Range: 78 - 100 fl 85   MCH Latest Ref Range: 26.5 - 33.0 pg 27.2   MCHC Latest Ref Range: 31.5 - 36.5 g/dL 31.9   RDW Latest Ref Range: 10.0 - 15.0 % 14.0   Diff Method Unknown Automated Method   % Neutrophils Latest Units: % 62.0   % Lymphocytes Latest Units: % 26.1   % Monocytes Latest Units: % 9.8   % Eosinophils Latest Units: % 1.3   % Basophils Latest Units: % 0.4   % Immature Granulocytes Latest Units: % 0.4   Nucleated RBCs Latest Ref Range: 0 /100 0   Absolute Neutrophil Latest Ref Range: 1.6 - 8.3 10e9/L 4.6   Absolute Lymphocytes Latest Ref Range: 0.8 - 5.3 10e9/L 2.0   Absolute Monocytes Latest Ref Range: 0.0 - 1.3 10e9/L 0.7   Absolute  Eosinophils Latest Ref Range: 0.0 - 0.7 10e9/L 0.1   Absolute Basophils Latest Ref Range: 0.0 - 0.2 10e9/L 0.0   Abs Immature Granulocytes Latest Ref Range: 0 - 0.4 10e9/L 0.0   Absolute Nucleated RBC Unknown 0.0       Imaging: n/a    Impression/plan:   1. 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 He did not get dexamethasone to pre-medicate prior to today, so will increase IV dex to 20 mg. Also will add emend to IV due to his prior intolerance/nausea with treatment  -reviewed potential side effects including infusion reaction, N/V, myelosuppression, risk of infection, bleeding, alopecia, neuropathy, fatigue. Reviewed use of antiemetics and dex prep  -he felt ready to proceed. Will see him in a week to see how he is tolerating chemo. Will see him prior to cycle 2 and 3. Plan for restaging after 3 cycles    2. CKD  -Cr stable at 1.47    3. HTN  -on amlodipine 10 mg daily, BP stable    4. Arthritis  -stable, using tylenol prn, stretching and Anthony Chi    5. GERD  -on omeprazole 20 mg daily, no reflux sx    6. Mild constipation  -using miralax prn.      Again, thank you for allowing me to participate in the care of your patient.      Sincerely,    ISAURA Verma CNP

## 2019-03-14 NOTE — PROGRESS NOTES
Infusion Nursing Note:  King Gonsalo Bruno presents today for Cycle 1 Day 1 Taxotere.    Patient seen and examined by Yessica Ovalles NP in clinic prior to infusion.    Note: Today was pt's first cycle of taxotere.  In the past he has had gemzar and cisplatin.  Information on taxotere was give to and reviewed with pt by his RN care coordinator.  Pt has no additional questions regarding taxotere today.    Intravenous Access:  Peripheral IV placed in lab    Treatment Conditions:  Lab Results   Component Value Date    HGB 10.0 03/14/2019     Lab Results   Component Value Date    WBC 7.5 03/14/2019      Lab Results   Component Value Date    ANEU 4.6 03/14/2019     Lab Results   Component Value Date     03/14/2019      Lab Results   Component Value Date     03/14/2019                   Lab Results   Component Value Date    POTASSIUM 4.7 03/14/2019           Lab Results   Component Value Date    MAG 2.0 12/27/2018            Lab Results   Component Value Date    CR 1.47 03/14/2019                   Lab Results   Component Value Date    SAADIA 9.3 03/14/2019                Lab Results   Component Value Date    BILITOTAL 0.4 03/14/2019           Lab Results   Component Value Date    ALBUMIN 3.2 03/14/2019                    Lab Results   Component Value Date    ALT 14 03/14/2019           Lab Results   Component Value Date    AST 31 03/14/2019     LDH was not run today as ordered.  Yessica Ovalles NP notified and stated it LD did not need to be drawn today.     Results reviewed, labs MET treatment parameters, ok to proceed with treatment.      Post Infusion Assessment:  Patient tolerated infusion without incident.       Discharge Plan:   Prescription refills given for ativan, compazine, decadron, flonase, and zofran.  Pt is aware how to use decadron.  He will take two tablets tomorrow and save the additional two tablets to take prior to cycle 2.  Copy of AVS reviewed with patient and/or family.  Patient will return  3/21/19 to see Yessica Ovalles in clinic and come back on 4/4/19 for cycle 2.   Face to Face time: 0.    Montserrat Moyer RN

## 2019-03-15 NOTE — TELEPHONE ENCOUNTER
Prior Authorization Approval    Authorization Effective Date: 1/1/2019  Authorization Expiration Date: 3/14/2020  Medication: Ondansetron-PA Approved  Approved Dose/Quantity: 8mg tablet  Reference #:   [id number]  Insurance Company: [employer] Stover - Phone [phone] Fax [phone]  Expected CoPay:       CoPay Card Available:    no  Foundation Assistance Needed:    Which Pharmacy is filling the prescription (Not needed for infusion/clinic administered): [facility] [address]  Pharmacy Notified: Yes  Patient Notified: Yes

## 2019-03-21 NOTE — NURSING NOTE
Chief Complaint   Patient presents with     Blood Draw     labs drawn with vpt by rn.  vs taken     Labs drawn with vpt by rn.  Pt tolerated well.  VS taken.  Pt checked in for next appt.    Ana Aldana RN

## 2019-03-21 NOTE — PROGRESS NOTES
Reason for Visit: seen in f/u of recurrent, metastatic penile urethral carcinoma    Oncology HPI: Mr. King Gonsalo Bruno is a 73 year old man with a history of stage II penile urethral carcinoma, treated with cisplatin/gemzar (split on day 1 and 8 given hx of CKD). His first 2 cycles were complicated by dehydration and nausea. He had a positive response and completed 4 cycles in November 2017. He underwent radial penectomy and urethrectomy, perineal urethrostomy and bilateral inguinal node dissection on 3/8/18. Surgical pathology demonstrated moderately differentiated SCC of the penile urethra with extension into the corpus spongiosum and cavernosum of the penile shaft. The specimen was positive for lymphovascular and perineural invasion. Margins were negative. 1/5 L inguinal nodes were positive and 1/7 R inguinal nodes were positive for disease. He was referred for consideration of radiation. At the time of that appointment, he was found to have multiple new pulmonary nodules concerning for metastatic disease. Biopsy was discussed, but after discussion with patient, opted to observe with a PET/CT 6 weeks later. PET/CT on 6/26/18 showed multiple hypermetabolic pulmonary nodules. He was offered palliative intent immunotherapy. He was started on Keytruda on 8/3/18 and continued on that through 2/28/19. On 3/6/19, he had a restaging evaluation and was found to have progression. Hospice was discussed, but he wanted to try alternate chemotherapy. He initiated treatment with docetaxel on 3/14/19.    Interval history:  is doing fairly well. He had fatigue 2 days after chemo. During this time, he had an episode of abdominal pain and started to have diarrhea. The fatigue is improving and abdominal pain has resolved. Had taken his dexamethasone on an empty stomach around this time. He also took ES Tylenol 500 mg x 3 times that day. He thought it was maybe that. Took more tylenol due to increased neck/arthritis pain.  Still  having looser than normal stools. Started to get some irritation in the perineum. Using soft wipes and showering. Hasn't tried imodium, but diarrhea is slowing down. Drinking water and urine is light in color. No dizziness. Neck pain improved with using 1/2 ES tylenol when needed. Appetite is good. Is feeling less anxious about being on chemo.    Current Outpatient Medications   Medication Sig Dispense Refill     acetaminophen (TYLENOL) 325 MG tablet Take 2 tablets (650 mg) by mouth every 4 hours as needed for mild pain or fever 150 tablet 0     amLODIPine (NORVASC) 10 MG tablet TAKE ONE TABLET BY MOUTH EVERY DAY 90 tablet 3     B Complex Vitamins (VITAMIN B COMPLEX PO) Take by mouth daily (with lunch)       dexamethasone (DECADRON) 4 MG tablet Take 2 tablets (8 mg) evening prior and next morning after docetaxel dose.. 4 tablet 9     difluprednate (DUREZOL) 0.05 % ophthalmic emulsion 1 drop every 2 hours       docusate sodium (COLACE) 100 MG capsule Take 1 capsule (100 mg) by mouth 2 times daily as needed for constipation (Patient not taking: Reported on 3/14/2019) 60 capsule 0     fluticasone (FLONASE) 50 MCG/ACT nasal spray Spray 2 sprays into both nostrils 2 times daily 16 g 11     LORazepam (ATIVAN) 0.5 MG tablet Take 1 tablet (0.5 mg) by mouth every 4 hours as needed (Anxiety, Nausea/Vomiting or Sleep) 30 tablet 5     LORazepam (ATIVAN) 0.5 MG tablet Take 1 tablet (0.5 mg) by mouth nightly as needed for anxiety 30 tablet 3     omeprazole (PRILOSEC) 20 MG DR capsule Take 1 capsule (20 mg) by mouth daily 30 capsule 3     omeprazole (PRILOSEC) 40 MG DR capsule Take 1 capsule (40 mg) by mouth daily (Patient not taking: Reported on 3/14/2019) 30 capsule 1     ondansetron (ZOFRAN) 8 MG tablet Take 1 tablet (8 mg) by mouth every 8 hours as needed for nausea 30 tablet 3     polyethylene glycol (MIRALAX/GLYCOLAX) packet Take 1 packet by mouth daily       prednisoLONE acetate (PRED FORTE) 1 % ophthalmic suspension 1 drop  "every 2 hours       prochlorperazine (COMPAZINE) 5 MG tablet Take 1 tablet (5 mg) by mouth every 6 hours as needed (Nausea/Vomiting) 30 tablet 3          Allergies   Allergen Reactions     Lisinopril Swelling     Oxycodone Nausea and Vomiting     Vicodin [Hydrocodone-Acetaminophen] Nausea and Vomiting         Exam: alert, appears well.  Blood pressure 141/70, pulse 75, temperature 98.6  F (37  C), temperature source Oral, resp. rate 16, height 1.905 m (6' 3\"), weight 64.9 kg (143 lb 1.6 oz), SpO2 100 %.  Wt Readings from Last 4 Encounters:   03/21/19 64.9 kg (143 lb 1.6 oz)   03/14/19 64 kg (141 lb 3.2 oz)   03/06/19 64.4 kg (141 lb 14.4 oz)   02/28/19 64.8 kg (142 lb 14.4 oz)     Oropharynx is moist and without lesion. No thrush      Labs: none    Imaging: none    Impression/plan:   1. 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 Tolerating with some diarrhea and fatigue, but managing well  -reminded him to use imodium as needed for diarrhea. Continue pushing oral hydration  -reminded him to take the dex prep prior to his infusion, but to take it with food.  -I will see him prior to cycle 2. If he is able to proceed will set up next 2 cycles, plan for restaging after 3 cycles     2. CKD  -Cr not checked today. Pushing hydration. No s/s of dehydration     3. HTN  -on amlodipine 10 mg daily, BP stable     4. Arthritis  -stable, OK to usetylenol prn. I doubt this causes his diarrhea, Continue stretching and Anthony Chi     5. GERD  -on omeprazole 20 mg daily, had increased abdominal pain after taking dexamethasone on an empty stomach. Reviewed need to take with food. If having breakthrough reflux, ok to add prn tums or maalox     6. Diarrhea-take imodium prn, reminded to hold miralax when having diarrhea.    "

## 2019-03-21 NOTE — NURSING NOTE
"Oncology Rooming Note    March 21, 2019 4:05 PM   King Gonsalo Bruno is a 73 year old male who presents for:    Chief Complaint   Patient presents with     Blood Draw     labs drawn with vpt by rn.  vs taken     Oncology Clinic Visit     Bladder Cancer     Initial Vitals: /70 (BP Location: Right arm, Patient Position: Sitting, Cuff Size: Adult Regular)   Pulse 75   Temp 98.6  F (37  C) (Oral)   Resp 16   Ht 1.905 m (6' 3\")   Wt 64.9 kg (143 lb 1.6 oz)   SpO2 100%   BMI 17.89 kg/m   Estimated body mass index is 17.89 kg/m  as calculated from the following:    Height as of this encounter: 1.905 m (6' 3\").    Weight as of this encounter: 64.9 kg (143 lb 1.6 oz). Body surface area is 1.85 meters squared.  Moderate Pain (5) Comment: Data Unavailable   No LMP for male patient.  Allergies reviewed: Yes  Medications reviewed: Yes    Medications: Medication refills not needed today.  Pharmacy name entered into [facility]:    [id number] - [place], [place] - [address] [place] [address] [address] [facility] [place], [place] - [phone]    Clinical concerns: No New Concerns    ALL [place]      "

## 2019-03-21 NOTE — LETTER
3/21/2019       RE: King Gonsalo Bruno  [address]  [facility] [address]     Dear Colleague,    Thank you for referring your patient, King Gonsalo Bruno, to the [facility]. Please see a copy of my visit note below.    Reason for Visit: seen in f/u of recurrent, metastatic penile urethral carcinoma    Oncology HPI: Mr. King Gonsalo Bruno is a 73 year old man with a history of stage II penile urethral carcinoma, treated with cisplatin/gemzar (split on day 1 and 8 given hx of CKD). His first 2 cycles were complicated by dehydration and nausea. He had a positive response and completed 4 cycles in November 2017. He underwent radial penectomy and urethrectomy, perineal urethrostomy and bilateral inguinal node dissection on 3/8/18. Surgical pathology demonstrated moderately differentiated SCC of the penile urethra with extension into the corpus spongiosum and cavernosum of the penile shaft. The specimen was positive for lymphovascular and perineural invasion. Margins were negative. 1/5 L inguinal nodes were positive and 1/7 R inguinal nodes were positive for disease. He was referred for consideration of radiation. At the time of that appointment, he was found to have multiple new pulmonary nodules concerning for metastatic disease. Biopsy was discussed, but after discussion with patient, opted to observe with a PET/CT 6 weeks later. PET/CT on 6/26/18 showed multiple hypermetabolic pulmonary nodules. He was offered palliative intent immunotherapy. He was started on Keytruda on 8/3/18 and continued on that through 2/28/19. On 3/6/19, he had a restaging evaluation and was found to have progression. Hospice was discussed, but he wanted to try alternate chemotherapy. He initiated treatment with docetaxel on 3/14/19.    Interval history:  is doing fairly well. He had fatigue 2 days after chemo. During this time, he had an episode of abdominal pain and started to have diarrhea. The fatigue is  improving and abdominal pain has resolved. Had taken his dexamethasone on an empty stomach around this time. He also took ES Tylenol 500 mg x 3 times that day. He thought it was maybe that. Took more tylenol due to increased neck/arthritis pain.  Still having looser than normal stools. Started to get some irritation in the perineum. Using soft wipes and showering. Hasn't tried imodium, but diarrhea is slowing down. Drinking water and urine is light in color. No dizziness. Neck pain improved with using 1/2 ES tylenol when needed. Appetite is good. Is feeling less anxious about being on chemo.    Current Outpatient Medications   Medication Sig Dispense Refill     acetaminophen (TYLENOL) 325 MG tablet Take 2 tablets (650 mg) by mouth every 4 hours as needed for mild pain or fever 150 tablet 0     amLODIPine (NORVASC) 10 MG tablet TAKE ONE TABLET BY MOUTH EVERY DAY 90 tablet 3     B Complex Vitamins (VITAMIN B COMPLEX PO) Take by mouth daily (with lunch)       dexamethasone (DECADRON) 4 MG tablet Take 2 tablets (8 mg) evening prior and next morning after docetaxel dose.. 4 tablet 9     difluprednate (DUREZOL) 0.05 % ophthalmic emulsion 1 drop every 2 hours       docusate sodium (COLACE) 100 MG capsule Take 1 capsule (100 mg) by mouth 2 times daily as needed for constipation (Patient not taking: Reported on 3/14/2019) 60 capsule 0     fluticasone (FLONASE) 50 MCG/ACT nasal spray Spray 2 sprays into both nostrils 2 times daily 16 g 11     LORazepam (ATIVAN) 0.5 MG tablet Take 1 tablet (0.5 mg) by mouth every 4 hours as needed (Anxiety, Nausea/Vomiting or Sleep) 30 tablet 5     LORazepam (ATIVAN) 0.5 MG tablet Take 1 tablet (0.5 mg) by mouth nightly as needed for anxiety 30 tablet 3     omeprazole (PRILOSEC) 20 MG DR capsule Take 1 capsule (20 mg) by mouth daily 30 capsule 3     omeprazole (PRILOSEC) 40 MG DR capsule Take 1 capsule (40 mg) by mouth daily (Patient not taking: Reported on 3/14/2019) 30 capsule 1      "ondansetron (ZOFRAN) 8 MG tablet Take 1 tablet (8 mg) by mouth every 8 hours as needed for nausea 30 tablet 3     polyethylene glycol (MIRALAX/GLYCOLAX) packet Take 1 packet by mouth daily       prednisoLONE acetate (PRED FORTE) 1 % ophthalmic suspension 1 drop every 2 hours       prochlorperazine (COMPAZINE) 5 MG tablet Take 1 tablet (5 mg) by mouth every 6 hours as needed (Nausea/Vomiting) 30 tablet 3          Allergies   Allergen Reactions     Lisinopril Swelling     Oxycodone Nausea and Vomiting     Vicodin [Hydrocodone-Acetaminophen] Nausea and Vomiting         Exam: alert, appears well.  Blood pressure 141/70, pulse 75, temperature 98.6  F (37  C), temperature source Oral, resp. rate 16, height 1.905 m (6' 3\"), weight 64.9 kg (143 lb 1.6 oz), SpO2 100 %.  Wt Readings from Last 4 Encounters:   03/21/19 64.9 kg (143 lb 1.6 oz)   03/14/19 64 kg (141 lb 3.2 oz)   03/06/19 64.4 kg (141 lb 14.4 oz)   02/28/19 64.8 kg (142 lb 14.4 oz)     Oropharynx is moist and without lesion. No thrush      Labs: none    Imaging: none    Impression/plan:   1. 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 Tolerating with some diarrhea and fatigue, but managing well  -reminded him to use imodium as needed for diarrhea. Continue pushing oral hydration  -reminded him to take the dex prep prior to his infusion, but to take it with food.  -I will see him prior to cycle 2. If he is able to proceed will set up next 2 cycles, plan for restaging after 3 cycles     2. CKD  -Cr not checked today. Pushing hydration. No s/s of dehydration     3. HTN  -on amlodipine 10 mg daily, BP stable     4. Arthritis  -stable, OK to usetylenol prn. I doubt this causes his diarrhea, Continue stretching and Anthony Chi     5. GERD  -on omeprazole 20 mg daily, had increased abdominal pain after taking dexamethasone on an empty stomach. Reviewed need to take with food. If having breakthrough " reflux, ok to add prn tums or maalox     6. Diarrhea-take imodium prn, reminded to hold miralax when having diarrhea.      Sincerely,    ISAURA Verma CNP

## 2019-04-04 NOTE — LETTER
4/4/2019       RE: King Gonsalo Bruno  [address]  [facility] [address]     Dear Colleague,    Thank you for referring your patient, King Gonsalo Bruno, to the [facility]. Please see a copy of my visit note below.    Reason for Visit: seen in f/u of recurrent, metastatic penile cancer    Oncology HPI: Mr. King Gonsalo Bruno is a 73 year old man with a history of stage II penile urethral carcinoma, treated with cisplatin/gemzar (split on day 1 and 8 given hx of CKD). His first 2 cycles were complicated by dehydration and nausea. He had a positive response and completed 4 cycles in November 2017. He underwent radial penectomy and urethrectomy, perineal urethrostomy and bilateral inguinal node dissection on 3/8/18. Surgical pathology demonstrated moderately differentiated SCC of the penile urethra with extension into the corpus spongiosum and cavernosum of the penile shaft. The specimen was positive for lymphovascular and perineural invasion. Margins were negative. 1/5 L inguinal nodes were positive and 1/7 R inguinal nodes were positive for disease. He was referred for consideration of radiation. At the time of that appointment, he was found to have multiple new pulmonary nodules concerning for metastatic disease. Biopsy was discussed, but after discussion with patient, opted to observe with a PET/CT 6 weeks later. PET/CT on 6/26/18 showed multiple hypermetabolic pulmonary nodules. He was offered palliative intent immunotherapy. He was started on Keytruda on 8/3/18 and continued on that through 2/28/19. On 3/6/19, he had a restaging evaluation and was found to have progression. Hospice was discussed, but he wanted to try alternate chemotherapy. He initiated treatment with docetaxel on 3/14/19. He presents for evaluation prior to cycle 2.    Interval history:  is feeling well. Neck pain has improved off of keytruda. Had some diarrhea post docetaxel that resolved with one dose of  imodium. Then became constipated, and is now using miralax prn. Bowels are regular today. No N/V. Felt some fatigue for the first week after infusion, but energy returned back to baseline.  Appetite is good. Mood is good. Was initially disheartened to be back on chemotherapy, but is feeling he is tolerating pretty well.  No edema, rash, bruising. Urination wnl. No cough, sob, cp, palpitation. No headache, vision changes.    Current Outpatient Medications   Medication Sig Dispense Refill     acetaminophen (TYLENOL) 325 MG tablet Take 2 tablets (650 mg) by mouth every 4 hours as needed for mild pain or fever 150 tablet 0     amLODIPine (NORVASC) 10 MG tablet TAKE ONE TABLET BY MOUTH EVERY DAY 90 tablet 3     B Complex Vitamins (VITAMIN B COMPLEX PO) Take by mouth daily (with lunch)       dexamethasone (DECADRON) 4 MG tablet Take 2 tablets (8 mg) evening prior and next morning after docetaxel dose.. 4 tablet 9     difluprednate (DUREZOL) 0.05 % ophthalmic emulsion 1 drop every 2 hours       docusate sodium (COLACE) 100 MG capsule Take 1 capsule (100 mg) by mouth 2 times daily as needed for constipation (Patient not taking: Reported on 3/14/2019) 60 capsule 0     fluticasone (FLONASE) 50 MCG/ACT nasal spray Spray 2 sprays into both nostrils 2 times daily 16 g 11     LORazepam (ATIVAN) 0.5 MG tablet Take 1 tablet (0.5 mg) by mouth every 4 hours as needed (Anxiety, Nausea/Vomiting or Sleep) 30 tablet 5     LORazepam (ATIVAN) 0.5 MG tablet Take 1 tablet (0.5 mg) by mouth nightly as needed for anxiety 30 tablet 3     omeprazole (PRILOSEC) 20 MG DR capsule Take 1 capsule (20 mg) by mouth daily 30 capsule 3     omeprazole (PRILOSEC) 40 MG DR capsule Take 1 capsule (40 mg) by mouth daily (Patient not taking: Reported on 3/14/2019) 30 capsule 1     ondansetron (ZOFRAN) 8 MG tablet Take 1 tablet (8 mg) by mouth every 8 hours as needed for nausea 30 tablet 3     polyethylene glycol (MIRALAX/GLYCOLAX) packet Take 1 packet by mouth  "daily       prednisoLONE acetate (PRED FORTE) 1 % ophthalmic suspension 1 drop every 2 hours       prochlorperazine (COMPAZINE) 5 MG tablet Take 1 tablet (5 mg) by mouth every 6 hours as needed (Nausea/Vomiting) 30 tablet 3     sterile water, bottle, irrigation   2          Allergies   Allergen Reactions     Lisinopril Swelling     Oxycodone Nausea and Vomiting     Vicodin [Hydrocodone-Acetaminophen] Nausea and Vomiting         Exam: alert, appears slim, but well. Blood pressure 120/73, pulse 86, temperature 98.2  F (36.8  C), temperature source Oral, resp. rate 16, height 1.905 m (6' 3\"), weight 65 kg (143 lb 4.8 oz), SpO2 99 %.  Wt Readings from Last 4 Encounters:   04/04/19 65 kg (143 lb 4.8 oz)   03/21/19 64.9 kg (143 lb 1.6 oz)   03/14/19 64 kg (141 lb 3.2 oz)   03/06/19 64.4 kg (141 lb 14.4 oz)     Oropharynx is moist and without lesion. Neck supple and without adenopathy. Lungs:CTA. Heart: RRR, no murmur or rub. Abdomen: soft, nontender, BS active, no masses or organomegaly.  Extremities: warm, no edema. Speech is clear. CN wnl. Gait/station wnl. Skin: some darkening at an area of prior phlebitis on the right forearm.    Labs: Results for KING AILYN CHAVEZ (MRN [id number]) as of 4/4/2019 14:13   Ref. Range 4/4/2019 13:30   Sodium Latest Ref Range: 133 - 144 mmol/L 132 (L)   Potassium Latest Ref Range: 3.4 - 5.3 mmol/L 4.4   Chloride Latest Ref Range: 94 - 109 mmol/L 100   Carbon Dioxide Latest Ref Range: 20 - 32 mmol/L 24   Urea Nitrogen Latest Ref Range: 7 - 30 mg/dL 20   Creatinine Latest Ref Range: 0.66 - 1.25 mg/dL 1.40 (H)   GFR Estimate Latest Ref Range: >60 mL/min/1.73_m2 50 (L)   GFR Estimate If Black Latest Ref Range: >60 mL/min/1.73_m2 58 (L)   Calcium Latest Ref Range: 8.5 - 10.1 mg/dL 10.4 (H)   Anion Gap Latest Ref Range: 3 - 14 mmol/L 8   Albumin Latest Ref Range: 3.4 - 5.0 g/dL 3.6   Protein Total Latest Ref Range: 6.8 - 8.8 g/dL 8.3   Bilirubin Total Latest Ref Range: 0.2 - 1.3 mg/dL 0.3 "   Alkaline Phosphatase Latest Ref Range: 40 - 150 U/L 102   ALT Latest Ref Range: 0 - 70 U/L 15   AST Latest Ref Range: 0 - 45 U/L 23   Bilirubin Direct Latest Ref Range: 0.0 - 0.2 mg/dL <0.1   Lactate Dehydrogenase Latest Ref Range: 85 - 227 U/L 172   Glucose Latest Ref Range: 70 - 99 mg/dL 107 (H)   WBC Latest Ref Range: 4.0 - 11.0 10e9/L 12.5 (H)   Hemoglobin Latest Ref Range: 13.3 - 17.7 g/dL 10.3 (L)   Hematocrit Latest Ref Range: 40.0 - 53.0 % 32.9 (L)   Platelet Count Latest Ref Range: 150 - 450 10e9/L 465 (H)   RBC Count Latest Ref Range: 4.4 - 5.9 10e12/L 3.93 (L)   MCV Latest Ref Range: 78 - 100 fl 84   MCH Latest Ref Range: 26.5 - 33.0 pg 26.2 (L)   MCHC Latest Ref Range: 31.5 - 36.5 g/dL 31.3 (L)   RDW Latest Ref Range: 10.0 - 15.0 % 14.9   Diff Method Unknown Automated Method   % Neutrophils Latest Units: % 83.8   % Lymphocytes Latest Units: % 11.2   % Monocytes Latest Units: % 4.3   % Eosinophils Latest Units: % 0.0   % Basophils Latest Units: % 0.1   % Immature Granulocytes Latest Units: % 0.6   Nucleated RBCs Latest Ref Range: 0 /100 0   Absolute Neutrophil Latest Ref Range: 1.6 - 8.3 10e9/L 10.4 (H)   Absolute Lymphocytes Latest Ref Range: 0.8 - 5.3 10e9/L 1.4   Absolute Monocytes Latest Ref Range: 0.0 - 1.3 10e9/L 0.5   Absolute Eosinophils Latest Ref Range: 0.0 - 0.7 10e9/L 0.0   Absolute Basophils Latest Ref Range: 0.0 - 0.2 10e9/L 0.0   Abs Immature Granulocytes Latest Ref Range: 0 - 0.4 10e9/L 0.1   Absolute Nucleated RBC Unknown 0.0       Imaging: n/a    Impression/plan:   1. 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 Tolerated the first cycle well.  -will plan on 2 additional cycles cycles, then f/u with Dr. Arceo with restaging CT scans. Will have f/u with one of my colleagues in 3 weeks prior to cycle 3.     2. CKD  -Cr stable at 1.4     3. HTN  -on amlodipine 10 mg daily, BP stable     4. Arthritis  -improved after  stopping keytruda. Continues with tylenol prn, Anthony Chi     5. GERD  -on omeprazole 20 mg daily. Taking dexamethasone with food which has decreased the GERD     6.  Bowels-initially had mild diarrhea, then constipation after use of imodium. Using miralax prn. Will monitor    7. Hypercalcemia, mild, new finding.  Corrected Ca 10.7 Asymptomatic. Will monitor at the next visit and check PTH Will push hydration, avoid a high calcium diet.    Again, thank you for allowing me to participate in the care of your patient.      Sincerely,    ISAURA Verma CNP

## 2019-04-04 NOTE — PROGRESS NOTES
Infusion Nursing Note:  King Gonsalo Bruno presents today for Cycle 2 Day 1 Taxotere.    Patient seen by provider today: Yes: Yessica Ovalles NP.   present during visit today: Not Applicable.    Note: Notified by lab that patient's blood samples drawn were either hemolyzed or insufficient amounts.  Yessica Ovalles NP notified.  Labs redrawn per orders.    Intravenous Access:  Peripheral IV placed by vascular access.    Treatment Conditions:  Lab Results   Component Value Date    HGB 10.3 04/04/2019     Lab Results   Component Value Date    WBC 12.5 04/04/2019      Lab Results   Component Value Date    ANEU 10.4 04/04/2019     Lab Results   Component Value Date     04/04/2019      Lab Results   Component Value Date     04/04/2019                   Lab Results   Component Value Date    POTASSIUM 4.4 04/04/2019           Lab Results   Component Value Date    MAG 2.0 12/27/2018            Lab Results   Component Value Date    CR 1.40 04/04/2019                   Lab Results   Component Value Date    SAADIA 10.4 04/04/2019                Lab Results   Component Value Date    BILITOTAL 0.3 04/04/2019           Lab Results   Component Value Date    ALBUMIN 3.6 04/04/2019                    Lab Results   Component Value Date    ALT 15 04/04/2019           Lab Results   Component Value Date    AST 23 04/04/2019       Results reviewed, labs MET treatment parameters, ok to proceed with treatment.  Yessica Ovalles NP aware of patient's lab results today.    4/4/19 1400 TORB:  Yessica Ovalles NP/Tejas Dillard RN  - OK to give Taxotere today with elevated WBC count.    4/4/19 1415 TORB:  Yessica Ovalles NP/Tejas Dillard RN  - OK to give Taxotere with elevated calcium.  Instruct patient to push fluids at home and will continue to monitor patient's labs.    Relayed above plan to patient.  Patient verbalized understanding.    Post Infusion Assessment:  Patient tolerated infusion without incident.  Blood return noted pre and  post infusion.  Site patent and intact, free from redness, edema or discomfort.  No evidence of extravasations.  Access discontinued per protocol.       Discharge Plan:   Patient declined prescription refills.  Discharge instructions reviewed with: Patient and Family.  Patient and/or family verbalized understanding of discharge instructions and all questions answered.  Copy of AVS reviewed with patient and/or family.  Patient will return 4/25/19 for next appointment.  Patient discharged in stable condition accompanied by: friend.  Departure Mode: Ambulatory.  Face to Face time: 3 minutes.    ROSS JO RN

## 2019-04-04 NOTE — NURSING NOTE
Chief Complaint   Patient presents with     Blood Draw     Labs collected from PIV placed by Heike GUTIERREZ. Line flushed with saline. Vs taken and pt checked in for appt     Mariela Rees RN

## 2019-04-04 NOTE — PATIENT INSTRUCTIONS
Contact Numbers    [facility] Main Line: [phone]  [facility] Triage and After Hours Nurse Line:  [phone]    Please call the [facility] nurse triage or the after hours nurse line if you experience a temperature greater than or equal to 100.5, shaking chills, have uncontrolled nausea, vomiting and/or diarrhea, dizziness, lightheadedness, shortness of breath, chest pain, bleeding, unexplained bruising, or if you have any other new/concerning symptoms, questions or concerns.     If you are having any concerning symptoms or wish to speak to a provider before your next infusion visit, please call your care coordinator or triage to notify them so we can adequately serve you.     If you need a refill on a narcotic prescription or other medication, please call triage before your infusion appointment.         April 2019 Sunday Monday Tuesday Wednesday Thursday Friday Saturday        1     2     3     4    [facility] DRAW  11:30 AM   (15 min.)    [facility] DRAW   [facility] Lab Draw    [facility] RETURN  11:55 AM   (50 min.)   Yessica Ovalles, ISAURA CNP   [facility] ONC INFUSION 120   1:00 PM   (120 min.)    ONCOLOGY INFUSION   [facility] 5     6       7     8     9     10     11     12     13       14     15     16     17     18     19     20       21     22     23     24     25    [facility] DRAW   1:15 PM   (15 min.)    [facility] DRAW   [facility] Lab Draw    [facility] RETURN   1:45 PM   (50 min.)   Kathleen Orosco PA-C   [facility] ONC INFUSION 120   3:00 PM   (120 min.)    ONCOLOGY INFUSION   [facility] 26     27       28     29     30                                     May 2019      [place] Monday Tuesday Wednesday Thursday Friday Saturday                  1     2     3     4       5     6     7     8     9     10     11       12     13     14     15    [facility] DRAW   8:15 AM   (15 min.)   [facility]  LAB DRAW   [facility] Lab Draw    CT CHEST/ABDOMEN/PELVIS W   9:20 AM   (20 min.)   UCCT1   [facility] CT    [facility] RETURN  11:45 AM   (30 min.)   Steve Arceo MD   [facility] Cancer [facility]    [facility] ONC INFUSION 120   1:00 PM   (120 min.)   [facility] ONCOLOGY INFUSION   [facility] 16     17     18       19     20     21     22     23     24     25       26     27     28     29     30     31                         Recent Results (from the past 24 hour(s))   Lactate Dehydrogenase    Collection Time: 04/04/19 12:28 PM   Result Value Ref Range    Lactate Dehydrogenase Canceled, Test credited 85 - 227 U/L   CBC with platelets differential    Collection Time: 04/04/19  1:30 PM   Result Value Ref Range    WBC 12.5 (H) 4.0 - 11.0 10e9/L    RBC Count 3.93 (L) 4.4 - 5.9 10e12/L    Hemoglobin 10.3 (L) 13.3 - 17.7 g/dL    Hematocrit 32.9 (L) 40.0 - 53.0 %    MCV 84 78 - 100 fl    MCH 26.2 (L) 26.5 - 33.0 pg    MCHC 31.3 (L) 31.5 - 36.5 g/dL    RDW 14.9 10.0 - 15.0 %    Platelet Count 465 (H) 150 - 450 10e9/L    Diff Method Automated Method     % Neutrophils 83.8 %    % Lymphocytes 11.2 %    % Monocytes 4.3 %    % Eosinophils 0.0 %    % Basophils 0.1 %    % Immature Granulocytes 0.6 %    Nucleated RBCs 0 0 /100    Absolute Neutrophil 10.4 (H) 1.6 - 8.3 10e9/L    Absolute Lymphocytes 1.4 0.8 - 5.3 10e9/L    Absolute Monocytes 0.5 0.0 - 1.3 10e9/L    Absolute Eosinophils 0.0 0.0 - 0.7 10e9/L    Absolute Basophils 0.0 0.0 - 0.2 10e9/L    Abs Immature Granulocytes 0.1 0 - 0.4 10e9/L    Absolute Nucleated RBC 0.0    Comprehensive metabolic panel    Collection Time: 04/04/19  1:30 PM   Result Value Ref Range    Sodium 132 (L) 133 - 144 mmol/L    Potassium 4.4 3.4 - 5.3 mmol/L    Chloride 100 94 - 109 mmol/L    Carbon Dioxide 24 20 - 32 mmol/L    Anion Gap 8 3 - 14 mmol/L    Glucose 107 (H) 70 - 99 mg/dL    Urea Nitrogen 20 7 - 30 mg/dL    Creatinine 1.40 (H) 0.66 - 1.25 mg/dL    GFR Estimate  50 (L) >60 mL/min/[1.73_m2]    GFR Estimate If Black 58 (L) >60 mL/min/[1.73_m2]    Calcium 10.4 (H) 8.5 - 10.1 mg/dL    Bilirubin Total 0.3 0.2 - 1.3 mg/dL    Albumin 3.6 3.4 - 5.0 g/dL    Protein Total 8.3 6.8 - 8.8 g/dL    Alkaline Phosphatase 102 40 - 150 U/L    ALT 15 0 - 70 U/L    AST 23 0 - 45 U/L   Lactate Dehydrogenase    Collection Time: 04/04/19  1:30 PM   Result Value Ref Range    Lactate Dehydrogenase 172 85 - 227 U/L   Bilirubin direct    Collection Time: 04/04/19  1:30 PM   Result Value Ref Range    Bilirubin Direct <0.1 0.0 - 0.2 mg/dL

## 2019-04-04 NOTE — NURSING NOTE
"Oncology Rooming Note    April 4, 2019 12:34 PM   King Gonsalo Bruno is a 73 year old male who presents for:    Chief Complaint   Patient presents with     Blood Draw     Labs collected from PIV placed by Heike GUTIERREZ. Line flushed with saline. Vs taken and pt checked in for appt     Oncology Clinic Visit     Return visit related to Urethral Cancer     Initial Vitals: /73 (BP Location: Right arm, Patient Position: Sitting, Cuff Size: Adult Regular)   Pulse 86   Temp 98.2  F (36.8  C) (Oral)   Resp 16   Ht 1.905 m (6' 3\")   Wt 65 kg (143 lb 4.8 oz)   SpO2 99%   BMI 17.91 kg/m   Estimated body mass index is 17.91 kg/m  as calculated from the following:    Height as of this encounter: 1.905 m (6' 3\").    Weight as of this encounter: 65 kg (143 lb 4.8 oz). Body surface area is 1.85 meters squared.  Moderate Pain (4) Comment: Data Unavailable   No LMP for male patient.  Allergies reviewed: Yes  Medications reviewed: Yes    Medications: Medication refills not needed today.  Pharmacy name entered into [employer]:    [id number] - [place], [place] - [address] [place] [address] [address] [facility] [place], [place] - [phone]    Clinical concerns: No new concerns. Provider was notified.      Yessica Stover LPN            "

## 2019-04-04 NOTE — PROGRESS NOTES
Reason for Visit: seen in f/u of recurrent, metastatic penile cancer    Oncology HPI: Mr. King Gonsalo Bruno is a 73 year old man with a history of stage II penile urethral carcinoma, treated with cisplatin/gemzar (split on day 1 and 8 given hx of CKD). His first 2 cycles were complicated by dehydration and nausea. He had a positive response and completed 4 cycles in November 2017. He underwent radial penectomy and urethrectomy, perineal urethrostomy and bilateral inguinal node dissection on 3/8/18. Surgical pathology demonstrated moderately differentiated SCC of the penile urethra with extension into the corpus spongiosum and cavernosum of the penile shaft. The specimen was positive for lymphovascular and perineural invasion. Margins were negative. 1/5 L inguinal nodes were positive and 1/7 R inguinal nodes were positive for disease. He was referred for consideration of radiation. At the time of that appointment, he was found to have multiple new pulmonary nodules concerning for metastatic disease. Biopsy was discussed, but after discussion with patient, opted to observe with a PET/CT 6 weeks later. PET/CT on 6/26/18 showed multiple hypermetabolic pulmonary nodules. He was offered palliative intent immunotherapy. He was started on Keytruda on 8/3/18 and continued on that through 2/28/19. On 3/6/19, he had a restaging evaluation and was found to have progression. Hospice was discussed, but he wanted to try alternate chemotherapy. He initiated treatment with docetaxel on 3/14/19. He presents for evaluation prior to cycle 2.    Interval history:  is feeling well. Neck pain has improved off of keytruda. Had some diarrhea post docetaxel that resolved with one dose of imodium. Then became constipated, and is now using miralax prn. Bowels are regular today. No N/V. Felt some fatigue for the first week after infusion, but energy returned back to baseline.  Appetite is good. Mood is good. Was initially disheartened to be  back on chemotherapy, but is feeling he is tolerating pretty well.  No edema, rash, bruising. Urination wnl. No cough, sob, cp, palpitation. No headache, vision changes.    Current Outpatient Medications   Medication Sig Dispense Refill     acetaminophen (TYLENOL) 325 MG tablet Take 2 tablets (650 mg) by mouth every 4 hours as needed for mild pain or fever 150 tablet 0     amLODIPine (NORVASC) 10 MG tablet TAKE ONE TABLET BY MOUTH EVERY DAY 90 tablet 3     B Complex Vitamins (VITAMIN B COMPLEX PO) Take by mouth daily (with lunch)       dexamethasone (DECADRON) 4 MG tablet Take 2 tablets (8 mg) evening prior and next morning after docetaxel dose.. 4 tablet 9     difluprednate (DUREZOL) 0.05 % ophthalmic emulsion 1 drop every 2 hours       docusate sodium (COLACE) 100 MG capsule Take 1 capsule (100 mg) by mouth 2 times daily as needed for constipation (Patient not taking: Reported on 3/14/2019) 60 capsule 0     fluticasone (FLONASE) 50 MCG/ACT nasal spray Spray 2 sprays into both nostrils 2 times daily 16 g 11     LORazepam (ATIVAN) 0.5 MG tablet Take 1 tablet (0.5 mg) by mouth every 4 hours as needed (Anxiety, Nausea/Vomiting or Sleep) 30 tablet 5     LORazepam (ATIVAN) 0.5 MG tablet Take 1 tablet (0.5 mg) by mouth nightly as needed for anxiety 30 tablet 3     omeprazole (PRILOSEC) 20 MG DR capsule Take 1 capsule (20 mg) by mouth daily 30 capsule 3     omeprazole (PRILOSEC) 40 MG DR capsule Take 1 capsule (40 mg) by mouth daily (Patient not taking: Reported on 3/14/2019) 30 capsule 1     ondansetron (ZOFRAN) 8 MG tablet Take 1 tablet (8 mg) by mouth every 8 hours as needed for nausea 30 tablet 3     polyethylene glycol (MIRALAX/GLYCOLAX) packet Take 1 packet by mouth daily       prednisoLONE acetate (PRED FORTE) 1 % ophthalmic suspension 1 drop every 2 hours       prochlorperazine (COMPAZINE) 5 MG tablet Take 1 tablet (5 mg) by mouth every 6 hours as needed (Nausea/Vomiting) 30 tablet 3     sterile water, bottle,  "irrigation   2          Allergies   Allergen Reactions     Lisinopril Swelling     Oxycodone Nausea and Vomiting     Vicodin [Hydrocodone-Acetaminophen] Nausea and Vomiting         Exam: alert, appears slim, but well. Blood pressure 120/73, pulse 86, temperature 98.2  F (36.8  C), temperature source Oral, resp. rate 16, height 1.905 m (6' 3\"), weight 65 kg (143 lb 4.8 oz), SpO2 99 %.  Wt Readings from Last 4 Encounters:   04/04/19 65 kg (143 lb 4.8 oz)   03/21/19 64.9 kg (143 lb 1.6 oz)   03/14/19 64 kg (141 lb 3.2 oz)   03/06/19 64.4 kg (141 lb 14.4 oz)     Oropharynx is moist and without lesion. Neck supple and without adenopathy. Lungs:CTA. Heart: RRR, no murmur or rub. Abdomen: soft, nontender, BS active, no masses or organomegaly.  Extremities: warm, no edema. Speech is clear. CN wnl. Gait/station wnl. Skin: some darkening at an area of prior phlebitis on the right forearm.    Labs: Results for KING AILYN CHAVEZ (MRN [id number]) as of 4/4/2019 14:13   Ref. Range 4/4/2019 13:30   Sodium Latest Ref Range: 133 - 144 mmol/L 132 (L)   Potassium Latest Ref Range: 3.4 - 5.3 mmol/L 4.4   Chloride Latest Ref Range: 94 - 109 mmol/L 100   Carbon Dioxide Latest Ref Range: 20 - 32 mmol/L 24   Urea Nitrogen Latest Ref Range: 7 - 30 mg/dL 20   Creatinine Latest Ref Range: 0.66 - 1.25 mg/dL 1.40 (H)   GFR Estimate Latest Ref Range: >60 mL/min/1.73_m2 50 (L)   GFR Estimate If Black Latest Ref Range: >60 mL/min/1.73_m2 58 (L)   Calcium Latest Ref Range: 8.5 - 10.1 mg/dL 10.4 (H)   Anion Gap Latest Ref Range: 3 - 14 mmol/L 8   Albumin Latest Ref Range: 3.4 - 5.0 g/dL 3.6   Protein Total Latest Ref Range: 6.8 - 8.8 g/dL 8.3   Bilirubin Total Latest Ref Range: 0.2 - 1.3 mg/dL 0.3   Alkaline Phosphatase Latest Ref Range: 40 - 150 U/L 102   ALT Latest Ref Range: 0 - 70 U/L 15   AST Latest Ref Range: 0 - 45 U/L 23   Bilirubin Direct Latest Ref Range: 0.0 - 0.2 mg/dL <0.1   Lactate Dehydrogenase Latest Ref Range: 85 - 227 U/L 172 "   Glucose Latest Ref Range: 70 - 99 mg/dL 107 (H)   WBC Latest Ref Range: 4.0 - 11.0 10e9/L 12.5 (H)   Hemoglobin Latest Ref Range: 13.3 - 17.7 g/dL 10.3 (L)   Hematocrit Latest Ref Range: 40.0 - 53.0 % 32.9 (L)   Platelet Count Latest Ref Range: 150 - 450 10e9/L 465 (H)   RBC Count Latest Ref Range: 4.4 - 5.9 10e12/L 3.93 (L)   MCV Latest Ref Range: 78 - 100 fl 84   MCH Latest Ref Range: 26.5 - 33.0 pg 26.2 (L)   MCHC Latest Ref Range: 31.5 - 36.5 g/dL 31.3 (L)   RDW Latest Ref Range: 10.0 - 15.0 % 14.9   Diff Method Unknown Automated Method   % Neutrophils Latest Units: % 83.8   % Lymphocytes Latest Units: % 11.2   % Monocytes Latest Units: % 4.3   % Eosinophils Latest Units: % 0.0   % Basophils Latest Units: % 0.1   % Immature Granulocytes Latest Units: % 0.6   Nucleated RBCs Latest Ref Range: 0 /100 0   Absolute Neutrophil Latest Ref Range: 1.6 - 8.3 10e9/L 10.4 (H)   Absolute Lymphocytes Latest Ref Range: 0.8 - 5.3 10e9/L 1.4   Absolute Monocytes Latest Ref Range: 0.0 - 1.3 10e9/L 0.5   Absolute Eosinophils Latest Ref Range: 0.0 - 0.7 10e9/L 0.0   Absolute Basophils Latest Ref Range: 0.0 - 0.2 10e9/L 0.0   Abs Immature Granulocytes Latest Ref Range: 0 - 0.4 10e9/L 0.1   Absolute Nucleated RBC Unknown 0.0       Imaging: n/a    Impression/plan:   1. 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 Tolerated the first cycle well.  -will plan on 2 additional cycles cycles, then f/u with Dr. Arceo with restaging CT scans. Will have f/u with one of my colleagues in 3 weeks prior to cycle 3.     2. CKD  -Cr stable at 1.4     3. HTN  -on amlodipine 10 mg daily, BP stable     4. Arthritis  -improved after stopping keytruda. Continues with tylenol prn, Anthony Chi     5. GERD  -on omeprazole 20 mg daily. Taking dexamethasone with food which has decreased the GERD     6. Bowels-initially had mild diarrhea, then constipation after use of imodium. Using miralax  prn. Will monitor    7. Hypercalcemia, mild, new finding.  Corrected Ca 10.7 Asymptomatic. Will monitor at the next visit and check PTH Will push hydration, avoid a high calcium diet.

## 2019-04-12 NOTE — NURSING NOTE
"Oncology Rooming Note    April 12, 2019 10:48 AM   King Gonsalo Bruno is a 73 year old male who presents for:    Chief Complaint   Patient presents with     Oncology Clinic Visit     Return: Bladder CA     Initial Vitals: /78 (BP Location: Right arm, Patient Position: Sitting, Cuff Size: Adult Regular)   Pulse 95   Temp 98.2  F (36.8  C) (Oral)   Resp 16   Ht 1.905 m (6' 3\")   Wt 64.6 kg (142 lb 6.4 oz)   SpO2 97%   BMI 17.80 kg/m   Estimated body mass index is 17.8 kg/m  as calculated from the following:    Height as of this encounter: 1.905 m (6' 3\").    Weight as of this encounter: 64.6 kg (142 lb 6.4 oz). Body surface area is 1.85 meters squared.  Moderate Pain (4) Comment: Data Unavailable   No LMP for male patient.  Allergies reviewed: Yes  Medications reviewed: Yes    Medications: Medication refills not needed today.  Pharmacy name entered into [employer]:    [id number] - [place], [place] - [address] [place] [address] AT [address] [place] [address] & [facility] [place], [place] - [phone]    Clinical concerns: N/A    Carly Sanabria CMA              "

## 2019-04-12 NOTE — TELEPHONE ENCOUNTER
I connected the patient with scheduling, for an appointment.  I advised urgent care if they can't find an appointment.    Shraddha Desai RN-[facility]      Reason for Disposition    Pus or cloudy fluid from IV site    Additional Information    Negative: Severe difficulty breathing (e.g., struggling for each breath, speaks in single words)    Negative: Shock suspected (e.g., cold/pale/clammy skin, too weak to stand, low BP, rapid pulse)    Negative: Sounds like a life-threatening emergency to the triager    Negative: IV not running or running slowly    Negative: [1] Difficulty breathing AND [2] not severe    Negative: Arm is swollen, new onset (or leg swelling if IV in lower extremity)    Negative: Fever > 100.5 F (38.1 C)    Negative: Patient sounds very sick or weak to the triager    Protocols used: IV SITE (SKIN) SYMPTOMS-ADULT-AH

## 2019-04-12 NOTE — PROGRESS NOTES
"S: add on visit for red, tender arm    O: metastatic penile cancer currently getting palliative Taxotere q3w.  After the first cycle he developed hard tender lines (phlebitis) from his IV site.  It occurred mostly a week after chemo and resolved quickly.  They were only hyperpigmentation when he saw Yessica Ovalles for C2.  During C2 he had a blood draw on his left forearm and then his IV was started distal to the blood draw.  AFter his Taxotere he developed again redness, warmth and itching to the site with a palpable cord.  Yesterday though he noted there were small blisters covering the area and he is concerned the chemo leaked out on his skin.  No f/s/c.  Otherwise doing well.     /78 (BP Location: Right arm, Patient Position: Sitting, Cuff Size: Adult Regular)   Pulse 95   Temp 98.2  F (36.8  C) (Oral)   Resp 16   Ht 1.905 m (6' 3\")   Wt 64.6 kg (142 lb 6.4 oz)   SpO2 97%   BMI 17.80 kg/m       Area on arm in picture below (doesn't show the erythema or blistering as well as in person)- hard palpable cord running only the length of the erythema.  Small oozing blisters with mild desquamation over the length of the palpable cord.  Patient otherwise appears in no distress except with much desire to itch the area          4/4/2019 13:30 4/12/2019 10:42   Sodium 132 (L) 133   Potassium 4.4 3.7   Chloride 100 101   Carbon Dioxide 24 24   Urea Nitrogen 20 14   Creatinine 1.40 (H) 1.47 (H)   GFR Estimate 50 (L) 47 (L)   GFR Estimate If Black 58 (L) 55 (L)   Calcium 10.4 (H) 9.7   Anion Gap 8 8   Albumin 3.6 3.5   Protein Total 8.3 7.6   Bilirubin Total 0.3 0.6   Alkaline Phosphatase 102 89   ALT 15 15   AST 23 25   Bilirubin Direct <0.1    Lactate Dehydrogenase 172    Glucose 107 (H) 81   WBC 12.5 (H) 2.9 (L)   Hemoglobin 10.3 (L) 9.7 (L)   Hematocrit 32.9 (L) 29.2 (L)   Platelet Count 465 (H) 310   RBC Count 3.93 (L) 3.54 (L)   MCV 84 83   MCH 26.2 (L) 27.4   MCHC 31.3 (L) 33.2   RDW 14.9 15.2 (H)   Diff " Method Automated Method Automated Method   % Neutrophils 83.8 37.5   % Lymphocytes 11.2 47.6   % Monocytes 4.3 9.4   % Eosinophils 0.0 2.4   % Basophils 0.1 1.7   % Immature Granulocytes 0.6 1.4   Nucleated RBCs 0 0   Absolute Neutrophil 10.4 (H) 1.1 (L)       A/P    #likely phlebitis as there is a hard palpable cord.  Discussed how to overcome this in the future.  Discussed bladder cancer is a highly thrombotic malignancy, in addition to PIV sites, chemo- he is set up for clotting issues.  He is really adamant against a port/picc.  We discussed using vascular access for all his lab draws/PIV sites and using a warm pack after infusion.     #likely some extravasation of the Taxotere, extravasation from Taxotere has usually a delayed onset of about a week, which would fit with the timing of the erythema, blistering and itching.  Likely due to the PIV being distal to the blood draw.  Cold packs and topical kenalog is the way to treat.  It will likely leave a hyperpigmented area.  His ANC is 1.1 today which is fine, but if fevers occur or the area looks worse over the weekend, he should call.     #FEN: repeated CMP as Na and Ca were off last draw, WNL today. He is eating/drinking well.     Lela Powell PA-C

## 2019-04-12 NOTE — TELEPHONE ENCOUNTER
Patient placed telephone call to triage this morning. He reports he had C2D1 taxotere infusion 4/4/19 and his left arm is swollen and intermittently painful. He called triage last night and said he was directed to call clinic this morning for an appointment. He denies fever, chills. He is requesting to be seen and have his arm evaluated in clinic today. Patient scheduled to see Lela Powell PA-C today with labs prior (CBC and CMP).     Mariela Molina RN BSN   Care Coordinator  [facility]

## 2019-04-16 NOTE — PROGRESS NOTES
"   02/01/19 1500   General Information   Type Of Visit Initial   Start Of Care Date 02/01/19   Referring Physician Dr. 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 Pt was started on Keytruda and has been on that since 8/3/18. Pt most recently completed a VFSS in 4/6/18 which demonstrated mild to moderate oropharyngeal dysphagia characterized by consistent penetration with thin liquids, with intermittent deep penetration to the cords. A small outpouching was also seen in left hypopharynx that appeared to have mild impact on pt's swallowing function. Pt returns for VFSS today reporting he is feeling \"ok.\" Pt endorses xerostomia. He states solids occasionally get stuck reporting he feels like things stick more on the left than on the right. Pt clears his throat, takes a sip of liquid, or gargles liquid to try to clear the bolus. Pt reports he intermittently coughs with liquids. It is worse when he is rushing. Pills also feel like they get stuck. Pt is currently on a regular diet with thin liquids, but he is avoiding chicken with skin on it and nuts. He denies odynophagia, recent unexplained weight loss and recent pneumonias. Pt takes Omeprazole for reflux; this is well controlled. Pt reports he has arthritis in his neck and that coughing makes this worse. He is completing swallowing exercises ~3x/day but at least 1x/day.    Respiratory Status Room air   Prior Level Of Function Swallowing "   Prior Level Of Function Comment Regular textures, thin liquids   General Observations Pt is alert, pleasant and cooperative.   Patient/family Goals To eat and drink safely.    Clinical Swallow Evaluation   Oral Musculature anomalies present   Structural Abnormalities none present   Dentition other (see comments)  (missing upper and lower molars)   Mucosal Quality adequate   Mandibular Strength and Mobility intact   Oral Labial Strength and Mobility WFL   Lingual Strength and Mobility impaired anterior elevation;impaired coordination;other (see comments)  (all lingual movements noted to be slow)   Velar Elevation intact   Buccal Strength and Mobility intact   Laryngeal Function Cough;Swallow;Voicing initiated   Oral Musculature Comments Pt demonstrates difficulty with coordination of lingual movements and anterior lingual elevation. All lingual movements are noted to be slow. All other aspects of oral motor examination are noted to be WFL.    VFSS Evaluation   VFSS Additional Documentation Yes   VFSS Eval: Radiology   Radiologist Resident   Views Taken left lateral;A/P   Physical Location of Procedure [facility]   VFSS Eval: Thin Liquid Texture Trial   Mode of Presentation, Thin Liquid cup;straw;self-fed   Order of Presentation 1,2,3,6,8,9,10, 19  (10 (AP), 19 (barium tablet with water))   Preparatory Phase WFL   Oral Phase, Thin Liquid WFL   Pharyngeal Phase, Thin Liquid Residue in valleculae;Residue in pyriform sinus;other (see comments)  (mildly prominent left outpouching)   Rosenbek's Penetration Aspiration Scale: Thin Liquid Trial Results 2 - contrast enters airway, remains above the vocal cords, no residue remains (penetration)   Successful Strategies Trialed During Procedure, Thin Liquid other (see comments)  (dry swallow)   Diagnostic Statement Pt demonstrates prompt swallow response. Inconsistent flash penetration with residuals that spontaneously clear the laryngeal vestibule with the force of the  swallow. No aspiration. Mild to moderate stasis is observed in the valleculae, piriform sinuses, and in small outpounching in the left hypopharynx after the swallow. A secondary, dry swallow was helpful to reduce the amount of stasis. Barium tablet was passed without difficulty as part of the esophagram.    VFSS Eval: Puree Solid Texture Trial   Mode of Presentation, Puree spoon;self-fed   Order of Presentation 4,5   Preparatory Phase WFL   Oral Phase, Puree WFL   Pharyngeal Phase, Puree Residue in valleculae;Residue in pyriform sinus;other (see comments)  (Residue along inferior posterior pharyngeal wall)   Rosenbek's Penetration Aspiration Scale: Puree Food Trial Results 1 - no aspiration, contrast does not enter airway   Successful Strategies Trialed During Procedure, Puree other (see comments)  (dry swallow)   Diagnostic Statement Prompt swallow response with effective airway protection. Mild pharyngeal residue which is reduced with dry swallows.    VFSS Eval: Solid Food Texture Trial   Mode of Presentation, Solid self-fed   Order of Presentation 7   Preparatory Phase WFL   Oral Phase, Solid WFL   Pharyngeal Phase, Solid Residue in valleculae;Residue in pyriform sinus;other (see comments)  (Residue in left outpouching)   Rosenbek's Penetration Aspiration Scale: Solid Food Trial Results 1 - no aspiration, contrast does not enter airway   Successful Strategies Trialed During Procedure, Solid other (see comments)  (dry swallow)   Diagnostic Statement Prompt swallow response with effective airway protection. Mild stasis observed in the valleculae, piriform sinuses and in the small left pharyngeal out pouching. Secondary dry swallow helpful to reduce residuals.    Swallow Compensations   Swallow Compensations Multiple swallow   Results Other (see comments)  (Reduction of pharyngeal residuals)   Educational Assessment   Barriers to Learning No barriers   Esophageal Phase of Swallow   Patient reports or presents with  symptoms of esophageal dysphagia Yes   Esophageal sweep performed during today s vidofluoroscopic exam  Yes;Please refer to radiologist's report for details   Swallow Eval: Clinical Impressions   Skilled Criteria for Therapy Intervention Skilled criteria met.  Treatment indicated.   Dysphagia Outcome Severity Scale (SHILPI) Level 5 - SHILPI   Treatment Diagnosis Mild oropharyngeal dysphagia   Diet texture recommendations Regular diet;Thin liquids   Recommended Feeding/Eating Techniques alternate between small bites and sips of food/liquid;maintain upright posture during/after eating for 30 mins;small sips/bites;other (see comments)  (double swallow, oral cares)   Rehab Potential good, to achieve stated therapy goals   Anticipated Discharge Disposition home   Risks and Benefits of Treatment have been explained. Yes   Patient, family and/or staff in agreement with Plan of Care Yes   Clinical Impression Comments Mr. Bruno presents today with mild oropharyngeal dysphagia; swallow function has slightly improved since VFSS completed on 4/6/18. Pt demonstrates prompt swallow response. He exhibits inconsistent flash penetration with thin liquids. No aspiration is observed. No penetration or aspiration is observed with any other PO trial textures. There is mild to moderate stasis in the valleculae, piriform sinuses and in the small left pharyngeal outpouching after the swallow. A secondary dry swallow and a liquid wash are both helpful to reduce residuals. The small outpouching in the hypopharynx appears similar to study completed on 4/6/18.  It continues to appear to have a mild impact on pt's swallow as residuals are generally eliminated with subsequent dry swallows and additional time. Additionally, there appears to be a very small outpouching in the right hypopharynx as well. Barium tablet easily passed through pharynx without becoming lodged in pouch. Recommend ongoing soft/moist regular textures and thin liquids with use  of swallowing strategies. Pt trained on continuing exercises and diet recommendations/swallowing strategies; he requested to follow up PRN.   Swallow Goals   SLP Swallow Goals 1;2   Swallow Goal 1   Goal Identifier Diet   Goal Description 1. Pt will tolerate soft/moist regular textures and thin liquids with independent use of swallowing strategies and no overt s/sx of aspiration or feeling of pharyngeal stasis in 95% of PO trials.    Target Date 05/02/19   Swallow Goal 2   Goal Identifier Exercises   Goal Description 2. Pt will complete pharyngeal strengthening exercises, 10 reps 3x/day, when given minimal written cues.    Target Date 05/02/19   Total Session Time   Total Evaluation Time 50     Thank you for the referral of King Gonsalo Bruno. If you have any questions about this report, please contact me using the information below.     STEVE Garcia  Speech-Language Pathology      Direct supervision of  provided throughout visit and documentation process.     Khalida March MA, CCC-SLP  Speech-Language Pathologist   [facility]  Department of Otolaryngology/D&T - 4th floor  Pager: [phone]  Phone: [phone]  Email: [email or web address]

## 2019-04-25 NOTE — PROGRESS NOTES
Reason for Visit: seen in f/u of recurrent, metastatic penile cancer    Oncology HPI: Mr. King Gonsalo Bruno is a 73 year old man with a history of stage II penile urethral carcinoma, treated with cisplatin/gemzar (split on day 1 and 8 given hx of CKD). His first 2 cycles were complicated by dehydration and nausea. He had a positive response and completed 4 cycles in November 2017. He underwent radial penectomy and urethrectomy, perineal urethrostomy and bilateral inguinal node dissection on 3/8/18. Surgical pathology demonstrated moderately differentiated SCC of the penile urethra with extension into the corpus spongiosum and cavernosum of the penile shaft. The specimen was positive for lymphovascular and perineural invasion. Margins were negative. 1/5 L inguinal nodes were positive and 1/7 R inguinal nodes were positive for disease. He was referred for consideration of radiation. At the time of that appointment, he was found to have multiple new pulmonary nodules concerning for metastatic disease. Biopsy was discussed, but after discussion with patient, opted to observe with a PET/CT 6 weeks later. PET/CT on 6/26/18 showed multiple hypermetabolic pulmonary nodules. He was offered palliative intent immunotherapy. He was started on Keytruda on 8/3/18 and continued on that through 2/28/19. On 3/6/19, he had a restaging evaluation and was found to have progression. Hospice was discussed, but he wanted to try alternate chemotherapy. He initiated treatment with docetaxel on 3/14/19. He presents for evaluation prior to cycle 2.    Interval history:    presents today prior to Cycle 3 of Taxotere.    The blistering on his left arm has resolved now. Pain is better though still present. The topical kenalog has been helpful.     Has had some vomiting due to his diverticulosis in his throat, though not new or due to chemo. Denies any edema. Has not needed antiemetics. Had some constipation and then he used Miralax and it  resolved. Now having normal stools, no diarrhea. Denies any abdominal pain. Denies any fevers/chills. Fatigue is stable, not requiring naps during the day. 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 Started B complex tabs and feels like it is helpful. Has thinning hair. Has ~48 oz of water with Ysabel and Coke.     Takes ativan at night, every night for sleep and to relax. Has been doing this since the beginning of his treatments.     ROS: Unless otherwise noted above, denies fatigue, fevers, chills, night sweats, HA, visual, hearing or taste changes, dry mouth, mouth sores, trouble swallowing, N/D,  abdominal pain, change in urination, chest pain, SOB, cough, edema, mood changes, bleeding    Current Outpatient Medications   Medication Sig Dispense Refill     amLODIPine (NORVASC) 10 MG tablet TAKE ONE TABLET BY MOUTH EVERY DAY 90 tablet 3     B Complex Vitamins (VITAMIN B COMPLEX PO) Take by mouth daily (with lunch)       dexamethasone (DECADRON) 4 MG tablet Take 2 tablets (8 mg) evening prior and next morning after docetaxel dose.. 4 tablet 9     fluticasone (FLONASE) 50 MCG/ACT nasal spray Spray 2 sprays into both nostrils 2 times daily 16 g 11     LORazepam (ATIVAN) 0.5 MG tablet Take 1 tablet (0.5 mg) by mouth every 4 hours as needed (Anxiety, Nausea/Vomiting or Sleep) 30 tablet 5     LORazepam (ATIVAN) 0.5 MG tablet Take 1 tablet (0.5 mg) by mouth nightly as needed for anxiety 30 tablet 3     omeprazole (PRILOSEC) 20 MG DR capsule Take 1 capsule (20 mg) by mouth daily 30 capsule 3     omeprazole (PRILOSEC) 40 MG DR capsule Take 1 capsule (40 mg) by mouth daily 30 capsule 1     polyethylene glycol (MIRALAX/GLYCOLAX) packet Take 1 packet by mouth daily       sterile water, bottle, irrigation   2     acetaminophen (TYLENOL) 325 MG tablet Take 2 tablets (650 mg) by mouth every 4 hours as needed for mild pain or fever (Patient not taking: Reported on  "4/25/2019) 150 tablet 0     difluprednate (DUREZOL) 0.05 % ophthalmic emulsion 1 drop every 2 hours       docusate sodium (COLACE) 100 MG capsule Take 1 capsule (100 mg) by mouth 2 times daily as needed for constipation (Patient not taking: Reported on 4/12/2019) 60 capsule 0     ondansetron (ZOFRAN) 8 MG tablet Take 1 tablet (8 mg) by mouth every 8 hours as needed for nausea (Patient not taking: Reported on 4/4/2019) 30 tablet 3     prednisoLONE acetate (PRED FORTE) 1 % ophthalmic suspension 1 drop every 2 hours       prochlorperazine (COMPAZINE) 5 MG tablet Take 1 tablet (5 mg) by mouth every 6 hours as needed (Nausea/Vomiting) (Patient not taking: Reported on 4/4/2019) 30 tablet 3     triamcinolone (KENALOG) 0.1 % external cream Apply topically 2 times daily (Patient not taking: Reported on 4/25/2019) 30 g 1          Allergies   Allergen Reactions     Lisinopril Swelling     Oxycodone Nausea and Vomiting     Vicodin [Hydrocodone-Acetaminophen] Nausea and Vomiting         Exam: alert, appears slim, but well.   Blood pressure 126/72, pulse 77, temperature 97.1  F (36.2  C), temperature source Oral, resp. rate 16, height 1.905 m (6' 3\"), weight 64.9 kg (143 lb), SpO2 97 %.  Wt Readings from Last 4 Encounters:   04/25/19 64.9 kg (143 lb)   04/12/19 64.6 kg (142 lb 6.4 oz)   04/04/19 65 kg (143 lb 4.8 oz)   03/21/19 64.9 kg (143 lb 1.6 oz)     General: Pleasant gentleman in no acute distress  HEENT: EOMI, PERRLA, no scleral icterus, mucus membranes moist and no lesions or thrush  Lymph: Neck is supple and shows no nodes in cervical or supraclavicular   Heart:  RRR, no murmur, gallop or rub  Lungs: CTA-B, no wheezes, rhonchi or rales  Abdomen: Normal bowel sounds, soft, non tender, not distended, no rebound or guarding, no palpable masses  Extremities: No pitting edema  Skin: Hard palpable cord on left forearm with overlying erythema. No peeling or blistering  Neuro: CN II-XII are intact    Labs:    4/25/2019 13:42 "   Sodium 135   Potassium 3.7   Chloride 103   Carbon Dioxide 23   Urea Nitrogen 15   Creatinine 1.47 (H)   GFR Estimate 47 (L)   GFR Estimate If Black 55 (L)   Calcium 10.1   Anion Gap 9   Albumin 3.6   Protein Total 8.1   Bilirubin Total 0.3   Alkaline Phosphatase 100   ALT 14   AST 23   Bilirubin Direct    Lactate Dehydrogenase 188   Glucose 102 (H)      4/25/2019 13:42   WBC 10.7   Hemoglobin 9.9 (L)   Hematocrit 30.3 (L)   Platelet Count 400   RBC Count 3.67 (L)   MCV 83   MCH 27.0   MCHC 32.7   RDW 15.6 (H)   Diff Method Automated Method   % Neutrophils 78.9   % Lymphocytes 14.9   % Monocytes 5.3   % Eosinophils 0.0   % Basophils 0.2   % Immature Granulocytes 0.7   Nucleated RBCs 0   Absolute Neutrophil 8.5 (H)   Absolute Lymphocytes 1.6   Absolute Monocytes 0.6   Absolute Eosinophils 0.0   Absolute Basophils 0.0   Abs Immature Granulocytes 0.1   Absolute Nucleated RBC 0.0        4/25/2019 13:42   Parathyroid Hormone Intact 88 (H)     Imaging: n/a    Impression/plan:     Recurrent, metastatic penile urethral carcinoma   -had progressive on pembrolizumab  -initiated on docetaxel on 3/14/19. Tolerated the first 2 cycles well, with main symptoms of phelbitis/extravasation   -will f/u with Dr. Arceo on 5/15 with restaging CT scans prior to next cycle      Anemia  -has had low hgb for years. Has not had iron studies checked since 2017. Will add these next time. May benefit from iron supplementation  -could also consider checking an EPO level since he has CKD.     CKD  -Cr baseline has been 1.4-1.6 over the past couple months (improved from previously). Today stable at 1.47  -continue to push PO hydrations especially with chemo     HTN  -on amlodipine 10 mg daily, BP stable     Arthritis  -improved after stopping keytruda though still has some occasional pain. Continues with tylenol prn, Anthony Chi/exercise     GERD  -on omeprazole 20 mg daily. Help his symptoms, though still has some GERD most days. Will increase his  dose to BID. Refilled for him today  -Continue taking dexamethasone with food      Bowels  -had constipation though he used miralax and now having normal stools. Can use Miralax prn    Hypercalcemia, mild  -has had persistent mildly elevated calcium (corrected). Today 10.4 corrected.    -Parathyroid hormone slightly elevated. Asymptomatic   -will add on Vit D level for next time. Should also get a 24 hr urinary calcium level   -no need for dexa scan at this time    Phelbitis  Extravasation of Taxotere  -Topical kenalog helped with pain and blistering. Now still has palpable cord. Can now use warm compresses to help relieve symptoms  -Discussed option of getting a port. He did not want to pursue this. Should use vascular access for all his lab draws/PIV sites and using a warm pack after infusion.     Kathleen Orosco PA-C  [facility]  [address], [place] [id number] [phone]

## 2019-04-25 NOTE — NURSING NOTE
Chief Complaint   Patient presents with     Blood Draw     vitals done by LISBET, JL and labs by vascular access BRENDA Morris CMA on 4/25/2019 at 1:46 PM

## 2019-04-25 NOTE — PATIENT INSTRUCTIONS
[facility] Main Line: [phone]    Call triage nurse with chills and/or temperature greater than or equal to 100.4, uncontrolled nausea/vomiting, diarrhea, constipation, dizziness, shortness of breath, chest pain, bleeding, unexplained bruising, or any new/concerning symptoms, questions/concerns.     If you are having any concerning symptoms or wish to speak to a provider before your next infusion visit, please call your care coordinator or triage to notify them so we can adequately serve you.     For triage nurse, after hours, weekends, and holidays: [phone]

## 2019-04-25 NOTE — PROGRESS NOTES
Infusion Nursing Note:  King Gonsalo Bruno presents today for Cycle 3 Day 1 Taxotere.     Patient seen by provider today: Yes: Kathleen Orosco PA-C   present during visit today: Not Applicable.    Note: Patient arrives to infusion following his clinic visit with Kathleen. He offers no new complaints at this time.    Intravenous Access:  Peripheral IV placed.    Treatment Conditions:  Lab Results   Component Value Date    HGB 9.9 04/25/2019     Lab Results   Component Value Date    WBC 10.7 04/25/2019      Lab Results   Component Value Date    ANEU 8.5 04/25/2019     Lab Results   Component Value Date     04/25/2019      Lab Results   Component Value Date     04/25/2019                   Lab Results   Component Value Date    POTASSIUM 3.7 04/25/2019           Lab Results   Component Value Date    MAG 2.0 12/27/2018            Lab Results   Component Value Date    CR 1.47 04/25/2019                   Lab Results   Component Value Date    SAADIA 10.1 04/25/2019                Lab Results   Component Value Date    BILITOTAL 0.3 04/25/2019    BILITOTAL 0.3 04/25/2019           Lab Results   Component Value Date    ALBUMIN 3.6 04/25/2019    ALBUMIN 3.6 04/25/2019                    Lab Results   Component Value Date    ALT 14 04/25/2019    ALT 14 04/25/2019           Lab Results   Component Value Date    AST 22 04/25/2019    AST 23 04/25/2019     Results reviewed, labs MET treatment parameters, ok to proceed with treatment.    Post Infusion Assessment:  Patient tolerated infusion without incident.  Blood return noted pre and post infusion.  Site patent and intact, free from redness, edema or discomfort.  No evidence of extravasations.  Access discontinued per protocol.     Discharge Plan:   Prescription refills given for omeprazole and dexamethasone.  Patient and/or family verbalized understanding of discharge instructions and all questions answered.  Copy of AVS reviewed with patient and/or family.   Patient will return 5/15/2019 for next appointment.  Patient discharged in stable condition.  Departure Mode: Ambulatory.    Phoenix Alexandra RN

## 2019-04-25 NOTE — LETTER
4/25/2019      RE: King Gonsalo Bruno  [address]  [facility] [address]       Reason for Visit: seen in f/u of recurrent, metastatic penile cancer    Oncology HPI: Mr. King Gonsalo Bruno is a 73 year old man with a history of stage II penile urethral carcinoma, treated with cisplatin/gemzar (split on day 1 and 8 given hx of CKD). His first 2 cycles were complicated by dehydration and nausea. He had a positive response and completed 4 cycles in November 2017. He underwent radial penectomy and urethrectomy, perineal urethrostomy and bilateral inguinal node dissection on 3/8/18. Surgical pathology demonstrated moderately differentiated SCC of the penile urethra with extension into the corpus spongiosum and cavernosum of the penile shaft. The specimen was positive for lymphovascular and perineural invasion. Margins were negative. 1/5 L inguinal nodes were positive and 1/7 R inguinal nodes were positive for disease. He was referred for consideration of radiation. At the time of that appointment, he was found to have multiple new pulmonary nodules concerning for metastatic disease. Biopsy was discussed, but after discussion with patient, opted to observe with a PET/CT 6 weeks later. PET/CT on 6/26/18 showed multiple hypermetabolic pulmonary nodules. He was offered palliative intent immunotherapy. He was started on Keytruda on 8/3/18 and continued on that through 2/28/19. On 3/6/19, he had a restaging evaluation and was found to have progression. Hospice was discussed, but he wanted to try alternate chemotherapy. He initiated treatment with docetaxel on 3/14/19. He presents for evaluation prior to cycle 2.    Interval history:    presents today prior to Cycle 3 of Taxotere.    The blistering on his left arm has resolved now. Pain is better though still present. The topical kenalog has been helpful.     Has had some vomiting due to his diverticulosis in his throat, though not new or due to chemo. Denies any  edema. Has not needed antiemetics. Had some constipation and then he used Miralax and it resolved. Now having normal stools, no diarrhea. Denies any abdominal pain. Denies any fevers/chills. Fatigue is stable, not requiring naps during the day. 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 Started B complex tabs and feels like it is helpful. Has thinning hair. Has ~48 oz of water with Ysabel and Coke.     Takes ativan at night, every night for sleep and to relax. Has been doing this since the beginning of his treatments.     ROS: Unless otherwise noted above, denies fatigue, fevers, chills, night sweats, HA, visual, hearing or taste changes, dry mouth, mouth sores, trouble swallowing, N/D,  abdominal pain, change in urination, chest pain, SOB, cough, edema, mood changes, bleeding    Current Outpatient Medications   Medication Sig Dispense Refill     amLODIPine (NORVASC) 10 MG tablet TAKE ONE TABLET BY MOUTH EVERY DAY 90 tablet 3     B Complex Vitamins (VITAMIN B COMPLEX PO) Take by mouth daily (with lunch)       dexamethasone (DECADRON) 4 MG tablet Take 2 tablets (8 mg) evening prior and next morning after docetaxel dose.. 4 tablet 9     fluticasone (FLONASE) 50 MCG/ACT nasal spray Spray 2 sprays into both nostrils 2 times daily 16 g 11     LORazepam (ATIVAN) 0.5 MG tablet Take 1 tablet (0.5 mg) by mouth every 4 hours as needed (Anxiety, Nausea/Vomiting or Sleep) 30 tablet 5     LORazepam (ATIVAN) 0.5 MG tablet Take 1 tablet (0.5 mg) by mouth nightly as needed for anxiety 30 tablet 3     omeprazole (PRILOSEC) 20 MG DR capsule Take 1 capsule (20 mg) by mouth daily 30 capsule 3     omeprazole (PRILOSEC) 40 MG DR capsule Take 1 capsule (40 mg) by mouth daily 30 capsule 1     polyethylene glycol (MIRALAX/GLYCOLAX) packet Take 1 packet by mouth daily       sterile water, bottle, irrigation   2     acetaminophen (TYLENOL) 325 MG tablet Take 2 tablets (650 mg) by mouth  "every 4 hours as needed for mild pain or fever (Patient not taking: Reported on 4/25/2019) 150 tablet 0     difluprednate (DUREZOL) 0.05 % ophthalmic emulsion 1 drop every 2 hours       docusate sodium (COLACE) 100 MG capsule Take 1 capsule (100 mg) by mouth 2 times daily as needed for constipation (Patient not taking: Reported on 4/12/2019) 60 capsule 0     ondansetron (ZOFRAN) 8 MG tablet Take 1 tablet (8 mg) by mouth every 8 hours as needed for nausea (Patient not taking: Reported on 4/4/2019) 30 tablet 3     prednisoLONE acetate (PRED FORTE) 1 % ophthalmic suspension 1 drop every 2 hours       prochlorperazine (COMPAZINE) 5 MG tablet Take 1 tablet (5 mg) by mouth every 6 hours as needed (Nausea/Vomiting) (Patient not taking: Reported on 4/4/2019) 30 tablet 3     triamcinolone (KENALOG) 0.1 % external cream Apply topically 2 times daily (Patient not taking: Reported on 4/25/2019) 30 g 1          Allergies   Allergen Reactions     Lisinopril Swelling     Oxycodone Nausea and Vomiting     Vicodin [Hydrocodone-Acetaminophen] Nausea and Vomiting         Exam: alert, appears slim, but well.   Blood pressure 126/72, pulse 77, temperature 97.1  F (36.2  C), temperature source Oral, resp. rate 16, height 1.905 m (6' 3\"), weight 64.9 kg (143 lb), SpO2 97 %.  Wt Readings from Last 4 Encounters:   04/25/19 64.9 kg (143 lb)   04/12/19 64.6 kg (142 lb 6.4 oz)   04/04/19 65 kg (143 lb 4.8 oz)   03/21/19 64.9 kg (143 lb 1.6 oz)     General: Pleasant gentleman in no acute distress  HEENT: EOMI, PERRLA, no scleral icterus, mucus membranes moist and no lesions or thrush  Lymph: Neck is supple and shows no nodes in cervical or supraclavicular   Heart:  RRR, no murmur, gallop or rub  Lungs: CTA-B, no wheezes, rhonchi or rales  Abdomen: Normal bowel sounds, soft, non tender, not distended, no rebound or guarding, no palpable masses  Extremities: No pitting edema  Skin: Hard palpable cord on left forearm with overlying erythema. No " peeling or blistering  Neuro: CN II-XII are intact    Labs:    4/25/2019 13:42   Sodium 135   Potassium 3.7   Chloride 103   Carbon Dioxide 23   Urea Nitrogen 15   Creatinine 1.47 (H)   GFR Estimate 47 (L)   GFR Estimate If Black 55 (L)   Calcium 10.1   Anion Gap 9   Albumin 3.6   Protein Total 8.1   Bilirubin Total 0.3   Alkaline Phosphatase 100   ALT 14   AST 23   Bilirubin Direct    Lactate Dehydrogenase 188   Glucose 102 (H)      4/25/2019 13:42   WBC 10.7   Hemoglobin 9.9 (L)   Hematocrit 30.3 (L)   Platelet Count 400   RBC Count 3.67 (L)   MCV 83   MCH 27.0   MCHC 32.7   RDW 15.6 (H)   Diff Method Automated Method   % Neutrophils 78.9   % Lymphocytes 14.9   % Monocytes 5.3   % Eosinophils 0.0   % Basophils 0.2   % Immature Granulocytes 0.7   Nucleated RBCs 0   Absolute Neutrophil 8.5 (H)   Absolute Lymphocytes 1.6   Absolute Monocytes 0.6   Absolute Eosinophils 0.0   Absolute Basophils 0.0   Abs Immature Granulocytes 0.1   Absolute Nucleated RBC 0.0        4/25/2019 13:42   Parathyroid Hormone Intact 88 (H)     Imaging: n/a    Impression/plan:     Recurrent, metastatic penile urethral carcinoma   -had progressive on pembrolizumab  -initiated on docetaxel on 3/14/19. Tolerated the first 2 cycles well, with main symptoms of phelbitis/extravasation   -will f/u with Dr. Arceo on 5/15 with restaging CT scans prior to next cycle      Anemia  -has had low hgb for years. Has not had iron studies checked since 2017. Will add these next time. May benefit from iron supplementation  -could also consider checking an EPO level since he has CKD.     CKD  -Cr baseline has been 1.4-1.6 over the past couple months (improved from previously). Today stable at 1.47  -continue to push PO hydrations especially with chemo     HTN  -on amlodipine 10 mg daily, BP stable     Arthritis  -improved after stopping keytruda though still has some occasional pain. Continues with tylenol prn, Anthony Chi/exercise     GERD  -on omeprazole 20 mg daily.  Help his symptoms, though still has some GERD most days. Will increase his dose to BID. Refilled for him today  -Continue taking dexamethasone with food      Bowels  -had constipation though he used miralax and now having normal stools. Can use Miralax prn    Hypercalcemia, mild  -has had persistent mildly elevated calcium (corrected). Today 10.4 corrected.    -Parathyroid hormone slightly elevated. Asymptomatic   -will add on Vit D level for next time. Should also get a 24 hr urinary calcium level   -no need for dexa scan at this time    Phelbitis  Extravasation of Taxotere  -Topical kenalog helped with pain and blistering. Now still has palpable cord. Can now use warm compresses to help relieve symptoms  -Discussed option of getting a port. He did not want to pursue this. Should use vascular access for all his lab draws/PIV sites and using a warm pack after infusion.     Kathleen Orosco PA-C  [facility]  [address], [place] [id number] [phone]

## 2019-04-25 NOTE — NURSING NOTE
"Oncology Rooming Note    April 25, 2019 1:56 PM   King Gonsalo Bruno is a 73 year old male who presents for:    Chief Complaint   Patient presents with     Blood Draw     vitals done by CMA, PIV and labs by vascular access RN     Oncology Clinic Visit     Return visit related to Bladder Cancer     Initial Vitals: /72 (BP Location: Right arm, Patient Position: Sitting, Cuff Size: Adult Regular)   Pulse 77   Temp 97.1  F (36.2  C) (Oral)   Resp 16   Ht 1.905 m (6' 3\")   Wt 64.9 kg (143 lb)   SpO2 97%   BMI 17.87 kg/m   Estimated body mass index is 17.87 kg/m  as calculated from the following:    Height as of this encounter: 1.905 m (6' 3\").    Weight as of this encounter: 64.9 kg (143 lb). Body surface area is 1.85 meters squared.  Moderate Pain (4) Comment: Data Unavailable   No LMP for male patient.  Allergies reviewed: Yes  Medications reviewed: Yes    Medications: Medication refills not needed today.  Pharmacy name entered into [employer]:    [id number] - [place], [place] - [address] [place] [address] [address] [place] [facility] [place], [place] - [phone]    Clinical concerns: No new concerns. Provider was notified.      Yessica Stover LPN            "

## 2019-05-15 NOTE — PATIENT INSTRUCTIONS
Contact Numbers  [facility] [place] [phone]  [facility] NurseTriage and After Hours:  [phone]    Call triage with chills and/or temperature greater than or equal to 100.5, uncontrolled nausea/vomiting, diarrhea, constipation, dizziness, shortness of breath, chest pain, bleeding, unexplained bruising, or any new/concerning symptoms, questions/concerns.     If after hours, weekends, or holidays, call the nurse triage number. Calls may be forwarded to the hospital , please ask for the adult oncology doctor on call.     If you are having any concerning symptoms or wish to speak to a provider before your next infusion visit, please call your care coordinator or triage to notify them so we can adequately serve you.     If you need a refill on a narcotic prescription, please call triage or your care coordinator before your infusion appointment.           May 2019      [place] Monday Tuesday Wednesday Thursday Friday Saturday                  1     2     3     4       5     6     7     8     9     10     11       12     13     14     15    [facility] DRAW   8:15 AM   (15 min.)    [facility]    CT CHEST/ABDOMEN/PELVIS W   9:20 AM   (20 min.)   UCCT1   [facility] CT    [facility] RETURN  11:45 AM   (30 min.)   Steve Arceo MD   [facility]    [facility] ONC INFUSION 120   1:00 PM   (120 min.)    ONCOLOGY INFUSION   [facility] 16     17     18       19     20     21     22     23     24     25       26     27     28     29     30     31 June 2019 Sunday Monday Tuesday Wednesday Thursday Friday Saturday                                 1       2     3     4     5     6    [facility] DRAW   1:30 PM   (15 min.)    [facility]   [facility]    [facility] RETURN   1:45 PM   (50 min.)   Kathleen Orosco PA-C   [facility]    [facility] ONC INFUSION 120   3:30 PM   (120 min.)     ONCOLOGY INFUSION   [facility] 7     8       9     10     11     12     13     14     15       16     17     18     19     20     21     22       23     24     25     26     27    [facility] LAB DRAW  11:30 AM   (15 min.)   [facility] LAB DRAW   [facility] Lab Draw    [facility] RETURN  11:55 AM   (50 min.)   Yessica Ovalles APRN CNP   [facility] ONC INFUSION 120   1:00 PM   (120 min.)    ONCOLOGY INFUSION   [facility] 28     29       30                                                   Lab Results:  Recent Results (from the past 12 hour(s))   Comprehensive metabolic panel    Collection Time: 05/15/19  8:26 AM   Result Value Ref Range    Sodium 135 133 - 144 mmol/L    Potassium 3.9 3.4 - 5.3 mmol/L    Chloride 102 94 - 109 mmol/L    Carbon Dioxide 23 20 - 32 mmol/L    Anion Gap 10 3 - 14 mmol/L    Glucose 83 70 - 99 mg/dL    Urea Nitrogen 14 7 - 30 mg/dL    Creatinine 1.58 (H) 0.66 - 1.25 mg/dL    GFR Estimate 43 (L) >60 mL/min/[1.73_m2]    GFR Estimate If Black 50 (L) >60 mL/min/[1.73_m2]    Calcium 9.4 8.5 - 10.1 mg/dL    Bilirubin Total 0.2 0.2 - 1.3 mg/dL    Albumin 3.0 (L) 3.4 - 5.0 g/dL    Protein Total 7.4 6.8 - 8.8 g/dL    Alkaline Phosphatase 84 40 - 150 U/L    ALT 14 0 - 70 U/L    AST 26 0 - 45 U/L   CBC with platelets differential    Collection Time: 05/15/19  8:26 AM   Result Value Ref Range    WBC 8.6 4.0 - 11.0 10e9/L    RBC Count 3.47 (L) 4.4 - 5.9 10e12/L    Hemoglobin 9.0 (L) 13.3 - 17.7 g/dL    Hematocrit 27.8 (L) 40.0 - 53.0 %    MCV 80 78 - 100 fl    MCH 25.9 (L) 26.5 - 33.0 pg    MCHC 32.4 31.5 - 36.5 g/dL    RDW 16.0 (H) 10.0 - 15.0 %    Platelet Count 454 (H) 150 - 450 10e9/L    Diff Method Automated Method     % Neutrophils 64.7 %    % Lymphocytes 21.4 %    % Monocytes 11.7 %    % Eosinophils 1.1 %    % Basophils 0.6 %    % Immature Granulocytes 0.5 %    Nucleated RBCs 0 0 /100    Absolute Neutrophil 5.6 1.6 - 8.3 10e9/L     Absolute Lymphocytes 1.8 0.8 - 5.3 10e9/L    Absolute Monocytes 1.0 0.0 - 1.3 10e9/L    Absolute Eosinophils 0.1 0.0 - 0.7 10e9/L    Absolute Basophils 0.1 0.0 - 0.2 10e9/L    Abs Immature Granulocytes 0.0 0 - 0.4 10e9/L    Absolute Nucleated RBC 0.0    Lactate Dehydrogenase    Collection Time: 05/15/19  8:26 AM   Result Value Ref Range    Lactate Dehydrogenase 251 (H) 85 - 227 U/L   Vitamin D deficiency screening    Collection Time: 05/15/19  8:26 AM   Result Value Ref Range    Vitamin D Deficiency screening 17 (L) 20 - 75 ug/L        ambulatory

## 2019-05-15 NOTE — PROGRESS NOTES
Reason for Visit: seen in f/u of recurrent, metastatic penile cancer    Oncology HPI: Mr. King Gonsalo Bruno is a 73 year old man with a history of stage II penile urethral carcinoma, treated with cisplatin/gemzar (split on day 1 and 8 given hx of CKD). His first 2 cycles were complicated by dehydration and nausea. He had a positive response and completed 4 cycles in November 2017. He underwent radial penectomy and urethrectomy, perineal urethrostomy and bilateral inguinal node dissection on 3/8/18. Surgical pathology demonstrated moderately differentiated SCC of the penile urethra with extension into the corpus spongiosum and cavernosum of the penile shaft. The specimen was positive for lymphovascular and perineural invasion. Margins were negative. 1/5 L inguinal nodes were positive and 1/7 R inguinal nodes were positive for disease. He was referred for consideration of radiation. At the time of that appointment, he was found to have multiple new pulmonary nodules concerning for metastatic disease. Biopsy was discussed, but after discussion with patient, opted to observe with a PET/CT 6 weeks later. PET/CT on 6/26/18 showed multiple hypermetabolic pulmonary nodules. He was offered palliative intent immunotherapy. He was started on Keytruda on 8/3/18 and continued on that through 2/28/19. On 3/6/19, he had a restaging evaluation and was found to have progression. Hospice was discussed, but he wanted to try alternate chemotherapy. He initiated treatment with docetaxel on 3/14/19.     Interval history:    presents today prior to Cycle 4 of Taxotere.  His iv had infiltrated last time and the blistering on his left arm has resolved now.  He feels tired throught most of the days. The fatigue is mostly after 2 days after chemo and it lasts for 1 week and he is back to baseline by third week. The first dose was not as bad, the third dose was the worst in terms of fatigue. Neuropathy manifested by finger tingling, feet  numbness is stable. Has thinning hair. Appetite is good. No fever, chills, night sweats. He is able to sleep ok. He tries to exercise and keep moving. No falls but he has balance problems, when he walks too fast. He played 18 holes of golf yesterday.     ROS: Unless otherwise noted above, denies fatigue, fevers, chills, night sweats, HA, visual, hearing or taste changes, dry mouth, mouth sores, trouble swallowing, N/D,  abdominal pain, change in urination, chest pain, SOB, cough, edema, mood changes, bleeding  Current Outpatient Medications   Medication Sig     amLODIPine (NORVASC) 10 MG tablet TAKE ONE TABLET BY MOUTH EVERY DAY     B Complex Vitamins (VITAMIN B COMPLEX PO) Take by mouth daily (with lunch)     fluticasone (FLONASE) 50 MCG/ACT nasal spray Spray 2 sprays into both nostrils 2 times daily     LORazepam (ATIVAN) 0.5 MG tablet Take 1 tablet (0.5 mg) by mouth nightly as needed for anxiety     omeprazole (PRILOSEC) 20 MG DR capsule Take 1 capsule (20 mg) by mouth 2 times daily     omeprazole (PRILOSEC) 40 MG DR capsule Take 1 capsule (40 mg) by mouth daily     polyethylene glycol (MIRALAX/GLYCOLAX) packet Take 1 packet by mouth daily     sterile water, bottle, irrigation      triamcinolone (KENALOG) 0.1 % external cream Apply topically 2 times daily     acetaminophen (TYLENOL) 325 MG tablet Take 2 tablets (650 mg) by mouth every 4 hours as needed for mild pain or fever (Patient not taking: Reported on 4/25/2019)     dexamethasone (DECADRON) 4 MG tablet Take 2 tablets (8 mg) evening prior and next morning after docetaxel dose.. (Patient not taking: Reported on 5/15/2019.)     difluprednate (DUREZOL) 0.05 % ophthalmic emulsion 1 drop every 2 hours     docusate sodium (COLACE) 100 MG capsule Take 1 capsule (100 mg) by mouth 2 times daily as needed for constipation (Patient not taking: Reported on 4/12/2019)     LORazepam (ATIVAN) 0.5 MG tablet Take 1 tablet (0.5 mg) by mouth every 4 hours as needed (Anxiety,  "Nausea/Vomiting or Sleep) (Patient not taking: Reported on 5/15/2019)     ondansetron (ZOFRAN) 8 MG tablet Take 1 tablet (8 mg) by mouth every 8 hours as needed for nausea (Patient not taking: Reported on 4/4/2019)     prednisoLONE acetate (PRED FORTE) 1 % ophthalmic suspension 1 drop every 2 hours     prochlorperazine (COMPAZINE) 5 MG tablet Take 1 tablet (5 mg) by mouth every 6 hours as needed (Nausea/Vomiting) (Patient not taking: Reported on 4/4/2019)     No current facility-administered medications for this visit.      Facility-Administered Medications Ordered in Other Visits   Medication     barium sulfate (EZ PAQUE) oral suspension 96%     barium sulfate (EZ-HD) oral suspension 98%     barium sulfate 40% (VARIBAR NECTAR) oral suspension 20 mL     barium sulfate 40% (VARIBAR THIN HONEY) oral suspension 20 mL     barium sulfate 40% (VARIBAR THIN HONEY) oral suspension     DOCEtaxel (TAXOTERE) 140 mg in sodium chloride 0.9 % 282 mL CHEMOTHERAPY     sod bicarbonate-citric acid-simethicone (EZ GAS) 2.21-1.53-0.04 G packet 4 g       Allergies   Allergen Reactions     Lisinopril Swelling     Oxycodone Nausea and Vomiting     Vicodin [Hydrocodone-Acetaminophen] Nausea and Vomiting         Exam: alert, appears slim, but well.   Blood pressure 123/79, pulse 86, temperature 98.3  F (36.8  C), temperature source Oral, resp. rate 18, height 1.905 m (6' 3\"), weight 64 kg (141 lb), SpO2 100 %.  Wt Readings from Last 4 Encounters:   05/15/19 64 kg (141 lb)   04/25/19 64.9 kg (143 lb)   04/12/19 64.6 kg (142 lb 6.4 oz)   04/04/19 65 kg (143 lb 4.8 oz)     General: Pleasant gentleman in no acute distress  HEENT: EOMI, PERRLA, no scleral icterus, mucus membranes moist and no lesions or thrush  Lymph: Neck is supple and shows no nodes in cervical or supraclavicular   Heart:  RRR, no murmur, gallop or rub  Lungs: CTA-B, no wheezes, rhonchi or rales  Abdomen: Normal bowel sounds, soft, non tender, not distended, no rebound or " guarding, no palpable masses  Extremities: No pitting edema  Skin: Hard palpable cord on left forearm with overlying erythema. No peeling or blistering  Neuro: CN II-XII are intact    Labs:     Recent Labs   Lab Test 05/15/19  0826 04/25/19  1342 04/12/19  1042 04/04/19  1330 03/21/19  1655    135 133 132* Canceled, Test credited   POTASSIUM 3.9 3.7 3.7 4.4 Canceled, Test credited   CHLORIDE 102 103 101 100 Canceled, Test credited   CO2 23 23 24 24 Canceled, Test credited   ANIONGAP 10 9 8 8 Canceled, Test credited   BUN 14 15 14 20 Canceled, Test credited   CR 1.58* 1.47* 1.47* 1.40* Canceled, Test credited   GLC 83 102* 81 107* Canceled, Test credited   SAADIA 9.4 10.1 9.7 10.4* Canceled, Test credited     Recent Labs   Lab Test 12/27/18  1147 11/24/18  1223 11/15/18  1223 10/25/18  1242 10/04/18  1240   MAG 2.0 1.8 1.8 1.9 1.9     Recent Labs   Lab Test 05/15/19  0826 04/25/19  1342 04/12/19  1042 04/04/19  1330 03/21/19  1655   WBC 8.6 10.7 2.9* 12.5* Canceled, Test credited   HGB 9.0* 9.9* 9.7* 10.3* Canceled, Test credited   * 400 310 465* Canceled, Test credited   MCV 80 83 83 84 Canceled, Test credited   NEUTROPHIL 64.7 78.9 37.5 83.8 Canceled, Test credited     Recent Labs   Lab Test 05/15/19  0826 04/25/19  1342 04/12/19  1042 04/04/19  1330   BILITOTAL 0.2 0.3  0.3 0.6 0.3   ALKPHOS 84 97  100 89 102   ALT 14 14  14 15 15   AST 26 22  23 25 23   ALBUMIN 3.0* 3.6  3.6 3.5 3.6   * 188  --  172     TSH   Date Value Ref Range Status   02/28/2019 0.85 0.40 - 4.00 mU/L Final   02/07/2019 0.97 0.40 - 4.00 mU/L Final   01/17/2019 0.81 0.40 - 4.00 mU/L Final     No results for input(s): CEA in the last 71929 hours.  Results for orders placed or performed in visit on 05/15/19   CT Chest/Abdomen/Pelvis w Contrast    Narrative    EXAMINATION: CT CHEST/ABDOMEN/PELVIS W CONTRAST, 5/15/2019 9:39 AM    TECHNIQUE:  Helical CT images from the thoracic inlet through the  symphysis pubis were obtained   with contrast. Contrast dose: Isovue  370   86 cc    COMPARISON: 3/5/2019.    HISTORY: Metastatic penile cancer, assess for progression; Penile  cancer (H); Malignant neoplasm metastatic to both lungs (H); Malignant  neoplasm metastatic to both lungs (H)    FINDINGS:    LUNGS: Predominant centrilobular and paraseptal emphysematous changes.  Biapical pleural thickening/scarring. 11 mm nodule in the right upper  lobe, series 4 image 50; 6.9 cm lobulated mass in the right lower lobe  medially narrowing the right lower lobe bronchus with adjacent  postobstructive changes. Right middle lobe irregular mass measuring  3.4 x 2.3 cm, previously measuring 3.4 x 2.8 cm with increased  cavitary component. Left lung pulmonary nodules as/masses are grossly  stable. Peripheral reticulation in the lungs with fibrotic/atelectatic  changes. No new or enlarging pulmonary nodules as/masses.    Chest: No thyroid nodules. Central tracheobronchial tree is patent.  Thoracic aorta and main pulmonary artery have normal diameter. No  central pulmonary embolism. Atherosclerotic calcifications of the  thoracic aorta an left anterior descending coronary artery. Cardiac  size within normal limits. Stable small pericardial effusion. No  pleural effusions. No pneumothorax. Subcarinal enlarged lymph node  measuring 1.7 cm in short axis, stable.    Abdomen and pelvis: Stable subcentimeter liver hypodensities, too  small to characterize. Focal fat deposition along the falciform  ligament. No new or enlarging liver lesions. Patent hepatic  vasculature. No biliary tree dilation. Partially distended  gallbladder. Homogeneous pancreatic parenchyma. Main pancreatic duct  is not dilated. The spleen is not enlarged. No focal splenic lesions.  Unremarkable adrenal glands. No renal stones or hydronephrosis. Left  kidney renal cysts measuring up to 2.9 cm in the superior pole.  Otherwise, subcentimeter cortical hypodensities in both kidneys, too  small to  characterize, stable favoring renal cysts. Decompressed  urinary bladder. Enlarged prostate. Postoperative changes in both  inguinal region presumably related to lymph node dissection and/or  biopsy. Previously described prominent left inguinal lymph nodes have  improved now measuring up to 7 mm short axis, previously measuring 11  mm. No new abdominal or pelvic lymphadenopathy. No abdominal aortic  aneurysm. Atherosclerotic calcifications of the abdominal aorta and  its major branches.    Small amount of free fluid in the pelvis. No free air. Moderate  colonic stool burden. No dilated loops of bowel. Decompressed stomach.    Bones: Multilevel degenerative changes of the spine. Degenerative  changes of bilateral SI joints an hips. Scattered Schmorl nodes. Disc  height narrowing with intradiscal gas at L5-S1. No aggressive bone  lesions. Old left-sided rib fractures. Stable lucent focus in the left  femoral head likely due to degenerative changes with associated lucent  focus in the left acetabulum.      Impression    IMPRESSION: In this patient with history of metastatic penile cancer:  1. Innumerable pulmonary metastases in both lungs are grossly stable  in size from the prior study, except for a right middle lobe mass with  increased cavitary component. No new or enlarging pulmonary nodules.  2. Stable subcarinal lymphadenopathy.  3. Postoperative/postprocedural changes in the right and left inguinal  region with decreased left inguinal lymph nodes.  4. Additional incidental findings as described above.    MARIN MARK MD         Impression/plan:     Recurrent, metastatic penile urethral carcinoma   -had progressive on pembrolizumab  -initiated on docetaxel on 3/14/19. Tolerated the first 3 cycles well, with main symptoms of fatigue, phelbitis/extravasation   -F/u CT CAP with stable disease and no new lesions  -Will therefore continue with Cycle 4 of docetaxel today        Anemia  -has had low hgb for  years. Has not had iron studies checked since 2017. Will add these next time. May benefit from iron supplementation  -could also consider checking an EPO level since he has CKD.     CKD  -Cr baseline has been 1.4-1.6 over the past couple months (improved from previously). Today stable at 1.59  -continue to push PO hydrations especially with chemo     HTN  -on amlodipine 10 mg daily, BP stable     Arthritis  -improved after stopping keytruda though still has some occasional pain. Continues with tylenol prn, Anthony Chi/exercise     GERD  -on omeprazole 20 mg daily. Help his symptoms, though still has some GERD most days. Will increase his dose to BID. Refilled for him today  -Continue taking dexamethasone with food      Bowels  -had constipation though he used miralax and now having normal stools. Can use Miralax prn    Hypercalcemia, mild  -has had persistent mildly elevated calcium (corrected). Today 10.4 corrected.    -Parathyroid hormone slightly elevated. Asymptomatic   -may consider adding Vit D level and urine ca for next time.   -no need for dexa scan at this time    Phelbitis  Extravasation of Taxotere  -Topical kenalog helped with pain and blistering.use warm compresses to help relieve symptoms  -Discussed option of getting a port previously. He did not want to pursue this. Should use vascular access for all his lab draws/PIV sites and using a warm pack after infusion.     Staffed with Dr. Arceo.    Gary Bautista MD  Hematology Oncology fellow  [phone]    This patient has been seen and evaluated by me, Steve Arceo MD along with Dr. Gary Bautista.  I have discussed with Dr. Bautista and agree with the findings and plan in this note which I have edited.      Mr. Bruno is tolerating therapy well. He has no new complains. He denies peripheral neuropathy. His scans are stable. We will plan to continue with therapy as current. I will restage him in 9 weeks or so. I will see him with labs and restaging scans.      Over 25 min of direct face to face time spent with patient with more than 50% time spent in counseling and coordinating care.

## 2019-05-15 NOTE — DISCHARGE INSTRUCTIONS

## 2019-05-15 NOTE — NURSING NOTE
"Oncology Rooming Note    May 15, 2019 11:27 AM   King Gonsalo Bruno is a 73 year old male who presents for:    Chief Complaint   Patient presents with     Blood Draw     IV placement with blood draw and vitals     RECHECK     onc bladder ca      Initial Vitals: /79   Pulse 86   Temp 98.3  F (36.8  C) (Oral)   Resp 18   Ht 1.905 m (6' 3\")   Wt 64 kg (141 lb)   SpO2 100%   BMI 17.62 kg/m   Estimated body mass index is 17.62 kg/m  as calculated from the following:    Height as of this encounter: 1.905 m (6' 3\").    Weight as of this encounter: 64 kg (141 lb). Body surface area is 1.84 meters squared.  Mild Pain (3) Comment: Data Unavailable   No LMP for male patient.  Allergies reviewed: Yes  Medications reviewed: Yes    Medications: Medication refills not needed today.  Pharmacy name entered into [facility] [employer]:    [id number] - [place], [place] - [address] [place] [address] AT [address] [place] [address] & [facility] [place], [place] - [phone]    Clinical concerns: none        Kat Kade, CMA              "

## 2019-05-15 NOTE — NURSING NOTE
Chief Complaint   Patient presents with     Blood Draw     IV placement with blood draw and vitals     Labs drawn via IV placed by Heike Reid in left arm

## 2019-05-15 NOTE — PROGRESS NOTES
Infusion Nursing Note:  King Gonsalo Bruno presents today for Day 1 Cycle 4 Taxotere.    Patient seen by provider today: Yes: Dr. Arceo   present during visit today: Not Applicable.    Note: Per Dr. Arceo, patient did not take his home dexamethasone last evening. Treatment plan dexamethasone increased to 20 mg today, patient will take his oral dose tomorrow.     Intravenous Access:  Peripheral IV placed by vascular access.    Treatment Conditions:  Lab Results   Component Value Date    HGB 9.0 05/15/2019     Lab Results   Component Value Date    WBC 8.6 05/15/2019      Lab Results   Component Value Date    ANEU 5.6 05/15/2019     Lab Results   Component Value Date     05/15/2019      Lab Results   Component Value Date     05/15/2019                   Lab Results   Component Value Date    POTASSIUM 3.9 05/15/2019           Lab Results   Component Value Date    MAG 2.0 12/27/2018            Lab Results   Component Value Date    CR 1.58 05/15/2019                   Lab Results   Component Value Date    SAADIA 9.4 05/15/2019                Lab Results   Component Value Date    BILITOTAL 0.2 05/15/2019           Lab Results   Component Value Date    ALBUMIN 3.0 05/15/2019                    Lab Results   Component Value Date    ALT 14 05/15/2019           Lab Results   Component Value Date    AST 26 05/15/2019       Results reviewed, labs MET treatment parameters, ok to proceed with treatment.    Post Infusion Assessment:  Patient tolerated infusion without incident.  Blood return noted pre and post infusion.  Site patent and intact, free from redness, edema or discomfort.  No evidence of extravasations.  Access discontinued per protocol.     Discharge Plan:   Prescription refills given for dexamethasone.  Copy of AVS reviewed with patient and wife.  Patient will return 06/06 for next appointment.  Patient discharged in stable condition accompanied by: wife.  Departure Mode: Ambulatory.    Ginette Hooks  RN

## 2019-05-15 NOTE — LETTER
5/15/2019       RE: King Gonsalo Bruno  [address]  [facility] [address]     Dear Colleague,    Thank you for referring your patient, King Gonsalo Bruno, to the [facility]. Please see a copy of my visit note below.    Reason for Visit: seen in f/u of recurrent, metastatic penile cancer    Oncology HPI: Mr. King Gonsalo Bruno is a 73 year old man with a history of stage II penile urethral carcinoma, treated with cisplatin/gemzar (split on day 1 and 8 given hx of CKD). His first 2 cycles were complicated by dehydration and nausea. He had a positive response and completed 4 cycles in November 2017. He underwent radial penectomy and urethrectomy, perineal urethrostomy and bilateral inguinal node dissection on 3/8/18. Surgical pathology demonstrated moderately differentiated SCC of the penile urethra with extension into the corpus spongiosum and cavernosum of the penile shaft. The specimen was positive for lymphovascular and perineural invasion. Margins were negative. 1/5 L inguinal nodes were positive and 1/7 R inguinal nodes were positive for disease. He was referred for consideration of radiation. At the time of that appointment, he was found to have multiple new pulmonary nodules concerning for metastatic disease. Biopsy was discussed, but after discussion with patient, opted to observe with a PET/CT 6 weeks later. PET/CT on 6/26/18 showed multiple hypermetabolic pulmonary nodules. He was offered palliative intent immunotherapy. He was started on Keytruda on 8/3/18 and continued on that through 2/28/19. On 3/6/19, he had a restaging evaluation and was found to have progression. Hospice was discussed, but he wanted to try alternate chemotherapy. He initiated treatment with docetaxel on 3/14/19.     Interval history:    presents today prior to Cycle 4 of Taxotere.  His iv had infiltrated last time and the blistering on his left arm has resolved now.  He feels tired throught most of  the days. The fatigue is mostly after 2 days after chemo and it lasts for 1 week and he is back to baseline by third week. The first dose was not as bad, the third dose was the worst in terms of fatigue. Neuropathy manifested by finger tingling, feet numbness is stable. Has thinning hair. Appetite is good. No fever, chills, night sweats. He is able to sleep ok. He tries to exercise and keep moving. No falls but he has balance problems, when he walks too fast. He played 18 holes of golf yesterday.     ROS: Unless otherwise noted above, denies fatigue, fevers, chills, night sweats, HA, visual, hearing or taste changes, dry mouth, mouth sores, trouble swallowing, N/D,  abdominal pain, change in urination, chest pain, SOB, cough, edema, mood changes, bleeding  Current Outpatient Medications   Medication Sig     amLODIPine (NORVASC) 10 MG tablet TAKE ONE TABLET BY MOUTH EVERY DAY     B Complex Vitamins (VITAMIN B COMPLEX PO) Take by mouth daily (with lunch)     fluticasone (FLONASE) 50 MCG/ACT nasal spray Spray 2 sprays into both nostrils 2 times daily     LORazepam (ATIVAN) 0.5 MG tablet Take 1 tablet (0.5 mg) by mouth nightly as needed for anxiety     omeprazole (PRILOSEC) 20 MG DR capsule Take 1 capsule (20 mg) by mouth 2 times daily     omeprazole (PRILOSEC) 40 MG DR capsule Take 1 capsule (40 mg) by mouth daily     polyethylene glycol (MIRALAX/GLYCOLAX) packet Take 1 packet by mouth daily     sterile water, bottle, irrigation      triamcinolone (KENALOG) 0.1 % external cream Apply topically 2 times daily     acetaminophen (TYLENOL) 325 MG tablet Take 2 tablets (650 mg) by mouth every 4 hours as needed for mild pain or fever (Patient not taking: Reported on 4/25/2019)     dexamethasone (DECADRON) 4 MG tablet Take 2 tablets (8 mg) evening prior and next morning after docetaxel dose.. (Patient not taking: Reported on 5/15/2019.)     difluprednate (DUREZOL) 0.05 % ophthalmic emulsion 1 drop every 2 hours     docusate  "sodium (COLACE) 100 MG capsule Take 1 capsule (100 mg) by mouth 2 times daily as needed for constipation (Patient not taking: Reported on 4/12/2019)     LORazepam (ATIVAN) 0.5 MG tablet Take 1 tablet (0.5 mg) by mouth every 4 hours as needed (Anxiety, Nausea/Vomiting or Sleep) (Patient not taking: Reported on 5/15/2019)     ondansetron (ZOFRAN) 8 MG tablet Take 1 tablet (8 mg) by mouth every 8 hours as needed for nausea (Patient not taking: Reported on 4/4/2019)     prednisoLONE acetate (PRED FORTE) 1 % ophthalmic suspension 1 drop every 2 hours     prochlorperazine (COMPAZINE) 5 MG tablet Take 1 tablet (5 mg) by mouth every 6 hours as needed (Nausea/Vomiting) (Patient not taking: Reported on 4/4/2019)     No current facility-administered medications for this visit.      Facility-Administered Medications Ordered in Other Visits   Medication     barium sulfate (EZ PAQUE) oral suspension 96%     barium sulfate (EZ-HD) oral suspension 98%     barium sulfate 40% (VARIBAR NECTAR) oral suspension 20 mL     barium sulfate 40% (VARIBAR THIN HONEY) oral suspension 20 mL     barium sulfate 40% (VARIBAR THIN HONEY) oral suspension     DOCEtaxel (TAXOTERE) 140 mg in sodium chloride 0.9 % 282 mL CHEMOTHERAPY     sod bicarbonate-citric acid-simethicone (EZ GAS) 2.21-1.53-0.04 G packet 4 g       Allergies   Allergen Reactions     Lisinopril Swelling     Oxycodone Nausea and Vomiting     Vicodin [Hydrocodone-Acetaminophen] Nausea and Vomiting         Exam: alert, appears slim, but well.   Blood pressure 123/79, pulse 86, temperature 98.3  F (36.8  C), temperature source Oral, resp. rate 18, height 1.905 m (6' 3\"), weight 64 kg (141 lb), SpO2 100 %.  Wt Readings from Last 4 Encounters:   05/15/19 64 kg (141 lb)   04/25/19 64.9 kg (143 lb)   04/12/19 64.6 kg (142 lb 6.4 oz)   04/04/19 65 kg (143 lb 4.8 oz)     General: Pleasant gentleman in no acute distress  HEENT: EOMI, PERRLA, no scleral icterus, mucus membranes moist and no " lesions or thrush  Lymph: Neck is supple and shows no nodes in cervical or supraclavicular   Heart:  RRR, no murmur, gallop or rub  Lungs: CTA-B, no wheezes, rhonchi or rales  Abdomen: Normal bowel sounds, soft, non tender, not distended, no rebound or guarding, no palpable masses  Extremities: No pitting edema  Skin: Hard palpable cord on left forearm with overlying erythema. No peeling or blistering  Neuro: CN II-XII are intact    Labs:     Recent Labs   Lab Test 05/15/19  0826 04/25/19  1342 04/12/19  1042 04/04/19  1330 03/21/19  1655    135 133 132* Canceled, Test credited   POTASSIUM 3.9 3.7 3.7 4.4 Canceled, Test credited   CHLORIDE 102 103 101 100 Canceled, Test credited   CO2 23 23 24 24 Canceled, Test credited   ANIONGAP 10 9 8 8 Canceled, Test credited   BUN 14 15 14 20 Canceled, Test credited   CR 1.58* 1.47* 1.47* 1.40* Canceled, Test credited   GLC 83 102* 81 107* Canceled, Test credited   SAADIA 9.4 10.1 9.7 10.4* Canceled, Test credited     Recent Labs   Lab Test 12/27/18  1147 11/24/18  1223 11/15/18  1223 10/25/18  1242 10/04/18  1240   MAG 2.0 1.8 1.8 1.9 1.9     Recent Labs   Lab Test 05/15/19  0826 04/25/19  1342 04/12/19  1042 04/04/19  1330 03/21/19  1655   WBC 8.6 10.7 2.9* 12.5* Canceled, Test credited   HGB 9.0* 9.9* 9.7* 10.3* Canceled, Test credited   * 400 310 465* Canceled, Test credited   MCV 80 83 83 84 Canceled, Test credited   NEUTROPHIL 64.7 78.9 37.5 83.8 Canceled, Test credited     Recent Labs   Lab Test 05/15/19  0826 04/25/19  1342 04/12/19  1042 04/04/19  1330   BILITOTAL 0.2 0.3  0.3 0.6 0.3   ALKPHOS 84 97  100 89 102   ALT 14 14  14 15 15   AST 26 22  23 25 23   ALBUMIN 3.0* 3.6  3.6 3.5 3.6   * 188  --  172     TSH   Date Value Ref Range Status   02/28/2019 0.85 0.40 - 4.00 mU/L Final   02/07/2019 0.97 0.40 - 4.00 mU/L Final   01/17/2019 0.81 0.40 - 4.00 mU/L Final     No results for input(s): CEA in the last 72923 hours.  Results for orders placed or  performed in visit on 05/15/19   CT Chest/Abdomen/Pelvis w Contrast    Narrative    EXAMINATION: CT CHEST/ABDOMEN/PELVIS W CONTRAST, 5/15/2019 9:39 AM    TECHNIQUE:  Helical CT images from the thoracic inlet through the  symphysis pubis were obtained  with contrast. Contrast dose: Isovue  370   86 cc    COMPARISON: 3/5/2019.    HISTORY: Metastatic penile cancer, assess for progression; Penile  cancer (H); Malignant neoplasm metastatic to both lungs (H); Malignant  neoplasm metastatic to both lungs (H)    FINDINGS:    LUNGS: Predominant centrilobular and paraseptal emphysematous changes.  Biapical pleural thickening/scarring. 11 mm nodule in the right upper  lobe, series 4 image 50; 6.9 cm lobulated mass in the right lower lobe  medially narrowing the right lower lobe bronchus with adjacent  postobstructive changes. Right middle lobe irregular mass measuring  3.4 x 2.3 cm, previously measuring 3.4 x 2.8 cm with increased  cavitary component. Left lung pulmonary nodules as/masses are grossly  stable. Peripheral reticulation in the lungs with fibrotic/atelectatic  changes. No new or enlarging pulmonary nodules as/masses.    Chest: No thyroid nodules. Central tracheobronchial tree is patent.  Thoracic aorta and main pulmonary artery have normal diameter. No  central pulmonary embolism. Atherosclerotic calcifications of the  thoracic aorta an left anterior descending coronary artery. Cardiac  size within normal limits. Stable small pericardial effusion. No  pleural effusions. No pneumothorax. Subcarinal enlarged lymph node  measuring 1.7 cm in short axis, stable.    Abdomen and pelvis: Stable subcentimeter liver hypodensities, too  small to characterize. Focal fat deposition along the falciform  ligament. No new or enlarging liver lesions. Patent hepatic  vasculature. No biliary tree dilation. Partially distended  gallbladder. Homogeneous pancreatic parenchyma. Main pancreatic duct  is not dilated. The spleen is not  enlarged. No focal splenic lesions.  Unremarkable adrenal glands. No renal stones or hydronephrosis. Left  kidney renal cysts measuring up to 2.9 cm in the superior pole.  Otherwise, subcentimeter cortical hypodensities in both kidneys, too  small to characterize, stable favoring renal cysts. Decompressed  urinary bladder. Enlarged prostate. Postoperative changes in both  inguinal region presumably related to lymph node dissection and/or  biopsy. Previously described prominent left inguinal lymph nodes have  improved now measuring up to 7 mm short axis, previously measuring 11  mm. No new abdominal or pelvic lymphadenopathy. No abdominal aortic  aneurysm. Atherosclerotic calcifications of the abdominal aorta and  its major branches.    Small amount of free fluid in the pelvis. No free air. Moderate  colonic stool burden. No dilated loops of bowel. Decompressed stomach.    Bones: Multilevel degenerative changes of the spine. Degenerative  changes of bilateral SI joints an hips. Scattered Schmorl nodes. Disc  height narrowing with intradiscal gas at L5-S1. No aggressive bone  lesions. Old left-sided rib fractures. Stable lucent focus in the left  femoral head likely due to degenerative changes with associated lucent  focus in the left acetabulum.      Impression    IMPRESSION: In this patient with history of metastatic penile cancer:  1. Innumerable pulmonary metastases in both lungs are grossly stable  in size from the prior study, except for a right middle lobe mass with  increased cavitary component. No new or enlarging pulmonary nodules.  2. Stable subcarinal lymphadenopathy.  3. Postoperative/postprocedural changes in the right and left inguinal  region with decreased left inguinal lymph nodes.  4. Additional incidental findings as described above.    MARIN MARK MD         Impression/plan:     Recurrent, metastatic penile urethral carcinoma   -had progressive on pembrolizumab  -initiated on docetaxel on  3/14/19. Tolerated the first 3 cycles well, with main symptoms of fatigue, phelbitis/extravasation   -F/u CT CAP with stable disease and no new lesions  -Will therefore continue with Cycle 4 of docetaxel today        Anemia  -has had low hgb for years. Has not had iron studies checked since 2017. Will add these next time. May benefit from iron supplementation  -could also consider checking an EPO level since he has CKD.     CKD  -Cr baseline has been 1.4-1.6 over the past couple months (improved from previously). Today stable at 1.59  -continue to push PO hydrations especially with chemo     HTN  -on amlodipine 10 mg daily, BP stable     Arthritis  -improved after stopping keytruda though still has some occasional pain. Continues with tylenol prn, Anthony Chi/exercise     GERD  -on omeprazole 20 mg daily. Help his symptoms, though still has some GERD most days. Will increase his dose to BID. Refilled for him today  -Continue taking dexamethasone with food      Bowels  -had constipation though he used miralax and now having normal stools. Can use Miralax prn    Hypercalcemia, mild  -has had persistent mildly elevated calcium (corrected). Today 10.4 corrected.    -Parathyroid hormone slightly elevated. Asymptomatic   -may consider adding Vit D level and urine ca for next time.   -no need for dexa scan at this time    Phelbitis  Extravasation of Taxotere  -Topical kenalog helped with pain and blistering.use warm compresses to help relieve symptoms  -Discussed option of getting a port previously. He did not want to pursue this. Should use vascular access for all his lab draws/PIV sites and using a warm pack after infusion.     Staffed with Dr. Arceo.    Gary Bautista MD  Hematology Oncology fellow  [phone]    This patient has been seen and evaluated by me, Steve Arceo MD along with Dr. Gary Bautista.  I have discussed with Dr. Bautista and agree with the findings and plan in this note which I have edited.        Damion is tolerating therapy well. He has no new complains. He denies peripheral neuropathy. His scans are stable. We will plan to continue with therapy as current. I will restage him in 9 weeks or so. I will see him with labs and restaging scans.     Over 25 min of direct face to face time spent with patient with more than 50% time spent in counseling and coordinating care.        Again, thank you for allowing me to participate in the care of your patient.      Sincerely,    Steve Arceo MD

## 2019-06-13 NOTE — NURSING NOTE
Chief Complaint   Patient presents with     Blood Draw     Labs drawn via PIV by vascular nurse. VS taken. Pt checked in for appt.      Maris De La Cruz MA

## 2019-06-13 NOTE — LETTER
6/13/2019      RE: King Gonsalo Bruno  [address]  [facility] [address]       Reason for Visit: seen in f/u of recurrent, metastatic penile cancer    Oncology HPI: Mr. King Gonsalo Bruno is a 73 year old man with a history of stage II penile urethral carcinoma, treated with cisplatin/gemzar (split on day 1 and 8 given hx of CKD). His first 2 cycles were complicated by dehydration and nausea. He had a positive response and completed 4 cycles in November 2017. He underwent radial penectomy and urethrectomy, perineal urethrostomy and bilateral inguinal node dissection on 3/8/18. Surgical pathology demonstrated moderately differentiated SCC of the penile urethra with extension into the corpus spongiosum and cavernosum of the penile shaft. The specimen was positive for lymphovascular and perineural invasion. Margins were negative. 1/5 L inguinal nodes were positive and 1/7 R inguinal nodes were positive for disease. He was referred for consideration of radiation. At the time of that appointment, he was found to have multiple new pulmonary nodules concerning for metastatic disease. Biopsy was discussed, but after discussion with patient, opted to observe with a PET/CT 6 weeks later. PET/CT on 6/26/18 showed multiple hypermetabolic pulmonary nodules. He was offered palliative intent immunotherapy. He was started on Keytruda on 8/3/18 and continued on that through 2/28/19. On 3/6/19, he had a restaging evaluation and was found to have progression. Hospice was discussed, but he wanted to try alternate chemotherapy. He initiated treatment with docetaxel on 3/14/19.     Interval history:    presents today prior to Cycle 5 of Taxotere.    He overall continues to do well though still struggles with his arthritis pains with chemo.  He had an extra week off the cycle due to these pains.  The pain is mainly in his shoulders, neck, jaw and arms.  These are all injuries sites from his youth.  He also continues to  struggle with fatigue.  This lasts about the first 5 days and then starts to improve.  He is eating and drinking well.  He denies any nausea though still will have occasional emesis due to diverticuli in throat.  Unchanged.  Denies any diarrhea though still has has occasional constipation which she manages well with MiraLAX.  Denies any GERD symptoms.  Denies any fevers or chills or night sweats.  He otherwise has no new or different symptoms to report.    ROS: 10 point ROS neg other than the symptoms noted above in the HPI.    Current Outpatient Medications   Medication Sig     acetaminophen (TYLENOL) 325 MG tablet Take 2 tablets (650 mg) by mouth every 4 hours as needed for mild pain or fever     amLODIPine (NORVASC) 10 MG tablet TAKE ONE TABLET BY MOUTH EVERY DAY     B Complex Vitamins (VITAMIN B COMPLEX PO) Take by mouth daily (with lunch)     dexamethasone (DECADRON) 4 MG tablet Take 2 tablets (8 mg) evening prior and next morning after docetaxel dose..     difluprednate (DUREZOL) 0.05 % ophthalmic emulsion 1 drop every 2 hours     docusate sodium (COLACE) 100 MG capsule Take 1 capsule (100 mg) by mouth 2 times daily as needed for constipation     fluticasone (FLONASE) 50 MCG/ACT nasal spray Spray 2 sprays into both nostrils 2 times daily     LORazepam (ATIVAN) 0.5 MG tablet Take 1 tablet (0.5 mg) by mouth every 4 hours as needed (Anxiety, Nausea/Vomiting or Sleep)     LORazepam (ATIVAN) 0.5 MG tablet Take 1 tablet (0.5 mg) by mouth nightly as needed for anxiety     omeprazole (PRILOSEC) 2 mg/mL suspension Take 10 mLs (20 mg) by mouth 2 times daily     omeprazole (PRILOSEC) 20 MG DR capsule Take 1 capsule (20 mg) by mouth 2 times daily     omeprazole (PRILOSEC) 40 MG DR capsule Take 1 capsule (40 mg) by mouth daily     ondansetron (ZOFRAN) 8 MG tablet Take 1 tablet (8 mg) by mouth every 8 hours as needed for nausea     polyethylene glycol (MIRALAX/GLYCOLAX) packet Take 1 packet by mouth daily     prednisoLONE  "acetate (PRED FORTE) 1 % ophthalmic suspension 1 drop every 2 hours     prochlorperazine (COMPAZINE) 5 MG tablet Take 1 tablet (5 mg) by mouth every 6 hours as needed (Nausea/Vomiting)     sterile water, bottle, irrigation      triamcinolone (KENALOG) 0.1 % external cream Apply topically 2 times daily     vitamin D3 (CHOLECALCIFEROL) 1000 units (25 mcg) tablet Take 1 tablet (1,000 Units) by mouth daily     No current facility-administered medications for this visit.      Facility-Administered Medications Ordered in Other Visits   Medication     barium sulfate (EZ PAQUE) oral suspension 96%     barium sulfate (EZ-HD) oral suspension 98%     barium sulfate 40% (VARIBAR NECTAR) oral suspension 20 mL     barium sulfate 40% (VARIBAR THIN HONEY) oral suspension 20 mL     barium sulfate 40% (VARIBAR THIN HONEY) oral suspension     sod bicarbonate-citric acid-simethicone (EZ GAS) 2.21-1.53-0.04 G packet 4 g       Allergies   Allergen Reactions     Lisinopril Swelling     Oxycodone Nausea and Vomiting     Vicodin [Hydrocodone-Acetaminophen] Nausea and Vomiting         Exam: alert, appears slim, but well.   Blood pressure 116/72, pulse 85, temperature 97.4  F (36.3  C), temperature source Oral, resp. rate 16, height 1.905 m (6' 3\"), weight 62.9 kg (138 lb 9.6 oz), SpO2 100 %.  Wt Readings from Last 4 Encounters:   06/13/19 62.9 kg (138 lb 9.6 oz)   05/15/19 64 kg (141 lb)   04/25/19 64.9 kg (143 lb)   04/12/19 64.6 kg (142 lb 6.4 oz)     General: Pleasant gentleman in no acute distress  HEENT: EOMI, PERRLA, no scleral icterus, mucus membranes moist and no lesions or thrush  Lymph: Neck is supple and shows no nodes in cervical or supraclavicular   Heart:  RRR, no murmur, gallop or rub  Lungs: CTA-B, no wheezes, rhonchi or rales  Abdomen: Normal bowel sounds, soft, non tender, not distended, no rebound or guarding, no palpable masses  Extremities: No pitting edema  Skin: No rashes or lesions on exposed skin  Neuro: CN II-XII " are intact    Labs:    6/13/2019 11:31   Albumin 3.2 (L)   Protein Total 8.0   Bilirubin Total 0.3   Alkaline Phosphatase 88   ALT 14   AST 24   Bilirubin Direct <0.1   WBC 7.1   Hemoglobin 8.9 (L)   Hematocrit 28.0 (L)   Platelet Count 444   RBC Count 3.59 (L)   MCV 78   MCH 24.8 (L)   MCHC 31.8   RDW 17.2 (H)   Diff Method Automated Method   % Neutrophils 83.8   % Lymphocytes 13.9   % Monocytes 1.6   % Eosinophils 0.0   % Basophils 0.1   % Immature Granulocytes 0.6   Nucleated RBCs 0   Absolute Neutrophil 5.9   Absolute Lymphocytes 1.0   Absolute Monocytes 0.1   Absolute Eosinophils 0.0   Absolute Basophils 0.0   Abs Immature Granulocytes 0.0   Absolute Nucleated RBC 0.0      6/13/2019 11:31   Ferritin 204   Iron 26 (L)   Iron Binding Cap 253   Iron Saturation Index 10 (L)       Impression/plan:     Recurrent, metastatic penile urethral carcinoma   -had progressive on pembrolizumab  -initiated on docetaxel on 3/14/19. Has been tolerating with fatigue, phelbitis/extravasation and exacerbation of arthritis and constipation    -CT CAP from last month with stable disease and no new lesions  -counts stable. Will continue with Cycle 5 today    Anemia  -has had low hgb for years. Has not had iron studies checked since 2017. Added on iron studies today. Suggested combination of anemia of chronic disease and iron deficiency. Will have him start iron supplementation (ferrous sulfate 325 mg daily with breakfast). If he does not tolerate PO iron, can do IV Injectafer  -could also consider checking an EPO if no improvement with iron supplements since he has CKD.     CKD  -Cr baseline has been 1.4-1.6 over the past couple months      HTN  -on amlodipine 10 mg daily, BP stable     Arthritis  -has started to worsen again with chemotherapy. Can continue to use APAP prn  -does seems to be improved when he is on dex. Could consider giving longer taper of dex to help with pain     GERD  -on omeprazole 20 mg daily. Help his symptoms,  though still has some GERD most days. Increased his dose to BID. Symptoms improved  -Continue taking dexamethasone with food      Bowels  -had constipation though he used miralax and now having normal stools. Can use Miralax prn    Hypercalcemia, mild  Vitamin D Deficiency  -has had persistent mildly elevated calcium (corrected).   -Parathyroid hormone slightly elevated. Asymptomatic   -Vitamin D is 17. Will replace with 1000 units daily. Will recheck in 3 months    -no need for dexa scan at this time    Phelbitis  Extravasation of Taxotere  -Topical kenalog helped with pain and blistering  -no reoccurrence since  -does not want port    Kathleen Orosco PA-C  [facility]  [address] [place] [address] [phone]

## 2019-06-13 NOTE — NURSING NOTE
"Oncology Rooming Note    June 13, 2019 11:57 AM   King Gonsalo Bruno is a 73 year old male who presents for:    Chief Complaint   Patient presents with     Blood Draw     Labs drawn via PIV by vascular nurse. VS taken. Pt checked in for appt.      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    Weight as of this encounter: 62.9 kg (138 lb 9.6 oz). Body surface area is 1.82 meters squared.  Moderate Pain (4) Comment: Data Unavailable   No LMP for male patient.  Allergies reviewed: Yes  Medications reviewed: Yes    Medications: Medication refills not needed today.  Pharmacy name entered into Smarterer:    [id number] - [place], [place] - [address] [place] [address] AT [address] [place] [address] & [facility] - [place], [place] -  [facility] [phone]    Clinical concerns: Wants Omeprazole back in Liquid form  Kwesi was notified.      Juliana Russell MA              "

## 2019-06-13 NOTE — PROGRESS NOTES
Reason for Visit: seen in f/u of recurrent, metastatic penile cancer    Oncology HPI: Mr. King Gonsalo Bruno is a 73 year old man with a history of stage II penile urethral carcinoma, treated with cisplatin/gemzar (split on day 1 and 8 given hx of CKD). His first 2 cycles were complicated by dehydration and nausea. He had a positive response and completed 4 cycles in November 2017. He underwent radial penectomy and urethrectomy, perineal urethrostomy and bilateral inguinal node dissection on 3/8/18. Surgical pathology demonstrated moderately differentiated SCC of the penile urethra with extension into the corpus spongiosum and cavernosum of the penile shaft. The specimen was positive for lymphovascular and perineural invasion. Margins were negative. 1/5 L inguinal nodes were positive and 1/7 R inguinal nodes were positive for disease. He was referred for consideration of radiation. At the time of that appointment, he was found to have multiple new pulmonary nodules concerning for metastatic disease. Biopsy was discussed, but after discussion with patient, opted to observe with a PET/CT 6 weeks later. PET/CT on 6/26/18 showed multiple hypermetabolic pulmonary nodules. He was offered palliative intent immunotherapy. He was started on Keytruda on 8/3/18 and continued on that through 2/28/19. On 3/6/19, he had a restaging evaluation and was found to have progression. Hospice was discussed, but he wanted to try alternate chemotherapy. He initiated treatment with docetaxel on 3/14/19.     Interval history:    presents today prior to Cycle 5 of Taxotere.    He overall continues to do well though still struggles with his arthritis pains with chemo.  He had an extra week off the cycle due to these pains.  The pain is mainly in his shoulders, neck, jaw and arms.  These are all injuries sites from his youth.  He also continues to struggle with fatigue.  This lasts about the first 5 days and then starts to improve.  He is  eating and drinking well.  He denies any nausea though still will have occasional emesis due to diverticuli in throat.  Unchanged.  Denies any diarrhea though still has has occasional constipation which she manages well with MiraLAX.  Denies any GERD symptoms.  Denies any fevers or chills or night sweats.  He otherwise has no new or different symptoms to report.    ROS: 10 point ROS neg other than the symptoms noted above in the HPI.    Current Outpatient Medications   Medication Sig     acetaminophen (TYLENOL) 325 MG tablet Take 2 tablets (650 mg) by mouth every 4 hours as needed for mild pain or fever     amLODIPine (NORVASC) 10 MG tablet TAKE ONE TABLET BY MOUTH EVERY DAY     B Complex Vitamins (VITAMIN B COMPLEX PO) Take by mouth daily (with lunch)     dexamethasone (DECADRON) 4 MG tablet Take 2 tablets (8 mg) evening prior and next morning after docetaxel dose..     difluprednate (DUREZOL) 0.05 % ophthalmic emulsion 1 drop every 2 hours     docusate sodium (COLACE) 100 MG capsule Take 1 capsule (100 mg) by mouth 2 times daily as needed for constipation     fluticasone (FLONASE) 50 MCG/ACT nasal spray Spray 2 sprays into both nostrils 2 times daily     LORazepam (ATIVAN) 0.5 MG tablet Take 1 tablet (0.5 mg) by mouth every 4 hours as needed (Anxiety, Nausea/Vomiting or Sleep)     LORazepam (ATIVAN) 0.5 MG tablet Take 1 tablet (0.5 mg) by mouth nightly as needed for anxiety     omeprazole (PRILOSEC) 2 mg/mL suspension Take 10 mLs (20 mg) by mouth 2 times daily     omeprazole (PRILOSEC) 20 MG DR capsule Take 1 capsule (20 mg) by mouth 2 times daily     omeprazole (PRILOSEC) 40 MG DR capsule Take 1 capsule (40 mg) by mouth daily     ondansetron (ZOFRAN) 8 MG tablet Take 1 tablet (8 mg) by mouth every 8 hours as needed for nausea     polyethylene glycol (MIRALAX/GLYCOLAX) packet Take 1 packet by mouth daily     prednisoLONE acetate (PRED FORTE) 1 % ophthalmic suspension 1 drop every 2 hours     prochlorperazine  "(COMPAZINE) 5 MG tablet Take 1 tablet (5 mg) by mouth every 6 hours as needed (Nausea/Vomiting)     sterile water, bottle, irrigation      triamcinolone (KENALOG) 0.1 % external cream Apply topically 2 times daily     vitamin D3 (CHOLECALCIFEROL) 1000 units (25 mcg) tablet Take 1 tablet (1,000 Units) by mouth daily     No current facility-administered medications for this visit.      Facility-Administered Medications Ordered in Other Visits   Medication     barium sulfate (EZ PAQUE) oral suspension 96%     barium sulfate (EZ-HD) oral suspension 98%     barium sulfate 40% (VARIBAR NECTAR) oral suspension 20 mL     barium sulfate 40% (VARIBAR THIN HONEY) oral suspension 20 mL     barium sulfate 40% (VARIBAR THIN HONEY) oral suspension     sod bicarbonate-citric acid-simethicone (EZ GAS) 2.21-1.53-0.04 G packet 4 g       Allergies   Allergen Reactions     Lisinopril Swelling     Oxycodone Nausea and Vomiting     Vicodin [Hydrocodone-Acetaminophen] Nausea and Vomiting         Exam: alert, appears slim, but well.   Blood pressure 116/72, pulse 85, temperature 97.4  F (36.3  C), temperature source Oral, resp. rate 16, height 1.905 m (6' 3\"), weight 62.9 kg (138 lb 9.6 oz), SpO2 100 %.  Wt Readings from Last 4 Encounters:   06/13/19 62.9 kg (138 lb 9.6 oz)   05/15/19 64 kg (141 lb)   04/25/19 64.9 kg (143 lb)   04/12/19 64.6 kg (142 lb 6.4 oz)     General: Pleasant gentleman in no acute distress  HEENT: EOMI, PERRLA, no scleral icterus, mucus membranes moist and no lesions or thrush  Lymph: Neck is supple and shows no nodes in cervical or supraclavicular   Heart:  RRR, no murmur, gallop or rub  Lungs: CTA-B, no wheezes, rhonchi or rales  Abdomen: Normal bowel sounds, soft, non tender, not distended, no rebound or guarding, no palpable masses  Extremities: No pitting edema  Skin: No rashes or lesions on exposed skin  Neuro: CN II-XII are intact    Labs:         Impression/plan:     Recurrent, metastatic penile urethral " carcinoma   -had progressive on pembrolizumab  -initiated on docetaxel on 3/14/19. Tolerated the first 3 cycles well, with main symptoms of fatigue, phelbitis/extravasation   -F/u CT CAP with stable disease and no new lesions  -Will therefore continue with Cycle 4 of docetaxel today    Anemia  -has had low hgb for years. Has not had iron studies checked since 2017. Will add these next time. May benefit from iron supplementation  -could also consider checking an EPO level since he has CKD.     CKD  -Cr baseline has been 1.4-1.6 over the past couple months (improved from previously). Today stable at 1.59  -continue to push PO hydrations especially with chemo     HTN  -on amlodipine 10 mg daily, BP stable     Arthritis  -improved after stopping keytruda though still has some occasional pain. Continues with tylenol prn, Anthony Chi/exercise     GERD  -on omeprazole 20 mg daily. Help his symptoms, though still has some GERD most days. Will increase his dose to BID. Refilled for him today  -Continue taking dexamethasone with food      Bowels  -had constipation though he used miralax and now having normal stools. Can use Miralax prn    Hypercalcemia, mild  Vitamin D Deficiency  -has had persistent mildly elevated calcium (corrected). Today 10.4 corrected.    -Parathyroid hormone slightly elevated. Asymptomatic   -Vitamin D is 17. Will replace with 1000 units daily. Will recheck in 3 months    -no need for dexa scan at this time    Phelbitis  Extravasation of Taxotere  -Topical kenalog helped with pain and blistering.use warm compresses to help relieve symptoms  -Discussed option of getting a port previously. He did not want to pursue this. Should use vascular access for all his lab draws/PIV sites and using a warm pack after infusion.

## 2019-06-13 NOTE — PATIENT INSTRUCTIONS
Contact Numbers  [facility] Nurse Triage: [phone]  After Hours Nurse Line:  [phone]     Please call the [facility] Triage line if you experience a temperature greater than or equal to 100.5, shaking chills, have uncontrolled nausea, vomiting and/or diarrhea, dizziness, shortness of breath, chest pain, bleeding, unexplained bruising, or if you have any other new/concerning symptoms, questions or concerns.      If it is after hours, weekends, or holidays, please call either the after hours nurse line listed above.      If you are having any concerning symptoms or wish to speak to a provider before your next infusion visit, please call your care coordinator or triage to notify them so we can adequately serve you.      If you need a refill on a narcotic prescription or other medication, please call triage before your infusion appointment.       June 2019 Sunday Monday Tuesday Wednesday Thursday Friday Saturday                                 1       2     3     4     5     6     7     8       9     10     11     12     13    [facility] LAB DRAW  11:00 AM   (15 min.)    [facility] LAB DRAW   [facility] Lab Draw    P RETURN  11:35 AM   (50 min.)   Kathleen Orosco PA-C   [facility] ONC INFUSION 120   1:00 PM   (120 min.)    ONCOLOGY INFUSION   [facility] 14     15       16     17     18     19     20     21     22       23     24     25     26     27     28     29       30                                               July 2019 Sunday Monday Tuesday Wednesday Thursday Friday Saturday        1     2     3     4     5    [facility] DRAW  10:30 AM   (15 min.)    [facility] DRAW   [facility] Lab Draw    [facility] RETURN  10:55 AM   (50 min.)   Kathleen Orosco PA-C   [facility]  12:00 PM   (120 min.)    ONCOLOGY INFUSION   [facility] 6       7     8     9     10      11     12     13       14     15     16     17     18     19     20       21     22     23     24     25    [facility] LAB DRAW  10:15 AM   (15 min.)   [facility] LAB DRAW   [facility] Lab Draw    [facility] RETURN  10:45 AM   (50 min.)   Kathleen Orosco PA-C   [facility] ONC INFUSION 120  12:00 PM   (120 min.)    ONCOLOGY INFUSION   [facility] 26     27       28     29     30     31                                    Lab Results:  Recent Results (from the past 12 hour(s))   CBC with platelets differential    Collection Time: 06/13/19 11:31 AM   Result Value Ref Range    WBC 7.1 4.0 - 11.0 10e9/L    RBC Count 3.59 (L) 4.4 - 5.9 10e12/L    Hemoglobin 8.9 (L) 13.3 - 17.7 g/dL    Hematocrit 28.0 (L) 40.0 - 53.0 %    MCV 78 78 - 100 fl    MCH 24.8 (L) 26.5 - 33.0 pg    MCHC 31.8 31.5 - 36.5 g/dL    RDW 17.2 (H) 10.0 - 15.0 %    Platelet Count 444 150 - 450 10e9/L    Diff Method Automated Method     % Neutrophils 83.8 %    % Lymphocytes 13.9 %    % Monocytes 1.6 %    % Eosinophils 0.0 %    % Basophils 0.1 %    % Immature Granulocytes 0.6 %    Nucleated RBCs 0 0 /100    Absolute Neutrophil 5.9 1.6 - 8.3 10e9/L    Absolute Lymphocytes 1.0 0.8 - 5.3 10e9/L    Absolute Monocytes 0.1 0.0 - 1.3 10e9/L    Absolute Eosinophils 0.0 0.0 - 0.7 10e9/L    Absolute Basophils 0.0 0.0 - 0.2 10e9/L    Abs Immature Granulocytes 0.0 0 - 0.4 10e9/L    Absolute Nucleated RBC 0.0    Hepatic panel    Collection Time: 06/13/19 11:31 AM   Result Value Ref Range    Bilirubin Direct <0.1 0.0 - 0.2 mg/dL    Bilirubin Total 0.3 0.2 - 1.3 mg/dL    Albumin 3.2 (L) 3.4 - 5.0 g/dL    Protein Total 8.0 6.8 - 8.8 g/dL    Alkaline Phosphatase 88 40 - 150 U/L    ALT 14 0 - 70 U/L    AST 24 0 - 45 U/L

## 2019-06-13 NOTE — PROGRESS NOTES
Infusion Nursing Note:  King Gonsalo Bruno presents for D1C5 Taxotere  Met with SARITHA Dunaway before infusion.    Treatment Conditions:  Lab Results   Component Value Date    HGB 8.9 06/13/2019     Lab Results   Component Value Date    WBC 7.1 06/13/2019      Lab Results   Component Value Date    ANEU 5.9 06/13/2019     Lab Results   Component Value Date     06/13/2019      Lab Results   Component Value Date     05/15/2019                   Lab Results   Component Value Date    POTASSIUM 3.9 05/15/2019           Lab Results   Component Value Date    MAG 2.0 12/27/2018            Lab Results   Component Value Date    CR 1.58 05/15/2019                   Lab Results   Component Value Date    SAADIA 9.4 05/15/2019                Lab Results   Component Value Date    BILITOTAL 0.3 06/13/2019           Lab Results   Component Value Date    ALBUMIN 3.2 06/13/2019                    Lab Results   Component Value Date    ALT 14 06/13/2019           Lab Results   Component Value Date    AST 24 06/13/2019       Results reviewed, labs MET treatment parameters, ok to proceed with treatment.    Intravenous Access:  Peripheral IV placed.    Post Infusion Assessment:  Patient tolerated infusion without incident.  Blood return noted pre and post infusion.  Site patent and intact, free from redness, edema or discomfort.  No evidence of extravasations.  Access discontinued per protocol.    Discharge Plan:   Prescription refills given for Dexamethasone, Vitamin D.  Patient and/or family verbalized understanding of discharge instructions and all questions answered.  Copy of AVS reviewed with patient and/or family.  Patient will return 7/5 for next appointment.  Patient discharged in stable condition accompanied by: wife.    Elizabeth Coello RN

## 2019-06-17 NOTE — PROGRESS NOTES
Reason for Visit: seen in f/u of recurrent, metastatic penile cancer    Oncology HPI: Mr. King Gonsalo Bruno is a 73 year old man with a history of stage II penile urethral carcinoma, treated with cisplatin/gemzar (split on day 1 and 8 given hx of CKD). His first 2 cycles were complicated by dehydration and nausea. He had a positive response and completed 4 cycles in November 2017. He underwent radial penectomy and urethrectomy, perineal urethrostomy and bilateral inguinal node dissection on 3/8/18. Surgical pathology demonstrated moderately differentiated SCC of the penile urethra with extension into the corpus spongiosum and cavernosum of the penile shaft. The specimen was positive for lymphovascular and perineural invasion. Margins were negative. 1/5 L inguinal nodes were positive and 1/7 R inguinal nodes were positive for disease. He was referred for consideration of radiation. At the time of that appointment, he was found to have multiple new pulmonary nodules concerning for metastatic disease. Biopsy was discussed, but after discussion with patient, opted to observe with a PET/CT 6 weeks later. PET/CT on 6/26/18 showed multiple hypermetabolic pulmonary nodules. He was offered palliative intent immunotherapy. He was started on Keytruda on 8/3/18 and continued on that through 2/28/19. On 3/6/19, he had a restaging evaluation and was found to have progression. Hospice was discussed, but he wanted to try alternate chemotherapy. He initiated treatment with docetaxel on 3/14/19.     Interval history:    presents today prior to Cycle 5 of Taxotere.    He overall continues to do well though still struggles with his arthritis pains with chemo.  He had an extra week off the cycle due to these pains.  The pain is mainly in his shoulders, neck, jaw and arms.  These are all injuries sites from his youth.  He also continues to struggle with fatigue.  This lasts about the first 5 days and then starts to improve.  He is  eating and drinking well.  He denies any nausea though still will have occasional emesis due to diverticuli in throat.  Unchanged.  Denies any diarrhea though still has has occasional constipation which she manages well with MiraLAX.  Denies any GERD symptoms.  Denies any fevers or chills or night sweats.  He otherwise has no new or different symptoms to report.    ROS: 10 point ROS neg other than the symptoms noted above in the HPI.    Current Outpatient Medications   Medication Sig     acetaminophen (TYLENOL) 325 MG tablet Take 2 tablets (650 mg) by mouth every 4 hours as needed for mild pain or fever     amLODIPine (NORVASC) 10 MG tablet TAKE ONE TABLET BY MOUTH EVERY DAY     B Complex Vitamins (VITAMIN B COMPLEX PO) Take by mouth daily (with lunch)     dexamethasone (DECADRON) 4 MG tablet Take 2 tablets (8 mg) evening prior and next morning after docetaxel dose..     difluprednate (DUREZOL) 0.05 % ophthalmic emulsion 1 drop every 2 hours     docusate sodium (COLACE) 100 MG capsule Take 1 capsule (100 mg) by mouth 2 times daily as needed for constipation     fluticasone (FLONASE) 50 MCG/ACT nasal spray Spray 2 sprays into both nostrils 2 times daily     LORazepam (ATIVAN) 0.5 MG tablet Take 1 tablet (0.5 mg) by mouth every 4 hours as needed (Anxiety, Nausea/Vomiting or Sleep)     LORazepam (ATIVAN) 0.5 MG tablet Take 1 tablet (0.5 mg) by mouth nightly as needed for anxiety     omeprazole (PRILOSEC) 2 mg/mL suspension Take 10 mLs (20 mg) by mouth 2 times daily     omeprazole (PRILOSEC) 20 MG DR capsule Take 1 capsule (20 mg) by mouth 2 times daily     omeprazole (PRILOSEC) 40 MG DR capsule Take 1 capsule (40 mg) by mouth daily     ondansetron (ZOFRAN) 8 MG tablet Take 1 tablet (8 mg) by mouth every 8 hours as needed for nausea     polyethylene glycol (MIRALAX/GLYCOLAX) packet Take 1 packet by mouth daily     prednisoLONE acetate (PRED FORTE) 1 % ophthalmic suspension 1 drop every 2 hours     prochlorperazine  "(COMPAZINE) 5 MG tablet Take 1 tablet (5 mg) by mouth every 6 hours as needed (Nausea/Vomiting)     sterile water, bottle, irrigation      triamcinolone (KENALOG) 0.1 % external cream Apply topically 2 times daily     vitamin D3 (CHOLECALCIFEROL) 1000 units (25 mcg) tablet Take 1 tablet (1,000 Units) by mouth daily     No current facility-administered medications for this visit.      Facility-Administered Medications Ordered in Other Visits   Medication     barium sulfate (EZ PAQUE) oral suspension 96%     barium sulfate (EZ-HD) oral suspension 98%     barium sulfate 40% (VARIBAR NECTAR) oral suspension 20 mL     barium sulfate 40% (VARIBAR THIN HONEY) oral suspension 20 mL     barium sulfate 40% (VARIBAR THIN HONEY) oral suspension     sod bicarbonate-citric acid-simethicone (EZ GAS) 2.21-1.53-0.04 G packet 4 g       Allergies   Allergen Reactions     Lisinopril Swelling     Oxycodone Nausea and Vomiting     Vicodin [Hydrocodone-Acetaminophen] Nausea and Vomiting         Exam: alert, appears slim, but well.   Blood pressure 116/72, pulse 85, temperature 97.4  F (36.3  C), temperature source Oral, resp. rate 16, height 1.905 m (6' 3\"), weight 62.9 kg (138 lb 9.6 oz), SpO2 100 %.  Wt Readings from Last 4 Encounters:   06/13/19 62.9 kg (138 lb 9.6 oz)   05/15/19 64 kg (141 lb)   04/25/19 64.9 kg (143 lb)   04/12/19 64.6 kg (142 lb 6.4 oz)     General: Pleasant gentleman in no acute distress  HEENT: EOMI, PERRLA, no scleral icterus, mucus membranes moist and no lesions or thrush  Lymph: Neck is supple and shows no nodes in cervical or supraclavicular   Heart:  RRR, no murmur, gallop or rub  Lungs: CTA-B, no wheezes, rhonchi or rales  Abdomen: Normal bowel sounds, soft, non tender, not distended, no rebound or guarding, no palpable masses  Extremities: No pitting edema  Skin: No rashes or lesions on exposed skin  Neuro: CN II-XII are intact    Labs:    6/13/2019 11:31   Albumin 3.2 (L)   Protein Total 8.0   Bilirubin " Total 0.3   Alkaline Phosphatase 88   ALT 14   AST 24   Bilirubin Direct <0.1   WBC 7.1   Hemoglobin 8.9 (L)   Hematocrit 28.0 (L)   Platelet Count 444   RBC Count 3.59 (L)   MCV 78   MCH 24.8 (L)   MCHC 31.8   RDW 17.2 (H)   Diff Method Automated Method   % Neutrophils 83.8   % Lymphocytes 13.9   % Monocytes 1.6   % Eosinophils 0.0   % Basophils 0.1   % Immature Granulocytes 0.6   Nucleated RBCs 0   Absolute Neutrophil 5.9   Absolute Lymphocytes 1.0   Absolute Monocytes 0.1   Absolute Eosinophils 0.0   Absolute Basophils 0.0   Abs Immature Granulocytes 0.0   Absolute Nucleated RBC 0.0      6/13/2019 11:31   Ferritin 204   Iron 26 (L)   Iron Binding Cap 253   Iron Saturation Index 10 (L)       Impression/plan:     Recurrent, metastatic penile urethral carcinoma   -had progressive on pembrolizumab  -initiated on docetaxel on 3/14/19. Has been tolerating with fatigue, phelbitis/extravasation and exacerbation of arthritis and constipation    -CT CAP from last month with stable disease and no new lesions  -counts stable. Will continue with Cycle 5 today    Anemia  -has had low hgb for years. Has not had iron studies checked since 2017. Added on iron studies today. Suggested combination of anemia of chronic disease and iron deficiency. Will have him start iron supplementation (ferrous sulfate 325 mg daily with breakfast). If he does not tolerate PO iron, can do IV Injectafer  -could also consider checking an EPO if no improvement with iron supplements since he has CKD.     CKD  -Cr baseline has been 1.4-1.6 over the past couple months      HTN  -on amlodipine 10 mg daily, BP stable     Arthritis  -has started to worsen again with chemotherapy. Can continue to use APAP prn  -does seems to be improved when he is on dex. Could consider giving longer taper of dex to help with pain     GERD  -on omeprazole 20 mg daily. Help his symptoms, though still has some GERD most days. Increased his dose to BID. Symptoms  improved  -Continue taking dexamethasone with food      Bowels  -had constipation though he used miralax and now having normal stools. Can use Miralax prn    Hypercalcemia, mild  Vitamin D Deficiency  -has had persistent mildly elevated calcium (corrected).   -Parathyroid hormone slightly elevated. Asymptomatic   -Vitamin D is 17. Will replace with 1000 units daily. Will recheck in 3 months    -no need for dexa scan at this time    Phelbitis  Extravasation of Taxotere  -Topical kenalog helped with pain and blistering  -no reoccurrence since  -does not want port    Kathleen Orosco PA-C  [facility]  [address] [place] [address] [phone]

## 2019-06-24 NOTE — TELEPHONE ENCOUNTER
A prior authorization is needed for the following medication prescribed.  Please complete a prior authorization with the information included below.    Medication: FV-Omeprazole 2mg/ml (pwd, bic)   Ingredients: Omeprazole Powd: NDC: [address]-74  Sodium Bicarbonate Powd NDC: [address]-49  Sweetening Enhancer NDC: [address]-44  Flavorx Liqd NDC:[address]-74  Sterile Water for Irr Soln NDC: [address]-87   RX #: [id number]  Reason for Rejection: Not Covered    Pharmacy Insurance plan: [employer] [place]  BIN #: [id number]  ID #: [id number]  PCN #: [id number]  Phone #: [phone]      Pharmacy NPI:[id number]      Please advise the pharmacy when the prior authorization is approved or denied.     Thank you for your time.     [facility]  [phone]

## 2019-06-26 NOTE — TELEPHONE ENCOUNTER
Central Prior Authorization Team   Phone: [phone]      PA Initiation    Medication: FV-Omeprazole 2mg/ml (pwd, bic) -PA initiated  Insurance Company: [employer] [place] - Phone [phone] Fax [phone]  Pharmacy Filling the Rx: [facility] [place], [place] - [address] [place] [address] [phone]  Filling Pharmacy Phone: [phone]  Filling Pharmacy Fax:    Start Date: 6/27/2019

## 2019-06-28 NOTE — TELEPHONE ENCOUNTER
PRIOR AUTHORIZATION DENIED    Medication: FV-Omeprazole 2mg/ml (pwd, bic) -PA denied    Denial Date: 6/27/2019    Denial Rational: compounded medication not covered-       Appeal Information:

## 2019-06-28 NOTE — TELEPHONE ENCOUNTER
I spoke to Domonique at [facility]. She indicates this was denied on 6/27. She will refax denial. Will update when received

## 2019-07-04 NOTE — PROGRESS NOTES
Reason for Visit: seen in f/u of recurrent, metastatic penile cancer    Oncology HPI: Mr. King Gonsalo Bruno is a 73 year old man with a history of stage II penile urethral carcinoma, treated with cisplatin/gemzar (split on day 1 and 8 given hx of CKD). His first 2 cycles were complicated by dehydration and nausea. He had a positive response and completed 4 cycles in November 2017. He underwent radial penectomy and urethrectomy, perineal urethrostomy and bilateral inguinal node dissection on 3/8/18. Surgical pathology demonstrated moderately differentiated SCC of the penile urethra with extension into the corpus spongiosum and cavernosum of the penile shaft. The specimen was positive for lymphovascular and perineural invasion. Margins were negative. 1/5 L inguinal nodes were positive and 1/7 R inguinal nodes were positive for disease. He was referred for consideration of radiation. At the time of that appointment, he was found to have multiple new pulmonary nodules concerning for metastatic disease. Biopsy was discussed, but after discussion with patient, opted to observe with a PET/CT 6 weeks later. PET/CT on 6/26/18 showed multiple hypermetabolic pulmonary nodules. He was offered palliative intent immunotherapy. He was started on Keytruda on 8/3/18 and continued on that through 2/28/19. On 3/6/19, he had a restaging evaluation and was found to have progression. Hospice was discussed, but he wanted to try alternate chemotherapy. He initiated treatment with docetaxel on 3/14/19.     Interval history:    presents today prior to Cycle 6 of Taxotere.    This last cycle was better than the previous one. Was able to play more golf. He did not have as much pain in his neck and shoulders as previously. Still taking APAP prn.  He has been eating overall well and drinking about 3 bottles of 16.9 oz. No fevers or chills. Breathing well. No nausea. Still occasional vomiting with throat. Difficulties with sleeping. Taking  Ativan ~twice at night to help with anxiety over choking sensation. Did take an ativan while golfing and crashed his cart due to altered mental status. No injury.     ROS: 10 point ROS neg other than the symptoms noted above in the HPI.    Current Outpatient Medications   Medication Sig     acetaminophen (TYLENOL) 325 MG tablet Take 2 tablets (650 mg) by mouth every 4 hours as needed for mild pain or fever     amLODIPine (NORVASC) 10 MG tablet TAKE ONE TABLET BY MOUTH EVERY DAY     B Complex Vitamins (VITAMIN B COMPLEX PO) Take by mouth daily (with lunch)     dexamethasone (DECADRON) 4 MG tablet Take 2 tablets (8 mg) evening prior and next morning after docetaxel dose..     difluprednate (DUREZOL) 0.05 % ophthalmic emulsion 1 drop every 2 hours     ferrous sulfate (FEROSUL) 325 (65 Fe) MG tablet Take 1 tablet (325 mg) by mouth daily (with breakfast)     fluticasone (FLONASE) 50 MCG/ACT nasal spray Spray 2 sprays into both nostrils 2 times daily     LORazepam (ATIVAN) 0.5 MG tablet Take 1 tablet (0.5 mg) by mouth nightly as needed for anxiety     omeprazole (PRILOSEC) 20 MG DR capsule Take 1 capsule (20 mg) by mouth 2 times daily     polyethylene glycol (MIRALAX/GLYCOLAX) packet Take 1 packet by mouth daily     prednisoLONE acetate (PRED FORTE) 1 % ophthalmic suspension 1 drop every 2 hours     sterile water, bottle, irrigation      triamcinolone (KENALOG) 0.1 % external cream Apply topically 2 times daily     vitamin D2 (ERGOCALCIFEROL) 08670 units (1250 mcg) capsule Take 1 capsule (50,000 Units) by mouth once a week     vitamin D3 (CHOLECALCIFEROL) 1000 units (25 mcg) tablet Take 1 tablet (1,000 Units) by mouth daily     docusate sodium (COLACE) 100 MG capsule Take 1 capsule (100 mg) by mouth 2 times daily as needed for constipation (Patient not taking: Reported on 7/5/2019)     LORazepam (ATIVAN) 0.5 MG tablet Take 1 tablet (0.5 mg) by mouth every 4 hours as needed (Anxiety, Nausea/Vomiting or Sleep) (Patient not  taking: Reported on 7/5/2019)     omeprazole (PRILOSEC) 2 mg/mL suspension Take 10 mLs (20 mg) by mouth 2 times daily (Patient not taking: Reported on 7/5/2019)     omeprazole (PRILOSEC) 40 MG DR capsule Take 1 capsule (40 mg) by mouth daily (Patient not taking: Reported on 7/5/2019)     ondansetron (ZOFRAN) 8 MG tablet Take 1 tablet (8 mg) by mouth every 8 hours as needed for nausea (Patient not taking: Reported on 7/5/2019)     prochlorperazine (COMPAZINE) 5 MG tablet Take 1 tablet (5 mg) by mouth every 6 hours as needed (Nausea/Vomiting) (Patient not taking: Reported on 7/5/2019)     No current facility-administered medications for this visit.      Facility-Administered Medications Ordered in Other Visits   Medication     barium sulfate (EZ PAQUE) oral suspension 96%     barium sulfate (EZ-HD) oral suspension 98%     barium sulfate 40% (VARIBAR NECTAR) oral suspension 20 mL     barium sulfate 40% (VARIBAR THIN HONEY) oral suspension 20 mL     barium sulfate 40% (VARIBAR THIN HONEY) oral suspension     sod bicarbonate-citric acid-simethicone (EZ GAS) 2.21-1.53-0.04 G packet 4 g       Allergies   Allergen Reactions     Lisinopril Swelling     Oxycodone Nausea and Vomiting     Vicodin [Hydrocodone-Acetaminophen] Nausea and Vomiting         Exam: alert, appears slim, but well.   Blood pressure 129/63, pulse 93, temperature 97.9  F (36.6  C), temperature source Oral, resp. rate 18, weight 63.5 kg (140 lb 1.6 oz), SpO2 100 %.  Wt Readings from Last 4 Encounters:   07/05/19 63.5 kg (140 lb 1.6 oz)   06/13/19 62.9 kg (138 lb 9.6 oz)   05/15/19 64 kg (141 lb)   04/25/19 64.9 kg (143 lb)     General: Pleasant gentleman in no acute distress  HEENT: EOMI, PERRLA, no scleral icterus, mucus membranes moist and no lesions or thrush  Lymph: Neck is supple and shows no nodes in cervical or supraclavicular   Heart:  RRR, no murmur, gallop or rub  Lungs: CTA-B, no wheezes, rhonchi or rales  Abdomen: Normal bowel sounds, soft, non  tender, not distended, no rebound or guarding, no palpable masses  Extremities: No pitting edema  Skin: No rashes or lesions on exposed skin  Neuro: CN II-XII are intact    Labs:    7/5/2019 11:05   Sodium 133   Potassium 4.2   Chloride 100   Carbon Dioxide 24   Urea Nitrogen 17   Creatinine 1.58 (H)   GFR Estimate 43 (L)   GFR Estimate If Black 50 (L)   Calcium 9.7   Anion Gap 9   Albumin 3.2 (L)   Protein Total 8.0   Bilirubin Total 0.3   Alkaline Phosphatase 89   ALT 14   AST 31   Bilirubin Direct <0.1   Lactate Dehydrogenase 202   Glucose 119 (H)   WBC 6.8   Hemoglobin 8.7 (L)   Hematocrit 28.2 (L)   Platelet Count 441   RBC Count 3.59 (L)   MCV 79   MCH 24.2 (L)   MCHC 30.9 (L)   RDW 17.4 (H)   Diff Method Automated Method   % Neutrophils 84.9   % Lymphocytes 13.3   % Monocytes 1.0   % Eosinophils 0.0   % Basophils 0.1   % Immature Granulocytes 0.7   Nucleated RBCs 0   Absolute Neutrophil 5.7   Absolute Lymphocytes 0.9   Absolute Monocytes 0.1   Absolute Eosinophils 0.0   Absolute Basophils 0.0   Abs Immature Granulocytes 0.1   Absolute Nucleated RBC 0.0         Impression/plan:     Recurrent, metastatic penile urethral carcinoma   -had progressive on pembrolizumab  -initiated on docetaxel on 3/14/19. Has been tolerating with fatigue, phelbitis/extravasation and exacerbation of arthritis and constipation    -CT CAP from May 2019 with stable disease and no new lesions  -counts stable. Will continue with Cycle 6 today    Anemia  -has had low hgb for years. Has not had iron studies checked since 2017. Added on iron studies 2 weeks ago. Suggested combination of anemia of chronic disease and iron deficiency. Will have him start iron supplementation (ferrous sulfate 325 mg daily with breakfast). If he does not tolerate PO iron, can do IV Injectafer  -could also consider checking an EPO if no improvement with iron supplements since he has CKD.     CKD  -Cr baseline has been 1.4-1.6 over the past couple months. Cr at  baseline today. Continue with good hydration      HTN  -on amlodipine 10 mg daily, BP stable. No dose changes needed     Arthritis  -worsens with chemotherapy. Can continue to use APAP prn  -does seems to be improved when he is on dex. Could consider giving longer taper of dex to help with pain if continues to worsen     GERD  -Insurance did not cover the liquid form of omeprazole. He is now taking omeprazole 20 mg BID with good management of symptoms.   -Continue taking dexamethasone with food      Bowels  -had constipation though he used miralax and now having normal stools. Can use Miralax prn    Hypercalcemia, mild  Vitamin D Deficiency  -has had persistent mildly elevated calcium (corrected).   -Parathyroid hormone slightly elevated. Asymptomatic   -Vitamin D is 17. Started him on 1000 units daily. This has been hard for him to swallow. Will switch him to weekly dosing with 50,000 units. Ranjith recheck in ~2 months  -Ca still mildly elevated, corrected at 10.3  -no need for dexa scan at this time    Phelbitis  Extravasation of Taxotere  -Topical kenalog helped with pain and blistering  -no reoccurrence since  -does not want port    Over 50% of >40 min visit was spent counseling and coordinating care    Kathleen Orosco PA-C  [facility]  [address] [place] [address] [phone]

## 2019-07-05 NOTE — LETTER
7/5/2019      RE: King Gonsalo Bruno  [address]  [facility] [id number]       Reason for Visit: seen in f/u of recurrent, metastatic penile cancer    Oncology HPI: Mr. King Gonsalo Bruno is a 73 year old man with a history of stage II penile urethral carcinoma, treated with cisplatin/gemzar (split on day 1 and 8 given hx of CKD). His first 2 cycles were complicated by dehydration and nausea. He had a positive response and completed 4 cycles in November 2017. He underwent radial penectomy and urethrectomy, perineal urethrostomy and bilateral inguinal node dissection on 3/8/18. Surgical pathology demonstrated moderately differentiated SCC of the penile urethra with extension into the corpus spongiosum and cavernosum of the penile shaft. The specimen was positive for lymphovascular and perineural invasion. Margins were negative. 1/5 L inguinal nodes were positive and 1/7 R inguinal nodes were positive for disease. He was referred for consideration of radiation. At the time of that appointment, he was found to have multiple new pulmonary nodules concerning for metastatic disease. Biopsy was discussed, but after discussion with patient, opted to observe with a PET/CT 6 weeks later. PET/CT on 6/26/18 showed multiple hypermetabolic pulmonary nodules. He was offered palliative intent immunotherapy. He was started on Keytruda on 8/3/18 and continued on that through 2/28/19. On 3/6/19, he had a restaging evaluation and was found to have progression. Hospice was discussed, but he wanted to try alternate chemotherapy. He initiated treatment with docetaxel on 3/14/19.     Interval history:    presents today prior to Cycle 6 of Taxotere.    This last cycle was better than the previous one. Was able to play more golf. He did not have as much pain in his neck and shoulders as previously. Still taking APAP prn.  He has been eating overall well and drinking about 3 bottles of 16.9 oz. No fevers or chills. Breathing  well. No nausea. Still occasional vomiting with throat. Difficulties with sleeping. Taking Ativan ~twice at night to help with anxiety over choking sensation. Did take an ativan while golfing and crashed his cart due to altered mental status. No injury.     ROS: 10 point ROS neg other than the symptoms noted above in the HPI.    Current Outpatient Medications   Medication Sig     acetaminophen (TYLENOL) 325 MG tablet Take 2 tablets (650 mg) by mouth every 4 hours as needed for mild pain or fever     amLODIPine (NORVASC) 10 MG tablet TAKE ONE TABLET BY MOUTH EVERY DAY     B Complex Vitamins (VITAMIN B COMPLEX PO) Take by mouth daily (with lunch)     dexamethasone (DECADRON) 4 MG tablet Take 2 tablets (8 mg) evening prior and next morning after docetaxel dose..     difluprednate (DUREZOL) 0.05 % ophthalmic emulsion 1 drop every 2 hours     ferrous sulfate (FEROSUL) 325 (65 Fe) MG tablet Take 1 tablet (325 mg) by mouth daily (with breakfast)     fluticasone (FLONASE) 50 MCG/ACT nasal spray Spray 2 sprays into both nostrils 2 times daily     LORazepam (ATIVAN) 0.5 MG tablet Take 1 tablet (0.5 mg) by mouth nightly as needed for anxiety     omeprazole (PRILOSEC) 20 MG DR capsule Take 1 capsule (20 mg) by mouth 2 times daily     polyethylene glycol (MIRALAX/GLYCOLAX) packet Take 1 packet by mouth daily     prednisoLONE acetate (PRED FORTE) 1 % ophthalmic suspension 1 drop every 2 hours     sterile water, bottle, irrigation      triamcinolone (KENALOG) 0.1 % external cream Apply topically 2 times daily     vitamin D2 (ERGOCALCIFEROL) 61655 units (1250 mcg) capsule Take 1 capsule (50,000 Units) by mouth once a week     vitamin D3 (CHOLECALCIFEROL) 1000 units (25 mcg) tablet Take 1 tablet (1,000 Units) by mouth daily     docusate sodium (COLACE) 100 MG capsule Take 1 capsule (100 mg) by mouth 2 times daily as needed for constipation (Patient not taking: Reported on 7/5/2019)     LORazepam (ATIVAN) 0.5 MG tablet Take 1 tablet  (0.5 mg) by mouth every 4 hours as needed (Anxiety, Nausea/Vomiting or Sleep) (Patient not taking: Reported on 7/5/2019)     omeprazole (PRILOSEC) 2 mg/mL suspension Take 10 mLs (20 mg) by mouth 2 times daily (Patient not taking: Reported on 7/5/2019)     omeprazole (PRILOSEC) 40 MG DR capsule Take 1 capsule (40 mg) by mouth daily (Patient not taking: Reported on 7/5/2019)     ondansetron (ZOFRAN) 8 MG tablet Take 1 tablet (8 mg) by mouth every 8 hours as needed for nausea (Patient not taking: Reported on 7/5/2019)     prochlorperazine (COMPAZINE) 5 MG tablet Take 1 tablet (5 mg) by mouth every 6 hours as needed (Nausea/Vomiting) (Patient not taking: Reported on 7/5/2019)     No current facility-administered medications for this visit.      Facility-Administered Medications Ordered in Other Visits   Medication     barium sulfate (EZ PAQUE) oral suspension 96%     barium sulfate (EZ-HD) oral suspension 98%     barium sulfate 40% (VARIBAR NECTAR) oral suspension 20 mL     barium sulfate 40% (VARIBAR THIN HONEY) oral suspension 20 mL     barium sulfate 40% (VARIBAR THIN HONEY) oral suspension     sod bicarbonate-citric acid-simethicone (EZ GAS) 2.21-1.53-0.04 G packet 4 g       Allergies   Allergen Reactions     Lisinopril Swelling     Oxycodone Nausea and Vomiting     Vicodin [Hydrocodone-Acetaminophen] Nausea and Vomiting         Exam: alert, appears slim, but well.   Blood pressure 129/63, pulse 93, temperature 97.9  F (36.6  C), temperature source Oral, resp. rate 18, weight 63.5 kg (140 lb 1.6 oz), SpO2 100 %.  Wt Readings from Last 4 Encounters:   07/05/19 63.5 kg (140 lb 1.6 oz)   06/13/19 62.9 kg (138 lb 9.6 oz)   05/15/19 64 kg (141 lb)   04/25/19 64.9 kg (143 lb)     General: Pleasant gentleman in no acute distress  HEENT: EOMI, PERRLA, no scleral icterus, mucus membranes moist and no lesions or thrush  Lymph: Neck is supple and shows no nodes in cervical or supraclavicular   Heart:  RRR, no murmur, gallop or  rub  Lungs: CTA-B, no wheezes, rhonchi or rales  Abdomen: Normal bowel sounds, soft, non tender, not distended, no rebound or guarding, no palpable masses  Extremities: No pitting edema  Skin: No rashes or lesions on exposed skin  Neuro: CN II-XII are intact    Labs:    7/5/2019 11:05   Sodium 133   Potassium 4.2   Chloride 100   Carbon Dioxide 24   Urea Nitrogen 17   Creatinine 1.58 (H)   GFR Estimate 43 (L)   GFR Estimate If Black 50 (L)   Calcium 9.7   Anion Gap 9   Albumin 3.2 (L)   Protein Total 8.0   Bilirubin Total 0.3   Alkaline Phosphatase 89   ALT 14   AST 31   Bilirubin Direct <0.1   Lactate Dehydrogenase 202   Glucose 119 (H)   WBC 6.8   Hemoglobin 8.7 (L)   Hematocrit 28.2 (L)   Platelet Count 441   RBC Count 3.59 (L)   MCV 79   MCH 24.2 (L)   MCHC 30.9 (L)   RDW 17.4 (H)   Diff Method Automated Method   % Neutrophils 84.9   % Lymphocytes 13.3   % Monocytes 1.0   % Eosinophils 0.0   % Basophils 0.1   % Immature Granulocytes 0.7   Nucleated RBCs 0   Absolute Neutrophil 5.7   Absolute Lymphocytes 0.9   Absolute Monocytes 0.1   Absolute Eosinophils 0.0   Absolute Basophils 0.0   Abs Immature Granulocytes 0.1   Absolute Nucleated RBC 0.0         Impression/plan:     Recurrent, metastatic penile urethral carcinoma   -had progressive on pembrolizumab  -initiated on docetaxel on 3/14/19. Has been tolerating with fatigue, phelbitis/extravasation and exacerbation of arthritis and constipation    -CT CAP from May 2019 with stable disease and no new lesions  -counts stable. Will continue with Cycle 6 today    Anemia  -has had low hgb for years. Has not had iron studies checked since 2017. Added on iron studies 2 weeks ago. Suggested combination of anemia of chronic disease and iron deficiency. Will have him start iron supplementation (ferrous sulfate 325 mg daily with breakfast). If he does not tolerate PO iron, can do IV Injectafer  -could also consider checking an EPO if no improvement with iron supplements  since he has CKD.     CKD  -Cr baseline has been 1.4-1.6 over the past couple months. Cr at baseline today. Continue with good hydration      HTN  -on amlodipine 10 mg daily, BP stable. No dose changes needed     Arthritis  -worsens with chemotherapy. Can continue to use APAP prn  -does seems to be improved when he is on dex. Could consider giving longer taper of dex to help with pain if continues to worsen     GERD  -Insurance did not cover the liquid form of omeprazole. He is now taking omeprazole 20 mg BID with good management of symptoms.   -Continue taking dexamethasone with food      Bowels  -had constipation though he used miralax and now having normal stools. Can use Miralax prn    Hypercalcemia, mild  Vitamin D Deficiency  -has had persistent mildly elevated calcium (corrected).   -Parathyroid hormone slightly elevated. Asymptomatic   -Vitamin D is 17. Started him on 1000 units daily. This has been hard for him to swallow. Will switch him to weekly dosing with 50,000 units. Ranjith recheck in ~2 months  -Ca still mildly elevated, corrected at 10.3  -no need for dexa scan at this time    Phelbitis  Extravasation of Taxotere  -Topical kenalog helped with pain and blistering  -no reoccurrence since  -does not want port    Over 50% of >40 min visit was spent counseling and coordinating care    Kathleen Orosco PA-C  [facility]  [address] [place] [address] [phone]

## 2019-07-05 NOTE — NURSING NOTE
"Oncology Rooming Note    July 5, 2019 11:21 AM   King Gonsalo Bruno is a 73 year old male who presents for:    Chief Complaint   Patient presents with     Blood Draw     Labs drawn via /PIV placed by BRENDA Zendejas in lab. VS taken.     Oncology Clinic Visit     [facility] RETURN- URETHRAL CA     Initial Vitals: /63 (BP Location: Right arm, Patient Position: Sitting, Cuff Size: Adult Regular)   Pulse 93   Temp 97.9  F (36.6  C) (Oral)   Resp 18   Wt 63.5 kg (140 lb 1.6 oz)   SpO2 100%   BMI 17.51 kg/m   Estimated body mass index is 17.51 kg/m  as calculated from the following:    Height as of 6/13/19: 1.905 m (6' 3\").    Weight as of this encounter: 63.5 kg (140 lb 1.6 oz). Body surface area is 1.83 meters squared.  No Pain (0) Comment: Data Unavailable   No LMP for male patient.  Allergies reviewed: Yes  Medications reviewed: Yes    Medications: MEDICATION REFILLS NEEDED TODAY. Provider was notified.  Pharmacy name entered into [employer]:    [id number] - [place], [place] - [address] [place] [address] AT [address] [place] [address] PHARMACY [place], [place] - [address] [facility] [phone]    Clinical concerns: Vitamin D3 refill. Kathleen Orosco was notified.      Matt Sanchez LPN            "

## 2019-07-05 NOTE — PATIENT INSTRUCTIONS
July 2019 Sunday Monday Tuesday Wednesday Thursday Friday Saturday        1     2     3     4     5    [facility]  10:30 AM   (15 min.)   [facility]   [facility] Draw    [facility] RETURN  10:55 AM   (50 min.)   Kathleen Orosco PA-C   [facility]    [facility] ONC INFUSION 120  12:00 PM   (120 min.)    ONCOLOGY INFUSION   [facility] 6       7     8     9     10     11     12     13       14     15     16     17     18     19     20       21     22     23     24     25    [facility] D[facility]  10:15 AM   (15 min.)   [facility]   [facility] Lab Draw    [facility] RETURN  10:45 AM   (50 min.)   Kathleen Orosco PA-C   [facility]    [facility] ONC INFUSION 120  12:00 PM   (120 min.)    ONCOLOGY INFUSION   [facility] 26     27       28     29     30     31 August 2019 Sunday Monday Tuesday Wednesday Thursday Friday Saturday                       1     2     3       4     5     6    [facility]  11:00 AM   (15 min.)   [facility]    CT CHEST/ABDOMEN/PELVIS W  12:00 PM   (20 min.)   UCCT2   [facility] CT 7    [facility] RETURN   1:15 PM   (30 min.)   Steve Arceo MD   [facility] 8     9     10       11     12     13     14     15    [facility]  10:15 AM   (15 min.)   [facility]   [facility] Draw    [facility] RETURN  10:45 AM   (50 min.)   Kathleen Orosco PA-C   [facility]    [facility] ONC INFUSION 120  12:00 PM   (120 min.)    ONCOLOGY INFUSION   [facility] 16     17       18     19     20     21     22     23     24       25     26     27     28     29     30     31                    Recent Results (from the past 24 hour(s))   CBC with platelets differential    Collection Time: 07/05/19 11:05 AM   Result Value Ref Range    WBC 6.8 4.0 - 11.0  10e9/L    RBC Count 3.59 (L) 4.4 - 5.9 10e12/L    Hemoglobin 8.7 (L) 13.3 - 17.7 g/dL    Hematocrit 28.2 (L) 40.0 - 53.0 %    MCV 79 78 - 100 fl    MCH 24.2 (L) 26.5 - 33.0 pg    MCHC 30.9 (L) 31.5 - 36.5 g/dL    RDW 17.4 (H) 10.0 - 15.0 %    Platelet Count 441 150 - 450 10e9/L    Diff Method Automated Method     % Neutrophils 84.9 %    % Lymphocytes 13.3 %    % Monocytes 1.0 %    % Eosinophils 0.0 %    % Basophils 0.1 %    % Immature Granulocytes 0.7 %    Nucleated RBCs 0 0 /100    Absolute Neutrophil 5.7 1.6 - 8.3 10e9/L    Absolute Lymphocytes 0.9 0.8 - 5.3 10e9/L    Absolute Monocytes 0.1 0.0 - 1.3 10e9/L    Absolute Eosinophils 0.0 0.0 - 0.7 10e9/L    Absolute Basophils 0.0 0.0 - 0.2 10e9/L    Abs Immature Granulocytes 0.1 0 - 0.4 10e9/L    Absolute Nucleated RBC 0.0    Comprehensive metabolic panel    Collection Time: 07/05/19 11:05 AM   Result Value Ref Range    Sodium 133 133 - 144 mmol/L    Potassium 4.2 3.4 - 5.3 mmol/L    Chloride 100 94 - 109 mmol/L    Carbon Dioxide 24 20 - 32 mmol/L    Anion Gap 9 3 - 14 mmol/L    Glucose 119 (H) 70 - 99 mg/dL    Urea Nitrogen 17 7 - 30 mg/dL    Creatinine 1.58 (H) 0.66 - 1.25 mg/dL    GFR Estimate 43 (L) >60 mL/min/[1.73_m2]    GFR Estimate If Black 50 (L) >60 mL/min/[1.73_m2]    Calcium 9.7 8.5 - 10.1 mg/dL    Bilirubin Total 0.3 0.2 - 1.3 mg/dL    Albumin 3.2 (L) 3.4 - 5.0 g/dL    Protein Total 8.0 6.8 - 8.8 g/dL    Alkaline Phosphatase 89 40 - 150 U/L    ALT 14 0 - 70 U/L    AST 31 0 - 45 U/L   Lactate Dehydrogenase    Collection Time: 07/05/19 11:05 AM   Result Value Ref Range    Lactate Dehydrogenase 202 85 - 227 U/L   Bilirubin direct    Collection Time: 07/05/19 11:05 AM   Result Value Ref Range    Bilirubin Direct <0.1 0.0 - 0.2 mg/dL   Contact Numbers    [facility] Main Line: [phone]  [facility] Triage and after hours / weekends / holidays:  [phone]      Please call the triage or after hours line if you experience a temperature greater than or equal to 100.5,  shaking chills, have uncontrolled nausea, vomiting and/or diarrhea, dizziness, shortness of breath, chest pain, bleeding, unexplained bruising, or if you have any other new/concerning symptoms, questions or concerns.      If you are having any concerning symptoms or wish to speak to a provider before your next infusion visit, please call your care coordinator or triage to notify them so we can adequately serve you.     If you need a refill on a narcotic prescription or other medication, please call before your infusion appointment.

## 2019-07-05 NOTE — PROGRESS NOTES
Infusion Nursing Note:  King Gonsalo Bruno presents today for Cycle 6 Day 1 Taxotere, IVF.    Patient seen by provider today: Yes: SARITHA Dunaway   present during visit today: Not Applicable.    Note: Patient requested 1 liter IVF and this was approved and ordered by SARITHA Dunaway.    Intravenous Access:  Peripheral IV placed.    Treatment Conditions:  Lab Results   Component Value Date    HGB 8.7 07/05/2019     Lab Results   Component Value Date    WBC 6.8 07/05/2019      Lab Results   Component Value Date    ANEU 5.7 07/05/2019     Lab Results   Component Value Date     07/05/2019      Lab Results   Component Value Date     07/05/2019                   Lab Results   Component Value Date    POTASSIUM 4.2 07/05/2019           Lab Results   Component Value Date    MAG 2.0 12/27/2018            Lab Results   Component Value Date    CR 1.58 07/05/2019                   Lab Results   Component Value Date    SAADIA 9.7 07/05/2019                Lab Results   Component Value Date    BILITOTAL 0.3 07/05/2019           Lab Results   Component Value Date    ALBUMIN 3.2 07/05/2019                    Lab Results   Component Value Date    ALT 14 07/05/2019           Lab Results   Component Value Date    AST 31 07/05/2019     Results reviewed, labs MET treatment parameters, ok to proceed with treatment.    Post Infusion Assessment:  Patient tolerated infusion without incident.  Blood return noted pre and post infusion.  Site patent and intact, free from redness, edema or discomfort.  No evidence of extravasations.  Access discontinued per protocol.     Discharge Plan:   Prescription refills given for Vitamin D, Iron, Dexamethasone.  Copy of AVS reviewed with patient and/or family. Patient will return 7/25/19 for next appointment.  Patient discharged in stable condition accompanied by: self.  Departure Mode: Ambulatory.    Jeannette Braswell RN

## 2019-07-05 NOTE — NURSING NOTE
Chief Complaint   Patient presents with     Blood Draw     Labs drawn via /PIV placed by BRENDA Zendejas in lab. VS taken.     Martha Aragon RN

## 2019-07-16 NOTE — PROGRESS NOTES
Oncology RN Care Coordination Note:     Writer called patient to answer his question about when to see the dentist.  Patient had concerns that his teeth weren't feeling clean and he wanted to see a dentist.  Writer informed patient he should make his dental cleaning appointment 1-2 days prior to his next infusion.  He states he will try to get the appointment for that time.      Writer also updated patient at this time that his occult stool sample came back negative, meaning no blood in his stool.  Writer asked how it's been going with his iron supplement, he states he has 3 episodes of emesis with this coming up.  He states he doesn't feel nauseated with this, but he does feel the pill get stuck and then he will have an episode of emesis and it will come up.  Patient has a swallow evaluation on Friday 7/19/19.  Writer has updated Kathleen Orosco PA-C of this information.      Rimma Lanza, RN BSN   [facility]  Nurse Coordinator

## 2019-07-22 NOTE — ED TRIAGE NOTES
Triage Assessment & Note:    There were no vitals taken for this visit.    Patient presents with: C/o fall this AM. PT reports left side weakness 3 days ago, new onset facial droop this AM. PT fell aprox 7 stairs and landed on hardwood  lola.  PT reports neck pain placed c-collar during triage.     Home Treatments/Remedies: None    Febrile / Afebrile? Afebrile      Duration of C/o:  3 days left side weakness, left facial droop this AM    Manjinder Velez  July 22, 2019

## 2019-07-22 NOTE — PROGRESS NOTES
..Patient admitted to:5c  Admitted from:home  Arrived by:litter  Reason for admission:falling at home  Patient accompanied by:Stephanie  Belongings:clothing  Teaching: Admission teaching
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
## 2019-07-22 NOTE — CONSULTS
[facility]       NEUROSURGERY CONSULTATION NOTE    This consultation was requested by Dr. Dillard from the ED service.    Reason for Consultation: R frontal mass    HPI:  Mr. 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 Patient states he has felt gradual weakness on the left side of his body for about 3 days. This morning he was upstairs at home when his slipper fell off his left foot. As he reached for the slipper, he fell down 7 steps of stairs and hit his head. He was taken to [facility] ED and CT head was obtained, which showed R frontal brain mass with significant cerebral edema. Denies headache, new nausea/vomiting (baseline nausea/vomiting from chemotherapy), vision changes, numbness (baseline peripheral neuropathy 2/2 chemotherapy side effect).      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      GERD (gastroesophageal reflux disease)      Hypertension      Penile cancer (H)      PONV (postoperative nausea and vomiting)        PAST SURGICAL HISTORY:   Past Surgical History:   Procedure Laterality Date     CYSTOSCOPY, BIOPSY BLADDER, COMBINED N/A 8/31/2017    Procedure: COMBINED CYSTOSCOPY, BIOPSY BLADDER;  Cystoscopy, Suprapubic Tube Placement with Ultrasound Guidiance, Uretheral Dilation;  Surgeon: Muriel Haddad MD;  Location: [facility] OR     CYSTOSTOMY, INSERT TUBE SUPRAPUBIC, COMBINED N/A 8/31/2017    Procedure: COMBINED CYSTOSTOMY, INSERT TUBE SUPRAPUBIC;;  Surgeon: Muriel Haddad MD;  Location: [facility] OR     DISSECT LYMPH NODE INGUINAL N/A 3/8/2018    Procedure: DISSECT LYMPH NODE INGUINAL;  Flexible Cystoscopy, Bladder Biopsy, urethral biopsy and penile biopsy, Radical  Penectomy and Urethrectomy, Perineal Urethrostomy, Bilateral Inguinal Lymphadenectomy; superficial lymph nodes and deep lymph nodes;  Surgeon: Muriel Haddad MD;  Location: [facility] OR     LARYNGOSCOPY  2013    Direct laryngoscopy with the use of rigid endoscopy and takedown of left hypopharyngeal diverticula.      PENECTOMY N/A 3/8/2018    Procedure: PENECTOMY;  Flexible Cystoscopy, Bladder Biopsy, urethral biopsy and penile biopsy, Radical Penectomy and Urethrectomy, Perineal Urethrostomy, Bilateral Inguinal Lymphadenectomy; superficial lymph nodes and deep lymph nodes    ;  Surgeon: Muriel Haddad MD;  Location: [facility] OR     URETHROSTOMY PERINEUM N/A 3/8/2018    Procedure: URETHROSTOMY PERINEUM;  Flexible Cystoscopy, Bladder Biopsy, urethral biopsy and penile biopsy, Radical Penectomy and Urethrectomy, Perineal Urethrostomy, Bilateral Inguinal Lymphadenectomy; superficial lymph nodes and deep lymph nodes;  Surgeon: Muriel Haddad MD;  Location: [facility] OR       FAMILY HISTORY:   Family History   Problem Relation Age of Onset     Cerebrovascular Disease Mother      Hypertension Mother      Prostate Cancer Father 84     Prostate Cancer Brother 63     Diabetes Brother      Cancer Brother      Diabetes Brother        SOCIAL HISTORY:   Social History     Tobacco Use     Smoking status: Former Smoker     Packs/day: 1.00     Years: 20.00     Pack years: 20.00     Types: Cigarettes     Start date: 1972     Last attempt to quit: 1992     Years since quittin.5     Smokeless tobacco: Never Used     Tobacco comment: quit in    Substance Use Topics     Alcohol use: No     Comment:  quit per personal choice.   Retired, former , lives in [facility].    MEDICATIONS:  Current Outpatient Medications   Medication Sig Dispense Refill     acetaminophen (TYLENOL) 325 MG tablet Take 2 tablets (650 mg) by mouth every 4 hours as needed for mild pain or fever 150 tablet 0     amLODIPine  (NORVASC) 10 MG tablet TAKE ONE TABLET BY MOUTH EVERY DAY 90 tablet 3     B Complex Vitamins (VITAMIN B COMPLEX PO) Take 1 tablet by mouth daily (with lunch)        dexamethasone (DECADRON) 4 MG tablet Take 2 tablets (8 mg) evening prior and next morning after docetaxel dose.. 4 tablet 9     fluticasone (FLONASE) 50 MCG/ACT nasal spray Spray 2 sprays into both nostrils 2 times daily 16 g 11     LORazepam (ATIVAN) 0.5 MG tablet Take 1 tablet (0.5 mg) by mouth nightly as needed for anxiety 30 tablet 3     omeprazole (PRILOSEC) 20 MG DR capsule Take 1 capsule (20 mg) by mouth 2 times daily 60 capsule 3     polyethylene glycol (MIRALAX/GLYCOLAX) packet Take 1 packet by mouth daily       triamcinolone (KENALOG) 0.1 % external cream Apply topically 2 times daily 30 g 1     docusate sodium (COLACE) 100 MG capsule Take 1 capsule (100 mg) by mouth 2 times daily as needed for constipation (Patient not taking: Reported on 7/22/2019) 60 capsule 0     ferrous sulfate (FEROSUL) 325 (65 Fe) MG tablet Take 1 tablet (325 mg) by mouth daily (with breakfast) (Patient not taking: Reported on 7/22/2019) 30 tablet 0     ondansetron (ZOFRAN) 8 MG tablet Take 1 tablet (8 mg) by mouth every 8 hours as needed for nausea (Patient not taking: Reported on 7/22/2019) 30 tablet 3     prochlorperazine (COMPAZINE) 5 MG tablet Take 1 tablet (5 mg) by mouth every 6 hours as needed (Nausea/Vomiting) (Patient not taking: Reported on 7/22/2019) 30 tablet 3     sterile water, bottle, irrigation   2     vitamin D2 (ERGOCALCIFEROL) 52803 units (1250 mcg) capsule Take 1 capsule (50,000 Units) by mouth once a week (Patient not taking: Reported on 7/22/2019) 8 capsule 0     vitamin D3 (CHOLECALCIFEROL) 1000 units (25 mcg) tablet Take 1 tablet (1,000 Units) by mouth daily (Patient not taking: Reported on 7/22/2019) 30 tablet 2       Allergies:  Allergies   Allergen Reactions     Lisinopril Swelling     Oxycodone Nausea and Vomiting     Vicodin  "[Hydrocodone-Acetaminophen] Nausea and Vomiting       ROS: 10 point ROS of systems including Constitutional, Eyes, Respiratory, Cardiovascular, Gastroenterology, Genitourinary, Integumentary, Muscularskeletal, Psychiatric were all negative except for pertinent positives noted in my HPI.    Physical exam:   Blood pressure 131/86, pulse 84, temperature 97.8  F (36.6  C), temperature source Oral, resp. rate 18, height 1.93 m (6' 4\"), weight 65.8 kg (145 lb), SpO2 100 %.  CV: Regular rate  PULM: breathing comfortably on room air  NEUROLOGIC:  -- Awake; Alert; oriented x 3  -- Follows commands briskly  -- Speech fluent, spontaneous. No aphasia or dysarthria.  -- no gaze preference. No apparent hemineglect.  -- no neck midline tenderness, able to move neck in all directions without significant pain  Cranial Nerves:  -- visual fields full to confrontation, PERRL 3-2mm bilat and brisk, extraocular movements intact  -- face symmetrical, tongue midline  -- sensory V1-V3 intact bilaterally  -- palate elevates symmetrically, uvula midline  -- hearing grossly intact bilat  -- Trapezii 5/5 strength bilat symmetric  -- Cerebellar: Finger nose finger without dysmetria    Motor:  Normal bulk / tone; no tremor, rigidity, or bradykinesia.  No muscle wasting or fasciculations  No Pronator Drift     Delt Bi Tri Hand Flexion/  Extension Iliopsoas Quadriceps Hamstrings Tibialis Anterior Gastroc    C5 C6 C7 C8/T1 L2 L3 L4-S1 L4 S1   R 5 5 5 5 5 5 5 5 5   L 4+ 4+ 4+ 4+ 4+ 4+ 4+ 4+ 4+   Sensory:  intact to LT x 4 extremities, except baseline peripheral neuropathy in bilateral hands and feet    Reflexes:     Bi Tri BR Leticia Pat Ach Bab    C5-6 C7-8 C6 UMN L2-4 S1 UMN   R 2+ 2+ 2+ Norm 2+ 2+ Norm   L 2+ 2+ 2+ Norm 2+ 2+ Norm     Gait: deferred      IMAGING:  CT head 7/22/19:  1. No intracranial hemorrhage.  2. Right frontal lobe metastasis with surrounding edema and mass  effect.    CT cervical spine 7/22/19:  1. No acute fracture or traumatic " subluxation.  2. 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  3. Multilevel spondylosis, most pronounced at C5-C6.    CTA head/neck 7/22/19:  1. Head CTA demonstrates questionable posterior pointing aneurysm  within the distal supraclinoid left internal carotid artery.  There is  no significant stenosis of the major intracranial arteries.   2. Neck CTA demonstrates no stenosis of the major cervical arteries.   3. There is a 5.1 x 3.8 cm hypodense lesion in the anteromedial right  frontal lobe with a small amount of adjacent vasogenic edema causing  slight mass effect on the anterior horn of the right lateral ventricle  without midline shift. Additionally, there is a thin rim of  hyperdensity surrounding this lesion. This lesion is concerning for a  necrotic metastatic lesion given patient's history of penile cancer,  however with the thin rim of cerebral enhancement, an abscess is also  a possibility, although thought less likely. Recommend further  characterization with contrast-enhanced MRI.    4. Partially visualized metastatic lung lesions the most superior of  which is in the medial left upper lobe and measures slightly larger in  size compared to 5/15/2019 CT.    ASSESSMENT:  Mr. 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    RECOMMENDATIONS:  - MRI brain with and without contrast  - Decadron 4mg q6 hrs  - Recommend admission to oncology service. Neurosurgery will continue to follow.  - May remove cervical collar    The patient was discussed with Dr. Khoury, neurosurgery chief resident, and he agrees with the above.    Jaskaran Jean Baptiste MD  Neurosurgery Resident PGY2    I have reviewed the history. I have seen and examined the patient myself and agree with the assessment  and plan above. If life expectancy is reasonable, he would benefit from resection of this presumed frontal lobe metastasis for symptomatic relief.  AIDAN Allen MD

## 2019-07-22 NOTE — ED NOTES
[facility], [place]   ED Nurse to Floor Handoff     King Gonsaol Bruno is a 73 year old male who speaks English and lives alone,  in a home  They arrived in the ED by car from home    ED Chief Complaint: Fall    ED Dx;   Final diagnoses:   Intracranial mass         Needed?: No    Allergies:   Allergies   Allergen Reactions     Lisinopril Swelling     Oxycodone Nausea and Vomiting     Vicodin [Hydrocodone-Acetaminophen] Nausea and Vomiting   .  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      GERD (gastroesophageal reflux disease)      Hypertension      Penile cancer (H)      PONV (postoperative nausea and vomiting)       Baseline Mental status: WDL  Current Mental Status changes: at basesline    Infection present or suspected this encounter: no  Sepsis suspected: No  Isolation type: No active isolations     Activity level - Baseline/Home:  Independent  Activity Level - Current:   Stand with Assist    Bariatric equipment needed?: No    In the ED these meds were given:   Medications   sodium chloride 0.9% infusion (has no administration in time range)   ondansetron (ZOFRAN) injection 4 mg (has no administration in time range)   dexamethasone (DECADRON) injection 4 mg (has no administration in time range)   sodium chloride (PF) 0.9% PF flush 90 mL (90 mLs Intravenous Given 7/22/19 0933)   iopamidol (ISOVUE-370) solution 75 mL (75 mLs Intravenous Given 7/22/19 0932)   dexamethasone PF (DECADRON) injection 10 mg (10 mg Intravenous Given 7/22/19 1143)       Drips running?  No    Home pump  No    Current LDAs  Peripheral IV 05/15/19 Right Lower forearm (Active)   Number of days: 68       Peripheral IV 07/22/19 Right;Anterior Upper forearm (Active)   Number of days: 0       Closed/Suction Drain 1 Left Groin Bulb 7 Chinese (Active)    Number of days: 501       Closed/Suction Drain 2 Right Groin Bulb 7 Belizean (Active)   Number of days: 501       Open Drain Perineal 16 [place] drains, perineal urethrostomy (Active)   Number of days: 501       Suprapubic Catheter Non-latex 16 fr (Active)   Number of days: 690       Incision/Surgical Site 03/08/18 Right Groin (Active)   Number of days: 501       Incision/Surgical Site 03/08/18 Left Groin (Active)   Number of days: 501       Labs results:   Labs Ordered and Resulted from Time of ED Arrival Up to the Time of Departure from the ED   CBC WITH PLATELETS DIFFERENTIAL - Abnormal; Notable for the following components:       Result Value    RBC Count 3.47 (*)     Hemoglobin 8.1 (*)     Hematocrit 27.4 (*)     MCH 23.3 (*)     MCHC 29.6 (*)     RDW 18.5 (*)     All other components within normal limits   COMPREHENSIVE METABOLIC PANEL - Abnormal; Notable for the following components:    Creatinine 1.41 (*)     GFR Estimate 50 (*)     GFR Estimate If Black 57 (*)     Albumin 3.2 (*)     AST 60 (*)     All other components within normal limits   INR   ALCOHOL ETHYL   TROPONIN I   PULSE OXIMETRY NURSING   PERIPHERAL IV CATHETER   ISTAT CREATININE NURSING POCT   ISTAT TROPONIN NURSING POCT       Imaging Studies:   Recent Results (from the past 24 hour(s))   XR Pelvis Port 1/2 Views    Narrative    EXAM: XR PELVIS PORT 1/2 VW  7/22/2019 8:44 AM      HISTORY: fall    COMPARISON: CT 5/15/2019    FINDINGS: Single AP view of the pelvis.    Femoral necks suboptimally profiled due to positioning.    No acute osseous abnormality. Rounded lucencies in the left acetabulum  and superior left femoral head, better seen on recent CT. Mild  degenerative changes of the hips.    Multiple scattered surgical clips. Stable ossific fragments medial to  the proximal right femoral shaft.       Impression    IMPRESSION: No acute osseous abnormality. Femoral necks suboptimally  profiled.    OSCAR SEALS MD (Joe)   XR Chest  Port 1 View    Narrative    XR CHEST PORT 1 VW7/22/2019 8:44 AM    INDICATION: fall    COMPARISON:  CT 5/15/2019    FINDINGS: AP view of the chest. Large bilateral perihilar masses and  left lower lobe masses are visualized, similar to CT 5/15/2018.  Hyperinflated, emphysematous appearance of the lungs. Cardiac  silhouette is otherwise stable. No visualized osseous lesions. No  visualized consolidation, pneumothorax or pleural effusion.      Impression    IMPRESSION:   1. Perihilar and left lower lobe masses similar to CT 5/13/2019.  2. Emphysematous lungs.  3. No visualized acute osseous lesion.    I have personally reviewed the examination and initial interpretation  and I agree with the findings.    GIOVANNY JOSEPH MD   CT Head w/o Contrast    Narrative    CT HEAD W/O CONTRAST 7/22/2019 9:55 AM    Provided History: Focal neuro deficit, > 6 hrs, stroke suspected; r/o  bleed, r/o arterial dissection    Comparison: Same day CTA head and neck, PET 6/26/2018.    Technique: Using multidetector thin collimation helical acquisition  technique, axial, coronal and sagittal CT images from the skull base  to the vertex were obtained without intravenous contrast.     Findings:    Well-circumscribed, homogenous, hypodense 4.8 cm x 3 cm x 4.1 cm right  frontal lobe mass with mild peripheral nodularity and surrounding  edema. There is mild effacement of the frontal horn of the right  lateral ventricle and effacement of the overlying sulci. No midline  shift. No intracranial hemorrhage. No hydrocephalus. The gray to white  matter differentiation of the cerebral hemispheres is preserved. The  basal cisterns are patent.    Mild mucosal thickening of the left sphenoid sinus. The remaining  visualized paranasal sinuses are clear. The mastoid air cells are  clear.       Impression    Impression:   1. No intracranial hemorrhage.  2. Right frontal lobe metastasis with surrounding edema and mass  effect.    Findings of right frontal lobe  lesion discussed with Dr. Dillard by  Dr. Gooden at 10:22 AM on 7/22/2019.      I have personally reviewed the examination and initial interpretation  and I agree with the findings.    TASHI LOZADA MD   Cervical spine CT w/o contrast    Narrative    CT CERVICAL SPINE W/O CONTRAST 7/22/2019 9:56 AM    Provided History: fall down stairs    Comparison: None    Technique: Using multidetector thin collimation helical acquisition  technique, axial, coronal and sagittal CT images through the cervical  spine were obtained without intravenous contrast.     Findings:  Mild straightening of the expected lordotic curvature. Grade 1  retrolisthesis of C4 on C5 and anterolisthesis of C7 on T1. No acute  fracture or subluxation. No prevertebral edema. There is a chronic  well-corticated spinous process fracture at C7. Multilevel spondylosis  with mild disc height narrowing from C4-C7. There is calcification of  the anterior longitudinal ligament. No abnormality of the paraspinous  soft tissues.      Impression    Impression:  1. No acute fracture or traumatic subluxation.  2. 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  3. Multilevel spondylosis, most pronounced at C5-C6.    I have personally reviewed the examination and initial interpretation  and I agree with the findings.    AILYN ROSS MD   CTA Head Neck with Contrast    Narrative    CTA  HEAD NECK WITH CONTRAST 7/22/2019 9:56 AM    Head CT without contrast  CT angiogram of the neck   CT angiogram of the base of the brain with contrast  Reconstruction by the Radiologist on the 3D workstation    Provided History:  Rule out arterial dissection-trauma and left-sided  weakness  ICD-10:    Comparison:  Same day head CT, CT chest, abdomen, and pelvis  5/15/2019.      Technique:  HEAD CT:  Using multidetector thin collimation helical acquisition  technique, axial, coronal and sagittal CT images from the skull base  to the vertex were obtained without intravenous contrast.    HEAD and NECK CTA: During rapid bolus intravenous injection of  nonionic contrast material, axial images were obtained using thin  collimation multidetector helical technique from the base of the skull  through the [place]. This CT angiogram data was reconstructed  at thin intervals with mild overlap. Images were sent to the [place]  workstation, and 3D reconstructions were obtained. The axial source  images, multiplanar reformations, 3D reconstructions in both maximum  intensity projection display and volume rendered models were reviewed,  with reconstructions performed by the technologist and the  radiologist.    Contrast: iopamidol (ISOVUE-370) solution 75 mL    Findings:  Head CT: There is a 5.1 x 3.8 cm hypodense lesion in the anteromedial  right frontal lobe causing a small amount of mass effect on the  anterior horn of the right lateral ventricle. There is a thin rim of  hyperdensity surrounding this well-defined lesion. Mild hypodensity  surrounds this lesion which may represent a small amount of vasogenic  edema. There is no significant midline shift.. Gray/white matter  differentiation in both cerebral hemispheres is preserved outside of  the above-mentioned lesion. No abnormal dilation of the ventricles.    Head CTA demonstrates a questionable posterior facing distal  supraclinoid left internal carotid artery (series 4 image 199). No  stenosis of the major intracranial arteries.    Neck CTA demonstrates no stenosis of the major cervical arteries. The  origins of the great vessels from the aortic arch are patent. The  normal distal right internal carotid artery measures 5 mm. The normal  distal left internal carotid artery measures 5 mm. There is mild  vascular calcifications at the distal internal carotid arteries.     Lung apices demonstrate severe emphysematous changes along with  multiple lobulated pulmonary masses. Partially visualized medial left  upper lobe nodule measures 5.2 cm,  "previously measured 4.9 cm.      Impression    Impression:    1. Head CTA demonstrates questionable posterior pointing aneurysm  within the distal supraclinoid left internal carotid artery.  There is  no significant stenosis of the major intracranial arteries.   2. Neck CTA demonstrates no stenosis of the major cervical arteries.   3. There is a 5.1 x 3.8 cm hypodense lesion in the anteromedial right  frontal lobe with a small amount of adjacent vasogenic edema causing  slight mass effect on the anterior horn of the right lateral ventricle  without midline shift. Additionally, there is a thin rim of  hyperdensity surrounding this lesion. This lesion is concerning for a  necrotic metastatic lesion given patient's history of penile cancer,  however with the thin rim of cerebral enhancement, an abscess is also  a possibility, although thought less likely. Recommend further  characterization with contrast-enhanced MRI.    4. Partially visualized metastatic lung lesions the most superior of  which is in the medial left upper lobe and measures slightly larger in  size compared to 5/15/2019 CT.    Findings of right frontal lobe hypodense lesion with the potential for  metastatic lesion versus abscess discussed with Dr. Dillard by Dr. Gooden at 10:22 AM on 7/22/2019.    I have personally reviewed the examination and initial interpretation  and I agree with the findings.    MALDONADO SAMANO MD       Recent vital signs:   /86   Pulse 84   Temp 97.8  F (36.6  C) (Oral)   Resp 18   Ht 1.93 m (6' 4\")   Wt 65.8 kg (145 lb)   SpO2 100%   BMI 17.65 kg/m      [place] Coma Scale Score: 15 (07/22/19 0814)       Cardiac Rhythm: Normal Sinus  Pt needs tele? No  Skin/wound Issues: None    Code Status: Full Code    Pain control: pt had none    Nausea control: pt had none    Abnormal labs/tests/findings requiring intervention: See EMR    Family present during ED course? Yes   Family Comments/Social Situation comments: May " need placement     Tasks needing completion: None    Manjinder Velez, RN  [facility] -- [phone] [place] [facility]  [phone] [facility] ED

## 2019-07-22 NOTE — H&P
[facility], [place]    History and Physical  Hospitalist       Date of Admission:  7/22/2019  Date of Service (when I saw the patient): 07/22/19    Assessment & Plan   King Gonsalo Bruno is a 73 year old male with PMH HTN, GERD, CKD, DJD and metastatic penile cancer (lung mets) who presented to the ED after a fall down stairs and was found to have a right frontal lobe brain mass with surrounding edema.     #Brain mass: Has noticed worsening left facial droop and LUE weakness that has been progressing over the past week. Head CT in the ED showed no hemorrhage but right frontal lobe mets with surrounding edema and mass effect. No midline shift. Recommended MRI.   - NSG consulted, recs appreciated. MRI brain with and without contrast is pending  - Dexamethasone 10 mg in the ED, continue 4 mg q6h  - If no surgical intervention is recommended will consult Rad Onc    #Fall: Mechanical fall today. Dropped his slipper and tried to lean back to grab in on the stairs and fell down 7 stairs. Hit his head on the rail and the landing. Trauma w/u in the ED with head CT (see results above). CTA without any carotid stenosis. CT C spine without any traumatic changes. CXR without traumatic changes (overall stable appearing lung mets). AP pelvis negative.   - Consult PT/OT due to recent falls and left sided weakness    #Metastatic penile cancer: Originally diagnosed in 2017, stage 2. Underwent 4 cycles of chemotherapy with cisplatin and gemzar, folled by radical penectomy, urethectomy, perineal urethostomy and bilateral inguinal node dissection 3/8/18. Plan was to proceed with XRT but he was then found to have pulmonary mets. He received Keytruda 8/18-2/28/18. Found to have progressive disease. Started on palliative docetaxel 3/14/19 and received cycle #6 on 7/5/19. He follows with Dr. Arceo. He feels that he has been having more fatigue and side effects from chemotherapy recently. Not sure he wants to  continue  - F/u as scheduled for repeat PET/CT and f/u with Dr. Arceo    #GERD: Persistent symptoms despite bid omeprazole  - Will try ranitidine 150 mg bid in addition to bid omeprazole. Another option could be carafate    #HTN: BP normal currently so will hold PTA amlodipine. If BP is high will resume    #CKD: Creatinine at baseline, 1.4-1.6    #Anemia: Chronic and slowly worsening. Likely multifactorial: chemotherapy, CKD, mild iron deficiency (did not tolerate PO iron), chronic disease  - Continue to monitor, transfuse for hgb <7    DVT Prophylaxis: Hold ppx lovenox for now pending NSG recs about possible surgical intervention  Code Status: Full Code    Disposition: Expected discharge in 2-3 days following work up and either surgery or radiation intervention.    Discussed with attending, Dr. Alexandria Griffith PA-C  Hematology/Oncology  Pager: [phone]    Primary Care Physician   Joan Ventura    Chief Complaint   Fall, left sided weakness    History is obtained from the patient and chart reivew    History of Present Illness   King Gonsalo Bruno is a 73 year old male with PMH HTN, GERD, CKD, DJD and metastatic penile cancer (lung mets) who presented to the ED after a fall down stairs and was found to have a right frontal lobe brain mass with surrounding edema. He had been doing fairly well but struggled after this most recent cycle of chemotherapy with worsening fatigue. He had some falls at home, after standing too quickly. Over the past week he has had progressive left facial and LUE weakness. Today he fell down a flight of stairs. He was climbing the stairs and dropped his slipper, tried to lean back to grab it and fell down 7 stairs. Hit his head on the rail and the landing. He denies a headache. Had some neck pain but cervical collar is off after negative CT C spine. No recent fevers or chills. Intermittent n/v after chemo and with medications. Continues with GERD symptoms despite omeprazole.  10 weight loss in the past month, good appetite. No diarrhea. Miralax daily prevents constipation. No leg swelling. No new rashes. No CP, SOB or cough. No pain issues.     Past Medical History    I have reviewed this patient's medical history and updated it with pertinent information if needed.   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  Past Surgical History:   Procedure Laterality Date     CYSTOSCOPY, BIOPSY BLADDER, COMBINED N/A 8/31/2017    Procedure: COMBINED CYSTOSCOPY, BIOPSY BLADDER;  Cystoscopy, Suprapubic Tube Placement with Ultrasound Guidiance, Uretheral Dilation;  Surgeon: Muriel Haddad MD;  Location: [facility] OR     CYSTOSTOMY, INSERT TUBE SUPRAPUBIC, COMBINED N/A 8/31/2017    Procedure: COMBINED CYSTOSTOMY, INSERT TUBE SUPRAPUBIC;;  Surgeon: Muriel Haddad MD;  Location: [facility] OR     DISSECT LYMPH NODE INGUINAL N/A 3/8/2018    Procedure: DISSECT LYMPH NODE INGUINAL;  Flexible Cystoscopy, Bladder Biopsy, urethral biopsy and penile biopsy, Radical Penectomy and Urethrectomy, Perineal Urethrostomy, Bilateral Inguinal Lymphadenectomy; superficial lymph nodes and deep lymph nodes;  Surgeon: Muriel Haddad MD;  Location: [facility] OR     LARYNGOSCOPY  2013    Direct laryngoscopy with the use of rigid endoscopy and takedown of left hypopharyngeal diverticula.      PENECTOMY N/A 3/8/2018    Procedure: PENECTOMY;  Flexible Cystoscopy, Bladder Biopsy, urethral biopsy and penile biopsy, Radical Penectomy and Urethrectomy, Perineal Urethrostomy, Bilateral Inguinal Lymphadenectomy; superficial lymph nodes and deep  lymph nodes    ;  Surgeon: Muriel Haddad MD;  Location: [facility] OR     URETHROSTOMY PERINEUM N/A 3/8/2018    Procedure: URETHROSTOMY PERINEUM;  Flexible Cystoscopy, Bladder Biopsy, urethral biopsy and penile biopsy, Radical Penectomy and Urethrectomy, Perineal Urethrostomy, Bilateral Inguinal Lymphadenectomy; superficial lymph nodes and deep lymph nodes;  Surgeon: Muriel Haddad MD;  Location: [facility] OR       Prior to Admission Medications   Prior to Admission Medications   Prescriptions Last Dose Informant Patient Reported? Taking?   B Complex Vitamins (VITAMIN B COMPLEX PO) Past Week at Unknown time Self Yes Yes   Sig: Take 1 tablet by mouth daily (with lunch)    LORazepam (ATIVAN) 0.5 MG tablet 7/21/2019 at HS Self No Yes   Sig: Take 1 tablet (0.5 mg) by mouth nightly as needed for anxiety   acetaminophen (TYLENOL) 325 MG tablet Past Month at Unknown time Self No Yes   Sig: Take 2 tablets (650 mg) by mouth every 4 hours as needed for mild pain or fever   amLODIPine (NORVASC) 10 MG tablet 7/21/2019 at AM Self No Yes   Sig: TAKE ONE TABLET BY MOUTH EVERY DAY   dexamethasone (DECADRON) 4 MG tablet Past Month at Unknown time Self No Yes   Sig: Take 2 tablets (8 mg) evening prior and next morning after docetaxel dose..   docusate sodium (COLACE) 100 MG capsule Not Taking at Unknown time Self No No   Sig: Take 1 capsule (100 mg) by mouth 2 times daily as needed for constipation   Patient not taking: Reported on 7/22/2019   ferrous sulfate (FEROSUL) 325 (65 Fe) MG tablet Not Taking at Unknown time Self No No   Sig: Take 1 tablet (325 mg) by mouth daily (with breakfast)   Patient not taking: Reported on 7/22/2019   fluticasone (FLONASE) 50 MCG/ACT nasal spray 7/21/2019 at AM Self No Yes   Sig: Spray 2 sprays into both nostrils 2 times daily   omeprazole (PRILOSEC) 20 MG DR capsule 7/21/2019 at AM Self No Yes   Sig: Take 1 capsule (20 mg) by mouth 2 times daily   ondansetron (ZOFRAN) 8 MG tablet Not Taking at Unknown  time Self No No   Sig: Take 1 tablet (8 mg) by mouth every 8 hours as needed for nausea   Patient not taking: Reported on 7/22/2019   polyethylene glycol (MIRALAX/GLYCOLAX) packet 7/21/2019 at AM Self Yes Yes   Sig: Take 1 packet by mouth daily   prochlorperazine (COMPAZINE) 5 MG tablet Not Taking at Unknown time Self No No   Sig: Take 1 tablet (5 mg) by mouth every 6 hours as needed (Nausea/Vomiting)   Patient not taking: Reported on 7/22/2019      Facility-Administered Medications: None     Allergies   Allergies   Allergen Reactions     Lisinopril Swelling     Oxycodone Nausea and Vomiting     Vicodin [Hydrocodone-Acetaminophen] Nausea and Vomiting       Social History   I have reviewed this patient's social history and updated it with pertinent information if needed. King Gonsalo Bruno  reports that he quit smoking about 27 years ago. His smoking use included cigarettes. He started smoking about 46 years ago. He has a 20.00 pack-year smoking history. He has never used smokeless tobacco. He reports that he does not drink alcohol or use drugs.    Family History   I have reviewed this patient's family history and updated it with pertinent information if needed.   Family History   Problem Relation Age of Onset     Cerebrovascular Disease Mother      Hypertension Mother      Prostate Cancer Father 84     Prostate Cancer Brother 63     Diabetes Brother      Cancer Brother      Diabetes Brother        Review of Systems   The 10 point Review of Systems is negative other than noted in the HPI or here.     Physical Exam   Temp: 98  F (36.7  C) Temp src: Oral BP: 120/71 Pulse: 84 Heart Rate: 62 Resp: 18 SpO2: 100 % O2 Device: None (Room air)    Vital Signs with Ranges  Temp:  [97.8  F (36.6  C)-98  F (36.7  C)] 98  F (36.7  C)  Pulse:  [66-84] 84  Heart Rate:  [62-83] 62  Resp:  [11-23] 18  BP: (102-158)/() 120/71  SpO2:  [95 %-100 %] 100 %  129 lbs 6.4 oz    Constitutional: Chronically ill appearing, cachectic, no  acute distress  Eyes: PERRL, EOMs intact, sclera anicteric, conjunctiva clear  HEENT: Normocephalic, atraumatic, MMM, no mucositis or thrush  Respiratory: clear to auscultation bilaterally  Cardiovascular: RRR, S1S2, no m/r/g  GI: soft, nondistended, nontender  Skin: no visible rashes  Musculoskeletal: no edema, no swollen or erythematous joints  Neurologic: A&O x 3, +4/5 LUE strength, 5/5 RUE  Psychiatric: appropriate affect    Data   Data reviewed today:  I personally reviewed no images or EKG's today.  Recent Labs   Lab 07/22/19  0818   WBC 5.8   HGB 8.1*   MCV 79      INR 1.04      POTASSIUM 3.5   CHLORIDE 100   CO2 27   BUN 13   CR 1.41*   ANIONGAP 9   SAADIA 9.7   GLC 91   ALBUMIN 3.2*   PROTTOTAL 7.6   BILITOTAL 0.3   ALKPHOS 80   ALT 22   AST 60*   TROPI <0.015       Recent Results (from the past 24 hour(s))   XR Pelvis Port 1/2 Views    Narrative    EXAM: XR PELVIS PORT 1/2 VW  7/22/2019 8:44 AM      HISTORY: fall    COMPARISON: CT 5/15/2019    FINDINGS: Single AP view of the pelvis.    Femoral necks suboptimally profiled due to positioning.    No acute osseous abnormality. Rounded lucencies in the left acetabulum  and superior left femoral head, better seen on recent CT. Mild  degenerative changes of the hips.    Multiple scattered surgical clips. Stable ossific fragments medial to  the proximal right femoral shaft.       Impression    IMPRESSION: No acute osseous abnormality. Femoral necks suboptimally  profiled.    OSCAR SEALS MD (Joe)   XR Chest Port 1 View    Narrative    XR CHEST PORT 1 7/22/2019 8:44 AM    INDICATION: fall    COMPARISON:  CT 5/15/2019    FINDINGS: AP view of the chest. Large bilateral perihilar masses and  left lower lobe masses are visualized, similar to CT 5/15/2018.  Hyperinflated, emphysematous appearance of the lungs. Cardiac  silhouette is otherwise stable. No visualized osseous lesions. No  visualized consolidation, pneumothorax or pleural effusion.       Impression    IMPRESSION:   1. Perihilar and left lower lobe masses similar to CT 5/13/2019.  2. Emphysematous lungs.  3. No visualized acute osseous lesion.    I have personally reviewed the examination and initial interpretation  and I agree with the findings.    GIOVANNY JOSEPH MD   CT Head w/o Contrast    Narrative    CT HEAD W/O CONTRAST 7/22/2019 9:55 AM    Provided History: Focal neuro deficit, > 6 hrs, stroke suspected; r/o  bleed, r/o arterial dissection    Comparison: Same day CTA head and neck, PET 6/26/2018.    Technique: Using multidetector thin collimation helical acquisition  technique, axial, coronal and sagittal CT images from the skull base  to the vertex were obtained without intravenous contrast.     Findings:    Well-circumscribed, homogenous, hypodense 4.8 cm x 3 cm x 4.1 cm right  frontal lobe mass with mild peripheral nodularity and surrounding  edema. There is mild effacement of the frontal horn of the right  lateral ventricle and effacement of the overlying sulci. No midline  shift. No intracranial hemorrhage. No hydrocephalus. The gray to white  matter differentiation of the cerebral hemispheres is preserved. The  basal cisterns are patent.    Mild mucosal thickening of the left sphenoid sinus. The remaining  visualized paranasal sinuses are clear. The mastoid air cells are  clear.       Impression    Impression:   1. No intracranial hemorrhage.  2. Right frontal lobe metastasis with surrounding edema and mass  effect.    Findings of right frontal lobe lesion discussed with Dr. Dillard by  Dr. Gooden at 10:22 AM on 7/22/2019.      I have personally reviewed the examination and initial interpretation  and I agree with the findings.    TASHI LOZADA MD   Cervical spine CT w/o contrast    Narrative    CT CERVICAL SPINE W/O CONTRAST 7/22/2019 9:56 AM    Provided History: fall down stairs    Comparison: None    Technique: Using multidetector thin collimation helical acquisition  technique,  axial, coronal and sagittal CT images through the cervical  spine were obtained without intravenous contrast.     Findings:  Mild straightening of the expected lordotic curvature. Grade 1  retrolisthesis of C4 on C5 and anterolisthesis of C7 on T1. No acute  fracture or subluxation. No prevertebral edema. There is a chronic  well-corticated spinous process fracture at C7. Multilevel spondylosis  with mild disc height narrowing from C4-C7. There is calcification of  the anterior longitudinal ligament. No abnormality of the paraspinous  soft tissues.      Impression    Impression:  1. No acute fracture or traumatic subluxation.  2. 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  3. Multilevel spondylosis, most pronounced at C5-C6.    I have personally reviewed the examination and initial interpretation  and I agree with the findings.    AILYN ROSS MD   CTA Head Neck with Contrast    Narrative    CTA  HEAD NECK WITH CONTRAST 7/22/2019 9:56 AM    Head CT without contrast  CT angiogram of the neck   CT angiogram of the base of the brain with contrast  Reconstruction by the Radiologist on the 3D workstation    Provided History:  Rule out arterial dissection-trauma and left-sided  weakness  ICD-10:    Comparison:  Same day head CT, CT chest, abdomen, and pelvis  5/15/2019.      Technique:  HEAD CT:  Using multidetector thin collimation helical acquisition  technique, axial, coronal and sagittal CT images from the skull base  to the vertex were obtained without intravenous contrast.   HEAD and NECK CTA: During rapid bolus intravenous injection of  nonionic contrast material, axial images were obtained using thin  collimation multidetector helical technique from the base of the skull  through the [place]. This CT angiogram data was reconstructed  at thin intervals with mild overlap. Images were sent to the [employer]  workstation, and 3D reconstructions were obtained. The axial source  images, multiplanar  reformations, 3D reconstructions in both maximum  intensity projection display and volume rendered models were reviewed,  with reconstructions performed by the technologist and the  radiologist.    Contrast: iopamidol (ISOVUE-370) solution 75 mL    Findings:  Head CT: There is a 5.1 x 3.8 cm hypodense lesion in the anteromedial  right frontal lobe causing a small amount of mass effect on the  anterior horn of the right lateral ventricle. There is a thin rim of  hyperdensity surrounding this well-defined lesion. Mild hypodensity  surrounds this lesion which may represent a small amount of vasogenic  edema. There is no significant midline shift.. Gray/white matter  differentiation in both cerebral hemispheres is preserved outside of  the above-mentioned lesion. No abnormal dilation of the ventricles.    Head CTA demonstrates a questionable posterior facing distal  supraclinoid left internal carotid artery (series 4 image 199). No  stenosis of the major intracranial arteries.    Neck CTA demonstrates no stenosis of the major cervical arteries. The  origins of the great vessels from the aortic arch are patent. The  normal distal right internal carotid artery measures 5 mm. The normal  distal left internal carotid artery measures 5 mm. There is mild  vascular calcifications at the distal internal carotid arteries.     Lung apices demonstrate severe emphysematous changes along with  multiple lobulated pulmonary masses. Partially visualized medial left  upper lobe nodule measures 5.2 cm, previously measured 4.9 cm.      Impression    Impression:    1. Head CTA demonstrates questionable posterior pointing aneurysm  within the distal supraclinoid left internal carotid artery.  There is  no significant stenosis of the major intracranial arteries.   2. Neck CTA demonstrates no stenosis of the major cervical arteries.   3. There is a 5.1 x 3.8 cm hypodense lesion in the anteromedial right  frontal lobe with a small amount of  adjacent vasogenic edema causing  slight mass effect on the anterior horn of the right lateral ventricle  without midline shift. Additionally, there is a thin rim of  hyperdensity surrounding this lesion. This lesion is concerning for a  necrotic metastatic lesion given patient's history of penile cancer,  however with the thin rim of cerebral enhancement, an abscess is also  a possibility, although thought less likely. Recommend further  characterization with contrast-enhanced MRI.    4. Partially visualized metastatic lung lesions the most superior of  which is in the medial left upper lobe and measures slightly larger in  size compared to 5/15/2019 CT.    Findings of right frontal lobe hypodense lesion with the potential for  metastatic lesion versus abscess discussed with Dr. Dillard by Dr. Gooden at 10:22 AM on 7/22/2019.    I have personally reviewed the examination and initial interpretation  and I agree with the findings.    MALDONADO SAMANO MD

## 2019-07-22 NOTE — ED PROVIDER NOTES
[place] EMERGENCY DEPARTMENT ([facility])  7/22/19    History     Chief Complaint   Patient presents with     Fall     The history is provided by the patient, the spouse and medical records.     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 Patient reports that the mechanical fall occurred at approximately 0530 this morning. At that time, he was walking up the stairs when his left slipper fell off his foot. When he reached back to get the slipper, he fell backwards down 7 steps hitting his head on the railing and hard landing. Patient denies losing consciousness and reports posterior neck pain. Additionally, patient reports left-sided weakness for the last 3 days with a new onset of noticeable left arm weakness and facial droop this morning. He denies dizziness, recent fevers, shortness of breath or chest pain. Patient reports nausea and vomiting but states that this is normal for him during chemotherapy. Per patient's wife, patient has planned on stopping chemotherapy as he feels it is not helping.  Patient also complains of some mild left hip pain after his fall.    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      GERD (gastroesophageal reflux disease)      Hypertension      Penile cancer (H)      PONV (postoperative nausea and vomiting)        PAST SURGICAL HISTORY:   Past Surgical History:   Procedure Laterality Date     CYSTOSCOPY, BIOPSY BLADDER, COMBINED N/A 8/31/2017    Procedure: COMBINED CYSTOSCOPY, BIOPSY BLADDER;  Cystoscopy, Suprapubic Tube Placement with Ultrasound Guidiance, Uretheral Dilation;  Surgeon: Muriel Haddad MD;  Location:  OR      CYSTOSTOMY, INSERT TUBE SUPRAPUBIC, COMBINED N/A 2017    Procedure: COMBINED CYSTOSTOMY, INSERT TUBE SUPRAPUBIC;;  Surgeon: Muriel Haddad MD;  Location: [facility] OR     DISSECT LYMPH NODE INGUINAL N/A 3/8/2018    Procedure: DISSECT LYMPH NODE INGUINAL;  Flexible Cystoscopy, Bladder Biopsy, urethral biopsy and penile biopsy, Radical Penectomy and Urethrectomy, Perineal Urethrostomy, Bilateral Inguinal Lymphadenectomy; superficial lymph nodes and deep lymph nodes;  Surgeon: Muriel Haddad MD;  Location: [facility] OR     LARYNGOSCOPY  2013    Direct laryngoscopy with the use of rigid endoscopy and takedown of left hypopharyngeal diverticula.      PENECTOMY N/A 3/8/2018    Procedure: PENECTOMY;  Flexible Cystoscopy, Bladder Biopsy, urethral biopsy and penile biopsy, Radical Penectomy and Urethrectomy, Perineal Urethrostomy, Bilateral Inguinal Lymphadenectomy; superficial lymph nodes and deep lymph nodes    ;  Surgeon: Muriel Haddad MD;  Location: [facility] OR     URETHROSTOMY PERINEUM N/A 3/8/2018    Procedure: URETHROSTOMY PERINEUM;  Flexible Cystoscopy, Bladder Biopsy, urethral biopsy and penile biopsy, Radical Penectomy and Urethrectomy, Perineal Urethrostomy, Bilateral Inguinal Lymphadenectomy; superficial lymph nodes and deep lymph nodes;  Surgeon: Muriel Haddad MD;  Location: [facility] OR       FAMILY HISTORY:   Family History   Problem Relation Age of Onset     Cerebrovascular Disease Mother      Hypertension Mother      Prostate Cancer Father 84     Prostate Cancer Brother 63     Diabetes Brother      Cancer Brother      Diabetes Brother        SOCIAL HISTORY:   Social History     Tobacco Use     Smoking status: Former Smoker     Packs/day: 1.00     Years: 20.00     Pack years: 20.00     Types: Cigarettes     Start date: 1972     Last attempt to quit: 1992     Years since quittin.5     Smokeless tobacco: Never Used     Tobacco comment: quit in    Substance Use Topics     Alcohol use: No      Comment: 1987 quit per personal choice.           Dose / Directions   acetaminophen 325 MG tablet  Commonly known as:  TYLENOL  Used for:  Urethral cancer (H)      Dose:  650 mg  Take 2 tablets (650 mg) by mouth every 4 hours as needed for mild pain or fever  Quantity:  150 tablet  Refills:  0     amLODIPine 10 MG tablet  Commonly known as:  NORVASC  Used for:  Benign essential hypertension      TAKE ONE TABLET BY MOUTH EVERY DAY  Quantity:  90 tablet  Refills:  3     dexamethasone 4 MG tablet  Commonly known as:  DECADRON  Used for:  Malignant neoplasm metastatic to both lungs (H), Urethral cancer (H)      Take 2 tablets (8 mg) evening prior and next morning after docetaxel dose..  Quantity:  4 tablet  Refills:  9     docusate sodium 100 MG capsule  Commonly known as:  COLACE  Used for:  Slow transit constipation      Dose:  100 mg  Take 1 capsule (100 mg) by mouth 2 times daily as needed for constipation  Quantity:  60 capsule  Refills:  0     DUREZOL 0.05 % ophthalmic emulsion  Generic drug:  difluprednate      Dose:  1 drop  1 drop every 2 hours  Refills:  0     ferrous sulfate 325 (65 Fe) MG tablet  Commonly known as:  FEROSUL  Used for:  Iron deficiency anemia, unspecified iron deficiency anemia type      Dose:  325 mg  Take 1 tablet (325 mg) by mouth daily (with breakfast)  Quantity:  30 tablet  Refills:  0     fluticasone 50 MCG/ACT nasal spray  Commonly known as:  FLONASE  Used for:  Urethral cancer (H), Allergic sinusitis      Dose:  2 spray  Spray 2 sprays into both nostrils 2 times daily  Quantity:  16 g  Refills:  11     * LORazepam 0.5 MG tablet  Commonly known as:  ATIVAN  Used for:  Anxiety      Dose:  0.5 mg  Take 1 tablet (0.5 mg) by mouth nightly as needed for anxiety  Quantity:  30 tablet  Refills:  3     * LORazepam 0.5 MG tablet  Commonly known as:  ATIVAN  Used for:  Malignant neoplasm metastatic to both lungs (H), Urethral cancer (H)      Dose:  0.5 mg  Take 1 tablet (0.5 mg) by mouth every  4 hours as needed (Anxiety, Nausea/Vomiting or Sleep)  Quantity:  30 tablet  Refills:  5     * omeprazole 40 MG DR capsule  Commonly known as:  priLOSEC  Used for:  Gastroesophageal reflux disease with esophagitis      Dose:  40 mg  Take 1 capsule (40 mg) by mouth daily  Quantity:  30 capsule  Refills:  1     * omeprazole 20 MG DR capsule  Commonly known as:  priLOSEC  Used for:  Gastroesophageal reflux disease with esophagitis      Dose:  20 mg  Take 1 capsule (20 mg) by mouth 2 times daily  Quantity:  60 capsule  Refills:  3     * omeprazole 2 mg/mL suspension  Commonly known as:  priLOSEC  Used for:  Gastroesophageal reflux disease with esophagitis, Urethral cancer (H)      Dose:  20 mg  Take 10 mLs (20 mg) by mouth 2 times daily  Quantity:  400 mL  Refills:  3     ondansetron 8 MG tablet  Commonly known as:  ZOFRAN  Used for:  Malignant neoplasm metastatic to both lungs (H), Urethral cancer (H)      Dose:  8 mg  Take 1 tablet (8 mg) by mouth every 8 hours as needed for nausea  Quantity:  30 tablet  Refills:  3     polyethylene glycol packet  Commonly known as:  MIRALAX/GLYCOLAX  Used for:  Urethral cancer (H), Malignant neoplasm metastatic to both lungs (H)      Dose:  1 packet  Take 1 packet by mouth daily  Refills:  0     prednisoLONE acetate 1 % ophthalmic suspension  Commonly known as:  PRED FORTE      Dose:  1 drop  1 drop every 2 hours  Refills:  0     prochlorperazine 5 MG tablet  Commonly known as:  COMPAZINE  Used for:  Malignant neoplasm metastatic to both lungs (H), Urethral cancer (H)      Dose:  5 mg  Take 1 tablet (5 mg) by mouth every 6 hours as needed (Nausea/Vomiting)  Quantity:  30 tablet  Refills:  3     sterile water (bottle) irrigation      Refills:  2     triamcinolone 0.1 % external cream  Commonly known as:  KENALOG  Used for:  Urethral cancer (H), Extravasation injury      Apply topically 2 times daily  Quantity:  30 g  Refills:  1     VITAMIN B COMPLEX PO      Take by mouth daily (with  "lunch)  Refills:  0     vitamin D2 60474 units (1250 mcg) capsule  Commonly known as:  ERGOCALCIFEROL  Used for:  Vitamin D deficiency      Dose:  47476 Units  Take 1 capsule (50,000 Units) by mouth once a week  Quantity:  8 capsule  Refills:  0     vitamin D3 1000 units (25 mcg) tablet  Commonly known as:  CHOLECALCIFEROL  Used for:  Vitamin D deficiency      Dose:  1000 Units  Take 1 tablet (1,000 Units) by mouth daily  Quantity:  30 tablet  Refills:  2            Allergies   Allergen Reactions     Lisinopril Swelling     Oxycodone Nausea and Vomiting     Vicodin [Hydrocodone-Acetaminophen] Nausea and Vomiting       I have reviewed the Medications, Allergies, Past Medical and Surgical History, and Social History in the Epic system.    Review of Systems   Constitutional: Negative for fever.   Respiratory: Negative for shortness of breath.    Cardiovascular: Negative for chest pain.   Gastrointestinal: Positive for nausea and vomiting.   Musculoskeletal: Positive for neck pain (posterior).   Neurological: Positive for facial asymmetry and weakness (left-sided). Negative for dizziness.        Negative for LOC   All other systems reviewed and are negative.      Physical Exam   BP: (!) 134/108  Pulse: 83  Heart Rate: 75  Temp: 97.8  F (36.6  C)  Resp: 18  Height: 193 cm (6' 4\")  Weight: 65.8 kg (145 lb)  SpO2: 95 %      Physical Exam   Constitutional: He is oriented to person, place, and time.   Awake alert elderly and in a cervical collar   HENT:   Head: Atraumatic.   No hemotympanum no sheriff sign no raccoon eyes.  Facial bones intact by palpation.   Eyes: Pupils are equal, round, and reactive to light. EOM are normal.   Neck:   Neck in a cervical collar but is minimally tender posteriorly.   Cardiovascular: Regular rhythm.   Pulmonary/Chest: No respiratory distress. He has no wheezes. He has no rales.   Abdominal: Soft. There is no tenderness.   Musculoskeletal:   Thoracic and lumbar spine are nontender.  Pelvic " rock is negative.  There is some slight tenderness of the left greater trochanter.  No leg shortening appreciated   Neurological: He is alert and oriented to person, place, and time.   Patient is noted to have central seventh facial drooping on the left with some left-sided weakness both upper and lower extremities   Skin: Skin is warm.   Psychiatric: He has a normal mood and affect.   Nursing note and vitals reviewed.      ED Course        Procedures        Patient was seen urgently because of his weakness and fall.    Patient was placed on cardiac monitor and oximetry and remained in a cervical collar.    IV access was obtained for blood draw.    EKG revealed a normal sinus rhythm at a rate of 78 with a  and a QRS duration of 0.080.  The patient had a normal axis with no acute ST or T wave changes significant for ischemia.  This is read by me personally.    Patient had a portable chest x-ray which revealed pulmonary mets but no acute traumatic injury noted.    AP pelvis obtained portably revealed no traumatic injury.    Patient was taken to CT for head CT/CTA along with cervical spine CT.    Results for orders placed or performed during the hospital encounter of 07/22/19   XR Chest Port 1 View    Narrative    1. Perihilar and left lower lobe masses similar to CT 5/13/2019.  2. Emphysematous lungs.  3. No visualized acute osseous lesion.   XR Pelvis Port 1/2 Views    Narrative    EXAM: XR PELVIS PORT 1/2 VW  7/22/2019 8:44 AM      HISTORY: fall    COMPARISON: CT 5/15/2019    FINDINGS: Single AP view of the pelvis.    Femoral necks suboptimally profiled due to positioning.    No acute osseous abnormality. Rounded lucencies in the left acetabulum  and superior left femoral head, better seen on recent CT. Mild  degenerative changes of the hips.    Multiple scattered surgical clips. Stable ossific fragments medial to  the proximal right femoral shaft.       Impression    IMPRESSION: No acute osseous abnormality.  Femoral necks suboptimally  profiled.    OSCAR SEALS MD (Joe)   CT Head w/o Contrast    Narrative    CT HEAD W/O CONTRAST 7/22/2019 9:55 AM    Provided History: Focal neuro deficit, > 6 hrs, stroke suspected; r/o  bleed, r/o arterial dissection    Comparison: Same day CTA head and neck, PET 6/26/2018.    Technique: Using multidetector thin collimation helical acquisition  technique, axial, coronal and sagittal CT images from the skull base  to the vertex were obtained without intravenous contrast.     Findings:    Well-circumscribed, homogenous, hypodense 4.8 cm x 3 cm x 4.1 cm right  frontal lobe mass with mild peripheral nodularity and surrounding  edema. There is mild effacement of the frontal horn of the right  lateral ventricle and effacement of the overlying sulci. No midline  shift. No intracranial hemorrhage. No hydrocephalus. The gray to white  matter differentiation of the cerebral hemispheres is preserved. The  basal cisterns are patent.    Mild mucosal thickening of the left sphenoid sinus. The remaining  visualized paranasal sinuses are clear. The mastoid air cells are  clear.       Impression    Impression:   1. No intracranial hemorrhage.  2. Right frontal lobe metastasis with surrounding edema and mass  effect.    Findings of right frontal lobe lesion discussed with Dr. Dillard by  Dr. Gooden at 10:22 AM on 7/22/2019.      I have personally reviewed the examination and initial interpretation  and I agree with the findings.    TASHI LOZADA MD   CTA Head Neck with Contrast    Narrative    PRELIMINARY REPORT - The following report is a preliminary  interpretation.   1. 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 There is no  significant stenosis of the major intracranial arteries.   2. Neck CTA demonstrates no stenosis of the major cervical arteries.   3. There is a 5.1 x 3.8 cm hypodense lesion in the  anteromedial right  frontal lobe with a small amount of adjacent vasogenic edema causing  slight mass effect on the anterior horn of the right lateral ventricle  without midline shift. Additionally, there is a thin rim of  hyperdensity surrounding this lesion. This lesion is concerning for a  necrotic metastatic lesion given patient's history of penile cancer,  however with the thin rim of enhancement, an abscess is also a  possibility. Recommend further characterization with contrast-enhanced  MRI.      Findings of right frontal lobe hypodense lesion with the potential for  metastatic lesion versus abscess discussed with Dr. Dillard by Dr. Gooden at 10:22 AM on 7/22/2019.     Cervical spine CT w/o contrast    Narrative    CT CERVICAL SPINE W/O CONTRAST 7/22/2019 9:56 AM    Provided History: fall down stairs    Comparison: None    Technique: Using multidetector thin collimation helical acquisition  technique, axial, coronal and sagittal CT images through the cervical  spine were obtained without intravenous contrast.     Findings:  Mild straightening of the expected lordotic curvature. Grade 1  retrolisthesis of C4 on C5 and anterolisthesis of C7 on T1. No acute  fracture or subluxation. No prevertebral edema. There is a chronic  well-corticated spinous process fracture at C7. Multilevel spondylosis  with mild disc height narrowing from C4-C7. There is calcification of  the anterior longitudinal ligament. No abnormality of the paraspinous  soft tissues.      Impression    Impression:  1. No acute fracture or traumatic subluxation.  2. 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  3. Multilevel spondylosis, most pronounced at C5-C6.    I have personally reviewed the examination and initial interpretation  and I agree with the findings.    AILYN ROSS MD   CBC with platelets differential   Result Value Ref Range    WBC 5.8 4.0 - 11.0 10e9/L    RBC Count 3.47 (L) 4.4 - 5.9 10e12/L    Hemoglobin 8.1 (L) 13.3 -  17.7 g/dL    Hematocrit 27.4 (L) 40.0 - 53.0 %    MCV 79 78 - 100 fl    MCH 23.3 (L) 26.5 - 33.0 pg    MCHC 29.6 (L) 31.5 - 36.5 g/dL    RDW 18.5 (H) 10.0 - 15.0 %    Platelet Count 401 150 - 450 10e9/L    Diff Method Automated Method     % Neutrophils 71.1 %    % Lymphocytes 15.3 %    % Monocytes 12.6 %    % Eosinophils 0.2 %    % Basophils 0.5 %    % Immature Granulocytes 0.3 %    Nucleated RBCs 0 0 /100    Absolute Neutrophil 4.1 1.6 - 8.3 10e9/L    Absolute Lymphocytes 0.9 0.8 - 5.3 10e9/L    Absolute Monocytes 0.7 0.0 - 1.3 10e9/L    Absolute Eosinophils 0.0 0.0 - 0.7 10e9/L    Absolute Basophils 0.0 0.0 - 0.2 10e9/L    Abs Immature Granulocytes 0.0 0 - 0.4 10e9/L    Absolute Nucleated RBC 0.0    Comprehensive metabolic panel   Result Value Ref Range    Sodium 136 133 - 144 mmol/L    Potassium 3.5 3.4 - 5.3 mmol/L    Chloride 100 94 - 109 mmol/L    Carbon Dioxide 27 20 - 32 mmol/L    Anion Gap 9 3 - 14 mmol/L    Glucose 91 70 - 99 mg/dL    Urea Nitrogen 13 7 - 30 mg/dL    Creatinine 1.41 (H) 0.66 - 1.25 mg/dL    GFR Estimate 50 (L) >60 mL/min/[1.73_m2]    GFR Estimate If Black 57 (L) >60 mL/min/[1.73_m2]    Calcium 9.7 8.5 - 10.1 mg/dL    Bilirubin Total 0.3 0.2 - 1.3 mg/dL    Albumin 3.2 (L) 3.4 - 5.0 g/dL    Protein Total 7.6 6.8 - 8.8 g/dL    Alkaline Phosphatase 80 40 - 150 U/L    ALT 22 0 - 70 U/L    AST 60 (H) 0 - 45 U/L   INR   Result Value Ref Range    INR 1.04 0.86 - 1.14   Alcohol ethyl   Result Value Ref Range    Ethanol g/dL <0.01 <0.01 g/dL   Troponin I   Result Value Ref Range    Troponin I ES <0.015 0.000 - 0.045 ug/L   EKG 12-lead, tracing only   Result Value Ref Range    Interpretation ECG Click View Image link to view waveform and result        Trauma surgery was contacted be regarding the patient's fall but they opted to not see the patient and instead neurosurgery was contacted.    Neurosurgery evaluated the patient and at this time the patient will be given some Decadron.    Patient will be  admitted to oncology with neurosurgery and consult.      Critical Care time:  was greater than 30 minutes for this patient excluding procedures.  Trauma:  Level of trauma activation: Emergency Department evaluation  C-collar and immobilization: applied in ED on initial evaluation.  CSpine Clearance: By clinical exam and negative CT       Labs Ordered and Resulted from Time of ED Arrival Up to the Time of Departure from the ED   CBC WITH PLATELETS DIFFERENTIAL - Abnormal; Notable for the following components:       Result Value    RBC Count 3.47 (*)     Hemoglobin 8.1 (*)     Hematocrit 27.4 (*)     MCH 23.3 (*)     MCHC 29.6 (*)     RDW 18.5 (*)     All other components within normal limits   COMPREHENSIVE METABOLIC PANEL - Abnormal; Notable for the following components:    Creatinine 1.41 (*)     GFR Estimate 50 (*)     GFR Estimate If Black 57 (*)     Albumin 3.2 (*)     AST 60 (*)     All other components within normal limits   INR   ALCOHOL ETHYL   TROPONIN I   PERIPHERAL IV CATHETER   ISTAT CREATININE NURSING POCT   ISTAT TROPONIN NURSING POCT   PULSE OXIMETRY NURSING     Assessments & Plan (with Medical Decision Making)     I have reviewed the nursing notes.    Medications   sodium chloride 0.9% infusion (has no administration in time range)   ondansetron (ZOFRAN) injection 4 mg (has no administration in time range)   sodium chloride (PF) 0.9% PF flush 90 mL (90 mLs Intravenous Given 7/22/19 0933)   iopamidol (ISOVUE-370) solution 75 mL (75 mLs Intravenous Given 7/22/19 0932)   dexamethasone PF (DECADRON) injection 10 mg (10 mg Intravenous Given 7/22/19 1143)     Patient at this time will be admitted to oncology.    I have reviewed the findings, diagnosis, and plan with the patient.      Final diagnoses:   Intracranial mass     Cody Dillard MD    , Fariba Hoover, am serving as a trained medical scribe to document services personally performed by Cody Dillard MD, based on the provider's  statements to me.   I, Cody Dillard MD, was physically present and have reviewed and verified the accuracy of this note documented by Fariba Hoover.    7/22/2019   [facility], [place], EMERGENCY DEPARTMENT     Cody Dillard MD  07/22/19 [id number]

## 2019-07-22 NOTE — PHARMACY-ADMISSION MEDICATION HISTORY
Admission medication history interview status for the 7/22/2019 admission is complete. See Epic admission navigator for allergy information, pharmacy, prior to admission medications and immunization status.     Medication history interview sources:  Patient, Chart Review/Care Everywhere    Changes made to PTA medication list (reason)  Added: None  Deleted: Difluprednate 0.05% ophthalmic solution, prednisolone acetate 1% ophthalmic suspension - patient reports that he was using these medications for a scratch on his eye. Patient states that the scratch has resolved and he no longer uses these medications.   Changed: None    Additional medication history information (including reliability of information, actions taken by pharmacist):  Patient was a reliable historian and could recall his home medications with minimal prompting.     Of note, patient reports that he has not been taking his antiemetic medications (ondansetron and prochlorperazine) because he has not been experiencing symptoms and has not taken either medication in over 1 month. However, patient also reports that he does not take his iron and vitamin D supplements because these medications make him vomit. Patient reports that he has not taken his supplements for about 1-2 weeks.     Additionally, patient reports that his last chemotherapy session was about 2 weeks ago. This was around the time when patient had his last dose of dexamethasone.       Prior to Admission medications    Medication Sig Last Dose Taking? Auth Provider   acetaminophen (TYLENOL) 325 MG tablet Take 2 tablets (650 mg) by mouth every 4 hours as needed for mild pain or fever Past Month at Unknown time Yes Fidelia Vizcaino PA   amLODIPine (NORVASC) 10 MG tablet TAKE ONE TABLET BY MOUTH EVERY DAY 7/21/2019 at AM Yes Steve Arceo MD   B Complex Vitamins (VITAMIN B COMPLEX PO) Take 1 tablet by mouth daily (with lunch)  Past Week at Unknown time Yes Reported, Patient    dexamethasone (DECADRON) 4 MG tablet Take 2 tablets (8 mg) evening prior and next morning after docetaxel dose.. Past Month at Unknown time Yes Steve Arceo MD   fluticasone (FLONASE) 50 MCG/ACT nasal spray Spray 2 sprays into both nostrils 2 times daily 7/21/2019 at AM Yes Yessica Ovalles APRN CNP   LORazepam (ATIVAN) 0.5 MG tablet Take 1 tablet (0.5 mg) by mouth nightly as needed for anxiety 7/21/2019 at HS Yes Yessica Ovalles APRN CNP   omeprazole (PRILOSEC) 20 MG DR capsule Take 1 capsule (20 mg) by mouth 2 times daily 7/21/2019 at AM Yes Kathleen Orosco PA-C   polyethylene glycol (MIRALAX/GLYCOLAX) packet Take 1 packet by mouth daily 7/21/2019 at AM Yes Reported, Patient   triamcinolone (KENALOG) 0.1 % external cream Apply topically 2 times daily Past Month at Unknown time Yes Josy Powell PA-C   docusate sodium (COLACE) 100 MG capsule Take 1 capsule (100 mg) by mouth 2 times daily as needed for constipation  Patient not taking: Reported on 7/22/2019 Not Taking at Unknown time  Fidelia Vizcaino PA   ferrous sulfate (FEROSUL) 325 (65 Fe) MG tablet Take 1 tablet (325 mg) by mouth daily (with breakfast)  Patient not taking: Reported on 7/22/2019 Not Taking at Unknown time  Kathleen Orosco PA-C   ondansetron (ZOFRAN) 8 MG tablet Take 1 tablet (8 mg) by mouth every 8 hours as needed for nausea  Patient not taking: Reported on 7/22/2019 Not Taking at Unknown time  Yessica Ovalles APRN CNP   prochlorperazine (COMPAZINE) 5 MG tablet Take 1 tablet (5 mg) by mouth every 6 hours as needed (Nausea/Vomiting)  Patient not taking: Reported on 7/22/2019 Not Taking at Unknown time  Steve Arceo MD   sterile water, bottle, irrigation    Reported, Patient   vitamin D2 (ERGOCALCIFEROL) 05041 units (1250 mcg) capsule Take 1 capsule (50,000 Units) by mouth once a week  Patient not taking: Reported on 7/22/2019 Not Taking at Unknown time  Kathleen Orosco PA-C   vitamin D3 (CHOLECALCIFEROL) 1000 units  (25 mcg) tablet Take 1 tablet (1,000 Units) by mouth daily  Patient not taking: Reported on 7/22/2019 Not Taking at Unknown time  Kathleen Orosco PA-C         Medication history completed by:   Tila LagunaD IV Student

## 2019-07-23 NOTE — PROGRESS NOTES
CLINICAL NUTRITION SERVICES - ASSESSMENT NOTE     Nutrition Prescription    RECOMMENDATIONS FOR MDs/PROVIDERS TO ORDER:  Consider rechecking vitamin D levels and need for further supplementation (Low 5/15/19)    Rec starting thera-vit-M (MVI) due to recent decline in po and significant weight loss    Malnutrition Status:    Severe malnutrition in the context of chronic illness    Recommendations already ordered by Registered Dietitian (RD):  Snacks/supplements:  AM snack: vanilla boost shake + banana   PM snack: Vanilla magic cup + baked chips   HS snack:vanilla pudding + applesauce   Can order additional PRN - list of options provided and discussed    Nutrition Education - Tips to increasing calories and protein in setting of decreased appetite    Future/Additional Recommendations:  If intakes decline, rec calorie counts to help better quantify po intakes. If continues to lose weight despite po intakes can consider enteral nutrition to help supplement po     REASON FOR ASSESSMENT  King Gonsalo Bruno is a/an 73 year old male assessed by the dietitian for Admission Nutrition Risk Screen for reduced oral intake over the last month and Patient/Family Request (Weight loss).    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 Is struggling with early satiety and GERD like symptoms - does avoid chocolate and tomato based products. Is able to eat and is able to eat a decent portion - ate ~90% of lunch at time of visit.  Engaged and interactive during discussion.     CURRENT NUTRITION ORDERS  Diet: Regular  Intake/Tolerance: eating well but below baseline portions    LABS  Labs reviewed  Lytes WNL; Cr baseline w/ CKD is ~1.4-1.6  Vitamin D 17L (5/15/19)  Euglycemia    MEDICATIONS  Medications  "reviewed  Dexamethasone  miralax  Ranitidine  IVF: NS @ 125 mL/hr    Palliative docetaxel started 3/14/19, Cycle #6 was on 7/5/19    ANTHROPOMETRICS  Height: 185 cm (6' .835\")  Most Recent Weight: 58.7 kg (129 lb 6.4 oz)    IBW: 83.6 kg  BMI: 17.2 --  Underweight BMI  Weight History: 7.6% wt loss x 2.5 weeks  Wt Readings from Last 10 Encounters:   07/22/19 58.7 kg (129 lb 6.4 oz)   07/05/19 63.5 kg (140 lb 1.6 oz)   06/13/19 62.9 kg (138 lb 9.6 oz)   05/15/19 64 kg (141 lb)   04/25/19 64.9 kg (143 lb)   04/12/19 64.6 kg (142 lb 6.4 oz)   04/04/19 65 kg (143 lb 4.8 oz)   03/21/19 64.9 kg (143 lb 1.6 oz)   03/14/19 64 kg (141 lb 3.2 oz)   03/06/19 64.4 kg (141 lb 14.4 oz)   1/17/19           64.7 kg  11/15/18         65.7 kg  8/3/18             65.4 kg    Dosing Weight: 59 kg (Actual 7/22)    ASSESSED NUTRITION NEEDS  Estimated Energy Needs: 2074-8486+ kcals/day (30 - 35+ kcals/kg )  Justification: Repletion  Estimated Protein Needs: 70-90 grams protein/day (1.2 - 1.5+ grams of pro/kg)  Justification: Repletion - aggressiveness of protein dependent on renal function w/ CKD  Estimated Fluid Needs:  (1 mL/kcal)   Justification: Maintenance and Per provider pending fluid status    PHYSICAL FINDINGS  See malnutrition section below.  Pt sitting OOBTC with tray in front of him (eating), unable to fully assess LE    MALNUTRITION  % Intake: </=75% for >/= 1 month (severe)  % Weight Loss: > 5% in 1 month (severe)  Subcutaneous Fat Loss: Facial region, Upper arm and Lower arm:  severe  Muscle Loss: Thoracic region (clavicle, acromium bone, deltoid, trapezius, pectoral):  severe and Upper arm (bicep, tricep):  severe  Fluid Accumulation/Edema: None noted  Malnutrition Diagnosis: Severe malnutrition in the context of chronic illness    NUTRITION DIAGNOSIS  Inadequate oral intake related to early satiety, GERD, likely hypercatabolism as evidenced by pt report.      INTERVENTIONS  Implementation  Nutrition Education: Provided " education on RD role and nutrition POC. Discussed strategies to increase po intakes for initial goal of weight maintenance vs weight gain.  Discussed making each bite count and how to utilize high calorie, high protein foods. Provided handouts: six small meals and  Tips to coping with anorexia (poor appetite) and early satiety.   Medical food supplement therapy - per above    Goals  Patient to consume % of nutritionally adequate meal trays TID, or the equivalent with supplements/snacks.     Monitoring/Evaluation  Progress toward goals will be monitored and evaluated per protocol.    Sana Simon MS, RD, , LD.  5C/BMT Pager:[phone]

## 2019-07-23 NOTE — PROGRESS NOTES
07/23/19 1100   Quick Adds   Type of Visit Initial Occupational Therapy Evaluation   Living Environment   Lives With alone   Living Arrangements condominium   Home Accessibility stairs within home   Number of Stairs, Within Home, Primary other (see comments)  (23)   Stair Railings, Within Home, Primary railing on left side (ascending)   Transportation Anticipated family or friend will provide   Living Environment Comment Pt lives in condo alone. Reports biggest difficulty is stairs that go upstairs (~12) and downstairs (~11) with railing. Reports having tub shower, does not drive. Reports no use of AD. Reports significant other who occasionally assists.   Self-Care   Usual Activity Tolerance good   Current Activity Tolerance fair   Regular Exercise No   Equipment Currently Used at Home none   Activity/Exercise/Self-Care Comment Pt reports enjoying golfing as able, had been wanting more formal exercise program due to new onset weakness.   Functional Level   Ambulation 0-->independent   Transferring 0-->independent   Toileting 0-->independent   Bathing 0-->independent   Dressing 0-->independent   Eating 0-->independent   Communication 0-->understands/communicates without difficulty   Swallowing 0-->swallows foods/liquids without difficulty   Cognition 1 - attention or memory deficits   Fall history within last six months yes   Number of times patient has fallen within last six months 1   Which of the above functional risks had a recent onset or change? ambulation;transferring;toileting;bathing;dressing;cognition;fall history   Prior Functional Level Comment Pt reports his significant other assists if needed but is independent. Reports increased short-term memory deficits.   General Information   Onset of Illness/Injury or Date of Surgery - Date 07/22/19   Referring Physician Darshana Griffith PA-C   Patient/Family Goals Statement To return to full independence   Additional Occupational Profile  Info/Pertinent History of Current Problem King Gonsalo Bruno is a 73 year old male with PMH HTN, GERD, CKD, DJD and metastatic penile cancer (lung mets) who presented to the ED after a fall down stairs and was found to have a right frontal lobe brain mass with surrounding edema.    Precautions/Limitations fall precautions   Weight-Bearing Status - LUE full weight-bearing   Weight-Bearing Status - RUE full weight-bearing   Weight-Bearing Status - LLE full weight-bearing   Weight-Bearing Status - RLE full weight-bearing   General Observations Pt seated upright upon arrival, presenting with delayed processing but agreeable to therapy   General Info Comments Ambulate with assist   Cognitive Status Examination   Orientation orientation to person, place and time   Level of Consciousness confused   Follows Commands (Cognition) delayed response/completion   Memory impaired   Attention Distractible during evaluation   Cognitive Comment Pt would benefit from further cognitive testing due to perceived confusion and short-term memory deficits. Delayed processing noted.   Visual Perception   Visual Perception Wears glasses   Sensory Examination   Sensory Quick Adds Other (describe)   Sensory Comments Pt reports intermittent tingling/numbness in UEs/LEs   Pain Assessment   Patient Currently in Pain No   Integumentary/Edema   Integumentary/Edema no deficits were identifed   Range of Motion (ROM)   ROM Quick Adds Other (describe)   ROM Comment LUE impaired, able to achieve ~85 degrees AROM against gravity, PROM able to achieve full range   Strength   Manual Muscle Testing Quick Adds Other   Strength Comments Significant L sided weakness, LUE with 3-/3+ out of 5   Hand Strength   Hand Strength Comments Significantly decreased hand grasp in L hand noted   Muscle Tone Assessment   Muscle Tone Quick Adds No deficits were identified   Coordination   Upper Extremity Coordination Left UE impaired   Upper Body Dressing   Level of  "Crane: Dress Upper Body stand-by assist   Lower Body Dressing   Level of Crane: Dress Lower Body stand-by assist   Eating/Self Feeding   Level of Crane: Eating minimum assist (75% patients effort)   Instrumental Activities of Daily Living (IADL)   Previous Responsibilities meal prep;housekeeping;laundry;medication management;finances;shopping   Activities of Daily Living Analysis   Impairments Contributing to Impaired Activities of Daily Living balance impaired;cognition impaired;coordination impaired;strength decreased;sensation decreased;ROM decreased   General Therapy Interventions   Planned Therapy Interventions ADL retraining;IADL retraining;cognition;fine motor coordination training;neuromuscular re-education;motor coordination training;ROM;strengthening;transfer training;home program guidelines;progressive activity/exercise;risk factor education   Clinical Impression   Criteria for Skilled Therapeutic Interventions Met yes, treatment indicated   OT Diagnosis decreased independence in ADLs/IADLs   Influenced by the following impairments decreased strength/activity tolerance, fine motor coordination deficits, cognitive deficits, decreased AROM   Assessment of Occupational Performance 5 or more Performance Deficits   Identified Performance Deficits dressing, bathing, toileting, household management, meal prep   Clinical Decision Making (Complexity) Low complexity   Therapy Frequency 6x/week   Predicted Duration of Therapy Intervention (days/wks) 2 weeks   Anticipated Equipment Needs at Discharge   (TBD)   Anticipated Discharge Disposition Transitional Care Facility   Risks and Benefits of Treatment have been explained. Yes   Patient, Family & other staff in agreement with plan of care Yes   [facility] AM-PAC TM \"6 Clicks\"   2016, Trustees of [facility], under license to [employer].  All rights reserved.   6 Clicks Short Forms Daily Activity Inpatient Short Form   [place] [facility] AM-PAC  \"6 Clicks\" Daily Activity Inpatient Short Form   1. Putting on and taking off regular lower body clothing? 3 - A Little   2. Bathing (including washing, rinsing, drying)? 3 - A Little   3. Toileting, which includes using toilet, bedpan or urinal? 3 - A Little   4. Putting on and taking off regular upper body clothing? 3 - A Little   5. Taking care of personal grooming such as brushing teeth? 3 - A Little   6. Eating meals? 3 - A Little   Daily Activity Raw Score (Score out of 24.Lower scores equate to lower levels of function) 18   Total Evaluation Time   Total Evaluation Time (Minutes) 6     "

## 2019-07-23 NOTE — PLAN OF CARE
AVSS on room air. Pt denies pain or nausea. Dressing to left knee changed, applied dressing to left hand wound as well, both covered in primapore. PIV was dislodged and replaced this afternoon. Pt is up with assist of 1 to bathroom. Today, we've had him sit on the toilet every 2 hours and he has not been incontinent. Did use purewick last night overnight. NS infusing at 50 ml/hr. Providers discussing the possibility of surgically removing right frontal lobe mass possibly later this week. Pt has left sided weakness but is able to ambulate to bathroom with assist of 1 and gait belt. Oriented x4.    Addendum: pt and his significant other have decided that they would like to proceed with surgery.    Problem: Adult Inpatient Plan of Care  Goal: Plan of Care Review  7/23/2019 1737 by Kelsey Munson RN  Outcome: Improving  7/23/2019 0557 by Samira Ellis RN  Outcome: No Change  Goal: Patient-Specific Goal (Individualization)  7/23/2019 1737 by Kelsey Munson RN  Outcome: Improving  7/23/2019 0557 by Samira Ellis RN  Outcome: No Change  Goal: Absence of Hospital-Acquired Illness or Injury  Outcome: Improving  Goal: Optimal Comfort and Wellbeing  7/23/2019 1737 by Kelsey Munson RN  Outcome: Improving  7/23/2019 0557 by Samira Ellis RN  Outcome: No Change  Goal: Readiness for Transition of Care  Outcome: Improving  Goal: Rounds/Family Conference  Outcome: Improving     Problem: Fall Injury Risk  Goal: Absence of Fall and Fall-Related Injury  7/23/2019 1737 by Kelsey Munson RN  Outcome: Improving  7/23/2019 0557 by Samira Ellis RN  Outcome: No Change

## 2019-07-23 NOTE — PLAN OF CARE
Discharge Planner PT  PT 5C  Patient plan for discharge: Open to TCU if needed  Current status: PT orders for evaluation and treatment acknowledged. PT evaluation completed and treatment initiated. Left LE strength 4-/5 for hip flexors and extensors, 4/5 for abductors/adductors, 4+/5 for knee and ankle. Pt transfers supine <> sit with minimal assist of 1, sit to stand with SBA of 1. Pt ambulates with SBA of 1 without an assistive device. Altered gait pattern noted. Pt ambulated ~ 475' with SBA of 1. No loss of balance occurred during ambulation. Pt completed balance exercises and demonstrated difficulty moving left LE during sideward movement.  Barriers to return to prior living situation: Medical condition, decreased functional balance, ambulates with assist, recent fall down stairs, lives alone, left LE/UE weakness  Recommendations for discharge: TCU  Rationale for recommendations: Continued rehab needed to progress with strengthening, transfer training, stairs, ambulation to prepare pt for safe return home.       Entered by: Jermaine Nunez 07/23/2019 10:44 AM

## 2019-07-23 NOTE — PLAN OF CARE
"/78   Pulse 68   Temp 98  F (36.7  C) (Oral)   Resp 18   Ht 1.85 m (6' 0.84\")   Wt 58.7 kg (129 lb 6.4 oz)   SpO2 98%   BMI 17.15 kg/m    Patient slept off and on through the night  Neuro MD came and discussed the results of MRI and patient options.   No complaint of pain  Pur wick working well.   VSS  Continue with current POC  Problem: Adult Inpatient Plan of Care  Goal: Plan of Care Review  7/23/2019 0557 by Samira Ellis RN  Outcome: No Change     Problem: Adult Inpatient Plan of Care  Goal: Patient-Specific Goal (Individualization)  7/23/2019 0557 by Samira Ellis RN  Outcome: No Change     Problem: Adult Inpatient Plan of Care  Goal: Optimal Comfort and Wellbeing  7/23/2019 0557 by Samira Ellis RN  Outcome: No Change     Problem: Fall Injury Risk  Goal: Absence of Fall and Fall-Related Injury  7/23/2019 0557 by Samira Ellis RN  Outcome: No Change     "

## 2019-07-23 NOTE — PLAN OF CARE
Pt denied pain and denied nausea.  Ate well.   Up to void frequently (was also incontinent of urine) until pure wick was placed.  So far, it is working well and pt is getting some much needed sleep.  Pt is weak on his left side and requires help to bathroom.  Pt assures me that he will call to get out of bed.  Footboard is off of bed to accommodate pt's height (bed extender not available).  MRI of head done this evening.  Left leg - 2 skin tears from fall at home - primapore applied to each.  Pt had stools x 2.    Problem: Adult Inpatient Plan of Care  Goal: Plan of Care Review  Outcome: No Change

## 2019-07-23 NOTE — PROGRESS NOTES
07/23/19 0913   Quick Adds   Type of Visit Initial PT Evaluation   Living Environment   Lives With alone   Living Arrangements condominium   Home Accessibility stairs within home   Number of Stairs, Within Home, Primary other (see comments)  (23)   Stair Railings, Within Home, Primary railing on left side (ascending)   Transportation Anticipated family or friend will provide   Living Environment Comment 3-story condo, tub/shower   Self-Care   Usual Activity Tolerance excellent   Current Activity Tolerance fair   Regular Exercise No   Equipment Currently Used at Home none   Activity/Exercise/Self-Care Comment Pt golfs 2x/wk   Functional Level Prior   Ambulation 0-->independent   Transferring 0-->independent   Toileting 0-->independent   Bathing 0-->independent   Communication 0-->understands/communicates without difficulty   Swallowing 0-->swallows foods/liquids without difficulty   Cognition 1 - attention or memory deficits   Fall history within last six months yes   Number of times patient has fallen within last six months 1   Which of the above functional risks had a recent onset or change? ambulation;transferring;toileting;bathing;dressing   Prior Functional Level Comment Pt has SO to assist    General Information   Onset of Illness/Injury or Date of Surgery - Date 07/22/19   Referring Physician Darshana Griffith PA-C   Patient/Family Goals Statement Unsure if he can return home alone   Pertinent History of Current Problem (include personal factors and/or comorbidities that impact the POC) King Gonsalo Bruno is a 73 year old male with PMH HTN, GERD, CKD, DJD and metastatic penile cancer (lung mets) who presented to the ED after a fall down stairs and was found to have a right frontal lobe brain mass with surrounding edema.    Precautions/Limitations fall precautions   General Observations Pt fell down 7 stairs; c-spine and brain cleared.   General Info Comments Activity: ambulate with assist   Cognitive  Status Examination   Orientation orientation to person, place and time   Level of Consciousness alert   Follows Commands and Answers Questions 100% of the time   Personal Safety and Judgment at risk behaviors demonstrated   Memory impaired   Cognitive Comment Pt reports short term memory problems    Pain Assessment   Patient Currently in Pain No   Integumentary/Edema   Integumentary/Edema no deficits were identifed   Posture    Posture Comments When standing or sitting, left upper trapezius is elevated and trunk is rotated to the left. Pt is able to self correct to neutral posture with verbal cues.   Range of Motion (ROM)   ROM Comment LE ROM is normal bilaterally   Strength   Strength Comments Right LE strength is 5/5. Left hip flexors and extensors 4-/5, abductors/adductors 4/5, knee and ankle 4+/5. Left UE weakness is present and OT will follow-up.   Bed Mobility   Bed Mobility Comments SBA to minimal assist of 1 for repositionining.   Transfer Skills   Transfer Comments Pt transfers with SBA of 1 to guard balance. No loss of balance occurred; pt needed assist to move left LE in and out of bed.   Gait   Gait Comments Pt ambulated without an assistive device and with SBA of 1. Step-to gait noted on left. Pt ambulated ~ 475' without loss of balance. Pt had difficulty moving left LE sideward to the right. Pt able to walk backward without loss of balance.   Balance   Balance Comments Rhomberg test is negative.    Sensory Examination   Sensory Perception Comments Pt reports bilateral foot neuropathy related to chemotherapy. Reports tingling in both feet.   General Therapy Interventions   Planned Therapy Interventions balance training;gait training;strengthening;transfer training;risk factor education;progressive activity/exercise   Clinical Impression   Criteria for Skilled Therapeutic Intervention yes, treatment indicated   PT Diagnosis Left-sided weakness with impraired functional mobility   Influenced by the  "following impairments Left-sided weakness, metastatic penile cancer with lung mets, new right frontal lobe mass    Functional limitations due to impairments Needs assist with transfers, bed mobility, and ambulation   Clinical Presentation Evolving/Changing   Clinical Presentation Rationale Chart review   Clinical Decision Making (Complexity) Moderate complexity   Therapy Frequency Daily   Predicted Duration of Therapy Intervention (days/wks) 1 week   Anticipated Discharge Disposition Transitional Care Facility   Risk & Benefits of therapy have been explained Yes   Patient, Family & other staff in agreement with plan of care Yes   [facility] TM \"6 Clicks\"   2016, Trustees of [facility], under license to "[employer].  All rights reserved.   6 Clicks Short Forms Basic Mobility Inpatient Short Form   [facility] AM-PAC  \"6 Clicks\" V.2 Basic Mobility Inpatient Short Form   1. Turning from your back to your side while in a flat bed without using bedrails? 4 - None   2. Moving from lying on your back to sitting on the side of a flat bed without using bedrails? 3 - A Little   3. Moving to and from a bed to a chair (including a wheelchair)? 3 - A Little   4. Standing up from a chair using your arms (e.g., wheelchair, or bedside chair)? 3 - A Little   5. To walk in hospital room? 3 - A Little   6. Climbing 3-5 steps with a railing? 3 - A Little   Basic Mobility Raw Score (Score out of 24.Lower scores equate to lower levels of function) 19   Total Evaluation Time   Total Evaluation Time (Minutes) 10     "

## 2019-07-23 NOTE — PROGRESS NOTES
Neurosurgery Progress Note    S: patient stated he felt confused and unable to fully assess risks/benefits of surgery. He asked for his girlfriend to be present for further discussion.     O:  Exam:  General: Awake;  Alert, In No Acute Distress  Pulm: Breathing Comfortably on room air  Mental status: Oriented x 3  Cranial Nerves: extraocular muscles intact. Slight left facial droop. No gross dysarthria. Shoulder shrug absent on left.   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  AIDAN Allen MD

## 2019-07-23 NOTE — PLAN OF CARE
Discharge Planner OT   Patient plan for discharge: Pt open to rehab.  Current status: With education for technique, pt SBA for LB dressing with pants, increased difficulty with socks due to poor fine motor coordination requiring min A. Increased time for UB dressing due to gross motor coordination deficits, but able to complete with SBA. Facilitated BUE theraband/foam block exercises for increased strength, pt presenting with delayed processing and difficulty visualizing/motor planning activity requiring VCs.  Barriers to return to prior living situation: L sided weakness, coordination deficits, delayed cognitive processing, impaired ADLs  Recommendations for discharge: TCU  Rationale for recommendations: Pt is currently below functional baseline for ADLs, transfers, and functional mobility, would benefit from continued therapy to address above deficits and maximize strength/independence in order to return to PLOF.         Entered by: Clarice Nava 07/23/2019 11:57 AM

## 2019-07-23 NOTE — PROGRESS NOTES
[facility], [place]    Hematology / Oncology Progress Note    Date of Service (when I saw the patient): 07/23/2019     Assessment & Plan   King Gonsalo Bruno is a 73 year old male with PMH HTN, GERD, CKD, DJD and metastatic penile cancer (lung mets) who presented to the ED after a fall down stairs and was found to have a right frontal lobe brain mass with surrounding edema.      #Brain mass  Has noticed worsening left facial droop and LUE weakness that has been progressing over the past week. Head CT in the ED showed no hemorrhage but right frontal lobe mets with surrounding edema and mass effect.  - NSG consulted, recs appreciated. Considering surgical intervention pending patient decision.  - MRI brain 7/22 with peripheral enhancing right frontal lobe mass with extensive adjacent edema (likely malignancy, less likely abscess). 3mm right to left midline shift.  - Continue Dex 4 mg q6h  - If no surgical intervention is recommended will consult Rad Onc     #Fall  Mechanical fall today. Dropped his slipper and tried to lean back to grab in on the stairs and fell down 7 stairs. Hit his head on the rail and the landing. Trauma w/u in the ED with head CT (see results above). CTA without any carotid stenosis. CT C spine without any traumatic changes. CXR without traumatic changes (overall stable appearing lung mets). AP pelvis negative.   - PT/OT consulted due to recent falls and left sided weakness. Recommending TCU.     #Metastatic penile cancer  Originally diagnosed in 2017, stage 2. Underwent 4 cycles of chemotherapy with cisplatin and gemzar, folled by radical penectomy, urethectomy, perineal urethostomy and bilateral inguinal node dissection 3/8/18. Plan was to proceed with XRT but he was then found to have pulmonary mets. He received Keytruda 8/18-2/28/18. Found to have progressive disease. Started on palliative docetaxel 3/14/19 and received cycle #6 on 7/5/19. He follows with Dr. Arceo.  He feels that he has been having more fatigue and side effects from chemotherapy recently. Not sure he wants to continue  - F/u as scheduled for repeat PET/CT and f/u with Dr. Arceo     #GERD  Persistent symptoms despite bid omeprazole  - Ranitidine 150 mg bid in addition to bid omeprazole. Another option could be carafate     #HTN  - Continue PTA Amlodipine     #CKD  Creatinine at baseline, 1.4-1.6     #Anemia  Chronic and slowly worsening. Likely multifactorial: chemotherapy, CKD, mild iron deficiency (did not tolerate PO iron), chronic disease  - Continue to monitor, transfuse for hgb <7     DVT Prophylaxis: Hold ppx lovenox for now pending NSG recs about possible surgical intervention  FEN: IVF NS @ 50 mL/hr. Regular diet.    Code Status: Full Code     Disposition: Expected discharge in 2-3 days pending surgical intervention.      Discussed with attending, Dr. Alexandria Vizcaino PA-C  Hematology/Oncology  Pager: [phone]    Interval History   Feeling slightly better today in both an emotional perspective and physical perspective (less weak on left side). Worked with PT/OT. Neurosurgery planning to discuss options for surgical intervention. Eating/drinking well. Afebrile. SO at bedside and supportive.    Physical Exam   Temp: 97.5  F (36.4  C) Temp src: Axillary BP: 124/74 Pulse: 67 Heart Rate: 72 Resp: 16 SpO2: 99 % O2 Device: None (Room air)    Vitals:    07/22/19 0810 07/22/19 1354   Weight: 65.8 kg (145 lb) 58.7 kg (129 lb 6.4 oz)     Vital Signs with Ranges  Temp:  [97.5  F (36.4  C)-98.5  F (36.9  C)] 97.5  F (36.4  C)  Pulse:  [67-77] 67  Heart Rate:  [64-83] 72  Resp:  [16-18] 16  BP: (120-152)/(71-84) 124/74  SpO2:  [96 %-100 %] 99 %  I/O last 3 completed shifts:  In: 1990 [P.O.:365; I.V.:1625]  Out: 750 [Urine:750]    Constitutional: Pleasant male seen sitting up in chair. No apparent distress, and appears stated age.  Eyes: Lids and lashes normal, sclera clear, conjunctiva normal.  ENT:  Normocephalic, oral cavity without erythema or exudates, gums normal and good dentition.   Respiratory: No increased work of breathing, good air exchange, clear to auscultation bilaterally, no crackles or wheezing.  Cardiovascular: Regular rate and rhythm, normal S1 and S2, and no murmur noted.  GI: No masses or scars. +BS. Soft. No tenderness on palpation.  Skin: Limited bruising, no bleeding, no redness, no warmth, no rashes, no lesions, no jaundice.  Extremities: There is no redness, warmth, or swelling of the joints. No lower extremity edema. No cyanosis.  Neurologic: Awake, alert, oriented to name, place and time. LUE and LLE weakness.  Vascular access: PIV    Medications     - MEDICATION INSTRUCTIONS -       sodium chloride 125 mL/hr at 07/22/19 1928       amLODIPine  10 mg Oral Daily     dexamethasone  4 mg Intravenous Q6H     fluticasone  2 spray Both Nostrils Daily     omeprazole  20 mg Oral BID     polyethylene glycol  17 g Oral Daily     ranitidine  150 mg Oral BID     sodium chloride (PF)  3 mL Intracatheter Q8H       Data   ROUTINE IP LABS (Last four results)  BMP  Recent Labs   Lab 07/23/19  0405 07/22/19  0818    136   POTASSIUM 4.3 3.5   CHLORIDE 104 100   SAADIA 9.4 9.7   CO2 20 27   BUN 13 13   CR 1.07 1.41*   * 91     CBC  Recent Labs   Lab 07/23/19  0537 07/23/19  0405 07/22/19  0818   WBC 7.9 Canceled, Test credited 5.8   RBC 3.72* Canceled, Test credited 3.47*   HGB 8.6* Canceled, Test credited 8.1*   HCT 28.9* Canceled, Test credited 27.4*   MCV 78 Canceled, Test credited 79   MCH 23.1* Canceled, Test credited 23.3*   MCHC 29.8* Canceled, Test credited 29.6*   RDW 18.6* Canceled, Test credited 18.5*    Canceled, Test credited 401     INR  Recent Labs   Lab 07/22/19  0818   INR 1.04

## 2019-07-24 NOTE — PLAN OF CARE
AVSS on room air. Pt denies pain or nausea. IVF stopped today. Up with SBA to bathroom. Pt has been doing well with scheduled toileting, no episodes of incontinence today. Pt would like to continue this overnight as it has been working well and he doesn't want to be wet overnight. Pt continues with left sided weakness but appears to be moving better than yesterday and is determined to continue PT/OT recommendations for strengthening. Dressing changed to left knee. Pt would like ativan at . Plan for neurosurgery on Saturday to resect right frontal lobe mass.     Problem: Adult Inpatient Plan of Care  Goal: Plan of Care Review  7/24/2019 1829 by Kelsey Munson RN  Outcome: Improving  7/24/2019 0658 by Kimo Stockton RN  Outcome: No Change  Goal: Patient-Specific Goal (Individualization)  7/24/2019 1829 by Kelsey Munson RN  Outcome: Improving  7/24/2019 0658 by Kimo Stockton RN  Outcome: No Change  Goal: Absence of Hospital-Acquired Illness or Injury  7/24/2019 1829 by Kelsey Munson RN  Outcome: Improving  7/24/2019 0658 by Kimo Stockton RN  Outcome: No Change  Goal: Optimal Comfort and Wellbeing  7/24/2019 1829 by Kelsey Munson RN  Outcome: Improving  7/24/2019 0658 by Kimo Stockton RN  Outcome: No Change  Goal: Readiness for Transition of Care  7/24/2019 1829 by Kelsey Munson RN  Outcome: Improving  7/24/2019 0658 by Kimo Stockton RN  Outcome: No Change  Goal: Rounds/Family Conference  7/24/2019 1829 by Kelsey Munson RN  Outcome: Improving  7/24/2019 0658 by Kimo Stockton RN  Outcome: No Change     Problem: Fall Injury Risk  Goal: Absence of Fall and Fall-Related Injury  7/24/2019 1829 by Kelsey Munson RN  Outcome: Improving  7/24/2019 0658 by Kimo Stockton RN  Outcome: No Change

## 2019-07-24 NOTE — PROGRESS NOTES
S: after discussion with patient, partner, and primary team, patient would like to proceed with planning for surgery    O:  Exam:  General: Awake;  Alert, In No Acute Distress  Pulm: Breathing Comfortably on room air  Mental status: Oriented x 3  Cra  nial Nerves: extraocular muscles intact. Slight left facial droop. No gross dysarthria. Shoulder shrug absent on left.   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 Surgical date TBD    Jaskaran Jean Baptiste MD  Neurosurgery Resident PGY2    I have reviewed the history. I have seen and examined the patient myself and agree with the assessment and plan above.   AIDAN Allen MD

## 2019-07-24 NOTE — PLAN OF CARE
"/76 (BP Location: Right arm)   Pulse 69   Temp 98.5  F (36.9  C) (Oral)   Resp 16   Ht 1.85 m (6' 0.84\")   Wt 58.7 kg (129 lb 6.4 oz)   SpO2 100%   BMI 17.15 kg/m  /76 (BP Location: Right arm)   Pulse 69   Temp 98.5  F (36.9  C) (Oral)   Resp 16   Ht 1.85 m (6' 0.84\")   Wt 58.7 kg (129 lb 6.4 oz)   SpO2 100%   BMI 17.15 kg/m    Pt's AVSS,  Al&OX4 but forgetful at time. Pt did denied pain all shift. Pt continue to be incontinent of urine all shift. Left PIV intact with MIVF at 50ml/h. Morning lab stable with K+4.6, WBC 12, Hgb 8.4 and Plt 167. Bed alarm is on. Keep monitoring pt as ordered and notify MD with any new changes.   Problem: Adult Inpatient Plan of Care  Goal: Plan of Care Review  7/24/2019 0658 by Kimo Stockton RN  Outcome: No Change     Problem: Adult Inpatient Plan of Care  Goal: Patient-Specific Goal (Individualization)  7/24/2019 0658 by Kimo Stockton RN  Outcome: No Change     Problem: Adult Inpatient Plan of Care  Goal: Absence of Hospital-Acquired Illness or Injury  7/24/2019 0658 by Kimo Stockton RN  Outcome: No Change     Problem: Adult Inpatient Plan of Care  Goal: Optimal Comfort and Wellbeing  7/24/2019 0658 by Kimo Stockton RN  Outcome: No Change     Problem: Adult Inpatient Plan of Care  Goal: Readiness for Transition of Care  7/24/2019 0658 by Kimo Stockton RN  Outcome: No Change     Problem: Adult Inpatient Plan of Care  Goal: Rounds/Family Conference  7/24/2019 0658 by Kimo Stockton RN  Outcome: No Change     Problem: Fall Injury Risk  Goal: Absence of Fall and Fall-Related Injury  7/24/2019 0658 by Kimo Stockton RN  Outcome: No Change     "

## 2019-07-24 NOTE — PROVIDER NOTIFICATION
Spoke with H/O moonlighter regarding pt surgery, pt and writer were told surgery would be on Saturday but it is on schedule for tomorrow. Also, decadron order ended, moonlighter re-ordered it.    Paged Neurosurgery resident, plan for surgery tomorrow at 1145, NPO at midnight, pre-op orders to come.

## 2019-07-24 NOTE — PROGRESS NOTES
[facility], [place]    Hematology / Oncology Progress Note    Date of Service (when I saw the patient): 07/24/2019     Assessment & Plan   King Gonsalo Bruno is a 73 year old male with PMH HTN, GERD, CKD, DJD and metastatic penile cancer (lung mets) who presented to the ED after a fall down stairs and was found to have a right frontal lobe brain mass with surrounding edema.      #Metastatic right frontal lobe mass  Has noticed worsening left facial droop and LUE weakness that has been progressing over the past week. Head CT in the ED showed no hemorrhage but right frontal lobe mets with surrounding edema and mass effect.  - NSG consulted, recs appreciated. Planning surgical intervention, awaiting update on OR time.  - MRI brain 7/22 with peripheral enhancing right frontal lobe mass with extensive adjacent edema (likely malignancy, less likely abscess). 3mm right to left midline shift.  - Continue Dex 4 mg q6h     #Fall  Mechanical fall today. Dropped his slipper and tried to lean back to grab in on the stairs and fell down 7 stairs. Hit his head on the rail and the landing. Trauma w/u in the ED with head CT (see results above). CTA without any carotid stenosis. CT C spine without any traumatic changes. CXR without traumatic changes (overall stable appearing lung mets). AP pelvis negative.   - PT/OT consulted due to recent falls and left sided weakness. Recommending TCU.     #Metastatic penile cancer  Originally diagnosed in 2017, stage 2. Underwent 4 cycles of chemotherapy with cisplatin and gemzar, folled by radical penectomy, urethectomy, perineal urethostomy and bilateral inguinal node dissection 3/8/18. Plan was to proceed with XRT but he was then found to have pulmonary mets. He received Keytruda 8/18-2/28/18. Found to have progressive disease. Started on palliative docetaxel 3/14/19 and received cycle #6 on 7/5/19. He follows with Dr. Arceo. He feels that he has been having more  fatigue and side effects from chemotherapy recently. Not sure he wants to continue  - F/u as scheduled for repeat PET/CT and f/u with Dr. Arceo     #GERD  Persistent symptoms despite bid omeprazole  - Ranitidine 150 mg bid in addition to bid omeprazole. Another option could be carafate     #HTN  - Continue PTA Amlodipine     #CKD  Creatinine at baseline, 1.4-1.6     #Anemia  Chronic and slowly worsening. Likely multifactorial: chemotherapy, CKD, mild iron deficiency (did not tolerate PO iron), chronic disease  - Continue to monitor, transfuse for hgb <7     DVT Prophylaxis: Hold ppx lovenox for now pending [facility] recs about possible surgical intervention  FEN: Regular diet.    Code Status: Full Code     Disposition: Expected discharge pending surgical intervention. Will need TCU placement.     Discussed with attending, Dr. Alexandria Vizcaino PA-C  Hematology/Oncology  Pager: [phone]    Interval History   Feeling slightly better today in a physical perspective (less weak on left side). Working with PT/OT. Eating/drinking well. Afebrile. Urinating frequently. Patient stated that after further discussion, he would like to pursue surgical intervention.    Physical Exam   Temp: 97.7  F (36.5  C) Temp src: Oral BP: 137/73 Pulse: 69 Heart Rate: 66 Resp: 16 SpO2: 99 % O2 Device: None (Room air)    Vitals:    07/22/19 0810 07/22/19 1354 07/24/19 0752   Weight: 65.8 kg (145 lb) 58.7 kg (129 lb 6.4 oz) 59.2 kg (130 lb 9.6 oz)     Vital Signs with Ranges  Temp:  [97.5  F (36.4  C)-98.5  F (36.9  C)] 97.7  F (36.5  C)  Pulse:  [61-69] 69  Heart Rate:  [63-72] 66  Resp:  [16-18] 16  BP: (121-137)/(65-77) 137/73  SpO2:  [99 %-100 %] 99 %  I/O last 3 completed shifts:  In: 2079 [P.O.:460; I.V.:1619]  Out: 2750 [Urine:2750]    Constitutional: Pleasant male seen sitting up in chair. No apparent distress, and appears stated age.  Eyes: Lids and lashes normal, sclera clear, conjunctiva normal.  ENT: Normocephalic, oral  cavity without erythema or exudates, gums normal and good dentition.   Respiratory: No increased work of breathing, good air exchange, clear to auscultation bilaterally, no crackles or wheezing.  Cardiovascular: Regular rate and rhythm, normal S1 and S2, and no murmur noted.  GI: No masses or scars. +BS. Soft. No tenderness on palpation.  Skin: Limited bruising, no bleeding, no redness, no warmth, no rashes, no lesions, no jaundice.  Extremities: There is no redness, warmth, or swelling of the joints. No lower extremity edema. No cyanosis.  Neurologic: Awake, alert, oriented to name, place and time. LUE and LLE weakness.  Vascular access: PIV    Medications     - MEDICATION INSTRUCTIONS -         amLODIPine  10 mg Oral Daily     dexamethasone  4 mg Intravenous Q6H     fluticasone  2 spray Both Nostrils Daily     omeprazole  20 mg Oral BID     polyethylene glycol  17 g Oral Daily     ranitidine  150 mg Oral BID     sodium chloride (PF)  3 mL Intracatheter Q8H       Data   ROUTINE IP LABS (Last four results)  BMP  Recent Labs   Lab 07/24/19  0356 07/23/19  0405 07/22/19  0818    134 136   POTASSIUM 4.6 4.3 3.5   CHLORIDE 102 104 100   SAADIA 9.7 9.4 9.7   CO2 23 20 27   BUN 15 13 13   CR 0.97 1.07 1.41*   * 138* 91     CBC  Recent Labs   Lab 07/24/19  0356 07/23/19  0537 07/23/19  0405 07/22/19  0818   WBC 12.1* 7.9 Canceled, Test credited 5.8   RBC 3.59* 3.72* Canceled, Test credited 3.47*   HGB 8.4* 8.6* Canceled, Test credited 8.1*   HCT 26.9* 28.9* Canceled, Test credited 27.4*   MCV 75* 78 Canceled, Test credited 79   MCH 23.4* 23.1* Canceled, Test credited 23.3*   MCHC 31.2* 29.8* Canceled, Test credited 29.6*   RDW 18.5* 18.6* Canceled, Test credited 18.5*    387 Canceled, Test credited 401     INR  Recent Labs   Lab 07/22/19  0818   INR 1.04

## 2019-07-24 NOTE — PLAN OF CARE
PT 5C: Up with CGA + gait belt + hands on gait belt. PT following 6x/week.     Discharge Planner PT   Patient plan for discharge: Open to rehab if needed  Current status: Engaged pt in sit <> stand transfers from multiple surface heights at [facility], gait without AD at [facility] for ~450ft total with intermittent lateral sway, mod A for dressing, dynamic balance tasks in hallway at [facility], ascending/descending 3 steps x 4 reps at [facility] with poor sense of foot placement, and B LE strengthening exercises.   Barriers to return to prior living situation: medical status, lives alone (SO does not live with patient), cognition, planned surgical intervention this Saturday  Recommendations for discharge: TCU  Rationale for recommendations: Pt currently below his baseline for mobility. He is at increased risk for falls and injury if he were to return home at this time. He is limited by impairments to sensation, strength, balance, and endurance.        Entered by: Evy Brizuela 07/24/2019 1:22 PM

## 2019-07-24 NOTE — PLAN OF CARE
Discharge Planner OT   Patient plan for discharge: did not discuss  Current status: Close CGA for in rm amb ~20ft with no AE, CGA for toilet transfer and day cares, stood at sink for ~15min to complete standing g/h,  Intermittent VC for hand placement and to turn off the faucet after use, used BUE for LB dressing and standing g/h with intermittent VC to use LUE, completed MoCA 8.1 with score of 16/30 indicating mild-moderate cog deficit with pt experiencing the most difficulty on visuospatial/executive function portion and delayed recall  Barriers to return to prior living situation: medical status, cognition, balance deficits, lives alone, LUE deficits  Recommendations for discharge: TCU  Rationale for recommendations: Pt lives alone and will benefit from continued skilled therapy to progress IND with ADL/IADLs       Entered by: Vilma Lynch 07/24/2019 4:45 PM

## 2019-07-25 NOTE — ANESTHESIA POSTPROCEDURE EVALUATION
Anesthesia POST Procedure Evaluation    Patient: King Gonsalo Bruno   MRN:     [id number] Gender:   male   Age:    73 year old :      1945        Preoperative Diagnosis: Brain Mass   Procedure(s):  Stealth Assisted Right Craniotomy And Tumor Resection   Postop Comments: No value filed.       Anesthesia Type:  Not documented  General    Reportable Event: NO     PAIN: Uncomplicated   Sign Out status: Comfortable, Well controlled pain     PONV: No PONV   Sign Out status:  No Nausea or Vomiting     Neuro/Psych: Uneventful perioperative course   Sign Out Status: Preoperative baseline; Age appropriate mentation     Airway/Resp.: Uneventful perioperative course   Sign Out Status: Non labored breathing, age appropriate RR; Resp. Status within EXPECTED Parameters     CV: Uneventful perioperative course   Sign Out status: Appropriate BP and perfusion indices; Appropriate HR/Rhythm     Disposition:   Sign Out in:  ICU  Disposition:  ICU  Recovery Course: Recovery in ICU  Follow-Up: Not required           Last Anesthesia Record Vitals:  CRNA VITALS  2019 1715 - 2019 1814      2019             EKG:  Sinus bradycardia          Last PACU Vitals:  Vitals Value Taken Time   /83 2019  6:10 PM   Temp 35.9  C (96.7  F) 2019  6:00 PM   Pulse 80 2019  6:10 PM   Resp 12 2019  6:00 PM   SpO2 100 % 2019  6:13 PM   Temp src     NIBP     Pulse     SpO2     Resp     Temp     Ht Rate     Temp 2     Vitals shown include unvalidated device data.      Electronically Signed By: Archana Mccann MD, 2019, 6:14 PM

## 2019-07-25 NOTE — PROGRESS NOTES
S: ready for surgery     O:  Exam:  General: Awake;  Alert, In No Acute Distress  Pulm: Breathing Comfortably on room air  Mental status: Oriented x 3  Cranial Nerves: extraocular muscles intact. left facial droop. No gross dysarthria. Shoulder shrug absent on left.   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 Awaiting surgery     Recommendations:   Decadron 4mg q6 hrs  Will plan for surgical resection of R frontal brain mass. Surgical date 7/25/19  Patient to be Neurosurgery primary patient post-operatively for 24h; if no events postoperatively-> patient will return to oncology primary     Jaskaran Jean Baptiste MD  Neurosurgery Resident PGY2    I have reviewed the history. I have seen and examined the patient myself and agree with the assessment and plan above.  AIDAN Allen MD

## 2019-07-25 NOTE — ANESTHESIA PROCEDURE NOTES
Arterial Line Procedure Note  Staff:     Anesthesiologist:  Max Leal MD  Location: In OR After Induction  Procedure Start/Stop Times:     patient identified, IV checked, site marked, risks and benefits discussed, informed consent, monitors and equipment checked, pre-op evaluation and at physician/surgeon's request      Correct Patient: Yes      Correct Position: Yes      Correct Site: Yes      Correct Procedure: Yes      Correct Laterality:  Yes    Site Marked:  Yes  Line Placement:     Procedure:  Arterial Line    Insertion Site:  Brachial    Insertion laterality:  Left    Skin Prep: Chloraprep      Patient Prep: patient draped, mask, sterile gloves, hat and hand hygiene      Local skin infiltration:  None    Ultrasound Guided?: Yes      Artery evaluated via ultrasound confirming patency.   Using realtime imaging, the artery was punctured and the needle was observed entering the artery.      A permanent image is NOT entered into the patient's record.      Catheter size:  20 gauge, 12 cm    Dressing:  Tegaderm    Complications:  Other (See comment)    Arterial waveform: Yes      IBP within 10% of NIBP: Yes    Assessment/Narrative:      Radial attempted x2, unable to advance guidewire.  Brachial attempted, wire passed, swelling noted.  Pressure held, US brought to OR.  Brachial artery accessed second time successfully with US, pressure dressing applied to wrist.

## 2019-07-25 NOTE — PROGRESS NOTES
[facility], [place]    Hematology / Oncology Progress Note    Date of Service (when I saw the patient): 07/25/2019     Assessment & Plan   King Gonsalo Bruno is a 73 year old male with PMH HTN, GERD, CKD, DJD and metastatic penile cancer (lung mets) who presented to the ED after a fall down stairs and was found to have a right frontal lobe brain mass with surrounding edema.      #Metastatic right frontal lobe mass  Has noticed worsening left facial droop and LUE weakness that has been progressing over the past week. Head CT in the ED showed no hemorrhage but right frontal lobe mets with surrounding edema and mass effect.  - NSG consulted, recs appreciated. Taking to OR 7/25 for resection, Neurosurgery will be primary team post-op for at least 24 hours.  - MRI brain 7/22 with peripheral enhancing right frontal lobe mass with extensive adjacent edema (likely malignancy, less likely abscess). 3mm right to left midline shift.  - Continue Dex 4 mg q6h.     #Fall  Mechanical fall today. Dropped his slipper and tried to lean back to grab in on the stairs and fell down 7 stairs. Hit his head on the rail and the landing. Trauma w/u in the ED with head CT (see results above). CTA without any carotid stenosis. CT C spine without any traumatic changes. CXR without traumatic changes (overall stable appearing lung mets). AP pelvis negative.   - PT/OT consulted due to recent falls and left sided weakness. Recommending TCU.     #Metastatic penile cancer  Originally diagnosed in 2017, stage 2. Underwent 4 cycles of chemotherapy with cisplatin and gemzar, folled by radical penectomy, urethectomy, perineal urethostomy and bilateral inguinal node dissection 3/8/18. Plan was to proceed with XRT but he was then found to have pulmonary mets. He received Keytruda 8/18-2/28/18. Found to have progressive disease. Started on palliative docetaxel 3/14/19 and received cycle #6 on 7/5/19. He follows with Dr. Arceo. He  feels that he has been having more fatigue and side effects from chemotherapy recently. Not sure he wants to continue  - F/u as scheduled for repeat PET/CT and f/u with Dr. Arceo     #GERD  Persistent symptoms despite bid omeprazole  - Ranitidine 150 mg bid in addition to bid omeprazole. Another option could be carafate     #HTN  - Continue PTA Amlodipine     #CKD  Creatinine at baseline, 1.4-1.6    #Hyponatremia  Na 130 7/25. NSG started 2% saline at 50 mL/hr. Defer to their team with transfer to their service.     #Anemia  Chronic and slowly worsening. Likely multifactorial: chemotherapy, CKD, mild iron deficiency (did not tolerate PO iron), chronic disease  - Continue to monitor, transfuse for hgb <7     DVT Prophylaxis: Hold ppx lovenox for now pending NSG recs about possible surgical intervention  FEN: Regular diet.    Code Status: Full Code     Disposition: Transfer to Neurosurgery service x 24 hours post op. Expected discharge pending surgical intervention. Will need TCU placement.     Discussed with attending, Dr. Abbie Vizcaino PA-C  Hematology/Oncology  Pager: [phone]    Interval History   Feeling slightly better today in a physical perspective (less weak on left side). Working with PT/OT. Eating/drinking well. Afebrile. Urinating frequently. Patient understandably nervous about surgery.     Physical Exam   Temp: 97.8  F (36.6  C) Temp src: Oral BP: 113/76 Pulse: (!) 20 Heart Rate: 71 Resp: 20 SpO2: 100 % O2 Device: None (Room air)    Vitals:    07/22/19 1354 07/24/19 0752 07/25/19 0941   Weight: 58.7 kg (129 lb 6.4 oz) 59.2 kg (130 lb 9.6 oz) 59.2 kg (130 lb 8.2 oz)     Vital Signs with Ranges  Temp:  [97.6  F (36.4  C)-98.4  F (36.9  C)] 97.8  F (36.6  C)  Pulse:  [20-72] 20  Heart Rate:  [61-79] 71  Resp:  [16-20] 20  BP: (113-145)/(76-91) 113/76  SpO2:  [97 %-100 %] 100 %  I/O last 3 completed shifts:  In: 1400 [P.O.:1250; I.V.:150]  Out: 2150 [Urine:2150]    Constitutional: Pleasant  male seen sitting up in chair. No apparent distress, and appears stated age.  Eyes: Lids and lashes normal, sclera clear, conjunctiva normal.  ENT: Normocephalic, oral cavity without erythema or exudates, gums normal and good dentition.   Respiratory: No increased work of breathing, good air exchange, clear to auscultation bilaterally, no crackles or wheezing.  Cardiovascular: Regular rate and rhythm, normal S1 and S2, and no murmur noted.  GI: No masses or scars. +BS. Soft. No tenderness on palpation.  Skin: Limited bruising, no bleeding, no redness, no warmth, no rashes, no lesions, no jaundice.  Extremities: There is no redness, warmth, or swelling of the joints. No lower extremity edema. No cyanosis.  Neurologic: Awake, alert, oriented to name, place and time. LUE and LLE weakness.  Vascular access: PIV    Medications     2% sodium chloride infusion 50 mL/hr at 07/25/19 0657     - MEDICATION INSTRUCTIONS -         [Auto Hold] amLODIPine  10 mg Oral Daily     ceFAZolin  1 g Intravenous See Admin Instructions     ceFAZolin  2 g Intravenous Pre-Op/Pre-procedure x 1 dose     [Auto Hold] dexamethasone  4 mg Intravenous Q6H     [Auto Hold] fluticasone  2 spray Both Nostrils Daily     [Auto Hold] omeprazole  20 mg Oral BID     [Auto Hold] polyethylene glycol  17 g Oral Daily     [Auto Hold] ranitidine  150 mg Oral BID     [Auto Hold] sodium chloride (PF)  3 mL Intracatheter Q8H       Data   ROUTINE IP LABS (Last four results)  BMP  Recent Labs   Lab 07/25/19  0923 07/25/19  0029 07/24/19  0356 07/23/19  0405 07/22/19  0818   * 130* 134 134 136   POTASSIUM  --  4.6 4.6 4.3 3.5   CHLORIDE  --  101 102 104 100   SAADIA  --  9.6 9.7 9.4 9.7   CO2  --  24 23 20 27   BUN  --  21 15 13 13   CR  --  1.07 0.97 1.07 1.41*   GLC  --  130* 112* 138* 91     CBC  Recent Labs   Lab 07/25/19  0029 07/24/19  0356 07/23/19  0537 07/23/19  0405   WBC 16.0* 12.1* 7.9 Canceled, Test credited   RBC 3.59* 3.59* 3.72* Canceled, Test  credited   HGB 8.5* 8.4* 8.6* Canceled, Test credited   HCT 28.7* 26.9* 28.9* Canceled, Test credited   MCV 80 75* 78 Canceled, Test credited   MCH 23.7* 23.4* 23.1* Canceled, Test credited   MCHC 29.6* 31.2* 29.8* Canceled, Test credited   RDW 19.4* 18.5* 18.6* Canceled, Test credited    167 387 Canceled, Test credited     INR  Recent Labs   Lab 07/25/19  0029 07/22/19  0818   INR 0.97 1.04

## 2019-07-25 NOTE — PLAN OF CARE
VSS. Up with SBA. Patient unsteady at times with left sided weakness. Bed alarm on for safety overnight. Patient incontinent 4x overnight even with Q2hr scheduled voiding. Neurosurgery resident visited with patient last evening and consented and marked surgical site. CHG cloth bed bath completed around 0000. Chest x-ray, pre-surgery labs, urine sample, & EKG all completed. Will need CT completed prior to right craniotomy and right tumor resection. Continue to monitor.      Problem: Adult Inpatient Plan of Care  Goal: Plan of Care Review  7/25/2019 0517 by Sharon Rooney RN  Outcome: No Change     Problem: Adult Inpatient Plan of Care  Goal: Patient-Specific Goal (Individualization)  7/25/2019 0517 by Sharon Rooney RN  Outcome: No Change     Problem: Adult Inpatient Plan of Care  Goal: Absence of Hospital-Acquired Illness or Injury  7/25/2019 0517 by Sharon Rooney RN  Outcome: No Change     Problem: Adult Inpatient Plan of Care  Goal: Optimal Comfort and Wellbeing  7/25/2019 0517 by Sharon Rooney RN  Outcome: No Change     Problem: Adult Inpatient Plan of Care  Goal: Readiness for Transition of Care  7/25/2019 0517 by Sharon Rooney RN  Outcome: No Change     Problem: Adult Inpatient Plan of Care  Goal: Rounds/Family Conference  7/25/2019 0517 by Sharon Rooney RN  Outcome: No Change     Problem: Fall Injury Risk  Goal: Absence of Fall and Fall-Related Injury  7/25/2019 0517 by Sharon Rooney RN  Outcome: No Change

## 2019-07-25 NOTE — ANESTHESIA CARE TRANSFER NOTE
Patient: King Gonsalo Bruno    Procedure(s):  Stealth Assisted Right Craniotomy And Tumor Resection    Diagnosis: Brain Mass  Diagnosis Additional Information: No value filed.    Anesthesia Type:   No value filed.     Note:  Airway :Face Mask  Patient transferred to:PACU  Comments:   -on 6L O2 via FM, Pt Spont.  breathing, awake & alert, monitors placed, VSS, RN at bedside, no airway      Vitals: (Last set prior to Anesthesia Care Transfer)    CRNA VITALS  7/25/2019 1715 - 7/25/2019 1753      7/25/2019             Resp Rate (observed):  2  (Abnormal)     Resp Rate (set):  10    EKG:  Sinus bradycardia                Electronically Signed By: ISAURA Roca CRNA  July 25, 2019  5:53 PM

## 2019-07-25 NOTE — OR NURSING
Kim Vizcaino with Urology here to place a palomino catheter due to the challenges of his reconstructed penis and length of surgery.

## 2019-07-25 NOTE — ANESTHESIA PREPROCEDURE EVALUATION
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      GERD (gastroesophageal reflux disease)      Hypertension      Penile cancer (H)      PONV (postoperative nausea and vomiting)       Past Surgical History:   Procedure Laterality Date     CYSTOSCOPY, BIOPSY BLADDER, COMBINED N/A 2017    Procedure: COMBINED CYSTOSCOPY, BIOPSY BLADDER;  Cystoscopy, Suprapubic Tube Placement with Ultrasound Guidiance, Uretheral Dilation;  Surgeon: Muriel Haddad MD;  Location: [facility] OR     CYSTOSTOMY, INSERT TUBE SUPRAPUBIC, COMBINED N/A 2017    Procedure: COMBINED CYSTOSTOMY, INSERT TUBE SUPRAPUBIC;;  Surgeon: Muriel Haddad MD;  Location: [facility] OR     DISSECT LYMPH NODE INGUINAL N/A 3/8/2018    Procedure: DISSECT LYMPH NODE INGUINAL;  Flexible Cystoscopy, Bladder Biopsy, urethral biopsy and penile biopsy, Radical Penectomy and Urethrectomy, Perineal Urethrostomy, Bilateral Inguinal Lymphadenectomy; superficial lymph nodes and deep lymph nodes;  Surgeon: Muriel Haddad MD;  Location: [facility] OR     LARYNGOSCOPY      Direct laryngoscopy with the use of rigid endoscopy and takedown of left hypopharyngeal diverticula.      PENECTOMY N/A 3/8/2018    Procedure: PENECTOMY;  Flexible Cystoscopy, Bladder Biopsy, urethral biopsy and penile biopsy, Radical Penectomy and Urethrectomy, Perineal Urethrostomy, Bilateral Inguinal Lymphadenectomy; superficial lymph nodes and deep lymph nodes    ;  Surgeon: Muriel Haddad MD;  Location: [facility] OR     URETHROSTOMY PERINEUM N/A 3/8/2018    Procedure: URETHROSTOMY PERINEUM;  Flexible Cystoscopy,  Bladder Biopsy, urethral biopsy and penile biopsy, Radical Penectomy and Urethrectomy, Perineal Urethrostomy, Bilateral Inguinal Lymphadenectomy; superficial lymph nodes and deep lymph nodes;  Surgeon: Muriel Haddad MD;  Location: [facility] OR          Anesthesia Evaluation     .             ROS/MED HX    ENT/Pulmonary:  - neg pulmonary ROS     Neurologic: Comment: Hyponatremia, on 2% NS at 50mL/hr      Cardiovascular:     (+) hypertension----. : . . . :. . Previous cardiac testing date:results:date: results:ECG reviewed date:07/24/19 results:SR date: results:          METS/Exercise Tolerance:     Hematologic:     (+) Anemia, -      Musculoskeletal:   (+) arthritis,  -       GI/Hepatic:     (+) GERD       Renal/Genitourinary: Comment: Suprapubic catheter in place        Endo:  - neg endo ROS       Psychiatric:  - neg psychiatric ROS       Infectious Disease:  - neg infectious disease ROS       Malignancy:   (+) Malignancy History of Lung and Other  Other CA penile CA w lung, brain mets status post         Other:                         PHYSICAL EXAM:   Mental Status/Neuro: Age Appropriate; A/A/O   Airway: Facies: h/o mandibular fracture.  Mouth/Opening: Limited   Respiratory: Auscultation: CTAB     Resp. Rate: Normal     Resp. Effort: Normal      CV: Rhythm: Regular  Rate: Age appropriate  Heart: Normal Sounds  Edema: None   Comments:      Dental: Normal Dentition                LABS:  CBC:   Lab Results   Component Value Date    WBC 16.0 (H) 07/25/2019    WBC 12.1 (H) 07/24/2019    HGB 8.5 (L) 07/25/2019    HGB 8.4 (L) 07/24/2019    HCT 28.7 (L) 07/25/2019    HCT 26.9 (L) 07/24/2019     07/25/2019     07/24/2019     BMP:   Lab Results   Component Value Date     (L) 07/25/2019     (L) 07/25/2019    POTASSIUM 4.6 07/25/2019    POTASSIUM 4.6 07/24/2019    CHLORIDE 101 07/25/2019    CHLORIDE 102 07/24/2019    CO2 24 07/25/2019    CO2 23 07/24/2019    BUN 21 07/25/2019    BUN 15 07/24/2019     "CR 1.07 07/25/2019    CR 0.97 07/24/2019     (H) 07/25/2019     (H) 07/24/2019     COAGS:   Lab Results   Component Value Date    PTT 24 07/25/2019    INR 0.97 07/25/2019     POC:   Lab Results   Component Value Date    BGM 97 07/25/2019     OTHER:   Lab Results   Component Value Date    LACT 1.2 10/19/2017    SAADAI 9.6 07/25/2019    MAG 2.0 12/27/2018    ALBUMIN 3.0 (L) 07/25/2019    PROTTOTAL 6.7 (L) 07/25/2019    ALT 24 07/25/2019    AST 43 07/25/2019    ALKPHOS 74 07/25/2019    BILITOTAL 0.1 (L) 07/25/2019    LIPASE 254 10/19/2017    TSH 0.85 02/28/2019        Preop Vitals    BP Readings from Last 3 Encounters:   07/25/19 113/76   07/05/19 129/63   06/13/19 116/72    Pulse Readings from Last 3 Encounters:   07/24/19 (!) 20   07/05/19 93   06/13/19 85      Resp Readings from Last 3 Encounters:   07/25/19 20   07/05/19 18   06/13/19 16    SpO2 Readings from Last 3 Encounters:   07/25/19 100%   07/05/19 100%   06/13/19 100%      Temp Readings from Last 1 Encounters:   07/25/19 36.6  C (97.8  F) (Oral)    Ht Readings from Last 1 Encounters:   07/22/19 1.85 m (6' 0.84\")      Wt Readings from Last 1 Encounters:   07/25/19 59.2 kg (130 lb 8.2 oz)    Estimated body mass index is 17.3 kg/m  as calculated from the following:    Height as of this encounter: 1.85 m (6' 0.84\").    Weight as of this encounter: 59.2 kg (130 lb 8.2 oz).     LDA:  Peripheral IV 07/23/19 Left;Lateral Lower forearm (Active)   Site Assessment WDL 7/25/2019 10:00 AM   Line Status Infusing 7/25/2019 10:00 AM   Phlebitis Scale 0-->no symptoms 7/25/2019 10:00 AM   Infiltration Scale 0 7/25/2019 10:00 AM   Infiltration Site Treatment Method  None 7/25/2019  8:00 AM   Extravasation? No 7/25/2019  8:00 AM   Number of days: 2       Closed/Suction Drain 1 Left Groin Bulb 7 Romanian (Active)   Number of days: 504       Closed/Suction Drain 2 Right Groin Bulb 7 Romanian (Active)   Number of days: 504       Open Drain Perineal 16 Romanian Penrose drains, " perineal urethrostomy (Active)   Number of days: 504       Urethral Catheter Non-latex 16 fr (Active)   Catheter Care Done 7/25/2019 11:00 AM   Collection Container Standard;Patent 7/25/2019 11:00 AM   Securement Method Securing device (Describe) 7/25/2019 11:00 AM   Rationale for Continued Use Anesthesia 7/25/2019 11:00 AM   Number of days: 0       Suprapubic Catheter Non-latex 16 fr (Active)   Number of days: 693        Assessment:   ASA SCORE: 3    H&P: History and physical reviewed and following examination; no interval change.   Smoking Status:  Non-Smoker/Unknown   NPO Status: NPO Appropriate     Plan:   Anes. Type:  General   Pre-Medication: None   Induction:  IV (Standard)   Airway: ETT; Oral   Access/Monitoring: PIV; A-Line; 2nd PIV   Maintenance: Balanced     Blood products: Blood in Room; PRBC     Postop Plan:   Postop Pain: Opioids  Postop Sedation/Airway: Not planned  Disposition: Inpatient/Admit; ICU     PONV Management:   Adult Risk Factors:, H/o PONV or Motion Sickness, Non-Smoker, Postop Opioids   Prevention: Ondansetron, Dexamethasone     CONSENT: Direct conversation   Plan and risks discussed with: Patient   Blood Products: Consented (ALL Blood Products)       Comments for Plan/Consent:  Will receive scheduled dexamethasone during procedure                Max Leal MD  Staff Anesthesiologist  [phone]

## 2019-07-25 NOTE — PROGRESS NOTES
Urology Brief Note:    The patient is s/p radical penectomy with perineal urethrostomy with Dr. Haddad.  He has since developed metastatic penile cancer and is being taken for a neurosurgery procedure today for same.  Leal placement has been requested by the primary service.     PROCEDURE NOTE:  - Prepped with betadyne and draped  - Using sterile technique a 16Fr latex Leal was placed without difficulty.  There was immediate return of clear yellow urine.  Placed 10cc in the Leal balloon which was then gently seated at the bladder neck.    - Secured to right thigh tension free  - The patient tolerated without difficulty    A/P: metastatic penile cancer   - Leal cares per primary service.        SARITHA Montelongo [facility]  [phone]

## 2019-07-25 NOTE — PROGRESS NOTES
"Neurosurgery Pre-Op Evaluation    S: Feels his left hand is weakest. Denies headache. Expressed concern regarding Leal placement due to urethral reconstruction.    O:  /76   Pulse (!) 20   Temp 97.8  F (36.6  C) (Oral)   Resp 20   Ht 1.85 m (6' 0.84\")   Wt 59.2 kg (130 lb 8.2 oz)   SpO2 100%   BMI 17.30 kg/m    Gen: Awake, AOx3, anxious, appears malnourished   CN: PERRL, EOM intact, facial sensation intact in V1-3 distributions, slight left facial asymmetry, soft palate and uvula raise symmetrically, tongue protrudes along midline.  Strength: Delayed motor activation and relaxation with LUE>LLE. LUE: Finger abduction 4/5, deltoid 4/5, otherwise 5/5. RUE/RLE/LLE: 5/5 throughout.  Sensation: Intact to light touch bilaterally    A: Metastatic tumor to right frontal lobe    P:  Go forward with resection of tumor as planned  Request Leal placement by Urology      Adán Earl MD  Neurosurgery PGY4    "

## 2019-07-25 NOTE — BRIEF OP NOTE
[facility], [place]    Brief Operative Note    Pre-operative diagnosis: Brain Mass  Post-operative diagnosis * No post-op diagnosis entered *  Procedure: Procedure(s):  Stealth Assisted Right Craniotomy And Tumor Resection  Surgeon: Surgeon(s) and Role:     * Chandni Allen MD - Primary     * Seth Hurt MD - Assisting     * Adán Earl MD - Resident - Assisting     * Raul Fay MD - Resident - Assisting     * Leschke, John M, MD - Resident - Observing  Anesthesia: General   Estimated blood loss: 20 mL  Drains: None  Specimens:   ID Type Source Tests Collected by Time Destination   1 : Cyst fluid Fluid Other ANAEROBIC BACTERIAL CULTURE, FLUID CULTURE AEROBIC BACTERIAL, FUNGUS CULTURE, GRAM STAIN Chandni Allen MD 7/25/2019  2:26 PM    2 : sent fresh to [facility] Other (specify in comments) Brain OR DOCUMENTATION ONLY Chandni Allen MD 7/25/2019  3:19 PM    A : Right Frontal Brain Tumor Tissue Brain SURGICAL PATHOLOGY EXAM Chandni Allen MD 7/25/2019  2:28 PM    B : Right Frontal Tumor Tissue Brain SURGICAL PATHOLOGY EXAM Chandni Allen MD 7/25/2019  3:07 PM      Findings:   None..  Complications: None.  Implants:    Implant Name Type Inv. Item Serial No.  Lot No. LRB No. Used   GRAFT DURAGEN 2X2&quot; [id number] Bone/Tissue/Biologic GRAFT DURAGEN 2X2&quot; ID-[id number]  [employer] [id number] Right 1   IMP SCR SYN MATRIX LOW PRO 1.5X04MM SELF DRILL [id number] Metallic Hardware/Doss IMP SCR SYN MATRIX LOW PRO 1.5X04MM SELF DRILL [id number]  [employer]AT[employer] N/A Right 10   IMP PLATE SYN STR DOG BONE LOW PROFILE 2H .502 Metallic Hardware/Doss IMP PLATE SYN STR DOG BONE LOW PROFILE 2H .502  [employer] N/A Right 1   IMP PLATE SYN BOX LOW PROFILE 04H .511 Metallic Hardware/Doss IMP PLATE SYN BOX LOW PROFILE 04H .511  [employer] N/A Right 1    IMP BUR HOLE COVER 17MM LOW PROFILE .527 Metallic Hardware/[place] IMP BUR HOLE COVER 17MM LOW PROFILE .527  SYN[employer]ATEC N/A Right 1        Raul Fay M.D.  Neurosurgery Resident, PGY-2    Please contact neurosurgery resident on call with questions.    Dial * * [phone], enter [phone] when prompted.

## 2019-07-26 NOTE — PROGRESS NOTES
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 Surgical Pathology Pending.    Precautions/Limitations other (see comments)  (craniotomy)   Cognitive Status Examination   Orientation orientation to person, place and time   Level of Consciousness alert   Follows Commands and Answers Questions 100% of the time   Personal Safety and Judgment intact   Posture    Posture Forward head position;Protracted shoulders   Range of Motion (ROM)   ROM Comment B LE grossly WNL   Strength   Strength Comments R LE grossly 5/5, L LE grossly 4+/5   Bed Mobility   Bed Mobility Comments supine > sit min assist    Transfer Skills   Transfer Comments sit > stand with SBA   Gait   Gait Comments ambulated in room with 1 hand on IV pole and min assist   Balance   Balance Comments romberg eyes open/closed x 30 seconds   Sensory Examination   Sensory Perception no deficits were identified   Coordination   Coordination Comments decreased coordination with L heel > shin   General Therapy Interventions   Planned Therapy Interventions balance training;bed mobility training;gait training;neuromuscular re-education;strengthening;transfer training;progressive activity/exercise   Clinical Impression   Criteria for Skilled Therapeutic Intervention yes, treatment indicated   PT Diagnosis impaired functional  "mobility s/p R craniotomy/tumor resection and L hemiparesis   Influenced by the following impairments hemiparesis, impaired balance, decreased coordination, decreased activity tolerance.    Functional limitations due to impairments impaired bed mobility, impaired transfers, impaired gait   Clinical Presentation Evolving/Changing   Clinical Presentation Rationale clinical judgment   Clinical Decision Making (Complexity) Low complexity   Therapy Frequency Daily   Predicted Duration of Therapy Intervention (days/wks) 1 week   Anticipated Discharge Disposition Acute Rehabilitation Facility   Risk & Benefits of therapy have been explained Yes   Patient, Family & other staff in agreement with plan of care Yes   [facility] AM-PAC  \"6 Clicks\" V.2 Basic Mobility Inpatient Short Form   1. Turning from your back to your side while in a flat bed without using bedrails? 4 - None   2. Moving from lying on your back to sitting on the side of a flat bed without using bedrails? 3 - A Little   3. Moving to and from a bed to a chair (including a wheelchair)? 3 - A Little   4. Standing up from a chair using your arms (e.g., wheelchair, or bedside chair)? 4 - None   5. To walk in hospital room? 3 - A Little   6. Climbing 3-5 steps with a railing? 3 - A Little   Basic Mobility Raw Score (Score out of 24.Lower scores equate to lower levels of function) 20   Total Evaluation Time   Total Evaluation Time (Minutes) 8     "

## 2019-07-26 NOTE — PLAN OF CARE
Discharge Planner OT   Patient plan for discharge: ARU  Current status: pt scoring 23 on slums indicating mild neurocognitive deficits. Pt with delayed mentation today however with insight to limitations. Pt L hand 48 lbs , R 71 lbs . Min assist basic LE dressing.  Barriers to return to prior living situation: post surgical precautions, deconditioning decreased cognitive skills.   Recommendations for discharge: ARU  Rationale for recommendations: pt with I PLOF and a good candidate to return to I. Pt currently lives alone and below baseline in ADL I.        Entered by: Stanford Mccord 07/26/2019 3:37 PM

## 2019-07-26 NOTE — PROGRESS NOTES
07/26/19 1500   Quick Adds   Type of Visit Initial Occupational Therapy Evaluation   Living Environment   Living Environment Comment see initial OT eval for all living and social information.    Self-Care   Usual Activity Tolerance good   Current Activity Tolerance fair   Regular Exercise No   Activity/Exercise/Self-Care Comment pt is an avid golfer   Functional Level   Ambulation 0-->independent   Transferring 0-->independent   Toileting 0-->independent   Bathing 0-->independent   Dressing 0-->independent   Eating 0-->independent   Communication 0-->understands/communicates without difficulty   Swallowing 0-->swallows foods/liquids without difficulty   Cognition 1 - attention or memory deficits   General Information   Referring Physician Raul Fay   Patient/Family Goals Statement return to I ADLs' and golfing.    Additional Occupational Profile Info/Pertinent History of Current Problem POD 1 Stealth-assisted right craniotomy for tumor resection   Precautions/Limitations other (see comments)  (craniotomy)   General Observations pt motivated in therapy with supportive SO present.    Cognitive Status Examination   Orientation orientation to person, place and time   Level of Consciousness alert   Follows Commands (Cognition) 75-90% accuracy   Memory impaired   Attention Distractible during evaluation   Cognitive Comment further testing required.    Visual Perception   Visual Perception No deficits were identified;Wears glasses   Sensory Examination   Sensory Quick Adds Other (describe)   Sensory Comments L UE retains protective sensation.    Range of Motion (ROM)   ROM Quick Adds No deficits were identified   Strength   Manual Muscle Testing Quick Adds Other   Strength Comments L UE grossly 4/5, R UE 5/5   Hand Strength   Left hand  (pounds) 48 pounds   Right hand  (pounds) 71 pounds   Coordination   Coordination Comments moderate deficits in L hand   Transfer Skill: Sit to Stand   Level of [place]:  "Sit/Stand minimum assist (75% patients effort)   Lower Body Dressing   Level of [place]: Dress Lower Body minimum assist (75% patients effort)   Instrumental Activities of Daily Living (IADL)   IADL Comments see initial eval.    General Therapy Interventions   Planned Therapy Interventions ADL retraining;IADL retraining;cognition;fine motor coordination training;motor coordination training;neuromuscular re-education;ROM;strengthening;transfer training;home program guidelines;progressive activity/exercise;risk factor education   Clinical Impression   Criteria for Skilled Therapeutic Interventions Met yes, treatment indicated   OT Diagnosis decreased ADL I   Assessment of Occupational Performance 5 or more Performance Deficits   Identified Performance Deficits dressing, bathing, toileting, leisure, home making.    Clinical Decision Making (Complexity) Low complexity   Therapy Frequency 5x/week   Predicted Duration of Therapy Intervention (days/wks) 2 weeks   Anticipated Discharge Disposition Acute Rehabilitation Facility   Risks and Benefits of Treatment have been explained. Yes   Patient, Family & other staff in agreement with plan of care Yes   Clinical Impression Comments Pt presents to OT with post surgical precautions, decreased motor control as well as deconditioning with decreased cognitive skilled leading to decreased ADL I. Pt to benefit from skilled OT intervention to address the above problem list. See daily note for treatment provided today.    [facility] [employer] TM \"6 Clicks\"   2016, Trustees of [facility], under license to [employer] [place].  All rights reserved.   6 Clicks Short Forms Daily Activity Inpatient Short Form   [facility] AM-PAC  \"6 Clicks\" Daily Activity Inpatient Short Form   1. Putting on and taking off regular lower body clothing? 3 - A Little   2. Bathing (including washing, rinsing, drying)? 3 - A Little   3. Toileting, which includes using toilet, bedpan or urinal? " 3 - A Little   4. Putting on and taking off regular upper body clothing? 3 - A Little   5. Taking care of personal grooming such as brushing teeth? 4 - None   6. Eating meals? 4 - None   Daily Activity Raw Score (Score out of 24.Lower scores equate to lower levels of function) 20   Total Evaluation Time   Total Evaluation Time (Minutes) 5

## 2019-07-26 NOTE — PROGRESS NOTES
S: patient noticed some left sided vision flashing and sore throat overnight. Otherwise is doing well.    O:  Exam:  General: Awake;  Alert & O x3, In No Acute Distress  Pulm: Breathing Comfortably on room air  Mental status: Oriented x 3  Cranial Nerves: extraocular muscles intact. left facial droop. No gross dysarthria. Shoulder shrug absent on left.   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 Surgical Pathology Pending.     Plan of Care:    Neuro:   Neuro Examination Q 1 HR; Q4 hrs when on normal floor  Remove Sutures On 8/8/2019  Steroids: Continue Decadron 4 mg Q 6 HR  Seizure Prophylaxis: Not Indicated  Head MRI pending    Cardiopulmonary:   Maintain SBP < 140 mmHg  Incentive Spirometry Q 1 HR While Awake    FEN/GI:   Continue IVF Until Adequate Oral Intake  Advance Diet as Tolerated  Bowel Regimen  Anti-Emetics PRN  Electrolyte Replacement Protocol  Continue home Omeprazole 20 mg BID    Renal:   Leal Catheter placed Intra-Operatively by Urology     Heme: SCDs to Bilateral Lower Extremities    ID: Perioperative Antibiotics Completed     Dispo: Unit 4A; Transfer to Oncology Service on POD #1    Anibal Castro MD  Neurosurgery Resident PGY-1    Please contact neurosurgery resident on call with questions.    Dial * * [phone], enter [phone] when prompted.     I have reviewed the history above and agree with the resident's assessment and plan.  AIDAN Allen MD

## 2019-07-26 NOTE — PLAN OF CARE
Neuro: A+Ox4, PERRL intact with 3+ pupils, Left side 4+ strength as opposed to right side being 5+. Slight pronator drift in left arm. Pt very steady on feet able to walk with stand by assist just for lines.   CV: HR and BP have been stable. Afebrile. No edema.   Pulm: Lungs are clear/diminished on RA.   GI: No BM today. Miralax given.   : Leal intact with great UO.   Pain: No pain noted other than slight headache this morning resolved by tylenol.   Skin: Incision on head covered with dressing.   See flow sheets for further interventions and assessments.  A: Stable  P: Continue to monitor pt closely. Notify MD of significant changes. Pt transferred up to .

## 2019-07-26 NOTE — PLAN OF CARE
Problem: Adult Inpatient Plan of Care  Goal: Plan of Care Review  Outcome: No Change     D/I: Patient on unit 4A Surgical/Neuro ICU s/p craniotomy with tumor resection    Neuro: A&Ox4.  Follows commands and able to make needs known.  PERRLA. Left side weaker at baseline, but still has 5/5 strength. Slight left sided facial droop. Pronator drift present on the left.  Pt reported visual changes at 2200, but resolved. Numbness present on left side of body at baseline.   CV:NSR 50-60's. SBP goal <140 met without PRNs.  Left A-line in place.  Pulm:LS clear.  Pt using yaunker to clear oral secretions.   GI:Bedside swallow screen passed.  Pt has so dysphagia at baseline.  Endo:N/A  :Pt has a  perineal urethostomy d/t penilectomy.  Leal catheter in place.  -350 out q2 hours.  Skin:Crani incisions on head with primapore.  Pain:Pt complaining of headache and throat soreness.   Access:2 PIVs in left arm and one in the right.  Drains:N/A  Gtt:2% sodium chloride at 50  Labs/Replacement:Magnesium replaced per protocol  Social:Wife called and was updated on pt status and POC       A: Stable    P: Will continue to monitor Pt closely and notify MD of any significant changes. Plan to transfer to  today.

## 2019-07-26 NOTE — OP NOTE
Procedure Date: 07/25/2019      PREOPERATIVE DIAGNOSIS:  Right frontal brain tumor.      POSTOPERATIVE DIAGNOSIS:  Right frontal brain metastasis.      PROCEDURE:    1. Frameless stereotactic assisted right frontal craniotomy for tumor resection.   2. Use of intraoperative microscope     ATTENDING SURGEON:  Chandni Allen MD      RESIDENT SURGEONS:   1.  Adán Earl MD   2.  Raul Fay MD      ANESTHESIA:  General.      ESTIMATED BLOOD LOSS:  100 mL.      INTRAOPERATIVE FINDINGS:  Large cystic and solid tumor with defined gross margins.  All visible tumor was resected.     INDICATIONS FOR OPERATION:  Mr. Bruno is a 73-year-old man with a known history of penile squamous cell carcinoma.  He has known lung metastases.  He presented initially on 07/22 after a fall at home.  The patient complained of approximately 3 days of worsening left lower extremity strength which resulted in his fall.  Imaging demonstrated a new brain mass in the right frontal region with surrounding cerebral edema.  Given his known metastatic squamous cell carcinoma, this likely represented an additional metastatic lesion.  Given the large size of the tumor, his symptoms, and the surgical accessibility, we determined that surgical resection was indicated.     DESCRIPTION OF PROCEDURE:  The patient was marked and consented in his hospital room.  A Leal catheter had been placed prior to entering the OR by Urology due to his urethral reconstruction.  He was then transferred to the operative table where general anesthesia was induced and the patient was intubated.  An arterial line was placed.  The head of the bed was then rotated 180 degrees away from Anesthesia.  He was kept in the supine position with his head secured in the [employer] headholder.    The patient's neuronavigation MRI and head CT were loaded.  The 2 studies were then merged.  Using scalp fiducial registration, the patient's head was then registered with the Stealth machine  and frameless stereotactic neuronavigation was established.      The patient's tumor was then localized along with the midline using the neuronavigation.  A linear, right frontal coronal incision was planned over the tumor. The incision extended just beyond midline.  The patient's scalp was then prepared and draped in normal sterile fashion.  A timeout was called. 10 mL of 0.5% bupivacaine with 1:200,000 epinephrine was injected along the intended incision line. 1g/kg of Mannitol IV was given.     A #10 blade was used to open the skin.  Monopolar cautery was then used to carry the dissection down to the skull.  Periosteal elevator was then used to elevate the scalp off the skull.  With the scalp edges retracted, the Stealth probe was then used to confirm again adequate location and we outlined the location of the tumor on the skull, which was used to plan the craniotomy.      A bur hole was placed in the right parasagittal region using a .  Using a B1 with a footplate the craniotomy was then turned and the bone flap was lifted and set aside.  Hemostasis was obtained.  Dural tack-up sutures were then placed around the lateral and inferior sides of the craniotomy site due to epidural bleeding.  Dural vessels were coagulated with bipolar cautery.  Using a #15 blade, the dura was then opened in a U-shape and flapped towards the midline.  A 4-0 Nurolon was used to retract the dural flap.  Again, the Stealth stylet was used to confirm the location of the tumor.  Corticectomy was then planned in an area just over the tumor.  Using bipolar cautery and microscissors, the corticectomy was performed.  Dissection through the cortex was then performed with a combination of suction and bipolar cautery.  Tumor capsule was encountered and opened with the bipolar.  Cystic fluid evacuated initially under pressure and then was evacuated with suction.  We sent 10 mL of the cystic fluid for pathology.  Using a combination of  suction and Rhoton 6 we found a dissection plane and began to dissect around the edges of the tumor margin.  After larger sections of the wall had been mobilized, they were resected with tumor forceps and microscissors. Several samples of the tumor wall were then collected with the cupped forceps and sent to the lab for frozen analysis.  This returned as metastatic carcinoma.  The remainder of the samples were then saved for permanent pathology. The microscope was brought into the field.     Continuing to use the cupped forceps, the wall of the tumor was then retracted and suction used to define the plane between normal brain and the tumor wall.  After resecting large portions of the tumor, we then inspected the medial aspect of the resection cavity. We dissected through into the interhemispheric fissure and identified the falx. The pericallosal branches were all intact. We identified additional tumor laterally and posteriorly. We now identified normal white matter in all the other directions. Once we felt we had removed all the tumor, we then obtained hemostasis with bipolar cautery.  The tumor resection cavity was then lined with Surgicel.  The dural flap was then flapped back into the closed position and closed using 4-0 Nurolon sutures.  A small piece of Duragen was then placed over the dural closure.  The bone flap was returned and secured using Synthes titanium cranial closure plates and screws.  The wound was irrigated.      We then turned our attention to closure.  The galea was closed using 2-0 Vicryls in an inverted interrupted fashion.  The skin was closed using glenn. ChloraPrep was then applied to the incision.  Once dried, a Primapore dressing was applied to the incision.  The drapes were taken down and the [place] headholder was detached from the bed.  The head of bed was returned and the [place] head pereyra was removed from the patient's head.  The head of the bed was then rotated back towards  Anesthesia, and the patient was extubated.  The Leal and the arterial line remained.  The patient was then taken to the recovery room without incident.      All counts were correct at the end of the procedure x 2.  Dr. Chandni Akins was present for the key portions of the case and immediately available for the entire operation.         CHANDNI AKINS MD       As dictated by GENESIS TURNER MD        ATTESTATION: My signature attests that I was present for the key portions and immediately available for the entire operation described above. I agree with the above findings.    CHANDNI AKINS MD       D: 2019   T: 2019   MT: PEE      Name:     KING CHAVEZ   MRN:      [id number]        Account:        [id number]   :      1945           Procedure Date: 2019      Document: [id number]

## 2019-07-26 NOTE — PLAN OF CARE
PT 4A: Reevaluation completed, treatment resumed.    Discharge Planner PT   Patient plan for discharge: rehab  Current status: transferred OOB with min assist.  Ambulated 275' with min assist.  Gait deviations present due to L hemiparesis/decreased coordination.  VSS on RA during session.   Barriers to return to prior living situation: assistance needed for safe mobility, falls risk.   Recommendations for discharge: ARU  Rationale for recommendations: patient lives alone with multidisciplinary needs, highly motivated to work in rehab process. Due to neurological impairment would benefit from intensive rehab during immediate post resection period.  Excellent chance to return to independent living and would tolerate 3 hrs of daily therapy.        Entered by: Timothy Yung 07/26/2019 2:20 PM

## 2019-07-27 NOTE — PROGRESS NOTES
S: transferred to oncology and floor yesterday    O:  Temp:  [97.3  F (36.3  C)-98.4  F (36.9  C)] 97.3  F (36.3  C)  Pulse:  [68-87] 68  Heart Rate:  [58-98] 70  Resp:  [16-18] 18  BP: (116-144)/(69-80) 135/73  MAP:  [85 mmHg] 85 mmHg  Arterial Line BP: (136)/(62) 136/62  SpO2:  [96 %-100 %] 100 %    Exam:  General: Awake;  Alert & O x3, In No Acute Distress  Pulm: Breathing Comfortably on room air  Mental status: Oriented x 3  Cranial Nerves: extraocular muscles intact. left facial droop. No gross dysarthria. Shoulder shrug absent on left.   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 Surgical Pathology Pending. Neurologically at preoperative baseline. Concern for cerebral salt wasting     Recommendations:   Remove Sutures On 8/8/2019  Please schedule radiation therapy; ok to start as soon as 8/8  Ok for systemic chemotherapy on 8/8   Steroids: quick taper as ordered   Normonatremia goal; wean 2% to off; salt tabs as needed   Ok for regular diet   PT/OT  DVT prophylaxis: SCDs    I have reviewed the history. I have seen and examined the patient myself and agree with the assessment and plan above.  AIDAN Allen MD

## 2019-07-27 NOTE — PLAN OF CARE
Pt is stronger and able to reposition self in bed more easily.  Denied nausea (ate well); denied pain.  Wound care done left shin - taking off primapore dressing caused more damage, so applied bacitracin and left dressings off.  Changed surgical head dressing - old blood present on dressing - staples intact; edema present.  New dressing applied.  Left side generally weaker than right; neuro checks - no change.  Leal dry/intact and kept in place per pt so he could rest tonight.  Reported off by ICU nurse (EDILIA Devlin) that per MDs, it was up to him when it was to be removed.  Replacing KPhos - will run until 0200 7/27.    Problem: Fall Injury Risk  Goal: Absence of Fall and Fall-Related Injury  Outcome: Improving

## 2019-07-27 NOTE — PLAN OF CARE
"/73 (BP Location: Left arm)   Pulse 68   Temp 97.3  F (36.3  C) (Axillary)   Resp 18   Ht 1.85 m (6' 0.84\")   Wt 60.6 kg (133 lb 11.2 oz)   SpO2 100%   BMI 17.72 kg/m      A&Ox4. Neuro checks unchanged, Q4H. Moderate deficit noted in L vs R LE. Bed alarm on, pt has been calling appropriately. R PIV at TKO. Leal draining well. Surg head dressing CDI. Labs pending. Atarax given for anxiety overnight. Will continue with POC.   "

## 2019-07-27 NOTE — PROGRESS NOTES
[facility], [place]    Hematology/Oncology Progress Note     Main Plans for Today   - Decrease 2% sodium chloride to 25 ml/hr  - Na checks q6    Assessment & Plan   King Gonsalo Bruno is a 73 year old male with PMH HTN, GERD, CKD, DJD and metastatic penile cancer (lung mets) who presented to the ED after a fall down stairs and found to have a right frontal lobe brain mass with surrounding edema.      # Metastatic right frontal lobe mass  MRI brain 7/22 with peripheral enhancing right frontal lobe mass with extensive adjacent edema. 3mm right to left midline shift. OR 7/25 for resection. Facial droop and LUE weakness improved.   - NSG consulted, recs appreciated.   - Continue Dex 4 mg q6h- taper per neurosurgery     # Fall  Trauma w/u in the ED with head CT (see results above). CTA without any carotid stenosis. CT C spine without any traumatic changes. CXR without traumatic changes (overall stable appearing lung mets). AP pelvis negative.   - PT/OT. Recommending TCU.     # Metastatic penile cancer  Diagnosed in 2017, stage 2. Underwent 4 cycles of chemotherapy with cisplatin and gemzar, folled by radical penectomy, urethectomy, perineal urethostomy and bilateral inguinal node dissection 3/8/18. Plan was to proceed with XRT but he was then found to have pulmonary mets. He received Keytruda 8/18-2/28/18. Found to have progressive disease. Started on palliative docetaxel 3/14/19 and received cycle #6 on 7/5/19. He follows with Dr. Arceo. He feels that he has been having more fatigue and side effects from chemotherapy recently. Not sure he wants to continue  - F/u as scheduled for repeat PET/CT and f/u with Dr. Arceo     # GERD  Persistent symptoms despite bid omeprazole  - Ranitidine bid and omeprazole bid.      # HTN  - Continue pta amlodipine     # CKD  Creatinine at baseline, Cr 1.10 today.     # Hyponatremia  Na 132 on 7/27. Goal: normonatremia. NSG started 2% saline at 50 mL/hr- weaning as  tolerated, salt tabs as needed.      # Chronic anemia  Multifactorial: chemotherapy, CKD, mild iron deficiency (did not tolerate PO iron), chronic disease.  - Continue to monitor, transfuse for hgb <7     DVT Prophylaxis: SCDs  FEN: Regular diet  Code Status: Full Code     Disposition: Will need TCU placement.    The patient's care was discussed with Dr. Leiva.     Lizzy Franco  [facility]  Pager: [phone]  Please see sticky note for cross cover information    Interval History   No acute events overnight. He denies any pain. He feels like his strength is a little better today. He would like a shower. He denies any headache, nausea or vomiting.    Physical Exam   Vital Signs: Temp: 97.3  F (36.3  C) Temp src: Axillary BP: 135/73 Pulse: 68 Heart Rate: 70 Resp: 18 SpO2: 100 % O2 Device: None (Room air)    Weight: 133 lbs 11.2 oz  General Appearance: Awake, alert, pleasant, NAD.  Respiratory: Clear to auscultation bilaterally. No wheezing or crackles. Good air entry bilaterally.  Cardiovascular: RRR. Normal S1/S2. No murmurs.   GI: Soft, non-tender, non-distended. Normoactive bowel sounds.   Skin: No rash or lesions  Neuro: Alert and oriented x3. Mild left facial droop. No dysarthria. Strength RUE 5/5, LUE 4/5, RLE 5/5, RLE 4/5. Answering questions appropriately and following commands.     Data   Recent Labs   Lab 07/27/19  0836 07/27/19  0404 07/27/19  0000 07/26/19  1911  07/26/19  0244 07/25/19  1836 07/25/19  1511  07/25/19  0029 07/25/19  0028  07/22/19  0818   WBC  --  16.6*  --   --   --  20.1*  --   --   --  16.0*  --    < > 5.8   HGB  --  8.3*  --   --   --  7.7*  --  8.2*   < > 8.5*  --    < > 8.1*   MCV  --  80  --   --   --  77*  --   --   --  80  --    < > 79   PLT  --  378  --   --   --  379  --   --   --  408  --    < > 401   INR  --   --   --   --   --   --   --   --   --  0.97  --   --  1.04   * 131* 133 133   < > 135 134 131*   < > 130*  --    < > 136   POTASSIUM  --  4.5  --  4.2   --  4.1 4.0 4.3   < > 4.6  --    < > 3.5   CHLORIDE  --  99  --  101  --  102 100  --   --  101  --    < > 100   CO2  --  23  --  24  --  26 23  --   --  24  --    < > 27   BUN  --  18  --  19  --  18 18  --   --  21  --    < > 13   CR  --  1.10  --  1.06  --  1.02 1.07  --   --  1.07  --    < > 1.41*   ANIONGAP  --   --   --  8  --  6 10  --   --  6  --    < > 9   SAADIA  --  9.4  --  9.3  --  9.3 9.5  --   --  9.6  --    < > 9.7   GLC  --  124*  --  139*  --  105* 137* 138*   < > 130*  --    < > 91   ALBUMIN  --   --   --   --   --   --   --   --   --  3.0*  --   --  3.2*   PROTTOTAL  --   --   --   --   --   --   --   --   --  6.7*  --   --  7.6   BILITOTAL  --   --   --   --   --   --   --   --   --  0.1*  --   --  0.3   ALKPHOS  --   --   --   --   --   --   --   --   --  74  --   --  80   ALT  --   --   --   --   --   --   --   --   --  24  --   --  22   AST  --   --   --   --   --   --   --   --   --  43  --   --  60*   TROPI  --   --   --   --   --   --   --   --   --  <0.015 Canceled, Test credited  --  <0.015    < > = values in this interval not displayed.

## 2019-07-27 NOTE — PLAN OF CARE
"/76 (BP Location: Left arm)   Pulse 68   Temp 97.5  F (36.4  C) (Axillary)   Resp 18   Ht 1.85 m (6' 0.84\")   Wt 59.7 kg (131 lb 9.6 oz)   SpO2 98%   BMI 17.44 kg/m  . PIV is patent and infusing. No c/o pain or n/v during this shift. Patient is eating and drinking well. Patient is up with one assist and calls ok. Neuro checks intact and no new event during this shift. Patient was washed up and caregiver is at the bedside and supportive. Leal cath is patent, urine is clear, yellow. Patient had bowel movement x 1 and has passes gas. Surg head dressing is C/D/I and will be removed 08/08/2019. Continue to encourage patient to do good mouth cares, cough, deep breath and sit up while eating or drinking. Continue with plan of care and notify MD for status changes.     Problem: Adult Inpatient Plan of Care  Goal: Plan of Care Review  7/27/2019 1617 by Devika Hopkins RN  Outcome: No Change  Flowsheets (Taken 7/27/2019 1617)  Plan of Care Reviewed With: patient;caregiver     Problem: Adult Inpatient Plan of Care  Goal: Patient-Specific Goal (Individualization)  Outcome: No Change     Problem: Adult Inpatient Plan of Care  Goal: Absence of Hospital-Acquired Illness or Injury  Outcome: No Change     Problem: Adult Inpatient Plan of Care  Goal: Optimal Comfort and Wellbeing  Outcome: No Change     Problem: Adult Inpatient Plan of Care  Goal: Readiness for Transition of Care  Outcome: No Change     Problem: Adult Inpatient Plan of Care  Goal: Rounds/Family Conference  Outcome: No Change     Problem: Fall Injury Risk  Goal: Absence of Fall and Fall-Related Injury  7/27/2019 1617 by Devika Hopkins RN  Outcome: No Change     Problem: OT General Care Plan  Goal: Meal Preparation (OT)  Description  Meal Preparation (OT)  Outcome: No Change       "

## 2019-07-28 NOTE — PLAN OF CARE
"/78 (BP Location: Left arm)   Pulse 63   Temp 97.1  F (36.2  C) (Axillary)   Resp 16   Ht 1.85 m (6' 0.84\")   Wt 60.6 kg (133 lb 11.2 oz)   SpO2 100%   BMI 17.72 kg/m    PIV is patent and infusing. Patient is A & O x 3 and neuro intact. No c/o pain or n/v during this shift. Patient is eating and drinking very well. Patient showered and is up with one assist. Leal cath is patent, urine is clear and patent. Patient had bowel movement x 1, normal. Phos is replaced. Patient calls appropriate to move from bed to chair. Continue with plan of care and notify MD for status changes.    Problem: Adult Inpatient Plan of Care  Goal: Patient-Specific Goal (Individualization)  Outcome: No Change     Problem: Adult Inpatient Plan of Care  Goal: Optimal Comfort and Wellbeing  7/28/2019 1351 by Devika Hopkins, RN  Outcome: No Change     Problem: Adult Inpatient Plan of Care  Goal: Readiness for Transition of Care  Outcome: No Change     Problem: Adult Inpatient Plan of Care  Goal: Plan of Care Review  7/28/2019 1351 by Devika Hopkins, RN  Outcome: Improving  Flowsheets (Taken 7/28/2019 1351)  Plan of Care Reviewed With: patient     "

## 2019-07-28 NOTE — PLAN OF CARE
"Blood pressure 126/81, pulse 68, temperature 97.8  F (36.6  C), temperature source Axillary, resp. rate 16, height 1.85 m (6' 0.84\"), weight 59.7 kg (131 lb 9.6 oz), SpO2 100 %.  Pt had uneventful shift. He c/o mild tenderness at his right tempo and he declined to have any thing for it. Frontal lobe dressing C/D/I. He c/o feeling hungry and had 2 pieces of peanut butter and jelly toast and 2 apple juices. Leal catheter is patent and putting out clear yellow urine and catheter care done. Right PIV got infiltrated and new left IV placed. Low magnesium and phos levels this morning and pt is getting 2 g of magSulfate and will need 10 mmol of potassium phos later. Pt is A/O x 4. And appropriate. Got Atarax at HS for ' I am feeling anxious. Continue to monitor pt and continue with plan of care.  Problem: Adult Inpatient Plan of Care  Goal: Plan of Care Review  Outcome: No Change     Problem: Adult Inpatient Plan of Care  Goal: Optimal Comfort and Wellbeing  Outcome: No Change     "

## 2019-07-28 NOTE — PROGRESS NOTES
[facility], [place]    Hematology/Oncology Progress Note     Main Plans for Today   - Decrease sodium chloride tabs from 4 g TID to 3 g TID with plan to decrease by 1-2 g/day  - Na checks q6    Assessment & Plan   King Gonsalo Bruno is a 73 year old male with PMH of HTN, GERD, CKD, DJD and metastatic penile cancer (lung mets) who presented to the ED after a fall down stairs and found to have a right frontal lobe brain mass with surrounding edema.      # Metastatic right frontal lobe mass  MRI brain 7/22 with peripheral enhancing right frontal lobe mass with extensive adjacent edema. 3mm right to left midline shift. OR 7/25 for resection. Facial droop and LUE weakness improved.   - NSG consulted, recs appreciated. Has now signed off  - Continue Dex 4 mg q6h- taper per neurosurgery  - Remove sutures on 8/8/18  - Will need to schedule radiation therapy, ok to start as soon as 8/8 per NSG  - Will need follow-up in NSG clinic in 2 weeks for wound check and in 3 months with Dr. Allen with MRI brain with and without contrast     # Fall  Trauma w/u in the ED with head CT (see results above). CTA without any carotid stenosis. CT C spine without any traumatic changes. CXR without traumatic changes (overall stable appearing lung mets). AP pelvis negative.   - PT/OT. Recommending TCU.     # Metastatic penile cancer  Diagnosed in 2017, stage 2. Underwent 4 cycles of chemotherapy with cisplatin and gemzar, folled by radical penectomy, urethectomy, perineal urethostomy and bilateral inguinal node dissection 3/8/18. Plan was to proceed with XRT but he was then found to have pulmonary mets. He received Keytruda 8/18-2/28/18. Found to have progressive disease. Started on palliative docetaxel 3/14/19 and received cycle #6 on 7/5/19. He follows with Dr. Arceo. He feels that he has been having more fatigue and side effects from chemotherapy recently. Not sure he wants to continue  - F/u as scheduled for repeat  PET/CT and f/u with Dr. Arceo     # GERD  Persistent symptoms despite bid omeprazole  - Ranitidine bid and omeprazole bid.      # HTN  - Continue pta amlodipine     # CKD  Creatinine at baseline, Cr 0.99 today.     # Hyponatremia  Na 134 on 7/27. Goal: normonatremia. NSG started 2% saline at 50 mL/hr, stopped on 7/27. Wean salt tabs. Decrease sodium chloride tabs from 4 g TID to 3 g TID with plan to decrease by 1-2 g/day     # Chronic anemia  Multifactorial: chemotherapy, CKD, mild iron deficiency (did not tolerate PO iron), chronic disease.  - Continue to monitor, transfuse for hgb <7     DVT Prophylaxis: SCDs. Per NSG, OK to start DVT ppx on 8/1  FEN: Regular diet  Code Status: Full Code     Disposition: Will need TCU placement.    The patient's care was discussed with Dr. Leiva.     Lisa Fileds MD, PhD  [employer]  Please see sticky note for cross cover information      Interval History    Hypertonic saline discontinued overnight since Na increased to 138. Patient notes feeling well today. No complaints. Denies chest pain, shortness of breath, and abdominal pain. Notes that he has a good appetite.      Physical Exam    Vital Signs: Temp: 97.8  F (36.6  C) Temp src: Axillary BP: 126/81   Heart Rate: 65 Resp: 16 SpO2: 100 % O2 Device: None (Room air)    Weight: 131 lbs 9.6 oz  General Appearance: Awake, alert, pleasant, NAD. Sitting up and eating breakfast  HEENT: Staples on scalp c/d/i  Respiratory: Clear to auscultation bilaterally. No wheezing or crackles. Good air entry bilaterally.  Cardiovascular: RRR. Normal S1/S2. No murmurs.   GI: Soft, non-tender, non-distended. Normoactive bowel sounds.   Skin: No rash or lesions  Neuro: Alert and oriented x3. Mild left facial droop. No dysarthria. Answering questions appropriately and following commands.     Data   Recent Labs   Lab 07/28/19  0350 07/27/19  2144 07/27/19  1621  07/27/19  0404  07/26/19  1911  07/26/19  0244  07/25/19  0029 07/25/19  0028   07/22/19  0818   WBC 13.9*  --   --   --  16.6*  --   --   --  20.1*  --  16.0*  --    < > 5.8   HGB 7.6*  --   --   --  8.3*  --   --   --  7.7*   < > 8.5*  --    < > 8.1*   MCV 78  --   --   --  80  --   --   --  77*  --  80  --    < > 79     --   --   --  378  --   --   --  379  --  408  --    < > 401   INR  --   --   --   --   --   --   --   --   --   --  0.97  --   --  1.04    138 138   < > 131*   < > 133   < > 135   < > 130*  --    < > 136   POTASSIUM 4.6  --   --   --  4.5  --  4.2  --  4.1   < > 4.6  --    < > 3.5   CHLORIDE 103  --   --   --  99  --  101  --  102   < > 101  --    < > 100   CO2 25  --   --   --  23  --  24  --  26   < > 24  --    < > 27   BUN 25  --   --   --  18  --  19  --  18   < > 21  --    < > 13   CR 0.99  --   --   --  1.10  --  1.06  --  1.02   < > 1.07  --    < > 1.41*   ANIONGAP 6  --   --   --   --   --  8  --  6   < > 6  --    < > 9   SAADIA 9.4  --   --   --  9.4  --  9.3  --  9.3   < > 9.6  --    < > 9.7   *  --   --   --  124*  --  139*  --  105*   < > 130*  --    < > 91   ALBUMIN  --   --   --   --   --   --   --   --   --   --  3.0*  --   --  3.2*   PROTTOTAL  --   --   --   --   --   --   --   --   --   --  6.7*  --   --  7.6   BILITOTAL  --   --   --   --   --   --   --   --   --   --  0.1*  --   --  0.3   ALKPHOS  --   --   --   --   --   --   --   --   --   --  74  --   --  80   ALT  --   --   --   --   --   --   --   --   --   --  24  --   --  22   AST  --   --   --   --   --   --   --   --   --   --  43  --   --  60*   TROPI  --   --   --   --   --   --   --   --   --   --  <0.015 Canceled, Test credited  --  <0.015    < > = values in this interval not displayed.

## 2019-07-28 NOTE — PROGRESS NOTES
S: weaned off 2% saline, plan to wean off salt tabs per oncology team with goal of normonatremia    O:  Exam:  General: Awake;  Alert & O x3, In No Acute Distress  Pulm: Breathing Comfortably on room air  Mental status: Oriented x 3  Cranial Nerves: extraocular muscles intact. left facial droop. No gross dysarthria. Shoulder shrug absent on left.   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 Surgical Pathology Pending. Neurologically at preoperative baseline. Concern for cerebral salt wasting, improving.    Recommendations:   Remove Sutures On 8/8/2019  Please schedule radiation therapy; ok to start as soon as 8/8  Ok for systemic chemotherapy on 8/8   Steroids: quick taper as ordered   Normonatremia goal; wean salt tabs as able  Ok for regular diet   PT/OT  DVT prophylaxis: SCDs; ok for DVT ppx in 1 week (8/1)  Neurosurgery team signing off. Ok to discharge when ready per primary team. Please follow up in neurosurgery clinic in 2 weeks for wound check and 3 months with MRI brain with and without contrast with Dr. Allen.    Jaskaran Jean Baptiste MD  Neurosurgery Resident PGY2      I have reviewed the history above and agree with the resident's assessment and plan.  AIDAN Allen MD

## 2019-07-28 NOTE — PLAN OF CARE
Discharge Planner PT   Patient plan for discharge: rehab   Current status: Pt performed bed mobility with HOB elevated and SBA. STS with SBA. Ambulated ~300' with HHA and CGA-min A. Slight steppage gait for clearance of L LE. To promote improvement in functional stability, pt completed dynamic balance drills including stop/go, head turns, retro walking and EC. Inc difficulty with EC and retro walking. Completed 30 sec STS - scoring 8 reps placing him at a high falls risk (see details below)   Barriers to return to prior living situation: medical needs, current mobility, level of A, falls risk   Recommendations for discharge: ARU   Rationale for recommendations: Pt is below baseline, he has supportive SO, would benefit from ongoing therapy for safe progression of stability, strength and activity tolerance, he is highly motivated to work with therapies, has interdisciplinary needs, and would tolerate extended therapy times.        Entered by: Oumou Blackman 07/28/2019 3:16 PM       30-Second Sit to Stand Test:  The test is conducted with a straight back chair, without armrests, with a 17-inch seat height.    Patient Score (score =0 if must use arms)0 reps  (Although patient scored 0 due to using arms, they completed: 8 reps with arms)    The 30 Second Sit to Stand Test is considered a test of fall risk.  Data from [employer]IDA RAWLS, cosponsored by [place] Dept of Health:  If must use arms = High Fall Risk regardless of reps  8 or less times = High Fall Risk   9 to 12 times = Moderate Risk  13 or more times = Low Risk    The 30 Second Sit to Stand Test is also considered a test of leg strength and endurance.   Normative Data from Luiz et al,. 2001  Age                 Reps: Men        Reps: Women  60-64                14-19                       12-17                               65-69                12-18                       11-16                    70-74                12-17                       10-15              75-79                 11-17                       10-15                    80-84                10-15                         9-14  85-89                 8-14                          8-13  90-94                 7-12                          4-11    Assessment (rationale for performing, application to patient s function & care plan): To assess falls risk, future POC, need for AD  Physical Therapist: 5

## 2019-07-29 NOTE — PLAN OF CARE
"/67 (BP Location: Right arm)   Pulse 64   Temp 98.8  F (37.1  C) (Axillary)   Resp 18   Ht 1.85 m (6' 0.84\")   Wt 60.6 kg (133 lb 11.2 oz)   SpO2 100%   BMI 17.72 kg/m    PIV is patent. Left arm PIV was removed and market. Patient is A & O x 3 but forgetful. Neuro intact. No c/o pain or n/v during this shift. Patient is eating and drinking very good. Patient is up with one assist to use the commode at the bedside and bed alarm is on.  Leal cath is removed, urine is clear, yellow. Possible discharge tomorrow to Rehab and caregiver will be here during the discharge teaching between  11 am to -12 pm.  Patient will work with PT and shower later today. Continue with plan of care and notify MD for status changes.     Problem: Adult Inpatient Plan of Care  Goal: Patient-Specific Goal (Individualization)  7/29/2019 1402 by Devika Hopkins, RN  Outcome: No Change     Problem: Adult Inpatient Plan of Care  Goal: Absence of Hospital-Acquired Illness or Injury  Outcome: No Change     Problem: Adult Inpatient Plan of Care  Goal: Plan of Care Review  7/29/2019 1402 by Devika Hopkins, RN  Outcome: Improving  Flowsheets (Taken 7/29/2019 1402)  Plan of Care Reviewed With: patient;caregiver     "

## 2019-07-29 NOTE — PLAN OF CARE
"Blood pressure 128/73, pulse 64, temperature 97.2  F (36.2  C), temperature source Axillary, resp. rate 16, height 1.85 m (6' 0.84\"), weight 60.6 kg (133 lb 11.2 oz), SpO2 100 %.  Pt had a good shift, A/O x 4. Neuro intact.. Pt c/o feeling hungry and had 2 pieces peanut butter and jelly on toast and ate it all with 2 apple juices. Leal patent and pt is putting out clear yellow urine. Low magnesium and phosphate levels this morning. Pt got 2 g of magnesium sulfate and 15 mmol of potassium phos going over 4 hour via left PIV. Pt had x 1 stool last night. Frontal head incision with staples C/D/I and open to air. No drainage noted. Pt is on bed alarm. C/O IV site sensitivity, warm pack applied with some relief. Continue to monitor pt and continue with plan of care.  Problem: Adult Inpatient Plan of Care  Goal: Plan of Care Review  Outcome: No Change     Problem: Adult Inpatient Plan of Care  Goal: Patient-Specific Goal (Individualization)  Outcome: No Change     Problem: Adult Inpatient Plan of Care  Goal: Optimal Comfort and Wellbeing  Outcome: No Change     Problem: Memory Impairment Comorbidity  Goal: Memory Impairment Comorbidity  Description  Patient comorbidity will be monitored for signs and symptoms of Memory Impairment condition.  Problems will be absent, minimized or managed by discharge/transition of care.  Outcome: No Change     "

## 2019-07-29 NOTE — PROGRESS NOTES
[facility], [place]    Hematology / Oncology Progress Note    Date of Service (when I saw the patient): 07/29/2019     Assessment & Plan   King Gonsalo Bruno is a 73 year old male with PMH of HTN, GERD, CKD, DJD and metastatic penile cancer (lung mets) who presented to the ED after a fall down stairs and found to have a right frontal lobe brain mass with surrounding edema.      # Metastatic right frontal lobe mass  MRI brain 7/22 with peripheral enhancing right frontal lobe mass with extensive adjacent edema. 3mm right to left midline shift. S/p right sided craniotomy 7/25. Pathology consistent with metastatic carcinoma (likely squamous). Facial droop and LUE weakness improved.   - NSG consulted, recs appreciated. Has now signed off.  - Continue Dexamethasone taper per neurosurgery. Will end 8/2.    - Remove sutures on 8/8/18  - Will need to schedule radiation therapy, ok to start as soon as 8/8 per NSG  - Will need follow-up in NSG clinic in 2 weeks for wound check and in 3 months with Dr. Allen with MRI brain with and without contrast     # Fall  Trauma w/u in the ED with head CT (see results above). CTA without any carotid stenosis. CT C spine without any traumatic changes. CXR without traumatic changes (overall stable appearing lung mets). AP pelvis negative.   - PT/OT. Recommending TCU.     # Metastatic penile cancer  Diagnosed in 2017, stage 2. Underwent 4 cycles of chemotherapy with cisplatin and gemzar, folled by radical penectomy, urethectomy, perineal urethostomy and bilateral inguinal node dissection 3/8/18. Plan was to proceed with XRT but he was then found to have pulmonary mets. He received Keytruda 8/18-2/28/18. Found to have progressive disease. Started on palliative docetaxel 3/14/19 and received cycle #6 on 7/5/19. He follows with Dr. Arceo. He feels that he has been having more fatigue and side effects from chemotherapy recently. Not sure he wants to continue  - F/u as  scheduled for repeat CT CAP on and f/u with Dr. Arceo on 8/6 and 8/7     # GERD  Persistent symptoms despite bid omeprazole  - Ranitidine bid and omeprazole bid.      # HTN  - Continue pta amlodipine     # CKD  Creatinine at baseline.     # Hyponatremia  Na 134 on 7/27. Goal: normonatremia. NSG started 2% saline at 50 mL/hr, stopped on 7/27. Started sodium chloride tabs from 4 g TID on 7/27 with plan to decrease by 1-2 g/day.  - Decrease salt tabs to 2g TID (x7/29)     # Chronic anemia  Multifactorial: chemotherapy, CKD, mild iron deficiency (did not tolerate PO iron), chronic disease.  - Continue to monitor, transfuse for hgb <7     DVT Prophylaxis: SCDs. Per NSG, OK to start DVT ppx on 8/1  FEN: Regular diet  Code Status: Full Code     Disposition: Will need TCU placement. Suspect medically ready to discharge on 7/30.     The patient's care was discussed with Dr. Leiva.     Khalida Vizcaino PA-C  Hematology/Oncology  Pager: [phone]    Interval History   Feeling well today. Working with PT/OT. Eating/drinking well. Afebrile. Hoping to get Leal catheter out today.     Physical Exam   Temp: 97.1  F (36.2  C) Temp src: Axillary BP: 139/72 Pulse: 64 Heart Rate: 67 Resp: 16 SpO2: 100 % O2 Device: None (Room air)    Vitals:    07/25/19 2100 07/27/19 0800 07/28/19 0909   Weight: 60.6 kg (133 lb 11.2 oz) 59.7 kg (131 lb 9.6 oz) 60.6 kg (133 lb 11.2 oz)     Vital Signs with Ranges  Temp:  [97.1  F (36.2  C)-98.1  F (36.7  C)] 97.1  F (36.2  C)  Pulse:  [63-69] 64  Heart Rate:  [63-67] 67  Resp:  [16-18] 16  BP: (113-141)/(72-78) 139/72  SpO2:  [99 %-100 %] 100 %  I/O last 3 completed shifts:  In: 1390 [P.O.:1020; I.V.:370]  Out: 2825 [Urine:2825]    Constitutional: Pleasant male seen sitting up in bed. No apparent distress, and appears stated age.  Eyes: Lids and lashes normal, sclera clear, conjunctiva normal.  ENT: Normocephalic, oral cavity without erythema or exudates, gums normal and good dentition.    Respiratory: No increased work of breathing, good air exchange, clear to auscultation bilaterally, no crackles or wheezing.  Cardiovascular: Regular rate and rhythm, normal S1 and S2, and no murmur noted.  GI: No masses or scars. +BS. Soft. No tenderness on palpation.  Skin: Limited bruising, no bleeding, no redness, no warmth, no rashes, no lesions, no jaundice.  Extremities: There is no redness, warmth, or swelling of the joints. No lower extremity edema. No cyanosis.  Neurologic: Awake, alert, oriented to name, place and time. Strength b/l upper and lower extremities 5+.  Vascular access: PIV       Medications     - MEDICATION INSTRUCTIONS -         amLODIPine  10 mg Oral Daily     ascorbic acid  500 mg Oral BID     calcium carbonate (OS-SAADIA) 500 mg (elemental) tablet  500 mg Oral BID w/meals     dexamethasone  3 mg Oral Q8H JOVANI    Followed by     dexamethasone  2 mg Oral Q12H JOVANI    Followed by     [START ON 8/1/2019] dexamethasone  1 mg Oral Daily     fluticasone  2 spray Both Nostrils Daily     pantoprazole  40 mg Oral QAM AC     polyethylene glycol  17 g Oral TID     ranitidine  150 mg Oral BID     sodium chloride (PF)  3 mL Intracatheter Q8H     sodium chloride  3 g Oral TID w/meals     vitamin A  20,000 Units Oral Daily     cholecalciferol  1,000 Units Oral BID     vitamin E  100 Units Oral Daily     zinc sulfate  220 mg Oral Daily       Data   ROUTINE IP LABS (Last four results)  BMP  Recent Labs   Lab 07/29/19  0412 07/28/19  2207 07/28/19  1642 07/28/19  1155 07/28/19  0350  07/27/19  0404  07/26/19  1911    135 133 133 134   < > 131*   < > 133   POTASSIUM 4.4  --   --   --  4.6  --  4.5  --  4.2   CHLORIDE 100  --   --   --  103  --  99  --  101   SAADIA 9.2  --   --   --  9.4  --  9.4  --  9.3   CO2 25  --   --   --  25  --  23  --  24   BUN 24  --   --   --  25  --  18  --  19   CR 0.98  --   --   --  0.99  --  1.10  --  1.06   *  --   --   --  133*  --  124*  --  139*    < > = values in  this interval not displayed.     CBC  Recent Labs   Lab 07/29/19  0412 07/28/19  0350 07/27/19  0404 07/26/19  0244   WBC 18.1* 13.9* 16.6* 20.1*   RBC 3.41* 3.25* 3.48* 3.31*   HGB 8.0* 7.6* 8.3* 7.7*   HCT 26.9* 25.4* 27.8* 25.5*   MCV 79 78 80 77*   MCH 23.5* 23.4* 23.9* 23.3*   MCHC 29.7* 29.9* 29.9* 30.2*   RDW 20.7* 20.1* 20.0* 19.1*    332 378 379     INR  Recent Labs   Lab 07/25/19  0029   INR 0.97

## 2019-07-29 NOTE — TUMOR CONFERENCE
Tumor Conference Information  Tumor Conference:    Specialties Present:  Medical Oncology, Radiation Oncology, Radiology, Pathology  Patient Status:  Current patient, New tumor(s)  Pathology:  Discussed (see comment)  Treatment to Date:  Surgical intervention(s)  Clinical Trial Eligibility:  Not discussed  Recommended Plan:  Referral to (see comment), Other (see comment) (Comment: radiation oncology for discussion of GK treatment, patient to follow up with Dr. Arceo as well)  Did the review exceed 30 minutes?:  did not           Documentation / Disclaimer Cancer Tumor Board Note  Cancer tumor board recommendations do not override what is determined to be reasonable care and treatment, which is dependent on the circumstances of a patient's case; the patient's medical, social, and personal concerns; and the clinical judgment of the oncologist [physician].

## 2019-07-29 NOTE — PLAN OF CARE
Discharge Planner PT  PT 5C  Patient plan for discharge: ARU  Current status: Pt completed supine, seated, and standing LE exercises. Pt SOB during exercise and needed rest breaks between exercise sets to manage SOB.Initiated gait training with single end cane. Pt ambulated ~ 200' x 1, then 500' x 1 with single end cane and SBA of 1. Pt demonstrated sequencing difficulty with cane and was able to improve technique with cues and practice. SOB during ambulation and managed with seated rest.  Barriers to return to prior living situation: Medical condition, decreased gait coordination  Recommendations for discharge: ARU  Rationale for recommendations: Continued intense rehab recommended to progress with left LE strengthening, gait training with cane or least restrictive device, stairs, functional endurance, and balance to prepare for safe return home.         Entered by: Jermaine Nunez 07/29/2019 4:03 PM

## 2019-07-29 NOTE — PROGRESS NOTES
Social Work Services Progress Note    Hospital Day: 8  Date of Initial Social Work Evaluation:  Not completed  Collaborated with:  Patient; patient's significant other, Maynor Teague, NANCY; [facility] ARU admissions Marycruz    Data:  King Gonsalo Bruno is a 73 year old male with PMH of HTN, GERD, CKD, DJD and metastatic penile cancer (lung mets) who presented to the ED after a fall down stairs and found to have a right frontal lobe brain mass with surrounding edema.     Intervention:  PT/OT recommending ARU placement. During IDT rounding, Khalida Teague, NANCY recommended TCU placement as patient will require multiple outpatient follow-ups once placed at a facility.    [facility] TCU referral placed for consideration.    Call received from ZARIA Joel ARU admissions coordinator. [facility] will not be able to accept patient if radiation treatment is needed during facility stay. Admissions inquiring if radiation treatment can be delayed in order for patient to receive about a 7 day stay at ARU.     This writer to follow-up with medical team to further discuss anticipated radiation treatment start date, length of treatment, and [facility] TCU/ARU ability to accept patient based on findings.    Assessment:  Patient and significant other comfortable with recommendations.     Plan:    Anticipated Disposition:  TCU    Barriers to d/c plan:  Insurance authorization; facility acceptance; medical stability    Follow Up:  SWer will continue to be available for discharge needs.    GERRI Hickey Hematology/Oncology Social Worker  Phone: [phone]  Pager: [phone]  [email or web address]

## 2019-07-30 NOTE — PROGRESS NOTES
Social Work Services Progress Note    Hospital Day: 9  Date of Initial Social Work Evaluation:  Not completed  Collaborated with:   TCU Milly Jhaveri    Data:  King Gonsalo Bruno is a 73 year old male with PMH of HTN, GERD, CKD, DJD and metastatic penile cancer (lung mets) who presented to the ED after a fall down stairs and found to have a right frontal lobe brain mass with surrounding edema.     Patient referred to  TCU and ARU for discharge planning.    Intervention:  SWer outreached to  TCU LiaisonMilly, to discuss patient's discharge needs and outpatient follow-up. Per Milly,  TCU would be able to take patient and assist with outpatient follow-up needs.     [facility] completed and faxed ([phone]) -  approval pending insurance authorization    Case #: [id number]    Plan:    Anticipated Disposition:  TCU    Barriers to d/c plan:  Insurance authorization; medical stability; facility acceptance    Follow Up:  SWer will follow-up with Cristian to determine PA approval.    GERRI Hickey  7D Hematology/Oncology Social Worker  Phone: [phone]  Pager: [phone]  [email or web address]

## 2019-07-30 NOTE — PROGRESS NOTES
CLINICAL NUTRITION SERVICES - REASSESSMENT NOTE     Nutrition Prescription    RECOMMENDATIONS FOR MDs/PROVIDERS TO ORDER:     Rec starting thera-vit-M (MVI) due to recent decline in po and significant weight loss    Malnutrition Status:    Severe malnutrition in the context of chronic illness    Recommendations already ordered by Registered Dietitian (RD):  Snacks/supplements:  AM snack: vanilla boost shake + banana   PM snack: Vanilla magic cup + baked chips   HS snack:vanilla pudding+ cookie   Can order additional PRN     Nutrition Education - Reiterated tips to increasing calories and protein in setting of decreased appetite    Future/Additional Recommendations:  If intakes decline, rec calorie counts to help better quantify po intakes. If continues to lose weight despite po intakes can consider enteral nutrition to help supplement po    Consider rechecking vitamin D levels and need for further supplementation (Low 5/15/19)     EVALUATION OF THE PROGRESS TOWARD GOALS   Diet: Regular    Intake: snacks discontinued when pt in ICU.  NPO/CLD 7/25 - 7/26; advanced to regular diet 7/26  -food records showing 100% intakes     NEW FINDINGS   Pt reports that he misses having previous planned snacks as they were discontinued when he was npo/CLD. He is appreciative of RD visit and is motivated to try and re-gain weight. Reiterated strategy of small and frequent po with focus on nutrient dense foods. Pt likes to utilize supplements to help provide additional calories and protein. Went over snack/supplements and placed scheduled regimen for pt.  Weight: admit 58.7 kg (7/22) --> 60.8 kg (7/30)  Labs: Lytes WNL, Euglycemia,   Meds: vitamin C, calcium carbonate, decadron, NaCL 2g BID,D3 2000 units/day,    GI: stooling appropriately    MALNUTRITION  % Intake: </=75% for >/= 1 month (severe)  % Weight Loss: > 5% in 1 month (severe) - PTA  Subcutaneous Fat Loss: Facial region, Upper arm, Lower arm and Thoracic/intercostal:   severe  Muscle Loss: Scapular bone, Thoracic region (clavicle, acromium bone, deltoid, trapezius, pectoral), Upper arm (bicep, tricep):, Dorsal hand, Upper leg (quadricep, hamstring), Patellar region and Posterior calf:  severe  Fluid Accumulation/Edema: Trace per flowsheets  Malnutrition Diagnosis: Severe malnutrition in the context of chronic illness    Previous Goals   Patient to consume % of nutritionally adequate meal trays TID, or the equivalent with supplements/snacks.  Evaluation: Met    Previous Nutrition Diagnosis  Inadequate oral intake related to early satiety, GERD, likely hypercatabolism as evidenced by pt report.    Evaluation: Improving    CURRENT NUTRITION DIAGNOSIS  Inadequate oral intake related to early satiety, GERD, likely hypercatabolism as evidenced by pt report.     INTERVENTIONS  Implementation  Medical food supplement therapy - per above  Modify composition of meals/snacks - per above  Nutrition education -reiterated strategies to increase po and assist in wt maintenance vs gain    Goals  1. Patient to consume % of nutritionally adequate meal trays TID, or the equivalent with supplements/snacks.    2. Pt to not fall below 129 lbs VS overall goal of wt gain    Monitoring/Evaluation  Progress toward goals will be monitored and evaluated per protocol.    Sana Simon MS, RD, , LD.  5C/BMT Pager:[phone]

## 2019-07-30 NOTE — PLAN OF CARE
VSS. No c/o n/v/d or pain. Pt up with stand by assist. PIV saline locked. Anticipate discharge tomorrow to rehab. His significant other will be here tomorrow around 11:00 for discharge teaching. Voiding spontaneously. Received ativan before bed. Continue POC.     Problem: Adult Inpatient Plan of Care  Goal: Plan of Care Review  7/29/2019 2107 by Citlalli Robbins RN  Outcome: Improving     Problem: Adult Inpatient Plan of Care  Goal: Absence of Hospital-Acquired Illness or Injury  7/29/2019 2107 by Citlalli Robbins RN  Outcome: Improving     Problem: Adult Inpatient Plan of Care  Goal: Optimal Comfort and Wellbeing  7/29/2019 2107 by Citlalli Robbins RN  Outcome: Improving     Problem: Adult Inpatient Plan of Care  Goal: Readiness for Transition of Care  7/29/2019 2107 by Citlalli Robbins RN  Outcome: Improving     Problem: Adult Inpatient Plan of Care  Goal: Rounds/Family Conference  7/29/2019 2107 by Citlalli Robbins RN  Outcome: Improving     Problem: Fall Injury Risk  Goal: Absence of Fall and Fall-Related Injury  7/29/2019 2107 by Citlalli Robbins RN  Outcome: Improving     Problem: Cardiac Disease Comorbidity  Goal: Cardiac Disease  Description  Patient comorbidity will be monitored for signs and symptoms of Cardiac Disease.  Problems will be absent, minimized or managed by discharge/transition of care.  7/29/2019 2107 by Citlalli Robbins RN  Outcome: Improving     Problem: Chronic Respiratory Difficulty Comorbidity  Goal: Chronic Respiratory Difficulty  Description  Patient comorbidity will be monitored for signs and symptoms of Respiratory Difficulty (Chronic) condition.  Problems will be absent, minimized or managed by discharge/transition of care.  Outcome: Improving

## 2019-07-30 NOTE — PLAN OF CARE
Discharge Planner OT   Patient plan for discharge: TCU  Current status: Pt making progress towards ADL goals. Pt indep with recall of craniotomy precautions, ambulating to bathroom SBA with SEC, completed repetitive toilet transfers x10 with supervision to increase safety with hand placement and promote proximal strengthening, stood at sink with supervision for g/h tasks.   Barriers to return to prior living situation: falls risk, coordination, craniotomy precautions, lives alone   Recommendations for discharge: TCU   Rationale for recommendations: Pt will benefit from continued therapy to maximize safety and indep within precautions prior to home.        Entered by: Gali Ham 07/30/2019 3:53 PM

## 2019-07-30 NOTE — PLAN OF CARE
Afebrile. VSS. Denies N/V/D. No complaints of pain. Stand by assist, on bed alarm. Needs Magnesium replacement. Adequate urine output and had 1 BM over night. Pt was not able to sleep very well and kept on wanting to urinated every hour. Discharged today to TCU. Continue with POC.            Problem: Adult Inpatient Plan of Care  Goal: Plan of Care Review  7/30/2019 0501 by Janet Guajardo, RN  Outcome: No Change     Problem: Adult Inpatient Plan of Care  Goal: Patient-Specific Goal (Individualization)  7/30/2019 0501 by Janet Guajardo, RN  Outcome: No Change     Problem: Adult Inpatient Plan of Care  Goal: Absence of Hospital-Acquired Illness or Injury  7/30/2019 0501 by Janet Guajardo, RN  Outcome: No Change     Problem: Adult Inpatient Plan of Care  Goal: Optimal Comfort and Wellbeing  7/30/2019 0501 by Janet Guajardo, RN  Outcome: No Change     Problem: Adult Inpatient Plan of Care  Goal: Readiness for Transition of Care  7/30/2019 0501 by Janet Guajardo, RN  Outcome: No Change

## 2019-07-31 NOTE — PLAN OF CARE
"Blood pressure 122/82, pulse 68, temperature 99  F (37.2  C), temperature source Axillary, resp. rate 16, height 1.85 m (6' 0.84\"), weight 60.8 kg (134 lb 0.6 oz), SpO2 98 %.  Pt had sleep interrupted due to frequent void, q 1 to 1/2 hour. Pt c/o feeling hungry and requested peanut butter/jelly on toast. Low magnesium and phos levels this morning and he got 2 g of Magnesium sulfate and potassium phos is infusing . Neuro intact. Seems steady on his feet. Head incision with staples C/D/I. Continue to monitor pt and continue with plan of care. Plan is pt waiting for TCU placement.  Problem: Adult Inpatient Plan of Care  Goal: Plan of Care Review  7/31/2019 0650 by Michelle Peralta RN  Outcome: No Change  7/30/2019 1905 by Tootie Lane RN  Outcome: No Change  7/30/2019 1833 by Tootie Lane RN  Outcome: Improving     Problem: Adult Inpatient Plan of Care  Goal: Patient-Specific Goal (Individualization)  Outcome: No Change     Problem: Adult Inpatient Plan of Care  Goal: Optimal Comfort and Wellbeing  Outcome: No Change     Problem: Fall Injury Risk  Goal: Absence of Fall and Fall-Related Injury  7/31/2019 0650 by Michelle Peralta RN  Outcome: No Change  7/30/2019 1905 by Tootie Lane RN  Outcome: No Change  7/30/2019 1833 by Tootie Lane RN  Outcome: Improving     Problem: Memory Impairment Comorbidity  Goal: Memory Impairment Comorbidity  Description  Patient comorbidity will be monitored for signs and symptoms of Memory Impairment condition.  Problems will be absent, minimized or managed by discharge/transition of care.  7/31/2019 0650 by Michelle Peralta RN  Outcome: No Change  7/30/2019 1905 by Tootie Lane RN  Outcome: Improving  7/30/2019 1833 by Tootie Lane RN  Outcome: Improving     Problem: Adult Inpatient Plan of Care  Goal: Readiness for Transition of Care  7/30/2019 1833 by Tootie Lane RN  Outcome: Improving     "

## 2019-07-31 NOTE — PROGRESS NOTES
[facility] [place]    Hematology / Oncology Progress Note    Date of Admission: 7/22/2019   Date of Service (when I saw the patient): 07/31/2019     Assessment & Plan   King Gonsalo Bruno is a 73 year old male with PMH of HTN, GERD, CKD, DJD and metastatic penile cancer (lung mets) who presented to the ED after a fall down stairs and found to have a right frontal lobe brain mass with surrounding edema. Now s/p right craniotomy and tumor resection on 7/25/19 and awaiting TCU placement.     # Metastatic right frontal lobe mass  # S/p right craniotomy and tumor resection on 7/25/19  MRI brain 7/22 with peripheral enhancing right frontal lobe mass with extensive adjacent edema. 3mm right to left midline shift. S/p right sided craniotomy 7/25. Pathology consistent with metastatic carcinoma (likely squamous). Facial droop and LUE weakness improved.   - Of note, anaerobic culture from brain lesion growing gram positive cocci in clusters on day 5 (broth only). Clinically stable, so likely contaminant.   - [facility] consulted, recs appreciated. Has now signed off.  - Continue Dexamethasone taper per neurosurgery. Will end 8/2.    - Will need to schedule radiation therapy, ok to start as soon as 8/8 per NSG. Spoke with rad onc on 7/30. Will likely tentatively be seen on 8/12 for consult and then start gamma knife on 8/13 or 8/19. Reached out to rad onc scheduling 7/31, awaiting reply.   - F/u in NS clinic for wound check and staple removal on 8/9. Brain MRI and f/u with Dr. Allen in 3 months (scheduled for 10/21 and 10/22).     # Fall  Trauma w/u in the ED with head CT (see results above). CTA without any carotid stenosis. CT C spine without any traumatic changes. CXR without traumatic changes (overall stable appearing lung mets). AP pelvis negative.   - PT/OT. Recommending TCU.     # Metastatic penile cancer  Diagnosed in 2017, stage 2. Underwent 4 cycles of chemotherapy with cisplatin and gemzar,  folled by radical penectomy, urethectomy, perineal urethostomy and bilateral inguinal node dissection 3/8/18. Plan was to proceed with XRT but he was then found to have pulmonary mets. He received Keytruda 8/18-2/28/18. Found to have progressive disease. Started on palliative docetaxel 3/14/19 and received cycle #6 on 7/5/19. He follows with Dr. Arceo. He feels that he has been having more fatigue and side effects from chemotherapy recently. Not sure he wants to continue  - F/u as scheduled for repeat CT CAP on and f/u with Dr. Arceo on 8/6 and 8/7.      # GERD  Persistent symptoms despite bid omeprazole  - Ranitidine bid and omeprazole bid.      # HTN  - Continue pta amlodipine 10 mg daily.      # CKD  Creatinine at baseline.     # Hyponatremia  Na 134 on 7/27. Goal: normonatremia. NSG started 2% saline at 50 mL/hr, stopped on 7/27. Started sodium chloride tabs from 4 g TID on 7/27 with plan to decrease by 1-2 g/day.  - Decrease salt tabs to 1g TID (x7/31). Will need Na monitored daily at TCU.       # Chronic anemia  Multifactorial: chemotherapy, CKD, mild iron deficiency (did not tolerate PO iron), chronic disease.  - Continue to monitor, transfuse for hgb <7. No transfusions needs today.      DVT Prophylaxis: SCDs. Per NSG, OK to start DVT ppx on 8/1  FEN: Regular diet  Code Status: Full Code     Disposition: Awaiting TCU acceptance. Medically ready to discharge.     The patient's care was discussed with Dr. Khoury.     SARITHA Mills-C  Hematology/Oncology  Pager: [phone]     Addendum:  Pt was seen and evaluated by me independently of the NANCY.  Reviewed labs, imaging and vitals.  On exam, pt comfortable in bed, conversant, in NAD      We discussed transition to TCU.  He is awaiting bed placement and will likely be discharged in the am.    He has close follow up with neurosurgery and radiation oncology.    Yessica Khoury MD    Interval History     Deandre reports to be feeling well today.Continues to work  with PT/OT. Eating/drinking well with good UOP and regular BMs. Afebrile, no pain. Ready to discharge to TCU, anxious to leave the hospital.     Physical Exam   Temp: 97.5  F (36.4  C) Temp src: Axillary BP: 107/66 Pulse: 68 Heart Rate: 76 Resp: 16 SpO2: 100 % O2 Device: None (Room air)    Vitals:    07/28/19 0909 07/30/19 0759 07/31/19 0742   Weight: 60.6 kg (133 lb 11.2 oz) 60.8 kg (134 lb 0.6 oz) 60.6 kg (133 lb 9.6 oz)     Vital Signs with Ranges  Temp:  [96.9  F (36.1  C)-99  F (37.2  C)] 97.5  F (36.4  C)  Pulse:  [68-76] 68  Heart Rate:  [72-76] 76  Resp:  [16-18] 16  BP: (107-141)/(66-86) 107/66  SpO2:  [98 %-100 %] 100 %  I/O last 3 completed shifts:  In: 1310 [P.O.:960; I.V.:350]  Out: 1375 [Urine:1375]    Constitutional: Pleasant male seen sitting up in bed. No apparent distress, and appears stated age.  Eyes: Lids and lashes normal, sclera clear, conjunctiva normal.  ENT: Normocephalic, oral cavity without erythema or exudates, gums normal and good dentition.   Respiratory: No increased work of breathing, good air exchange, clear to auscultation bilaterally, no crackles or wheezing.  Cardiovascular: Regular rate and rhythm, normal S1 and S2, and no murmur noted.  GI: No masses or scars. +BS. Soft. No tenderness on palpation.  Skin: Limited bruising, no bleeding, no redness, no warmth, no rashes, no lesions, no jaundice. Well healing stapled lesion of his scalp, non erythematous.   Extremities: There is no redness, warmth, or swelling of the joints. No lower extremity edema. No cyanosis.  Neurologic: Awake, alert, oriented to name, place and time.   Vascular access: PIV c/d/i        Medications     - MEDICATION INSTRUCTIONS -         amLODIPine  10 mg Oral Daily     calcium carbonate (OS-SAADIA) 500 mg (elemental) tablet  500 mg Oral BID w/meals     [START ON 8/1/2019] dexamethasone  1 mg Oral Daily     fluticasone  2 spray Both Nostrils Daily     pantoprazole  40 mg Oral QAM AC     polyethylene glycol  17 g  Oral Daily     ranitidine  150 mg Oral BID     sodium chloride (PF)  3 mL Intracatheter Q8H     sodium chloride  2 g Oral BID w/meals     cholecalciferol  1,000 Units Oral BID       Data   ROUTINE IP LABS (Last four results)  BMP  Recent Labs   Lab 07/31/19  0401 07/30/19  0410 07/29/19  2242 07/29/19  1642  07/29/19  0412  07/28/19  0350    134 134 133   < > 133   < > 134   POTASSIUM 4.2 4.1  --   --   --  4.4  --  4.6   CHLORIDE 101 100  --   --   --  100  --  103   SAADIA 9.0 9.2  --   --   --  9.2  --  9.4   CO2 24 26  --   --   --  25  --  25   BUN 28 25  --   --   --  24  --  25   CR 1.03 0.98  --   --   --  0.98  --  0.99   GLC 98 116*  --   --   --  124*  --  133*    < > = values in this interval not displayed.     CBC  Recent Labs   Lab 07/31/19  0401 07/30/19 0410 07/29/19  0412 07/28/19  0350   WBC 15.3* 15.2* 18.1* 13.9*   RBC 3.17* 3.33* 3.41* 3.25*   HGB 7.5* 8.0* 8.0* 7.6*   HCT 25.5* 26.4* 26.9* 25.4*   MCV 80 79 79 78   MCH 23.7* 24.0* 23.5* 23.4*   MCHC 29.4* 30.3* 29.7* 29.9*   RDW 21.2* 20.9* 20.7* 20.1*    301 325 332     INR  Recent Labs   Lab 07/25/19  0029   INR 0.97

## 2019-07-31 NOTE — PROGRESS NOTES
Social Work Services Progress Note    Hospital Day: 10  Collaborated with:  [facility]; patient    Data:  King Gonsalo Bruno is a 73 year old male with PMH of HTN, GERD, CKD, DJD and metastatic penile cancer (lung mets) who presented to the ED after a fall down stairs and found to have a right frontal lobe brain mass with surrounding edema. Now s/p right craniotomy and tumor resection on 7/25/19 and awaiting TCU placement.    Intervention:      7/31/19 1138: This writer [facility] to determine status of PAC Prior Approval. At time of call, patient's case was still under review.  SWer met with the patient in his hospital room. Discussed status of insurance authorization and will continue to update as information becomes available.     7/31/19 1414: [facility] still pending review. This writer [facility] to confirm all necessary clinicals were received; nurse confirmed all necessary documentation was obtained. Nurse requested more time to review case.     7/31/19 1543: This writer reviewed case with [facility] Care Management Manager to assist with escalating insurance authorization. Patient and caregiver updated.    7/31/19 1552: [facility] received. Authorization #: [id number]. This writer outreached to  TCU Liaison to provide authorization number.  TCU looking to take patient tomorrow, 8/1/19.    Assessment:  Patient and caregiver expressed frustration with delay in discharge but understanding of efforts made.    Plan:    Anticipated Disposition:  TCU    Barriers to d/c plan:  Insurance authorization    Follow Up:  SWer will await insurance authorization approval from [facility].    GERRI Hickey  7D Hematology/Oncology Social Worker  Phone: [phone]  Pager: [phone]  [email or web address]

## 2019-08-01 PROBLEM — R53.81 PHYSICAL DECONDITIONING: Status: ACTIVE | Noted: 2019-01-01

## 2019-08-01 NOTE — PLAN OF CARE
Occupational Therapy Discharge Summary    Reason for therapy discharge:    Discharged to transitional care facility.    Progress towards therapy goal(s). See goals on Care Plan in [facility] electronic health record for goal details.  Goals partially met.  Barriers to achieving goals:   discharge from facility.    Therapy recommendation(s):    Continued therapy is recommended.  Rationale/Recommendations:  Increase safety and IND with I/ADLs given falls risk, coordination, craniotomy precautions, and pt lives alone .

## 2019-08-01 NOTE — PLAN OF CARE
"Blood pressure 122/65, pulse 75, temperature 98  F (36.7  C), temperature source Axillary, resp. rate 18, height 1.85 m (6' 0.84\"), weight 60.6 kg (133 lb 9.6 oz), SpO2 100 %.  Pt is A/O x 4. No memory impairment noted. Calling /using call light appropriately calling for assist. Pt main issue was not getting enough rest due to frequent trips to the commode/bathroom  q 30 minute or less and voiding averaging 200 ML. Otherwise pt offered no complaints.Low Magnesium and phos levels this morning and he got 2 g of magnesium sulfate and 15 mmol of potassium phos is infusing over 4 hours. Still awaiting TCU placement pending insurance approval. Continue with plan of care.  Problem: Adult Inpatient Plan of Care  Goal: Plan of Care Review  8/1/2019 0700 by Michelle Peralta RN  Outcome: No Change  7/31/2019 1900 by Kelsey Munson RN  Outcome: Improving     Problem: Adult Inpatient Plan of Care  Goal: Readiness for Transition of Care  7/31/2019 1900 by Kelsey Munson RN  Outcome: Improving  Goal: Rounds/Family Conference  7/31/2019 1900 by Kelsey Munson RN  Outcome: Improving     Problem: Adult Inpatient Plan of Care  Goal: Optimal Comfort and Wellbeing  8/1/2019 0700 by Michelle Peralta RN  Outcome: No Change  7/31/2019 1900 by Kelsey Munson RN  Outcome: Improving     "

## 2019-08-01 NOTE — PLAN OF CARE
AVSS on room air. Pt denies pain or nausea. Good appetite. Phos replaced this morning. New PIV replaced this evening. Sodium tablet dose decreased to 1g TID. Pt pleasant and cooperative of cares. Calling appropriately with needs. Discharge to TCU planned, pending insurance approval.    Problem: Adult Inpatient Plan of Care  Goal: Patient-Specific Goal (Individualization)  7/31/2019 0650 by Michelle Peralta RN  Outcome: No Change  Goal: Absence of Hospital-Acquired Illness or Injury  7/31/2019 0650 by Michelle Peralta RN  Outcome: No Change     Problem: Cardiac Disease Comorbidity  Goal: Cardiac Disease  Description  Patient comorbidity will be monitored for signs and symptoms of Cardiac Disease.  Problems will be absent, minimized or managed by discharge/transition of care.  7/31/2019 0650 by Michelle Peralta RN  Outcome: No Change     Problem: Adult Inpatient Plan of Care  Goal: Plan of Care Review  7/31/2019 1900 by Kelsey Munson RN  Outcome: Improving  7/31/2019 0650 by Michelle Peralta RN  Outcome: No Change  Goal: Optimal Comfort and Wellbeing  7/31/2019 1900 by Kelsey Munson RN  Outcome: Improving  7/31/2019 0650 by Michelle Peralta RN  Outcome: No Change  Goal: Readiness for Transition of Care  Outcome: Improving  Goal: Rounds/Family Conference  Outcome: Improving     Problem: Fall Injury Risk  Goal: Absence of Fall and Fall-Related Injury  7/31/2019 1900 by Kelsey Munson RN  Outcome: Improving  7/31/2019 0650 by Michelle Peralta RN  Outcome: No Change

## 2019-08-01 NOTE — LETTER
ATTN: [employer]    King Gonsalo Bruno  1945  AUTH# [id number]    Please see attached PT/OT daily documentation with session minutes.  Clinical updates to be faxed separately.    Please call or email with any additional questions or requests.     Sincerely,  Sudha John MSN, RN  MDS Coordinator - [facility]  PH: [phone]  FAX: [phone]  EMAIL: [email or web address]    PT DAILY DOCUMENTATION     08/02/19 1429   Signing Clinician's Name / Credentials   Signing clinician's name / credentials Milena Gonsalez PT   Quick Adds   Rehab Discipline PT   Therapeutic Exercise   Symptoms Noted During/After Treatment fatigue   Treatment Detail PT: pt performed Nustep for conditioning on level 2 for 10 min; avg 51 steps/min, 1.2 METs and 37 espinal. OMNI 4. No SOB. Pt reports likes this activity; states has a stationary bike at home   Additional Documentation   Rehab Comments PT: eval completed, rx initiated   PT Plan PT: stand ex; gait around obstacles--progress to no AD as able; amb outdoors   Total Session Time   Total Session Time (minutes) 50 minutes  (20 eval; 13 TE; 17 gait)   ARC or TCU Only   What unit is patient on? TCU   Bed Mobility: Turning side to side/Roll Left and Right   Patient Performance Independent   Staff Performance No setup or physical help (No setup or physical help from staff)   Describe Performance PT: IND   Bed Mobility: Sit to lying   Patient Performance Independent   Staff Performance No setup or physical help (No setup or physical help from staff)   Describe Performance PT: IND   Bed Mobility: Lying to sitting on the side of bed   Patient Performance Independent   Staff Performance No set up or physical help (No set up or physical help from staff)   Describe Performance PT:IND   Transfers: Sit to Stand   Patient Performance Supervision or verbal cues   Staff Performance Set up help only   Equipment Used Straight cane   Describe Performance PT: SBA sit<.>stand from bed and armchair  "  Transfers: Chair/Bed transfers   Patient Performance Supervision or verbal cues   Staff Performance Set up help only   Equipment Used Straight cane   Describe Performance PT: pivot with cane, sBA   Ambulation   Walks in room:Patient Performance Supervision or verbal cues   Walks in room: Staff Performance Set up help only   Walk in lipscomb: Patient Performance Supervision or verbal cues   Walks in lipscomb: Staff Performance Set up help only   Mobility device used straight cane   Describe Performance PT: amb with cane 500 ft x 1; 155 ft x 1; SBA; step thru pattern, slowed pace compared to pt's usual; able to scan environment without LOB   Walk 10 Feet   Patient Performance Supervision or verbal cues   Describe Performance PT: see amb   Walk 10 Feet on uneven surfaces   Patient Performance Supervision or verbal cues   Describe Performance PT: see amb   Walk 50 Feet with Two Turns   Patient Performance Supervision or verbal cues   Describe Performance PT: see amb   Walk 150 Feet   Patient Performance Supervision or verbal cues   Describe Performance PT: see amb   Locomotion   Move on unit: Patient Performance Supervision or verbal cues   Move on unit: Staff Performance Set up help only   Move off unit: Patient Performance Supervision or verbal cues   Move off unit: Staff Performance Set up help only   Mobility device used straight cane   Describe Performance PT: see amb   Wheel 50 Feet   Reason Not Done Activity not applicable   Wheel 150 Feet   Reason Not Done Activity not applicable   1 Step (curb)   Patient Performance Supervision or verbal cues   Describe Performance PT: SBA 6\" curb with cane   4 Steps   Patient Performance Supervision or verbal cues   Describe Performance PT: up/down 3 x 4 stairs with rail and cane; SBA; reciprocal up./ single step down transition to reciprocal. Pt reports L LE had been weaker before surgery but about same now.   12 Steps   Patient Performance Supervision or verbal cues   Describe " Performance PT: see 4 steps   Car Transfer   Patient Performance Supervision or verbal cues   Describe Performance PT: simulated car transfer, SBA   Picking up Object   Reason Not Done Safety concerns   Describe Performance PT: not attempted due to craniotomy       08/03/19 1130   Signing Clinician's Name / Credentials   Signing clinician's name / credentials Larissa Sharp PTA   Quick Adds   Rehab Discipline PT   PT Assistant Visit Number 1   Therapeutic Exercise   Treatment Detail PT: Focus on B LE strength and endurance with standing ther ex at countertop for support: heel/toe raises x 15, partial squats x 10, high marching x 30, hip abd/add x 15 B, hip extension x 15 B, hamstring curls x 15 B, cuing throughout to not lean on countertop and for specific ex form and technique.    Additional Documentation   PT Plan PT: dynamic gait with SEC (around obstacles, head turns, amb outdoors)-progress to no AD as able, nustep, standing ther ex   Total Session Time   Total Session Time (minutes) 28 minutes  (3 ther act, 10 gait, 15 ther ex)   ARC or TCU Only   What unit is patient on? TCU   Transfers: Sit to Stand   Patient Performance Supervision or verbal cues   Transfers: Chair/Bed transfers   Patient Performance Supervision or verbal cues   Ambulation   Walks in room:Patient Performance Supervision or verbal cues   Describe Performance PT: pt ambulated with SEC ~210' x 2, pt cued for vertical and horizontal head turns-greatest diffuculty looking down, CGA    Lower Body Dressing (Putting On/Taking-Off Footwear)   Describe Performance PT: pt kiara B socks and shoes sitting EOB      08/03/19 1400   Signing Clinician's Name / Credentials   Signing clinician's name / credentials Wilma العلي DPT   Quick Adds   Rehab Discipline PT   Therapeutic Exercise   Minutes of Treatment 30   Treatment Detail PT: Pt performed NuStep bike for CV endurance and strengthening, x 10 min continuous, WL: 2, Avg SPM: 58, Avg METs: 2.5, OMNI  "followin/10. Pt also ambulated ~100 ft x 1, then ~500 ft x 2 with SEC, outside over uneven surfaces to present dynamic ambulation challenge. Pt tolerated well only requiring one seated rest break between sets for resolve of fatigue.    Additional Documentation   Rehab Comments Be aware of falls risk, skyler when ambulating with cane, watch for self-monitoring of safety.    PT Plan PT: dynamic gait (outside?) with SEC (around obstacles, head turns, amb outdoors)-progress to no AD as able, nustep, standing ther ex   Total Session Time   Total Session Time (minutes) 35 minutes  (30 TE, 5 TA)   Bed Mobility: Lying to sitting on the side of bed   Patient Performance Independent   Staff Performance No set up or physical help (No set up or physical help from staff)   Lower Body Dressing (Putting On/Taking-Off Footwear)   Patient Performance Partial/moderate assist \"includes weight bearing assist of trunk or limbs\"   Staff Performance Set up help only   Describe Performance PT: min A for donning socks/shoes at EOB      19 1036   Signing Clinician's Name / Credentials   Signing clinician's name / credentials Sana Padilla, ELIGIO   Quick Adds   Rehab Discipline PT   PT Assistant Visit Number 2   Therapeutic Exercise   Symptoms Noted During/After Treatment fatigue   Treatment Detail PTA: Pt performed nustep activity using B UEs and LEs on level 3 x10 min with 58 avg steps per min. OMNI 5. Seated rest taken at end d/t fatigue. Pt performed standing ex for strengthening and endurance using counter top for light hand hold for added balance challenge including heel/toe raises, mini squats, marching, hip abd/add, hip ext, and knee lifts x10 reps B. Pt tolerated well overall. Seated rest taken at end d/t fatigue.   Additional Documentation   Rehab Comments PTA: -30 min d/t bathroom use for bm at start of PT session.   PT Plan PTA: Amb without AD, amb outdoors weather permitting, stairs for strengthening and endurance, " dynamic gait.   Total Session Time   Total Session Time (minutes) 30 minutes  (ther ex 23', gait 7')   Transfers: Sit to Stand   Patient Performance Independent   Staff Performance No set up or physical help (No set up or physical help from staff)   Ambulation   Walks in room:Patient Performance Supervision or verbal cues   Walks in room: Staff Performance No set up or physical help ( No set up or physical help from staff)   Walk in lipscomb: Patient Performance Supervision or verbal cues   Walks in lipscomb: Staff Performance No set up or physical help ( No set up or physical help from staff)   Mobility device used straight cane   Describe Performance PTA: pt amb 160 ft x2 using SEC SBA. Slow pace. Good sequencing with SEC. Seated rest taken after each trial d/t mild fatigue.   Transfers: Toilet transfers   Patient Performance Supervision or verbal cues   Staff Performance Set up help only   Describe Performance PTA: SBA on toilet using grab bar and SEC. Pt requesting male NA to assist off when done. Call light within reach.   Hygiene/Grooming   Patient Performance Supervision or verbal cues   Staff Performance Set up help only   Describe Performance PTA: SBA in standing with lowering pants. Call light within reach. Pt requesting male NA assist off when done.     OT DAILY DOCUMENTATION     08/02/19 1200   Signing Clinician's Name / Credentials   Signing clinician's name / credentials carie lofton OTNANDA Gray Adds   Rehab Discipline OT   ADL Training   Minutes of Treatment 45   Symptoms Noted During/After Treatment fatigue   Treatment Instruction in compensatory methods;ADL training within precautions;Tasks graded to facilitate independence   Additional Documentation   Rehab Comments OT watch safe use of cane, does he remember it, safe set up safe grabbing it etc. use caution for fall risk. weaker on L side UE LE   OT Plan OT shower assessment. full body dressing    Total Session Time   Total Session Time (minutes) 60  "minutes  (OT 15 min eval 45 min ADL)   ARC or TCU Only   What unit is patient on? TCU   Bed Mobility: Lying to sitting on the side of bed   Patient Performance Supervision or verbal cues   Staff Performance Set up help only   Describe Performance OT SBA set up   Transfers: Sit to Stand   Patient Performance Supervision or verbal cues   Staff Performance Set up help only   Equipment Used Straight cane   Describe Performance OT set up SBA cue as needed   Transfers: Chair/Bed transfers   Patient Performance Partial/moderate assist \"includes weight bearing assist of trunk or limbs\"   Staff Performance One person assist (one person physical assist)   Equipment Used Straight cane   Describe Performance OT min assist cane cue as needed   Ambulation   Walks in room:Patient Performance Partial/moderate assist \"includes weight bearing assist of trunk or limbs\"   Walks in room: Staff Performance One person assist (one person physical assist)   Mobility device used straight cane   Describe Performance OT min assist cane cue as needed   Walk 10 Feet   Patient Performance Partial/moderate assist \"includes weight bearing assist of trunk or limbs\"   Describe Performance OT min assist cane cue as needed   Toileting Hygiene   Patient Performance Supervision or verbal cues   Staff Performance No set up or physical help (No set up or physical help from staff)   Describe Performance OT tall toilet grab bar    Transfers: Toilet transfers   Patient Performance Partial/moderate assist \"includes weight bearing assist of trunk or limbs\"   Staff Performance One person assist (one person physical assist)   Equipment Used Straight cane   Describe Performance OT min assist cane tall toilet grab bar   Hygiene/Grooming   Patient Performance Supervision or verbal cues   Staff Performance No set up or physical help ( No set up or physical help from staff)   Describe Performance OT SBA cue cane used at sink in standing       08/03/19 1200   Signing " Clinician's Name / Credentials   Signing clinician's name / credentials CAESAR Landeros Adds   Rehab Discipline OT   ADL Training   Minutes of Treatment 45   Treatment Detail OT: shower assessment   Additional Documentation   Rehab Comments OT watch safe use of cane, does he remember it, safe set up safe grabbing it etc. use caution for fall risk. weaker on L side UE LE   OT Plan OT: toileting/hygiene - oral cares - standing UE g/h at EOS, higher level IADLs, dynavision, BUE strengthening   Total Session Time   Total Session Time (minutes) 45 minutes  (45 adl)   ARC or TCU Only   What unit is patient on? TCU   Bed Mobility: Turning side to side/Roll Left and Right   Patient Performance Independent   Staff Performance No setup or physical help (No setup or physical help from staff)   Describe Performance OT: IND w/bed mobility   Transfers: Sit to Stand   Patient Performance Supervision or verbal cues   Staff Performance Set up help only   Describe Performance OT: STS shower chair <> grab bar w/supervision for safety   Ambulation   Walks in room:Patient Performance Supervision or verbal cues   Describe Performance OT: supervision while ambulating w/cane - cuing to navigate envirionment   Upper Body Dressing   Patient Performance Supervision or verbal cues   Staff Performance Set up help only   Describe Performance OT: Supervision while standing to kiara/doff UE shirt w/set up provided   Lower Body Dressing (Pants/Undergarments)   Patient Performance Supervision or verbal cues   Staff Performance Set up help only   Describe Performance OT: close supervision w/pt using grab bar for unilateral realse while standing to kiara/doff pants. performed donning underwear while seated   Lower Body Dressing (Putting On/Taking-Off Footwear)   Patient Performance Supervision or verbal cues   Staff Performance Set up help only   Describe Performance OT: supervision while seated to kiara/doff scosk and shoes while seated  "  Bathing   Patient Performance Supervision or verbal cues   Staff Performance Set up help only   Describe Performance OT: pt able to wash/rinse/dry 10/10 body parts while alternating between standing and sitting on shower chair w/supervision for safety provided. pt demo's good insght into safety awareness and crainial precautions while bathing and dressing      08/05/19 1410   Signing Clinician's Name / Credentials   Signing clinician's name / credentials mily Gray Adds   Rehab Discipline OT   Therapeutic Activity   Symptoms Noted During/After Treatment None   Treatment Detail OT stoneeit stood to complete Dynavision wit Mode A runing score of 17 with avergae time of 3.41, Mode B running score of 23 witgh average time of 2.38 - educatedo n purpose adn score of screeen and in agreement with plan no LOb noted mild fatigue noted    ADL Training   Symptoms Noted During/After Treatment fatigue   Treatment Instruction in compensatory methods;Adaptive equipment training;Environment structured   Treatment Detail OT tub/shwoer transfer. bed mobitly , funcitoanl mobilty , Dynavision, dressing    Additional Documentation   Rehab Comments OT patient moving slowly but autious with SEC used  - does have some slight cross over at times with mobility but appears to \"catch\" self    OT Plan OT: toileting/hygiene - oral cares - standing UE g/h at EOS, higher level IADLs, dynavision, BUE strengthening   Total Session Time   Total Session Time (minutes) 45 minutes  (45 ADL)   ARC or TCU Only   What unit is patient on? TCU   Bed Mobility: Lying to sitting on the side of bed   Patient Performance Independent   Staff Performance No set up or physical help (No set up or physical help from staff)   Describe Performance OT IND supine to sit EOb to his R side no rail used    Transfers: Sit to Stand   Patient Performance Independent   Staff Performance No set up or physical help (No set up or physical help from staff)   Equipment Used " "Straight cane   Describe Performance OT IND sit<> stand form EOb and chair with arms   Transfers: Chair/Bed transfers   Patient Performance Supervision or verbal cues   Staff Performance Set up help only   Equipment Used Straight cane   Describe Performance OT SBA for transfer to/from chair with arms    Ambulation   Walks in room:Patient Performance Supervision or verbal cues   Walks in room: Staff Performance No set up or physical help ( No set up or physical help from staff)   Walk in lipscomb: Patient Performance Supervision or verbal cues   Walks in lipscomb: Staff Performance Set up help only   Mobility device used straight cane   Describe Performance OT_ ambulated with SEC to and form gym area ad on /off elevaotr with set upneeded no LOB noted milld sway  and some cross over of legs but quicly \"catching\" self with support of SEC one reest break about 120 feet x 2    Lower Body Dressing (Putting On/Taking-Off Footwear)   Patient Performance Supervision or verbal cues   Staff Performance Set up help only   Describe Performance OT SBA for donning ssocks in bed and donned shoes (slip on) with setup    Bathing   Patient Performance Supervision or verbal cues   Bathing Transfer Assist Yes   Staff Performance Set up help only   Describe Performance OT SBA and setup for transfer in.out of the tub with a chair and use of one grab ar - tolerated well with cues for safetu - educatedon use of a chair for home and in agreement with plan- did offer ideas for purchase of item either on line or from medical supply stores  - patient reprots he willlook into this on his own , but will check back wtih therapist                      "

## 2019-08-01 NOTE — DISCHARGE SUMMARY
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 Now s/p right craniotomy and tumor resection on 7/25/19 and doing well. Hospitalization complicated by ongoing hyponatremia and hypophosphatemia requiring replacements as needed. He will discharge to  TCU today with close follow up with his primary oncologist, Dr. Arceo, neurosurgery and radiation oncology.     Hospital Course   King Gonsalo Bruno was admitted on 7/22/2019.  The following problems were addressed during his hospitalization:    # Metastatic right frontal lobe mass  # S/p right craniotomy and tumor resection on 7/25/19  MRI brain 7/22 with peripheral enhancing right frontal lobe mass with extensive adjacent edema. 3mm right to left midline shift. S/p right sided craniotomy 7/25. Pathology consistent with metastatic carcinoma (likely squamous). Facial droop and LUE weakness improved.   - Of note, anaerobic culture from brain lesion growing gram positive cocci in clusters on day 5 (broth only). Clinically stable, so likely contaminant.   - NSG consulted, recs appreciated. Has now signed off.  - Continue Dexamethasone taper per neurosurgery. Will end 8/2.    - Will need to schedule radiation therapy, ok to start as soon as 8/8 per NSG. He will tentatively be seen on 8/12 for  consult and then start gamma knife on 8/13 or 8/19.   - F/u in [facility] clinic for wound check and staple removal on 8/9. Brain MRI and f/u with Dr. Allen in 3 months (scheduled for 10/21 and 10/22).     # Fall  Trauma w/u in the ED with head CT (see results above). CTA without any carotid stenosis. CT C spine without any traumatic changes. CXR without traumatic changes (overall stable appearing lung mets). AP pelvis negative.   - PT/OT. Recommending TCU.     # Metastatic penile cancer  Diagnosed in 2017, stage 2. Underwent 4 cycles of chemotherapy with cisplatin and gemzar, folled by radical penectomy, urethectomy, perineal urethostomy and bilateral inguinal node dissection 3/8/18. Plan was to proceed with XRT but he was then found to have pulmonary mets. He received Keytruda 8/18-2/28/18. Found to have progressive disease. Started on palliative docetaxel 3/14/19 and received cycle #6 on 7/5/19. He follows with Dr. Arceo. He feels that he has been having more fatigue and side effects from chemotherapy recently. Not sure he wants to continue  - F/u as scheduled for repeat CT CAP on and f/u with Dr. Arceo on 8/6 and 8/7.      # GERD  Persistent symptoms despite bid omeprazole  - Ranitidine bid and omeprazole bid.      # HTN  - Continue pta amlodipine 10 mg daily.      # CKD  Creatinine 1.41 on admission, improved. Monitor BMP MWF at TCU, eating and drinking well with good UOP.      # Hyponatremia  Na 134 on 7/27. Goal: normonatremia. NSG started 2% saline at 50 mL/hr, stopped on 7/27. Started sodium chloride tabs from 4 g TID on 7/27 with plan to decrease by 1-2 g/day.  - Decrease salt tabs to 1g BID x3 days, then 1g daily x3 days then discontinue. Will monitor BMP MWF at TCU.       # Hypophosphatemia   - Phos supplement 500 mg daily, monitor phos MWF at TCU     # Chronic anemia  Multifactorial: chemotherapy, CKD, mild iron deficiency (did not tolerate PO iron), chronic disease.  - Continue to monitor, transfuse for hgb <7.  No transfusions needs today.      DVT Prophylaxis: SCDs.   FEN: Regular diet.   Code Status: Full Code     Disposition: Discharge to TCU today.      The patient's care was discussed with Dr. Khoury.      Evy Walters PA-C  Hematology/Oncology  Pager: [phone]     Addendum:  Pt was seen and evaluated by me independently of NANCY.  Reviewed vitals, labs and imaging.  Pt doing well, eating and working with therapy.  Stable for discharge.    Follow up with neurosurgery and radiation oncology    Yessica Khoury MD    Pending Results   These results will be followed up by NANCY follow up   Unresulted Labs Ordered in the Past 30 Days of this Admission     Date and Time Order Name Status Description    7/31/2019 2200 Basic metabolic panel In process     7/28/2019 2200 Basic metabolic panel In process     7/26/2019 0401 Phosphorus In process     7/25/2019 1426 Fungus Culture, non-blood Preliminary     7/25/2019 1426 Anaerobic bacterial culture Preliminary           Code Status   Full Code    Primary Care Physician   Joan Ventura    Physical Exam   Temp: 97.6  F (36.4  C) Temp src: Axillary BP: 121/75 Pulse: 86 Heart Rate: 85 Resp: 18 SpO2: 100 % O2 Device: None (Room air)    Vitals:    07/30/19 0759 07/31/19 0742 08/01/19 0746   Weight: 60.8 kg (134 lb 0.6 oz) 60.6 kg (133 lb 9.6 oz) 62.7 kg (138 lb 3.2 oz)     Vital Signs with Ranges  Temp:  [97.4  F (36.3  C)-98.1  F (36.7  C)] 97.6  F (36.4  C)  Pulse:  [75-86] 86  Heart Rate:  [76-85] 85  Resp:  [16-18] 18  BP: (107-125)/(65-78) 121/75  SpO2:  [98 %-100 %] 100 %  I/O last 3 completed shifts:  In: 2360 [P.O.:2050; I.V.:310]  Out: 2950 [Urine:2950]    Constitutional: Pleasant male seen sitting up in bed. No apparent distress, and appears stated age.  Eyes: Lids and lashes normal, sclera clear, conjunctiva normal.  ENT: Normocephalic, oral cavity without erythema or exudates, gums normal and good dentition.   Respiratory: No increased work of breathing, good air  exchange, clear to auscultation bilaterally, no crackles or wheezing.  Cardiovascular: Regular rate and rhythm, normal S1 and S2, and no murmur noted.  GI: No masses or scars. +BS. Soft. No tenderness on palpation.  Skin: Limited bruising, no bleeding, no redness, no warmth, no rashes, no lesions, no jaundice. Well healing stapled lesion of his scalp, non erythematous.   Extremities: There is no redness, warmth, or swelling of the joints. No lower extremity edema. No cyanosis.  Neurologic: Awake, alert, oriented to name, place and time.   Vascular access: PIV c/d/i     Discharge Disposition   Discharged to short-term care facility  Condition at discharge: Stable    Consultations This Hospital Stay   VASCULAR ACCESS CARE ADULT IP CONSULT  MEDICATION HISTORY IP PHARMACY CONSULT  NUTRITION SERVICES ADULT IP CONSULT  PHYSICAL THERAPY ADULT IP CONSULT  OCCUPATIONAL THERAPY ADULT IP CONSULT  VASCULAR ACCESS CARE ADULT IP CONSULT  SOCIAL WORK IP CONSULT  VASCULAR ACCESS CARE ADULT IP CONSULT  VASCULAR ACCESS CARE ADULT IP CONSULT  PHYSICAL THERAPY ADULT IP CONSULT  OCCUPATIONAL THERAPY ADULT IP CONSULT  VASCULAR ACCESS CARE ADULT IP CONSULT    Discharge Orders      AntiEmbolism Stockings    Bilateral below knee length.On in the morning, off at night     MR Brain w/o & w Contrast     **CBC with platelets differential FUTURE 2mo    Last Lab Result: Hemoglobin (g/dL)       Date                     Value                 08/01/2019               7.2 (L)          ----------     **Comprehensive metabolic panel FUTURE anytime     Magnesium     Phosphorus     RADIATION THERAPY REFERRAL      RAD ONCOLOGY REFERRAL      General info for SNF    Length of Stay Estimate: Short Term Care: Estimated # of Days <30  Condition at Discharge: Stable  Level of care:skilled   Rehabilitation Potential: Good  Admission H&P remains valid and up-to-date: Yes  Recent Chemotherapy: Date:                  7/5/2019      Use Nursing Home Standing Orders:  Yes     Mantoux instructions    Give two-step Mantoux (PPD) Per Facility Policy Yes     Reason for your hospital stay    Metastatic Penile Cancer s/p Right Craniotomy     Daily weights    Call Provider for weight gain of more than 2 pounds per day or 5 pounds per week.     Follow Up and recommended labs and tests    Follow up as scheduled below     Activity - Up with nursing assistance     Encourage PO fluids     Full Code     Physical Therapy Adult Consult    Evaluate and treat as clinically indicated.    Reason:  Deconditioning     Occupational Therapy Adult Consult    Evaluate and treat as clinically indicated.    Reason:  Deconditioning     Fall precautions     Pneumatic Compression Device     Bilateral calf. Remove 30 mins BID.     Advance Diet as Tolerated    Follow this diet upon discharge: Regular diet, supplemental Boosts     Discharge Medications   Current Discharge Medication List      START taking these medications    Details   benzocaine-menthol (CEPACOL) 15-3.6 MG lozenge Place 1 lozenge inside cheek every hour as needed for sore throat    Associated Diagnoses: Urethral cancer (H)      calcium carbonate 500 mg, elemental, (OSCAL;OYSTER SHELL CALCIUM) 500 MG tablet Take 1 tablet (500 mg) by mouth 2 times daily (with meals)    Associated Diagnoses: Urethral cancer (H)      cholecalciferol 1000 units TABS Take 1,000 Units by mouth 2 times daily    Associated Diagnoses: Urethral cancer (H)      glycerin (ADULT) 2 g suppository Place 1 suppository rectally daily as needed for constipation or no stool    Associated Diagnoses: Constipation, unspecified constipation type      pantoprazole (PROTONIX) 40 MG EC tablet Take 1 tablet (40 mg) by mouth every morning (before breakfast)    Associated Diagnoses: Urethral cancer (H)      phenol (CHLORASEPTIC) 1.4 % spray Take 1 spray (1 mL) by mouth every hour as needed for sore throat    Associated Diagnoses: Urethral cancer (H)      ranitidine (ZANTAC) 150 MG tablet  Take 1 tablet (150 mg) by mouth 2 times daily    Associated Diagnoses: Urethral cancer (H)      sodium chloride 1 GM tablet Take 1 tablet (1 g) by mouth 2 times daily for 3 days, THEN 1 tablet (1 g) daily for 3 days.    Associated Diagnoses: Urethral cancer (H)         CONTINUE these medications which have CHANGED    Details   amLODIPine (NORVASC) 10 MG tablet Take 1 tablet (10 mg) by mouth daily    Associated Diagnoses: Benign essential hypertension      dexamethasone (DECADRON) 1 MG tablet Take 1 tablet (1 mg) by mouth daily for 1 day    Associated Diagnoses: Malignant neoplasm metastatic to both lungs (H)      fluticasone (FLONASE) 50 MCG/ACT nasal spray Spray 2 sprays into both nostrils daily    Associated Diagnoses: Malignant neoplasm metastatic to both lungs (H)      ondansetron (ZOFRAN) 8 MG tablet Take 1 tablet (8 mg) by mouth every 8 hours as needed for nausea    Associated Diagnoses: Nausea      prochlorperazine (COMPAZINE) 5 MG tablet Take 1 tablet (5 mg) by mouth every 6 hours as needed for nausea or vomiting    Associated Diagnoses: Nausea         CONTINUE these medications which have NOT CHANGED    Details   acetaminophen (TYLENOL) 325 MG tablet Take 2 tablets (650 mg) by mouth every 4 hours as needed for mild pain or fever  Qty: 150 tablet, Refills: 0    Associated Diagnoses: Urethral cancer (H)      LORazepam (ATIVAN) 0.5 MG tablet Take 1 tablet (0.5 mg) by mouth nightly as needed for anxiety  Qty: 30 tablet, Refills: 3    Associated Diagnoses: Anxiety      polyethylene glycol (MIRALAX/GLYCOLAX) packet Take 1 packet by mouth daily    Associated Diagnoses: Urethral cancer (H); Malignant neoplasm metastatic to both lungs (H)      docusate sodium (COLACE) 100 MG capsule Take 1 capsule (100 mg) by mouth 2 times daily as needed for constipation  Qty: 60 capsule, Refills: 0    Associated Diagnoses: Slow transit constipation         STOP taking these medications       B Complex Vitamins (VITAMIN B COMPLEX  PO) Comments:   Reason for Stopping:         ferrous sulfate (FEROSUL) 325 (65 Fe) MG tablet Comments:   Reason for Stopping:         omeprazole (PRILOSEC) 20 MG DR capsule Comments:   Reason for Stopping:             Allergies   Allergies   Allergen Reactions     Lisinopril Swelling     Oxycodone Nausea and Vomiting     Vicodin [Hydrocodone-Acetaminophen] Nausea and Vomiting     Data   Most Recent 3 CBC's:  Recent Labs   Lab Test 08/01/19 0312 07/31/19  0401 07/30/19  0410   WBC 16.7* 15.3* 15.2*   HGB 7.2* 7.5* 8.0*   MCV 81 80 79    293 301      Most Recent 3 BMP's:  Recent Labs   Lab Test 08/01/19 0312 07/31/19  0401 07/30/19  0410    133 134   POTASSIUM 4.5 4.2 4.1   CHLORIDE 102 101 100   CO2 26 24 26   BUN 33* 28 25   CR 1.25 1.03 0.98   ANIONGAP 7 8 9   SAADIA 8.8 9.0 9.2   GLC 83 98 116*     Most Recent 2 LFT's:  Recent Labs   Lab Test 08/01/19 0312 07/29/19  0412   AST 21 22   ALT 21 25   ALKPHOS 62 65   BILITOTAL 0.3 0.2     Most Recent INR's and Anticoagulation Dosing History:  Anticoagulation Dose History     Recent Dosing and Labs Latest Ref Rng & Units 2/26/2018 7/22/2019 7/25/2019    INR 0.86 - 1.14 0.97 1.04 0.97        Most Recent 3 Troponin's:  Recent Labs   Lab Test 07/25/19  0029 07/25/19  0028 07/22/19  0818   TROPI <0.015 Canceled, Test credited <0.015     Most Recent Cholesterol Panel:No lab results found.  Most Recent 6 Bacteria Isolates From Any Culture (See EPIC Reports for Culture Details):  Recent Labs   Lab Test 07/25/19  1426 02/26/18  1519 10/19/17  1946 10/06/17  1005 10/03/17  1544 10/03/17  1521   CULT No growth  On day 5, isolated in broth only:  Staphylococcus epidermidis  not isolated or reported on routine culture  Susceptibility testing in progress  *  Critical Value/Significant Value, preliminary result only, called to and read back by  Tootie Lane RN. 7/30/19 @ 1125, JIMENEZ    Culture in progress  Culture negative after 4 days 50,000 to 100,000  colonies/mL  Enterococcus faecalis  *  50,000 to 100,000 colonies/mL  Candida albicans / dubliniensis  Candida albicans and Candida dubliniensis are not routinely speciated  Susceptibility testing not routinely done  * 50,000 to 100,000 colonies/mL  Coagulase negative Staphylococcus  *  50,000 to 100,000 colonies/mL  Enterococcus faecalis  * >100,000 colonies/mL  mixed urogenital vadim  Susceptibility testing not routinely done   No growth  No growth 50,000 to 100,000 colonies/mL  mixed urogenital vadim    Susceptibility testing not routinely done     Most Recent TSH, T4 and A1c Labs:  Recent Labs   Lab Test 02/28/19  1247   TSH 0.85

## 2019-08-01 NOTE — PROGRESS NOTES
Pt discharged to:  TCU  Via: [facility] wheelchair transport  Accompanied by: [facility]; significant other Stephanie following behind  Belongings: pt packed his own belongings   Teaching: AVS reviewed, copy given to pt and SO  Clinic appointment: 8/7 @ 1330  Report called/faxed: to  TCU  Local housing:  TCU    PIV L FA remains in place, saline locked.   Pt departed at 1510 via [facility] wheelchair.

## 2019-08-01 NOTE — PROGRESS NOTES
Social Work Services Discharge Note      Patient Name:  King Gonsalo Bruno     Anticipated Discharge Date:  8/1/19    Discharge Disposition: TCU    [place] [id number] [place] [address]  [address]  P: [phone]  F: [phone]    Following MD:  Assigned per facility     Pre-Admission Screening (PAS) online form has been completed.  The Level of Care (LOC) is:  Determined  Confirmation Code is:  [id number]  Patient/caregiver informed of referral to [facility] for Pre-Admission Screening for skilled nursing facility (SNF) placement and to expect a phone call post discharge from SNF.     Additional Services/Equipment Arranged:      [facility] ([phone]) wheelchair transport at 1500. Potential transportation costs discussed with patient and significant other.     Patient / Family response to discharge plan:  In agreement with plan.     Persons notified of above discharge plan:  Patient, patient's significant other, Evy Walters PA-C, charge nurse, bedside nurse, accepting facility    Staff Discharge Instructions:  Please fax discharge orders and signed hard scripts for any controlled substances.  Please print a packet and send with patient.     CTS Handoff completed:  YES    GERRI Hickey  7D Hematology/Oncology Social Worker  Phone: [phone]  Pager: [phone]  [email or web address]

## 2019-08-01 NOTE — PLAN OF CARE
Physical Therapy Discharge Summary    Reason for therapy discharge:    Discharged to transitional care facility.    Progress towards therapy goal(s). See goals on Care Plan in [facility] electronic health record for goal details.  Goals partially met.  Barriers to achieving goals:   discharge from facility.    Therapy recommendation(s):    Continued therapy is recommended.  Rationale/Recommendations:  at ARU: Continued intense rehab recommended to progress with left LE strengthening, gait training with cane or least restrictive device, stairs, functional endurance, and balance to prepare for safe return home..

## 2019-08-02 NOTE — PLAN OF CARE
Pt VSS. Denied pain. Pt very pleasant and conversant, alert. Had PT/OT evals, see their notes. Incision to head CAROL, approximated. +BM today. Continue to  monitor pt status.

## 2019-08-02 NOTE — PLAN OF CARE
Suleiman completed. Pt at SBA level with cane, 500 ft. SBA on stairs with rail, cane. Doing very well. Lives alone.  Discharge Planner PT   Patient plan for discharge: alone, condo  Current status: above  Barriers to return to prior living situation: 11 stairs to bed/bath  Recommendations for discharge: home safety eval vs OP PT  Rationale for recommendations: pt presentation and desired goals to return to high level of function       Entered by: Milena Gonsalez 08/02/2019 5:58 PM

## 2019-08-02 NOTE — PLAN OF CARE
Patient is a 73 year old male admitted to room 427 via wheelchair. Patient is alert and oriented X 3. See Epic for VS and assessment. Patient denies pain on admission. Patient is able to transfer with cane and 1 assist. Patient was settled into their room, shown call light, tv, mealtimes etc. Oriented to unit. Full skin assessment. Well approximated staples head CDI. Scabs left hand, knee and across top of head. Hx of TB and treated per patient. Sticky left for Quantiferon TB Gold plus test. Will continue monitoring pain level and VS. Notifying MD with any concerns. Follow MD orders for cares and medications.    Level of Schooling: Masters  Ethnicity:   Marital Status: Single  Vascular Access: PIV left forearm  Dentures: none  Smoker: no   Glasses: yes   Occupation: Retired  Falls 0-1 mo:1

## 2019-08-02 NOTE — PLAN OF CARE
C/o anxiety/insomnia. Was on Lorazepam 0.5mg HS prn prior to dc to TCU today. Information sent to the [facility] via web page. Will wait for a response from the [facility].

## 2019-08-02 NOTE — PLAN OF CARE
A&O x4. CMS and Neuros intact. Pt denied pain, nausea, SOB or any new concerns overnight. Pt continues to have frequency of urination. Pt ambulating to bathroom with stand-by assist and cane. Scheduled medications administered as ordered. Pt slept majority of shift. Call light within reach and pt able to make needs known.

## 2019-08-02 NOTE — PROGRESS NOTES
08/02/19 1346   Quick Adds   Quick Adds Certification   Type of Visit Initial PT Evaluation   Living Environment   Lives With alone   Living Arrangements condominium   Home Accessibility stairs within home   Number of Stairs, Main Entrance other (see comments)  (comes in entrance that has no stairs.)   Number of Stairs, Within Home, Primary other (see comments)  (11 to bed/bath; 8 down to laundry)   Stair Railings, Within Home, Primary railing on left side (ascending)   Transportation Anticipated car, drives self;family or friend will provide   Living Environment Comment retired    Self-Care   Usual Activity Tolerance good   Current Activity Tolerance fair   Regular Exercise Yes   Activity/Exercise Type other (see comments)  (golf )   Exercise Amount/Frequency 2 times/wk   Equipment Currently Used at Home none   Activity/Exercise/Self-Care Comment IND without AD. Pt has cane here he said was issued to him at hospital but has the hospital tape on it; pt states would like a cane for home   Functional Level Prior   Ambulation 0-->independent   Transferring 0-->independent   Toileting 0-->independent   Bathing 0-->independent   Communication 0-->understands/communicates without difficulty   Swallowing 0-->swallows foods/liquids without difficulty   Cognition 0 - no cognition issues reported   Fall history within last six months yes   Number of times patient has fallen within last six months 1   Which of the above functional risks had a recent onset or change? ambulation   Prior Functional Level Comment IND without AD   General Information   Onset of Illness/Injury or Date of Surgery - Date 07/22/19   Referring Physician Shoaib Monsivais MD; Dr Shakeel Ott   Patient/Family Goals Statement walk without AD; be able to golf again   Pertinent History of Current Problem (include personal factors and/or comorbidities that impact the POC) 73 year old male with PMH of HTN, GERD, CKD, DJD and metastatic penile  cancer (lung mets) who presented to the ED after a fall down stairs and found to have a right frontal lobe brain mass with surrounding edema. Now s/p right craniotomy and tumor resection on 7/25/19 and doing well. Hospitalization complicated by ongoing hyponatremia and hypophosphatemia requiring replacements as needed.   Precautions/Limitations fall precautions   General Observations pt lying in bed; very pleasant and agreeable to PT   Cognitive Status Examination   Orientation orientation to person, place and time   Level of Consciousness alert   Follows Commands and Answers Questions 100% of the time   Personal Safety and Judgment intact   Memory intact   Pain Assessment   Patient Currently in Pain No   Integumentary/Edema   Integumentary/Edema Comments healing craniotomy incision frontal skull   Posture    Posture Not impaired   Range of Motion (ROM)   ROM Comment WNL LE   Strength   Strength Comments LE 5/5 except 4-/5 hip flexion   Bed Mobility   Bed Mobility Comments IND sit<>supine   Transfer Skills   Transfer Comments SBA sit<>stand   Gait   Gait Comments SBA amb with cane 500 ft; SBA; step thru pattern, slowed pace compared to pt's usual; able to scan environment without LOB   Balance   Balance Comments able to tandem stand 30 sec; single leg stand 5 sec   Sensory Examination   Sensory Perception Comments intact light touch LE   Coordination   Coordination no deficits were identified   Muscle Tone   Muscle Tone no deficits were identified   General Therapy Interventions   Planned Therapy Interventions ADL retraining;balance training;gait training;groups;manual therapy;neuromuscular re-education;strengthening;transfer training;home program guidelines;progressive activity/exercise   Clinical Impression   Criteria for Skilled Therapeutic Intervention yes, treatment indicated   PT Diagnosis impaired functional mobility   Influenced by the following impairments decreased hip strength, activity tolerance    Functional limitations due to impairments transfers, gait, stair negotiation below prior level   Clinical Presentation Evolving/Changing   Clinical Presentation Rationale clinical judgment; comorbidities; participation   Clinical Decision Making (Complexity) Moderate complexity   Therapy Frequency 6x/week   Predicted Duration of Therapy Intervention (days/wks) 8/12/19   Anticipated Equipment Needs at Discharge standard cane   Anticipated Discharge Disposition Home with Home Therapy;Home with Outpatient Therapy  (TBD)   Risk & Benefits of therapy have been explained Yes   Patient, Family & other staff in agreement with plan of care Yes   Clinical Impression Comments pt s/p craniotomy for brain met. Previously IND without AD. Will benefit from skilled PT to address identified deficits for return home. Has stairs in home   Therapy Certification   Start of care date 08/02/19   Certification date from 08/02/19   Certification date to 08/22/19   Medical Diagnosis metastatic penile cancer (lung mets); brain met with craniotomy   Certification I certify the need for these services furnished under this plan of treatment and while under my care.  (Physician co-signature of this document indicates review and certification of the therapy plan).    Total Evaluation Time   Total Evaluation Time (Minutes) 20

## 2019-08-02 NOTE — H&P
[facility]  HISTORY AND PHYSICAL   Chief Complaint       History of Present Illness       King Gonsalo Bruno is 73 year old year old male with hx of  HTN, GERD, CKD, DJD and metastatic penile cancer (lung mets) is being admitted to  TCU on 08/01 for ongoing rehabilitation after hospitalization at [facility] from 07/22 to 08/01. He  presented to the ED after a fall down stairs and found to have a right frontal lobe brain mass with surrounding edema. He underwent  right craniotomy and tumor resection on 7/25/19. Hospitalization complicated by ongoing hyponatremia and hypophosphatemia requiring replacements as needed.      Currently, reports doing well. Tolerating diet well. Denies any chest pain, or shortness of breath. Denies any nausea, vomiting. No lightheadedness or dizziness  Had BM yesterday. No burning urination.             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      GERD (gastroesophageal reflux disease)      Hypertension      Penile cancer (H)      PONV (postoperative nausea and vomiting)          Past Surgical History     Past Surgical History:   Procedure Laterality Date     CYSTOSCOPY, BIOPSY BLADDER, COMBINED N/A 8/31/2017    Procedure: COMBINED CYSTOSCOPY, BIOPSY BLADDER;  Cystoscopy, Suprapubic Tube Placement with Ultrasound Guidiance, Uretheral Dilation;  Surgeon: Muriel Haddad MD;  [facility]: [facility] OR     CYSTOSTOMY, INSERT TUBE SUPRAPUBIC, COMBINED N/A 8/31/2017    Procedure: COMBINED CYSTOSTOMY, INSERT TUBE SUPRAPUBIC;;  Surgeon: Muriel Haddad MD;  Location: [facility] OR     DISSECT LYMPH NODE INGUINAL N/A 3/8/2018    Procedure: DISSECT LYMPH NODE INGUINAL;  Flexible Cystoscopy, Bladder Biopsy, urethral biopsy and penile biopsy, Radical Penectomy and Urethrectomy, Perineal Urethrostomy, Bilateral  Inguinal Lymphadenectomy; superficial lymph nodes and deep lymph nodes;  Surgeon: Muriel Haddad MD;  Location: [facility] OR     LARYNGOSCOPY  2013    Direct laryngoscopy with the use of rigid endoscopy and takedown of left hypopharyngeal diverticula.      OPTICAL TRACKING SYSTEM CRANIOTOMY, EXCISE TUMOR, COMBINED Right 2019    Procedure: Stealth Assisted Right Craniotomy And Tumor Resection;  Surgeon: Chandni Allen MD;  Location: [facility] OR     PENECTOMY N/A 3/8/2018    Procedure: PENECTOMY;  Flexible Cystoscopy, Bladder Biopsy, urethral biopsy and penile biopsy, Radical Penectomy and Urethrectomy, Perineal Urethrostomy, Bilateral Inguinal Lymphadenectomy; superficial lymph nodes and deep lymph nodes    ;  Surgeon: Muriel Haddad MD;  Location: [facility] OR     URETHROSTOMY PERINEUM N/A 3/8/2018    Procedure: URETHROSTOMY PERINEUM;  Flexible Cystoscopy, Bladder Biopsy, urethral biopsy and penile biopsy, Radical Penectomy and Urethrectomy, Perineal Urethrostomy, Bilateral Inguinal Lymphadenectomy; superficial lymph nodes and deep lymph nodes;  Surgeon: Muriel Haddad MD;  Location: [facility] OR        Social History     Social History     Socioeconomic History     Marital status: Single     Spouse name: Stephanie Oh     Number of children: 1     Years of education: Not on file     Highest education level: Not on file   Occupational History     Occupation: retired crisis  for [employer]   Social Needs     Financial resource strain: Not on file     Food insecurity:     Worry: Not on file     Inability: Not on file     Transportation needs:     Medical: Not on file     Non-medical: Not on file   Tobacco Use     Smoking status: Former Smoker     Packs/day: 1.00     Years: 20.00     Pack years: 20.00     Types: Cigarettes     Start date: 1972     Last attempt to quit: 1992     Years since quittin.6     Smokeless tobacco: Never Used     Tobacco comment: quit in    Substance and  Sexual Activity     Alcohol use: No     Comment: 1987 quit per personal choice.     Drug use: No     Sexual activity: Not on file   Lifestyle     Physical activity:     Days per week: Not on file     Minutes per session: Not on file     Stress: Not on file   Relationships     Social connections:     Talks on phone: Not on file     Gets together: Not on file     Attends Mormonism service: Not on file     Active member of club or organization: Not on file     Attends meetings of clubs or organizations: Not on file     Relationship status: Not on file     Intimate partner violence:     Fear of current or ex partner: Not on file     Emotionally abused: Not on file     Physically abused: Not on file     Forced sexual activity: Not on file   Other Topics Concern     Parent/sibling w/ CABG, MI or angioplasty before 65F 55M? Not Asked   Social History Narrative     Not on file      Family History     Family History   Problem Relation Age of Onset     Cerebrovascular Disease Mother      Hypertension Mother      Prostate Cancer Father 84     Prostate Cancer Brother 63     Diabetes Brother      Cancer Brother      Diabetes Brother         Medications     Reviewed and medication reconciliation done.  Current Facility-Administered Medications   Medication     acetaminophen (TYLENOL) tablet 650 mg     amLODIPine (NORVASC) tablet 10 mg     benzocaine-menthol (CEPACOL) 15-3.6 MG lozenge 1 lozenge     calcium carbonate 500 mg (elemental) (OSCAL;OYSTER SHELL CALCIUM) tablet 500 mg     dexamethasone (DECADRON) tablet 1 mg     docusate sodium (COLACE) capsule 100 mg     fluticasone (FLONASE) 50 MCG/ACT spray 2 spray     glycerin (ADULT) Suppository 1 suppository     lidocaine (LMX4) cream     lidocaine 1 % 0.1-1 mL     LORazepam (ATIVAN) tablet 0.5 mg     magnesium sulfate 2 g in NS intermittent infusion ([facility]ar[facility] or [facility] Cmpd)     magnesium sulfate 4 g in 100 mL sterile water (premade)     Medication Instruction     ondansetron  (ZOFRAN) injection 8 mg    Or     ondansetron (ZOFRAN-ODT) ODT tab 8 mg    Or     ondansetron (ZOFRAN) tablet 8 mg     pantoprazole (PROTONIX) EC tablet 40 mg     phenol (CHLORASEPTIC) 1.4 % spray 1 mL     polyethylene glycol (MIRALAX/GLYCOLAX) Packet 17 g     potassium chloride (KLOR-CON) Packet 20-40 mEq     potassium chloride 10 mEq in 100 mL intermittent infusion with 10 mg lidocaine     potassium chloride 10 mEq in 100 mL sterile water intermittent infusion (premix)     potassium chloride 20 mEq in 50 mL intermittent infusion     potassium chloride ER (K-DUR/KLOR-CON M) CR tablet 20-40 mEq     potassium phosphate 10 mmol in D5W 250 mL intermittent infusion     potassium phosphate 15 mmol in D5W 250 mL intermittent infusion     potassium phosphate 20 mmol in D5W 250 mL intermittent infusion     potassium phosphate 20 mmol in D5W 500 mL intermittent infusion     potassium phosphate 25 mmol in D5W 500 mL intermittent infusion     prochlorperazine (COMPAZINE) tablet 5 mg    Or     prochlorperazine (COMPAZINE) injection 5 mg     ranitidine (ZANTAC) tablet 150 mg     sodium chloride (PF) 0.9% PF flush 3 mL     sodium chloride (PF) 0.9% PF flush 3 mL     sodium chloride tablet 1 g     vitamin D3 (CHOLECALCIFEROL) 1000 units (25 mcg) tablet 1,000 Units     Facility-Administered Medications Ordered in Other Encounters   Medication     barium sulfate (EZ PAQUE) oral suspension 96%     barium sulfate (EZ-HD) oral suspension 98%     barium sulfate 40% (VARIBAR NECTAR) oral suspension 20 mL     barium sulfate 40% (VARIBAR THIN HONEY) oral suspension 20 mL     barium sulfate 40% (VARIBAR THIN HONEY) oral suspension     sod bicarbonate-citric acid-simethicone (EZ GAS) 2.21-1.53-0.04 G packet 4 g          Review of Systems     10 point  review of systems negative, except for what is mentioned above      Physical Exam     General:   Vital signs:    Blood pressure 127/73, pulse 82, temperature 97.9  F (36.6  C), temperature  "source Axillary, resp. rate 18, height 1.85 m (6' 0.84\"), weight 62.7 kg (138 lb 3.2 oz), SpO2 100 %.  Estimated body mass index is 18.32 kg/m  as calculated from the following:    Height as of this encounter: 1.85 m (6' 0.84\").    Weight as of this encounter: 62.7 kg (138 lb 3.2 oz).      Intake/Output Summary (Last 24 hours) at 8/1/2019 1308  Last data filed at 8/1/2019 1132  Gross per 24 hour   Intake 1820 ml   Output 2725 ml   Net -905 ml      HEENT: No icterus, no pallor. Scalp incision with sutures looks healthy  Cardiovascular: S1, S2 normal  Respiratory: B/L CTA  Abdomen: Soft, NT, BS+  Neurology: Alert, awake,and oriented. No tremors. No focal neuro deficit  Extremities: No pretibial edema.               Laboratory/Imaging Studies     I have reviewed  laboratory and imaging studies in the Epic. Pertinent findings are as below    CMP  Recent Labs   Lab 08/01/19  0312 07/31/19  0401 07/30/19  0410 07/29/19  2242  07/29/19  0412    133 134 134   < > 133   POTASSIUM 4.5 4.2 4.1  --   --  4.4   CHLORIDE 102 101 100  --   --  100   CO2 26 24 26  --   --  25   ANIONGAP 7 8 9  --   --  8   GLC 83 98 116*  --   --  124*   BUN 33* 28 25  --   --  24   CR 1.25 1.03 0.98  --   --  0.98   GFRESTIMATED 57* 72 77  --   --  77   GFRESTBLACK 66 84 89  --   --  89   SAADIA 8.8 9.0 9.2  --   --  9.2   MAG 1.9 1.8 1.7  --   --  1.6   PHOS 2.0* 2.6 2.8  --   --  2.2*   PROTTOTAL 5.7*  --   --   --   --  6.0*   ALBUMIN 2.6*  --   --   --   --  2.4*   BILITOTAL 0.3  --   --   --   --  0.2   ALKPHOS 62  --   --   --   --  65   AST 21  --   --   --   --  22   ALT 21  --   --   --   --  25    < > = values in this interval not displayed.     CBC  Recent Labs   Lab 08/01/19  0312 07/31/19  0401 07/30/19  0410 07/29/19  0412   WBC 16.7* 15.3* 15.2* 18.1*   RBC 2.94* 3.17* 3.33* 3.41*   HGB 7.2* 7.5* 8.0* 8.0*   HCT 23.7* 25.5* 26.4* 26.9*   MCV 81 80 79 79   MCH 24.5* 23.7* 24.0* 23.5*   MCHC 30.4* 29.4* 30.3* 29.7*   RDW 21.5* " 21.2* 20.9* 20.7*    293 301 325     INRNo lab results found in last 7 days.      Impression/Plan       73 year old year old male with hx of  HTN, GERD, CKD, DJD and metastatic penile cancer (lung mets) is being admitted to  TCU on 08/01 for ongoing rehabilitation after hospitalization at [facility] from 07/22 to 08/01. He  presented to the ED after a fall down stairs and found to have a right frontal lobe brain mass with surrounding edema. He underwent  right craniotomy and tumor resection on 7/25/19. Hospitalization complicated by ongoing hyponatremia and hypophosphatemia requiring replacements as needed.      # Metastatic right frontal lobe mass  # S/p right craniotomy and tumor resection on 7/25/19  MRI brain 7/22 with peripheral enhancing right frontal lobe mass with extensive adjacent edema. 3mm right to left midline shift. S/p right sided craniotomy 7/25. Pathology consistent with metastatic carcinoma (likely squamous). Facial droop and LUE weakness improved. He was treated with Dexamethaosone.   Anaerobic culture from brain lesion growing gram positive cocci in clusters on day 5 (broth only). Clinically stable, so likely contaminant.   - Continue Dexamethasone taper per neurosurgery. End date 8/2.    - Okay to start Radiation therapy as soon as 8/8 per [facility]. He will tentatively be seen on 8/12 for consult and then start gamma knife on 8/13 or 8/19.   - F/u in [facility] clinic for wound check and staple removal on 8/9.   - Brain MRI and f/u with Dr. Allen in 3 months (scheduled for 10/21 and 10/22).     # Fall  Trauma w/u in the ED with head CT (see results above). CTA without any carotid stenosis. CT C spine without any traumatic changes. CXR without traumatic changes (overall stable appearing lung mets). AP pelvis negative.   She was evaluated by PT/OT, and recommended TCU for rehabilitation     # Metastatic penile cancer  Diagnosed in 2017, stage 2. Underwent 4 cycles of chemotherapy with  cisplatin and gemzar, folled by radical penectomy, urethectomy, perineal urethostomy and bilateral inguinal node dissection 3/8/18. Plan was to proceed with XRT but he was then found to have pulmonary mets. He received Keytruda 8/18-2/28/18. Found to have progressive disease. Started on palliative docetaxel 3/14/19 and received cycle #6 on 7/5/19. He follows with Dr. Arceo. He feels that he has been having more fatigue and side effects from chemotherapy recently. Not sure he wants to continue  - F/u as scheduled for repeat CT CAP on and f/u with Dr. Arceo on 8/6 and 8/7.      # GERD:   Persistent symptoms despite bid omeprazole  - Ranitidine bid and omeprazole bid.      # HTN: On Amlodipine  - Continue Amlodipine     # CKD: Baseline creatinine 1-1.5  Creatinine 1.41 on admission to [facility].  Maintaining good PO intake,and having good urine output  Creatinine level 1.25 on admission to TCU on 08/01  - Avoid Nephrotoxins, hypotension, hypovolemia  - Renal dose medications  - BMP MWF at TCU      # Hyponatremia:   Na 134 on 7/27. Treated with 2% saline initially, and then started on Nacl tabs 4 g TID on 7/27 with plan to decrease by 1-2 g/day.  - Nacl 1g BID for 3 days, then 1g daily for 3 days, then discontinue.   - BMP MWF     # Hypophosphatemia   Started on Phosphorus supplement 500 mg daily  - Phosphorus MWF at TCU      # Chronic anemia:   Multifactorial: chemotherapy, CKD, mild iron deficiency (did not tolerate PO iron), chronic disease.  - CBC weekly  - Transfuse for hgb <7.    # Anxiety: Patient reports takes Ativan PRN for anxiety  - Continue and wean as able       # FEN: Regular diet  # Lines &Tubes: No palomino's catheter  # Activity & Physical condition:   # Prophylaxis: SCD  # Social Issues:   # Code status: Full code  # Pneumococcal vaccine: Pneumonia Conjugate vaccine on discharge.

## 2019-08-02 NOTE — PROGRESS NOTES
Social Work: Initial Assessment with Discharge Plan    Patient Name: King Gonsalo Bruno  : 1945  Age: 73 year old  MRN: [id number]  Completed assessment with: Patient  Admitted to TCU: 2019    Presenting Information   Date of SW assessment: 2019  Health Care Directive: Copy in Chart  Primary Health Care Agent: Patient  Secondary Health Care Agent: Patient's significant other, Stpehanie, listed in Health Care Directive  Living Situation:  lives in a 2 story condo in [place]. 1 step to enter home, 11 steps upstairs to bedroom/bathroom. 8 steps downstairs to laundry. Patient lives alone, but gets assistance from significant other, Stephanie.  Previous Functional Status: Assistance with driving, recently stopped driving. SO assisting with driving. Patient uses a cane.  DME available: See therapy notes.  Patient and family understanding of hospitalization: Rehab  Cultural/Language/Spiritual Considerations: English speaking. Spirituality and Hinduism is important to patient.  Abuse concerns: None indicated.  BIMS: SW will complete closer to day 5  PHQ-9: SW will complete closer to day 5  PAS: confirmation number- [id number]  Has there been a level II screen?  No  Were there any recommendations in the screen? N/A  If yes, will the recommendations we incorporated into the Plan of Care?  N/A  Physical Health  Reason for admission: Fall downstairs and found to have right frontal lobe brain mass. Metastatic penile cancer.     Provider Information   Primary Care Physician:Joan Ventura     [facility]:   Diagnosis: No diagnosis. Patient expressed feeling down about medical status, which SW sat and discussed with him. Patient became tearful when talking about his family and the support he has from them as well as Stephanie.   Current Support/Services: None  Previous Services: None  Services Needed/Recommended: SW discuss spiritual health services with patient, which patient is interested in while in TCU,  as well as SW visits.     Substance Use:  Diagnosis: None  Current Support/Services: NA  Previous Services: NA  Services Needed/Recommended: None    Support System  Marital Status: Significant other, Stephanie. Not . Stephanie lives in [place] (she does not live with patient).  Family support: Patient stated that all of his family lives out of state (in [place] and [place]). Patient stated that his sister is have knee surgery in [place], but is hoping to come to [place] to be with patient.   Other support available: Patient interested in [employer] for meal support as well as PCA services a couple hours a week. SW provided patient with a list of 5 companies to research, then SW can initiate an assessment time.   Gaps in support system: Assistance with meals and transportation. Assistance with laundry.    Community Resources  Current in home services: None  Previous services: None    Financial/Employment/Education  Employment Status: Retired   Income Source: intermediate. Savings.   Education: Masters degree in Social Work  Financial Concerns:  None indicated  Insurance: BCBS Medicare Advantage      Discharge Plan   Patient and family discharge goal: Return home with supports in place.   Provided Education on discharge plan: YES  Patient agreeable to discharge plan:  YES  A list of Medicare Certified Facilities was provided to the patient and/or family to encourage patient choice. Based on location and rating, patient would like referrals made to: [facility]  General information regarding anticipated insurance coverage and possible out of pocket cost was discussed. Patient and patient's family are aware patient may incur the cost of transportation to the facility, pending insurance payment: YES  Barriers to discharge: Medical clearance, therapy goals met, safe discharge plan    Discharge Recommendations   Disposition: Home  Transportation Needs: Significant other Stephanie to provide  Name of  Transportation Company and Phone: NA    Additional comments   SW introduced self and role of SW on TCU. Patient will receive care plan goals and orders tomorrow 8/3/2019. No other concerns at this time. SW will continue to follow and assist as needed.    CHER Rubio,[facility]  TCU    Phone: [phone]  Pager: [phone]

## 2019-08-03 NOTE — PLAN OF CARE
PT: pt engaged in standing there ex for LE strengthening, cues for form and technique throughout; ambulated with SEC and was cued for adding head turns with most difficulty gazing towards floor, but no overt LOB.

## 2019-08-03 NOTE — PROGRESS NOTES
08/02/19 1200   Quick Adds   Quick Adds Certification   Type of Visit Initial Occupational Therapy Evaluation   Living Environment   Lives With alone   Living Arrangements condominium   Home Accessibility stairs to enter home   Number of Stairs, Main Entrance other (see comments)  (33)   Stair Railings, Main Entrance railing on right side (ascending)   Transportation Anticipated car, drives self;family or friend will provide   Living Environment Comment OT retired [facility] was iindependent and driving prior to current hospitalization   Self-Care   Usual Activity Tolerance good   Current Activity Tolerance fair   Regular Exercise Yes   Activity/Exercise Type other (see comments)  (golf 2x/wk )   Exercise Amount/Frequency 2 times/wk   Equipment Currently Used at Home none   Activity/Exercise/Self-Care Comment OT independent without use of AE DME   Functional Level   Ambulation 0-->independent   Transferring 0-->independent   Toileting 0-->independent   Bathing 0-->independent   Dressing 0-->independent   Eating 0-->independent   Communication 0-->understands/communicates without difficulty   Swallowing 0-->swallows foods/liquids without difficulty   Cognition 0 - no cognition issues reported   Fall history within last six months yes   Number of times patient has fallen within last six months 1   General Information   Onset of Illness/Injury or Date of Surgery - Date 08/22/19   Referring Physician dhital   Patient/Family Goals Statement improve strength return home safely   Additional Occupational Profile Info/Pertinent History of Current Problem penile cancer, 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, HTN DJD   Precautions/Limitations fall precautions   Weight-Bearing Status - LUE full weight-bearing   Weight-Bearing Status - RUE full weight-bearing   Weight-Bearing Status - LLE full weight-bearing   Weight-Bearing Status - RLE full weight-bearing   Heart Disease Risk Factors High blood pressure;Stress;Medical  history;Gender;Age;Race   General Observations OT patient pleasant receptive to OT feedback   General Info Comments OT some congitive  cahnge post craniotomy   Cognitive Status Examination   Orientation orientation to person, place and time   Level of Consciousness alert   Follows Commands (Cognition) WFL   Memory impaired   Attention No deficits were identified   Cognitive Comment OT will continue to observe during functional activity OT to screen or assess as needed for safe DC plan   Visual Perception   Visual Perception Wears glasses   Sensory Examination   Sensory Comments WFL   Pain Assessment   Patient Currently in Pain No   Integumentary/Edema   Integumentary/Edema Comments WFL   Posture   Posture Comments WFL   Range of Motion (ROM)   ROM Comment WFL   Strength   Strength Comments fair, grossly 3+/5 strength grade with limited endurance   Hand Strength   Left hand  (pounds) 70 pounds   Right hand  (pounds) 60 pounds   Muscle Tone Assessment   Muscle Tone Comments WFL   Coordination   Left hand, nine hole peg test (seconds) 40   Right hand, nine hole peg test (seconds) 35   Coordination Comments OT will continue to observe during functional activity   Mobility   Bed Mobility Comments standard bed height at home   Transfer Skills   Transfer Comments no AE used at [facility]   Transfer Skill: Bed to Chair/Chair to Bed   Level of [place]: Bed to Chair minimum assist (75% patients effort)   Physical Assist/Nonphysical Assist: Bed to Chair set-up required;supervision;verbal cues;1 person assist   Weight-Bearing Restrictions full weight-bearing   Assistive Device - Transfer Skill Bed to Chair Chair to Bed Rehab Eval straight cane   Transfer Skill: Sit to Stand   Level of [place]: Sit/Stand stand-by assist   Physical Assist/Nonphysical Assist: Sit/Stand set-up required;supervision;verbal cues   Transfer Skill: Sit to Stand full weight-bearing   Assistive Device for Transfer: Sit/Stand straight cane    Toilet Transfer   Toilet Transfer Comments standard toilet at home near vanity   Transfer Skill: Toilet Transfer   Level of [place]: Toilet minimum assist (75% patients effort)   Physical Assist/Nonphysical Assist: Toilet set-up required;supervision;verbal cues;1 person assist   Weight-Bearing Restrictions: Toilet full weight-bearing   Assistive Device rolling walker;grab bars;straight cane;other (see comments)   Toilet Transfer Skill Comments tall toilet    Balance   Balance Comments fair with use of cane. OT will continue to monitor.    Toileting   Level of [place]: Toilet stand-by assist   Physical Assist/Nonphysical Assist: Toilet supervision   Assistive Device straight cane;grab bars;other (see comments)   Grooming   Level of [place]: Grooming stand-by assist   Physical Assist/Nonphysical Assist: Grooming set-up required;supervision;verbal cues   Instrumental Activities of Daily Living (IADL)   Previous Responsibilities meal prep;housekeeping;laundry;shopping;medication management;finances;driving   Activities of Daily Living Analysis   Impairments Contributing to Impaired Activities of Daily Living balance impaired;cognition impaired;coordination impaired;fear and anxiety;flexibility decreased;motor control impaired;post surgical precautions;strength decreased   General Therapy Interventions   Planned Therapy Interventions ADL retraining;IADL retraining;balance training;bed mobility training;cognition;fine motor coordination training;strengthening;transfer training;progressive activity/exercise;risk factor education   Clinical Impression   Criteria for Skilled Therapeutic Interventions Met yes, treatment indicated   OT Diagnosis deconditioning   Influenced by the following impairments post surgical precaution HTN L side weakness cognition change   Assessment of Occupational Performance 5 or more Performance Deficits   Identified Performance Deficits OT patient below baseline in BADL iADL FX  mobility strength endurance balance cogntiive   Clinical Decision Making (Complexity) High complexity   Therapy Frequency Daily   Predicted Duration of Therapy Intervention (days/wks) OT goals to be met by 8/17/19   Anticipated Discharge Disposition Home with Assist;Home with Home Therapy   Risks and Benefits of Treatment have been explained. Yes   Patient, Family & other staff in agreement with plan of care Yes   Therapy Certification   Start of Care Date 08/02/19   Certification date from 08/02/19   Certification date to 09/02/19   Medical Diagnosis metastatic cancer with brain mass removed by craniotomy in R frontal lobe.    Certification I certify the need for these services furnished under this plan of treatment and while under my care.  (Physician co-signature of this document indicates review and certification of the therapy plan).   Total Evaluation Time   Total Evaluation Time (Minutes) 15

## 2019-08-03 NOTE — PHARMACY-MEDICATION REGIMEN REVIEW
Pharmacy Medication Regimen Review  King Gonsalo Bruno is a 73 year old male who is currently in the Transitional Care Unit.    Assessment: All medications have an appropriate indications, durations and no unnecessary use was found    Plan:   1.  Will continue to monitor meds as appropriate  2.  Lorazepam has 3 doses max on it  3.  Will assess need for compazine on 8/15    Attending provider will be sent this note for review.  If there are any emergent issues noted above, pharmacist will contact provider directly by phone.      Pharmacy will periodically review the resident's medication regimen for any PRN medications not administered in > 72 hours and discontinue them. The pharmacist will discuss gradual dose reductions of psychopharmacologic medications with interdisciplinary team on a regular basis.    Please contact pharmacy if the above does not answer specific medication questions/concerns.    Background:  A pharmacist has reviewed all medications and pertinent medical history today.  Medications were reviewed for appropriate use and any irregularities found are listed with recommendations.      Current Facility-Administered Medications:      [START ON 8/4/2019] - Skin Test Reading -, , Does not apply, Q21 Days, Shoaib Monsivais MD     acetaminophen (TYLENOL) tablet 650 mg, 650 mg, Oral, Q4H PRN, Shoaib Monsivais MD, 650 mg at 08/03/19 0230     [START ON 8/4/2019] amLODIPine (NORVASC) tablet 10 mg, 10 mg, Oral, Daily, Shoaib Monsivais MD     benzocaine-menthol (CEPACOL) 15-3.6 MG lozenge 1 lozenge, 1 lozenge, Buccal, Q1H PRN, Shoaib Monsivais MD, 1 lozenge at 08/03/19 0934     calcium carbonate 500 mg (elemental) (OSCAL;OYSTER SHELL CALCIUM) tablet 500 mg, 500 mg, Oral, BID w/meals, Shoaib Monsivais MD     docusate sodium (COLACE) capsule 100 mg, 100 mg, Oral, BID PRN, Shoaib Monsivais MD     [START ON 8/4/2019] fluticasone (FLONASE) 50 MCG/ACT spray 2 spray, 2 spray, Both Nostrils, Daily, Shoaib Monsivais MD      LORazepam (ATIVAN) tablet 0.5 mg, 0.5 mg, Oral, Once PRN, Shoaib Monsivais MD     magnesium sulfate 4 g in 100 mL sterile water (premade), 4 g, Intravenous, Q4H PRN, Shoaib Monsivais MD     medication instructions, , Does not apply, Continuous PRN, Shoaib Monsivais MD     ondansetron (ZOFRAN) tablet 8 mg, 8 mg, Oral, Q8H PRN, Shoaib Monsivais MD     [START ON 8/4/2019] pantoprazole (PROTONIX) EC tablet 40 mg, 40 mg, Oral, QAM AC, Shoaib Monsivais MD     phenol (CHLORASEPTIC) 1.4 % spray 1 mL, 1 spray, Mouth/Throat, Q1H PRN, Shoaib Monsivais MD     [START ON 8/4/2019] polyethylene glycol (MIRALAX/GLYCOLAX) Packet 17 g, 17 g, Oral, Daily, Shoaib Monsivais MD     potassium chloride (KLOR-CON) Packet 20-40 mEq, 20-40 mEq, Oral or Feeding Tube, Q2H PRN, Shoaib Monsivais MD     potassium chloride 10 mEq in 100 mL intermittent infusion with 10 mg lidocaine, 10 mEq, Intravenous, Q1H PRN, Shoaib Monsivais MD     potassium chloride 10 mEq in 100 mL sterile water intermittent infusion (premix), 10 mEq, Intravenous, Q1H PRN, Shoaib Monsivais MD     potassium chloride 20 mEq in 50 mL intermittent infusion, 20 mEq, Intravenous, Q1H PRN, Shoaib Monsivais MD     potassium chloride ER (K-DUR/KLOR-CON M) CR tablet 20-40 mEq, 20-40 mEq, Oral, Q2H PRN, Shoaib Monsivais MD     potassium phosphate (monobasic) (K-PHOS) tablet 500 mg, 500 mg, Oral, Daily, Shoaib Monsivais MD, 500 mg at 08/03/19 0842     prochlorperazine (COMPAZINE) tablet 5 mg, 5 mg, Oral, Q6H PRN, Shoaib Monsivais MD     ranitidine (ZANTAC) tablet 150 mg, 150 mg, Oral, BID, Shoaib Monsivais MD     sodium chloride tablet 1 g, 1 g, Oral, BID w/meals, Shoaib Monsivais MD     [START ON 8/7/2019] sodium chloride tablet 1 g, 1 g, Oral, Daily, Shoaib Monsivais MD     tuberculin injection 5 Units, 5 Units, Intradermal, Q21 Days, Shoaib Monsivais MD     vitamin D3 (CHOLECALCIFEROL) 1000 units (25 mcg) tablet 1,000 Units, 1,000 Units, Oral, BID, Shoaib Monsivais MD    Facility-Administered  Medications Ordered in Other Encounters:      barium sulfate (EZ PAQUE) oral suspension 96%, , Oral, Once, Renato Payne MD     barium sulfate (EZ-HD) oral suspension 98%, , Oral, Once, Renato Payne MD     barium sulfate 40% (VARIBAR NECTAR) oral suspension 20 mL, 20 mL, Oral, Once, Franny Perez MD     barium sulfate 40% (VARIBAR THIN HONEY) oral suspension 20 mL, 20 mL, Oral, Once, Franny Perez MD     barium sulfate 40% (VARIBAR THIN HONEY) oral suspension, , Oral, Once, Renato Payne MD     sod bicarbonate-citric acid-simethicone (EZ GAS) 2.21-1.53-0.04 G packet 4 g, 4 g, Oral, Once, Renato Payne MD  No current outpatient prescriptions on file.   PMH: HTN, GERD, CKD, DJD and metastatic penile cancer (lung mets)

## 2019-08-03 NOTE — PLAN OF CARE
Patient alert and able to make needs known, no complained of pain and no respiratory distress noted, patient up to bathroom using his cane with supervision. Continent of bladder, no BM noted this shift. Complained of feeling dry throat, sucking on ice chips and performing oral suction frequently, prn Cepacol given this morning. Continue plan of care

## 2019-08-03 NOTE — PHARMACY-TCU REVIEW OF H&P
I have reviewed this patient's TCU admission History & Physical for medication related changes/recommendations identified by the admitting provider.  I am confirming that there are no recommendations requiring changes to medication orders are indicated at this time based on the provider recommendations in the H&P.    Tavia Pennington, PharmD, MPH, BCPS

## 2019-08-03 NOTE — PROGRESS NOTES
The patient is alert and oriented x 3. VSS. Denies pain at this time. Appetite is good. Voiding without difficulty. SBA with ambulation using walker. Continue to monitor.

## 2019-08-03 NOTE — PLAN OF CARE
PT: Pt ambulated outside with SEC in PM session with great tolerance, only requiring one seated rest break, no obvious LOB, good sequencing and safety awareness with SEC.

## 2019-08-03 NOTE — PLAN OF CARE
Pt VSS. Alert and oriented. Denied pain. C/o sore throat/cough, given cepacol. Using yaunker for sputum. Head incision C/D/I, CAROL, wearing hat. Pt reports BM this morning. Pt able to make needs known, calling appropriately.

## 2019-08-03 NOTE — PROGRESS NOTES
Writer introduced self and explained role.   provided patient with printout of orders and initial care plan goals which were given on 8/3 for patient for review.  Writer explained this is an overview and available to answer questions as able.  If further questions, then writer can direct patient to the nurse, provider, therapy staff or interdisciplinary team member as needed.  Patient didn't have any questions at this time.  Copies of orders and care plan placed in chart.  Writer also told patient that the unit  would meet in the coming days to complete other assessments.

## 2019-08-04 NOTE — PLAN OF CARE
Pt VSS. Denies pain today. + BM today. Calls appropriately, walks using cane and SBA. Incision c/d/I with staples, approximated. Continues with dry throat and productive cough, cepacol lozenges effective, using yaunker for oral suctioning of sputum. Pt alert and orient, calls appropriately and makes needs known.

## 2019-08-04 NOTE — PLAN OF CARE
Patient alert and able to make needs known, up with supervision to bathroom, continent of B & B, denies pain and SOB. Dry throat noted and he took Cepacol x 2, continue plan of care

## 2019-08-04 NOTE — PLAN OF CARE
The patient is alert and oriented x 3. VSS. Cepacol lozenges x 3 for c/o throat pain. Denies further pain at this time. Appetite is good. Voiding without difficulty. Staples to head are CDI. SBA with ambulation using walker. Continue to monitor.

## 2019-08-05 NOTE — PLAN OF CARE
Patient alert and oriented x4, able to make needs known. Constipation this am during morning meds. Miralax given. Pt able to have BM following administration. No complaints of pain, SOB, chest pain or nausea. Cepacol losenges PRN given for throat pain. SBA, continent of bowel and bladder. Incision CDI and CAROL on scalp. No other concerns, will continue to monitor.

## 2019-08-05 NOTE — PROGRESS NOTES
08/05/19 1400   General Information   Patient Profile Review See Profile for full history and prior level of function   Daily Contact with Relatives or Friends Phone call;Visit   Community Involvement   Community Involvement Retired  ()   Spiritual Practice Other (comments)  (personal)   Sees [facility] ? No   Hobbies/Interests   Cards Other (see comments)  (solitaire)   Media   TV / Movies TV;Movie list   Sports / Physical Activities   Sports/Exercise Golf   Sports Fan Baseball;Basketball;Football;Golf   Impression   Open to Socializing with Others Independent   Barriers to Leisure No barriers / independent   Patient, family and / or staff in agreement with Plan of Care Yes   Treatment Plan   Interested in Unit [place]? No   Type of Intervention 1:1 intervention   Equipment and Supplies While on Unit Universal remote;Movies;Other (comments)  (deck of cards)   Assessment Assessment completed. Pt interested in accupuncture while on unit. Provided pt with deck of cards and movie list.

## 2019-08-05 NOTE — PLAN OF CARE
Patient slept most of this shift, up to bathroom with stand by assist using his cane, denies pain and no sign of respiratory distress noted. Prn Cepacol given for dry throat, continue to monitor

## 2019-08-05 NOTE — PLAN OF CARE
VSS. Alert and orientedx4. With productive coughing ,pt independently suctioned self orally with [place]. Cepacol given prn. Denies any chest pain, SOB, headache, N/V, numbness. SBA for ADL's, trasnfer and toileting. +BM today per pt. No concerns for bowel and bladder. Incision to head CDI, approximated with staples, no s/sx of infection. Pt used call light appropriately. Ativan given prn at HS per pts request.     TB skin test was not done per pt, sticky note left for MD to order blood test.   Will continue paln of care.  Vital signs:  Temp: 97.3  F (36.3  C) Temp src: Oral BP: 118/66   Heart Rate: 95 Resp: 18 SpO2: 99 % O2 Device: None (Room air)         .

## 2019-08-05 NOTE — PROGRESS NOTES
Oncology RN Care Coordination Note:     Patient's significant other, Stephanie, called wondering if patient needed his CT scan tomorrow since he was just in the hospital and had multiple scans while he was there.  Writer reviewed the scans he had while in the hospital and what is ordered tomorrow is not what was obtained why he admitted.  Writer reviewed this information with Stephanie and she verbalizes understanding.      Rimma Lanza RN BSN   [facility]  Nurse Coordinator

## 2019-08-06 NOTE — PLAN OF CARE
PTA: Pt close to meeting PT goals. Moved PT POC date to 8/10 vs 8/12. May have more OT needs than PT. Pt amb >1000 ft without and AD on unit and outdoors SBA. Up/down 22 stairs SBA with 1 rail.

## 2019-08-06 NOTE — PROGRESS NOTES
[facility]    Hospitalist Progress Note    Date of Service (when I saw the patient): 08/06/2019    Assessment & Plan     73 year old year old male with hx of  HTN, GERD, CKD, DJD and metastatic penile cancer (lung mets) is being admitted to  TCU on 08/01 for ongoing rehabilitation after hospitalization at [facility] from 07/22 to 08/01. He  presented to the ED after a fall down stairs and found to have a right frontal lobe brain mass with surrounding edema. He underwent  right craniotomy and tumor resection on 7/25/19. Hospitalization complicated by ongoing hyponatremia and hypophosphatemia requiring replacements as needed.        # Metastatic right frontal lobe mass  # S/p right craniotomy and tumor resection on 7/25/19    MRI brain 7/22 with peripheral enhancing right frontal lobe mass with extensive adjacent edema. 3mm right to left midline shift. S/p right sided craniotomy 7/25. Pathology consistent with metastatic carcinoma (likely squamous). Facial droop and LUE weakness improved. He was treated with Dexamethaosone. Anaerobic culture from brain lesion growing gram positive cocci in clusters on day 5 (broth only). Clinically stable, so likely contaminant.     - Continue Dexamethasone taper per neurosurgery. End date 8/2.    - Okay to start Radiation therapy as soon as 8/8 per [facility]. He will tentatively be seen on 8/12 for consult and then start gamma knife on 8/13 or 8/19.   - F/u in [facility] clinic for wound check and staple removal on 8/9.   - Brain MRI and f/u with Dr. Allen in 3 months (scheduled for 10/21 and 10/22).     # Fall: Trauma w/u in the ED with head CT (see results above). CTA without any carotid stenosis. CT C spine without any traumatic changes. CXR without traumatic changes (overall stable appearing lung mets). AP pelvis negative. He was evaluated by PT/OT, and recommended TCU for rehabilitation     # Metastatic penile cancer: Diagnosed in 2017, stage 2. Underwent 4  cycles of chemotherapy with cisplatin and gemzar, folled by radical penectomy, urethectomy, perineal urethostomy and bilateral inguinal node dissection 3/8/18. Plan was to proceed with XRT but he was then found to have pulmonary mets. He received Keytruda 8/18-2/28/18. Found to have progressive disease. Started on palliative docetaxel 3/14/19 and received cycle #6 on 7/5/19. He follows with Dr. Arceo. He feels that he has been having more fatigue and side effects from chemotherapy recently. Not sure he wants to continue    - F/u as scheduled for repeat CT CAP on and f/u with Dr. Arceo on 8/6 and 8/7.      # GERD: Persistent symptoms despite bid omeprazole  - Ranitidine bid and omeprazole bid.      # HTN: On Amlodipine  - Continue Amlodipine     # CKD: Baseline creatinine 1-1.5: Creatinine 1.41 on admission to [facility]. Improved to 1.2 Creatinine level 1.25 on admission to TCU on 08/01, but now gone up to 1.4.     - give one liter of IVF, Encourage to drink. His oral intake is poor.   - Avoid Nephrotoxins, hypotension, hypovolemia  - Renal dose medications  - BMP MWF at U      # Hyponatremia: Na 134 on 7/27. Treated with 2% saline initially, and then started on Nacl tabs 4 g TID on 7/27 with plan to decrease by 1-2 g/day.  - Nacl 1g BID for 3 days, then 1g daily for 3 days, then discontinue.   - BMP MWF     # Hypophosphatemia   Started on Phosphorus supplement 500 mg daily  - Phosphorus MWF at TCU      # Chronic anemia: Multifactorial: chemotherapy, CKD, mild iron deficiency (did not tolerate PO iron), chronic disease.  - CBC weekly  - Transfuse for hgb <7.     # Anxiety: Patient reports takes Ativan PRN for anxiety  - Continue and wean as able        # FEN: Regular diet  # Lines &Tubes: No palomino's catheter  # Activity & Physical condition:   # Prophylaxis: SCD  # Social Issues:   # Code status: Full code  # Pneumococcal vaccine: Pneumonia Conjugate vaccine on discharge.       Disposition: based on PT, OT,  Speech    Smith Bailey MD    Interval History     He feels fine. Reports still has some swalling difficulty with solids, but liquids go okay. No GERD. No abdominal pain. No diarrhea. No vomiting. No fevers or chills.     No cough or phlegm. No dysuria.       -Data reviewed today: I reviewed all new labs and imaging results over the last 24 hours. I personally reviewed no images or EKG's today.    Physical Exam   Temp: 96.8  F (36  C) Temp src: Oral BP: 121/66 Pulse: 76   Resp: 18 SpO2: 98 % O2 Device: None (Room air)    Vitals:    08/01/19 1535   Weight: 63.9 kg (140 lb 14.4 oz)     Vital Signs with Ranges  Temp:  [96.8  F (36  C)-97.9  F (36.6  C)] 96.8  F (36  C)  Pulse:  [76-98] 76  Resp:  [16-18] 18  BP: (117-127)/(64-70) 121/66  SpO2:  [97 %-98 %] 98 %  I/O last 3 completed shifts:  In: 480 [P.O.:480]  Out: -     Head: Incision healing okay. Staples in place.   Constitutional:  Awake, alert, cooperative, no apparent distress  Respiratory: Clear to auscultation bilaterally, no crackles or wheezing  Cardiovascular: Regular rate and rhythm, normal S1 and S2, and no murmur noted  GI: Normal bowel sounds, soft, non-distended, non-tender  Skin/Integumen: No rashes, no cyanosis, no edema    Medications     - MEDICATION INSTRUCTIONS -         - Skin Test Reading (tuberculin) -   Does not apply Q21 Days     sodium chloride 0.9%  1,000 mL Intravenous Once     amLODIPine  10 mg Oral Daily     calcium carbonate 500 mg (elemental)  500 mg Oral BID w/meals     fluticasone  2 spray Both Nostrils Daily     pantoprazole  40 mg Oral QAM AC     polyethylene glycol  17 g Oral Daily     potassium phosphate (monobasic)  500 mg Oral Daily     ranitidine  150 mg Oral BID     [START ON 8/7/2019] sodium chloride  1 g Oral Daily     tuberculin  5 Units Intradermal Q21 Days     vitamin D3  1,000 Units Oral BID       Data   Recent Labs   Lab 08/06/19  1150 08/05/19  0701 08/02/19  1004 08/01/19  0312 07/31/19  0401   WBC 9.8  --   --   16.7* 15.3*   HGB 8.5*  --   --  7.2* 7.5*   MCV 81  --   --  81 80     --   --  282 293    135 134 135 133   POTASSIUM 4.5 4.3 4.7 4.5 4.2   CHLORIDE 100 101 100 102 101   CO2 27 28 25 26 24   BUN 24 28 29 33* 28   CR 1.44* 1.19 1.20 1.25 1.03   ANIONGAP 7 6 9 7 8   SAADIA 9.4 9.0 9.0 8.8 9.0   GLC 90 86 105* 83 98   ALBUMIN 3.2*  --   --  2.6*  --    PROTTOTAL 7.7  --   --  5.7*  --    BILITOTAL 0.3  --   --  0.3  --    ALKPHOS 80  --   --  62  --    ALT 23  --   --  21  --    AST 21  --   --  21  --        No results found for this or any previous visit (from the past 24 hour(s)).

## 2019-08-06 NOTE — NURSING NOTE
Chief Complaint   Patient presents with     Blood Draw     Labs drawn via PIV by RN in lab.      Gali Giordano RN

## 2019-08-06 NOTE — PLAN OF CARE
Pt is alert and oriented, able to make needs known. Scalp incision with staples is CDI and CAROL, no drainage noted. Scabs noted on the left lateral and anterior left knee from fall at home, sie is CAROL, no bleeding or drainage. Perineal area remains intact, no redness or pressure area noted. Pt denies discomfort at this time. Pt was [facility] to the [facility] for a follow up appointment, awaiting return to the unit. Pleasant and cooperative with cares. Last BM was on 8/5/19. On Mg and Potassium protocol, no labs collected on both today.

## 2019-08-06 NOTE — DISCHARGE INSTRUCTIONS

## 2019-08-06 NOTE — PLAN OF CARE
Pt.A&Ox4. Uses call light appropriately. SBA to BR - ambulates with cane. Requested and rec'd Cepacol lozenge x1. Denies CP/SOB. Indep.with james for oral secretions. Scalp incision C/D/I with staples and CAROL.    0620 - Pt.requested and rec'd Zofran 8mg po for nausea - no emesis. Pt.to be NPO @ 1000 for CT @ 1200. Pick-up scheduled @ 1045 via wheelchair - has lab @ 1100. Pt.aware of info.

## 2019-08-07 NOTE — PROGRESS NOTES
Reason for Visit: seen in f/u of recurrent, metastatic penile cancer    Oncology HPI: Mr. King Gonsalo Bruno is a 73 year old man with a history of stage II penile urethral carcinoma, treated with cisplatin/gemzar (split on day 1 and 8 given hx of CKD). His first 2 cycles were complicated by dehydration and nausea. He had a positive response and completed 4 cycles in November 2017. He underwent radial penectomy and urethrectomy, perineal urethrostomy and bilateral inguinal node dissection on 3/8/18. Surgical pathology demonstrated moderately differentiated SCC of the penile urethra with extension into the corpus spongiosum and cavernosum of the penile shaft. The specimen was positive for lymphovascular and perineural invasion. Margins were negative. 1/5 L inguinal nodes were positive and 1/7 R inguinal nodes were positive for disease. He was referred for consideration of radiation. At the time of that appointment, he was found to have multiple new pulmonary nodules concerning for metastatic disease. Biopsy was discussed, but after discussion with patient, opted to observe with a PET/CT 6 weeks later. PET/CT on 6/26/18 showed multiple hypermetabolic pulmonary nodules. He was offered palliative intent immunotherapy. He was started on Keytruda on 8/3/18 and continued on that through 2/28/19. On 3/6/19, he had a restaging evaluation and was found to have progression. Hospice was discussed, but he wanted to try alternate chemotherapy. He initiated treatment with docetaxel on 3/14/19.     He was admitted for metastatic right frontal lobe mass. He had right craniotomy and tumor resection on 7/25/19.     Interval history:    is being followed for his metastatic penile cancer    He has been admitted for penile cancer metastatic to his brain. He had right craniotomy and tumor resection on 7/25/19. He has recovered well from surgery. He denies any headaches, nausea, vomiting, dizziness. He denies any pain at this visit.      No fever, chills, night sweats. He is able to sleep ok. He tries to exercise and keep moving. No falls but he has balance problems, when he walks too fast. He played 18 holes of golf yesterday.     ROS: Unless otherwise noted above, denies fatigue, fevers, chills, night sweats, HA, visual, hearing or taste changes, dry mouth, mouth sores, trouble swallowing, N/D,  abdominal pain, change in urination, chest pain, SOB, cough, edema, mood changes, bleeding  No current facility-administered medications for this visit.      No current outpatient medications on file.     Facility-Administered Medications Ordered in Other Visits   Medication     2% sodium chloride infusion     acetaminophen (TYLENOL) tablet 650 mg     amLODIPine (NORVASC) tablet 10 mg     ascorbic acid (vitamin C) chewable tablet 500 mg     calcium carbonate 500 mg (elemental) (OSCAL;OYSTER SHELL CALCIUM) tablet 500 mg     dexamethasone (DECADRON) tablet 4 mg    Followed by     [START ON 8/30/2019] dexamethasone (DECADRON) tablet 3 mg    Followed by     [START ON 9/6/2019] dexamethasone (DECADRON) tablet 2 mg    Followed by     [START ON 9/13/2019] dexamethasone (DECADRON) tablet 1 mg     glycerin (ADULT) Suppository 1 suppository     hydrALAZINE (APRESOLINE) injection 10-20 mg     labetalol (NORMODYNE/TRANDATE) syringe 10-40 mg     levETIRAcetam (KEPPRA) tablet 1,000 mg     lidocaine (LMX4) cream     lidocaine 1 % 0.1-1 mL     magnesium sulfate 2 g in NS intermittent infusion (PharMEDium or [facility] Cmpd)     magnesium sulfate 4 g in 100 mL sterile water (premade)     naloxone (NARCAN) injection 0.1-0.4 mg     ondansetron (ZOFRAN-ODT) ODT tab 4-8 mg    Or     ondansetron (ZOFRAN) injection 4-8 mg     oxyCODONE (ROXICODONE) tablet 5 mg     pantoprazole (PROTONIX) EC tablet 40 mg     polyethylene glycol (MIRALAX/GLYCOLAX) Packet 17 g     potassium chloride (KLOR-CON) Packet 20-40 mEq     potassium chloride 10 mEq in 100 mL intermittent infusion with 10 mg  "lidocaine     potassium chloride 10 mEq in 100 mL sterile water intermittent infusion (premix)     potassium chloride 20 mEq in 50 mL intermittent infusion     potassium chloride ER (K-DUR/KLOR-CON M) CR tablet 20-40 mEq     potassium phosphate 10 mmol in D5W 250 mL intermittent infusion     potassium phosphate 15 mmol in D5W 250 mL intermittent infusion     potassium phosphate 20 mmol in D5W 250 mL intermittent infusion     potassium phosphate 20 mmol in D5W 500 mL intermittent infusion     potassium phosphate 25 mmol in D5W 500 mL intermittent infusion     senna-docusate (SENOKOT-S/PERICOLACE) 8.6-50 MG per tablet 2 tablet     sodium chloride (PF) 0.9% PF flush 3 mL     sodium chloride (PF) 0.9% PF flush 3 mL     sodium chloride tablet 1 g     sodium phosphate (FLEET ENEMA) 1 enema     vitamin A 77898 units capsule 20,000 Units     vitamin D3 (CHOLECALCIFEROL) 1000 units (25 mcg) tablet 1,000 Units     vitamin E (TOCOPHEROL) 100 units (90 mg) capsule 100 Units     zinc sulfate (ZINCATE) capsule 220 mg       Allergies   Allergen Reactions     Lisinopril Swelling     Oxycodone Nausea and Vomiting     Vicodin [Hydrocodone-Acetaminophen] Nausea and Vomiting         Exam: alert, appears slim, but well.   Blood pressure (!) 149/69, pulse 86, temperature 98.4  F (36.9  C), temperature source Oral, resp. rate 16, height 1.848 m (6' 0.75\"), weight 63 kg (139 lb), SpO2 100 %.  Wt Readings from Last 4 Encounters:   08/24/19 61 kg (134 lb 7.7 oz)   08/09/19 65.3 kg (144 lb)   08/07/19 63 kg (139 lb)   08/07/19 63 kg (139 lb)     General: Pleasant gentleman in no acute distress  HEENT: EOMI, PERRLA, no scleral icterus, mucus membranes moist and no lesions or thrush  Lymph: Neck is supple and shows no nodes in cervical or supraclavicular   Heart:  RRR, no murmur, gallop or rub  Lungs: CTA-B, no wheezes, rhonchi or rales  Abdomen: Normal bowel sounds, soft, non tender, not distended, no rebound or guarding, no palpable " masses  Extremities: No pitting edema  Skin: Hard palpable cord on left forearm with overlying erythema. No peeling or blistering  Neuro: CN II-XII are intact    Labs:     Labs reviewed    Results for orders placed or performed in visit on 08/06/19   CT Chest/Abdomen/Pelvis w Contrast    Narrative    EXAMINATION: CT CHEST/ABDOMEN/PELVIS W CONTRAST, 8/6/2019 12:32 PM    TECHNIQUE:  Helical CT images from the lung apices through the  symphysis pubis were obtained with contrast.  Coronal and sagittal  reformatted images were generated at a workstation for further  assessment.    CONTRAST:  86 ml Isovue 370.    COMPARISON: CT 5/15/2019.    HISTORY: Penile cancer with extensive metastasis on chemotherapy;  Hypercalcemia; Penile cancer (H); Malignant neoplasm metastatic to  both lungs (H); Malignant neoplasm metastatic to both lungs (H);  Urethral cancer (H)    ADDITIONAL HISTORY FROM THE EMR:Diagnosed in 2017, stage 2. Underwent  4 cycles of chemotherapy with cisplatin and gemzar, followed by  radical penectomy, urethectomy, perineal urethostomy and bilateral  inguinal node dissection 3/8/18. Plan was to proceed with XRT but he  was then found to have pulmonary mets. He received Keytruda  8/18-2/28/18. Found to have progressive disease. Started on palliative  docetaxel 3/14/19 and received cycle #6 on 7/5/19. Of note, patient  was recently found to have a metastatic right frontal lobe brain  metastasis.    FINDINGS:    CHEST: Progression of lung metastasis. Some examples are as follows;    - 3.1 cm left lower lobe lateral basal segment nodule, previously 1.8 cm  - 3.5 cm left lower lobe nodule adjacent to fissure, previously 2.3 cm.  - 7.8 cm right parahilar metastatic disease, previously 6 cm this  completely obstructs segmentary bronchus of the right lower lobe  superior segment with increased atelectatic disease since 5/15/2019  - 6 cm left paracardiac metastatic nodule, previous to 4 cm.    Extensive centrilobular  emphysema. No infectious consolidation. No  pneumothorax or pleural effusion.    Normal heart size. No pericardial effusion. Mild three-vessel coronary  artery calcification. Normal caliber of the main pulmonary artery and  aortic arch. Origins of the great neck vessels are patent.    Thyroid is unremarkable. Grossly no mediastinal or axillary  lymphadenopathy.    ABDOMEN and PELVIS: Normal liver size. No suspicious liver lesion.  Patent hepatic vasculature. No biliary tree dilation. Decompressed  gallbladder. Homogeneous pancreatic parenchyma. Main pancreatic duct  is not dilated. The spleen is not enlarged. No focal splenic lesions.  Unremarkable adrenal glands. No renal stones or hydronephrosis.  Bilateral simple renal cysts , largest measuring up to 3 cm in the  left upper pole. Normal bladder. Mildly enlarged prostate. Significant  stool in the rectosigmoid colon.     Postoperative changes bilateral inguinal regions and mons pubis  secondary to inguinal node dissections and radical penectomy. No  suspicious pelvic, inguinal or abdominal lymphadenopathy. Marked  destructive changes in the iliac arteries. No aneurysm. No free fluid  or air in the abdominal cavity.    Small amount of free fluid in the pelvis. No free air. Moderate  colonic stool burden. No dilated loops of bowel.     BONES and SOFT TISSUES: Stable subchondral cyst in the left acetabulum  and femoral heads, accompanying sclerosis of the superior aspect of  the acetabulum, consistent with degenerative changes. Similar but less  conspicuous changes in the right acetabulum as well. No suspicious  lytic or sclerotic bone lesion in the skeleton.      Impression    IMPRESSION: Progressive metastatic lung disease since 5/15/2019.  Remainder of the findings are stable.    I have personally reviewed the examination and initial interpretation  and I agree with the findings.    JENISE FOSTER MD     ]    Impression/plan:     Recurrent, metastatic penile  urethral carcinoma   He had brain metastasis which has been resected. The plan is for radiation/Gamma Knife.   He is being seen with restaging scans. I have reviewed actual images from his CT scan. He has disease progression in the chest.   We will continue to follow for now. He has progressed on pembrolizumab and now taxotere.   Previously we had tried cisplatin with gemcitabine for him which he had a very hard time tolerating.   Of note I did discuss that we could just focus on symptoms given his progressive disease and now brain metastasis  He would still like to try options if possible.   I would consider carboplatin with cetuximab.    Anemia  -has had low hgb for years.     CKD  -Cr baseline has been 1.4-1.6 over the past couple months (improved from previously). Today stable at 1.59  -continue to push PO hydrations especially with chemo     HTN  -on amlodipine 10 mg daily, BP stable     Arthritis  -improved after stopping keytruda though still has some occasional pain.      GERD  -on omeprazole 20 mg daily. Help his symptoms, though still has some GERD most days. Will increase his dose to BID. Refilled for him today        Bowels  -had constipation though he used miralax and now having normal stools. Can use Miralax prn    Hypercalcemia,   Resolved     Over 25 min of direct face to face time spent with patient with more than 50% time spent in counseling and coordinating care.

## 2019-08-07 NOTE — NURSING NOTE
"Oncology Rooming Note    August 7, 2019 2:04 PM   King Gonsalo Bruno is a 73 year old male who presents for:    Chief Complaint   Patient presents with     Oncology Clinic Visit     Return Bladder Ca     Initial Vitals: BP (!) 149/69   Pulse 86   Temp 98.4  F (36.9  C) (Oral)   Resp 16   Ht 1.848 m (6' 0.75\")   Wt 63 kg (139 lb)   SpO2 100%   BMI 18.47 kg/m   Estimated body mass index is 18.47 kg/m  as calculated from the following:    Height as of this encounter: 1.848 m (6' 0.75\").    Weight as of this encounter: 63 kg (139 lb). Body surface area is 1.8 meters squared.  No Pain (0) Comment: Data Unavailable   No LMP for male patient.  Allergies reviewed: Yes  Medications reviewed: Yes    Medications: Medication refills not needed today.  Pharmacy name entered into EPIC: [facility] [place], [place] - [address] [place] [phone] [phone]    Clinical concerns: No new concerns.         Zoë Castillo CMA              "

## 2019-08-07 NOTE — PLAN OF CARE
Alert and oriented x 4. Good appetite and fluid intake. Gave IV bolus NS 1000cc per MD to lower his creatinine level. Denied having pain and discomfort. Surgical staples on scalp CDI and CAROL. Ambulated to the bathroom with SBA x 1 voided good no BM.

## 2019-08-07 NOTE — PLAN OF CARE
"Anesthesia nurse cam in to insert PIV line for pt. Pt declined and stated \"my Oncology Doctor from the appt said no need to put an IV in, all I need is to drink lots of fluids. \" Staples to head was removed at the appointment clinic.    Dr. Bailey paged regarding pt declining PIV.    Oral intake encouraged as tolerated. No concerns for bowel and bladder. Denies any pain, SOB, N/V, headache. Alert and orientedx4. Incision to head intact. Independent in room.  Will continue plan of  Care.  Vital signs:  Temp: 97.7  F (36.5  C) Temp src: Axillary BP: 124/61   Heart Rate: 78 Resp: 17 SpO2: 100 % O2 Device: None (Room air)       "

## 2019-08-07 NOTE — PLAN OF CARE
Discharge Planner PT   Patient plan for discharge: Home with assist  Current status: Focus on IND with mobility and overall strength/balance.  Pt ambulated outside and inside with no use of cane and SBA, now IND in room.  Pt completed several balance and strength exercises, tolerating well.   Barriers to return to prior living situation: Decreased overall strength and balance  Recommendations for discharge: Home with OP PT  Rationale for recommendations: Plan to discharge home on 8/10       Entered by: Blanca Singleton 08/07/2019 10:00 AM

## 2019-08-07 NOTE — PROGRESS NOTES
NEUROSURGERY PROGRESS NOTE    REASON FOR VISIT: Routine postop wound check and suture/staple removal.    Procedure Date: 07/25/2019   PREOPERATIVE DIAGNOSIS:  Right frontal brain lesion.   PROCEDURE:    1. Stealth-assisted right craniotomy for tumor resection.   2. Use of intraoperative microscope  SURGEON:  Chandni Allen MD       HPI: Mr. Bruno is a 73-year-old male with a known history of squamous cell carcinoma with lung mets.  The patient presented to ED initially on 07/22 after a fall at home. He also complained of approximately 3 days of worsening left lower extremity strength which resulted in his fall. Imaging demonstrated a new brain mass in the right frontal region with surrounding cerebral edema.  Given his known metastatic squamous cell carcinoma, this likely represented an additional metastatic lesion. He was admitted under Hem/Onc for close monitoring and Neurosurgery with Dr. Allen on call was consulted.  Given these findings, Dr. Allen recommended surgical resection and the patient elected to proceed on the above mentioned date after risks, benefits, and alternatives were discussed. The surgery went without incident. The final pathology showed metastatic carcinoma, likely squamous, given the patient's clinical history of squamous cell carcinoma. He had facial droop and LUE weakness, which was improved prior to discharge. Of note, anaerobic culture from brain lesion growing gram positive cocci in clusters on day 5 (broth only). Clinically stable, so likely contaminant.  He was put on continue Dexamethasone taper that ended on 8/2. His follow up care was discussed by Tumor Board and it was determined that he will continue radiation therapy (start gamma knife on 8/13 or 8/19.) for residual tumors. He already has a consultation setup with Radiation Oncology for this. He was also instructed to follow up with Dr. Allen with a repeat MRI of brain 3 months after the surgery.      Interval  History: The patient denies fever, chills, redness, swelling, and/or drainage from incision. He feels tired during the day, which is his baseline. 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 He denies further falls since discharge from the hospital.      CURRENT MEDICATIONS:  No current facility-administered medications for this visit.   No current outpatient medications on file.    Facility-Administered Medications Ordered in Other Visits:      - Skin Test Reading -, , Does not apply, Q21 Days, Shoaib Monsivais MD     0.9% sodium chloride BOLUS, 1,000 mL, Intravenous, Once, Smith Bailey MD, Last Rate: 250 mL/hr at 08/07/19 1137, 1,000 mL at 08/07/19 1137     acetaminophen (TYLENOL) tablet 650 mg, 650 mg, Oral, Q4H PRN, Shoaib Monsivais MD, 650 mg at 08/03/19 0230     amLODIPine (NORVASC) tablet 10 mg, 10 mg, Oral, Daily, Shoaib Monsivais MD, 10 mg at 08/07/19 0857     barium sulfate (EZ PAQUE) oral suspension 96%, , Oral, Once, Renato Payne MD     barium sulfate (EZ-HD) oral suspension 98%, , Oral, Once, Renato Payne MD     barium sulfate 40% (VARIBAR NECTAR) oral suspension 20 mL, 20 mL, Oral, Once, Franny Perez MD     barium sulfate 40% (VARIBAR THIN HONEY) oral suspension 20 mL, 20 mL, Oral, Once, Franny Perez MD     barium sulfate 40% (VARIBAR THIN HONEY) oral suspension, , Oral, Once, Renato Payne MD     benzocaine-menthol (CEPACOL) 15-3.6 MG lozenge 1 lozenge, 1 lozenge, Buccal, Q1H PRN, Shoaib Monsivais MD, 1 lozenge at 08/05/19 2346     calcium carbonate 500 mg (elemental) (OSCAL;OYSTER SHELL CALCIUM) tablet 500 mg, 500 mg, Oral, BID w/meals, Shoaib Monsivais MD, 500 mg at 08/07/19 0854     docusate sodium (COLACE) capsule 100 mg, 100 mg, Oral, BID PRN, Shoaib Monsivais MD     fluticasone (FLONASE) 50 MCG/ACT spray 2 spray, 2 spray, Both Nostrils, Daily, Shoaib Monsivais MD, 2 spray at 08/07/19 0854     LORazepam (ATIVAN) tablet 0.5 mg,  0.5 mg, Oral, Once PRN, Shoaib Monsivais MD, 0.5 mg at 08/04/19 2047     magnesium sulfate 4 g in 100 mL sterile water (premade), 4 g, Intravenous, Q4H PRN, Shoaib Monsivais MD     medication instructions, , Does not apply, Continuous PRN, Shoaib Monsivais MD     ondansetron (ZOFRAN) tablet 8 mg, 8 mg, Oral, Q8H PRN, Shoaib Monsivais MD, 8 mg at 08/06/19 1558     pantoprazole (PROTONIX) EC tablet 40 mg, 40 mg, Oral, QAM AC, Shoaib Monsivais MD, 40 mg at 08/07/19 0548     phenol (CHLORASEPTIC) 1.4 % spray 1 mL, 1 spray, Mouth/Throat, Q1H PRN, Shoaib Monsivais MD     polyethylene glycol (MIRALAX/GLYCOLAX) Packet 17 g, 17 g, Oral, Daily, Shoaib Monsivais MD, 17 g at 08/07/19 0854     potassium chloride (KLOR-CON) Packet 20-40 mEq, 20-40 mEq, Oral or Feeding Tube, Q2H PRN, Shoaib Monsivais MD     potassium chloride 10 mEq in 100 mL intermittent infusion with 10 mg lidocaine, 10 mEq, Intravenous, Q1H PRN, Shoaib Monsivais MD     potassium chloride 10 mEq in 100 mL sterile water intermittent infusion (premix), 10 mEq, Intravenous, Q1H PRN, Shoaib Monsivais MD     potassium chloride 20 mEq in 50 mL intermittent infusion, 20 mEq, Intravenous, Q1H PRN, Shoaib Monsivais MD     potassium chloride ER (K-DUR/KLOR-CON M) CR tablet 20-40 mEq, 20-40 mEq, Oral, Q2H PRN, Shoaib Monsivais MD     [START ON 8/8/2019] potassium phosphate (monobasic) (K-PHOS) tablet 500 mg, 500 mg, Oral, Daily, Shoaib Monsivais MD     prochlorperazine (COMPAZINE) tablet 5 mg, 5 mg, Oral, Q6H PRN, Shoaib Monsivais MD, 5 mg at 08/05/19 1958     ranitidine (ZANTAC) tablet 150 mg, 150 mg, Oral, BID, Shoaib Monsivais MD, 150 mg at 08/07/19 0854     sod bicarbonate-citric acid-simethicone (EZ GAS) 2.21-1.53-0.04 G packet 4 g, 4 g, Oral, Once, Renato Payne MD     sodium chloride tablet 1 g, 1 g, Oral, Daily, Shoaib Monsivais MD, 1 g at 08/07/19 0854     tuberculin injection 5 Units, 5 Units, Intradermal, Q21 Days, Shoaib Monsivais MD     vitamin D3 (CHOLECALCIFEROL)  1000 units (25 mcg) tablet 1,000 Units, 1,000 Units, Oral, BID, Shoaib Monsivais MD, 1,000 Units at 08/07/19 0854      ALLERGIES:  Allergies   Allergen Reactions     Lisinopril Swelling     Oxycodone Nausea and Vomiting     Vicodin [Hydrocodone-Acetaminophen] Nausea and Vomiting         PMH, SOC HIST, FAM HIST, PROBLEM LIST:  All reviewed in EPIC.      FOCUS EXAMINATION:  There were no vitals taken for this visit.  General:Well developed and thin male in no apparent distress.   LOC/Cognition: He is A&O X3.  Mood and affect WNL. Language and fund of knowledge intact.  Is able to sit and rise independently.     Neuro exam was deferred for this visit.  Incision: Right frontal skull incision is approximated by staples. No erythema, swelling, induration, and/or drainage.    I prepped the wound with ChloraPrep and cleanly removed staples without difficulty.      ASSESSMENT:  1. Brain metastases (H)    2. S/P craniotomy for resection of metastatic squamous cell carcinoma. on 7/25/2019    3. Visit for suture removal      King Gonsalo Bruno is getting along after the recent craniotomy. The wound is healthy without evidence of infection.    PLAN:  We discussed wound care.  *  Keep it open to air.   *  May resume shampoo with mild shampoo including the incision after 48 hours. No hair conditioners, hair treatments or skin cream for two more weeks.  *  May swim or bathe when all scabbing is gone from the incision.  *  Call our services if you develop fever, chills, redness, swelling, and/or drainage from incision.  We discussed activities.  *  We recommend he gradually return to usual activities as able.  We discussed follow up    *  Follow up with regular Medical Oncology and Radiation Oncology as instructed.  *  Return to Dr. Allen's clinic in 10/2019 with a MRI of brain with and without contrast prior .     All the patient's questions have been answered and they demonstrate good understanding of the above.  The patient has  our contact information and is aware that he should call if he has questions or concerns.     We appreciate the opportunity to be of service in the care of this pleasant patient.  Please do call if there is anything more we can do.    This note was completed using Dragon voice recognition software.  Although reviewed after completion, some word and grammatical errors may occur.      Mitzi Weeks DNP, APRN, FNP-BC  Department of Neurosurgery

## 2019-08-07 NOTE — PLAN OF CARE
Discharge Planner OT   Patient plan for discharge: Home alone with daily support and per patient report will have open arms in place for discharge.   Current status: Pt. Demonstrates good safety with mobility in room using cane Mod indep. Supervision in hallway for ambulation with no A.D. And no LOB  Pt. Has a list of items will purchase for home to assist with safe discharge         Entered by: Dianna Cardenas 08/07/2019 8:03 AM

## 2019-08-07 NOTE — PLAN OF CARE
"Pt has been advanced to be independent in the room. Pt expressed frustration and dissatisfaction that he had to wait for the staff to help him out to the bathroom, stated \"I'm not that sick, I'm at the point where I can go home now and care for myself, I don't want to wait for anyone before I go\". Up independently with a cane to the bathroom. Had a large BM after breakfast. Creatinine remains elevated at, pt was encouraged to increase oral fluid intake. Had 1000 ml of water between breakfast and lunch. NS 0.9% 1000 ml was ordered. PIV infiltrated within 15 minutes after the NS was started. Pt was taken to the [facility] for Oncology and Neurosurgery appointments. Will resume bolus infusion when pt returns to the unit. No complaints of discomfort.    "

## 2019-08-07 NOTE — LETTER
8/7/2019       RE: King Gonsalo Bruno  [address]  [facility] [address]     Dear Colleague,    Thank you for referring your patient, King Gonsalo Bruno, to the [facility] NEUROSURGERY at [facility]. Please see a copy of my visit note below.    NEUROSURGERY PROGRESS NOTE    REASON FOR VISIT: Routine postop wound check and suture/staple removal.    Procedure Date: 07/25/2019   PREOPERATIVE DIAGNOSIS:  Right frontal brain lesion.   PROCEDURE:    1. Stealth-assisted right craniotomy for tumor resection.   2. Use of intraoperative microscope  SURGEON:  Chandni Allen MD     HPI: Mr. Bruno is a 73-year-old male with a known history of squamous cell carcinoma with lung mets.    The patient presented to ED initially on 07/22 after a fall at home. He also complained of approximately 3 days of worsening left lower extremity strength which resulted in his fall. Imaging demonstrated a new brain mass in the right frontal region with surrounding cerebral edema.  Given his known metastatic squamous cell carcinoma, this likely represented an additional metastatic lesion. He was admitted under Hem/Onc for close monitoring and Neurosurgery with Dr. Allen on call was consulted.  Given these findings, Dr. Allen recommended surgical resection and the patient elected to proceed on the above mentioned date after risks, benefits, and alternatives were discussed. The surgery went without incident. The final pathology showed metastatic carcinoma, likely squamous, given the patient's clinical history of squamous cell carcinoma. He had facial droop and LUE weakness, which was improved prior to discharge. Of note, anaerobic culture from brain lesion growing gram positive cocci in clusters on day 5 (broth only). Clinically stable, so likely contaminant.  He was put on continue Dexamethasone taper that ended on 8/2. His follow up care was discussed by Tumor Board and it was determined that he  will continue radiation therapy (start gamma knife on 8/13 or 8/19.) for residual tumors. He already has a consultation setup with Radiation Oncology for this. He was also instructed to follow up with Dr. Allen with a repeat MRI of brain 3 months after the surgery.      Interval History: The patient denies fever, chills, redness, swelling, and/or drainage from incision. He feels tired during the day, which is his baseline. 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 He denies further falls since discharge from the hospital.    CURRENT MEDICATIONS:  No current facility-administered medications for this visit.   No current outpatient medications on file.    Facility-Administered Medications Ordered in Other Visits:      - Skin Test Reading -, , Does not apply, Q21 Days, Shoaib Monsivais MD     0.9% sodium chloride BOLUS, 1,000 mL, Intravenous, Once, Smith Bailey MD, Last Rate: 250 mL/hr at 08/07/19 1137, 1,000 mL at 08/07/19 1137     acetaminophen (TYLENOL) tablet 650 mg, 650 mg, Oral, Q4H PRN, Shoaib Monsivais MD, 650 mg at 08/03/19 0230     amLODIPine (NORVASC) tablet 10 mg, 10 mg, Oral, Daily, Shoaib Monsivais MD, 10 mg at 08/07/19 0857     barium sulfate (EZ PAQUE) oral suspension 96%, , Oral, Once, Renato Payne MD     barium sulfate (EZ-HD) oral suspension 98%, , Oral, Once, Renato Payne MD     barium sulfate 40% (VARIBAR NECTAR) oral suspension 20 mL, 20 mL, Oral, Once, Franny Perez MD     barium sulfate 40% (VARIBAR THIN HONEY) oral suspension 20 mL, 20 mL, Oral, Once, Franny Perez MD     barium sulfate 40% (VARIBAR THIN HONEY) oral suspension, , Oral, Once, Renato Payne MD     benzocaine-menthol (CEPACOL) 15-3.6 MG lozenge 1 lozenge, 1 lozenge, Buccal, Q1H PRN, Shoaib Monsivais MD, 1 lozenge at 08/05/19 2346     calcium carbonate 500 mg (elemental) (OSCAL;OYSTER SHELL CALCIUM) tablet 500 mg, 500 mg, Oral, BID w/meals, Shoaib Monsivais,  MD, 500 mg at 08/07/19 0854     docusate sodium (COLACE) capsule 100 mg, 100 mg, Oral, BID PRN, Shoaib Monsivais MD     fluticasone (FLONASE) 50 MCG/ACT spray 2 spray, 2 spray, Both Nostrils, Daily, Shoaib Monsivais MD, 2 spray at 08/07/19 0854     LORazepam (ATIVAN) tablet 0.5 mg, 0.5 mg, Oral, Once PRN, Shoaib Monsivais MD, 0.5 mg at 08/04/19 2047     magnesium sulfate 4 g in 100 mL sterile water (premade), 4 g, Intravenous, Q4H PRN, Shoaib Monsivais MD     medication instructions, , Does not apply, Continuous PRN, Shoaib Monsivais MD     ondansetron (ZOFRAN) tablet 8 mg, 8 mg, Oral, Q8H PRN, Shoaib Monsivais MD, 8 mg at 08/06/19 1558     pantoprazole (PROTONIX) EC tablet 40 mg, 40 mg, Oral, QAM AC, Shoaib Monsivais MD, 40 mg at 08/07/19 0548     phenol (CHLORASEPTIC) 1.4 % spray 1 mL, 1 spray, Mouth/Throat, Q1H PRN, Shoaib Monsivais MD     polyethylene glycol (MIRALAX/GLYCOLAX) Packet 17 g, 17 g, Oral, Daily, Shoaib Monsivais MD, 17 g at 08/07/19 0854     potassium chloride (KLOR-CON) Packet 20-40 mEq, 20-40 mEq, Oral or Feeding Tube, Q2H PRN, Shoaib Monsivais MD     potassium chloride 10 mEq in 100 mL intermittent infusion with 10 mg lidocaine, 10 mEq, Intravenous, Q1H PRN, Shoaib Monsivais MD     potassium chloride 10 mEq in 100 mL sterile water intermittent infusion (premix), 10 mEq, Intravenous, Q1H PRN, Shoaib Monsivais MD     potassium chloride 20 mEq in 50 mL intermittent infusion, 20 mEq, Intravenous, Q1H PRN, Shoaib Monsivais, MD     potassium chloride ER (K-DUR/KLOR-CON M) CR tablet 20-40 mEq, 20-40 mEq, Oral, Q2H PRN, Shoaib Monsivais MD     [START ON 8/8/2019] potassium phosphate (monobasic) (K-PHOS) tablet 500 mg, 500 mg, Oral, Daily, Shoaib Monsivais MD     prochlorperazine (COMPAZINE) tablet 5 mg, 5 mg, Oral, Q6H PRN, Shoaib Monsivais MD, 5 mg at 08/05/19 1958     ranitidine (ZANTAC) tablet 150 mg, 150 mg, Oral, BID, Shoaib Monsivais MD, 150 mg at 08/07/19 0854     sod bicarbonate-citric acid-simethicone (EZ GAS)  2.21-1.53-0.04 G packet 4 g, 4 g, Oral, Once, Renato Payne MD     sodium chloride tablet 1 g, 1 g, Oral, Daily, Shoaib Monsivais MD, 1 g at 08/07/19 0854     tuberculin injection 5 Units, 5 Units, Intradermal, Q21 Days, Shoaib Monsivais MD     vitamin D3 (CHOLECALCIFEROL) 1000 units (25 mcg) tablet 1,000 Units, 1,000 Units, Oral, BID, Shoaib Monsivais MD, 1,000 Units at 08/07/19 0854    ALLERGIES:  Allergies   Allergen Reactions     Lisinopril Swelling     Oxycodone Nausea and Vomiting     Vicodin [Hydrocodone-Acetaminophen] Nausea and Vomiting       PMH, SOC HIST, FAM HIST, PROBLEM LIST:  All reviewed in EPIC.    FOCUS EXAMINATION:  There were no vitals taken for this visit.  General:Well developed and thin male in no apparent distress.   LOC/Cognition: He is A&O X3.  Mood and affect WNL. Language and fund of knowledge intact.  Is able to sit and rise independently.     Neuro exam was deferred for this visit.  Incision: Right frontal skull incision is approximated by staples. No erythema, swelling, induration, and/or drainage.    I prepped the wound with ChloraPrep and cleanly removed staples without difficulty.      ASSESSMENT:  1. Brain metastases (H)    2. S/P craniotomy for resection of metastatic squamous cell carcinoma. on 7/25/2019    3. Visit for suture removal      King Gonsalo Bruno is getting along after the recent craniotomy. The wound is healthy without evidence of infection.    PLAN:  We discussed wound care.  *  Keep it open to air.   *  May resume shampoo with mild shampoo including the incision after 48 hours. No hair conditioners, hair treatments or skin cream for two more weeks.  *  May swim or bathe when all scabbing is gone from the incision.  *  Call our services if you develop fever, chills, redness, swelling, and/or drainage from incision.  We discussed activities.  *  We recommend he gradually return to usual activities as able.  We discussed follow up    *  Follow up with regular  Medical Oncology and Radiation Oncology as instructed.  *  Return to Dr. Allen's clinic in 10/2019 with a MRI of brain with and without contrast prior .     All the patient's questions have been answered and they demonstrate good understanding of the above.  The patient has our contact information and is aware that he should call if he has questions or concerns.     We appreciate the opportunity to be of service in the care of this pleasant patient.  Please do call if there is anything more we can do.    This note was completed using Dragon voice recognition software.  Although reviewed after completion, some word and grammatical errors may occur.    Mitzi Weeks, ABRAM, APRN, FNP-BC  Department of Neurosurgery

## 2019-08-07 NOTE — NURSING NOTE
Chief Complaint   Patient presents with     RECHECK     [facility] RETURN - FOLLOW UP       Bradley Rico, EMT

## 2019-08-07 NOTE — LETTER
8/7/2019       RE: King Gonsalo Bruno  [address]  [facility] [address]     Dear Colleague,    Thank you for referring your patient, King Gonsalo Bruno, to the [facility]. Please see a copy of my visit note below.    Reason for Visit: seen in f/u of recurrent, metastatic penile cancer    Oncology HPI: Mr. King Gonsalo Bruno is a 73 year old man with a history of stage II penile urethral carcinoma, treated with cisplatin/gemzar (split on day 1 and 8 given hx of CKD). His first 2 cycles were complicated by dehydration and nausea. He had a positive response and completed 4 cycles in November 2017. He underwent radial penectomy and urethrectomy, perineal urethrostomy and bilateral inguinal node dissection on 3/8/18. Surgical pathology demonstrated moderately differentiated SCC of the penile urethra with extension into the corpus spongiosum and cavernosum of the penile shaft. The specimen was positive for lymphovascular and perineural invasion. Margins were negative. 1/5 L inguinal nodes were positive and 1/7 R inguinal nodes were positive for disease. He was referred for consideration of radiation. At the time of that appointment, he was found to have multiple new pulmonary nodules concerning for metastatic disease. Biopsy was discussed, but after discussion with patient, opted to observe with a PET/CT 6 weeks later. PET/CT on 6/26/18 showed multiple hypermetabolic pulmonary nodules. He was offered palliative intent immunotherapy. He was started on Keytruda on 8/3/18 and continued on that through 2/28/19. On 3/6/19, he had a restaging evaluation and was found to have progression. Hospice was discussed, but he wanted to try alternate chemotherapy. He initiated treatment with docetaxel on 3/14/19.     He was admitted for metastatic right frontal lobe mass. He had right craniotomy and tumor resection on 7/25/19.     Interval history:    is being followed for his metastatic penile cancer    He  has been admitted for penile cancer metastatic to his brain. He had right craniotomy and tumor resection on 7/25/19. He has recovered well from surgery. He denies any headaches, nausea, vomiting, dizziness. He denies any pain at this visit.     No fever, chills, night sweats. He is able to sleep ok. He tries to exercise and keep moving. No falls but he has balance problems, when he walks too fast. He played 18 holes of golf yesterday.     ROS: Unless otherwise noted above, denies fatigue, fevers, chills, night sweats, HA, visual, hearing or taste changes, dry mouth, mouth sores, trouble swallowing, N/D,  abdominal pain, change in urination, chest pain, SOB, cough, edema, mood changes, bleeding  No current facility-administered medications for this visit.      No current outpatient medications on file.     Facility-Administered Medications Ordered in Other Visits   Medication     2% sodium chloride infusion     acetaminophen (TYLENOL) tablet 650 mg     amLODIPine (NORVASC) tablet 10 mg     ascorbic acid (vitamin C) chewable tablet 500 mg     calcium carbonate 500 mg (elemental) (OSCAL;OYSTER SHELL CALCIUM) tablet 500 mg     dexamethasone (DECADRON) tablet 4 mg    Followed by     [START ON 8/30/2019] dexamethasone (DECADRON) tablet 3 mg    Followed by     [START ON 9/6/2019] dexamethasone (DECADRON) tablet 2 mg    Followed by     [START ON 9/13/2019] dexamethasone (DECADRON) tablet 1 mg     glycerin (ADULT) Suppository 1 suppository     hydrALAZINE (APRESOLINE) injection 10-20 mg     labetalol (NORMODYNE/TRANDATE) syringe 10-40 mg     levETIRAcetam (KEPPRA) tablet 1,000 mg     lidocaine (LMX4) cream     lidocaine 1 % 0.1-1 mL     magnesium sulfate 2 g in NS intermittent infusion (PharMEDium or FV Cmpd)     magnesium sulfate 4 g in 100 mL sterile water (premade)     naloxone (NARCAN) injection 0.1-0.4 mg     ondansetron (ZOFRAN-ODT) ODT tab 4-8 mg    Or     ondansetron (ZOFRAN) injection 4-8 mg     oxyCODONE  "(ROXICODONE) tablet 5 mg     pantoprazole (PROTONIX) EC tablet 40 mg     polyethylene glycol (MIRALAX/GLYCOLAX) Packet 17 g     potassium chloride (KLOR-CON) Packet 20-40 mEq     potassium chloride 10 mEq in 100 mL intermittent infusion with 10 mg lidocaine     potassium chloride 10 mEq in 100 mL sterile water intermittent infusion (premix)     potassium chloride 20 mEq in 50 mL intermittent infusion     potassium chloride ER (K-DUR/KLOR-CON M) CR tablet 20-40 mEq     potassium phosphate 10 mmol in D5W 250 mL intermittent infusion     potassium phosphate 15 mmol in D5W 250 mL intermittent infusion     potassium phosphate 20 mmol in D5W 250 mL intermittent infusion     potassium phosphate 20 mmol in D5W 500 mL intermittent infusion     potassium phosphate 25 mmol in D5W 500 mL intermittent infusion     senna-docusate (SENOKOT-S/PERICOLACE) 8.6-50 MG per tablet 2 tablet     sodium chloride (PF) 0.9% PF flush 3 mL     sodium chloride (PF) 0.9% PF flush 3 mL     sodium chloride tablet 1 g     sodium phosphate (FLEET ENEMA) 1 enema     vitamin A 20470 units capsule 20,000 Units     vitamin D3 (CHOLECALCIFEROL) 1000 units (25 mcg) tablet 1,000 Units     vitamin E (TOCOPHEROL) 100 units (90 mg) capsule 100 Units     zinc sulfate (ZINCATE) capsule 220 mg       Allergies   Allergen Reactions     Lisinopril Swelling     Oxycodone Nausea and Vomiting     Vicodin [Hydrocodone-Acetaminophen] Nausea and Vomiting         Exam: alert, appears slim, but well.   Blood pressure (!) 149/69, pulse 86, temperature 98.4  F (36.9  C), temperature source Oral, resp. rate 16, height 1.848 m (6' 0.75\"), weight 63 kg (139 lb), SpO2 100 %.  Wt Readings from Last 4 Encounters:   08/24/19 61 kg (134 lb 7.7 oz)   08/09/19 65.3 kg (144 lb)   08/07/19 63 kg (139 lb)   08/07/19 63 kg (139 lb)     General: Pleasant gentleman in no acute distress  HEENT: EOMI, PERRLA, no scleral icterus, mucus membranes moist and no lesions or thrush  Lymph: Neck is " supple and shows no nodes in cervical or supraclavicular   Heart:  RRR, no murmur, gallop or rub  Lungs: CTA-B, no wheezes, rhonchi or rales  Abdomen: Normal bowel sounds, soft, non tender, not distended, no rebound or guarding, no palpable masses  Extremities: No pitting edema  Skin: Hard palpable cord on left forearm with overlying erythema. No peeling or blistering  Neuro: CN II-XII are intact    Labs:     Labs reviewed    Results for orders placed or performed in visit on 08/06/19   CT Chest/Abdomen/Pelvis w Contrast    Narrative    EXAMINATION: CT CHEST/ABDOMEN/PELVIS W CONTRAST, 8/6/2019 12:32 PM    TECHNIQUE:  Helical CT images from the lung apices through the  symphysis pubis were obtained with contrast.  Coronal and sagittal  reformatted images were generated at a workstation for further  assessment.    CONTRAST:  86 ml Isovue 370.    COMPARISON: CT 5/15/2019.    HISTORY: Penile cancer with extensive metastasis on chemotherapy;  Hypercalcemia; Penile cancer (H); Malignant neoplasm metastatic to  both lungs (H); Malignant neoplasm metastatic to both lungs (H);  Urethral cancer (H)    ADDITIONAL HISTORY FROM THE EMR:Diagnosed in 2017, stage 2. Underwent  4 cycles of chemotherapy with cisplatin and gemzar, followed by  radical penectomy, urethectomy, perineal urethostomy and bilateral  inguinal node dissection 3/8/18. Plan was to proceed with XRT but he  was then found to have pulmonary mets. He received Keytruda  8/18-2/28/18. Found to have progressive disease. Started on palliative  docetaxel 3/14/19 and received cycle #6 on 7/5/19. Of note, patient  was recently found to have a metastatic right frontal lobe brain  metastasis.    FINDINGS:    CHEST: Progression of lung metastasis. Some examples are as follows;    - 3.1 cm left lower lobe lateral basal segment nodule, previously 1.8 cm  - 3.5 cm left lower lobe nodule adjacent to fissure, previously 2.3 cm.  - 7.8 cm right parahilar metastatic disease,  previously 6 cm this  completely obstructs segmentary bronchus of the right lower lobe  superior segment with increased atelectatic disease since 5/15/2019  - 6 cm left paracardiac metastatic nodule, previous to 4 cm.    Extensive centrilobular emphysema. No infectious consolidation. No  pneumothorax or pleural effusion.    Normal heart size. No pericardial effusion. Mild three-vessel coronary  artery calcification. Normal caliber of the main pulmonary artery and  aortic arch. Origins of the great neck vessels are patent.    Thyroid is unremarkable. Grossly no mediastinal or axillary  lymphadenopathy.    ABDOMEN and PELVIS: Normal liver size. No suspicious liver lesion.  Patent hepatic vasculature. No biliary tree dilation. Decompressed  gallbladder. Homogeneous pancreatic parenchyma. Main pancreatic duct  is not dilated. The spleen is not enlarged. No focal splenic lesions.  Unremarkable adrenal glands. No renal stones or hydronephrosis.  Bilateral simple renal cysts , largest measuring up to 3 cm in the  left upper pole. Normal bladder. Mildly enlarged prostate. Significant  stool in the rectosigmoid colon.     Postoperative changes bilateral inguinal regions and mons pubis  secondary to inguinal node dissections and radical penectomy. No  suspicious pelvic, inguinal or abdominal lymphadenopathy. Marked  destructive changes in the iliac arteries. No aneurysm. No free fluid  or air in the abdominal cavity.    Small amount of free fluid in the pelvis. No free air. Moderate  colonic stool burden. No dilated loops of bowel.     BONES and SOFT TISSUES: Stable subchondral cyst in the left acetabulum  and femoral heads, accompanying sclerosis of the superior aspect of  the acetabulum, consistent with degenerative changes. Similar but less  conspicuous changes in the right acetabulum as well. No suspicious  lytic or sclerotic bone lesion in the skeleton.      Impression    IMPRESSION: Progressive metastatic lung disease  since 5/15/2019.  Remainder of the findings are stable.    I have personally reviewed the examination and initial interpretation  and I agree with the findings.    JENISE FOSTER MD     ]    Impression/plan:     Recurrent, metastatic penile urethral carcinoma   He had brain metastasis which has been resected. The plan is for radiation/Gamma Knife.   He is being seen with restaging scans. I have reviewed actual images from his CT scan. He has disease progression in the chest.   We will continue to follow for now. He has progressed on pembrolizumab and now taxotere.   Previously we had tried cisplatin with gemcitabine for him which he had a very hard time tolerating.   Of note I did discuss that we could just focus on symptoms given his progressive disease and now brain metastasis  He would still like to try options if possible.   I would consider carboplatin with cetuximab.    Anemia  -has had low hgb for years.     CKD  -Cr baseline has been 1.4-1.6 over the past couple months (improved from previously). Today stable at 1.59  -continue to push PO hydrations especially with chemo     HTN  -on amlodipine 10 mg daily, BP stable     Arthritis  -improved after stopping keytruda though still has some occasional pain.      GERD  -on omeprazole 20 mg daily. Help his symptoms, though still has some GERD most days. Will increase his dose to BID. Refilled for him today        Bowels  -had constipation though he used miralax and now having normal stools. Can use Miralax prn    Hypercalcemia,   Resolved     Over 25 min of direct face to face time spent with patient with more than 50% time spent in counseling and coordinating care.      Again, thank you for allowing me to participate in the care of your patient.      Sincerely,    Steve Arceo MD

## 2019-08-07 NOTE — PATIENT INSTRUCTIONS
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 No hair cut or conditioning for 2 more weeks.    Follow up with Dr. Allen on 10/22/2019. You will need to schedule and go for a MRI of brain (on the same day or some other day if it does not work on that day)     Call [phone] for concerns or questions.    Proceed with radiation treatments as planned.

## 2019-08-07 NOTE — PLAN OF CARE
Pt.A&Ox4. Urinary frequency tonight - rec'd NS bolus completed @ HS. Has no c/o's pain, discomfort, CP and SOB. Scalp incision C/D/I with staples and CAROL. Pt.has appt.today - pickup @ 7585 via wheelchair / [employer] [facility] and Neuro.

## 2019-08-08 NOTE — PROGRESS NOTES
SW completed [facility] with patient this morning and faxed application to [employer] ([phone]).     Patient planning for discharge to home this Sunday, August 11 with supports and services in place.     SOWMYA will continue to follow and assist as needed.    CHER Rubio,[facility]    Phone: [phone]  Pager: [phone]

## 2019-08-08 NOTE — PLAN OF CARE
Discharge Planner OT   Patient plan for discharge: Home with HC OT, no PT  Current status: IND functional transfers/mobility no device. Pt IND basic ADLs. Intervention focused on higher level IADLs, pt has demonstrated safety with simple meal prep, laundry, dishes, and putting groceries away. Pt has been educated on EC strategies to incorporate into ADL/IADL.  Barriers to return to prior living situation: Plan to further address complex IADLs with focus on adherence to precautions, dynamic balance, and functional cognition.  Recommendations for discharge: Updated OT POC date to 8/10.        Entered by: Georgina Murrell 08/08/2019 2:13 PM

## 2019-08-08 NOTE — PLAN OF CARE
Scalp incision is clean, approximated and CAROL, no drainage or dehiscence noted. Pt was reminded not to scrub or scratch the incision. Pt remains independent in the room. Had a bowel movement after breakfast. Fluid intake encouraged, pt has been compliant.

## 2019-08-08 NOTE — PROVIDER NOTIFICATION
The patient is alert and oriented x 4. Denies pain. Ambulates independently to the bathroom. Incision to head is CDI and well approximated. Staples were removed at appointment yesterday. Up to the bathroom and voiding without difficulty. Creatinine 1.34 yesterday, declined bolus NS IV fluid ordered and provider notified. Patient preferred PO fluid intake. Encouraged PO fluid intake. No complaints at this time.

## 2019-08-08 NOTE — PROGRESS NOTES
"CLINICAL NUTRITION SERVICES - ASSESSMENT NOTE     Nutrition Prescription    RECOMMENDATIONS FOR MDs/PROVIDERS TO ORDER:  None today    Malnutrition Status:    Severe malnutrition in the context of chronic illness    Recommendations already ordered by Registered Dietitian (RD):  Continue snacks/supplements as ordered     Future/Additional Recommendations:  If PO intakes decline, consider increasing snacks or start calorie counts.      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 Despite this, he has been consistently eating well.   - Avoids chocolate and tomato based foods d/t GERD.    CURRENT NUTRITION ORDERS  Diet: Regular  Snacks/supplements:  - AM snack: vanilla Boost Shake + banana   - PM snack: vanilla Magic Cup + baked chips   - HS snack: vanilla pudding + cookie   - Can order additional PRN     Intake/Tolerance: 100% of meals per flowsheet. On average, pt is ordering 3400 kcal and 95 g protein daily per [employer]. This is likely meeting 100% minimum energy and protein needs. He enjoys the snacks and supplements as ordered.   - Pt denies any food restrictions or preferences that severely limit their menu options and feel their menu choices are adequate.     LABS  Labs reviewed    MEDICATIONS  Medications reviewed  - Oscal  - NETTIE-Phos  - Vitamin D3    ANTHROPOMETRICS  Ht Readings from Last 1 Encounters:   08/07/19 1.848 m (6' 0.75\")   Most Recent Weight: 63.9 kg (140 lb 14.4 oz)  IBW: 83.6 kg (76% IBW)  BMI: Underweight BMI <18.5  Weight History: Wt has remained stable over the last 1 month, overall stable from 6 months ago.   08/07/19 : 63 kg (139 lb)  08/01/19 : 62.7 kg (138 lb 3.2 oz)  07/31/19 : 60.6 kg (133 lb 9.6 oz)  07/30/19 : 60.8 kg (134 lb 0.6 " oz)  07/28/19 : 60.6 kg (133 lb 11.2 oz)  07/27/19 : 59.7 kg (131 lb 9.6 oz)  07/25/19 : 60.6 kg (133 lb 11.2 oz)  07/25/19 : 59.2 kg (130 lb 8.2 oz)  07/24/19 : 59.2 kg (130 lb 9.6 oz)  07/22/19 : 58.7 kg (129 lb 6.4 oz)  07/22/19 : 65.8 kg (145 lb)  07/05/19 : 63.5 kg (140 lb 1.6 oz)  06/13/19 : 62.9 kg (138 lb 9.6 oz)  05/15/19 : 64 kg (141 lb)  04/04/19 : 65 kg (143 lb 4.8 oz)  03/06/19 : 64.4 kg (141 lb 14.4 oz)  02/07/19 : 64.1 kg (141 lb 6.4 oz)    Dosing Weight: 64 kg - most recent wt    ASSESSED NUTRITION NEEDS  Estimated Energy Needs: 4724-5888 kcals/day (30 - 35 kcals/kg)  Justification: Underweight  Estimated Protein Needs: 77-96 grams protein/day (1.2 - 1.5 grams of pro/kg)  Justification: Repletion  Estimated Fluid Needs: 1 mL/kcal  Justification: Per provider pending fluid status    PHYSICAL FINDINGS  See malnutrition section below.    MALNUTRITION  % Intake: No decreased intake noted  % Weight Loss: None noted  Subcutaneous Fat Loss: Facial region, Upper arm, Lower arm and Thoracic/intercostal: moderate-severe  Muscle Loss: Scapular bone, Thoracic region (clavicle, acromium bone, deltoid, trapezius, pectoral), Upper arm (bicep, tricep), Dorsal hand, Upper leg (quadricep, hamstring), Patellar region and Posterior calf: moderate-severe  Fluid Accumulation/Edema: None noted  Malnutrition Diagnosis: Severe malnutrition in the context of chronic illness    NUTRITION DIAGNOSIS  Predicted inadequate protein-energy intake related to early satiety as evidenced by pt reliant on PO intakes to meet 100% of nutritional needs with potential for variation       INTERVENTIONS  Implementation  Medical food supplement therapy - continue as ordered     Goals  Patient to consume % of nutritionally adequate meal trays TID, or the equivalent with supplements/snacks.     Monitoring/Evaluation  Progress toward goals will be monitored and evaluated per protocol.      Tavia Cochran RD, LD  Unit Pager: [phone]

## 2019-08-08 NOTE — PLAN OF CARE
VSS. Alert and orientedx4. Denies any pain, SOB, numbness, headache. Able to communicate needs. Independent in room. No concerns for bowel and bladder. Sutures intact to head with no s/sx of infection. Feeling nauseous d/t gag reflex after brushing teeth/throat, Zofran given and Ativan per pts request. Cepacol given per pts request.  Will continue plan of care  Vital signs:  Temp: 97.9  F (36.6  C) Temp src: Oral BP: 133/76 Pulse: 84 Heart Rate: 81 Resp: 18 SpO2: 99 % O2 Device: None (Room air)

## 2019-08-09 NOTE — PROGRESS NOTES
"   08/09/19 1724   Values Beliefs and Spiritual Care   C: Community: In support of your spiritual health, is there someone we may contact for you? (identify all that apply)   (SHS/8-9)   Visit Information   Visit Made By Staff    Type of Visit Initial;Staff consultation/triage;Distress   Distress Emotional;Restorationism/Spiritual   Visited Patient   Visit Location (if NOT Inpatient)   Transitional Care Unit   Interventions   Plan of Care Review   With patient/family/proxy   Basic Spiritual Interventions    introduction/orientation to Spiritual Health Services;Assessment of spiritual needs/resources;Life Review;Reflective conversation;Prayer;Devotional reading   Provision of Spiritual Resources  Sacred text   Advanced Assessments/Interventions   Presenting Concerns/Issues Spiritual/Latter day/emotional support;Goals of care discernment;Grief and adjustment issues;Family dynamics;Challenged coping   Healing Modalities Provided Singing/Music   SPIRITUAL HEALTH SERVICES  SPIRITUAL  ASSESSMENT Progress Note  [facility] 4R    PRIMARY FOCUS    Goals of Care    Emotional/spiritual/Latter day distress    Support for coping    ILLNESS CIRCUMSTANCES:   Reviewed documentation. Responded to referral based on request from SW.  Reflective conversation shared with  which integrated elements of illness and family narratives.    Context of Serious Illness/Sympton(s)-  was a  until 2014 at [employer], [place], operated by [employer]. He said that he loved his job, helping people.  spoke about needing to talk to his oncologist again about this new treatment that is being proposed. \"What makes it different than the others? Why didn't we try this before if it's so good? What are the side effects? What benefit might it give me?  What is the percentage of it helping me?\"  said that after three weeks with no chemo, he is feeling a bit better and he is really considering if it is right for him to continue " treatment.  He said that he is interested in talking to Palliative Care when he gets home, and also to his sister, to help him think through his options and frame them.    Resources for Support - He has a partner (not ), Stephanie, since . He has a son, Eriberto, 35 yo, who lives in [place] with his mom.  (Eriberto's mom tells  that his son has been adopted by her new  and is not his son.) Stephanie has two children, one in  and one in [place]. He has one brother left, three brothers have , including his youngest who  went down south to [facility]. He is very close to one of his two sisters and spoke of her often.    DISTRESS:     Emotional, Spiritual, Existential Distress -  mentioned that he is contemplating what he knows and thinks about death.  He wept when he spoke of losing his mother in , and of the loss of his youngest brother, and the estrangement he is experiencing from the brother he has left.    Muslim Distress -  said that while he went to [employer] when he was younger all the time, he doesn't do it so much anymore, though the [facility] [place] [facility] and [place] have good folks in it.  He thinks that [employer] may know that he is here.    Social/Cultural/Economic- n/d    SPIRITUAL/Druze COPING):     Restorationism/Mikala -  said that his mama had him baptized and attend [employer] ([employer] [facility] [place] until her death in  from a brain aneurysm.    Spiritual Practice(s) - Prayer and leaning on God and Gary.  We had a prayer together for clarity for 's decision making process and Gary' power to be made known to him.   asked for a copy of the bible so that he could read Leviticus (which I gave him).  I also gave him a copy of the  Psalm, which he'd referenced.    Emotional, Relational,Existential Connections-  said that talking over his options with Stephanie is very helpful and that she will attend the meeting with the oncologist next Tuesday. He said that his mama told him  to lean on God and Gary and put his trust in Them.  He named that he is afraid; the thought of letting go that much scared him, he said.    GOALS OF CARE:    Goals of Care - To discover what he needs to do for himself regarding further treatment.    Meaning/Sense-Making-  owned that he is at the point of looking at death, at treatment and whatever is not at peace with his life.    PLAN:  will be leaving the TCU on Sunday; no further visits planned at this time.    Rev. Luis Carlos-[place]  Staff   [phone]  * [facility] remains available 24/7 for emergent requests/referrals, either by having the switchboard page the on-call  or by entering an ASAP/STAT consult in Epic (this will also page the on-call ).*

## 2019-08-09 NOTE — PLAN OF CARE
PTA: Plan d/c home Sunday. -15 min as pt needing to get dressed and washed up before PT. IND with these tasks. Plan for HC OT and nsg, no further PT needed at d/c.

## 2019-08-09 NOTE — PLAN OF CARE
Pt alert and oriented. Able to make needs known. Denies pain, denies SOB. Independent in the room. Scalp incision CDI and CAROL. Call light within reach. Continue with POC.

## 2019-08-09 NOTE — PLAN OF CARE
Patient alert and oriented x4, able to make needs known. Requested administration of Protonix to be given @ 0700 each day instead of 0930 to take before meals. Would also like evening dose administered before dinner @ 1830. Pt had no complaints of pain, SOB, chest pain or nausea. Had 2 BM's today. Incision to head CDI and CAROL. Independent with cane in room. Uses call light when needing assistance. No other concerns, will continue to monitor.

## 2019-08-10 NOTE — PLAN OF CARE
Patient alert and oriented x4, able to make needs known. Incision to scalp remains CDI, and CAROL. Pt had no complaints of pain, SOB, chest pain or nausea. Two BM's today. Regular diet, continent of bowel and bladder. Good appetite. No other concerns, will continue to monitor.

## 2019-08-10 NOTE — PROGRESS NOTES
[facility]    Hospitalist Progress Note    Date of Service (when I saw the patient): 08/10/2019    Assessment & Plan     73 year old year old male with hx of  HTN, GERD, CKD, DJD and metastatic penile cancer (lung mets) is being admitted to  TCU on 08/01 for ongoing rehabilitation after hospitalization at [facility] from 07/22 to 08/01. He  presented to the ED after a fall down stairs and found to have a right frontal lobe brain mass with surrounding edema. He underwent  right craniotomy and tumor resection on 7/25/19. Hospitalization complicated by ongoing hyponatremia and hypophosphatemia requiring replacements as needed.        # Metastatic right frontal lobe mass  # S/p right craniotomy and tumor resection on 7/25/19    MRI brain 7/22 with peripheral enhancing right frontal lobe mass with extensive adjacent edema. 3mm right to left midline shift. S/p right sided craniotomy 7/25. Pathology consistent with metastatic carcinoma (likely squamous). Facial droop and LUE weakness improved. He was treated with Dexamethaosone. Anaerobic culture from brain lesion growing gram positive cocci in clusters on day 5 (broth only). Clinically stable, so likely contaminant.     - Dexamethasone taper per neurosurgery, Ended date 8/2.    - Okay to start Radiation therapy as soon as 8/8 per [facility]. He will tentatively be seen on 8/12 for consult and then start gamma knife on 8/13 or 8/19.   - He followed up with [facility] for wound check and staple removal on 8/9. Staples taken out.   - Brain MRI and f/u with Dr. Allen in 3 months (scheduled for 10/21 and 10/22).     # Fall: Trauma w/u in the ED with head CT (see results above). CTA without any carotid stenosis. CT C spine without any traumatic changes. CXR without traumatic changes (overall stable appearing lung mets). AP pelvis negative. He was evaluated by PT/OT, and recommended TCU for rehabilitation, so he was here. Doing well with therapy.  Walking independently. Being discharged home.      # Metastatic penile cancer: Diagnosed in 2017, stage 2. Underwent 4 cycles of chemotherapy with cisplatin and gemzar, folled by radical penectomy, urethectomy, perineal urethostomy and bilateral inguinal node dissection 3/8/18. Plan was to proceed with XRT but he was then found to have pulmonary mets. He received Keytruda 8/18-2/28/18. Found to have progressive disease. Started on palliative docetaxel 3/14/19 and received cycle #6 on 7/5/19. He follows with Dr. Arceo. He feels that he has been having more fatigue and side effects from chemotherapy recently. Not sure he wants to continue    -He had repeat CT CAP on and f/u with Dr. Arceo on 8/6 and 8/7. It showed progression of metastatic disease.   -Not sure what is the plan from Dr Arceo's office yet. Have not received any note. Follow up with them for the plan and further treatment.      # GERD: Persistent symptoms despite bid omeprazole  - Ranitidine bid and omeprazole bid.      # HTN: On Amlodipine  - Continue Amlodipine     # CKD: Baseline creatinine 1-1.5: Creatinine 1.41 on admission to [facility]. Improved to 1.2 Creatinine level 1.25 on admission to TCU on 08/01, but now gone up to 1.4.     - given one liter of IVF, Encourage to drink. His oral intake is poor. Improved to 1.34.   - Avoid Nephrotoxins, hypotension, hypovolemia  - Renal dose medications  - Follow-up with BMP at Follow up with oncology in clinic.      # Hyponatremia: Na 134 on 7/27. Treated with 2% saline initially, and then started on Nacl tabs 4 g TID on 7/27 with plan to decrease by 1-2 g/day.  - Nacl 1g BID for 3 days, then 1g daily for 3 days, then discontinue.   - latest Na 135 on 08/9/19  -   # Hypophosphatemia: Started on Phosphorus supplement 500 mg daily  - Ct at discharge.      # Chronic anemia: Multifactorial: chemotherapy, CKD, mild iron deficiency (did not tolerate PO iron), chronic disease.  -Hb 8.5 on 9/8.5/19  -Follow-up with  Oncology or PCP after discharge.      # Anxiety: Patient reports takes Ativan PRN for anxiety  - He is doing well. Ativan Stopped      # FEN: Regular diet  # Lines &Tubes: No palomino's catheter  # Prophylaxis: SCD  # Social Issues:   # Code status: Full code  # Pneumococcal vaccine: Pneumonia Conjugate vaccine on discharge.     Disposition: based on PT, OT, Speech    Smith Bailey MD    Interval History     He is doing well. Reports some heart burns. Otherwise doing fine. Moving around good. No nausea or vomiting. No abdominal pain. Some time food gets stuck in the throat. Reports had two swallow evals and those were fine.     Denies sob. No cough or phlegm.       -Data reviewed today: I reviewed all new labs and imaging results over the last 24 hours. I personally reviewed no images or EKG's today.    Physical Exam      Temp: 98.5  F (36.9  C) Temp src: Oral BP: 129/63 Pulse: 69   Resp: 18 SpO2: 100 % O2 Device: None (Room air)    Vitals:    08/01/19 1535 08/08/19 1115 08/09/19 1800   Weight: 63.9 kg (140 lb 14.4 oz) 62.6 kg (138 lb) 65.3 kg (144 lb)     Vital Signs with Ranges  Temp:  [98.3  F (36.8  C)-98.5  F (36.9  C)] 98.5  F (36.9  C)  Pulse:  [69-81] 69  Resp:  [18] 18  BP: (129-136)/(63-64) 129/63  SpO2:  [98 %-100 %] 100 %  I/O last 3 completed shifts:  In: 360 [P.O.:360]  Out: -     Head: Incision healing okay. Staples in place.   Constitutional:  Awake, alert, cooperative, no apparent distress  Respiratory: Clear to auscultation bilaterally, no crackles or wheezing  Cardiovascular: Regular rate and rhythm, normal S1 and S2, and no murmur noted  GI: Normal bowel sounds, soft, non-distended, non-tender  Skin/Integumen: No rashes, no cyanosis, no edema    Medications        - MEDICATION INSTRUCTIONS -         - Skin Test Reading (tuberculin) -   Does not apply Q21 Days     amLODIPine  10 mg Oral Daily     calcium carbonate 500 mg (elemental)  500 mg Oral BID w/meals     fluticasone  2 spray Both Nostrils  Daily     pantoprazole  40 mg Oral QAM AC     polyethylene glycol  17 g Oral Daily     potassium phosphate (monobasic)  500 mg Oral Daily     ranitidine  150 mg Oral BID     tuberculin  5 Units Intradermal Q21 Days     vitamin D3  1,000 Units Oral BID       Data   Recent Labs   Lab 08/09/19  0631 08/07/19  0551 08/06/19  1150   WBC  --   --  9.8   HGB  --   --  8.5*   MCV  --   --  81   PLT  --   --  268    134 134   POTASSIUM 4.2 4.4 4.5   CHLORIDE 101 102 100   CO2 27 27 27   BUN 19 21 24   CR 1.34* 1.34* 1.44*   ANIONGAP 7 5 7   SAADIA 9.2 9.0 9.4   GLC 80 79 90   ALBUMIN  --   --  3.2*   PROTTOTAL  --   --  7.7   BILITOTAL  --   --  0.3   ALKPHOS  --   --  80   ALT  --   --  23   AST  --   --  21       No results found for this or any previous visit (from the past 24 hour(s)).

## 2019-08-10 NOTE — PLAN OF CARE
Pt A&Ox4, able to make needs known. Denies pain and SOB. Given PRN ativan x1 this shift per pt request. Complains of reflux of food while eating, scheduled zantac and protonix. Denies nausea. Head incision CDI and CAROL.  Independent with a cane in room. Call light within reach, will continue to monitor.

## 2019-08-10 NOTE — PLAN OF CARE
Pt alert and oriented. Able to make needs known. Denies pain, denies SOB. Independent in the room. Scalp incision CDI and CAROL. Uses yankauer for PO secretions. PRN lozenge administered x1. Requested scheduled 0800 Flonase early, administered. Call light within reach. Continue with POC.

## 2019-08-10 NOTE — PLAN OF CARE
Physical Therapy Discharge Summary    Reason for therapy discharge:    Discharged to home with home therapy. Home 8/11/19    Progress towards therapy goal(s). See goals on Care Plan in [facility] electronic health record for goal details.  Goals met Amb IND without AD. Pt feels comfortable with plan. Has home ex program    Therapy recommendation(s):    Home PT safety eval.

## 2019-08-11 NOTE — PROGRESS NOTES
Called by Microbiology for Propionibacter Acne growing on the same sample which grew staph Epi from cyst resected during neuro surgery. The staph Epi was felt to be a contaminant so this is likely to be a contaminant too. Patient has been doing great. No fever or chills. No HA. Doing well. Monitor for now.

## 2019-08-11 NOTE — PLAN OF CARE
Pt A&Ox4, able to make needs known. Denies pain and SOB. Given PRN zofran x1 this shift per pt request. PRN Cepacol lozenges given x1. Incision to scalp remains CDI, and CAROL. Uses yanker for PO secretions. Independent in room. Call light within reach, will continue to monitor.

## 2019-08-11 NOTE — PLAN OF CARE
Pt alert and oriented,declines pain.Pt given his discharge papers and medications & he signed for them.Pt friend came to give him a ride home and they left about 10.30 am.

## 2019-08-11 NOTE — PLAN OF CARE
Pt alert and oriented. Able to make needs known. Denies pain, denies SOB. Independent in the room. Scalp incision CDI and CAROL. Uses yankauer for PO secretions. PRN lozenge administered x1. Woke up around 0200. Frustrated about Ativan being stopped. Says that's the only thing that helps him sleep. RN asked him if she could page the HO and request for something to help him sleep, pt refused stating that they talked to him early and whatever they offered him wasn't going to work, he just wants his Ativan. Pt went on saying that the doctors are currently not helping him, not treating him right, and he can't wait to go home. Stayed up the rest of the night. Took a shower at 0630. Discharging today,said his ride will be here at 10:30am. Call light within reach. Continue with POC.

## 2019-08-12 NOTE — TELEPHONE ENCOUNTER
Date: 2019   Age: 73 year old  Ethnicity: Black  Sex: male  : 1945   Lives In: [place], [place]      Diagnosis: Penile Urethral Cancer     Prior radiation therapy:   Site Treated: at  Facility: at  Dates: at  Dose: ta    Site Treated: at         Facility: at  Dates: at  Dose: at    Prior chemotherapy:   See Below    RN time with patient:  Educated on Gamma Knife:    Doctors: Dr. Allen, Dr. 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  Positive for lymphovascular and perineural invasion.  Margins neg.  1/5 left inguinal nodes pos, 1/7 right inguinal nodes pos    Saw Dr. Arceo, multiple lung nodules, pt not wanting bx, PET/CT in 6 weeks    18; pt saw Dr. Yeung in consult, saw no role for radiation due to metastasis    18; PET/CT, multiple hypermetabolic pulmonary nodules    8/3/18; started Keytruda, continued on Keytruda thru 2/28/19    3/6/19; restaging showed progression    3/14/19; pt declined hospice and started Docetaxel    5/15/19; CT Chest/Abd/Pelvis, lung nodules grossly stable-no new, stable subcarinal lymphadenopathy    19; pt fell at home due to worsening left lower leg weakness    19; Brain MRI, 5.3 x 3.6 x 4.5 cm lesion right frontal    19; crani by Dr. Allen for right frontal brain met , biopsy showed metastatic carcinoma favorably squamous     19; Brain MRI, postop changes right frontal mass resection, mild linear peripheral enhancement     19; CT Chest/Abd/Pelvis, progressive lung mets , remainder of findings stable     19; pt saw   Marielos, cycle 4 of docetaxel due.  Fatigue and neuropathy    Chief Complaint: at consult

## 2019-08-16 PROBLEM — Z98.890 S/P CRANIOTOMY: Status: ACTIVE | Noted: 2019-01-01

## 2019-08-16 NOTE — ED TRIAGE NOTES
"Deandre ambulatory to ED for c/o left-sided weakness for the past 2 days, \"especially in my arms\". Pt has hx of brain tumor.   "

## 2019-08-16 NOTE — LETTER
Transition Communication Hand-off for Care Transitions to Next Level of Care Provider    Name: King Gonsalo Bruno  : 1945  MRN #: [id number]  Primary Care Provider: Joan Ventura     Primary Clinic: [address] [address]     Reason for Hospitalization:  Cerebral edema (H) [G93.6]  Brain metastases (H) [C79.31]  S/P craniotomy [Z98.890]  Admit Date/Time: 2019  3:10 PM  Discharge Date: 19  Payor Source: Payor: [facility] / Plan: Patient Feed MEDICARE ADVANTAGE / Product Type: Medicare /              Reason for Communication Hand-off Referral:     Discharge Plan:    Social Work Services Discharge Note     Patient Name:  King Gonsalo Bruno     Anticipated Discharge Date:  19     Discharge Disposition:   [facility] ([phone])     Following MD:  Facility Assignment     Pre-Admission Screening (PAS) online form has been completed.  The Level of Care (LOC) is:  Determined  Confirmation Code is:  Not required as pt is admitting to ARU setting.        Additional Services/Equipment Arranged:  SOWMYA confirmed readiness for discharge with Janet Porras NP  Neuro Surgery. SOWMYA [facility] with Ad[facility] (Jenelle).  SOWMYA arranged for [employer] (max [phone]) to provide w/c transport at 2pm. SOWMYA received notice from [employer] that prior auth was approved for pt's ARU stay.  SOWMYA faxed a copy of the prior auth approval to  ARU Admissions and a copy was placed in the medical chart.     Patient / Family response to discharge plan:  Pt and significant other (Stephanie Oh) voice understanding of the discharge plan and agreement with the discharge plan.      Persons notified of above discharge plan:  Patient, significant other (Stephanie Oh), 6A nursing and Janet Porras NP.     Staff Discharge Instructions:  Please fax discharge orders and signed hard scripts for any controlled substances.  Please print a packet and send with patient.      CTS Handoff  completed:  YES     Medicare Notice of Rights provided to the patient/family:  NO, as [employer] manages pt's medicare benefits.     ETIENNE Johnson  Social Work, 6A  Phone:  [phone]  Pager:  [phone]  8/27/2019

## 2019-08-16 NOTE — ED NOTES
Bed: IN05  Expected date: 8/16/19  Expected time: 1:49 PM  Means of arrival: Car  Comments:  DeandreDamion  [birth year]  Recently discharged home with [facility] from acute rehab. Today pt was confused, flooded kitchen, physical decline with left sided weakness. VSS. Home care cant take him back/placement issue

## 2019-08-17 NOTE — CONSULTS
[facility]       NEUROSURGERY CONSULTATION NOTE    This consultation was requested by Dr. Guevara from the EM service.    Reason for Consultation  Left sided weakness - s/p right craniotomy for tumor resection (7/25/19)      HPI:  Mr. 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 He reports that this weakness is similar to what he experienced before his brain metastasis was found and resected. He was discharged from rehab on Sunday 8/11/2019 and began feeling weaker on Wednesday. He endorses several recent fall when he bends over (particularly to the left) to put on socks or shoes. He denies head trauma or loss of consciousness.  He confirms that his incision feels a little tender at times, but neither he nor his wife have noticed any discharge. He denies fever since leaving the hospital.    He has not begun radiation, but has an appointment for it on 8/19/19. He was previously taking chemotherapy for his lung metastases, but that has been held for radiation.    No recent fevers, chills, nausea, vomiting, chest pain, shortness of breath, and denies headaches, LOC, numbness/paresthesias in extremities, changes in sensation, taste, smell, nor trouble speaking or other neurologic symptoms.      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      GERD (gastroesophageal reflux disease)      Hypertension      Penile cancer (H)      PONV (postoperative nausea and vomiting)        PAST SURGICAL HISTORY:   Past Surgical History:   Procedure Laterality Date     CYSTOSCOPY, BIOPSY BLADDER, COMBINED N/A 8/31/2017    Procedure:  COMBINED CYSTOSCOPY, BIOPSY BLADDER;  Cystoscopy, Suprapubic Tube Placement with Ultrasound Guidiance, Uretheral Dilation;  Surgeon: Muriel Haddad MD;  Location: [facility] OR     CYSTOSTOMY, INSERT TUBE SUPRAPUBIC, COMBINED N/A 8/31/2017    Procedure: COMBINED CYSTOSTOMY, INSERT TUBE SUPRAPUBIC;;  Surgeon: Muriel Haddad MD;  Location: [facility] OR     DISSECT LYMPH NODE INGUINAL N/A 3/8/2018    Procedure: DISSECT LYMPH NODE INGUINAL;  Flexible Cystoscopy, Bladder Biopsy, urethral biopsy and penile biopsy, Radical Penectomy and Urethrectomy, Perineal Urethrostomy, Bilateral Inguinal Lymphadenectomy; superficial lymph nodes and deep lymph nodes;  Surgeon: Muriel Haddad MD;  Location: [facility] OR     LARYNGOSCOPY  2013    Direct laryngoscopy with the use of rigid endoscopy and takedown of left hypopharyngeal diverticula.      OPTICAL TRACKING SYSTEM CRANIOTOMY, EXCISE TUMOR, COMBINED Right 7/25/2019    Procedure: Stealth Assisted Right Craniotomy And Tumor Resection;  Surgeon: Chandni Allen MD;  Location: [facility] OR     PENECTOMY N/A 3/8/2018    Procedure: PENECTOMY;  Flexible Cystoscopy, Bladder Biopsy, urethral biopsy and penile biopsy, Radical Penectomy and Urethrectomy, Perineal Urethrostomy, Bilateral Inguinal Lymphadenectomy; superficial lymph nodes and deep lymph nodes    ;  Surgeon: Muriel Haddad MD;  Location: [facility] OR     URETHROSTOMY PERINEUM N/A 3/8/2018    Procedure: URETHROSTOMY PERINEUM;  Flexible Cystoscopy, Bladder Biopsy, urethral biopsy and penile biopsy, Radical Penectomy and Urethrectomy, Perineal Urethrostomy, Bilateral Inguinal Lymphadenectomy; superficial lymph nodes and deep lymph nodes;  Surgeon: Muriel Haddad MD;  Location: [facility] OR       FAMILY HISTORY:   Family History   Problem Relation Age of Onset     Cerebrovascular Disease Mother      Hypertension Mother      Prostate Cancer Father 84     Prostate Cancer Brother 63     Diabetes Brother      Cancer Brother       Diabetes Brother        SOCIAL HISTORY:   Social History     Tobacco Use     Smoking status: Former Smoker     Packs/day: 1.00     Years: 20.00     Pack years: 20.00     Types: Cigarettes     Start date: 1972     Last attempt to quit: 1992     Years since quittin.6     Smokeless tobacco: Never Used     Tobacco comment: quit in    Substance Use Topics     Alcohol use: No     Comment:  quit per personal choice.       MEDICATIONS:  Current Outpatient Medications   Medication Sig Dispense Refill     acetaminophen (TYLENOL) 325 MG tablet Take 2 tablets (650 mg) by mouth every 4 hours as needed for mild pain or fever 150 tablet 0     amLODIPine (NORVASC) 10 MG tablet Take 1 tablet (10 mg) by mouth daily       benzocaine-menthol (CEPACOL) 15-3.6 MG lozenge Place 1 lozenge inside cheek every hour as needed for sore throat 30 lozenge 0     calcium carbonate 500 mg, elemental, (OSCAL;OYSTER SHELL CALCIUM) 500 MG tablet Take 1 tablet (500 mg) by mouth 2 times daily (with meals) 60 tablet 0     cholecalciferol 1000 units TABS Take 1,000 Units by mouth 2 times daily 60 tablet 0     fluticasone (FLONASE) 50 MCG/ACT nasal spray Spray 2 sprays into both nostrils daily       omeprazole (PRILOSEC OTC) 20 MG EC tablet Take 1 tablet (20 mg) by mouth 2 times daily  0     polyethylene glycol (MIRALAX/GLYCOLAX) packet Take 1 packet by mouth daily       potassium phosphate, monobasic, (K-PHOS) 500 MG tablet Take 1 tablet (500 mg) by mouth daily 30 tablet 0     ranitidine (ZANTAC) 150 MG tablet Take 1 tablet (150 mg) by mouth 2 times daily 60 tablet 0       Allergies:  Allergies   Allergen Reactions     Lisinopril Swelling     Oxycodone Nausea and Vomiting     Vicodin [Hydrocodone-Acetaminophen] Nausea and Vomiting       ROS: 10 point ROS of systems including Constitutional, Eyes, Respiratory, Cardiovascular, Gastroenterology, Genitourinary, Integumentary, Muscularskeletal, Psychiatric were all negative except for  pertinent positives noted in my HPI.    Physical exam:   Blood pressure (!) 144/73, temperature 98.4  F (36.9  C), temperature source Oral, resp. rate 16, weight 63.5 kg (140 lb), SpO2 99 %.  General: awake and alert  HEENT: atraumatic - scalp incision healed well.  PULM: breathing comfortably on room air  NEUROLOGIC:  -- Awake; Alert; oriented x 3  -- Follows commands briskly  -- Speech fluent, spontaneous. No aphasia or dysarthria.  -- no gaze preference. No apparent hemineglect.  Cranial Nerves:  -- visual fields full to confrontation, PERRL 3-2mm bilateral, extraocular movements intact  -- face symmetrical, tongue midline  -- sensory V1-V3 intact bilaterally  -- palate elevates symmetrically, uvula midline  INCISION: clean, dry, intact, no drainage    Motor:  Normal bulk / tone; no tremor, rigidity, or bradykinesia.  No muscle wasting or fasciculations  No Pronator Drift     Delt Bi Tri Hand Flexion/  Extension Iliopsoas Quadriceps Hamstrings Tibialis Anterior Gastroc    C5 C6 C7 C8/T1 L2 L3 L4-S1 L4 S1   R 5 5 5 5 5 5 5 5 5   L 4 4 4 4 4 4 4 4 4   Sensory:  intact to LT x 4 extremities       Reflexes:       Bi Tri BR Leticia Pat Ach Bab     C5-6 C7-8 C6 UMN L2-4 S1 UMN   R 2+ 2+ 2+ Norm 2+ 2+ Norm   L 2+ 2+ 2+ Norm 2+ 2+ Norm          IMAGING:  CT head w/o contrast (8/16/19):   1. Increased mass effect at site of prior right frontal metastatic  brain tumor resection, whether related to recurrence of the metastasis  versus swelling at the site of surgery. Brain MRI with contrast and  pre- and post contrast subtraction imaging would be helpful to further  evaluate as there was hemorrhage within this site previously.  2. No hydrocephalus, and no midline shift at this time.      LABS:   Last Comprehensive Metabolic Panel:  Sodium   Date Value Ref Range Status   08/16/2019 130 (L) 133 - 144 mmol/L Final     Potassium   Date Value Ref Range Status   08/16/2019 4.3 3.4 - 5.3 mmol/L Final     Chloride   Date Value Ref  Range Status   08/16/2019 96 94 - 109 mmol/L Final     Carbon Dioxide   Date Value Ref Range Status   08/16/2019 26 20 - 32 mmol/L Final     Anion Gap   Date Value Ref Range Status   08/16/2019 8 3 - 14 mmol/L Final     Glucose   Date Value Ref Range Status   08/16/2019 88 70 - 99 mg/dL Final     Urea Nitrogen   Date Value Ref Range Status   08/16/2019 14 7 - 30 mg/dL Final     Creatinine   Date Value Ref Range Status   08/16/2019 1.43 (H) 0.66 - 1.25 mg/dL Final     GFR Estimate   Date Value Ref Range Status   08/16/2019 49 (L) >60 mL/min/[1.73_m2] Final     Comment:     Non  GFR Calc  Starting 12/18/2018, serum creatinine based estimated GFR (eGFR) will be   calculated using the Chronic Kidney Disease Epidemiology Collaboration   (CKD-EPI) equation.       Calcium   Date Value Ref Range Status   08/16/2019 10.0 8.5 - 10.1 mg/dL Final     Lab Results   Component Value Date    WBC 6.6 08/16/2019     Lab Results   Component Value Date    RBC 3.43 08/16/2019     Lab Results   Component Value Date    HGB 8.5 08/16/2019     Lab Results   Component Value Date    HCT 28.0 08/16/2019     Lab Results   Component Value Date    MCV 82 08/16/2019     Lab Results   Component Value Date    MCH 24.8 08/16/2019     Lab Results   Component Value Date    MCHC 30.4 08/16/2019     Lab Results   Component Value Date    RDW 22.4 08/16/2019     Lab Results   Component Value Date     08/16/2019     INR: 1.05  PTT: 31      ASSESSMENT:  Mr. Bruno is a 73 year old male with metastatic penile cancer s/p right frontal brain metastasis resected on 7/25/19 presents with reoccurring left-sided weakness and findings on CT scan concerning for focal brain edema vs recurring metastasis vs infection.       RECOMMENDATIONS/PLAN:  No neurosurgical intervention indicated at this time   MRI brain with and without contrast  q4hrs neuro exams   4q8 decadron  Glucose < 180 with ISS   Continue glucose checks  DVT: SCDs while in  bed  Disposition: Admit to neurosurgery 6A       The patient was discussed with Dr. Leschke, neurosurgery chief resident, and Dr. Del Rio, neurosurgery staff, and they agree with the above.    Anibal Castro MD  Neurosurgery Resident PGY-1    Please contact neurosurgery resident on call with questions.    Dial * * [phone], enter [phone] when prompted.       Patient seen and examined. Please see Dr. Castro's note for details. This is a 73 y.o. M with penile squamous cell carcinoma that metastasized to the right frontal lobe. The patient is s/p craniotomy for near GTR on 7/25/2019. The patient now returns with recurrent LUE weakness. MRI of the brain showed tumor recurrence in the previous resection cavity, with significant mass effect. The patient's LUE is 3/5 currently (previously full strength). In the context of the current findings, I believe that the patient can benefit from repeat tumor resection followed by gammatile placement. I have reviewed the option with the patient and his significant other.  They would like some time to consider this surgical option.    I spent 45 minutes personally evaluating clinical and imaging findings & managing the care of this patient. Over 50% of my time on the unit was spent counseling the patient on the risks and benefits of repeat surgery and gammatile placement

## 2019-08-17 NOTE — ED NOTES
[facility], [place]   ED Nurse to Floor Handoff     King Gonsalo Bruno is a 73 year old male who speaks English and lives with a spouse,  in an assisted living  They arrived in the ED by car from home    ED Chief Complaint: Extremity Weakness    ED Dx;   Final diagnoses:   Cerebral edema (H)         Needed?: No    Allergies:   Allergies   Allergen Reactions     Lisinopril Swelling     Oxycodone Nausea and Vomiting     Vicodin [Hydrocodone-Acetaminophen] Nausea and Vomiting   .  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      GERD (gastroesophageal reflux disease)      Hypertension      Penile cancer (H)      PONV (postoperative nausea and vomiting)       Baseline Mental status: cognitively impaired  Current Mental Status changes: other per wife, pt more confused than usual    Infection present or suspected this encounter: no  Sepsis suspected: No  Isolation type: No active isolations     Activity level - Baseline/Home:  Stand with Assist  Activity Level - Current:   Stand with Assist    Bariatric equipment needed?: No    In the ED these meds were given:   Medications   lidocaine 1 % 0.1-1 mL (has no administration in time range)   lidocaine (LMX4) cream (has no administration in time range)   sodium chloride (PF) 0.9% PF flush 3 mL (has no administration in time range)   sodium chloride (PF) 0.9% PF flush 3 mL (has no administration in time range)   dexamethasone (DECADRON) tablet 4 mg (has no administration in time range)   pantoprazole (PROTONIX) EC tablet 40 mg (has no administration in time range)       Drips running?  No    Home pump  No    Current LDAs  Peripheral IV 08/16/19 Right Upper forearm (Active)   Number of days: 0       Closed/Suction Drain 1 Left Groin Bulb 7 Mozambican (Active)   Number of days: 526        Closed/Suction Drain 2 Right Groin Bulb 7 Swazi (Active)   Number of days: 526       Open Drain Perineal 16 Swazi Penrose drains, perineal urethrostomy (Active)   Number of days: 526       Suprapubic Catheter Non-latex 16 fr (Active)   Number of days: 715       Wound 07/22/19 Left Leg (Active)   Number of days: 25       Incision/Surgical Site 03/08/18 Right Groin (Active)   Number of days: 526       Incision/Surgical Site 03/08/18 Left Groin (Active)   Number of days: 526       Incision/Surgical Site 07/25/19 Bilateral Head (Active)   Number of days: 22       Labs results:   Labs Ordered and Resulted from Time of ED Arrival Up to the Time of Departure from the ED   CBC WITH PLATELETS DIFFERENTIAL - Abnormal; Notable for the following components:       Result Value    RBC Count 3.43 (*)     Hemoglobin 8.5 (*)     Hematocrit 28.0 (*)     MCH 24.8 (*)     MCHC 30.4 (*)     RDW 22.4 (*)     All other components within normal limits   COMPREHENSIVE METABOLIC PANEL - Abnormal; Notable for the following components:    Sodium 130 (*)     Creatinine 1.43 (*)     GFR Estimate 49 (*)     GFR Estimate If Black 56 (*)     Albumin 3.3 (*)     All other components within normal limits   LACTIC ACID WHOLE BLOOD   INR   PARTIAL THROMBOPLASTIN TIME   ROUTINE UA WITH MICROSCOPIC   PERIPHERAL IV CATHETER       Imaging Studies:   Recent Results (from the past 24 hour(s))   CT Head w/o Contrast   Result Value    Radiologist flags Brain mass effect (Urgent)    Narrative    CT HEAD W/O CONTRAST 8/16/2019 6:27 PM    History: Left-sided weakness for 2 days, post tumor resection  ICD-10:  Comparison: Brain MRI 7/26/2019 and head CT 7/25/2019.    Technique: Using multidetector thin collimation helical acquisition  technique, axial, coronal and sagittal CT images from the skull base  to the vertex were obtained without intravenous contrast.     Findings:    On the noncontrast images of the brain, there is the finding of prior  frontal  craniotomy, as on the prior MRI, on the right side, where  there continues to be mild mass effect on the right lateral ventricle  frontal horn, with surrounding vasogenic edema. Centrally there is an  approximately 3.0 cm sized area of hypoattenuation with slightly  hyperattenuating rim within the area of vasogenic edema, that appears  increased in size from the postoperative resection site with small  amount of internal hemorrhage that was just over 2.7 centimeters size.      There is no overt hydrocephalus although the frontal horns remain  slightly enlarged. The gray to white matter differentiation of the  cerebral hemispheres is otherwise preserved. The basal cisterns are  patent.  The visualized paranasal sinuses are clear. The mastoid air cells are  clear.       Impression    Impression:   1. Increased mass effect at site of prior right frontal metastatic  brain tumor resection, whether related to recurrence of the metastasis  versus swelling at the site of surgery. Brain MRI with contrast and  pre- and post contrast subtraction imaging would be helpful to further  evaluate as there was hemorrhage within this site previously.  2. No hydrocephalus, and no midline shift at this time.    [Urgent Result: Brain mass effect]    Finding was identified on 8/16/2019 6:16 PM.     The ER M.D. was contacted by Dr. Galvez, radiology resident at  8/16/2019 6:30 PM and verbalized understanding of the urgent finding.     DANIKA IVERSON MD       Recent vital signs:   BP (!) 144/73   Temp 98.4  F (36.9  C) (Oral)   Resp 16   Wt 63.5 kg (140 lb)   SpO2 99%   BMI 18.60 kg/m      Sanjay Coma Scale Score: 15 (08/16/19 1700)       Cardiac Rhythm: Normal Sinus  Pt needs tele? No  Skin/wound Issues: None    Code Status: Full Code    Pain control: pt had none    Nausea control: pt had none    Abnormal labs/tests/findings requiring intervention: MRI, CT    Family present during ED course? Yes   Family Comments/Social  Situation comments: Pt seems to be declining per spouse.    Tasks needing completion: none    Nyasia Brannon, RN    [phone]

## 2019-08-17 NOTE — ED NOTES
Per Virgie GUTIERREZ on 6A, they will not be taking any pt's from the ED [facility]. ER charge notified.

## 2019-08-17 NOTE — PROGRESS NOTES
"Social Work: Assessment with Discharge Plan    Patient Name:  King Gonsalo Bruno  :  1945  Age:  73 year old  MRN:  [id number]  Risk/Complexity Score:     Completed assessment with:  Pt, chart review    Presenting Information   Reason for Referral:  Discharge plan and Potential need for community services upon discharge  Date of Intake:  2019  Referral Source:  Chart Review  Decision Maker:  Patient  Alternate Decision Maker:  Pt's significant other Stephanie  Health Care Directive:  Copy in Chart  Living Situation:  House (condo)  Previous Functional Status:  Assistance with Appointments:  significant other drives pt to appointments  Patient and family understanding of hospitalization:  \"The tumor started acting up. Even though they took it out, it's acting up.\"  Cultural/Language/Spiritual Considerations:  JENNIFER ( [employer]). Pt is a  and a retired .  Adjustment to Illness:  Appropriate    Physical Health  Reason for Admission:    1. Cerebral edema (H)    2. S/P craniotomy    3. Brain metastases (H)      Services Needed/Recommended:  Other:  TBD    Mental Health/Chemical Dependency  Diagnosis:  None in chart  Support/Services in Place:  N/A  Services Needed/Recommended:  N/A    Support System  Significant relationship at present time:  Significant other Stephanie  Family of origin is available for support:  No. Geographically distant.  Other support available:  [employer] meal support initiated by [facility] on last admission. Pt reports he is set up for meals but had to return to the hospital almost immediately.  Gaps in support system:  Would benefit from additional in-home services  Patient is caregiver to:  None     Provider Information   Primary Care Physician:  Joan Ventura   [phone]   Clinic:  [address] [address]      :  N/A    Financial   Income Source:  Not discussed  Financial Concerns:  None " "identified  Insurance:    Payor/Plan Subscriber Name Rel Member # Group #   BCBS - BCBS MEDICARE * KING AILYN CHAVEZ  [id number]       [id number]       Discharge Plan   Patient and family discharge goal:  Not discussed as pt about to admit to floor. Pt has been to [facility] most recently  Provided education on discharge plan:  NO  Patient agreeable to discharge plan:  N/A  A list of Medicare Certified Facilities was provided to the patient and/or family to encourage patient choice. Patient's choices for facility are:  N/A  Will NH provide Skilled rehabilitation or complex medical:  N/A  General information regarding anticipated insurance coverage and possible out of pocket cost was discussed. Patient and patient's family are aware patient may incur the cost of transportation to the facility, pending insurance payment: NO. Not appropriate discussion at this time.  Barriers to discharge:  Course of hospitalization.    Discharge Recommendations   Anticipated Disposition:  TBD  Transportation Needs:  Other:  TBD  Name of Transportation Company and Phone:  N/A    Additional comments   SW met with pt at bedside in the ED. Introduced self and ED SW. Spoke with pt briefly about his return to the ED; pt stated \"I knew something was wrong\" and tapped his head. SW was interrupted by transport as pt about to transfer to the floor. Pt aware of SW role in hospital and availability throughout admission.    CHER Salcedo, SW  Float   Pager: [phone]      "

## 2019-08-17 NOTE — ED PROVIDER NOTES
History     Chief Complaint   Patient presents with     Extremity Weakness     HPI  King Gonsalo Bruno is a 73 year old male who presents emergency room with left-sided weakness starting the last 48 hours.  Patient brought here with wife.  Patient had history of metastatic disease with a brain tumor removed as patient had some left-sided weakness.  Patient was doing better this procedure was done on July 25 this year by neurosurgery.  Patient was on prednisone is off currently at this point.  He was doing better now recurrent symptoms last 2 days.  No falls noted.  Patient noted to be somewhat more confused he was in the kitchen and watching the sink overflow but did not know how to turn off the valves on the faucet also.  As noted patient has not fallen now.  Not present here for evaluation of fevers etc.    I have reviewed the Medications, Allergies, Past Medical and Surgical History, and Social History in the Epic system.    Review of Systems   Constitutional: Positive for activity change. Negative for appetite change, fatigue and fever.   HENT: Negative for trouble swallowing and voice change.    Eyes: Negative for visual disturbance.   Respiratory: Negative for cough, chest tightness and shortness of breath.    Cardiovascular: Negative for chest pain and leg swelling.   Gastrointestinal: Negative for abdominal pain, nausea and vomiting.   Genitourinary: Negative for dysuria.   Musculoskeletal: Positive for gait problem. Negative for neck pain and neck stiffness.   Skin: Positive for wound. Negative for rash.        Abdomen scar healing right scalp.   Allergic/Immunologic: Negative for immunocompromised state.   Neurological: Positive for weakness.        Increasing left-sided upper and lower extremity weakness   Psychiatric/Behavioral: Positive for decreased concentration and dysphoric mood. Negative for confusion.   All other systems reviewed and are negative.      Physical Exam   BP: (!) 144/73  Pulse:  72  Heart Rate: 84  Temp: 98.4  F (36.9  C)  Resp: 16  Weight: 63.5 kg (140 lb)  SpO2: 99 %      Physical Exam   Constitutional: He is oriented to person, place, and time. He appears well-developed and well-nourished. He appears distressed.   Patient was wife she gets primary history.  He has some left-sided weakness.  Patient otherwise not markedly confused is responsive   HENT:   Head: Normocephalic and atraumatic.   Right coronal laceration repair healing well   Eyes: Pupils are equal, round, and reactive to light. Conjunctivae and EOM are normal. No scleral icterus.   Neck: Normal range of motion. Neck supple.   Cardiovascular: Normal rate and regular rhythm.   Pulmonary/Chest: No stridor. No respiratory distress. He has no wheezes.   Abdominal: He exhibits no distension and no mass. There is no tenderness. There is no guarding.   Musculoskeletal: He exhibits no edema, tenderness or deformity.   Neurological: He is alert and oriented to person, place, and time.   Patient noted this point with left upper lower extremity drift noted in the ER.   Skin: Skin is warm and dry. Capillary refill takes less than 2 seconds. No rash noted. He is not diaphoretic.   Psychiatric:   Flat affect noted   Nursing note and vitals reviewed.      ED Course        Procedures         Patient evaluated here in the ER.  Initial head CT was done revealing some increasing edema noted.  Discussed with neurosurgery to give 10 mg of Decadron IV.  Other labs stable except sodium is 130.  Hemoglobin 8.6.  Patient here in the ER stable otherwise.  Was seen by neurosurgery they recommend an MRI scan.  I see the results show concern for recurrent metastatic lesion this was followed by neurosurgery patient to be admitted to their service for further management of cerebral edema with left sided weakness noted.       EKG Interpretation:      Interpreted by Alphonso Infante  Time reviewed: 1713  Symptoms at time of EKG: left side weakness   Rhythm:  normal sinus   Rate: normal  Axis: normal  Ectopy: none  Conduction: normal  ST Segments/ T Waves: No ST-T wave changes  Q Waves: none  Comparison to prior: No old EKG available    Clinical Impression: normal EKG          Critical Care time:  none             Labs Ordered and Resulted from Time of ED Arrival Up to the Time of Departure from the ED   CBC WITH PLATELETS DIFFERENTIAL - Abnormal; Notable for the following components:       Result Value    RBC Count 3.43 (*)     Hemoglobin 8.5 (*)     Hematocrit 28.0 (*)     MCH 24.8 (*)     MCHC 30.4 (*)     RDW 22.4 (*)     All other components within normal limits   COMPREHENSIVE METABOLIC PANEL - Abnormal; Notable for the following components:    Sodium 130 (*)     Creatinine 1.43 (*)     GFR Estimate 49 (*)     GFR Estimate If Black 56 (*)     Albumin 3.3 (*)     All other components within normal limits   LACTIC ACID WHOLE BLOOD   INR   PARTIAL THROMBOPLASTIN TIME   ROUTINE UA WITH MICROSCOPIC   PERIPHERAL IV CATHETER   IP ASSIGN PROVIDER TEAM TO TREATMENT TEAM   ACTIVITY   APPLY PNEUMATIC COMPRESSION DEVICE (PCD)     Results for orders placed or performed during the hospital encounter of 08/16/19   CT Head w/o Contrast   Result Value Ref Range    Radiologist flags Brain mass effect (Urgent)     Narrative    CT HEAD W/O CONTRAST 8/16/2019 6:27 PM    History: Left-sided weakness for 2 days, post tumor resection  ICD-10:  Comparison: Brain MRI 7/26/2019 and head CT 7/25/2019.    Technique: Using multidetector thin collimation helical acquisition  technique, axial, coronal and sagittal CT images from the skull base  to the vertex were obtained without intravenous contrast.     Findings:    On the noncontrast images of the brain, there is the finding of prior  frontal craniotomy, as on the prior MRI, on the right side, where  there continues to be mild mass effect on the right lateral ventricle  frontal horn, with surrounding vasogenic edema. Centrally there is  an  approximately 3.0 cm sized area of hypoattenuation with slightly  hyperattenuating rim within the area of vasogenic edema, that appears  increased in size from the postoperative resection site with small  amount of internal hemorrhage that was just over 2.7 centimeters size.      There is no overt hydrocephalus although the frontal horns remain  slightly enlarged. The gray to white matter differentiation of the  cerebral hemispheres is otherwise preserved. The basal cisterns are  patent.  The visualized paranasal sinuses are clear. The mastoid air cells are  clear.       Impression    Impression:   1. Increased mass effect at site of prior right frontal metastatic  brain tumor resection, whether related to recurrence of the metastasis  versus swelling at the site of surgery. Brain MRI with contrast and  pre- and post contrast subtraction imaging would be helpful to further  evaluate as there was hemorrhage within this site previously.  2. No hydrocephalus, and no midline shift at this time.    [Urgent Result: Brain mass effect]    Finding was identified on 8/16/2019 6:16 PM.     The ER M.D. was contacted by Dr. Galvez, radiology resident at  8/16/2019 6:30 PM and verbalized understanding of the urgent finding.     DANIKA IVERSON MD   MR Brain w/o & w Contrast    Narrative     MR BRAIN W/O & W CONTRAST 8/16/2019 8:55 PM    Provided History: eval for tumor progression.  ICD-10:    Comparison: CT head dated 8/16/2019, MR brain dated 7/26/2018.    Technique: Multiplanar T1-weighted, axial FLAIR, and susceptibility  images were obtained without intravenous contrast. Following  intravenous gadolinium-based contrast administration, axial  T2-weighted, diffusion, and T1-weighted images (in multiple planes)  were obtained.    Contrast: Gadavist 7.5 mL IV.    Findings:  Postsurgical changes of right frontal lobe mass resection. There is a  rim-enhancing mass in the resection fossa which measures  approximately  3.7 x 2.5 by 2.8 cm, compared to a site where the tumor appeared  mostly resected without nodular enhancement on prior exam, although  there was a thin and slightly thickened rim inferiorly on the prior  examination post resection. There are a few enhancing central  septations. There are patchy areas of restricted diffusion most  prominent superiorly and medially. Susceptibility weighted imaging  demonstrating minimal blood product accumulations deep to the  enhancing ring. T2/FLAIR images demonstrating a large confluent area  of surrounding vasogenic edema involving the subcortical white matter  of the right frontal and parietal lobes. This has mildly increased  since the prior examination. However there is no midline shift  although there has been mildly increased mass effect.    No new or enhancing mass lesions. There is effacement of the anterior  horn of the right lateral ventricle similar to prior exam. No evidence  of hydrocephalus. T2 FLAIR images demonstrating patchy subcortical and  periventricular white matter densities which may represent chronic  small vessel disease. Susceptibility weighted imaging demonstrating no  acute intracranial hemorrhage. The normal intracranial flow voids are  patent.    No abnormality of the skull marrow signal. The visualized portions of  paranasal sinuses, and mastoid air cells are relatively clear. The  orbits are grossly unremarkable.      Impression    Impression:  1. Postoperative changes of right frontal mass resection with interval  increase in size of a rim-enhancing mass in the resection fossa, most  consistent with recurrent metastatic tumor at the resection site.  2. As mentioned in the head CT performed the same day, there is mildly  increased mass effect, particularly on the frontal horn of the right  lateral ventricle, without hydrocephalus.    I have personally reviewed the examination and initial interpretation  and I agree with the  findings.    DANIKA IVERSON MD   CBC with platelets differential   Result Value Ref Range    WBC 6.6 4.0 - 11.0 10e9/L    RBC Count 3.43 (L) 4.4 - 5.9 10e12/L    Hemoglobin 8.5 (L) 13.3 - 17.7 g/dL    Hematocrit 28.0 (L) 40.0 - 53.0 %    MCV 82 78 - 100 fl    MCH 24.8 (L) 26.5 - 33.0 pg    MCHC 30.4 (L) 31.5 - 36.5 g/dL    RDW 22.4 (H) 10.0 - 15.0 %    Platelet Count 405 150 - 450 10e9/L    Diff Method Automated Method     % Neutrophils 65.1 %    % Lymphocytes 18.9 %    % Monocytes 13.4 %    % Eosinophils 1.8 %    % Basophils 0.5 %    % Immature Granulocytes 0.3 %    Nucleated RBCs 0 0 /100    Absolute Neutrophil 4.3 1.6 - 8.3 10e9/L    Absolute Lymphocytes 1.2 0.8 - 5.3 10e9/L    Absolute Monocytes 0.9 0.0 - 1.3 10e9/L    Absolute Eosinophils 0.1 0.0 - 0.7 10e9/L    Absolute Basophils 0.0 0.0 - 0.2 10e9/L    Abs Immature Granulocytes 0.0 0 - 0.4 10e9/L    Absolute Nucleated RBC 0.0    Comprehensive metabolic panel   Result Value Ref Range    Sodium 130 (L) 133 - 144 mmol/L    Potassium 4.3 3.4 - 5.3 mmol/L    Chloride 96 94 - 109 mmol/L    Carbon Dioxide 26 20 - 32 mmol/L    Anion Gap 8 3 - 14 mmol/L    Glucose 88 70 - 99 mg/dL    Urea Nitrogen 14 7 - 30 mg/dL    Creatinine 1.43 (H) 0.66 - 1.25 mg/dL    GFR Estimate 49 (L) >60 mL/min/[1.73_m2]    GFR Estimate If Black 56 (L) >60 mL/min/[1.73_m2]    Calcium 10.0 8.5 - 10.1 mg/dL    Bilirubin Total 0.4 0.2 - 1.3 mg/dL    Albumin 3.3 (L) 3.4 - 5.0 g/dL    Protein Total 7.9 6.8 - 8.8 g/dL    Alkaline Phosphatase 81 40 - 150 U/L    ALT 16 0 - 70 U/L    AST 35 0 - 45 U/L   Lactic acid   Result Value Ref Range    Lactic Acid 0.9 0.7 - 2.0 mmol/L   INR   Result Value Ref Range    INR 1.05 0.86 - 1.14   Partial thromboplastin time   Result Value Ref Range    PTT 31 22 - 37 sec   EKG 12-lead, tracing only   Result Value Ref Range    Interpretation ECG Click View Image link to view waveform and result               Assessments & Plan (with Medical Decision  Making)  73-year-old male history of metastatic brain tumor that had resection in July of this year.  Patient now with left-sided weakness for last 2 days CT findings concerning for some cerebral edema without hydrocephalus.  Patient seen by neurosurgery did give 10 mg of Decadron IV.  Patient otherwise stable here in the ER.  MRI scan done per neurosurgery results were noted showing some recurrent tumor also with edema.  Plan to be admitted to neurosurgery service for ongoing management.           I have reviewed the nursing notes.    I have reviewed the findings, diagnosis, plan and need for follow up with the patient.    New Prescriptions    No medications on file       Final diagnoses:   Cerebral edema (H)   S/P craniotomy   Brain metastases (H)       8/16/2019   [facility], [place], EMERGENCY DEPARTMENT     Alphonso Infante MD  08/16/19 [id number]

## 2019-08-17 NOTE — PLAN OF CARE
AVSS, afebrile. RA. Neuros intact except left side weakness and slow to respond. Left extremities 4/5, Right 5/5. Denies pain/SOB. Remains NPO for now. PIV with NS at 50cc/hr. Up to bathroom with Ax1 and GB. Adequate UO. Last BM yesterday per pt. Pt is slightly unsteady on feet. Unable to use urinal at bedside d/t penile reconstruction. Refused bedpan/bedside commode. Pt had incontinence accident x1, offered pull up brief. Bed alarm on for safety. Pt calls appropriately. Continue with cares and update MD with any changes.

## 2019-08-17 NOTE — PROGRESS NOTES
"S: no events    O:   Temp:  [98.2  F (36.8  C)-98.6  F (37  C)] 98.6  F (37  C)  Pulse:  [72-85] 85  Heart Rate:  [84] 84  Resp:  [16-20] 16  BP: (134-144)/(73-85) 138/85  SpO2:  [99 %-100 %] 99 %    Awake and alert  Left upper and left lower extremity weakness 4/5  5/5 in right upper and right lower extremity    A:  Recurrent intracranial metastatic tumor     P:   Surgical plan pending     Carter \"Angel\" MD [place]   Neurosurgery, PGY-3    Please contact neurosurgery resident on call with questions.    Dial * * [phone], enter [phone] when prompted.       Patient seen and examined. Please see Dr. August's e-mail for details. The patient's LUE strength has significantly improved while on Decadron (now 4/5). I have again reviewed the rationale for surgical resection as well as the risks and benefits with the patient and his partner. Informed consent obtained. Will proceed to order gammatile and schedule surgery.    I spent 30  minutes personally evaluating clinical and imaging findings & managing the care of this patient. Over 50% of my time on the unit was spent counseling the patient in terms of the surgery.  "

## 2019-08-18 NOTE — PLAN OF CARE
AVss, denies pain. Up to bathroom to void with some urgency and frequency, no incontinence, stands and needs only minimal assist. Baseline left side weakness. Tolerating regular diet and activity, likes to be self motivated. Seen by team, discussing further surgery and radiation treatment.

## 2019-08-18 NOTE — PLAN OF CARE
Status: Pt on 6A d/t Left sided weakness (08/16). s/p right craniotomy for tumor resection, was discharged from rehab on 08/11  Vitals: VSS on RA.  Neuros: AO x4. Slow to respond. Follows commands. L side weaker than R side. L 4/5, R 5/5.  IV: L PIV infiltration at shift change. New IV placed on R arm this shift, c/o of pain at IV insertion site, MIVF stopped. MIVF was then discontinued by MD. Now pt wants this R PIV removed.  Resp/trach: LS clear, lower lobes diminished.  Diet: Regular diet   Bowel status: Last BM 08/17, +BS.  : Pt has urgency issues, brief in place  Skin: Scabs throughout from falls at home.  Pain: Denies.  Activity: Assist x1 & GB. Can be unsteady at times.  Social: No family overnight  Plan: Surgical plan pending. Continue to monitor and follow POC.

## 2019-08-18 NOTE — PLAN OF CARE
Status: Pt on 6A d/t Left sided weakness - s/p right craniotomy for tumor resection.  Vitals: VSS on RA.  Neuros: AOx4. Slow to respond. Follows commands. L side weaker than R side. L 4/5, R 5/5.  IV: PIV infusing NS @ 75 mL/hr.  Resp/trach: LS clear, lower lobes diminished.  Diet: Regular diet - tolerating well.  Bowel status: BM today.  : Pt has urgency issues. UA sent.  Skin: Scabs throughout from falls at home.  Pain: Denies.  Activity: Assist x1, gb. Can be unsteady at times.  Social: Significant other at bedside, supportive with cares.  Plan: Continue to monitor and follow POC.

## 2019-08-18 NOTE — CONSULTS
RADIATION ONCOLOGY CONSULT NOTE      PATIENT NAME: King Gonsalo Bruno  MRN: [id number]  : 1945    REFERRAL:  Alfred Del Rio    REASON FOR CONSULTATION: Care coordination for adjuvant radiotherapy    HISTORY OF PRESENT ILLNESS:  Mr. Bruno is a very pleasant 73 year old male with diagnosis of ypT3 N2 Mx, now M1 metastatic urethral cancer who is referred today for consideration of adjuvant radiotherapy after planned resection.    In brief, Mr. Bruno's oncologic history dates back to the clinical presentation of urinary complaints in May 2017.  A cystoscopy was done that demonstrated a large urethral mass after he underwent a cystoscopy (2017) which demonstrated a large urethral mass, and a biopsy during this procedure was consistent with high-grade carcinoma with squamous differentiation.  His initial staging PET/CT (2017) showed definitive evidence of local disease burden and betty metastatic spread and the possibility of distant disease recurrence with small bilateral hypermetabolic pulmonary nodules in the YOANA and RUL.  As patient refused to have these biopsied, he was treated with a course of neoadjuvant chemotherapy, cisplatin/gemcitabine x4, followed by an R0 penectomy/ureterectomy plus bilateral inguinal lymph node dissection (3/8/2018).    Shortly after undergoing definitive surgery, his restaging imaging showed progressive disease.  As such he began pembrolizumab (8/3/2018-2019).  After further progression, he transitioned to docetaxel x6 cycles (3/14/2019- 2019)    In 2019 he fell at home secondary to worsening lateralized, left lower leg weakness.  Brain MRI (2019) demonstrated a 5.3 x 3.6 x 4.5 cm peripheral enhancing mass with subtle peripheral nodularity in the right frontal lobe.           He underwent a craniotomy under the care of Dr. Allen (2019) which verified metastatic carcinoma.  Operative notes indicated that he cystic mass was encountered and  intraoperatively, the strategy for removal was first drainage of the cyst and subsequent resection of the wall of the tumor cavity.   His postoperative MRI (7/26/2019) demonstrated a 3.7 x 2.5 x 2.8 cm rim-enhancing mass in the resection bed with a few enhancing central septations.          He was seen on 8/16/2019 for the 48-hour progressive onset of left upper extremity weakness as well as confusion by his wife's report in the emergency room.  At time of presentation to the emergency room he was off all steroids.  An MRI brain (8/16/2019) was done which showed interval progression of the rim enhancement in the resection fossa.        On interview today, he reports that he is much improved and starting steroids in the emergency room.  He reports that he has some residual left arm weakness.  He denies any difficulty with gait or left leg weakness.  He denies any new changes to vision, taste, smell, hearing, difficulty chewing, difficulty swallowing, sensation, motor function with the exception of his left upper extremity, nausea, vomiting, new onset fatigue, or constipation, although he does report regular use of Stacy Lax.    All pertinent labs, imaging, and pathology findings have been reviewed with details above.     REVIEW OF SYSTEMS: A 10 point review of systems was obtained. Pertinent findings are noted in the HPI and nursing note from today, but are otherwise unremarkable.    CHEMOTHERAPY HISTORY: Yes, as above.  Significant for pembrolizumab and docetaxel.  Last infusion 7/15/2019.    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      GERD (gastroesophageal reflux disease)      Hypertension      Penile cancer (H)      PONV (postoperative nausea and vomiting)      Past Surgical  History:   Procedure Laterality Date     CYSTOSCOPY, BIOPSY BLADDER, COMBINED N/A 8/31/2017    Procedure: COMBINED CYSTOSCOPY, BIOPSY BLADDER;  Cystoscopy, Suprapubic Tube Placement with Ultrasound Guidiance, Uretheral Dilation;  Surgeon: Muriel Haddad MD;  Location: [facility] OR     CYSTOSTOMY, INSERT TUBE SUPRAPUBIC, COMBINED N/A 8/31/2017    Procedure: COMBINED CYSTOSTOMY, INSERT TUBE SUPRAPUBIC;;  Surgeon: Muriel Haddad MD;  Location: [facility] OR     DISSECT LYMPH NODE INGUINAL N/A 3/8/2018    Procedure: DISSECT LYMPH NODE INGUINAL;  Flexible Cystoscopy, Bladder Biopsy, urethral biopsy and penile biopsy, Radical Penectomy and Urethrectomy, Perineal Urethrostomy, Bilateral Inguinal Lymphadenectomy; superficial lymph nodes and deep lymph nodes;  Surgeon: Muriel Haddad MD;  Location: [facility] OR     LARYNGOSCOPY  2013    Direct laryngoscopy with the use of rigid endoscopy and takedown of left hypopharyngeal diverticula.      OPTICAL TRACKING SYSTEM CRANIOTOMY, EXCISE TUMOR, COMBINED Right 7/25/2019    Procedure: Stealth Assisted Right Craniotomy And Tumor Resection;  Surgeon: Chandni Allen MD;  Location: [facility] OR     PENECTOMY N/A 3/8/2018    Procedure: PENECTOMY;  Flexible Cystoscopy, Bladder Biopsy, urethral biopsy and penile biopsy, Radical Penectomy and Urethrectomy, Perineal Urethrostomy, Bilateral Inguinal Lymphadenectomy; superficial lymph nodes and deep lymph nodes    ;  Surgeon: Muriel Haddad MD;  Location: [facility] OR     URETHROSTOMY PERINEUM N/A 3/8/2018    Procedure: URETHROSTOMY PERINEUM;  Flexible Cystoscopy, Bladder Biopsy, urethral biopsy and penile biopsy, Radical Penectomy and Urethrectomy, Perineal Urethrostomy, Bilateral Inguinal Lymphadenectomy; superficial lymph nodes and deep lymph nodes;  Surgeon: Muriel Haddad MD;  Location: [facility] OR       ALLERGIES:  Allergies as of 08/16/2019 - Reviewed 08/16/2019   Allergen Reaction Noted     Lisinopril Swelling 07/28/2013      Oxycodone Nausea and Vomiting 2018     Vicodin [hydrocodone-acetaminophen] Nausea and Vomiting 2018       MEDICATIONS:  No current outpatient medications on file.        FAMILY HISTORY:  Family History   Problem Relation Age of Onset     Cerebrovascular Disease Mother      Hypertension Mother      Prostate Cancer Father 84     Prostate Cancer Brother 63     Diabetes Brother      Cancer Brother      Diabetes Brother        SOCIAL HISTORY:  Social History     Socioeconomic History     Marital status: Single     Spouse name: Stephanie Oh     Number of children: 1     Years of education: Not on file     Highest education level: Not on file   Occupational History     Occupation: retired crisis  for [employer]   Social Needs     Financial resource strain: Not on file     Food insecurity:     Worry: Not on file     Inability: Not on file     Transportation needs:     Medical: Not on file     Non-medical: Not on file   Tobacco Use     Smoking status: Former Smoker     Packs/day: 1.00     Years: 20.00     Pack years: 20.00     Types: Cigarettes     Start date: 1972     Last attempt to quit: 1992     Years since quittin.6     Smokeless tobacco: Never Used     Tobacco comment: quit in    Substance and Sexual Activity     Alcohol use: No     Comment:  quit per personal choice.     Drug use: No     Sexual activity: Not on file   Lifestyle     Physical activity:     Days per week: Not on file     Minutes per session: Not on file     Stress: Not on file   Relationships     Social connections:     Talks on phone: Not on file     Gets together: Not on file     Attends [employer] service: Not on file     Active member of club or organization: Not on file     Attends meetings of clubs or organizations: Not on file     Relationship status: Not on file     Intimate partner violence:     Fear of current or ex partner: Not on file     Emotionally abused: Not on file     Physically abused: Not  "on file     Forced sexual activity: Not on file   Other Topics Concern     Parent/sibling w/ CABG, MI or angioplasty before 65F 55M? Not Asked   Social History Narrative     Not on file       PHYSICAL EXAM:  Vital Signs: /65 (BP Location: Left arm)   Pulse 61   Temp 97  F (36.1  C) (Oral)   Resp 18   Ht 1.93 m (6' 4\")   Wt 63.5 kg (140 lb)   SpO2 100%   BMI 17.04 kg/m    Gen: NAD  Eyes: EOMI, sclera anicteric  HENT      Head: NC/AT     Ears: No external auricular lesions  Pulm: Breathing comfortably on room air  CV: No visible cyanosis  Skin: Normal color and turgor of the visualized skin  Neurologic/MSK/psych: A&Ox3, weakness of left upper extremity in comparison to the lower extremity, however he still measures 5 out of 5 strength on formal testing of the left arm against gravity.  There is no sensory deficit head, neck or remainder of the body.  There is no noticeable focal neurological deficit.    ECOG PERFORMANCE STATUS: 2    RADIOLOGY AND LABORATORIES: Relevant studies reviewed as above outlined in HPI.     IMPRESSION: In summary, Mr. Bruno is a 73 year old male with diagnosis of initially ypT3 N2 Mx s/p neoadjuvant chemotherapy and definitive surgery.  In the short interval after surgery he was detected a progressive metastatic disease burden.  In 07/2019 he had a symptomatic presentation of a cystic brain metastasis treated with surgical resection (7/25/19) and has now experienced a short interval cavity recurrence prior to undergoing planned adjuvant therapy.  Given the symptomatic nature of his recurrence, surgical re-resection may be beneficial in reducing mass effect.  Adjuvant radiation after surgical intervention would be expected to reduce the probability of local recurrence.     RECOMMENDATION:  In summary, we discussed with Mr. Bruno the role for radiation therapy in the management of his intracranial metastatic disease.  Given that he is symptomatic, re-resection may play a role in the " management of his disease burden.  We discussed that after resection, adjuvant radiotherapy would be expected to decrease the probability of local recurrence within the radiation field.  We compared and contrasted the use of external delivery of radiation via GammKnife as well as the placement of a permanent LDR brachytherapy source (GammaTile). We will defer to Dr. Barnett to assess his candidacy for the latter.  As he was specifically interested in the comparative efficacy between the 2 methods of adjuvant radiotherapy, we discussed that there is much less data to back the efficacy of the GammaTile in the adjuvant setting in comparison to an adjuvant GammaKnife SRS cavity boost, however our expectation is that either treatment in the adjuvant setting would be expected to provide the same outcomes in terms of oncologic control.  Logistically, it may not be possible in order to obtain the brachytherapy source intended for implantation in a short time course, however, again, we discussed with him that he should await final recommendations in this regard from Dr. Barnett.    Mr. Bruno had many questions during our conversation today, which were answered to the best of our ability. By the end of our inpatient consultation today, Mr. Bruno was satisfied with waiting for final multidisciplinary review tomorrow.      Additionally, given that he currently has evidence of significant vasogenic edema on imaging and reports clinical improvement on steroids, we agree with continued use of steroids under the direction of neurosurgery.     The patient was seen and discussed with staff, Dr. Carrillo. Thank you for involving us in the care of this patient.  Please feel free to contact us with questions or concerns at any time.      Arsh Barclay MD  Radiation Oncology Resident, PGY-5  [facility]      CC  Patient Care Team:  Joan Ventura NP as PCP - General (Nurse Practitioner)  Mona  Ry HARLEY as Referring Physician (Surgery)  Muriel Haddad MD as MD (Urology)  Lizzy Chowdary, RN as Registered Nurse (Urology)  Steve Arceo MD as MD (Oncology)  Rimma Lanza, RN as Nurse Coordinator (Oncology)

## 2019-08-18 NOTE — PROGRESS NOTES
"S: ambulating yesterday well. Frustrated by IVs overnight     O:  Temp:  [95.7  F (35.4  C)-97.9  F (36.6  C)] 97  F (36.1  C)  Heart Rate:  [62-73] 69  Resp:  [16-20] 18  BP: (122-142)/(58-71) 122/65  SpO2:  [97 %-100 %] 100 %    Exam:  General: Awake;  Alert, In No Acute Distress  Pulm: Breathing Comfortably on room air  Mental status: Oriented x 3  Cranial Nerves: very mild left facial droop. No dysarthria. Extraocular muscles intact.   Strength: 5/5 in right upper and lower. 4+/5 in the left upper and lower.   Pronator Drift: Absent  Sensory: Intact to Light Touch  INCISION: Well-healed    Assessment:   Recurrent brain metastasis. Awaiting surgery.     Plan:     Neuro:  Cerebral Edema: Decadron 4 mg q8h  Pending gamma tile surgery  Incentive spirometry   regular diet  Sliding scale insulin while on decadron   Monitor for fever  PT/OT: pending  DVT prophylaxis: Sequential compression devices  Ulcer prophylaxis: protonix   DISPO: 6A  Barriers: surgical management of lesion     Carter \"Angel\" MD [place]   Neurosurgery, PGY-3    Please contact neurosurgery resident on call with questions.    Dial * * [phone], enter [phone] when prompted.       Patient seen and examined. Please see Dr. August's note for details. Patient's LUE continues to improve on Decadron. Now 5-/5. Pending Gammatile availability and surgery.    I spent 15 minutes personally evaluating clinical and imaging findings & managing the care of this patient.   "

## 2019-08-19 NOTE — PROGRESS NOTES
"Neurosurgery Progress Note      S: Was up ambulating in halls yesterday without fall.  Denies headache, vision changes, nausea or vomiting this morning.  Awaiting PT/OT evaluations.      O:  BP (!) 140/74 (BP Location: Right arm)   Pulse 61   Temp 96.5  F (35.8  C) (Oral)   Resp 16   Ht 1.93 m (6' 4\")   Wt 63.5 kg (140 lb)   SpO2 100%   BMI 17.04 kg/m    Exam:  General: Awake;  Alert, In No Acute Distress  Pulm: Breathing Comfortably on room air  Mental status: Oriented x 3  Cranial Nerves: very mild left facial droop. No dysarthria. Extraocular muscles intact.   Strength: 5/5 in right upper and lower. 4+/5 in the left upper and lower.   Pronator Drift: Absent  Sensory: Intact to Light Touch  INCISION: Well-healed    Assessment:   Recurrent brain metastasis. Awaiting surgery anticipated for 8/23/19 with Dr Alfred Del Rio.      Plan:     Neuro:  Cerebral Edema: Decadron 4 mg q8h  Pending gamma tile surgery, anticipate surgery on 8/23  Incentive spirometry   regular diet  Sliding scale insulin while on decadron   Monitor for fever  PT/OT: pending  DVT prophylaxis: Sequential compression devices  Ulcer prophylaxis: protonix   DISPO: 6A  Barriers: surgical management of lesion     ISAURA Chapman, CNP  Department of Neurosurgery  Pager: [phone]    Neurosurgery faculty note    Patient seen and examined. Please see Ms. Porras's note for details. Patient's neurologic examination remains stable. Pending OR on 8/22.    I spent 15 minutes personally evaluating clinical and imaging findings & managing the care of this patient.   "

## 2019-08-19 NOTE — PLAN OF CARE
Status: Pt on 6A d/t Left sided weakness (08/16). s/p right craniotomy for tumor resection, was discharged from rehab on 08/11  Vitals: VSS on RA.  Neuros: AO x4. Slow to respond. Follows commands. L side weaker than R side. L 4/5, R 5/5.  IV: Unable to flush PIV, removed PIV, made charge aware since no active order for IV  Resp: LS clear, lower lobes diminished.  Diet: Regular diet - tolerating well.  Bowel status: Last BM 08/18/19, +BS  : Pt has urgency issues, brief in place.  Skin: Scabs throughout from falls at home.  Pain: Denies.  Activity: Assist x1, gb. Can be unsteady at times.  Social: No visitors this shift.  Plan: Continue to monitor and follow POC.

## 2019-08-19 NOTE — PROGRESS NOTES
RADIATION ONCOLOGY CONSULTATION  DATE:  August 19, 2019    PATIENT NAME: King Gonsalo Bruno  MEDICAL RECORD NUMBER: [id number]  REFERRING PHYSICIAN:  Dr. Reynoso ref. provider found    REASON FOR CONSULTATION:  Consideration of radiotherapy for treatment of recurrent right frontal lesion s/p GTR in the setting of metastatic urethral cancer.      Additional request was to consider pulmonary radiotherapy for RLL mass     HISTORY OF PRESENT ILLNESS: Mr. Bruno is a 73 year old with diagnosis of metastatic urethral cancer who was referred to us for consideration of radiotherapy to the recurrent right frontal lesion status post GTR on 7/25/19.                 He initially presented to his PCP with penile discomfort and hematuria in July 2017. He underwent a cystoscopy at outside hospital on 7/20/2017 which showed a large urethral mass from which a biopsy was obtained and showed ([id number]) invasive high grade carcinoma with squamous differentiation.      He was referred to [facility] and was seen by Dr. Haddad with Urology and Dr. Arceo with Medical oncology on 8/23/17. He had a staging PET/CT which demonstrated hypermetabolic urethral mass measuring 1.7 x 2.1 cm and involving the membranous and penile urethra, with concern for local invasion into the right corpus cavernosa. There was a hypermetabolic node in the left groin, measuring 1.0 cm as well as two hypermetabolic pulmonary nodules, measuring 1.1 cm in YOANA and 0.9 cm in RUL, concerning for metastatic disease.     He completed 4 cycles of neoadjuvant chemotherapy Cisplatin/Gemcitabine between (9/15/17-11/24/17). He had an interval PET CT in 10/3/17 which showed positive response to treatment, although the PET CT obtained after completion of chemotherapy on 12/12/17 showed increased uptake of urethral mass. He then underwent radical penectomy and urethrectomy, perineal urethrostomy, and bilateral inguinal lymph node dissection on 3/8/2018. The surgical pathology ([id number])  demonstrated moderately differentiated SCC of the penile urethra with extension into the corpus spongiosum and cavernosum of the penile shaft. There was positive LVSI and PNI. The margins were negative. He had 1/5 left inguinal nodes and 1/7 right inguinal nodes positive for metastatic disease. The patient was referred to Dr. Yeung on 5/16/18 who recommended against adjuvant radiotherapy given the presence of metastatic disease.    He had a follow up PET CT on 6/26/18 which showed progression of his pulmonary metastases. As such, he was started on Keytruda till 2/28/19. He underwent a restaging CT CAP on 3/5/19 which unfortunately showed continued progression of his pulmonary metastases and lymphadenopathy. The patient declined Hospice and preferred to try another line of chemotherapy. He was switched to single agent Docetaxel. He had an interval CT CAP on 5/15/19 which showed stable disease.     On 7/22/19, he presented to ED after he fell at home with a complaint of worsening left lower extremity weakness. He was evaluated by a brain MRI which showed a 5.3 x 3.6 x 4.5 cm right frontal lobe lesion. He underwent a right craniotomy and gross tumor resection by Dr. Allen on 7/25/19. The surgical pathology ([id number]) was consistent with metastatic squamous cell carcinoma. His postoperative MRI (7/26/2019) demonstrated a 3.7 x 2.5 x 2.8 cm rim-enhancing mass in the resection bed with a few enhancing central septations.     He had a follow-up CT CAP on 8/6/319 which demonstrated progression of his pulmonary metastasis including a 7.8 cm right parahilar mass obstructing segmentary bronchus of the right lower lobe. Unfortunately, he presented to emergency department on 8/16/2019 for the 48-hour progressive onset of left upper extremity weakness as well as confusion by his wife's report in the emergency room. An MRI brain was done and showed interval progression of the rim enhancement in the resection cavity.     On  interview today, he was is doing well.  He reported moderate improvement of his left arm weakness and confusion.  He denied any headaches, nausea, vomiting, numbness, chest pain, hemoptysis, shortness of breath, fever, chills, urinary or bowel symptoms, weight or appetite changes.  His KPS is 80%.  He played golf 3-4 weeks ago without difficulty.     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      GERD (gastroesophageal reflux disease)      Hypertension      Penile cancer (H)      PONV (postoperative nausea and vomiting)        PSH:  Past Surgical History:   Procedure Laterality Date     CYSTOSCOPY, BIOPSY BLADDER, COMBINED N/A 8/31/2017    Procedure: COMBINED CYSTOSCOPY, BIOPSY BLADDER;  Cystoscopy, Suprapubic Tube Placement with Ultrasound Guidiance, Uretheral Dilation;  Surgeon: Muriel Haddad MD;  Location: [facility] OR     CYSTOSTOMY, INSERT TUBE SUPRAPUBIC, COMBINED N/A 8/31/2017    Procedure: COMBINED CYSTOSTOMY, INSERT TUBE SUPRAPUBIC;;  Surgeon: Muriel Haddad MD;  Location: [facility] OR     DISSECT LYMPH NODE INGUINAL N/A 3/8/2018    Procedure: DISSECT LYMPH NODE INGUINAL;  Flexible Cystoscopy, Bladder Biopsy, urethral biopsy and penile biopsy, Radical Penectomy and Urethrectomy, Perineal Urethrostomy, Bilateral Inguinal Lymphadenectomy; superficial lymph nodes and deep lymph nodes;  Surgeon: Muriel Haddad MD;  Location: [facility] OR     LARYNGOSCOPY  2013    Direct laryngoscopy with the use of rigid endoscopy and takedown of left hypopharyngeal diverticula.      OPTICAL TRACKING SYSTEM CRANIOTOMY, EXCISE TUMOR, COMBINED Right 7/25/2019    Procedure: Stealth Assisted Right Craniotomy And Tumor Resection;  Surgeon: Chandni Allen MD;   Location: [facility] OR     PENECTOMY N/A 3/8/2018    Procedure: PENECTOMY;  Flexible Cystoscopy, Bladder Biopsy, urethral biopsy and penile biopsy, Radical Penectomy and Urethrectomy, Perineal Urethrostomy, Bilateral Inguinal Lymphadenectomy; superficial lymph nodes and deep lymph nodes    ;  Surgeon: Muriel Haddad MD;  Location: [facility] OR     URETHROSTOMY PERINEUM N/A 3/8/2018    Procedure: URETHROSTOMY PERINEUM;  Flexible Cystoscopy, Bladder Biopsy, urethral biopsy and penile biopsy, Radical Penectomy and Urethrectomy, Perineal Urethrostomy, Bilateral Inguinal Lymphadenectomy; superficial lymph nodes and deep lymph nodes;  Surgeon: Muriel Haddad MD;  Location: [facility] OR     MEDICATIONS:  No current facility-administered medications for this visit.      No current outpatient medications on file.     Facility-Administered Medications Ordered in Other Visits   Medication     amLODIPine (NORVASC) tablet 10 mg     benzocaine-menthol (CEPACOL) 15-3.6 MG lozenge 1 lozenge     calcium carbonate 500 mg (elemental) (OSCAL;OYSTER SHELL CALCIUM) tablet 500 mg     dexamethasone (DECADRON) tablet 4 mg     fluticasone (FLONASE) 50 MCG/ACT spray 2 spray     lidocaine (LMX4) cream     lidocaine 1 % 0.1-1 mL     pantoprazole (PROTONIX) EC tablet 40 mg     polyethylene glycol (MIRALAX/GLYCOLAX) Packet 17 g     potassium phosphate (monobasic) (K-PHOS) tablet 500 mg     ranitidine (ZANTAC) tablet 150 mg     sodium chloride (PF) 0.9% PF flush 3 mL     sodium chloride (PF) 0.9% PF flush 3 mL     vitamin D3 (CHOLECALCIFEROL) 1000 units (25 mcg) tablet 1,000 Units     ALLERGY:  Allergies   Allergen Reactions     Lisinopril Swelling     Oxycodone Nausea and Vomiting     Vicodin [Hydrocodone-Acetaminophen] Nausea and Vomiting     FAMILY HISTORY:  Family History   Problem Relation Age of Onset     Cerebrovascular Disease Mother      Hypertension Mother      Prostate Cancer Father 84     Prostate Cancer Brother 63     Diabetes Brother       Cancer Brother      Diabetes Brother      SOCIAL HISTORY:  Social History     Tobacco Use     Smoking status: Former Smoker     Packs/day: 1.00     Years: 20.00     Pack years: 20.00     Types: Cigarettes     Start date: 1972     Last attempt to quit: 1992     Years since quittin.6     Smokeless tobacco: Never Used     Tobacco comment: quit in    Substance Use Topics     Alcohol use: No     Comment:  quit per personal choice.     ROS: 10 point ROS reviewed as reported on the nursing assessment note.    PHYSICAL EXAMINATION:    Gen: Alert, in NAD  Eyes: EOMI, sclera anicteric, PERRL  HENT      Head: NC/AT     Ears: No external auricular lesions     Nose/sinus: No rhinorrhea or epistaxis     Oral Cavity/Oropharynx: MMM, no thrush noted  Pulm: Breathing comfortably on room air  Neurologic/MSK: Alert and oriented x3, CN II-XII intact, motor 5/5 in right UE/LE, 3/5 in left UE/LE, sensation intact to light touch throughout. Finger-nose-finger and heel knee-shin intact.    Psych: Appropriate mood and affect    MRI impression 'Impression:  19   1. Postoperative changes of right frontal mass resection with interval  increase in size of a rim-enhancing mass in the resection fossa, most  consistent with recurrent metastatic tumor at the resection site.  2. As mentioned in the head CT performed the same day, there is mildly  increased mass effect, particularly on the frontal horn of the right  lateral ventricle, without hydrocephalus.    IMPRESSION: A 73 year old with metastatic urethral cancer, s/p neoadjuvant chemotherapy and definitive surgery, with pulmonary and cranial metastases. He had a solitary right frontal lesion s/p GTR by Dr. Allen on 19.  He was recently admitted for evaluation of worsening left upper extremity weakness. An MRI of the brain showed evidence of recurrent disease in the form of rim enhancement in the resection cavity.  His neurologic deficits are improving. He has no  pulmonary symptoms. His KPS is 80%.    RECOMMENDATION: We discussed with Mr. Bruno the available treatment options including potential re-resection plus LDR brachytherapy (Gamma Tile) versus external beam radiotherapy via gamma knife with or without repeat resection.       Given the large size of the resected cavity (5 cm) and rapid regrowth of tumor Dr Del Rio is favoring re-resection and GammaTIle.    We feel comfortable to proceed with this option.     We will check with his insurance to get approval for Gamma-Tile. I      In regard to progression of his pulmonary disease, we discussed with Dr. Arceo about possibility of palliative radiotherapy to the growing parahilar mass obstructing the right bronchus because the inpatient team inquired as to whether it was possible.       Given that he has no pulmonary symptoms, the consensus was to reserve palliative radiotherapy should he develops any symptoms or further progression in the future.  In the meantime the plan is to switch to another systemic treatment.    The patient was seen and discussed with staff, Dr. Barnett. Thank you for involving us in the care of this patient.  Please feel free to contact us with questions or concerns at any time.    Rahul Fang MD  PGY-3 Resident, Radiation Oncology  [facility]    I was personally present with the resident during the history and exam.  I   discussed the case with the resident and agree with the findings and plan   of care as documented in the resident's note.    Mulu Barnett   [phone]       Patient Care Team:  Jona Ventura NP as PCP - General (Nurse Practitioner)  Ry Dunn as Referring Physician (Surgery)  Muriel Haddad MD as MD (Urology)  Lizzy Chowdary, RN as Registered Nurse (Urology)  Steve Arceo MD as MD (Oncology)  Rimma Lanza, RN as Nurse Coordinator (Oncology)

## 2019-08-19 NOTE — PROGRESS NOTES
HPI    INITIAL PATIENT ASSESSMENT    Diagnosis: Metastatic penile cancer.    Prior radiation therapy: None    Prior chemotherapy: See pt records.    Prior hormonal therapy:No    Pain Eval:  Denies    Psychosocial  Living arrangements: Lives at home alone.  Fall Risk: wheelchair/stretcher bound   referral needs: Not needed    Advanced Directive: Unknown  Implantable Cardiac Device? No      Nurse face-to-face time: Level 4:  15 min face to face time  Review of Systems   Constitutional: Negative.    HENT: Negative.    Eyes: Negative.    Respiratory: Negative.    Cardiovascular: Negative.    Gastrointestinal: Negative.    Genitourinary: Negative.    Musculoskeletal: Negative.    Skin: Negative.    Neurological: Positive for weakness.        Pt reports increased weakness on Left side, with a change in gate, as the reason for current hospitalization.   Endo/Heme/Allergies: Negative.    Psychiatric/Behavioral: Negative.

## 2019-08-19 NOTE — PLAN OF CARE
PT-6A- Hold, PT Consult Orders received, per chart review and communication with interdisciplinary care team, pt was at radiation treatment this AM, and anticipates OR 8/23, PT will hold evaluation and initiate pre-operatively as indicated.

## 2019-08-19 NOTE — PLAN OF CARE
Status: Pt on 6A d/t Left sided weakness - s/p right craniotomy for tumor resection.  Vitals: VSS on RA.  Neuros: AOx4. Slow to respond. Follows commands. L side weaker than R side. L 4/5, R 5/5.  IV: PIV SL.  Resp/trach: LS clear, lower lobes diminished.  Diet: Regular diet - tolerating well.  Bowel status: BM today.  : Pt has urgency issues, brief in place.  Skin: Scabs throughout from falls at home.  Pain: Denies.  Activity: Assist x1, gb. Can be unsteady at times.  Social: No visitors this shift.  Plan: Continue to monitor and follow POC.

## 2019-08-19 NOTE — PLAN OF CARE
Status:Admitted with Left sided weakness  s/p right craniotomy for tumor resection on 7-25.  Vitals: VSS on RA.  Neuros: AO x4. L side weaker than R side. L 4/5, R 5/5.L neglect.  IV: SL'd  Resp: LS clear, lower lobes diminished.  Diet: Regular diet - tolerating well.  Bowel status: Last BM 08/18/19, +BS  : Voiding spont , has urgency. Using pull ups.   Pain: Denies.  Activity:  Up to bathroom with one assist.Down to radiation for 2 hours in wheel chair.  Social: S.O(Stephanie) and friend visited.  Plan: Gamma tile on Thursday.

## 2019-08-19 NOTE — TUMOR CONFERENCE
Tumor Conference Information  Tumor Conference:    Specialties Present:  Surgery, Medical Oncology, Radiation Oncology, Radiology, Pathology  Patient Status:  Current patient  Pathology:  Not Discussed  Treatment to Date:  Palliative chemotherapy  Clinical Trial Eligibility:  Not discussed  Recommended Plan:  Palliative radiation therapy, Surgery (Comment: plan for re-resection and gammatile placement pending insurance approval)  Did the review exceed 30 minutes?:  did not           Documentation / Disclaimer Cancer Tumor Board Note  Cancer tumor board recommendations do not override what is determined to be reasonable care and treatment, which is dependent on the circumstances of a patient's case; the patient's medical, social, and personal concerns; and the clinical judgment of the oncologist [physician].

## 2019-08-19 NOTE — LETTER
8/19/2019       RE: King Gonsalo Bruno  [address]  [facility] [address]     Dear Colleague,    Thank you for referring your patient, King Gonsalo Bruno, to the [facility]. Please see a copy of my visit note below.      HPI    INITIAL PATIENT ASSESSMENT    Diagnosis: Metastatic penile cancer.    Prior radiation therapy: None    Prior chemotherapy: See pt records.    Prior hormonal therapy:No    Pain Eval:  Denies    Psychosocial  Living arrangements: Lives at home alone.  Fall Risk: wheelchair/stretcher bound   referral needs: Not needed    Advanced Directive: Unknown  Implantable Cardiac Device? No      Nurse face-to-face time: Level 4:  15 min face to face time  Review of Systems   Constitutional: Negative.    HENT: Negative.    Eyes: Negative.    Respiratory: Negative.    Cardiovascular: Negative.    Gastrointestinal: Negative.    Genitourinary: Negative.    Musculoskeletal: Negative.    Skin: Negative.    Neurological: Positive for weakness.        Pt reports increased weakness on Left side, with a change in gate, as the reason for current hospitalization.   Endo/Heme/Allergies: Negative.    Psychiatric/Behavioral: Negative.                  RADIATION ONCOLOGY CONSULTATION  DATE:  August 19, 2019    PATIENT NAME: King Gonsalo Bruno  MEDICAL RECORD NUMBER: [id number]  REFERRING PHYSICIAN:  Dr. Reynoso ref. provider found    REASON FOR CONSULTATION:  Consideration of radiotherapy for treatment of recurrent right frontal lesion s/p GTR in the setting of metastatic urethral cancer.      Additional request was to consider pulmonary radiotherapy for RLL mass     HISTORY OF PRESENT ILLNESS: Mr. Bruno is a 73 year old with diagnosis of metastatic urethral cancer who was referred to us for consideration of radiotherapy to the recurrent right frontal lesion status post GTR on 7/25/19.                 He initially presented to his PCP with penile discomfort and hematuria in July 2017. He  underwent a cystoscopy at outside hospital on 7/20/2017 which showed a large urethral mass from which a biopsy was obtained and showed ([id number]) invasive high grade carcinoma with squamous differentiation.      He was referred to [facility] and was seen by Dr. Haddad with Urology and Dr. Arceo with Medical oncology on 8/23/17. He had a staging PET/CT which demonstrated hypermetabolic urethral mass measuring 1.7 x 2.1 cm and involving the membranous and penile urethra, with concern for local invasion into the right corpus cavernosa. There was a hypermetabolic node in the left groin, measuring 1.0 cm as well as two hypermetabolic pulmonary nodules, measuring 1.1 cm in YOANA and 0.9 cm in RUL, concerning for metastatic disease.     He completed 4 cycles of neoadjuvant chemotherapy Cisplatin/Gemcitabine between (9/15/17-11/24/17). He had an interval PET CT in 10/3/17 which showed positive response to treatment, although the PET CT obtained after completion of chemotherapy on 12/12/17 showed increased uptake of urethral mass. He then underwent radical penectomy and urethrectomy, perineal urethrostomy, and bilateral inguinal lymph node dissection on 3/8/2018. The surgical pathology ([id number]) demonstrated moderately differentiated SCC of the penile urethra with extension into the corpus spongiosum and cavernosum of the penile shaft. There was positive LVSI and PNI. The margins were negative. He had 1/5 left inguinal nodes and 1/7 right inguinal nodes positive for metastatic disease. The patient was referred to Dr. Yeung on 5/16/18 who recommended against adjuvant radiotherapy given the presence of metastatic disease.    He had a follow up PET CT on 6/26/18 which showed progression of his pulmonary metastases. As such, he was started on Keytruda till 2/28/19. He underwent a restaging CT CAP on 3/5/19 which unfortunately showed continued progression of his pulmonary metastases and lymphadenopathy. The patient declined Hospice and  preferred to try another line of chemotherapy. He was switched to single agent Docetaxel. He had an interval CT CAP on 5/15/19 which showed stable disease.     On 7/22/19, he presented to ED after he fell at home with a complaint of worsening left lower extremity weakness. He was evaluated by a brain MRI which showed a 5.3 x 3.6 x 4.5 cm right frontal lobe lesion. He underwent a right craniotomy and gross tumor resection by Dr. Allen on 7/25/19. The surgical pathology ([id number]) was consistent with metastatic squamous cell carcinoma. His postoperative MRI (7/26/2019) demonstrated a 3.7 x 2.5 x 2.8 cm rim-enhancing mass in the resection bed with a few enhancing central septations.     He had a follow-up CT CAP on 8/6/319 which demonstrated progression of his pulmonary metastasis including a 7.8 cm right parahilar mass obstructing segmentary bronchus of the right lower lobe. Unfortunately, he presented to emergency department on 8/16/2019 for the 48-hour progressive onset of left upper extremity weakness as well as confusion by his wife's report in the emergency room. An MRI brain was done and showed interval progression of the rim enhancement in the resection cavity.     On interview today, he was is doing well.  He reported moderate improvement of his left arm weakness and confusion.  He denied any headaches, nausea, vomiting, numbness, chest pain, hemoptysis, shortness of breath, fever, chills, urinary or bowel symptoms, weight or appetite changes.  His KPS is 80%.  He played golf 3-4 weeks ago without difficulty.     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      GERD (gastroesophageal reflux disease)      Hypertension      Penile cancer (H)       PONV (postoperative nausea and vomiting)        PSH:  Past Surgical History:   Procedure Laterality Date     CYSTOSCOPY, BIOPSY BLADDER, COMBINED N/A 8/31/2017    Procedure: COMBINED CYSTOSCOPY, BIOPSY BLADDER;  Cystoscopy, Suprapubic Tube Placement with Ultrasound Guidiance, Uretheral Dilation;  Surgeon: Muriel Haddad MD;  [facility]: [facility]     CYSTOSTOMY, INSERT TUBE SUPRAPUBIC, COMBINED N/A 8/31/2017    Procedure: COMBINED CYSTOSTOMY, INSERT TUBE SUPRAPUBIC;;  Surgeon: Muriel Haddad MD;  [facility]: [facility]     DISSECT LYMPH NODE INGUINAL N/A 3/8/2018    Procedure: DISSECT LYMPH NODE INGUINAL;  Flexible Cystoscopy, Bladder Biopsy, urethral biopsy and penile biopsy, Radical Penectomy and Urethrectomy, Perineal Urethrostomy, Bilateral Inguinal Lymphadenectomy; superficial lymph nodes and deep lymph nodes;  Surgeon: Muriel Haddad MD;  Location: [facility] OR     LARYNGOSCOPY  2013    Direct laryngoscopy with the use of rigid endoscopy and takedown of left hypopharyngeal diverticula.      OPTICAL TRACKING SYSTEM CRANIOTOMY, EXCISE TUMOR, COMBINED Right 7/25/2019    Procedure: Stealth Assisted Right Craniotomy And Tumor Resection;  Surgeon: Chandni Allen MD;  [facility]: [facility]     PENECTOMY N/A 3/8/2018    Procedure: PENECTOMY;  Flexible Cystoscopy, Bladder Biopsy, urethral biopsy and penile biopsy, Radical Penectomy and Urethrectomy, Perineal Urethrostomy, Bilateral Inguinal Lymphadenectomy; superficial lymph nodes and deep lymph nodes    ;  Surgeon: Muriel Haddad MD;  [facility]     URETHROSTOMY PERINEUM N/A 3/8/2018    Procedure: URETHROSTOMY PERINEUM;  Flexible Cystoscopy, Bladder Biopsy, urethral biopsy and penile biopsy, Radical Penectomy and Urethrectomy, Perineal Urethrostomy, Bilateral Inguinal Lymphadenectomy; superficial lymph nodes and deep lymph nodes;  Surgeon: Muriel Haddad MD;  [facility]     MEDICATIONS:  No current facility-administered medications for this  visit.      No current outpatient medications on file.     Facility-Administered Medications Ordered in Other Visits   Medication     amLODIPine (NORVASC) tablet 10 mg     benzocaine-menthol (CEPACOL) 15-3.6 MG lozenge 1 lozenge     calcium carbonate 500 mg (elemental) (OSCAL;OYSTER SHELL CALCIUM) tablet 500 mg     dexamethasone (DECADRON) tablet 4 mg     fluticasone (FLONASE) 50 MCG/ACT spray 2 spray     lidocaine (LMX4) cream     lidocaine 1 % 0.1-1 mL     pantoprazole (PROTONIX) EC tablet 40 mg     polyethylene glycol (MIRALAX/GLYCOLAX) Packet 17 g     potassium phosphate (monobasic) (K-PHOS) tablet 500 mg     ranitidine (ZANTAC) tablet 150 mg     sodium chloride (PF) 0.9% PF flush 3 mL     sodium chloride (PF) 0.9% PF flush 3 mL     vitamin D3 (CHOLECALCIFEROL) 1000 units (25 mcg) tablet 1,000 Units     ALLERGY:  Allergies   Allergen Reactions     Lisinopril Swelling     Oxycodone Nausea and Vomiting     Vicodin [Hydrocodone-Acetaminophen] Nausea and Vomiting     FAMILY HISTORY:  Family History   Problem Relation Age of Onset     Cerebrovascular Disease Mother      Hypertension Mother      Prostate Cancer Father 84     Prostate Cancer Brother 63     Diabetes Brother      Cancer Brother      Diabetes Brother      SOCIAL HISTORY:  Social History     Tobacco Use     Smoking status: Former Smoker     Packs/day: 1.00     Years: 20.00     Pack years: 20.00     Types: Cigarettes     Start date: 1972     Last attempt to quit: 1992     Years since quittin.6     Smokeless tobacco: Never Used     Tobacco comment: quit in    Substance Use Topics     Alcohol use: No     Comment:  quit per personal choice.     ROS: 10 point ROS reviewed as reported on the nursing assessment note.    PHYSICAL EXAMINATION:    Gen: Alert, in NAD  Eyes: EOMI, sclera anicteric, PERRL  HENT      Head: NC/AT     Ears: No external auricular lesions     Nose/sinus: No rhinorrhea or epistaxis     Oral Cavity/Oropharynx: MMM, no  thrush noted  Pulm: Breathing comfortably on room air  Neurologic/MSK: Alert and oriented x3, CN II-XII intact, motor 5/5 in right UE/LE, 3/5 in left UE/LE, sensation intact to light touch throughout. Finger-nose-finger and heel knee-shin intact.    Psych: Appropriate mood and affect    MRI impression 'Impression:  6/17/19   1. Postoperative changes of right frontal mass resection with interval  increase in size of a rim-enhancing mass in the resection fossa, most  consistent with recurrent metastatic tumor at the resection site.  2. As mentioned in the head CT performed the same day, there is mildly  increased mass effect, particularly on the frontal horn of the right  lateral ventricle, without hydrocephalus.    IMPRESSION: A 73 year old with metastatic urethral cancer, s/p neoadjuvant chemotherapy and definitive surgery, with pulmonary and cranial metastases. He had a solitary right frontal lesion s/p GTR by Dr. Allen on 7/25/19.  He was recently admitted for evaluation of worsening left upper extremity weakness. An MRI of the brain showed evidence of recurrent disease in the form of rim enhancement in the resection cavity.  His neurologic deficits are improving. He has no pulmonary symptoms. His KPS is 80%.    RECOMMENDATION: We discussed with Mr. Bruno the available treatment options including potential re-resection plus LDR brachytherapy (Gamma Tile) versus external beam radiotherapy via gamma knife with or without repeat resection.       Given the large size of the resected cavity (5 cm) and rapid regrowth of tumor Dr Del Rio is favoring re-resection and GammaTIle.    We feel comfortable to proceed with this option.     We will check with his insurance to get approval for Gamma-Tile. I      In regard to progression of his pulmonary disease, we discussed with Dr. Arceo about possibility of palliative radiotherapy to the growing parahilar mass obstructing the right bronchus because the inpatient team inquired as  to whether it was possible.       Given that he has no pulmonary symptoms, the consensus was to reserve palliative radiotherapy should he develops any symptoms or further progression in the future.  In the meantime the plan is to switch to another systemic treatment.    The patient was seen and discussed with staff, Dr. Barnett. Thank you for involving us in the care of this patient.  Please feel free to contact us with questions or concerns at any time.    Rahul Fang MD  PGY-3 Resident, Radiation Oncology  [facility]    I was personally present with the resident during the history and exam.  I   discussed the case with the resident and agree with the findings and plan   of care as documented in the resident's note.    Mulu Barnett   [phone]     CC  Patient Care Team:  Joan Ventura NP as PCP - General (Nurse Practitioner)  Ry Dunn as Referring Physician (Surgery)  Muriel Haddad MD as MD (Urology)  Lizzy Chowdary, RN as Registered Nurse (Urology)  Steve Arceo MD as MD (Oncology)  Rimma Lanza, RN as Nurse Coordinator (Oncology)      Again, thank you for allowing me to participate in the care of your patient.      Sincerely,    Mulu Barnett MD

## 2019-08-19 NOTE — PLAN OF CARE
OT 6A. Cancel. OT orders acknowledged and appreciated. Per chart review and conversation with RN, pt with tentative surgery scheduled for Friday 8/23. Pt at radiology this AM and with family this PM. Will reschedule OT evaluation for tomorrow.

## 2019-08-20 NOTE — ANESTHESIA PREPROCEDURE EVALUATION
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      GERD (gastroesophageal reflux disease)      Hypertension      Penile cancer (H)      PONV (postoperative nausea and vomiting)       Past Surgical History:   Procedure Laterality Date     CYSTOSCOPY, BIOPSY BLADDER, COMBINED N/A 2017    Procedure: COMBINED CYSTOSCOPY, BIOPSY BLADDER;  Cystoscopy, Suprapubic Tube Placement with Ultrasound Guidiance, Uretheral Dilation;  Surgeon: Muriel Haddad MD;  [facility]     CYSTOSTOMY, INSERT TUBE SUPRAPUBIC, COMBINED N/A 2017    Procedure: COMBINED CYSTOSTOMY, INSERT TUBE SUPRAPUBIC;;  Surgeon: Muriel Haddad MD;  [facility]     DISSECT LYMPH NODE INGUINAL N/A 3/8/2018    Procedure: DISSECT LYMPH NODE INGUINAL;  Flexible Cystoscopy, Bladder Biopsy, urethral biopsy and penile biopsy, Radical Penectomy and Urethrectomy, Perineal Urethrostomy, Bilateral Inguinal Lymphadenectomy; superficial lymph nodes and deep lymph nodes;  Surgeon: Muriel Haddad MD;  [facility]     LARYNGOSCOPY      Direct laryngoscopy with the use of rigid endoscopy and takedown of left hypopharyngeal diverticula.      OPTICAL TRACKING SYSTEM CRANIOTOMY, EXCISE TUMOR, COMBINED Right 2019    Procedure: Stealth Assisted Right Craniotomy And Tumor Resection;  Surgeon: Chandni Allen MD;  [facility]     PENECTOMY N/A 3/8/2018    Procedure: PENECTOMY;  Flexible Cystoscopy, Bladder Biopsy, urethral biopsy and penile biopsy, Radical Penectomy and Urethrectomy, Perineal  Urethrostomy, Bilateral Inguinal Lymphadenectomy; superficial lymph nodes and deep lymph nodes    ;  Surgeon: Muriel Haddad MD;  [facility]     URETHROSTOMY PERINEUM N/A 3/8/2018    Procedure: URETHROSTOMY PERINEUM;  Flexible Cystoscopy, Bladder Biopsy, urethral biopsy and penile biopsy, Radical Penectomy and Urethrectomy, Perineal Urethrostomy, Bilateral Inguinal Lymphadenectomy; superficial lymph nodes and deep lymph nodes;  Surgeon: Muriel Haddad MD;  [facility]: [facility] OR          Anesthesia Evaluation     . Pt has had prior anesthetic.     History of anesthetic complications   - PONV        ROS/MED HX    ENT/Pulmonary: Comment: Bilateral lung metastasis -Progression of lung metastasis. Some examples are as follows;     - 3.1 cm left lower lobe lateral basal segment nodule, previously 1.8  cm  - 3.5 cm left lower lobe nodule adjacent to fissure, previously 2.3  cm.  - 7.8 cm right parahilar metastatic disease, previously 6 cm this  completely obstructs segmentary bronchus of the right lower lobe  superior segment with increased atelectatic disease since 5/15/2019  - 6 cm left paracardiac metastatic nodule, previous to 4 cm.      Neurologic: Comment: Left sided weakness and left facial drooping  1. Postoperative changes of right frontal mass resection with interval  increase in size of a rim-enhancing mass in the resection fossa, most  consistent with recurrent metastatic tumor at the resection site.  2. As mentioned in the head CT performed the same day, there is mildly  increased mass effect, particularly on the frontal horn of the right  lateral ventricle, without hydrocephalus.      Cardiovascular:     (+) hypertension----. : . . . :. . Previous cardiac testing date:results:date: results:ECG reviewed date: results:SR date: results:          METS/Exercise Tolerance:     Hematologic:     (+) Anemia, -      Musculoskeletal:   (+) arthritis,  -       GI/Hepatic:     (+) GERD        Renal/Genitourinary: Comment: Suprapubic catheter in place        Endo: Comment: On dexamethasone for tumor edema - neg endo ROS       Psychiatric:  - neg psychiatric ROS       Infectious Disease:  - neg infectious disease ROS       Malignancy:   (+) Malignancy History of Lung, Other and Neuro  Other CA penile CA w lung, brain mets status post         Other:    (+) No chance of pregnancy C-spine cleared: Yes,                        PHYSICAL EXAM:   Mental Status/Neuro: A/A/O   Airway: Facies: Feasible  Mallampati: I  Mouth/Opening: Full  TM distance: > 6 cm  Neck ROM: Full   Respiratory: Auscultation: CTAB     Resp. Rate: Normal     Resp. Effort: Normal      CV: Rhythm: Regular  Rate: Age appropriate  Heart: Normal Sounds  Edema: None   Comments: Noted left sided weakness                      LABS:  CBC:   Lab Results   Component Value Date    WBC 9.9 08/20/2019    WBC 9.0 08/19/2019    HGB 9.6 (L) 08/20/2019    HGB 10.2 (L) 08/19/2019    HCT 32.5 (L) 08/20/2019    HCT 33.8 (L) 08/19/2019     (H) 08/20/2019     (H) 08/19/2019     BMP:   Lab Results   Component Value Date     08/20/2019     08/19/2019    POTASSIUM 4.2 08/20/2019    POTASSIUM 4.1 08/19/2019    CHLORIDE 97 08/20/2019    CHLORIDE 97 08/19/2019    CO2 26 08/20/2019    CO2 26 08/19/2019    BUN 22 08/20/2019    BUN 20 08/19/2019    CR 1.01 08/20/2019    CR 1.07 08/19/2019     (H) 08/20/2019     (H) 08/19/2019     COAGS:   Lab Results   Component Value Date    PTT 31 08/16/2019    INR 1.05 08/16/2019     POC:   Lab Results   Component Value Date     (H) 08/18/2019     OTHER:   Lab Results   Component Value Date    PH 7.34 (L) 07/25/2019    LACT 0.9 08/16/2019    SAADIA 10.1 08/20/2019    PHOS 3.2 08/19/2019    MAG 1.8 08/06/2019    ALBUMIN 3.3 (L) 08/16/2019    PROTTOTAL 7.9 08/16/2019    ALT 16 08/16/2019    AST 35 08/16/2019    ALKPHOS 81 08/16/2019    BILITOTAL 0.4 08/16/2019    LIPASE 254 10/19/2017    TSH  "0.85 02/28/2019        Preop Vitals    BP Readings from Last 3 Encounters:   08/20/19 (!) 144/81   08/11/19 103/64   08/07/19 (!) 149/69    Pulse Readings from Last 3 Encounters:   08/20/19 57   08/11/19 82   08/07/19 86      Resp Readings from Last 3 Encounters:   08/20/19 16   08/11/19 18   08/07/19 16    SpO2 Readings from Last 3 Encounters:   08/20/19 99%   08/11/19 99%   08/07/19 100%      Temp Readings from Last 1 Encounters:   08/20/19 36.1  C (97  F) (Oral)    Ht Readings from Last 1 Encounters:   08/17/19 1.93 m (6' 4\")      Wt Readings from Last 1 Encounters:   08/16/19 63.5 kg (140 lb)    Estimated body mass index is 17.04 kg/m  as calculated from the following:    Height as of this encounter: 1.93 m (6' 4\").    Weight as of this encounter: 63.5 kg (140 lb).     LDA:        Assessment:   ASA SCORE: 4      Smoking Status:  Non-Smoker/Unknown        Plan:   Anes. Type:  General   Pre-Medication: None   Induction:  IV (Standard)   Airway: ETT; Oral   Access/Monitoring: PIV; 2nd PIV; A-Line; FloTrac   Maintenance: Balanced     Postop Plan:   Postop Pain: Opioids  Postop Sedation/Airway: Not planned     PONV Management:   Adult Risk Factors:, H/o PONV or Motion Sickness, Non-Smoker, Postop Opioids   Prevention: Ondansetron, Dexamethasone         Anesthesia Plan will be finalized by the Anesthesia team on the day.Patient was discussed with anesthesia oficer of the day .            Emile Durand MD  "

## 2019-08-20 NOTE — PROGRESS NOTES
[id number]:   Neuro: A&Ox4. Calls appropriately. L uppers and lowers 4/5. R side 5/5. No L shoulder shrug.    Activity: Up SBA to bathroom.   Vitals: VSS on RA.      LDAS: L PIV SL.   Cardiac: No acute changes.   Respiratory: Lung sounds clear.      GI/: Up to bathroom voiding w/o difficulty. No BM, positive bowel sounds.   Skin: Intact- No new deficits.    Pain: Denies.   Diet: Regular.    Plan: Gamma Tile on Thursday. Will continue to monitor and follow POC.

## 2019-08-20 NOTE — PROGRESS NOTES
[facility]  Patient is currently open to home care services with [facility].  The patient is currently receiving Palliative RN/PT/OT/ST/SW services.  Critical access hospital  and team have been notified of patient admission.  Critical access hospital liaison will continue to follow patient during stay.  If appropriate provide orders to resume home care at time of discharge.    Thank you  Elle Peter RN, BSN  [facility] Liaison  [facility]  [phone]

## 2019-08-20 NOTE — PLAN OF CARE
6A-PT: Hold: Per chart review, discussion with pt and RN - therapies to hold. Pt planning on heading back to OR 8/22. Up SBA with staff. Will initiate as appropriate post operatively.

## 2019-08-20 NOTE — PLAN OF CARE
OT/6A: Per chart review, discussion with patient and RN - therapies to hold. Pt planning on OR procedure 8/22. Able to ambulate with nursing staff SBA. Will initiate therapy evaluation as appropriate post-op.  Patient in agreement with plan.

## 2019-08-20 NOTE — PROGRESS NOTES
"Neurosurgery Progress Note      S: Upset this morning regarding the fact that bed alarm is in place and he is not allowed to rise from bed by himself.  Denies headache.     O:  BP (!) 144/81   Pulse 57   Temp 97  F (36.1  C) (Oral)   Resp 16   Ht 1.93 m (6' 4\")   Wt 63.5 kg (140 lb)   SpO2 99%   BMI 17.04 kg/m    Exam:  General: Awake;  Alert, In No Acute Distress  Pulm: Breathing Comfortably on room air  Mental status: Oriented x 3  Cranial Nerves: very mild left facial droop. No dysarthria. Extraocular muscles intact.   Strength: 5/5 in right upper and lower. 4+/5 in the left upper and lower.   Pronator Drift: Absent  Sensory: Intact to Light Touch  INCISION: Well-healed    Assessment:   Recurrent brain metastasis. Awaiting surgery anticipated for 8/22/19 with Dr Alfred Del Rio.      Plan:     Neuro:  Cerebral Edema: Decadron 4 mg q8h  Pending gamma tile surgery, anticipate surgery on 8/22  Incentive spirometry   regular diet  Sliding scale insulin while on decadron   Monitor for fever  PT/OT: pending  DVT prophylaxis: Sequential compression devices  Ulcer prophylaxis: protonix   DISPO: 6A  Barriers: surgical management of lesion     ISAURA Chapman, CNP  Department of Neurosurgery  Pager: [phone]    Neurosurgery faculty note.    Patient seen and examined. Please see Ms. Porras's note for details. Exam stable. Pending OR. Will need anesthesia assessment given pulmonary mass.  I spent 15 minutes personally evaluating clinical and imaging findings & managing the care of this patient.     "

## 2019-08-20 NOTE — PLAN OF CARE
Status: Admitted with Left sided weakness  s/p right craniotomy for tumor resection on 7-25.  Vitals: VSS on RA  Neuros: A&Ox4. L sided neglect, no L shoulder shrug.  L side 4/5, L side 5/5.  IV: PIV SL  Resp/trach: LS clear  Diet: Regular diet  Bowel status: No BM this shift  : Voiding spontaneously to Br. Needs to get to BR quickly d/t urgency, pull up in place.  Skin: Intact w/ scabs throughout.  Pain: Denies  Activity: A1 + GB  Social: SO (Stephanie) visited this shift  Plan: Continue to monitor and follow POC, Gamma Tile on Thursday.

## 2019-08-20 NOTE — CONSULTS
Patient is on IR schedule 8/21/2019 for a Port placement for vascular access and future chemo.   Labs ordered for procedure.   Orders for NPO, scrubs and antibiotics have been entered.  Consent will be done prior to procedure.   Please contact the IR charge RN at [phone] for estimated time of procedure.   metastatic urethral cancer, recurrent right frontal lesion  Discussed with Dr. Sweetie Multani IR RPA  [phone] Call pager  [phone] pager

## 2019-08-21 NOTE — PROGRESS NOTES
S: No acute events. Ready for surgery Thursday.    O:  Exam:  General: Awake;  Alert, In No Acute Distress  Pulm: Breathing Comfortably on room air  Mental status: Oriented x 3  Cranial Nerves: very mild left facial droop. No dysarthria. Extraocular muscles intact.   Strength: 5/5 in right upper and lower. 4+/5 in the left upper and lower.   Pronator Drift: Absent  Sensory: Intact to Light Touch  INCISION: Well-healed    Assessment:   Recurrent brain metastasis. Awaiting surgery anticipated for 8/22/19 with Dr Alfred Del Rio.      Plan:     Neuro:  Cerebral Edema: Decadron 4 mg q8h  Pending gamma tile surgery, anticipate surgery on 8/22  Stealth MRI with fiducials 8/21  Port by IR for chemotherapy  Anesthesia pre-op eval complete   Incentive spirometry   regular diet; NPO at midnight 8/22  Sliding scale insulin while on decadron   Monitor for fever  PT/OT: pending  DVT prophylaxis: Sequential compression devices  Ulcer prophylaxis: protonix   DISPO: 6A  Barriers: surgical management of lesion     Jaskaran Jean Baptiste MD  Neurosurgery Resident PGY2    Please contact neurosurgery resident on call with questions.    Dial * * [phone], enter [phone] when prompted.     Neurosurgery faculty note.    Patient seen and examined. Please see Dr. Jean Baptiste's note for details. Patient cleared to proceed with surgery.  I spent 15 minutes personally evaluating clinical and imaging findings & managing the care of this patient.

## 2019-08-21 NOTE — PLAN OF CARE
Status: Patient admitted for increased left sided weakness. Hx of crani for tumor resection on 7/25.  Vitals: VSS.  Neuros: A&Ox4. Neuros include left sided neglect and left sided weakness, strength on left = 4/5.  IV: Port HL.  Resp/trach: Oral suctioning mouth independently.  Diet: Regular diet. NPO at midnight for surgery tomorrow.  Bowel status: No BM this shift.  : Voiding spontaneously.  Skin: Intact.  Pain: Denies.  Activity: Up SBA, slightly unsteady d/t left sided weakness.   Social: Several visitors this shift.  Plan: Plan for redo tumor resection tomorrow. Fiducials placed in MRI. Will continue to monitor.

## 2019-08-21 NOTE — PLAN OF CARE
Status:  Admitted for L sided weakness; s/p crani/tumor resection 7/25   Vitals: VSS   Neuros: A/Ox4, L side 4/5, L neglect   IV: No IV, scheduled for port placement today  Resp/trach: LS clear, RA  Diet: NPO since 0000  Bowel status:  No BM this shift   : Frequency/urgency. Unable to use urinal d/t penile reconstruction   Skin: Intact, old crani incision healed  Pain: Denies overnight   Activity: SBA in room, Ax1 GB for longer distances   Plan: Port placement scheduled for today in IR; Gamma tile placement scheduled for Thursday 8/22. Continue to monitor and assess.

## 2019-08-21 NOTE — PROCEDURES
[facility], [place]    Procedure: IR Procedure Note  Date/Time: 8/21/2019 11:38 AM  Performed by: Dianna Thompson PA-C  Authorized by: Dianna Thompson PA-C     UNIVERSAL PROTOCOL   Site Marked: Yes  Prior Images Obtained and Reviewed:  Yes  Required items: Required blood products, implants, devices and special equipment available    Patient identity confirmed:  Verbally with patient  Patient was reevaluated immediately before administering moderate or deep sedation or anesthesia  Confirmation Checklist:  Patient's identity using two indicators, relevant allergies, procedure was appropriate and matched the consent or emergent situation and correct equipment/implants were available  Time out: Immediately prior to the procedure a time out was called    Preparation: Patient was prepped and draped in usual sterile fashion       ANESTHESIA    Anesthesia: Local infiltration  Local Anesthetic:  Lidocaine 1% with epinephrine      SEDATION    Patient Sedated: Yes    Sedation:  Fentanyl and midazolam  Vital signs: Vital signs monitored during sedation    See dictated procedure note for full details.  Findings: Tolerated well    Specimens: none    Complications: None    Condition: Stable    Plan: 2 mg versed and 50 mcg fentanyl over 35 minutes    PROCEDURE   Patient Tolerance:  Patient tolerated the procedure well with no immediate complications  Describe Procedure: Completed image-guided placement of 6 [place] 27.5 cm single lumen power-injectable central venous chest port via right internal jugular vein. Aspirates and flushes freely, heparin locked and is ready for immediate use.    Time of Sedation in Minutes by Physician:  30

## 2019-08-21 NOTE — PRE-PROCEDURE
GENERAL PRE-PROCEDURE:   Date/Time:  8/21/2019 10:18 AM    Verbal consent obtained?: Yes    Written consent obtained?: Yes    Risks and benefits: Risks, benefits and alternatives were discussed    Consent given by:  Patient  Patient states understanding of procedure being performed: Yes    Patient's understanding of procedure matches consent: Yes    Procedure consent matches procedure scheduled: Yes    Appropriately NPO:  Yes  ASA Class:  Class 2- mild systemic disease, no acute problems, no functional limitations  Mallampati  :  Grade 2- soft palate, base of uvula, tonsillar pillars, and portion of posterior pharyngeal wall visible  Lungs:  Lungs clear with good breath sounds bilaterally  Heart:  Normal heart sounds and rate  History & Physical reviewed:  History and physical reviewed and no updates needed  Statement of review:  I have reviewed the lab findings, diagnostic data, medications, and the plan for sedation

## 2019-08-21 NOTE — PLAN OF CARE
Status: Admitted with Left sided weakness  s/p right craniotomy for tumor resection on 7-25.  Vitals: VSS on RA  Neuros: A&Ox4. L sided neglect.  L side 4/5, R side 5/5.  IV: No IV, getting port tomorrow.  Resp/trach: LS clear  Diet: Regular diet- NPO at midnight  Bowel status: No BM this shift, BM earlier today.  : Voiding spontaneously to Br. Needs to get to BR quickly d/t urgency, pull up in place.  Skin: Intact   Pain: Denies  Activity: SBA in room, A1+GB for longer walks  Social: SO (Stephanie) visited this shift  Plan: Continue to monitor and follow POC, Port tomorrow in IR,Gamma Tile on Thursday.

## 2019-08-21 NOTE — PROGRESS NOTES
Patient Name: King Gonsalo Bruno  Medical Record Number: [id number]  Today's Date: 8/21/2019    Procedure: chest port placement   Proceduralist: Dianna Thompson PA-C     Sedation start time: 1040  Sedation end time: 1100  Sedation medications administered: 2mg versed 50mcg fentanyl     Procedure start time: 1050  Procedure end time: 1125    Report given to: CAMMY Badillo RN     Pt arrived to IR room 2 from  via stretcher on RA. No PIV present vascular access called. Consent obtained/ reviewed. Pt denies any questions or concerns regarding procedure. Pt requesting family be notified post procedure called BRENDA Badillo on 6A  Family member Stephanie arrived to R notified provider.Pt positioned supine and monitored per protocol. Pt tolerated procedure without any noted complications. Pt transferred back to .

## 2019-08-22 NOTE — ANESTHESIA PROCEDURE NOTES
Arterial Line Procedure Note  Staff:     Anesthesiologist:  Leandro Torres MD  Location: In OR After Induction  Procedure Start/Stop Times:     patient identified, IV checked, site marked, risks and benefits discussed, informed consent, monitors and equipment checked, pre-op evaluation and at physician/surgeon's request      Correct Patient: Yes      Correct Position: Yes      Correct Site: Yes      Correct Procedure: Yes      Correct Laterality:  Yes    Site Marked:  Yes  Line Placement:     Procedure:  Arterial Line    Insertion Site:  Radial    Insertion laterality:  Left    Skin Prep: Chloraprep      Patient Prep: patient draped, mask, sterile gloves, hat and hand hygiene      Local skin infiltration:  None    Ultrasound Guided?: No      Catheter size:  20 gauge, Quick cath    Cath secured with: anchor securement device      Dressing:  Tegaderm    Complications:  None obvious    Arterial waveform: Yes      IBP within 10% of NIBP: Yes

## 2019-08-22 NOTE — ANESTHESIA CARE TRANSFER NOTE
Patient: King Gonsalo Bruno    Procedure(s):  Stealth Assisted Right Craniotomy For Tumor Resection With Gamma Tile Placement    Diagnosis: Brain Metastasis  Diagnosis Additional Information: No value filed.    Anesthesia Type:   General     Note:  Airway :Face Mask  Patient transferred to:PACU  Handoff Report: Identifed the Patient, Identified the Reponsible Provider, Reviewed the pertinent medical history, Discussed the surgical course, Reviewed Intra-OP anesthesia mangement and issues during anesthesia, Set expectations for post-procedure period and Allowed opportunity for questions and acknowledgement of understanding      Vitals: (Last set prior to Anesthesia Care Transfer)    CRNA VITALS  8/22/2019 1758 - 8/22/2019 1836      8/22/2019             ART BP:  140/60    Ht Rate:  70    SpO2:  100 %                Electronically Signed By: ISAURA Chamberlain CRNA  August 22, 2019  6:36 PM

## 2019-08-22 NOTE — PLAN OF CARE
Status: Patient admitted for increased left sided weakness. Hx of crani for tumor resection on 7/25.  Vitals: VSS.  Neuros: A&Ox4. L side weakness and neglect. strength on left = 4/5.  IV: Port HL.  Resp/trach: Oral suctioning mouth independently.  Diet: NPO at midnight for surgery tomorrow. Pt compliant  Bowel status: No BM this shift.  : Voiding spontaneously to bathroom with urgency. Unable to use urinal d/t surgical history.   Skin: Intact.  Pain: Pain around port site managed with lidocaine and PRN tylenol x1.   Activity: Up SBA, slightly unsteady d/t left sided weakness.   Social: No visitors overnight. Pt pleasant with cares.   Plan: Plan for redo tumor resection this afternoon. Fiducials in place. Will continue to monitor.

## 2019-08-22 NOTE — BRIEF OP NOTE
"   [facility], [place]    Brief Operative Note    Pre-operative diagnosis: Brain Metastasis  Post-operative diagnosis Brain Metastasis  Procedure: Procedure(s):  Stealth Assisted Right Craniotomy For Tumor Resection With Gamma Tile Placement  Surgeon: Surgeon(s) and Role:     * Alfred Del Rio MD - Primary     * Mulu Barnett MD - Assisting     * Raul August MD - Resident - Assisting  Anesthesia: General   Estimated blood loss: 40 ml  Drains: None  Specimens:   ID Type Source Tests Collected by Time Destination   A : Right Brain Tumor Tissue Brain SURGICAL PATHOLOGY EXAM Alfred Del Rio MD 8/22/2019  5:24 PM      Findings:   Yellow appearing tumor lesion, grossly resected. Gamma tiles placed. .  Complications: None.  Implants:    Implant Name Type Inv. Item Serial No.  Lot No. LRB No. Used   GammaTile     [id number] Right 6   IMP SCR SYN MATRIX LOW PRO 1.5X04MM SELF DRILL [id number] [employer]/[place] IMP SCR SYN MATRIX LOW PRO 1.5X04MM SELF DRILL [id number]  [employer] N/A Right 12   IMP SCR SYN MATRIX LOW PRO 1.5X04MM SELF DRILL [phone] Met[employer] IMP SCR SYN MATRIX LOW PRO 1.5X04MM SELF DRILL [phone]  [employer] N/A Right 1   IMP PLATE SYN BOX LOW PROFILE 04H .511 Metallic [employer]ware[employer] IMP PLATE SYN BOX LOW PROFILE 04H .511  [employer] N/A Right 2   IMP BUR HOLE COVER 17MM LOW PROFILE .527 Metallic Hardware[employer] IMP BUR HOLE COVER 17MM LOW PROFILE .527  [employer] N/A Right 1   GRAFT DURAGEN 2X2&quot; [id number] Bone/Tissue/Biologic GRAFT DURAGEN 2X2&quot; [id number]  [employer] [id number] Right 1      Raul \"Angel\" MD [place]   Neurosurgery, PGY-3    Please contact neurosurgery resident on call with questions.    Dial * * [phone], enter [phone] when prompted.       "

## 2019-08-22 NOTE — PROGRESS NOTES
"Urology Progress Note:    The patient is s/p radical penectomy with perineal urethrostomy with Dr. Haddad.  He has since developed metastatic penile cancer and is being taken for a neurosurgery procedure today for same.  Leal placement has been requested by the primary service.      PROCEDURE NOTE:  - Prepped with betadyne and draped  - Using sterile technique a 16Fr latex Leal was placed without difficulty.  There was immediate return of clear yellow urine.  Placed 10cc in the Leal balloon which was then gently seated at the bladder neck.    - Secured to right thigh tension free  - The patient tolerated without difficulty     A/P: metastatic penile cancer   - Leal placement is not complicated but be aware that the \"meatus\" is in the perineum  - Leal cares per primary service.          SARITHA Montelongo U[facility]  [phone]  "

## 2019-08-22 NOTE — ANESTHESIA POSTPROCEDURE EVALUATION
Anesthesia POST Procedure Evaluation    Patient: King Gonsalo Bruno   MRN:     [id number] Gender:   male   Age:    73 year old :      1945        Preoperative Diagnosis: Brain Metastasis   Procedure(s):  Stealth Assisted Right Craniotomy For Tumor Resection With Gamma Tile Placement   Postop Comments: No value filed.       Anesthesia Type:  Not documented  General    Reportable Event: NO     PAIN: Uncomplicated   Sign Out status: Comfortable, Well controlled pain     PONV: No PONV   Sign Out status:  No Nausea or Vomiting     Neuro/Psych: Uneventful perioperative course   Sign Out Status: Preoperative baseline; Age appropriate mentation     Airway/Resp.: Uneventful perioperative course   Sign Out Status: Non labored breathing, age appropriate RR; Resp. Status within EXPECTED Parameters     CV: Uneventful perioperative course   Sign Out status: Appropriate BP and perfusion indices; Appropriate HR/Rhythm     Disposition:   Sign Out in:  PACU  Disposition:  ICU  Recovery Course: Recovery in ICU  Follow-Up: Not required           Last Anesthesia Record Vitals:  CRNA VITALS  2019 1758 - 2019 1856      2019             ART BP:  130/55    Ht Rate:  67    SpO2:  100 %          Last PACU Vitals:  Vitals Value Taken Time   /81 2019  6:45 PM   Temp     Pulse 58 2019  6:45 PM   Resp     SpO2 100 % 2019  6:56 PM   Temp src     NIBP     Pulse     SpO2     Resp     Temp     Ht Rate     Temp 2     Vitals shown include unvalidated device data.      Electronically Signed By: Leandro Torres MD, 2019, 6:56 PM

## 2019-08-22 NOTE — PROGRESS NOTES
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 Resection of recurrent tumor   2. Placement of LDR brachytherapy seeds (Gamma Tile)     ANESTHESIA: General endotracheal.      SURGEONS: Mulu Barnett MD Radiation Oncology                         Alfred Del Rio M.D., Ph.D Neurosurgery        ASSISTANTS: Rahul Fang MD Radiation oncology resident      NUMBER OF GAMMA TILES: 6                              OPERATIVE PROCEDURE IN DETAIL: We joined the neurosurgery team after resection of the residual tumor.  A total number of 6  Gamma tiles have been placed into the resection cavity. Please refer to Dr. Del Rio's operative note for more surgical details.     Rahul Fang MD  PGY-3 Resident, Radiation Oncology  [facility]    I was personally present with the resident during the surgery as documented  Mulu Barnett   [phone]

## 2019-08-22 NOTE — PROGRESS NOTES
"Neurosurgery Daily Progress Note    S: No Acute Events Overnight. Ready for Surgery today.     O:    BP (!) 153/76 (BP Location: Right arm)   Pulse 65   Temp 97.5  F (36.4  C) (Oral)   Resp 18   Ht 1.93 m (6' 4\")   Wt 63.5 kg (140 lb)   SpO2 100%   BMI 17.04 kg/m      Current Facility-Administered Medications   Medication     acetaminophen (TYLENOL) tablet 650 mg     amLODIPine (NORVASC) tablet 10 mg     benzocaine-menthol (CEPACOL) 15-3.6 MG lozenge 1 lozenge     calcium carbonate 500 mg (elemental) (OSCAL;OYSTER SHELL CALCIUM) tablet 500 mg     ceFAZolin (ANCEF) intermittent infusion 2 g in 100mL NS (pre-mix)     dexamethasone (DECADRON) tablet 4 mg     glucose gel 15-30 g    Or     dextrose 50 % injection 25-50 mL    Or     glucagon injection 1 mg     fluticasone (FLONASE) 50 MCG/ACT spray 2 spray     heparin 100 UNIT/ML injection 5 mL     heparin lock flush 10 UNIT/ML injection 5 mL     lidocaine (LMX4) cream     lidocaine (LMX4) cream     lidocaine 1 % 1 mL     medication instruction     pantoprazole (PROTONIX) EC tablet 40 mg     polyethylene glycol (MIRALAX/GLYCOLAX) Packet 17 g     potassium phosphate (monobasic) (K-PHOS) tablet 500 mg     ranitidine (ZANTAC) tablet 150 mg     sodium chloride (PF) 0.9% PF flush 10-20 mL     sodium chloride (PF) 0.9% PF flush 3 mL     sodium chloride (PF) 0.9% PF flush 3 mL     sodium chloride (PF) 0.9% PF flush 3 mL     sodium chloride (PF) 0.9% PF flush 3 mL     vitamin D3 (CHOLECALCIFEROL) 1000 units (25 mcg) tablet 1,000 Units     Lab Results   Component Value Date    WBC 9.7 08/22/2019     Lab Results   Component Value Date    RBC 3.72 08/22/2019     Lab Results   Component Value Date    HGB 9.1 08/22/2019     Lab Results   Component Value Date    HCT 31.0 08/22/2019     No components found for: MCT  Lab Results   Component Value Date    MCV 83 08/22/2019     Lab Results   Component Value Date    MCH 24.5 08/22/2019     Lab Results   Component Value Date    MCHC " 29.4 08/22/2019     Lab Results   Component Value Date    RDW 22.6 08/22/2019     Lab Results   Component Value Date     08/22/2019     Last Comprehensive Metabolic Panel:  Sodium   Date Value Ref Range Status   08/22/2019 133 133 - 144 mmol/L Final     Potassium   Date Value Ref Range Status   08/22/2019 4.3 3.4 - 5.3 mmol/L Final     Chloride   Date Value Ref Range Status   08/22/2019 98 94 - 109 mmol/L Final     Carbon Dioxide   Date Value Ref Range Status   08/22/2019 28 20 - 32 mmol/L Final     Anion Gap   Date Value Ref Range Status   08/22/2019 7 3 - 14 mmol/L Final     Glucose   Date Value Ref Range Status   08/22/2019 108 (H) 70 - 99 mg/dL Final     Urea Nitrogen   Date Value Ref Range Status   08/22/2019 28 7 - 30 mg/dL Final     Creatinine   Date Value Ref Range Status   08/22/2019 1.26 (H) 0.66 - 1.25 mg/dL Final     GFR Estimate   Date Value Ref Range Status   08/22/2019 57 (L) >60 mL/min/[1.73_m2] Final     Comment:     Non  GFR Calc  Starting 12/18/2018, serum creatinine based estimated GFR (eGFR) will be   calculated using the Chronic Kidney Disease Epidemiology Collaboration   (CKD-EPI) equation.       Calcium   Date Value Ref Range Status   08/22/2019 9.7 8.5 - 10.1 mg/dL Final     INR   Date Value Ref Range Status   08/22/2019 1.00 0.86 - 1.14 Final        Exam:  General: Awake;  Alert, In No Acute Distress  Pulm: Breathing Comfortably on room air  Mental status: Oriented x 3  Cranial Nerves: very mild left facial droop. No dysarthria. Extraocular muscles intact.   Strength: 5/5 in right upper and lower. 4+/5 in the left upper and lower.   Pronator Drift: Absent  Sensory: Intact to Light Touch  INCISION: Well-healed    Assessment: Mr. 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     Plan:   Scheduled for Surgical Resection of Metastatic Lesions with Gamma Tile Placement today with Dr. Del Rio   Cerebral Edema: Decadron  4 mg q8h  S/P Port Placement for Chemotherapy   Incentive spirometry   NPO for Operative Treatment Today   Sliding scale insulin while on decadron   Monitor for fever  PT/OT: Post-Operative Evaluations Pending   DVT prophylaxis: Sequential compression devices  Ulcer prophylaxis: Protonix 40 mg QD while on Dexamethasone    DISPO: 4A  Barriers: Surgical Intervention; Post-Operative Recovery     KIRSTY Webb-BC,CNRN  Department of Neurosurgery  Pager: [phone]

## 2019-08-22 NOTE — OR NURSING
Patient and s.o. Updated. No questions at this time.      Dr. Vizcaino paged about pt needing urology to place palomino prior to surgery.   MD at bedside, palomino inserted.

## 2019-08-23 NOTE — PROGRESS NOTES
"Neurosurgery Daily Progress Note      S: POD #1 for Resection of Recurrent Metastatic Lesions to the Brain; Post-Op CT Head Stable. No Acute Events Overnight.     O:  /74 (BP Location: Right arm, Cuff Size: Adult Regular)   Pulse 72   Temp 97.1  F (36.2  C) (Axillary)   Resp 16   Ht 1.93 m (6' 3.98\")   Wt 60.3 kg (132 lb 15 oz)   SpO2 100%   BMI 16.19 kg/m      Current Facility-Administered Medications   Medication     [START ON 8/25/2019] acetaminophen (TYLENOL) tablet 650 mg     acetaminophen (TYLENOL) tablet 975 mg     ascorbic acid (vitamin C) chewable tablet 500 mg     calcium carbonate 500 mg (elemental) (OSCAL;OYSTER SHELL CALCIUM) tablet 500 mg     dexamethasone (DECADRON) tablet 4 mg    Followed by     [START ON 8/30/2019] dexamethasone (DECADRON) tablet 3 mg    Followed by     [START ON 9/6/2019] dexamethasone (DECADRON) tablet 2 mg    Followed by     [START ON 9/13/2019] dexamethasone (DECADRON) tablet 1 mg     glycerin (ADULT) Suppository 1 suppository     hydrALAZINE (APRESOLINE) injection 10-20 mg     labetalol (NORMODYNE/TRANDATE) syringe 10-40 mg     levETIRAcetam (KEPPRA) tablet 1,000 mg     lidocaine (LMX4) cream     lidocaine 1 % 0.1-1 mL     magnesium sulfate 2 g in NS intermittent infusion ([facility] or [facility] Cmpd)     magnesium sulfate 4 g in 100 mL sterile water (premade)     naloxone (NARCAN) injection 0.1-0.4 mg     ondansetron (ZOFRAN-ODT) ODT tab 4-8 mg    Or     ondansetron (ZOFRAN) injection 4-8 mg     oxyCODONE (ROXICODONE) tablet 5 mg     pantoprazole (PROTONIX) EC tablet 40 mg     polyethylene glycol (MIRALAX/GLYCOLAX) Packet 17 g     potassium chloride (KLOR-CON) Packet 20-40 mEq     potassium chloride 10 mEq in 100 mL intermittent infusion with 10 mg lidocaine     potassium chloride 10 mEq in 100 mL sterile water intermittent infusion (premix)     potassium chloride 20 mEq in 50 mL intermittent infusion     potassium chloride ER (K-DUR/KLOR-CON M) CR tablet 20-40 mEq     " potassium phosphate 10 mmol in D5W 250 mL intermittent infusion     potassium phosphate 15 mmol in D5W 250 mL intermittent infusion     potassium phosphate 20 mmol in D5W 250 mL intermittent infusion     potassium phosphate 20 mmol in D5W 500 mL intermittent infusion     potassium phosphate 25 mmol in D5W 500 mL intermittent infusion     senna-docusate (SENOKOT-S/PERICOLACE) 8.6-50 MG per tablet 2 tablet     sodium chloride (PF) 0.9% PF flush 3 mL     sodium chloride (PF) 0.9% PF flush 3 mL     sodium chloride 0.9% infusion     sodium phosphate (FLEET ENEMA) 1 enema     vitamin A 66279 units capsule 20,000 Units     vitamin D3 (CHOLECALCIFEROL) 1000 units (25 mcg) tablet 1,000 Units     vitamin E (TOCOPHEROL) 100 units (90 mg) capsule 100 Units     zinc sulfate (ZINCATE) capsule 220 mg     Lab Results   Component Value Date    WBC 13.0 08/23/2019     Lab Results   Component Value Date    RBC 3.76 08/23/2019     Lab Results   Component Value Date    HGB 9.3 08/23/2019     Lab Results   Component Value Date    HCT 30.1 08/23/2019     No components found for: MCT  Lab Results   Component Value Date    MCV 80 08/23/2019     Lab Results   Component Value Date    MCH 24.7 08/23/2019     Lab Results   Component Value Date    MCHC 30.9 08/23/2019     Lab Results   Component Value Date    RDW 22.5 08/23/2019     Lab Results   Component Value Date     08/23/2019     Last Comprehensive Metabolic Panel:  Sodium   Date Value Ref Range Status   08/23/2019 132 (L) 133 - 144 mmol/L Final     Potassium   Date Value Ref Range Status   08/23/2019 3.9 3.4 - 5.3 mmol/L Final     Chloride   Date Value Ref Range Status   08/23/2019 98 94 - 109 mmol/L Final     Carbon Dioxide   Date Value Ref Range Status   08/23/2019 24 20 - 32 mmol/L Final     Anion Gap   Date Value Ref Range Status   08/23/2019 10 3 - 14 mmol/L Final     Glucose   Date Value Ref Range Status   08/23/2019 115 (H) 70 - 99 mg/dL Final     Urea Nitrogen   Date Value  Ref Range Status   08/23/2019 22 7 - 30 mg/dL Final     Creatinine   Date Value Ref Range Status   08/23/2019 1.02 0.66 - 1.25 mg/dL Final     GFR Estimate   Date Value Ref Range Status   08/23/2019 73 >60 mL/min/[1.73_m2] Final     Comment:     Non  GFR Calc  Starting 12/18/2018, serum creatinine based estimated GFR (eGFR) will be   calculated using the Chronic Kidney Disease Epidemiology Collaboration   (CKD-EPI) equation.       Calcium   Date Value Ref Range Status   08/23/2019 9.3 8.5 - 10.1 mg/dL Final       Exam:  General: Awake;  Alert, In No Acute Distress  Pulm: Breathing Comfortably on room air  Mental status: Oriented x 3  Cranial Nerves: very mild left facial droop. No dysarthria. Extraocular muscles intact.   Strength: 5/5 in right upper and lower. 3/5 in the Left upper and lower.   Pronator Drift: Absent  Sensory: Intact to Light Touch  INCISION: Clean, Dry and Intact with Surgical Dressing.     Assessment: Mr. 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     Plan:   Neurological Examination Q 1 HR   Cerebral Edema: Decadron 4 mg Q 8 HR for 1 month.   MRI Brain with and without Contrast Today to assess Resection Cavity   S/P Port Placement for Chemotherapy   Incentive spirometry   SLP Evaluation  Sliding scale insulin while on Decadron   Monitor for fever  PT/OT: Post-Operative Evaluations Pending   DVT prophylaxis: Sequential compression devices  Ulcer prophylaxis: Protonix 40 mg QD while on Dexamethasone    DISPO: 4A; Possible Transfer to  Pending MRI Results   Barriers: Post-Operative Recovery; Evaluations by PT/OT; Tolerating Oral Diet; Ambulating     Bri Merchant, KIRSTY-BC,CNRN  Department of Neurosurgery  Pager: [phone]    Plan of care discussed with Dr. Del Rio.

## 2019-08-23 NOTE — PLAN OF CARE
Discharge Planner OT    4A EVAL  Patient plan for discharge: home  Current status: Pt currently below his baseline for LLE and UE function, ROM and strength.  Needed assist for bed mobility and close CGA for short functional transfers.    Barriers to return to prior living situation: new precautions, LLE weakness fall risk, FMC deficit, MCI  Recommendations for discharge: ARU pending PT eval  Rationale for recommendations: pt below his baseline and lives alone although has SO assist PRN.  Pt would benefit from skilled rehab to improve safety and IND for safe return to home.   Pt will benefit from intensive, interdisciplinary ARU to address these deficits and to provide caregiver training to facilitate a safe dc to home.  Anticipate at discharge pt can and will be able to tolerate 3 hours of therapy.          Entered by: Gali Quintanilla 08/23/2019 12:22 PM

## 2019-08-23 NOTE — PLAN OF CARE
Problem: Adult Inpatient Plan of Care  Goal: Plan of Care Review  Outcome: No Change     D/I: Patient [place] [facility] on unit 4A Surgical/Neuro ICU s/p gamma tile pacement    Neuro: Lethargic and oriented x4.  Follows commands. Pt has left sided weakness and left sided facial droop.  CT done before arrival to unit showed  pneumocephalus over bilateral  frontal convexities and an unchanged 4 mm shift.  MD notified of completed CT.  No new orders.  CV:HR 50-60s.  SBP goal <140 met wit PRNsx2.  Writer noticed ST elevation and EKG was done to confirm.  STAT chest xray done and troponin was drawn.  Troponin negative.  Pulm:LS clear and diminished in the bases.  RA with O2 sats at 100%.  Capnography not capturing breaths d/t pt breathing through mouth.  GI:Clear liquid diet.  Pt passed initial swallow screen and was khruram to swallow pills fine.  About an hour later pt complaining of feeling like something stuck in his throat and unable to clear it.  MD notified.  Ok to continue to give PO meds and will have speech eval in AM.  Endo:N/A  :Pt has history of penile ectomy.  Leal catheter placed in perineal urethrostomy.  200-350 mL out/ hour. MD notified.   Skin:Head incision CAROL.  Scab on left shin also CAROL.  Preventative mepilex in place on sacrum   Pain:Scheduled tylenol and ice packs effective.   Access:1 PIV in left hand.  Art line in left radial.  Drains:N/A  Gtt:NS @ 75  Labs/Replacement: Magnesium and Potassium replaced per protocol  Social: S/O updated on POC before going home for the night.      A: Stable    P: Will continue to monitor Pt closely and notify MD of any significant changes.

## 2019-08-23 NOTE — OP NOTE
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 Resection of recurrent tumor   2. Placement of LDR brachytherapy seeds (Gamma Tiles)     ANESTHESIA: General endotracheal.      SURGEONS: Mulu Barnett MD Radiation Oncology                         Alfred Del Rio M.D., Ph.D Neurosurgery      ASSISTANTS: Rahul Fang MD Radiation oncology resident       NUMBER OF GAMMA TILES: 6      OPERATIVE PROCEDURE IN DETAIL: We joined the neurosurgery team after resection of the residual tumor.  A total number of 6  Gamma tiles have been placed into the resection cavity. Please see to Dr. Del Rio's operative note for more surgical details.      Rahul Fang MD  PGY-3 Resident, Radiation Oncology  [facility]    I was personally present for the critical radiation portion of this case and supervised the handling of the brachytherapy sources.    Mulu Barnett   [phone]

## 2019-08-23 NOTE — SUMMARY OF CARE
Admitted/transferred from: PACU  Reason for admission/transfer: Gamma tile placement and frequent neuro checks  Patient status upon admission/transfer: stable with bradycardia.  SBP slightly elevated above goal.  Interventions: PRN hydralazine administered.   Plan: Continue to monitor closely  2 RN skin assessment: completed by Gertrude Lambert  Result of skin assessment and interventions/actions: Scab on left shin  Height, weight, drug calc weight: done  Patient belongings: None with patient and none brought up by PACU.

## 2019-08-23 NOTE — PROGRESS NOTES
"   08/23/19 1051   General Information   Onset Date 08/16/19   Start of Care Date 08/23/19   Referring Physician Raul August MD   Patient Profile Review/OT: Additional Occupational Profile Info See Profile for full history and prior level of function   Patient/Family Goals Statement Pt did not state   Swallowing Evaluation Bedside swallow evaluation   Behaviorial Observations WFL (within functional limits)   Mode of current nutrition Oral diet   Type of oral diet Thin liquid;Other (Comment)  (fulls)   Respiratory Status Room air   Comments Mr. 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 Pt known to ST caseload with previous video swallow study completed in 2/2019 which recommended regular (soft) and thin liquids. Pt with vallecular residuals which pt was able to clear with dry swallow and liquid wash. Pt reported increased food \"sticking\" in throat last night after surgery. Clinical swallow eval completed per MD orders to further assess oropharyngeal swallow function.    Clinical Swallow Evaluation   Oral Musculature generally intact   Structural Abnormalities none present   Dentition present and adequate;other (see comments)  (missing some back molars)   Mucosal Quality adequate   Mandibular Strength and Mobility intact   Oral Labial Strength and Mobility WFL   Lingual Strength and Mobility WFL   Velar Elevation intact   Buccal Strength and Mobility intact   Laryngeal Function Cough;Throat clear;Swallow;Voicing initiated   Additional Documentation Yes   Clinical Swallow Eval: Thin Liquid Texture Trial   Mode of Presentation, Thin Liquids cup;spoon;self-fed   Volume of Liquid or Food Presented 5 oz   Oral Phase of Swallow WFL   Pharyngeal Phase of Swallow intact   Diagnostic Statement Pt tolerated thin liquids via cup with no overt s/sx of aspiration  "   Clinical Swallow Eval: Puree Solid Texture Trial   Mode of Presentation, Puree spoon;self-fed   Volume of Puree Presented 4 tbsp   Oral Phase, Puree WFL   Pharyngeal Phase, Puree intact   Diagnostic Statement Pt tolerated pureed textures via spoon with no overt s/sx of aspiration    Clinical Swallow Eval: Semisolid Texture Trial   Mode of Presentation, Semisolid spoon;self-fed   Volume of Semisolid Food Presented 3 tbsp   Oral Phase, Semisolid WFL   Pharyngeal Phase, Semisolid intact   Diagnostic Statement Pt tolerated semisolid textures with no overt s/sx of aspiration    Clinical Swallow Eval: Solid Food Texture Trial   Mode of Presentation, Solid self-fed   Volume of Solid Food Presented 3 tbsp   Oral Phase, Solid other (see comments)  (prolonged but functional time for mastication)   Pharyngeal Phase, Solid intact   Diagnostic Statement No overt s/sx of aspiration. Pt required prolonged but functional time for mastication. No c/o globus sensation   VFSS Evaluation   VFSS Additional Documentation No   FEES Evaluation   Additional Documentation No   Swallow Compensations   Swallow Compensations Alternate viscosity of consistencies;Pacing;Reduce amounts;Multiple swallow   Results Oral difficulties only   General Therapy Interventions   Planned Therapy Interventions Dysphagia Treatment   Dysphagia treatment Compensatory strategies for swallowing;Instruction of safe swallow strategies;Modified diet education   Swallow Eval: Clinical Impressions   Skilled Criteria for Therapy Intervention Skilled criteria met.  Treatment indicated.   Functional Assessment Scale (FAS) 5   Treatment Diagnosis mild oropharyngeal dysphagia    Diet texture recommendations Regular diet;Thin liquids   Recommended Feeding/Eating Techniques alternate between small bites and sips of food/liquid;check mouth frequently for oral residue/pocketing;hard swallow w/ each bite or sip;maintain upright posture during/after eating for 30 mins;small  sips/bites   Demonstrates Need for Referral to Another Service occupational therapy;dietitian;physical therapy;respiratory therapy   Therapy Frequency 5x/week   Predicted Duration of Therapy Intervention (days/wks) 1 week   Anticipated Discharge Disposition home w/ outpatient services   Risks and Benefits of Treatment have been explained. Yes   Patient, family and/or staff in agreement with Plan of Care Yes   Clinical Impression Comments SLP: Clinical swallow eval completed per MD orders. Pt presents with mild oropharyngeal dysphagia. Pt with VFSS completed in 2/2019 which showed mild pharyngeal residuals in valleculae which pt was able to clear with liquid wash and double swallow. Pt tolerated thin liquids, pureed textures, semisolid textures, and regular solid textures with no overt s/sx of aspiration. Pt denied any globus sensation today, however pt was encouraged to use double swallows and liquid wash. Recommend pt advance to regular textures and thin liquids. Pt should be fully upright for all PO, self-select soft/moist textures, slow pacing, alternate between consistencies, and take small single sips/bites. ST to continue to follow to train safe swallow strategies and assess diet tolerance. Pt may benefit from ongoing ST upon discharge pending progress.    Total Evaluation Time   Total Evaluation Time (Minutes) 15

## 2019-08-23 NOTE — PROGRESS NOTES
08/23/19 1100   Quick Adds   Type of Visit Initial Occupational Therapy Evaluation   Living Environment   Lives With alone   Living Arrangements condominium   Number of Stairs, Main Entrance other (see comments)  (33)   Transportation Anticipated car, drives self   Living Environment Comment Has many stairs to enter condo. retired    Self-Care   Usual Activity Tolerance good   Current Activity Tolerance moderate   Regular Exercise Yes   Equipment Currently Used at Home none   Activity/Exercise/Self-Care Comment Per notes pt golfs 2x a week.  INd prior no AE.   Functional Level   Ambulation 0-->independent   Transferring 0-->independent   Toileting 0-->independent   Bathing 0-->independent   Dressing 0-->independent   Eating 0-->independent   Cognition 1 - attention or memory deficits   Fall history within last six months yes   Which of the above functional risks had a recent onset or change? 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    Precautions/Limitations fall precautions   General Observations Pt supine, pleasant an dagreeable. Now with gamma tile radiation precautions.    General Info Comments activity ambulate w a   Cognitive Status Examination   Orientation orientation to person, place and time   Level of Consciousness alert   Memory impaired   Cognitive Comment mild cognitive impairment at baseline, a little slow to respond today   Visual Perception   Visual Perception Wears glasses   Sensory  Examination   Sensory Comments WFL   Pain Assessment   Patient Currently in Pain No   Integumentary/Edema   Integumentary/Edema Comments WFL   Posture   Posture Comments WFL sitting EOB and in standing   Range of Motion (ROM)   ROM Comment deficits on L shoulder, flexion BNL   Strength   Strength Comments fair on L UE   Coordination   Fine Motor Coordination below baseline, noted he uses R dominant hand and will neglect to use L hand even with typical bilateral tasks   Coordination Comments would benefit from additional screening to L hand   Mobility   Bed Mobility Comments min A to move LLE   Transfer Skills   Transfer Comments Close CGA x2 due to lines.   Transfer Skill: Sit to Stand   Level of [place]: Sit/Stand stand-by assist   Toilet Transfer   Toilet Transfer Comments catheter   Balance   Balance Comments below baseline, noted difficulty moving L LE   Upper Body Dressing   Level of [place]: Dress Upper Body minimum assist (75% patients effort)   Lower Body Dressing   Level of [place]: Dress Lower Body maximum assist (25% patients effort)   Grooming   Level of [place]: Grooming stand-by assist   Physical Assist/Nonphysical Assist: Grooming set-up required   Instrumental Activities of Daily Living (IADL)   Previous Responsibilities meal prep;housekeeping;laundry;shopping;medication management;finances;driving   Activities of Daily Living Analysis   Impairments Contributing to Impaired Activities of Daily Living balance impaired;cognition impaired;coordination impaired;ROM decreased;sensation decreased;post surgical precautions  (radation precautions)   General Therapy Interventions   Planned Therapy Interventions ADL retraining;IADL retraining;balance training;bed mobility training;cognition;fine motor coordination training;strengthening;transfer training;progressive activity/exercise;risk factor education   Clinical Impression   Criteria for Skilled Therapeutic Interventions Met yes,  "treatment indicated   OT Diagnosis post op crani   Influenced by the following impairments post op precautions, deconditioning, MCI, FMC deficits   Assessment of Occupational Performance 5 or more Performance Deficits   Identified Performance Deficits deficits in all ADLs and IADLs currently due to lack of IND standing and mobility   Clinical Decision Making (Complexity) Moderate complexity   Therapy Frequency Daily   Predicted Duration of Therapy Intervention (days/wks) 2 weeks   Anticipated Discharge Disposition Acute Rehabilitation Facility   Risks and Benefits of Treatment have been explained. Yes   Patient, Family & other staff in agreement with plan of care Yes   [facility] TM \"6 Clicks\"   2016, Trustees of [facility], under license to [employer].  All rights reserved.   6 Clicks Short Forms Daily Activity Inpatient Short Form   [facility]  \"6 Clicks\" Daily Activity Inpatient Short Form   1. Putting on and taking off regular lower body clothing? 3 - A Little   2. Bathing (including washing, rinsing, drying)? 3 - A Little   3. Toileting, which includes using toilet, bedpan or urinal? 3 - A Little   4. Putting on and taking off regular upper body clothing? 3 - A Little   5. Taking care of personal grooming such as brushing teeth? 4 - None   6. Eating meals? 4 - None   Daily Activity Raw Score (Score out of 24.Lower scores equate to lower levels of function) 20   Total Evaluation Time   Total Evaluation Time (Minutes) 5     "

## 2019-08-23 NOTE — PLAN OF CARE
Discharge Planner SLP   Patient plan for discharge: none stated  Current status: SLP: Clinical swallow eval completed per MD orders. Pt presents with mild oropharyngeal dysphagia. Pt with VFSS completed in 2/2019 which showed mild pharyngeal residuals in valleculae which pt was able to clear with liquid wash and double swallow. Pt tolerated thin liquids, pureed textures, semisolid textures, and regular solid textures with no overt s/sx of aspiration. Pt denied any globus sensation today, however pt was encouraged to use double swallows and liquid wash. Recommend pt advance to regular textures and thin liquids. Pt should be fully upright for all PO, self-select soft/moist textures, slow pacing, alternate between consistencies, and take small single sips/bites. ST to continue to follow to train safe swallow strategies and assess diet tolerance. Pt may benefit from ongoing ST upon discharge pending progress.   Barriers to return to prior living situation: dysphagia, medical readiness  Recommendations for discharge: pt may require ongoing ST upon discharge pending progress  Rationale for recommendations: pt would benefit from continued ST to train compensatory swallow strategies to minimize aspiration risk        Entered by: Gali Kemp 08/23/2019 11:38 AM

## 2019-08-23 NOTE — PLAN OF CARE
"BP 99/59   Pulse 72   Temp 97.1  F (36.2  C) (Axillary)   Resp 16   Ht 1.93 m (6' 3.98\")   Wt 60.3 kg (132 lb 15 oz)   SpO2 99%   BMI 16.19 kg/m       Neuro: A&O X 4, L sided weakness, L facial droop, +2 strength LUE, +2 strength LLE, +5 on R side, L pronator drift present  Cardiac: NSR with occasional PVCs, SBP <140 without intervention  Respiratory: lungs clear, 97% on RA  GI/: palomino in place with AUOP, no bm, +gas  Diet/Appetite: tolerating regular diet, good intake, pt requires encouragement to take sips between bites and eat slow  Skin: intact except for drains/devices  Activity: walked to hallway with A X 2, pt reported dizziness with walking  Pain: managed with scheduled tylenol and ice packs      See flowsheets for detailed interventions and assessments.  Will continue to monitor and follow POC    "

## 2019-08-23 NOTE — OR NURSING
Patient went to CT without any issues. This RN spoke with Dr.Samuel Fay. Alerted surgeon that CT was completed. Ok to go to  rather than return to PACU per surgeon. Patient stable and appropriate for transfer. Receving RN, Gertrude and fellow professional at bedside upon arrival to . AVSS stable through transfer except bradycardia. Neuro assessment completed/hand-off. No immediate concerns were expressed. Dr. Del Rio in room assessing patient upon exit of this RN from inpatient room. Spoke with patient family ASAP post-transfer.    2100: CT results resulted, notified ALBERTA Loredo RN. Stated she would contact neurosurgery to alert of preliminary results.

## 2019-08-23 NOTE — PROGRESS NOTES
08/23/19 1300   Quick Adds   Type of Visit Initial PT Evaluation       Present no   Living Environment   Lives With alone   Living Arrangements condominium   Home Accessibility stairs to enter home;stairs within home   Number of Stairs, Main Entrance 1   Stair Railings, Main Entrance none   Number of Stairs, Within Home, Primary other (see comments)  (11 up to bedroom, 7 down to laundry)   Stair Railings, Within Home, Primary railing on left side (ascending)   Transportation Anticipated car, drives self;family or friend will provide   Self-Care   Usual Activity Tolerance good   Current Activity Tolerance fair   Regular Exercise Yes   Activity/Exercise Type other (see comments)   Exercise Amount/Frequency 2 times/wk   Equipment Currently Used at Home none   Functional Level Prior   Ambulation 0-->independent   Transferring 0-->independent   Toileting 0-->independent   Fall history within last six months yes   Number of times patient has fallen within last six months 4  (fell x3  to L at EOB while dressing , fell while walking )   Prior Functional Level Comment independent prior to initial admit, after d/c from TCU this month had numerous falls and difficulty at home   General Information   Onset of Illness/Injury or Date of Surgery - Date 08/16/19   Referring Physician Raul August MD   Patient/Family Goals Statement return home   Pertinent History of Current Problem (include personal factors and/or comorbidities that impact the POC) Mr. 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  L hip flexion 3-/5, L knee extension 5/5, L dorsiflexion 3-/5   Transfer Skills   Transfer Comments sit > stand min assist   Gait   Gait Comments ambulated in room with 1 hand on IV pole and mod assist   Balance   Balance Comments required UE support and min assist to maintain standing balance   Sensory Examination   Sensory Perception no deficits were identified   Coordination   Coordination Comments impaired heel > shin L LE   General Therapy Interventions   Planned Therapy Interventions balance training;bed mobility training;gait training;transfer training;progressive activity/exercise;neuromuscular re-education   Clinical Impression   Criteria for Skilled Therapeutic Intervention yes, treatment indicated   PT Diagnosis impaired functional mobility s/p tumor resection    Influenced by the following impairments hemiparesis, impaired balance, decreased activity tolerance   Functional limitations due to impairments impaired bed mobility, impaired transfers,impaired gait   Clinical Presentation Evolving/Changing   Clinical Presentation Rationale medical complications   Clinical Decision Making (Complexity) Moderate complexity   Therapy Frequency 6x/week   Predicted Duration of Therapy Intervention (days/wks) 1 week   Anticipated Discharge Disposition Acute Rehabilitation Facility;Transitional Care Facility   Risk & Benefits of therapy have been explained Yes   Patient, Family & other staff in agreement with plan of care Yes   [facility] AM-PAC  \"6 Clicks\" V.2 Basic Mobility Inpatient Short Form   1. Turning from your back to your side while in a flat bed without using bedrails? 3 - A Little   2. Moving from lying on your back to sitting on the side of a flat bed without using bedrails? 3 - A Little   3. Moving to and from a " bed to a chair (including a wheelchair)? 3 - A Little   4. Standing up from a chair using your arms (e.g., wheelchair, or bedside chair)? 3 - A Little   5. To walk in hospital room? 2 - A Lot   6. Climbing 3-5 steps with a railing? 2 - A Lot   Basic Mobility Raw Score (Score out of 24.Lower scores equate to lower levels of function) 16   Total Evaluation Time   Total Evaluation Time (Minutes) 8

## 2019-08-23 NOTE — OR NURSING
Neuro at bedside. Assessed patient. Still quite sedated. Oriented x3. DANG. Intermittently following commands. AVSS. Dr. August came to bedside soon after. Brief neuro assessment done. Stated no CT to be done this evening. No concerns were expressed from either neurosurgery team member at bedside.    Dr. August contacted this RN stating that he would like a CT completed tonight. If patient stable can transfer to ICU 4A instead of back to PACU. Contacted CT and patient was scanned as soon as their schedule permitted.

## 2019-08-23 NOTE — PLAN OF CARE
PT 4A: Evaluation completed, treatment initiated.     Discharge Planner PT   Patient plan for discharge: rehab  Current status: required min assist for sit <> stand. Ambulated 65' with R UE support and mod assist.  Mobility limited by L hemiparesis and impaired balance.  Patient hypotensive at end of walk.    Barriers to return to prior living situation: assistance needed for safe mobility, falls risk.   Recommendations for discharge: ARU  Rationale for recommendations: patient previously independent with functional mobility, lives alone.  Now with L hemiparesis, mulitdisciplinary needs and highly motivated to work with rehab.  Able to tolerate 3 hrs of daily therapy.       Entered by: Timothy Yung 08/23/2019 4:11 PM

## 2019-08-24 NOTE — PLAN OF CARE
PT 4A    Discharge Planner PT   Patient plan for discharge: rehab  Current status: transferred OOB with SBA.  Sit <> stand with min assist.  Ambulated 55' with hemiwalker and min assist x 2.  Gait limited by impaired balance, hemiparesis and impaired coordination.  VSS on RA during session.   Barriers to return to prior living situation: assistance needed for safe mobility.   Recommendations for discharge: ARU  Rationale for recommendations: patient previously independent with functional mobility, now with L hemiparesis, multidisciplinary needs, highly motivated to work with rehab and able to tolerate 3hrs of daily therapy.       Entered by: Timothy Yung 08/24/2019 4:30 PM

## 2019-08-24 NOTE — PLAN OF CARE
Assessment: R. Craniotomy for resection of r. Frontal metastatic lesions w/ gamma tile placement  Plan: Neuro Checks q 2 hrs, SBP<140, PT/OT/ST  Subjective: pt c/o incisional pain relieved w/ scheduled tylenol  Objective: Maintaining BP goals w/o intervention. NSR 60's-80's. Afebrile. Breathing comfortably on Ra. No BM this shift. UOP noted to be 275mls/hr, MD notified, urine osmolality and sodium drawn, see results. Neuro exam unchanged this shift, A&O X 4, L sided weakness, L facial droop, +2 strength LUE, +2 strength LLE, +5 on R side, L pronator drift present. Rested comfortably between cares.

## 2019-08-24 NOTE — OP NOTE
Name:  King Gonsalo Bruno  MRN:  [id number]  YOB: 1945  Date of Surgery:  August 22, 2019      Pre-operative Diagnosis: Right frontal recurrent metastasis  Post-operative Diagnosis: Same  Procedure:  1) Right craniotomy for tumor resection  2) Micro-dissection  3) Stereotaxis  4) Gammatile placement    Anesthesia: GETA    Surgeon: Alfred Florentino MD, PhD  Assistant: Raul August MD    Description of Procedure: After pre-operative laboratory value and informed consent were verified, the patient was brought into the operating room. The patient underwent general anesthesia and intubation. Antibiotic was administered.    The patient was placed in a supine position, with the head turned to the left, exposing the right front cranium bearing the previous surgical incision.  The Med[employer]onic Stealth reference frame was placed. The patient's anatomy was registered relative to the pre-operative MRI using the [employer] system.    The region overlying the tumor was identified. An incision was planned based on the location of the tumor. The area encompassing the surgical incision was prepped and draped in a sterile manner.     Time out was performed. The incision was re-opened. Hemostasis was achieved and retracted using a cerebellar retractor.  The region overlying the tumor was confirmed using the Stealth probe.    The previous craniotomy was removed.  Dural bleeding was controlled. The epidural space was packed with Floseal.     The dura was re-opened. Corticectomy was performed in the region immediate superficial to the lesion. The tumor specimen was immediately apparent. A biopsy was taken and sent to pathology.     Frozen pathology findings are consistent with metastatic carcinoma.    The plane between the tumor capsule and the normal cerebrum was identified. This plane was developed. Through a combination of tumor debulking and plane development, the dissection as carried out until the entirety of the  lesion was removed. Meticulous hemostasis was achieved after the tumor resection. Dissection was carried out under microscopic magnification.    After hemostasis, I turned my attention to the gammatile placement.  A total of 6 gammatiles were placed circumferentially around the resection cavity. Dura was closed with duragen. The craniotomy flap was secured using titanium plate/screw construct. The wound was amply irrigated with bacitracin containing saline. The galea was closed with 2'0 vicryl. The skin was closed with 3'0 Monocryl. Exothin was applied.    I was present and performed the key portions of the procedure.    EBL: < 20 ml's    Specimens: Resected tumor specimens    Disposition: ICU

## 2019-08-24 NOTE — PROGRESS NOTES
D: Pt s/p crani for resection of frontal metastatic lesion with gamma tile placement.  On Neuros q2 hours.  I/A:  NEURO: mild left facial droop. +3 left sided strengths, +5 right sided strengths. Pt ambulated with PT, in chair and worked with OT as well. Left foot drag. No pronator drift. A&O x 4  CARDS: SBP goal < 140. A line 20 points higher than cuff consistently. Cuff: 110-120s/70-80. HRs 70-80s, 100-120s with activity. Afebrile. Improved orthostatic with PT from yesterday (per PT).   RESP: LS clear, good cough, able to cough up residual food stuck in vallecula.   GIGU: Large formed BM today. UOP marginal but IV gtt started so will monitor. Regular diet.   SKIN: head incision CDI without evidence of infection or drainage. Sacral area covered with Mepilex.   LABS: Na 129 - 2% Na gtt at 50cc/hr started, Na tabs also started.   Creat rising, today 1.16. nHgb 8.4 (yest 9.3).    PLAN: 6a transfer orders in. Possible serial Na labs (not ordered at this time but lab just sent). OOB as much as possible for mobility. Monitor neuros and alert team if changes.     Freya Hernandez RN

## 2019-08-24 NOTE — PLAN OF CARE
Discharge Planner OT    ALBERTA EPPS  Patient plan for discharge: home  Current status: Pt progressing to standing with walker for ADLs in standing and BSC transfers. Pt able to complete basic ADLs standing with one hand on walker unilateral tasks.  Pt stood >10' with poor coordination and awareness of LLE with noted knee hyperextension with fatigue.    Barriers to return to prior living situation: new precautions, LLE incoordination, fall risk, FMC deficit on L, MCI  Recommendations for discharge: ARU   Rationale for recommendations: pt below his baseline and lives alone although has SO nearby and can assist PRN.  Pt would benefit from skilled rehab to improve safety and IND for safe return to home.   Pt will benefit from intensive, interdisciplinary ARU to address these deficits and to provide caregiver training to facilitate a safe dc to home.  Anticipate at discharge pt can and will be able to tolerate 3 hours of therapy.          Entered by: Gali Quintanilla 08/24/2019 4:38 PM

## 2019-08-24 NOTE — OP NOTE
PATIENT NAME: King Gonsalo Bruno  PATIENT MRN: [id number]  PATIENT ACCOUNT: [id number]  PATIENT YOB: 1945    NEUROSURGERY OPERATIVE REPORT     DATE OF SURGERY: 08/22/19     PREOPERATIVE DIAGNOSIS:  1. 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 Redo right craniotomy for tumor resection   2. Gammatile placement  3. Stealth neuronavigation     STAFF SURGEON: Alfred Del Rio MD, PhD    RADIATION ONCOLOGIST: Mulu Beverly MD     RESIDENT SURGEON: Raul August MD    STUDENT: Terry Cm MS4     ANESTHESIA: General endotracheal anesthesia     ESTIMATED BLOOD LOSS: 40 mL     IMPLANTS: Gamma tiles. Synthes cranial plating system. Duragen graft.     EXPLANTS: Prior Synthes cranial plating system.      DRAINS: None     FINDINGS:  Yellow appearing tumor lesion, grossly resected. Gamma tiles placed. .     COMPLICATIONS: None     INDICATIONS: Mr. King Bruno is a 72 yo male with squamous cell carcinoma of the penis. He underwent resection of a metastatic brain lesion on 7/25/19. The patient re-presented to the hospital with concerns of progressive left sided weakness. He was found to have a recurrent mass lesion from his previous surgical site. Given the rapidity of recurrence, we discussed redo resection as well as gammatile placement to facilitate early radiation of his metastatic lesion. After a discussion of risks, benefits, and alternatives, the patient provided written and verbal consent to proceed with the above stated operation.      DESCRIPTION OF PROCEDURE:  King Gonsalo Bruno was brought to the operating room. His identity was verified. The patient was transferred in the supien position to the operating table. General endotracheal anesthesia was obtained. The bed was turned 180 degrees. Both arms were tucked. The patient's head was pinned and secured with  a Ocampo head pereyra. Preoperative antibiotics were administered in addition to steroids, mannitol, and keppra. A preoperatively obtained stealth image was obtained. The patient's head was registered to the stealth machine.     The patient was cleaned, prepared, and draped in sterile fashion. No local anesthetic was used. Timeout was performed.     The patient's prior incision was bluntly opened with Metzenbaum scissors. No significant bleeding was encountered. A self-retaining retractor was placed. The patient's bone flap was removed after unscrewing the patient's prior cranial plating. The dura was sharply opened at the prior suture line that was primarily closed. We immediately visualized the underlying abnormal tumor under the dura to be yellow and solid appearing. A small corticectomy was made using bipolar cautery. The lesion was debulked around the periphery using suction. Bipolar cautery was used to coagulate blood vessels supplying the solid tumor bulk as well as attachments of the tumor to the native parenchyma. Micro scissors were used to sharply dissect the coagulated tissue away from the brain parenchyma. A sizable specimen was then sent for pathologic review. Stealth guidance was used to verify sufficient resection of the tumor. The cavity had appeared to collapse and no grossly abnormal tissue was visualized. The surgical cavity was inspected for hemostasis and areas of bleeding were controlled with bipolar cautery. At this time, Dr. Beverly who had previously prepared the gammatiles facilitated placement of the radioactive implants. We lined the surgical cavity with these radioactive implants.     Attention was turned to closure. The wound was irrigated. 4-0 nurolon suture was used to re-approximate the native dura. A duragen graft was placed on top of the native dura to close the dural defect. The bone flap was re-plated and secured to the native skull using the [employer] cranial plating system.  "Thereafter, we closed in layers with inverted, interrupted 2-0 vicryl for the galea and 3-0 running monocryl for the skin. Exofin was applied.     The patient was extubated and returned to the intensive care unit without incident.     At the end of the procedure, sponge and needle counts were correct.      Dr. Del Rio was present for entirety of the procedure.      Operative note prepared by Raul \"Angel\" MD Mariangel, Neurosurgery, PGY-3        "

## 2019-08-24 NOTE — PLAN OF CARE
Discharge Planner SLP   Patient plan for discharge: not stated  Current status: The patient was seen for dysphagia tx this AM. He was pleasant, cooperative and agreeable to safe swallowing recommendations due to his hx of mild dysphagia. Agree with recommendation to continue a regular texture diet and thin liquids with self selection of soft/moist foods and with compensatory strategy use. Recommend slow pacing, alternate between consistencies, and take small single sips/bites.  Barriers to return to prior living situation: none from SLP standpoint  Recommendations for discharge: defer to OT and PT  Rationale for recommendations: SLP tx to maximize safe swallowing, but expect patient to reach goals prior to hospital discharge       Entered by: Lily Phelan 08/24/2019 10:34 AM

## 2019-08-25 NOTE — PLAN OF CARE
Status: Arrived form 4A at 12:45 am via wheelchair. He has a history of penile CA with 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. He was admitted with L sided weakness and had a R crani and gamma tile placement on 8-22.  Vitals: stable  Neuros: L sided weakness L UE 4/5. L LE 3/5.  IV: R Port infusing with 2% NA chloride at 50cc/hr.  Resp/trach: clear  Diet: regular on 4A  Bowel status: Had BM on 4A before coming down   : palomino removed at 11:20 am. Has urgency. voided at 2 pm-did not make it to commode(he cannot use urinal due to reconstructive surgery).  Skin: small sacb on each leg. Head incision with dermabond, minimal swelling, no redness.  Pain: denies, but did take scheduled tylenol for prevention.  Activity: pivots with 1 assist to chair and commode.  Social: S.O, Stephanie at bedside  Plan: Continue with plan of care as ordered.

## 2019-08-25 NOTE — PLAN OF CARE
Assessment: Tumor Resection and Gamma Tile placement  Plan: q 2 hr neuro checks, q8 hr sodium checks, 2% sodium chloride infusion, PT/OT/ST, SBP< 140  Subjective: pt denies pain/discomfort  Objective: No neuro changes noted from previous shift. Breathing comfortably on RA. NSR in the 60s-70s. Arterial line wave dampened r/t patient moving frequently and bending wrist, appopriate wave form and readings while still, BP cuff cycled q 1 hr, meeting BP goal w/o intervention. Leal patent w/ 200-350 mls clear/yellow urine q 2 hours. Up to bedside commode for large soft stool x's 1. Using call light appropriately. Skin intact.

## 2019-08-25 NOTE — PROGRESS NOTES
S: Floor status. Post-op MRI with gross total resection. No acute events overnight.    O:  Exam:  General: Awake;  Alert, In No Acute Distress  Pulm: Breathing Comfortably on room air  Mental status: Oriented x 3  Cranial Nerves: very mild left facial droop. No dysarthria. Extraocular muscles intact.   Strength: 5/5 in right upper and lower. 4/5 in the Left upper and lower.   Pronator Drift: Absent  Sensory: Intact to Light Touch  INCISION: Clean, Dry and Intact with Surgical Dressing.     Assessment: Mr. 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     Plan:   Cerebral Edema: Decadron 4 mg Q 8 HR, with 1 month taper  Salt tabs & 2% saline for hyponatremia; q8 sodium checks  Incentive spirometry   SLP: regular diet with thins  Sliding scale insulin while on Decadron   Monitor for fever  PT/OT: ARU; PM&R  DVT prophylaxis: Sequential compression devices  Ulcer prophylaxis: Protonix 40 mg QD while on Dexamethasone    DISPO: Floor status  Barriers: Post-Operative Recovery; Hyponatremia    Jaskaran Jean Baptiste MD  Neurosurgery Resident PGY2    Please contact neurosurgery resident on call with questions.    Dial * [phone], enter [phone] when prompted.

## 2019-08-25 NOTE — PLAN OF CARE
PT 6A    Discharge Planner PT   Patient plan for discharge: ARU  Current status: L LE strength improving with L dorsflexion 3+/5 today. transferred to EOB with SBA. Sit <> stand with SPC and min assist. Ambulated 120' with SPC and min assist.  Mobility limited by impaired balance, L hemiparesis and impaired L LE coordination.   Barriers to return to prior living situation: assistance needed for safe mobility, falls risk.  Recommendations for discharge: ARU  Rationale for recommendations: patient previously independent with functional mobility, now with L hemiparesis, impaired L coordination, multidisciplinary rehab needs and highly motivated to work with rehab and return to independent living. Can tolerate 3hrs of daily therapy.        Entered by: Timothy Yung 08/25/2019 4:50 PM

## 2019-08-25 NOTE — PROGRESS NOTES
"Neurosurgery Progress Note    S: No acute events overnight.    O:  /63   Pulse 77   Temp 95.7  F (35.4  C) (Oral)   Resp 16   Ht 1.93 m (6' 3.98\")   Wt 61 kg (134 lb 7.7 oz)   SpO2 99%   BMI 16.38 kg/m       Exam:  General: Awake;  Alert, In No Acute Distress  Pulm: Breathing Comfortably on room air  Mental status: Oriented x 3  Cranial Nerves: No dysarthria. Extraocular muscles intact.   Strength: 5/5 in right upper and lower. 4/5 in the Left upper and lower.   Pronator Drift: Absent  Sensory: Intact to Light Touch  INCISION: Clean, Dry and Intact with Surgical Dressing.     Assessment: Mr. 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     Plan:   Cerebral Edema: Decadron 4 mg Q 8 HR, with 1 month taper  Salt tabs & 2% saline for hyponatremia; q8 sodium checks  Incentive spirometry   SLP: regular diet with thins  Sliding scale insulin while on Decadron   Monitor for fever  PT/OT: ARU; PM&R  DVT prophylaxis: Sequential compression devices  Ulcer prophylaxis: Protonix 40 mg QD while on Dexamethasone    DISPO: 6A  Barriers: Post-Operative Recovery; Hyponatremia    Jarret Blair MD  Neurosurgery Resident, PGY-1  "

## 2019-08-25 NOTE — PLAN OF CARE
Patient on unit 4A Surgical/Neuro ICU s/p R crani and tumor resection with gamma tile placement.  Neuro- L sided weakness otherwise intact. No c/o pain, Ax2 to move.  CV- Meeting SBP goal<140 without intervention, SR 60-80s with occ PVCs, afebrile, palpable pulses. Art line pulled this shift and pressure held for 10 minutes.   Pulm- RA, CTA  GI- Thin liquid diet, cleared by speech. Consult for ENT placed for Zenker's diverticulum. Held stool softners this AM due to 3 soft stools yesterday.   - urostomy, palomino pulled at 11:20. Adequate output with palomino  Skin- R crani incision, scattered bruising  Gtts-  2% NaCl at 50  Lines- R chest port  Social-  No family at bedside or calls.  See flow sheets for further interventions and assessments.  Plan: Continue to monitor pt closely, Notify MD of changes/concerns. Patient transferred to 6A shortly after noon. Gave report to RNAlberta.

## 2019-08-26 NOTE — PLAN OF CARE
Discharge Planner PT  6A  Patient plan for discharge: rehab   Current status: Reviewed crani precautions. Progressed gait endurance/pattern with SPC for ~250ft + ~ 50 ft, CGA-min A for minor LOBs where pt was unable to adjust and correct safely. Initiated and progressed 2 x 3 stairs with 1 UE support on railing and SPC - significant cueing/education needed for safe/steady stepping pattern. Multiple STSs completed from various surface heights with SPC + reaching for additional support from free hand (non-cane side) 2/2 pt feeling unsteady though with encouragement to not reach forward pt demonstrated increased steadiness. Intermittently tearful throughout session primarily because of his current mobility status. Impaired cognition. Recommend nursing utilize FWW + SBA for all mobility. High falls risk.     Barriers to return to prior living situation: medical status, current mob status, falls risk, balance, weakness  Recommendations for discharge: ARU  Rationale for recommendations: Pt demonstrates that he would be a great candidate for an ARU as pt was previously IND, new L hemiparesis and craniotomy, had good family support, and has multidisciplinary needs. Pt would tolerate 3 hours of therapy/day.         Entered by: Isabel Valencia 08/26/2019 10:49 AM

## 2019-08-26 NOTE — PLAN OF CARE
Discharge Planner SLP   Patient plan for discharge: not stated  Current status: The patient was seen for dysphagia tx this afternoon. He reports good tolerance of PO overall. However, this session wasn't feeling well, so he declined PO trials (RN aware).  The patient continues a regular texture diet and thin liquids with self selection of soft/moist foods and with compensatory strategy use. Recommend slow pacing, alternate between consistencies, and take small single sips/bites.  Barriers to return to prior living situation: none from SLP standpoint  Recommendations for discharge: defer to OT and PT  Rationale for recommendations: SLP tx to maximize safe swallowing, but expect patient to reach goals prior to hospital discharge       Entered by: Lily Phelan 08/26/2019 4:21 PM

## 2019-08-26 NOTE — PROGRESS NOTES
Rehab Admissions:  Met w/ pt today to discuss [facility]. Pt familiar w/ [facility] so we discussed differences between both levels of care. Discussed setup and structure of acute rehab including daily PMR oversight, skilled PT/OT needs 3 hours a day 7 days per week, and skilled rehab nursing. Pt will need [employer] auth - to be initiated by unit SW. Pt also has pending PMR consult for today. Discussed average LOS of 12 days, estimate pt will have shorter 7 day ELOS. Pt's friend Stephanie and another gentleman friend present during our discussion - they are both able to provide intermittent A to pt at discharge. Stephanie expressed concern that pt was disch 'too soon' last admission and she does not want her availability to A at disch to prevent pt from receiving the services he needs. I assured her that further therapies would be set up as needed after IRF admission.  Provided pt w/ brochure and encouraged him to call w/ questions. Pt expresses motivation for IRF stay.     Thank you for the referral, we will continue to follow this patient for post acute placement.     Determination of admission is based upon the patient's need for an intensive, interdisciplinary approach to rehabilitation, their ability to progress, their ability to tolerate intensive therapies, their need for daily physician supervision, their need for twenty four hour nursing assistance, and their ability and willingness to participate in such a program.    Arlyn Carrera CM  Rehab Liaison/  [facility]  8/26/2019    12:24 PM

## 2019-08-26 NOTE — PROGRESS NOTES
Brief ENT Progress Note  8/26/2019    Contacted by neurosurgery regarding patient's known Zenker's diverticulum and chronic mild dysphagia. Patient is followed by Dr. Ortiz as an outpatient and with serial video swallow studies, last performed on 2/1/19 showing stable small lateral pharyngeal pouches not amenable to surgery. Patient has next scheduled video swallow study on 9/3/19. Pending results of this video swallow study patient may follow up with Dr. Ortiz for ongoing surgical discussions if pharyngeal pouches are growing in size. Please continue diet recommendations per SLP, per their most recent note he is cleared for a regular diet with thin liquids. Discussed plan with Dr. Ortiz.     Fariba Gannon PA-C  Otolaryngology-Head & Neck Surgery  Please contact ENT by dialing * [phone] and entering job code [phone].

## 2019-08-26 NOTE — PLAN OF CARE
Status: POD 4 Crani/tumor resection & gamma tile placement. 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 Also being treated for hyponatremia  Vitals: VSS on RA  Neuros: A/O x4. L side weakness LLE 3/5, LUE 4/5, Otherwise intact  IV: R chest port infusing 2% NS now @ 25mL/hr since 0600, look out for Sodium level result  Resp/trach: Lungs clear  Diet: Regular. Now on 1500mL fluid restriction, made pt aware, provided 400mL at 0600.   Bowel status: Loose BM yesterday, AM BM meds held. BS active, passing gas  : Voiding with urgency. No incontinence.  Skin: Incision on top of head, WDL CAROL. Small scabs on legs and L hand.  Pain: Denies  Activity: Up w/ A1, GB, Pivot.  Social: No family overnight  Plan: Sodium level checks every 8 hrs. Cont fluid restriction. PT recs ARU when medically ready. Continue to monitor and follow POC.

## 2019-08-26 NOTE — PLAN OF CARE
Status: POD #4 crani/tumor resection & gamma tile placement. 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   Vitals: VSS on RA  Neuros: A&O x4. L side weakness LLE 3/5, LUE 4/5, Otherwise intact.   IV: R chest port infusing NS at TKO between electrolytes. Magnesium replaced; recheck in AM. Replacing phos today and recheck in AM. Serial sodium checks.   Resp/trach: LS clear  Diet: Regular diet. 1500 mL fluid restriction.   Bowel status: +flatus; no BM this shift  : Voiding with urgency.  Skin: Incision on top of head, CAROL CDI. Small scabs on legs and L hand.  Pain: Denies pain  Activity: Up with assist of 1, GB and pivot to commode. Walker ordered per PT.   Social: Friend at bedside this shift.   Plan: Sodium level checks every 8 hrs. PM&R consulted. Continue to monitor and follow current POC.    1600: patient had large emesis; pt states he thinks it was the chicken he ate early that a friend brought. Much relieved after emesis.

## 2019-08-26 NOTE — PLAN OF CARE
Status: POD 3 Crani/tumor resection and gamma tile placement. Pmhx penile cancer w/mets to lungs and brain.  Vitals: VSS  Neuros: A/O x4. Some forgetfulness. L side weakness LLE 3/5, LUE 4/5, Otherwise intact  IV: R chest port infusing 2% NS at 50cc/hr  Resp/trach: Lungs clear, O2 sats stable, RA  Diet: Reg diet. Good inake.  Bowel status: Loose BM earlier today, bowel meds held. BS active, Passing gas  : Voiding. Urgency vs. Incontinence.  Skin: Incision on top of head, CAROL. Mild erythema, no redness/drainage. Small scabs on legs and L hand. Occ. Bruising.  Pain: Denies  Activity: Up w/1 Gb, Pivot.  Social: Significant other visited.  Plan: Continue to monitor and follow POC.

## 2019-08-26 NOTE — PROGRESS NOTES
"S: no acute events overnight. Attempting to ambulate to bathroom today     O:  Temp:  [95.7  F (35.4  C)-97.9  F (36.6  C)] 96  F (35.6  C)  Pulse:  [67-74] 67  Heart Rate:  [60-86] 60  Resp:  [14-24] 16  BP: (131-158)/(63-77) 155/70  MAP:  [98 mmHg] 98 mmHg  Arterial Line BP: (116-130)/(66-74) 130/67  SpO2:  [97 %-100 %] 100 %    Exam:  General: Awake;  Alert, In No Acute Distress  Pulm: Breathing Comfortably on room air  Mental status: Oriented x 3  Cranial Nerves: No dysarthria. Extraocular muscles intact. Very slight left facial droop.   Strength: 5/5 in right upper and lower. 4/5 in the Left upper and lower.   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 Doing well post-op. Ongoing issues: hyponatremia and dysphagia due to Zenker s.     Plan:   Cerebral Edema: Decadron 1 month taper  Weaning off 2% saline -> anticipate discontinuation today  1.5 L fluid restriction   Continue salt tabs   Incentive spirometry   SLP: regular diet with thins  No acute management per ENT for Zenker's -> outpatient follow-up and diet per SLP   Sliding scale insulin while on Decadron   Monitor for fever  PT/OT: ARU; PM&R  DVT prophylaxis: Sequential compression devices  Ulcer prophylaxis: Protonix 40 mg QD while on Dexamethasone    DISPO: 6A  Barriers: stability of serum sodium off 2%    Carter \"Angel\" MD [facility]   Neurosurgery, PGY-3    Please contact neurosurgery resident on call with questions.    Dial * [phone], enter [phone] when prompted.       "

## 2019-08-26 NOTE — PLAN OF CARE
OT 6A  Discharge Planner OT   Patient plan for discharge: rehab  Current status: Administered the MOCA 8.1 and pt scored 24/30, with 26/30 as normal, indicating a mild cognitive deficit. SBA supine<>EOB. CGA sit<>Stand. Pt ambulated to and from the bathroom with one hand on IV pole and CGA. CGA for a toilet transfer. CGA for g/h while standing at the sink. SBA doffing/donning bilateral socks while seated.   Barriers to return to prior living situation: post surgical precautions, acute medical needs, deconditioning  Recommendations for discharge: ARU  Rationale for recommendations: Pt is below baseline and would benefit from continued skilled therapy to increase activity tolerance and independence with ADLs. Pt is motivated, has a good support system and is making good progress with therapy. Pt is able to tolerate 3 hours of therapy per day.        Entered by: Nuha Lo 08/26/2019 11:48 AM

## 2019-08-26 NOTE — PROGRESS NOTES
Social Work Services Progress Note    Hospital Day: 11  Date of Initial Social Work Evaluation:  8/17/19  Collaborated with: Admissions (Arlyn) at [facility]    Data:  SW is following for discharge planning. Acute rehab placement is recommended.  [facility] is assessing and Admissions has met with pt.    Intervention:  SW completed and faxed [employer] prior approval form.      Assessment:  Pt is agreeable to rehab placement    Plan:    Anticipated Disposition:  Acute rehab placement    Barriers to d/c plan:  Medical readiness and await prior auth from insurance.    Follow Up:  SW will continue to follow for discharge planning.    ETIENNE Johnson  Social Work, 6A  Phone:  [phone]  Pager:  [phone]  8/27/2019

## 2019-08-26 NOTE — PROGRESS NOTES
"CLINICAL NUTRITION SERVICES - ASSESSMENT NOTE     Nutrition Prescription    RECOMMENDATIONS FOR MDs/PROVIDERS TO ORDER:  - Recommend checking current Vitamin D level for evaluation to determine if higher dose warranted in order to resolve deficiency and possibly help correct hypophosphatemia.     Malnutrition Status:    Unable to determine at this time    Recommendations already ordered by Registered Dietitian (RD):  None at this time    Future/Additional Recommendations:  Recommend encouraging good po intakes d/t ongoing low wt.     REASON FOR ASSESSMENT  King Gonsalo Bruno is a/an 73 year old male assessed by the dietitian for LOS      CURRENT NUTRITION ORDERS  Diet: Regular (per SLP reassessment on 8/24, pt with mild oropharyngeal dysphagia)   - Receiving the following ONS: Boost Plus Shake betw meals and Gelatein Plus with meals  Fluid Restriction: 1500 mL  Intake/Tolerance: Per RN report, good po intakes reported today. Informed that pt has already ordered 2 meals this morning.    LABS  PO4 2.0 (low); replacement protocol ordered, but not yet given per review of MAR. Question if hypophosphatemia caused by low Vitamin D level.   Vitamin D 17 (5/15/19); low --> supplementation ordered on 8/22    MEDICATIONS  Vitamin C (8/22-8/27)  Vitamin A (8/16-8/28)  Zinc Sulfate (8/23-8/28)    ANTHROPOMETRICS  Height: 193 cm (6' 3.984\")  Most Recent Weight: 61 kg (134 lb 7.7 oz) - 8/24 (last recorded wt), 60.3 kg (132 lbs) on 8/23 - Lowest wt this admission  Admit wt = 63.5 kg (140 lbs) on 8/16.  IBW: 92 kg (66% of IBW)  BMI: Underweight BMI <18.5 (actual 16.2 km/m2)  Weight History: Wt loss of 3.2 kg (5% loss) x one week. Question d/t shifts in fluid status vs inadequate nutritional intakes. Noted MIVF's discontinued on today.   Wt Readings from Last 10 Encounters:   08/24/19 61 kg (134 lb 7.7 oz)   08/09/19 65.3 kg (144 lb)   08/07/19 63 kg (139 lb)   08/07/19 63 kg (139 lb)   08/01/19 62.7 kg (138 lb 3.2 oz)   07/05/19 63.5 " kg (140 lb 1.6 oz)   06/13/19 62.9 kg (138 lb 9.6 oz)   05/15/19 64 kg (141 lb)   04/25/19 64.9 kg (143 lb)   04/12/19 64.6 kg (142 lb 6.4 oz)     Dosing Weight: 60 kg    ASSESSED NUTRITION NEEDS  Estimated Energy Needs:5902-0816 kcals/day (35 - 40 kcals/kg)  Justification: Underweight  Estimated Protein Needs:  grams protein/day (1.5 - 2 grams of pro/kg)  Justification: Repletion  Estimated Fluid Needs: (1 mL/kcal)   Justification: Maintenance    PHYSICAL FINDINGS  See malnutrition section below.  Unable to complete d/t unable to see pt at this time  Mild Right Facial Droop per chart review.     MALNUTRITION  % Intake: Unable to assess  % Weight Loss: > 2% in 1 week (severe)  Subcutaneous Fat Loss: Unable to assess  Muscle Loss: Unable to assess  Fluid Accumulation/Edema: None noted per chart review  Malnutrition Diagnosis: Unable to determine due to unable to obtain diet hx and unable to complete nutritional physical assessment.    NUTRITION DIAGNOSIS  Underweight related to question inconsistent po intakes vs hypercatabolism secondary to illness as evidenced by underweight at 66% of IBW, BMI of 16.2 km/m2 and recent wt loss of 5% over the past week.        INTERVENTIONS  Implementation  Nutrition Education: No education needs assessed at this time   Collaboration with RN regarding pt's po intakes.     Goals  1. Patient to consume >75% of nutritionally adequate meal trays TID, or the equivalent with supplements/snacks.  2. Wt not to decline < 60 kg    Monitoring/Evaluation  Progress toward goals will be monitored and evaluated per protocol.  Milly Echols RD,LD  Unit pager [phone]

## 2019-08-27 PROBLEM — G93.9 NONTRAUMATIC BRAIN INJURY: Status: ACTIVE | Noted: 2019-01-01

## 2019-08-27 NOTE — PLAN OF CARE
Speech Language Therapy Discharge Summary    Reason for therapy discharge:    Discharged to acute rehabilitation facility.    Progress towards therapy goal(s). See goals on Care Plan in [facility] electronic health record for goal details.  Goals partially met.  Barriers to achieving goals:   discharge from facility.    Therapy recommendation(s):    Continued therapy is recommended.  Rationale/Recommendations:  Continued ST for dysphagia upon discharge.    The patient continues a regular texture diet and thin liquids with self selection of soft/moist foods and with compensatory strategy use. Recommend slow pacing, alternate between consistencies, and take small single sips/bites.

## 2019-08-27 NOTE — CONSULTS
[facility]   PM&R CONSULT    Consulting Provider: Dr. Cintia August   Reason for Consult: Assessment of rehabilitation   Location of Patient: 6A- 6201 - [id number]  Date of Encounter: 8/27/2019       ASSESSMENT/PLAN:    Mr. 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  On 8/16 he presented back to the ED for noted left sided weakness over the past 48 hours.  Was noted to have progression of weakness on the left side and repeat imaging showed mass affect at the site of prior right frontal brain tumor resection.  He underwent a second resection on 8/22/19.  Hospitalization and recover complicated by hyponatremia and zenker's.    Rehab diagnosis: Metastatic brain tumor s/p resection   Functional deficits: impaired mobility and ADLs. With mild cognitive impairment and bowel and bladder function     Recommendations:   The patient will likely be a good candidate for acute inpatient rehab as he is requiring PT/OT for below baseline function with mobility, ADLs, strength and endurance.  He also has a significant medical complexity that requires physician oversight and ongoing skilled nursing for cares and medication management.     Continue PT/OT at this time until primary team ready for discharge     ----------------------------------------------------------------------------------------------------------------  HPI:    Mr. King Gonsalo Bruno is a Right  handed 73 year old yo male w/ PMHX significant for HTN, CKD, DJD, and metastatic penile cancer with known mets to the lungs (s/p chemotherapy treatments) and more recently with findings of mets  to the brain, with prior admission to hospital on 7/22/19 for falls and left sided weakness and numbness with new diagnosis of brain mass in the right frontal lobe.  He is s/p tumor resection on 7/25/19, with good recovery from surgery and was discharged to TCU on 8/1/19 and home on 8/11/19.  On 8/16 he presented back to the ED for notable worsening left sided weakness over the past 48 hours.  He reported progressive worsening on the left side for the past 2 days prior.   Repeat imaging showed mass affect at the site of prior right frontal brain tumor resection.  Neurosurgery was re-consulted and patient underwent a second resection on 8/22/19.  Hospitalization and recover complicated by hyponatremia and zenker's which required no acute intervention.      Briefly today he reports that he is feeling better, he is slowly getting some strength back on the left side and feeling more stable on his feet.  He denies any numbness or tingling, no new fevers, chills, but does report that if he is lying in bed that he has less control over his bladder than if he is standing at edge of bed to urinate.     PREVIOUS LEVEL OF FUNCTION:   Independent mobility and ADLs. No falls the last 6 months. Driving.       CURRENT FUNCTION:   PT - Taken from most recent note:   Progressed gait endurance/pattern with SPC for ~250ft + ~ 50 ft, CGA-min A for minor LOBs where pt was unable to adjust and correct safely. Initiated and progressed 2 x 3 stairs with 1 UE support on railing and SPC - significant cueing/education needed for safe/steady stepping pattern. Multiple STSs completed from various surface heights with SPC + reaching for additional support from free hand (non-cane side) 2/2 pt feeling unsteady though with encouragement to not reach forward pt demonstrated increased steadiness. Intermittently tearful throughout session primarily because of his current mobility status. Impaired cognition. Nursing to utilize FWW + SBA for all  mobility. High falls risk.   Recommendation for ARU    OT - Taken from most recent note:   Administered the MOCA 8.1 and pt scored 24/30, with 26/30 as normal, indicating a mild cognitive deficit. SBA supine<>EOB. CGA sit<>Stand. Pt ambulated to and from the bathroom with one hand on IV pole and CGA. CGA for a toilet transfer. CGA for g/h while standing at the sink. SBA doffing/donning bilateral socks while seated.  Recommendation for ARU    SLP - Taken from most recent note:   -The patient was seen for dysphagia tx this afternoon. He reports good tolerance of PO overall. However, this session wasn't feeling well, so he declined PO trials (RN aware).  The patient continues a regular texture diet and thin liquids with self selection of soft/moist foods and with compensatory strategy use. Recommend slow pacing, alternate between consistencies, and take small single sips/bites.    nursing - medication management, glucose monitoring, assist with fluid restriction and ADls    SOCIAL HISTORY:  Smoke: none  EtOH:denies  Drugs: denies    Relationship: single   Home: condo with 10 steps up to bedroom and 11 steps down to down stairs.   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      GERD (gastroesophageal reflux disease)      Hypertension      Penile cancer (H)      PONV (postoperative nausea and vomiting)        CURRENT MEDICATIONS:  Current Facility-Administered Medications   Medication     acetaminophen (TYLENOL) tablet 650 mg     amLODIPine (NORVASC) tablet 10 mg     ascorbic acid (vitamin C) chewable tablet 500 mg     calcium carbonate 500 mg (elemental) (OSCAL;OYSTER SHELL CALCIUM) tablet 500 mg     dexamethasone (DECADRON) tablet 4 mg    Followed by     [START ON 8/30/2019] dexamethasone (DECADRON)  tablet 3 mg    Followed by     [START ON 9/6/2019] dexamethasone (DECADRON) tablet 2 mg    Followed by     [START ON 9/13/2019] dexamethasone (DECADRON) tablet 1 mg     glycerin (ADULT) Suppository 1 suppository     hydrALAZINE (APRESOLINE) injection 10-20 mg     labetalol (NORMODYNE/TRANDATE) syringe 10-40 mg     levETIRAcetam (KEPPRA) tablet 1,000 mg     lidocaine (LMX4) cream     lidocaine 1 % 0.1-1 mL     magnesium sulfate 2 g in NS intermittent infusion ([facility] or [facility] Cmpd)     magnesium sulfate 4 g in 100 mL sterile water (premade)     naloxone (NARCAN) injection 0.1-0.4 mg     ondansetron (ZOFRAN-ODT) ODT tab 4-8 mg    Or     ondansetron (ZOFRAN) injection 4-8 mg     oxyCODONE (ROXICODONE) tablet 5 mg     pantoprazole (PROTONIX) EC tablet 40 mg     polyethylene glycol (MIRALAX/GLYCOLAX) Packet 17 g     potassium chloride (KLOR-CON) Packet 20-40 mEq     potassium chloride 10 mEq in 100 mL intermittent infusion with 10 mg lidocaine     potassium chloride 10 mEq in 100 mL sterile water intermittent infusion (premix)     potassium chloride 20 mEq in 50 mL intermittent infusion     potassium chloride ER (K-DUR/KLOR-CON M) CR tablet 20-40 mEq     potassium phosphate 10 mmol in D5W 250 mL intermittent infusion     potassium phosphate 15 mmol in D5W 250 mL intermittent infusion     potassium phosphate 20 mmol in D5W 250 mL intermittent infusion     potassium phosphate 20 mmol in D5W 500 mL intermittent infusion     potassium phosphate 25 mmol in D5W 500 mL intermittent infusion     senna-docusate (SENOKOT-S/PERICOLACE) 8.6-50 MG per tablet 2 tablet     sodium chloride (PF) 0.9% PF flush 3 mL     sodium chloride (PF) 0.9% PF flush 3 mL     sodium chloride tablet 1 g     sodium phosphate (FLEET ENEMA) 1 enema     vitamin A 59881 units capsule 20,000 Units     vitamin D3 (CHOLECALCIFEROL) 1000 units (25 mcg) tablet 1,000 Units     vitamin E (TOCOPHEROL) 100 units (90 mg) capsule 100 Units     zinc sulfate (ZINCATE)  "capsule 220 mg       REVIEW OF SYSTEMS:  Total of ten systems reviewed, pertinent positives and negatives as follows  Instability with standing and walking.    Feels generally fatigued  Reports weakness in Left side   Denies any tingling or numbness  No problems with bladder when standing, less control when in bed.   LBM 2 days ago  No chest pain. No cough or SOB.  No visual changes.   No headache or photophobia.   No nausea, or abdominal pain.  Slept ok last night  No joint pain, muscle pain or swelling.  Mood is good, denies any symptoms of depression.   Remainder of the review of the systems was negative.    LABS:   Lab Results   Component Value Date    WBC 9.0 08/26/2019    HGB 8.2 (L) 08/26/2019    HCT 28.2 (L) 08/26/2019    MCV 84 08/26/2019     08/26/2019     Lab Results   Component Value Date     08/27/2019    POTASSIUM 4.1 08/26/2019    CHLORIDE 102 08/26/2019    CO2 25 08/26/2019     (H) 08/26/2019     Lab Results   Component Value Date    GFRESTIMATED 89 08/26/2019    GFRESTBLACK >90 08/26/2019     Lab Results   Component Value Date    AST 35 08/16/2019    ALT 16 08/16/2019    ALKPHOS 81 08/16/2019    BILITOTAL 0.4 08/16/2019     Lab Results   Component Value Date    INR 1.00 08/22/2019     Lab Results   Component Value Date    BUN 26 08/26/2019    CR 0.81 08/26/2019       PHYSICAL EXAM:  Vitals:    08/26/19 2311 08/27/19 0021 08/27/19 0331 08/27/19 0747   BP: (!) 146/63 136/70 130/66 (!) 150/79   BP Location:   Right arm Left arm   Pulse:       Resp: 16  16 16   Temp: 96.1  F (35.6  C)  96.8  F (36  C) 95.3  F (35.2  C)   TempSrc: Axillary  Oral Axillary   SpO2: 100%  100% 100%   Weight:       Height:         Blood pressure (!) 150/79, pulse 78, temperature 95.3  F (35.2  C), temperature source Axillary, resp. rate 16, height 1.93 m (6' 3.98\"), weight 61 kg (134 lb 7.7 oz), SpO2 100 %.    GEN: NAD, alert, oriented, seated comfortably in bed, appropriate and cooperative   Speech is " fluid   Comprehension is grossly normal   HEENT: Incision on right side of head healing well, sutures are self absorbing  RESPIRATORY: CTAB, no wheezes/crackles/rales, no labored breathing  CARDIAC: RRR, no m/g/r, no dependent edema   ABD: soft, non tender, pos BS  MSK: full active and passive ROM at all major joints of the bilaterally upper and lower extremities with slight weakness on left side as detailed below.  No muscle atrophy noted  NEURO:  CRANIAL NERVES: Discs flat. Pupils equal, round and reactive to light. Extraocular movements full. Visual fields full. Face moves symmetrically. Tongue midline. Hearing intact to finger rubbing.               Strength:     Right Strength (0-5/5) Left Strength (0-5/5)   Shoulder Abduction 5/5 4+/5   Elbow Flexion 5/5 4+/5   Wrist Extension 5/5 4/5   Elbow Extension 5/5 4/5   Long Finger Flexion 5/5 4/5   Finger Abduction 5/5 4/5   Hip Flexion 5/5 4/5   Knee Extension 5/5 4/5   Ankle Dorsiflexion 5/5 4/5   Extensor Hallucis Longus 5/5 4/5   Plantar Flexion 5/5 4/5   Sensation: sensation to light touch intact throughout  Tone: normal, no clonus, negative shaw's  Reflexes: symmetric   Gait: normal reciprocal gait   Cognition: fund of knowledge and train of thought appropriate. No obvious inattention, neglect, or restlessness.     SKIN: no rashes or lesions noted.  Patient has a right chest line.   EXT: no edema bilaterally, chronic stasis changes, no ulcers.   PSYCH: normal affect and mood   IMAGING: Reviewed MRI from 8/23/19 and agree with below  Postsurgical changes of right frontal craniotomy, right frontal mass  resection and brachytherapy seed placement. The mass has gross totally  resected. Expected postoperative changes      Thank you for the consult. Please do not hesitate calling with any questions or concerns.    Audie Samaniego MD  PM&R, PGY4  [facility]  [phone] (pager)    Staffed with Dr. Tucker (PM&R)    Total of 70 min spent in this encounter, more than 50% in  counseling and coordination.

## 2019-08-27 NOTE — H&P
[facility]   Acute Rehabilitation Unit  Admission History and Physical    CHIEF COMPLAINT   Left hemiparesis     HISTORY OF PRESENT ILLNESS  Mr. 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  He is s/p tumor resection on 7/25/19, with good recovery from surgery and was discharged to TCU on 8/1/19 and home on 8/11/19.  On 8/16 he presented back to the ED for notable worsening left sided weakness over the past 48 hours.  He reported progressive worsening on the left side for the past 2 days prior.   Repeat imaging showed mass affect at the site of prior right frontal brain tumor resection.  Neurosurgery was re-consulted and patient underwent a second resection on 8/22/19.  Hospitalization and recover complicated by hyponatremia and worsening symptoms due to Zenker's diverticulum which required no acute intervention.       Briefly today he reports that he is feeling better, he is slowly getting some strength back on the left side and feeling more stable on his feet.  He denies any numbness or tingling, no new fevers, chills, but does report that if he is lying in bed that he has less control over his bladder than if he is standing at edge of bed to urinate.     During his acute hospitalization, patient was seen and evaluated by PT, OT, SLP and PM&R consult service.  All specialties collectively recommended that patient would benefit from ongoing therapies in the acute inpatient rehabilitation setting.      In review of the therapy notes,   PT - Taken from most recent note:   Progressed gait endurance/pattern with SPC for ~250ft + ~ 50 ft,  CGA-min A for minor LOBs where pt was unable to adjust and correct safely. Initiated and progressed 2 x 3 stairs with 1 UE support on railing and SPC - significant cueing/education needed for safe/steady stepping pattern. Multiple STSs completed from various surface heights with SPC + reaching for additional support from free hand (non-cane side) 2/2 pt feeling unsteady though with encouragement to not reach forward pt demonstrated increased steadiness. Intermittently tearful throughout session primarily because of his current mobility status. Impaired cognition. Nursing to utilize FWW + SBA for all mobility. High falls risk.   Recommendation for ARU     OT - Taken from most recent note:   Administered the MOCA 8.1 and pt scored 24/30, with 26/30 as normal, indicating a mild cognitive deficit. SBA supine<>EOB. CGA sit<>Stand. Pt ambulated to and from the bathroom with one hand on IV pole and CGA. CGA for a toilet transfer. CGA for g/h while standing at the sink. SBA doffing/donning bilateral socks while seated.  Recommendation for ARU     SLP - Taken from most recent note:   -The patient was seen for dysphagia tx this afternoon. He reports good tolerance of PO overall. However, this session wasn't feeling well, so he declined PO trials (RN aware).  The patient continues a regular texture diet and thin liquids with self selection of soft/moist foods and with compensatory strategy use. Recommend slow pacing, alternate between consistencies, and take small single sips/bites.    Currently, the patient is medically appropriate and is assessed to have needs and will benefit from an inpatient acute rehabilitation comprehensive program working with PT and OT, and will also benefit from supervision and management of Rehab Nursing and Rehab MD.       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      GERD (gastroesophageal reflux disease)      Hypertension      Penile cancer (H)      PONV (postoperative nausea and vomiting)        SURGICAL HISTORY  Past Surgical History:   Procedure Laterality Date     CYSTOSCOPY, BIOPSY BLADDER, COMBINED N/A 8/31/2017    Procedure: COMBINED CYSTOSCOPY, BIOPSY BLADDER;  Cystoscopy, Suprapubic Tube Placement with Ultrasound Guidiance, Uretheral Dilation;  Surgeon: Muriel Haddad MD;  [facility]     CYSTOSTOMY, INSERT TUBE SUPRAPUBIC, COMBINED N/A 8/31/2017    Procedure: COMBINED CYSTOSTOMY, INSERT TUBE SUPRAPUBIC;;  Surgeon: Muriel Haddad MD;  [facility]: [facility] OR     DISSECT LYMPH NODE INGUINAL N/A 3/8/2018    Procedure: DISSECT LYMPH NODE INGUINAL;  Flexible Cystoscopy, Bladder Biopsy, urethral biopsy and penile biopsy, Radical Penectomy and Urethrectomy, Perineal Urethrostomy, Bilateral Inguinal Lymphadenectomy; superficial lymph nodes and deep lymph nodes;  Surgeon: Muriel Haddad MD;  Location: [facility] OR     IR CHEST PORT PLACEMENT > 5 YRS OF AGE  8/21/2019     LARYNGOSCOPY  2013    Direct laryngoscopy with the use of rigid endoscopy and takedown of left hypopharyngeal diverticula.      OPTICAL TRACKING SYSTEM CRANIOTOMY, EXCISE TUMOR, COMBINED Right 7/25/2019    Procedure: Stealth Assisted Right Craniotomy And Tumor Resection;  Surgeon: Chandni Allen MD;  Location: [facility] OR     OPTICAL TRACKING SYSTEM CRANIOTOMY, EXCISE TUMOR, COMBINED Right 8/22/2019    Procedure: Stealth Assisted Right Craniotomy For Tumor Resection With Gamma Tile Placement;  Surgeon: Alfred Del Rio MD;  Location: [facility] OR     PENECTOMY N/A 3/8/2018    Procedure: PENECTOMY;  Flexible Cystoscopy, Bladder Biopsy, urethral biopsy and penile biopsy, Radical Penectomy and Urethrectomy, Perineal Urethrostomy, Bilateral Inguinal Lymphadenectomy; superficial lymph nodes and deep lymph nodes    ;  Surgeon: Muriel Haddad MD;  [facility]  OR     URETHROSTOMY PERINEUM N/A 3/8/2018    Procedure: URETHROSTOMY PERINEUM;  Flexible Cystoscopy, Bladder Biopsy, urethral biopsy and penile biopsy, Radical Penectomy and Urethrectomy, Perineal Urethrostomy, Bilateral Inguinal Lymphadenectomy; superficial lymph nodes and deep lymph nodes;  Surgeon: Muriel Haddad MD;  Location:  OR     PREVIOUS LEVEL OF FUNCTION:   Independent mobility and ADLs. No falls the last 6 months. Driving.    SOCIAL HISTORY  Marital Status: Single   Living situation:  condo with 10 steps up to bedroom and 11 steps down to down stairs.   Family support: Friends available to assist with cares but not provide    Tobacco use: Denies  Alcohol use: Denies  Illicit drug use: Denies  Social History     Socioeconomic History     Marital status: Single     Spouse name: Stephanie Oh     Number of children: 1     Years of education: Not on file     Highest education level: Not on file   Occupational History     Occupation: retired crisis  for [employer]   Social Needs     Financial resource strain: Not on file     Food insecurity:     Worry: Not on file     Inability: Not on file     Transportation needs:     Medical: Not on file     Non-medical: Not on file   Tobacco Use     Smoking status: Former Smoker     Packs/day: 1.00     Years: 20.00     Pack years: 20.00     Types: Cigarettes     Start date: 1972     Last attempt to quit: 1992     Years since quittin.6     Smokeless tobacco: Never Used     Tobacco comment: quit in    Substance and Sexual Activity     Alcohol use: No     Comment:  quit per personal choice.     Drug use: No     Sexual activity: Not on file   Lifestyle     Physical activity:     Days per week: Not on file     Minutes per session: Not on file     Stress: Not on file   Relationships     Social connections:     Talks on phone: Not on file     Gets together: Not on file     Attends Hinduism service: Not on file     Active member of  club or organization: Not on file     Attends meetings of clubs or organizations: Not on file     Relationship status: Not on file     Intimate partner violence:     Fear of current or ex partner: Not on file     Emotionally abused: Not on file     Physically abused: Not on file     Forced sexual activity: Not on file   Other Topics Concern     Parent/sibling w/ CABG, MI or angioplasty before 65F 55M? Not Asked   Social History Narrative     Not on file       FAMILY HISTORY  Family History   Problem Relation Age of Onset     Cerebrovascular Disease Mother      Hypertension Mother      Prostate Cancer Father 84     Prostate Cancer Brother 63     Diabetes Brother      Cancer Brother      Diabetes Brother          PRIOR FUNCTIONAL HISTORY   Pt was independent with all ADLs/IADLs, transfers, mobility and gait.      MEDICATIONS  Medications Prior to Admission   Medication Sig Dispense Refill Last Dose     amLODIPine (NORVASC) 10 MG tablet Take 1 tablet (10 mg) by mouth daily   8/27/2019 at 0847     ascorbic acid (VITAMIN C) 250 MG CHEW chewable tablet Take 500 mg by mouth 2 times daily   8/27/2019 at 0845     calcium carbonate 500 mg, elemental, (OSCAL;OYSTER SHELL CALCIUM) 500 MG tablet Take 1 tablet (500 mg) by mouth 2 times daily (with meals) 60 tablet 0 8/27/2019 at  0847     cholecalciferol 1000 units TABS Take 1,000 Units by mouth 2 times daily 60 tablet 0 8/27/2019 at  0847     dexamethasone (DECADRON) 4 MG tablet Take 1 tablet (4 mg) by mouth every 8 hours   8/27/2019 at 1327     levETIRAcetam (KEPPRA) 1000 MG tablet Take 1 tablet (1,000 mg) by mouth every 12 hours   8/27/2019 at 0545     Misc Natural Products (LUTEIN VISION BLEND) CAPS Take 2 capsules by mouth 2 times daily   8/27/2019 at Unknown time     [START ON 8/28/2019] pantoprazole (PROTONIX) 40 MG EC tablet Take 1 tablet (40 mg) by mouth every morning (before breakfast)   8/27/2019 at 0847     polyethylene glycol (MIRALAX/GLYCOLAX) packet Take 1 packet  "by mouth daily as needed    8/27/2019 at 0848     senna-docusate (SENOKOT-S/PERICOLACE) 8.6-50 MG tablet Take 2 tablets by mouth 2 times daily   8/26/2019 at 1946     sodium chloride 1 GM tablet Take 1 g by mouth 3 times daily   8/27/2019 at 1327     vitamin A 36526 units capsule Take 20,000 Units by mouth daily   8/27/2019 at 0845     vitamin E (TOCOPHEROL) 100 units (90 mg) capsule Take 100 Units by mouth daily   8/27/2019 at  0847     zinc sulfate (ZINCATE) 220 (50 Zn) MG capsule Take 220 mg by mouth daily   8/27/2019 at 0847      fluticasone (FLONASE) 50 MCG/ACT nasal spray Spray 2 sprays into both nostrils daily        potassium phosphate, monobasic, (K-PHOS) 500 MG tablet Take 1 tablet (500 mg) by mouth daily 30 tablet 0        ALLERGIES     Allergies   Allergen Reactions     Lisinopril Swelling     Oxycodone Nausea and Vomiting     Vicodin [Hydrocodone-Acetaminophen] Nausea and Vomiting         REVIEW OF SYSTEMS  A 10 point ROS was performed and negative unless otherwise noted in HPI.     Constitutional: denies any fevers, chills.   Eyes: denies changes in visual acuity   Ears, Nose, Throat: History of Zenker which is chronic and patient is at baseline.   Cardiovascular: denies any exertional chest pain or palpitation   Respiratory: denies dyspnea   Gastrointestinal: denies any nausea, vomiting, abdominal pain, diarrhea or constipation   Genitourinary: denies any dysuria, hematuria, has some urgency which is chronic   Musculoskeletal: denies any muscle pain, joint pain, neck pain or back pain   Neurologic: denies any headache, changes in motor or sensory function, no loss of balance or vertigo   Psychiatric: denies mood changes; sleeps OK         PHYSICAL EXAM  VITAL SIGNS:  BP (!) 141/78 (BP Location: Right arm)   Pulse 68   Temp 96.9  F (36.1  C) (Oral)   Resp 18   SpO2 100%   BMI:  Estimated body mass index is 16.38 kg/m  as calculated from the following:    Height as of 8/22/19: 1.93 m (6' 3.98\").    " Weight as of 8/24/19: 61 kg (134 lb 7.7 oz).     GEN: NAD, alert, oriented, seated comfortably in bed, appropriate and cooperative   Speech is fluid   Comprehension is grossly normal   HEENT: Incision on right side of head healing well, sutures are self absorbing  RESPIRATORY: CTAB, no wheezes/crackles/rales, no labored breathing  CARDIAC: RRR, no m/g/r, no dependent edema   ABD: soft, non tender, pos BS  MSK: full active and passive ROM at all major joints of the bilaterally upper and lower extremities with slight weakness on left side as detailed below.  No muscle atrophy noted  NEURO:  CRANIAL NERVES: Discs flat. Pupils equal, round and reactive to light. Extraocular movements full. Visual fields full. Face moves symmetrically. Tongue midline. Hearing intact to finger rubbing.               Strength:     Right Strength (0-5/5) Left Strength (0-5/5)   Shoulder Abduction 5/5 4+/5   Elbow Flexion 5/5 4+/5   Wrist Extension 5/5 4/5   Elbow Extension 5/5 4/5   Long Finger Flexion 5/5 4/5   Finger Abduction 5/5 4/5   Hip Flexion 5/5 4/5   Knee Extension 5/5 4/5   Ankle Dorsiflexion 5/5 4/5   Extensor Hallucis Longus 5/5 4/5   Plantar Flexion 5/5 4/5   Sensation: sensation to light touch intact throughout  Tone: normal, no clonus, negative shaw's  Reflexes: symmetric   Gait: normal reciprocal gait              Cognition: fund of knowledge and train of thought appropriate. No obvious inattention, neglect, or restlessness.      SKIN: no rashes or lesions noted.  Patient has a right chest line.   EXT: no edema bilaterally, chronic stasis changes, no ulcers.   PSYCH: normal affect and mood     LABS  Pertinent labs Hgb - stable at 8.2. CBC in am, Na stable at 134 and K+ stable at 4.1 - BMP in am     Lab Results   Component Value Date    WBC 9.0 08/26/2019    HGB 8.2 (L) 08/26/2019    HCT 28.2 (L) 08/26/2019    MCV 84 08/26/2019     08/26/2019     Lab Results   Component Value Date     08/27/2019    POTASSIUM  4.1 08/26/2019    CHLORIDE 102 08/26/2019    CO2 25 08/26/2019     (H) 08/26/2019     Lab Results   Component Value Date    GFRESTIMATED 89 08/26/2019    GFRESTBLACK >90 08/26/2019     Lab Results   Component Value Date    AST 35 08/16/2019    ALT 16 08/16/2019    ALKPHOS 81 08/16/2019    BILITOTAL 0.4 08/16/2019     Lab Results   Component Value Date    INR 1.00 08/22/2019     Lab Results   Component Value Date    BUN 26 08/26/2019    CR 0.81 08/26/2019         ASSESSMENT/PLAN:  Mr. 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  On 8/16 he presented back to the ED for noted left sided weakness over the past 48 hours.  Was noted to have progression of weakness on the left side and repeat imaging showed mass affect at the site of prior right frontal brain tumor resection.  He underwent a second resection on 8/22/19.  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  Admitting to ARU for Non-traumatic brain injury secondary to recurrent brain tumor s/p resection and repeat resection.  (primary reason which should match PAS)      Admission to acute inpatient rehab on 8/27/19    Impairment group code: 02.1    1. PT and OT for 90 minutes of each on a daily basis, in addition to rehab nursing and close management of physiatrist.      2. Impairment of ADL's: daily OT for 90 minutes daily to work on ADL re-training such as grooming, self cares and bathing.      3. Impairment of mobility:  Daily PT for 90 minutes daily to work on neuromuscular re-education focusing on strength, balance, coordination, and  endurance.      4. Rehab RN for med administration, bowel regimen, glucose monitoring and wound care.     5. Medical Conditions  # Metastatic brain tumor; metastatic squamous cell carcinoma; s/p resection on 8/22/19 now with cerebral edema   - Discontinued keppra per neurosurgery team recs   - Wound care; incision open to air   - PRN tylenol for pain   - Decadron Taper -    4 mg q8h -> beginning on 8/23   3 mg q8h -> beginning on 8/30   2 mg q8h -> beginning on 9/6   1 mg q8h -> beginning on 9/13   0 mg -> beginning on 9/20     # Hyponatremia:   - Fluid Restriction 1500ml  - Salt tabs 1g TID   - BMP in am     # Anemia: post surgery  - CBC in am  - Most recent hgb 8.4    # Hypomagnesemia/hypokalemia  - Check BMP Q2-3 days with monitoring of electrolytes and replace as needed.   - If Magnesium between 1.6-2 replace with 2 mg IV and recheck bmp in am   - If Potasium between 2.0 and 2.4 replace with 15 mmol and recheck bmp in am     #HTN: Essential HTN  - Cont PTA amlodipine 10 mg qday    #Known Zenker's diverticulum and chronic mild dysphagia: followed by Dr. Ortiz as an outpatient and with serial video swallow studies, last performed on 2/1/19 showing stable small lateral pharyngeal pouches not amenable to surgery. Next scheduled video swallow study on 9/3/19. Pending results of this video swallow study patient may follow up with Dr. Ortiz for ongoing surgical discussions if pharyngeal pouches are growing in size.    6. Adjustment to disability:  Clinical psychology to eval and treat  7. FEN: Fluid restriction of 1500 ml/day, Regular diet with thins. Supplements per dietitian recs.   8. Bowel: Miralax daily prn, Senna BID prn  9. Bladder: Bladder scan PVR x 2 with straight cath for volumes > 300 ml   10. DVT Prophylaxis: Mechanical   11. GI Prophylaxis: Protonix 40 mg Qday - while on decadron  12. Code: Full  13. Disposition: Home with support  14. ELOS:  5-7 days .  15. Rehab prognosis:  good  16. Follow up  Appointments on Discharge:    - Follow up with Dr Alfred Del Rio in Neurosurgery clinic in 2 weeks with repeat MRI brain    - Follow up with your primary Oncologist in 1-2 weeks    - Follow up PM&R in 6-8 weeks   - Follow up with PCP in 7 days from discharge.       Audie Samaniego MD  PM&R Resident PGY-4  Pager: [phone]    Patient staffed with Dr. Cintron.     Physician Attestation   I, Haley Cintron MD, saw this patient with the resident and agree with the resident/fellow's findings and plan of care as documented in the note.      I personally reviewed vital signs, medications, labs and imaging.    Torres findings:  King Damion is a 74 yo male with metastatic brain lesions s/p craniotomy for resection.     Post-po course was complicated by hyponatremia and other electrolyte abnormality, and anemia. 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 He also has h/o HTN and chronic dysphagia due to zenker's diverticulum, followed by ENT team.      He was I with all aspects of his life. Currently he is able to walk with SEC and CGA; working on stairs with cane, use of railing and min A. Requires CGA for most ADLs. Baseline dysphagia has been stable.      Anticipate improvement to mod I level with mobility and ADLs. Might need help with iADLs pending his cognition.      Will benefit from intensive rehabilitation includin minutes each of PT, and OT. Will consult SLP pending OT screening for functional cognition and for repeat FVSS.   Rehabilitation nursing  Close management by physiatry     He has good rehab prognosis; medical prognosis is guarded due to his metastatic cancer and new lesions.   Estimated length of stay is 5-7 days     Haley Cintron MD  Date of Service (when I saw the patient): 19

## 2019-08-27 NOTE — PLAN OF CARE
Physical Therapy Discharge Summary    Reason for therapy discharge:    Discharged to acute rehabilitation facility.    Progress towards therapy goal(s). See goals on Care Plan in [facility] electronic health record for goal details.  Goals partially met.  Barriers to achieving goals:   discharge from facility.    Therapy recommendation(s):    Continued therapy is recommended.  Rationale/Recommendations:  to progress functional mobility and ADLs towards PLOF.

## 2019-08-27 NOTE — PROGRESS NOTES
Social Work Services Discharge Note      Patient Name:  King Gonsalo Bruno     Anticipated Discharge Date:  8/27/19    Discharge Disposition:   [facility] ([phone])    Following MD:  Facility Assignment     Pre-Admission Screening (PAS) online form has been completed.  The Level of Care (LOC) is:  Determined  Confirmation Code is:  Not required as pt is admitting to ARU setting.       Additional Services/Equipment Arranged:  SOWMYA confirmed readiness for discharge with Janet Porras NP  Neuro Surgery. [facility] ([facility]  SOWMYA arranged for [employer] ([place] [phone]) to provide w/c transport at 2pm. SOWMYA received notice from [employer] that prior auth was approved for pt's ARU stay.  SOWMYA faxed a copy of the prior auth approval to  ARU Admissions and a copy was placed in the medical chart.     Patient / Family response to discharge plan:  Pt and significant other (Stephanie May) voice understanding of the discharge plan and agreement with the discharge plan.      Persons notified of above discharge plan:  Patient, significant other (Stephanie May), 6A nursing and Janet Porras NP.    Staff Discharge Instructions:  Please fax discharge orders and signed hard scripts for any controlled substances.  Please print a packet and send with patient.     CTS Handoff completed:  YES    Medicare Notice of Rights provided to the patient/family:  NO, as [employer] manages pt's medicare benefits.    ETIENNE Johnson  Social Work, 6A  Phone:  [phone]  Pager:  [phone]  8/27/2019

## 2019-08-27 NOTE — PLAN OF CARE
Discharge Planner OT   Patient plan for discharge: ARU  Current status: Pt performed full body shower and most LB dressing with SBA today. VCs for sequencing, attention to task, and attention to L hand. Tolerated well, VSS.  Barriers to return to prior living situation: Decreased ADL independence and activity tolerance  Recommendations for discharge: ARU  Rationale for recommendations: Increase functional endurance and ADL independence at rehab       Entered by: Abhishek Ayoub 08/27/2019 2:15 PM     Occupational Therapy Discharge Summary    Reason for therapy discharge:    Discharged to acute rehabilitation facility.    Progress towards therapy goal(s). See goals on Care Plan in [facility] electronic health record for goal details.  Goals partially met.  Barriers to achieving goals:   discharge from facility.    Therapy recommendation(s):    Continued therapy is recommended.  Rationale/Recommendations:  continue OT at ARU.

## 2019-08-27 NOTE — PLAN OF CARE
OT:  Initial evaluation started at this time.  Pt able to complete sit to stands and ambulation with CGA.  Pt using single point cane to assist with ambulation.  Pt able to ambulate to and from therapy gym with CGA and one seated rest break in gym. Will continue with therapy evaluations tomorrow.

## 2019-08-27 NOTE — PROGRESS NOTES
"Neurosurgery Progress Note      S: no acute events overnight. Serum sodium has remained stable on salt tabs.  Patient states he is ready to leave the hospital.     O:  BP (!) 150/79 (BP Location: Left arm)   Pulse 78   Temp 95.3  F (35.2  C) (Axillary)   Resp 16   Ht 1.93 m (6' 3.98\")   Wt 61 kg (134 lb 7.7 oz)   SpO2 100%   BMI 16.38 kg/m    Exam:  General: Awake;  Alert, In No Acute Distress  Pulm: Breathing Comfortably on room air  Mental status: Oriented x 3  Cranial Nerves: No dysarthria. Extraocular muscles intact. Very slight left facial droop.   Strength: 5/5 in right upper and lower. 4/5 in the Left upper and lower.   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 Doing well post-op. Ongoing issues: hyponatremia and dysphagia due to Zenker s.     Plan:   Cerebral Edema: Decadron 1 month taper  1.5 L fluid restriction   Continue salt tabs   Incentive spirometry   SLP: regular diet with thins  Sliding scale insulin while on Decadron   Monitor for fever  PT/OT: ARU; PM&R  DVT prophylaxis: Sequential compression devices  Ulcer prophylaxis: Protonix 40 mg QD while on Dexamethasone    DISPO: TCU placement      ISAURA Chapman, CNP  Department of Neurosurgery  Pager: [phone]    "

## 2019-08-27 NOTE — PLAN OF CARE
Status: Patient is POD 5 s/p crani/tumor resection & gamma tile placement. 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   Vitals: VSS. Intermittently bradycardic.   Neuros: A&Ox4. LUE=4/5, LLE=3/5, moderate hand grasp and dorsi/plantar flexion.  IV: R chest port SL.  Resp/trach: Oral suctioning mouth independently. Fair productive cough.  Diet: Regular diet. C/o chronic swallowing issue. 1500mL fluid restriction, minimal intake overnight - see flowsheets.   Bowel status: No BM this shift.  : Voiding spontaneously with urgency and urge incontinence at times.  Skin: Head incision intact with sutures and liquid bandage.  Pain: Denies.  Activity: Up SBA  Social: No visitors this shift.  Plan: Continue to montior sodium levels. PM&R consulted, plan to discharge to ARU.

## 2019-08-27 NOTE — PLAN OF CARE
Neuro: A&Ox4. LUE 4/5, LLE 3/5 strength.  L sided neglect continues.   Cardiac: VSS.   Respiratory: Sating >92% on RA.  GI/: Adequate urine output. BM 8/25/19  Diet/appetite: Tolerating regular diet. Eating well.  Activity:  Assist of SBA, up to chair and in halls.  Pain: At acceptable level on current regimen.   Skin: No new deficits noted.  Head incision c/d/I, CAROL  LDA's:  R chest port    Plan:  IV mag and phos given per protocol.  [facility]   was dressed and escorted to [facility] wheelchair prior to discharge.  All possessions collected and sent with him and his wife.

## 2019-08-27 NOTE — PHARMACY
Admission medication history for the August 27, 2019 admission is complete.     Interview sources:  MAR from the previous institution    Reliability of source: reliable    Medication compliance: compliant    Changes made to PTA medication list (reason)  Added: ascorbic acid, vitamin A, vitamin E, Zinc, sodium chloride, MVI w/lutein  Deleted: Tylenol prn, Cepacol, omeprazole, oxycodone  Changed: Miralax        Prior to Admission medications    Medication Sig Last Dose Taking? Auth Provider   amLODIPine (NORVASC) 10 MG tablet Take 1 tablet (10 mg) by mouth daily 8/27/2019 at Unknown time Yes Janet Porras APRN CNP   ascorbic acid (VITAMIN C) 250 MG CHEW chewable tablet Take 500 mg by mouth 2 times daily 8/27/2019 at Unknown time Yes Unknown, Entered By History   calcium carbonate 500 mg, elemental, (OSCAL;OYSTER SHELL CALCIUM) 500 MG tablet Take 1 tablet (500 mg) by mouth 2 times daily (with meals) 8/27/2019 at Unknown time Yes Janet Porras APRN CNP   cholecalciferol 1000 units TABS Take 1,000 Units by mouth 2 times daily 8/27/2019 at Unknown time Yes Janet Porras APRN CNP   dexamethasone (DECADRON) 4 MG tablet Take 1 tablet (4 mg) by mouth every 8 hours 8/27/2019 at Unknown time Yes Janet Porras APRN CNP   levETIRAcetam (KEPPRA) 1000 MG tablet Take 1 tablet (1,000 mg) by mouth every 12 hours 8/27/2019 at Unknown time Yes Janet Porras APRN CNP   pantoprazole (PROTONIX) 40 MG EC tablet Take 1 tablet (40 mg) by mouth every morning (before breakfast) 8/27/2019 at Unknown time Yes Janet Porras APRN CNP   polyethylene glycol (MIRALAX/GLYCOLAX) packet Take 1 packet by mouth daily as needed  8/27/2019 at Unknown time Yes Janet Porras APRN CNP   senna-docusate (SENOKOT-S/PERICOLACE) 8.6-50 MG tablet Take 2 tablets by mouth 2 times daily 8/26/2019 at Unknown time Yes Janet Porras APRN CNP    vitamin A 72291 units capsule Take 20,000 Units by mouth daily 8/27/2019 at Unknown time Yes Unknown, Entered By History   vitamin E (TOCOPHEROL) 100 units (90 mg) capsule Take 100 Units by mouth daily 8/27/2019 at Unknown time Yes Unknown, Entered By History   zinc sulfate (ZINCATE) 220 (50 Zn) MG capsule Take 220 mg by mouth daily 8/27/2019 at Unknown time Yes Unknown, Entered By History   fluticasone (FLONASE) 50 MCG/ACT nasal spray Spray 2 sprays into both nostrils daily   Janet Porras APRN CNP   potassium phosphate, monobasic, (K-PHOS) 500 MG tablet Take 1 tablet (500 mg) by mouth daily   Janet Porras APRN CNP       Time spent: 20 minutes    Medication history completed by:   Agueda Baptiste, PharmD, BCPS August 27, 2019

## 2019-08-27 NOTE — H&P
Post Admission Physician Evaluation:    I have compared King Damion's condition on admission to acute rehabilitation to that outlined in the preadmission screen. History and physical exam performed by me. No significant differences are identified and the patient remains appropriate for an inpatient rehabilitation facility level of care to manage medical issues and address functional impairments due to (rehabilitation diagnosis) metastatic brain lesions s/p craniotomy for resection.    Comorbid medical conditions being managed: Post-po course was complicated by hyponatremia and other electrolyte abnormality, and anemia. 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 He also has h/o HTN and chronic dysphagia due to zenker's diverticulum, followed by ENT team.     Prior functional level: I with all aspects of his life.     Present function: walking with SEC and CGA; working on stairs with cane, use of railing and min A. CGA for most ADLs. Full cognitive and linguistic evaluation is pending.     Anticipated rehabilitation course: improvement to mod I level with mobility and ADLs. Might need help with iADLs pending his cognition.     Will benefit from intensive rehabilitation includin minutes each of PT, and OT. Will consult SLP pending OT screening for functional cognition and for repeat FVSS.   Rehabilitation nursing  Close management by physiatry    Prognosis: good rehab prognosis; medical prognosis is guarded due to his metastatic cancer and new lesions     Estimated length of stay: 5-7 days     Haley Cintron MD  Physical Medicine & Rehabilitation

## 2019-08-27 NOTE — PROGRESS NOTES
"Social Work Services Progress Note    Hospital Day: 12  Date of Initial Social Work Evaluation:  8/17/19  Collaborated with:  Janet Porras (NP Neuro Surgery), [facility]  ([place]), Admissions at [facility] ([place])    Data:  SOWMYA is following for discharge planning. Acute rehab placement is recommended.      Intervention:  SOWMYA spoke with Janet Porras NP Neuro Surgery. Janet confirms readiness for discharge.  SOWMYA phoned [facility] Case  ([place] [phone]) and confirmed receipt of request for prior auth that was sent on 8/26/19. [place] indicates that the assigned case number is [id number].  [place] states that a case was \"built\" and is in review. [place] anticipates a decision soon.  SOWMYA updated Admissions ([place]) at [facility].       Assessment:  Per Neuro Surgery, pt is medically ready for discharge.    Plan:    Anticipated Disposition:  [facility] if pt's insurance gives prior auth.    Barriers to d/c plan:  Awaiting prior auth from [facility]    Follow Up:  SOWMYA will continue to follow.    ETIENNE Johnson  Social Work, 6A  Phone:  [phone]  Pager:  [phone]  8/27/2019        "

## 2019-08-28 NOTE — PROGRESS NOTES
"   08/28/19 0915   Quick Adds   Type of Visit Initial PT Evaluation      Language English   Living Environment   Lives With alone   Living Arrangements condominium   Home Accessibility stairs within home   Number of Stairs, Within Home, Primary   (11 to bed room, 7 to laundry)   Stair Railings, Within Home, Primary railing on left side (ascending)   Transportation Anticipated family or friend will provide  (Stephanie QIU)   Living Environment Comment Pt reports having stairs to get to bed room, has tub shower at home   Self-Care   Current Activity Tolerance moderate   Regular Exercise No   Equipment Currently Used at Home none   Functional Level Prior   Ambulation 0-->independent   Transferring 0-->independent   Toileting 0-->independent   Bathing 0-->independent   Communication 0-->understands/communicates without difficulty   Swallowing 0-->swallows foods/liquids without difficulty   Cognition 0 - no cognition issues reported   Fall history within last six months yes   Number of times patient has fallen within last six months 4  (per chart review)   Which of the above functional risks had a recent onset or change? ambulation;transferring;toileting;bathing   Prior Functional Level Comment Pt reports independent with all ADL and IADL   General Information   Onset of Illness/Injury or Date of Surgery - Date 08/16/19   Referring Physician Dr. Haley Cintron   Patient/Family Goals Statement \"I want to get home again\"   Pertinent History of Current Problem (include personal factors and/or comorbidities that impact the POC) Now with gamma tile intracranial. Per chart: Right  handed 73 year old yo male w/ PMHX significant for HTN, CKD, DJD, and metastatic penile cancer with known mets to the lungs (s/p chemotherapy treatments) and more recently with findings of mets to the brain, with prior admission to hospital on 7/22/19 for falls and left sided weakness and numbness with new diagnosis of brain mass in the right " frontal lobe.  He is s/p tumor resection on 7/25/19, with good recovery from surgery and was discharged to TCU on 8/1/19 and home on 8/11/19.  On 8/16 he presented back to the ED for notable worsening left sided weakness over the past 48 hours.  He reported progressive worsening on the left side for the past 2 days prior.   Repeat imaging showed mass affect at the site of prior right frontal brain tumor resection.  Neurosurgery was re-consulted and patient underwent a second resection on 8/22/19.  Hospitalization and recover complicated by hyponatremia and worsening symptoms due to Zenker's diverticulum which required no acute intervention.     Precautions/Limitations fall precautions  (cranial, gamma tile)   Heart Disease Risk Factors Stress;Gender;Age;Race;Medical history   General Observations Pleasant and conversational. Motivated to participate.    General Info Comments Tearful at times - feeling sad about last functional backslide.   Cognitive Status Examination   Orientation orientation to person, place and time   Level of Consciousness alert   Follows Commands and Answers Questions 100% of the time   Personal Safety and Judgment intact   Memory intact   Cognitive Comment No initial concerns - will defer to OT   Pain Assessment   Patient Currently in Pain No   Integumentary/Edema   Integumentary/Edema Comments Closed incision on R cranium, open to air   Posture    Posture Not impaired   Range of Motion (ROM)   ROM Quick Adds No deficits were identified   Strength   Strength Comments Generalized weakness on LLE 4/5, 4+/5 in RLE.    ARC Assessment Only   Acute Rehab FIM See FIM scores for Mobility/ADL Assessment   Balance   Balance Comments Able to stand with feet together, eyes closed, and resist mild perturbations in standard stance. No LOB noted, but stumbles with gait d/t intermittent L toe drag. Will test RODRIGUEZ this PM.    Sensory Examination   Sensory Perception Comments Denies sensory changes, functional  L inattention.    Coordination   Coordination Comments PADILLA in LLE. [place] scrape equal bilaterally.    Muscle Tone   Muscle Tone no deficits were identified   Modality Interventions   Planned Modality Interventions Comments TENS for attention   General Therapy Interventions   Planned Therapy Interventions balance training;bed mobility training;ROM;strengthening;stretching;transfer training;gait training;groups;neuromuscular re-education;progressive activity/exercise;home program guidelines;risk factor education   Clinical Impression   Criteria for Skilled Therapeutic Intervention yes, treatment indicated   PT Diagnosis Difficulty walking, imbalance   Influenced by the following impairments see clinical impression comments   Functional limitations due to impairments see clinical impression comments   Clinical Presentation Evolving/Changing   Clinical Presentation Rationale Moderately complex d/t medical dx and neuro symptoms.    Clinical Decision Making (Complexity) Moderate complexity   Therapy Frequency Daily   Predicted Duration of Therapy Intervention (days/wks) 5-7 days   Anticipated Equipment Needs at Discharge standard cane   Anticipated Discharge Disposition Home with Outpatient Therapy   Risk & Benefits of therapy have been explained Yes   Patient, Family & other staff in agreement with plan of care Yes   Clinical Impression Comments Pt is below baseline for functional mobility s/p cranial tumor resection revision and gamma tile placement. He presents with mild L hemiparesis, imbalance, gait disturbance, and L inattention. Will requier 5-7 days to reach MOD I with household mobility. Recommending OP PT after d/c.   Total Evaluation Time   Total Evaluation Time (Minutes) 30

## 2019-08-28 NOTE — PROCEDURES
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 Resection of recurrent tumor   2. Placement of LDR brachytherapy seeds (Gamma Tiles)     ANESTHESIA: General endotracheal.      SURGEONS: Mulu Barnett MD Radiation Oncology                         Alfred Del Rio M.D., Ph.D Neurosurgery      ASSISTANTS: Rahul Fang MD Radiation oncology resident       NUMBER OF GAMMA TILES: 6      OPERATIVE PROCEDURE IN DETAIL: We joined the neurosurgery team after resection of the residual tumor.  A total number of 6  Gamma tiles have been placed into the resection cavity. Please see to Dr. Del Rio's operative note for more surgical details.      Rahul Fang MD  PGY-3 Resident, Radiation Oncology  [facility]     I was personally present for the critical radiation portion of this case and supervised the handling of the brachytherapy sources.     Mulu Barnett   [phone]

## 2019-08-28 NOTE — PLAN OF CARE
PT eval completed today. Pt is below baseline for functional mobility s/p cranial tumor resection revision and gamma tile placement. He presents with mild L hemiparesis, imbalance, gait disturbance, and L inattention. Will requier 5-7 days to reach MOD I with household mobility. Recommending OP PT after d/c.

## 2019-08-28 NOTE — PROGRESS NOTES
"  [facility]   Acute Rehabilitation Unit  Daily progress note    INTERVAL HISTORY  King Gonsalo Bruno was seen and examined at bedside. Was doing well. Was incontinent of bladder last night as he didn't make it on time. Reported chronic issue with urgency and nocturia (x3-4/night) since his initial surgery or penile cancer. Can't use a urinal or condom cath.     Discontinued fluid restriction today and will recheck labs in am.  Discussed the recommendations to minimize fluid intake after supper, toileing before bedtime and no coffee/tea in the afternoon. He was compliant with all of these recommendation even prior to admission.     Has started his rehab course today; will discuss ELOS and discharge date at [facility].       MEDICATIONS  Scheduled meds    amLODIPine  10 mg Oral Daily     calcium carbonate (OS-SAADIA) 500 mg (elemental) tablet  500 mg Oral BID w/meals     dexamethasone  4 mg Oral Q8H JOVANI    Followed by     [START ON 8/30/2019] dexamethasone  3 mg Oral Q8H JOVANI    Followed by     [START ON 9/6/2019] dexamethasone  2 mg Oral Q8H JOVANI    Followed by     [START ON 9/13/2019] dexamethasone  1 mg Oral Q8H JOVANI     pantoprazole  40 mg Oral QAM AC     sodium chloride  1 g Oral TID w/meals     cholecalciferol  25 mcg Oral BID     vitamin E  100 Units Oral Daily       PRN meds:  acetaminophen, alum & mag hydroxide-simethicone, ondansetron, polyethylene glycol, senna-docusate      PHYSICAL EXAM  /70 (BP Location: Right arm)   Pulse 71   Temp 97.5  F (36.4  C) (Oral)   Resp 18   Ht 1.905 m (6' 3\")   Wt 62.6 kg (138 lb)   SpO2 99%   BMI 17.25 kg/m    Gen: NAD, sitting in chair   HEENT: incision intact and healing well   Pulm: non-labored in room air   Abd: soft and non-tender   Ext: no edema in bilateral lower extremities    Neuro/MSK: alert and fully oriented, mild left hemiparesis, was seen walking with FWW    LABS  No new today     ASSESSMENT AND PLAN  Mr. King Gonsalo Bruno " is a Right  handed 73 year old yo male w/ PMHX significant for HTN, CKD, DJD, and 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  On 8/16 he presented back to the ED for noted left sided weakness over the past 48 hours.  Was noted to have progression of weakness on the left side and repeat imaging showed mass affect at the site of prior right frontal brain tumor resection.  He underwent a second resection on 8/22/19.  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  Admitting to ARU for Non-traumatic brain injury secondary to recurrent brain tumor s/p resection and repeat resection.  (primary reason which should match PAS)        Admission to acute inpatient rehab on 8/27/19    Impairment group code: 02.1     1. PT and OT for 90 minutes of each on a daily basis, in addition to rehab nursing and close management of physiatrist.       2. Impairment of ADL's: daily OT for 90 minutes daily to work on ADL re-training such as grooming, self cares and bathing.       3. Impairment of mobility:  Daily PT for 90 minutes daily to work on neuromuscular re-education focusing on strength, balance, coordination, and endurance.       4. Rehab RN for med administration, bowel regimen, glucose monitoring and wound care.      5. Medical Conditions  # Metastatic brain tumor; metastatic squamous cell carcinoma; s/p resection on 8/22/19 now with cerebral edema   - Discontinued keppra per neurosurgery team recs   - Wound care; incision open to air   - PRN tylenol for pain   - Decadron Taper -                4 mg q8h -> beginning on 8/23               3 mg q8h -> beginning on 8/30               2 mg q8h -> beginning on 9/6                1 mg q8h -> beginning on 9/13               0 mg -> beginning on 9/20      # Hyponatremia:   - Fluid Restriction 1500ml  - Salt tabs 1g TID   - BMP in am      # Anemia: post surgery  - CBC in am  - Most recent hgb 8.4     # Hypomagnesemia/hypokalemia  - Check BMP Q2-3 days with monitoring of electrolytes and replace as needed.   - If Magnesium between 1.6-2 replace with 2 mg IV and recheck bmp in am   - If Potasium between 2.0 and 2.4 replace with 15 mmol and recheck bmp in am      #HTN: Essential HTN  - Cont PTA amlodipine 10 mg qday     #Known Zenker's diverticulum and chronic mild dysphagia: followed by Dr. Ortiz as an outpatient and with serial video swallow studies, last performed on 2/1/19 showing stable small lateral pharyngeal pouches not amenable to surgery. Next scheduled video swallow study on 9/3/19. Pending results of this video swallow study patient may follow up with Dr. Ortiz for ongoing surgical discussions if pharyngeal pouches are growing in size.     6. Adjustment to disability:  Clinical psychology to eval and treat  7. FEN: Fluid restriction of 1500 ml/day, Regular diet with thins. Supplements per dietitian recs.   8. Bowel: Miralax daily prn, Senna BID prn  9. Bladder: Bladder scan PVR x 2 with straight cath for volumes > 300 ml   10. DVT Prophylaxis: Mechanical   11. GI Prophylaxis: Protonix 40 mg Qday - while on decadron  12. Code: Full  13. Disposition: Home with support  14. ELOS:  5-7 days .  15. Rehab prognosis:  good  16. Follow up Appointments on Discharge:                - Follow up with Dr Alfred Del Rio in Neurosurgery clinic in 2 weeks with repeat MRI brain                - Follow up with your primary Oncologist in 1-2 weeks                - Follow up PM&R in 6-8 weeks               - Follow up with PCP in 7 days from discharge.        Haley Cintron MD  Physical Medicine & Rehabilitation    I spent a total of 25 minutes face to face and coordinating care of King Gonsalo  Brown.  Over 50% of my time on the unit was spent counseling the patient and /or coordinating care.

## 2019-08-28 NOTE — PLAN OF CARE
FOCUS/GOAL  Medication management, Skin integrity and Safety management    ASSESSMENT, INTERVENTIONS AND CONTINUING PLAN FOR GOAL:{ :  Orientation: Alert and oriented X4  Bowel & Bladder: Continent of bowel and bladder, LBM  8/25/19, PVR 18  Pain: Denies pain  Ambulation/Transfers: Assist of 1/gait belt /cane  Diet/ Liquids: Regular diet/ 1500 ml fluid restrictions. Takes meds whole.  Tubes/ Lines/ Drains: Port a cath on right side of chest  Skin: Craniotomy to right side of scalp, sutures in place C/D/I. Pt has dry flaky skin, mepilex applied to sacrum for protection.  Pt has mild weakness on his left side.Bed alarm on for safety, call light within reach, Ok to continue with care plan.

## 2019-08-28 NOTE — CONSULTS
Health Psychology                  Clinic    Department of Medicine  Marlee Ferraro, Ph.D., L.P. [phone]                          Clinics and Surgery Center  [facility] Akilah Bashir, Ph.D.,  L.P. [phone]                 3rd Floor  [place] Mail Code 311   Janet Reinoso, Ph.D., L.P. [phone]    [address] Rimma Gastelum, Ph.D., L.P. [phone]            [place] [address]          Nick Carson, Ph.D., A.THELMA.SHON.SHON., L.P. [phone]      Tova Escobar, Ph.D., L.P. [phone]      Inpatient Health Psychology Consultation*    DOS:  08/28/2019      REFERRAL SOURCE:  Haley Cintron MD, Acute Rehabilitation Center, [facility].      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  Mr. Bruno was noted on a previous TCU admission this summer to be having some strong emotional reactions related to his illness and focus in his thinking that this is leading to.  This evaluation was requested to assess his current emotional status and to make treatment recommendations.      HISTORY OF PRESENT ILLNESS:  Mr. Carrera reports that he has a significant history of depression, but has never before met with a mental health professional.  This is particularly notable in context that Mr. 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      Mr. Bruno reports that he became profoundly depressed following the death of his mother in 1972.  He remained very depressed for approximately 15 years.  He describes that at that time, he was very closed off  "emotionally, travelled frequently from place to place doing various work just to survive, and had a focus of not becoming too attached to anyone.  He was drinking at that time to self-medicate his emotions.  He eventually returned to [place], where he had attended [employer] as an undergraduate, completing a degree in Human Services; he began working toward the goal of attending the MSW program at [employer].  He met the woman who has now been his significant other since the early 1980s, and had a realization that if he wanted to be able to engage in a mature and productive relationship, that he needed to \"find myself again,\" and started by stopping drinking cold turkey.  At the same time, he notes that he remained emotionally guarded for many years, and had received feedback from his significant other many times about his difficulty opening up emotionally.      Mr. Bruno believes that he has been able to shift his ability to be emotionally vulnerable only since he was diagnosed with cancer in 2017.  He notes that prior to that, his depression had not remained profound or overwhelming, that he did look forward to his skilled nursing in 2014, with thoughts of \"just enjoying being old\" including thoughts of fishing, traveling, and spending time with friends.  He states that he had another emotional shift in his most recent acute hospitalization for a second tumor resection earlier this month.  He believes that he has the ability to feel safe being emotionally vulnerable through the support and kindness of primarily the nursing staff that he has had caring for him over his recent hospitalizations.  He reports that he has been able to have very meaningful conversations with his significant other, Stephanie, that he has not been able to have over all the years that they have been together.  He states his belief that while he would not wish his cancer on even his worst enemy, that he appreciates the silver " lining it has allowed him of learning to open his heart and connect more closely with the people that he loves.      Mr. Bruno was admitted to the [facility] yesterday, 2019.  As such, he is not able to assess at this time the progress that he is making in rehabilitation therapies.  He does anticipate a short stay, but would appreciate additional contact with Health Psychology while on the [facility].      SOCIAL HISTORY:  Mr. Bruno grew up in [place], the 5th of 8 children.  He was extremely close with his mother, and was devastated when she  in , triggering his longstanding years of depression.  Mr. Bruno had moved to [place] at age 19 to attend [employer].  He chose to move to [place] because of the lack of opportunity and sense of any positive future for himself as a young black man in [place] in .  He found his experience at [employer] very positive.  He reports that there were 17 total black students on campus at that time, and they became very close and provided support to one another.  He counts his closest friends as being from that group from [employer] and has always relied on them as sources of emotional support.  On return to [place], he focused on gaining more education, and received his MSW from the [employer].  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  Mr. Bruno was briefly  but had no children from that relationship.  He does have a son from a different relationship, but because of the choices of that child's mother, has never had a relationship with him; this clearly is a significant point of loss for Mr. Bruno.  Although Mr. Bruno grew up in the [place], he does not consider himself currently to have a particular cameron perspective.  He expresses an interest in learning more about eastern philosophies, particularly Rastafarian.      PAST  MEDICAL HISTORY:  Mr. Bruno's cancer was diagnosed in 08/2017.  His medical record also indicates a history of hypertension, chronic kidney disease, and DJD.  He has received chemotherapy as part of his treatment for cancer.  Please see his [facility] [place], electronic medical record for more complete information on his medical history, current admission and status, and all medications.      PSYCHIATRIC HISTORY:  As above in HPI.  Mr. Bruno is very clear that he has lived with depression for many years, and that this became less oppressive and affected his life less when he stopped drinking when he was in mid to late 30s.  He acknowledges that he has probably continued to live with some level of depression, but has never been treated for this and feels that his mood and openness to his own emotional process is now much greater than it ever has been.  He does not feel the need for any medication treatment at this time.  He does note that he finds this conversation helpful.  No other significant psychiatric history was available at the time of this evaluation.      BEHAVIORAL IMPRESSIONS:  Mr. Bruno presented for this evaluation sitting up in his room on the [facility] between scheduled rehabilitation therapies.  His significant other, Stephanie, was present for a short time as she was bringing him items from home and taking home laundry to wash.  I was able to observe a very positive interaction between them.  Mr. Bruno reported his mood as sad, and exhibited a varied affect that included multiple episodes of tears when he reflected on regrets from his past.  These episodes were brief, and he was able to regain his composure and balance quite easily and continue the conversation.  Speech was clear, coherent, and goal oriented.  Insight and judgment appear to be good.  He exhibited no evidence of distortions of thought or perception.      IMPRESSIONS AND PLAN:  King Gonsalo Bruno is a 73-year-old man  with a history of metastatic penile cancer with metastases to the lungs and brain.  He has undergone 2 resections of brain mets this summer, the first on  and the second on .  On his first admission earlier this summer, he expressed some uncertainty about his goals of care, and had a brief conversation with  MyoO about the possibility that contact with Palliative Care could be helpful in clarifying his own wishes and goals.  He referenced this past conversation today, and we agreed that we would discuss it when we are able to meet again later this week.  It also appears that Mr. Bruno may benefit from ongoing connection to a mental health provider in the community who understands the impact of cancer on emotional wellbeing and could provide ongoing support.      DIAGNOSES:  Major depressive disorder, single episode, unspecified (F32.9).         MARIAA GANDARA, PHD, LP         *In accordance with the Rules of the [place] Board of [employer], it is noted that psychological descriptions and scientific procedures underlying psychological evaluations have limitations.  Absolute predictions cannot be made based on information in this report.     D: 2019   T: 2019   MT:       Name:     KING BRUNO   MRN:      [id number]        Account:       [id number]   :      1945           Consult Date:  2019      Document: [id number]

## 2019-08-28 NOTE — PHARMACY-MEDICATION REGIMEN REVIEW
Pharmacy Medication Regimen Review  King Gonsalo Bruno is a 73 year old male who is currently in the Acute Rehab Unit.    Assessment: All medications have an appropriate indications, durations and no unnecessary use was found    Plan:   Continue current medication regimen.  Attending provider will be sent this note for review.  If there are any emergent issues noted above, pharmacist will contact provider directly by phone.      Pharmacy will periodically review the resident's medication regimen for any PRN medications not administered in > 72 hours and discontinue them. The pharmacist will discuss gradual dose reductions of psychopharmacologic medications with interdisciplinary team on a regular basis.    Please contact pharmacy if the above does not answer specific medication questions/concerns.    Background:  A pharmacist has reviewed all medications and pertinent medical history today.  Medications were reviewed for appropriate use and any irregularities found are listed with recommendations.      Current Facility-Administered Medications:      acetaminophen (TYLENOL) tablet 650 mg, 650 mg, Oral, Q4H PRN, Haley Cintron MD     alum & mag hydroxide-simethicone (MYLANTA ES/MAALOX  ES) suspension 30 mL, 30 mL, Oral, Q4H PRN, Haley Cintron MD     amLODIPine (NORVASC) tablet 10 mg, 10 mg, Oral, Daily, Haley Cintron MD, 10 mg at 08/28/19 0753     calcium carbonate 500 mg (elemental) (OSCAL;OYSTER SHELL CALCIUM) tablet 500 mg, 500 mg, Oral, BID w/meals, Haley Cintron MD, 500 mg at 08/28/19 0753     dexamethasone (DECADRON) tablet 4 mg, 4 mg, Oral, Q8H JOVANI, 4 mg at 08/28/19 1427 **FOLLOWED BY** [START ON 8/30/2019] dexamethasone (DECADRON) tablet 3 mg, 3 mg, Oral, Q8H JOVANI **FOLLOWED BY** [START ON 9/6/2019] dexamethasone (DECADRON) tablet 2 mg, 2 mg, Oral, Q8H JOVANI **FOLLOWED BY** [START ON 9/13/2019] dexamethasone (DECADRON) tablet 1 mg, 1 mg, Oral, Q8H JOVANI, Haley Cintron MD     ondansetron (ZOFRAN) tablet 4 mg,  4 mg, Oral, Q6H PRN, Haley Cintron MD     pantoprazole (PROTONIX) EC tablet 40 mg, 40 mg, Oral, QAM AC, Haley Cintron MD, 40 mg at 08/28/19 0638     polyethylene glycol (MIRALAX/GLYCOLAX) Packet 17 g, 17 g, Oral, Daily PRN, Haley Cintron MD     senna-docusate (SENOKOT-S/PERICOLACE) 8.6-50 MG per tablet 2 tablet, 2 tablet, Oral, BID PRN, Haley Cintron MD     sodium chloride tablet 1 g, 1 g, Oral, TID w/meals, Haley Cintron MD, 1 g at 08/28/19 1427     vitamin D3 (CHOLECALCIFEROL) 1000 units (25 mcg) tablet 25 mcg, 25 mcg, Oral, BID, Haley Cintron MD, 25 mcg at 08/28/19 0753     vitamin E (TOCOPHEROL) 100 units (90 mg) capsule 100 Units, 100 Units, Oral, Daily, Haley Cintron MD, 100 Units at 08/28/19 0753  No current outpatient prescriptions on file.   PMH: s/p craniotomy, cerebral edema, brain mets    Sylvester FariasD.

## 2019-08-28 NOTE — PROGRESS NOTES
08/28/19 1400   Signing Clinician's Name / Credentials   Signing clinician's name / credentials Naty Beth, DPCARMITA   Gabriel Balance Scale (MICHAEL SHEFFIELD, NIKI S, DEBBY GURROLA, THELMA HAIR: MEASURING BALANCE IN THE ELDERLY: VALIDATION OF AN INSTRUMENT. CAN. J. PUB. HEALTH, JULY/AUGUST SUPPLEMENT 2:S7-11, 1992.)   Sit To Stand 4   Standing Unsupported 2  (uses two fingers on bedside table for support intermittently)   Sitting Unsupported 4   Stand to Sit 4   Transfers 4   Standing with Eyes Closed 3   Standing Unsupported, Feet Together 3   Reach Forward With Outstretched Arm 4   Retrieve Object From Floor 0  (medical contraindication - cranial precautions)   Turning to Look Behind 4   Turn 360 Degrees 2   Placing Alternate Foot on Stool (4-6 inches) 1   Unsupported Tandem Stand (Demonstrate to Subject) 0   One Leg Stand 1   Total Score (A score of 45 or less has been correlated with an increased risk of falls)   Total Score (out of 56) 36     Gabriel Balance Scale (BBS) Cutoff Scores: A score of ? 45/56 indicates an increased risk for falls.   Patient Score: 36/56    The BBS is a measure of static and dynamic standing balance that has been validated in community dwelling elderly individuals and individuals who have Parkinson's Disease, MS, and those who are s/p CVA and TBI. The test is administered without an assistive device. Scores from the Gabriel are used to determine the probability of falling based on the patient's previous history of falls and their test performance.     Minimal Detectable Change = 6.5   & Minimal Detectable Change (Parkinson's Disease) = 5 according to Glenroy & Raey 2008    Assessment (rationale for performing, application to patient s function & care plan): Pt has balance deficits with both simple and complex balance tasks. He is able to perform basic mobility, but needs external stabilization for prolonged standing. He has difficulty with SL activities d/t core and hip instability, as well as L  inattention. Recommending use of assistive device for balance with ambulation, and assistive person at this time. Will continue to follow and progress balance activities as appropriate.      Minutes billed as physical performance test: 25

## 2019-08-28 NOTE — PROGRESS NOTES
08/27/19 1600   Quick Adds   Type of Visit Initial Occupational Therapy Evaluation   Living Environment   Lives With alone   Living Arrangements condominium   Home Accessibility stairs within home   Number of Stairs, Within Home, Primary other (see comments)  (11 to bed room, 7 to laundry)   Stair Railings, Within Home, Primary railing on left side (ascending);railing on right side (ascending)   Transportation Anticipated car, drives self;family or friend will provide   Living Environment Comment Pt reports having stairs to get to bed room, has tub shower at home   Self-Care   Usual Activity Tolerance good   Current Activity Tolerance fair   Equipment Currently Used at Home none   Activity/Exercise/Self-Care Comment Pt reports independent with all ADL and IADL with no AE, and was golfing 2x per week.   Functional Level   Ambulation 0-->independent   Transferring 0-->independent   Toileting 0-->independent   Bathing 0-->independent   Dressing 0-->independent   Eating 0-->independent   Communication 0-->understands/communicates without difficulty   Swallowing 0-->swallows foods/liquids without difficulty   Cognition 0 - no cognition issues reported   Fall history within last six months yes   Number of times patient has fallen within last six months 4  (per chart review)   Which of the above functional risks had a recent onset or change? ambulation;transferring;toileting;bathing   Prior Functional Level Comment Pt reports independent with all ADL and IADL   General Information   Onset of Illness/Injury or Date of Surgery - Date 08/22/19   Referring Physician Haley Cintron   Patient/Family Goals Statement To get home   Additional Occupational Profile Info/Pertinent History of Current Problem Mr. King Gonsalo Bruno is a Right  handed 73 year old yo male w/ PMHX significant for HTN, CKD, DJD, and metastatic penile cancer with known mets to the lungs (s/p chemotherapy treatments) and more recently with findings of mets to  the brain, with prior admission to hospital on 7/22/19 for falls and left sided weakness and numbness with new diagnosis of brain mass in the right frontal lobe.  He is s/p tumor resection on 7/25/19, with good recovery from surgery and was discharged to TCU on 8/1/19 and home on 8/11/19.  On 8/16 he presented back to the ED for notable worsening left sided weakness over the past 48 hours.  He reported progressive worsening on the left side for the past 2 days prior.   Repeat imaging showed mass affect at the site of prior right frontal brain tumor resection.  Neurosurgery was re-consulted and patient underwent a second resection on 8/22/19.  Hospitalization and recover complicated by hyponatremia and zenker's which required no acute intervention.     Precautions/Limitations other (see comments)  (craniotomy precautions)   Weight-Bearing Status - LUE full weight-bearing   Weight-Bearing Status - RUE full weight-bearing   Weight-Bearing Status - LLE full weight-bearing   Weight-Bearing Status - RLE full weight-bearing   Cognitive Status Examination   Orientation orientation to person, place and time   Level of Consciousness alert   Follows Commands (Cognition) WNL   Memory intact   Attention No deficits were identified   Organization/Problem Solving No deficits were identified   Executive Function No deficits were identified   Cognitive Comment Pt alert and oriented, scored 24/30 on MOCA since surgery   Visual Perception   Visual Perception Wears glasses;No deficits were identified   Visual Acuity Pt able to read name badge and clock    Pain Assessment   Patient Currently in Pain No   Integumentary/Edema   Integumentary/Edema no deficits were identifed   Range of Motion (ROM)   ROM Comment L UE slightly limited but WFL, R UE not effected   Strength   Strength Comments Appeared to be WFL, L UE slighlt weaker   ARC Assessment Only   Acute Rehab FIM See FIM scores for Mobility/ADL Assessment   Transfer Skill: Sit to  Stand   Level of [place]: Sit/Stand independent   Physical Assist/Nonphysical Assist: Sit/Stand supervision   Transfer Skill: Sit to Stand full weight-bearing   Assistive Device for Transfer: Sit/Stand straight cane   Instrumental Activities of Daily Living (IADL)   Previous Responsibilities meal prep;housekeeping;laundry;shopping;yardwork;medication management;finances;driving   Activities of Daily Living Analysis   Impairments Contributing to Impaired Activities of Daily Living balance impaired;ROM decreased;strength decreased;motor control impaired;coordination impaired   General Therapy Interventions   Planned Therapy Interventions ADL retraining;IADL retraining;cognition;fine motor coordination training;neuromuscular re-education;strengthening;ROM;home program guidelines;progressive activity/exercise   Clinical Impression   Criteria for Skilled Therapeutic Interventions Met yes, treatment indicated   OT Diagnosis OT: generalized weakness, impaired adls/iadls   Influenced by the following impairments OT: pt currently demonstrates decreased ability to complete adls/iadls, impaired mobility, standing balance, inattention and weakness of L side/LUE, craniotomy precautions. Skilled OT needed to address above mentioned deficits in order for a safe discharge home with assistance and home health OT, aide for bathing.    Assessment of Occupational Performance 3-5 Performance Deficits   Identified Performance Deficits OT; impaired bathing, social, home management   Clinical Decision Making (Complexity) High complexity   Therapy Frequency Daily   Predicted Duration of Therapy Intervention (days/wks) 7 days   Anticipated Equipment Needs at Discharge shower chair   Anticipated Discharge Disposition Home;Home with Assist;Home with Home Therapy   Risks and Benefits of Treatment have been explained. Yes   Patient, Family & other staff in agreement with plan of care Yes   Clinical Impression Comments OT: pt currently  demonstrates decreased ability to complete adls/iadls, impaired mobility, standing balance, inattention and weakness of L side/LUE, craniotomy precautions. Skilled OT needed to address above mentioned deficits in order for a safe discharge home with assistance and home health OT, aide for bathing.    Total Evaluation Time   Total Evaluation Time (Minutes) 15

## 2019-08-28 NOTE — PLAN OF CARE
FOCUS/GOAL  Bowel management, Bladder management, Pain management, Skin integrity and Cognition/Memory/Judgment/Problem solving    ASSESSMENT, INTERVENTIONS AND CONTINUING PLAN FOR GOAL:  Pt is alert and oriented x4, denies fever, chills, CP, SOB, N/V, abdominal pain, or new weakness/numbness/tingling, tingling in feet reported as previously occurring, incontinent of bladder overnight due to urgency, pt stated that at home he was not incontinent because he was able to immediatly move to toilet and is concerned, transferring with assist of 1 and cane, sleeping well throughout the night, fluid restriction of 1500 ml daily, accessed ben cath in place,  vs stable but slightly hypertensive, sutures intact to scalp incision, no further care concerns at this time continue with POC.

## 2019-08-28 NOTE — CONSULTS
Social Work: Initial Assessment with Discharge Plan    Patient Name: King Gonsalo Bruno  : 1945  Age: 73 year old  MRN: [id number]  Completed assessment with: Pt at bedside and chart review   Admitted to ARU: 2019    Presenting Information   Date of SW assessment: 2019  Health Care Directive: Copy in Chart  Primary Health Care Agent: Pt significant other Stephanie PH: [phone]  Secondary Health Care Agent: Pt sister from [place], contact information unknown  Living Situation: Pt lives alone in a condo.   Previous Functional Status: Independent with ALDs, No use of assistive device. Pt significant other assists with IADLs and provides transportation. Good support from S.O.  DME available: none reported   Patient and family understanding of hospitalization: Appropriate. Pt appeared A&O and pleasant. Pt denied concerns or needs at this time.   Cultural/Language/Spiritual Considerations:  [employer] M[employer]       Physical Health  Reason for admission: Brain metastases, S/P craniotomy, Nontraumatic brain injury    Provider Information   Primary Care Physician:Joan Ventura--[phone] with [facility]   : Pt not aware if he has a clinic SWer. SWer received call from [employer] ([employer] ) PH: [phone] ext [phone]    Mental Health/Chemical Dependency:   Diagnosis: Denied  Alcohol/Tobacco/Narcotis: Denied. Per chart review, pt smoked marijuana to relieve symptoms and requested a medical marijuana prescription.   Support/Services in Place: Health Psychology consulted. Support from sister, s.o and friend Shawn. No services in the community for MH.   Services Needed/Recommended: Continue with HP support. SW support and possible outpt resources.   Sexuality/Intimacy: Not discussed     Support System  Marital Status: Not  but in a supportive and long relationship with significant other. Significant other provides a lot of assistance and support. Able to  assist as needed.   Family support: Pt sister from [place] flying into town in a few days and will assist as needed. Wont stay long but pt expressed looking forward to her visit as she makes the best chicken and dumplings. Pt stated that their relationship wasn't always good because she (like most of his other family) had large families and he did not.   Other support available: College friend Shawn who came to visit with pt yesterday.     Community Resources  Current in home services: Pt was set up with [facility] at time of previous discharge. Pt prefers outpt at time of this discharge. Therapy aware.   Previous services: See above.     Financial/Employment/Education  Employment Status: Retired. 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   Income Source: Social Security Income and pension.   Education: Not discussed  Financial Concerns:  Denied   Insurance: Denied. Expressed feeling grateful for medical team who has advocated for him to come to ARU because his insurance was going to deny him.       Discharge Plan   Patient and family discharge goal: Home with assistance PRN from significant other and outpt therapy.   Provided Education on discharge plan: YES  Patient agreeable to discharge plan:  YES  Provided education and attained signature for Medicare IM and IRF Patient Rights and Privacy Information provided to patient : YES  Provided patient with [facility] Resources: No  Barriers to discharge: None at this time,     Discharge Recommendations   Disposition: Home with assistance PRN from significant other and outpt therapy.   Transportation Needs: significant other   Name of Transportation Company and Phone: N/A    Additional comments   Assessment completed. Pt appeared A&O and pleasant. Pt scored 15/15 on BIMs and denied and concerns or needs at this time. Pt prefers to discharge home with outpt therapy and is looking forward to his sister's arrival from [place].     SWer will  remain available as needed.    Please invite to Care Conference:  Significant other Stephanie PH: [phone]     08/28/19 1600   Living Arrangements   Lives With alone   Living Arrangements condominium   Able to Return to Prior Arrangements yes   Living Arrangement Comments S.O. assists with IADLs   Home Safety   Patient Feels Safe Living in Home? yes   Discharge Planning   Expected Discharge Date 09/04/19   Patient/Family Anticipates Transition to home;home with help/services   Disposition Comments Pt agree and prefer outpt therapy   Concerns to be Addressed no discharge needs identified   Discharge Needs Assessment   Transportation Anticipated family or friend will provide  (S.O. will assist )     GERRI Craft, CAD-[place]  [facility]   Phone: [phone]  I   Pager: [phone]

## 2019-08-29 NOTE — PROGRESS NOTES
Kendallr called [facility] (PH: [phone], Fax: [phone]) to follow up on pt's application for meal services (from prior admission). Kendallr left vm with [facility] and requested a return call. Kendallr met with pt at bedside and provided pt with the contact information to follow up as well. Pt expressed appreciation. Kendallr also provided pt with information for [facility] if additional questions or resources are needed.     Pt requesting a parking disability form before discharge. Kendallr updated MD.     Pt denied additional SW needs at this time. SWer available if needed. Plan to discharge home 9/4 with outpt services and assistance from significant other.     GERRI Craft, [facility]   Phone: [phone]  I   Pager: [phone]

## 2019-08-29 NOTE — PLAN OF CARE
FOCUS/GOAL  Bladder management, Pain management, Mobility, Cognition/Memory/Judgment/Problem solving and Safety management    ASSESSMENT, INTERVENTIONS AND CONTINUING PLAN FOR GOAL:  Pt is alert and oriented. Continent of bladder this shift, voiding spontaneously using toilet. No complaints of pain or discomfort overnight. Pt up with CGA to the bathroom, declining to use the cane. Pt did not sleep much this shift. Uses call light appropriately, able to make needs known. Will continue with POC.

## 2019-08-29 NOTE — PLAN OF CARE
OT: Intervention focused on completion of toileting at CGA, LE dressing at Min A, footwear donning at SBA and shower transfer and ambulation from room to therapy dept. <> at CGA. Hot meal prep task with Pt having 3 LOBs requiring therpaist's assistance to correct and 3 rest breks required toward end of session with Pt reporting fatigue. Pt required Max A for VCs during hot meal prep for safety awareness and sequencing of task.

## 2019-08-29 NOTE — PLAN OF CARE
FOCUS/GOAL  Bowel management, Bladder management, Mobility and Equipment/Assistive devices    ASSESSMENT, INTERVENTIONS AND CONTINUING PLAN FOR GOAL:  Pt is alert and oriented x4, denies pain or SOB. Using call light appropriately. Cont of bowel and bladder using toilet. Had large BM this shift. Using CGA and cane for transfers. Incision on head WDL and CAROL

## 2019-08-29 NOTE — PLAN OF CARE
Denies pain, continent of bladder on the toilet. Patient express interest in exercises to strengthen the bladder  to prevent incontinence therefore we practice Kegel exercises. Continent of bladder on the toilet, SBA with managing clothing, will continue POC

## 2019-08-29 NOTE — PLAN OF CARE
PT: making good progress towards functional ambulation. Will be close to MOD I in room in next few days. OT reporting significant cognitive deficits during functional kitchen tasks, which may affect discharge timeline and supervision needs.

## 2019-08-30 NOTE — PLAN OF CARE
AOx4 uses call light and able to make needs known. CGA with cane for transfers. Continent of B&B and uses BR with supervision. Denies pain or SOB, pleasant and cooperative with cares. Continue with POC.

## 2019-08-30 NOTE — PROGRESS NOTES
"  General acute hospital   Acute Rehabilitation Unit  Daily progress note    INTERVAL HISTORY  King Gonsalo Bruno was seen and examined at bedside. Reported issues with his swallowing and regurgitation at night time causing choking sensation. Noted that he has had this problem for 4.5 years and ENT team has not recommended surgical intervention. Will have repeat FVSS and f/u with ENT after discharge.     Reviewed lab results;  Na 132 - no change in the plan; repeat Na level in am.   Leukocytosis most likely due to steroids, complete ROM was unremarkable today. procal low as well. Will monitor clinically.     BP elevated this am but in better range later; will monitor for now.     Participating well and making good progress. Will be mod I at discharge. Completed handicapped parking permit.     MEDICATIONS  Scheduled meds    amLODIPine  10 mg Oral Daily     calcium carbonate (OS-SAADIA) 500 mg (elemental) tablet  500 mg Oral BID w/meals     dexamethasone  4 mg Oral Q8H JOVANI    Followed by     [START ON 8/30/2019] dexamethasone  3 mg Oral Q8H JOVANI    Followed by     [START ON 9/6/2019] dexamethasone  2 mg Oral Q8H JOVANI    Followed by     [START ON 9/13/2019] dexamethasone  1 mg Oral Q8H JOVANI     heparin  5 mL Intracatheter Q28 Days     heparin lock flush  5-10 mL Intracatheter Q24H     pantoprazole  40 mg Oral QAM AC     sodium chloride  1 g Oral TID w/meals     cholecalciferol  25 mcg Oral BID     vitamin E  100 Units Oral Daily       PRN meds:  acetaminophen, alum & mag hydroxide-simethicone, heparin lock flush, lidocaine 4%, lidocaine (buffered or not buffered), ondansetron, polyethylene glycol, senna-docusate, sodium chloride (PF), sodium chloride (PF)      PHYSICAL EXAM  /75 (BP Location: Right arm)   Pulse 100   Temp 97.6  F (36.4  C) (Oral)   Resp 16   Ht 1.905 m (6' 3\")   Wt 62.6 kg (138 lb)   SpO2 95%   BMI 17.25 kg/m      Gen: NAD, sitting in chair   HEENT: incision intact and " healing well   Heart: RRR  Pulm: clear breath sounds b/l   Abd: soft and non-tender   Ext: no edema in bilateral lower extremities    Neuro/MSK: alert and fully oriented, mild left hemiparesis, was seen walking with FWW    LABS  Reviewed as noted in interval history section     ASSESSMENT AND PLAN  Mr. 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  On 8/16 he presented back to the ED for noted left sided weakness over the past 48 hours.  Was noted to have progression of weakness on the left side and repeat imaging showed mass affect at the site of prior right frontal brain tumor resection.  He underwent a second resection on 8/22/19.  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  Admitting to ARU for Non-traumatic brain injury secondary to recurrent brain tumor s/p resection and repeat resection. Admission to acute inpatient rehab on 8/27/19         Rehabilitation - continue comprehensive acute inpatient rehabilitation program with multidisciplinary approach including therapies, rehab nursing, and physiatry following. See interval history for updates.       Medical Conditions  # Metastatic brain tumor; metastatic squamous cell carcinoma; s/p resection on 8/22/19 now with cerebral edema   - Discontinued keppra per neurosurgery team recs   - Wound care; incision open to air   - PRN tylenol for pain   - Decadron Taper -                4 mg q8h -> beginning on 8/23               3 mg q8h -> beginning on 8/30               2 mg q8h -> beginning on 9/6               1 mg q8h -> beginning on 9/13                0 mg -> beginning on 9/20      # Hyponatremia:   - Fluid Restriction 1500ml; was discontinued 8/28  - Salt tabs 1g TID   - BMP in am      # Anemia: post surgery  - CBC 08/29/19; stable     # Hypomagnesemia/hypokalemia - resolved  - Check BMP Q2-3 days with monitoring of electrolytes and replace as needed.   - If Magnesium between 1.6-2 replace with 2 mg IV and recheck bmp in am   - If Potasium between 2.0 and 2.4 replace with 15 mmol and recheck bmp in am      #HTN: Essential HTN  - Cont PTA amlodipine 10 mg qday     #Known Zenker's diverticulum and chronic mild dysphagia: followed by Dr. Ortiz as an outpatient and with serial video swallow studies, last performed on 2/1/19 showing stable small lateral pharyngeal pouches not amenable to surgery. Next scheduled video swallow study on 9/3/19. Pending results of this video swallow study patient may follow up with Dr. Ortiz for ongoing surgical discussions if pharyngeal pouches are growing in size.     Adjustment to disability / h/o MDD:  followed by Dr. Ferraro   FEN: Fluid restriction of 1500 ml/day (discontinued 8/28), Regular diet with thins. Supplements per dietitian recs.   Bowel: Miralax daily prn, Senna BID prn  Bladder: Bladder scan PVR x 2 with straight cath for volumes > 300 ml   DVT Prophylaxis: Mechanical   GI Prophylaxis: Protonix 40 mg Qday - while on decadron  Code: Full  Disposition: Home with support  ELOS:  5-7 days .  Rehab prognosis:  good  Follow up Appointments on Discharge:                - Follow up with Dr Alfred Del Rio in Neurosurgery clinic in 2 weeks with repeat MRI brain                - Follow up with your primary Oncologist in 1-2 weeks                - Follow up PM&R in 6-8 weeks               - Follow up with PCP in 7 days from discharge.        Haley Cintron MD  Physical Medicine & Rehabilitation    I spent a total of 35 minutes face to face and coordinating care of King Gonsalo Bruno.  Over 50% of my time on the unit was spent  counseling the patient and /or coordinating care.

## 2019-08-30 NOTE — PLAN OF CARE
PT: Pt demonstrates improved gait safety without cane. Recommending CGA for ambulation without assistive device.
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
## 2019-08-30 NOTE — PLAN OF CARE
Pt Up with CGA.VSS.States needs to remember he has a left side,and wearing weights on his extremities.Pt had dribbling and uses the toilet.Breifs on.Incison CDI.Coughing up secretions from throat and using yanker.Ate well.Pt has been good at calling for help.Bed alarms on.Continue with POC.

## 2019-08-30 NOTE — PLAN OF CARE
"OT: Mobility with cane at [facility] from room down lipscomb and outside for over 1000 feet with 2 rest breaks required. Pt became tearful 4 times during seassion - reporting \"happy tears - so joyful to be able and to be taken outside.\" Pt was incontinent of urine - unable to make it to toilet in time. Toilet transfer x 4 at [facility] for unsteadiness. Toileting routine Mod A for managing pants with VCs for sequencing, physical assist with threading of left foot and balance. Set up for doffing/donning footwear. While standing at sink SBA with increased time and set up Pt completed oral cares and required Mod A with shaving face for VCS to initiate and sequence correctly. Intervention focused on dynamic standing endurance - standing at table with no support to fold clothes  "

## 2019-08-30 NOTE — PLAN OF CARE
FOCUS/GOAL  Bladder management, Pain management, Mobility, Cognition/Memory/Judgment/Problem solving and Safety management    ASSESSMENT, INTERVENTIONS AND CONTINUING PLAN FOR GOAL:  Pt is alert and oriented. Occasional episodes of urinary incontinence, incontinent of bladder x 1 overnight. No complaints of pain or discomfort. Pt up with SBA with cane to the bathroom. Bed alarm on for safety. Uses call light appropriately, able to make needs known. Will continue with POC.

## 2019-08-31 NOTE — PLAN OF CARE
OT: Pt not seen for OT in the afternoon due to therapist being pregnant and pt having radioactive rods implanted in his brain. OT consulted with pt's nurse prior to cancelling pt's OT appointment. Nurse recommends not working with the patient at this time.

## 2019-08-31 NOTE — PLAN OF CARE
SLP:  Initiated cog/ling evaluation, auditory comprehension and reading comprehension skills intact.  Mild deficits in verbal expression at complex levels noted.  Will plan to complete evaluation tomorrow and make recommendations at that time.    Swallow:  Pt seen for bedside swallow evaluation.  No overt s/sx of aspiration noted however Pt did report globus senstation following bites of dry cracker, was able to clear with liquid wash and double swallow.  Pt reports managing dysphagia at baseline by choosing softer foods, taking small bites/sips, alternating consistencies, and using double swallow as needed.  Reports GERD symptoms (takes meds to manage, doesn't eat after 8pm, sits upright for 2 hours following last meal before bed, and elevates head) in the evening and is required to use suctioning to clear.  Pt does not report coughing or change in vocal quality during or after meals, but only coughing while sleeping. Recommendations:  1. Continue Regular/thin diet. 2. Safe swallow strategies-  cont GERD precautions, small bites/sips, alternate consistencies, double swallow as needed, remaining upgright at least 90 minutes following a meal, HOB elevated during sleep.  SLP to f/u at a meal tomorrow to assess for diet tolerance and follow up with use of strategies.  If Pt symptoms worsen will consider VFSS.

## 2019-08-31 NOTE — PLAN OF CARE
Pt VSS.Up with CGA.Pt calls for assistance.Chair and bed alarms on.Using BR,continent but does dribble a little.Throat cough,pt using yanker to manage.Incision CDI.Continue with POC.

## 2019-08-31 NOTE — PROGRESS NOTES
"  [facility]   Acute Rehabilitation Unit  Daily progress note    INTERVAL HISTORY  King Gonsalo Bruno was seen briefly during his OT session. He was doing well. Denied any pain. Pleased with his progress and agreed with the plan to consult SLP.     Na 133; decreased salt tab from tid to bid. Repeat labs on Sunday and monitoring BP as it has been mildly elevated.     PT: Pt demonstrates improved gait safety without cane. Recommending CGA for ambulation without assistive device.   SLP team was consulted for more evaluation of his cognition.     Scheduled a care conference on Tuesday. Discharge date 9/4 but maybe longer pending his family availability and passing the test.      MEDICATIONS  Scheduled meds    amLODIPine  5 mg Oral BID     calcium carbonate (OS-SAADIA) 500 mg (elemental) tablet  500 mg Oral BID w/meals     dexamethasone  3 mg Oral Q8H JOVANI    Followed by     [START ON 9/6/2019] dexamethasone  2 mg Oral Q8H JOVANI    Followed by     [START ON 9/13/2019] dexamethasone  1 mg Oral Q8H JOVANI     heparin  5 mL Intracatheter Q28 Days     heparin lock flush  5-10 mL Intracatheter Q24H     pantoprazole  40 mg Oral QAM AC     sodium chloride  1 g Oral BID w/meals     cholecalciferol  25 mcg Oral BID     vitamin E  100 Units Oral Daily       PRN meds:  acetaminophen, alum & mag hydroxide-simethicone, heparin lock flush, lidocaine 4%, lidocaine (buffered or not buffered), ondansetron, polyethylene glycol, senna-docusate, sodium chloride (PF), sodium chloride (PF)      PHYSICAL EXAM  BP (!) 149/79   Pulse 90   Temp 97.6  F (36.4  C) (Oral)   Resp 16   Ht 1.905 m (6' 3\")   Wt 62.6 kg (138 lb)   SpO2 98%   BMI 17.25 kg/m      Gen: NAD, pleasant   HEENT: incision intact and healing well   Pulm: non-labored in room air   Abd: benign  Ext: no edema in bilateral lower extremities    Neuro/MSK: was walking with no assistive device       LABS  No new today         ASSESSMENT AND PLAN  Mr." King Gonsalo Bruno is a Right  handed 73 year old yo male w/ PMHX significant for HTN, CKD, DJD, and 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  On 8/16 he presented back to the ED for noted left sided weakness over the past 48 hours.  Was noted to have progression of weakness on the left side and repeat imaging showed mass affect at the site of prior right frontal brain tumor resection.  He underwent a second resection on 8/22/19.  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  Admitting to ARU for Non-traumatic brain injury secondary to recurrent brain tumor s/p resection and repeat resection. Admission to acute inpatient rehab on 8/27/19         Rehabilitation - continue comprehensive acute inpatient rehabilitation program with multidisciplinary approach including therapies, rehab nursing, and physiatry following. See interval history for updates.       Medical Conditions  # Metastatic brain tumor; metastatic squamous cell carcinoma; s/p resection on 8/22/19 now with cerebral edema   - Discontinued keppra per neurosurgery team recs   - Wound care; incision open to air   - PRN tylenol for pain   - Decadron Taper -                4 mg q8h -> beginning on 8/23               3 mg q8h -> beginning on 8/30               2 mg q8h -> beginning on 9/6               1 mg q8h -> beginning on 9/13               0 mg -> beginning on 9/20      # Hyponatremia:   - Fluid Restriction 1500ml; was discontinued 8/28  - Salt tabs 1g TID. 08/30/19 decreased to bis and will monitor.   - BMP in am      # Anemia: post surgery  - CBC 08/29/19; stable     # Hypomagnesemia/hypokalemia - resolved  - Check BMP Q2-3 days  with monitoring of electrolytes and replace as needed.   - If Magnesium between 1.6-2 replace with 2 mg IV and recheck bmp in am   - If Potasium between 2.0 and 2.4 replace with 15 mmol and recheck bmp in am      #HTN: Essential HTN  - Cont PTA amlodipine 10 mg qday     #Known Zenker's diverticulum and chronic mild dysphagia: followed by Dr. Ortiz as an outpatient and with serial video swallow studies, last performed on 2/1/19 showing stable small lateral pharyngeal pouches not amenable to surgery. Next scheduled video swallow study on 9/3/19. Pending results of this video swallow study patient may follow up with Dr. Ortiz for ongoing surgical discussions if pharyngeal pouches are growing in size.     Adjustment to disability / h/o MDD:  followed by Dr. Ferraro   FEN: Fluid restriction of 1500 ml/day (discontinued 8/28), Regular diet with thins. Supplements per dietitian recs.   Bowel: Miralax daily prn, Senna BID prn  Bladder: Bladder scan PVR x 2 with straight cath for volumes > 300 ml   DVT Prophylaxis: Mechanical   GI Prophylaxis: Protonix 40 mg Qday - while on decadron  Code: Full  Disposition: Home with support  ELOS:  5-7 days .  Rehab prognosis:  good  Follow up Appointments on Discharge:                - Follow up with Dr Alfred Del Rio in Neurosurgery clinic in 2 weeks with repeat MRI brain                - Follow up with your primary Oncologist in 1-2 weeks                - Follow up PM&R in 6-8 weeks               - Follow up with PCP in 7 days from discharge.        Haley Cintron MD  Physical Medicine & Rehabilitation    I spent a total of 30A minutes face to face and coordinating care of King Gonsalo [facility].  Over 50% of my time on the unit was spent counseling the patient and /or coordinating care.

## 2019-08-31 NOTE — PROGRESS NOTES
08/31/19 0900   General Information   Onset Date 08/16/19   Start of Care Date 08/31/19   Referring Physician Haley Cintron   Patient Profile Review/OT: Additional Occupational Profile Info See Profile for full history and prior level of function   Patient/Family Goals Statement To be able to eat and swallow and not have to be hacking all night.    Swallowing Evaluation Bedside swallow evaluation   Behaviorial Observations WNL (within normal limits)   Mode of current nutrition Oral diet   Type of oral diet Regular;Thin liquid   Comments SLP:  Pt with Zenker's diverticulum and reports dysphagia for the past 4 years.  Pt completed VFSS 2/2019, results revealed mild pharyngeal residual in valleculae though Pt cleared with liquid wash and double swallow (recommendations were Regular/thin diet with soft food items and completion of oral pharyngeal excs), this was improved from previous VFSS on 4/6/18.  Pt not seen by SLP during initial resection but seen for dysphagia management following 2nd resection.  Pt reports managing dysphagia at baseline by choosing softer foods, taking small bites/sips, alternating consitencies, and using double swallow as needed.  Reports GERD symptoms (takes meds to manage) in the evening and is required to use suctioning to clear.     Clinical Swallow Evaluation   Oral Musculature generally intact   Structural Abnormalities none present   Dentition present and adequate  (missing 2 back molars)   Mandibular Strength and Mobility intact   Oral Labial Strength and Mobility WFL   Lingual Strength and Mobility WFL   Buccal Strength and Mobility intact   Laryngeal Function Cough;Throat clear;Swallow   Additional Documentation Yes   Clinical Swallow Eval: Thin Liquid Texture Trial   Mode of Presentation, Thin Liquids cup;self-fed   Volume of Liquid or Food Presented 6 oz water/milk   Oral Phase of Swallow WFL   Pharyngeal Phase of Swallow intact   Diagnostic Statement SLP: No overt s/sx of  "aspiration   Clinical Swallow Eval: Semisolid Texture Trial   Mode of Presentation, Semisolid spoon;self-fed   Volume of Semisolid Food Presented 4 tablespoons crushed madan crackers in pudding and entire bowl of mini wheats with milk   Oral Phase, Semisolid WFL   Pharyngeal Phase, Semisolid intact   Diagnostic Statement SLP: No overt s/sx of aspiration noted.  Pt with \"hacking\" x3 following intake to \"remove what was stuck.\"  Pt was utilizing safe strategies throughout the snack.    Clinical Swallow Eval: Solid Food Texture Trial   Mode of Presentation, Solid self-fed   Volume of Solid Food Presented 1/2 madan cracker   Oral Phase, Solid WFL   Pharyngeal Phase, Solid feeling of something stuck in throat   Diagnostic Statement SLP:  Pt reported globus sensation but was able to independently clear with liquid wash and double swallow.  Reportedly avoids breads and dry textures as baseline.     Esophageal Phase of Swallow   Esophageal comments SLP:  Pt reports GERD symptoms in the evenings.    Swallow Eval: Clinical Impressions   Skilled Criteria for Therapy Intervention Skilled criteria met.  Treatment indicated.   Functional Assessment Scale (FAS) 5   Dysphagia Outcome Severity Scale (SHILPI) Level 5 - SHILPI   Treatment Diagnosis mild oral pharyngeal dysphagia   Diet texture recommendations Regular diet;Thin liquids   Recommended Feeding/Eating Techniques small sips/bites;maintain upright posture during/after eating for 30 mins;alternate between small bites and sips of food/liquid   Therapy Frequency Daily   Predicted Duration of Therapy Intervention (days/wks) ~7-14 days   Anticipated Discharge Disposition home w/ assist   Risks and Benefits of Treatment have been explained. Yes   Patient, family and/or staff in agreement with Plan of Care Yes   Clinical Impression Comments SLP:  Pt seen for bedside swallow evaluation.  No overt s/sx of aspiration noted however Pt did report globus senstation following bites of dry " cracker, was able to clear with liquid wash and double swallow.  Pt reports managing dysphagia at baseline by choosing softer foods, taking small bites/sips, alternating consitencies, and using double swallow as needed.  Reports GERD symptoms (takes meds to manage, doesn't eat after 8pm, sits upright for 2 hours following last meal before bed, and elevates head) in the evening and is required to use suctioning to clear.  Pt does not report coughing or change in vocal quality during or after meals, but only coughing while sleeping. Recommendations:  1. Continue Regular/thin diet. 2. Safe swallow strategies-  cont GERD precautions, small bites/sips, alternate consistencies, double swallow as needed, remaining upgright at least 90 minutes following a meal, HOB elevated during sleep.  SLP to f/u at a meal tomorrow to assess for diet tolerance and follow up with use of strategies.  Pt symptoms worsen may consider VFSS.     Total Evaluation Time   Total Evaluation Time (Minutes) 30

## 2019-08-31 NOTE — PROGRESS NOTES
[facility], [place]   Physical Medicine and Rehabilitation Daily Note           Assessment and Plan of Care:   Mr. 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  On 8/16 he presented back to the ED for noted left sided weakness over the past 48 hours.  Was noted to have progression of weakness on the left side and repeat imaging showed mass affect at the site of prior right frontal brain tumor resection.  He underwent a second resection on 8/22/19.  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  Admitting to ARU for Non-traumatic brain injury secondary to recurrent brain tumor s/p resection and repeat resection. Admission to acute inpatient rehab on 8/27/19      --Vitals stable. No lab today.  --Continue ongoing medical management.  --Continue therapies and plan of care.         Interval history:   No acute events overnight. He shares that he's slept better last night compared to prior evenings, however reports urinating on himself last night waiting on nursing to respond. He has insight for need to remain in place as he is falls risk. I apologized that he had an accident and encouraged always asking and waiting for help, to which he voiced understanding. He feels that therapies are helping. Tolerates PO intake, continent/incontinent of bladder, continent of bowel, LBM 8/30. Denies fever, chills, CP, SOB, N/V, abdominal pain, new pain or weakness/numbness/tingling.     8/30 PT: Pt demonstrates improved gait safety without cane. Recommending CGA for ambulation without  "assistive device.     8/30 OT: Mobility with cane at [facility] from room down lipscomb and outside for over 1000 feet with 2 rest breaks required. Pt became tearful 4 times during seassion - reporting \"happy tears - so joyful to be able and to be taken outside.\" Pt was incontinent of urine - unable to make it to toilet in time. Toilet transfer x 4 at [facility] for unsteadiness. Toileting routine Mod A for managing pants with VCs for sequencing, physical assist with threading of left foot and balance. Set up for doffing/donning footwear. While standing at sink SBA with increased time and set up Pt completed oral cares and required Mod A with shaving face for VCS to initiate and sequence correctly. Intervention focused on dynamic standing endurance - standing at table with no support to fold clothes          Physical Exam:     Vitals:    08/30/19 1249 08/30/19 1614 08/30/19 2046 08/31/19 0730   BP: 128/70 139/75 (!) 149/79 (!) 152/70   BP Location: Right arm Right arm  Right arm   Pulse:  90  99   Resp:  16  16   Temp:    97.6  F (36.4  C)   TempSrc:    Oral   SpO2:  98%  99%   Weight:       Height:         Gen: NAD, seated in chair  HEENT: well healing incision to right frontal scalp, sutures intact, no drainage, warmth, or erythema  Heart: RRR  Lungs: clear breath sounds b/l  Abd: soft and non-tender  Ext: wwp, no edema in BLE, no tenderness in calves  MSK/neuro: alert and oriented. speech fluent. moves BUE and BLE volitionally.          Data:   Scheduled meds    amLODIPine  5 mg Oral BID     calcium carbonate (OS-SAADIA) 500 mg (elemental) tablet  500 mg Oral BID w/meals     dexamethasone  3 mg Oral Q8H JOVANI    Followed by     [START ON 9/6/2019] dexamethasone  2 mg Oral Q8H JOVANI    Followed by     [START ON 9/13/2019] dexamethasone  1 mg Oral Q8H JOVANI     heparin  5 mL Intracatheter Q28 Days     heparin lock flush  5-10 mL Intracatheter Q24H     pantoprazole  40 mg Oral QAM AC     sodium chloride  1 g Oral BID w/meals     " cholecalciferol  25 mcg Oral BID     vitamin E  100 Units Oral Daily       PRN meds:  acetaminophen, alum & mag hydroxide-simethicone, heparin lock flush, lidocaine 4%, lidocaine (buffered or not buffered), ondansetron, polyethylene glycol, senna-docusate, sodium chloride (PF), sodium chloride (PF)    PM&R attending, Dr. Jeronimo, hair Elliott, DO  PGY-2 Physical Medicine and Rehabilitation

## 2019-08-31 NOTE — PLAN OF CARE
FOCUS/GOAL  Bowel management, Bladder management, Pain management, Skin integrity and Cognition/Memory/Judgment/Problem solving    ASSESSMENT, INTERVENTIONS AND CONTINUING PLAN FOR GOAL:  Pt is alert and oriented x4, denies fever, chills, CP, SOB, N/V, abdominal pain, or new weakness/numbness/tingling, continent and incontinent of bladder due to urgency, transferring with assist of 1 and no divice, sleeping well throughout the night, no further care concerns at this time continue with POC.

## 2019-08-31 NOTE — PLAN OF CARE
Current status PT:  Improved step length, diandra and symmetry with gait, no device, cga to min A for L arm swing; 2-3 moderate toe catches over 500 feet without a device;  Fast reciprocal gait with a walker with cga to min facilitation for re ed;  Progressing with cues    Barriers: toe catch, fall risk, motor control    Recommendations: PT: gait without walker, with walker for fast reciprocal, continued standing neuro re ed, stairs; focused tx on L toe catch and high fall risk.     Murphy Cruz, PT  8/31/2019

## 2019-08-31 NOTE — PLAN OF CARE
AOX4, uses call light and able to make needs known. SBA for transfers. Continent of B&B and using the BR, urgency with urination. Scalp incision WDL. Denies pain or SOB, pleasant and cooperative with cares. Continue with POC.

## 2019-09-01 NOTE — PLAN OF CARE
FOCUS/GOAL  Bowel management, Bladder management, Pain management, Skin integrity and Cognition/Memory/Judgment/Problem solving    ASSESSMENT, INTERVENTIONS AND CONTINUING PLAN FOR GOAL:  Pt is alert and oriented x4, denies fever, chills, CP, SOB, N/V, abdominal pain, or new weakness/numbness/tingling, incontinent due to urgency but then also continent bladder post incontinence, transferring with assist of 1 and no device, set off bed alarm while self transferring to bathroom, pt seems very worried about incontinence, possible need for timed toileting every 2 hours or less, port right chest, vs stable, pt reported that incision was itching and so pt was itching head when nurse walked into room and found pt had pulled out several inches of sutures, wound remained closed and there is no apparent drainage, no further care concerns at this time continue with POC.

## 2019-09-01 NOTE — PLAN OF CARE
PT: use of TENS on LLE to improve gait smoothness and L attention. Transient response to treatment, working toward improving attention strategies to minimize safety concern with ambulation.

## 2019-09-01 NOTE — PLAN OF CARE
OT: Recommend toilet schedule every 2 hrs to increase urinary continence. Engaged in gross motor coordination training for LUE and ADL retraining with no AD standing x20 minutes. Repeated some tasks during hygiene d/t forgetfulness that he already completed task

## 2019-09-01 NOTE — PLAN OF CARE
Pt seen for swallow tx- meal follow-up on current regular diet with thin liquids. Pt is generally tolerating regular diet items but occasionally throughout the meal does have a globus sensation in the pharynx/ esophagus-- pt is able to verbally state the safe swallow strategies of slow rate and knows he needs to take 1 bite at a time but still needs occasional reminders to use these strategies consistently. Pt did have some intermittent throat clearing during meal. Pt does have hx of GERD and takes medication for this as well as uses other GERD guidelines.   Recommendations:  1. Continue Regular/thin diet. 2. Safe swallow strategies-  cont GERD precautions, self- select softer foods from regular diet menu, take frequent breaks during meal or consider smaller more frequent meals throughout the day,  small bites/sips- 1 at a time, alternate consistencies, double swallow as needed, remaining upgright at least 90 minutes following a meal, HOB elevated during sleep.  SLP to plan to complete VFSS with esophagram to further assess is related more to esophageal dysphagia vs pharyngeal dysphagia.

## 2019-09-01 NOTE — PLAN OF CARE
Pt is alert and oriented x 4, able to make needs known. SBA to ambulate to the bathroom with a walker. Incontinent of bladder x 1 due to urgency. Pt is mostly continent of bowel and bladder. Pt prefers to sit on the toilet to void due penectomy. No complaints of discomfort through the evening. Scalp incision with dissolvable sutures is intact and CAROL,  no dehiscence noted. Mepilex foam on the sacral area was removed, no redness or open area observed.

## 2019-09-01 NOTE — PLAN OF CARE
Compelted formal speech-langauge assessment and informal cognitive assessment per MD orders. Pt presents with intact auditory comprehension and reading comprehension and minimal to mild impairments with word retreival on more structured tasks- pthowever is able to express needs independently. Pt also demosntrates intact attention at all levels ( sustained, alternating and flexible); intact problems solving and reasoning but minimal to mild deficits in numerical reasoning- noting increased processing time. Pt's current level of functioning appears to be below baseline and pt will benefit from skilled SLP intervention to improve cognitive linguistic skills for improved safety and independence in ADL's

## 2019-09-01 NOTE — PROGRESS NOTES
"   08/31/19 0900   General Information, SLP   Type of Evaluation Speech and Language;Cognitive-Linguistic   Type of Visit Initial   Start of Care Date 08/31/19   Onset of Illness/Injury or Date of Surgery - Date 08/16/19   Referring Physician Haley Cintron   Patient/Family Goals Statement To get back home.    Pertinent History of Current Problem Per MD H&P:  \"Mr. 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  He is s/p tumor resection on 7/25/19, with good recovery from surgery and was discharged to TCU on 8/1/19 and home on 8/11/19.  On 8/16 he presented back to the ED for notable worsening left sided weakness over the past 48 hours.  He reported progressive worsening on the left side for the past 2 days prior.   Repeat imaging showed mass affect at the site of prior right frontal brain tumor resection.  Neurosurgery was re-consulted and patient underwent a second resection on 8/22/19.  Hospitalization and recover complicated by hyponatremia and worsening symptoms due to Zenker's diverticulum which required no acute intervention.\"   Precautions/Limitations swallowing precautions;fall precautions   General Observations pleasant and cooperative   General Info Comments SLP:  seen only for swallowng management following 2nd resection.  Only previous SLP intervention was in February 2019 for dysphagia management.    Oral Motor Sensory Function   Completed on Swallow Evaluation Completed Clinical Bedside Swallow Evaluation   Speech   Deficits in Articulation None   Speech Comments SLP:  Speech is 100% intelligible.   Language: Auditory Comprehension (understanding of spoken language) "   Tests were administered at the following levels Moderate (routine daily activities);Complex (vocation/community/social activities)   Two Step Commands (out of 5 total) 5   Yes/No Sentence and Simple Paragraph; [place] Diagnostic Aphasia Exam 3 short (out of 6 total) 6   Paragraph; Discourse Comprehension Test (out of 8 total; less than 7 is below mean) 8   Functional Assessment Scale (Auditory Comprehension) No Impairment   Comments (Auditory Comprehension) SLP:  Auditory comprehension skills intact.    Language: Verbal Expression (use of spoken language to express information)   Tests were administered at the following levels Complex (vocation/community/social activities)   Define Words; [place] Test for Differential Diagnosis Of Aphasia (out of 10 total) 8.5   Generative Naming Score; Cognitive Linguistic Quick Test 4   Generative Naming; Cognitive Linguistic Quick Test Result Below mean   Functional Assessment Scale (Verbal Expression) Mild Impairment   Comments (Verbal Expression) SLP:  No obvious word finding difficulty noted at conversational level.  Pt became emotional following generative naming task reporting he knows he could've performed better at baseline.  In addition, Pt did request repetition of directions half way through naming animals task, frequent pausing noted.   Reading Comprehension (understanding of written language)   Tests were administered at the following levels Complex (vocation/community/social activities)   Practical Reading (out of 7 total) 7   Functional Assessment Scale (Reading Comprehension) No Impairment   Comments (Reading Comprehension) SLP:  Reading comprehension skills intact.    Cognitive Status Examination   Attention intact  (no errors on sustained, flexible or alternating attention)   Behavioral Observations WFL   Short Term Memory impaired  (3/3 delayed recall; 9/15 paragraph recall)   Long Term Memory intact   Reasoning impaired  (4/4 verbal p.s;6/6 verbal reason;  1/2 numerical reason)   Executive Function Deficits Noted mental flexibility   Additional cognitive-linguistic evaluation indicated  recommend   Standardized cognitive-linguistic assessment completed to be completed during future session   Cognitive Status Exam Comments All levels of attention are intact. Minimal tomild deficits in ST recall; verbal problem solving and verbal reasoning appear to be intact- mild deficits with numerical reasoning- needs increased time for processing info   General Therapy Interventions   Planned Therapy Interventions Cognitive Treatment   Cognitive treatment Internal memory strategy training;External memory strategy training   Clinical Impression, SLP Eval   Criteria for Skilled Therapeutic Interventions Met Yes;Treatment indicated   SLP Diagnosis Minimal to mild cognitive linguistic impairments   Influenced by the following factors/impairments baseline level of functioning   Rehab Potential Good, to achieve stated therapy goals   Therapy Frequency Daily   Predicted Duration of Therapy Intervention (days/wks) 1 week   Anticipated Discharge Disposition Home with Outpatient Therapy   Risks and Benefits of Treatment have been explained. Yes   Patient, Family & other staff in agreement with plan of care Yes   Clinical Impression Comments Compelted formal speech-langauge assessment and informal cognitive assessment per MD orders. Pt presents with intact auditory comprehension and reading comprehension and minimal to mild impairments with word retreival on more structured tasks- pthowever is able to express needs independently. Pt also demosntrates intact attention at all levels ( sustained, alternating and flexible); intact problems solving and reasoning but minimal to mild deficits in numerical reasoning- noting increased processing time. Pt's current level of functioning appears to be below baseline and pt will benefit from skilled SLP intervention to improve cognitive linguistic skills  for improved safety and independence in ADL's   Total Evaluation Time      Total Evaluation Time (Minutes) 60  (30 min on 8/31/19)

## 2019-09-01 NOTE — PLAN OF CARE
AOX4, uses call light and able to make needs known. CGA for transfers. Continent of B&B and uses the BR, experiencing bladder urgency. Scalp incision sutured, some sutures came off last night, patient seen by MD, sutures may come off in the near as per patient report from MD visit. Denies pain or SOB, pleasant and cooperative with cares. Continue to monitor per POC.

## 2019-09-01 NOTE — PROGRESS NOTES
[facility]   Acute Rehabilitation Unit  Daily progress note    INTERVAL HISTORY  King Gonsalo Bruno was seen and examined at bedside. He was without acute event overnight. RN found pt scratching his scalp incision with a few stitches loosened. The incision was found to be well approximated, closed, and healing well. The loose sutures were cut out, the intact sutures left in place. Sutures are absorbable and will be left in place, he is amenable putting some lotion around the sutures to mitigate itchiness. He is wants to start wearing a soft beanie to avoid scratching his scalp. Otherwise feels therapies are progressing well, and is pleased with q2H timed voids. Tolerates PO intake, continent of bowel and bladder, LBM 9/1.  Denies pain, headache, dizziness, nausea, vomiting, shortness of breath, chest pain, abdominal pain, dysuria, diarrhea, or constipation.    8/31 PT: Improved step length, diandra and symmetry with gait, no device, cga to min A for L arm swing; 2-3 moderate toe catches over 500 feet without a device;  Fast reciprocal gait with a walker with cga to min facilitation for re ed;  Progressing with cues    8/31 SLP: Initiated cog/ling evaluation, auditory comprehension and reading comprehension skills intact.  Mild deficits in verbal expression at complex levels noted.  Will plan to complete evaluation tomorrow and make recommendations at that time.     Swallow:  Pt seen for bedside swallow evaluation.  No overt s/sx of aspiration noted however Pt did report globus senstation following bites of dry cracker, was able to clear with liquid wash and double swallow.  Pt reports managing dysphagia at baseline by choosing softer foods, taking small bites/sips, alternating consistencies, and using double swallow as needed.  Reports GERD symptoms (takes meds to manage, doesn't eat after 8pm, sits upright for 2 hours following last meal before bed, and elevates head) in the  "evening and is required to use suctioning to clear.  Pt does not report coughing or change in vocal quality during or after meals, but only coughing while sleeping. Recommendations:  1. Continue Regular/thin diet. 2. Safe swallow strategies-  cont GERD precautions, small bites/sips, alternate consistencies, double swallow as needed, remaining upgright at least 90 minutes following a meal, HOB elevated during sleep.  SLP to f/u at a meal tomorrow to assess for diet tolerance and follow up with use of strategies.  If Pt symptoms worsen will consider VFSS.      MEDICATIONS  Scheduled meds    amLODIPine  5 mg Oral BID     calcium carbonate (OS-SAADIA) 500 mg (elemental) tablet  500 mg Oral BID w/meals     dexamethasone  3 mg Oral Q8H JOVANI    Followed by     [START ON 9/6/2019] dexamethasone  2 mg Oral Q8H JOVANI    Followed by     [START ON 9/13/2019] dexamethasone  1 mg Oral Q8H JOVANI     heparin  5 mL Intracatheter Q28 Days     heparin lock flush  5-10 mL Intracatheter Q24H     pantoprazole  40 mg Oral QAM AC     sodium chloride  1 g Oral BID w/meals     cholecalciferol  25 mcg Oral BID     vitamin E  100 Units Oral Daily     PRN meds:  acetaminophen, alum & mag hydroxide-simethicone, heparin lock flush, lidocaine 4%, lidocaine (buffered or not buffered), ondansetron, polyethylene glycol, senna-docusate, sodium chloride (PF), sodium chloride (PF)    PHYSICAL EXAM  BP (!) 147/78 (BP Location: Right arm)   Pulse 75   Temp 96.7  F (35.9  C) (Oral)   Resp 18   Ht 1.905 m (6' 3\")   Wt 62.6 kg (138 lb)   SpO2 100%   BMI 17.25 kg/m    Gen: NAD, seated in chair  HEENT: well healing incision to right frontal scalp, wound is approximated and healing, no dehiscence noted, 3-4 sutures close to midline removed however rest of sutures intact, no drainage, warmth, or erythema  Heart: RRR  Lungs: clear breath sounds b/l  Abd: soft and non-tender  Ext: wwp, no edema in BLE, no tenderness in calves, Estim pads on LLE  MSK/neuro: alert " and oriented. speech fluent. moves BUE and BLE volitionally.     ASSESSMENT AND PLAN  Mr. 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  On 8/16 he presented back to the ED for noted left sided weakness over the past 48 hours.  Was noted to have progression of weakness on the left side and repeat imaging showed mass affect at the site of prior right frontal brain tumor resection.  He underwent a second resection on 8/22/19.  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  Admitting to ARU for Non-traumatic brain injury secondary to recurrent brain tumor s/p resection and repeat resection. Admission to acute inpatient rehab on 8/27/19       --Vitals stable. No lab today.  --Continue ongoing medical management.  --Continue therapies and plan of care.    -Added Aveeno lotion to scalp PRN    PM&R attending, hair Davenport DO  PGY-2, Dept. of Physical Medicine & Rehabilitation  Pager: [phone]

## 2019-09-02 NOTE — PLAN OF CARE
FOCUS/GOAL  Bowel management, Bladder management, Pain management, Skin integrity and Cognition/Memory/Judgment/Problem solving    ASSESSMENT, INTERVENTIONS AND CONTINUING PLAN FOR GOAL:  Pt is alert and oriented x4, denies fever, chills, CP, SOB, N/V, abdominal pain, or new weakness/numbness/tingling, continent of bowel, pt reported feeling constipated and received prn mirilax, has active bowel sounds, voiding close to hourly in toilet, slight dribbling incontinence occurring even with frequent toileting,  transferring with assist of 1 and no divice, sleeping very little and seeming to be more agitated in am, port dressing changed, no further care concerns at this time continue with POC.

## 2019-09-02 NOTE — PLAN OF CARE
FOCUS/GOAL  Bowel management and Mobility    ASSESSMENT, INTERVENTIONS AND CONTINUING PLAN FOR GOAL:  CGA with walker/gaitbelt for transfers, has ambulated in the hallway x3 this shift per his request. SO here visiting this afternoon. Pt c/o feeling constipated this morning, LBM was on 9/1. Pt drank two containers of prune juice, large BM noted this afternoon. New order noted for scheduled stool softeners. Will continue with POC.

## 2019-09-02 NOTE — PROGRESS NOTES
FOCUS/GOAL  Bladder management and Mobility    ASSESSMENT, INTERVENTIONS AND CONTINUING PLAN FOR GOAL:  Patient is alert and oriented x 4 and is able to make needs known by using call light and verbalizing needs.  Continent of bowel, LBM 9/1/19 and continent of bladder with timed toileting.  CGA with no assistive device.  Denies pain.  PRN lotion applied to scalp near incision at HS.  Results: pending, patient sleeping.

## 2019-09-02 NOTE — PROGRESS NOTES
"  [facility]   Acute Rehabilitation Unit  Daily progress note    INTERVAL HISTORY  No acute events overnight but this morning Mr. Bruno feels constipated.  He had a bowel movement 2 days ago, however feels his stomach is \"full\".  Took a MiraLAX this morning, but yet to have any results.  He denies any nausea or vomiting.  Also denies chest pain or shortness of breath.      MEDICATIONS  Scheduled:    amLODIPine  5 mg Oral BID     calcium carbonate (OS-SAADIA) 500 mg (elemental) tablet  500 mg Oral BID w/meals     dexamethasone  3 mg Oral Q8H JOVANI    Followed by     [START ON 9/6/2019] dexamethasone  2 mg Oral Q8H JOVANI    Followed by     [START ON 9/13/2019] dexamethasone  1 mg Oral Q8H JOVANI     docusate sodium  100 mg Oral BID     heparin  5 mL Intracatheter Q28 Days     heparin lock flush  5-10 mL Intracatheter Q24H     pantoprazole  40 mg Oral QAM AC     sodium chloride  1 g Oral BID w/meals     cholecalciferol  25 mcg Oral BID     vitamin E  100 Units Oral Daily        PRN:  acetaminophen, alum & mag hydroxide-simethicone, dimethicone, heparin lock flush, lidocaine 4%, lidocaine (buffered or not buffered), ondansetron, polyethylene glycol, senna-docusate, sodium chloride (PF), sodium chloride (PF)       PHYSICAL EXAM  Patient Vitals for the past 24 hrs:   BP Temp Temp src Pulse Resp SpO2   09/02/19 0816 (!) 141/72 96.4  F (35.8  C) Oral 87 18 100 %   09/01/19 2046 129/71 -- -- -- -- --   09/01/19 1553 (!) 152/82 97.2  F (36.2  C) Oral 87 18 97 %       GEN: NAD, pleasant and cooperative  HEENT: Incision closed with sutures, healing well  CVS: RRR, S1+S2, no m/r/g  PULM: CTA b/l, no w/r/r  ABD: Soft, NT, ND, bowel sounds present  EXT: No LE edema or calf tenderness b/l  Neuro: Answers appropriately, follows commands    LABS  CBC RESULTS:   Recent Labs   Lab Test 09/01/19  0601   WBC 16.8*   RBC 3.57*   HGB 9.0*   HCT 28.6*   MCV 80   MCH 25.2*   MCHC 31.5   RDW 21.9*    "       Last Comprehensive Metabolic Panel:  Sodium   Date Value Ref Range Status   09/01/2019 132 (L) 133 - 144 mmol/L Final     Potassium   Date Value Ref Range Status   08/29/2019 3.8 3.4 - 5.3 mmol/L Final     Chloride   Date Value Ref Range Status   08/29/2019 97 94 - 109 mmol/L Final     Carbon Dioxide   Date Value Ref Range Status   08/29/2019 25 20 - 32 mmol/L Final     Anion Gap   Date Value Ref Range Status   08/29/2019 10 3 - 14 mmol/L Final     Glucose   Date Value Ref Range Status   08/29/2019 136 (H) 70 - 99 mg/dL Final     Urea Nitrogen   Date Value Ref Range Status   08/29/2019 31 (H) 7 - 30 mg/dL Final     Creatinine   Date Value Ref Range Status   08/29/2019 0.96 0.66 - 1.25 mg/dL Final     GFR Estimate   Date Value Ref Range Status   08/29/2019 79 >60 mL/min/[1.73_m2] Final     Comment:     Non  GFR Calc  Starting 12/18/2018, serum creatinine based estimated GFR (eGFR) will be   calculated using the Chronic Kidney Disease Epidemiology Collaboration   (CKD-EPI) equation.       Calcium   Date Value Ref Range Status   08/29/2019 9.2 8.5 - 10.1 mg/dL Final       Recent Labs   Lab 08/29/19  0851   *       ASSESSMENT  Mr. 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  On 8/16 he presented back to the ED for noted left sided weakness over the past 48 hours.  Was noted to have progression of weakness on the left side and repeat imaging showed mass affect at the site of prior right frontal brain tumor resection.  He underwent a second resection on 8/22/19.  Hospitalization and recover complicated by hyponatremia and worsening  symptoms due to zenker's diverticulum.  Admitting to ARU for Non-traumatic brain injury secondary to recurrent brain tumor s/p resection and repeat resection. Admission to acute inpatient rehab on 8/27/19        PLAN  -Continue with inpatient rehabilitation program including PT, OT, and SLP for 3 hrs/day, rehab nursing, social work, nutrition, and close monitoring by physiatry  -Prior labs reviewed, will repeat tomorrow to monitor sodium levels and leukocytes  -Add Colace 100 mg twice daily standing for constipation  -Continue with plan of care      Stanford Jeronimo MD  Department of Rehabilitation Medicine  Pager: [phone]    Time Spent on this Encounter   I, Stanford Jeronimo, spent a total of 15 minutes face-to-face or managing the care of Deandre Gonsalo Bruno. Over 50% of my time on the unit was spent counseling the patient and coordinating care. See note for details.

## 2019-09-03 NOTE — PROGRESS NOTES
09/03/19 1727   Signing Clinician's Name / Credentials   Signing clinician's name / credentials Lucy Carrera   Quick Adds   Rehab Discipline SLP   Additional Documentation   SLP Plan SLP: see for meal, can review general swallowing strategies (small sips,/bites, slow pace, alternate PRN) pt did well on VFSS esophagram (essentially same, no Zenker's, slight outpouching above pyriforms resulting in min stasis.?if MD has thooughts re:tx for his dry mouth/cough at night? can try WJ-R analysis synthesis.  cognitive deficits at least mild (Ot/PT also noting), added goal for reasoning   Total Session Time   Total Session Time (minutes) 45 minutes  (25 swallowing 20 cognitive)   SLP - Acute Rehab Center Time   Individual Time (minutes) - enter zero if not applicable - SLP 45   Group Time (minutes) - enter zero if not applicable  - SLP 0   Concurrent Time (minutes) - enter zero if not applicable  - SLP 0   Co-Treatment Time (minutes) - enter zero if not applicable  - SLP 0   ARC Total Session Time (minutes) - SLP 45   Comprehension (FIM)   Functional Performance Complete independence comprehending complex/abstract ideas   Expression (FIM)   Functional Performance Complete independence expressing complex/abstract ideas   Problem Solving (FIM)   Functional Performance Needs increased time to make decisions and solve complex problems   Problem Solving Comment SLP: administered WJ-R test of verbal analogies, pt scored 46th percentile   Memory (FIM)   Functional Performance Minimal prompting-recognizes and remembers 75-90% of the time   Memory Comment SLP WJ-R test of auditory visual learning, pt scored 14th percentile

## 2019-09-03 NOTE — CARE CONFERENCE
Acute Rehab Care Conference/Team Rounds      Type: Patient Conference    Present: Pt, pt significant other, Haley Cintron (MD), Carlee Ponce (OT), Lucy García (SLP), Naty Beth (PT), Juanita Reynoso ([facility]) and Jessica Corbett (RN).       Discharge Barriers/Treatment/Education    Rehab Diagnosis: metastatic brain lesions s/p craniotomy for resection.    Active Medical Co-morbidities/Prognosis: hyponatremia and other electrolyte abnormality, and anemia. 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 He also has h/o HTN and chronic dysphagia due to zenker's diverticulum, followed by ENT team.     Safety: Pt is alert and oriented, able to communicate his needs, calling frequently due to urinary urgency during the night, started as independent without device during the day on am of 9/3/19, stand by assist at night with alarms for safety.     Pain: No reports of pain.     Medications, Skin, Tubes/Lines: Order place to start MAP, expected to start MAP 9/4, incision on scalp approximated without drainage, several sutures removed from pt scratching at scalp, prn lotion being applied to reduce itching.     Swallowing/Nutrition:SLP: pt c/o dry mouth, coughing at night, intermittent globus sensation.  VFSS and esophagram completed this date, appears essentially WNL.  Minimal pharyngeal residue, no aspiration.  Esophagram WNL preliminary findings (see additional reports as submitted).  Pt working on strategies, per baseline, small bites and sips, alternate, sit up after meals ?tx for dry mouth/throat per MD recommendations.    Bowel/Bladder: Pt is continent of bowel and intermittently incontinent of bladder. Voiding hourly or sooner overnight with urgency and stress incontinence.     Psychosocial: Pt lives alone in a condo. Independent PTA and was driving, managing medications and finances. Pt has a  significant other who is able to assist if/when needed. Pt sister in [place] is other main support. Retired . Denied anxiety/depression or other substance use.     ADLs/IADLs: Pt is independent in the room as of today. Completed a hot meal prep task and pt able to follow steps safely but at the end did not turn stove off. Recommending oversight/supervision for cooking and possibly medication management. Discussed with pt regarding not cooking unless oversight is provided. Nursing to start MAP. Recommending frequent check ins including drop ins, oversight with cooking when using stove otherwise no use of stove and oversight with medication management. Home health OT, aide. DME/AE: shower chair, grab bar.     Mobility: Pt is making good progress toward PT goals. He is now IND in room, but benefits from supervision in halls and on stairs d/t mild instability. Working on L attention modalities including ankle weight and TENS. Working towards safe discharge plan as pt has higher level cognitive deficits and will benefit from frequent physical check-in support. Recommending ongoing HCPT after discharge to ensure safety in home environment.     Cognition/Language:SLP: in process of further evaluation, noting minimal difficulty with complex word finding, reasoning/memory, unsure if baseline.  Further recommendations regarding outpatient tx to follow.  Pt may benefit from self medication program  Community Re-Entry:    Transportation: no driving     Decision maker: self    Plan of Care and goals reviewed and updated.    Discharge Plan/Recommendations    Fall Precautions: modify    Overall plan for the patient:  has made excellent progress since admission; he is now I in his room during the day and will most likely be I all day over the next 24-48 hours. Reviewed his medical issues, medications and f/u appointments. Anticipate 3 more days at ARU and then discharge to home on Friday. Recommended to have 24/7  supervision for the first few days after discharge and then frequent check-ins throughout the day after that. No driving, no using the stove and assistance with medication management. Will have home care with RN. DEJONA, PT, OT and SLP.       Utilization Review and Continued Stay Justification    Medical Necessity Criteria:    For any criteria that is not met, please document reason and plan for discharge, transfer, or modification of plan of care to address.    Requires intensive rehabilitation program to treat functional deficits?: Yes    Requires 3x per week or greater involvement of rehabilitation physician to oversee rehabilitation program?: Yes    Requires rehabilitation nursing interventions?: Yes    Patient is making functional progress?: Yes    There is a potential for additional functional progress? Yes    Patient is participating in therapy 3 hours per day a minimum of 5 days per week or 15 hours per week in 7 day period?:Yes    Has discharge needs that require coordinated discharge planning approach?:Yes        Final Physician Sign off    Statement of Approval: I agree with all the recommendations detailed in this document.      Patient Goals  Social Work Goals: Confirm discharge recommendations with therapy, coordinate safe discharge plan and remain available to support and assist as needed.         OT Frequency: daily  minutes  OT goal: hygiene/grooming: independent  OT goal: upper body dressing: Independent  OT goal: lower body dressing: Independent  OT goal: upper body bathing: Supervision/stand-by assist  OT goal: lower body bathing: Supervision/stand-by assist  OT goal: toilet transfer/toileting: Modified independent  OT goal: meal preparation: Modified independent  OT goal: home management: Modified independent  OT goal 1: Pt will complete tub transfer with supervision for safety using grab bar/AE PRN           PT Frequency: daily 60-90 minutes  PT goal: bed mobility: Independent, Rolling,  Supine to/from sit  PT goal: transfers: Independent, Bed to/from chair, Sit to/from stand  PT goal: gait: Modified independent, Greater than 200 feet(SEC)  PT goal: stairs: Modified independent, Rail on left(11, 7)  PT goal 1: SBA for car transfer  PT Goal 2: Floor transfer without assist  PT Goal 3: Amb outside with SBA and SEC, 100 ft        SLP Frequency: daily  SLP Swallow Goal:  Safely tolerate diet without signs/symptoms of aspiration: regular diet, thin liquids, with use of swallow precautions   SLP goal 1: Pt will usitlize word retrieval strategines to complete complex level verbal expressiont asks with 90% accuracy  SLP goal 2: Pt will utilize compensatory memory strategies to complete St recallt asks with 90% accuracy  SLP goal 3: Pt will complete complex level reasoning/ nimerical reasoning tasks with 90% accuracy        Nursing goals   Patient/Family Goal: Medication Management: Pt will successful paticipate in MAP and be able to verbalize indication of medications prior discharge     Goal: Skin Integrity: Patient will be able to demonstrate ways to prevent skin breakdown prior to discharge     Goal: Safety Management: Patient will use call light appropriately and demonstrate ways to prevent falls prior to discharge

## 2019-09-03 NOTE — PLAN OF CARE
"FOCUS/GOAL  Bladder management, Mobility, Cognition/Memory/Judgment/Problem solving and Safety management    ASSESSMENT, INTERVENTIONS AND CONTINUING PLAN FOR GOAL:  Pt is alert and oriented x4, VSS, denies pain or SOB. Pt voiding approx q 2 hours to try to avoid incontinence. Pt was incont x1 of small amount of urine. Pt became upset with writer stating he wanted staff to come to his room every 2 hours so he can void, but this was not happening. Explained to pt that when awake the plan is to have pt call to void, which pt was in agreement with and had been practicing. Complaints from pt about the plan for voiding were not consistent Pt would like to be woken up q2hrs during the night to void per his request. Pt also upset that staff not answering call light fast enough from the bathroom and he was transferring self. Writer updated board to say \"do not leave alone on toilet\", pt updated on plan.     "

## 2019-09-03 NOTE — CONSULTS
Health Psychology                  Clinic    Department of Medicine  Marlee Ferraro, Ph.D., L.P. [phone]                          [facility]  [facility] Akilah Bashir, Ph.D.,  L.P. [phone]                 3rd Floor  [place] Mail Code 082   Janet Reinoso, Ph.D., L.P. [phone]    [address] Rimma Gastelum, Ph.D., L.P. [phone]            [place] [address]          Nick Carson, Ph.D., A.B.P.P., L.P. [phone]      Tova Escobar, Ph.D., L.P. [phone]      Inpatient Health Psychology Consultation*    DOS:  09/03/2019      Clinical Information:  Mr Bruno was reading when I entered, and initially indicated that he wanted some quiet time prior to his next schedule rehabilitation therapy, but then asked to return to conversation about depression that we had last week. Talked at length about his belief that any black man in this culture lives with depression because of the position that he is in and the burdens that this culture has placed upon back men. Talked at length about his relationship with his father, who lived apart from the family for over 30 [place]. Mr Bruno notes that his older brothers were angry at their father, felt that he had abandoned the family when he actually sacrificed his relationships with them in order to be a good provider. Describes his father as a kind, loving, and compassionate man who taught him many important things that have been of benefit to him over his lifetime. Spent summers while in college at [employer] with his father in [place] and worked for the [employer] where his father was a longtime worker. Talks about his father teaching him how to interact in work and in social settings. There were summers when he was able to see his mother there when she would come up to  [place] to spend time with her .  Affect during this conversation varied, from teary to euthymic, congruent with content of conversation. Able to engage in very deep and heartfelt conversation about this very painful and fraught topic. Appeared to benefit from opportunity to engage around this topic.  Assessment: Mr Bruno confirms that he has been depressed for much if not most of his life, and states his belief that this is the case with most of not all black men in this culture. Appears to benefit from ability to process his thoughts and feelings around this while clearly expressing the toll that the facts of this culture and race have taken on him and others.   Diagnosis:    Major depressive disorder, single episode, unspecified (F32.9).   Recommendations/Plan: Continue to provide opportunities for Mr Bruno to engage in conversation focused on his emotional process.  Time Spent with Patient: 38 minutes (In: 0917 Out: 0955)  Service Provided: Individual psychotherapy   Provider: Marlee Ferraro, Ph.D,    Provider Pager: [phone]   Provider Phone: [phone]

## 2019-09-03 NOTE — PLAN OF CARE
FOCUS/GOAL  Bowel management, Bladder management, Pain management and Cognition/Memory/Judgment/Problem solving    ASSESSMENT, INTERVENTIONS AND CONTINUING PLAN FOR GOAL:  Pt is alert and oriented x4, denies fever, chills, CP, SOB, N/V, abdominal pain, or new weakness/numbness/tingling, urgency and stress incontinence of bladder during the night even with hourly toileting, transferring with stand by assist and  ww overnight, pt has shown very good balance even during day cares, unable to sleep due to frequent voiding, Port accessed and dressing intact, vs stable, no further care concerns at this time continue with POC.

## 2019-09-03 NOTE — PROGRESS NOTES
"   09/03/19 1200   General Information   Onset Date 08/16/19   Start of Care Date 08/31/19   Referring Physician Haley Cintron   Patient Profile Review/OT: Additional Occupational Profile Info See Profile for full history and prior level of function   Patient/Family Goals Statement SLP: pt reports dry mouth, coughing at [facility]   Swallowing Evaluation Videofluoroscopic evaluation   Behaviorial Observations WNL (within normal limits)   Mode of current nutrition Oral diet   Type of oral diet Regular;Thin liquid   Respiratory Status Room air   Comments SLP:  Pt with (possible)Zenker's diverticulum, stable \"small lateral pharyngeal pouches\" from VFSS 2/1/19, esophagram was negaitive.reports dysphagia for the past 4 years.  Pt completed VFSS 2/2019, results revealed mild pharyngeal residual in valleculae though Pt cleared with liquid wash and double swallow (recommendations were Regular/thin diet with soft food items and completion of oral pharyngeal excs), this was improved from previous VFSS on 4/6/18.  Pt not seen by SLP during initial resection but seen for dysphagia management following 2nd resection.  Pt reports managing dysphagia at baseline by choosing softer foods, taking small bites/sips, alternating consitencies, and using double swallow as needed.  Reports GERD symptoms (takes meds to manage) in the evening and is required to use suctioning to clear.  . Now reports occasional \"globus \"sensation and dry mouth, coughing at night, reports he sleeps on back, likely open mouth breathing   VFSS Eval: Radiology   Radiologist   (resident)   Views Taken left lateral   VFSS Eval: Thin Liquid Texture Trial   Mode of Presentation, Thin Liquid cup;straw;self-fed   Order of Presentation 1-5  (also larger consecutive swallows during esophagram)   Preparatory Phase WFL   Oral Phase, Thin Liquid WFL   Pharyngeal Phase, Thin Liquid Residue in valleculae;Residue in pyriform sinus  (minimal, did not perform AP view, per " radiologist recs)   Leonciobek's Penetration Aspiration Scale: Thin Liquid Trial Results 2 - contrast enters airway, remains above the vocal cords, no residue remains (penetration)  (flash penetration x 1)   Successful Strategies Trialed During Procedure, Thin Liquid other (see comments)  (extra dry swallow)   Diagnostic Statement SLP: pt appears safe with thin fluids by straw, sip, small sips.  minimal pharyngeal residue, vallecula,  piyiforms and lateral pharyngeal outpouchings seen superior to pyriform sinuses clears well with subsequent swallows. refer to A-P view 2/1/19 study for lateralization (A-P not done - consultation with radiologist regarding efficacy).. Pt did have laryngeal penetration with large consecutive sips with straw during esophagram, no aspiration   VFSS Eval: Solid Food Texture Trial   Mode of Presentation, Solid fed by clinician   Order of Presentation 6.7   Preparatory Phase WFL   Oral Phase, Solid WFL   Pharyngeal Phase, Solid Residue in valleculae;Residue in pyriform sinus  (minimal, in part slight residue from prior bolus)   Leonciobek's Penetration Aspiration Scale: Solid Food Trial Results 1 - no aspiration, contrast does not enter airway   Diagnostic Statement SLP: pt swallowed WNL, slight premature entry while preparing bolus , min residue noted, but did not appear signficant (in part appeared same as end of prior swallow.   see note above regarding pharyngeal stasis, minimally   Esophageal Phase of Swallow   Patient reports or presents with symptoms of esophageal dysphagia Yes   Esophageal comments SLP:  Pt reports GERD symptoms in the evenings. at night. prior VFSS revealed possible Zenker's diverticulum, see radiologist report, not noted this exam. good esophageal movement.  one time cough noted after exam ?penetration with large consecutive sips liquid   General Therapy Interventions   Planned Therapy Interventions Dysphagia Treatment   Swallow Eval: Clinical Impressions   Skilled  Criteria for Therapy Intervention Skilled criteria met.  Treatment indicated.   Functional Assessment Scale (FAS) 6   Treatment Diagnosis   (minimal oropharyngeal dysphagia)   Diet texture recommendations Regular diet;Thin liquids   Recommended Feeding/Eating Techniques alternate between small bites and sips of food/liquid;maintain upright posture during/after eating for 30 mins;small sips/bites  (extra swallows PRN)   Therapy Frequency Daily   Predicted Duration of Therapy Intervention (days/wks) estimated 1-2x   Anticipated Discharge Disposition home w/ assist   Risks and Benefits of Treatment have been explained. Yes   Patient, family and/or staff in agreement with Plan of Care Yes   Clinical Impression Comments SLP: pt seen for VFSS/esophagram as pt reports coughing at night, (dry mouth), occasional globus sensation with solids.  . Results similar to studies from 2/1/19.  Pt given thin liquids, and solid.  Noting normal oral phase.  Minimal pharyngeal stasis after swallows in vallleculae, pyriform sinuses and lateral pharyngeal outpouchings (superior to pyriform sinuses).  A-P view not performed but per radiologist recommendation - refer to images 2/1/19 study. Residue clears with second swallow PRN. Laryngeal penetration only noted with large consecutive sips thin liquid by straw during esophagram - no aspiration on any texture.  Please refer to radiology report for esophagram (WNL).  Recommend pt to continue regular diet, thin fluids.  Pt to sit up for po and remain at least 30 minutes after meals.  Small bites and sips, slow pace, frequent sips alternating with solids PRN, extra dry swallows as needed.  Pt may want to discuss with medical team any recommendations for night time dry mouth/cough (pt states he is mouth breather likely, and sleeps on his back which may contribute to sx. Recommend follow up at least once more inpatient, Pt referred to home care SLP for swallowing f/u and cognitive linguistic tx.    Total Evaluation Time   Total Evaluation Time (Minutes) 30

## 2019-09-03 NOTE — PLAN OF CARE
OT: morning adls in room. pt able to navigate in the room, transfer, pull tray table close with independence. pt able to recall therapist from last weeek, able to recall which therapist gave the walker, able to complete dressing task with independence. updated board, informed nurse. independent in the room during day time, frequent check ins and alarms at night.

## 2019-09-03 NOTE — PLAN OF CARE
SLP: pt seen for VFSS/esophagram as pt reports coughing at night, (dry mouth), occasional globus sensation with solids.  . Results similar to studies from 2/1/19.  Pt given thin liquids, and solid.  Noting normal oral phase.  Minimal pharyngeal stasis after swallows in vallleculae, pyriform sinuses and lateral pharyngeal outpouchings (superior to pyriform sinuses).  A-P view not performed but per radiologist recommendation - refer to images 2/1/19 study. Residue clears with second swallow PRN. Laryngeal penetration only noted with large consecutive sips thin liquid by straw during esophagram - no aspiration on any texture.  Please refer to radiology report for esophagram (WNL).  Recommend pt to continue regular diet, thin fluids.  Pt to sit up for po and remain at least 30 minutes after meals.  Small bites and sips, slow pace, frequent sips alternating with solids PRN, extra dry swallows as needed.  Pt may want to discuss with medical team any recommendations for night time dry mouth/cough (pt states he is mouth breather likely, and sleeps on his back which may contribute to sx. Recommend follow up at least once more inpatient, Pt referred to home care SLP for swallowing f/u and cognitive linguistic tx.

## 2019-09-03 NOTE — PLAN OF CARE
OT: Training for in room and ambulation down to therapy department <> with no AE at CGA required for inconsistent balance and motor planning. Standing for arm bike unsupported for total of 12 minutes to challenge UE endurance and balance to increase balance and safety with ADLs.

## 2019-09-04 NOTE — PLAN OF CARE
Pt alert and oriented x4. No pain overnight. MAP beginning today, bottles filled, pt aware. No reports of chest pain, SOB, new numbness/tingling. 1 PVR completed, pt refused future PVR per request. See care rounds note for more detail.

## 2019-09-04 NOTE — PROGRESS NOTES
Plan to discharge home on 09/06/2019 with assistance from significant other and [facility] RN, PT, OT, SLP and HHA. Pt was active with [facility] PTA. Referral send to  to resume care. Will need orders and F2F from MD. SWer will plan to meet with pt before discharge to follow up on plans and provide IMM. No immediate needs at this time.     [facility] Care Ph: [phone]      GERRI Craft, [facility] Acute Rehab Unit   Phone: [phone]  I   Pager: [phone]

## 2019-09-04 NOTE — PLAN OF CARE
FOCUS/GOAL  Bowel management, Bladder management and Medication management    ASSESSMENT, INTERVENTIONS AND CONTINUING PLAN FOR GOAL:  A & O x4. Independent in room without advices, Alarms on and SBA @Night. No c/o pain. Incontinent of bladder, continent of BM,LBM 9/3. PT required to be waken up for voiding every 2 hours. Pt had Care Conference today and required for the report of CC .  Scalp Incision was dry, intact and open to air. MAP will start from 9/4. Medication bottles have been put in MAP box in medication room. Medication list did not finish yet.

## 2019-09-04 NOTE — PLAN OF CARE
Acute Rehab Care Conference/Team Rounds      Type: Team Rounds    Present:  Dr. Haley Cintron, Hari WRIGHT, Naty Beth PT, Carlee Ponce OT, Juanita Reynoso SW, Tova Orosco SLP, Sofi Alcala Mgr, Joaquina Dean Dietician, Luis Carlos-O Roosevelt Coxlain      Discharge Barriers/Treatment/Education    Rehab Diagnosis:metastatic brain lesions s/p craniotomy for resection.     Active Medical Co-morbidities/Prognosis: hyponatremia and other electrolyte abnormality, and anemia. 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 He also has h/o HTN and chronic dysphagia due to zenker's diverticulum, followed by ENT team.     Safety: Pt is alert and oriented, able to communicate his needs, calling frequently due to urinary urgency during the night, started as independent without device during the day on am of 9/3/19, stand by assist at night with alarms for safety.    Pain: No reports of pain.     Medications, Skin, Tubes/Lines: Order place to start MAP, expected to start MAP 9/4, incision on scalp approximated without drainage, several sutures removed from pt scratching at scalp, prn lotion being applied to reduce itching. Port to right chest accessed and patent.    Swallowing/Nutrition:  Pt currently tolerating Regular/thin diet. Results of VFSS on 9/3/19 revealed no aspiration on any texture though did include the following: minimal pharyngeal stasis after swallows in vallleculae, pyriform sinuses and lateral pharyngeal outpouchings (superior to pyriform sinuses). A-P view not performed but per radiologist recommendation - refer to images 2/1/19 study. Residue clears with second swallow PRN. Laryngeal penetration only noted with large consecutive sips thin liquid by straw during esophagram. Esophagram WNL (refers to radiologist report). May want to discuss with medical team recommendations for  nighttime dry mouth/cough (Pt reports is likely a mouth breather and sleeps on back). Likely HH SLP for swallowing f/u.       Bowel/Bladder: Pt is continent of bowel and intermittently incontinent of bladder. Voiding hourly or sooner overnight with urgency and stress incontinence. Last bowel movement 9/3/19.    Psychosocial: mood is depressed but overall is coping well. Psychology team is following.     ADLs/IADLs: Pt is independent in the room as of today. Completed a hot meal prep task and pt able to follow steps safely but at the end did not turn stove off. Recommending oversight/supervision for cooking and possibly medication management. Discussed with pt regarding not cooking unless oversight is provided. Nursing to start MAP. Recommending frequent check ins including drop ins, oversight with cooking when using stove otherwise no use of stove and oversight with medication management. Home health OT, aide. DME/AE: tub bench    Mobility: Making good gains in PT - now IND in room. CC held yesterday and established supervision guidelines with pt and LAZARUS Brown. Pt will discharge to home with home care. No assistive device necessary, but recommending use of 2# ankle weight for attention and coordination. DME: none. Recommending initial supervision and frequent check-in support, and pt not to negotiate stairs on his own.    Cognition/Language: Pt with mild impairments in complex reasoning and ST recall. Alllevels of attention testing out WNL. However with standardized testing with ST recall/new learning: WJ-R Visual Auditory learning- pt scored in the 14th percentile- this is a low score on this test. With assessment of complex level reasoning/ mental flexibility- pt scored in the 46th percentile- this is a low average score on this higher level test. Would recommend oversight with medication management ( pending results of MAP), finances and higher level decision making. Recommending HH sptx for cognition and  swallowing.    Community Re-Entry: has supportive team    Transportation: Car transfer not a barrier - pt not a .    Decision maker: self    Plan of Care and goals reviewed and updated.    Discharge Plan/Recommendations    Fall Precautions: modify    Overall plan for the patient: discussed at care conference yesterday; on track for discharge to home this weekend. Will have home care initially and then transition to outpatient therapies. Will repeat labs in am and monitor Na and WBC. Clinically he is stable.       Utilization Review and Continued Stay Justification    Medical Necessity Criteria:    For any criteria that is not met, please document reason and plan for discharge, transfer, or modification of plan of care to address.    Requires intensive rehabilitation program to treat functional deficits?: Yes    Requires 3x per week or greater involvement of rehabilitation physician to oversee rehabilitation program?: Yes    Requires rehabilitation nursing interventions?: Yes    Patient is making functional progress?: Yes    There is a potential for additional functional progress? Yes    Patient is participating in therapy 3 hours per day a minimum of 5 days per week or 15 hours per week in 7 day period?:Yes    Has discharge needs that require coordinated discharge planning approach?:Yes          Final Physician Sign off    Statement of Approval: I agree with all the recommendations detailed in this document.      Patient Goals       OT Frequency: daily  minutes  OT goal: hygiene/grooming: independent  OT goal: upper body dressing: Independent  OT goal: lower body dressing: Independent  OT goal: upper body bathing: Supervision/stand-by assist  OT goal: lower body bathing: Supervision/stand-by assist  OT goal: toilet transfer/toileting: Modified independent  OT goal: meal preparation: Modified independent  OT goal: home management: Modified independent  OT goal 1: Pt will complete tub transfer with  supervision for safety using grab bar/AE PRN     PT Frequency: daily 60-90 minutes  PT goal: bed mobility: Independent, Rolling, Supine to/from sit  PT goal: transfers: Independent, Bed to/from chair, Sit to/from stand  PT goal: gait: Modified independent, Greater than 200 feet(SEC)  PT goal: stairs: Modified independent, Rail on left(11, 7)  PT goal 1: SBA for car transfer  PT Goal 2: Floor transfer without assist  PT Goal 3: Amb outside with SBA and SEC, 100 ft       SLP Frequency: daily  SLP Swallow Goal:  Safely tolerate diet without signs/symptoms of aspiration: regular diet, thin liquids, with use of swallow precautions  SLP goal 1: Pt will usitlize word retrieval strategines to complete complex level verbal expressiont asks with 90% accuracy  SLP goal 2: Pt will utilize compensatory memory strategies to complete St recallt asks with 90% accuracy  SLP goal 3: Pt will complete complex level reasoning/ nimerical reasoning tasks with 90% accuracy        Nursing goals     Patient/Family Goal: Medication Management: Pt will successful paticipate in MAP and be able to verbalize indication of medications prior discharge        Goal: Skin Integrity: Patient will be able to demonstrate ways to prevent skin breakdown prior to discharge    Goal: Safety Management: Patient will use call light appropriately and demonstrate ways to prevent falls prior to discharge

## 2019-09-04 NOTE — PROGRESS NOTES
[facility]   Acute Rehabilitation Unit  Daily progress note    INTERVAL HISTORY  King Gonsalo Bruno was seen during his care conference. He has made excellent progress since admission; he is now I in his room during the day and will most likely be I all day over the next 24-48 hours. Reviewed his medical issues, medications and f/u appointments. Anticipate 3 more days at ARU and then discharge to home on Friday. Recommended to have 24/7 supervision for the first few days after discharge and then frequent check-ins throughout the day after that. No driving, no using the stove and assistance with medication management. Will have home care with RN. DEJONA, PT, OT and SLP.       Na low but stable. Will discontinue salt tabs and repeat labs on Thursday.   Worsening leukocytosis with no clinical s/s of infection. Negative procal. Will monitor closely and repeat labs on Thursday.   BP intermittently elevated. Will monitor after salt tabs are discontinued and consider to adjust meds if persistent.   Repeat FVSS today; reviewed the results. No Zenker's was noted; should f/u with ENT. Symptoms might be secondary to dry mouth and questionable JEISON. Added biotene and will monitor.         MEDICATIONS  Scheduled meds    - Medication Assessment Program - Rehab Services   Does not apply See Admin Instructions     amLODIPine  5 mg Oral BID     artificial saliva  2 spray Swish & Spit 4x Daily     calcium carbonate (OS-SAADIA) 500 mg (elemental) tablet  500 mg Oral BID w/meals     dexamethasone  3 mg Oral Q8H JOVANI    Followed by     [START ON 9/6/2019] dexamethasone  2 mg Oral Q8H JOVANI    Followed by     [START ON 9/13/2019] dexamethasone  1 mg Oral Q8H JOVANI     docusate sodium  100 mg Oral BID     heparin  5 mL Intracatheter Q28 Days     heparin lock flush  5-10 mL Intracatheter Q24H     pantoprazole  40 mg Oral QAM AC     cholecalciferol  25 mcg Oral BID     vitamin E  100 Units Oral Daily       PRN  "meds:  acetaminophen, alum & mag hydroxide-simethicone, dimethicone, heparin lock flush, lidocaine 4%, lidocaine (buffered or not buffered), ondansetron, polyethylene glycol, senna-docusate, sodium chloride (PF), sodium chloride (PF)      PHYSICAL EXAM  /77   Pulse 92   Temp 97.3  F (36.3  C) (Oral)   Resp 19   Ht 1.905 m (6' 3\")   Wt 62.6 kg (138 lb)   SpO2 98%   BMI 17.25 kg/m      Gen: NAD, pleasant   HEENT: incision intact and healing well   Pulm: non-labored in room air   Abd: benign  Ext: no edema in bilateral lower extremities    Neuro/MSK: was walking with no assistive device       LABS  Reviewed as noted in interval history         ASSESSMENT AND PLAN  Mr. 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  On 8/16 he presented back to the ED for noted left sided weakness over the past 48 hours.  Was noted to have progression of weakness on the left side and repeat imaging showed mass affect at the site of prior right frontal brain tumor resection.  He underwent a second resection on 8/22/19.  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  Admitting to ARU for Non-traumatic brain injury secondary to recurrent brain tumor s/p resection and repeat resection. Admission to acute inpatient rehab on 8/27/19         Rehabilitation - continue comprehensive acute inpatient rehabilitation program with multidisciplinary approach including therapies, rehab nursing, and physiatry following. See interval history for updates.       Medical Conditions  # Metastatic brain tumor; metastatic squamous " cell carcinoma; s/p resection on 8/22/19 now with cerebral edema   - Discontinued keppra per neurosurgery team recs   - Wound care; incision open to air   - PRN tylenol for pain   - Decadron Taper -                4 mg q8h -> beginning on 8/23               3 mg q8h -> beginning on 8/30               2 mg q8h -> beginning on 9/6               1 mg q8h -> beginning on 9/13               0 mg -> beginning on 9/20 09/03/19 worsening leukocytosis with no clinical s/s of infection. Negative procal. Will monitor closely and repeat labs on Thursday.       # Hyponatremia:   - Fluid Restriction 1500ml; was discontinued 8/28  - Salt tabs 1g TID. 08/30/19 decreased to bis and will monitor.     09/03/19 Na low but stable. Will discontinue salt tabs and repeat labs on Thursday.        # Anemia: post surgery  - CBC 08/29/19; stable     # Hypomagnesemia/hypokalemia - resolved  - Check BMP Q2-3 days with monitoring of electrolytes and replace as needed.   - If Magnesium between 1.6-2 replace with 2 mg IV and recheck bmp in am   - If Potasium between 2.0 and 2.4 replace with 15 mmol and recheck bmp in am      #HTN: Essential HTN  - Cont PTA amlodipine 10 mg qday  09/03/19 BP intermittently elevated. Will monitor after salt tabs are discontinued and consider to adjust meds if persistent.      #Known Zenker's diverticulum and chronic mild dysphagia: followed by Dr. Ortiz as an outpatient and with serial video swallow studies, last performed on 2/1/19 showing stable small lateral pharyngeal pouches not amenable to surgery. Next scheduled video swallow study on 9/3/19. Pending results of this video swallow study patient may follow up with Dr. Ortiz for ongoing surgical discussions if pharyngeal pouches are growing in size.    09/03/19 Repeat FVSS today; reviewed the results. No Zenker's was noted; should f/u with ENT. Symptoms might be secondary to dry mouth and questionable JEISON. Added biotene and will monitor.      Adjustment to  disability / h/o MDD:  followed by Dr. Ferraro   FEN: Fluid restriction of 1500 ml/day (discontinued 8/28), Regular diet with thins. Supplements per dietitian recs.   Bowel: Miralax daily prn, Senna BID prn  Bladder: PVR 18 x1.   DVT Prophylaxis: Mechanical   GI Prophylaxis: Protonix 40 mg Qday - while on decadron  Code: Full  Disposition: Home with support  ELOS:  5-7 days .  Rehab prognosis:  good  Follow up Appointments on Discharge:                - Follow up with Dr Alfred Del Rio in Neurosurgery clinic in 2 weeks with repeat MRI brain                - Follow up with your primary Oncologist in 1-2 weeks                - Follow up PM&R in 6-8 weeks               - Follow up with PCP in 7 days from discharge.        Haley Cintron MD  Physical Medicine & Rehabilitation    I spent a total of 45 minutes face to face and coordinating care of King Gonsalo Bruno.  Over 50% of my time on the unit was spent counseling the patient and /or coordinating care.

## 2019-09-04 NOTE — PLAN OF CARE
Training for ambulation out of room and outside on uneven surfaces mobility to challenge endurance and balance and focus on visually scanning for safety at [facility] with no AE for over 1000 feet and requiring 1 rest break at end of walk. Pt was highly motivated and very appreciative for challenge today. Standing at SBA for arm bike total of 12 minutes. And 5lb weighted dowel through 5 exercises 2 x 15 reps.

## 2019-09-04 NOTE — PLAN OF CARE
Compelted a complex deductive reasoning puzzle- pt needing incresaed time to complete and occasional cues to aid in attention to detail. REviewed memory strategy handout- compelted memory recall task- recall of word list of 16 words using categorization strategy- pt able to recall 14/16 words with using this strategy.

## 2019-09-04 NOTE — PLAN OF CARE
SLP:  Pt seen for meal f/u Regular/thin.  Pt admitted that yesterday following the VFSS he was so hungry he wasn't really implementing the safe swallow strategies.  This date Pt was implementing strategies and no overt s/sx of aspiration were noted.  Pt hesitant to try the turkey (reports it's too dry) but requested it be chopped, typically he chooses softer/moist foods at baseline. Will plan to f/u in a couple days for tolerance.  Continue Regular/thin with strategies- small bites and sips, slow pace, frequent sips alternating with solids PRN, extra dry swallows as needed.

## 2019-09-04 NOTE — PLAN OF CARE
FOCUS/GOAL  Medication management    ASSESSMENT, INTERVENTIONS AND CONTINUING PLAN FOR GOAL:  MAP program began this morning. Pt called for medications on time, he was able to select the correct medications following along with his medication sheet. This writer noted several pauses during program but he was able to redirect himself with task at hand. Recommended he use a weekly pill box and have the Home Care RN set up his pills weekly.

## 2019-09-05 NOTE — PLAN OF CARE
Completed deductive reasoning puzzle today- occasional cues with this complex task- able to complete in more timely manner than yesterday. Compelted memory recall task- recall without using any strategies ( word list of 12 words)- recall was 7/12; then had pt use strategy of categorization/ association to reorganize words into indiviual categories and then study them- pt was then able to recall 12/12 words with using this strategy as well as pt states he used the strategy of forming acronyms to remember the words

## 2019-09-05 NOTE — PROGRESS NOTES
"   09/05/19 1542   Visit Information   Visit Made By Staff    Type of Visit Follow-up   Distress Emotional   Visited Patient;Family;Friend(s)   Interventions   Plan of Care Review   With patient/family/proxy   Basic Spiritual Interventions   Assessment of spiritual needs/resources;Reflective conversation   Advanced Assessments/Interventions   Presenting Concerns/Issues Spiritual/Religion/emotional support;Grief and adjustment issues   SPIRITUAL HEALTH SERVICES   Spiritual Assessment Progress Note   [facility] ([facility] 5R   REFERRAL SOURCE: F/U visit on this admission.   On this visit, met 's partner, Stephanie, and his good friend Shawn.   EXPERIENCE OF ILLNESS/HOSPITALIZATION:  said,\"I don't want to talk today. The last time I talked to you it was about death and I don't want to go down that road today.\"    MEANING-MAKING: This writer let  know that I have no agenda and that I simply follow him where he want or needs to go in our conversation. Stephanie said, \"Run away now.\" Then she laughed.  SPIRITUALITY/VALUES/Shinto: 's friend, Shawn, gave a small lecture on Heaven. \", what would you do if one day you woke up with no obstacles?\"  said, \"I am a reflection of God.\" I affirmed what he said.  COPING/SPIRITUAL PRACTICES: n/d   SUPPORT SYSTEMS: \"These are my friends who love me: Stephanie and Shawn.   PLAN: No further visits planned before Deandre discharge's on Sunday.    Rev. Inga Guevara  Staff   Spiritual Health Services  Pager: [phone]  Office: [phone]  * [facility] remains available 24/7 for emergent requests/referrals, either by having the switchboard page the on-call  or by entering an ASAP/STAT consult in Epic (this will also page the on-call ).*        "

## 2019-09-05 NOTE — PLAN OF CARE
Patient called on time for MAP meds today and chose the correct medications.    Independent in the room including transfers and toileting.    Denied pain, shortness of breath or chest pain.    Head incision healing without diffiulty.    BP remains slightly elevated 140's systolic. Dr. De Leon has been aware and following BP's closely.  Pt's neurosurgery appt and MRI that was scheduled for tomorrow was rescheduled for next Friday. Patient was notified.

## 2019-09-05 NOTE — PLAN OF CARE
OT: shower/adl routine completed. Pt to discharge sunday with significant other and so is supposed to stay with pt for a  few days and wean off superivsion- recommending no cooking, finances and medication management and will need oversight and supervision. SO is going to turn the stove line off. Also recommended frequent physical check-ins. DME/AE: tub bench, pill box that is easy to open. Home health OT/aide.

## 2019-09-05 NOTE — PLAN OF CARE
FOCUS/GOAL  Bowel management, Bladder management and Medical management    ASSESSMENT, INTERVENTIONS AND CONTINUING PLAN FOR GOAL:  A & O x 4. No c/o pain. Independent in the room. No Assist devices needed for day and night. No alarms needed. Cont of B/B in toilet, LBM 9/4. Called for medication and able to  medication based on MAP list. Staples on Scalp incision removed today, and incision intact. Pt required staff to wake him up for medication at 0600 tomorrow.

## 2019-09-05 NOTE — PLAN OF CARE
SLP: worked on tasks on IPAD for reasoning, planning ?if left visual inattention at times. needing moderate assist, but does demonstrate improvement with strategies and repetition. worked on tasks for immediate memory.  noting some impulsivity, cues to slow, use verbal cueing.

## 2019-09-05 NOTE — PLAN OF CARE
PT: Pt is participating well with PT. Pt verbalizes safety awareness during activities, states he knows he needs to be careful at home.  Pt using rail, stairs x 18  with sba to supervision.

## 2019-09-05 NOTE — PLAN OF CARE
Pt alert and oriented x4. No pain reported, appeared to be sleeping through the night during rounds. Independent in room overnight per board, no assistive devices. Continent of B/B, last BM 9/4. Continuing MAP program, pt woken for 6 am meds per request. No reports of chest pain, SOB, new numbness/tingling. Sutures removed from head incision, approximated and CAROL. Continue plan of care.

## 2019-09-06 NOTE — PROCEDURES
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     Treatment Summary:  Radiation Oncology - Course: 1 Protocol:   Treatment Site  Current Dose  Modality  From  To  Elapse  d Days  Fx.  1 gammatile   6,000 cGy  Cs-131   2019      1                             Dose per Fraction: 6000 cGy   Total Dose:   6000 cGy     COMMENTS: King Damion underwent a permanent radioactive brain implant by Dr. Alfred Del Rio for   Malignant neoplasm of urethra.  A total of 6 GammaTiles were implanted (24 Cs-131 seeds, 3.5U per   seed at implant date).  The patient tolerated the treatment well and will be followed by Dr. Del Rio.        PAIN MANAGEMENT: Patient's pain management was per inpatient team.        Staff Physician  Mulu Barnett M.D.     Physicist   Tosin Pelayo, PhD     CC:   Alfred Del Rio MD

## 2019-09-06 NOTE — PLAN OF CARE
Discharge Planner PT     Patient plan for discharge: Home with HCPT and assist/supervision from friends and family on Sunday 9/8.    Current status: IND in room. IND Day to be performed tomorrow. Family training has been provided to LAZARUS Brown on stair safety and supervision.    Recommendations for discharge: Family and friends to provide initial 24-7 supervision at discharge to ensure safe transition home. Pt has been educated not to perform stairs or operate stove on his own. Can perform these activities with helpers present.     Pt performs all necessary functional mobility without assistive device, but benefits from 2-3# ankle weight for improved proprioception. Family to purchase. No other DME needed.         Entered by: Naty Beth 09/06/2019 4:07 PM

## 2019-09-06 NOTE — PLAN OF CARE
PT: -30 minutes PT today d/t toileting and pt participating in MAP program. Will perform IND Day tomorrow and discharge to home on Sunday.

## 2019-09-06 NOTE — PLAN OF CARE
FOCUS/GOAL  Medication management, Skin integrity and Safety management    ASSESSMENT, INTERVENTIONS AND CONTINUING PLAN FOR GOAL:  Pt is alert and oriented.VSS, except for BP that is slightly elevated 141/66. Denies pain. Head incision is C/D/I.Pt is Mod I in room, he's been calm and cooperative tonight. Pt is on MAP, he called each time for his meds and choose correctly. Continent of bowel and bladder, LBM 9/4/19.

## 2019-09-06 NOTE — PROGRESS NOTES
CLINICAL NUTRITION SERVICES - ASSESSMENT NOTE     Nutrition Prescription    RECOMMENDATIONS FOR MDs/PROVIDERS TO ORDER:  Recommend checking current Vitamin D level for evaluation to determine if higher dose warranted in order to resolve deficiency     Malnutrition Status:    Severe malnutrition in the context of chronic illness     Recommendations already ordered by Registered Dietitian (RD):  Boost Plus (chocolate) at 10 am and 2 pm    Future/Additional Recommendations:  Monitor PO and wt trends and acceptance of nutrition supplements      REASON FOR ASSESSMENT  King Gonsalo Bruno is a/an 73 year old male assessed by the dietitian for LOS    PMH: HTN, CKD, DJD, and metastatic penile cancer with known mets to the lungs (s/p chemotherapy treatments) and more recently with findings of mets to the brain, with prior admission to hospital on 7/22/19 for falls and left sided weakness and numbness with new diagnosis of brain mass in the right frontal lobe.  He is s/p tumor resection on 7/25/19, with good recovery from surgery and was discharged to TCU on 8/1/19 and home on 8/11/19.  On 8/16 he presented back to the ED for notable worsening left sided weakness over the past 48 hours.  He reported progressive worsening on the left side for the past 2 days prior. Repeat imaging showed mass affect at the site of prior right frontal brain tumor resection.  Neurosurgery was re-consulted and patient underwent a second resection on 8/22/19.  Hospitalization and recover complicated by hyponatremia and worsening symptoms due to Zenker's diverticulum which required no acute intervention.      NUTRITION HISTORY  Pt reports that he has been eating well despite chemotherapy. Pt likes to utilize supplements to help provide additional calories and protein.     CURRENT NUTRITION ORDERS  Diet: Regular  Intake/Tolerance: Per RN flowsheet pt eating 100% of meals. Ordering 2-3 meals/day.     LABS  Labs reviewed    MEDICATIONS  Medications  "reviewed  Biotene  Calcium carbonate 500 mg  Colace  Vit D3  Vitamin E    ANTHROPOMETRICS  Height: 190.5 cm (6' 3\")  Most Recent Weight: 62.6 kg (138 lb)    IBW: 89 kg (70%)   BMI: Underweight BMI <18.5  Weight History: Wt appears stable over the past few months.   Wt Readings from Last 10 Encounters:   08/27/19 62.6 kg (138 lb)   08/24/19 61 kg (134 lb 7.7 oz)   08/09/19 65.3 kg (144 lb)   08/07/19 63 kg (139 lb)   08/07/19 63 kg (139 lb)   08/01/19 62.7 kg (138 lb 3.2 oz)   07/05/19 63.5 kg (140 lb 1.6 oz)   06/13/19 62.9 kg (138 lb 9.6 oz)   05/15/19 64 kg (141 lb)   04/25/19 64.9 kg (143 lb)     Dosing Weight: 63 kg (most recent wt)     ASSESSED NUTRITION NEEDS  Estimated Energy Needs: 3058-5137 kcals/day (30 - 35 kcals/kg )  Justification: Underweight  Estimated Protein Needs: 76-95 grams protein/day (1.2 - 1.5 grams of pro/kg)  Justification: Repletion- monitor kidney function  Estimated Fluid Needs: (1 mL/kcal)   Justification: Maintenance    PHYSICAL FINDINGS  See malnutrition section below.    MALNUTRITION  % Intake: Decreased intake does not meet criteria  % Weight Loss: None noted  Subcutaneous Fat Loss: Facial region, Upper arm, Lower arm and Thoracic: moderate-severe  Muscle Loss: Scapular bone, Thoracic region (clavicle, acromium bone, deltoid, trapezius, pectoral), Upper arm (bicep, tricep), Dorsal hand, Upper leg (quadricep, hamstring), Patellar region and Posterior calf: moderate-severe  Fluid Accumulation/Edema: None noted  Malnutrition Diagnosis: Severe malnutrition in the context of chronic illness     NUTRITION DIAGNOSIS  Underweight related to hypercatabolism 2/2 illness and inconsistent PO intake around treatments as evidenced by pt report and BMI of 17.7 kg/m2.     INTERVENTIONS  Implementation  Nutrition Education: Discussed current appetite/intake and role of RD. Encouraged continuing meals TID and using nutrition supplements to meet protein goals. Encouraged high calorie/protein foods. " Discussed continuing using nutrition supplements after discharge.    Medical food supplement therapy- Boost Plus (chocolate) at 10 am and 2 pm. Provided coupons for Boost supplements.      Goals  Patient to consume % of nutritionally adequate meal trays TID, or the equivalent with supplements/snacks.     Monitoring/Evaluation  Progress toward goals will be monitored and evaluated per protocol.    Eri Amezquita RD, CONNIE MICHAEL RD pager: [phone]

## 2019-09-06 NOTE — PROGRESS NOTES
"  [facility]   Acute Rehabilitation Unit  Daily progress note    INTERVAL HISTORY  King Gonsalo Bruno was seen in his room. He was doing well. Reviewed lab results with normal Na and improving leukocytosis. BPs slightly elevated; will monitor for now. Consider to add hydrochlorothiazide if persistent.     Now I during the day and night; very pleased with his progress.       Team rounds held yesterday; please see separate document in Plan of Care tab for full details. Briefly, Kin is doing very well. On track for discharge to home this weekend. Will have home care initially and then transition to outpatient therapies.      MEDICATIONS  Scheduled meds    - Medication Assessment Program - Rehab Services   Does not apply See Admin Instructions     [START ON 9/6/2019] amLODIPine  10 mg Oral Daily     amLODIPine  5 mg Oral BID     artificial saliva  2 spray Swish & Spit 4x Daily     calcium carbonate (OS-SAADIA) 500 mg (elemental) tablet  500 mg Oral BID w/meals     dexamethasone  3 mg Oral Q8H JOVANI    Followed by     [START ON 9/6/2019] dexamethasone  2 mg Oral Q8H JOVANI    Followed by     [START ON 9/13/2019] dexamethasone  1 mg Oral Q8H JOVANI     docusate sodium  100 mg Oral BID     heparin  5 mL Intracatheter Q28 Days     heparin lock flush  5-10 mL Intracatheter Q24H     pantoprazole  40 mg Oral QAM AC     cholecalciferol  25 mcg Oral BID     vitamin E  100 Units Oral Daily       PRN meds:  acetaminophen, alum & mag hydroxide-simethicone, dimethicone, heparin lock flush, lidocaine 4%, lidocaine (buffered or not buffered), ondansetron, polyethylene glycol, senna-docusate, sodium chloride (PF), sodium chloride (PF)      PHYSICAL EXAM  BP (!) 140/67 (BP Location: Right arm)   Pulse 78   Temp 97.1  F (36.2  C) (Oral)   Resp 16   Ht 1.905 m (6' 3\")   Wt 62.6 kg (138 lb)   SpO2 98%   BMI 17.25 kg/m      Gen: NAD, pleasant   HEENT: incision intact and healing well   Pulm: non-labored in " room air   Abd: benign  Ext: no edema in bilateral lower extremities    Neuro/MSK: was walking with no assistive device       LABS  Reviewed as noted in interval history         ASSESSMENT AND PLAN  Mr. 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  On 8/16 he presented back to the ED for noted left sided weakness over the past 48 hours.  Was noted to have progression of weakness on the left side and repeat imaging showed mass affect at the site of prior right frontal brain tumor resection.  He underwent a second resection on 8/22/19.  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  Admitting to ARU for Non-traumatic brain injury secondary to recurrent brain tumor s/p resection and repeat resection. Admission to acute inpatient rehab on 8/27/19         Rehabilitation - continue comprehensive acute inpatient rehabilitation program with multidisciplinary approach including therapies, rehab nursing, and physiatry following. See interval history for updates.       Medical Conditions  # Metastatic brain tumor; metastatic squamous cell carcinoma; s/p resection on 8/22/19 now with cerebral edema   - Discontinued keppra per neurosurgery team recs   - Wound care; incision open to air   - PRN tylenol for pain   - Decadron Taper -                4 mg q8h -> beginning on 8/23               3 mg q8h -> beginning on 8/30               2 mg q8h -> beginning on 9/6               1 mg q8h -> beginning on 9/13               0 mg -> beginning on 9/20 09/03/19 worsening leukocytosis with no clinical s/s of infection.  Negative procal. Will monitor closely and repeat labs on Thursday. 09/05/19 WBC in lower range; no more labs.      # Hyponatremia:   - Fluid Restriction 1500ml; was discontinued 8/28  - Salt tabs 1g TID. 08/30/19 decreased to bis and will monitor.     09/03/19 Na low but stable. Will discontinue salt tabs and repeat labs on Thursday. 09/05/19 Na wnl. No more checks.        # Anemia: post surgery  - CBC 08/29/19; stable     # Hypomagnesemia/hypokalemia - resolved  - Check BMP Q2-3 days with monitoring of electrolytes and replace as needed.   - If Magnesium between 1.6-2 replace with 2 mg IV and recheck bmp in am   - If Potasium between 2.0 and 2.4 replace with 15 mmol and recheck bmp in am      #HTN: Essential HTN  - Cont PTA amlodipine 10 mg qday  09/03/19 BP intermittently elevated. Will monitor after salt tabs are discontinued and consider to adjust meds if persistent.      #Known Zenker's diverticulum and chronic mild dysphagia: followed by Dr. Ortiz as an outpatient and with serial video swallow studies, last performed on 2/1/19 showing stable small lateral pharyngeal pouches not amenable to surgery. Next scheduled video swallow study on 9/3/19. Pending results of this video swallow study patient may follow up with Dr. Ortiz for ongoing surgical discussions if pharyngeal pouches are growing in size.    09/03/19 Repeat FVSS today; reviewed the results. No Zenker's was noted; should f/u with ENT. Symptoms might be secondary to dry mouth and questionable JEISON. Added biotene and will monitor.      Adjustment to disability / h/o MDD:  followed by Dr. Ferraro   FEN: Fluid restriction of 1500 ml/day (discontinued 8/28), Regular diet with thins. Supplements per dietitian recs.   Bowel: Miralax daily prn, Senna BID prn  Bladder: PVR 18 x1.   DVT Prophylaxis: Mechanical   GI Prophylaxis: Protonix 40 mg Qday - while on decadron  Code: Full  Disposition: Home with support  ELOS:  5-7 days .  Rehab prognosis:  good  Follow  up Appointments on Discharge:                - Follow up with Dr Alfred Del Rio in Neurosurgery clinic in 2 weeks with repeat MRI brain                - Follow up with your primary Oncologist in 1-2 weeks                - Follow up PM&R in 6-8 weeks               - Follow up with PCP in 7 days from discharge.        Haley Cintron MD  Physical Medicine & Rehabilitation    I spent a total of 25 minutes face to face and coordinating care of King Gonsalo Bruno.  Over 50% of my time on the unit was spent counseling the patient and /or coordinating care.

## 2019-09-06 NOTE — PLAN OF CARE
OT: Pt. Again engaged in multi step meal prep using stove top. Pt. Demonstrated good carryover of education provided at previous OT sessions of safety strategies and recall to turn off stove top. Pt. Does report plans to use microwave most of time at home. Pt. Is aware of deficits and able to identify techniques to maximize safety and Indep.

## 2019-09-06 NOTE — PROGRESS NOTES
Notified that pt would like to speak with SWer. SWer met with pt at bedside. Pt denied any immediate needs and requested that SWer contact his significant other to confirm discharge plans. With permission, Sherly contacted Stephanie PH: [phone]. Sherly and Stephanie discussed and confirmed [facility] plans and f/u apts. Stephanie expressed appreciation and denied additional needs or concerns.     Pt will discharge home on Sunday 9/8/19 with assistance from significant other and [facility] services (RN, PT, OT, HHA and SWer). Stephanie will transport home.     No further SW needs at this time.     Juanita Reynoso, GERRI, [facility] Acute Rehab Unit   Phone: [phone]  I   Pager: [phone]

## 2019-09-06 NOTE — PLAN OF CARE
Patient enjoying completing iPad based tasks for addressing cognition and expressing a desire to potentially get something similar of his own that he can complete more independently at home.  Patient requiring moderate level of assistance in order to be successful with moderate level tasks.  Would recommend initial oversight for safety.

## 2019-09-06 NOTE — PLAN OF CARE
FOCUS/GOAL  Medication management and Mobility    ASSESSMENT, INTERVENTIONS AND CONTINUING PLAN FOR GOAL:  Pt denied pain during shift. Denied SOB, denied difficulty breathing. Pt independent in room - calling appropriately for needs. Check-ins provided. MAP set-up. Pt called appropriately for 0600 AM medications and was able to accurately pick out correct medications without difficulty. Continue with POC.

## 2019-09-07 NOTE — PLAN OF CARE
FOCUS/GOAL  Medical management    ASSESSMENT, INTERVENTIONS AND CONTINUING PLAN FOR GOAL:  Patient slept well, no c/o pain. He is independent in the room without assistive device. He is on MAP. Plan to discharge to home tomorrow.

## 2019-09-07 NOTE — PLAN OF CARE
Speech Language Therapy Discharge Summary    Reason for therapy discharge:    Discharged to home with home therapy.    Progress towards therapy goal(s). See goals on Care Plan in Epic electronic health record for goal details.  Goals partially met.  Barriers to achieving goals:   discharge from facility.    Therapy recommendation(s):    Continued therapy is recommended.  Rationale/Recommendations:  Maximize cognitive function.    Over the course of the rehab stay, patient's diet was upgraded to regular textures and thin liquids.  Patient presenting with mild cognitive linguistic impairments and areas of higher level IADL type tasks.  Recommend ongoing SLP intervention upon facility discharge though do not suspect that current cognitive function would be a significant barrier to discharge and do not anticipate need for full supervision for safety.

## 2019-09-07 NOTE — PLAN OF CARE
Denies pain, continent of bowel of  bladder  toilet, did well in MAP, able to pick out meds and also knows their indications, will discontinue MAP at this time, patient is on track to discharge tomorrow. Will continue POC

## 2019-09-07 NOTE — DISCHARGE SUMMARY
[facility]   Acute Rehabilitation Unit  Discharge summary     Date of Admission: 8/27/2019  Date of Discharge: 9/8/19  Disposition: home   Primary Care Physician: Joan Ventura  Attending physician: Haley Cintron MD          discharge diagnosis  # Metastatic brain tumor  # Hyponatremia - resolved  # Anemia  # Hypomagnesemia/hypokalemia - resolved  #HTN  #Known Zenker's diverticulum and chronic mild dysphagia        brief summary  Mr. 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  On 8/16 he presented back to the ED for noted left sided weakness over the past 48 hours.  Was noted to have progression of weakness on the left side and repeat imaging showed mass affect at the site of prior right frontal brain tumor resection.  He underwent a second resection on 8/22/19.  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  Admitting to ARU for Non-traumatic brain injury secondary to recurrent brain tumor s/p resection and repeat resection. Admission to acute inpatient rehab on 8/27/19 .        rehabilitaiton course  PT:   Patient plan for discharge: Home with HCPT and assist/supervision from friends and family on Sunday 9/8.     Current status: IND in room. IND Day to be performed tomorrow. Family training has been provided to LAZARUS Brown on stair safety and supervision.     Recommendations for discharge: Family and friends to provide initial 24-7 supervision at discharge to ensure safe transition home. Pt has been educated not to perform stairs or operate stove on his  own. Can perform these activities with helpers present.      Pt performs all necessary functional mobility without assistive device, but benefits from 2-3# ankle weight for improved proprioception. Family to purchase. No other DME needed.    SLP:   Patient enjoying completing iPad based tasks for addressing cognition and expressing a desire to potentially get something similar of his own that he can complete more independently at home.  Patient requiring moderate level of assistance in order to be successful with moderate level tasks.  Would recommend initial oversight for safety.    Swallowing/Nutrition:  Pt currently tolerating Regular/thin diet. Results of VFSS on 9/3/19 revealed no aspiration on any texture though did include the following: minimal pharyngeal stasis after swallows in vallleculae, pyriform sinuses and lateral pharyngeal outpouchings (superior to pyriform sinuses). A-P view not performed but per radiologist recommendation - refer to images 2/1/19 study. Residue clears with second swallow PRN. Laryngeal penetration only noted with large consecutive sips thin liquid by straw during esophagram. Esophagram WNL (refers to radiologist report). May want to discuss with medical team recommendations for nighttime dry mouth/cough (Pt reports is likely a mouth breather and sleeps on back). Likely HH SLP for swallowing f/u.     OT:   Pt. Again engaged in multi step meal prep using stove top. Pt. Demonstrated good carryover of education provided at previous OT sessions of safety strategies and recall to turn off stove top. Pt. Does report plans to use microwave most of time at home. Pt. Is aware of deficits and able to identify techniques to maximize safety and Indep.       ADLs/IADLs: Pt is independent in the room as of today. Completed a hot meal prep task and pt able to follow steps safely but at the end did not turn stove off. Recommending oversight/supervision for cooking and possibly medication  management. Discussed with pt regarding not cooking unless oversight is provided. Nursing to start MAP. Recommending frequent check ins including drop ins, oversight with cooking when using stove otherwise no use of stove and oversight with medication management. Home health OT, aide. DME/AE: tub bench        mEDICAL COURSE  # Metastatic brain tumor; metastatic squamous cell carcinoma; s/p resection on 8/22/19 now with cerebral edema   - Discontinued keppra per neurosurgery team recs   - Wound care; incision open to air   - PRN tylenol for pain   - Decadron Taper -                4 mg q8h -> beginning on 8/23               3 mg q8h -> beginning on 8/30               2 mg q8h -> beginning on 9/6               1 mg q8h -> beginning on 9/13               0 mg -> beginning on 9/20 09/03/19 worsening leukocytosis with no clinical s/s of infection. Negative procal. Will monitor closely and repeat labs on Thursday. 09/05/19 WBC in lower range; no more labs.        # Hyponatremia:   - Fluid Restriction 1500ml; was discontinued 8/28  - Salt tabs 1g TID. 08/30/19 decreased to bid and will monitor.      09/03/19 Na low but stable. Will discontinue salt tabs and repeat labs on Thursday. 09/05/19 Na wnl. No more checks.         # Anemia: post surgery  - CBC 08/29/19; stable      # Hypomagnesemia/hypokalemia - resolved  - Check BMP Q2-3 days with monitoring of electrolytes and replace as needed.        #HTN: Essential HTN  - Cont PTA amlodipine 10 mg qday  09/03/19 BP intermittently elevated. Will monitor after salt tabs are discontinued and consider to adjust meds if persistent. 09/06/19 added hydrochlorothiazide due to persistent high BP off salt tabs.       #Known Zenker's diverticulum and chronic mild dysphagia: followed by Dr. Ortiz as an outpatient and with serial video swallow studies, last performed on 2/1/19 showing stable small lateral pharyngeal pouches not amenable to surgery. Next scheduled video swallow study  on 9/3/19. Pending results of this video swallow study patient may follow up with Dr. Ortiz for ongoing surgical discussions if pharyngeal pouches are growing in size.     09/03/19 Repeat FVSS today; reviewed the results. No Zenker's was noted; should f/u with ENT. Symptoms might be secondary to dry mouth and questionable JEISON. Added biotene and will monitor. Will f/u with ENT team as outpatient/     Adjustment to disability / h/o MDD:  followed by Dr. Ferraro   FEN: Fluid restriction of 1500 ml/day (discontinued 8/28), Regular diet with thins. Supplements per dietitian recs.   Bowel: Miralax daily prn, Senna BID prn  Bladder: PVR 18 x1.   DVT Prophylaxis: Mechanical   GI Prophylaxis: Protonix 40 mg Qday - while on decadron  Code: Full        dISCHARGE MEDICATIONS  Current Discharge Medication List      START taking these medications    Details   acetaminophen (TYLENOL) 325 MG tablet Take 2 tablets (650 mg) by mouth every 4 hours as needed for mild pain or fever (> 101 F)    Associated Diagnoses: Brain metastasis (H)      artificial saliva (BIOTENE MT) SOLN solution Swish and spit 2 mLs (2 sprays) in mouth 4 times daily  Qty: 1 Bottle, Refills: 0    Associated Diagnoses: Brain metastasis (H)      docusate sodium (COLACE) 100 MG capsule Take 1 capsule (100 mg) by mouth 2 times daily  Qty: 60 capsule, Refills: 0    Associated Diagnoses: Brain metastasis (H)      hydrochlorothiazide (MICROZIDE) 12.5 MG capsule Take 1 capsule (12.5 mg) by mouth daily  Qty: 30 capsule, Refills: 0    Associated Diagnoses: Benign essential hypertension         CONTINUE these medications which have CHANGED    Details   amLODIPine (NORVASC) 10 MG tablet Take 1 tablet (10 mg) by mouth daily  Qty: 30 tablet, Refills: 0    Associated Diagnoses: Benign essential hypertension      dexamethasone (DECADRON) 1 MG tablet 2 mg q8h till 9/12, then 1 mg q8h on 9/13 for one week, then stop  Qty: 50 tablet, Refills: 0    Associated Diagnoses: Brain  metastasis (H)      pantoprazole (PROTONIX) 40 MG EC tablet Take 1 tablet (40 mg) by mouth every morning (before breakfast)  Qty: 30 tablet, Refills: 0    Associated Diagnoses: Brain metastasis (H)         CONTINUE these medications which have NOT CHANGED    Details   calcium carbonate 500 mg, elemental, (OSCAL;OYSTER SHELL CALCIUM) 500 MG tablet Take 1 tablet (500 mg) by mouth 2 times daily (with meals)  Qty: 60 tablet, Refills: 0    Comments: Calcium supplementation  Associated Diagnoses: Urethral cancer (H)      cholecalciferol 1000 units TABS Take 1,000 Units by mouth 2 times daily  Qty: 60 tablet, Refills: 0    Comments: Vitamin D supplementation  Associated Diagnoses: Urethral cancer (H)         STOP taking these medications       ascorbic acid (VITAMIN C) 250 MG CHEW chewable tablet Comments:   Reason for Stopping:         fluticasone (FLONASE) 50 MCG/ACT nasal spray Comments:   Reason for Stopping:         levETIRAcetam (KEPPRA) 1000 MG tablet Comments:   Reason for Stopping:         Misc Natural Products (LUTEIN VISION BLEND) CAPS Comments:   Reason for Stopping:         polyethylene glycol (MIRALAX/GLYCOLAX) packet Comments:   Reason for Stopping:         potassium phosphate, monobasic, (K-PHOS) 500 MG tablet Comments:   Reason for Stopping:         senna-docusate (SENOKOT-S/PERICOLACE) 8.6-50 MG tablet Comments:   Reason for Stopping:         sodium chloride 1 GM tablet Comments:   Reason for Stopping:         vitamin A 28425 units capsule Comments:   Reason for Stopping:         vitamin E (TOCOPHEROL) 100 units (90 mg) capsule Comments:   Reason for Stopping:         zinc sulfate (ZINCATE) 220 (50 Zn) MG capsule Comments:   Reason for Stopping:                 DISCHARGE INSTRUCTIONS AND FOLLOW UP  Discharge Procedure Orders   Home Care Social Service Referral for Hospital Discharge   Referral Priority: Routine   Number of Visits Requested: 1     Home care nursing referral   Referral Priority: Routine  Referral Type: Home Health Therapies & Aides   Number of Visits Requested: 1     Home Care PT Referral for Hospital Discharge   Referral Priority: Routine Referral Type: Home Health Therapies & Aides   Number of Visits Requested: 1     Home Care OT Referral for Hospital Discharge   Referral Priority: Routine   Number of Visits Requested: 1     Reason for your hospital stay   Order Comments: Craniotomy for tumor resection     Adult [facility] Follow-up and recommended labs and tests   Order Comments: -- Follow up with Dr. Alfred Del Rio in Neurosurgery clinic as schedueld on September 13th at 1:15 PM with your MRI appointment at 11:30 AM.    -- Follow up with your primary Oncologist/PCP as scheduled with Dr. Arceo on September 25th at 4:30 PM.    --Follow up with ENT team Dr. Ortiz in 1-2 weeks.      -- Follow up PM&R as scheduled with Dr. Haley Cintron on Thursday, October 31st at 3:40 PM.    Appointments on [facility] and/or [facility] (with [facility] or [facility] provider or service). Call [phone] if you haven't heard regarding these appointments within 7 days of discharge.     Activity   Order Comments: Your activity upon discharge:   No assistive device   No driving until clear by OT and a physician     Order Specific Question Answer Comments   Is discharge order? Yes      Monitor and record   Order Comments: blood pressure daily, make diary of readings to bring to the appointment with your PCP     Wound care and dressings   Order Comments: Instructions to care for your wound at home:  -You may shower and get incisions wet but do not scrub incisions or submerge wounds (aka, bath, pool, hot tub, ect.) until approved by your surgeon.     IV access   Order Comments: **Ordering Provider MUST call/page Care Coordinator/ to discuss arranging this service**    You are going home with the following vascular access device: Port-a-Cath.     MD face to face encounter   Order Comments: Documentation of Face to Face  and Certification for Home Health Services    I certify that patient: King Gonsalo Bruno is under my care and that I, or a nurse practitioner or physician's assistant working with me, had a face-to-face encounter that meets the physician face-to-face encounter requirements with this patient on: 9/6/2019.    This encounter with the patient was in whole, or in part, for the following medical condition, which is the primary reason for home health care: recent surgery and acute on chronic issues; concerns for home safety.    I certify that, based on my findings, the following services are medically necessary home health services: Nursing, Occupational Therapy, Physical Therapy and Social Work.    My clinical findings support the need for the above services because: Nurse is needed: To assess BP after changes in medications or other medical regimen., To provide assessment and oversight required in the home to assure adherence to the medical plan due to: high risk for readmission. and To provide caregiver training to assist with: medication management and home safety.., Occupational Therapy Services are needed to assess and treat cognitive ability and address ADL safety due to impairment in ADLs and home safety. and Physical Therapy Services are needed to assess and treat the following functional impairments: gait and safety.    Further, I certify that my clinical findings support that this patient is homebound (i.e. absences from home require considerable and taxing effort and are for medical reasons or [employer] services or infrequently or of short duration when for other reasons) because: Requires assistance of another person or specialized equipment to access medical services because patient: Is prone to wander/get lost without assistance., Is unable to exit home safely on own due to: cognitive deficits and inattention. and Is unable to operate assistive equipment on their own...    Based on the above findings. I  certify that this patient is confined to the home and needs intermittent skilled nursing care, physical therapy and/or speech therapy.  The patient is under my care, and I have initiated the establishment of the plan of care.  This patient will be followed by a physician who will periodically review the plan of care.  Physician/Provider to provide follow up care: Joan Ventura    Attending [facility] physician (the Medicare certified [facility] provider): Haley Cintron MD  Physician Signature: See electronic signature associated with these discharge orders.  Date: 9/6/2019     Full Code     Order Specific Question Answer Comments   Code status determined by: Discussion with patient/legal decision maker      Diet   Order Comments: Follow this diet upon discharge: Regular Diet Adult; Thin Liquids (water, ice chips, juice, milk, gelatin, ice cream, etc)     Order Specific Question Answer Comments   Is discharge order? Yes           Haley Cintron MD  Physical Medicine & Rehabilitation        I didn't see the patient on the day of discharge.

## 2019-09-07 NOTE — PLAN OF CARE
FOCUS/GOAL  Pain management and Mobility    ASSESSMENT, INTERVENTIONS AND CONTINUING PLAN FOR GOAL:  Pt c/o R-chest port pain, PRN tylenol x 2, warm pack given and effective. Provider was noticed and will check it tomorrow. Pt called for MAP properly. Medication list need to update and print out for patient.

## 2019-09-07 NOTE — PLAN OF CARE
Occupational Therapy Discharge Summary    Reason for therapy discharge:    Discharged to home with home therapy.    Progress towards therapy goal(s). See goals on Care Plan in [facility] electronic health record for goal details.  Goals met    Therapy recommendation(s):    Continued therapy is recommended.  Rationale/Recommendations:  ADLs/IADLs, functional mobility, equipment training, caregiver training, community reintegration, functional cognition.       Summary: Mr. 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 Admission to acute inpatient rehab on 8/27/19.    ADLs/IADLs: Pt is IND functional mobility and transfers no device, requiring supervision for stairs. Pt is IND BADLs. Pt requiring supervision for meal prep, finances, medication, and household management tasks. Recommend assistance with community transportation.    DME/AE: tub bench, pill box that is easy to open.    Caregiver support: Significant other to provide initial 24/7 support and gradually wean off supervision pending pt performance in home environment. Significant other aware that pt may require ongoing assistance for complex IADL. Recommend HHA for shower transfer and bathing assist.

## 2019-09-07 NOTE — PROGRESS NOTES
"  [facility]   Acute Rehabilitation Unit  Daily progress note    INTERVAL HISTORY  King Gonsalo Bruno was seen during his OT session. Was doing well. Denied any issues. Pleased with his progress and excited about the plan to discharge to home on Sunday. Reviewed his meds including new addition of hydrochlorothiazide. He was agreeable. Repeat labs on Sunday.     I in his room; will have home care for home safety evaluation with plan to transition to outpatient soon.     MEDICATIONS  Scheduled meds    - Medication Assessment Program - Rehab Services   Does not apply See Admin Instructions     amLODIPine  10 mg Oral Daily     artificial saliva  2 spray Swish & Spit 4x Daily     calcium carbonate (OS-SAADIA) 500 mg (elemental) tablet  500 mg Oral BID w/meals     dexamethasone  2 mg Oral Q8H JOVANI    Followed by     [START ON 9/13/2019] dexamethasone  1 mg Oral Q8H JOVANI     docusate sodium  100 mg Oral BID     heparin  5 mL Intracatheter Q28 Days     heparin lock flush  5-10 mL Intracatheter Q24H     hydrochlorothiazide  12.5 mg Oral Daily     pantoprazole  40 mg Oral QAM AC     cholecalciferol  25 mcg Oral BID     vitamin E  100 Units Oral Daily       PRN meds:  acetaminophen, alum & mag hydroxide-simethicone, dimethicone, heparin lock flush, lidocaine 4%, lidocaine (buffered or not buffered), ondansetron, polyethylene glycol, senna-docusate, sodium chloride (PF), sodium chloride (PF)      PHYSICAL EXAM  /72 (BP Location: Right arm)   Pulse 75   Temp 97.1  F (36.2  C) (Oral)   Resp 15   Ht 1.905 m (6' 3\")   Wt 62.6 kg (138 lb)   SpO2 98%   BMI 17.25 kg/m      Gen: NAD, pleasant   HEENT: incision intact and healing well   Pulm: non-labored in room air   Abd: benign  Ext: no edema in bilateral lower extremities    Neuro/MSK: was walking with no assistive device       LABS  No new today         ASSESSMENT AND PLAN  Mr. 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  On 8/16 he presented back to the ED for noted left sided weakness over the past 48 hours.  Was noted to have progression of weakness on the left side and repeat imaging showed mass affect at the site of prior right frontal brain tumor resection.  He underwent a second resection on 8/22/19.  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  Admitting to ARU for Non-traumatic brain injury secondary to recurrent brain tumor s/p resection and repeat resection. Admission to acute inpatient rehab on 8/27/19         Rehabilitation - continue comprehensive acute inpatient rehabilitation program with multidisciplinary approach including therapies, rehab nursing, and physiatry following. See interval history for updates.       Medical Conditions  # Metastatic brain tumor; metastatic squamous cell carcinoma; s/p resection on 8/22/19 now with cerebral edema   - Discontinued keppra per neurosurgery team recs   - Wound care; incision open to air   - PRN tylenol for pain   - Decadron Taper -                4 mg q8h -> beginning on 8/23               3 mg q8h -> beginning on 8/30               2 mg q8h -> beginning on 9/6               1 mg q8h -> beginning on 9/13               0 mg -> beginning on 9/20 09/03/19 worsening leukocytosis with no clinical s/s of infection. Negative procal. Will monitor closely and repeat labs on Thursday. 09/05/19 WBC in lower range; no more labs.      # Hyponatremia:   - Fluid Restriction 1500ml; was discontinued 8/28  - Salt tabs 1g TID. 08/30/19 decreased to bis and will monitor.     09/03/19 Na low  but stable. Will discontinue salt tabs and repeat labs on Thursday. 09/05/19 Na wnl. No more checks.        # Anemia: post surgery  - CBC 08/29/19; stable     # Hypomagnesemia/hypokalemia - resolved  - Check BMP Q2-3 days with monitoring of electrolytes and replace as needed.   - If Magnesium between 1.6-2 replace with 2 mg IV and recheck bmp in am   - If Potasium between 2.0 and 2.4 replace with 15 mmol and recheck bmp in am      #HTN: Essential HTN  - Cont PTA amlodipine 10 mg qday  09/03/19 BP intermittently elevated. Will monitor after salt tabs are discontinued and consider to adjust meds if persistent. 09/06/19 added hydrochlorothiazide due to persistent high BP off salt tabs.       #Known Zenker's diverticulum and chronic mild dysphagia: followed by Dr. Ortiz as an outpatient and with serial video swallow studies, last performed on 2/1/19 showing stable small lateral pharyngeal pouches not amenable to surgery. Next scheduled video swallow study on 9/3/19. Pending results of this video swallow study patient may follow up with Dr. Ortiz for ongoing surgical discussions if pharyngeal pouches are growing in size.    09/03/19 Repeat FVSS today; reviewed the results. No Zenker's was noted; should f/u with ENT. Symptoms might be secondary to dry mouth and questionable JEISON. Added biotene and will monitor.      Adjustment to disability / h/o MDD:  followed by Dr. Ferraro   FEN: Fluid restriction of 1500 ml/day (discontinued 8/28), Regular diet with thins. Supplements per dietitian recs.   Bowel: Miralax daily prn, Senna BID prn  Bladder: PVR 18 x1.   DVT Prophylaxis: Mechanical   GI Prophylaxis: Protonix 40 mg Qday - while on decadron  Code: Full  Disposition: Home with support  ELOS:  5-7 days .  Rehab prognosis:  good  Follow up Appointments on Discharge:                - Follow up with Dr Alfred Del Rio in Neurosurgery clinic in 2 weeks with repeat MRI brain                - Follow up with your primary Oncologist in  1-2 weeks                - Follow up PM&R in 6-8 weeks               - Follow up with PCP in 7 days from discharge.        Haley Cintron MD  Physical Medicine & Rehabilitation    I spent a total of 15 minutes face to face and coordinating care of King Gonsalo Bruno.  Over 50% of my time on the unit was spent counseling the patient and /or coordinating care.

## 2019-09-07 NOTE — PLAN OF CARE
Discharge Planner PT   Patient plan for discharge: Home tomorrow 9/8 with home PT and A from friends and family  Current status: Pt Ind with no assistive device in room and SBA for stairs, curbs and outdoor amb  Barriers to return to prior living situation: None  Recommendations for discharge: Home PT and A with stairs, curbs and outdoor mobility  Rationale for recommendations: Pt needs to continue to work on balance for high level mobility       Entered by: Manoj Seay 09/07/2019 4:05 PM

## 2019-09-07 NOTE — PROGRESS NOTES
"PM&R PROGRESS NOTE     Patient seen and examined today.  Coordinated with team.      HPI/ACTIVE ISSUES   King Gonsalo Bruno is a 73 year old male, admitted to [facility] ARU on 8/27/2019 who presents with metastatic brain lesions s/p craniotomy. He has h/o SCC penile cancer.     Main issues today are     Functionally, King Gonsalo Bruno is participating in the therapies in a motivated fashion. He is making good progress. Discharge plan tomorrow. Did well in MAP and is ambulating without an assistive device.   He complaints of swelling around the Port a cath on right side of chest. I examined the area, no swelling, no erythema, no drainage. He does palpate the line itself thinking it is the swelling     Medically  He is stable. Blood pressures were reviewed. High 153/84 noted. Cont PTA amlodipine 10 mg qday  09/03/19 BP intermittently elevated. Will monitor after salt tabs are discontinued and consider to adjust meds if persistent. 09/06/19 hydrochlorothiazide was added due to persistent high BP off salt tabs. Will monitor and not make changes for now.           REVIEW OF THE SYSTEMS   Total of ten systems reviewed, pertinent positives and negatives as follows  Denies chest pain fever chills rigors  Denies any shortness of breath   Denies any nausea or vomiting   Appetite good  Denies any lightheadedness or dizziness   On regular diet with thins   Denies any sob or cough   Denies any new rashes   Mood euphoric    Slept well last night   Remainder of the review of the systems was negative      Physical exam    Vital signs:  Temp: 96.7  F (35.9  C) Temp src: Oral BP: (!) 153/84 Pulse: 66   Resp: 14 SpO2: 100 % O2 Device: None (Room air)   Height: 190.5 cm (6' 3\") Weight: 62.6 kg (138 lb)  Estimated body mass index is 17.25 kg/m  as calculated from the following:    Height as of this encounter: 1.905 m (6' 3\").    Weight as of this encounter: 62.6 kg (138 lb).  HEENT NC AT PRTL EOM good   Neck supple  Heart S1S2  Lungs " CTA  Abdomen  benign BS positive NT NR   LE no edema            REVIEWED THE MEDICATIONS BELOW   Current Facility-Administered Medications   Medication     - Medication Assessment Program - Rehab Services     acetaminophen (TYLENOL) tablet 650 mg     alum & mag hydroxide-simethicone (MYLANTA ES/MAALOX  ES) suspension 30 mL     amLODIPine (NORVASC) tablet 10 mg     artificial saliva (BIOTENE MT) solution 2 spray     calcium carbonate 500 mg (elemental) (OSCAL;OYSTER SHELL CALCIUM) tablet 500 mg     dexamethasone (DECADRON) tablet 2 mg    Followed by     [START ON 9/13/2019] dexamethasone (DECADRON) tablet 1 mg     dimethicone 1.3 % lotion LOTN 1 g     docusate sodium (COLACE) capsule 100 mg     heparin 100 UNIT/ML injection 5 mL     heparin lock flush 10 UNIT/ML injection 5-10 mL     heparin lock flush 10 UNIT/ML injection 5-10 mL     hydrochlorothiazide (MICROZIDE) capsule 12.5 mg     lidocaine (LMX4) cream     lidocaine 1 % 0.1-1 mL     ondansetron (ZOFRAN) tablet 4 mg     pantoprazole (PROTONIX) EC tablet 40 mg     polyethylene glycol (MIRALAX/GLYCOLAX) Packet 17 g     senna-docusate (SENOKOT-S/PERICOLACE) 8.6-50 MG per tablet 2 tablet     sodium chloride (PF) 0.9% PF flush 10-20 mL     sodium chloride (PF) 0.9% PF flush 10-20 mL     vitamin D3 (CHOLECALCIFEROL) 1000 units (25 mcg) tablet 25 mcg     vitamin E (TOCOPHEROL) 100 units (90 mg) capsule 100 Units       No results found for this or any previous visit (from the past 24 hour(s)).    Total of 15 min spent in the encounter, more than 50% in coordination and counseling on topics listed in the HPI

## 2019-09-08 NOTE — PLAN OF CARE
Patient discharge home, discharge meds and summary provided to him and his significant other, Implanted port de-access and flushed with hepatin, stable at the time of discharge.

## 2019-09-08 NOTE — PLAN OF CARE
Patient uses call light appropriately. C/O chest pain near port-a-cath site, PRN Tylenol given x2 with good results. Slept well throughout the night. Call light within reach.

## 2019-09-08 NOTE — DISCHARGE INSTRUCTIONS
Follow Up Appointments  -- Follow up with PCP in 1-2 weeks regarding hospitalization and for medication refills.  Please call Dr. Ventura's office after discharge to schedule  Phone: [phone]    -- Follow up with Dr. Alfred Del Rio in Neurosurgery clinic as schedueld on September 13th at 1:15 PM with your MRI appointment at 11:30 AM.    -- Follow up with your primary Oncologist/PCP as scheduled with Dr. Arceo on September 25th at 4:30 PM.    --Follow up with ENT team Dr. Ortiz in 1-2 weeks.      -- Follow up PM&R as scheduled with Dr. Haley Cintron on Thursday, October 31st at 3:40 PM.      HOME CARE  [facility]. If you have any questions, please call [phone].  Nursing, physical therapy, occupational therapy, .     [facility]   [address], [place] [address]  Main Phone: [phone]  Fax: [phone]

## 2019-09-08 NOTE — PLAN OF CARE
Pt is independent for functional mobility in room. Continent of bowel and bladder. Had bm this pm.   C/o port-a-catheter site pain, tylenol given x1 per request with good relief. VSS.   On track to discharge home tomorrow with his significant other.

## 2019-09-13 NOTE — PROGRESS NOTES
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 The patient underwent a second resection (8/16/2019) with Gammatile placement. GTR was achieved. The patient experienced symptomatic improvement after the procedure. The patient presents today for follow-up.    No new complaints since last seen.     BP (!) 153/87 (BP Location: Right arm, Patient Position: Chair, Cuff Size: Adult Regular)   Pulse 86   Temp 97.4  F (36.3  C) (Oral)   Resp 18   Wt 65.3 kg (144 lb)   SpO2 97%   BMI 18.00 kg/m      Examination non-focal.  Incision well-healed.    MRI of the brain today (9/13/2019) showed no evidence of tumor recurrence. Gammatile remains in place.    AP: Doing very well overall. Given the aggressive growth previously seen, the patient will need to be closely monitored. Will repeat MRI in one month. I have advised the patient that it is critical that he follow-up with his oncologist for systemic treatment.

## 2019-09-13 NOTE — LETTER
9/13/2019       RE: King Gonsalo Bruno  [address]  [place] [address]     Dear Colleague,    Thank you for referring your patient, King Gonsalo Bruno, to the [facility]. Please see a copy of my visit note below.    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 The patient underwent a second resection (8/16/2019) with Gammatile placement. GTR was achieved. The patient experienced symptomatic improvement after the procedure. The patient presents today for follow-up.    No new complaints since last seen.     BP (!) 153/87 (BP Location: Right arm, Patient Position: Chair, Cuff Size: Adult Regular)   Pulse 86   Temp 97.4  F (36.3  C) (Oral)   Resp 18   Wt 65.3 kg (144 lb)   SpO2 97%   BMI 18.00 kg/m       Examination non-focal.  Incision well-healed.    MRI of the brain today (9/13/2019) showed no evidence of tumor recurrence. Gammatile remains in place.    AP: Doing very well overall. Given the aggressive growth previously seen, the patient will need to be closely monitored. Will repeat MRI in one month. I have advised the patient that it is critical that he follow-up with his oncologist for systemic treatment.    Again, thank you for allowing me to participate in the care of your patient.      Sincerely,    Alfred Del Rio MD

## 2019-09-13 NOTE — NURSING NOTE
"Oncology Rooming Note    September 13, 2019 1:46 PM   King Gonsalo Bruno is a 73 year old male who presents for:    Chief Complaint   Patient presents with     Oncology Clinic Visit     Patient with brain metastasis here for provider visit      Initial Vitals: BP (!) 153/87 (BP Location: Right arm, Patient Position: Chair, Cuff Size: Adult Regular)   Pulse 86   Temp 97.4  F (36.3  C) (Oral)   Resp 18   Wt 65.3 kg (144 lb)   SpO2 97%   BMI 18.00 kg/m   Estimated body mass index is 18 kg/m  as calculated from the following:    Height as of 8/27/19: 1.905 m (6' 3\").    Weight as of this encounter: 65.3 kg (144 lb). Body surface area is 1.86 meters squared.  No Pain (0) Comment: Data Unavailable   No LMP for male patient.  Allergies reviewed: Yes  Medications reviewed: Yes    Medications: Medication refills not needed today.  Pharmacy name entered into [employer]: [place], [place] - [address] [phone]    Clinical concerns:       Sherrill Valerio CMA              "

## 2019-09-20 NOTE — PROGRESS NOTES
Oncology RN Care Coordination Note:     Patient's significant other, Stephanie, had left a voicemail for this writer yesterday afternoon stating they are anxious and they want to know what the next steps are in Deandre's process.      Writer sent a message to Dr. Arceo, Lela Powell PA-C, ISAURA Jones, Kathleen Orosco PA-C and Shiloh Loera PA-C to see if there was any information I could share with patient and his significant other, however there is no plan at this time.      Writer called Stephanie back and explained that the appointment with Dr. Arceo on 9/25/19 is to determine the plan going forward.      Rimma Lanza, RN BSN   [facility]  Nurse Coordinator

## 2019-09-23 NOTE — PROGRESS NOTES
Oncology RN Care Coordination Note:     Patient's home care nurse, Marcella, left this writer a voicemail this morning stating she, the patient and his significant other, Stephanie, were discussing the possibility of hospice this morning.      Writer returned Marcella's phone call this morning, but she did not answer.  Writer did leave a detailed message stating if the patient wishes to enroll in hospice his oncology team would be supportive of that.     Rimma Lanza RN BSN   [facility]  Nurse Coordinator

## 2019-09-25 NOTE — PROGRESS NOTES
Patient provided with information on whole body donation with the [facility] per their request. Patient and family encouraged to call number provided on information, or our office, with any additional questions or concerns.     Juju Haywood RNCC

## 2019-09-25 NOTE — NURSING NOTE
"Oncology Rooming Note    September 25, 2019 4:40 PM   King Gonsalo Bruno is a 73 year old male who presents for:    Chief Complaint   Patient presents with     Oncology Clinic Visit     Return; Bladder Ca     Initial Vitals: /79   Pulse 95   Temp 98.8  F (37.1  C) (Oral)   Resp 12   Wt 61.9 kg (136 lb 8 oz)   SpO2 94%   BMI 17.06 kg/m   Estimated body mass index is 17.06 kg/m  as calculated from the following:    Height as of 8/27/19: 1.905 m (6' 3\").    Weight as of this encounter: 61.9 kg (136 lb 8 oz). Body surface area is 1.81 meters squared.  No Pain (1) Comment: Data Unavailable   No LMP for male patient.  Allergies reviewed: Yes  Medications reviewed: Yes    Medications: Medication refills not needed today.  Pharmacy name entered into [employer]: [facility] [place], [place] - [address] [facility] [phone]    Clinical concerns: Having trouble sleeping        Jenelle Mitchell CMA              "

## 2019-09-25 NOTE — PROGRESS NOTES
Reason for Visit: seen in f/u of recurrent, metastatic penile cancer    Oncology HPI: Mr. King Gonsalo Bruno is a 73 year old man with a history of stage II penile urethral carcinoma, treated with cisplatin/gemzar (split on day 1 and 8 given hx of CKD). His first 2 cycles were complicated by dehydration and nausea. He had a positive response and completed 4 cycles in November 2017. He underwent radial penectomy and urethrectomy, perineal urethrostomy and bilateral inguinal node dissection on 3/8/18. Surgical pathology demonstrated moderately differentiated SCC of the penile urethra with extension into the corpus spongiosum and cavernosum of the penile shaft. The specimen was positive for lymphovascular and perineural invasion. Margins were negative. 1/5 L inguinal nodes were positive and 1/7 R inguinal nodes were positive for disease. He was referred for consideration of radiation. At the time of that appointment, he was found to have multiple new pulmonary nodules concerning for metastatic disease. Biopsy was discussed, but after discussion with patient, opted to observe with a PET/CT 6 weeks later. PET/CT on 6/26/18 showed multiple hypermetabolic pulmonary nodules. He was offered palliative intent immunotherapy. He was started on Keytruda on 8/3/18 and continued on that through 2/28/19. On 3/6/19, he had a restaging evaluation and was found to have progression. Hospice was discussed, but he wanted to try alternate chemotherapy. He initiated treatment with docetaxel on 3/14/19.     He was admitted for metastatic right frontal lobe mass. He had right craniotomy and tumor resection on 7/25/19.     Interval history:    is being followed for his metastatic penile cancer    He has been admitted for penile cancer metastatic to his brain. He had right craniotomy and tumor resection on 7/25/19. and admitted to TCU on 8/1/19 and subsequently discharged on 8/11 to home.  On 8/16 he presented back to the ED for noted left  sided weakness over the past 48 hours.  Was noted to have progression of weakness on the left side and repeat imaging showed mass affect at the site of prior right frontal brain tumor resection.  He underwent a second resection on 8/22/19.  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  He has been discharged to ARU for recurrent brain tumor s/p resection and repeat resection. Admission to acute inpatient rehab on 8/27/19 .He has recovered well from surgery. He denies any headaches, nausea, vomiting, dizziness. He denies any pain at this visit.     No fever, chills, night sweats. He is able to sleep ok.     ROS: Unless otherwise noted above, denies fatigue, fevers, chills, night sweats, HA, visual, hearing or taste changes, dry mouth, mouth sores, trouble swallowing, N/D,  abdominal pain, change in urination, chest pain, SOB, cough, edema, mood changes, bleeding  Current Outpatient Medications   Medication Sig     acetaminophen (TYLENOL) 325 MG tablet Take 2 tablets (650 mg) by mouth every 4 hours as needed for mild pain or fever (> 101 F)     amLODIPine (NORVASC) 10 MG tablet Take 1 tablet (10 mg) by mouth daily     artificial saliva (BIOTENE MT) SOLN solution Swish and spit 2 mLs (2 sprays) in mouth 4 times daily     calcium carbonate 500 mg, elemental, (OSCAL;OYSTER SHELL CALCIUM) 500 MG tablet Take 1 tablet (500 mg) by mouth 2 times daily (with meals)     cholecalciferol 1000 units TABS Take 1,000 Units by mouth 2 times daily     docusate sodium (COLACE) 100 MG capsule Take 1 capsule (100 mg) by mouth 2 times daily     pantoprazole (PROTONIX) 40 MG EC tablet Take 1 tablet (40 mg) by mouth every morning (before breakfast)     hydrochlorothiazide (MICROZIDE) 12.5 MG capsule Take 1 capsule (12.5 mg) by mouth daily     No current facility-administered medications for this visit.        Allergies   Allergen Reactions     Lisinopril Swelling     Oxycodone  Nausea and Vomiting     Vicodin [Hydrocodone-Acetaminophen] Nausea and Vomiting         Exam: alert, appears slim, but well.   Blood pressure 129/79, pulse 95, temperature 98.8  F (37.1  C), temperature source Oral, resp. rate 12, weight 61.9 kg (136 lb 8 oz), SpO2 94 %.  Wt Readings from Last 4 Encounters:   09/25/19 61.9 kg (136 lb 8 oz)   09/13/19 65.3 kg (144 lb)   09/08/19 61.8 kg (136 lb 3.2 oz)   08/24/19 61 kg (134 lb 7.7 oz)     General: Pleasant gentleman in no acute distress  HEENT: EOMI, PERRLA, no scleral icterus, mucus membranes moist and no lesions or thrush  Lymph: Neck is supple and shows no nodes in cervical or supraclavicular   Heart:  RRR, no murmur, gallop or rub  Lungs: CTA-B, no wheezes, rhonchi or rales  Abdomen: Normal bowel sounds, soft, non tender, not distended, no rebound or guarding, no palpable masses  Extremities: No pitting edema  Skin: Hard palpable cord on left forearm with overlying erythema. No peeling or blistering  Neuro: CN II-XII are intact    Labs:     Recent Labs   Lab Test 09/08/19  0711 09/05/19  0826 09/03/19  0603 09/01/19  0601 08/30/19  0541 08/29/19  0851  08/26/19  0618    133 131* 132* 133 132*   < > 134   POTASSIUM 4.1 3.7 4.2  --   --  3.8  --  4.1   CHLORIDE 96 98 95  --   --  97  --  102   CO2 29 24 28  --   --  25  --  25   ANIONGAP 8 11 8  --   --  10  --  7   BUN 36* 30 36*  --   --  31*  --  26   CR 1.14 1.04 1.11  --   --  0.96  --  0.81   GLC 87 124* 95  --   --  136*  --  112*   SAADIA 8.9 8.9 9.0  --   --  9.2  --  9.2    < > = values in this interval not displayed.     Recent Labs   Lab Test 08/29/19  0851 08/27/19  0625 08/26/19  0618 08/25/19  0306 08/24/19  0311   MAG 1.7 1.8 1.6 2.0 2.2   PHOS 2.6 2.1* 2.0* 3.0 3.4     Recent Labs   Lab Test 09/08/19  0711 09/05/19  0826 09/03/19  0603 09/01/19  0601 08/29/19  0851  08/16/19  1655 08/06/19  1150  07/25/19  0029   WBC 11.4* 15.7* 19.0* 16.8* 15.6*   < > 6.6 9.8   < > 16.0*   HGB 9.6* 9.7* 9.3*  9.0* 9.6*   < > 8.5* 8.5*   < > 8.5*    231 239 273 336   < > 405 268   < > 408   MCV 81 81 80 80 80   < > 82 81   < > 80   NEUTROPHIL  --   --  90.3  --  90.6  --  65.1 75.7  --  95.4    < > = values in this interval not displayed.     Recent Labs   Lab Test 08/16/19  1655 08/06/19  1150 08/01/19  0312  07/05/19  1105  05/15/19  0826   BILITOTAL 0.4 0.3 0.3   < > 0.3   < > 0.2   ALKPHOS 81 80 62   < > 89   < > 84   ALT 16 23 21   < > 14   < > 14   AST 35 21 21   < > 31   < > 26   ALBUMIN 3.3* 3.2* 2.6*   < > 3.2*   < > 3.0*   LDH  --  271*  --   --  202  --  251*    < > = values in this interval not displayed.     TSH   Date Value Ref Range Status   02/28/2019 0.85 0.40 - 4.00 mU/L Final   02/07/2019 0.97 0.40 - 4.00 mU/L Final   01/17/2019 0.81 0.40 - 4.00 mU/L Final     No results for input(s): CEA in the last 89854 hours.  Results for orders placed or performed in visit on 09/13/19   MRI Brain w & w/o contrast    Narrative     MR BRAIN W/O & W CONTRAST 9/13/2019 1:04 PM    Provided History: s/p craniotomy; S/P craniotomy.  ICD-10: S/P craniotomy    Comparison: 8/23/2019.    Technique: Multiplanar T1-weighted, axial FLAIR, and susceptibility  images were obtained without intravenous contrast. Following  intravenous gadolinium-based contrast administration, axial  T2-weighted, diffusion, and T1-weighted images (in multiple planes)  were obtained.    Contrast: 6 cc Gadavist    Findings:  Postoperative changes of right frontal craniotomy with underlying  resection cavity. There is a curvilinear area of T1 hypointensity  surrounded by T1 hyperintensity along the inferolateral aspect of the  resection cavity which does not enhance internally and demonstrates  restricted diffusion and presumably represents evolving blood  products. There is linear enhancement along the resection cavity which  is presumably postoperative. No definite nodular enhancement to  suggest recurrence. Small extra-axial fluid collection  communicating  with resection cavity overlying the right frontal lobe. There are  multiple foci susceptibility within the resection cavity which  correlate to the gamma tiles that were present previously. The degree  of T2 hyperintensity surrounding the resection cavity has decreased  since prior study. There are a moderate amount of periventricular and  subcortical white matter T2 hyperintensities that are nonspecific, but  could relate to small vessel ischemic disease versus prior therapy..  The ventricles are proportionate to the cerebral sulci. Normal major  vascular intracranial flow-voids.     The visualized portions of paranasal sinuses, and mastoid air cells  are relatively clear. The orbits are grossly unremarkable.      Impression    Impression:   Postoperative changes of right frontal craniotomy with no definite  evidence for recurrence associated with the right frontal lobe  resection cavity.    NATI COOK MD     Impression/plan:     Recurrent, metastatic penile urethral carcinoma     He has progressed on pembrolizumab and taxotere. Previously we had tried cisplatin with gemcitabine for him which he had a very hard time tolerating. We had discussed options of carboplatin and cetuximab to control his disease.  Unfortunately, he has had 2 relapses within the CNS in the last couple of months.  This is a very poor prognostic sign.  The systemic chemotherapy will have minimal effect, if any, within the CNS.  Besides the systemic chemotherapy will carry significant ill effects.  He is already struggling with his 2 recent brain surgeries with gamma knife in between.  He is not a candidate for chemotherapy.  I advised him against this.  I do endorse the option of hospice for him.  The hospice team is going to visit him at his house this Friday.  He had discussions in regards to hospice in the past.  He does understand the rationale, benefits and limitations of this.  I again explained it out for him.  It was  a little harder for him to accept that he would not be pursuing any further therapy.  I did explain again that his quality of life was the most important.  He will do better if he is feeling better.  To this end, the chemotherapy is only going to make him more and more symptomatic.  He is not having a lot of symptoms from his disease at this time, which is quite encouraging.  We are actively choosing not to pursue any further doses of chemotherapy with substantial side effects and uncertain benefits.      He explained to me that Dr. Mulu Barnett had offered him radiation to his diverticula in his pharynx where his food gets stuck and he gets bouts of coughing episodes.  He notes that Dr. Barnett informed him that the persistent/recurrent cough will stephanie after radiation of this benign cyst/diverticula.  Probably he would have to get done with radiation before he can sign up for hospice.      Once he signs up for hospice, he would not need consultations or visits in my clinic.  However, I would always be available and quite happy to help him out in any way that I can.       He would like to donate his body to the [facility] and I have provided him printed forms and information packet.      Over 45 min of direct face to face time spent with patient with more than 50% time spent in counseling and coordinating care.

## 2019-09-25 NOTE — LETTER
9/25/2019       RE: King Gonsalo Bruno  [address]  [facility] [address]     Dear Colleague,    Thank you for referring your patient, King Gonsalo Bruno, to the [facility]. Please see a copy of my visit note below.    Reason for Visit: seen in f/u of recurrent, metastatic penile cancer    Oncology HPI: Mr. King Gonsalo Bruno is a 73 year old man with a history of stage II penile urethral carcinoma, treated with cisplatin/gemzar (split on day 1 and 8 given hx of CKD). His first 2 cycles were complicated by dehydration and nausea. He had a positive response and completed 4 cycles in November 2017. He underwent radial penectomy and urethrectomy, perineal urethrostomy and bilateral inguinal node dissection on 3/8/18. Surgical pathology demonstrated moderately differentiated SCC of the penile urethra with extension into the corpus spongiosum and cavernosum of the penile shaft. The specimen was positive for lymphovascular and perineural invasion. Margins were negative. 1/5 L inguinal nodes were positive and 1/7 R inguinal nodes were positive for disease. He was referred for consideration of radiation. At the time of that appointment, he was found to have multiple new pulmonary nodules concerning for metastatic disease. Biopsy was discussed, but after discussion with patient, opted to observe with a PET/CT 6 weeks later. PET/CT on 6/26/18 showed multiple hypermetabolic pulmonary nodules. He was offered palliative intent immunotherapy. He was started on Keytruda on 8/3/18 and continued on that through 2/28/19. On 3/6/19, he had a restaging evaluation and was found to have progression. Hospice was discussed, but he wanted to try alternate chemotherapy. He initiated treatment with docetaxel on 3/14/19.     He was admitted for metastatic right frontal lobe mass. He had right craniotomy and tumor resection on 7/25/19.     Interval history:    is being followed for his metastatic penile  cancer    He has been admitted for penile cancer metastatic to his brain. He had right craniotomy and tumor resection on 7/25/19. and admitted to TCU on 8/1/19 and subsequently discharged on 8/11 to home.  On 8/16 he presented back to the ED for noted left sided weakness over the past 48 hours.  Was noted to have progression of weakness on the left side and repeat imaging showed mass affect at the site of prior right frontal brain tumor resection.  He underwent a second resection on 8/22/19.  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  He has been discharged to ARU for recurrent brain tumor s/p resection and repeat resection. Admission to acute inpatient rehab on 8/27/19 .He has recovered well from surgery. He denies any headaches, nausea, vomiting, dizziness. He denies any pain at this visit.     No fever, chills, night sweats. He is able to sleep ok.     ROS: Unless otherwise noted above, denies fatigue, fevers, chills, night sweats, HA, visual, hearing or taste changes, dry mouth, mouth sores, trouble swallowing, N/D,  abdominal pain, change in urination, chest pain, SOB, cough, edema, mood changes, bleeding  Current Outpatient Medications   Medication Sig     acetaminophen (TYLENOL) 325 MG tablet Take 2 tablets (650 mg) by mouth every 4 hours as needed for mild pain or fever (> 101 F)     amLODIPine (NORVASC) 10 MG tablet Take 1 tablet (10 mg) by mouth daily     artificial saliva (BIOTENE MT) SOLN solution Swish and spit 2 mLs (2 sprays) in mouth 4 times daily     calcium carbonate 500 mg, elemental, (OSCAL;OYSTER SHELL CALCIUM) 500 MG tablet Take 1 tablet (500 mg) by mouth 2 times daily (with meals)     cholecalciferol 1000 units TABS Take 1,000 Units by mouth 2 times daily     docusate sodium (COLACE) 100 MG capsule Take 1 capsule (100 mg) by mouth 2 times daily     pantoprazole (PROTONIX) 40 MG EC tablet Take 1 tablet (40 mg) by mouth every  morning (before breakfast)     hydrochlorothiazide (MICROZIDE) 12.5 MG capsule Take 1 capsule (12.5 mg) by mouth daily     No current facility-administered medications for this visit.        Allergies   Allergen Reactions     Lisinopril Swelling     Oxycodone Nausea and Vomiting     Vicodin [Hydrocodone-Acetaminophen] Nausea and Vomiting         Exam: alert, appears slim, but well.   Blood pressure 129/79, pulse 95, temperature 98.8  F (37.1  C), temperature source Oral, resp. rate 12, weight 61.9 kg (136 lb 8 oz), SpO2 94 %.  Wt Readings from Last 4 Encounters:   09/25/19 61.9 kg (136 lb 8 oz)   09/13/19 65.3 kg (144 lb)   09/08/19 61.8 kg (136 lb 3.2 oz)   08/24/19 61 kg (134 lb 7.7 oz)     General: Pleasant gentleman in no acute distress  HEENT: EOMI, PERRLA, no scleral icterus, mucus membranes moist and no lesions or thrush  Lymph: Neck is supple and shows no nodes in cervical or supraclavicular   Heart:  RRR, no murmur, gallop or rub  Lungs: CTA-B, no wheezes, rhonchi or rales  Abdomen: Normal bowel sounds, soft, non tender, not distended, no rebound or guarding, no palpable masses  Extremities: No pitting edema  Skin: Hard palpable cord on left forearm with overlying erythema. No peeling or blistering  Neuro: CN II-XII are intact    Labs:     Recent Labs   Lab Test 09/08/19  0711 09/05/19  0826 09/03/19  0603 09/01/19  0601 08/30/19  0541 08/29/19  0851  08/26/19  0618    133 131* 132* 133 132*   < > 134   POTASSIUM 4.1 3.7 4.2  --   --  3.8  --  4.1   CHLORIDE 96 98 95  --   --  97  --  102   CO2 29 24 28  --   --  25  --  25   ANIONGAP 8 11 8  --   --  10  --  7   BUN 36* 30 36*  --   --  31*  --  26   CR 1.14 1.04 1.11  --   --  0.96  --  0.81   GLC 87 124* 95  --   --  136*  --  112*   SAADIA 8.9 8.9 9.0  --   --  9.2  --  9.2    < > = values in this interval not displayed.     Recent Labs   Lab Test 08/29/19  0851 08/27/19  0625 08/26/19  0618 08/25/19  0306 08/24/19  0311   MAG 1.7 1.8 1.6 2.0 2.2    PHOS 2.6 2.1* 2.0* 3.0 3.4     Recent Labs   Lab Test 09/08/19  0711 09/05/19  0826 09/03/19  0603 09/01/19  0601 08/29/19  0851  08/16/19  1655 08/06/19  1150  07/25/19  0029   WBC 11.4* 15.7* 19.0* 16.8* 15.6*   < > 6.6 9.8   < > 16.0*   HGB 9.6* 9.7* 9.3* 9.0* 9.6*   < > 8.5* 8.5*   < > 8.5*    231 239 273 336   < > 405 268   < > 408   MCV 81 81 80 80 80   < > 82 81   < > 80   NEUTROPHIL  --   --  90.3  --  90.6  --  65.1 75.7  --  95.4    < > = values in this interval not displayed.     Recent Labs   Lab Test 08/16/19  1655 08/06/19  1150 08/01/19  0312  07/05/19  1105  05/15/19  0826   BILITOTAL 0.4 0.3 0.3   < > 0.3   < > 0.2   ALKPHOS 81 80 62   < > 89   < > 84   ALT 16 23 21   < > 14   < > 14   AST 35 21 21   < > 31   < > 26   ALBUMIN 3.3* 3.2* 2.6*   < > 3.2*   < > 3.0*   LDH  --  271*  --   --  202  --  251*    < > = values in this interval not displayed.     TSH   Date Value Ref Range Status   02/28/2019 0.85 0.40 - 4.00 mU/L Final   02/07/2019 0.97 0.40 - 4.00 mU/L Final   01/17/2019 0.81 0.40 - 4.00 mU/L Final     No results for input(s): CEA in the last 88380 hours.  Results for orders placed or performed in visit on 09/13/19   MRI Brain w & w/o contrast    Narrative     MR BRAIN W/O & W CONTRAST 9/13/2019 1:04 PM    Provided History: s/p craniotomy; S/P craniotomy.  ICD-10: S/P craniotomy    Comparison: 8/23/2019.    Technique: Multiplanar T1-weighted, axial FLAIR, and susceptibility  images were obtained without intravenous contrast. Following  intravenous gadolinium-based contrast administration, axial  T2-weighted, diffusion, and T1-weighted images (in multiple planes)  were obtained.    Contrast: 6 cc Gadavist    Findings:  Postoperative changes of right frontal craniotomy with underlying  resection cavity. There is a curvilinear area of T1 hypointensity  surrounded by T1 hyperintensity along the inferolateral aspect of the  resection cavity which does not enhance internally and  demonstrates  restricted diffusion and presumably represents evolving blood  products. There is linear enhancement along the resection cavity which  is presumably postoperative. No definite nodular enhancement to  suggest recurrence. Small extra-axial fluid collection communicating  with resection cavity overlying the right frontal lobe. There are  multiple foci susceptibility within the resection cavity which  correlate to the gamma tiles that were present previously. The degree  of T2 hyperintensity surrounding the resection cavity has decreased  since prior study. There are a moderate amount of periventricular and  subcortical white matter T2 hyperintensities that are nonspecific, but  could relate to small vessel ischemic disease versus prior therapy..  The ventricles are proportionate to the cerebral sulci. Normal major  vascular intracranial flow-voids.     The visualized portions of paranasal sinuses, and mastoid air cells  are relatively clear. The orbits are grossly unremarkable.      Impression    Impression:   Postoperative changes of right frontal craniotomy with no definite  evidence for recurrence associated with the right frontal lobe  resection cavity.    NATI COOK MD     Impression/plan:     Recurrent, metastatic penile urethral carcinoma     He has progressed on pembrolizumab and taxotere. Previously we had tried cisplatin with gemcitabine for him which he had a very hard time tolerating. We had discussed options of carboplatin and cetuximab to control his disease.  Unfortunately, he has had 2 relapses within the CNS in the last couple of months.  This is a very poor prognostic sign.  The systemic chemotherapy will have minimal effect, if any, within the CNS.  Besides the systemic chemotherapy will carry significant ill effects.  He is already struggling with his 2 recent brain surgeries with gamma knife in between.  He is not a candidate for chemotherapy.  I advised him against this.  I do  endorse the option of hospice for him.  The hospice team is going to visit him at his house this Friday.  He had discussions in regards to hospice in the past.  He does understand the rationale, benefits and limitations of this.  I again explained it out for him.  It was a little harder for him to accept that he would not be pursuing any further therapy.  I did explain again that his quality of life was the most important.  He will do better if he is feeling better.  To this end, the chemotherapy is only going to make him more and more symptomatic.  He is not having a lot of symptoms from his disease at this time, which is quite encouraging.  We are actively choosing not to pursue any further doses of chemotherapy with substantial side effects and uncertain benefits.      He explained to me that Dr. Mulu Barnett had offered him radiation to his diverticula in his pharynx where his food gets stuck and he gets bouts of coughing episodes.  He notes that Dr. Barnett informed him that the persistent/recurrent cough will stephanie after radiation of this benign cyst/diverticula.  Probably he would have to get done with radiation before he can sign up for hospice.      Once he signs up for hospice, he would not need consultations or visits in my clinic.  However, I would always be available and quite happy to help him out in any way that I can.       He would like to donate his body to the [facility] and I have provided him printed forms and information packet.      Over 45 min of direct face to face time spent with patient with more than 50% time spent in counseling and coordinating care.      Again, thank you for allowing me to participate in the care of your patient.      Sincerely,    Steve Arceo MD

## 2019-10-01 NOTE — PROGRESS NOTES
This is a recent snapshot of the patient's [facility] medical record.  For current drug dose and complete information and questions, call [phone] or In Basket pool, fv home infusion ([id number])  CSN Number:  [id number]

## 2019-10-09 NOTE — PROGRESS NOTES
's hospice RN, Lily, called today to see if they could get a non-hospice referral for patient so he could pursue treatment for diverticula on pharynx.  He has been having difficulty swallowing, gagging and pain.      Per Dr. Arceo's last note, he explained that Dr. Mulu Barnett had offered patient radiation to his diverticula in his pharynx where his food gets stuck and he gets bouts of coughing episodes.  He notes that Dr. Barnett informed him that the persistent/recurrent cough will stephanie after radiation of this benign cyst/diverticula.  Probably he would have to get done with radiation before he can sign up for hospice, but patient is currently on hospice now.  Encouraged Hospice RN to call Dr. Barnett's office to see if radiation is still an option for patient.  Will also forward this message to the [place] RN pool to follow.  Lily, hospice RN, can be reached at [phone].

## 2019-10-09 NOTE — TELEPHONE ENCOUNTER
Health Call Center    Phone Message    May a detailed message be left on voicemail: no    Reason for Call: Other: Pt started hospice on 9/27/19 and Sol from  Hospice called to check if he needs to be going to further appts or follow-ups with Dr. Del Rio because Pt is in hospice. Pt has an appt on 10/11/19. Please call her back.     Action Taken: Message routed to:  [facility] ([facility]ULT [facility]

## 2019-10-11 NOTE — TELEPHONE ENCOUNTER
Appointment was cancelled.    Joaquina Rader, RN, BSN  Care Coordinator  [facility]]

## 2019-10-16 NOTE — TELEPHONE ENCOUNTER
A call was made to both cell and home numbers to cancel fup and sim appt on Friday.  Dr. Barnett is suggesting hospice.  A message was left for pt or Stephanie to call back and the dept number was given.

## 2019-11-20 ENCOUNTER — TELEPHONE (OUTPATIENT)
Dept: ONCOLOGY | Facility: CLINIC | Age: 74
End: 2019-11-20

## 2019-11-20 NOTE — TELEPHONE ENCOUNTER
Received notice of patient death.  Date of Death  10.21.2019  All appointments, orders and treatment plans cancelled.  Care TEAM and HIM notified

## 2019-11-26 ENCOUNTER — HOME INFUSION (PRE-WILLOW HOME INFUSION) (OUTPATIENT)
Dept: PHARMACY | Facility: CLINIC | Age: 74
End: 2019-11-26

## 2019-12-02 NOTE — PROGRESS NOTES
This is a recent snapshot of the patient's [facility] medical record.  For current drug dose and complete information and questions, call [phone] or In Basket pool, fv home infusion ([id number])  CSN Number:  [id number]

## 2022-02-17 PROBLEM — S06.890A OTHER SPECIFIED INTRACRANIAL INJURY WITHOUT LOSS OF CONSCIOUSNESS, INITIAL ENCOUNTER (H): Status: ACTIVE | Noted: 2019-01-01

## 2022-05-16 NOTE — PROGRESS NOTES
Pt taken to pre-op via transport. Report given to Tova GUTIERREZ. Head washing done this morning, full bed bath done last night. Pt voided just prior to leaving floor. SCDs sent with pt. NPO since midnight, with exception of sips of water with meds at 0800. CT with markers placed done this morning. 2% NaCl infusing at 50ml/hr, for Na level of 130 this morning. Repeat Na level pending. Surgical consent signed and in the chart. Healthcare directive paper copy in hard chart, sent to [facility]ool [place] to get uploaded into epic.   No Residual Tumor Seen Histology Text: There were no malignant cells seen in the sections examined.

## 2023-02-09 NOTE — NURSING NOTE
Chief Complaint   Patient presents with     Blood Draw     Labs drawn via PIV by RN in lab. VS taken. Pt checked in for next appt     Labs drawn from PIV placed by RN. Line flushed with saline. Vitals taken. Pt checked in for appointment(s).    Lisa BULLARD RN PHN BSN  BMT/Oncology Lab     Expected Date Of Service: 02/09/2023

## 2023-03-02 NOTE — NURSING NOTE
Chief Complaint   Patient presents with     Blood Draw     Labs drawn from PIV placed by Shant vascular access RN. Line flushed with saline. Vs taken and pt checked in for appt     Labs drawn from PIV placed by Shant vascular access RN. Line flushed with saline. Vitals taken. Pt checked in for appointment(s).    Mariela Santamaria RN   Spoke to pharmacy. I told her (the pharmacy tech) the Klonopin was sent electronically and gave her the date and the time. Pharmacy does not see it in their system. Can you resubmit the prescription that Dr Pal sent on the 27th?

## 2023-04-03 PROBLEM — C79.31 MALIGNANT NEOPLASM METASTATIC TO BRAIN (H): Status: ACTIVE | Noted: 2019-01-01

## 2023-06-15 NOTE — H&P
Pre-Operative H & P     CC:  Preoperative exam to assess for increased cardiopulmonary risk while undergoing surgery and anesthesia.    Date of Encounter: 2/26/2018  Primary Care Physician:  Joan Ventura  King Gonsalo Bruno is a 72 year old male who presents for pre-operative H & P in preparation for  radical penectomy and urethrectomy possible cystectomy with ileal conduit, possible appendicovesicostomy, possible perineal urethrostomy, inguinal lymphadenectomy on 3/8/2018 with Dr. Haddad at [facility] under general anesthesia.  Mr. Bruno was seen by Dr. 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  Mr. Bruno is s/p urethral dilation and SPT placed in August 2017.  He has completed neoadjuvant chemotherapy.  Records indicate pulmonary nodules on imaging concerning for metastatic disease or second primary disease , however, patient convinced it is r/t his prior TB exposure.  PAC referral for risk assessment and optimization of anesthesia with comorbid conditions of:  as above;h/o dysphagia>>> hypopharyngeal diverticula, s/p DL with take down of pharyngeal diverticula (9/2013); HTN; GERD; CKD; OA; PONV and h/o blood transfusion.        History is obtained from the patient, SO and EMR.    Past Medical History  Past Medical History:   Diagnosis Date     Dysphagia 2013    Secondary to diverticulum in the hypopharynx     Esophageal pouch 2013    Left sided diverticulum in the hypopharynx      GERD (gastroesophageal reflux disease)      Hypertension      PONV (postoperative nausea and vomiting)        Past Surgical History  Past Surgical History:   Procedure Laterality Date     CYSTOSCOPY, BIOPSY BLADDER, COMBINED N/A 8/31/2017    Procedure: COMBINED CYSTOSCOPY, BIOPSY BLADDER;  Cystoscopy, Suprapubic Tube Placement with Ultrasound Guidiance, Uretheral Dilation;  Surgeon: Muriel Haddad MD;  Location: [facility] OR     CYSTOSTOMY,  [facility] :  Care Coordination Note     SITUATION   Wandy Velazco (she/her) is a 45 year old female who is receiving support for:  Appointment (Gender Care ) and Care Team  .    BACKGROUND     Pt is scheduled for a virtual full depth vaginoplasty consult with Deena Wallace on 8/21/23. She is also scheduled for a gender affirming breast augmentation consult with Dr. Galeas on 10/13/23 (in-person).     Pt lives in [place]. Writer discussed requirement for pt to be in the state [facility] for a virtual consult and pt verbalized understanding.      ASSESSMENT     Surgery              CGC Assessment  [facility] ([facility]) Enrollment: Enrolled  Patient has a therapist: Yes  Letter of support #1: Requested  Letter of support #2: Requested  Surgery being considered: Yes  Augmentation: Yes  Hormones at least 12mo: Yes    Pt reports:   No nicotine or other gender affirming surgeries  HRT for 2 years      PLAN          Nursing Interventions:       Follow-up plan:  1. Start hair removal  2. Obtain CASI Hernandez     INSERT TUBE SUPRAPUBIC, COMBINED N/A 8/31/2017    Procedure: COMBINED CYSTOSTOMY, INSERT TUBE SUPRAPUBIC;;  Surgeon: Muriel Haddad MD;  Location: [facility] OR     LARYNGOSCOPY  2013    Direct laryngoscopy with the use of rigid endoscopy and takedown of left hypopharyngeal diverticula.        Hx of Blood transfusions/reactions: yes without reaction      Hx of abnormal bleeding or anti-platelet use: denies    Menstrual history: No LMP for male patient.: na    Steroid use in the last year: denies    Personal or FH with difficulty with Anesthesia:  PONV    Prior to Admission Medications  Current Outpatient Prescriptions   Medication Sig Dispense Refill     Acetaminophen (TYLENOL PO) Take 325 mg by mouth as needed for mild pain or fever       B Complex Vitamins (VITAMIN B COMPLEX PO) Take by mouth daily (with lunch)       fluticasone (FLONASE) 50 MCG/ACT spray Spray 2 sprays into both nostrils 2 times daily (Patient taking differently: Spray 2 sprays into both nostrils every morning ) 2 Bottle 11     polyethylene glycol (MIRALAX/GLYCOLAX) powder Take 1 capful by mouth daily as needed for constipation       LORazepam (ATIVAN) 0.5 MG tablet Take 1 tablet (0.5 mg) by mouth every 8 hours as needed for anxiety (Patient taking differently: Take 0.5 mg by mouth At Bedtime ) 30 tablet 3     amLODIPine (NORVASC) 10 MG tablet Take 1 tablet (10 mg) by mouth daily (Patient taking differently: Take 20 mg by mouth every morning ) 90 tablet 3     omeprazole (PRILOSEC) 2 mg/mL SUSP Take 10 mLs (20 mg) by mouth 2 times daily 280 mL 11     nystatin (MYCOSTATIN) 308647 UNIT/ML suspension Take 5 mLs (500,000 Units) by mouth 4 times daily Swish and spit (Patient taking differently: Take 500,000 Units by mouth as needed Swish and spit) 200 mL 0     docusate sodium (COLACE) 100 MG capsule Take 1 capsule (100 mg) by mouth 2 times daily as needed for constipation 60 capsule 0       Allergies  Allergies   Allergen Reactions     Lisinopril  Swelling     Oxycodone Nausea and Vomiting     Vicodin [Hydrocodone-Acetaminophen] Nausea and Vomiting       Social History  Social History     Social History     Marital status: Single     Spouse name: Stephanie Oh     Number of children: 1     Years of education: N/A     Occupational History     retired crisis  for [employer]      Social History Main Topics     Smoking status: Former Smoker     Packs/day: 1.00     Years: 20.00     Types: Cigarettes     Start date: 9/29/1972     Quit date: 1/1/1992     Smokeless tobacco: Never Used      Comment: quit in 1992     Alcohol use No      Comment: 1987 quit per personal choice.     Drug use: No     Sexual activity: Not on file     Other Topics Concern     Not on file     Social History Narrative       Family History  Family History   Problem Relation Age of Onset     CEREBROVASCULAR DISEASE Mother      Hypertension Mother      Prostate Cancer Father 84     Prostate Cancer Brother 63     DIABETES Brother      CANCER Brother      DIABETES Brother        ROS/MED HX    ENT/Pulmonary: Comment: H/o exposure to TB and was treated for one year.     (+)JEISON risk factors hypertension, allergic rhinitis, tobacco use (Quit 1992.  Smoked <1PPD for 16 years. ), Past use , . .    Neurologic:  - neg neurologic ROS     Cardiovascular:     (+) hypertension----. : . . . :. . Previous cardiac testing date:results:date: results:ECG reviewed date: results: date: results:         (-) taking anticoagulants/antiplatelets   METS/Exercise Tolerance:  >4 METS   Hematologic:     (+) History of Transfusion -      Musculoskeletal: Comment: Chronic neck pain.    (+) arthritis (cervical spine and knuckles. ), , , -       GI/Hepatic: Comment: Constipation    (+) GERD Asymptomatic on medication,       Renal/Genitourinary:     (+) chronic renal disease, type: CRI,       Endo:  - neg endo ROS       Psychiatric:     (+) psychiatric history anxiety      Infectious Disease:  - neg infectious  "disease ROS       Malignancy:   (+) Malignancy History of Other  Other CA status post Chemo         Other:    (+) No chance of pregnancy H/O Chronic Pain,       Temp: 97.9  F (36.6  C) Temp src: Oral BP: 156/76 Pulse: 87   Resp: 18 SpO2: 99 %         145 lbs 4.8 oz  6' 4\"   Body mass index is 17.69 kg/(m^2).       Physical Exam  Constitutional: Awake, alert, cooperative, no apparent distress, and appears stated age.  Eyes: Pupils equal, round and reactive to light, extra ocular muscles intact, sclera clear, conjunctiva normal.  HENT: Normocephalic, oral pharynx with moist mucus membranes, good dentition. No goiter appreciated.   Respiratory: Clear to auscultation bilaterally, no crackles or wheezing.  Cardiovascular: Regular rate and rhythm, normal S1 and S2, and no murmur noted.  Carotids +2, no bruits. No edema. Palpable pulses to radial  DP and PT arteries.   GI: Normal bowel sounds, soft, non-distended, non-tender, no masses palpated, no hepatosplenomegaly.    Lymph/Hematologic: No cervical lymphadenopathy and no supraclavicular lymphadenopathy.  Genitourinary:  deferred  Skin: Warm and dry.    Musculoskeletal: Full ROM of neck. There is no redness, warmth, or swelling of the joints. Gross motor strength is normal.    Neurologic: Awake, alert, oriented to name, place and time. Cranial nerves II-XII are grossly intact. Gait is normal.   Neuropsychiatric: Calm, cooperative. Normal affect.     Labs: (personally reviewed)  Lab Results   Component Value Date    WBC 5.6 02/26/2018     Lab Results   Component Value Date    RBC 3.65 02/26/2018     Lab Results   Component Value Date    HGB 11.0 02/26/2018     Lab Results   Component Value Date    HCT 33.0 02/26/2018     Lab Results   Component Value Date    MCV 90 02/26/2018     Lab Results   Component Value Date    MCH 30.1 02/26/2018     Lab Results   Component Value Date    MCHC 33.3 02/26/2018     Lab Results   Component Value Date    RDW 14.1 02/26/2018     Lab " Results   Component Value Date     02/26/2018       Last Basic Metabolic Panel:  Lab Results   Component Value Date     02/26/2018      Lab Results   Component Value Date    POTASSIUM 3.7 02/26/2018     Lab Results   Component Value Date    CHLORIDE 101 02/26/2018     Lab Results   Component Value Date    SAADIA 9.9 02/26/2018     Lab Results   Component Value Date    CO2 24 02/26/2018     Lab Results   Component Value Date    BUN 12 02/26/2018     Lab Results   Component Value Date    CR 1.71 02/26/2018     Lab Results   Component Value Date    GLC 91 02/26/2018     Color Urine (no units)   Date Value   02/26/2018 Yellow     Appearance Urine (no units)   Date Value   02/26/2018 Cloudy     Glucose Urine (mg/dL)   Date Value   02/26/2018 Negative     Bilirubin Urine (no units)   Date Value   02/26/2018 Negative     Ketones Urine (mg/dL)   Date Value   02/26/2018 20 (A)     Specific Gravity Urine (no units)   Date Value   02/26/2018 1.016     pH Urine (pH)   Date Value   02/26/2018 6.0     Protein Albumin Urine (mg/dL)   Date Value   02/26/2018 100 (A)     Nitrite Urine (no units)   Date Value   02/26/2018 Negative     Leukocyte Esterase Urine (no units)   Date Value   02/26/2018 Large (A)       Procedures   PET and CT on  12/12/2017 1:25 PM :     IMPRESSION:   1. In this patient with a history of penile carcinoma status post  chemotherapy:  a. The penile urethral tumor is slightly more hypermetabolic compared  to 10/23/2017.  b. Slightly enlarged FDG avid precarinal and AP window lymph nodes,  probably reactive.     2. Scattered pulmonary nodules which are unchanged from 10/23/2017,  but improved from 8/23/2017.     3. Emphysematous changes of the lungs.     4. Pancreas divisum.     5. Enlarged pulmonary artery trunk which can be seen with pulmonary  artery hypertension.    For further details of assessment, testing, and physical exam please see H and P completed on same date.    ASSESSMENT and PLAN    Gonsalo Bruno is a 72 year old male scheduled to undergo  penectomy, ureterectomy, inguinal lymph node dissection, and ileal diversion on 3/8/18 by Dr. Haddad in treatment of penile and urethral carcinoma.      He has the following specific operative considerations:     ERAS protocol    1.  Cardiology - Denies cardiac history or symptoms.  METS>4 prior to chemotherapy.  RCRI : No serious cardiac risks.  0.4 % risk of major adverse cardiac event. No further cardiac evaluation needed per 2014 ACC/AHA guidelines for non-cardiac surgery.         HTN, take CCB DOS  2.  Pulmonary - remote smoking hx       - JEISON # of risks 3/8 = intermediate       - pulmonary nodules on imaging concerning for metastatic disease or second primary disease >>>>patient convinced it is r/t his prior TB exposure.    3.  Hematology - VTE risk:  4.5%.  Increased VTE risk: Recommend use of mechanical/chemical VTE prophylaxis in the day- and postoperative periods.         - H/O blood transfusion>>>T&S today  4.  GI - Known PONV = anti-emetic intervention recommended.        - GERD, take PPI DOS   5.  Renal - CKD, GFR 40, Cr 1.71  6.   - Stage II penile urethral carcinoma, s/p chemotherapy>>>now with above procedure planned.        - abnormal UA today>>>contacted Dr. Haddad's office and they will manage.   7. Musculoskeletal - OA of cervical spine and knuckles.     - Anesthesia considerations:  Refer to PAC assessment in anesthesia records  - Airway:  H/o dysphagia>>>>s/p repair hypopharyngeal diverticula 9/2013.  - Post-op delirium risk: elevated given age    Arrival time, NPO, shower and medication instructions provided by nursing staff today.  Preparing For Your Surgery handout given.  Patient was discussed with Dr Urrutia. I spent 20 minutes face to face with patient assessing, educating, counseling and/or coordinating care and examining the patient.  Of that 20 minutes, I spent greater than 50% of my time counseling and/or coordinating care.   All of the above labs and procedures I personally reviewed.    ISAURA Haas CNP  Preoperative Assessment Center  [facility]  [facility]  Phone: [phone]  Fax: [phone]

## 2023-12-21 NOTE — TELEPHONE ENCOUNTER
MATILDA [address] [address]  Phone: [phone]  Primary Provider: Gifty - MATILDA [facility]  Pre-op Performing Provider: GUILLERMO CALLAWAY      PREOPERATIVE EVALUATION:  Today's date: 12/21/2023    Mag is a 42 year old, presenting for the following:  Pre-Op Exam        12/21/2023     1:19 PM   Additional Questions   Roomed by Gisell GREY       Surgical Information:  Surgery/Procedure: Left Knee ACL reconstruction   Surgery Location: [facility]  Surgeon: Dr. Cynthia Spencer  Surgery Date: 12/28/23  Time of Surgery: tbd  Where patient plans to recover: At home with family  Fax number for surgical facility: [phone]    Assessment & Plan     The proposed surgical procedure is considered INTERMEDIATE risk.    Assessment and Plan  1. Preoperative clearance  2. Rupture of anterior cruciate ligament of left knee, subsequent encounter  Pt is here for Preop clearance of Arthroscopy for her LEFT KNEE ACL tear >> AT [facility] DR CYNTHIA SPENCER. No records , all the information was given by the patient in her history.   - No significant medical conditions per chart review except Rt thyroid lobectomy on 10/2022 which was benign adenoma. Last lab work at that time normal BMP, CBC. Can recheck labs and EKG which was never done in the past as per shared decision.   - Comprehensive metabolic panel (BMP + Alb, Alk Phos, ALT, AST, Total. Bili, TP); Future  - CBC with platelets; Future  - PRIMARY CARE FOLLOW-UP SCHEDULING; Future  - EKG 12-lead complete w/read - Clinics  - Comprehensive metabolic panel (BMP + Alb, Alk Phos, ALT, AST, Total. Bili, TP)  - CBC with platelets    3. Screening for HIV (human immunodeficiency virus)  - HIV Antigen Antibody Combo; Future  - HIV Antigen Antibody Combo    4. Need for hepatitis C screening test  - Hepatitis C Screen Reflex to HCV RNA Quant and Genotype; Future  - Hepatitis C Screen Reflex to HCV RNA Quant and  Pt has not responded to previous messages left by lab results team.    At 1548 Ignacia, [facility] ER Provider, recommends seeing if Urology will address at upcoming appointment.        At 1553 Tatyana RN at [facility] will forward results to Dr. Haddad and will address at next visit in two days if pt needs to be treated.     Isidra Lin, RN  [facility] RN  Lung Nodule and ED Lab Result F/u RN  Epic pool (ED late result f/u RN): [id number]  FV INCIDENTAL RADIOLOGY F/U NURSES: P [id number]  Ph# [phone]     Genotype    5. FH: breast cancer  6. Encounter for screening mammogram for breast cancer  - MA Screen Bilateral w/Tyron; Future         Please note that this note consists of symbols derived from keyboarding, dictation and/or voice recognition software. As a result, there may be errors in the script that have gone undetected. Please consider this when interpreting information found in this chart.    Patient Instructions   As discussed , will do pertinent work up needed for clearance    ===============================  Preparing for Your Surgery  Getting started  A nurse will call you to review your health history and instructions. They will give you an arrival time based on your scheduled surgery time. Please be ready to share:  Your doctor's clinic name and phone number  Your medical, surgical, and anesthesia history  A list of allergies and sensitivities  A list of medicines, including herbal treatments and over-the-counter drugs  Whether the patient has a legal guardian (ask how to send us the papers in advance)  Please tell us if you're pregnant--or if there's any chance you might be pregnant. Some surgeries may injure a fetus (unborn baby), so they require a pregnancy test. Surgeries that are safe for a fetus don't always need a test, and you can choose whether to have one.   If you have a child who's having surgery, please ask for a copy of Preparing for Your Child's Surgery.    Preparing for surgery  Within 10 to 30 days of surgery: Have a pre-op exam (sometimes called an H&P, or History and Physical). This can be done at a clinic or pre-operative center.  If you're having a , you may not need this exam. Talk to your care team.  At your pre-op exam, talk to your care team about all medicines you take. If you need to stop any medicines before surgery, ask when to start taking them again.  We do this for your safety. Many medicines can make you bleed too much during surgery. Some change how well surgery  (anesthesia) drugs work.  Call your insurance company to let them know you're having surgery. (If you don't have insurance, call [phone].)  Call your clinic if there's any change in your health. This includes signs of a cold or flu (sore throat, runny nose, cough, rash, fever). It also includes a scrape or scratch near the surgery site.  If you have questions on the day of surgery, call your hospital or surgery center.  Eating and drinking guidelines  For your safety: Unless your surgeon tells you otherwise, follow the guidelines below.  Eat and drink as usual until 8 hours before you arrive for surgery. After that, no food or milk.  Drink clear liquids until 2 hours before you arrive. These are liquids you can see through, like water, Gatorade, and Propel Water. They also include plain black coffee and tea (no cream or milk), candy, and breath mints. You can spit out gum when you arrive.  If you drink alcohol: Stop drinking it the night before surgery.  If your care team tells you to take medicine on the morning of surgery, it's okay to take it with a sip of water.  Preventing infection  Shower or bathe the night before and morning of your surgery. Follow the instructions your clinic gave you. (If no instructions, use regular soap.)  Don't shave or clip hair near your surgery site. We'll remove the hair if needed.  Don't smoke or vape the morning of surgery. You may chew nicotine gum up to 2 hours before surgery. A nicotine patch is okay.  Note: Some surgeries require you to completely quit smoking and nicotine. Check with your surgeon.  Your care team will make every effort to keep you safe from infection. We will:  Clean our hands often with soap and water (or an alcohol-based hand rub).  Clean the skin at your surgery site with a special soap that kills germs.  Give you a special gown to keep you warm. (Cold raises the risk of infection.)  Wear special hair covers, masks, gowns and gloves during  surgery.  Give antibiotic medicine, if prescribed. Not all surgeries need antibiotics.  What to bring on the day of surgery  Photo ID and insurance card  Copy of your health care directive, if you have one  Glasses and hearing aids (bring cases)  You can't wear contacts during surgery  Inhaler and eye drops, if you use them (tell us about these when you arrive)  CPAP machine or breathing device, if you use them  A few personal items, if spending the night  If you have . . .  A pacemaker, ICD (cardiac defibrillator) or other implant: Bring the ID card.  An implanted stimulator: Bring the remote control.  A legal guardian: Bring a copy of the certified (court-stamped) guardianship papers.  Please remove any jewelry, including body piercings. Leave jewelry and other valuables at home.  If you're going home the day of surgery  You must have a responsible adult drive you home. They should stay with you overnight as well.  If you don't have someone to stay with you, and you aren't safe to go home alone, we may keep you overnight. Insurance often won't pay for this.  After surgery  If it's hard to control your pain or you need more pain medicine, please call your surgeon's office.  Questions?   If you have any questions for your care team, list them here: _________________________________________________________________________________________________________________________________________________________________________ ____________________________________ ____________________________________ ____________________________________  For informational purposes only. Not to replace the advice of your health care provider. Copyright   2003, 2019 [facility]. All rights reserved. Clinically reviewed by Shantelle Ramirez MD. [employer] 251216 - REV 12/22.    Return in about 2 months (around 2/21/2024), or if symptoms worsen or fail to improve, for Preventative Visit.    Alexia Mccann MD  [facility]  [place]        - No identified additional risk factors other than previously addressed    Antiplatelet or Anticoagulation Medication Instructions:   - Patient is on no antiplatelet or anticoagulation medications.    Additional Medication Instructions:   - ibuprofen (Advil, Motrin): HOLD 1 day before surgery.     RECOMMENDATION:  APPROVAL GIVEN to proceed with proposed procedure, without further diagnostic evaluation.    Review of external notes as documented elsewhere in note  30 minutes spent by me on the date of the encounter doing chart review, review of outside records, review of test results, interpretation of tests, patient visit, and documentation       Subjective       HPI related to upcoming procedure:     Pt is new to me, last seen at [facility]. Looking for establish care with PCP.         12/21/2023     1:02 PM   Preop Questions   1. Have you ever had a heart attack or stroke? No   2. Have you ever had surgery on your heart or blood vessels, such as a stent placement, a coronary artery bypass, or surgery on an artery in your head, neck, heart, or legs? No   3. Do you have chest pain with activity? No   4. Do you have a history of  heart failure? No   5. Do you currently have a cold, bronchitis or symptoms of other infection? No   6. Do you have a cough, shortness of breath, or wheezing? No   7. Do you or anyone in your family have previous history of blood clots? UNKNOWN    8. Do you or does anyone in your family have a serious bleeding problem such as prolonged bleeding following surgeries or cuts? No   9. Have you ever had problems with anemia or been told to take iron pills? No   10. Have you had any abnormal blood loss such as black, tarry or bloody stools, or abnormal vaginal bleeding? No   11. Have you ever had a blood transfusion? No   12. Are you willing to have a blood transfusion if it is medically needed before, during, or after your surgery? Yes   13. Have you or any of your relatives ever had  problems with anesthesia? YES    14. Do you have sleep apnea, excessive snoring or daytime drowsiness? No   15. Do you have any artifical heart valves or other implanted medical devices like a pacemaker, defibrillator, or continuous glucose monitor? No   16. Do you have artificial joints? No   17. Are you allergic to latex? No   18. Is there any chance that you may be pregnant? No       Health Care Directive:  Patient does not have a Health Care Directive or Living Will: N/A     Preoperative Review of :   reviewed - no record of controlled substances prescribed.      Status of Chronic Conditions:  See problem list for active medical problems.  Problems all longstanding and stable, except as noted/documented.  See ROS for pertinent symptoms related to these conditions.    Review of Systems  CONSTITUTIONAL: NEGATIVE for fever, chills, change in weight  INTEGUMENTARY/SKIN: NEGATIVE for worrisome rashes, moles or lesions  EYES: NEGATIVE for vision changes or irritation  ENT/MOUTH: NEGATIVE for ear, mouth and throat problems  RESP: NEGATIVE for significant cough or SOB  CV: NEGATIVE for chest pain, palpitations or peripheral edema  GI: NEGATIVE for nausea, abdominal pain, heartburn, or change in bowel habits  : NEGATIVE for frequency, dysuria, or hematuria  MUSCULOSKELETAL: NEGATIVE for significant arthralgias or myalgia  NEURO: NEGATIVE for weakness, dizziness or paresthesias  ENDOCRINE: NEGATIVE for temperature intolerance, skin/hair changes  HEME: NEGATIVE for bleeding problems  PSYCHIATRIC: NEGATIVE for changes in mood or affect    There are no problems to display for this patient.     History reviewed. No pertinent past medical history.  Past Surgical History:   Procedure Laterality Date    APPENDECTOMY       SECTION  2012    THYROIDECTOMY N/A 10/12/2022    Procedure: Right thyroid lobectomy;  Surgeon: Irene Kc MD;  Location:  OR     No current outpatient medications on file.  "      No Known Allergies     Social History     Tobacco Use    Smoking status: Never    Smokeless tobacco: Never   Substance Use Topics    Alcohol use: Yes     Comment: occ     History reviewed. No pertinent family history.  History   Drug Use Unknown         Objective     /70 (BP Location: Left arm, Patient Position: Sitting, Cuff Size: Adult Regular)   Pulse 76   Temp 97.9  F (36.6  C) (Tympanic)   Resp 16   Ht 1.543 m (5' 0.73\")   Wt 51.6 kg (113 lb 12.8 oz)   LMP 11/28/2023 (Approximate)   SpO2 100%   BMI 21.70 kg/m      Physical Exam    GENERAL APPEARANCE: healthy, alert and no distress     EYES: EOMI, PERRL     HENT: ear canals and TM's normal and nose and mouth without ulcers or lesions     NECK: no adenopathy, no asymmetry, masses, or scars and thyroid normal to palpation     RESP: lungs clear to auscultation - no rales, rhonchi or wheezes     CV: regular rates and rhythm, normal S1 S2, no S3 or S4 and no murmur, click or rub     ABDOMEN:  soft, nontender, no HSM or masses and bowel sounds normal     MS: extremities normal- no gross deformities noted, no evidence of inflammation in joints, FROM in all extremities.     SKIN: no suspicious lesions or rashes     NEURO: Normal strength and tone, sensory exam grossly normal, mentation intact and speech normal     PSYCH: mentation appears normal. and affect normal/bright     LYMPHATICS: No cervical adenopathy    Recent Labs   Lab Test 10/03/22  0924   HGB 14.1         POTASSIUM 3.9   CR 0.91        Diagnostics:  Labs pending at this time.  Results will be reviewed when available.  Recent Results (from the past 720 hour(s))   CBC with platelets    Collection Time: 12/21/23  2:21 PM   Result Value Ref Range    WBC Count 9.9 4.0 - 11.0 10e3/uL    RBC Count 4.56 3.80 - 5.20 10e6/uL    Hemoglobin 13.9 11.7 - 15.7 g/dL    Hematocrit 41.1 35.0 - 47.0 %    MCV 90 78 - 100 fL    MCH 30.5 26.5 - 33.0 pg    MCHC 33.8 31.5 - 36.5 g/dL    RDW 12.5 " 10.0 - 15.0 %    Platelet Count 386 150 - 450 10e3/uL      EKG required for Intermediate risk surgery , General anaesthesia  and not completed in the last 90 days.   EKG: appears normal, NSR, normal axis, normal intervals, no acute ST/T changes c/w ischemia, no LVH by voltage criteria, there are no prior tracings available    Revised Cardiac Risk Index (RCRI):  The patient has the following serious cardiovascular risks for perioperative complications:   - No serious cardiac risks = 0 points     RCRI Interpretation: 0 points: Class I (very low risk - 0.4% complication rate)         Signed Electronically by: Alexia Mccann MD  Copy of this evaluation report is provided to requesting physician.

## (undated) DEVICE — SUTURE BOOTS 051003PBX

## (undated) DEVICE — ESU GROUND PAD ADULT W/CORD E7507

## (undated) DEVICE — PERFORATOR 14MM CODMAN

## (undated) DEVICE — SU VICRYL 2-0 CT-2 CR 8X18" J726D

## (undated) DEVICE — SUPPORTER ATHLETIC LG LATEX 202636

## (undated) DEVICE — SU VICRYL 2-0 CT-2 27" UND J269H

## (undated) DEVICE — NDL COUNTER 20CT 31142493

## (undated) DEVICE — DRSG GAUZE 4X4" TRAY 6939

## (undated) DEVICE — LINEN TOWEL PACK X6 WHITE 5487

## (undated) DEVICE — NDL ANGIOCATH 14GA 1.25" 4048

## (undated) DEVICE — KIT ENDO FIRST STEP DISINFECTANT 200ML W/POUCH EP-4

## (undated) DEVICE — SOL RINGERS LACTATED 1000ML BAG 07953-09

## (undated) DEVICE — DRAPE MAYO STAND 23X54 8337

## (undated) DEVICE — VESSEL LOOPS RED MINI 31145710

## (undated) DEVICE — FORCEP BX OVAL CUP 3FRX115CM 220130

## (undated) DEVICE — Device

## (undated) DEVICE — SU PDS II 5-0 RB-1 27" Z303H

## (undated) DEVICE — SPECIMEN CONTAINER 3OZ W/FORMALIN 59901

## (undated) DEVICE — TAPE MEDIPORE 4"X2YD 2864

## (undated) DEVICE — RX BACITRACIN OINTMENT 0.9G 1/32OZ CUR001109

## (undated) DEVICE — DRAPE POUCH INSTRUMENT 1018

## (undated) DEVICE — SOL NACL 0.9% 10ML VIAL 0409-4888-02

## (undated) DEVICE — DRSG PRIMAPORE 03 1/8X6" 66000318

## (undated) DEVICE — SU SILK 2-0 SH 30" K833H

## (undated) DEVICE — PAD CHUX UNDERPAD 23X24" 7136

## (undated) DEVICE — ADH FLOSEAL W/HUMAN THROMBIN 5ML W/APPLICATOR TIP ADS201844

## (undated) DEVICE — SU CHROMIC 4-0 SH 27" G121H

## (undated) DEVICE — SOL NACL 0.9% IRRIG 1000ML BOTTLE 2F7124

## (undated) DEVICE — DRSG PRIMAPORE 02X3" 7133

## (undated) DEVICE — CONNECTOR WATER VALVE PERFUSION PACK STR 020272801

## (undated) DEVICE — SU VICRYL 4-0 SH-1 27" J315H

## (undated) DEVICE — PREP SKIN SCRUB TRAY 4461A

## (undated) DEVICE — ESU ELEC BLADE 6" COATED E1450-6

## (undated) DEVICE — SU SILK 4-0 RB-1 CR 8X18" C054D

## (undated) DEVICE — SPONGE RAY-TEC 4X8" 7318

## (undated) DEVICE — DRSG DRAIN 4X4" 7086

## (undated) DEVICE — APPLICATOR COTTON TIP 3" 9325

## (undated) DEVICE — SPONGE SURGIFOAM 100 1974

## (undated) DEVICE — NDL TROCAR TWO-PART 18GA 20CM

## (undated) DEVICE — SOL WATER IRRIG 1000ML BOTTLE 2F7114

## (undated) DEVICE — SU CHROMIC 3-0 SH 27" G122H

## (undated) DEVICE — CRANIOTOME ADULT ANSPACH A-CRN

## (undated) DEVICE — GLOVE PROTEXIS MICRO 7.0  2D73PM70

## (undated) DEVICE — SU ETHILON 3-0 PS-1 18" 1663H

## (undated) DEVICE — LINEN TOWEL PACK X30 5481

## (undated) DEVICE — SU MONOCRYL 4-0 PS-2 27" UND Y426H

## (undated) DEVICE — CATH URETERAL OPEN END 05FR 70CM 020015

## (undated) DEVICE — SPONGE COTTONOID 1/2X1 1/2" 20-06S

## (undated) DEVICE — SU SILK 3-0 SH 30" K832H

## (undated) DEVICE — BUR ROUTER 1.4X12.8MM ANSPACH S-1R

## (undated) DEVICE — ESU CORD BIPOLAR AND IRR TUBING AESCULAP US355

## (undated) DEVICE — SU PDS II 5-0 RB-1 DA 30" Z320H

## (undated) DEVICE — TUBING IRRIG CYSTO/BLADDER SET 81" LF 2C4040

## (undated) DEVICE — SU NUROLON 4-0 TF CR 8X18" C584D

## (undated) DEVICE — PREP CHLORAPREP CLEAR 3ML 260400

## (undated) DEVICE — SPONGE DOUBLE 1.25" W/STRING 23275-690

## (undated) DEVICE — KIT UROSTOMY POUCH 2 3/4" 19254

## (undated) DEVICE — PREP POVIDONE IODINE SCRUB 7.5% 4OZ APL82212

## (undated) DEVICE — SPONGE COTTONOID 1/2X1/2" 20-04S

## (undated) DEVICE — DRAIN CHEST TUBE 28FR STR 8028

## (undated) DEVICE — PREP POVIDONE IODINE SOLUTION 10% 120ML

## (undated) DEVICE — TUBE FEEDING 08FR 20" 461701

## (undated) DEVICE — SOL RINGERS LACTATED 500ML BAG 2B2323QA

## (undated) DEVICE — PREP POVIDONE IODINE SCRUB 7.5% 120ML

## (undated) DEVICE — DRAPE CRANIOTOMY W/POUCH 9450

## (undated) DEVICE — PACK AB HYST II

## (undated) DEVICE — STRAP UNIVERSAL POSITIONING 2-PIECE 4X47X76" 91-287

## (undated) DEVICE — NDL COUNTER 40CT  31142311

## (undated) DEVICE — STPL RELOAD 80 X 3.8MM GIA8038L

## (undated) DEVICE — PACK GOWN 3/PK DISP XL SBA32GPFCB

## (undated) DEVICE — DRAPE LAP W/ARMBOARD 29410

## (undated) DEVICE — DRAIN JACKSON PRATT 19FR ROUND SU130-1325

## (undated) DEVICE — DRAIN JACKSON PRATT ROUND SIL 19FR W/TROCAR LF JP-2232

## (undated) DEVICE — SU NDL MAYO INTESTINAL 1823-6D

## (undated) DEVICE — CATH FOLEY 20FR 5ML SILVER COAT LATEX 0165SI20

## (undated) DEVICE — SU PDS II 4-0 RB-1 27" Z304H

## (undated) DEVICE — PACK CRANIOTOMY

## (undated) DEVICE — SYR BULB IRRIG 50ML LATEX FREE 0035280

## (undated) DEVICE — RETR RING LONE STAR 14.1X14.1CM 3307G

## (undated) DEVICE — SPECIMEN BAG MEDIVAC SUCTION WHITE SOCK 65652-122

## (undated) DEVICE — CATH FOLYSIL 16FR 15ML AA6116

## (undated) DEVICE — SYR 70ML TOOMEY 041170

## (undated) DEVICE — ESU ELEC CUTTING LOOP BIPOLAR 24/26FR 0.35MM  27040GP1/6

## (undated) DEVICE — NDL BUTTERFLY 21GA .75" 367296

## (undated) DEVICE — SU VICRYL 3-0 SH 27" J316H

## (undated) DEVICE — DRAPE SPLIT SHEET 77X108 REINFORCED 29436

## (undated) DEVICE — CUP AND LID 4OZ STERILE SSK9008A

## (undated) DEVICE — TUBING IV 69" STERILE 1C8160S

## (undated) DEVICE — SPONGE KITTNER 30-101

## (undated) DEVICE — LINEN TOWEL PACK X5 5464

## (undated) DEVICE — CLIP HORIZON SM RED WIDE SLOT 001201

## (undated) DEVICE — DRAPE MICROSCOPE LEICA 54X150" AR8033650

## (undated) DEVICE — WIPES FOLEY CARE SURESTEP PROVON DFC100

## (undated) DEVICE — SU UMBILICAL TAPE .125X30" U11T

## (undated) DEVICE — SU VICRYL 2-0 SH 27" UND J417H

## (undated) DEVICE — SU VICRYL 4-0 PS-2 18" UND J496H

## (undated) DEVICE — SU SILK 2-0 TIE 12X30" A305H

## (undated) DEVICE — DRAPE SHEET REV FOLD 3/4 9349

## (undated) DEVICE — SUCTION MANIFOLD DORNOCH ULTRA CART UL-CL500

## (undated) DEVICE — SYR 30ML LL W/O NDL 302832

## (undated) DEVICE — SYR PISTON URETHRAL 60ML 68000

## (undated) DEVICE — SU SILK 3-0 SH CR 8X18" C013D

## (undated) DEVICE — DRAPE TIBURON TOP SHEET 100X60" 29352

## (undated) DEVICE — SPONGE COTTONOID 1X3" 20-10S

## (undated) DEVICE — PAD CHUX UNDERPAD 30X30"

## (undated) DEVICE — SU SILK 0 TIE 6X30" A306H

## (undated) DEVICE — DRAPE POUCH IRR 1016

## (undated) DEVICE — ESU LIGASURE IMPACT OPEN SEALER/DVDR CVD LG JAW LF4418

## (undated) DEVICE — DRSG TELFA 3X8" 1238

## (undated) DEVICE — PREP POVIDONE IODINE SOLUTION 10% 4OZ

## (undated) DEVICE — JELLY LUBRICATING SURGILUBE 2OZ TUBE

## (undated) DEVICE — SYR 03ML LL W/O NDL 309657

## (undated) DEVICE — COVER CAMERA VIDEO LASER 9X96" 04-CC227

## (undated) DEVICE — SPONGE LAP 18X18" X8435

## (undated) DEVICE — CLIP HORIZON MED BLUE 002200

## (undated) DEVICE — BLADE CLIPPER SGL USE 9680

## (undated) DEVICE — HYDROGEN PEROXIDE 3% 16OZ D0012

## (undated) DEVICE — DRSG DRAIN 2X2" 7087

## (undated) DEVICE — BLADE KNIFE SURG 15C 371716

## (undated) DEVICE — LIGHT HANDLE X1 31140133

## (undated) DEVICE — GLOVE ESTEEM BLUE W/NEU-THERA 7.5  2D73PB75

## (undated) DEVICE — SYR EAR BULB 3OZ 0035830

## (undated) DEVICE — DRAPE U SPLIT 74X120" 29440

## (undated) DEVICE — ADH SKIN CLOSURE PREMIERPRO EXOFIN 1.0ML 3470

## (undated) DEVICE — GLOVE PROTEXIS W/NEU-THERA 7.0  2D73TE70

## (undated) DEVICE — SU VICRYL 4-0 RB-1 27" UND J214H

## (undated) DEVICE — PIN SKULL MAYFIELD ADULT TITANIUM 3/PK A1120

## (undated) DEVICE — SPONGE COTTONOID 1/4X1/4" 20-01S

## (undated) DEVICE — ESU ELEC NDL 1" E1552

## (undated) DEVICE — BAG URINARY DRAIN LUBRISIL IC 4000ML LF 253509A

## (undated) DEVICE — SYR 50ML LL W/O NDL 309653

## (undated) DEVICE — DRAPE STERI TOWEL SM 1000

## (undated) DEVICE — CATH FOLEY 20FR 5ML SILICONE LUBRI-SIL 175820

## (undated) DEVICE — SYR 10ML LL W/O NDL 302995

## (undated) DEVICE — VESSEL LOOPS BLUE SUPERMAXI 011022PBX

## (undated) DEVICE — SPONGE COTTONOID 1/2X3" 20-07S

## (undated) DEVICE — SU PDS II 3-0 SH 27" Z316H

## (undated) DEVICE — NDL 18GA 1.5" 305196

## (undated) DEVICE — DRAIN JACKSON PRATT RESERVOIR 100ML SU130-1305

## (undated) DEVICE — ESU ELEC BLADE 2.75" COATED/INSULATED E1455

## (undated) DEVICE — TUBING CUSA MANIFOLD 36KHZ

## (undated) DEVICE — MARKER SPHERES PASSIVE MEDT PACK 5 8801075

## (undated) DEVICE — VESSEL LOOPS YELLOW MAXI 31145694

## (undated) DEVICE — SU VICRYL 3-0 SH 8X18" UND J864D

## (undated) DEVICE — DRAIN PENROSE 1/4X18" LATEX

## (undated) DEVICE — CATH FOLEY 18FR 5ML LATEX 0165SI18

## (undated) DEVICE — SU ETHILON 2-0 FS 18" 664H

## (undated) DEVICE — SUCTION TIP YANKAUER STR K87

## (undated) DEVICE — CLIP HORIZON LG ORANGE 004200

## (undated) DEVICE — BNDG KLING 3" 2232

## (undated) DEVICE — BLADE KNIFE SURG 15 371115

## (undated) DEVICE — DRAIN JACKSON PRATT 07MM FLAT SU130-1310

## (undated) DEVICE — STPL SKIN 35W 059037

## (undated) DEVICE — SU SILK 3-0 TIE 12X30" A304H

## (undated) DEVICE — DILATOR AMPLATZ RENAL SET G14292

## (undated) DEVICE — SYR 10ML FINGER CONTROL W/O NDL 309695

## (undated) DEVICE — BLADE KNIFE SURG 10 371110

## (undated) DEVICE — DRAPE GYN/UROLOGY FLUID POUCH TUR 29455

## (undated) DEVICE — ESU LIGASURE OPEN SEALER/DIVIDER SM JAW 16.5MM LF1212A

## (undated) DEVICE — SURGICEL HEMOSTAT 4X8" 1952

## (undated) DEVICE — CUSA 36KHZ WRENCH

## (undated) DEVICE — PANTIES MESH LG/XLG 2PK 706M2

## (undated) DEVICE — DRAPE SHEET MED 44X70" 9355

## (undated) DEVICE — SOL NACL 0.9% IRRIG 3000ML BAG 2B7477

## (undated) DEVICE — ESU ELEC COAG BALL 3FRX110CM 11770T-S

## (undated) DEVICE — SU VICRYL 0 CT-1 27" UND J260H

## (undated) DEVICE — CATH FOLEY 18FR 5ML SILVER COAT SIL LUBRISIL 1758SI18

## (undated) DEVICE — RETR RING LONE STAR 28.3X18.3CM W/CATH CLIPSX2 3308G

## (undated) DEVICE — ESU HOLSTER PLASTIC DISP E2400

## (undated) DEVICE — DECANTER BAG 2002S

## (undated) DEVICE — SPONGE SURGIFOAM 01GM POWDER 1978

## (undated) DEVICE — GLOVE PROTEXIS BLUE W/NEU-THERA 7.5  2D73EB75

## (undated) DEVICE — SU SILK 2-0 SH CR 5X18" C0125

## (undated) DEVICE — SU SILK 4-0 TIE 12X30" A303H

## (undated) DEVICE — TAPE MICROPORE 2"X1.5YD 1530S-2

## (undated) DEVICE — DRSG TELFA 3"X8" NON25720

## (undated) RX ORDER — CEFAZOLIN SODIUM 1 G/3ML
INJECTION, POWDER, FOR SOLUTION INTRAMUSCULAR; INTRAVENOUS
Status: DISPENSED
Start: 2018-03-08

## (undated) RX ORDER — PHENYLEPHRINE HCL IN 0.9% NACL 1 MG/10 ML
SYRINGE (ML) INTRAVENOUS
Status: DISPENSED
Start: 2018-03-08

## (undated) RX ORDER — BACITRACIN 500 [USP'U]/G
OINTMENT OPHTHALMIC
Status: DISPENSED
Start: 2018-03-08

## (undated) RX ORDER — PROPOFOL 10 MG/ML
INJECTION, EMULSION INTRAVENOUS
Status: DISPENSED
Start: 2019-01-01

## (undated) RX ORDER — ACETAMINOPHEN 325 MG/1
TABLET ORAL
Status: DISPENSED
Start: 2018-03-08

## (undated) RX ORDER — CIPROFLOXACIN 500 MG/1
TABLET, FILM COATED ORAL
Status: DISPENSED
Start: 2018-01-29

## (undated) RX ORDER — MINERAL OIL
OIL (ML) MISCELLANEOUS
Status: DISPENSED
Start: 2018-03-08

## (undated) RX ORDER — CEFAZOLIN SODIUM 1 G/3ML
INJECTION, POWDER, FOR SOLUTION INTRAMUSCULAR; INTRAVENOUS
Status: DISPENSED
Start: 2019-01-01

## (undated) RX ORDER — PHENYLEPHRINE HCL IN 0.9% NACL 1 MG/10 ML
SYRINGE (ML) INTRAVENOUS
Status: DISPENSED
Start: 2019-01-01

## (undated) RX ORDER — LIDOCAINE HYDROCHLORIDE 20 MG/ML
INJECTION, SOLUTION EPIDURAL; INFILTRATION; INTRACAUDAL; PERINEURAL
Status: DISPENSED
Start: 2019-01-01

## (undated) RX ORDER — ACETAMINOPHEN 325 MG/1
TABLET ORAL
Status: DISPENSED
Start: 2017-08-31

## (undated) RX ORDER — FENTANYL CITRATE 50 UG/ML
INJECTION, SOLUTION INTRAMUSCULAR; INTRAVENOUS
Status: DISPENSED
Start: 2018-03-08

## (undated) RX ORDER — BACITRACIN 50000 [IU]/1
INJECTION, POWDER, FOR SOLUTION INTRAMUSCULAR
Status: DISPENSED
Start: 2019-01-01

## (undated) RX ORDER — FENTANYL CITRATE 50 UG/ML
INJECTION, SOLUTION INTRAMUSCULAR; INTRAVENOUS
Status: DISPENSED
Start: 2019-01-01

## (undated) RX ORDER — GABAPENTIN 300 MG/1
CAPSULE ORAL
Status: DISPENSED
Start: 2018-03-08

## (undated) RX ORDER — PROPOFOL 10 MG/ML
INJECTION, EMULSION INTRAVENOUS
Status: DISPENSED
Start: 2017-08-31

## (undated) RX ORDER — SODIUM CHLORIDE 9 MG/ML
INJECTION, SOLUTION INTRAVENOUS
Status: DISPENSED
Start: 2018-03-08

## (undated) RX ORDER — CEFAZOLIN SODIUM 2 G/100ML
INJECTION, SOLUTION INTRAVENOUS
Status: DISPENSED
Start: 2018-03-08

## (undated) RX ORDER — PROPOFOL 10 MG/ML
INJECTION, EMULSION INTRAVENOUS
Status: DISPENSED
Start: 2018-03-08

## (undated) RX ORDER — EPHEDRINE SULFATE 50 MG/ML
INJECTION, SOLUTION INTRAMUSCULAR; INTRAVENOUS; SUBCUTANEOUS
Status: DISPENSED
Start: 2019-01-01

## (undated) RX ORDER — ROCURONIUM BROMIDE 50 MG/5 ML
SYRINGE (ML) INTRAVENOUS
Status: DISPENSED
Start: 2018-03-08

## (undated) RX ORDER — BUPIVACAINE HYDROCHLORIDE AND EPINEPHRINE 5; 5 MG/ML; UG/ML
INJECTION, SOLUTION EPIDURAL; INTRACAUDAL; PERINEURAL
Status: DISPENSED
Start: 2018-03-08

## (undated) RX ORDER — PHENYLEPHRINE HCL IN 0.9% NACL 1 MG/10 ML
SYRINGE (ML) INTRAVENOUS
Status: DISPENSED
Start: 2017-08-31

## (undated) RX ORDER — ERTAPENEM 1 G/1
INJECTION, POWDER, LYOPHILIZED, FOR SOLUTION INTRAMUSCULAR; INTRAVENOUS
Status: DISPENSED
Start: 2018-03-08

## (undated) RX ORDER — ESMOLOL HYDROCHLORIDE 10 MG/ML
INJECTION INTRAVENOUS
Status: DISPENSED
Start: 2019-01-01

## (undated) RX ORDER — SODIUM CHLORIDE 9 MG/ML
INJECTION, SOLUTION INTRAVENOUS
Status: DISPENSED
Start: 2019-01-01

## (undated) RX ORDER — CIPROFLOXACIN 500 MG/1
TABLET, FILM COATED ORAL
Status: DISPENSED
Start: 2017-12-11

## (undated) RX ORDER — ONDANSETRON 2 MG/ML
INJECTION INTRAMUSCULAR; INTRAVENOUS
Status: DISPENSED
Start: 2019-01-01

## (undated) RX ORDER — DEXAMETHASONE SODIUM PHOSPHATE 4 MG/ML
INJECTION, SOLUTION INTRA-ARTICULAR; INTRALESIONAL; INTRAMUSCULAR; INTRAVENOUS; SOFT TISSUE
Status: DISPENSED
Start: 2018-03-08

## (undated) RX ORDER — HEPARIN SODIUM 5000 [USP'U]/.5ML
INJECTION, SOLUTION INTRAVENOUS; SUBCUTANEOUS
Status: DISPENSED
Start: 2018-03-08

## (undated) RX ORDER — CIPROFLOXACIN 500 MG/1
TABLET, FILM COATED ORAL
Status: DISPENSED
Start: 2018-02-26

## (undated) RX ORDER — OXYCODONE HYDROCHLORIDE 5 MG/1
TABLET ORAL
Status: DISPENSED
Start: 2017-08-31

## (undated) RX ORDER — MANNITOL 20 G/100ML
INJECTION, SOLUTION INTRAVENOUS
Status: DISPENSED
Start: 2019-01-01

## (undated) RX ORDER — ALBUMIN, HUMAN INJ 5% 5 %
SOLUTION INTRAVENOUS
Status: DISPENSED
Start: 2018-03-08

## (undated) RX ORDER — CIPROFLOXACIN 500 MG/1
TABLET, FILM COATED ORAL
Status: DISPENSED
Start: 2017-11-06

## (undated) RX ORDER — TOLTERODINE 4 MG/1
CAPSULE, EXTENDED RELEASE ORAL
Status: DISPENSED
Start: 2017-08-31

## (undated) RX ORDER — LABETALOL 20 MG/4 ML (5 MG/ML) INTRAVENOUS SYRINGE
Status: DISPENSED
Start: 2019-01-01

## (undated) RX ORDER — CEFAZOLIN SODIUM 1 G/50ML
SOLUTION INTRAVENOUS
Status: DISPENSED
Start: 2019-01-01

## (undated) RX ORDER — EPHEDRINE SULFATE 50 MG/ML
INJECTION, SOLUTION INTRAMUSCULAR; INTRAVENOUS; SUBCUTANEOUS
Status: DISPENSED
Start: 2018-03-08

## (undated) RX ORDER — FENTANYL CITRATE 50 UG/ML
INJECTION, SOLUTION INTRAMUSCULAR; INTRAVENOUS
Status: DISPENSED
Start: 2017-08-31

## (undated) RX ORDER — ONDANSETRON 2 MG/ML
INJECTION INTRAMUSCULAR; INTRAVENOUS
Status: DISPENSED
Start: 2018-03-08

## (undated) RX ORDER — BUPIVACAINE HYDROCHLORIDE AND EPINEPHRINE 5; 5 MG/ML; UG/ML
INJECTION, SOLUTION EPIDURAL; INTRACAUDAL; PERINEURAL
Status: DISPENSED
Start: 2019-01-01

## (undated) RX ORDER — ONDANSETRON 2 MG/ML
INJECTION INTRAMUSCULAR; INTRAVENOUS
Status: DISPENSED
Start: 2017-08-31

## (undated) RX ORDER — LIDOCAINE HYDROCHLORIDE 20 MG/ML
INJECTION, SOLUTION EPIDURAL; INFILTRATION; INTRACAUDAL; PERINEURAL
Status: DISPENSED
Start: 2018-03-08

## (undated) RX ORDER — DEXAMETHASONE SODIUM PHOSPHATE 4 MG/ML
INJECTION, SOLUTION INTRA-ARTICULAR; INTRALESIONAL; INTRAMUSCULAR; INTRAVENOUS; SOFT TISSUE
Status: DISPENSED
Start: 2017-08-31

## (undated) RX ORDER — LIDOCAINE HYDROCHLORIDE 10 MG/ML
INJECTION, SOLUTION EPIDURAL; INFILTRATION; INTRACAUDAL; PERINEURAL
Status: DISPENSED
Start: 2019-01-01

## (undated) RX ORDER — CIPROFLOXACIN 500 MG/1
TABLET, FILM COATED ORAL
Status: DISPENSED
Start: 2018-01-08

## (undated) RX ORDER — CIPROFLOXACIN 500 MG/1
TABLET, FILM COATED ORAL
Status: DISPENSED
Start: 2017-10-06

## (undated) RX ORDER — HEPARIN SODIUM (PORCINE) LOCK FLUSH IV SOLN 100 UNIT/ML 100 UNIT/ML
SOLUTION INTRAVENOUS
Status: DISPENSED
Start: 2019-01-01